# Patient Record
Sex: MALE | Race: WHITE | NOT HISPANIC OR LATINO | Employment: OTHER | URBAN - NONMETROPOLITAN AREA
[De-identification: names, ages, dates, MRNs, and addresses within clinical notes are randomized per-mention and may not be internally consistent; named-entity substitution may affect disease eponyms.]

---

## 2017-01-03 ENCOUNTER — COMMUNICATION - GICH (OUTPATIENT)
Dept: FAMILY MEDICINE | Facility: OTHER | Age: 62
End: 2017-01-03

## 2017-01-03 DIAGNOSIS — I20.89 OTHER FORMS OF ANGINA PECTORIS (H): ICD-10-CM

## 2017-01-14 ENCOUNTER — COMMUNICATION - GICH (OUTPATIENT)
Dept: FAMILY MEDICINE | Facility: OTHER | Age: 62
End: 2017-01-14

## 2017-01-14 DIAGNOSIS — I20.89 OTHER FORMS OF ANGINA PECTORIS (H): ICD-10-CM

## 2017-01-17 ENCOUNTER — COMMUNICATION - GICH (OUTPATIENT)
Dept: RADIOLOGY | Facility: OTHER | Age: 62
End: 2017-01-17

## 2017-01-20 ENCOUNTER — COMMUNICATION - GICH (OUTPATIENT)
Dept: CARDIAC REHAB | Facility: OTHER | Age: 62
End: 2017-01-20

## 2017-01-24 ENCOUNTER — AMBULATORY - GICH (OUTPATIENT)
Dept: FAMILY MEDICINE | Facility: OTHER | Age: 62
End: 2017-01-24

## 2017-01-24 ENCOUNTER — TRANSFERRED RECORDS (OUTPATIENT)
Dept: HEALTH INFORMATION MANAGEMENT | Facility: CLINIC | Age: 62
End: 2017-01-24

## 2017-01-24 ENCOUNTER — HOSPITAL ENCOUNTER (OUTPATIENT)
Dept: RADIOLOGY | Facility: OTHER | Age: 62
End: 2017-01-24
Attending: FAMILY MEDICINE

## 2017-01-24 ENCOUNTER — OFFICE VISIT - GICH (OUTPATIENT)
Dept: FAMILY MEDICINE | Facility: OTHER | Age: 62
End: 2017-01-24

## 2017-01-24 ENCOUNTER — AMBULATORY - GICH (OUTPATIENT)
Dept: SCHEDULING | Facility: OTHER | Age: 62
End: 2017-01-24

## 2017-01-24 ENCOUNTER — MEDICAL CORRESPONDENCE (OUTPATIENT)
Facility: CLINIC | Age: 62
End: 2017-01-24
Payer: COMMERCIAL

## 2017-01-24 DIAGNOSIS — I71.9 AORTIC ANEURYSM WITHOUT RUPTURE (H): ICD-10-CM

## 2017-01-24 DIAGNOSIS — I20.89 OTHER FORMS OF ANGINA PECTORIS (H): ICD-10-CM

## 2017-01-24 DIAGNOSIS — R07.9 CHEST PAIN: ICD-10-CM

## 2017-01-24 DIAGNOSIS — R94.31 ABNORMAL ELECTROCARDIOGRAM: ICD-10-CM

## 2017-01-24 DIAGNOSIS — K21.9 GASTRO-ESOPHAGEAL REFLUX DISEASE WITHOUT ESOPHAGITIS: ICD-10-CM

## 2017-01-24 DIAGNOSIS — M51.369 OTHER INTERVERTEBRAL DISC DEGENERATION, LUMBAR REGION: ICD-10-CM

## 2017-01-24 DIAGNOSIS — M54.16 RADICULOPATHY OF LUMBAR REGION: ICD-10-CM

## 2017-01-24 DIAGNOSIS — I10 ESSENTIAL (PRIMARY) HYPERTENSION: ICD-10-CM

## 2017-01-24 PROCEDURE — 93350 STRESS TTE ONLY: CPT | Mod: 26 | Performed by: INTERNAL MEDICINE

## 2017-01-24 PROCEDURE — 93016 CV STRESS TEST SUPVJ ONLY: CPT | Performed by: INTERNAL MEDICINE

## 2017-01-24 PROCEDURE — 93325 DOPPLER ECHO COLOR FLOW MAPG: CPT | Mod: 26 | Performed by: INTERNAL MEDICINE

## 2017-01-24 PROCEDURE — 93321 DOPPLER ECHO F-UP/LMTD STD: CPT | Mod: 26 | Performed by: INTERNAL MEDICINE

## 2017-01-24 PROCEDURE — 93018 CV STRESS TEST I&R ONLY: CPT | Performed by: INTERNAL MEDICINE

## 2017-02-02 ENCOUNTER — COMMUNICATION - GICH (OUTPATIENT)
Dept: FAMILY MEDICINE | Facility: OTHER | Age: 62
End: 2017-02-02

## 2017-02-13 ENCOUNTER — AMBULATORY - GICH (OUTPATIENT)
Dept: SCHEDULING | Facility: OTHER | Age: 62
End: 2017-02-13

## 2017-02-13 ENCOUNTER — COMMUNICATION - GICH (OUTPATIENT)
Dept: FAMILY MEDICINE | Facility: OTHER | Age: 62
End: 2017-02-13

## 2017-02-13 DIAGNOSIS — I20.89 OTHER FORMS OF ANGINA PECTORIS (H): ICD-10-CM

## 2017-02-14 ENCOUNTER — AMBULATORY - GICH (OUTPATIENT)
Dept: RADIOLOGY | Facility: OTHER | Age: 62
End: 2017-02-14

## 2017-02-14 ENCOUNTER — COMMUNICATION - GICH (OUTPATIENT)
Dept: FAMILY MEDICINE | Facility: OTHER | Age: 62
End: 2017-02-14

## 2017-02-14 DIAGNOSIS — M48.061 SPINAL STENOSIS OF LUMBAR REGION: ICD-10-CM

## 2017-02-14 DIAGNOSIS — M54.16 RADICULOPATHY OF LUMBAR REGION: ICD-10-CM

## 2017-02-16 ENCOUNTER — HOSPITAL ENCOUNTER (OUTPATIENT)
Dept: RADIOLOGY | Facility: OTHER | Age: 62
End: 2017-02-16

## 2017-02-16 DIAGNOSIS — M54.16 RADICULOPATHY OF LUMBAR REGION: ICD-10-CM

## 2017-02-16 DIAGNOSIS — M48.061 SPINAL STENOSIS OF LUMBAR REGION: ICD-10-CM

## 2017-02-17 ENCOUNTER — OFFICE VISIT - GICH (OUTPATIENT)
Dept: FAMILY MEDICINE | Facility: OTHER | Age: 62
End: 2017-02-17

## 2017-02-17 ENCOUNTER — HISTORY (OUTPATIENT)
Dept: FAMILY MEDICINE | Facility: OTHER | Age: 62
End: 2017-02-17

## 2017-02-17 DIAGNOSIS — M51.369 OTHER INTERVERTEBRAL DISC DEGENERATION, LUMBAR REGION: ICD-10-CM

## 2017-02-17 DIAGNOSIS — M54.16 RADICULOPATHY OF LUMBAR REGION: ICD-10-CM

## 2017-02-23 ENCOUNTER — COMMUNICATION - GICH (OUTPATIENT)
Dept: FAMILY MEDICINE | Facility: OTHER | Age: 62
End: 2017-02-23

## 2017-02-23 DIAGNOSIS — I20.89 OTHER FORMS OF ANGINA PECTORIS (H): ICD-10-CM

## 2017-03-08 ENCOUNTER — COMMUNICATION - GICH (OUTPATIENT)
Dept: FAMILY MEDICINE | Facility: OTHER | Age: 62
End: 2017-03-08

## 2017-03-08 ENCOUNTER — OFFICE VISIT - GICH (OUTPATIENT)
Dept: FAMILY MEDICINE | Facility: OTHER | Age: 62
End: 2017-03-08

## 2017-03-08 DIAGNOSIS — E78.00 PURE HYPERCHOLESTEROLEMIA: ICD-10-CM

## 2017-03-08 DIAGNOSIS — M54.16 RADICULOPATHY OF LUMBAR REGION: ICD-10-CM

## 2017-03-08 DIAGNOSIS — C61 MALIGNANT NEOPLASM OF PROSTATE (H): ICD-10-CM

## 2017-03-08 LAB
A/G RATIO - HISTORICAL: 1.5 (ref 1–2)
ALBUMIN SERPL-MCNC: 4.1 G/DL (ref 3.5–5.7)
ALP SERPL-CCNC: 70 IU/L (ref 34–104)
ALT (SGPT) - HISTORICAL: 12 IU/L (ref 7–52)
ANION GAP - HISTORICAL: 7 (ref 5–18)
AST SERPL-CCNC: 12 IU/L (ref 13–39)
BILIRUB SERPL-MCNC: 0.8 MG/DL (ref 0.3–1)
BUN SERPL-MCNC: 9 MG/DL (ref 7–25)
BUN/CREAT RATIO - HISTORICAL: 11
CALCIUM SERPL-MCNC: 9.3 MG/DL (ref 8.6–10.3)
CHLORIDE SERPLBLD-SCNC: 105 MMOL/L (ref 98–107)
CHOL/HDL RATIO - HISTORICAL: 2.65
CHOLESTEROL TOTAL: 98 MG/DL
CO2 SERPL-SCNC: 27 MMOL/L (ref 21–31)
CREAT SERPL-MCNC: 0.8 MG/DL (ref 0.7–1.3)
GFR IF NOT AFRICAN AMERICAN - HISTORICAL: >60 ML/MIN/1.73M2
GLOBULIN - HISTORICAL: 2.8 G/DL (ref 2–3.7)
GLUCOSE SERPL-MCNC: 106 MG/DL (ref 70–105)
HDLC SERPL-MCNC: 37 MG/DL (ref 23–92)
LDLC SERPL CALC-MCNC: 29 MG/DL
NON-HDL CHOLESTEROL - HISTORICAL: 61 MG/DL
PATIENT STATUS - HISTORICAL: ABNORMAL
POTASSIUM SERPL-SCNC: 4.1 MMOL/L (ref 3.5–5.1)
PROT SERPL-MCNC: 6.9 G/DL (ref 6.4–8.9)
PSA TOTAL (DIAGNOSTIC) - HISTORICAL: 0.26 NG/ML
SODIUM SERPL-SCNC: 139 MMOL/L (ref 133–143)
TRIGL SERPL-MCNC: 161 MG/DL

## 2017-03-29 ENCOUNTER — COMMUNICATION - GICH (OUTPATIENT)
Dept: FAMILY MEDICINE | Facility: OTHER | Age: 62
End: 2017-03-29

## 2017-04-07 ENCOUNTER — HISTORY (OUTPATIENT)
Dept: FAMILY MEDICINE | Facility: OTHER | Age: 62
End: 2017-04-07

## 2017-04-07 ENCOUNTER — AMBULATORY - GICH (OUTPATIENT)
Dept: FAMILY MEDICINE | Facility: OTHER | Age: 62
End: 2017-04-07

## 2017-04-07 DIAGNOSIS — Z01.810 ENCOUNTER FOR PREPROCEDURAL CARDIOVASCULAR EXAMINATION: ICD-10-CM

## 2017-04-07 DIAGNOSIS — M54.16 RADICULOPATHY OF LUMBAR REGION: ICD-10-CM

## 2017-04-07 LAB
A/G RATIO - HISTORICAL: 1.4 (ref 1–2)
ABSOLUTE BASOPHILS - HISTORICAL: 0.1 THOU/CU MM
ABSOLUTE EOSINOPHILS - HISTORICAL: 0.2 THOU/CU MM
ABSOLUTE LYMPHOCYTES - HISTORICAL: 1.4 THOU/CU MM (ref 0.9–2.9)
ABSOLUTE MONOCYTES - HISTORICAL: 0.4 THOU/CU MM
ABSOLUTE NEUTROPHILS - HISTORICAL: 3.2 THOU/CU MM (ref 1.7–7)
ALBUMIN SERPL-MCNC: 4 G/DL (ref 3.5–5.7)
ALP SERPL-CCNC: 67 IU/L (ref 34–104)
ALT (SGPT) - HISTORICAL: 13 IU/L (ref 7–52)
ANION GAP - HISTORICAL: 8 (ref 5–18)
AST SERPL-CCNC: 15 IU/L (ref 13–39)
BASOPHILS # BLD AUTO: 1.1 %
BILIRUB SERPL-MCNC: 0.5 MG/DL (ref 0.3–1)
BUN SERPL-MCNC: 24 MG/DL (ref 7–25)
BUN/CREAT RATIO - HISTORICAL: 32
CALCIUM SERPL-MCNC: 9.1 MG/DL (ref 8.6–10.3)
CHLORIDE SERPLBLD-SCNC: 107 MMOL/L (ref 98–107)
CO2 SERPL-SCNC: 23 MMOL/L (ref 21–31)
CREAT SERPL-MCNC: 0.76 MG/DL (ref 0.7–1.3)
EOSINOPHIL NFR BLD AUTO: 4.4 %
ERYTHROCYTE [DISTWIDTH] IN BLOOD BY AUTOMATED COUNT: 12.4 % (ref 11.5–15.5)
GFR IF NOT AFRICAN AMERICAN - HISTORICAL: >60 ML/MIN/1.73M2
GLOBULIN - HISTORICAL: 2.9 G/DL (ref 2–3.7)
GLUCOSE SERPL-MCNC: 98 MG/DL (ref 70–105)
HCT VFR BLD AUTO: 43.8 % (ref 37–53)
HEMOGLOBIN: 14.7 G/DL (ref 13.5–17.5)
INR - HISTORICAL: 1.1
LYMPHOCYTES NFR BLD AUTO: 26 % (ref 20–44)
MCH RBC QN AUTO: 29.7 PG (ref 26–34)
MCHC RBC AUTO-ENTMCNC: 33.6 G/DL (ref 32–36)
MCV RBC AUTO: 88 FL (ref 80–100)
MONOCYTES NFR BLD AUTO: 7.6 %
NEUTROPHILS NFR BLD AUTO: 60.9 % (ref 42–72)
PLATELET # BLD AUTO: 179 THOU/CU MM (ref 140–440)
PMV BLD: 10.3 FL (ref 6.5–11)
POTASSIUM SERPL-SCNC: 4 MMOL/L (ref 3.5–5.1)
PROT SERPL-MCNC: 6.9 G/DL (ref 6.4–8.9)
PROTIME - HISTORICAL: 11.4 SEC (ref 11.9–15.2)
PSA TOTAL (DIAGNOSTIC) - HISTORICAL: 0.27 NG/ML
RED BLOOD COUNT - HISTORICAL: 4.95 MIL/CU MM (ref 4.3–5.9)
SODIUM SERPL-SCNC: 138 MMOL/L (ref 133–143)
WHITE BLOOD COUNT - HISTORICAL: 5.2 THOU/CU MM (ref 4.5–11)

## 2017-05-11 ENCOUNTER — COMMUNICATION - GICH (OUTPATIENT)
Dept: FAMILY MEDICINE | Facility: OTHER | Age: 62
End: 2017-05-11

## 2017-05-12 ENCOUNTER — AMBULATORY - GICH (OUTPATIENT)
Dept: FAMILY MEDICINE | Facility: OTHER | Age: 62
End: 2017-05-12

## 2017-05-17 ENCOUNTER — OFFICE VISIT - GICH (OUTPATIENT)
Dept: FAMILY MEDICINE | Facility: OTHER | Age: 62
End: 2017-05-17

## 2017-05-17 ENCOUNTER — HISTORY (OUTPATIENT)
Dept: FAMILY MEDICINE | Facility: OTHER | Age: 62
End: 2017-05-17

## 2017-05-17 DIAGNOSIS — M54.50 LOW BACK PAIN: ICD-10-CM

## 2017-05-17 DIAGNOSIS — G89.29 OTHER CHRONIC PAIN: ICD-10-CM

## 2017-05-17 DIAGNOSIS — G89.18 OTHER ACUTE POSTPROCEDURAL PAIN: ICD-10-CM

## 2017-05-17 DIAGNOSIS — Z79.899 OTHER LONG TERM (CURRENT) DRUG THERAPY: ICD-10-CM

## 2017-05-17 DIAGNOSIS — I10 ESSENTIAL (PRIMARY) HYPERTENSION: ICD-10-CM

## 2017-06-15 ENCOUNTER — AMBULATORY - GICH (OUTPATIENT)
Dept: RADIOLOGY | Facility: OTHER | Age: 62
End: 2017-06-15

## 2017-06-15 ENCOUNTER — AMBULATORY - GICH (OUTPATIENT)
Dept: SCHEDULING | Facility: OTHER | Age: 62
End: 2017-06-15

## 2017-06-19 ENCOUNTER — COMMUNICATION - GICH (OUTPATIENT)
Dept: FAMILY MEDICINE | Facility: OTHER | Age: 62
End: 2017-06-19

## 2017-06-28 ENCOUNTER — AMBULATORY - GICH (OUTPATIENT)
Dept: PHYSICAL THERAPY | Facility: OTHER | Age: 62
End: 2017-06-28

## 2017-06-28 ENCOUNTER — OFFICE VISIT - GICH (OUTPATIENT)
Dept: FAMILY MEDICINE | Facility: OTHER | Age: 62
End: 2017-06-28

## 2017-06-28 ENCOUNTER — HISTORY (OUTPATIENT)
Dept: FAMILY MEDICINE | Facility: OTHER | Age: 62
End: 2017-06-28

## 2017-06-28 DIAGNOSIS — M54.50 LOW BACK PAIN: ICD-10-CM

## 2017-06-28 DIAGNOSIS — M48.061 SPINAL STENOSIS OF LUMBAR REGION: ICD-10-CM

## 2017-06-28 DIAGNOSIS — G89.18 OTHER ACUTE POSTPROCEDURAL PAIN: ICD-10-CM

## 2017-06-28 DIAGNOSIS — G89.29 OTHER CHRONIC PAIN: ICD-10-CM

## 2017-07-07 ENCOUNTER — AMBULATORY - GICH (OUTPATIENT)
Dept: SCHEDULING | Facility: OTHER | Age: 62
End: 2017-07-07

## 2017-07-12 ENCOUNTER — COMMUNICATION - GICH (OUTPATIENT)
Dept: FAMILY MEDICINE | Facility: OTHER | Age: 62
End: 2017-07-12

## 2017-07-13 ENCOUNTER — AMBULATORY - GICH (OUTPATIENT)
Dept: SCHEDULING | Facility: OTHER | Age: 62
End: 2017-07-13

## 2017-07-14 ENCOUNTER — AMBULATORY - GICH (OUTPATIENT)
Dept: SCHEDULING | Facility: OTHER | Age: 62
End: 2017-07-14

## 2017-07-14 ENCOUNTER — HOSPITAL ENCOUNTER (OUTPATIENT)
Dept: PHYSICAL THERAPY | Facility: OTHER | Age: 62
Setting detail: THERAPIES SERIES
End: 2017-07-14

## 2017-07-14 DIAGNOSIS — M48.061 SPINAL STENOSIS OF LUMBAR REGION: ICD-10-CM

## 2017-07-18 ENCOUNTER — HOSPITAL ENCOUNTER (OUTPATIENT)
Dept: PHYSICAL THERAPY | Facility: OTHER | Age: 62
Setting detail: THERAPIES SERIES
End: 2017-07-18

## 2017-07-20 ENCOUNTER — AMBULATORY - GICH (OUTPATIENT)
Dept: SCHEDULING | Facility: OTHER | Age: 62
End: 2017-07-20

## 2017-07-21 ENCOUNTER — HOSPITAL ENCOUNTER (OUTPATIENT)
Dept: PHYSICAL THERAPY | Facility: OTHER | Age: 62
Setting detail: THERAPIES SERIES
End: 2017-07-21

## 2017-07-24 ENCOUNTER — HOSPITAL ENCOUNTER (OUTPATIENT)
Dept: PHYSICAL THERAPY | Facility: OTHER | Age: 62
Setting detail: THERAPIES SERIES
End: 2017-07-24

## 2017-07-27 ENCOUNTER — HOSPITAL ENCOUNTER (OUTPATIENT)
Dept: PHYSICAL THERAPY | Facility: OTHER | Age: 62
Setting detail: THERAPIES SERIES
End: 2017-07-27

## 2017-07-31 ENCOUNTER — HOSPITAL ENCOUNTER (OUTPATIENT)
Dept: PHYSICAL THERAPY | Facility: OTHER | Age: 62
Setting detail: THERAPIES SERIES
End: 2017-07-31

## 2017-08-01 ENCOUNTER — COMMUNICATION - GICH (OUTPATIENT)
Dept: FAMILY MEDICINE | Facility: OTHER | Age: 62
End: 2017-08-01

## 2017-08-01 ENCOUNTER — OFFICE VISIT - GICH (OUTPATIENT)
Dept: FAMILY MEDICINE | Facility: OTHER | Age: 62
End: 2017-08-01

## 2017-08-01 ENCOUNTER — AMBULATORY - GICH (OUTPATIENT)
Dept: SCHEDULING | Facility: OTHER | Age: 62
End: 2017-08-01

## 2017-08-01 ENCOUNTER — HISTORY (OUTPATIENT)
Dept: FAMILY MEDICINE | Facility: OTHER | Age: 62
End: 2017-08-01

## 2017-08-01 DIAGNOSIS — M54.50 LOW BACK PAIN: ICD-10-CM

## 2017-08-01 DIAGNOSIS — G89.29 OTHER CHRONIC PAIN: ICD-10-CM

## 2017-08-03 ENCOUNTER — HOSPITAL ENCOUNTER (OUTPATIENT)
Dept: PHYSICAL THERAPY | Facility: OTHER | Age: 62
Setting detail: THERAPIES SERIES
End: 2017-08-03

## 2017-08-28 ENCOUNTER — COMMUNICATION - GICH (OUTPATIENT)
Dept: FAMILY MEDICINE | Facility: OTHER | Age: 62
End: 2017-08-28

## 2017-09-25 ENCOUNTER — OFFICE VISIT - GICH (OUTPATIENT)
Dept: FAMILY MEDICINE | Facility: OTHER | Age: 62
End: 2017-09-25

## 2017-09-25 ENCOUNTER — HISTORY (OUTPATIENT)
Dept: FAMILY MEDICINE | Facility: OTHER | Age: 62
End: 2017-09-25

## 2017-09-25 ENCOUNTER — COMMUNICATION - GICH (OUTPATIENT)
Dept: FAMILY MEDICINE | Facility: OTHER | Age: 62
End: 2017-09-25

## 2017-09-25 DIAGNOSIS — M48.061 SPINAL STENOSIS OF LUMBAR REGION: ICD-10-CM

## 2017-09-25 DIAGNOSIS — Z23 ENCOUNTER FOR IMMUNIZATION: ICD-10-CM

## 2017-09-25 DIAGNOSIS — Z79.899 OTHER LONG TERM (CURRENT) DRUG THERAPY: ICD-10-CM

## 2017-09-25 DIAGNOSIS — N39.492 POSTURAL URINARY INCONTINENCE: ICD-10-CM

## 2017-09-25 DIAGNOSIS — C61 MALIGNANT NEOPLASM OF PROSTATE (H): ICD-10-CM

## 2017-10-17 ENCOUNTER — COMMUNICATION - GICH (OUTPATIENT)
Dept: FAMILY MEDICINE | Facility: OTHER | Age: 62
End: 2017-10-17

## 2017-10-18 ENCOUNTER — OFFICE VISIT - GICH (OUTPATIENT)
Dept: FAMILY MEDICINE | Facility: OTHER | Age: 62
End: 2017-10-18

## 2017-10-18 ENCOUNTER — HISTORY (OUTPATIENT)
Dept: FAMILY MEDICINE | Facility: OTHER | Age: 62
End: 2017-10-18

## 2017-10-18 DIAGNOSIS — G89.29 OTHER CHRONIC PAIN: ICD-10-CM

## 2017-10-18 DIAGNOSIS — C61 MALIGNANT NEOPLASM OF PROSTATE (H): ICD-10-CM

## 2017-10-18 DIAGNOSIS — M54.50 LOW BACK PAIN: ICD-10-CM

## 2017-11-04 ENCOUNTER — AMBULATORY - GICH (OUTPATIENT)
Dept: SCHEDULING | Facility: OTHER | Age: 62
End: 2017-11-04

## 2017-11-07 ENCOUNTER — COMMUNICATION - GICH (OUTPATIENT)
Dept: FAMILY MEDICINE | Facility: OTHER | Age: 62
End: 2017-11-07

## 2017-12-05 ENCOUNTER — COMMUNICATION - GICH (OUTPATIENT)
Dept: FAMILY MEDICINE | Facility: OTHER | Age: 62
End: 2017-12-05

## 2017-12-06 ENCOUNTER — AMBULATORY - GICH (OUTPATIENT)
Dept: SCHEDULING | Facility: OTHER | Age: 62
End: 2017-12-06

## 2017-12-22 ENCOUNTER — COMMUNICATION - GICH (OUTPATIENT)
Dept: FAMILY MEDICINE | Facility: OTHER | Age: 62
End: 2017-12-22

## 2017-12-22 DIAGNOSIS — M54.50 LOW BACK PAIN: ICD-10-CM

## 2017-12-22 DIAGNOSIS — G89.29 OTHER CHRONIC PAIN: ICD-10-CM

## 2017-12-27 NOTE — PROGRESS NOTES
"Patient Information     Patient Name MRN Sex Dilip Lomax [id number] Male 1955      Progress Notes by Mago Musa at 2017  1:26 PM     Author:  Mago Musa Service:  (none) Author Type:  PT- Physical Therapy Assistant     Filed:  2017  4:20 PM Date of Service:  2017  1:26 PM Status:  Signed     :  Mago Musa (PT- Physical Therapy Assistant)            [facility]  Outpatient PT - Daily Note      Date of Service: 2017   Appointment #3  Patient Name: Dilip Kan   YOB: 1955   Referring MD/Provider: (not recorded)  Medical and Treatment Diagnosis: (not recorded)  PT Treatment Diagnosis: Spinal stenosis at L4-L5 level [M48.06] , neurogenic claudication  Insurance: Other: UMR/UMR  Start of Service:  2017     Certification Dates: Start of Service: 2017                        Medicare/MA Re-Cert Due: 2017     Surgery date: 2017. Procedure L4-5 PSF. Dr. Reeder [facility]      Subjective        Pain Rating:  Today pain is \"real bad\": He has increased pain with sitting and driving.    3 = Mild Pain, (Bothersome, Annoying, Irritating, Nagging) and  4 = Moderate Pain, (Aggravating, Grueling, Upsetting, Frustrating) / Location:  Mid to low lumbar with radiation to the upper lateral right hip.    According to Dr. Reeder, surgery site looks good, may have tweaked something a few weeks ago which is causing his current pain. Reports he was told to start doing more, within reason and as pain allows.     Objective      Today's Intervention:    1) Reviewed Physical Therapy POC  2) Reviewed initial therapeutic exercise program    Treadmill x 10 minutes 1.0 mph - progressed to 1.5 mph   NuStep x 10 minutes level 4 seat and arms at position 11    Completed   1) PPT 5 second x20 - reinstructed for greater ROM vs ab set   2) PPT with SLR x 10 B  3) squats with PPT x 10  4) hooklying lower trunk rotation B x10 "     Passive HS stretch B 5 x30 second hold       Interferential e-stim x 20 minutes - Patient declined, did not feel like pain levels required it today.     Plan: quad and hip flexor stretches     Home Exercise Program:    Supine lumbar rotation  PPT  PPT with SLR  Gentle knee to chest      Assessment    Therapist Assessment:    Functional limitation due to residual LBP        Goals:  Patient goal (time reference required): 8-10 weeks.  ST) Patient is to be independent in their Home Exercise Program in 4-5 weeks.  2) Patient will report a 30% decrease in LBP in 4-5 weeks  3) Patient will be able to flex forward to reach his ankles in 4  4) Patient will demo an improvement of LE strength of  1/2 to 1 full MMT grade in 4-5 weeks         Patients long term functional goals:   Patient is to tolerate walking with 0-210 pain up to 45 minutes in 8 weeks.  Patient is to walk with a normal gait pattern in 8 weeks.  Patient is to have improved spine mobility to NML to allow for improved dressing and self-cares with 0-2 pain in 8-10 weeks.  Patient is to sleep with rare disruptions due to pain in 8-10 weeks.  Patient is to complete work activities with 0-2/10 pain with no restrictions in 12 weeks.  Patient is to demonstrate improved core and abdominal strength to allow work tasks around tasks in 12 weeks.     Patient is to report /10 pain during household activities including light cleaning, self-cares in 8 weeks    Response to Intervention:  Patient had no c/o pain with aerobic conditioning. Note back pain when getting up from sitting/supine positions       Plan    Treatment Plan / Targeted Outcomes:     Frequency:   16 visits     Duration of Treatment: 60 days    Planned Interventions:    Home Exercise Program development  Therapeutic Exercise (ROM & Strengthening)  Manual Therapy  Neuromuscular Re-education  Ultrasound  Electrical Stimulation  Phonophoresis with Ketoprofen  Iontophoresis with Dexamethasone  Therapeutic  Activities    Plan for next visit:  Continue PT    Student or PTA has been instructed in and demonstrates skills necessary to carry out above stated treatment plan: Yes    Thank you for your referral to [facility].  Please call with any questions, concerns or comments.  [phone]

## 2017-12-27 NOTE — PROGRESS NOTES
Patient Information     Patient Name MRN Dilip Kumar [id number] Male 1955      Progress Notes by Sumanth Roberts MD at 2017  2:30 PM     Author:  Sumanth Roberts MD Service:  (none) Author Type:  Physician     Filed:  7/3/2017 10:57 AM Encounter Date:  2017 Status:  Signed     :  Sumanth Roberts MD (Physician)            SUBJECTIVE:  Dilip Kan is a 62 y.o. Male.  He comes in today to follow-up for pain management on chronic low back pain. Recently had surgery. Has had gradual improvement in pain and has gradually reduced his narcotics since surgery. He had cut down to 8 pills per day of hydrocodone for total oral morphine equivalent of 80 mg up until about a week ago. He reports he was taking the garbage out and afterwards had intense pain in his low back. Upon further discussion right around this time he also started walking for about a half hour per day. He reports the pain is localized to his low back. Seems to be worse with activity, better when he lays down. He has not had any numbness or weakness. He has not had any urinary changes. Specifically no bowel or bladder incontinence. Because of this he has increased his hydrocodone back to 12 pills per day. He does think overall symptoms have improved over the past day or 2 but is still using the higher dose of hydrocodone. He has not had any oxycodone now for couple of weeks.      Social History        Substance Use Topics          Smoking status:   Former Smoker      Packs/day:  1.00      Years:  20.00      Types:  Cigarettes      Quit date:  2002      Smokeless tobacco:   Current User      Types:  Snuff      Alcohol use   Yes      Comment: 5 drinks a month         I have personally reviewed and updated above noted social, family and/or past medical history.    A comprehensive review of systems was negative except for items noted in HPI/Subjective.      OBJECTIVE:  /64  Pulse 68  Temp 98.5  " F (36.9  C) (Tympanic)  Ht 1.797 m (5' 10.75\")  Wt 99.5 kg (219 lb 6.4 oz)  BMI 30.82 kg/m2  EXAM:  General Appearance: Pleasant, alert, appropriate appearance for age. No acute distress  Chest/Respiratory Exam: Normal chest wall and respirations. Clear to auscultation.  Cardiovascular Exam: Regular rate and rhythm. S1, S2, no murmur, click, gallop, or rubs.  Gastrointestinal Exam: Soft, nontender, no abnormal masses or organomegaly.  Musculoskeletal Exam: Patient is tender with palpation of paraspinous muscles. He does not have midline or bony tenderness today.  Neurologic Exam: Nonfocal; symmetric DTRs, normal gross motor movement, tone, and coordination. No tremor.  Skin: No redness or abnormality.    ASSESSMENT/Plan :          Dilip was seen today for follow up.    Diagnoses and all orders for this visit:    Chronic low back pain, unspecified back pain laterality, with sciatica presence unspecified  patient's exam is reassuring. I think he likely has overdone it with increasing his activity a little fast. It is also reassuring that things have improved over the past day or 2. Patient had #340 hydrocodone last 34 days after surgery he had it refilled on June 12 for lower quantity of #300. He reports that he probably has 10-12 days of hydrocodone left at his current increased dose, but would like to have it filled on July 3 due to holiday. I think that is reasonable. He has been very reliable.  His current oral morphine equivalent is right around the 100 mg. It has decreased since surgery. Hopefully we can continue to decrease and patient's goal is to get off the medication altogether if possible. I did write for him a second prescription for #300 to be filled 30 days later. We will see him back in September and hopefully at this point we can significantly decrease his hydrocodone. I explained to him that if in the meantime he does not require as much medications he should only take what is needed andhis " prescription would last longer. This will make tapering even easier.     reviewed. Tox screen up-to-date.  We'll hold off on changing his narcotic agreement as his monthly prescription and care plan is in flux right now      -     Discontinue: HYDROcodone-acetaminophen,  mg, (NORCO )  mg per tablet; Take 1-2 tablets by mouth every 4 hours if needed  Ok to refill on 7/3/17  -     HYDROcodone-acetaminophen,  mg, (NORCO )  mg per tablet; Take 1-2 tablets by mouth every 4 hours if needed  Ok to refill on 8/3/17    Acute postoperative pain  with some decreased mobility and ongoing narcotics has had some intermittent constipation. Needs refill of senna. This has worked well in the past.  -     sennosides-docusate, 8.6-50 mg, (SENOKOT S) 8.6-50 mg tablet; Take 1-4 tablets by mouth 2 times daily.      Greater than 50% of this 25 minute visit was spent in counseling and coordination of care regarding diagnosis and treatment of chronic low back pain    There are no Patient Instructions on file for this visit.    Sumanth Roberts MD    This document was created using computer generated templates and voice activated software.

## 2017-12-27 NOTE — PROGRESS NOTES
Patient Information     Patient Name MRN Sex Dilip Lomax [id number] Male 1955      Progress Notes by Kee Tobar PT at 2017  1:52 PM     Author:  Kee Tobar PT Service:  (none) Author Type:  PT- Physical Therapist     Filed:  2017  2:35 PM Date of Service:  2017  1:52 PM Status:  Signed     :  Kee Tobar PT (PT- Physical Therapist)            [facility]  Outpatient PT - Daily Note      Date of Service:  2017    Appointment #5  Patient Name: Dilip Kan   YOB: 1955   Referring MD/Provider: (not recorded)  Medical and Treatment Diagnosis: (not recorded)  PT Treatment Diagnosis: Spinal stenosis at L4-L5 level [M48.06] , neurogenic claudication  Insurance: Other: UMR/UMR  Start of Service:  2017     Certification Dates: Start of Service: 2017                        Medicare/MA Re-Cert Due: 2017     Surgery date: 2017. Procedure L4-5 PSF. Dr. Reeder [facility]      Subjective        Pain Rating:  Patient reports decreased pain. H efeels more confident in his back after conversation with his spinal surgeon.    3 = Mild Pain, (Bothersome, Annoying, Irritating, Nagging) and  4 = Moderate Pain, (Aggravating, Grueling, Upsetting, Frustrating) / Location:  Mid to low lumbar with radiation to the upper lateral right hip.    Patient reports he has been able to do more outside without any increase in back discomfort.      Objective  Note significant bilateral HS, piriformis  tightness    Today's Intervention:    1) Reviewed Physical Therapy POC  2) Reviewed initial therapeutic exercise program    Treadmill x 10 minutes 1.0 mph - progressed to 1.6 mph   NuStep x 10 minutes level 4 seat and arms at position 11    Completed   1) PPT 5 second x20 - reinstructed for greater ROM vs ab set   2) PPT with SLR x 10 B  3) squats with PPT x 10  4) hooklying lower trunk rotation B x10   5) Anterior/posterior  pelvic tilts 5 second hold x10     Passive HS and piriformis  stretch B 5 x30 second hold   Sidelying passive hip flexor and quad stretches B 5 x30 second hold       Interferential e-stim x 20 minutes - Patient again declined notes his pain level has decreased significantly.      Home Exercise Program:    Supine lumbar rotation  PPT  PPT with SLR  Gentle knee to chest      Assessment    Therapist Assessment:    Functional limitation due to residual LBP        Goals:  Patient goal (time reference required): 8-10 weeks.  ST) Patient is to be independent in their Home Exercise Program in 4-5 weeks.  2) Patient will report a 30% decrease in LBP in 4-5 weeks  3) Patient will be able to flex forward to reach his ankles in 4  4) Patient will demo an improvement of LE strength of  1/2 to 1 full MMT grade in 4-5 weeks         Patients long term functional goals:   Patient is to tolerate walking with 0-210 pain up to 45 minutes in 8 weeks.  Patient is to walk with a normal gait pattern in 8 weeks.  Patient is to have improved spine mobility to NML to allow for improved dressing and self-cares with 0-2 pain in 8-10 weeks.  Patient is to sleep with rare disruptions due to pain in 8-10 weeks.  Patient is to complete work activities with 0-2/10 pain with no restrictions in 12 weeks.  Patient is to demonstrate improved core and abdominal strength to allow work tasks around tasks in 12 weeks.     Patient is to report /10 pain during household activities including light cleaning, self-cares in 8 weeks    Response to Intervention:  Patient had no c/o pain with aerobic conditioning. Note back pain when getting up from sitting/supine positions       Plan    Treatment Plan / Targeted Outcomes:     Frequency:   16 visits     Duration of Treatment: 60 days    Planned Interventions:    Home Exercise Program development  Therapeutic Exercise (ROM & Strengthening)  Manual Therapy  Neuromuscular Re-education  Ultrasound  Electrical  Stimulation  Phonophoresis with Ketoprofen  Iontophoresis with Dexamethasone  Therapeutic Activities    Plan for next visit:  Continue PT    Student or PTA has been instructed in and demonstrates skills necessary to carry out above stated treatment plan: Yes    Thank you for your referral to [facility].  Please call with any questions, concerns or comments.  [phone]

## 2017-12-28 ENCOUNTER — COMMUNICATION - GICH (OUTPATIENT)
Dept: FAMILY MEDICINE | Facility: OTHER | Age: 62
End: 2017-12-28

## 2017-12-28 ENCOUNTER — HISTORY (OUTPATIENT)
Dept: FAMILY MEDICINE | Facility: OTHER | Age: 62
End: 2017-12-28

## 2017-12-28 ENCOUNTER — OFFICE VISIT - GICH (OUTPATIENT)
Dept: FAMILY MEDICINE | Facility: OTHER | Age: 62
End: 2017-12-28

## 2017-12-28 DIAGNOSIS — G89.29 OTHER CHRONIC PAIN: ICD-10-CM

## 2017-12-28 DIAGNOSIS — M54.50 LOW BACK PAIN: ICD-10-CM

## 2017-12-28 DIAGNOSIS — I10 ESSENTIAL (PRIMARY) HYPERTENSION: ICD-10-CM

## 2017-12-28 NOTE — TELEPHONE ENCOUNTER
Patient Information     Patient Name MRN Sex Dilip Lomax [id number] Male 1955      Telephone Encounter by Jacqueline Cortez at 2017  1:10 PM     Author:  Jacqueline Cortez Service:  (none) Author Type:  (none)     Filed:  2017  1:24 PM Encounter Date:  2017 Status:  Signed     :  Jacqueline Cortez            Fax received requesting Prior Authorization for Hydrocodone.  Patient's insurance and ID #: [id number]  Phone number to help desk: [phone]  PA or change to alternative therapy? PA  Drugs tried and failed: Morphine CR, Oxycodone, several spinal injections, s/p decompression and spinal fusion 17  Diagnosis related to medication: low back pain, chronic (M48.06, M54.9, M54.5, G89.29)  See scanned image.  Jacqueline Cortez [facility])  ..................2017   1:10 PM

## 2017-12-28 NOTE — TELEPHONE ENCOUNTER
Patient Information     Patient Name MRN Sex Dilip Lomax [id number] Male 1955      Telephone Encounter by Sumanth Roberts MD at 2017 11:43 AM     Author:  Sumanth Roberts MD Service:  (none) Author Type:  Physician     Filed:  2017 11:43 AM Encounter Date:  2017 Status:  Signed     :  Sumanth Roberts MD (Physician)            I saw him a month ago after surgery.  Can we get more information from his surgeon such as an office note to find out what he actually needs.  It is unlikely I can see him anytime soon.        
Patient Information     Patient Name MRN Sex Dilip Lomax [id number] Male 1955      Telephone Encounter by Jacqueline Cortez at 2017 12:39 PM     Author:  Jacqueline Cortez Service:  (none) Author Type:  (none)     Filed:  2017 12:46 PM Encounter Date:  2017 Status:  Signed     :  Jacqueline Cortez            Left message for [facility] to call back or fax us some records from his surgery and/or follow up afterward.  Jacqueline Cortez CMA([facility]) ....................  2017   12:39 PM   Called patient and he is going to probably run out of his pain medications around the first weekend of July. He won't be able to make it until 17 for an appointment. He wonders if you could squeeze him on on 17 to discuss how to proceed with medications now that surgery is done.  Jacqueline Cortez CMA([facility])  ..................2017   12:45 PM         
Patient Information     Patient Name MRN Sex Dilip Lomax [id number] Male 1955      Telephone Encounter by Jacqueline Cortez at 2017  2:01 PM     Author:  Jacqueline Cortez Service:  (none) Author Type:  (none)     Filed:  2017  2:01 PM Encounter Date:  2017 Status:  Signed     :  Jacqueline Cortez            Left  Message with appointment time of 2:30 on 10/28/17 as there was a cancellation.  Jacqueline Cortez [facility])  ..................2017   2:01 PM         
Patient Information     Patient Name MRN Sex Dilip Lomax [id number] Male 1955      Telephone Encounter by Marissa Garcia at 2017 10:56 AM     Author:  Marissa Garcia Service:  (none) Author Type:  (none)     Filed:  2017 10:58 AM Encounter Date:  2017 Status:  Signed     :  Marissa Garcia            PT NEEDS TO BE SEEN FOR A HOSPITAL FOLLOW UP FROM HIS SURG PER HIS SURGEON IN REGARDS TO HIS BACK.  NOTHING IS AVAIL BEFORE 17; HE WOULD LIKE A CALL FROM [facility] NURSE. THANK YOU        
Patient Information     Patient Name MRN Sex Dilip Lomax [id number] Male 1955      Telephone Encounter by Jacqueline Cortez at 2017 11:11 AM     Author:  Jacqueline Cortez Service:  (none) Author Type:  (none)     Filed:  2017 11:11 AM Encounter Date:  2017 Status:  Signed     :  Jacqueline Cortez            Where would you like to add him?  Jacqueline Cortez [facility])  ..................2017   11:11 AM         
no concerns

## 2017-12-28 NOTE — TELEPHONE ENCOUNTER
Patient Information     Patient Name MRN Dilip Kumar [id number] Male 1955      Telephone Encounter by Sumanth Roberts MD at 2017  4:39 PM     Author:  Sumanth Roberts MD Service:  (none) Author Type:  Physician     Filed:  2017  4:40 PM Encounter Date:  2017 Status:  Signed     :  Sumanth Roberts MD (Physician)            Yes that is fine, but then his next refill should still be on  after that.

## 2017-12-28 NOTE — PROGRESS NOTES
Patient Information     Patient Name MRN Sex Dilip Lomax [id number] Male 1955      Progress Notes by Kee Tobar PT at 2017  8:32 AM     Author:  Kee Tobar PT Service:  (none) Author Type:  PT- Physical Therapist     Filed:  2017  8:49 AM Date of Service:  2017  8:32 AM Status:  Signed     :  Kee Tobar PT (PT- Physical Therapist)            [facility]  Outpatient PT - Daily Note      Date of Service:  2017  Appointment #6    Patient Name: Dilip Kan   YOB: 1955   Referring MD/Provider: (not recorded)  Medical and Treatment Diagnosis: (not recorded)  PT Treatment Diagnosis: Spinal stenosis at L4-L5 level [M48.06] , neurogenic claudication  Insurance: Other: UMR/UMR  Start of Service:  2017     Certification Dates: Start of Service: 2017                        Medicare/MA Re-Cert Due: 2017     Surgery date: 2017. Procedure L4-5 PSF. Dr. Reeder [facility]      Subjective        Pain Rating:  Patient reports decreased pain. He feels more confident in his back after conversation with his spinal surgeon.    3 = Mild Pain, (Bothersome, Annoying, Irritating, Nagging) and  4 = Moderate Pain, (Aggravating, Grueling, Upsetting, Frustrating) / Location:  Mid to low lumbar with radiation to the upper lateral right hip.    Patient reports he has been able to do more outside without any increase in back discomfort.      Objective  Note significant bilateral HS, piriformis  tightness    Today's Intervention:    1) Reviewed Physical Therapy POC  2) Reviewed initial therapeutic exercise program    Treadmill x 10 minutes 1.0 mph - progressed to 1.6 mph   NuStep x 10 minutes level 4 seat and arms at position 11    Completed   1) PPT 5 second x20 - reinstructed for greater ROM vs ab set   2) PPT with SLR x 15 B  3) squats with PPT x 15  4) hooklying lower trunk rotation B x10   5) Anterior/posterior  pelvic tilts with ball squeeze 5 second hold x10   6) SL Hip abduction  x15 With PPT    Passive HS and piriformis  stretch B 5 x30 second hold   Sidelying passive hip flexor and quad stretches B 5 x30 second hold       Interferential e-stim x 20 minutes - Patient again declined notes his pain level has decreased significantly.      Home Exercise Program:    Supine lumbar rotation  PPT  PPT with SLR  Gentle knee to chest      Assessment    Therapist Assessment:    Functional limitation due to residual LBP        Goals:  Patient goal (time reference required): 8-10 weeks.  ST) Patient is to be independent in their Home Exercise Program in 4-5 weeks.  2) Patient will report a 30% decrease in LBP in 4-5 weeks  3) Patient will be able to flex forward to reach his ankles in 4  4) Patient will demo an improvement of LE strength of  1/2 to 1 full MMT grade in 4-5 weeks         Patients long term functional goals:   Patient is to tolerate walking with 0-210 pain up to 45 minutes in 8 weeks.  Patient is to walk with a normal gait pattern in 8 weeks.  Patient is to have improved spine mobility to NML to allow for improved dressing and self-cares with 0-2 pain in 8-10 weeks.  Patient is to sleep with rare disruptions due to pain in 8-10 weeks.  Patient is to complete work activities with 0-2/10 pain with no restrictions in 12 weeks.  Patient is to demonstrate improved core and abdominal strength to allow work tasks around tasks in 12 weeks.     Patient is to report /10 pain during household activities including light cleaning, self-cares in 8 weeks    Response to Intervention:  Patient had no c/o pain with aerobic conditioning. Note back pain when getting up from sitting/supine positions       Plan    Treatment Plan / Targeted Outcomes:     Frequency:   16 visits     Duration of Treatment: 60 days    Planned Interventions:    Home Exercise Program development  Therapeutic Exercise (ROM & Strengthening)  Manual  Therapy  Neuromuscular Re-education  Ultrasound  Electrical Stimulation  Phonophoresis with Ketoprofen  Iontophoresis with Dexamethasone  Therapeutic Activities    Plan for next visit:  Continue PT    Student or PTA has been instructed in and demonstrates skills necessary to carry out above stated treatment plan: Yes    Thank you for your referral to [facility].  Please call with any questions, concerns or comments.  [phone]

## 2017-12-28 NOTE — TELEPHONE ENCOUNTER
Patient Information     Patient Name MRN Sex Dilip Lomax [id number] Male 1955      Telephone Encounter by Mary Gaffney at 2017  4:44 PM     Author:  Mary Gaffney Service:  (none) Author Type:  (none)     Filed:  2017  4:47 PM Encounter Date:  2017 Status:  Signed     :  Mary Gaffney            Patient notified of Dr Roberts's response.

## 2017-12-28 NOTE — TELEPHONE ENCOUNTER
Patient Information     Patient Name MRN Sex Dilip Lomax [id number] Male 1955      Telephone Encounter by Jacqueline Cortez at 2017  2:10 PM     Author:  Jacqueline Cortez Service:  (none) Author Type:  (none)     Filed:  2017  2:11 PM Encounter Date:  2017 Status:  Signed     :  Jacqueline Cortez            Fax received today with a denial of the Hydrocodone prescription. I called to appeal it and they are faxing me a new PA form.  Will fill out and send back as soon as possible.  Jacqueline Cortez CMA([facility])  ..................2017   2:11 PM

## 2017-12-28 NOTE — TELEPHONE ENCOUNTER
Patient Information     Patient Name MRN Sex Dilip Lomax [id number] Male 1955      Telephone Encounter by Jacqueline Cortez at 2017 12:08 PM     Author:  Jacqueline Cortez Service:  (none) Author Type:  (none)     Filed:  2017 12:08 PM Encounter Date:  2017 Status:  Signed     :  Jacqueline Cortez            Approval received dated 17. Good through  18.  Jacqueline Cortez [facility])  ..................2017   12:08 PM

## 2017-12-28 NOTE — PROGRESS NOTES
"Patient Information     Patient Name MRN Sex Dilip Lomax [id number] Male 1955      Progress Notes by Mago Musa at 2017  2:33 PM     Author:  Mago Musa Service:  (none) Author Type:  PT- Physical Therapy Assistant     Filed:  2017  4:24 PM Date of Service:  2017  2:33 PM Status:  Signed     :  Mago Musa (PT- Physical Therapy Assistant)            [facility]  Outpatient PT - Daily Note      Date of Service: 2017   Appointment #4  Patient Name: Dilip Kan   YOB: 1955   Referring MD/Provider: (not recorded)  Medical and Treatment Diagnosis: (not recorded)  PT Treatment Diagnosis: Spinal stenosis at L4-L5 level [M48.06] , neurogenic claudication  Insurance: Other: UMR/UMR  Start of Service:  2017     Certification Dates: Start of Service: 2017                        Medicare/MA Re-Cert Due: 2017     Surgery date: 2017. Procedure L4-5 PSF. Dr. Reeder [facility]      Subjective        Pain Rating:  Today pain is \"real bad\": He has increased pain with sitting and driving.    4 = Moderate Pain, (Aggravating, Grueling, Upsetting, Frustrating) / Location:  Mid to low lumbar with radiation to the upper lateral right hip.    According to Dr. Reeder, surgery site looks good, may have tweaked something a few weeks ago which is causing his current pain. Reports he was told to start doing more, within reason and as pain allows.     Objective      Today's Intervention:    1) Reviewed Physical Therapy POC  2) Reviewed initial therapeutic exercise program    Treadmill x 10 minutes 1.0 mph - progressed to 1.5 mph   NuStep x 10 minutes level 4 seat and arms at position 11    Completed   1) PPT 5 second x20 - reinstructed for greater ROM vs ab set   2) PPT with SLR x 10 B  3) squats with PPT x 10  4) hooklying lower trunk rotation B x10   5) Anterior/posterior pelvic tilts 5 second hold x10     Passive " HS stretch B 5 x30 second hold   Sidelying passive hip flexor and quad stretches B 5 x30 second hold       Interferential e-stim x 20 minutes - Patient declined, did not feel like pain levels required it today.       Home Exercise Program:    Supine lumbar rotation  PPT  PPT with SLR  Gentle knee to chest      Assessment    Therapist Assessment:    Functional limitation due to residual LBP        Goals:  Patient goal (time reference required): 8-10 weeks.  ST) Patient is to be independent in their Home Exercise Program in 4-5 weeks.  2) Patient will report a 30% decrease in LBP in 4-5 weeks  3) Patient will be able to flex forward to reach his ankles in 4  4) Patient will demo an improvement of LE strength of  1/2 to 1 full MMT grade in 4-5 weeks         Patients long term functional goals:   Patient is to tolerate walking with 0-210 pain up to 45 minutes in 8 weeks.  Patient is to walk with a normal gait pattern in 8 weeks.  Patient is to have improved spine mobility to NML to allow for improved dressing and self-cares with 0-2 pain in 8-10 weeks.  Patient is to sleep with rare disruptions due to pain in 8-10 weeks.  Patient is to complete work activities with 0-2/10 pain with no restrictions in 12 weeks.  Patient is to demonstrate improved core and abdominal strength to allow work tasks around tasks in 12 weeks.     Patient is to report /10 pain during household activities including light cleaning, self-cares in 8 weeks    Response to Intervention:  Patient had no c/o pain with aerobic conditioning. Note back pain when getting up from sitting/supine positions       Plan    Treatment Plan / Targeted Outcomes:     Frequency:   16 visits     Duration of Treatment: 60 days    Planned Interventions:    Home Exercise Program development  Therapeutic Exercise (ROM & Strengthening)  Manual Therapy  Neuromuscular Re-education  Ultrasound  Electrical Stimulation  Phonophoresis with Ketoprofen  Iontophoresis with  Dexamethasone  Therapeutic Activities    Plan for next visit:  Continue PT    Student or PTA has been instructed in and demonstrates skills necessary to carry out above stated treatment plan: Yes    Thank you for your referral to [facility].  Please call with any questions, concerns or comments.  [phone]

## 2017-12-28 NOTE — PROGRESS NOTES
Patient Information     Patient Name MRN Sex Dilip Lomax [id number] Male 1955      Progress Notes by Kee Tobar, PT at 8/3/2017  9:49 AM     Author:  Kee Tobar, PT  Service:  (none) Author Type:  PT- Physical Therapist     Filed:  2017  9:29 AM  Date of Service:  8/3/2017  9:49 AM Status:  Signed     :  Kee Tobar PT (PT- Physical Therapist)        Related Notes: Original Note by Mago Musa (PT- Physical Therapy Assistant) filed at 8/3/2017 11:24 AM            [facility]  Outpatient PT - Daily Note      Date of Service: 8/3/2017   Appointment #7    Patient Name: Dilip Kan   YOB: 1955   Referring MD/Provider: (not recorded)  Medical and Treatment Diagnosis: (not recorded)  PT Treatment Diagnosis: Spinal stenosis at L4-L5 level [M48.06] , neurogenic claudication  Insurance: Other: UMR/UMR  Start of Service:  2017     Certification Dates: Start of Service: 2017                        Medicare/MA Re-Cert Due: 2017     Surgery date: 2017. Procedure L4-5 PSF. Dr. Reeder [facility]      Subjective        Pain Ratin = Moderate Pain, (Aggravating, Grueling, Upsetting, Frustrating) / Location:  Mid to low lumbar with radiation to the upper lateral right hip.    Jermain reports that he hopes to go back to work this weekend, got the ok from doctor on Tuesday. He reports pain no longer jumps to 8-9/10, the worst it gets is 6/10. He is having less bad days in a row. No longer having radiating symptoms unless the pain is really bad, none since last week.     Overall improvement 60-70% improvement. Was able to use his chainsaw for a short duration recently.      Objective  Note significant bilateral HS, piriformis  tightness    Today's Intervention:    1) Reviewed Physical Therapy POC  2) Reviewed initial therapeutic exercise program    Treadmill x 10 minutes 1.0 mph - progressed to 1.9 mph   NuStep x  10 minutes level 4 seat and arms at position 11    Completed   Sidestepping squat walk with neutral spine and red tband B x15'     Passive HS and piriformis  stretch B 5 x30 second hold   Sidelying passive hip flexor and quad stretches B 5 x30 second hold     1) PPT 5 second x20 - reinstructed for greater ROM vs ab set   2) PPT with SLR x 15 B  3) squats with PPT x 15  4) hooklying lower trunk rotation B x10   5) Anterior/posterior pelvic tilts with ball squeeze 5 second hold x10   6) SL Hip abduction  x15 With PPT    Instructed in:   - 1/2 kneel hip flexor stretch B x30 second hold   - standing quad stretch with patient holding pant leg B x30 second hold     Discussed frequency of performing HEP throughout day when working. Encouraged him to perform lumbar extension and rotation every time he gets out of his trunk. Jermain will call to schedule follow-up appointments as he is able.       Home Exercise Program:    Supine lumbar rotation  PPT  PPT with SLR  Gentle knee to chest    Date: 2017 Prepared by: Mago Musa Access Code: [email or web address]    Half Kneeling Hip Flexor Stretch - 3 reps - 1 sets - 30 hold - 2x daily - 7x weekly    Standing Quadriceps Stretch - 3 reps - 1 sets - 30 hold - 2x daily - 7x weekly    Assessment    Therapist Assessment:    Functional limitation due to residual LBP        Goals:  Patient goal (time reference required): 8-10 weeks.  ST) Patient is to be independent in their Home Exercise Program in 4-5 weeks. Goal met   2) Patient will report a 30% decrease in LBP in 4-5 weeks. Goal met   3) Patient will be able to flex forward to reach his ankles in 4  4) Patient will demo an improvement of LE strength of 1/2 to 1 full MMT grade in 4-5 weeks         Patients long term functional goals:   Patient is to tolerate walking with 0-2/10 pain up to 45 minutes in 8 weeks. 4/10 after 20 minutes, which is the longest duration   Patient is to walk with a normal gait pattern in 8 weeks.  Patient is  to have improved spine mobility to NML to allow for improved dressing and self-cares with 0-2 pain in 8-10 weeks.  Patient is to sleep with rare disruptions due to pain in 8-10 weeks. Very seldom waking because of LBP  Patient is to complete work activities with 0-2/10 pain with no restrictions in 12 weeks.  Patient is to demonstrate improved core and abdominal strength to allow work tasks around tasks in 12 weeks.  Patient is to report /10 pain during household activities including light cleaning, self-cares in 8 weeks. Has been very active outdoors with pain reaching 6/10     Response to Intervention:  Patient had no c/o pain with aerobic conditioning. Note back pain when getting up from sitting/supine positions       Plan    Treatment Plan / Targeted Outcomes:     Frequency:   16 visits     Duration of Treatment: 60 days    Planned Interventions:    Home Exercise Program development  Therapeutic Exercise (ROM & Strengthening)  Manual Therapy  Neuromuscular Re-education  Ultrasound  Electrical Stimulation  Phonophoresis with Ketoprofen  Iontophoresis with Dexamethasone  Therapeutic Activities    Plan for next visit:  Continue PT    Student or PTA has been instructed in and demonstrates skills necessary to carry out above stated treatment plan: Yes    Thank you for your referral to [facility].  Please call with any questions, concerns or comments.  [phone]      Documentation reviewed and found to be appropriate and complete.    Kee Tobar PT, SCS

## 2017-12-28 NOTE — PROGRESS NOTES
Patient Information     Patient Name MRN Sex Dilip Lomax [id number] Male 1955      Progress Notes by Sumanth Roberts MD at 10/18/2017 10:00 AM     Author:  Sumanth Roberts MD Service:  (none) Author Type:  Physician     Filed:  10/29/2017  4:32 PM Encounter Date:  10/18/2017 Status:  Signed     :  Sumanth Roberts MD (Physician)            Nursing Notes:   Mary Gaffney  10/18/2017 10:07 AM  Signed  Patient comes in to the clinic today to follow up on his back and his medications.      SUBJECTIVE:  Dilip Kan is a 62 y.o. Male.  Patient reports that his back pain has improved since our last visit. He had surgery 6 months ago and since then has had steady improvement. He is no longer having radicular symptoms. Now mainly just having pain in his lower and mid spine. It is much worse with work. 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 If he sits or lays down it improves. He continues to gradually reduce his hydrocodone usage. He is currently utilizing 8 pills per day. He has not noticed any issues with sedation or drowsiness. He has not had any new concerning symptoms.      OBJECTIVE:  /80  Pulse 68  Wt 99.3 kg (219 lb)  BMI 30.76 kg/m2  EXAM:  General Appearance: Pleasant, alert, appropriate appearance for age. No acute distress  Musculoskeletal Exam: Tender with palpation of lumbar spine but more so over paraspinous muscles and thoracic and lumbar spine. Straight leg raise negative.  Neurologic Exam: Nonfocal; symmetric DTRs, normal gross motor movement, tone, and coordination. No tremor.        ASSESSMENT/Plan :      Dilip was seen today for follow up.    Diagnoses and all orders for this visit:    Cancer of prostate (HC)  patient still has not followed up with urology and needs to do so.  -     AMB CONSULT TO UROLOGY; Future    Chronic low back pain,  unspecified back pain laterality, with sciatica presence unspecified  patient has had to have a few refills done a week or so early mainly due to his work schedule. Overall however he has been steadily decreasing his hydrocodone usage. We discussed ongoing taper. We will cut back to #225 tablets per month. He is optimistic that he will be able to make this last greater than a month and will come back when he is about out of medications. At that point we will continue his taper. He is optimistic than the next 4-5 months he will be able to retire from his job and then suspects he will need dramatically less pain medication and we can expedite his taper.    CSA was updated to reflect #225 pills per month. Tox screen have all been normal, consider repeat next visit if taper expected to last greater than 6 months.  reviewed. Current oral morphine equivalent will be about 75 mg per day with goal to significantly reduce in 2018.    -     Discontinue: HYDROcodone-acetaminophen,  mg, (NORCO )  mg per tablet; Take 1-2 tablets by mouth every 4 hours if needed  Ok to refill on 10/18  -     Discontinue: HYDROcodone-acetaminophen,  mg, (NORCO )  mg per tablet; Take 1-2 tablets by mouth every 4 hours if needed  Ok to refill on 10/17  -     HYDROcodone-acetaminophen,  mg, (NORCO )  mg per tablet; Take 1-2 tablets by mouth every 4 hours if needed  Ok to refill on 12/17        There are no Patient Instructions on file for this visit.    Sumanth Roberts MD    This document was created using computer generated templates and voice activated software.

## 2017-12-28 NOTE — PROGRESS NOTES
Patient Information     Patient Name MRN Sex Dilip Lomax [id number] Male 1955      Progress Notes by Sumanth Roberts MD at 2017 12:30 PM     Author:  Sumanth Roberts MD Service:  (none) Author Type:  Physician     Filed:  2017  9:31 AM Encounter Date:  2017 Status:  Signed     :  Sumanth Roberts MD (Physician)            SUBJECTIVE:  Dilip Kan is a 62 y.o. Male.  He comes in to follow-up on low back pain. Patient underwent surgery about 2 months ago now. He has had steady improvement since our last visit. He did have a flare of pain which required him to increase his hydrocodone from about 90 mg a day up to 120 mg a day. He has now tapered back down to 100 mg per day. He does think it would be possible to continue to taper down. He saw the spine surgeon who felt that everything surgically was stable. He thought this was likely predominantly muscular and due to increase in activity following surgery. He recommended that he continue with activity, stretching and strengthening. He reports that the spine surgeon told him that he has a 50 pound lifting restriction but otherwise no restrictions.    He has not had any change in urination. No change in bowel habits.  He denies any confusion or slowed mentation. He has not noticed any other side effects from the narcotics.        Social History        Substance Use Topics          Smoking status:   Former Smoker      Packs/day:  1.00      Years:  20.00      Types:  Cigarettes      Quit date:  2002      Smokeless tobacco:   Current User      Types:  Snuff      Alcohol use   Yes      Comment: 5 drinks a month         I have personally reviewed and updated above noted social, family and/or past medical history.    A comprehensive review of systems was negative except for items noted in HPI/Subjective.      OBJECTIVE:  /82  Pulse 84  Wt 97.1 kg (214 lb)  BMI 30.06 kg/m2  EXAM:  General Appearance: Pleasant,  alert, appropriate appearance for age. No acute distress  RESP: Clear to ascultation bilaterally.  No increased respiratory effort.   CV: Regular rate and rhythm.  No murmurs rubs or gallops heard.   MSK: No synovitis.  Muscle strength age and body habitus appropriate as well as equal and even.   NEURO:  No deficits.    ASSESSMENT/Plan :    I personally reviewed the patients' labs and imaging.    Results for orders placed or performed during the hospital encounter of 04/28/17      Hemoglobin - Daily x 2      Result  Value Ref Range    HEMOGLOBIN                11.8 (L) 13.5 - 17.5 g/dL    MCV                       89 80 - 100 fL   Hemoglobin - Daily x 2      Result  Value Ref Range    HEMOGLOBIN                11.6 (L) 13.5 - 17.5 g/dL    MCV                       89 80 - 100 fL       Dilip was seen today for follow up.    Diagnoses and all orders for this visit:    Chronic low back pain, unspecified back pain laterality, with sciatica presence unspecified  patient would like to gradually taper off the hydrocodone. He has been on this now for the past few years at relatively high doses. Between the medication in his back surgery he has been off work now for the past few months. He would like to go back to work. I explained to patient that I would like to see him continue to taper down on the hydrocodone. At this point no evidence of altered mentation and from a surgical perspective he certainly seems capable of driving as he is able to walk briskly, carry items up to 50 pounds with twisting and also is able to obviously easily slam on break and gas as he was able to demonstrate for me today without any wincing or pain. He has a refill for #300 now.  He will use a maximum of 10/d.  We will cut down to 270 pills later this month and then down to 240 pills. I will see him back at that point and we will likely continue to cut back by about 30 pills per month until hopefully he can be off the medication. Certainly if  he is able to expedite his taper he will do so and we discussed this in detail. He is very motivated to get off the medication.    I think it is reasonable for him to drive with lifting restriction but he understands potential risks of the narcotics and will continue to avoid any increasing doses while driving and if he notices any issues with cognition reaction time he will take himself off the road. It should be noted that his oral morphine equivalent has reduced compared with his rate over the past several months.    Greater than 50% of this 25 minute visit was spent in counseling and coordination of care regarding diagnosis and treatment of the above.      -     Discontinue: HYDROcodone-acetaminophen,  mg, (NORCO )  mg per tablet; Take 1-2 tablets by mouth every 4 hours if needed  Ok to refill on 9/1/17  -     HYDROcodone-acetaminophen,  mg, (NORCO )  mg per tablet; Take 1-2 tablets by mouth every 4 hours if needed  Ok to refill on 10/1/17      Greater than 50% of this 25 minute visit was spent in counseling and coordination of care regarding diagnosis and treatment of chronic low back pain s/p       There are no Patient Instructions on file for this visit.    Sumanth Roberts MD    This document was created using computer generated templates and voice activated software.

## 2017-12-28 NOTE — TELEPHONE ENCOUNTER
Patient Information     Patient Name MRN Sex Dilip Lomax [id number] Male 1955      Telephone Encounter by Mary Gaffney at 2017 10:51 AM     Author:  Mary Gaffney Service:  (none) Author Type:  (none)     Filed:  2017 10:53 AM Encounter Date:  2017 Status:  Signed     :  Mary Gaffney            Patient states his prescriptions are due to be filled on  but he is leaving [facility] for work on 11/10. He is wondering if he can stop in and have the date changed to 11/10.

## 2017-12-28 NOTE — TELEPHONE ENCOUNTER
Patient Information     Patient Name MRN Sex Dilip Lomax [id number] Male 1955      Telephone Encounter by Sumanth Roberts MD at 2017  2:18 PM     Author:  Sumanth Roberts MD Service:  (none) Author Type:  Physician     Filed:  2017  2:18 PM Encounter Date:  2017 Status:  Signed     :  Sumanth Roberts MD (Physician)            Noted.

## 2017-12-28 NOTE — TELEPHONE ENCOUNTER
Patient Information     Patient Name MRN Sex Dilip Lomax [id number] Male 1955      Telephone Encounter by Mary Gaffney at 2017 11:40 AM     Author:  Mary Gaffney Service:  (none) Author Type:  (none)     Filed:  2017 11:41 AM Encounter Date:  2017 Status:  Signed     :  Mary Gaffney            Patient requested to be seen today instead of tomorrow. He will come in this afternoon at the  clinic.

## 2017-12-28 NOTE — TELEPHONE ENCOUNTER
Patient Information     Patient Name MRN Sex Dilip Lomax [id number] Male 1955      Telephone Encounter by Dwayne Dorman LPN at 10/17/2017 11:27 AM     Author:  Dwayne Dorman LPN Service:  (none) Author Type:  NURS- Licensed Practical Nurse     Filed:  10/17/2017 11:29 AM Encounter Date:  10/17/2017 Status:  Signed     :  Dwayne Dorman LPN (NURS- Licensed Practical Nurse)            Called patient and he stated he is having back issues and is leaving out of town for work this Thursday. Patient is wondering if he could be worked in today or tomorrow. Please advise.  Dwayne Dorman LPN ..............10/17/2017 11:28 AM

## 2017-12-28 NOTE — TELEPHONE ENCOUNTER
Patient Information     Patient Name MRN Sex Dilip Lomax [id number] Male 1955      Telephone Encounter by Jacqueline Cortez at 2017  9:45 AM     Author:  Jacqueline Cortez Service:  (none) Author Type:  (none)     Filed:  2017  9:46 AM Encounter Date:  2017 Status:  Signed     :  Jacqueline Cortez            Patient is wondering if he could be seen today to ask Dr Roberts about his prescription that is dated for this Friday. Also has some urinary stream issues to discuss. He will be here by 10:15am.  Jacqueline Cortez CMA(AAMA)  ..................2017   9:46 AM

## 2017-12-28 NOTE — PROGRESS NOTES
Patient Information     Patient Name MRN Dilip Kumar [id number] Male 1955      Progress Notes by Sumanth Roberts MD at 2017 10:30 AM     Author:  Sumanth Roberts MD  Service:  (none) Author Type:  Physician     Filed:  10/2/2017  8:04 AM  Encounter Date:  2017 Status:  Addendum     :  Sumanth Roberts MD (Physician)        Related Notes: Original Note by Sumanth Roberts MD (Physician) filed at 10/2/2017  7:55 AM            Nursing Notes:   Akosua Smith  2017 11:13 AM  Signed  Dilip Kan is a 62 y.o. male presenting with a urinary problem.  Akosua Smith LPN 2017 10:54 AM       SUBJECTIVE:  Dilip Kan is a 62 y.o. Male.  Patient comes in today to follow-up on a couple of different issues. First issue is to follow-up on pain in his back. Patient reports that the pain continues to gradually improve. He has had some time where the pain has become acutely worse and he has taken a few more hydrocodone but overall has been gradually reducing the amount. He reports that he has had symptoms of anxiety, nausea and generally feeling sick. This has seemed to be more prominent if he tries to go more than 6 hours without a pain pill. That being said he has had a few days where he has been able to space them out without difficulty. He has a more psychologically difficult time trying to cut down from 1.5 pills when he does take them and he has had with trying to space out time in between doses.  He has not had problems with constipation. He is no longer having numbness and nearly as much radiating pain.    He does report however since back surgery he has had more problems with increased urinary incontinence. He had quite a bit of difficulty with this after his prostate surgery but it gradually improved. Now he reports with certain things such as standing after sitting for some time he will have urinary urgency. If he gets out of his truck and moves  quickly he can make it to the bathroom. He has been doing some kilo exercises.      Social History        Substance Use Topics          Smoking status:   Former Smoker      Packs/day:  1.00      Years:  20.00      Types:  Cigarettes      Quit date:  11/8/2002      Smokeless tobacco:   Current User      Types:  Snuff      Alcohol use   Yes      Comment: 5 drinks a month         I have personally reviewed and updated above noted social, family and/or past medical history.    A comprehensive review of systems was negative except for items noted in HPI/Subjective.      OBJECTIVE:  /70  Pulse 72  Wt 99.8 kg (220 lb)  BMI 30.9 kg/m2  EXAM:  General Appearance: Pleasant, alert, appropriate appearance for age. No acute distress  Chest/Respiratory Exam: Normal chest wall and respirations. Clear to auscultation.  Cardiovascular Exam: Regular rate and rhythm. S1, S2, no murmur, click, gallop, or rubs.  Genitourinary Exam Male: Normal.  Musculoskeletal Exam: No synovitis.  Muscle strength age and body habitus appropriate as well as equal and even. Minimal tenderness with palpation of paraspinous muscles. At present no midline lumbar tenderness. Also some mild tenderness of SI joints. Significant improvement.  Neurologic Exam: Nonfocal; symmetric DTRs, normal gross motor movement, tone, and coordination. No tremor.    ASSESSMENT/Plan :    I personally reviewed the patients' imaging.        Dilip was seen today for urinary problem.    Diagnoses and all orders for this visit:    Needs flu shot  -     FLU VACCINE => 3 YRS PF QUADRIVALENT IIV4 IM  -     NM ADMIN VACC INITIAL SEASONAL AFFILIATE ONLY    Postural urinary incontinence  scuffs patient that I think he is past due for follow-up on urology. He needs to follow-up from prostate cancer standpoint but also I think there are potential interventions that could dramatically improve his incontinence. That being said Dr. Arrington may choose a more conservative approach as  the symptoms have seemed to be worse since back surgery and now may be are improving gradually again. If patient has any andrea incontinence or any numbness or loss of sensation urination or if he has any abdominal pain and urinary retention he will come in urgently. Also if he has any stool incontinence or dramatic changes he'll come in urgently.  -     AMB SURGERY UROLOGY; Future    Controlled substance agreement signed 7-13-16   patient continues with his taper. He is now down to 240 tablets of hydrocodone per month.  He admits that the taper has been more difficult than he was imagining. We will continue to gradually taper down over the next 6-12 months with goal being to get him off the medication. I suspect he will have significant improvement in a couple of months once she is healed up from surgery.  reviewed. CSA is out of date but being that we are actively tapering, updating the quantities is a limited value as able change month by month.    Current daily oral morphine equivalent 80 mg. Peak daily oral morphine equivalent 150 mg.    Cancer of prostate (HC)    Spinal stenosis, lumbar region, without neurogenic claudication   improved since surgery. We'll work on strengthening through static and mobile core exercise and gradual increase in activity.        There are no Patient Instructions on file for this visit.    Sumanth Roberts MD    This document was created using computer generated templates and voice activated software.

## 2017-12-28 NOTE — TELEPHONE ENCOUNTER
Patient Information     Patient Name MRN Sex Dilip Lomax [id number] Male 1955      Telephone Encounter by Jacqueline Cortez at 2017  8:54 AM     Author:  Jacqueline Cortez Service:  (none) Author Type:  (none)     Filed:  2017  8:57 AM Encounter Date:  2017 Status:  Signed     :  Jacqueline Cortez            Jermain is returning to [facility] tomorrow and leaving again on Wednesday. He is wondering if it would be ok to fill his Hydrocodone prescription on Tuesday at [facility]. He will stop by on Tuesday morning to see us here at the [facility] clinic.  Jacqueline Cortez CMA(AAMA)  ..................2017   8:57 AM

## 2017-12-28 NOTE — TELEPHONE ENCOUNTER
Patient Information     Patient Name MRN Sex Dilip Lomax [id number] Male 1955      Telephone Encounter by Mary Gaffney at 10/17/2017  4:22 PM     Author:  Mary Gaffney Service:  (none) Author Type:  (none)     Filed:  10/17/2017  4:23 PM Encounter Date:  10/17/2017 Status:  Signed     :  Mary Gaffney            Appointment made for tomorrow morning.

## 2017-12-28 NOTE — PROGRESS NOTES
"Patient Information     Patient Name MRN Sex Dilip Lomax [id number] Male 1955      Progress Notes by Kee Tobar PT at 2017  9:16 AM     Author:  Kee Tobar PT Service:  (none) Author Type:  PT- Physical Therapist     Filed:  2017 10:07 AM Date of Service:  2017  9:16 AM Status:  Signed     :  Kee Tobar PT (PT- Physical Therapist)            [facility]  Outpatient PT - Daily Note      Date of Service: 2017  Appointment #2  Patient Name: Dilip Kan   YOB: 1955   Referring MD/Provider: (not recorded)  Medical and Treatment Diagnosis: (not recorded)  PT Treatment Diagnosis: Spinal stenosis at L4-L5 level [M48.06] , neurogenic claudication  Insurance: Other: UMR/UMR  Start of Service:  2017     Certification Dates: Start of Service: 2017                        Medicare/MA Re-Cert Due: 2017     Surgery date: 2017. Procedure L4-5 PSF. Dr. Reeder [facility]      Subjective        Pain Rating:  Today pain is \"real bad\": He has increased pain with sitting and driving.    6 = Severe Pain, (Miserable, Gnawing, Fierce, Piercing) and  7 = Severe Pain, (Miserable, Gnawing, Fierce, Piercing) / Location:  Mid to low lumbar with radiation to the upper lateral right hip.        Objective      Today's Intervention:    1) Reviewed Physical Therapy POC  2) Reviewed initial therapeutic exercise program    Treadmill x 10' 1.0 mph  Nu Step x 10' level 4 seat and arms at position 11    Completed   1) PPT x 12 hold 5\"  2) PPT with SLR x 10 B  3) squats with PPT x 10  4) Patient was uncomfortable in supine so deferred supine lumbar rotation  5) Seated lumbar rotation x 10 B     Interferential e-stim x 20' to treat post evaluation soreness     Home Exercise Program:    Supine lumbar rotation  PPT  PPT with SLR  Gentle knee to chest      Assessment    Therapist Assessment:    Functional limitation due to " residual LBP        Goals:  Patient goal (time reference required): 8-10 weeks.  ST) Patient is to be independent in their Home Exercise Program in 4-5 weeks.  2) Patient will report a 30% decrease in LBP in 4-5 weeks  3) Patient will be able to flex foirward to reach his ankles in 4  4) Patient will demo an improvement of LE strength of  1/2 to 1 full MMT grade in 4-5 weeks         Patients long term functional goals:   Patient is to tolerate walking with 0-210 pain up to 45 minutes in 8 weeks.  Patient is to walk with a normal gait pattern in 8 weeks.  Patient is to have improved spine mobility to NML to allow for improved dressing and self-cares with 0-2 pain in 8-10 weeks.  Patient is to sleep with rare disruptions due to pain in 8-10 weeks.  Patient is to complete work activities with 0-2/10 pain with no restrictions in 12 weeks.  Patient is to demonstrate improved core and abdominal strength to allow work tasks around tasks in 12 weeks.     Patient is to report /10 pain during household activities including light cleaning, self-cares in 8 weeks    Response to Intervention:  Patient had no c/o pain with aerobic conditioning. Note back pain when getting up from sitting/supine positions       Plan    Treatment Plan / Targeted Outcomes:     Frequency:   16 visits     Duration of Treatment: 60 days    Planned Interventions:    Home Exercise Program development  Therapeutic Exercise (ROM & Strengthening)  Manual Therapy  Neuromuscular Re-education  Ultrasound  Electrical Stimulation  Phonophoresis with Ketoprofen  Iontophoresis with Dexamethasone  Therapeutic Activities    Plan for next visit:  Continue PT    Student or PTA has been instructed in and demonstrates skills necessary to carry out above stated treatment plan: Yes    Thank you for your referral to [facility].  Please call with any questions, concerns or comments.  [phone]

## 2017-12-30 NOTE — NURSING NOTE
Patient Information     Patient Name MRN Sex Dilip Lomax [id number] Male 1955      Nursing Note by Mary Gaffney at 10/18/2017 10:00 AM     Author:  Mary Gaffney Service:  (none) Author Type:  (none)     Filed:  10/18/2017 10:07 AM Encounter Date:  10/18/2017 Status:  Signed     :  Mary Gaffney            Patient comes in to the clinic today to follow up on his back and his medications.

## 2017-12-30 NOTE — NURSING NOTE
Patient Information     Patient Name MRN Dilip Kumar [id number] Male 1955      Nursing Note by Akosua Smith at 2017 10:30 AM     Author:  Akosua Smith Service:  (none) Author Type:  (none)     Filed:  2017 11:13 AM Encounter Date:  2017 Status:  Signed     :  Akosua Smith            Dilip Kan is a 62 y.o. male presenting with a urinary problem.  Akosua Smith LPN 2017 10:54 AM

## 2017-12-30 NOTE — INITIAL ASSESSMENTS
Patient Information     Patient Name MRN Sex Dilip Lomax [id number] Male 1955      Initial Assessments by Kee Tobar PT at 2017  8:30 AM     Author:  Kee Tobar PT  Service:  (none) Author Type:  PT- Physical Therapist     Filed:  2017 10:23 AM  Date of Service:  2017  8:30 AM Status:  Addendum     :  Kee Tobar PT (PT- Physical Therapist)        Related Notes: Original Note by Kee Tobar PT (PT- Physical Therapist) filed at 2017 10:14 AM            [facility]  Outpatient PT - Initial Evaluation  Spine Eval     Date of Service: 2017     Patient Name: Dilip Kan   YOB: 1955   Referring MD/Provider: (not recorded)  Medical and Treatment Diagnosis: (not recorded)  PT Treatment Diagnosis: Spinal stenosis at L4-L5 level [M48.06] , neurogenic claudication  Insurance: Other: UMR/UMR  Start of Service:  2017    Certification Dates: Start of Service: 2017    Medicare/MA Re-Cert Due: 2017    Surgery date: 2017. Procedure L4-5 PSF. Dr. Reeder [facility]        Subjective      History of Injury:  Patient is a 61 y/o male whom received a L4-5 PSF procedure 2017. Patient reports that he had a previous surgery several years ago. Reports that his back pain slowly returned and became much worse over the last 2 1/2 years. 3 weeks ago  And after the surgery he re-developed back pain. Pain has remained at a higher level since. Patient had a new evaluation with Dr. Reeder prior to this re-exacerbation. He has not seen him since. He will see him on 2017. His pain is worse with rotation, bending forward, getting up from sitting. He is referred to PT at this time for treatment of post surgical pain, weakness and to develop core and back stability.     Current Symptoms:    Decreased Motion  Lumbar  Weakness Right leg  Tingling/Numbness intermittently right anterior thigh and groin  "area.  Pain Rating:  With pain medications  ( 10-11 hydrocodone 325 )   4 = Moderate Pain, (Aggravating, Grueling, Upsetting, Frustrating) and  5 = Moderate Pain, (Aggravating, Grueling, Upsetting, Frustrating) / Location:  Mid lumbar to the right. Described as a \"hard pain\".  Reports that some days are better, but intermittent \"bad\" days.    24 hour Hx:  Pain worse in AM and better through out day. Worse again  in evening 5-6/10    Aggravation Factors:   AM:   Stiffness   PM: Increased pain with fatigue   Bending: Forward   Sitting: for extended periods of time   Standing: for more than 10'     Easing Factors:   Pain medications will cover for \"a couple hours\".   As day progresses: with movement     Subjective rating of current functional limitations:  Functional Activity No Difficulty Mild Difficulty Mod. Difficulty Severe Difficulty   Sleeping   X1-2 hours X   Walking   10' limit    Lifting/Carrying    X   Bending    X forward   Sitting/Driving    1 Hour  X X X   Work Activities         Oswestry LBP disability index score: 19/50 or 38% function      Occupation: Long Distance   Current Work Status:  Not Working?    ?   Expected Date of Return: Unknown      Prior Level of Function:    Long history of intermittent functional limitation due to chronic LBP    Previous Injury / Surgery:    Lumbar laminectomy several years ago     Previous Treatment:    Pain Meds / Anti-inflammatory Meds:    ?See current medication lisrt   Injections    ? 2013  Chiropractic None recent  Surgery lumbar laminectomy  Physical Therapy       Diagnostics:           MRI                      Results:HISTORY: 61 years Male chronic low back pain, 5 years duration.     COMPARISON: 07/22/2015     TECHNIQUE: Multisequence and multiplanar MRI imaging of the lumbar spine  was performed with and without fat saturated sequences.     FINDINGS  Alignment of the lumbar spine is normal. Vertebral body height is well-maintained throughout.. " Signal intensity and caliber of the thoracic spinal cord is normal. The conus medullaris is at the level of T12-L1.     L5-S1: There is moderate to severe loss of disc height. A mild broad-based disc bulge is present. There is right neuroforaminal narrowing of moderate severity. The central canal and left neuroforamen are patent. There is no significant interval change.     L4-L5: There is moderate loss of disc height and signal. The Center Center canal is patent. There is bilateral facet osteoarthritis. There is neuroforaminal narrowing of mild severity bilaterally.     L3-L4: There is loss of disc height and signal of moderate or greater severity. There is discogenic endplate edema. There is a mild broad-based disc bulge. There is mild neuroforaminal narrowing bilaterally.     L2-L3: There is moderate severe loss of disc height and signal. There is a broad-based disc bulge with small left paracentral disc herniation. This narrows the left lateral recess and imparts mass effect upon the left L3 nerve root. The Center Center canal is mildly narrowed. The right neuroforamen is patent. The left neuroforamen is moderately narrowed.     L1-L2: There is mild loss of disc height and signal. The central spinal canal and neural foramen are patent.      IMPRESSION: Interval development of small left paracentral disc herniation at L2-L3. The disc fragment is extruded inferiorly. There is narrowing of the left lateral recess and mild mass effect upon the left L3 nerve root. There is also left neuroforaminal narrowing at this level of moderate severity.     Degenerative disc disease with increasing discogenic bone marrow edema at L2-L3 and L3-L4. There is been otherwise no significant interval change.     Electronically Signed By: Mao Mackenzie M.D. on 2/16/2017 2:51 PM     Other:    Current Medications:  Reviewed (see chart)    Drug Allergies:  Reviewed (see chart)  ?   Latex Allergy:  None reprted    Significant PMHX:   "     Past Medical History:     Diagnosis  Date     Cancer of prostate (HC) 9/6/2012     CIGARETTE SMOKER     Quit - October 2007 with chantix      DEGENERATIVE DISC DISEASE, LUMBAR SPINE 12/1/2008     Esophagitis      GASTROESOPHAGEAL REFLUX DISEASE      Low back pain      Pain medication agreement signed 09/09/2013 1/31/2014     PSA, INCREASED 4/10/2012     Past Surgical History:      Procedure  Laterality Date     APPENDECTOMY  09/19/2009     Ruptured [place]       COLONOSCOPY SCREENING  08/31/2006    EGD, next due in 2016        ESOPHAGOGASTRODUODENOSCOPY  03/2003     ESOPHAGOGASTRODUODENOSCOPY  08/31/2006     INCISION AND DRAINAGE  age 4    EXCISE VARICOCELE       LUMBAR DISKECTOMY  03/16/2009    [facility]       RADICAL PROSTATECTOMY  11/28/12    Nerve Sparring, Dr Warner       TONSIL AND ADENOIDECTOMY      as a child              Objective    Items left blank indicate that the test was inappropriate or not meaningful at the time of evaluation and therefore not performed.    Fall Risk Screening: Not Indicated      ROM/Strength:UE strength is all WFL     Cerv Thor Lumbar Myotomes    L    R     L    R   Flexion  NML Hands to thighs C4 Shoulder Elev. /5 /5 L2 Hip Flexion 4+/5 4+/5   Extension  Limit Limited C5 Shoulder Abd. /5 /5 L3 Knee Ext.      4/5 4/5   Left Lat. Flex  Minor  limits 20 C6 Elbow Flexion /5 /5 L4 Ankle DF 5/5 5/5   Right Lat. Flex  \" 10 C7 Elbow Ext. /5 /5 L5 1st MTP Ext. 5/5 5/5   Left Rotation  \" Min C8 Finger Flex /5 /5 S1 Ankle P.F. 5/5 5/5   Right Rotation  \" Min T1 Finger Abd. /5 /5 S2 Knee Flexion 4/5 4/5          Hip Abduction 4/5 4/5          Ext. Rotation 4/5 4/5       Palpation:    Cervical:  NT   Thoracic:  Tender lower para spinals   Lumbar:  Central Lumbar at L3-4  To L5 S1  ASIS:  Equal   PSIS:  Equal      LLD:  Equal    Posture/Structural:     Cervical Lordosis:  Reduced   Thoracic Kyphosis:  Increased   Lumbar Lordosis:  Flat   Pelvic Crest Height:  " Equal        Special Tests:     Not indicated due to post operative nature of this diagnosis.    Today's Intervention:    1) Discussed a Physical Therapy POC  2) Instructed an initial therapeutic exercise program  3) Interferential e-stim x 20' to treat post evaluation soreness    Home Exercise Program:    Supine lumbar rotation  PPT  PPT with SLR  Gentle knee to chest    Assessment    Therapist Assessment / Clinical Impression:    Functional impairment due to residual LBP and weakness s/p lumbar fusion surgery       Functional Impairment(s): See subjective functional limitations above      Physical Impairment(s):       MUSCULOSKELETAL:  Deconditioned, Loss of Motion, Muscle Tightness, Obesity, Pain and Weakness       PAIN       POSTURE AND FUNCTIONAL BODY MECHANICS DEFICITS    G Codes and Modifier taken from patient completing the FOTO assessment:   Initial Primary G Code and Modifier:    Per the Patient's intake and/or assessment the Primary G Code is: Other PT/OT Subsequent .   The Patient's Impairment, Limitation or Restriction Modifier would be best described as: CN - 100% Impairment.   Goal Primary G Code and Modifier:    The Patient's G Code Goal would be: Other PT/OT Subsequent    The Patient's Impairment, Limitation or Restriction Modifier goal would be best described as: CN - 100% Impairment.     Goals:  Patient goal (time reference required): 8-10 weeks.  ST) Patient is to be independent in their Home Exercise Program in 4-5 weeks.  2) Patient will report a 30% decrease in LBP in 4-5 weeks  3) Patient will be able to flex foirward to reach his ankles in 4  4) Patient will demo an improvement of LE strength of  1/2 to 1 full MMT grade in 4-5 weeks       Patients long term functional goals:   Patient is to tolerate walking with 0-210 pain up to 45 minutes in 8 weeks.  Patient is to walk with a normal gait pattern in 8 weeks.  Patient is to have improved spine mobility to NML to allow for  improved dressing and self-cares with 0-2 pain in 8-10 weeks.  Patient is to sleep with rare disruptions due to pain in 8-10 weeks.  Patient is to complete work activities with 0-2/10 pain with no restrictions in 12 weeks.  Patient is to demonstrate improved core and abdominal strength to allow work tasks around tasks in 12 weeks.    Patient is to report /10 pain during household activities including light cleaning, self-cares in 8 weeks    Patient participated in goal selection and understand(s) the plan of care: Yes   Patient Potential for Achieving Desired Outcome: Good, Fair  Patient will need to develop a consistent approach to the instructed exercise program in order to achieve his desired goals.       Response to Intervention:    Patient understands and is able to complete all exercises with effective technique.       Plan    Treatment Plan / Targeted Outcomes:      Frequency:   16 visits    Duration of Treatment: 60 days    Planned Interventions:    Home Exercise Program development  Therapeutic Exercise (ROM & Strengthening)  Neuromuscular Re-education  Manual Therapy  Gait Training  Ultrasound  Phonophoresis with Ketoprofen  Iontophoresis with Dexamethasone  Therapeutic Activities    Plan for next visit:   Continue PT treatment with a focus on improving strength and function        Student or PTA has been instructed in and demonstrates skills necessary to carry out above stated treatment plan: Yes    Thank you for your referral to [facility].  Please call with any questions, concerns or comments.  [phone]    The signature, of the referring medical provider, on this document indicates certification of the above prescribed plan of care and is medically necessary.    X____________________________________________________    Physician Signature                     Date  Time

## 2017-12-30 NOTE — NURSING NOTE
Patient Information     Patient Name MRN Sex Dilip Lomax [id number] Male 1955      Nursing Note by Erin Long at 2017  2:30 PM     Author:  Erin Long Service:  (none) Author Type:  (none)     Filed:  2017  3:27 PM Encounter Date:  2017 Status:  Signed     :  Erin Long            Patient presents to the clinic for a follow up from his back surgery on 2017.   Erin CODY CMA.......2017..2:30 PM

## 2017-12-31 ENCOUNTER — COMMUNICATION - GICH (OUTPATIENT)
Dept: FAMILY MEDICINE | Facility: OTHER | Age: 62
End: 2017-12-31

## 2017-12-31 DIAGNOSIS — K21.9 GASTRO-ESOPHAGEAL REFLUX DISEASE WITHOUT ESOPHAGITIS: ICD-10-CM

## 2018-01-02 NOTE — TELEPHONE ENCOUNTER
Patient Information     Patient Name MRN Sex Dilip Lomax [id number] Male 1955      Telephone Encounter by Mary Gaffney at 1/3/2017 10:47 AM     Author:  Mary Gaffney Service:  (none) Author Type:  (none)     Filed:  1/3/2017 10:55 AM Encounter Date:  1/3/2017 Status:  Signed     :  Mary Gaffney            Patient notified that Dr Roberts would like to start him on a statin medication. He is in [place] now and he gave me the name of a pharmacy there.

## 2018-01-03 NOTE — PROGRESS NOTES
Patient Information     Patient Name MRN Sex Dilip Lomax [id number] Male 1955      Progress Notes by Marybeth Kim RN at 2017  8:19 AM     Author:  Marybeth Kim RN Service:  (none) Author Type:  NURS- Registered Nurse     Filed:  2017  8:19 AM Date of Service:  2017  8:19 AM Status:  Signed     :  Marybeth Kim RN (NURS- Registered Nurse)            Falls Risk Criteria:    Age 65 and older or under age 4        Sensory deficits    Poor vision    Use of ambulatory aides    Impaired judgment    Unable to walk independently    Meets High Risk criteria for falls:  no

## 2018-01-03 NOTE — TELEPHONE ENCOUNTER
Patient Information     Patient Name MRN Sex Dilip Lomax [id number] Male 1955      Telephone Encounter by Suamnth Roberts MD at 3/8/2017  5:00 PM     Author:  Sumanth Roberts MD  Service:  (none) Author Type:  Physician     Filed:  3/8/2017  5:07 PM  Encounter Date:  3/8/2017 Status:  Addendum     :  Sumanth Roberts MD (Physician)        Related Notes: Original Note by Sumanth Roberts MD (Physician) filed at 3/8/2017  5:06 PM            I had changed the date for the patient but the wrong prescription was redated for him.  He did bring it back to me and it was my writing and the patient did not alter anything.  I now have that rx and it has been destroyed as he has tapered off the MScontin but does need a refill of his hydrocodone as he will run out prior to returning home at the end of March.

## 2018-01-03 NOTE — TELEPHONE ENCOUNTER
Patient Information     Patient Name MRN Sex Dilip Lomax [id number] Male 1955      Telephone Encounter by Marco A Aburto RN at 2017  3:15 PM     Author:  Marco A Aburto RN Service:  (none) Author Type:  NURS- Registered Nurse     Filed:  2017  3:26 PM Encounter Date:  2017 Status:  Signed     :  Marco A Aburto RN (NURS- Registered Nurse)            Statins    Office visit in the past 12 months.    Last visit with BANDAR HUGHES was on: 2017 in [email or web address]  Next visit with BANDAR HUGHES is on: No future appointment listed with this provider  Next visit with Family Practice is on: 2017 in [facility]    Lab testing requirements:  Lipids annually.  Repeat lipids 6-8 weeks after dosage or drug change.    Last Lipids:  Chol: 145    10/16/2014  T    10/16/2014  HDL:   36    10/16/2014  LDL:  65    10/16/2014  LDL DIRECT:  No results found in past 5 years    .    Max refills 12 months from last office visit.      Per chart review, rx for lipitor originally sent in by PCP on 1/3/17 after office visit with PCP on 16. Patient was not on a statin prior to this date. Last Lipid panel as listed above, with current order in patients chart for a lipid panel. Protocol states that patient would need a recheck lipid panel 6-8 weeks after starting on a statin. Writer will fill requested rx for another 30 days, of which patient would be due for a recheck lipid panel. Attempted to reach patient via phone to advise him of this. Writer was unsuccessful.    Prescription refilled per RN Medication Refill Policy.................... Marco A Aburto RN ....................  2017   3:24 PM

## 2018-01-03 NOTE — TELEPHONE ENCOUNTER
Patient Information     Patient Name MRN Sex Dilip Lomax [id number] Male 1955      Telephone Encounter by Xaio Paula RN at 2017  9:18 AM     Author:  Xiao Paula RN Service:  (none) Author Type:  NURS- Registered Nurse     Filed:  2017  9:22 AM Encounter Date:  2017 Status:  Signed     :  Xiao Paula RN (NURS- Registered Nurse)            Statins    Office visit in the past 12 months.    Last visit with BANDAR HUGHES was on: 2016 in [facility]  Next visit with BANDAR HUGEHS is on: No future appointment listed with this provider  Next visit with Family Practice is on: No future appointment listed in this department    Lab testing requirements:  Lipids annually.  Repeat lipids 6-8 weeks after dosage or drug change.    Last Lipids:  Chol: 145    10/16/2014  T    10/16/2014  HDL:   36    10/16/2014  LDL:  65    10/16/2014  LDL DIRECT:  No results found in past 5 years    .    Concommitant use of fibrates and statins-If it is an addition to the medication list, review note and/or discuss with provider.  If already on medication list, refill.    Max refills 12 months from last office visit.    Prescription refilled per RN Medication Refill Policy.................... Xiao Paula RN ....................  2017   9:21 AM

## 2018-01-03 NOTE — TELEPHONE ENCOUNTER
Patient Information     Patient Name MRN Sex Dilip Lomax [id number] Male 1955      Telephone Encounter by Valerie Rutledge RN at 2017  1:04 PM     Author:  Valerie Rutledge RN Service:  (none) Author Type:  NURS- Registered Nurse     Filed:  2017  1:04 PM Encounter Date:  2017 Status:  Signed     :  Valerie Rutledge RN (NURS- Registered Nurse)            Left message for pt/ re: upcoming stress echo  instructions

## 2018-01-03 NOTE — PROGRESS NOTES
Patient Information     Patient Name MRN Sex Dilip Lomax [id number] Male 1955      Progress Notes by Diamante Zheng at 2017 11:29 AM     Author:  Diamante Zheng Service:  (none) Author Type:  Other Clinical Staff     Filed:  2017 11:29 AM Date of Service:  2017 11:29 AM Status:  Signed     :  Diamante Zheng (Other Clinical Staff)            Falls Risk Criteria:    Age 65 and older or under age 4        Sensory deficits    Poor vision    Use of ambulatory aides    Impaired judgment    Unable to walk independently    Meets High Risk criteria for falls:  no

## 2018-01-03 NOTE — TELEPHONE ENCOUNTER
Patient Information     Patient Name MRN Sex Dilip Lomax [id number] Male 1955      Telephone Encounter by Marco A Aburto RN at 2017  1:20 PM     Author:  Marco A Aburto RN Service:  (none) Author Type:  NURS- Registered Nurse     Filed:  2017  1:28 PM Encounter Date:  2017 Status:  Signed     :  Marco A Aburto RN (NURS- Registered Nurse)            This is a Refill request from: [facility]  Name of Medication: Lipitor  Quantity requested: 90 tabs with 3 refills  Last fill date: 17  Due for refill: Not now, but was only given 30 day supply  Last visit with SUMANTH HUGHES was on: 2017 in [facility]  PCP:  Sumanth Hughes MD  Controlled Substance Agreement:  N/A   Diagnosis r/t this medication request: Hyperlipidemia    Chart review shows that patient was started on Lipitor on 1/3/17 per PCP. RN refill protocol states that patient should have a repeat lipids 6-8 weeks after initiation of a statin. Rx was filled by this writer for 30 day supply on 17 as no repeat lipids noted. Lab order in chart for lipids but lab has not been completed.. Additional refill request received from pharmacy. Will pend rx request to PCP for his consideration/approval.      Unable to complete prescription refill per RN Medication Refill Policy.................... Marco A Aburto RN ....................  2017   1:22 PM

## 2018-01-03 NOTE — TELEPHONE ENCOUNTER
Patient Information     Patient Name MRN Sex Dilip Lomax [id number] Male 1955      Telephone Encounter by Jacqueline Cortez at 2017 11:06 AM     Author:  Jacqueline Cortez Service:  (none) Author Type:  (none)     Filed:  2017 11:09 AM Encounter Date:  2017 Status:  Signed     :  Jacqueline Cortez            Per last visit note:    hyperlipidemia   continue her Lipitor. Available stress test reading is reassuring. After the visit his formal echocardiogram came back again reassuring. Slight aortic enlargement. We'll consult cardiology but this does not need to be done urgently.  Sumanth Roberts MD     Patient notified and he will schedule when he gets back in town.  Jacqueline Cortez CMA(AAMA)  ..................2017   11:08 AM

## 2018-01-03 NOTE — PROGRESS NOTES
Patient Information     Patient Name MRN Sex Dilip Lomax [id number] Male 1955      Progress Notes by Sumanth Roberts MD at 2017 12:00 PM     Author:  Sumanth Roberts MD Service:  (none) Author Type:  Physician     Filed:  2017  7:40 AM Encounter Date:  2017 Status:  Signed     :  Sumanth Roberts MD (Physician)            SUBJECTIVE:  Dilip Kan is a 61 y.o. Male.  He comes in today to follow-up on hypertension, hyperlipidemia, GERD and to follow-up on his stress echocardiogram. He reports overall he feels well. He has had no side effects from antihypertensives.  He recently started Lipitor and has not noticed any side effect. Has not had any breakthrough acid reflux.    Patient has not had any additional spells. He reports that he is feeling well. The available portion of the stress test was reviewed with him in detail. I did get a verbal pulmonary report from the echo tech that showed mildly dilated aorta and otherwise normal.    Patient does continue to have low back pain that radiates down into his leg. Is unchanged in quality. Unchanged in severity. She continues to use hydrocodone. He is taking 11-12 tablets per day. He is also using MS Contin 15 mg twice a day. He denies any problems with constipation. He has been using over-the-counter senna with Colace. He reports no issues with mood. He has been on this long-term and does not have any problems with altered mental status when he is on the medication. He does have an appointment set up for  to scheduled back surgery. This was previously set up but unfortunately his surgeon took a one-year leave of absence for policy work in [facility] and then his insurance required him to restart physical therapy/conservative care. That was ineffective and has effectively delayed his intervention.      Social History      Substance Use Topics        Smoking status:  Former Smoker     Packs/day: 1.00      Years: 20.00     Types: Cigarettes     Quit date: 11/8/2002     Smokeless tobacco:  Current User     Types: Snuff     Alcohol use  Not on file       I have personally reviewed and updated above noted social, family and/or past medical history.    A comprehensive review of systems was negative except for items noted in HPI/Subjective.      OBJECTIVE:  /82  Pulse 80  Wt 103 kg (227 lb)  BMI 32.57 kg/m2  EXAM:  General Appearance: Pleasant, alert, appropriate appearance for age. No acute distress  Chest/Respiratory Exam: Normal chest wall and respirations. Clear to auscultation.  Cardiovascular Exam: Regular rate and rhythm. S1, S2, no murmur, click, gallop, or rubs.  Gastrointestinal Exam: Soft, nontender, no abnormal masses or organomegaly.  Musculoskeletal Exam: Tender with palpation of lumbar spine. No tenderness today at SI joint. Mild pain at sciatic notch. Straight leg raise equivocal.  Neurologic Exam: Nonfocal; symmetric DTRs, normal gross motor movement, tone, and coordination. No tremor.    ASSESSMENT/Plan :      Results for orders placed or performed in visit on 11/03/16      PSA TOTAL (DIAGNOSTIC)      Result  Value Ref Range    PSA TOTAL (DIAGNOSTIC) 0.253 <=3.100 ng/mL   BASIC METABOLIC PANEL      Result  Value Ref Range    SODIUM 140 133 - 143 mmol/L    POTASSIUM 4.2 3.5 - 5.1 mmol/L    CHLORIDE 107 98 - 107 mmol/L    CO2,TOTAL 24 21 - 31 mmol/L    ANION GAP 9 5 - 18                    GLUCOSE 112 (H) 70 - 105 mg/dL    CALCIUM 8.9 8.6 - 10.3 mg/dL    BUN 18 7 - 25 mg/dL    CREATININE 0.96 0.70 - 1.30 mg/dL    BUN/CREAT RATIO           19                    GFR if African American >60 >60 ml/min/1.73m2    GFR if not African American >60 >60 ml/min/1.73m2       Dilip was seen today for medication management and follow up.    Diagnoses and all orders for this visit:    HTN (hypertension)  continue current dose of lisinopril. BMP done 2 months ago normal. Blood pressure mildly elevated. Suggest  low sodium diet. If persistent will increase lisinopril or add diuretic.  -     lisinopril (PRINIVIL; ZESTRIL) 5 mg tablet; Take 1 tablet by mouth once daily.    Gastroesophageal reflux disease without esophagitis  continue current dose of PPI.  -     omeprazole (PRILOSEC) 40 mg Delayed-Release capsule; Take 1 capsule by mouth once daily.    Lumbar radiculopathy  patient's current oral morphine equivalent is between 140-150 mg. He understands that this is well above guidelines. He does have planned intervention set up. Our goal will be to taper him completely off narcotics as soon as he has recovered from back surgery. In the event back surgery does not significantly alleviated pain we will still need to look towards other ways to manage his pain and taper him off narcotics. I discussed with patient if there is any further significant delays we will need to taper down on medications and consider alternative options. He voiced good understanding. He is optimistic that he will get surgery done within the next month and at that point we will be able to taper him off over the next couple of months.  - Discontinue: HYDROcodone-acetaminophen,  mg, (NORCO )  mg per tablet; 1-2 tabs every 4 hours as needed.  Maximum 12 per day. Ok to fill Jan 30th  -     Discontinue: morphine CONTROLLED-RELEASE (MS CONTIN) 15 mg tablet; Take 1 tablet by mouth every 12 hours  Ok to fill Jan 30th.  -     Discontinue: morphine CONTROLLED-RELEASE (MS CONTIN) 15 mg tablet; Take 1 tablet by mouth every 12 hours  Ok to fill March 1st.  -     Discontinue: HYDROcodone-acetaminophen,  mg, (NORCO )  mg per tablet; 1-2 tabs every 4 hours as needed.  Maximum 12 per day. Ok to fill March 1st.  -     HYDROcodone-acetaminophen,  mg, (NORCO )  mg per tablet; 1-2 tabs every 4 hours as needed.  Maximum 12 per day. Ok to fill March 31st.  -     morphine CONTROLLED-RELEASE (MS CONTIN) 15 mg tablet; Take 1  tablet by mouth every 12 hours  Ok to fill March 31st.    Degeneration of intervertebral disc of lumbar region  -     Discontinue: morphine CONTROLLED-RELEASE (MS CONTIN) 15 mg tablet; Take 1 tablet by mouth every 12 hours  Ok to fill Jan 30th.  -     Discontinue: morphine CONTROLLED-RELEASE (MS CONTIN) 15 mg tablet; Take 1 tablet by mouth every 12 hours  Ok to fill March 1st.  -     morphine CONTROLLED-RELEASE (MS CONTIN) 15 mg tablet; Take 1 tablet by mouth every 12 hours  Ok to fill March 31st.    hyperlipidemia    continue her Lipitor. Available stress test reading is reassuring. After the visit his formal echocardiogram came back again reassuring. Slight aortic enlargement. We'll consult cardiology but this does not need to be done urgently.      There are no Patient Instructions on file for this visit.    Sumanth Roberts MD    This document was created using computer generated templates and voice activated software.

## 2018-01-03 NOTE — PROGRESS NOTES
Patient Information     Patient Name MRN Sex Dilip Lomax [id number] Male 1955      Progress Notes by Marybeth Kim RN at 2017  9:15 AM     Author:  Marybeth Kim RN Service:  (none) Author Type:  NURS- Registered Nurse     Filed:  2017  9:40 AM Date of Service:  2017  9:15 AM Status:  Signed     :  Marybeth Kim RN (NURS- Registered Nurse)            0800 The patient arrived for a stress echo. The procedure, risks and benefits were discussed and the consent was signed. The patient was prepped for the stress test, and the echo sonographer did the initial images. An IV was started for use of Definity with the images, per the sonographer's request.  Dr. Box arrived, and the patient walked 7 minutes and 16 seconds. The patient complained of mid chest pressure of 4-5/10 in stage 2 of the stress test. Then he stated he got a sharp pain in his left chest near the end of the stage, that is what he has been having. The pains all resolved with rest. . Stress images were completed and the patient was released in stable condition. The IV was removed before discharge. Please see the chart for complete test results.

## 2018-01-03 NOTE — TELEPHONE ENCOUNTER
Patient Information     Patient Name MRN Sex Dilip Lomax [id number] Male 1955      Telephone Encounter by Anastasia Abel at 2017 10:53 AM     Author:  Anastasia Abel Service:  (none) Author Type:  (none)     Filed:  2017 10:56 AM Encounter Date:  2017 Status:  Signed     :  Anastasia Abel            Patient called and is unaware of why a referral was placed for him for cardiology.  He stated that he had a stress test done and has not heard of the results for that.  He is wondering if [facility] could give him a call as to why the cardiology referral was placed for him.  Thank you!     Anastasia Abel ....................  2017   10:55 AM

## 2018-01-03 NOTE — PROGRESS NOTES
Patient Information     Patient Name MRN Sex Dilip Lomax [id number] Male 1955      Progress Notes by Sumanth Roberts MD at 2017  1:45 PM     Author:  Sumanth Roberts MD  Service:  (none) Author Type:  Physician     Filed:  2017  8:25 AM  Encounter Date:  2017 Status:  Addendum     :  Sumanth Roberts MD (Physician)        Related Notes: Original Note by Sumanth Roberts MD (Physician) filed at 2017  7:29 AM            SUBJECTIVE:  Dilip Kan is a 61 y.o. Male.  He comes in today to follow-up on his back pain. He reports that he recently had an appointment with Dr. Mendoza ().  Plan is for him to have spinal fusion. He is not quite certain on that timeframe but is hoping this can be done mid March. He is working for the next 3 weeks. Leaving tomorrow. Will then be back in home and is hoping to get it done then. He is currently taking hydrocodone typically 12 tablets per day. He has actually cut down his MS Contin to 1 tablet per day. He denies any issues with sedation. He takes a stool softener with senna and Colace and does not have any problems with constipation. He reports no mood disturbance.    Patient reports lumbar back pain with bilateral radiation down his legs. It is worse on the right. Worse with lifting things or prolonged sitting or standing still. Seems to be better with rest.    He reports specifically he has not had any symptoms of impaired cognition or any difficulty with driving abilities with the medication. Even though he has never noticed any sedation or altered sensorium he does time using the medication so that he is not driving right after taking the meds, just to be careful. He reports never having had an accident.      Social History      Substance Use Topics        Smoking status:  Former Smoker     Packs/day: 1.00     Years: 20.00     Types: Cigarettes     Quit date: 2002     Smokeless tobacco:  Current  "User     Types: Snuff     Alcohol use  Not on file       I have personally reviewed and updated above noted social, family and/or past medical history.    A comprehensive review of systems was negative except for items noted in HPI/Subjective.      OBJECTIVE:  /60  Pulse 76  Ht 1.77 m (5' 9.7\")  Wt 103 kg (227 lb)  BMI 32.85 kg/m2  EXAM:  General Appearance: Pleasant, alert, appropriate appearance for age. No acute distress  Musculoskeletal Exam: Tender with palpation of the lumbar spine and facets, most predominantly L3-L5. Also some paraspinous muscle soreness. Straight leg raise equivocal. No focal motor weakness  Neurologic Exam: Nonfocal; symmetric DTRs, normal gross motor movement, tone, and coordination. No tremor.  Psychiatric Exam: Alert and oriented, appropriate affect.    ASSESSMENT/Plan :    I personally reviewed the patients' labs and imaging.    Results for orders placed or performed in visit on 11/03/16      PSA TOTAL (DIAGNOSTIC)      Result  Value Ref Range    PSA TOTAL (DIAGNOSTIC) 0.253 <=3.100 ng/mL   BASIC METABOLIC PANEL      Result  Value Ref Range    SODIUM 140 133 - 143 mmol/L    POTASSIUM 4.2 3.5 - 5.1 mmol/L    CHLORIDE 107 98 - 107 mmol/L    CO2,TOTAL 24 21 - 31 mmol/L    ANION GAP 9 5 - 18                    GLUCOSE 112 (H) 70 - 105 mg/dL    CALCIUM 8.9 8.6 - 10.3 mg/dL    BUN 18 7 - 25 mg/dL    CREATININE 0.96 0.70 - 1.30 mg/dL    BUN/CREAT RATIO           19                    GFR if African American >60 >60 ml/min/1.73m2    GFR if not African American >60 >60 ml/min/1.73m2       Dilip was seen today for back pain.    Diagnoses and all orders for this visit:    Lumbar radiculopathy  patient understands very well that the surgery will likely resolve his radicular type symptoms but he will likely still have some persistent low back pain. I discussed with patient that he may continue current dose of narcotics but I would not be willing to increase his dosage at all. He does not " want an increase in actually would like to cut back or go off medications in the near future. I discussed with patient that while he has not had any issues with the medication as been stable on his current oral morphine equivalent of roughly 135 mg he is above the typically recommended maximum dosage. He will continue to be very careful in regards to medication and his work.   I would strongly suggest that he pursue surgical intervention and then we work on a taper schedule to either get him off narcotics or at least get him down significantly, at least less than or equal to 60mg hydrocodone daily. He is willing to do this plan of care. His surgical intervention has been delayed logistically as his previous surgeon decided to work on national policy issues just before his surgery was to be completed and from an insurance perspective he had to go through physical therapy, injections and other conservative measures.    If patient notices any sedation or any altered sensorium with the medications she will immediately stop driving, returned home and follow-up with me regarding adjustment of medications. He understands that not doing so could put himself and others at risk.    Degeneration of intervertebral disc of lumbar region    Other orders  -     Cancel: HYDROcodone-acetaminophen,  mg, (NORCO )  mg per tablet; 1-2 tabs every 4 hours as needed.  Maximum 12 per day. Ok to fill March 31st.      Greater than 50% of this 25 minute visit was spent in counseling and coordination of care regarding diagnosis and treatment of the above      There are no Patient Instructions on file for this visit.    Sumanth Roberts MD    This document was created using computer generated templates and voice activated software.

## 2018-01-03 NOTE — PROGRESS NOTES
Patient Information     Patient Name MRN Sex Dilip Lomax [id number] Male 1955      Progress Notes by Sumanth Roberts MD at 3/8/2017  1:00 PM     Author:  Sumanth Roberts MD Service:  (none) Author Type:  Physician     Filed:  3/13/2017  1:56 PM Encounter Date:  3/8/2017 Status:  Signed     :  Sumanth Roberts MD (Physician)            SUBJECTIVE:  Dilip Kan is a 61 y.o. Male.  Patient comes in for follow-up on his back pain. He reports no change in severity or quality of back pain. He is awaiting a final decision by insurance to the surgical center in terms of setting up a surgical date. His report. He has been taking hydrocodone 12 tablets per day. He has now tapered off and discontinued MS Contin. His last refill of hydrocodone was on , this was 19 days ago. Quantity filled was 340.  He did bring in his pills today. He has over 100 tablets.  He reports that he would not need a refill until  but will be out of town for the next 2 weeks. He would like to get this refilled today so he does not have to refill out of state as this has been problematic for him in the past.    He has not had a chance to follow up with Dr. Arrington yet.      OBJECTIVE:  /80  Pulse 80  Wt 103.4 kg (228 lb)  BMI 33 kg/m2  EXAM:  General Appearance: Pleasant, alert, appropriate appearance for age. No acute distress  Chest/Respiratory Exam: Normal chest wall and respirations. Clear to auscultation.  Cardiovascular Exam: Regular rate and rhythm. S1, S2, no murmur, click, gallop, or rubs.  Gastrointestinal Exam: Soft, nontender, no abnormal masses or organomegaly.  Musculoskeletal Exam: No synovitis.  Muscle strength age and body habitus appropriate as well as equal and even. Continues to be tender to palpation in lumbar spine and paraspinous muscles  Neurologic Exam: Nonfocal; symmetric DTRs, normal gross motor movement, tone, and coordination. No tremor.  Psychiatric Exam:  Alert and oriented ×3. Cognition appears to be normal, at baseline and without any deficits whatsoever. He did take his medications prior to arrival.        ASSESSMENT/Plan :      Dilip was seen today for follow up.    Diagnoses and all orders for this visit:    Cancer of prostate (HC)  patient has not follow-up with Dr. Arrington. He is due for repeat PSA. PSA came back essentially unchanged. Strongly suggest she schedule follow-up with urology.  -     PSA, TOTAL; Future  -     PSA, TOTAL    Lumbar radiculopathy  patient will continue with current prescription of hydrocodone 10 mg tablets. He is currently taking 12 tablets per day. I discussed with patient that this is the absolute maximum and I would like to see him taper down. I did call his surgical office and they report that the insurance issue has resolved they're just waiting on a formal decision by his spine surgeon to determine if he can be scheduled for surgery or needs to come back down for follow-up visit.    I discussed with patient that he has gradually increased his hydrocodone usage. I think he needs to pursue definitive treatment. If definitive treatment is either not available or not successful at significantly managing his pain I would suggest he discontinue his work and develop multimodal approach to pain management which may include narcotics or continue work but taper off narcotics and focus only on other modalities. He does not think that he would be able to tolerate the pain without surgical intervention.    He has not ever been found to have cognitive deficit or evidence of sedation during visits despite being on the medication. He has been on a stable dose overall for the past year. Current oral morphine equivalents 120 mg. He understands he is on high-dose treatment and is aggressively trying to pursue surgical intervention.    He has not had any numbness or weakness of his foot recently and reports no bowel or bladder dysfunction.  -      HYDROcodone-acetaminophen,  mg, (NORCO )  mg per tablet; 1-2 tabs every 4 hours as needed.  Maximum 12 per day. Ok to fill March 8th    Greater than 50% of this 25 minute visit was spent in counseling and coordination of care regarding diagnosis and treatment of      Hypercholesterolemia  -     LIPID PANEL  -     COMPLETE METABOLIC PANEL        There are no Patient Instructions on file for this visit.    Sumanth Roberts MD    This document was created using computer generated templates and voice activated software.

## 2018-01-03 NOTE — TELEPHONE ENCOUNTER
Patient Information     Patient Name MRN Sex Dilip Lomax [id number] Male 1955      Telephone Encounter by Valerie Rutledge RN at 2017 11:20 AM     Author:  Valerie Rutledge RN Service:  (none) Author Type:  NURS- Registered Nurse     Filed:  2017 11:21 AM Encounter Date:  2017 Status:  Signed     :  Valerie Rutledge RN (NURS- Registered Nurse)            Left message re: appointment for 2017.

## 2018-01-04 NOTE — TELEPHONE ENCOUNTER
Patient Information     Patient Name MRN Sex Dilip Lomax [id number] Male 1955      Telephone Encounter by Anh Alonso CNA at 3/29/2017 10:38 AM     Author:  Anh Alonso CNA Service:  (none) Author Type:  NURS- Nurse Assist/Care Tech     Filed:  3/29/2017 10:39 AM Encounter Date:  3/29/2017 Status:  Signed     :  Anh Alonso CNA (NURS- Nurse Assist/Care Tech)            Patient called in regards to getting an appointment on Friday when Dr. Roberts is back in. He states he has severe back pain and would like to see him about this. Please call him back in regards to this. Thank you!

## 2018-01-04 NOTE — PROGRESS NOTES
Patient Information     Patient Name MRN Sex Dilip Lomax [id number] Male 1955      Progress Notes by Sumanth Roberts MD at 2017  2:00 PM     Author:  Sumanth Roberts MD  Service:  (none) Author Type:  Physician     Filed:  2017  1:39 PM  Encounter Date:  2017 Status:  Addendum     :  Sumanth Roberts MD (Physician)        Related Notes: Original Note by Sumanth Roberts MD (Physician) filed at 2017  9:12 PM            ----------------- PREOPERATIVE EXAM ------------------  2017    SUBJECTIVE:  Dilip Kan is a 62 y.o. male here for preoperative optimization.    I was asked to see Dilip Kan by Dr. Mendoza for  preoperative evaluation due to chronic narcotic use.    Date of Surgery: 17  Type of Surgery: Lumbosacral decompression with fusion  Surgeon: Dr. Mendoza  Hospital[facility]  [facility]    HPI:  Patient reports that he feels well. He does not have any acute concerns. He has not had any chest pain or shortness of breath. Patient's activity has been moderated by his back pain. He is able to walk 100 yards without any cardiopulmonary symptoms.  He did have some nonspecific symptoms in December. Underwent echocardiogram stress test. That was low probability for ischemia. It was noted that he developed some chest pain during exercise however. He has not had any recurrent symptoms. He has not had any fevers or chills.  No change in bowel movements.      Fever/Chills or other infectious symptoms in past month:  (NO)   >10lb weight loss in past two months:  (NO)     Health Care Directive/Code status: FULL CODE  Hx of blood transfusions:   (NO)   History of VRE/MRSA:  (NO) Date: NA Site: NA    Preoperative Evaluation: Obstructive Sleep Apnea screening    S: Snore -  Do you snore loudly? (louder than talking or loud enough to be heard through closed doors)(NO)  T: Tired - Do you often feel tired, fatigued, or sleepy during the daytime?(NO)  O: Observed -  "Has anyone ever observed you stop breathing during your sleep?(NO)  P: Pressure - Do you have or are you being treated for high blood pressure?(YES)  B: BMI - BMI greater than 35kg/m2?(NO)  A: Age - Age over 50 years old?(YES)  N: Neck - Neck circumference greater than 40 cm?(YES)  G: Gender - Gender: Male?(YES)    Total number of \"YES\" responses:  4    Scoring: Low risk of JAMES 0-2  At Risk of JAMES: >3 High Risk of JAMES: 5-8        Patient Active Problem List       Diagnosis  Date Noted     Controlled substance agreement signed 7-13-16  01/15/2016     Back pain  01/13/2014     Malignant neoplasm of prostate (HC)  01/02/2013     Malignant neoplasm of prostate (HC)  01/02/2013     SURGERY FOLLOWUP NEC  12/21/2012     PERS HX PROSTATIC MALIGNANCY  12/06/2012     Pelvic pain in male  11/30/2012     Gross hematuria  11/30/2012     Post op, with clots, leading to hooks cath obstruction          Anemia due to acute blood loss  11/30/2012     EBL 11/28/12:  1700 ml  EBL 11/28/12:  500 ml  Transfused 2 units pRBC's early on 11/29/12        Syncope  11/30/2012     One episode, 11/28/12 evening          Fever, postprocedural  11/30/2012 11/30/12, mild        Cancer of prostate (HC)  09/06/2012     Nerve sparing radical retropubic prostatectomy, 11/28/12  Replacement of obstructed Hooks catheter with reanastamosis of urethra to bladder neck, 11/28/12              Past Medical History:     Diagnosis  Date     Cancer of prostate (HC) 9/6/2012     CIGARETTE SMOKER     Quit - October 2007 with chantix      DEGENERATIVE DISC DISEASE, LUMBAR SPINE 12/1/2008     Esophagitis      GASTROESOPHAGEAL REFLUX DISEASE      Low back pain      Pain medication agreement signed 09/09/2013 1/31/2014     PSA, INCREASED 4/10/2012       Past Surgical History:      Procedure  Laterality Date     APPENDECTOMY  09/19/2009     Ruptured [place]       COLONOSCOPY SCREENING  08/31/2006    EGD, next due in 2016        ESOPHAGOGASTRODUODENOSCOPY "  03/2003     ESOPHAGOGASTRODUODENOSCOPY  08/31/2006     INCISION AND DRAINAGE  age 4    EXCISE VARICOCELE       LUMBAR DISKECTOMY  03/16/2009    L 3-4 microdiskectomy, [facility]       RADICAL PROSTATECTOMY  11/28/12    Nerve Sparring, Dr Warner       TONSIL AND ADENOIDECTOMY      as a child         Current Outpatient Prescriptions       Medication  Sig Dispense Refill     aspirin enteric coated 81 mg tablet Take 1 tablet by mouth once daily with a meal. 90 tablet 0     atorvastatin (LIPITOR) 20 mg tablet TAKE 1 TABLET BY MOUTH ONCE DAILY. 90 tablet 3     Bee Pollen 500 mg Tab Take 2 tablets by mouth once daily.       CYANOCOBALAMIN, VITAMIN B-12, ORAL Take 1 Tab by mouth once daily.       Garlic tablet Take 2 tablets by mouth once daily.       HYDROcodone-acetaminophen,  mg, (NORCO )  mg per tablet 1-2 tabs every 4 hours as needed.  Maximum 12 per day. Ok to fill March 8th 340 tablet 0     lisinopril (PRINIVIL; ZESTRIL) 5 mg tablet Take 1 tablet by mouth once daily. 90 tablet 1     Omega-3 Fatty Acids cap Take 2 capsules by mouth once daily.       omeprazole (PRILOSEC) 40 mg Delayed-Release capsule Take 1 capsule by mouth once daily. 90 capsule 2     sildenafil citrate (VIAGRA) 100 mg tablet Take 0.5 tablets by mouth once daily if needed for Erectile Dysfunction. Take 30min to 4 hours before sexual activity. Max 100mg/24hr. 6 tablet 0     tamsulosin (FLOMAX) 0.4 mg capsule Take 1 capsule by mouth once daily after a meal.  0     No current facility-administered medications for this visit.      Medications have been reviewed by me and are current to the best of my knowledge and ability.    Recent use of: aspirin and ibuprofen    Allergies:  No Known Allergies  Latex allergy  no  Immunizations:  Immunization History     Administered  Date(s) Administered     Influenza Virus, Unspecified 11/17/2011     Influenza, IIV3 (Age >=3 years) 11/09/2012, 01/13/2014     Influenza, IIV4 (Age >= 3 Years) 10/16/2014,  "11/13/2015, 11/03/2016     Tdap 11/09/2012       Family History       Problem   Relation Age of Onset     Heart Disease  Father       passed away from CHF,CAD        Cancer-colon  Father      Hypertension  Father      Stroke  Father      Other  Father      Dementia       Hypertension  Mother      HTN       Other  Mother       Parkinsons       Good Health  Sister      emotional problems.        Good Health  Sister      Good Health  Other      Other  Son      w/o major medical problems.        Other  Daughter      w/o major medical problems.        Good Health  Other      previous marriage./previous marriage.       Good Health  Other      previous marriage.         Denies family hx of bleeding tendencies, anesthesia complications, or other problems with surgery.    Social History      Substance Use Topics        Smoking status:  Former Smoker     Packs/day: 1.00     Years: 20.00     Types: Cigarettes     Quit date: 11/8/2002     Smokeless tobacco:  Current User     Types: Snuff     Alcohol use  Not on file       ROS:    Surgical:  patient denies previous complications from prior surgeries including but not limited to prolonged bleeding, anesthesia complications, dysrhythmias, surgical wound infections, or prolonged hospital stay.  Cardiorespiratory: denies chest pain, palpitations, shortness of breath, cough. Most exertion in the past 2 weeks was Stacking/carrying firewood for house.  Complete ROS otherwise negative except as noted in HPI.     -------------------------------------------------------------    PHYSICAL EXAM:  /82  Pulse 88  Ht 1.778 m (5' 10\")  Wt 102.5 kg (226 lb)  BMI 32.43 kg/m2    EXAM:  General Appearance: Pleasant, alert, appropriate appearance for age. No acute distress  Head Exam: Normal. Normocephalic, atraumatic.  Eyes: PERRL, EOMI  Ears: Normal TM's bilaterally.   OroPharynx: Mucosa pink and moist. Dentition in good repair. Yes - has partial dentures.   Neck: Supple, no masses or " nodes, no lymphadenopathy.  No thyromegaly.  Lungs: Normal chest wall and respirations. Clear to auscultation, no wheezes or crackles.  Cardiovascular: Regular rate and rhythm. S1, S2, no murmurs.  Gastrointestinal: Soft, nontender, no abnormal masses or organomegaly. BS normal   Musculoskeletal: No edema. No warm or erythematous joints.  Skin: no concerning or new rashes.  Neurologic Exam: CN 2-12 grossly intact.  Normal gait.  Symmetric DTRs, normal gross motor movement, tone, and coordination. No tremor.  Psychiatric Exam: Alert and oriented, appropriate affect.      EKG:  Please see internal medicine read.  I did not notice any significant change from previous.  ---------------------------------------------------------------    ASSESSEMENT AND PLAN:     Dilip was seen today for preoperative exam.    Diagnoses and all orders for this visit:    Preop cardiovascular exam   from a clinical standpoint patient appears to be stable with no evidence of cardiopulmonary disease however he has decreased exercise capacity. I waited the formal read from internal medicine. Given the concerns noted in that read I would like him to follow-up with internal medicine prior to surgery to determine if he needs any additional risk stratification given recent negative stress test despite having chest pain during test.    I asked Dr. Yen, who interpreted his EKG, to review his chart and evaluate his need for further risk stratification.  He felt due to patient's maximal stress test in Jan 2017 there was nothing that could be done to reduce his surgical risk and he has no cardiopulmonary limitation thus ok for surgery from that standpoint.    Lumbar radiculopathy  patient continues on relatively high-dose narcotics. His oral morphine equivalent is roughly 120mg.  -     Discontinue: HYDROcodone-acetaminophen,  mg, (NORCO )  mg per tablet; 1-2 tabs every 4 hours as needed.  Maximum 12 per day. Ok to fill April  7th.  -     HYDROcodone-acetaminophen,  mg, (NORCO )  mg per tablet; 1-2 tabs every 4 hours as needed.  Maximum 12 per day. Ok to fill May 7th.  -     PROTIME-INR; Future  -     COMPLETE METABOLIC PANEL; Future  -     PSA, TOTAL; Future  -     CBC WITH DIFFERENTIAL; Future  -     EKG 12 LEAD UNIT PERFORMED  -     PROTIME-INR  -     COMPLETE METABOLIC PANEL  -     PSA, TOTAL  -     CBC WITH DIFFERENTIAL  -     CBC WITH AUTO DIFFERENTIAL    Elevated PSA   history of prostate cancer and radical prostatectomy. Strongly suggest he follow up with Dr. Arrington. He reports that he plans to but feels he cannot get this done before his surgery. He has not had any urinary symptoms.    PRE OP RECOMMENDATIONS:    No family history of problems with bleeding or anesthesia. Patient is able to tolerate greater than 4 METs of activity without any cardiopulmonary symptoms. ASA PS class 2 . No cardiopulmonary workup is neccessary for the current procedure. Please contact the office with any questions or concerns.    Patient is on chronic pain medications (YES);  - I would appreciate the surgeon to manage his narcotics in the first two weeks after surgery.  After that I would be happy to assume ongoing pain management care.  Current OME is about 120mg.  Patient is on antiplatlet/anticoagulation (NO)  Other medications that need adjustment perioperatively (NO)    Other:  Patient was advised to call our office and the surgical services with any change in condition or new symptoms if they were to develop between today and their surgical date; especially any cardiopulmonary symptoms or symptoms concerning for an infection.

## 2018-01-04 NOTE — TELEPHONE ENCOUNTER
Patient Information     Patient Name MRN Sex Dilip Lomax [id number] Male 1955      Telephone Encounter by Mary Gaffney at 3/29/2017 11:03 AM     Author:  Mary Gaffney Service:  (none) Author Type:  (none)     Filed:  3/29/2017 11:03 AM Encounter Date:  3/29/2017 Status:  Signed     :  Mary Gaffney            Appointment made for .

## 2018-01-04 NOTE — H&P
Patient Information     Patient Name MRN Sex Dilip Lomax [id number] Male 1955      H&P by Sumanth Roberts MD at 2017  2:00 PM     Author:  Sumanth Roberts MD  Service:  (none) Author Type:  Physician     Filed:  2017  1:39 PM  Encounter Date:  2017 Status:  Addendum     :  Sumanth Roberts MD (Physician)        Related Notes: Original Note by Sumanth Roberts MD (Physician) filed at 2017  1:36 PM            ----------------- PREOPERATIVE EXAM ------------------  2017    SUBJECTIVE:  Dilip Kan is a 62 y.o. male here for preoperative optimization.    I was asked to see Dilip Kan by Dr. Mendoza for  preoperative evaluation due to chronic narcotic use.    Date of Surgery: 17  Type of Surgery: Lumbosacral decompression with fusion  Surgeon: Dr. Mendoza  Hospital:  [facility]    HPI:  Patient reports that he feels well. He does not have any acute concerns. He has not had any chest pain or shortness of breath. Patient's activity has been moderated by his back pain. He is able to walk 100 yards without any cardiopulmonary symptoms.  He did have some nonspecific symptoms in December. Underwent echocardiogram stress test. That was low probability for ischemia. It was noted that he developed some chest pain during exercise however. He has not had any recurrent symptoms. He has not had any fevers or chills.  No change in bowel movements.      Fever/Chills or other infectious symptoms in past month:  (NO)   >10lb weight loss in past two months:  (NO)     Health Care Directive/Code status: FULL CODE  Hx of blood transfusions:   (NO)   History of VRE/MRSA:  (NO) Date: NA Site: NA    Preoperative Evaluation: Obstructive Sleep Apnea screening    S: Snore -  Do you snore loudly? (louder than talking or loud enough to be heard through closed doors)(NO)  T: Tired - Do you often feel tired, fatigued, or sleepy during the daytime?(NO)  O: Observed - Has anyone  "ever observed you stop breathing during your sleep?(NO)  P: Pressure - Do you have or are you being treated for high blood pressure?(YES)  B: BMI - BMI greater than 35kg/m2?(NO)  A: Age - Age over 50 years old?(YES)  N: Neck - Neck circumference greater than 40 cm?(YES)  G: Gender - Gender: Male?(YES)    Total number of \"YES\" responses:  4    Scoring: Low risk of JAMES 0-2  At Risk of JAMES: >3 High Risk of JAMES: 5-8        Patient Active Problem List       Diagnosis  Date Noted     Controlled substance agreement signed 7-13-16  01/15/2016     Back pain  01/13/2014     Malignant neoplasm of prostate (HC)  01/02/2013     Malignant neoplasm of prostate (HC)  01/02/2013     SURGERY FOLLOWUP NEC  12/21/2012     PERS HX PROSTATIC MALIGNANCY  12/06/2012     Pelvic pain in male  11/30/2012     Gross hematuria  11/30/2012     Post op, with clots, leading to hooks cath obstruction          Anemia due to acute blood loss  11/30/2012     EBL 11/28/12:  1700 ml  EBL 11/28/12:  500 ml  Transfused 2 units pRBC's early on 11/29/12        Syncope  11/30/2012     One episode, 11/28/12 evening          Fever, postprocedural  11/30/2012 11/30/12, mild        Cancer of prostate (HC)  09/06/2012     Nerve sparing radical retropubic prostatectomy, 11/28/12  Replacement of obstructed Hooks catheter with reanastamosis of urethra to bladder neck, 11/28/12              Past Medical History:     Diagnosis  Date     Cancer of prostate (HC) 9/6/2012     CIGARETTE SMOKER     Quit - October 2007 with chantix      DEGENERATIVE DISC DISEASE, LUMBAR SPINE 12/1/2008     Esophagitis      GASTROESOPHAGEAL REFLUX DISEASE      Low back pain      Pain medication agreement signed 09/09/2013 1/31/2014     PSA, INCREASED 4/10/2012       Past Surgical History:      Procedure  Laterality Date     APPENDECTOMY  09/19/2009     Ruptured Kimberly       COLONOSCOPY SCREENING  08/31/2006    EGD, next due in 2016        ESOPHAGOGASTRODUODENOSCOPY  03/2003 "     ESOPHAGOGASTRODUODENOSCOPY  08/31/2006     INCISION AND DRAINAGE  age 4    EXCISE VARICOCELE       LUMBAR DISKECTOMY  03/16/2009    L 3-4 [facility]diskectomy, [facility]       RADICAL PROSTATECTOMY  11/28/12    Nerve Sparring, Dr Warner       TONSIL AND ADENOIDECTOMY      as a child         Current Outpatient Prescriptions       Medication  Sig Dispense Refill     aspirin enteric coated 81 mg tablet Take 1 tablet by mouth once daily with a meal. 90 tablet 0     atorvastatin (LIPITOR) 20 mg tablet TAKE 1 TABLET BY MOUTH ONCE DAILY. 90 tablet 3     Bee Pollen 500 mg Tab Take 2 tablets by mouth once daily.       CYANOCOBALAMIN, VITAMIN B-12, ORAL Take 1 Tab by mouth once daily.       Garlic tablet Take 2 tablets by mouth once daily.       HYDROcodone-acetaminophen,  mg, (NORCO )  mg per tablet 1-2 tabs every 4 hours as needed.  Maximum 12 per day. Ok to fill March 8th 340 tablet 0     lisinopril (PRINIVIL; ZESTRIL) 5 mg tablet Take 1 tablet by mouth once daily. 90 tablet 1     Omega-3 Fatty Acids cap Take 2 capsules by mouth once daily.       omeprazole (PRILOSEC) 40 mg Delayed-Release capsule Take 1 capsule by mouth once daily. 90 capsule 2     sildenafil citrate (VIAGRA) 100 mg tablet Take 0.5 tablets by mouth once daily if needed for Erectile Dysfunction. Take 30min to 4 hours before sexual activity. Max 100mg/24hr. 6 tablet 0     tamsulosin (FLOMAX) 0.4 mg capsule Take 1 capsule by mouth once daily after a meal.  0     No current facility-administered medications for this visit.      Medications have been reviewed by me and are current to the best of my knowledge and ability.    Recent use of: aspirin and ibuprofen    Allergies:  No Known Allergies  Latex allergy  no  Immunizations:  Immunization History     Administered  Date(s) Administered     Influenza Virus, Unspecified 11/17/2011     Influenza, IIV3 (Age >=3 years) 11/09/2012, 01/13/2014     Influenza, IIV4 (Age >= 3 Years) 10/16/2014, 11/13/2015,  "11/03/2016     Tdap 11/09/2012       Family History       Problem   Relation Age of Onset     Heart Disease  Father       passed away from CHF,CAD        Cancer-colon  Father      Hypertension  Father      Stroke  Father      Other  Father      Dementia       Hypertension  Mother      HTN       Other  Mother       Parkinsons       Good Health  Sister      emotional problems.        Good Health  Sister      Good Health  Other      Other  Son      w/o major medical problems.        Other  Daughter      w/o major medical problems.        Good Health  Other      previous marriage./previous marriage.       Good Health  Other      previous marriage.         Denies family hx of bleeding tendencies, anesthesia complications, or other problems with surgery.    Social History      Substance Use Topics        Smoking status:  Former Smoker     Packs/day: 1.00     Years: 20.00     Types: Cigarettes     Quit date: 11/8/2002     Smokeless tobacco:  Current User     Types: Snuff     Alcohol use  Not on file       ROS:    Surgical:  patient denies previous complications from prior surgeries including but not limited to prolonged bleeding, anesthesia complications, dysrhythmias, surgical wound infections, or prolonged hospital stay.  Cardiorespiratory: denies chest pain, palpitations, shortness of breath, cough. Most exertion in the past 2 weeks was Stacking/carrying firewood for house.  Complete ROS otherwise negative except as noted in HPI.     -------------------------------------------------------------    PHYSICAL EXAM:  /82  Pulse 88  Ht 1.778 m (5' 10\")  Wt 102.5 kg (226 lb)  BMI 32.43 kg/m2    EXAM:  General Appearance: Pleasant, alert, appropriate appearance for age. No acute distress  Head Exam: Normal. Normocephalic, atraumatic.  Eyes: PERRL, EOMI  Ears: Normal TM's bilaterally.   OroPharynx: Mucosa pink and moist. Dentition in good repair. Yes - has partial dentures.   Neck: Supple, no masses or nodes, no " lymphadenopathy.  No thyromegaly.  Lungs: Normal chest wall and respirations. Clear to auscultation, no wheezes or crackles.  Cardiovascular: Regular rate and rhythm. S1, S2, no murmurs.  Gastrointestinal: Soft, nontender, no abnormal masses or organomegaly. BS normal   Musculoskeletal: No edema. No warm or erythematous joints.  Skin: no concerning or new rashes.  Neurologic Exam: CN 2-12 grossly intact.  Normal gait.  Symmetric DTRs, normal gross motor movement, tone, and coordination. No tremor.  Psychiatric Exam: Alert and oriented, appropriate affect.      EKG:  Please see internal medicine read.  I did not notice any significant change from previous.  ---------------------------------------------------------------    ASSESSEMENT AND PLAN:     Dilip was seen today for preoperative exam.    Diagnoses and all orders for this visit:    Preop cardiovascular exam   from a clinical standpoint patient appears to be stable with no evidence of cardiopulmonary disease however he has decreased exercise capacity. I waited the formal read from internal medicine. Given the concerns noted in that read I would like him to follow-up with internal medicine prior to surgery to determine if he needs any additional risk stratification given recent negative stress test despite having chest pain during test.    I asked Dr. Yen, who interpreted his EKG, to review his chart and evaluate his need for further risk stratification.  He felt due to patient's maximal stress test in Jan 2017 there was nothing that could be done to reduce his surgical risk and he has no cardiopulmonary limitation thus ok for surgery from that standpoint.    Lumbar radiculopathy  patient continues on relatively high-dose narcotics. His oral morphine equivalent is roughly 120mg.  -     Discontinue: HYDROcodone-acetaminophen,  mg, (NORCO )  mg per tablet; 1-2 tabs every 4 hours as needed.  Maximum 12 per day. Ok to fill April 7th.  -      HYDROcodone-acetaminophen,  mg, (NORCO )  mg per tablet; 1-2 tabs every 4 hours as needed.  Maximum 12 per day. Ok to fill May 7th.  -     PROTIME-INR; Future  -     COMPLETE METABOLIC PANEL; Future  -     PSA, TOTAL; Future  -     CBC WITH DIFFERENTIAL; Future  -     EKG 12 LEAD UNIT PERFORMED  -     PROTIME-INR  -     COMPLETE METABOLIC PANEL  -     PSA, TOTAL  -     CBC WITH DIFFERENTIAL  -     CBC WITH AUTO DIFFERENTIAL    Elevated PSA   history of prostate cancer and radical prostatectomy. Strongly suggest he follow up with Dr. Arrington. He reports that he plans to but feels he cannot get this done before his surgery. He has not had any urinary symptoms.    PRE OP RECOMMENDATIONS:    No family history of problems with bleeding or anesthesia. Patient is able to tolerate greater than 4 METs of activity without any cardiopulmonary symptoms. ASA PS class 2 . No cardiopulmonary workup is neccessary for the current procedure. Please contact the office with any questions or concerns.    Patient is on chronic pain medications (YES);  - I would appreciate the surgeon to manage his narcotics in the first two weeks after surgery.  After that I would be happy to assume ongoing pain management care.  Current OME is about 120mg.  Patient is on antiplatlet/anticoagulation (NO)  Other medications that need adjustment perioperatively (NO)    Other:  Patient was advised to call our office and the surgical services with any change in condition or new symptoms if they were to develop between today and their surgical date; especially any cardiopulmonary symptoms or symptoms concerning for an infection.

## 2018-01-05 NOTE — TELEPHONE ENCOUNTER
Patient Information     Patient Name MRN Sex Dilip Lomax [id number] Male 1955      Telephone Encounter by Mary Gaffney at 2017  2:21 PM     Author:  Mary Gaffney Service:  (none) Author Type:  (none)     Filed:  2017  2:22 PM Encounter Date:  2017 Status:  Signed     :  Mary Gaffney            Appointment made for next Wednesday at 1:30.

## 2018-01-05 NOTE — NURSING NOTE
Patient Information     Patient Name MRN Sex Dilip Lomax [id number] Male 1955      Nursing Note by Erin Long at 2017  1:30 PM     Author:  Erin Long Service:  (none) Author Type:  (none)     Filed:  2017  1:42 PM Encounter Date:  2017 Status:  Signed     :  Erin Long            Patient presents to the clinic for a 3 week follow up on his back surgery and for medication refills on oxycodone and lisinopril.  Erin CODY, CHANDRA.......2017..1:21 PM

## 2018-01-05 NOTE — TELEPHONE ENCOUNTER
"Patient Information     Patient Name MRN Dilip Kumar [id number] Male 1955      Telephone Encounter by Marissa Garcia at 2017  9:40 AM     Author:  Marissa Garcia Service:  (none) Author Type:  (none)     Filed:  2017  9:46 AM Encounter Date:  2017 Status:  Signed     :  Marissa Garcia            PT CALLED, HE NEEDS TO BE SEEN FOR HIS SURG ON HIS BACK (DOS 2017) FOR A FOLLOW UP AND MEDS. THE NEXT AVAILABLE IS THE FIRST WEEK OF , HE STATES HE NEEDS TO BE SEEN SOONER THAN THAT AND WOULD LIKE TO COME TO THE \"Y\". PLEASE CALL PT BACK.        "

## 2018-01-05 NOTE — PROGRESS NOTES
Patient Information     Patient Name MRN Sex Dilip Lomax [id number] Male 1955      Progress Notes by Sumanth Roberts MD at 2017  1:30 PM     Author:  Sumanth Roberts MD  Service:  (none) Author Type:  Physician     Filed:  2017  9:50 AM  Encounter Date:  2017 Status:  Addendum     :  Sumanth Roberts MD (Physician)        Related Notes: Original Note by Sumanth Roberts MD (Physician) filed at 2017  7:49 AM            SUBJECTIVE:  Dilip Kan is a 62 y.o. Male.  He comes in today to follow-up on his back surgery. He had decompression on  as well as fusion of L4-L5. He reports significant change in the quality of pain. He reports no more pain radiating down his legs or any sensation of numbness. He does have quite a bit of pain and soreness locally at surgical site. He reports that is gradually improving. Was prescribed oxycodone and MS Contin from surgery. Did not take any of the MS Contin. He is taking oxycodone as prescribed and reports it is effective. He has been on the medication for a little over 2 weeks and does have about 20 left. He is taking roughly 5 per day. In addition to the oxycodone he has continued on his typical dose of hydrocodone although has been able to cut down slightly to about 10 pills per day.    Current oral morphine equivalent over the past 2 weeks has been 175 mg which is an increase as would be expected with surgery.    He reports no issues with urination. He has had no problems with constipation. Does continue with Senokot S.      Social History        Substance Use Topics          Smoking status:   Former Smoker      Packs/day:  1.00      Years:  20.00      Types:  Cigarettes      Quit date:  2002      Smokeless tobacco:   Current User      Types:  Snuff      Alcohol use   Yes      Comment: 5 drinks a month         I have personally reviewed and updated above noted social, family and/or past medical  "history.    A comprehensive review of systems was negative except for items noted in HPI/Subjective.      OBJECTIVE:  /68  Pulse 70  Ht 1.778 m (5' 10\")  Wt 98.2 kg (216 lb 9.6 oz)  BMI 31.08 kg/m2  EXAM:  Chest/Respiratory Exam: Normal chest wall and respirations. Clear to auscultation.  Cardiovascular Exam: Regular rate and rhythm. S1, S2, no murmur, click, gallop, or rubs.  Musculoskeletal Exam: Back is straight. He has generalized tenderness around the surgical site but nothing concerning.  Neurologic Exam: Reflexes symmetric. Strength symmetric and normal  Skin: Wound is healing very well. No evidence of infection    ASSESSMENT/Plan :      Results for orders placed or performed during the hospital encounter of 04/28/17      Hemoglobin - Daily x 2      Result  Value Ref Range    HEMOGLOBIN                11.8 (L) 13.5 - 17.5 g/dL    MCV                       89 80 - 100 fL   Hemoglobin - Daily x 2      Result  Value Ref Range    HEMOGLOBIN                11.6 (L) 13.5 - 17.5 g/dL    MCV                       89 80 - 100 fL       Dilip was seen today for follow up.    Diagnoses and all orders for this visit:    HTN (hypertension)  -     lisinopril (PRINIVIL; ZESTRIL) 5 mg tablet; Take 1 tablet by mouth once daily.    Acute postoperative pain  -     oxyCODONE 10 mg tablet; Take 1-2 tablets by mouth every 4 hours if needed    Controlled substance agreement signed 7-13-16    Chronic low back pain, unspecified back pain laterality, with sciatica presence unspecified  -     HYDROcodone-acetaminophen,  mg, (NORCO )  mg per tablet; Take 1-2 tablets by mouth every 4 hours if needed  Ok to refill on 6/9/17      I discussed with patient that I would expect his pain does start to significantly improve concerning majority of this seems to be postsurgical pain. That being said he will likely require some degree of narcotics for the next couple of months. I discussed with him initial taper would " be to get off the oxycodone. I would like him to use the oxycodone or the hydrocodone but not use both medications within 4 hours of each other. He should limit his oxycodone 10 maximum of 2 pills per day and hydrocodone to maximum of 10 pills per day. This would be oral morphine equivalent of 130 mg. After a month he should be able to get off the oxycodone which would cut his oral morphine equivalent 100 mg. A couple weeks later he will follow-up with me and we will come up with a long term taper plan to get him off the hydrocodone.    Patient voiced good understanding of all these concerns and considerations. He plans on bringing the morphine to the 's office to get rid of them.    Greater than 50% of this 25 minute visit was spent in counseling and coordination of care regarding diagnosis and treatment of chronic narcotics and chronic back pain s/p surgery.      Patient Instructions   Ok to take the oxycodone when pain is severe.  Take the hydrocodone when pain is mild/moderate.  Do not take any pain pills more frequently than every 4hrs.  Take a maximum of 10 pills of hydrocodone per day.  Take a maximum of 2 pills of oxycodone per day.  After a month you should be able to get off the oxycodone.  See me back in about 6w and we will come up with a long term plan to get you off hydrocodone.      Sumanth Roberts MD    This document was created using computer generated templates and voice activated software.

## 2018-01-05 NOTE — PATIENT INSTRUCTIONS
Patient Information     Patient Name MRN Dilip Kumar [id number] Male 1955      Patient Instructions by Sumanth Roberts MD at 2017  1:30 PM     Author:  Sumanth Roberts MD Service:  (none) Author Type:  Physician     Filed:  2017  1:47 PM Encounter Date:  2017 Status:  Signed     :  Sumanth Roberts MD (Physician)            Ok to take the oxycodone when pain is severe.  Take the hydrocodone when pain is mild/moderate.  Do not take any pain pills more frequently than every 4hrs.  Take a maximum of 10 pills of hydrocodone per day.  Take a maximum of 2 pills of oxycodone per day.  After a month you should be able to get off the oxycodone.  See me back in about 6w and we will come up with a long term plan to get you off hydrocodone.

## 2018-01-05 NOTE — TELEPHONE ENCOUNTER
Patient Information     Patient Name MRN Sex Dilip Lomax [id number] Male 1955      Telephone Encounter by Mary Gaffney at 2017  9:35 AM     Author:  Mary Gaffney Service:  (none) Author Type:  (none)     Filed:  2017  9:36 AM Encounter Date:  2017 Status:  Signed     :  Mary Gaffney            Left message to call back.

## 2018-01-07 ENCOUNTER — COMMUNICATION - GICH (OUTPATIENT)
Dept: FAMILY MEDICINE | Facility: OTHER | Age: 63
End: 2018-01-07

## 2018-01-07 DIAGNOSIS — K21.9 GASTRO-ESOPHAGEAL REFLUX DISEASE WITHOUT ESOPHAGITIS: ICD-10-CM

## 2018-01-17 ENCOUNTER — COMMUNICATION - GICH (OUTPATIENT)
Dept: FAMILY MEDICINE | Facility: OTHER | Age: 63
End: 2018-01-17

## 2018-01-23 ENCOUNTER — COMMUNICATION - GICH (OUTPATIENT)
Dept: FAMILY MEDICINE | Facility: OTHER | Age: 63
End: 2018-01-23

## 2018-01-26 VITALS
SYSTOLIC BLOOD PRESSURE: 128 MMHG | DIASTOLIC BLOOD PRESSURE: 64 MMHG | BODY MASS INDEX: 30.71 KG/M2 | TEMPERATURE: 98.5 F | WEIGHT: 214 LBS | SYSTOLIC BLOOD PRESSURE: 144 MMHG | HEART RATE: 68 BPM | WEIGHT: 219.4 LBS | HEART RATE: 84 BPM | DIASTOLIC BLOOD PRESSURE: 82 MMHG | BODY MASS INDEX: 30.72 KG/M2 | HEIGHT: 71 IN

## 2018-01-26 VITALS
HEIGHT: 70 IN | HEART RATE: 72 BPM | SYSTOLIC BLOOD PRESSURE: 120 MMHG | DIASTOLIC BLOOD PRESSURE: 60 MMHG | SYSTOLIC BLOOD PRESSURE: 110 MMHG | BODY MASS INDEX: 30.9 KG/M2 | BODY MASS INDEX: 32.5 KG/M2 | WEIGHT: 227 LBS | WEIGHT: 220 LBS | DIASTOLIC BLOOD PRESSURE: 70 MMHG | HEART RATE: 76 BPM

## 2018-01-26 VITALS
HEART RATE: 88 BPM | BODY MASS INDEX: 30.76 KG/M2 | SYSTOLIC BLOOD PRESSURE: 124 MMHG | SYSTOLIC BLOOD PRESSURE: 138 MMHG | WEIGHT: 226 LBS | HEIGHT: 70 IN | DIASTOLIC BLOOD PRESSURE: 80 MMHG | HEART RATE: 68 BPM | BODY MASS INDEX: 32.35 KG/M2 | DIASTOLIC BLOOD PRESSURE: 82 MMHG | WEIGHT: 219 LBS

## 2018-01-26 VITALS
WEIGHT: 227 LBS | WEIGHT: 228 LBS | DIASTOLIC BLOOD PRESSURE: 82 MMHG | DIASTOLIC BLOOD PRESSURE: 80 MMHG | SYSTOLIC BLOOD PRESSURE: 138 MMHG | HEART RATE: 80 BPM | BODY MASS INDEX: 32.57 KG/M2 | SYSTOLIC BLOOD PRESSURE: 134 MMHG | BODY MASS INDEX: 32.71 KG/M2 | HEART RATE: 80 BPM

## 2018-01-26 VITALS
WEIGHT: 216.6 LBS | DIASTOLIC BLOOD PRESSURE: 68 MMHG | HEIGHT: 70 IN | BODY MASS INDEX: 31.01 KG/M2 | HEART RATE: 70 BPM | SYSTOLIC BLOOD PRESSURE: 130 MMHG

## 2018-01-31 ENCOUNTER — DOCUMENTATION ONLY (OUTPATIENT)
Dept: FAMILY MEDICINE | Facility: OTHER | Age: 63
End: 2018-01-31

## 2018-01-31 PROBLEM — F11.90 CHRONIC, CONTINUOUS USE OF OPIOIDS: Status: ACTIVE | Noted: 2018-01-31

## 2018-01-31 PROBLEM — I10 HYPERTENSION: Status: ACTIVE | Noted: 2017-04-28

## 2018-01-31 PROBLEM — E78.00 PURE HYPERCHOLESTEROLEMIA: Status: ACTIVE | Noted: 2017-04-28

## 2018-01-31 PROBLEM — M48.061 SPINAL STENOSIS, LUMBAR REGION, WITHOUT NEUROGENIC CLAUDICATION: Status: ACTIVE | Noted: 2017-04-28

## 2018-01-31 PROBLEM — K21.9 GASTROESOPHAGEAL REFLUX DISEASE WITHOUT ESOPHAGITIS: Status: ACTIVE | Noted: 2017-04-28

## 2018-01-31 PROBLEM — E66.09 NON MORBID OBESITY DUE TO EXCESS CALORIES: Status: ACTIVE | Noted: 2017-04-28

## 2018-01-31 RX ORDER — HYDROCODONE BITARTRATE AND ACETAMINOPHEN 10; 325 MG/1; MG/1
TABLET ORAL
COMMUNITY
Start: 2017-12-28 | End: 2018-04-24

## 2018-01-31 RX ORDER — OMEPRAZOLE 40 MG/1
40 CAPSULE, DELAYED RELEASE ORAL DAILY
COMMUNITY
Start: 2018-01-03 | End: 2018-12-26

## 2018-01-31 RX ORDER — ATORVASTATIN CALCIUM 20 MG/1
20 TABLET, FILM COATED ORAL DAILY
COMMUNITY
Start: 2017-02-23 | End: 2019-01-02

## 2018-01-31 RX ORDER — LISINOPRIL 5 MG/1
5 TABLET ORAL DAILY
COMMUNITY
Start: 2018-01-02 | End: 2019-01-02

## 2018-01-31 RX ORDER — AMOXICILLIN 250 MG
1-4 CAPSULE ORAL PRN
COMMUNITY
Start: 2017-06-28 | End: 2019-11-26

## 2018-02-06 ENCOUNTER — COMMUNICATION - GICH (OUTPATIENT)
Dept: FAMILY MEDICINE | Facility: OTHER | Age: 63
End: 2018-02-06

## 2018-02-07 ENCOUNTER — HISTORY (OUTPATIENT)
Dept: FAMILY MEDICINE | Facility: OTHER | Age: 63
End: 2018-02-07

## 2018-02-07 ENCOUNTER — OFFICE VISIT - GICH (OUTPATIENT)
Dept: FAMILY MEDICINE | Facility: OTHER | Age: 63
End: 2018-02-07

## 2018-02-07 DIAGNOSIS — M54.50 LOW BACK PAIN: ICD-10-CM

## 2018-02-07 DIAGNOSIS — I77.810 THORACIC AORTIC ECTASIA (H): ICD-10-CM

## 2018-02-07 DIAGNOSIS — C61 MALIGNANT NEOPLASM OF PROSTATE (H): ICD-10-CM

## 2018-02-07 DIAGNOSIS — G89.29 OTHER CHRONIC PAIN: ICD-10-CM

## 2018-02-07 DIAGNOSIS — I10 ESSENTIAL (PRIMARY) HYPERTENSION: ICD-10-CM

## 2018-02-07 DIAGNOSIS — E78.5 HYPERLIPIDEMIA: ICD-10-CM

## 2018-02-07 DIAGNOSIS — K21.9 GASTRO-ESOPHAGEAL REFLUX DISEASE WITHOUT ESOPHAGITIS: ICD-10-CM

## 2018-02-09 VITALS
DIASTOLIC BLOOD PRESSURE: 82 MMHG | HEIGHT: 71 IN | BODY MASS INDEX: 31.36 KG/M2 | SYSTOLIC BLOOD PRESSURE: 152 MMHG | HEART RATE: 89 BPM | WEIGHT: 224 LBS

## 2018-02-09 VITALS
SYSTOLIC BLOOD PRESSURE: 152 MMHG | WEIGHT: 227 LBS | HEART RATE: 84 BPM | BODY MASS INDEX: 31.88 KG/M2 | DIASTOLIC BLOOD PRESSURE: 80 MMHG

## 2018-02-12 NOTE — TELEPHONE ENCOUNTER
Patient Information     Patient Name MRN Sex Dilip Lomax [id number] Male 1955      Telephone Encounter by Mary Gaffney at 2017 11:22 AM     Author:  Mary Gaffney Service:  (none) Author Type:  (none)     Filed:  2017 11:26 AM Encounter Date:  2017 Status:  Signed     :  Mary Gaffney            Patient states he will be going out on the road on Friday and will need to fill his prescription a few days early. He will stop in to the [facility] tomorrow to have Dr Roberts review this request and possibly change the date on the refill. Dr Roberts is aware of this request.

## 2018-02-12 NOTE — PROGRESS NOTES
"Patient Information     Patient Name MRN Sex Dilip Lomax [id number] Male 1955      Progress Notes by Riley Carson MD at 2017  9:15 AM     Author:  Riley Carson MD Service:  (none) Author Type:  Physician     Filed:  2017  9:41 AM Encounter Date:  2017 Status:  Signed     :  Riley Carson MD (Physician)            SUBJECTIVE:  Dilip Kan is a 62 y.o. male here for follow-up. He has a history of spinal surgery this past April. He had decompression at L3 S1 and fusion at L4-L5. He has been using hydrocodone and has been working on titrating downwards. He has no other road  and is leaving for [place] this evening. He is due for refills on  and will not be in town. He is here to get refills early. It appears that this has happened previously.    Allergies:  No Known Allergies    ROS:    As above otherwise ROS is unremarkable.    OBJECTIVE:  /82 (Cuff Site: Right Arm, Position: Sitting, Cuff Size: Adult Large)  Pulse 89  Ht 1.797 m (5' 10.75\")  Wt 101.6 kg (224 lb)  BMI 31.46 kg/m2    EXAM:  General Appearance: Pleasant, alert, appropriate appearance for age. No acute distress      ASSESSEMENT AND PLAN:    Dilip was seen today for medication management.    Diagnoses and all orders for this visit:    Chronic low back pain, unspecified back pain laterality, with sciatica presence unspecified  -     HYDROcodone-acetaminophen,  mg, (NORCO )  mg per tablet; Take 1-2 tablets by mouth every 4 hours if needed. 7 tabs daily max.     his case was discussed with his primary physician who recommended refilling for 7 tablets daily at a maximum. He is feeling early so this should last him through approximately . He has a follow-up appointment with his primary physician at that time to continue titration.      Kenny Carson MD    This document was prepared using voice generated software.  While every attempt was made for " accuracy, grammatical errors may exist.

## 2018-02-12 NOTE — TELEPHONE ENCOUNTER
Patient Information     Patient Name MRN Dilip Kumar [id number] Male 1955      Telephone Encounter by Lucero Bernard at 2017  1:48 PM     Author:  Lucero Bernrad Service:  (none) Author Type:  (none)     Filed:  2017  1:58 PM Encounter Date:  2017 Status:  Signed     :  Lucero Bernard            Patient states that he is going to be running out of his hydrocodone while Dr Roberts is out on vacation, he is wondering if he is able to have one month rx filled and then he will come in mid January.  Lucero Bernard LPN .............2017  1:56 PM

## 2018-02-12 NOTE — NURSING NOTE
Patient Information     Patient Name MRN Sex Dilip Lomax [id number] Male 1955      Nursing Note by Lucero Bernard at 2017  9:15 AM     Author:  Lucero Bernard Service:  (none) Author Type:  (none)     Filed:  2017  9:30 AM Encounter Date:  2017 Status:  Signed     :  Lucero Bernard            Patient presents today to have medication refilled until he can be seen with Dr Roberts on . He is leaving [facility] this afternoon for work and will not return until around the .  Lucero Bernard LPN .............2017  9:20 AM

## 2018-02-12 NOTE — TELEPHONE ENCOUNTER
Patient Information     Patient Name MRN Sex Dilip Lomax [id number] Male 1955      Telephone Encounter by Sumanth Roberts MD at 2017  6:54 PM     Author:  Sumanth Roberts MD Service:  (none) Author Type:  Physician     Filed:  2017  6:58 PM Encounter Date:  2017 Status:  Signed     :  Sumanth Roberts MD (Physician)            We contacted patient.  He is currently out of town and he will be out of town in mid January during his scheduled visit with me on .     I would suggest he see Dr. Carson for a limited refill of max 7 per day of hydrocodone for a couple weeks until I return.  It sounds like he will run out around  or shortly therafter, which is early but I will discuss this with him at my visit as he is on an ongoing taper since his spine surgery.  He will probably need #140 to refill on 18.

## 2018-02-13 NOTE — TELEPHONE ENCOUNTER
Patient Information     Patient Name MRN Dilip Kumar [id number] Male 1955      Telephone Encounter by Sumanth Roberts MD at 2018  3:04 PM     Author:  Sumanth Roberts MD Service:  (none) Author Type:  Physician     Filed:  2018  3:08 PM Encounter Date:  2018 Status:  Signed     :  Sumanth Roberts MD (Physician)            Agree that patient should keep his appointment on Tuesday and reduce his medication to make it until that date.  This is not a new issue and his appointment has been made and planned for for weeks.  His last refill was for #140 and he told me on  he had enough meds to get him through  which means at 7 per day he should be covered until .    He can safely reduce to 5 pills daily from 7 which should at least get him through another day and a half and if at that point he still runs out on the  or  unfortunately he may withdraw.

## 2018-02-13 NOTE — TELEPHONE ENCOUNTER
Patient Information     Patient Name MRN Sex Dilip Lomax [id number] Male 1955      Telephone Encounter by Marco A Aburto RN at 2018  4:04 PM     Author:  Marco A Aburto RN Service:  (none) Author Type:  NURS- Registered Nurse     Filed:  2018  4:07 PM Encounter Date:  2018 Status:  Signed     :  Marco A Aburto RN (NURS- Registered Nurse)            Redundant Refill Request for Omeprazole refused;    Prescribing Provider: Sumanth Roberts MD             Order Date: 2018  Ordered by: LAINEY BRASHER  Medication:omeprazole (PRILOSEC) 40 mg Delayed-Release capsule  Prescription #:[id number]    Qty:90 capsule  Ref:0  Start:1/3/2018    End:              Route:Oral                  CHELSEA:No   Class:eRx    Sig:TAKE 1 CAPSULE BY MOUTH DAILY    Pharmacy:[id number] - [place], [place] - [address]    Cosign accepted by SUMANTH ROBERTS[[id number]] on 2018  9:09 AM    Unable to complete prescription refill per RN Medication Refill Policy.................... Marco A Aburto RN ....................  2018   4:05 PM

## 2018-02-13 NOTE — TELEPHONE ENCOUNTER
Patient Information     Patient Name MRN Sex Dilip Lomax [id number] Male 1955      Telephone Encounter by Mary Gaffney at 2018  1:32 PM     Author:  Mary Gaffney Service:  (none) Author Type:  (none)     Filed:  2018  1:43 PM Encounter Date:  2018 Status:  Signed     :  Mary Gaffney            Patient states he is returning from an over the road trip this afternoon and would like to leave again on Friday morning. States he will be out of his pain meds over the weekend. Right now he has an appointment with Dr Roberts next Tuesday and says he would be able to get by with enough medication until then but would like to leave town on Friday. Per Dr Roberts, I advised him that it would be best if he can keep his appointment on Tuesday since the medication cannot be filled outside of an appointment and Dr Roberts will not be back in clinic until Tuesday. Patient states understanding of this. States if he can't get out of this work trip he may call to see if he can get in with another provider tomorrow in Dr Roberts's absence. He understands he may not be able to get his meds filled early.

## 2018-02-13 NOTE — PATIENT INSTRUCTIONS
Patient Information     Patient Name MRN Sex Dilip Lomax [id number] Male 1955      Patient Instructions by Sumanth Roberts MD at 2018  9:30 AM     Author:  Sumanth Roberts MD Service:  (none) Author Type:  Physician     Filed:  2018 10:48 AM Encounter Date:  2018 Status:  Signed     :  Sumanth Roberts MD (Physician)            See me back in April  Come in fasting, we will get labs then.  If the colonoscopy is tough to do logistically lets do cologuard.

## 2018-02-13 NOTE — TELEPHONE ENCOUNTER
Patient Information     Patient Name MRN Sex Dilip Lomax [id number] Male 1955      Telephone Encounter by Mary Gaffney at 2018  1:07 PM     Author:  Mary Gaffney Service:  (none) Author Type:  (none)     Filed:  2018  1:08 PM Encounter Date:  2018 Status:  Signed     :  Mary Gaffney            Patient states he will be back in [facility] and would like to come in tomorrow for prescription refills.

## 2018-02-13 NOTE — TELEPHONE ENCOUNTER
Patient Information     Patient Name MRN Sex Dilip Lomax [id number] Male 1955      Telephone Encounter by Mary Gaffney at 2018  4:57 PM     Author:  Mary Gaffney Service:  (none) Author Type:  (none)     Filed:  2018  4:58 PM Encounter Date:  2018 Status:  Signed     :  Mary Gaffney            Appointment made for tomorrow at 9:15 AM

## 2018-02-13 NOTE — TELEPHONE ENCOUNTER
Patient Information     Patient Name MRN Sex Dilip Lomax [id number] Male 1955      Telephone Encounter by Mary Gaffney at 2018  1:40 PM     Author:  Mary Gaffney Service:  (none) Author Type:  (none)     Filed:  2018  1:41 PM Encounter Date:  2018 Status:  Signed     :  Mary Gaffney            Patient notified of Dr Roberts's response. He states understanding of this.

## 2018-02-13 NOTE — TELEPHONE ENCOUNTER
Patient Information     Patient Name MRN Sex Dilip Lomax [id number] Male 1955      Telephone Encounter by Mary Gaffney at 2018 11:06 AM     Author:  Mary Gaffney Service:  (none) Author Type:  (none)     Filed:  2018 11:06 AM Encounter Date:  2018 Status:  Signed     :  Mary Gaffney            Left message to call back.

## 2018-02-13 NOTE — DISCHARGE SUMMARY
Patient Information     Patient Name MRN Sex Dilip Lomax [id number] Male 1955      Discharge Summaries by Kee Tobar PT at 2017  8:26 AM     Author:  Kee Tobar PT Service:  (none) Author Type:  PT- Physical Therapist     Filed:  2017  8:28 AM Date of Service:  2017  8:26 AM Status:  Signed     :  Kee Tobar PT (PT- Physical Therapist)            [facility]  Outpatient PT - Discharge Summary    Date of discharge: 2017                Reason for Discharge:    Goals partially met    Patient failed to schedule further appointments      Progress from Initial to Discharge (if applicable):    Comments:  See final progress note for status at the time of final visit.         Discharge G codes not required    Further Recommendations:      Patient was encouraged to continue with established home exercise program    Patient is discharged from Physical Therapy    Thank you for your referral to [facility].  Please call with any questions, concerns or comments.  [phone]

## 2018-02-13 NOTE — PROGRESS NOTES
"Patient Information     Patient Name MRN Sex Dilip Lomax [id number] Male 1955      Progress Notes by Sumanth Roberts MD at 2018  9:30 AM     Author:  Sumanth Roberts MD  Service:  (none) Author Type:  Physician     Filed:  2018  3:40 PM  Encounter Date:  2018 Status:  Addendum     :  Sumanth Roberts MD (Physician)        Related Notes: Original Note by Sumanth Roberts MD (Physician) filed at 2018  7:40 PM            Nursing Notes:   Mary Gaffney  2018 10:12 AM  Signed  Patient comes in today to follow up on his back and medications.      SUBJECTIVE:  Dilip Kan is a 62 y.o. Male who comes in today to follow up on multiple issues.  Last check up had elevated blood pressure.  He has been off narcotics for 3-4d as he was unable to come in for a required visit.  He has had symptoms of anxiety, increased pain in his back and some loose stools.  He cut back to six pills per day until about 7d ago, then took 4 per day and then ran out.      Back pain is currently localized to the small of his back.  It is an achy pain.  At times sharp with movement or lifting.  The radiating \"sciatic pain\" that had become severe has resolved.  He has no numbness or weakness in the leg.  Describes as moderate.  He continues to do his own core strengthening exercises and stretches.  Icing also helps a bit.    He has been taking omeprazole for GERD and has no breakthrough symptoms.      He continues on aspirin and lipitor and has not had any chest pain or anginal equivalents.  He did have an echo a year ago that showed normal EF and no wall motion abnormalities.  He did have mildly enlarged ascending aorta and aortic stenosis.    Also overdue for follow up with Dr. Arrington.      Social History        Substance Use Topics          Smoking status:   Former Smoker      Packs/day:  1.00      Years:  20.00      Types:  Cigarettes      Quit date:  2002      Smokeless " tobacco:   Current User      Types:  Snuff      Alcohol use   Yes      Comment: 5 drinks a month         I have personally reviewed and updated above noted social, family and/or past medical history.    A comprehensive review of systems was negative except for items noted in HPI/Subjective.      OBJECTIVE:  /80  Pulse 84  Wt 103 kg (227 lb)  BMI 31.88 kg/m2  EXAM:  General Appearance: Pleasant, alert, appropriate appearance for age. No acute distress  Thyroid Exam: No nodules or enlargement.  Chest/Respiratory Exam: Normal chest wall and respirations. Clear to auscultation.  Cardiovascular Exam: Regular rate and rhythm. 2/6 FAITH @ LUSB.  Gastrointestinal Exam: Soft, nontender, no abnormal masses or organomegaly.  Musculoskeletal Exam: No synovitis.  Muscle strength age and body habitus appropriate as well as equal and even. Mild tenderness with palpation of lumbar paraspinous muscles and to a lesser extent spine. Straight leg raise negative.  Neurologic Exam: Nonfocal; symmetric DTRs, normal gross motor movement, tone, and coordination. No tremor.  Psychiatric Exam: Alert and oriented, appropriate affect.    ASSESSMENT/Plan :    I personally reviewed the patients' labs and imaging.        Dilip was seen today for follow up.    Diagnoses and all orders for this visit:    Chronic low back pain, unspecified back pain laterality, with sciatica presence unspecified  patient has had chronic lumbar radiculopathy and had decompression back in November. At that time his oral morphine equivalent was 120 mg. We have been gradually reducing with most recent dosage at 70 mg daily.  He was driving over the road and was not able to get in for a visit for refill. Due to that he tapered down to 60 mg and then rapidly tapered off due to running out of medications. He has been off medications now for 4 days. He has had loose stools, some anxiety and other symptoms of withdrawal. He is managing reasonably well but has had  "significant increase in his pain.    I discussed with patient that long-term I do not think narcotics are good option in general. With the surgery he has had significant improvement in his pain and resolution of the radicular component but still has pain which may be from chronic arthritis.  This is made worse by his job activities. I discussed with him staying off the medications or restarting with slower taper. He would very much like to restart then taper at a more gradual pace.    He has never had any abnormal drug screens. While he has had some adjustments in fill dates both forwards and backwards this has been due to job duties and not typically due to \"running out\". He has not had any activities or concern me for diversion.    We will restart at 60 mg daily and taper by 10 mg per day per month. He will come back and see me in 3 months at which point he will be taking 30 mg daily. At that point we will likely reduce down to 7.5 mg hydrocodone for a month, then 5 mg hydrocodone for month and then consider discontinuing altogether.  -     Discontinue: HYDROcodone-acetaminophen,  mg, (NORCO )  mg per tablet; Take 1-2 tablets by mouth every 4 hours if needed. 6 tabs daily max.  -     Discontinue: HYDROcodone-acetaminophen,  mg, (NORCO )  mg per tablet; Take 1-2 tablets by mouth every 4 hours if needed. 5 tabs daily max. Ok to refill on 3/9/18  -     Discontinue: HYDROcodone-acetaminophen,  mg, (NORCO )  mg per tablet; Take 1-2 tablets by mouth every 4 hours if needed. 5 tabs daily max. Ok to refill on 3/9/18  -     HYDROcodone-acetaminophen,  mg, (NORCO )  mg per tablet; Take 1 tablets by mouth every 6 hours if needed. 4 tabs daily max. Ok to refill on 4/08/18    HTN (hypertension)  continue current dosage.  -     lisinopril (PRINIVIL; ZESTRIL) 5 mg tablet; Take 1 tablet by mouth once daily.    Gastroesophageal reflux disease without " esophagitis  continue PPI.  -     omeprazole (PRILOSEC) 40 mg Delayed-Release capsule; Take 1 capsule by mouth once daily.    Ascending aorta dilation (HC)  patient did have dilation of aorta. We'll recheck with echo.   -     Cancel: ECHO COMPLETE WO CONTRAST; Future    Hyperlipidemia, unspecified hyperlipidemia type  continue on Lipitor.  -     atorvastatin (LIPITOR) 20 mg tablet; Take 1 tablet by mouth once daily.    HTN   Recheck at next visit, has been relatively normotensive up until last two visits.  May be due to narcotic withdrawal and increased pain.  Suggest low sodium diet, exercise and weight loss.  If persistent suggest increase in ACEI and/or addition of diuretic.    Also needs to follow up with Dr. Arrington re: hx prostate cancer.  Strongly suggest colonoscopy but if not able to coordinate this he should do cologuard which was discussed with him.  He would like to do colonoscopy in a couple months when he returns.      Patient Instructions   See me back in April  Come in fasting, we will get labs then.  If the colonoscopy is tough to do logistically lets do cologuard.      Sumanth Roberts MD    This document was created using computer generated templates and voice activated software.

## 2018-02-13 NOTE — NURSING NOTE
Patient Information     Patient Name MRN Sex Dilip Lomax [id number] Male 1955      Nursing Note by Mary Gaffney at 2018  9:30 AM     Author:  Mary Gaffney Service:  (none) Author Type:  (none)     Filed:  2018 10:12 AM Encounter Date:  2018 Status:  Signed     :  Mary Gaffney            Patient comes in today to follow up on his back and medications.

## 2018-02-13 NOTE — TELEPHONE ENCOUNTER
Patient Information     Patient Name MRN Sex Dilip Lomax [id number] Male 1955      Telephone Encounter by Analia Bentley RN at 1/3/2018  4:13 PM     Author:  Analia Bentley RN Service:  (none) Author Type:  NURS- Registered Nurse     Filed:  1/3/2018  4:18 PM Encounter Date:  2017 Status:  Signed     :  Analia Bentley RN (NURS- Registered Nurse)            Proton Pump Inhibitors    Office visit in the past 12 months or per provider note.    Last visit with BANDAR HUGHES was on: [facility]  Next visit with BANDAR HUGHES is on: 01/10/2018 in [facility]  Next visit with Family Practice is on: 01/10/2018 in [facility]    Max refill for 12 months from last office visit or per provider note.    Prescription refilled per RN Medication Refill Policy.................... Analia Bentley RN ....................  1/3/2018   4:17 PM

## 2018-02-13 NOTE — TELEPHONE ENCOUNTER
Patient Information     Patient Name MRN Dilip Kumar [id number] Male 1955      Telephone Encounter by Sumanth Roberts MD at 2018  9:15 AM     Author:  Sumanth Roberts MD Service:  (none) Author Type:  Physician     Filed:  2018  9:19 AM Encounter Date:  2018 Status:  Signed     :  Sumanth Roberts MD (Physician)            I would take 2 pills daily until he runs out to try to mitigate some of the withdrawal.  If he has significant symptoms he may need to be seen by a clinic whever he is to try to get 20-30 pills so he can go down a bit slower.    If he had 30 pills left I would suggest he take 6 pills daily for 2d then 4 pills daily for 3d then 2 pills daily for 3d then stop

## 2018-02-13 NOTE — TELEPHONE ENCOUNTER
Patient Information     Patient Name MRN Sex Dilip Lomax [id number] Male 1955      Telephone Encounter by Mary Gaffney at 2018  9:08 AM     Author:  Mary Gaffney Service:  (none) Author Type:  (none)     Filed:  2018  9:10 AM Encounter Date:  2018 Status:  Signed     :  Mary Gaffney            Patient states he is out on the road until next week. He has 8 tabs left of his pain meds and thinks he will be out by Friday. He wonders what he can take to help with the withdrawal. He can call for an appointment when he finds out the exact day he will be home but wonders what he should do until then.

## 2018-03-09 ENCOUNTER — TELEPHONE (OUTPATIENT)
Dept: FAMILY MEDICINE | Facility: OTHER | Age: 63
End: 2018-03-09

## 2018-03-09 NOTE — TELEPHONE ENCOUNTER
Bentley from [facility] in [place] called needing to verify C2 prescription.    Ref #: [id number]       Quantity Refills Start End   HYDROcodone-acetaminophen,  mg, (NORCO )  mg per tablet (Discontinued) 150 tablet 0 2018   Sig :  Take 1-2 tablets by mouth every 4 hours if needed. 5 tabs daily max. Ok to refill on 3/9/18     Route:   (none)     Reason for Discontinue:   Reorder     Class:   Print       Per PCP note, taper was discussed. The month of March is 50 mg max. Patient will return in April to follow-up.    Returned call to [facility] and spoke with Bentley after verifying Patient's last name and . Bentley notified of this information.    Xiao Donald RN .............. 3/9/2018  2:27 PM

## 2018-03-27 DIAGNOSIS — Z85.46 PERSONAL HISTORY OF MALIGNANT NEOPLASM OF PROSTATE: Primary | ICD-10-CM

## 2018-03-28 ENCOUNTER — TELEPHONE (OUTPATIENT)
Dept: FAMILY MEDICINE | Facility: OTHER | Age: 63
End: 2018-03-28

## 2018-03-28 RX ORDER — HYDROCODONE BITARTRATE AND ACETAMINOPHEN 10; 325 MG/1; MG/1
TABLET ORAL
Qty: 18 TABLET | Status: CANCELLED | OUTPATIENT
Start: 2018-03-28

## 2018-03-28 NOTE — TELEPHONE ENCOUNTER
Ok to fill on Friday but Rx to last until 5/7 and he needs to see me in late April to discuss final taper schedule.

## 2018-03-28 NOTE — TELEPHONE ENCOUNTER
Pt would like to get norco refilled.  States that he will be leaving for out of town on Friday and be gone for a couple of weeks.  Pt wants [facility] to know that he will be giving his notice on Friday.  Nevaeh Varela LPN.......3/28/2018 1:11 PM

## 2018-03-29 ENCOUNTER — OFFICE VISIT (OUTPATIENT)
Dept: UROLOGY | Facility: OTHER | Age: 63
End: 2018-03-29
Attending: UROLOGY
Payer: COMMERCIAL

## 2018-03-29 ENCOUNTER — HOSPITAL ENCOUNTER (OUTPATIENT)
Dept: CARDIOLOGY | Facility: OTHER | Age: 63
Discharge: HOME | End: 2018-03-29
Attending: FAMILY MEDICINE | Admitting: FAMILY MEDICINE
Payer: COMMERCIAL

## 2018-03-29 VITALS
BODY MASS INDEX: 32.64 KG/M2 | WEIGHT: 228 LBS | DIASTOLIC BLOOD PRESSURE: 78 MMHG | RESPIRATION RATE: 14 BRPM | HEIGHT: 70 IN | SYSTOLIC BLOOD PRESSURE: 136 MMHG

## 2018-03-29 DIAGNOSIS — R97.21 RISING PSA FOLLOWING TREATMENT FOR MALIGNANT NEOPLASM OF PROSTATE: ICD-10-CM

## 2018-03-29 DIAGNOSIS — Z85.46 PERSONAL HISTORY OF MALIGNANT NEOPLASM OF PROSTATE: Primary | ICD-10-CM

## 2018-03-29 DIAGNOSIS — N52.35 ERECTILE DYSFUNCTION FOLLOWING RADIATION THERAPY: ICD-10-CM

## 2018-03-29 DIAGNOSIS — I77.810 ASCENDING AORTA DILATION (H): ICD-10-CM

## 2018-03-29 LAB — PSA SERPL-MCNC: 0.8 NG/ML

## 2018-03-29 PROCEDURE — 36415 COLL VENOUS BLD VENIPUNCTURE: CPT | Performed by: UROLOGY

## 2018-03-29 PROCEDURE — 99213 OFFICE O/P EST LOW 20 MIN: CPT | Performed by: UROLOGY

## 2018-03-29 PROCEDURE — 93306 TTE W/DOPPLER COMPLETE: CPT

## 2018-03-29 PROCEDURE — 84153 ASSAY OF PSA TOTAL: CPT | Performed by: UROLOGY

## 2018-03-29 PROCEDURE — 93306 TTE W/DOPPLER COMPLETE: CPT | Mod: 26 | Performed by: INTERNAL MEDICINE

## 2018-03-29 RX ORDER — SILDENAFIL CITRATE 20 MG/1
TABLET ORAL
Qty: 30 TABLET | Refills: 11 | Status: SHIPPED | OUTPATIENT
Start: 2018-03-29 | End: 2018-04-24

## 2018-03-29 ASSESSMENT — PAIN SCALES - GENERAL: PAINLEVEL: MODERATE PAIN (4)

## 2018-03-29 NOTE — PROGRESS NOTES
Type of Visit  EST    Chief Complaint  Rising PSA following prostatectomy and salvage radiation    HPI  Mr. Kan is a 63 y.o. male who presents s/p RRP in 11/2012 followed by salvage radiation therapy in 2013 who follows up with detectable PSA.  He has had recent PSA values that are detectable.  His most recent PSA was almost a year ago.  He elected not to undergo a PSA this morning prior to clinic visit.  Since I saw him last he reports no changes in health history.  He continues to deny unintentional weight loss, bone pain, no leg swelling or hematuria.        Family History  Family History   Problem Relation Age of Onset     Hypertension Mother      HTN     Heart Disease Father       passed away from CHF,CAD      Good Health Sister      emotional problems.      Good Health Sister      Good Health Other      Other Son      w/o major medical problems.      Other Daughter      w/o major medical problems.      Good Health Other      previous marriage./previous marriage.     Good Health Other      previous marriage.     Cancer-colon Father      Hypertension Father      Stroke Father      Other Father      Dementia     Other Mother       Parkinsons       Review of Systems  I reviewed the ROS with the patient today.    Nursing Notes:   Oanh Ibrahim LPN  3/29/2018 11:55 AM  Signed  Here to establish care. HX prostate cancer.  Review of Systems:    Weight loss:    No     Recent fever/chills:  No   Night sweats:   No  Current skin rash:  No   Recent hair loss:  No  Heat intolerance:  No   Cold intolerance:  No  Chest pain:   No   Palpitations:   No  Shortness of breath:  No   Wheezing:   No  Constipation:    No   Diarrhea:   No   Nausea:   No   Vomiting:   No   Kidney/side pain:  No   Back pain:   Yes  Frequent headaches:  No   Dizziness:     No  Leg swelling:   No   Calf pain:    No    Parents, brothers or sisters with history of kidney cancer:   No  Parents, brothers or sisters with history of bladder  "cancer: No  Father or brother with history of prostate cancer:  No  Oanh Khannaelder, LPN on 3/29/2018 at 11:52 AM        Physical Exam  /78 (BP Location: Right arm, Patient Position: Sitting, Cuff Size: Adult Large)  Resp 14  Ht 1.778 m (5' 10\")  Wt 103.4 kg (228 lb)  BMI 32.71 kg/m2  Constitutional: No acute distress.  Alert and cooperative   Head: NCAT  Eyes: Conjunctivae normal  Cardiovascular: Regular rate.  Pulmonary/Chest: Respirations are even and non-labored bilaterally, no audible wheezing  Abdominal: Soft. No distension, tenderness, masses or guarding.   Neurological: A + O x 3.  Cranial Nerves II-XII grossly intact.  Extremities: GINNA x 4, Warm. No clubbing.  No cyanosis.    Skin: Pink, warm and dry.  No visible rashes noted.  Psychiatric:  Normal mood and affect  Back:  No left CVA tenderness.  No right CVA tenderness.  Genitourinary:  Nonpalpable bladder    Labs  Results for LELAND KAN (MRN [id number]) as of 3/23/2016 10:42   1/2/2013 08:00* 2/27/2013** 08:10 10/16/2014 11:26 3/1/2016 14:15   PSA TOTAL (DIAGNOSTIC) 0.67 0.97     PSA TOTAL (DIAGNOSTIC)   0.070 0.154   * Following radical prostatectomy  ** Following salvage external beam radiation therapy.    Results for LELAND KAN (MRN [id number]) as of 3/29/2018 11:55   4/7/2017 16:04   PSA Total (Diagnostic) - Historical 0.268     Results for LELAND KAN (MRN [id number]) as of 3/29/2018 14:31   3/29/2018 12:15   Prostate Specific Antigen 0.798       Assessment  Mr. Kan is a 63 y.o. male who presents s/p RRP in 11/2012 followed by salvage radiation therapy in 2013 who presents with detectable and rising PSA.  Discussed the approach to his clinical situation.  He is asymptomatic however with a detectable PSA following prostatectomy and salvage EBRT.  His PSA will be trended out over time.  I will perform a metastatic survey if the PSA shows a persistent rise and reaches approximately 2.    Plan  Follow up with PSA in 6 " months

## 2018-03-29 NOTE — NURSING NOTE
Here to establish care. HX prostate cancer.  Review of Systems:    Weight loss:    No     Recent fever/chills:  No   Night sweats:   No  Current skin rash:  No   Recent hair loss:  No  Heat intolerance:  No   Cold intolerance:  No  Chest pain:   No   Palpitations:   No  Shortness of breath:  No   Wheezing:   No  Constipation:    No   Diarrhea:   No   Nausea:   No   Vomiting:   No   Kidney/side pain:  No   Back pain:   Yes  Frequent headaches:  No   Dizziness:     No  Leg swelling:   No   Calf pain:    No    Parents, brothers or sisters with history of kidney cancer:   No  Parents, brothers or sisters with history of bladder cancer: No  Father or brother with history of prostate cancer:  No  Oanh Ibrahim LPN on 3/29/2018 at 11:52 AM

## 2018-03-29 NOTE — MR AVS SNAPSHOT
After Visit Summary   3/29/2018    Dilip Kan    MRN: [id number]           Patient Information     Date Of Birth          1955        Visit Information        Provider Department      3/29/2018 11:30 AM Celestine Arrington MD [facility]        Today's Diagnoses     Personal history of malignant neoplasm of prostate    -  1    Rising PSA following treatment for malignant neoplasm of prostate        Erectile dysfunction following radiation therapy           Follow-ups after your visit        Your next 10 appointments already scheduled     Mar 29, 2018  2:30 PM CDT   Ech Complete with [address] ([facility])    [address]  [place] [address] [phone]           1. Please bring or wear a comfortable two-piece outfit. 2. You may eat, drink and take your normal medicines. 3. For any questions that cannot be answered, please contact the ordering physician            Apr 24, 2018  8:00 AM CDT   Office Visit with Sumanth Roberts MD   [facility] ([facility])    [address] [facility] [address] [phone]           Bring a current list of meds and any records pertaining to this visit. For Physicals, please bring immunization records and any forms needing to be filled out. Please arrive 10 minutes early to complete paperwork.              Who to contact     If you have questions or need follow up information about today's clinic visit or your schedule please contact [facility] directly at [phone].  Normal or non-critical lab and imaging results will be communicated to you by MyChart, letter or phone within 4 business days after the clinic has received the results. If you do not hear from us within 7 days, please contact the clinic through MyChart or phone. If you have a critical or abnormal lab result, we will notify you by phone as soon  "as possible.  Submit refill requests through [employer] or call your pharmacy and they will forward the refill request to us. Please allow 3 business days for your refill to be completed.          Additional Information About Your Visit        [employer] Information     [employer] lets you send messages to your doctor, view your test results, renew your prescriptions, schedule appointments and more. To sign up, go to [email or web address] . Click on \"Log in\" on the left side of the screen, which will take you to the Welcome page. Then click on \"Sign up Now\" on the right side of the page.     You will be asked to enter the access code listed below, as well as some personal information. Please follow the directions to create your username and password.     Your access code is: [email or web address]  Expires: 2018 12:37 PM     Your access code will  in 90 days. If you need help or a new code, please call your [facility] clinic or [phone].        Care EveryWhere ID     This is your Care EveryWhere ID. This could be used by other organizations to access your [facility] medical records  [id number]        Your Vitals Were     Respirations Height BMI (Body Mass Index)             14 1.778 m (5' 10\") 32.71 kg/m2          Blood Pressure from Last 3 Encounters:   18 136/78   18 152/80   17 152/82    Weight from Last 3 Encounters:   18 103.4 kg (228 lb)   18 103 kg (227 lb)   17 101.6 kg (224 lb)              We Performed the Following     Prostate Specific Antigen GH          Today's Medication Changes          These changes are accurate as of 3/29/18 12:37 PM.  If you have any questions, ask your nurse or doctor.               Start taking these medicines.        Dose/Directions    sildenafil 20 MG tablet   Commonly known as:  REVATIO   Used for:  Erectile dysfunction following radiation therapy   Started by:  Celestine Arrington MD        Take 3-5 (60-100mg) tablets by mouth 1 hour prior to " sexual activity   Quantity:  30 tablet   Refills:  11            Where to get your medicines      These medications were sent to [facility], - [place], [place] - [address]  [address], [place] [address]     Phone:  [phone]     sildenafil 20 MG tablet                Primary Care Provider Office Phone # Fax #    Sumanth Roberts [phone]       [address]  [place] [address]        Equal Access to Services     [facility]: Hadii aad ku hadasho Soomaali, waaxda luqadaha, qaybta kaalmada adeegyada, waxay idiin hayaan ynes guzmán . So [facility] [phone].    ATENCIÓN: Si habla español, tiene a mitchell disposición servicios gratuitos de asistencia lingüística. [facility] [phone].    We comply with applicable federal civil rights laws and [place] laws. We do not discriminate on the basis of race, color, national origin, age, disability, sex, sexual orientation, or gender identity.            Thank you!     Thank you for choosing [facility]  for your care. Our goal is always to provide you with excellent care. Hearing back from our patients is one way we can continue to improve our services. Please take a few minutes to complete the written survey that you may receive in the mail after your visit with us. Thank you!             Your Updated Medication List - Protect others around you: Learn how to safely use, store and throw away your medicines at www.disposemymeds.org.          This list is accurate as of 3/29/18 12:37 PM.  Always use your most recent med list.                   Brand Name Dispense Instructions for use Diagnosis    aspirin EC 81 MG EC tablet      Take 81 mg by mouth daily with food        atorvastatin 20 MG tablet    LIPITOR     Take 20 mg by mouth daily        * BEE POLLEN PO      Take  by mouth.        * Bee Pollen 500 MG Chew      Take 2 tablets by mouth daily        FISH OIL            GARLIC           IBUPROFEN      Prn for back pain        lisinopril 5 MG tablet    PRINIVIL/ZESTRIL     Take 5 mg by mouth daily        * LORTAB 5 5-500 MG per tablet   Generic drug:  HYDROcodone-acetaminophen      Take 1-2 tablets by mouth every 6 hours as needed. Takes for back pain        * HYDROcodone-acetaminophen  MG per tablet    NORCO     Take 1-2 tablets by mouth every 4 hours if needed. 7 tabs daily max.        OMEGA 3 PO      Take 2 capsules by mouth daily        omeprazole 40 MG capsule    priLOSEC     Take 40 mg by mouth daily        RA VITAMIN B-12 TR 1000 MCG Tbcr   Generic drug:  cyanocobalamin      Take 1,000 mcg by mouth daily        senna-docusate 8.6-50 MG per tablet    SENOKOT-S;PERICOLACE     Take 1-4 tablets by mouth 2 times daily        sildenafil 20 MG tablet    REVATIO    30 tablet    Take 3-5 (60-100mg) tablets by mouth 1 hour prior to sexual activity    Erectile dysfunction following radiation therapy       Simethicone 125 MG Caps      Take  by mouth.        SM GARLIC 150 MG Tabs tablet   Generic drug:  garlic      Take 2 tablets by mouth daily        VITAMIN B-1 PO      Take  by mouth.        * Notice:  This list has 4 medication(s) that are the same as other medications prescribed for you. Read the directions carefully, and ask your doctor or other care provider to review them with you.

## 2018-03-29 NOTE — Clinical Note
Please let the patient know his PSA returned at 0.798     because it has increased more than double in the last year I would like to check it in 6 months rather than the one year we initially planned.

## 2018-03-30 ENCOUNTER — TELEPHONE (OUTPATIENT)
Dept: UROLOGY | Facility: OTHER | Age: 63
End: 2018-03-30

## 2018-03-30 NOTE — TELEPHONE ENCOUNTER
Please let the patient know his PSA returned at 0.798     because it has increased more than double in the last year I would like to check it in 6 months rather than the one year we initially planned. Pt notified and will schedule appointment in Sept.  Oanh Ibrahim LPN on 3/30/2018 at 10:43 AM

## 2018-04-12 DIAGNOSIS — I35.2 AORTIC VALVE STENOSIS WITH INSUFFICIENCY, ETIOLOGY OF CARDIAC VALVE DISEASE UNSPECIFIED: Primary | ICD-10-CM

## 2018-04-24 ENCOUNTER — HOSPITAL ENCOUNTER (OUTPATIENT)
Dept: GENERAL RADIOLOGY | Facility: OTHER | Age: 63
Discharge: HOME | End: 2018-04-24
Attending: FAMILY MEDICINE | Admitting: FAMILY MEDICINE
Payer: COMMERCIAL

## 2018-04-24 ENCOUNTER — OFFICE VISIT (OUTPATIENT)
Dept: FAMILY MEDICINE | Facility: OTHER | Age: 63
End: 2018-04-24
Attending: FAMILY MEDICINE
Payer: COMMERCIAL

## 2018-04-24 VITALS
BODY MASS INDEX: 32.43 KG/M2 | HEART RATE: 68 BPM | SYSTOLIC BLOOD PRESSURE: 160 MMHG | DIASTOLIC BLOOD PRESSURE: 84 MMHG | WEIGHT: 226 LBS

## 2018-04-24 DIAGNOSIS — I10 ESSENTIAL HYPERTENSION: Primary | ICD-10-CM

## 2018-04-24 DIAGNOSIS — Z79.899 CONTROLLED SUBSTANCE AGREEMENT SIGNED: ICD-10-CM

## 2018-04-24 DIAGNOSIS — M17.12 PRIMARY OSTEOARTHRITIS OF LEFT KNEE: ICD-10-CM

## 2018-04-24 DIAGNOSIS — M17.12 PRIMARY LOCALIZED OSTEOARTHROSIS OF LEFT LOWER LEG: ICD-10-CM

## 2018-04-24 DIAGNOSIS — M48.061 SPINAL STENOSIS, LUMBAR REGION, WITHOUT NEUROGENIC CLAUDICATION: ICD-10-CM

## 2018-04-24 DIAGNOSIS — E78.00 HIGH BLOOD CHOLESTEROL: ICD-10-CM

## 2018-04-24 DIAGNOSIS — F11.90 CHRONIC, CONTINUOUS USE OF OPIOIDS: ICD-10-CM

## 2018-04-24 LAB
ANION GAP SERPL CALCULATED.3IONS-SCNC: 9 MMOL/L (ref 3–14)
BUN SERPL-MCNC: 23 MG/DL (ref 7–25)
CALCIUM SERPL-MCNC: 9.1 MG/DL (ref 8.6–10.3)
CHLORIDE SERPL-SCNC: 107 MMOL/L (ref 98–107)
CO2 SERPL-SCNC: 24 MMOL/L (ref 21–31)
CREAT SERPL-MCNC: 0.59 MG/DL (ref 0.7–1.3)
GFR SERPL CREATININE-BSD FRML MDRD: >90 ML/MIN/1.7M2
GLUCOSE SERPL-MCNC: 106 MG/DL (ref 70–105)
POTASSIUM SERPL-SCNC: 3.9 MMOL/L (ref 3.5–5.1)
SODIUM SERPL-SCNC: 140 MMOL/L (ref 134–144)

## 2018-04-24 PROCEDURE — 73560 X-RAY EXAM OF KNEE 1 OR 2: CPT | Mod: LT

## 2018-04-24 PROCEDURE — 36415 COLL VENOUS BLD VENIPUNCTURE: CPT | Performed by: FAMILY MEDICINE

## 2018-04-24 PROCEDURE — 99213 OFFICE O/P EST LOW 20 MIN: CPT | Mod: 25 | Performed by: FAMILY MEDICINE

## 2018-04-24 PROCEDURE — 80048 BASIC METABOLIC PNL TOTAL CA: CPT | Performed by: FAMILY MEDICINE

## 2018-04-24 PROCEDURE — 25000128 H RX IP 250 OP 636: Performed by: FAMILY MEDICINE

## 2018-04-24 PROCEDURE — 20610 DRAIN/INJ JOINT/BURSA W/O US: CPT | Mod: LT | Performed by: FAMILY MEDICINE

## 2018-04-24 RX ORDER — HYDROCODONE BITARTRATE AND ACETAMINOPHEN 10; 325 MG/1; MG/1
1 TABLET ORAL EVERY 6 HOURS PRN
Qty: 120 TABLET | Refills: 0 | Status: SHIPPED | OUTPATIENT
Start: 2018-04-24 | End: 2018-06-05

## 2018-04-24 RX ORDER — HYDROCODONE BITARTRATE AND ACETAMINOPHEN 10; 325 MG/1; MG/1
1 TABLET ORAL EVERY 6 HOURS PRN
Qty: 120 TABLET | Refills: 0 | Status: SHIPPED | OUTPATIENT
Start: 2018-04-24 | End: 2018-04-24

## 2018-04-24 RX ADMIN — TRIAMCINOLONE ACETONIDE 60 MG: 40 INJECTION, SUSPENSION INTRA-ARTICULAR; INTRAMUSCULAR at 15:47

## 2018-04-24 NOTE — MR AVS SNAPSHOT
After Visit Summary   4/24/2018    Dilip Kan    MRN: [id number]           Patient Information     Date Of Birth          1955        Visit Information        Provider Department      4/24/2018 8:00 AM Sumanth Roberts MD [facility]        Today's Diagnoses     Essential hypertension    -  1    High blood cholesterol        Chronic, continuous use of opioids        Controlled substance agreement signed        Spinal stenosis, lumbar region, without neurogenic claudication        Primary osteoarthritis of left knee        Primary localized osteoarthrosis of left lower leg          Care Instructions    Come back in 7-10d or so and get your blood drawn again.  Come in fasting that time.  Stay on the same dose of hydrocodone for the next three months and we can restart the taper in July.  Lets try a knee injection.          Follow-ups after your visit        Who to contact     If you have questions or need follow up information about today's clinic visit or your schedule please contact [facility] directly at [phone].  Normal or non-critical lab and imaging results will be communicated to you by MyChart, letter or phone within 4 business days after the clinic has received the results. If you do not hear from us within 7 days, please contact the clinic through MyChart or phone. If you have a critical or abnormal lab result, we will notify you by phone as soon as possible.  Submit refill requests through [employer] or call your pharmacy and they will forward the refill request to us. Please allow 3 business days for your refill to be completed.          Additional Information About Your Visit        Care EveryWhere ID     This is your Care EveryWhere ID. This could be used by other organizations to access your [facility] medical records  [id number]        Your Vitals Were     Pulse BMI (Body Mass Index)                68 32.43 kg/m2           Blood Pressure from Last  3 Encounters:   06/05/18 138/84   04/24/18 160/84   03/29/18 136/78    Weight from Last 3 Encounters:   06/05/18 225 lb (102.1 kg)   04/24/18 226 lb (102.5 kg)   03/29/18 228 lb (103.4 kg)              We Performed the Following     Basic metabolic panel     Large Joint/Bursa injection and/or drainage (Shoulder, Knee)          Today's Medication Changes          These changes are accurate as of 4/24/18 11:59 PM.  If you have any questions, ask your nurse or doctor.               Start taking these medicines.        Dose/Directions    HYDROcodone-acetaminophen  MG per tablet   Commonly known as:  NORCO   Used for:  Chronic, continuous use of opioids   Started by:  Sumanth Roberts MD        Dose:  1 tablet   Take 1 tablet by mouth every 6 hours as needed for moderate to severe pain or severe pain Ok to refill on 6/24/18   Quantity:  120 tablet   Refills:  0         These medicines have changed or have updated prescriptions.        Dose/Directions    Bee Pollen 500 MG Chew   This may have changed:  Another medication with the same name was removed. Continue taking this medication, and follow the directions you see here.   Changed by:  Sumanth Roberts MD        Dose:  2 tablet   Take 2 tablets by mouth daily   Refills:  0         Stop taking these medicines if you haven't already. Please contact your care team if you have questions.     FISH OIL   Stopped by:  Sumanth Roberts MD           GARLIC   Stopped by:  Sumanth Roberts MD           sildenafil 20 MG tablet   Commonly known as:  REVATIO   Stopped by:  Sumanth Roberts MD                Where to get your medicines      Some of these will need a paper prescription and others can be bought over the counter.  Ask your nurse if you have questions.     Bring a paper prescription for each of these medications     HYDROcodone-acetaminophen  MG per tablet                Primary Care Provider Office Phone # Fax #    Sumanth Roberts [phone]  [phone]       [address]  [place] [address]        Equal Access to Services     JANLIZBETH NANCY : Hadii aad ku hadirenebetito Yuridiaali, wajuliada katiegretchenha, chandnita kalynnda adriannacrystal, nu tavares bellpaco rodasdanielle robbiemarymarcel simpson. So [facility] [phone].    ATENCIÓN: Si habla español, tiene a mitchell disposición servicios gratuitos de asistencia lingüística. Llame al [phone].    We comply with applicable federal civil rights laws and [place] laws. We do not discriminate on the basis of race, color, national origin, age, disability, sex, sexual orientation, or gender identity.            Thank you!     Thank you for choosing [facility]  for your care. Our goal is always to provide you with excellent care. Hearing back from our patients is one way we can continue to improve our services. Please take a few minutes to complete the written survey that you may receive in the mail after your visit with us. Thank you!             Your Updated Medication List - Protect others around you: Learn how to safely use, store and throw away your medicines at www.disposemymeds.org.          This list is accurate as of 4/24/18 11:59 PM.  Always use your most recent med list.                   Brand Name Dispense Instructions for use Diagnosis    aspirin 81 MG EC tablet      Take 81 mg by mouth daily with food        atorvastatin 20 MG tablet    LIPITOR     Take 20 mg by mouth daily        Bee Pollen 500 MG Chew      Take 2 tablets by mouth daily        HYDROcodone-acetaminophen  MG per tablet    NORCO    120 tablet    Take 1 tablet by mouth every 6 hours as needed for moderate to severe pain or severe pain Ok to refill on 6/24/18    Chronic, continuous use of opioids       IBUPROFEN      Prn for back pain        lisinopril 5 MG tablet    PRINIVIL/ZESTRIL     Take 5 mg by mouth daily        OMEGA 3 PO      Take 2 capsules by mouth daily        omeprazole 40 MG capsule    priLOSEC     Take 40 mg by mouth daily         RA VITAMIN B-12 TR 1000 MCG Tbcr   Generic drug:  cyanocobalamin      Take 1,000 mcg by mouth daily        senna-docusate 8.6-50 MG per tablet    SENOKOT-S;PERICOLACE     Take 1-4 tablets by mouth 2 times daily        Simethicone 125 MG Caps      Take  by mouth.        SM GARLIC 150 MG Tabs tablet   Generic drug:  garlic      Take 2 tablets by mouth daily        VITAMIN B-1 PO      Take  by mouth.

## 2018-04-24 NOTE — PATIENT INSTRUCTIONS
Come back in 7-10d or so and get your blood drawn again.  Come in fasting that time.  Stay on the same dose of hydrocodone for the next three months and we can restart the taper in July.  Lets try a knee injection.

## 2018-04-24 NOTE — LETTER
April 27, 2018      Dilip Kan  [address] [place] [address]        Dear ,    We are writing to inform you of your test results.    Dilip Kan  [address] [place] [address]    4/27/2018      Dear ,    Your lab results are listed below and are acceptable/stable.  Please continue on your current medications.    Contact us with any questions.    I'm sending along your actual numbers so that youwill have them for your general interest and your records.       Take Care,   Sumanth Roberts           Resulted Orders   Basic metabolic panel   Result Value Ref Range    Sodium 140 134 - 144 mmol/L    Potassium 3.9 3.5 - 5.1 mmol/L    Chloride 107 98 - 107 mmol/L    Carbon Dioxide 24 21 - 31 mmol/L    Anion Gap 9 3 - 14 mmol/L    Glucose 106 (H) 70 - 105 mg/dL    Urea Nitrogen 23 7 - 25 mg/dL    Creatinine 0.59 (L) 0.70 - 1.30 mg/dL    GFR Estimate >90 >60 mL/min/1.7m2    GFR Estimate If Black >90 >60 mL/min/1.7m2    Calcium 9.1 8.6 - 10.3 mg/dL       If you have any questions or concerns, please call the clinic at the number listed above.       Sincerely,        Sumanth Roberts MD

## 2018-04-25 RX ORDER — TRIAMCINOLONE ACETONIDE 40 MG/ML
60 INJECTION, SUSPENSION INTRA-ARTICULAR; INTRAMUSCULAR ONCE
Status: COMPLETED | OUTPATIENT
Start: 2018-04-25 | End: 2018-04-24

## 2018-05-09 ENCOUNTER — TELEPHONE (OUTPATIENT)
Dept: FAMILY MEDICINE | Facility: OTHER | Age: 63
End: 2018-05-09

## 2018-05-09 DIAGNOSIS — F51.01 PRIMARY INSOMNIA: Primary | ICD-10-CM

## 2018-05-09 RX ORDER — TRAZODONE HYDROCHLORIDE 100 MG/1
50-100 TABLET ORAL
Qty: 30 TABLET | Refills: 1 | Status: SHIPPED | OUTPATIENT
Start: 2018-05-09 | End: 2018-06-05

## 2018-05-09 NOTE — TELEPHONE ENCOUNTER
Patient states he is waking up half way through the night because of his back pain. He takes a pain pill and is eventually able to go back to sleep. He thinks if he took something to help him sleep before he goes to bed he may sleep better through the night.

## 2018-05-15 ENCOUNTER — TELEPHONE (OUTPATIENT)
Dept: FAMILY MEDICINE | Facility: OTHER | Age: 63
End: 2018-05-15

## 2018-05-15 NOTE — TELEPHONE ENCOUNTER
Message left for patient with Dr Roberts's response. I also notified Briseida Jade that he can fill it on the 18th if insurance will allow it. If not he will have to pay for the full prescription out of pocket if he chooses to fill it early.

## 2018-05-15 NOTE — TELEPHONE ENCOUNTER
He should cut down to 3 pills daily and then ok to fill new RX on 5/18 but will need to last until 6/23 so he should continue to take 3 pills per day maximum.  Please let pharmacy know ok to fill on 5/18

## 2018-05-15 NOTE — TELEPHONE ENCOUNTER
Patient called to request a refill of his Hydrocodone early. States he has 9 tablets left to last until 5/24. States he takes up to 6 per day at times.

## 2018-05-17 ENCOUNTER — TELEPHONE (OUTPATIENT)
Dept: FAMILY MEDICINE | Facility: OTHER | Age: 63
End: 2018-05-17

## 2018-05-17 NOTE — PROGRESS NOTES
Nursing Notes:   Mary Gaffney LPN  4/24/2018  8:20 AM  Signed  Patient comes in to the clinic today to follow up on his back pain and medications.      SUBJECTIVE:  Dilip Kan is a 63 year old male who comes in today to follow up on back pain.  He underwent surgery about a year ago on his back and has had improvement but not resolution of pain.  He is frustrated by this and has not been able to discontinue hydrocodone, which was the original hope.  He has reduced his hydrocodone down to about 40mg daily but admits he takes anywhere from 30-60mg depending on pain level.  It is requesting a refill today as he used #120 in past 25d.  Pain has not changed in quality or severity recently.  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    Reports 1 mo ago developing pain in his left knee.  Pain is over medial joint line.  Worse with going down stairs.  Occasionally swells but no locking or catching.  No trauma.  Particularly bothersome at night.      PHQ-9  No flowsheet data found.    Past Surgical History:   Procedure Laterality Date     APPENDECTOMY OPEN      09/19/2009,Ruptured Diamond     COLONOSCOPY      08/31/2006,EGD, next due in 2016     ESOPHAGOSCOPY, GASTROSCOPY, DUODENOSCOPY (EGD), COMBINED      03/2003     ESOPHAGOSCOPY, GASTROSCOPY, DUODENOSCOPY (EGD), COMBINED      08/31/2006     OTHER SURGICAL HISTORY      age 4,[id number],INCISION AND DRAINAGE,EXCISE VARICOCELE     OTHER SURGICAL HISTORY      03/16/2009,[id number],LUMBAR DISKECTOMY,L 3-4 microdiskectomy, [facility]     OTHER SURGICAL HISTORY      11/28/12,,RADICAL PROSTATECTOMY,Nerve Sparring, Dr Warner     SPINE SURGERY N/A 04/28/2017    L3 to S1 spinal decompression L4/L5 fusion     TONSILLECTOMY, ADENOIDECTOMY, COMBINED      as a child         Current Outpatient Prescriptions   Medication Sig Dispense Refill     aspirin EC 81 MG EC tablet Take 81 mg by mouth daily with food        atorvastatin (LIPITOR) 20 MG tablet Take 20 mg by mouth daily       Bee Pollen 500 MG CHEW Take 2 tablets by mouth daily       cyanocobalamin (RA VITAMIN B-12 TR) 1000 MCG TBCR Take 1,000 mcg by mouth daily       garlic (SM GARLIC) 150 MG TABS tablet Take 2 tablets by mouth daily       HYDROcodone-acetaminophen (NORCO)  MG per tablet Take 1 tablet by mouth every 6 hours as needed for moderate to severe pain or severe pain Ok to refill on 6/24/18 120 tablet 0     IBUPROFEN Prn for back pain       lisinopril (PRINIVIL/ZESTRIL) 5 MG tablet Take 5 mg by mouth daily       Omega-3 Fatty Acids (OMEGA 3 PO) Take 2 capsules by mouth daily       omeprazole (PRILOSEC) 40 MG capsule Take 40 mg by mouth daily       senna-docusate (SENOKOT-S;PERICOLACE) 8.6-50 MG per tablet Take 1-4 tablets by mouth 2 times daily       Simethicone 125 MG CAPS Take  by mouth.       Thiamine HCl (VITAMIN B-1 PO) Take  by mouth.       traZODone (DESYREL) 100 MG tablet Take 0.5-1 tablets ( mg) by mouth nightly as needed for sleep 30 tablet 1         Social History     Social History     Marital status:      Spouse name: N/A     Number of children: N/A     Years of education: N/A     Occupational History      Other     Social History Main Topics     Smoking status: Former Smoker     Packs/day: 1.00     Years: 20.00     Types: Cigarettes     Quit date: 11/8/2002     Smokeless tobacco: Current User     Types: Snuff     Alcohol use Yes      Comment: Alcoholic Drinks/day: 5 drinks a month     Drug use: Not on file      Comment: Drug use: No     Sexual activity: Not on file     Other Topics Concern     Not on file     Social History Narrative    [employer] - retired.  Owns hobby farm and works at [employer]  Patient has quit smoking.         I have personally reviewed and updated above noted social, family and/or past medical history.    ROS  CONSTITUTIONAL: NEGATIVE for fever, chills, change in  weight  ENT/MOUTH: NEGATIVE for ear, mouth and throat problems  RESP: NEGATIVE for significant cough or SOB  CV: NEGATIVE for chest pain, palpitations or peripheral edema  MUSCULOSKELETAL: See HPI  NEURO: NEGATIVE for weakness, dizziness or paresthesias      /84  Pulse 68  Wt 226 lb (102.5 kg)  BMI 32.43 kg/m2    Exam:  GENERAL: healthy, alert and no distress  NECK: no adenopathy, no asymmetry, masses, or scars and thyroid normal to palpation  RESP: lungs clear to auscultation - no rales, rhonchi or wheezes  CV: regular rate and rhythm, normal S1 S2, no S3 or S4, no murmur, click or rub, no peripheral edema and peripheral pulses strong  ABDOMEN: soft, nontender, no hepatosplenomegaly, no masses and bowel sounds normal  MS: Affected knee with no visual defomities.  Mild effusion present.  Patella with crepitus.  Moderate joint line tenderness medial>lateral.  All knee ligaments in tact and bilaterally symmetric.  Dasha's isnegative.  Some pain with deep knee flexion.  Back with mild tenderness to palpation of the lumbar spine and paraspinous muscles with no weakness.  NEURO:  Reflexes symmetric.        ASSESSMENT/Plan :    I personally reviewed the patients' labs and imaging.    Results for orders placed or performed in visit on 04/24/18   Basic metabolic panel   Result Value Ref Range    Sodium 140 134 - 144 mmol/L    Potassium 3.9 3.5 - 5.1 mmol/L    Chloride 107 98 - 107 mmol/L    Carbon Dioxide 24 21 - 31 mmol/L    Anion Gap 9 3 - 14 mmol/L    Glucose 106 (H) 70 - 105 mg/dL    Urea Nitrogen 23 7 - 25 mg/dL    Creatinine 0.59 (L) 0.70 - 1.30 mg/dL    GFR Estimate >90 >60 mL/min/1.7m2    GFR Estimate If Black >90 >60 mL/min/1.7m2    Calcium 9.1 8.6 - 10.3 mg/dL       No images are attached to the encounter or orders placed in the encounter.      1. Essential hypertension  BP above goal but patient has run out of pain meds and only took one pill yesterday, may have mild withdrawal as he was normotensive  one month ago.  Will check BMP and consider increasing lisinopril or adding HCTZ if persistent.  - Basic metabolic panel  - **Basic metabolic panel FUTURE anytime; Future    2. High blood cholesterol  Continue lipitor.  He is not fasting today but will come back in about 1-2w for recheck of labs.  - Lipid Panel; Future    3. Chronic, continuous use of opioids  Patient has struggled with tapering off the opiods due to increased pain and symptoms of withdrawal.  He is concerned about loosing functionality.  I think a majority of his symptoms are due to tolerance of the medication and would suggest we continue to work towards discontinuing this as part of his chronic treatment plan but I do think we can slow down his taper.  Suggest 4 pills maximum daily for the next 3 months then we will resume a slow taper over the next 6 months at which point he should hopefully be off the medication.    - HYDROcodone-acetaminophen (NORCO)  MG per tablet; Take 1 tablet by mouth every 6 hours as needed for moderate to severe pain or severe pain Ok to refill on 6/24/18  Dispense: 120 tablet; Refill: 0    4. Controlled substance agreement signed  See above    5. Spinal stenosis, lumbar region, without neurogenic claudication  Persistent but improved symptoms, now on much less daily OME.    6. Primary localized osteoarthrosis of left lower leg  Symptoms and exam consistent with osteoarthritis.  Xray consistent with this. He is requesting trial of injection.    Risks, benefits and alternatives discussed with patient.  Theyvoiced good understanding and had the opportunity to ask questions.  Patient gave consent and proceed with procedure.     Area was sterilized with chloroprep.  Using sterile procedure 2.5 mL of 1% lidocaine without from was mixed with 1 mL of 0.25% Marcaine and 60 mg of Kenalog in a single 5 mL syringe.  The solution was injected into the knee space using the inferior lateral approach with 25-gauge needle.   Patient tolerated procedure well and had no complications.  They had significant improvement in symptoms prior to departure.    Patient will follow upurgently if they develop any fevers, chills, redness, warmth or worsening pain.    - Large Joint/Bursa injection and/or drainage (Shoulder, Knee)  - triamcinolone acetonide (KENALOG-40) injection 60 mg; 1.5 mLs (60 mg) by INTRA-ARTICULAR route once              Patient Instructions   Come back in 7-10d or so and get your blood drawn again.  Come in fasting that time.  Stay on the same dose of hydrocodone for the next three months and we can restart the taper in July.  Lets try a knee injection.      Sumanth Roberts    This document was created using computer generated templates and voiceactivated software.

## 2018-06-01 RX ORDER — SILDENAFIL CITRATE 20 MG/1
TABLET ORAL
Refills: 10 | COMMUNITY
Start: 2018-04-30 | End: 2019-04-02

## 2018-06-05 ENCOUNTER — OFFICE VISIT (OUTPATIENT)
Dept: FAMILY MEDICINE | Facility: OTHER | Age: 63
End: 2018-06-05
Attending: FAMILY MEDICINE
Payer: COMMERCIAL

## 2018-06-05 ENCOUNTER — TELEPHONE (OUTPATIENT)
Dept: FAMILY MEDICINE | Facility: OTHER | Age: 63
End: 2018-06-05

## 2018-06-05 VITALS
BODY MASS INDEX: 32.28 KG/M2 | SYSTOLIC BLOOD PRESSURE: 138 MMHG | DIASTOLIC BLOOD PRESSURE: 84 MMHG | WEIGHT: 225 LBS | HEART RATE: 68 BPM

## 2018-06-05 DIAGNOSIS — E78.00 HIGH BLOOD CHOLESTEROL: ICD-10-CM

## 2018-06-05 DIAGNOSIS — M54.16 CHRONIC LEFT LUMBAR RADICULOPATHY: ICD-10-CM

## 2018-06-05 DIAGNOSIS — M48.061 SPINAL STENOSIS, LUMBAR REGION, WITHOUT NEUROGENIC CLAUDICATION: ICD-10-CM

## 2018-06-05 DIAGNOSIS — I10 ESSENTIAL HYPERTENSION: ICD-10-CM

## 2018-06-05 DIAGNOSIS — F11.90 CHRONIC, CONTINUOUS USE OF OPIOIDS: Primary | ICD-10-CM

## 2018-06-05 DIAGNOSIS — Z23 NEED FOR VACCINATION AGAINST STREPTOCOCCUS PNEUMONIAE: ICD-10-CM

## 2018-06-05 DIAGNOSIS — F40.240 CLAUSTROPHOBIA: Primary | ICD-10-CM

## 2018-06-05 DIAGNOSIS — Z12.11 SPECIAL SCREENING FOR MALIGNANT NEOPLASMS, COLON: ICD-10-CM

## 2018-06-05 LAB
ANION GAP SERPL CALCULATED.3IONS-SCNC: 9 MMOL/L (ref 3–14)
BUN SERPL-MCNC: 17 MG/DL (ref 7–25)
CALCIUM SERPL-MCNC: 9.4 MG/DL (ref 8.6–10.3)
CHLORIDE SERPL-SCNC: 103 MMOL/L (ref 98–107)
CHOLEST SERPL-MCNC: 131 MG/DL
CO2 SERPL-SCNC: 26 MMOL/L (ref 21–31)
CREAT SERPL-MCNC: 0.84 MG/DL (ref 0.7–1.3)
GFR SERPL CREATININE-BSD FRML MDRD: >90 ML/MIN/1.7M2
GLUCOSE SERPL-MCNC: 100 MG/DL (ref 70–105)
HDLC SERPL-MCNC: 64 MG/DL (ref 23–92)
LDLC SERPL CALC-MCNC: 26 MG/DL
NONHDLC SERPL-MCNC: 67 MG/DL
POTASSIUM SERPL-SCNC: 4.2 MMOL/L (ref 3.5–5.1)
SODIUM SERPL-SCNC: 138 MMOL/L (ref 134–144)
TRIGL SERPL-MCNC: 206 MG/DL

## 2018-06-05 PROCEDURE — 80061 LIPID PANEL: CPT | Performed by: FAMILY MEDICINE

## 2018-06-05 PROCEDURE — G0463 HOSPITAL OUTPT CLINIC VISIT: HCPCS

## 2018-06-05 PROCEDURE — 99215 OFFICE O/P EST HI 40 MIN: CPT | Performed by: FAMILY MEDICINE

## 2018-06-05 PROCEDURE — 36415 COLL VENOUS BLD VENIPUNCTURE: CPT | Performed by: FAMILY MEDICINE

## 2018-06-05 PROCEDURE — 80048 BASIC METABOLIC PNL TOTAL CA: CPT | Performed by: FAMILY MEDICINE

## 2018-06-05 RX ORDER — HYDROCODONE BITARTRATE AND ACETAMINOPHEN 10; 325 MG/1; MG/1
1 TABLET ORAL EVERY 6 HOURS PRN
Qty: 150 TABLET | Refills: 0 | Status: SHIPPED | OUTPATIENT
Start: 2018-06-05 | End: 2018-09-18

## 2018-06-05 RX ORDER — HYDROCODONE BITARTRATE AND ACETAMINOPHEN 10; 325 MG/1; MG/1
1 TABLET ORAL EVERY 6 HOURS PRN
Qty: 150 TABLET | Refills: 0 | Status: SHIPPED | OUTPATIENT
Start: 2018-06-05 | End: 2018-06-05

## 2018-06-05 NOTE — LETTER
June 5, 2018      Dilip Kan  [address] [place] [address]        Dear ,    We are writing to inform you of your test results.    Your test results fall within the expected range(s) or remain unchanged from previous results.     Your cholesterol is quite good at present.  You have no evidence of diabetes or kidney disease.    Please continue with current treatment plan.    Resulted Orders   **Basic metabolic panel FUTURE anytime   Result Value Ref Range    Sodium 138 134 - 144 mmol/L    Potassium 4.2 3.5 - 5.1 mmol/L    Chloride 103 98 - 107 mmol/L    Carbon Dioxide 26 21 - 31 mmol/L    Anion Gap 9 3 - 14 mmol/L    Glucose 100 70 - 105 mg/dL    Urea Nitrogen 17 7 - 25 mg/dL    Creatinine 0.84 0.70 - 1.30 mg/dL    GFR Estimate >90 >60 mL/min/1.7m2    GFR Estimate If Black >90 >60 mL/min/1.7m2    Calcium 9.4 8.6 - 10.3 mg/dL   Lipid Panel   Result Value Ref Range    Cholesterol 131 <200 mg/dL    Triglycerides 206 (H) <150 mg/dL      Comment:      Borderline high:  150-199 mg/dl  High:             200-499 mg/dl  Very high:       >499 mg/dl      HDL Cholesterol 64 23 - 92 mg/dL    LDL Cholesterol Calculated 26 <100 mg/dL      Comment:      Desirable:       <100 mg/dl    Non HDL Cholesterol 67 <130 mg/dL       If you have any questions or concerns, please call the clinic at the number listed above.       Sincerely,        Sumanth Roberts MD

## 2018-06-05 NOTE — MR AVS SNAPSHOT
After Visit Summary   6/5/2018    Dilip Kan    MRN: [id number]           Patient Information     Date Of Birth          1955        Visit Information        Provider Department      6/5/2018 8:00 AM Sumanth Roberts MD [facility]        Today's Diagnoses     Chronic, continuous use of opioids    -  1    Spinal stenosis, lumbar region, without neurogenic claudication        Chronic left lumbar radiculopathy        Essential hypertension        Need for vaccination against Streptococcus pneumoniae        High blood cholesterol        Special screening for malignant neoplasms, colon          Care Instructions    Ok to refill your current Rx at pharmacy on 6/11/18.  Start the Elavil at night, this should allow you to use less if any pain pills at night.  We will get labs today.  Plan on repeat MRI then consider repeat injection versus visiting with surgeon again.  Repeat knee XR in two mos or so.          Follow-ups after your visit        Additional Services     SPINE SURGERY REFERRAL       Follow up with Dr. Mendoza's office at [facility] spine.  Please send recent MRI and note from 6/5                  Who to contact     If you have questions or need follow up information about today's clinic visit or your schedule please contact [facility] directly at [phone].  Normal or non-critical lab and imaging results will be communicated to you by MyChart, letter or phone within 4 business days after the clinic has received the results. If you do not hear from us within 7 days, please contact the clinic through MyChart or phone. If you have a critical or abnormal lab result, we will notify you by phone as soon as possible.  Submit refill requests through [employer] or call your pharmacy and they will forward the refill request to us. Please allow 3 business days for your refill to be completed.          Additional Information About Your Visit        Care EveryWhere ID      This is your Care EveryWhere ID. This could be used by other organizations to access your [place] medical records  [id number]        Your Vitals Were     Pulse BMI (Body Mass Index)                68 32.28 kg/m2           Blood Pressure from Last 3 Encounters:   06/05/18 138/84   04/24/18 160/84   03/29/18 136/78    Weight from Last 3 Encounters:   06/05/18 225 lb (102.1 kg)   04/24/18 226 lb (102.5 kg)   03/29/18 228 lb (103.4 kg)              We Performed the Following     **Basic metabolic panel FUTURE anytime     Lipid Panel     SPINE SURGERY REFERRAL          Today's Medication Changes          These changes are accurate as of 6/5/18 11:59 PM.  If you have any questions, ask your nurse or doctor.               Start taking these medicines.        Dose/Directions    ALPRAZolam 0.5 MG tablet   Commonly known as:  XANAX   Used for:  Claustrophobia   Started by:  Sumanth Roberts MD        One time as needed prior to MRI.  May repeat once.   Quantity:  2 tablet   Refills:  0       amitriptyline 25 MG tablet   Commonly known as:  ELAVIL   Started by:  Sumanth Roberts MD        Dose:  25 mg   Take 1 tablet (25 mg) by mouth At Bedtime   Quantity:  30 tablet   Refills:  1       HYDROcodone-acetaminophen  MG per tablet   Commonly known as:  NORCO   Used for:  Chronic, continuous use of opioids   Started by:  Sumanth Roberts MD        Dose:  1 tablet   Take 1 tablet by mouth every 6 hours as needed for moderate to severe pain or severe pain Ok to refill on 9/05/18.   Quantity:  150 tablet   Refills:  0         Stop taking these medicines if you haven't already. Please contact your care team if you have questions.     traZODone 100 MG tablet   Commonly known as:  DESYREL   Stopped by:  Sumanth Roberts MD                Where to get your medicines      These medications were sent to [id number] - [place], [place] - [address]  [address], [facility] [address]      Phone:  [phone]     amitriptyline 25 MG tablet         Some of these will need a paper prescription and others can be bought over the counter.  Ask your nurse if you have questions.     Bring a paper prescription for each of these medications     ALPRAZolam 0.5 MG tablet    HYDROcodone-acetaminophen  MG per tablet               Information about OPIOIDS     PRESCRIPTION OPIOIDS: WHAT YOU NEED TO KNOW   We gave you an opioid (narcotic) pain medicine. It is important to manage your pain, but opioids are not always the best choice. You should first try all the other options your care team gave you. Take this medicine for as short a time (and as few doses) as possible.     These medicines have risks:    DO NOT drive when on new or higher doses of pain medicine. These medicines can affect your alertness and reaction times, and you could be arrested for driving under the influence (DUI). If you need to use opioids long-term, talk to your care team about driving.    DO NOT operate heave machinery    DO NOT do any other dangerous activities while taking these medicines.     DO NOT drink any alcohol while taking these medicines.      If the opioid prescribed includes acetaminophen, DO NOT take with any other medicines that contain acetaminophen. Read all labels carefully. Look for the word  acetaminophen  or  Tylenol.  Ask your pharmacist if you have questions or are unsure.    You can get addicted to pain medicines, especially if you have a history of addiction (chemical, alcohol or substance dependence). Talk to your care team about ways to reduce this risk.    Store your pills in a secure place, locked if possible. We will not replace any lost or stolen medicine. If you don t finish your medicine, please throw away (dispose) as directed by your pharmacist. The [place] Pollution Control Agency has more information about safe disposal: https://www.pca.state.[place]/living-green/managing-unwanted-medications.      All opioids tend to cause constipation. Drink plenty of water and eat foods that have a lot of fiber, such as fruits, vegetables, prune juice, apple juice and high-fiber cereal. Take a laxative (Miralax, milk of magnesia, Colace, Senna) if you don t move your bowels at least every other day.          Primary Care Provider Office Phone # Fax #    Sumanth Roberts [phone] [address] [facility] [address]        Equal Access to Services     [facility]estoYESENIA : Hadii aad ku hadasho Soomaali, waaxda luqadaha, qaybta kaalmada adeegyada, waxsri tavares haydelmarn ynes guzmán . So [facility] [phone].    ATENCIÓN: Si habla español, tiene a mitchell disposición servicios gratuitos de asistencia lingüística. Llame al [phone].    We comply with applicable federal civil rights laws and [place] laws. We do not discriminate on the basis of race, color, national origin, age, disability, sex, sexual orientation, or gender identity.            Thank you!     Thank you for choosing [facility]  for your care. Our goal is always to provide you with excellent care. Hearing back from our patients is one way we can continue to improve our services. Please take a few minutes to complete the written survey that you may receive in the mail after your visit with us. Thank you!             Your Updated Medication List - Protect others around you: Learn how to safely use, store and throw away your medicines at www.disposemymeds.org.          This list is accurate as of 6/5/18 11:59 PM.  Always use your most recent med list.                   Brand Name Dispense Instructions for use Diagnosis    ALPRAZolam 0.5 MG tablet    XANAX    2 tablet    One time as needed prior to MRI.  May repeat once.    Claustrophobia       amitriptyline 25 MG tablet    ELAVIL    30 tablet    Take 1 tablet (25 mg) by mouth At Bedtime        aspirin 81 MG EC tablet      Take 81 mg by mouth daily with food         atorvastatin 20 MG tablet    LIPITOR     Take 20 mg by mouth daily        Bee Pollen 500 MG Chew      Take 2 tablets by mouth daily        HYDROcodone-acetaminophen  MG per tablet    NORCO    150 tablet    Take 1 tablet by mouth every 6 hours as needed for moderate to severe pain or severe pain Ok to refill on 9/05/18.    Chronic, continuous use of opioids       IBUPROFEN      Prn for back pain        lisinopril 5 MG tablet    PRINIVIL/ZESTRIL     Take 5 mg by mouth daily        OMEGA 3 PO      Take 2 capsules by mouth daily        omeprazole 40 MG capsule    priLOSEC     Take 40 mg by mouth daily        RA VITAMIN B-12 TR 1000 MCG Tbcr   Generic drug:  cyanocobalamin      Take 1,000 mcg by mouth daily        senna-docusate 8.6-50 MG per tablet    SENOKOT-S;PERICOLACE     Take 1-4 tablets by mouth 2 times daily        sildenafil 20 MG tablet    REVATIO     TAKE 3 TO 5 TABLETS (60MG- 100MG) BY MOUTH ONE HOUR PRIOR TO SEXUAL ACTIVITY.        Simethicone 125 MG Caps      Take  by mouth.        SM GARLIC 150 MG Tabs tablet   Generic drug:  garlic      Take 2 tablets by mouth daily        VITAMIN B-1 PO      Take  by mouth.

## 2018-06-05 NOTE — PATIENT INSTRUCTIONS
Ok to refill your current Rx at pharmacy on 6/11/18.  Start the Elavil at night, this should allow you to use less if any pain pills at night.  We will get labs today.  Plan on repeat MRI then consider repeat injection versus visiting with surgeon again.  Repeat knee XR in two mos or so.

## 2018-06-06 RX ORDER — ALPRAZOLAM 0.5 MG
TABLET ORAL
Qty: 2 TABLET | Refills: 0 | Status: SHIPPED | OUTPATIENT
Start: 2018-06-06 | End: 2019-03-05

## 2018-06-07 ENCOUNTER — TELEPHONE (OUTPATIENT)
Dept: FAMILY MEDICINE | Facility: OTHER | Age: 63
End: 2018-06-07

## 2018-06-07 NOTE — TELEPHONE ENCOUNTER
Patient is asking if we can call [facility] to let them know he can  his Hydrocodone prescription on Monday 6/11 instead of 6/24. OK?

## 2018-06-11 ENCOUNTER — HOSPITAL ENCOUNTER (OUTPATIENT)
Dept: MRI IMAGING | Facility: OTHER | Age: 63
Discharge: HOME | End: 2018-06-11
Attending: FAMILY MEDICINE | Admitting: FAMILY MEDICINE
Payer: MEDICARE

## 2018-06-11 DIAGNOSIS — M54.16 CHRONIC LEFT LUMBAR RADICULOPATHY: ICD-10-CM

## 2018-06-11 DIAGNOSIS — M48.061 SPINAL STENOSIS, LUMBAR REGION, WITHOUT NEUROGENIC CLAUDICATION: ICD-10-CM

## 2018-06-11 PROCEDURE — 72158 MRI LUMBAR SPINE W/O & W/DYE: CPT

## 2018-06-11 PROCEDURE — 25500064 ZZH RX 255 OP 636: Performed by: FAMILY MEDICINE

## 2018-06-11 PROCEDURE — A9575 INJ GADOTERATE MEGLUMI 0.1ML: HCPCS | Performed by: FAMILY MEDICINE

## 2018-06-11 RX ORDER — GADOTERATE MEGLUMINE 376.9 MG/ML
20 INJECTION INTRAVENOUS ONCE
Status: COMPLETED | OUTPATIENT
Start: 2018-06-11 | End: 2018-06-11

## 2018-06-11 RX ADMIN — GADOTERATE MEGLUMINE 20 ML: 376.9 INJECTION INTRAVENOUS at 12:15

## 2018-06-14 NOTE — PROGRESS NOTES
Nursing Notes:   Mary Gaffney LPN  6/5/2018  8:32 AM  Signed  Patient comes in to the clinic today for medication review and refills. He is also due for labs.      SUBJECTIVE:  Dilip Kan is a 63 year old male who comes in today to follow-up on hypertension and hyperlipidemia.  He continues to take lisinopril for hypertension without side effect.  He has been trying to watch his sodium intake but admits he has not been overly careful about this.  He is also here to follow-up on hyperlipidemia.  He is taking Lipitor 20 mg.  No side effects.  He has not had chest pain or shortness of breath.  Continues with low-dose aspirin for primary cardiac prevention.    Patient's last colonoscopy was in 2006.  He is overdue for repeat.  Also had EGD at that time.  He is on omeprazole.  Reports no recent issues with esophagitis.  No dysphasia.  He has not had any black or bloody stool.  No change in caliber of stool.    He is also underwent radical prostatectomy.  Continues to have urinary incontinence but otherwise has not had any change in symptoms or concerns.    Patient has long-standing history of low back pain.  Patient underwent microdiscectomy in 2009.  Over the past several years has been on increasing dosages of narcotics to maintain functionality.  On April 20, 2017 underwent lumbar decompression with lower lumbar fusion.  That did improve significant portion of pain and radicular symptoms.  Now he is having ongoing pain localized to the lumbar spine that has been impacting his functionality intermittently with lowering of narcotics.  Difficult to sleep.  It does radiate a little bit to the left but not down his leg.  Described as an achy sensation.  Worse with overactivity.  Better with stretching and rest.  We have been gradually tapering him off narcotics.  At his peak he was on about 150 mg oral morphine equivalent.  Over the past couple of months he has been taking 4-5 pills of 10 mg hydrocodone daily.   He is struggling with managing the pain and some functionality.    Right knee pain has had interval improvement.        Past Surgical History:   Procedure Laterality Date     APPENDECTOMY OPEN      09/19/2009,Ruptured [place]     COLONOSCOPY      08/31/2006,EGD, next due in 2016     ESOPHAGOSCOPY, GASTROSCOPY, DUODENOSCOPY (EGD), COMBINED      03/2003     ESOPHAGOSCOPY, GASTROSCOPY, DUODENOSCOPY (EGD), COMBINED      08/31/2006     OTHER SURGICAL HISTORY      age [id number],INCISION AND DRAINAGE,EXCISE VARICOCELE     OTHER SURGICAL HISTORY      03/16/2009,[id number],LUMBAR DISKECTOMY,L 3-4 microdiskectomy, [facility]     OTHER SURGICAL HISTORY      11/28/12,,RADICAL PROSTATECTOMY,Nerve Sparring, Dr Warner     SPINE SURGERY N/A 04/28/2017    L3 to S1 spinal decompression L4/L5 fusion     TONSILLECTOMY, ADENOIDECTOMY, COMBINED      as a child         Current Outpatient Prescriptions   Medication Sig Dispense Refill     amitriptyline (ELAVIL) 25 MG tablet Take 1 tablet (25 mg) by mouth At Bedtime 30 tablet 1     aspirin EC 81 MG EC tablet Take 81 mg by mouth daily with food       atorvastatin (LIPITOR) 20 MG tablet Take 20 mg by mouth daily       Bee Pollen 500 MG CHEW Take 2 tablets by mouth daily       cyanocobalamin (RA VITAMIN B-12 TR) 1000 MCG TBCR Take 1,000 mcg by mouth daily       garlic (SM GARLIC) 150 MG TABS tablet Take 2 tablets by mouth daily       HYDROcodone-acetaminophen (NORCO)  MG per tablet Take 1 tablet by mouth every 6 hours as needed for moderate to severe pain or severe pain Ok to refill on 9/05/18. 150 tablet 0     IBUPROFEN Prn for back pain       lisinopril (PRINIVIL/ZESTRIL) 5 MG tablet Take 5 mg by mouth daily       Omega-3 Fatty Acids (OMEGA 3 PO) Take 2 capsules by mouth daily       omeprazole (PRILOSEC) 40 MG capsule Take 40 mg by mouth daily       senna-docusate (SENOKOT-S;PERICOLACE) 8.6-50 MG per tablet Take 1-4 tablets by mouth 2 times daily       sildenafil (REVATIO) 20  MG tablet TAKE 3 TO 5 TABLETS (60MG- 100MG) BY MOUTH ONE HOUR PRIOR TO SEXUAL ACTIVITY.  10     Simethicone 125 MG CAPS Take  by mouth.       Thiamine HCl (VITAMIN B-1 PO) Take  by mouth.       ALPRAZolam (XANAX) 0.5 MG tablet One time as needed prior to MRI.  May repeat once. 2 tablet 0         Social History     Social History     Marital status:      Spouse name: N/A     Number of children: N/A     Years of education: N/A     Occupational History      Other     Social History Main Topics     Smoking status: Former Smoker     Packs/day: 1.00     Years: 20.00     Types: Cigarettes     Quit date: 11/8/2002     Smokeless tobacco: Current User     Types: Snuff     Alcohol use Yes      Comment: Alcoholic Drinks/day: 5 drinks a month     Drug use: Not on file      Comment: Drug use: No     Sexual activity: Not on file     Other Topics Concern     Not on file     Social History Narrative    [employer] .  Patient has quit smoking.  He plans on retiring from [employer] in the next few months.       I have personally reviewed and updated above noted social, family and/or past medical history.    ROS  CONSTITUTIONAL: NEGATIVE for fever, chills, change in weight  ENT/MOUTH: NEGATIVE for ear, mouth and throat problems  RESP: NEGATIVE for significant cough or SOB  CV: NEGATIVE for chest pain, palpitations or peripheral edema  : See HPI  MUSCULOSKELETAL: See HPI  NEURO: NEGATIVE for weakness, dizziness or paresthesias  PSYCHIATRIC: NEGATIVE for changes in mood or affect      /84  Pulse 68  Wt 225 lb (102.1 kg)  BMI 32.28 kg/m2    Exam:  GENERAL: healthy, alert and no distress  NECK: no adenopathy, no asymmetry, masses, or scars and thyroid normal to palpation  RESP: lungs clear to auscultation - no rales, rhonchi or wheezes  CV: regular rate and rhythm, normal S1 S2, no S3 or S4, no murmur, click or rub, no peripheral edema and peripheral pulses strong  ABDOMEN: soft, nontender, no  hepatosplenomegaly, no masses and bowel sounds normal  MS: no gross musculoskeletal defects noted, no edema.  Patient does have tenderness in the lumbar spine near L4.  Seems to be worse on the left.  Also some mild tenderness of paraspinous musculature.  NEURO: Reflexes are bilaterally symmetric.  Strength appears equal and even  PSYCH: mentation appears normal and affect normal/bright      ASSESSMENT/Plan :    I personally reviewed the patients' labs and imaging.    Results for orders placed or performed in visit on 06/05/18   **Basic metabolic panel FUTURE anytime   Result Value Ref Range    Sodium 138 134 - 144 mmol/L    Potassium 4.2 3.5 - 5.1 mmol/L    Chloride 103 98 - 107 mmol/L    Carbon Dioxide 26 21 - 31 mmol/L    Anion Gap 9 3 - 14 mmol/L    Glucose 100 70 - 105 mg/dL    Urea Nitrogen 17 7 - 25 mg/dL    Creatinine 0.84 0.70 - 1.30 mg/dL    GFR Estimate >90 >60 mL/min/1.7m2    GFR Estimate If Black >90 >60 mL/min/1.7m2    Calcium 9.4 8.6 - 10.3 mg/dL   Lipid Panel   Result Value Ref Range    Cholesterol 131 <200 mg/dL    Triglycerides 206 (H) <150 mg/dL    HDL Cholesterol 64 23 - 92 mg/dL    LDL Cholesterol Calculated 26 <100 mg/dL    Non HDL Cholesterol 67 <130 mg/dL               1. Chronic, continuous use of opioids  Discussed options with patient regarding medications.  I discussed adding additional medication such as gabapentin, amitriptyline or Lyrica.  Will trial elavil at night in place of the trazodone. He does use senna to help with constipation and has not had any issues if he takes that medication.    I discussed with patient at this time I would suggest he continue on current dose of hydrocodone.  Will write for 150 per month but this needs to last him a full 30 days and he understands this.  I will not refill early.    I would like to repeat MRI as she does have some increased symptoms on the left side.  MRI was reviewed with comment by radiologist that he may have L4 impingement.  This does  not specifically relate to symptoms but symptoms are a bit difficult for him to explain given chronicity and ongoing degree of pain with narcotic taper.  I would like him to follow-up with his spine surgeon to see if they have additional recommendations such as injection, any benefit from additional surgery or perhaps if they would recommend starting a medication like gabapentin.  I would like to further reduce his narcotics but at this time it appears the medication is helping him maintain functionality and if you are going any lower on the dose would cause significant decrease in activity.     reviewed.  CSA updated today.  No aberrant medication usage beyond difficulty tapering down to 4 pills and some early refills during his tapering.  Tox screens have been negative in past but will be repeated at next visit.  - HYDROcodone-acetaminophen (NORCO)  MG per tablet; Take 1 tablet by mouth every 6 hours as needed for moderate to severe pain or severe pain Ok to refill on 9/05/18.  Dispense: 150 tablet; Refill: 0    2. Spinal stenosis, lumbar region, without neurogenic claudication  As noted above  - MR Lumbar Spine w/o & w Contrast; Future    3. Chronic left lumbar radiculopathy  See above.  - MR Lumbar Spine w/o & w Contrast; Future    4. Essential hypertension  BP with slight elevation.  Further reduce sodium intake and consider addition of norvasc or increase in lisinopril if persistant  - **Basic metabolic panel FUTURE anytime    5. Need for vaccination against Streptococcus pneumoniae  - GH IMM-  PNEUMOCOCCAL CONJ VACCINE 13 VALENT IM (Prevnar)    6. High blood cholesterol  LDL excellent.  TGs elevated.  Suggest low carbohydate, low saturated fat diet  - Lipid Panel    7.  Colon cancer screening  Suggest colonoscopy or cologuard, he opts for cologuard which was given to him to complete.            Patient Instructions   Ok to refill your current Rx at pharmacy on 6/11/18.  Start the Elavil at night,  this should allow you to use less if any pain pills at night.  We will get labs today.  Plan on repeat MRI then consider repeat injection versus visiting with surgeon again.  Repeat knee XR in two mos or so.      Sumanth Roberts    This document was created using computer generated templates and voiceactivated software.

## 2018-07-23 NOTE — PROGRESS NOTES
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 This patient takes morphine and hydrocodone and has listed you as the prescribing provider.  By [facility] regulation (391.41 (b) (12)) this medication is considered medically disqualifying   except when the use is prescribed by a licensed medical practitioner who is familiar with the 's medical history and has advised the  that the substance will not adversely affect the 's ability to safely operate a commercial motor vehicle.   The intent of this rule is to ensure there is no medical condition that interferes with driving tasks on a public road. As the primary provider for this patient, you have knowledge of their medical history and ability to operate a commercial vehicle.  Until this clearance is obtained, this individual will be not be medically qualified for commercial driving.     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

## 2018-07-23 NOTE — PROGRESS NOTES
Patient Information     Patient Name  Dilip Kan MRN  [id number] Sex  Male   1955      Letter by Sumanth Roberts MD at      Author:  Sumanth Roberts MD Service:  (none) Author Type:  (none)    Filed:   Encounter Date:  3/8/2017 Status:  (Other)         Dilip Kan  [address]  [place] [address]    3/8/2017      Dear Mr. Kan,    Your lab results are listed below and are acceptable/stable.  Please continue on your current medications and schedule a follow up with Dr. Arrington as soon as able.  Your last PSA was 0.253 so it is essentially unchanged over the past 4 months but still above where we would like to see it with having your prostate removed.    Contact us with any questions.    I'm sending along your actual numbers so that you will have them for your general interest and your records.       Take Care,   Sumanth Roberts MD      Resulted Orders      LIPID PANEL (Collected: 3/8/2017  1:59 PM)      Result  Value Ref Range    CHOLESTEROL,TOTAL 98 <200 mg/dL    TRIGLYCERIDES 161 (H) <150 mg/dL    HDL CHOLESTEROL 37 23 - 92 mg/dL    NON-HDL CHOLESTEROL 61 <145 mg/dl    CHOL/HDL RATIO            2.65 <4.50                    LDL CHOLESTEROL 29 <100 mg/dL    PATIENT STATUS            FASTING                   COMP METABOLIC PANEL (Collected: 3/8/2017  1:59 PM)      Result  Value Ref Range    SODIUM 139 133 - 143 mmol/L    POTASSIUM 4.1 3.5 - 5.1 mmol/L    CHLORIDE 105 98 - 107 mmol/L    CO2,TOTAL 27 21 - 31 mmol/L    ANION GAP 7 5 - 18                    GLUCOSE 106 (H) 70 - 105 mg/dL    CALCIUM 9.3 8.6 - 10.3 mg/dL    BUN 9 7 - 25 mg/dL    CREATININE 0.80 0.70 - 1.30 mg/dL    BUN/CREAT RATIO           11                    GFR if African American >60 >60 ml/min/1.73m2    GFR if not African American >60 >60 ml/min/1.73m2    ALBUMIN 4.1 3.5 - 5.7 g/dL    PROTEIN,TOTAL 6.9 6.4 - 8.9 g/dL    GLOBULIN                  2.8 2.0 - 3.7 g/dL    A/G RATIO 1.5 1.0 - 2.0                     BILIRUBIN,TOTAL 0.8 0.3 - 1.0 mg/dL    ALK PHOSPHATASE 70 34 - 104 IU/L    ALT (SGPT) 12 7 - 52 IU/L    AST (SGOT) 12 (L) 13 - 39 IU/L   PSA TOTAL (DIAGNOSTIC) (Collected: 3/8/2017  1:59 PM)      Result  Value Ref Range    PSA TOTAL (DIAGNOSTIC) 0.256 <=3.100 ng/mL    Narrative      The percentage of Free PSA can be used to enhance the   differentiation of prostate cancer from benign prostatic  disease in subjects whose PSA levels are between 4.00 and  10.00 ng/mL.      The DXI PSA assay is a Chemiluminescent Assay.  Assay values obtained with different assay   methods cannot be used interchangeably due to differences  in assay methods and reagent specificity.

## 2018-07-24 NOTE — PROGRESS NOTES
Patient Information     Patient Name  Dilip Kan MRN  [id number] Sex  Male   1955      Letter by Sumanth Roberts MD at      Author:  Sumanth Roberts MD Service:  (none) Author Type:  (none)    Filed:   Encounter Date:  2017 Status:  (Other)         Dilip Kan  [address]  [place] [address]    2017      Dear Mr. Kan,    Your lab results are listed below and are acceptable/stable.  Please continue on your current medications.    Your PSA does continue to slowly creep up and I would strongly suggest you follow up with Dr. Arrington.    Contact us with any questions.    I'm sending along your actual numbers so that you will have them for your general interest and your records.       Take Care,   Sumanth Roberts MD      Resulted Orders      PROTIME-INR (Collected: 2017  3:15 PM)      Result  Value Ref Range    INR 1.1 <1.3    PROTIME 11.4 (L) 11.9 - 15.2 sec    Narrative                Therapeutic Range  2.0-3.0 for most anticoagulated patients  2.5-3.5 or 4.0 for high risk patients    The INR is only used for patients on stable oral anticoagulant therapy.   It makes no significant contribution to the diagnosis or treatment   of patients whose Protime is prolonged for other reasons.     COMPLETE METABOLIC PANEL (Collected: 2017  3:15 PM)      Result  Value Ref Range    SODIUM 138 133 - 143 mmol/L    POTASSIUM 4.0 3.5 - 5.1 mmol/L    CHLORIDE 107 98 - 107 mmol/L    CO2,TOTAL 23 21 - 31 mmol/L    ANION GAP 8 5 - 18                    GLUCOSE 98 70 - 105 mg/dL    CALCIUM 9.1 8.6 - 10.3 mg/dL    BUN 24 7 - 25 mg/dL    CREATININE 0.76 0.70 - 1.30 mg/dL    BUN/CREAT RATIO           32                    GFR if African American >60 >60 ml/min/1.73m2    GFR if not African American >60 >60 ml/min/1.73m2    ALBUMIN 4.0 3.5 - 5.7 g/dL    PROTEIN,TOTAL 6.9 6.4 - 8.9 g/dL    GLOBULIN                  2.9 2.0 - 3.7 g/dL    A/G RATIO 1.4 1.0 - 2.0                     BILIRUBIN,TOTAL 0.5 0.3 - 1.0 mg/dL    ALK PHOSPHATASE 67 34 - 104 IU/L    ALT (SGPT) 13 7 - 52 IU/L    AST (SGOT) 15 13 - 39 IU/L   PSA, TOTAL (Collected: 4/7/2017  3:15 PM)      Result  Value Ref Range    PSA TOTAL (DIAGNOSTIC) 0.268 <=3.100 ng/mL    Narrative      The percentage of Free PSA can be used to enhance the   differentiation of prostate cancer from benign prostatic  disease in subjects whose PSA levels are between 4.00 and  10.00 ng/mL.      The DXI PSA assay is a Chemiluminescent Assay.  Assay values obtained with different assay   methods cannot be used interchangeably due to differences  in assay methods and reagent specificity.       CBC WITH AUTO DIFFERENTIAL (Collected: 4/7/2017  3:15 PM)      Result  Value Ref Range    WHITE BLOOD COUNT         5.2 4.5 - 11.0 thou/cu mm    RED BLOOD COUNT           4.95 4.30 - 5.90 mil/cu mm    HEMOGLOBIN                14.7 13.5 - 17.5 g/dL    HEMATOCRIT                43.8 37.0 - 53.0 %    MCV                       88 80 - 100 fL    MCH                       29.7 26.0 - 34.0 pg    MCHC                      33.6 32.0 - 36.0 g/dL    RDW                       12.4 11.5 - 15.5 %    PLATELET COUNT            179 140 - 440 thou/cu mm    MPV                       10.3 6.5 - 11.0 fL    NEUTROPHILS               60.9 42.0 - 72.0 %    LYMPHOCYTES               26.0 20.0 - 44.0 %    MONOCYTES                 7.6 <12.0 %    EOSINOPHILS               4.4 <8.0 %    BASOPHILS                 1.1 <3.0 %    ABSOLUTE NEUTROPHILS      3.2 1.7 - 7.0 thou/cu mm    ABSOLUTE LYMPHOCYTES      1.4 0.9 - 2.9 thou/cu mm    ABSOLUTE MONOCYTES        0.4 <0.9 thou/cu mm    ABSOLUTE EOSINOPHILS      0.2 <0.5 thou/cu mm    ABSOLUTE BASOPHILS        0.1 <0.3 thou/cu mm

## 2018-07-31 ENCOUNTER — TELEPHONE (OUTPATIENT)
Dept: FAMILY MEDICINE | Facility: OTHER | Age: 63
End: 2018-07-31

## 2018-07-31 NOTE — TELEPHONE ENCOUNTER
No I cannot.  We specifically talked about this at his last appointment, and increased his medication to #150 per month from #120 with the specific agreement that the prescriptions must last 30d and no early refills would be given.

## 2018-07-31 NOTE — TELEPHONE ENCOUNTER
Patient notified of Dr Roberts's response. Since 8/5 is a Sunday and the pharmacy is closed, can he fill it on 8/4?

## 2018-07-31 NOTE — TELEPHONE ENCOUNTER
Patient states the past 2 weeks his back has been worse. He took extra pain medication and is now out of pills. Is due for refill on 8/5. He has an appointment with his surgeon on 8/9 and with Dr Roberts on 8/14. Is asking if he can fill his August prescription today.

## 2018-08-09 ENCOUNTER — MEDICAL CORRESPONDENCE (OUTPATIENT)
Dept: HEALTH INFORMATION MANAGEMENT | Facility: OTHER | Age: 63
End: 2018-08-09

## 2018-08-13 DIAGNOSIS — M54.50 LUMBAGO: Primary | ICD-10-CM

## 2018-08-15 ENCOUNTER — HOSPITAL ENCOUNTER (OUTPATIENT)
Dept: GENERAL RADIOLOGY | Facility: OTHER | Age: 63
Discharge: HOME | End: 2018-08-15
Admitting: FAMILY MEDICINE
Payer: MEDICARE

## 2018-08-15 DIAGNOSIS — R29.818 NEUROGENIC CLAUDICATION: ICD-10-CM

## 2018-08-15 PROCEDURE — 64483 NJX AA&/STRD TFRM EPI L/S 1: CPT

## 2018-08-15 PROCEDURE — 25000128 H RX IP 250 OP 636: Performed by: RADIOLOGY

## 2018-08-15 PROCEDURE — 25000125 ZZHC RX 250: Performed by: RADIOLOGY

## 2018-08-15 PROCEDURE — 25500064 ZZH RX 255 OP 636: Performed by: RADIOLOGY

## 2018-08-15 RX ORDER — DEXAMETHASONE SODIUM PHOSPHATE 10 MG/ML
10 INJECTION, SOLUTION INTRAMUSCULAR; INTRAVENOUS EVERY 6 HOURS
Status: DISCONTINUED | OUTPATIENT
Start: 2018-08-15 | End: 2018-08-16 | Stop reason: HOSPADM

## 2018-08-15 RX ORDER — LIDOCAINE HYDROCHLORIDE 10 MG/ML
10 INJECTION, SOLUTION INFILTRATION; PERINEURAL ONCE
Status: COMPLETED | OUTPATIENT
Start: 2018-08-15 | End: 2018-08-15

## 2018-08-15 RX ORDER — LIDOCAINE HYDROCHLORIDE 10 MG/ML
2 INJECTION, SOLUTION EPIDURAL; INFILTRATION; INTRACAUDAL; PERINEURAL ONCE
Status: COMPLETED | OUTPATIENT
Start: 2018-08-15 | End: 2018-08-15

## 2018-08-15 RX ADMIN — LIDOCAINE HYDROCHLORIDE 2 ML: 10 INJECTION, SOLUTION EPIDURAL; INFILTRATION; INTRACAUDAL; PERINEURAL at 14:04

## 2018-08-15 RX ADMIN — DEXAMETHASONE SODIUM PHOSPHATE 10 MG: 10 INJECTION, SOLUTION INTRAMUSCULAR; INTRAVENOUS at 14:03

## 2018-08-15 RX ADMIN — IOHEXOL 2 ML: 240 INJECTION, SOLUTION INTRATHECAL; INTRAVASCULAR; INTRAVENOUS; ORAL at 14:03

## 2018-08-15 RX ADMIN — LIDOCAINE HYDROCHLORIDE 2 ML: 10 INJECTION, SOLUTION INFILTRATION; PERINEURAL at 14:04

## 2018-08-21 ENCOUNTER — TELEPHONE (OUTPATIENT)
Dept: FAMILY MEDICINE | Facility: OTHER | Age: 63
End: 2018-08-21

## 2018-08-21 NOTE — TELEPHONE ENCOUNTER
After birth date was verified, spoke with pharmacy. Let them know the verbal information below from Sumanth Roberts MD.     Called patient back and informed him that his prescription may be filled on 9/3/2018.     Patient requested to inform Sumanth Roberts MD that he met with his back surgeon, Dr. Gustafson. He stated that the two vertebrae above where he previously had the fusion completed are pushing against his nerves and he will need to have a fusion again. He received a shot in his back last week and will begin physical therapy tomorrow. When PT is completed he will call his surgeon and schedule surgery.      Heads up was verbally given to Sumanth Roberts MD. No further questions or concerns at this time.        Marcial Caballero, KANE 08/21/18 12:22 PM

## 2018-08-21 NOTE — TELEPHONE ENCOUNTER
Ok to give verbal ok to pharmacy if they will accept that.  I am ok with him filling it two days early.

## 2018-08-21 NOTE — TELEPHONE ENCOUNTER
After birth date was verified, spoke with Dilip. He stated that he is going to [place] on September 4th. He would like to be able to  his hydrocodone prescription on September 3rd versus September 5th when it is due to refill.     Is this possible?        Marcial Caballero LPN 08/21/18 11:49 AM

## 2018-08-23 ENCOUNTER — HOSPITAL ENCOUNTER (OUTPATIENT)
Dept: PHYSICAL THERAPY | Facility: OTHER | Age: 63
Setting detail: THERAPIES SERIES
End: 2018-08-23
Attending: FAMILY MEDICINE
Payer: MEDICARE

## 2018-08-23 PROCEDURE — G8979 MOBILITY GOAL STATUS: HCPCS | Mod: GP,CK

## 2018-08-23 PROCEDURE — 97161 PT EVAL LOW COMPLEX 20 MIN: CPT | Mod: GP

## 2018-08-23 PROCEDURE — 97110 THERAPEUTIC EXERCISES: CPT | Mod: GP

## 2018-08-23 PROCEDURE — G8978 MOBILITY CURRENT STATUS: HCPCS | Mod: GP,CM

## 2018-08-23 PROCEDURE — 40000185 ZZHC STATISTIC PT OUTPT VISIT

## 2018-08-24 NOTE — PROGRESS NOTES
[facility]          OUTPATIENT PHYSICAL THERAPY ORTHOPEDIC EVALUATION  PLAN OF TREATMENT FOR OUTPATIENT REHABILITATION  (COMPLETE FOR INITIAL CLAIMS ONLY)  Patient's Last Name, First Name, M.I.  YOB: 1955  Dilip Kan    Provider s Name:  [facility]   Medical Record No.  [id number]   Start of Care Date:  08/23/18   Onset Date:      Type:     _X__PT   ___OT   ___SLP Medical Diagnosis:  Lumbar Pain     PT Diagnosis:  Lumbar Pain   Visits from SOC:  1      _________________________________________________________________________________  Plan of Treatment/Functional Goals:  manual therapy, neuromuscular re-education, ROM, gait training, strengthening, stretching     Cryotherapy     Goals  Goal Identifier: Pain  Goal Description: Report pain at 2/10 or leass at rest.   Target Date: 10/19/18    Goal Identifier: Walking   Goal Description: Report ability to walk for 30 minutes to complete shopping tasks with pain at 2/10 or less.  Target Date: 10/19/18    Goal Identifier: Sitting  Goal Description: Report ability to sit for 30 minutes with pain at 2/10 or less.   Target Date: 10/19/18                                                           Therapy Frequency:  2 times/Week  Predicted Duration of Therapy Intervention:  6 weeks    Delfino Day, PT                 I CERTIFY THE NEED FOR THESE SERVICES FURNISHED UNDER        THIS PLAN OF TREATMENT AND WHILE UNDER MY CARE .             Physician Signature               Date    X_____________________________________________________                             Certification Date From:  08/23/18   Certification Date To:  10/04/18    Referring Provider:  Dr. Reeder    Initial Assessment        See Epic Evaluation Start of Care Date: 08/23/18

## 2018-08-24 NOTE — PROGRESS NOTES
08/23/18 1600   General Information   Type of Visit Initial OP Ortho PT Evaluation   Start of Care Date 08/23/18   Referring Physician Dr. Reeder   Patient/Family Goals Statement decrease pain   Orders Evaluate and Treat   Orders Comment See scanned orders   Date of Order 08/14/18   Insurance Type Medicare   Medical Diagnosis Lumbar Pain   Surgical/Medical history reviewed Yes   Body Part(s)   Body Part(s) Lumbar Spine/SI   Presentation and Etiology   Pertinent history of current problem (include personal factors and/or comorbidities that impact the POC) Patient is s/p lumabr fusion a year ago.  States he has been experiencing worsening pain the past several months.  He recently had an MRI and follow up with Dr. Reeder.  He recieved an injection which did not help.  PT was recommended.  If PT does not improve symptoms, patient states he will most likely need another fusion at the levels above the prior lumabr fusion.    Impairments A. Pain;B. Decreased WB tolerance;F. Decreased strength and endurance;H. Impaired gait;K. Numbness   Functional Limitations perform activities of daily living;perform required work activities;perform desired leisure / sports activities   Symptom Location Low back and right anterior thigh   How/Where did it occur (Pain gradually got worse with working )   Chronicity Recurrent   Pain rating (0-10 point scale) Best (/10);Worst (/10)   Best (/10) 4/10   Worst (/10) 8/10   Pain quality A. Sharp;C. Aching;E. Shooting;F. Stabbing   Frequency of pain/symptoms A. Constant   Pain/symptoms exacerbated by A. Sitting;B. Walking;C. Lifting;D. Carrying;E. Rest;G. Certain positions;I. Bending;J. ADL;L. Work tasks   Pain/symptoms eased by C. Rest;E. Changing positions;F. Certain positions   Current / Previous Interventions   Diagnostic Tests: MRI   MRI Results Results   MRI results Reviewed in chart    Prior Level of Function   Prior Level of Function-Mobility No limitations    Prior Level of  Function-ADLs No limitations    Current Level of Function   Current Community Support Family/friend caregiver   Patient role/employment history F. Retired   Fall Risk Screen   Fall screen completed by PT   Have you fallen 2 or more times in the past year? No   Have you fallen and had an injury in the past year? No   Is patient a fall risk? No   Lumbar Spine/SI Objective Findings   Observation patient demonstrates a slight forward trunk lean    Gait/Locomotion Antalagic gait    Lumbar ROM Comment deferred due to current pain level    Palpation pain across the low back and lumbar paraspinals.    Planned Therapy Interventions   Planned Therapy Interventions manual therapy;neuromuscular re-education;ROM;gait training;strengthening;stretching   Planned Modality Interventions   Planned Modality Interventions Cryotherapy   Clinical Impression   Criteria for Skilled Therapeutic Interventions Met yes, treatment indicated   PT Diagnosis Lumbar Pain   Influenced by the following impairments Pain, weakness, impaired gait, impaired ROM    Functional limitations due to impairments Diffculty walking, sitting, lifting, carrying,  certain positions, ADL's and work tasks    Clinical Presentation Stable/Uncomplicated   Clinical Presentation Rationale Symptoms are consistent with DDD and stenosis at L2-L3   Clinical Decision Making (Complexity) Low complexity   Therapy Frequency 2 times/Week   Predicted Duration of Therapy Intervention (days/wks) 6 weeks   Risk & Benefits of therapy have been explained Yes   Patient, Family & other staff in agreement with plan of care Yes   Education Assessment   Preferred Learning Style Listening;Demonstration;Pictures/video   Barriers to Learning No barriers   ORTHO GOALS   PT Ortho Eval Goals 1;2;3   Ortho Goal 1   Goal Identifier Pain   Goal Description Report pain at 2/10 or leass at rest.    Target Date 10/19/18   Ortho Goal 2   Goal Identifier Walking    Goal Description Report ability to walk  for 30 minutes to complete shopping tasks with pain at 2/10 or less.   Target Date 10/19/18   Ortho Goal 3   Goal Identifier Sitting   Goal Description Report ability to sit for 30 minutes with pain at 2/10 or less.    Target Date 10/19/18   Total Evaluation Time   Total Evaluation Time 30   Therapy Certification   Certification date from 08/23/18   Certification date to 10/04/18   Medical Diagnosis Lumbar Pain

## 2018-08-28 ENCOUNTER — TELEPHONE (OUTPATIENT)
Dept: FAMILY MEDICINE | Facility: OTHER | Age: 63
End: 2018-08-28

## 2018-08-28 ENCOUNTER — HOSPITAL ENCOUNTER (OUTPATIENT)
Dept: PHYSICAL THERAPY | Facility: OTHER | Age: 63
Setting detail: THERAPIES SERIES
End: 2018-08-28
Attending: FAMILY MEDICINE
Payer: MEDICARE

## 2018-08-28 PROCEDURE — 40000185 ZZHC STATISTIC PT OUTPT VISIT

## 2018-08-28 PROCEDURE — 97110 THERAPEUTIC EXERCISES: CPT | Mod: GP

## 2018-08-28 NOTE — TELEPHONE ENCOUNTER
Patient is leaving for [place] Tuesday. Dr Roberts had previously stated he could  his prescription a day early at [facility]. But now he realized Monday is a holiday and he will need to fill it Saturday (perferably) or Sunday. Please advise if OK.

## 2018-08-31 ENCOUNTER — HOSPITAL ENCOUNTER (OUTPATIENT)
Dept: PHYSICAL THERAPY | Facility: OTHER | Age: 63
Setting detail: THERAPIES SERIES
End: 2018-08-31
Attending: SPECIALIST
Payer: MEDICARE

## 2018-08-31 PROCEDURE — 97110 THERAPEUTIC EXERCISES: CPT | Mod: GP

## 2018-08-31 PROCEDURE — 40000185 ZZHC STATISTIC PT OUTPT VISIT

## 2018-09-13 ENCOUNTER — HOSPITAL ENCOUNTER (OUTPATIENT)
Dept: PHYSICAL THERAPY | Facility: OTHER | Age: 63
Setting detail: THERAPIES SERIES
End: 2018-09-13
Attending: SPECIALIST
Payer: MEDICARE

## 2018-09-13 PROCEDURE — 40000185 ZZHC STATISTIC PT OUTPT VISIT

## 2018-09-13 PROCEDURE — 97110 THERAPEUTIC EXERCISES: CPT | Mod: GP

## 2018-09-18 ENCOUNTER — OFFICE VISIT (OUTPATIENT)
Dept: FAMILY MEDICINE | Facility: OTHER | Age: 63
End: 2018-09-18
Attending: FAMILY MEDICINE
Payer: COMMERCIAL

## 2018-09-18 VITALS
DIASTOLIC BLOOD PRESSURE: 78 MMHG | HEART RATE: 76 BPM | WEIGHT: 220.6 LBS | BODY MASS INDEX: 31.65 KG/M2 | SYSTOLIC BLOOD PRESSURE: 140 MMHG

## 2018-09-18 DIAGNOSIS — M54.17 LUMBOSACRAL RADICULOPATHY AT L3: Primary | ICD-10-CM

## 2018-09-18 DIAGNOSIS — Z12.11 COLON CANCER SCREENING: ICD-10-CM

## 2018-09-18 DIAGNOSIS — F11.90 CHRONIC, CONTINUOUS USE OF OPIOIDS: ICD-10-CM

## 2018-09-18 PROCEDURE — G0463 HOSPITAL OUTPT CLINIC VISIT: HCPCS

## 2018-09-18 PROCEDURE — 99215 OFFICE O/P EST HI 40 MIN: CPT | Performed by: FAMILY MEDICINE

## 2018-09-18 RX ORDER — HYDROCODONE BITARTRATE AND ACETAMINOPHEN 10; 325 MG/1; MG/1
1 TABLET ORAL EVERY 4 HOURS PRN
Qty: 180 TABLET | Refills: 0 | Status: SHIPPED | OUTPATIENT
Start: 2018-09-18 | End: 2018-09-18

## 2018-09-18 RX ORDER — HYDROCODONE BITARTRATE AND ACETAMINOPHEN 10; 325 MG/1; MG/1
1 TABLET ORAL EVERY 4 HOURS PRN
Qty: 150 TABLET | Refills: 0 | Status: SHIPPED | OUTPATIENT
Start: 2018-09-18 | End: 2018-12-11

## 2018-09-18 ASSESSMENT — PAIN SCALES - GENERAL: PAINLEVEL: SEVERE PAIN (6)

## 2018-09-18 NOTE — NURSING NOTE
Patient presents today for medication management and back pain.     Anh Betancur LPN on 9/18/2018 at 9:45 AM

## 2018-09-18 NOTE — MR AVS SNAPSHOT
After Visit Summary   9/18/2018    Dilip Kan    MRN: [id number]           Patient Information     Date Of Birth          1955        Visit Information        Provider Department      9/18/2018 9:30 AM Sumanth Roberts MD [facility]        Today's Diagnoses     Lumbosacral radiculopathy at L3    -  1    Colon cancer screening        Chronic, continuous use of opioids           Follow-ups after your visit        Additional Services     SPINE SURGERY REFERRAL       Please choose Medical Spine Specialist (unless patient was seen by a Medical Spine Specialist within the past 6 months).  Surgical Evaluation is advised if the patient presents with one or more of the following red flags:     **Cauda Equina Syndrome  **Evidence of Spinal Tumor  **Fracture  **Infection  **Loss of Bowel or Bladder Control  **Sudden or Progressive Weakness  **Any other documented emergent neurological condition resulting from a Lumbar Spinal Condition.    You have been referred to: Dr. Mendoza - follow up for L3 radiculopathy, not improving with injection or PT    Please be aware that coverage of these services is subject to the terms and limitations of your health insurance plan.  Call member services at your health plan with any benefit or coverage questions.      Please bring the following to your appointment:    **Any x-rays, CTs or MRIs which have been performed.  Contact the facility where they were done to arrange for  prior to your scheduled appointment.    **List of current medications   **This referral request   **Any documents/labs given to you regarding this referral                  Who to contact     If you have questions or need follow up information about today's clinic visit or your schedule please contact [facility] directly at [phone].  Normal or non-critical lab and imaging results will be communicated to you by MyChart, letter or phone within 4 business days  after the clinic has received the results. If you do not hear from us within 7 days, please contact the clinic through [employer] or phone. If you have a critical or abnormal lab result, we will notify you by phone as soon as possible.  Submit refill requests through [employer] or call your pharmacy and they will forward the refill request to us. Please allow 3 business days for your refill to be completed.          Additional Information About Your Visit        Care EveryWhere ID     This is your Care EveryWhere ID. This could be used by other organizations to access your [place] medical records  [id number]        Your Vitals Were     Pulse BMI (Body Mass Index)                76 31.65 kg/m2           Blood Pressure from Last 3 Encounters:   09/18/18 140/78   06/05/18 138/84   04/24/18 160/84    Weight from Last 3 Encounters:   09/18/18 220 lb 9.6 oz (100.1 kg)   06/05/18 225 lb (102.1 kg)   04/24/18 226 lb (102.5 kg)              We Performed the Following     SPINE SURGERY REFERRAL          Today's Medication Changes          These changes are accurate as of 9/18/18 11:59 PM.  If you have any questions, ask your nurse or doctor.               Start taking these medicines.        Dose/Directions    HYDROcodone-acetaminophen  MG per tablet   Commonly known as:  NORCO   Used for:  Chronic, continuous use of opioids   Started by:  Sumanth Roberts MD        Dose:  1 tablet   Take 1 tablet by mouth every 4 hours as needed for moderate to severe pain or severe pain Ok to refill on 11/27/18.   Quantity:  150 tablet   Refills:  0            Where to get your medicines      Some of these will need a paper prescription and others can be bought over the counter.  Ask your nurse if you have questions.     Bring a paper prescription for each of these medications     HYDROcodone-acetaminophen  MG per tablet               Information about OPIOIDS     PRESCRIPTION OPIOIDS: WHAT YOU NEED TO KNOW   We gave you an  opioid (narcotic) pain medicine. It is important to manage your pain, but opioids are not always the best choice. You should first try all the other options your care team gave you. Take this medicine for as short a time (and as few doses) as possible.    Some activities can increase your pain, such as bandage changes or therapy sessions. It may help to take your pain medicine 30 to 60 minutes before these activities. Reduce your stress by getting enough sleep, working on hobbies you enjoy and practicing relaxation or meditation. Talk to your care team about ways to manage your pain beyond prescription opioids.    These medicines have risks:    DO NOT drive when on new or higher doses of pain medicine. These medicines can affect your alertness and reaction times, and you could be arrested for driving under the influence (DUI). If you need to use opioids long-term, talk to your care team about driving.    DO NOT operate heavy machinery    DO NOT do any other dangerous activities while taking these medicines.    DO NOT drink any alcohol while taking these medicines.     If the opioid prescribed includes acetaminophen, DO NOT take with any other medicines that contain acetaminophen. Read all labels carefully. Look for the word  acetaminophen  or  Tylenol.  Ask your pharmacist if you have questions or are unsure.    You can get addicted to pain medicines, especially if you have a history of addiction (chemical, alcohol or substance dependence). Talk to your care team about ways to reduce this risk.    All opioids tend to cause constipation. Drink plenty of water and eat foods that have a lot of fiber, such as fruits, vegetables, prune juice, apple juice and high-fiber cereal. Take a laxative (Miralax, milk of magnesia, Colace, Senna) if you don t move your bowels at least every other day. Other side effects include upset stomach, sleepiness, dizziness, throwing up, tolerance (needing more of the medicine to have the  same effect), physical dependence and slowed breathing.    Store your pills in a secure place, locked if possible. We will not replace any lost or stolen medicine. If you don t finish your medicine, please throw away (dispose) as directed by your pharmacist. The [place] Pollution Control Agency has more information about safe disposal: [email or web address]://[email or web address].[email or web address]/[email or web address]-unwanted-medications         Primary Care Provider Office Phone # Fax #    Sumanth Roberts [phone] [address] [facility] [address]        Equal Access to Services     [facility]: Hadii aad ku hadasho Soomaali, waaxda luqadaha, qaybta kaalmada adedanielleyajamie, nu guzmán . So [facility] [phone].    ATENCIÓN: Si habla español, tiene a mitchell disposición servicios gratuitos de asistencia lingüística. Linda al [phone].    We comply with applicable federal civil rights laws and [place] laws. We do not discriminate on the basis of race, color, national origin, age, disability, sex, sexual orientation, or gender identity.            Thank you!     Thank you for choosing [facility]  for your care. Our goal is always to provide you with excellent care. Hearing back from our patients is one way we can continue to improve our services. Please take a few minutes to complete the written survey that you may receive in the mail after your visit with us. Thank you!             Your Updated Medication List - Protect others around you: Learn how to safely use, store and throw away your medicines at www.disposemymeds.org.          This list is accurate as of 9/18/18 11:59 PM.  Always use your most recent med list.                   Brand Name Dispense Instructions for use Diagnosis    ALPRAZolam 0.5 MG tablet    XANAX    2 tablet    One time as needed prior to MRI.  May repeat once.    Claustrophobia       amitriptyline 25 MG tablet    ELAVIL    30 tablet    Take 1  tablet (25 mg) by mouth At Bedtime        aspirin 81 MG EC tablet      Take 81 mg by mouth daily with food        atorvastatin 20 MG tablet    LIPITOR     Take 20 mg by mouth daily        Bee Pollen 500 MG Chew      Take 2 tablets by mouth daily        HYDROcodone-acetaminophen  MG per tablet    NORCO    150 tablet    Take 1 tablet by mouth every 4 hours as needed for moderate to severe pain or severe pain Ok to refill on 11/27/18.    Chronic, continuous use of opioids       IBUPROFEN      Prn for back pain        lisinopril 5 MG tablet    PRINIVIL/ZESTRIL     Take 5 mg by mouth daily        OMEGA 3 PO      Take 2 capsules by mouth daily        omeprazole 40 MG capsule    priLOSEC     Take 40 mg by mouth daily        RA VITAMIN B-12 TR 1000 MCG Tbcr   Generic drug:  cyanocobalamin      Take 1,000 mcg by mouth daily        senna-docusate 8.6-50 MG per tablet    SENOKOT-S;PERICOLACE     Take 1-4 tablets by mouth 2 times daily        sildenafil 20 MG tablet    REVATIO     TAKE 3 TO 5 TABLETS (60MG- 100MG) BY MOUTH ONE HOUR PRIOR TO SEXUAL ACTIVITY.        Simethicone 125 MG Caps      Take  by mouth.        SM GARLIC 150 MG Tabs tablet   Generic drug:  garlic      Take 2 tablets by mouth daily        VITAMIN B-1 PO      Take  by mouth.

## 2018-09-21 ENCOUNTER — HOSPITAL ENCOUNTER (OUTPATIENT)
Dept: PHYSICAL THERAPY | Facility: OTHER | Age: 63
Setting detail: THERAPIES SERIES
End: 2018-09-21
Attending: SPECIALIST
Payer: MEDICARE

## 2018-09-21 PROCEDURE — 97110 THERAPEUTIC EXERCISES: CPT | Mod: GP

## 2018-09-21 PROCEDURE — 40000185 ZZHC STATISTIC PT OUTPT VISIT

## 2018-09-26 ENCOUNTER — HOSPITAL ENCOUNTER (OUTPATIENT)
Dept: PHYSICAL THERAPY | Facility: OTHER | Age: 63
Setting detail: THERAPIES SERIES
End: 2018-09-26
Attending: SPECIALIST
Payer: MEDICARE

## 2018-09-26 PROCEDURE — 97110 THERAPEUTIC EXERCISES: CPT | Mod: GP

## 2018-09-26 PROCEDURE — 40000185 ZZHC STATISTIC PT OUTPT VISIT

## 2018-10-08 NOTE — PROGRESS NOTES
Nursing Notes:   Anh Betancur LPN  9/18/2018 10:16 AM  Signed  Patient presents today for medication management and back pain.     Anh Betancur LPN on 9/18/2018 at 9:45 AM        SUBJECTIVE:  Dilip Kan is a 63 year old male who comes in today to follow up on his low back pain.  He has had chronic low back pain for several years and underwent a lumbar microdiskectomy L3/L4 in 2009 and again had surgery with L3-S1 decompression and L4/L5 fusion in 4/2017.  He was previously on high dose narcotics and has been able to significantly reduce his daily OME since surgery albeit gradually.  His radiating pain resolved after surgery.     He reports however he has had gradual worsening of pain in his lower back at midline which radiates out to the right side. Is also having pain on the left side but less prominent today. Worse with bending, lifting.  Has also reports pain in his right hip that travels around to his anterior thigh just at the top of the knee.  He had a shot at L3 which helped with about 50% of the symptoms for 2 weeks but has since returned.  He has been doing therapy without any benefit.      He is continued to be followed by his spine specialist who told him he will likely need another operation and follow up is to be scheduled in the next month.  He thinks they are basically attempting another round of conservative therapy with the idea that insurance will not approve another surgery unless that fails, which it appears to be doing.    He has not had any weakness, but the pain can inhibit strength occasionally.  No falls.  NO foot drop.  NO radiation down the left side.  No bowel dysfunction.  Has some chronic urinary incontinence from prostate surgery that is unchanged.     reviewed with him. He had been down to about 40mg per day but actual use was closer to 50mg as he filled 4-5d early a couple of times.  In July and most of August he reports being steady at 50mg per day average in  the past month has been taking closer to 60mg per day due to increased pain and predicts he will run out about a week early from the previous estimate of what would be a refill on 10/4 after filling last rx 4 days early.    Past Surgical History:   Procedure Laterality Date     APPENDECTOMY OPEN      09/19/2009,Ruptured [place]     COLONOSCOPY      08/31/2006,EGD, next due in 2016     DISKECTOMY, LUMBAR, SINGLE SP  03/16/2009    L 3-4 microdiskectomy, [facility]     ESOPHAGOSCOPY, GASTROSCOPY, DUODENOSCOPY (EGD), COMBINED      03/2003     ESOPHAGOSCOPY, GASTROSCOPY, DUODENOSCOPY (EGD), COMBINED      08/31/2006     PROSTATECTOMY PERINEAL RADICAL  11/28/2012    Nerve Sparring, Dr Warner     SPINE SURGERY N/A 04/28/2017    L3 to S1 spinal decompression L4/L5 fusion     TONSILLECTOMY, ADENOIDECTOMY, COMBINED      as a child     VARICOCELECTOMY  1959    INCISION AND DRAINAGE,EXCISE VARICOCELE         Current Outpatient Prescriptions   Medication Sig Dispense Refill     ALPRAZolam (XANAX) 0.5 MG tablet One time as needed prior to MRI.  May repeat once. 2 tablet 0     amitriptyline (ELAVIL) 25 MG tablet Take 1 tablet (25 mg) by mouth At Bedtime 30 tablet 1     aspirin EC 81 MG EC tablet Take 81 mg by mouth daily with food       atorvastatin (LIPITOR) 20 MG tablet Take 20 mg by mouth daily       Bee Pollen 500 MG CHEW Take 2 tablets by mouth daily       cyanocobalamin (RA VITAMIN B-12 TR) 1000 MCG TBCR Take 1,000 mcg by mouth daily       garlic (SM GARLIC) 150 MG TABS tablet Take 2 tablets by mouth daily       HYDROcodone-acetaminophen (NORCO)  MG per tablet Take 1 tablet by mouth every 4 hours as needed for moderate to severe pain or severe pain Ok to refill on 11/27/18. 150 tablet 0     IBUPROFEN Prn for back pain       lisinopril (PRINIVIL/ZESTRIL) 5 MG tablet Take 5 mg by mouth daily       Omega-3 Fatty Acids (OMEGA 3 PO) Take 2 capsules by mouth daily       omeprazole (PRILOSEC) 40 MG capsule Take 40 mg by  mouth daily       senna-docusate (SENOKOT-S;PERICOLACE) 8.6-50 MG per tablet Take 1-4 tablets by mouth 2 times daily       sildenafil (REVATIO) 20 MG tablet TAKE 3 TO 5 TABLETS (60MG- 100MG) BY MOUTH ONE HOUR PRIOR TO SEXUAL ACTIVITY.  10     Simethicone 125 MG CAPS Take  by mouth.       Thiamine HCl (VITAMIN B-1 PO) Take  by mouth.         /78  Pulse 76  Wt 220 lb 9.6 oz (100.1 kg)  BMI 31.65 kg/m2    Exam:  GEN:  Undomfortable but no acute distress  RESP: Clear toascultation bilaterally.  No increased respiratory effort.   CV: Regularrate and rhythm.  No murmurs rubs or gallops heard.   MSK: No synovitis.  Muscle strength age and body habitus appropriateas well as equal and even. He is tender over the middle lumbar spine and the right Paraspinous muscles and facets.  NEURO:  Reflexes currently symmetric.  MS symmetric.  Sensation without deficit.      ASSESSMENT/Plan :    Results for orders placed or performed during the hospital encounter of 08/15/18   XR Lumbar Transforaminal Inj Single    Narrative    Procedure: XR LUMBAR TRANSFORAMINAL INJ SINGLE    HISTORY:  Neurogenic claudication    COMPARISON: MR lumbar spine 6/11/2018.    TECHNIQUE:    The risks and benefits of right L3-4 transforaminal epidural steroid  injection were discussed with the patient, including transient  worsening of radiculopathy, infection, bleeding / epidural hematoma,  inadvertent intrathecal puncture, spinal headache, nerve root injury  and reaction to contrast agents. The patient expressed understanding  and a willingness to proceed. Written informed consent was obtained.    The patient was placed prone on the fluoroscopy table. The right L3-4  neural foramen was identified. The overlying skin was marked, prepped  and draped in the standard sterile fashion. Lidocaine was used to  anesthetize the skin entry site. A 22-gauge spinal needle was advanced  under fluoroscopic observation into the neural foramen. A contrast  containing  syringe was attached and there was prompt visualization of  perineural and epidural contrast. Subsequent administration of 2cc of  preservative free 1% lidocaine and 10 mg dexamethasone resulted in  dispersion of the previously seen contrast. The patient tolerated the  procedure well. .2 minutes of fluoro time were used.    Prior to the procedure, the patient had pain 7 out of 10 in severity.  Shortly after, 50% improved in severity.       Impression    IMPRESSION:     Successful transforaminal epidural steroid injection on the right at  L3-4. R1.     KATHI OSPINA MD       No images are attached to the encounter or orders placed in the encounter.      1. Lumbosacral radiculopathy at L3  Patient needs to follow up with spine surgeon given complicated history.  Currently with bilateral pain and recent MRI shows perhaps worsening at the LEFT L4 nerve root although today symptoms worse on right.  He had recent right L3 injection with transient improvement in symptoms.      - SPINE SURGERY REFERRAL    2. Colon cancer screening  Strongly suggest colon cancer screening either by colonoscopy or cologuard.  Cologuard paperwork filled out as it is important he do at least one of them being 2y overdue.    3. Chronic, continuous use of opioids  Patient understands both short and long-term risks of chronic narcotics which include respiratory sedation that can lead to death.  He understands he is not to take anysedating substances with his pain medication. We also discussed long-term risks which include chronic constipation, risk for bowel impaction/obstruction, mood disturbance, urinary retention, tolerance and addiction.  He understands studies seem to suggest minimal if any long-term benefit with chronic narcotics in pain reduction.  Despite all of this he feels strongly that the medication significantly improves his functionality and qualityof life.     This is a difficult situation and we had a long discussion about  it.  He has never had any aberrant usage but occasionally has taken more than prescribed during his prolonged taper after surgery.  He is on about half of his once maximum dose and is committed to continued reduction but has noticed significant reduction in functionality in quality of life and ability to do neccessary chores around house and with his own personal hygiene on lower doses.    I explained that he needs to be absolutely faithful on not taking more than 5 pills daily going forward, which starts today but after pill count it was noted he will be out on 9/28.  Three refills given.  Needs close follow up for surgical intervention, although a bit unclear to me what would be the best approach with current pain and imaging findings.  It sounds like surgery already has a good plan in mind for him per patient.    Greater than 50% of this 40 minute visit was spent in counseling and coordination of care regarding chronic pain.  Tox screen will also be obtained at next appointment as it is due but was not ordered today.      - HYDROcodone-acetaminophen (NORCO)  MG per tablet; Take 1 tablet by mouth every 4 hours as needed for moderate to severe pain or severe pain Ok to refill on 11/27/18.  Dispense: 150 tablet; Refill: 0          There are no Patient Instructions on file for this visit.    Sumanth Roberts    This document was created using computer generated templates and voiceactivated software.

## 2018-10-23 ENCOUNTER — TELEPHONE (OUTPATIENT)
Dept: FAMILY MEDICINE | Facility: OTHER | Age: 63
End: 2018-10-23

## 2018-10-23 NOTE — TELEPHONE ENCOUNTER
OK to fill his meds on Saturday instead of Sunday the 28th as dated? [facility] is closed on Sunday.

## 2018-11-01 ENCOUNTER — OFFICE VISIT (OUTPATIENT)
Dept: FAMILY MEDICINE | Facility: OTHER | Age: 63
End: 2018-11-01
Attending: FAMILY MEDICINE
Payer: MEDICARE

## 2018-11-01 ENCOUNTER — OFFICE VISIT (OUTPATIENT)
Dept: UROLOGY | Facility: OTHER | Age: 63
End: 2018-11-01
Attending: UROLOGY
Payer: MEDICARE

## 2018-11-01 VITALS
BODY MASS INDEX: 31.85 KG/M2 | SYSTOLIC BLOOD PRESSURE: 138 MMHG | WEIGHT: 222 LBS | DIASTOLIC BLOOD PRESSURE: 80 MMHG | RESPIRATION RATE: 18 BRPM | HEART RATE: 76 BPM

## 2018-11-01 VITALS
RESPIRATION RATE: 18 BRPM | BODY MASS INDEX: 31.64 KG/M2 | DIASTOLIC BLOOD PRESSURE: 82 MMHG | HEIGHT: 70 IN | SYSTOLIC BLOOD PRESSURE: 138 MMHG | HEART RATE: 76 BPM | WEIGHT: 221 LBS | TEMPERATURE: 97.9 F

## 2018-11-01 DIAGNOSIS — D62 ANEMIA DUE TO ACUTE BLOOD LOSS: ICD-10-CM

## 2018-11-01 DIAGNOSIS — Z01.810 PRE-OPERATIVE CARDIOVASCULAR EXAMINATION: Primary | ICD-10-CM

## 2018-11-01 DIAGNOSIS — Z23 NEED FOR PROPHYLACTIC VACCINATION AND INOCULATION AGAINST INFLUENZA: ICD-10-CM

## 2018-11-01 DIAGNOSIS — M48.062 SPINAL STENOSIS OF LUMBAR REGION WITH NEUROGENIC CLAUDICATION: ICD-10-CM

## 2018-11-01 DIAGNOSIS — Z85.46 PERSONAL HISTORY OF MALIGNANT NEOPLASM OF PROSTATE: ICD-10-CM

## 2018-11-01 DIAGNOSIS — Z79.899 CONTROLLED SUBSTANCE AGREEMENT SIGNED: ICD-10-CM

## 2018-11-01 DIAGNOSIS — Z85.46 PERSONAL HISTORY OF MALIGNANT NEOPLASM OF PROSTATE: Primary | ICD-10-CM

## 2018-11-01 LAB
ANION GAP SERPL CALCULATED.3IONS-SCNC: 8 MMOL/L (ref 3–14)
BUN SERPL-MCNC: 13 MG/DL (ref 7–25)
CALCIUM SERPL-MCNC: 8.9 MG/DL (ref 8.6–10.3)
CHLORIDE SERPL-SCNC: 104 MMOL/L (ref 98–107)
CO2 SERPL-SCNC: 27 MMOL/L (ref 21–31)
CREAT SERPL-MCNC: 0.73 MG/DL (ref 0.7–1.3)
ERYTHROCYTE [DISTWIDTH] IN BLOOD BY AUTOMATED COUNT: 12.7 % (ref 10–15)
GFR SERPL CREATININE-BSD FRML MDRD: >90 ML/MIN/1.7M2
GLUCOSE SERPL-MCNC: 118 MG/DL (ref 70–105)
HCT VFR BLD AUTO: 41.3 % (ref 40–53)
HGB BLD-MCNC: 13.8 G/DL (ref 13.3–17.7)
INR PPP: 0.95 (ref 0–1.3)
MCH RBC QN AUTO: 29.7 PG (ref 26.5–33)
MCHC RBC AUTO-ENTMCNC: 33.4 G/DL (ref 31.5–36.5)
MCV RBC AUTO: 89 FL (ref 78–100)
PLATELET # BLD AUTO: 171 10E9/L (ref 150–450)
POTASSIUM SERPL-SCNC: 3.8 MMOL/L (ref 3.5–5.1)
PSA SERPL-MCNC: 1.18 NG/ML
RBC # BLD AUTO: 4.65 10E12/L (ref 4.4–5.9)
SODIUM SERPL-SCNC: 139 MMOL/L (ref 134–144)
WBC # BLD AUTO: 5 10E9/L (ref 4–11)

## 2018-11-01 PROCEDURE — 36415 COLL VENOUS BLD VENIPUNCTURE: CPT | Performed by: UROLOGY

## 2018-11-01 PROCEDURE — G0463 HOSPITAL OUTPT CLINIC VISIT: HCPCS | Mod: 25,27

## 2018-11-01 PROCEDURE — 85610 PROTHROMBIN TIME: CPT | Performed by: UROLOGY

## 2018-11-01 PROCEDURE — 93010 ELECTROCARDIOGRAM REPORT: CPT | Performed by: INTERNAL MEDICINE

## 2018-11-01 PROCEDURE — 85027 COMPLETE CBC AUTOMATED: CPT | Performed by: UROLOGY

## 2018-11-01 PROCEDURE — 80048 BASIC METABOLIC PNL TOTAL CA: CPT | Performed by: UROLOGY

## 2018-11-01 PROCEDURE — 99215 OFFICE O/P EST HI 40 MIN: CPT | Mod: 25 | Performed by: FAMILY MEDICINE

## 2018-11-01 PROCEDURE — 99213 OFFICE O/P EST LOW 20 MIN: CPT | Performed by: UROLOGY

## 2018-11-01 PROCEDURE — 84153 ASSAY OF PSA TOTAL: CPT | Performed by: UROLOGY

## 2018-11-01 PROCEDURE — G0008 ADMIN INFLUENZA VIRUS VAC: HCPCS

## 2018-11-01 PROCEDURE — G0463 HOSPITAL OUTPT CLINIC VISIT: HCPCS | Mod: 25

## 2018-11-01 PROCEDURE — 90662 IIV NO PRSV INCREASED AG IM: CPT | Performed by: FAMILY MEDICINE

## 2018-11-01 ASSESSMENT — PAIN SCALES - GENERAL: PAINLEVEL: SEVERE PAIN (6)

## 2018-11-01 NOTE — PROGRESS NOTES

## 2018-11-01 NOTE — PROGRESS NOTES
"----------------- PREOPERATIVE EXAM ------------------  Nursing Notes:   Mary Gaffney LPN  11/1/2018  8:28 AM  Signed  Date of Surgery: 11/14/2018  Type of Surgery: Fusion L2-L4  Surgeon: Dr Ambrose  Hospital:  [facility]  Fax:     Fever/Chills or otherinfectious symptoms in past month: No  >10lb weight loss in past two months: No    Health Care Directive/Code status:  Full Code  Hx of bloodtransfusions:   No   Td up to date:  Yes  History of VRE/MRSA:  No Date:     Preoperative Evaluation: Obstructive Sleep Apnea screening    S:Snore -  Do you snore loudly? (louder than talking or loud enough to be heard through closed doors)No  T: Tired - Do you often feeltired, fatigued, or sleepy during the daytime?No  O: Observed - Has anyone ever observed you stop breathing during your sleep?No  P: Pressure - Do you have or areyou being treated for high blood pressure?Yes  B: BMI - BMI greater than 35kg/m2?No  A: Age - Age over 50 years old?Yes  N: Neck - Neck circumferencegreater than 40 cm?No  G:Gender - Gender: Male?Yes    Totalnumber of \"YES\" responses:  3    Scoring: Low risk of JAMES 0-2  At Risk of JAMES: >3 High Risk ofOSA: 5-8    Mary Gaffney LPN....................  11/1/2018   8:25 AM      Mary Gaffney LPN  11/1/2018  8:28 AM  Signed  Chief Complaint   Patient presents with     Pre-Op Exam       Initial /82  Pulse 76  Temp 97.9  F (36.6  C)  Resp 18  Ht 5' 10\" (1.778 m)  Wt 221 lb (100.2 kg)  BMI 31.71 kg/m2 Estimated body mass index is 31.71 kg/(m^2) as calculated from the following:    Height as of this encounter: 5' 10\" (1.778 m).    Weight as of this encounter: 221 lb (100.2 kg).  Medication Reconciliation: complete    Mary Gaffney, KANE        11/1/2018    SUBJECTIVE:  Dilip Kan is a 63 year old male here for preoperative optimization.    I was asked to see Dilip Kan by Dr. Reeder for  preoperative evaluation due to hypertension cardiovascular risk and chronic narcotics.  Patient " reports that he feels reasonably well.  He continues to have significant back pain.  Reports the pain is constant and has not changed in quality.  Continues to be localized to lumbar spine with radiation down both legs over his anterior thighs.    He denies any chest pain, shortness of breath, exertional indigestion or episodes of diaphoresis or numbness in the arm or jaw.  He continues to take Lipitor as well as aspirin 81 mg daily.  He is also on lisinopril.  Has not had any orthostasis or presyncope.  Denies any palpitations.    He does have a 20-pack-year history but does not have known chronic lung disease and is not on any medications for this.    For pain he continues to take amitriptyline 25 mg at night as well as hydrocodone, 50-60 mg daily.  He has been consistent with his dosage as of late.  He has not had any issues with constipation as long as he takes senna with Colace.  He denies any mood disturbance.  No delirium or confusion.  Patient was previously on much higher dose prior to previous surgery.    Patient Active Problem List   Diagnosis     Anemia due to acute blood loss     Backache     Chronic, continuous use of opioids     Controlled substance agreement signed 4/24/18     Fever, postprocedural     Gastroesophageal reflux disease without esophagitis     Hypertension     Non morbid obesity due to excess calories     Other specified causes of urethral stricture     Pelvic pain in male     Personal history of prostate cancer s/p prostatectomy and sEBRT     Pure hypercholesterolemia     Spinal stenosis of lumbar region with neurogenic claudication     Follow-up examination following surgery     Syncope     Rising PSA following treatment for malignant neoplasm of prostate       Past Medical History:   Diagnosis Date     Elevated prostate specific antigen (PSA)     4/10/2012     Encounter for other administrative examinations     1/31/2014     Esophagitis     No Comments Provided     Gastro-esophageal  reflux disease without esophagitis     No Comments Provided     Low back pain     No Comments Provided     Malignant neoplasm of prostate (H)     9/6/2012     Nicotine dependence, uncomplicated     Quit - October 2007 with chantix     Other intervertebral disc degeneration, lumbosacral region     12/1/2008       Past Surgical History:   Procedure Laterality Date     APPENDECTOMY OPEN      09/19/2009,Ruptured [place]     COLONOSCOPY      08/31/2006,EGD, next due in 2016     DISKECTOMY, LUMBAR, SINGLE SP  03/16/2009    L 3-4 microdiskectomy, [facility]     ESOPHAGOSCOPY, GASTROSCOPY, DUODENOSCOPY (EGD), COMBINED      03/2003     ESOPHAGOSCOPY, GASTROSCOPY, DUODENOSCOPY (EGD), COMBINED      08/31/2006     PROSTATECTOMY PERINEAL RADICAL  11/28/2012    Nerve [place], Dr Warner     SPINE SURGERY N/A 04/28/2017    L3 to S1 spinal decompression L4/L5 fusion     TONSILLECTOMY, ADENOIDECTOMY, COMBINED      as a child     VARICOCELECTOMY  1959    INCISION AND DRAINAGE,EXCISE VARICOCELE       Current Outpatient Prescriptions   Medication Sig Dispense Refill     ALPRAZolam (XANAX) 0.5 MG tablet One time as needed prior to MRI.  May repeat once. 2 tablet 0     amitriptyline (ELAVIL) 25 MG tablet Take 1 tablet (25 mg) by mouth At Bedtime 30 tablet 1     aspirin EC 81 MG EC tablet Take 81 mg by mouth daily with food       atorvastatin (LIPITOR) 20 MG tablet Take 20 mg by mouth daily       Bee Pollen 500 MG CHEW Take 2 tablets by mouth daily       cyanocobalamin (RA VITAMIN B-12 TR) 1000 MCG TBCR Take 1,000 mcg by mouth daily       garlic (SM GARLIC) 150 MG TABS tablet Take 2 tablets by mouth daily       HYDROcodone-acetaminophen (NORCO)  MG per tablet Take 1 tablet by mouth every 4 hours as needed for moderate to severe pain or severe pain Ok to refill on 11/27/18. 150 tablet 0     IBUPROFEN Prn for back pain       lisinopril (PRINIVIL/ZESTRIL) 5 MG tablet Take 5 mg by mouth daily       Omega-3 Fatty Acids (OMEGA 3 PO)  Take 2 capsules by mouth daily       omeprazole (PRILOSEC) 40 MG capsule Take 40 mg by mouth daily       senna-docusate (SENOKOT-S;PERICOLACE) 8.6-50 MG per tablet Take 1-4 tablets by mouth 2 times daily       sildenafil (REVATIO) 20 MG tablet TAKE 3 TO 5 TABLETS (60MG- 100MG) BY MOUTH ONE HOUR PRIOR TO SEXUAL ACTIVITY.  10     Simethicone 125 MG CAPS Take  by mouth.       Thiamine HCl (VITAMIN B-1 PO) Take  by mouth.         Recent use of: ibuprofen, aspirin    Allergies:   No Known Allergies    Latex allergy: NO    Immunizations:  Immunization History   Administered Date(s) Administered     Influenza (IIV3) PF 10/15/2008, 11/17/2011, 11/09/2012     Influenza Vaccine IM 3yrs+ 4 Valent IIV4 10/16/2014, 11/13/2015, 11/03/2016, 09/25/2017     TDAP Vaccine (Adacel) 11/09/2012         Family History   Problem Relation Age of Onset     HEART DISEASE Father      Heart Disease, passed away from CHF,CAD     Colon Cancer Father      Cancer-colon     Hypertension Father      Hypertension     Other - See Comments Father      Stroke/Dementia     Hypertension Mother      Hypertension,HTN     Other - See Comments Mother       Parkinsons     Family History Negative Other      Good Health     Family History Negative Other      Good Health,previous marriage./previous marriage.     Family History Negative Other      Good Health,previous marriage.     Family History Negative Sister      Good Health,emotional problems.     Family History Negative Sister      Good Health     Other - See Comments Son      w/o major medical problems.     Other - See Comments Daughter      w/o major medical problems.         Deniesfamily hx of bleeding tendencies, anesthesia complications, or other problems with surgery.    Social History     Social History     Marital status:      Spouse name: N/A     Number of children: N/A     Years of education: N/A     Occupational History      Other     Social History Main Topics     Smoking status:  "Former Smoker     Packs/day: 1.00     Years: 20.00     Types: Cigarettes     Quit date: 11/8/2002     Smokeless tobacco: Current User     Types: Snuff     Alcohol use Yes      Comment: Alcoholic Drinks/day: 5 drinks a month     Drug use: Not on file      Comment: Drug use: No     Sexual activity: Not on file     Other Topics Concern     Not on file     Social History Narrative    [employer] - retired.  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  Patient has quit smoking.           ROS:    Surgical:  patient denies previous complications from prior surgeries including but not limitedto prolonged bleeding, anesthesia complications, dysrhythmias, surgical wound infections, or prolonged hospital stay.  Cardiorespiratory: denies chest pain, palpitations, shortness of breath, cough. Most exertion in the past 2 weeks was 100 yrds brisk walk to water trough, only limited by back pain.  Housework etc.  Goes up and down stairs.     Complete ROS otherwise negative except as noted in HPI.     -------------------------------------------------------------    PHYSICAL EXAM:  /82  Pulse 76  Temp 97.9  F (36.6  C)  Resp 18  Ht 5' 10\" (1.778 m)  Wt 221 lb (100.2 kg)  BMI 31.71 kg/m2      EXAM:  General Appearance:Pleasant, alert, appropriate appearance for age. No acute distress  Head Exam: Normal. Normocephalic, atraumatic.  Eyes: PERRL, EOMI  Ears: Normal TM's bilaterally.   OroPharynx: Mucosa pink and moist. Dentition in good repair. Yes  Neck: Supple, no masses or nodes, no lymphadenopathy.  No thyromegaly.  Lungs: Normal chest wall and respirations. Clear to auscultation, no wheezes or crackles.  Cardiovascular: Regular rate andrhythm. S1, S2, no murmurs.  Gastrointestinal: Soft, nontender, no abnormal masses or organomegaly. BS normal   Musculoskeletal: No edema. No warm or erythematous joints.  He does have tenderness of the lumbar spine and paraspinous muscles without any focal weakness.  Skin: no " concerning or new rashes.  Neurologic Exam: CN 2-12 grossly intact.  Normal gait.  Symmetric DTRs, normal gross motor movement, tone, and coordination. No tremor.  Psychiatric Exam: Alert and oriented, appropriate affect.    Results for orders placed or performed in visit on 11/01/18   Prostate Specific Antigen GH   Result Value Ref Range    Prostate Specific Antigen 1.177 <3.100 ng/mL   CBC with platelets   Result Value Ref Range    WBC 5.0 4.0 - 11.0 10e9/L    RBC Count 4.65 4.4 - 5.9 10e12/L    Hemoglobin 13.8 13.3 - 17.7 g/dL    Hematocrit 41.3 40.0 - 53.0 %    MCV 89 78 - 100 fl    MCH 29.7 26.5 - 33.0 pg    MCHC 33.4 31.5 - 36.5 g/dL    RDW 12.7 10.0 - 15.0 %    Platelet Count 171 150 - 450 10e9/L   INR   Result Value Ref Range    INR 0.95 0 - 1.3   Basic metabolic panel   Result Value Ref Range    Sodium 139 134 - 144 mmol/L    Potassium 3.8 3.5 - 5.1 mmol/L    Chloride 104 98 - 107 mmol/L    Carbon Dioxide 27 21 - 31 mmol/L    Anion Gap 8 3 - 14 mmol/L    Glucose 118 (H) 70 - 105 mg/dL    Urea Nitrogen 13 7 - 25 mg/dL    Creatinine 0.73 0.70 - 1.30 mg/dL    GFR Estimate >90 >60 mL/min/1.7m2    GFR Estimate If Black >90 >60 mL/min/1.7m2    Calcium 8.9 8.6 - 10.3 mg/dL   EKG 12-LEAD CLINIC READ    Impression    Normal sinus rhythm.  Rate 63.  Nonspecific T wave abnormality.  Normal DC and QRS indices.  Abnormal EKG.  Compared to previous tracing dated April 7, 2017, QRS axis in aVL is now negative.  Otherwise no significant change.  Teddy Yen MD         EKG: EKG shows normal sinus rhythm.  He has T wave inversion in the lateral leads which is not new, only change is aVL QRS with negative axis which after discussion with internal medicine, it is suspected that limb leads were reversed.  ---------------------------------------------------------------    ASSESSEMENT AND PLAN:     (Z01.810) Pre-operative cardiovascular examination  (primary encounter diagnosis)  Comment: Patient underwent stress testing in  January 2017 as he was having chest pain at that time, stress test was reassuring and he underwent spine surgery on 4/28/17.  He has not had any interval symptoms concerning for angina or cardiac ischemia.  Plan: EKG 12-LEAD CLINIC READ            (D62) Anemia due to acute blood loss  Comment: Hemoglobin is excellent.  No issues with black or bloody stool or other concerns for ongoing blood loss.  Plan: CBC with platelets, INR, Basic metabolic panel            (Z79.899) Controlled substance agreement signed 4/24/18  Comment: Patient is chronically taking 50-60 mg oral morphine equivalent.  Plan: Continue chronic dose and may need additional medications in the postoperative period.  For home dosage it would be reasonable to increase his hydrocodone rather than add a second medication if needed.    (Z85.46) Personal history of prostate cancer s/p prostatectomy and sEBRT  Comment: PSA continues to rise.  Patient follows with Dr. Arrington.  Plan: Advised to continue urologic follow-up.    (M48.062) Spinal stenosis of lumbar region with neurogenic claudication  Comment: Ongoing significant debilitating pain.  Plan: Agree with surgical intervention        PRE OP RECOMMENDATIONS:    No family history of problems with bleeding or anesthesia. Patient is able to tolerate about 4 METs of activity without any cardiopulmonary symptoms, only limited by back pain. ASA PS class 3. No additional cardiopulmonary workup is neccessary for the current procedure. Please contact the office with any questions or concerns.    Patient is on chronic pain medications: Yes Hydrocodone 50-60mg daily.    Patient is on antiplatlet/anticoagulation:ASA but will quit 10d prior to surgery  Other medications that need adjustment perioperatively: Hold ibuprofen if surgeon requests (call their office)    Other:  Patient was advised tocall our office and the surgical services with any change in condition or new symptoms if they were to develop between today  and their surgical date; especially any cardiopulmonary symptoms or symptoms concerning for aninfection.    Recommendations:

## 2018-11-01 NOTE — PROGRESS NOTES
Type of Visit  EST    Chief Complaint  Rising PSA following prostatectomy and salvage radiation    HPI  Mr. Kan is a 63 y.o. male who presents s/p RRP in 11/2012 followed by salvage radiation therapy in 2013 who follows up with increasing PSA.  His most recent PSA was almost a year ago.  He denies changes to his health history since the last visit.  He specifically denies unintentional weight loss, bone and pelvic pain.  He underwent PSA earlier this morning and follows up to review the results.      Family History  Family History   Problem Relation Age of Onset     Hypertension Mother      HTN     Heart Disease Father       passed away from CHF,CAD      Good Health Sister      emotional problems.      Good Health Sister      Good Health Other      Other Son      w/o major medical problems.      Other Daughter      w/o major medical problems.      Good Health Other      previous marriage./previous marriage.     Good Health Other      previous marriage.     Cancer-colon Father      Hypertension Father      Stroke Father      Other Father      Dementia     Other Mother       Parkinsons       Review of Systems  I reviewed the ROS with the patient today.    Nursing Notes:   Nevaeh Varela LPN  11/1/2018 10:50 AM  Signed  Pt presents to clinic for a six month PSA follow up     Review of Systems:    Weight loss:    No     Recent fever/chills:  No   Night sweats:   No  Current skin rash:  No   Recent hair loss:  No  Heat intolerance:  No   Cold intolerance:  No  Chest pain:   No   Palpitations:   No  Shortness of breath:  No   Wheezing:   No  Constipation:    No   Diarrhea:   No   Nausea:   No   Vomiting:   No   Kidney/side pain:  No   Back pain:   Yes  Frequent headaches:  No   Dizziness:     No  Leg swelling:   No   Calf pain:    No    P        Physical Exam  /80 (BP Location: Right arm, Patient Position: Sitting, Cuff Size: Adult Regular)  Pulse 76  Resp 18  Wt 100.7 kg (222 lb)  BMI 31.85  kg/m2  Constitutional: No acute distress.  Alert and cooperative   Head: NCAT  Eyes: Conjunctivae normal  Cardiovascular: Regular rate.  Pulmonary/Chest: Respirations are even and non-labored bilaterally, no audible wheezing  Abdominal: Soft. No distension, tenderness, masses or guarding.   Neurological: A + O x 3.  Cranial Nerves II-XII grossly intact.  Extremities: GINNA x 4, Warm. No clubbing.  No cyanosis.    Skin: Pink, warm and dry.  No visible rashes noted.  Psychiatric:  Normal mood and affect  Back:  No left CVA tenderness.  No right CVA tenderness.  Genitourinary:  Nonpalpable bladder    Labs  Results for LELAND KAN (MRN [id number]) as of 11/1/2018 11:08   11/1/2018 08:49   Prostate Specific Antigen 1.177     Results for LELAND KAN (MRN [id number]) as of 3/29/2018 14:31   3/29/2018 12:15   Prostate Specific Antigen 0.798     Results for LELAND KAN (MRN [id number]) as of 3/29/2018 11:55   4/7/2017 16:04   PSA Total (Diagnostic) - Historical 0.268     Results for LELAND KAN (MRN [id number]) as of 3/23/2016 10:42   1/2/2013 08:00* 2/27/2013** 08:10 10/16/2014 11:26 3/1/2016 14:15   PSA TOTAL (DIAGNOSTIC) 0.67 0.97     PSA TOTAL (DIAGNOSTIC)   0.070 0.154   * Following radical prostatectomy  ** Following salvage external beam radiation therapy.    Assessment  Mr. Kan is a 63 y.o. male who presents s/p RRP in 11/2012 followed by salvage radiation therapy in 2013 who presents with detectable and rising PSA.  Continuing to trend PSA.  I recommended we consider metastatic survey if and when the PSA reaches 2.    Plan  Follow up with PSA in 6 months

## 2018-11-01 NOTE — NURSING NOTE
Pt presents to clinic for a six month PSA follow up     Review of Systems:    Weight loss:    No     Recent fever/chills:  No   Night sweats:   No  Current skin rash:  No   Recent hair loss:  No  Heat intolerance:  No   Cold intolerance:  No  Chest pain:   No   Palpitations:   No  Shortness of breath:  No   Wheezing:   No  Constipation:    No   Diarrhea:   No   Nausea:   No   Vomiting:   No   Kidney/side pain:  No   Back pain:   Yes  Frequent headaches:  No   Dizziness:     No  Leg swelling:   No   Calf pain:    No    P

## 2018-11-01 NOTE — NURSING NOTE
"Date of Surgery: 11/14/2018  Type of Surgery: Fusion L2-L4  Surgeon: Dr Ambrose  Hospital:  [facility]  Fax:     Fever/Chills or otherinfectious symptoms in past month: No  >10lb weight loss in past two months: No    Health Care Directive/Code status:  Full Code  Hx of bloodtransfusions:   No   Td up to date:  Yes  History of VRE/MRSA:  No Date:     Preoperative Evaluation: Obstructive Sleep Apnea screening    S:Snore -  Do you snore loudly? (louder than talking or loud enough to be heard through closed doors)No  T: Tired - Do you often feeltired, fatigued, or sleepy during the daytime?No  O: Observed - Has anyone ever observed you stop breathing during your sleep?No  P: Pressure - Do you have or areyou being treated for high blood pressure?Yes  B: BMI - BMI greater than 35kg/m2?No  A: Age - Age over 50 years old?Yes  N: Neck - Neck circumferencegreater than 40 cm?No  G:Gender - Gender: Male?Yes    Totalnumber of \"YES\" responses:  3    Scoring: Low risk of JAMES 0-2  At Risk of JAMES: >3 High Risk ofOSA: 5-8    Mary Gaffney LPN....................  11/1/2018   8:25 AM    "

## 2018-11-01 NOTE — Clinical Note
Just a quick question.  He is asymptomatic for cardiopulmonary disease with a nl stress in 2017.  He is undergoing spine surgery and has been limited in exertion due to back pain, but still fairly active.  Does the change in axis in aVL concern you in this situation.

## 2018-11-01 NOTE — MR AVS SNAPSHOT
"              After Visit Summary   11/1/2018    Dilip Kan    MRN: [id number]           Patient Information     Date Of Birth          1955        Visit Information        Provider Department      11/1/2018 8:00 AM Sumanth Roberts MD [facility]        Today's Diagnoses     Pre-operative cardiovascular examination    -  1    Anemia due to acute blood loss        Controlled substance agreement signed 4/24/18        Personal history of prostate cancer s/p prostatectomy and sEBRT        Spinal stenosis of lumbar region with neurogenic claudication        Personal history of malignant neoplasm of prostate        Need for prophylactic vaccination and inoculation against influenza           Follow-ups after your visit        Who to contact     If you have questions or need follow up information about today's clinic visit or your schedule please contact [facility] directly at [phone].  Normal or non-critical lab and imaging results will be communicated to you by MyChart, letter or phone within 4 business days after the clinic has received the results. If you do not hear from us within 7 days, please contact the clinic through MyChart or phone. If you have a critical or abnormal lab result, we will notify you by phone as soon as possible.  Submit refill requests through [employer] or call your pharmacy and they will forward the refill request to us. Please allow 3 business days for your refill to be completed.          Additional Information About Your Visit        Care EveryWhere ID     This is your Care EveryWhere ID. This could be used by other organizations to access your [place] medical records  [id number]        Your Vitals Were     Pulse Temperature Respirations Height BMI (Body Mass Index)       76 97.9  F (36.6  C) 18 5' 10\" (1.778 m) 31.71 kg/m2        Blood Pressure from Last 3 Encounters:   11/01/18 138/80   11/01/18 138/82   09/18/18 140/78    Weight from Last 3 " Encounters:   11/01/18 222 lb (100.7 kg)   11/01/18 221 lb (100.2 kg)   09/18/18 220 lb 9.6 oz (100.1 kg)              We Performed the Following     Basic metabolic panel     CBC with platelets     EKG 12-LEAD CLINIC READ     HC FLU VACCINE, INCREASED ANTIGEN, PRESV FREE     INR     Prostate Specific Antigen GH        Primary Care Provider Office Phone # Fax #    Sumanth Roberts [phone] [address]   [facility] [address]        Equal Access to Services     [facility] : Hadii aad ku hadasho Soomaali, waaxda luqadaha, qaybta kaalmada adeegyada, waxay idiin hayaan ynes guzmán . So [facility] [phone].    ATENCIÓN: Si habla español, tiene a mitchell disposición servicios gratuitos de asistencia lingüística. Llame al [phone].    We comply with applicable federal civil rights laws and [place] laws. We do not discriminate on the basis of race, color, national origin, age, disability, sex, sexual orientation, or gender identity.            Thank you!     Thank you for choosing [facility]  for your care. Our goal is always to provide you with excellent care. Hearing back from our patients is one way we can continue to improve our services. Please take a few minutes to complete the written survey that you may receive in the mail after your visit with us. Thank you!             Your Updated Medication List - Protect others around you: Learn how to safely use, store and throw away your medicines at www.disposemymeds.org.          This list is accurate as of 11/1/18 11:59 PM.  Always use your most recent med list.                   Brand Name Dispense Instructions for use Diagnosis    ALPRAZolam 0.5 MG tablet    XANAX    2 tablet    One time as needed prior to MRI.  May repeat once.    Claustrophobia       amitriptyline 25 MG tablet    ELAVIL    30 tablet    Take 1 tablet (25 mg) by mouth At Bedtime        aspirin 81 MG EC tablet      Take 81 mg by mouth daily with  food        atorvastatin 20 MG tablet    LIPITOR     Take 20 mg by mouth daily        Bee Pollen 500 MG Chew      Take 2 tablets by mouth daily        HYDROcodone-acetaminophen  MG per tablet    NORCO    150 tablet    Take 1 tablet by mouth every 4 hours as needed for moderate to severe pain or severe pain Ok to refill on 11/27/18.    Chronic, continuous use of opioids       IBUPROFEN      Prn for back pain        lisinopril 5 MG tablet    PRINIVIL/ZESTRIL     Take 5 mg by mouth daily        OMEGA 3 PO      Take 2 capsules by mouth daily        omeprazole 40 MG capsule    priLOSEC     Take 40 mg by mouth daily        RA VITAMIN B-12 TR 1000 MCG Tbcr   Generic drug:  cyanocobalamin      Take 1,000 mcg by mouth daily        senna-docusate 8.6-50 MG per tablet    SENOKOT-S;PERICOLACE     Take 1-4 tablets by mouth 2 times daily        sildenafil 20 MG tablet    REVATIO     TAKE 3 TO 5 TABLETS (60MG- 100MG) BY MOUTH ONE HOUR PRIOR TO SEXUAL ACTIVITY.        Simethicone 125 MG Caps      Take  by mouth.        SM GARLIC 150 MG Tabs tablet   Generic drug:  garlic      Take 2 tablets by mouth daily        VITAMIN B-1 PO      Take  by mouth.

## 2018-11-01 NOTE — MR AVS SNAPSHOT
After Visit Summary   11/1/2018    Dilip Kan    MRN: [id number]           Patient Information     Date Of Birth          1955        Visit Information        Provider Department      11/1/2018 10:45 AM Celestine Arrington MD [facility]        Today's Diagnoses     Personal history of prostate cancer s/p prostatectomy and sEBRT    -  1       Follow-ups after your visit        Who to contact     If you have questions or need follow up information about today's clinic visit or your schedule please contact [facility] directly at [phone].  Normal or non-critical lab and imaging results will be communicated to you by MyChart, letter or phone within 4 business days after the clinic has received the results. If you do not hear from us within 7 days, please contact the clinic through MyChart or phone. If you have a critical or abnormal lab result, we will notify you by phone as soon as possible.  Submit refill requests through [employer] or call your pharmacy and they will forward the refill request to us. Please allow 3 business days for your refill to be completed.          Additional Information About Your Visit        Care EveryWhere ID     This is your Care EveryWhere ID. This could be used by other organizations to access your [place] medical records  [id number]        Your Vitals Were     Pulse Respirations BMI (Body Mass Index)             76 18 31.85 kg/m2          Blood Pressure from Last 3 Encounters:   11/01/18 138/80   11/01/18 138/82   09/18/18 140/78    Weight from Last 3 Encounters:   11/01/18 100.7 kg (222 lb)   11/01/18 100.2 kg (221 lb)   09/18/18 100.1 kg (220 lb 9.6 oz)              Today, you had the following     No orders found for display       Primary Care Provider Office Phone # Fax #    Sumanth Roberts [phone] [address] [facility] [address]        Equal Access to Services     HILARY CRUZ  AH: Hadii jay johnirenebetito Evan, waaxda luqadaha, qaybta kadilia castro, nu chaitanya bellpaco avalosmarymarcel simpson. So [facility] [phone].    ATENCIÓN: Si nonila lavinia, tiene a mitchell disposición servicios gratuitos de asistencia lingüística. Llame al [phone].    We comply with applicable federal civil rights laws and [place] laws. We do not discriminate on the basis of race, color, national origin, age, disability, sex, sexual orientation, or gender identity.            Thank you!     Thank you for choosing [facility]  for your care. Our goal is always to provide you with excellent care. Hearing back from our patients is one way we can continue to improve our services. Please take a few minutes to complete the written survey that you may receive in the mail after your visit with us. Thank you!             Your Updated Medication List - Protect others around you: Learn how to safely use, store and throw away your medicines at www.disposemymeds.org.          This list is accurate as of 11/1/18  1:55 PM.  Always use your most recent med list.                   Brand Name Dispense Instructions for use Diagnosis    ALPRAZolam 0.5 MG tablet    XANAX    2 tablet    One time as needed prior to MRI.  May repeat once.    Claustrophobia       amitriptyline 25 MG tablet    ELAVIL    30 tablet    Take 1 tablet (25 mg) by mouth At Bedtime        aspirin 81 MG EC tablet      Take 81 mg by mouth daily with food        atorvastatin 20 MG tablet    LIPITOR     Take 20 mg by mouth daily        Bee Pollen 500 MG Chew      Take 2 tablets by mouth daily        HYDROcodone-acetaminophen  MG per tablet    NORCO    150 tablet    Take 1 tablet by mouth every 4 hours as needed for moderate to severe pain or severe pain Ok to refill on 11/27/18.    Chronic, continuous use of opioids       IBUPROFEN      Prn for back pain        lisinopril 5 MG tablet    PRINIVIL/ZESTRIL     Take 5 mg by mouth daily         OMEGA 3 PO      Take 2 capsules by mouth daily        omeprazole 40 MG capsule    priLOSEC     Take 40 mg by mouth daily        RA VITAMIN B-12 TR 1000 MCG Tbcr   Generic drug:  cyanocobalamin      Take 1,000 mcg by mouth daily        senna-docusate 8.6-50 MG per tablet    SENOKOT-S;PERICOLACE     Take 1-4 tablets by mouth 2 times daily        sildenafil 20 MG tablet    REVATIO     TAKE 3 TO 5 TABLETS (60MG- 100MG) BY MOUTH ONE HOUR PRIOR TO SEXUAL ACTIVITY.        Simethicone 125 MG Caps      Take  by mouth.        SM GARLIC 150 MG Tabs tablet   Generic drug:  garlic      Take 2 tablets by mouth daily        VITAMIN B-1 PO      Take  by mouth.

## 2018-11-01 NOTE — NURSING NOTE
"Chief Complaint   Patient presents with     Pre-Op Exam       Initial /82  Pulse 76  Temp 97.9  F (36.6  C)  Resp 18  Ht 5' 10\" (1.778 m)  Wt 221 lb (100.2 kg)  BMI 31.71 kg/m2 Estimated body mass index is 31.71 kg/(m^2) as calculated from the following:    Height as of this encounter: 5' 10\" (1.778 m).    Weight as of this encounter: 221 lb (100.2 kg).  Medication Reconciliation: complete    Mary Gaffney LPN    "

## 2018-11-20 ENCOUNTER — OFFICE VISIT (OUTPATIENT)
Dept: FAMILY MEDICINE | Facility: OTHER | Age: 63
End: 2018-11-20
Attending: FAMILY MEDICINE
Payer: COMMERCIAL

## 2018-11-20 ENCOUNTER — TELEPHONE (OUTPATIENT)
Dept: FAMILY MEDICINE | Facility: OTHER | Age: 63
End: 2018-11-20

## 2018-11-20 VITALS
BODY MASS INDEX: 31.57 KG/M2 | WEIGHT: 220 LBS | RESPIRATION RATE: 20 BRPM | DIASTOLIC BLOOD PRESSURE: 64 MMHG | SYSTOLIC BLOOD PRESSURE: 134 MMHG | HEART RATE: 100 BPM

## 2018-11-20 DIAGNOSIS — M62.830 BACK MUSCLE SPASM: ICD-10-CM

## 2018-11-20 DIAGNOSIS — M48.062 SPINAL STENOSIS OF LUMBAR REGION WITH NEUROGENIC CLAUDICATION: Primary | ICD-10-CM

## 2018-11-20 PROCEDURE — 99213 OFFICE O/P EST LOW 20 MIN: CPT | Performed by: FAMILY MEDICINE

## 2018-11-20 PROCEDURE — G0463 HOSPITAL OUTPT CLINIC VISIT: HCPCS

## 2018-11-20 RX ORDER — ACETAMINOPHEN 325 MG/1
650 TABLET ORAL
COMMUNITY
Start: 2018-11-16 | End: 2021-09-15

## 2018-11-20 RX ORDER — DIAZEPAM 2 MG
2 TABLET ORAL
Qty: 7 TABLET | Refills: 0 | Status: SHIPPED | OUTPATIENT
Start: 2018-11-20 | End: 2018-12-11

## 2018-11-20 RX ORDER — OXYCODONE HYDROCHLORIDE 5 MG/1
5 TABLET ORAL EVERY 4 HOURS PRN
Qty: 40 TABLET | Refills: 0 | Status: SHIPPED | OUTPATIENT
Start: 2018-11-20 | End: 2018-12-11

## 2018-11-20 RX ORDER — OXYCODONE HYDROCHLORIDE 5 MG/1
TABLET ORAL
Refills: 0 | COMMUNITY
Start: 2018-11-16 | End: 2018-11-20

## 2018-11-20 RX ORDER — DIAZEPAM 2 MG
2-4 TABLET ORAL
COMMUNITY
Start: 2018-11-16 | End: 2018-12-11

## 2018-11-20 ASSESSMENT — PAIN SCALES - GENERAL: PAINLEVEL: SEVERE PAIN (6)

## 2018-11-20 NOTE — TELEPHONE ENCOUNTER
After birth date was verified, spoke with Jermain. He stated that he had back surgery on Wednesday. He is going to be running out of some prescriptions. Would like to come in today for refills.     -Oxycodone   -Valium  Tylenol 325      Scheduled to come at 2:30 pm today.          Marcial Caballero LPN 11/20/18 11:42 AM

## 2018-11-20 NOTE — NURSING NOTE
"Dilip presents to the clinic today for concerns of a medication check with refills.          Marcial Caballero LPN 11/20/18 2:23 PM      Chief Complaint   Patient presents with     Recheck Medication     Medication check and refills        Initial /64 (BP Location: Right arm, Patient Position: Sitting, Cuff Size: Adult Large)  Pulse 100  Resp 20  Wt 220 lb (99.8 kg)  BMI 31.57 kg/m2 Estimated body mass index is 31.57 kg/(m^2) as calculated from the following:    Height as of 11/1/18: 5' 10\" (1.778 m).    Weight as of this encounter: 220 lb (99.8 kg).  Medication Reconciliation: complete    Marcial Caballero LPN    "

## 2018-11-26 ENCOUNTER — TELEPHONE (OUTPATIENT)
Dept: FAMILY MEDICINE | Facility: OTHER | Age: 63
End: 2018-11-26

## 2018-11-26 NOTE — TELEPHONE ENCOUNTER
"Jermain's prescription is at the pharmacy with a \"fill date of 11/27/18\" but his wife is coming into town today and can bring him to pick it up. He doesn't have a ride to town tomorrow. Also, his last fill was on 10/27/18 (and there are 31 days in October). He had back surgery on 11/14/18.  Is it ok to fill today? Will need to notify [facility].  Jacqueline Cortez CMA([facility])..................11/26/2018   10:16 AM   "

## 2018-11-26 NOTE — TELEPHONE ENCOUNTER
Pharmacy and patient notified.  Jacqueline Cortez CMA([facility])..................11/26/2018   11:55 AM

## 2018-12-03 NOTE — PROGRESS NOTES
"Nursing Notes:   Marcial Caballero LPN  11/20/2018  2:52 PM  Signed  Dilip presents to the clinic today for concerns of a medication check with refills.          Marcial Caballero LPN 11/20/18 2:23 PM      Chief Complaint   Patient presents with     Recheck Medication     Medication check and refills        Initial /64 (BP Location: Right arm, Patient Position: Sitting, Cuff Size: Adult Large)  Pulse 100  Resp 20  Wt 220 lb (99.8 kg)  BMI 31.57 kg/m2 Estimated body mass index is 31.57 kg/(m^2) as calculated from the following:    Height as of 11/1/18: 5' 10\" (1.778 m).    Weight as of this encounter: 220 lb (99.8 kg).  Medication Reconciliation: complete    Marcial Caballero LPN        SUBJECTIVE:  Dilip Kan is a 63 year old male who comes in to follow-up on back surgery.  He is currently postoperative day #6.  Patient reports postsurgically he was prescribed a Depo-Medrol Dosepak and was started on oxycodone 5 mg tablets, given #50.  He was also given diazepam 2 mg tablets given #10.  He tells me that he has 2 or 3 diazepam left and probably about 10 oxycodone.  He feels both medications have been very effective and he has used less oxycodone today and yesterday than he did over the first couple of days.  He has not had any problems with sedation.  No issues with constipation.    Since surgery he has noticed that his right leg has improved significantly in terms of the numbness and radiating pain.  Most of his pain right now is localized to the low back.  Does have increased pain at night with back spasms.  He is very optimistic that this is going to dramatically improve his pain based on his current symptoms.  He would also like the incision looked at.    Current Outpatient Prescriptions   Medication Sig Dispense Refill     acetaminophen (TYLENOL) 325 MG tablet Take 650 mg by mouth       ALPRAZolam (XANAX) 0.5 MG tablet One time as needed prior to MRI.  May repeat once. 2 tablet 0     amitriptyline " (ELAVIL) 25 MG tablet Take 1 tablet (25 mg) by mouth At Bedtime 30 tablet 1     atorvastatin (LIPITOR) 20 MG tablet Take 20 mg by mouth daily       Bee Pollen 500 MG CHEW Take 2 tablets by mouth daily       cyanocobalamin (RA VITAMIN B-12 TR) 1000 MCG TBCR Take 1,000 mcg by mouth daily       diazepam (VALIUM) 2 MG tablet Take 2-4 mg by mouth       diazepam (VALIUM) 2 MG tablet Take 1 tablet (2 mg) by mouth nightly as needed for muscle spasms 7 tablet 0     garlic (SM GARLIC) 150 MG TABS tablet Take 2 tablets by mouth daily       HYDROcodone-acetaminophen (NORCO)  MG per tablet Take 1 tablet by mouth every 4 hours as needed for moderate to severe pain or severe pain Ok to refill on 11/27/18. 150 tablet 0     lisinopril (PRINIVIL/ZESTRIL) 5 MG tablet Take 5 mg by mouth daily       Omega-3 Fatty Acids (OMEGA 3 PO) Take 2 capsules by mouth daily       omeprazole (PRILOSEC) 40 MG capsule Take 40 mg by mouth daily       oxyCODONE IR (ROXICODONE) 5 MG tablet Take 1 tablet (5 mg) by mouth every 4 hours as needed for severe pain 40 tablet 0     senna-docusate (SENOKOT-S;PERICOLACE) 8.6-50 MG per tablet Take 1-4 tablets by mouth 2 times daily       sildenafil (REVATIO) 20 MG tablet TAKE 3 TO 5 TABLETS (60MG- 100MG) BY MOUTH ONE HOUR PRIOR TO SEXUAL ACTIVITY.  10     Simethicone 125 MG CAPS Take  by mouth.       Thiamine HCl (VITAMIN B-1 PO) Take  by mouth.       aspirin EC 81 MG EC tablet Take 81 mg by mouth daily with food       IBUPROFEN Prn for back pain         /64 (BP Location: Right arm, Patient Position: Sitting, Cuff Size: Adult Large)  Pulse 100  Resp 20  Wt 220 lb (99.8 kg)  BMI 31.57 kg/m2    Exam:  General: No acute distress  Musculoskeletal: No synovitis.  No new issues.  Skin: Well-healing incision      ASSESSMENT/Plan :        No images are attached to the encounter or orders placed in the encounter.      1. Spinal stenosis of lumbar region with neurogenic claudication  Discussed with patient  expectations for oxycodone usage.  He still has enough to likely last about 2 days.  He can continue with this medication for about the next week and then I would like him to transition back to hydrocodone.  Number 40 tablets should last him that long as he has taken about 10 tablets daily for the past 4 days.  I suspect he will average about 8 tablets daily for the next 4 days and then the following few days after that will likely be 6 tablets daily.    He may use diazepam but I would like him to minimize use of that, no more than once per day at night.  - oxyCODONE IR (ROXICODONE) 5 MG tablet; Take 1 tablet (5 mg) by mouth every 4 hours as needed for severe pain  Dispense: 40 tablet; Refill: 0  - diazepam (VALIUM) 2 MG tablet; Take 1 tablet (2 mg) by mouth nightly as needed for muscle spasms  Dispense: 7 tablet; Refill: 0    2. Back muscle spasm  As noted above may continue but need to use sparingly  - diazepam (VALIUM) 2 MG tablet; Take 1 tablet (2 mg) by mouth nightly as needed for muscle spasms  Dispense: 7 tablet; Refill: 0       website was reviewed and is entirely consistent with patient's history.      There are no Patient Instructions on file for this visit.    Sumanth Roberts    This document was created using computer generated templates and voiceactivated software.

## 2018-12-11 ENCOUNTER — OFFICE VISIT (OUTPATIENT)
Dept: FAMILY MEDICINE | Facility: OTHER | Age: 63
End: 2018-12-11
Attending: FAMILY MEDICINE
Payer: COMMERCIAL

## 2018-12-11 VITALS
BODY MASS INDEX: 30.99 KG/M2 | TEMPERATURE: 97.9 F | SYSTOLIC BLOOD PRESSURE: 136 MMHG | WEIGHT: 216 LBS | RESPIRATION RATE: 20 BRPM | DIASTOLIC BLOOD PRESSURE: 82 MMHG | HEART RATE: 68 BPM

## 2018-12-11 DIAGNOSIS — F11.90 CHRONIC, CONTINUOUS USE OF OPIOIDS: ICD-10-CM

## 2018-12-11 PROCEDURE — G0463 HOSPITAL OUTPT CLINIC VISIT: HCPCS

## 2018-12-11 PROCEDURE — 99213 OFFICE O/P EST LOW 20 MIN: CPT | Performed by: FAMILY MEDICINE

## 2018-12-11 RX ORDER — HYDROCODONE BITARTRATE AND ACETAMINOPHEN 10; 325 MG/1; MG/1
1-2 TABLET ORAL EVERY 6 HOURS PRN
Qty: 180 TABLET | Refills: 0 | Status: SHIPPED | OUTPATIENT
Start: 2018-12-11 | End: 2019-01-02

## 2018-12-11 NOTE — NURSING NOTE
No LMP for male patient.  Medication Reconciliation: complete    Mary Gaffney LPN  12/11/2018 10:31 AM

## 2018-12-20 ENCOUNTER — TRANSFERRED RECORDS (OUTPATIENT)
Dept: HEALTH INFORMATION MANAGEMENT | Facility: OTHER | Age: 63
End: 2018-12-20

## 2018-12-21 NOTE — PROGRESS NOTES
Nursing Notes:   Mary Gaffney LPN  12/11/2018 10:32 AM  Signed  No LMP for male patient.  Medication Reconciliation: complete    Mary Gaffney LPN  12/11/2018 10:31 AM      Mary Gaffney LPN  12/11/2018 10:32 AM  Signed  Patient comes in to the clinic today for medication review and refills.      SUBJECTIVE:  Dilip Kan is a 63 year old male comes in today for ongoing back pain.  Patient underwent decompression L2-L4, and to level posterior spinal fusion on 11/14.  He tells me that he is slowly improving.  Tends to be 2 steps forward one step back in terms of his improvement.  He has still been using a walker for mobility.  Still spending quite a bit of time in bed.    He denies any radiating numbness or weakness.  He reports some muscle spasms in his low back and pain around the L3-L4 area of his low back but not really any significant radiating pain.    He has been taking between 6 and 10 hydrocodone daily.  Admits that he took 9 or 10 per day for many days up until recently and now has been trying to cut back to 6.  He reports with this he has been able to stay a little bit active through the house and at least get decent sleep.  He has not noticed any side effects.  His last refill was on November 27.     Current Outpatient Medications   Medication Sig Dispense Refill     acetaminophen (TYLENOL) 325 MG tablet Take 650 mg by mouth       ALPRAZolam (XANAX) 0.5 MG tablet One time as needed prior to MRI.  May repeat once. 2 tablet 0     amitriptyline (ELAVIL) 25 MG tablet Take 1 tablet (25 mg) by mouth At Bedtime 30 tablet 1     aspirin EC 81 MG EC tablet Take 81 mg by mouth daily with food       atorvastatin (LIPITOR) 20 MG tablet Take 20 mg by mouth daily       Bee Pollen 500 MG CHEW Take 2 tablets by mouth daily       cyanocobalamin (RA VITAMIN B-12 TR) 1000 MCG TBCR Take 1,000 mcg by mouth daily       garlic (SM GARLIC) 150 MG TABS tablet Take 2 tablets by mouth daily        HYDROcodone-acetaminophen (NORCO)  MG per tablet Take 1-2 tablets by mouth every 6 hours as needed for moderate to severe pain or severe pain Ok to refill on 12/11/2018 180 tablet 0     IBUPROFEN Prn for back pain       lisinopril (PRINIVIL/ZESTRIL) 5 MG tablet Take 5 mg by mouth daily       Omega-3 Fatty Acids (OMEGA 3 PO) Take 2 capsules by mouth daily       omeprazole (PRILOSEC) 40 MG capsule Take 40 mg by mouth daily       senna-docusate (SENOKOT-S;PERICOLACE) 8.6-50 MG per tablet Take 1-4 tablets by mouth 2 times daily       sildenafil (REVATIO) 20 MG tablet TAKE 3 TO 5 TABLETS (60MG- 100MG) BY MOUTH ONE HOUR PRIOR TO SEXUAL ACTIVITY.  10     Simethicone 125 MG CAPS Take  by mouth.       Thiamine HCl (VITAMIN B-1 PO) Take  by mouth.         /82   Pulse 68   Temp 97.9  F (36.6  C)   Resp 20   Wt 98 kg (216 lb)   BMI 30.99 kg/m      Exam:        ASSESSMENT/Plan :    Results for orders placed or performed in visit on 11/01/18   Prostate Specific Antigen GH   Result Value Ref Range    Prostate Specific Antigen 1.177 <3.100 ng/mL   CBC with platelets   Result Value Ref Range    WBC 5.0 4.0 - 11.0 10e9/L    RBC Count 4.65 4.4 - 5.9 10e12/L    Hemoglobin 13.8 13.3 - 17.7 g/dL    Hematocrit 41.3 40.0 - 53.0 %    MCV 89 78 - 100 fl    MCH 29.7 26.5 - 33.0 pg    MCHC 33.4 31.5 - 36.5 g/dL    RDW 12.7 10.0 - 15.0 %    Platelet Count 171 150 - 450 10e9/L   INR   Result Value Ref Range    INR 0.95 0 - 1.3   Basic metabolic panel   Result Value Ref Range    Sodium 139 134 - 144 mmol/L    Potassium 3.8 3.5 - 5.1 mmol/L    Chloride 104 98 - 107 mmol/L    Carbon Dioxide 27 21 - 31 mmol/L    Anion Gap 8 3 - 14 mmol/L    Glucose 118 (H) 70 - 105 mg/dL    Urea Nitrogen 13 7 - 25 mg/dL    Creatinine 0.73 0.70 - 1.30 mg/dL    GFR Estimate >90 >60 mL/min/1.7m2    GFR Estimate If Black >90 >60 mL/min/1.7m2    Calcium 8.9 8.6 - 10.3 mg/dL   EKG 12-LEAD CLINIC READ    Impression    Normal sinus rhythm.  Rate 63.   Nonspecific T wave abnormality.  Normal AR and QRS indices.  Abnormal EKG.  Compared to previous tracing dated April 7, 2017, QRS axis in aVL is now negative.  Otherwise no significant change.  Teddy Yen MD       No images are attached to the encounter or orders placed in the encounter.      1. Chronic, continuous use of opioids  Patient reports that he has about 20 tablets left.  This puts him at 130 tablets over 14 days, or roughly 9/day average.  I discussed with patient need to start cutting back on the medication.  Average oral morphine equivalent over the past is 90 mg daily.  He tells me that he is presently taking about 6-7 tablets/day and thus we will refill his medications for #180 with expectation that it will last at least a month.    Patient will follow up with me in early January to further discuss pain management and postsurgical improvement.    - HYDROcodone-acetaminophen (NORCO)  MG per tablet; Take 1-2 tablets by mouth every 6 hours as needed for moderate to severe pain or severe pain Ok to refill on 12/11/2018  Dispense: 180 tablet; Refill: 0          There are no Patient Instructions on file for this visit.    Sumanth Roberts    This document was created using computer generated templates and voiceactivated software.

## 2018-12-26 DIAGNOSIS — K21.9 GASTROESOPHAGEAL REFLUX DISEASE WITHOUT ESOPHAGITIS: Primary | ICD-10-CM

## 2018-12-28 RX ORDER — OMEPRAZOLE 40 MG/1
CAPSULE, DELAYED RELEASE ORAL
Qty: 90 CAPSULE | Refills: 3 | Status: SHIPPED | OUTPATIENT
Start: 2018-12-28 | End: 2019-06-03

## 2018-12-28 NOTE — TELEPHONE ENCOUNTER
Refill request for: Omeprazole 40 mg caps   From: [facility]  Rx written date: 01/03/2018   LOV: 12/11/2018 with PCP  Next appt: 01/02/2019 with PCP  Protocol: PPI Protocol Passed     Prescription approved per [facility] [place]. Thalia Vázquez RN on 12/28/2018 at 12:38 PM

## 2019-01-02 ENCOUNTER — OFFICE VISIT (OUTPATIENT)
Dept: FAMILY MEDICINE | Facility: OTHER | Age: 64
End: 2019-01-02
Attending: FAMILY MEDICINE
Payer: COMMERCIAL

## 2019-01-02 VITALS
SYSTOLIC BLOOD PRESSURE: 138 MMHG | BODY MASS INDEX: 31.42 KG/M2 | WEIGHT: 219 LBS | DIASTOLIC BLOOD PRESSURE: 78 MMHG | RESPIRATION RATE: 18 BRPM | HEART RATE: 76 BPM | TEMPERATURE: 98 F

## 2019-01-02 DIAGNOSIS — I10 ESSENTIAL HYPERTENSION: ICD-10-CM

## 2019-01-02 DIAGNOSIS — G89.29 CHRONIC BACK PAIN GREATER THAN 3 MONTHS DURATION: ICD-10-CM

## 2019-01-02 DIAGNOSIS — F11.90 CHRONIC, CONTINUOUS USE OF OPIOIDS: ICD-10-CM

## 2019-01-02 DIAGNOSIS — M54.9 CHRONIC BACK PAIN GREATER THAN 3 MONTHS DURATION: ICD-10-CM

## 2019-01-02 DIAGNOSIS — E78.5 HYPERLIPIDEMIA LDL GOAL <100: Primary | ICD-10-CM

## 2019-01-02 PROCEDURE — 99214 OFFICE O/P EST MOD 30 MIN: CPT | Performed by: FAMILY MEDICINE

## 2019-01-02 PROCEDURE — G0463 HOSPITAL OUTPT CLINIC VISIT: HCPCS

## 2019-01-02 RX ORDER — ATORVASTATIN CALCIUM 20 MG/1
20 TABLET, FILM COATED ORAL DAILY
Qty: 90 TABLET | Refills: 3 | Status: SHIPPED | OUTPATIENT
Start: 2019-01-02 | End: 2019-04-16

## 2019-01-02 RX ORDER — LISINOPRIL 5 MG/1
5 TABLET ORAL DAILY
Qty: 90 TABLET | Refills: 3 | Status: SHIPPED | OUTPATIENT
Start: 2019-01-02 | End: 2019-04-16

## 2019-01-02 RX ORDER — HYDROCODONE BITARTRATE AND ACETAMINOPHEN 10; 325 MG/1; MG/1
1-2 TABLET ORAL EVERY 6 HOURS PRN
Qty: 180 TABLET | Refills: 0 | Status: SHIPPED | OUTPATIENT
Start: 2019-01-02 | End: 2019-01-22

## 2019-01-02 NOTE — NURSING NOTE
No LMP for male patient.  Medication Reconciliation: complete    Mary Gaffney LPN  1/2/2019 9:48 AM

## 2019-01-03 NOTE — PROGRESS NOTES
"Nursing Notes:   Mary Gaffney LPN  1/2/2019  9:50 AM  Signed  Patient comes in to the clinic today for medication review and refills.    Mary Gaffney LPN  1/2/2019  9:50 AM  Signed  No LMP for male patient.  Medication Reconciliation: complete    Mary Gaffney LPN  1/2/2019 9:48 AM        SUBJECTIVE:  Dilip Kan is a 63 year old male comes in today to follow-up on hypertension, hyperlipidemia and chronic low back pain.  Patient underwent surgery about 7 weeks ago by Dr. Mendoza.  He has had gradual improvement in pain since her last visit.  He reports for about a week and a half after last visit pain was severe and he was using 8 or 9 tablets of hydrocodone per day.  Now things have improved and he is weaned down to about 6 tablets/day, 7 if he \"overdoes it\".  He averaged 8.1 tablets/day over the last 22 days.  He has increased his walking.  He reports improvement in spasm type pain also improvement in the paresthesias over his left anterior thigh.  He has also noticed he has increased exercise tolerance and ability to sit his length and now before he has severe pain.  He is using amitriptyline at night as an adjuvant and denies any side effects.  No longer using any benzodiazepines.    She is also here to follow-up on hypertension and hyperlipidemia.  His blood pressure has had borderline elevation.  He admits he has not been adhering to a low-salt diet.  Has not really been able to focus on weight loss with his recent back surgery.  Does continue to take lisinopril as prescribed and denies any side effects.  Also continues on Lipitor without any side effects.          PHQ-9  No flowsheet data found.      Current Outpatient Medications   Medication Sig Dispense Refill     acetaminophen (TYLENOL) 325 MG tablet Take 650 mg by mouth       ALPRAZolam (XANAX) 0.5 MG tablet One time as needed prior to MRI.  May repeat once. 2 tablet 0     amitriptyline (ELAVIL) 25 MG tablet Take 1 tablet (25 mg) by " mouth At Bedtime 30 tablet 11     aspirin EC 81 MG EC tablet Take 81 mg by mouth daily with food       atorvastatin (LIPITOR) 20 MG tablet Take 1 tablet (20 mg) by mouth daily 90 tablet 3     Bee Pollen 500 MG CHEW Take 2 tablets by mouth daily       cyanocobalamin (RA VITAMIN B-12 TR) 1000 MCG TBCR Take 1,000 mcg by mouth daily       garlic (SM GARLIC) 150 MG TABS tablet Take 2 tablets by mouth daily       HYDROcodone-acetaminophen (NORCO)  MG per tablet Take 1-2 tablets by mouth every 6 hours as needed for moderate to severe pain or severe pain Ok to refill on 2019 180 tablet 0     IBUPROFEN Prn for back pain       lisinopril (PRINIVIL/ZESTRIL) 5 MG tablet Take 1 tablet (5 mg) by mouth daily 90 tablet 3     Omega-3 Fatty Acids (OMEGA 3 PO) Take 2 capsules by mouth daily       omeprazole (PRILOSEC) 40 MG DR capsule TAKE 1 CAPSULE BY MOUTH ONCE DAILY. 90 capsule 3     senna-docusate (SENOKOT-S;PERICOLACE) 8.6-50 MG per tablet Take 1-4 tablets by mouth 2 times daily       sildenafil (REVATIO) 20 MG tablet TAKE 3 TO 5 TABLETS (60MG- 100MG) BY MOUTH ONE HOUR PRIOR TO SEXUAL ACTIVITY.  10     Simethicone 125 MG CAPS Take  by mouth.       Thiamine HCl (VITAMIN B-1 PO) Take  by mouth.           Social History     Socioeconomic History     Marital status:      Spouse name: Not on file     Number of children: Not on file     Years of education: Not on file     Highest education level: Not on file   Social Needs     Financial resource strain: Not on file     Food insecurity - worry: Not on file     Food insecurity - inability: Not on file     Transportation needs - medical: Not on file     Transportation needs - non-medical: Not on file   Occupational History     Occupation:      Employer: OTHER   Tobacco Use     Smoking status: Former Smoker     Packs/day: 1.00     Years: 20.00     Pack years: 20.00     Types: Cigarettes     Last attempt to quit: 2002     Years since quittin.1     Smokeless  tobacco: Current User     Types: Snuff   Substance and Sexual Activity     Alcohol use: Yes     Comment: Alcoholic Drinks/day: 5 drinks a month     Drug use: No     Comment: Drug use: No     Sexual activity: Not on file   Other Topics Concern     Parent/sibling w/ CABG, MI or angioplasty before 65F 55M? Not Asked   Social History Narrative    [place] - retired.  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  Patient has quit smoking.         I have personally reviewed and updated above noted social, family and/or past medical history.    ROS  CONSTITUTIONAL: NEGATIVE for fever, chills, change in weight  ENT/MOUTH: NEGATIVE for ear, mouth and throat problems  RESP: NEGATIVE for significant cough or SOB  CV: NEGATIVE for chest pain, palpitations or peripheral edema  MUSCULOSKELETAL: See HPI  NEURO: See HPI      /78   Pulse 76   Temp 98  F (36.7  C)   Resp 18   Wt 99.3 kg (219 lb)   BMI 31.42 kg/m      Exam:  GENERAL: healthy, alert and no distress  NECK: no adenopathy, no asymmetry, masses, or scars and thyroid normal to palpation  RESP: lungs clear to auscultation - no rales, rhonchi or wheezes  CV: regular rate and rhythm, normal S1 S2, no S3 or S4, no murmur, click or rub, no peripheral edema and peripheral pulses strong  ABDOMEN: soft, nontender, no hepatosplenomegaly, no masses and bowel sounds normal  MS: no gross musculoskeletal defects noted, no edema  NEURO:  No deficits note today.      ASSESSMENT/Plan :      Results for orders placed or performed in visit on 11/01/18   Prostate Specific Antigen GH   Result Value Ref Range    Prostate Specific Antigen 1.177 <3.100 ng/mL   CBC with platelets   Result Value Ref Range    WBC 5.0 4.0 - 11.0 10e9/L    RBC Count 4.65 4.4 - 5.9 10e12/L    Hemoglobin 13.8 13.3 - 17.7 g/dL    Hematocrit 41.3 40.0 - 53.0 %    MCV 89 78 - 100 fl    MCH 29.7 26.5 - 33.0 pg    MCHC 33.4 31.5 - 36.5 g/dL    RDW 12.7 10.0 - 15.0 %    Platelet Count 171 150 - 450  10e9/L   INR   Result Value Ref Range    INR 0.95 0 - 1.3   Basic metabolic panel   Result Value Ref Range    Sodium 139 134 - 144 mmol/L    Potassium 3.8 3.5 - 5.1 mmol/L    Chloride 104 98 - 107 mmol/L    Carbon Dioxide 27 21 - 31 mmol/L    Anion Gap 8 3 - 14 mmol/L    Glucose 118 (H) 70 - 105 mg/dL    Urea Nitrogen 13 7 - 25 mg/dL    Creatinine 0.73 0.70 - 1.30 mg/dL    GFR Estimate >90 >60 mL/min/1.7m2    GFR Estimate If Black >90 >60 mL/min/1.7m2    Calcium 8.9 8.6 - 10.3 mg/dL   EKG 12-LEAD CLINIC READ    Impression    Normal sinus rhythm.  Rate 63.  Nonspecific T wave abnormality.  Normal VA and QRS indices.  Abnormal EKG.  Compared to previous tracing dated April 7, 2017, QRS axis in aVL is now negative.  Otherwise no significant change.  Teddy Yen MD       No images are attached to the encounter or orders placed in the encounter.      1. Hyperlipidemia LDL goal <100  Continue Lipitor at current dosage.  - atorvastatin (LIPITOR) 20 MG tablet; Take 1 tablet (20 mg) by mouth daily  Dispense: 90 tablet; Refill: 3    2. Essential hypertension  Continue lisinopril at current dosage.  - lisinopril (PRINIVIL/ZESTRIL) 5 MG tablet; Take 1 tablet (5 mg) by mouth daily  Dispense: 90 tablet; Refill: 3    3. Chronic back pain greater than 3 months duration  Discussed with patient various additional adjuvants.  He is already icing, starting to increase activity and plans on doing some stretching and strengthening once surgeon allows.  We discussed starting gabapentin which may particularly help with the paresthesias.  After discussion of risk benefits and alternatives and potential for increased risk of sedation while still on the narcotics we decided to hold off on starting this.  We will continue to use hydrocodone and gradually reduce his dosage.  He will come back and see me in about a month.  I anticipate his oral morphine equivalent to be around 60 mg but he will use the minimum amount necessary to maintain  functionality.  - amitriptyline (ELAVIL) 25 MG tablet; Take 1 tablet (25 mg) by mouth At Bedtime  Dispense: 30 tablet; Refill: 11    4. Chronic, continuous use of opioids  Patient understands both short and long-term risks of chronic narcotics which include respiratory sedation that can lead to death.  He understands he is not to take anysedating substances with his pain medication. We also discussed long-term risks which include chronic constipation, risk for bowel impaction/obstruction, mood disturbance, urinary retention, tolerance and addiction.  Heunderstands studies seem to suggest minimal if any long-term benefit with chronic narcotics in pain reduction.  Despite all of this he feels strongly that the medication significantly improves his functionality and qualityof life. We have not identified any risks that would necessitate discontinuation at this time.    - HYDROcodone-acetaminophen (NORCO)  MG per tablet; Take 1-2 tablets by mouth every 6 hours as needed for moderate to severe pain or severe pain Ok to refill on 1/02/2019  Dispense: 180 tablet; Refill: 0              There are no Patient Instructions on file for this visit.    Sumanth Roberts    This document was created using computer generated templates and voiceactivated software.

## 2019-01-17 NOTE — ADDENDUM NOTE
Encounter addended by: Delfino Day, PT on: 1/17/2019 8:46 AM   Actions taken: Sign clinical note, Episode resolved

## 2019-01-17 NOTE — PROGRESS NOTES
Outpatient Physical Therapy Discharge Note     Patient: Dilip Kan  : 1955    Beginning/End Dates of Reporting Period:  18 to 18    Referring Provider: Dr. Reeder    Therapy Diagnosis: Lumbar Pain     Client Self Report at time of final visit on 18: Patient states his low back seems to be getting progressively more.  Patient would like to hold PT until he talks to Dr. Reeder.     Objective Measurements:  Objective Measure: Left low back pain  Details: -5/10     Outcome Measures (most recent score): Not completed due to unplanned discharge    Goals:  Goal Identifier Pain   Goal Description Report pain at 2/10 or leass at rest.    Target Date 10/19/18   Date Met      Progress: not met due to continued pain      Goal Identifier Walking    Goal Description Report ability to walk for 30 minutes to complete shopping tasks with pain at 2/10 or less.   Target Date 10/19/18   Date Met      Progress: not met due to continued pain     Goal Identifier Sitting   Goal Description Report ability to sit for 30 minutes with pain at 2/10 or less.    Target Date 10/19/18   Date Met      Progress: not met due to continued pain       Progress Toward Goals:   Progress limited due to continued worsening of low back pain     Plan:  Discharge from therapy.    Discharge:    Reason for Discharge: Patient chooses to discontinue therapy.    Discharge Plan: Follow up with Dr. Reeder

## 2019-01-22 ENCOUNTER — OFFICE VISIT (OUTPATIENT)
Dept: FAMILY MEDICINE | Facility: OTHER | Age: 64
End: 2019-01-22
Attending: FAMILY MEDICINE
Payer: COMMERCIAL

## 2019-01-22 VITALS
DIASTOLIC BLOOD PRESSURE: 78 MMHG | HEART RATE: 92 BPM | WEIGHT: 218 LBS | TEMPERATURE: 99.3 F | SYSTOLIC BLOOD PRESSURE: 122 MMHG | RESPIRATION RATE: 20 BRPM | BODY MASS INDEX: 31.28 KG/M2

## 2019-01-22 DIAGNOSIS — F11.90 CHRONIC, CONTINUOUS USE OF OPIOIDS: ICD-10-CM

## 2019-01-22 PROCEDURE — G0463 HOSPITAL OUTPT CLINIC VISIT: HCPCS

## 2019-01-22 PROCEDURE — 99213 OFFICE O/P EST LOW 20 MIN: CPT | Performed by: FAMILY MEDICINE

## 2019-01-22 RX ORDER — HYDROCODONE BITARTRATE AND ACETAMINOPHEN 10; 325 MG/1; MG/1
1 TABLET ORAL EVERY 4 HOURS PRN
Qty: 180 TABLET | Refills: 0 | Status: SHIPPED | OUTPATIENT
Start: 2019-01-22 | End: 2019-01-22

## 2019-01-22 RX ORDER — HYDROCODONE BITARTRATE AND ACETAMINOPHEN 10; 325 MG/1; MG/1
1 TABLET ORAL EVERY 4 HOURS PRN
Qty: 180 TABLET | Refills: 0 | Status: SHIPPED | OUTPATIENT
Start: 2019-01-22 | End: 2019-03-08

## 2019-01-22 NOTE — NURSING NOTE
No LMP for male patient.  Medication Reconciliation: complete    Mary Gaffney LPN  1/22/2019 10:17 AM

## 2019-02-11 NOTE — PROGRESS NOTES
Nursing Notes:   Mary Gaffney LPN  1/22/2019 10:17 AM  Signed  Patient comes in to the clinic today for medication review and refills.    Mary Gaffney LPN  1/22/2019 10:17 AM  Signed  No LMP for male patient.  Medication Reconciliation: complete    Mary Gaffney LPN  1/22/2019 10:17 AM        SUBJECTIVE:  Dilip Kan is a 63 year old male comes in today to follow-up on chronic back pain.  Patient underwent surgery to his spine 10 weeks ago.  He reports gradual improvement in the pain.  He was last given #180 hydrocodone 20 days ago.  He reports he has about 20 pills left.  Patient does report that his overall pain has improved in severity despite not significantly reducing his narcotic usage.  He had previously averaged about 81 mg/day over the prior 22 months.  His current average would be 80 mg.  He does think that he will be able to cut back at this time.  I explained to patient that the current prescription will need to last 30 days.    He reports no change in the quality of pain.  He has not had any new symptoms that are concerning and does have follow-up with surgery as scheduled.    Current Outpatient Medications   Medication Sig Dispense Refill     acetaminophen (TYLENOL) 325 MG tablet Take 650 mg by mouth       ALPRAZolam (XANAX) 0.5 MG tablet One time as needed prior to MRI.  May repeat once. 2 tablet 0     amitriptyline (ELAVIL) 25 MG tablet Take 1 tablet (25 mg) by mouth At Bedtime 30 tablet 11     aspirin EC 81 MG EC tablet Take 81 mg by mouth daily with food       atorvastatin (LIPITOR) 20 MG tablet Take 1 tablet (20 mg) by mouth daily 90 tablet 3     Bee Pollen 500 MG CHEW Take 2 tablets by mouth daily       cyanocobalamin (RA VITAMIN B-12 TR) 1000 MCG TBCR Take 1,000 mcg by mouth daily       garlic (SM GARLIC) 150 MG TABS tablet Take 2 tablets by mouth daily       HYDROcodone-acetaminophen (NORCO)  MG per tablet Take 1 tablet by mouth every 4 hours as needed for moderate to  severe pain or severe pain Ok to refill on 2/21/2019.  Max 6 per day. 180 tablet 0     IBUPROFEN Prn for back pain       lisinopril (PRINIVIL/ZESTRIL) 5 MG tablet Take 1 tablet (5 mg) by mouth daily 90 tablet 3     Omega-3 Fatty Acids (OMEGA 3 PO) Take 2 capsules by mouth daily       omeprazole (PRILOSEC) 40 MG DR capsule TAKE 1 CAPSULE BY MOUTH ONCE DAILY. 90 capsule 3     senna-docusate (SENOKOT-S;PERICOLACE) 8.6-50 MG per tablet Take 1-4 tablets by mouth 2 times daily       sildenafil (REVATIO) 20 MG tablet TAKE 3 TO 5 TABLETS (60MG- 100MG) BY MOUTH ONE HOUR PRIOR TO SEXUAL ACTIVITY.  10     Simethicone 125 MG CAPS Take  by mouth.       Thiamine HCl (VITAMIN B-1 PO) Take  by mouth.         /78   Pulse 92   Temp 99.3  F (37.4  C)   Resp 20   Wt 98.9 kg (218 lb)   BMI 31.28 kg/m      Exam:  General: Uncomfortable but in no acute distress  Musculoskeletal: Tender with palpation of paraspinous muscles.  Generalized core muscle weakness.  No lower extremity weakness.  Skin: No erythema.      ASSESSMENT/Plan :        No images are attached to the encounter or orders placed in the encounter.      1. Chronic, continuous use of opioids  Patient understands both short and long-term risks of chronic narcotics which include respiratory sedation that can lead to death.  He understands he is not to take anysedating substances with his pain medication. We also discussed long-term risks which include chronic constipation, risk for bowel impaction/obstruction, mood disturbance, urinary retention, tolerance and addiction.  He understands studies seem to suggest minimal if any long-term benefit with chronic narcotics in pain reduction.  Despite all of this he feels strongly that the medication significantly improves his functionality and quality of life. We have not identified any risks that would necessitate discontinuation at this time but I did explain to patient that his maximum dosage per day going forward needs to  be 6 pills/day.  This would put him at 60 mg OME.  I did write for the next 2 months but will see him back in March at which point we will plan on further reducing his daily OME and a goal to get him off or at least below 45 mg daily in fairly short order.    - HYDROcodone-acetaminophen (NORCO)  MG per tablet; Take 1 tablet by mouth every 4 hours as needed for moderate to severe pain or severe pain Ok to refill on 2/21/2019.  Max 6 per day.  Dispense: 180 tablet; Refill: 0          There are no Patient Instructions on file for this visit.    Sumanth Roberts    This document was created using computer generated templates and voiceactivated software.

## 2019-02-19 ENCOUNTER — TELEPHONE (OUTPATIENT)
Dept: FAMILY MEDICINE | Facility: OTHER | Age: 64
End: 2019-02-19

## 2019-02-19 NOTE — TELEPHONE ENCOUNTER
Patient states he is due to fill his Hydrocodone on 2/21. He is leaving for the [employer] and would like to leave before the snow storm coming. Would like to fill it later today or tomorrow morning at the latest. OK to fill a day or 2 early?

## 2019-02-19 NOTE — TELEPHONE ENCOUNTER
Yes this is fine for a 1 time fill. Please notify pharmacy. MOISÉS Mario CNP on 2/19/2019 at 2:49 PM

## 2019-02-27 DIAGNOSIS — Z85.46 PERSONAL HISTORY OF MALIGNANT NEOPLASM OF PROSTATE: Primary | ICD-10-CM

## 2019-03-05 ENCOUNTER — OFFICE VISIT (OUTPATIENT)
Dept: FAMILY MEDICINE | Facility: OTHER | Age: 64
End: 2019-03-05
Attending: CHIROPRACTOR

## 2019-03-05 VITALS
BODY MASS INDEX: 32.58 KG/M2 | WEIGHT: 220 LBS | HEIGHT: 69 IN | TEMPERATURE: 98 F | HEART RATE: 72 BPM | RESPIRATION RATE: 20 BRPM | DIASTOLIC BLOOD PRESSURE: 66 MMHG | SYSTOLIC BLOOD PRESSURE: 130 MMHG

## 2019-03-05 DIAGNOSIS — Z02.89 ENCOUNTER FOR EXAMINATION REQUIRED BY DEPARTMENT OF TRANSPORTATION (DOT): Primary | ICD-10-CM

## 2019-03-05 LAB
ALBUMIN UR-MCNC: ABNORMAL MG/DL
APPEARANCE UR: CLEAR
BILIRUB UR QL STRIP: NEGATIVE
COLOR UR AUTO: YELLOW
GLUCOSE UR STRIP-MCNC: NEGATIVE MG/DL
HGB UR QL STRIP: NEGATIVE
KETONES UR STRIP-MCNC: NEGATIVE MG/DL
LEUKOCYTE ESTERASE UR QL STRIP: NEGATIVE
NITRATE UR QL: NEGATIVE
NON-SQ EPI CELLS #/AREA URNS LPF: NORMAL /LPF
PH UR STRIP: 5.5 PH (ref 5–9)
RBC #/AREA URNS AUTO: NORMAL /HPF
SOURCE: ABNORMAL
SP GR UR STRIP: 1.02 (ref 1–1.03)
UROBILINOGEN UR STRIP-ACNC: 0.2 EU/DL (ref 0.2–1)
WBC #/AREA URNS AUTO: NORMAL /HPF

## 2019-03-05 PROCEDURE — 81001 URINALYSIS AUTO W/SCOPE: CPT | Performed by: CHIROPRACTOR

## 2019-03-05 PROCEDURE — 99499 UNLISTED E&M SERVICE: CPT | Performed by: CHIROPRACTOR

## 2019-03-05 PROCEDURE — 40001068 ZZHCL STATISTIC UA W/ REFLEX TO MICRO GICH: Performed by: CHIROPRACTOR

## 2019-03-05 ASSESSMENT — MIFFLIN-ST. JEOR: SCORE: 1773.29

## 2019-03-05 NOTE — NURSING NOTE
Patient presenting for a DOT physical. Please see scanned in forms.  No LMP for male patient.  Medication Reconciliation: complete    Akosua Smith LPN  3/5/2019 9:35 AM

## 2019-03-05 NOTE — PROGRESS NOTES
Department of Transportation (DOT) physical performed.  See scanned documents dated today, 3/5/2019.     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  Referral made to Dr. Dave, cardiologist.

## 2019-03-08 ENCOUNTER — OFFICE VISIT (OUTPATIENT)
Dept: FAMILY MEDICINE | Facility: OTHER | Age: 64
End: 2019-03-08
Attending: FAMILY MEDICINE
Payer: MEDICARE

## 2019-03-08 VITALS
WEIGHT: 221 LBS | BODY MASS INDEX: 32.64 KG/M2 | HEART RATE: 76 BPM | SYSTOLIC BLOOD PRESSURE: 138 MMHG | DIASTOLIC BLOOD PRESSURE: 86 MMHG | TEMPERATURE: 98 F | RESPIRATION RATE: 18 BRPM

## 2019-03-08 DIAGNOSIS — F11.90 CHRONIC, CONTINUOUS USE OF OPIOIDS: ICD-10-CM

## 2019-03-08 DIAGNOSIS — M54.17 LUMBOSACRAL RADICULOPATHY AT L3: ICD-10-CM

## 2019-03-08 DIAGNOSIS — H93.13 TINNITUS, BILATERAL: Primary | ICD-10-CM

## 2019-03-08 PROCEDURE — 99213 OFFICE O/P EST LOW 20 MIN: CPT | Performed by: FAMILY MEDICINE

## 2019-03-08 PROCEDURE — G0463 HOSPITAL OUTPT CLINIC VISIT: HCPCS

## 2019-03-08 PROCEDURE — 80307 DRUG TEST PRSMV CHEM ANLYZR: CPT | Performed by: FAMILY MEDICINE

## 2019-03-08 RX ORDER — HYDROCODONE BITARTRATE AND ACETAMINOPHEN 10; 325 MG/1; MG/1
1 TABLET ORAL EVERY 4 HOURS PRN
Qty: 180 TABLET | Refills: 0 | Status: SHIPPED | OUTPATIENT
Start: 2019-03-08 | End: 2019-03-08

## 2019-03-08 RX ORDER — HYDROCODONE BITARTRATE AND ACETAMINOPHEN 10; 325 MG/1; MG/1
1 TABLET ORAL EVERY 4 HOURS PRN
Qty: 180 TABLET | Refills: 0 | Status: SHIPPED | OUTPATIENT
Start: 2019-03-08 | End: 2019-04-16

## 2019-03-08 NOTE — NURSING NOTE
Patient comes in to the clinic today for medication review and refills as well as to evaluate ringing in his ears.

## 2019-03-08 NOTE — NURSING NOTE
No LMP for male patient.  Medication Reconciliation: complete    Mary Gaffney LPN  3/8/2019 9:54 AM

## 2019-03-08 NOTE — PROGRESS NOTES
Nursing Notes:   Mary Gaffney LPN  3/8/2019 10:04 AM  Addendum  Patient comes in to the clinic today for medication review and refills as well as to evaluate ringing in his ears.    Mary Gaffney LPN  3/8/2019  9:55 AM  Signed  No LMP for male patient.  Medication Reconciliation: complete    Mary Gaffney LPN  3/8/2019 9:54 AM        SUBJECTIVE:  Dilip Kan is a 63 year old male who comes in today to follow-up on chronic back pain.  Patient is with him his unused hydrocodone.  He has 64 pills left.  There are 13 days left on his current prescription which would put him at about 5 pills per day remaining.  He typically will take between 5 and 7 tablets/day and earlier this month had several days where he took 7 which now rule will require him to take 5 for the last 2 weeks.  He reports the pain has not really changed much.  Does wax and wane in severity.  He does think gradually things are improving and is looking forward to being able to be more active outside which she thinks will help his symptoms.  Right now with the ice and whether it has been difficult for him to get much exercise.  He denies any new issues such as bowel or bladder changes.  No weakness in his lower extremities.  No numbness.    He has had ringing in his ears over the past 2 weeks.  He reports it is bilateral.  It is intermittent.  Not associated with any neurologic or balance issues.  Does admit that his hearing has gradually worsened.  Symptoms seem to be worse when it is quieter at night.  He does report the ringing has happened in the past usually last for a couple weeks every few months.  Symptoms are currently resolved.    Current Outpatient Medications   Medication Sig Dispense Refill     acetaminophen (TYLENOL) 325 MG tablet Take 650 mg by mouth       aspirin EC 81 MG EC tablet Take 81 mg by mouth daily with food       atorvastatin (LIPITOR) 20 MG tablet Take 1 tablet (20 mg) by mouth daily 90 tablet 3     Bee Pollen  500 MG CHEW Take 2 tablets by mouth daily       cyanocobalamin (RA VITAMIN B-12 TR) 1000 MCG TBCR Take 1,000 mcg by mouth daily       garlic (SM GARLIC) 150 MG TABS tablet Take 2 tablets by mouth daily       HYDROcodone-acetaminophen (NORCO)  MG per tablet Take 1 tablet by mouth every 4 hours as needed for moderate to severe pain or severe pain Ok to refill on 5/19/2019.  Max 6 per day. 180 tablet 0     IBUPROFEN Prn for back pain       lisinopril (PRINIVIL/ZESTRIL) 5 MG tablet Take 1 tablet (5 mg) by mouth daily 90 tablet 3     Omega-3 Fatty Acids (OMEGA 3 PO) Take 2 capsules by mouth daily       omeprazole (PRILOSEC) 40 MG DR capsule TAKE 1 CAPSULE BY MOUTH ONCE DAILY. 90 capsule 3     senna-docusate (SENOKOT-S;PERICOLACE) 8.6-50 MG per tablet Take 1-4 tablets by mouth 2 times daily       sildenafil (REVATIO) 20 MG tablet TAKE 3 TO 5 TABLETS (60MG- 100MG) BY MOUTH ONE HOUR PRIOR TO SEXUAL ACTIVITY.  10     Simethicone 125 MG CAPS Take  by mouth.       Thiamine HCl (VITAMIN B-1 PO) Take  by mouth.         /86   Pulse 76   Temp 98  F (36.7  C)   Resp 18   Wt 100.2 kg (221 lb)   BMI 32.64 kg/m      Exam:  General: No acute distress  Respiratory: Clear to auscultation bilaterally  Cardiovascular: Regular rate and rhythm  Musculoskeletal: Minimal tenderness  with palpation of lumbar spine.  No tenderness at facets.  Moderate tenderness with palpation of paraspinous muscles.  No pain at sciatic notch.  Straight leg raise negative.  Neurologic: No deficits currently.      ASSESSMENT/Plan :        No images are attached to the encounter or orders placed in the encounter.      1. Chronic, continuous use of opioids  CSA updated 3-.  Tox screen done today.  NO aberrant medication usage.  MME 60mg daily.  We will continue to work with patient to gradually reduce his morphine equivalent while helping him to maintain his functionality using multimodal approach to pain management as well as time for  recovery from surgery.  Needs to strengthen his core but right now is at high risk for falls given the weather.  I think in a month or 2 here things will get much better.    I reinforced with patient both short and long-term risks of chronic narcotics including but not limited to confusion, falls, constipation, hyperalgesia, acute respiratory sedation which can lead to death, tolerance and dependence and addiction as well as risks for mood disturbance    Patient voiced good understanding of need to keep medications in a secure location and take them exactly as prescribed.  She understands risks for unintentional overdose and need to not take with any other sedating substances.    At this time patient reports significant increase functionality on the medication and denies any uncontrolled side effects.  Currently benefit seems to outweigh risks but will continue to use a multimodal approach to pain management to try to minimize narcotics due to significant future risks.    - HYDROcodone-acetaminophen (NORCO)  MG per tablet; Take 1 tablet by mouth every 4 hours as needed for moderate to severe pain or severe pain Ok to refill on 5/19/2019.  Max 6 per day.  Dispense: 180 tablet; Refill: 0    2. Lumbosacral radiculopathy at L3  Drug testing has returned normal.  - Drug  Screen Comprehensive , Urine with Reported Meds ([facility]) (Pain Care Package)    3.  Tinnitus  I suspect this is due to hearing loss that is perhaps made acutely worse with some eustachian tube dysfunction or earwax.  I explained this to patient.  Currently no evidence of either.  Suggest hearing testing.      There are no Patient Instructions on file for this visit.    Sumanth Roberts    This document was created using computer generated templates and voiceactivated software.

## 2019-03-14 LAB — PAIN DRUG SCR UR W RPTD MEDS: NORMAL

## 2019-03-18 ENCOUNTER — NURSE TRIAGE (OUTPATIENT)
Dept: FAMILY MEDICINE | Facility: OTHER | Age: 64
End: 2019-03-18

## 2019-03-18 NOTE — TELEPHONE ENCOUNTER
"S-(situation):   Pt requesting approval to fill Norco 2 days early vs. Limited fill.    B-(background):   Saw TC 3/8, for back pain/chronic opioid use. Given 3 prescriptions for #180; ok to fill on 3/20, 4/19 and 5/19. Last filled #180 (30-day supply) on 2/21 (26 days ago). Max 6 per day.    A-(assessment):   Pt reports he takes 6-7 daily and has 2 remaining. Able to fill on Wednesday 3/20, but will run short for tonight and tomorrow. Back pain currently rated 4/10; usually 7/10 in the evening. Pt states, \"Dr. Roberts knew it might be close and told me to call if needed.\" (Not discussed in OV note). CSA signed 6/5/18, agreeing to request all sched. II medications from PCP only and not from any other providers, unless PCP absent >48 hours.    R-(recommendations):   Clinic policy regarding Schedule II medications discussed with Pt. JTC out today and back tomorrow.    In clinical absence of Dr. Roberts and Dr. Perez, patient is requesting that this message be sent to the primary provider's Teamlet for consideration please. Xiao Donald RN .............. 3/18/2019  9:58 AM       "

## 2019-03-18 NOTE — TELEPHONE ENCOUNTER
After verifying pts name and date of birth with pt, pt was notified of Dr. Nick's message and stated understanding.  Sydney Caballero

## 2019-03-19 ENCOUNTER — TELEPHONE (OUTPATIENT)
Dept: FAMILY MEDICINE | Facility: OTHER | Age: 64
End: 2019-03-19

## 2019-03-19 NOTE — TELEPHONE ENCOUNTER
Patient wants to know if he can fill his Hydrocodone prescription today instead of tomorrow as prescribed. He took his last one last night.

## 2019-03-22 ENCOUNTER — OFFICE VISIT (OUTPATIENT)
Dept: CARDIOLOGY | Facility: OTHER | Age: 64
End: 2019-03-22
Attending: FAMILY MEDICINE
Payer: COMMERCIAL

## 2019-03-22 VITALS
WEIGHT: 226 LBS | BODY MASS INDEX: 33.37 KG/M2 | HEART RATE: 68 BPM | TEMPERATURE: 97.8 F | SYSTOLIC BLOOD PRESSURE: 124 MMHG | RESPIRATION RATE: 16 BRPM | DIASTOLIC BLOOD PRESSURE: 60 MMHG

## 2019-03-22 DIAGNOSIS — E66.09 NON MORBID OBESITY DUE TO EXCESS CALORIES: ICD-10-CM

## 2019-03-22 DIAGNOSIS — R00.2 PALPITATIONS: ICD-10-CM

## 2019-03-22 DIAGNOSIS — E78.1 HYPERTRIGLYCERIDEMIA: ICD-10-CM

## 2019-03-22 DIAGNOSIS — I27.20 MILD PULMONARY HYPERTENSION (H): ICD-10-CM

## 2019-03-22 DIAGNOSIS — R09.89 BRUIT: ICD-10-CM

## 2019-03-22 DIAGNOSIS — I10 ESSENTIAL HYPERTENSION: ICD-10-CM

## 2019-03-22 DIAGNOSIS — I35.1 NONRHEUMATIC AORTIC VALVE INSUFFICIENCY: ICD-10-CM

## 2019-03-22 DIAGNOSIS — Z02.89 ENCOUNTER FOR EXAMINATION REQUIRED BY DEPARTMENT OF TRANSPORTATION (DOT): ICD-10-CM

## 2019-03-22 DIAGNOSIS — I35.0 MILD AORTIC STENOSIS: ICD-10-CM

## 2019-03-22 DIAGNOSIS — I71.21 ASCENDING AORTIC ANEURYSM (H): ICD-10-CM

## 2019-03-22 DIAGNOSIS — Z87.898 HX OF SYNCOPE: ICD-10-CM

## 2019-03-22 DIAGNOSIS — Z87.891 HISTORY OF TOBACCO ABUSE: ICD-10-CM

## 2019-03-22 DIAGNOSIS — E78.2 MIXED HYPERLIPIDEMIA: ICD-10-CM

## 2019-03-22 DIAGNOSIS — K21.9 GASTROESOPHAGEAL REFLUX DISEASE WITHOUT ESOPHAGITIS: ICD-10-CM

## 2019-03-22 DIAGNOSIS — I35.2 AORTIC VALVE STENOSIS WITH INSUFFICIENCY, ETIOLOGY OF CARDIAC VALVE DISEASE UNSPECIFIED: Primary | ICD-10-CM

## 2019-03-22 DIAGNOSIS — Z13.6 ENCOUNTER FOR ABDOMINAL AORTIC ANEURYSM SCREENING: ICD-10-CM

## 2019-03-22 PROCEDURE — 93010 ELECTROCARDIOGRAM REPORT: CPT | Performed by: INTERNAL MEDICINE

## 2019-03-22 PROCEDURE — 99204 OFFICE O/P NEW MOD 45 MIN: CPT | Performed by: INTERNAL MEDICINE

## 2019-03-22 PROCEDURE — G0463 HOSPITAL OUTPT CLINIC VISIT: HCPCS

## 2019-03-22 PROCEDURE — 93005 ELECTROCARDIOGRAM TRACING: CPT | Performed by: INTERNAL MEDICINE

## 2019-03-22 PROCEDURE — G0463 HOSPITAL OUTPT CLINIC VISIT: HCPCS | Mod: 25

## 2019-03-22 ASSESSMENT — PAIN SCALES - GENERAL: PAINLEVEL: MODERATE PAIN (4)

## 2019-03-22 NOTE — PATIENT INSTRUCTIONS
You were seen by Cardiologist Riley Dave DO      1. A Echocardiogram (an ultrasound of your heart) has been ordered. You will be receive a call from [facility] to schedule this. At that time you will receive instructions for the procedure.      2. An ultrasound of your carotid arteries and an ultrasound of your abdomen has been ordered. You will be receive a call from [facility] to schedule this. At that time you will receive instructions for the procedure.       3. A Zio Patch has been ordered to be placed May 1, 2019 or later. A Zio Patch is placed on the skin. This monitor will be worn for 14 days. This is a device that will monitor your heart rate and rhythm. You will receive a call from [facility] scheduling department to schedule this appointment, and educate you on the use of this patch.    You will follow up with [facility] in early June , sooner if needed.     Please call the cardiology office with problems, questions, or concerns at [phone].    If you experience chest pain, chest pressure, chest tightness, shortness of breath, fainting, lightheadedness, nausea, vomiting, or other concerning symptoms, please report to the Emergency Department or call 911. These symptoms may be emergent, and best treated in the Emergency Department.     AJ Botello, RN  [facility]  [phone]

## 2019-03-22 NOTE — PROGRESS NOTES
[facility] HEART CARE   CARDIOLOGY CONSULT     Dilip Kan   1955  [id number]    Wei Perez     Chief Complaint   Patient presents with     Consult For     aortic valve stenosis          HPI:   Mr. 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  This needs to be completed by 4/5/19 otherwise it will require additional testing.  He had some abnormal echo findings as described below on 3/29/18 on his previous echo.  He also has a history of mild aortic stenosis, moderate to severe aortic insufficiency, and ascending aortic aneurysm at 4.6 cm on 3/29/18, obesity, hyperlipidemia, hypertension, history of tobacco abuse quitting on 11/8/2002, hypertriglyceridemia, mild pulmonary hypertension, and palpitations.    He denies chest pain, chest tightness, or chest discomfort.  He is not having symptoms suggestive of angina.  He has not had any lower extremity edema, shortness of breath, or dyspnea on exertion. He has not had near syncope or any syncope.  He essentially has no cardiac complaints today.      He was identified as having mild aortic stenosis, moderate to severe aortic insufficiency, mild pulmonary hypertension, and a moderate ascending aortic aneurysm at 4.6 cm on his echo from 3/29/18.  He is needing clinical evaluation prior to being approved for his DOT license.  With these abnormal echo findings, it was suggested he have repeat echocardiogram to determine if there has been any changes to these previous noted findings.  If his abnormalities are stable, it should not preclude DOT licensing.  He is aware that he should have an echocardiogram on a yearly basis.    He also describes infrequent palpitations.  He only notices these when he is going to bed.  He describes a quick flutter in his chest that last seconds.  He has not been lightheaded, dizzy, had near-syncope, or syncope.  These have been going on since approximately age 25.  These  have never been worked up previously.  He would like to have ambulatory monitoring with a Zio patch.  He would like to have it after 5/1/19 as he does not want to have the workup completed while being assessed for DOT licensure.    We also discussed his ascending aortic aneurysm.  This was measured as moderately dilated at 4.6 cm on 3/29/18.  The heart model in the room was used to explain this finding.  Again, he is aware that he needs yearly echocardiogram as it can increase in size and potentially rupture.  He is aware that surgery is performed at 5.5 cm.    Also, using the heart model in the room, we discussed mild aortic stenosis and moderate to severe aortic insufficiency.  He is asymptomatic related to these abnormalities.    He has a history of hyperlipidemia with a cholesterol panel on 6/1/18 which was normal.  He has history of hypertension with normal blood pressures ranging from the 120s-130s.  He has a history of tobacco abuse quitting 11/8/2002.    IMAGING RESULTS:   Echocardiogram 3/29/18.    Interpretation Summary  Global and regional left ventricular function is normal with an EF of 60-65%.  Global right ventricular function is normal.  Mild aortic stenosis with moderate to severe associated insufficiency is  present.  Mild pulmonary hypertension. Right ventricular systolic pressure is 32 mmHg  above the right atrial pressure.  The inferior vena cava is normal. Estimated mean right atrial pressure is 3  mmHg.  Moderate dilatation of the aorta is present. The ascending aorta measures 4.6  cm.  No pericardial effusion is present.  This study was compared with the study from 1/24/2017. The size of the aorta  is overall unchanged; however, the degree of aortic insufficency has  progressed.  Consider definitive imaging for sizing and quantification of aortic  regurgitation with cardiac MRI.  _____________________________________________________________________________  __        Left Ventricle  Global  and regional left ventricular function is normal with an EF of 60-65%.  Left ventricular size is normal. Left ventricular wall thickness is normal.  Left ventricular diastolic function is normal. No regional wall motion  abnormalities are seen.     Right Ventricle  The right ventricle is normal size. Global right ventricular function is  normal.     Atria  Both atria appear normal.     Mitral Valve  Mild mitral annular calcification is present.     Aortic Valve  Moderate to severe aortic insufficiency is present. Mild aortic stenosis is  present. The peak aortic velocity is 2.44 m/sec. The mean gradient across the  aortic valve is13 mmHg.        Tricuspid Valve  The tricuspid valve is normal. Mild tricuspid insufficiency is present. Mild  pulmonary hypertension is present. Right ventricular systolic pressure is  32mmHg above the right atrial pressure.     Pulmonic Valve  The pulmonic valve is normal.     Vessels  The inferior vena cava is normal. Moderate dilatation of the aorta is present.  Ascending aorta 4.6 cm. Estimated mean right atrial pressure is 3 mmHg.     Pericardium  No pericardial effusion is present.     Compared to Previous Study  This study was compared with the study from 1/24/2017 . The size of the aorta  is overall unchanged; however, the degree of aortic insufficency has  progressed.    ALLERGIES:   No Known Allergies     PAST MEDICAL HISTORY:   Past Medical History:   Diagnosis Date     Elevated prostate specific antigen (PSA)     4/10/2012     Encounter for other administrative examinations     1/31/2014     Esophagitis     No Comments Provided     Gastro-esophageal reflux disease without esophagitis     No Comments Provided     Low back pain     No Comments Provided     Malignant neoplasm of prostate (H)     9/6/2012     Nicotine dependence, uncomplicated     Quit - October 2007 with chantix     Other intervertebral disc degeneration, lumbosacral region     12/1/2008        PAST SURGICAL  HISTORY:   Past Surgical History:   Procedure Laterality Date     APPENDECTOMY OPEN      09/19/2009,Ruptured [place]     COLONOSCOPY      08/31/2006,EGD, next due in 2016     DISKECTOMY, LUMBAR, SINGLE SP  03/16/2009    L 3-4 microdiskectomy, [facility]     ESOPHAGOSCOPY, GASTROSCOPY, DUODENOSCOPY (EGD), COMBINED      03/2003     ESOPHAGOSCOPY, GASTROSCOPY, DUODENOSCOPY (EGD), COMBINED      08/31/2006     PROSTATECTOMY PERINEAL RADICAL  11/28/2012    Nerve Sparring, Dr Warner     SPINE SURGERY N/A 04/28/2017    L3 to S1 spinal decompression L4/L5 fusion     TONSILLECTOMY, ADENOIDECTOMY, COMBINED      as a child     VARICOCELECTOMY  1959    INCISION AND DRAINAGE,EXCISE VARICOCELE        FAMILY HISTORY:   Family History   Problem Relation Age of Onset     Heart Disease Father         Heart Disease, passed away from CHF,CAD     Colon Cancer Father         Cancer-colon     Hypertension Father         Hypertension     Other - See Comments Father         Stroke/Dementia     Hypertension Mother         Hypertension,HTN     Other - See Comments Mother          Parkinsons     Family History Negative Other         Good Health     Family History Negative Other         Good Health,previous marriage./previous marriage.     Family History Negative Other         Good Health,previous marriage.     Family History Negative Sister         Good Health,emotional problems.     Family History Negative Sister         Good Health     Other - See Comments Son         w/o major medical problems.     Other - See Comments Daughter         w/o major medical problems.        SOCIAL HISTORY:   Social History     Socioeconomic History     Marital status:      Spouse name: None     Number of children: None     Years of education: None     Highest education level: None   Occupational History     Occupation:      Employer: OTHER   Social Needs     Financial resource strain: None     Food insecurity:     Worry: None     Inability:  None     Transportation needs:     Medical: None     Non-medical: None   Tobacco Use     Smoking status: Former Smoker     Packs/day: 1.00     Years: 20.00     Pack years: 20.00     Types: Cigarettes     Last attempt to quit: 2002     Years since quittin.3     Smokeless tobacco: Current User     Types: Snuff   Substance and Sexual Activity     Alcohol use: Yes     Comment: Alcoholic Drinks/day: 5 drinks a month     Drug use: No     Comment: Drug use: No     Sexual activity: Yes     Partners: Female   Lifestyle     Physical activity:     Days per week: None     Minutes per session: None     Stress: None   Relationships     Social connections:     Talks on phone: None     Gets together: None     Attends Hoahaoism service: None     Active member of club or organization: None     Attends meetings of clubs or organizations: None     Relationship status: None     Intimate partner violence:     Fear of current or ex partner: None     Emotionally abused: None     Physically abused: None     Forced sexual activity: None   Other Topics Concern     Parent/sibling w/ CABG, MI or angioplasty before 65F 55M? Not Asked   Social History Narrative    [employer] - retired.  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  Patient has quit smoking.           CURRENT MEDICATIONS:   Prior to Admission medications    Medication Sig Start Date End Date Taking? Authorizing Provider   acetaminophen (TYLENOL) 325 MG tablet Take 650 mg by mouth 18  Yes Reported, Patient   aspirin EC 81 MG EC tablet Take 81 mg by mouth daily with food 14  Yes Reported, Patient   atorvastatin (LIPITOR) 20 MG tablet Take 1 tablet (20 mg) by mouth daily 19  Yes Sumanth Roberts MD   Bee Pollen 500 MG CHEW Take 2 tablets by mouth daily   Yes Reported, Patient   cyanocobalamin (RA VITAMIN B-12 TR) 1000 MCG TBCR Take 1,000 mcg by mouth daily   Yes Reported, Patient   garlic (SM GARLIC) 150 MG TABS tablet Take 2 tablets by mouth  daily   Yes Reported, Patient   HYDROcodone-acetaminophen (NORCO)  MG per tablet Take 1 tablet by mouth every 4 hours as needed for moderate to severe pain or severe pain Ok to refill on 5/19/2019.  Max 6 per day. 3/8/19  Yes Sumanth Roberts MD   IBUPROFEN Prn for back pain   Yes Reported, Patient   lisinopril (PRINIVIL/ZESTRIL) 5 MG tablet Take 1 tablet (5 mg) by mouth daily 1/2/19  Yes Sumanth Roberts MD   Omega-3 Fatty Acids (OMEGA 3 PO) Take 2 capsules by mouth daily   Yes Reported, Patient   omeprazole (PRILOSEC) 40 MG DR capsule TAKE 1 CAPSULE BY MOUTH ONCE DAILY. 12/28/18  Yes Sumanth Roberts MD   senna-docusate (SENOKOT-S;PERICOLACE) 8.6-50 MG per tablet Take 1-4 tablets by mouth 2 times daily 6/28/17  Yes Reported, Patient   sildenafil (REVATIO) 20 MG tablet TAKE 3 TO 5 TABLETS (60MG- 100MG) BY MOUTH ONE HOUR PRIOR TO SEXUAL ACTIVITY. 4/30/18  Yes Celestine Arrington MD   Simethicone 125 MG CAPS Take  by mouth.   Yes Reported, Patient   Thiamine HCl (VITAMIN B-1 PO) Take  by mouth.   Yes Reported, Patient          ROS:   CONSTITUTIONAL: No weight loss, fever, chills, weakness or fatigue.   HEENT: Eyes: No visual changes. Ears, Nose, Throat: No hearing loss, congestion or difficulty swallowing.   CARDIOVASCULAR: No chest pain, chest pressure or chest discomfort.  Infrequent palpitations at night the last only seconds but no lower extremity edema.   RESPIRATORY: No shortness of breath, dyspnea upon exertion, cough or sputum production.   GASTROINTESTINAL: No abdominal pain. No anorexia, nausea, vomiting or diarrhea.   NEUROLOGICAL: No headache, lightheadedness, dizziness, syncope, ataxia or weakness.   HEMATOLOGIC: No anemia, bleeding or bruising.   PSYCHIATRIC: No history of depression or anxiety.   ENDOCRINOLOGIC: No reports of sweating, cold or heat intolerance. No polyuria or polydipsia.   SKIN: No abnormal rashes or itching.       PHYSICAL EXAM:   GENERAL: The patient is a well-developed,  well-nourished, in no apparent distress. Alert and oriented x3.   HEENT: Head is normocephalic and atraumatic. Eyes are symmetrical with normal visual tracking.  HEART: Regular rate and rhythm, S1S2 early peaking crescendo decrescendo murmur, but no rub or gallop.   LUNGS: Respirations regular and unlabored. Clear to auscultation.   GI: Abdomen is soft and nondistended.   EXTREMITIES: No peripheral edema present.   MUSCULOSKELETAL: No joint swelling.   NEUROLOGIC: Alert and oriented X3.    SKIN: No jaundice. No rashes or visible skin lesions present.         LAB RESULTS:   Office Visit on 03/08/2019   Component Date Value Ref Range Status     Pain Drug SCR UR W RPTD Meds 03/08/2019 FINAL   Final   Office Visit on 03/05/2019   Component Date Value Ref Range Status     Color Urine 03/05/2019 Yellow   Final     Appearance Urine 03/05/2019 Clear   Final     Glucose Urine 03/05/2019 Negative  NEG^Negative mg/dL Final     Bilirubin Urine 03/05/2019 Negative  NEG^Negative Final     Ketones Urine 03/05/2019 Negative  NEG^Negative mg/dL Final     Specific Gravity Urine 03/05/2019 1.020  1.000 - 1.030 Final     Blood Urine 03/05/2019 Negative  NEG^Negative Final     pH Urine 03/05/2019 5.5  5.0 - 9.0 pH Final     Protein Albumin Urine 03/05/2019 Trace* NEG^Negative mg/dL Final     Urobilinogen Urine 03/05/2019 0.2  0.2 - 1.0 EU/dL Final     Nitrite Urine 03/05/2019 Negative  NEG^Negative Final     Leukocyte Esterase Urine 03/05/2019 Negative  NEG^Negative Final     Source 03/05/2019 Midstream Urine   Final     WBC Urine 03/05/2019 0 - 5  OTO5^0 - 5 /HPF Final     RBC Urine 03/05/2019 O - 2  OTO2^O - 2 /HPF Final     Squamous Epithelial /LPF Urine 03/05/2019 Few  FEW^Few /LPF Final            ASSESSMENT:       ICD-10-CM    1. Aortic valve stenosis with insufficiency, etiology of cardiac valve disease unspecified I35.2 EKG 12-lead, tracing only (Same Day)     Echocardiogram Complete   2. Encounter for examination required by  Department of Transportation (DOT) Z02.89 Echocardiogram Complete   3. Mild aortic stenosis I35.0 Echocardiogram Complete   4. Aortic valve insufficiency-moderate to severe I35.1 Echocardiogram Complete   5. Ascending aortic aneurysm-moderate at 4.6 cm on 3/29/18 I71.2 Echocardiogram Complete   6. GERD K21.9    7. Non morbid obesity due to excess calories E66.09    8. Mixed hyperlipidemia E78.2    9. Essential hypertension I10 EKG 12-lead, tracing only (Same Day)   10. Hx of syncope Z87.898    11. History of tobacco abuse quitting 11/8/2002 Z87.891    12. Hypertriglyceridemia E78.1    13. Mild pulmonary hypertension (H) I27.20 Echocardiogram Complete   14. Bruit R09.89 US carotid bilateral   15. Encounter for abdominal aortic aneurysm screening Z13.6  Aorta Medicare AAA Screening   16. Palpitations R00.2 Zio Patch Holter Adult Pediatric Greater than 48 hrs         PLAN:   1.  He has been asymptomatic denying chest pain, shortness of breath, near syncope, syncope or lower extremity edema.  He will need a repeat echocardiogram in follow-up to an echocardiogram from 3/29/18 which showed mild aortic stenosis, moderate to severe aortic insufficiency, mild pulmonary hypertension, and ascending aortic aneurysm at 4.6 cm.  This needs to be completed prior to approval for DOT certification.  2.  He will have an ultrasound of his bilateral carotids to assess for carotid artery stenosis.  This is just a screening test.  3.  Have an ultrasound of his abdominal aorta to assess for abdominal aortic aneurysm.  Also, this is just a screening test.  4.  He gets infrequent palpitations particularly when he goes to bed at night.  This has been going on for approximately 40 years.  He does not have the symptoms every night.  This has never been worked up.  It was suggested he have a Zio patch placed to identify the cause for his palpitations.  He is requesting this be completed on 5/1/19 or thereafter.  5.  He will be seen in early  June, 2019 after completion of the Zio patch.  We will review his echocardiogram, ultrasound of carotids, and ultrasound of his abdomen looking for an abdominal aortic aneurysm at that time.  In the meantime, he will be called with these results.  If his results are concerning, he will be seen sooner.      Thank you for allowing me to participate in the care of your patient. Please do not hesitate to contact me if you have any questions.     Riley Dave, DO

## 2019-03-22 NOTE — NURSING NOTE
"Patient comes in for new consult on aortic valve stenosis.  Feels good without any symptoms.  Joy Kate LPN ....................3/22/2019   9:57 AM  Chief Complaint   Patient presents with     Consult For     aortic valve stenosis       Initial /60 (BP Location: Right arm, Patient Position: Sitting, Cuff Size: Adult Large)   Pulse 68   Temp 97.8  F (36.6  C) (Tympanic)   Resp 16   Wt 102.5 kg (226 lb)   BMI 33.37 kg/m   Estimated body mass index is 33.37 kg/m  as calculated from the following:    Height as of 3/5/19: 1.753 m (5' 9\").    Weight as of this encounter: 102.5 kg (226 lb).  Meds Reconciled: complete  Pt is on Aspirin  Pt is on a Statin  PHQ and/or MARY reviewed. Pt referred to PCP/MH Provider as appropriate.    Joy Kate LPN      "

## 2019-03-27 ENCOUNTER — HOSPITAL ENCOUNTER (OUTPATIENT)
Dept: ULTRASOUND IMAGING | Facility: OTHER | Age: 64
End: 2019-03-27
Attending: INTERNAL MEDICINE
Payer: MEDICARE

## 2019-03-27 ENCOUNTER — HOSPITAL ENCOUNTER (OUTPATIENT)
Dept: CARDIOLOGY | Facility: OTHER | Age: 64
Discharge: HOME | End: 2019-03-27
Attending: INTERNAL MEDICINE | Admitting: INTERNAL MEDICINE
Payer: MEDICARE

## 2019-03-27 DIAGNOSIS — I71.21 ASCENDING AORTIC ANEURYSM (H): ICD-10-CM

## 2019-03-27 DIAGNOSIS — I27.20 MILD PULMONARY HYPERTENSION (H): ICD-10-CM

## 2019-03-27 DIAGNOSIS — Z13.6 ENCOUNTER FOR ABDOMINAL AORTIC ANEURYSM SCREENING: ICD-10-CM

## 2019-03-27 DIAGNOSIS — R09.89 BRUIT: ICD-10-CM

## 2019-03-27 DIAGNOSIS — Z02.89 ENCOUNTER FOR EXAMINATION REQUIRED BY DEPARTMENT OF TRANSPORTATION (DOT): ICD-10-CM

## 2019-03-27 DIAGNOSIS — I35.0 MILD AORTIC STENOSIS: ICD-10-CM

## 2019-03-27 DIAGNOSIS — I35.2 AORTIC VALVE STENOSIS WITH INSUFFICIENCY, ETIOLOGY OF CARDIAC VALVE DISEASE UNSPECIFIED: ICD-10-CM

## 2019-03-27 DIAGNOSIS — I35.1 NONRHEUMATIC AORTIC VALVE INSUFFICIENCY: ICD-10-CM

## 2019-03-27 PROCEDURE — 76706 US ABDL AORTA SCREEN AAA: CPT

## 2019-03-27 PROCEDURE — 93306 TTE W/DOPPLER COMPLETE: CPT | Mod: 26 | Performed by: INTERNAL MEDICINE

## 2019-03-27 PROCEDURE — 93306 TTE W/DOPPLER COMPLETE: CPT

## 2019-03-27 PROCEDURE — 93880 EXTRACRANIAL BILAT STUDY: CPT

## 2019-03-28 ENCOUNTER — TELEPHONE (OUTPATIENT)
Dept: CARDIOLOGY | Facility: OTHER | Age: 64
End: 2019-03-28

## 2019-03-28 NOTE — TELEPHONE ENCOUNTER
ADELAIDA Vásquez patient   Incoming call from patient requesting letter for DOT.   Please have Dr. Dave sign for patient.   Ange Houston RN-BSN

## 2019-04-02 ENCOUNTER — OFFICE VISIT (OUTPATIENT)
Dept: FAMILY MEDICINE | Facility: OTHER | Age: 64
End: 2019-04-02
Attending: CHIROPRACTOR
Payer: COMMERCIAL

## 2019-04-02 VITALS
HEIGHT: 69 IN | SYSTOLIC BLOOD PRESSURE: 130 MMHG | TEMPERATURE: 98 F | DIASTOLIC BLOOD PRESSURE: 80 MMHG | WEIGHT: 231 LBS | RESPIRATION RATE: 20 BRPM | HEART RATE: 68 BPM | BODY MASS INDEX: 34.21 KG/M2

## 2019-04-02 DIAGNOSIS — Z02.89 ENCOUNTER FOR EXAMINATION REQUIRED BY DEPARTMENT OF TRANSPORTATION (DOT): Primary | ICD-10-CM

## 2019-04-02 DIAGNOSIS — N52.9 ERECTILE DYSFUNCTION, UNSPECIFIED ERECTILE DYSFUNCTION TYPE: Primary | ICD-10-CM

## 2019-04-02 DIAGNOSIS — N52.9 ED (ERECTILE DYSFUNCTION): ICD-10-CM

## 2019-04-02 PROCEDURE — 99499 UNLISTED E&M SERVICE: CPT | Performed by: CHIROPRACTOR

## 2019-04-02 PROCEDURE — G0463 HOSPITAL OUTPT CLINIC VISIT: HCPCS

## 2019-04-02 ASSESSMENT — MIFFLIN-ST. JEOR: SCORE: 1818.19

## 2019-04-02 NOTE — NURSING NOTE
Patient presenting for follow up on his DOT. He did see cardiology on 3/22/19 as instructed.  No LMP for male patient.  Medication Reconciliation: complete    Akosua Smith LPN  4/2/2019 8:36 AM

## 2019-04-02 NOTE — PROGRESS NOTES
Patient comes in for updated DOT card.  Has been cleared by Dr. Dave.  Issued one year card expiring on 3/5/2020.  He is to have cardiology follow up prior to his next DOT physical for cardiac related clearance.  See new scanned documents dated today.

## 2019-04-02 NOTE — TELEPHONE ENCOUNTER
"Pt.'s last office visit with Celestine Arrington MD was on 11/1/2018 and note states:  \"Plan  Follow up with PSA in 6 months\"  To MD to address.  Sydney Ace RN.........4/2/2019...2:52 PM    "

## 2019-04-03 RX ORDER — SILDENAFIL CITRATE 20 MG/1
TABLET ORAL
Qty: 100 TABLET | Refills: 1 | Status: SHIPPED | OUTPATIENT
Start: 2019-04-03 | End: 2019-11-07

## 2019-04-15 ENCOUNTER — TELEPHONE (OUTPATIENT)
Dept: FAMILY MEDICINE | Facility: OTHER | Age: 64
End: 2019-04-15

## 2019-04-15 NOTE — TELEPHONE ENCOUNTER
After verifying pts name and date of birth with pt, an appointment was made for 4/16 @1015.  Sydney Caballero

## 2019-04-16 ENCOUNTER — OFFICE VISIT (OUTPATIENT)
Dept: FAMILY MEDICINE | Facility: OTHER | Age: 64
End: 2019-04-16
Attending: FAMILY MEDICINE
Payer: COMMERCIAL

## 2019-04-16 VITALS
RESPIRATION RATE: 18 BRPM | WEIGHT: 223.2 LBS | TEMPERATURE: 98.1 F | BODY MASS INDEX: 33.06 KG/M2 | DIASTOLIC BLOOD PRESSURE: 72 MMHG | HEIGHT: 69 IN | SYSTOLIC BLOOD PRESSURE: 138 MMHG | HEART RATE: 98 BPM

## 2019-04-16 DIAGNOSIS — I10 ESSENTIAL HYPERTENSION: ICD-10-CM

## 2019-04-16 DIAGNOSIS — E78.5 HYPERLIPIDEMIA LDL GOAL <100: ICD-10-CM

## 2019-04-16 DIAGNOSIS — M48.062 SPINAL STENOSIS OF LUMBAR REGION WITH NEUROGENIC CLAUDICATION: Primary | ICD-10-CM

## 2019-04-16 DIAGNOSIS — F11.90 CHRONIC, CONTINUOUS USE OF OPIOIDS: ICD-10-CM

## 2019-04-16 PROCEDURE — 99214 OFFICE O/P EST MOD 30 MIN: CPT | Performed by: FAMILY MEDICINE

## 2019-04-16 PROCEDURE — G0463 HOSPITAL OUTPT CLINIC VISIT: HCPCS

## 2019-04-16 RX ORDER — ATORVASTATIN CALCIUM 20 MG/1
20 TABLET, FILM COATED ORAL DAILY
Qty: 90 TABLET | Refills: 3 | Status: SHIPPED | OUTPATIENT
Start: 2019-04-16 | End: 2020-01-24

## 2019-04-16 RX ORDER — HYDROCODONE BITARTRATE AND ACETAMINOPHEN 10; 325 MG/1; MG/1
1 TABLET ORAL EVERY 4 HOURS PRN
Qty: 180 TABLET | Refills: 0 | Status: SHIPPED | OUTPATIENT
Start: 2019-04-16 | End: 2019-05-17

## 2019-04-16 RX ORDER — LISINOPRIL 5 MG/1
5 TABLET ORAL DAILY
Qty: 90 TABLET | Refills: 3 | Status: SHIPPED | OUTPATIENT
Start: 2019-04-16 | End: 2020-01-24

## 2019-04-16 RX ORDER — AMITRIPTYLINE HYDROCHLORIDE 10 MG/1
10-20 TABLET ORAL AT BEDTIME
Qty: 60 TABLET | Refills: 1 | Status: SHIPPED | OUTPATIENT
Start: 2019-04-16 | End: 2019-05-17 | Stop reason: SINTOL

## 2019-04-16 ASSESSMENT — PAIN SCALES - GENERAL: PAINLEVEL: MODERATE PAIN (4)

## 2019-04-16 ASSESSMENT — MIFFLIN-ST. JEOR: SCORE: 1782.81

## 2019-04-16 NOTE — PROGRESS NOTES
Nursing Notes:   Sydney Caballero LPN  4/16/2019 10:19 AM  Sign at exiting of workspace  Pt presents to clinic today for medication management and establish care.      Medication Reconciliation: complete  Sydney Caballero LPN      SUBJECTIVE:  64 year old male presents for follow up on chronic low back pain.     He had a fusion on lumbar region in November. Seeing Dr Reeder in follow up in a few days. Was on 12 hydrocodone around surgery, has worked down to 6 per day on average, about 5 to 7 depending on the day. Pain remains in bilateral and midline low lumbar region. Denies numbness or tingling in feet. Chronic left thigh numbness. He was on opioids prior to surgery.    Looking back tried amitriptyline at 25 mg at bedtime, but made him feel funny next day and only ever took for 1 night. Gabapentin on list at surgery in November, but he never took. Never took Lyrica. Never took Cymbalta. Worried about any anti-depressants.     REVIEW OF SYSTEMS:    Constitutional: negative  Respiratory: negative  Cardiovascular: negative    Past Medical History:   Diagnosis Date     Elevated prostate specific antigen (PSA)     4/10/2012     Encounter for other administrative examinations     1/31/2014     Esophagitis     No Comments Provided     Gastro-esophageal reflux disease without esophagitis     No Comments Provided     Low back pain     No Comments Provided     Malignant neoplasm of prostate (H)     9/6/2012     Nicotine dependence, uncomplicated     Quit - October 2007 with chantix     Other intervertebral disc degeneration, lumbosacral region     12/1/2008       Past Surgical History:   Procedure Laterality Date     APPENDECTOMY OPEN      09/19/2009,Ruptured [place]     COLONOSCOPY      08/31/2006,EGD, next due in 2016     DISKECTOMY, LUMBAR, SINGLE SP  03/16/2009    L 3-4 microdiskectomy, [facility]     ESOPHAGOSCOPY, GASTROSCOPY, DUODENOSCOPY (EGD), COMBINED      03/2003     ESOPHAGOSCOPY, GASTROSCOPY, DUODENOSCOPY  "(EGD), COMBINED      08/31/2006     PROSTATECTOMY PERINEAL RADICAL  11/28/2012    Nerve Sparring, Dr Warner     SPINE SURGERY N/A 04/28/2017    L3 to S1 spinal decompression L4/L5 fusion     TONSILLECTOMY, ADENOIDECTOMY, COMBINED      as a child     VARICOCELECTOMY  1959    INCISION AND DRAINAGE,EXCISE VARICOCELE        Current Outpatient Medications   Medication Sig Dispense Refill     acetaminophen (TYLENOL) 325 MG tablet Take 650 mg by mouth       aspirin EC 81 MG EC tablet Take 81 mg by mouth daily with food       atorvastatin (LIPITOR) 20 MG tablet Take 1 tablet (20 mg) by mouth daily 90 tablet 3     Bee Pollen 500 MG CHEW Take 2 tablets by mouth daily       cyanocobalamin (RA VITAMIN B-12 TR) 1000 MCG TBCR Take 1,000 mcg by mouth daily       garlic (SM GARLIC) 150 MG TABS tablet Take 2 tablets by mouth daily       HYDROcodone-acetaminophen (NORCO)  MG per tablet Take 1 tablet by mouth every 4 hours as needed for moderate to severe pain or severe pain Ok to refill on 5/19/2019.  Max 6 per day. 180 tablet 0     IBUPROFEN Prn for back pain       lisinopril (PRINIVIL/ZESTRIL) 5 MG tablet Take 1 tablet (5 mg) by mouth daily 90 tablet 3     Omega-3 Fatty Acids (OMEGA 3 PO) Take 2 capsules by mouth daily       omeprazole (PRILOSEC) 40 MG DR capsule TAKE 1 CAPSULE BY MOUTH ONCE DAILY. 90 capsule 3     senna-docusate (SENOKOT-S;PERICOLACE) 8.6-50 MG per tablet Take 1-4 tablets by mouth as needed        sildenafil (REVATIO) 20 MG tablet TAKE 3 TO 5 TABLETS BY MOUTH 1 hour prior TO sexual activity. 100 tablet 1     Thiamine HCl (VITAMIN B-1 PO) Take  by mouth.       No Known Allergies    OBJECTIVE:  /72   Pulse 98   Temp 98.1  F (36.7  C) (Tympanic)   Resp 18   Ht 1.753 m (5' 9\")   Wt 101.2 kg (223 lb 3.2 oz)   BMI 32.96 kg/m      EXAM:  General Appearance: Pleasant, alert, appropriate appearance for age. No acute distress  Chest/Respiratory Exam: Clear to auscultation bilaterally  Cardiovascular Exam: " Regular rate and rhythm. S1, S2, no murmur, gallop, or rubs.  Musculoskeletal Exam: Lumbar Spine: tenderness over lumbar paraspinous muscles and near healing incision in inferior lumbar region  Psychiatric: Normal affect and mentation      ASSESSMENT/PLAN:    ICD-10-CM    1. Spinal stenosis of lumbar region with neurogenic claudication M48.062 amitriptyline (ELAVIL) 10 MG tablet   2. Essential hypertension I10 lisinopril (PRINIVIL/ZESTRIL) 5 MG tablet   3. Hyperlipidemia LDL goal <100 E78.5 atorvastatin (LIPITOR) 20 MG tablet   4. Chronic, continuous use of opioids F11.90 HYDROcodone-acetaminophen (NORCO)  MG per tablet     Reviewed harms of chronic opioid use.  Typically after the surgery he had in November, he would be off opioids for now.  Likely the reason he is not is due to opioids use prior to surgery.  Discussed goals of long-term gradual reduction in opioids.  He should not see any substantial harm or change in function if he gradually worked down on the dose.  He is willing to proceed with a plan of slow taper.  He is leaving town and would like to refill hydrocodone today so he does not have to return back to town before going down to see spine surgeon.  Given a prescription for today.  It is 3 days prior to his refill date, which should be April 19.  Next refill would then be due May 19.  He does have prescriptions from Dr. Roberts, which I was not aware of.  We will work out a plan going forward to reduce pills slightly, likely down by number 15 pills/month.    Discussed other treatments for pain that have not shown long-term detrimental effects on function and pain scores.  He could use a TCA, gabapentin annoyed, or S and RI.  He seems very concerned about any antidepressants.  However, he was willing to try amitriptyline again after we discussed it further.  Was given 25 mg, which appears to have been too strong.  He is willing to try 10 mg each night.  We discussed appropriate use.  He could  take 10 mg for 1 week and then increase to 20 mg nightly if tolerating.  If not able to tolerate, then contact me.  In that case would try 300 mg of gabapentin every evening.  Ideally if he sleeps better with use of TCA or other adjunct medication, he will be able to use less opioids at that time which will aid in reduction.    Renewed atorvastatin and lisinopril. Lab last in November 2018    F/U 1 month    Greater than 50% of this 32 minute appointment spent on counseling     Wei Perez MD    This document was prepared using a combination of typing and voice generated software.  While every attempt was made for accuracy, spelling and grammatical errors may exist.

## 2019-04-16 NOTE — NURSING NOTE
Pt presents to clinic today for medication management and establish care.      Medication Reconciliation: complete  Sydney Caballero LPN

## 2019-04-16 NOTE — PATIENT INSTRUCTIONS
Refilled hydrocodone  Next month likely plan to reduce pills slightly  Amitriptyline 10 mg at night for a week. If it works then can keep on 10 mg. Otherwise, if no side effects, then increase to 20 mg (2 pills) every night.  Call if problems taking the amitriptyline  Follow-up in a month

## 2019-04-18 ENCOUNTER — TRANSFERRED RECORDS (OUTPATIENT)
Dept: HEALTH INFORMATION MANAGEMENT | Facility: OTHER | Age: 64
End: 2019-04-18

## 2019-05-01 ENCOUNTER — OFFICE VISIT (OUTPATIENT)
Dept: UROLOGY | Facility: OTHER | Age: 64
End: 2019-05-01
Attending: UROLOGY
Payer: MEDICARE

## 2019-05-01 ENCOUNTER — HOSPITAL ENCOUNTER (OUTPATIENT)
Dept: CARDIOLOGY | Facility: OTHER | Age: 64
Discharge: HOME | End: 2019-05-01
Attending: INTERNAL MEDICINE | Admitting: INTERNAL MEDICINE
Payer: MEDICARE

## 2019-05-01 VITALS
BODY MASS INDEX: 33.37 KG/M2 | DIASTOLIC BLOOD PRESSURE: 70 MMHG | SYSTOLIC BLOOD PRESSURE: 140 MMHG | HEART RATE: 64 BPM | RESPIRATION RATE: 18 BRPM | WEIGHT: 226 LBS

## 2019-05-01 DIAGNOSIS — R97.21 RISING PSA FOLLOWING TREATMENT FOR MALIGNANT NEOPLASM OF PROSTATE: ICD-10-CM

## 2019-05-01 DIAGNOSIS — R00.2 PALPITATIONS: ICD-10-CM

## 2019-05-01 DIAGNOSIS — Z85.46 PERSONAL HISTORY OF MALIGNANT NEOPLASM OF PROSTATE: Primary | ICD-10-CM

## 2019-05-01 DIAGNOSIS — Z85.46 PERSONAL HISTORY OF MALIGNANT NEOPLASM OF PROSTATE: ICD-10-CM

## 2019-05-01 LAB — PSA SERPL-MCNC: 2.71 NG/ML

## 2019-05-01 PROCEDURE — 0296T ZIO PATCH HOLTER ADULT PEDIATRIC GREATER THAN 48 HRS: CPT

## 2019-05-01 PROCEDURE — 84153 ASSAY OF PSA TOTAL: CPT | Performed by: UROLOGY

## 2019-05-01 PROCEDURE — 99214 OFFICE O/P EST MOD 30 MIN: CPT | Performed by: UROLOGY

## 2019-05-01 PROCEDURE — 36415 COLL VENOUS BLD VENIPUNCTURE: CPT | Performed by: UROLOGY

## 2019-05-01 PROCEDURE — G0463 HOSPITAL OUTPT CLINIC VISIT: HCPCS

## 2019-05-01 PROCEDURE — 0298T ZIO PATCH HOLTER ADULT PEDIATRIC GREATER THAN 48 HRS: CPT | Performed by: INTERNAL MEDICINE

## 2019-05-01 ASSESSMENT — PAIN SCALES - GENERAL: PAINLEVEL: MODERATE PAIN (4)

## 2019-05-01 NOTE — PATIENT INSTRUCTIONS
Patient instructed not to:   -take baths, swim, sauna   -remove device prior to 14 days   -use electric blankets   -shower or sweat excessively within first 24 hours of device application  Patient instructed to:   -shower as needed   -be carefull when toweling off and dressing   -press button when cardiac symptoms occur   -document symptoms in log book   -remove and return device (send via mail to [facility])   -Call [employer] with any questions

## 2019-05-01 NOTE — NURSING NOTE
Chief Complaint   Patient presents with     Follow Up     PSA    Patient presents to the clinic for a follow up PSA.    Review of Systems:    Weight loss:    No     Recent fever/chills:  No   Night sweats:   No  Current skin rash:  No   Recent hair loss:  No  Heat intolerance:  No   Cold intolerance:  No  Chest pain:   No   Palpitations:   No  Shortness of breath:  No   Wheezing:   No  Constipation:    No   Diarrhea:   No   Nausea:   No   Vomiting:   No   Kidney/side pain:  No   Back pain:   Yes  Frequent headaches:  No   Dizziness:     No  Leg swelling:   No   Calf pain:    No        Medication Reconciliation: completed   Dasha Vasques LPN  5/1/2019 9:28 AM

## 2019-05-01 NOTE — PROGRESS NOTES
Type of Visit  EST    Chief Complaint  Rising PSA following prostatectomy and salvage radiation    HPI  Mr. Kan is a 64 y.o. male who presents s/p RRP in 11/2012 followed by salvage radiation therapy in 2013 who follows up with increasing PSA.  His most recent PSA was earlier today.  I saw him last 6 months ago.  Reports no changes in his health history in the last 6 months.  Specifically he denies bone and pelvic pain, gross hematuria and unintentional weight loss.  He follows up today to review the recent PSA.      Family History  Family History   Problem Relation Age of Onset     Hypertension Mother      HTN     Heart Disease Father       passed away from CHF,CAD      Good Health Sister      emotional problems.      Good Health Sister      Good Health Other      Other Son      w/o major medical problems.      Other Daughter      w/o major medical problems.      Good Health Other      previous marriage./previous marriage.     Good Health Other      previous marriage.     Cancer-colon Father      Hypertension Father      Stroke Father      Other Father      Dementia     Other Mother       Parkinsons       Review of Systems  I reviewed the ROS with the patient today.    Nursing Notes:   Dasha Vasques LPN  5/1/2019  9:44 AM  Signed  Chief Complaint   Patient presents with     Follow Up     PSA    Patient presents to the clinic for a follow up PSA.    Review of Systems:    Weight loss:    No     Recent fever/chills:  No   Night sweats:   No  Current skin rash:  No   Recent hair loss:  No  Heat intolerance:  No   Cold intolerance:  No  Chest pain:   No   Palpitations:   No  Shortness of breath:  No   Wheezing:   No  Constipation:    No   Diarrhea:   No   Nausea:   No   Vomiting:   No   Kidney/side pain:  No   Back pain:   Yes  Frequent headaches:  No   Dizziness:     No  Leg swelling:   No   Calf pain:    No        Medication Reconciliation: completed   Dasha Vasques LPN  5/1/2019 9:28 AM         Physical Exam  BP  140/70 (BP Location: Left arm, Patient Position: Sitting, Cuff Size: Adult Regular)   Pulse 64   Resp 18   Wt 102.5 kg (226 lb)   BMI 33.37 kg/m    Constitutional: No acute distress.  Alert and cooperative   Head: NCAT  Eyes: Conjunctivae normal  Cardiovascular: Regular rate.  Pulmonary/Chest: Respirations are even and non-labored bilaterally, no audible wheezing  Abdominal: Soft. No distension, tenderness, masses or guarding.   Neurological: A + O x 3.  Cranial Nerves II-XII grossly intact.  Extremities: GINNA x 4, Warm. No clubbing.  No cyanosis.    Skin: Pink, warm and dry.  No visible rashes noted.  Psychiatric:  Normal mood and affect  Back:  No left CVA tenderness.  No right CVA tenderness.  Genitourinary:  Nonpalpable bladder    Labs  Results for EMILIA KAN (MRN [id number]) as of 5/1/2019 09:45   5/1/2019 07:36   Prostate Specific Antigen 2.714     Results for LELAND KAN (MRN [id number]) as of 11/1/2018 11:08   11/1/2018 08:49   Prostate Specific Antigen 1.177     Results for LELAND KAN (MRN [id number]) as of 3/29/2018 14:31   3/29/2018 12:15   Prostate Specific Antigen 0.798     Results for LELAND KAN (MRN [id number]) as of 3/29/2018 11:55   4/7/2017 16:04   PSA Total (Diagnostic) - Historical 0.268     Results for LELAND KAN (MRN [id number]) as of 3/23/2016 10:42   1/2/2013 08:00* 2/27/2013** 08:10 10/16/2014 11:26 3/1/2016 14:15   PSA TOTAL (DIAGNOSTIC) 0.67 0.97     PSA TOTAL (DIAGNOSTIC)   0.070 0.154   * Following radical prostatectomy  ** Following salvage external beam radiation therapy.    Assessment  Mr. Kan is a 63 y.o. male who presents s/p RRP in 11/2012 followed by salvage radiation therapy in 2013 who follows up with detectable and rising PSA.  I explained that further local therapy would is typically not considered following surgery and ablative salvage therapy.  We discussed systemic androgen deprivation therapy.  I explained that the indications were rate of  PSA rise, absolute PSA value, clinical symptoms and imaging.  Because of this I recommended we perform a full metastatic survey.    Plan  CT c/a/p with IV contrast  Bone scan  Follow up after imaging

## 2019-05-06 DIAGNOSIS — M48.062 SPINAL STENOSIS, LUMBAR REGION, WITH NEUROGENIC CLAUDICATION: Primary | ICD-10-CM

## 2019-05-17 ENCOUNTER — TELEPHONE (OUTPATIENT)
Dept: FAMILY MEDICINE | Facility: OTHER | Age: 64
End: 2019-05-17

## 2019-05-17 ENCOUNTER — OFFICE VISIT (OUTPATIENT)
Dept: FAMILY MEDICINE | Facility: OTHER | Age: 64
End: 2019-05-17
Attending: FAMILY MEDICINE
Payer: COMMERCIAL

## 2019-05-17 VITALS
HEIGHT: 69 IN | SYSTOLIC BLOOD PRESSURE: 138 MMHG | TEMPERATURE: 97.6 F | OXYGEN SATURATION: 95 % | BODY MASS INDEX: 32.79 KG/M2 | RESPIRATION RATE: 20 BRPM | HEART RATE: 88 BPM | DIASTOLIC BLOOD PRESSURE: 74 MMHG | WEIGHT: 221.4 LBS

## 2019-05-17 DIAGNOSIS — I10 ESSENTIAL HYPERTENSION: ICD-10-CM

## 2019-05-17 DIAGNOSIS — M48.062 SPINAL STENOSIS OF LUMBAR REGION WITH NEUROGENIC CLAUDICATION: Primary | ICD-10-CM

## 2019-05-17 DIAGNOSIS — E78.2 MIXED HYPERLIPIDEMIA: ICD-10-CM

## 2019-05-17 DIAGNOSIS — M48.062 SPINAL STENOSIS OF LUMBAR REGION WITH NEUROGENIC CLAUDICATION: ICD-10-CM

## 2019-05-17 DIAGNOSIS — F11.90 CHRONIC, CONTINUOUS USE OF OPIOIDS: ICD-10-CM

## 2019-05-17 LAB
ANION GAP SERPL CALCULATED.3IONS-SCNC: 11 MMOL/L (ref 3–14)
BUN SERPL-MCNC: 18 MG/DL (ref 7–25)
CALCIUM SERPL-MCNC: 9.3 MG/DL (ref 8.6–10.3)
CHLORIDE SERPL-SCNC: 104 MMOL/L (ref 98–107)
CHOLEST SERPL-MCNC: 120 MG/DL
CO2 SERPL-SCNC: 25 MMOL/L (ref 21–31)
CREAT SERPL-MCNC: 0.81 MG/DL (ref 0.7–1.3)
GFR SERPL CREATININE-BSD FRML MDRD: >90 ML/MIN/{1.73_M2}
GLUCOSE SERPL-MCNC: 96 MG/DL (ref 70–105)
HDLC SERPL-MCNC: 58 MG/DL (ref 23–92)
LDLC SERPL CALC-MCNC: 40 MG/DL
NONHDLC SERPL-MCNC: 62 MG/DL
POTASSIUM SERPL-SCNC: 4.2 MMOL/L (ref 3.5–5.1)
SODIUM SERPL-SCNC: 140 MMOL/L (ref 134–144)
TRIGL SERPL-MCNC: 112 MG/DL

## 2019-05-17 PROCEDURE — 80061 LIPID PANEL: CPT | Mod: ZL | Performed by: FAMILY MEDICINE

## 2019-05-17 PROCEDURE — 36415 COLL VENOUS BLD VENIPUNCTURE: CPT | Mod: ZL | Performed by: FAMILY MEDICINE

## 2019-05-17 PROCEDURE — 80048 BASIC METABOLIC PNL TOTAL CA: CPT | Mod: ZL | Performed by: FAMILY MEDICINE

## 2019-05-17 PROCEDURE — 99214 OFFICE O/P EST MOD 30 MIN: CPT | Performed by: FAMILY MEDICINE

## 2019-05-17 PROCEDURE — G0463 HOSPITAL OUTPT CLINIC VISIT: HCPCS

## 2019-05-17 RX ORDER — GABAPENTIN 300 MG/1
300-600 CAPSULE ORAL AT BEDTIME
Qty: 60 CAPSULE | Refills: 1 | Status: SHIPPED | OUTPATIENT
Start: 2019-05-17 | End: 2019-05-17

## 2019-05-17 RX ORDER — HYDROCODONE BITARTRATE AND ACETAMINOPHEN 10; 325 MG/1; MG/1
1 TABLET ORAL EVERY 4 HOURS PRN
Qty: 180 TABLET | Refills: 0 | Status: SHIPPED | OUTPATIENT
Start: 2019-05-17 | End: 2019-06-12

## 2019-05-17 RX ORDER — HYDROCODONE BITARTRATE AND ACETAMINOPHEN 10; 325 MG/1; MG/1
1 TABLET ORAL EVERY 4 HOURS PRN
Qty: 180 TABLET | Refills: 0 | Status: SHIPPED | OUTPATIENT
Start: 2019-05-17 | End: 2019-05-17

## 2019-05-17 RX ORDER — GABAPENTIN 300 MG/1
300-600 CAPSULE ORAL AT BEDTIME
Qty: 60 CAPSULE | Refills: 1 | Status: SHIPPED | OUTPATIENT
Start: 2019-05-17 | End: 2019-06-12

## 2019-05-17 ASSESSMENT — MIFFLIN-ST. JEOR: SCORE: 1774.64

## 2019-05-17 ASSESSMENT — PAIN SCALES - GENERAL: PAINLEVEL: MODERATE PAIN (5)

## 2019-05-17 NOTE — PROGRESS NOTES
"Nursing Notes:   Gosselin, Norma J., LPN  5/17/2019  8:55 AM  Sign at exiting of workspace  Chief Complaint   Patient presents with     Recheck Medication     Hydrodone    Initial /74   Pulse 88   Temp 97.6  F (36.4  C) (Temporal)   Resp 20   Ht 1.753 m (5' 9\")   Wt 100.4 kg (221 lb 6.4 oz)   SpO2 95%   BMI 32.70 kg/m    Estimated body mass index is 32.7 kg/m  as calculated from the following:    Height as of this encounter: 1.753 m (5' 9\").    Weight as of this encounter: 100.4 kg (221 lb 6.4 oz).    Medication Reconciliation: complete      Norma J. Gosselin, LPN      SUBJECTIVE:  64 year old male presents for follow up on chronic low back pain.     Saw Dr Reeder and fusion seems appropriate. Starting PT with Delfino at [facility]. Remains on chronic opioids, but higher than previous baseline. Sometimes takes 3 pain pills overnight, ideally if he slept better would not take as many opioids.    We tried amitriptyline to help with pain and sleep. Amitriptyline makes him feel funny.      S/p prostatectomy and radiation in 2013. PSA is increasing. Now having CT scans and bone scan ordered by Dr Arrington to look for metastases.      REVIEW OF SYSTEMS:    Constitutional: negative  Respiratory: negative  Cardiovascular: negative    Past Medical History:   Diagnosis Date     Elevated prostate specific antigen (PSA)     4/10/2012     Encounter for other administrative examinations     1/31/2014     Esophagitis     No Comments Provided     Gastro-esophageal reflux disease without esophagitis     No Comments Provided     Low back pain     No Comments Provided     Malignant neoplasm of prostate (H)     9/6/2012     Nicotine dependence, uncomplicated     Quit - October 2007 with chantix     Other intervertebral disc degeneration, lumbosacral region     12/1/2008       Past Surgical History:   Procedure Laterality Date     APPENDECTOMY OPEN      09/19/2009,Ruptured [place]     COLONOSCOPY      08/31/2006,EGD, next " due in 2016     DISKECTOMY, LUMBAR, SINGLE SP  03/16/2009    L 3-4 microdiskectomy, [facility]     ESOPHAGOSCOPY, GASTROSCOPY, DUODENOSCOPY (EGD), COMBINED      03/2003     ESOPHAGOSCOPY, GASTROSCOPY, DUODENOSCOPY (EGD), COMBINED      08/31/2006     PROSTATECTOMY PERINEAL RADICAL  11/28/2012    Nerve Sparring, Dr Warner     SPINE SURGERY N/A 04/28/2017    L3 to S1 spinal decompression L4/L5 fusion     TONSILLECTOMY, ADENOIDECTOMY, COMBINED      as a child     VARICOCELECTOMY  1959    INCISION AND DRAINAGE,EXCISE VARICOCELE        Current Outpatient Medications   Medication Sig Dispense Refill     acetaminophen (TYLENOL) 325 MG tablet Take 650 mg by mouth       amitriptyline (ELAVIL) 10 MG tablet Take 1-2 tablets (10-20 mg) by mouth At Bedtime 60 tablet 1     aspirin EC 81 MG EC tablet Take 81 mg by mouth daily with food       atorvastatin (LIPITOR) 20 MG tablet Take 1 tablet (20 mg) by mouth daily 90 tablet 3     Bee Pollen 500 MG CHEW Take 2 tablets by mouth daily       cyanocobalamin (RA VITAMIN B-12 TR) 1000 MCG TBCR Take 1,000 mcg by mouth daily       garlic (SM GARLIC) 150 MG TABS tablet Take 2 tablets by mouth daily       HYDROcodone-acetaminophen (NORCO)  MG per tablet Take 1 tablet by mouth every 4 hours as needed for moderate to severe pain or severe pain Ok to refill on 5/19/2019.  Max 6 per day. 180 tablet 0     IBUPROFEN Prn for back pain       lisinopril (PRINIVIL/ZESTRIL) 5 MG tablet Take 1 tablet (5 mg) by mouth daily 90 tablet 3     Omega-3 Fatty Acids (OMEGA 3 PO) Take 2 capsules by mouth daily       omeprazole (PRILOSEC) 40 MG DR capsule TAKE 1 CAPSULE BY MOUTH ONCE DAILY. 90 capsule 3     senna-docusate (SENOKOT-S;PERICOLACE) 8.6-50 MG per tablet Take 1-4 tablets by mouth as needed        sildenafil (REVATIO) 20 MG tablet TAKE 3 TO 5 TABLETS BY MOUTH 1 hour prior TO sexual activity. 100 tablet 1     No Known Allergies    OBJECTIVE:  /74   Pulse 88   Temp 97.6  F (36.4  C) (Temporal)    "Resp 20   Ht 1.753 m (5' 9\")   Wt 100.4 kg (221 lb 6.4 oz)   SpO2 95%   BMI 32.70 kg/m      EXAM:  General Appearance: Pleasant, alert, appropriate appearance for age. No acute distress  Psychiatric: Normal affect and mentation      ASSESSMENT/PLAN:    ICD-10-CM    1. Spinal stenosis of lumbar region with neurogenic claudication M48.062 HYDROcodone-acetaminophen (NORCO)  MG per tablet     DISCONTINUED: gabapentin (NEURONTIN) 300 MG capsule     DISCONTINUED: HYDROcodone-acetaminophen (NORCO)  MG per tablet   2. Chronic, continuous use of opioids F11.90 HYDROcodone-acetaminophen (NORCO)  MG per tablet     CBC W PLT No Diff     DISCONTINUED: HYDROcodone-acetaminophen (NORCO)  MG per tablet   3. Essential hypertension I10 Basic Metabolic Panel     CBC W PLT No Diff     Basic Metabolic Panel     CBC W PLT No Diff     CANCELED: CBC W PLT No Diff   4. Mixed hyperlipidemia E78.2 Lipid Panel     Lipid Panel     CBC W PLT No Diff     We again discussed goal of using some kind of adjunct to help with pain and reduce opioids.  He did not tolerate amitriptyline.  Had gabapentin on his list in the past, but it does not sound like he ever took it.  We will try gabapentin 300 mg at night and if no side effects, he will increase up to 600 mg every night.  If no side effects, continue taking and we can increase the dose.  If he has substantial side effects from the gabapentin, then call for alternate options.    Continue same hydrocodone dosing of 10/325 up to 6/day.  Given a number 180 tablets    He is due for labs for medication monitoring.  He needs a creatinine checked before he can have a CT scan, so ordered CBC, BMP, lipids.    Continue with prostate cancer work-up.  We can visit about findings in a month and then determine how to dose opioids going forward.    Greater than 50% of this 27 minute appointment spent on counseling     Wei Perez MD    This document was prepared using a combination " of typing and voice generated software.  While every attempt was made for accuracy, spelling and grammatical errors may exist.

## 2019-05-17 NOTE — TELEPHONE ENCOUNTER
Pt states that his current pharmacy will not fill Rx for gabapentin but was told to have it sent to [facility] in [place] to get it filled

## 2019-05-17 NOTE — PATIENT INSTRUCTIONS
Complete the scans and follow up with Dr Arrington  Try gabapentin 300 mg each night for a few nights, then if no sedation or side effects, try 600 mg each night  Refilled hydrocodone  Letter with lab results or we will call if abnormal  Follow-up in a month

## 2019-05-17 NOTE — NURSING NOTE
"Chief Complaint   Patient presents with     Recheck Medication     Hydrodone    Initial /74   Pulse 88   Temp 97.6  F (36.4  C) (Temporal)   Resp 20   Ht 1.753 m (5' 9\")   Wt 100.4 kg (221 lb 6.4 oz)   SpO2 95%   BMI 32.70 kg/m   Estimated body mass index is 32.7 kg/m  as calculated from the following:    Height as of this encounter: 1.753 m (5' 9\").    Weight as of this encounter: 100.4 kg (221 lb 6.4 oz).    Medication Reconciliation: complete      Norma J. Gosselin, LPN  "

## 2019-05-17 NOTE — LETTER
May 19, 2019      Dilip Kan  [address] [place] [address]      Dear ,    We are writing to inform you of your test results. Cholesterol values look a little better than last year. Electrolytes and kidney tests are normal.  The blood counts clotted, so at some point we should check a CBC, but it can be done at your convenience.     Resulted Orders   Lipid Panel   Result Value Ref Range    Cholesterol 120 <200 mg/dL    Triglycerides 112 <150 mg/dL    HDL Cholesterol 58 23 - 92 mg/dL    LDL Cholesterol Calculated 40 <100 mg/dL    Non HDL Cholesterol 62 <130 mg/dL   Basic Metabolic Panel   Result Value Ref Range    Sodium 140 134 - 144 mmol/L    Potassium 4.2 3.5 - 5.1 mmol/L    Chloride 104 98 - 107 mmol/L    Carbon Dioxide 25 21 - 31 mmol/L    Glucose 96 70 - 105 mg/dL    Urea Nitrogen 18 7 - 25 mg/dL    Creatinine 0.81 0.70 - 1.30 mg/dL    Calcium 9.3 8.6 - 10.3 mg/dL       If you have any questions or concerns, please call the clinic at the number listed above. We can discuss results further at your next appointment.      Sincerely,        Wei Perez MD

## 2019-05-20 ENCOUNTER — HOSPITAL ENCOUNTER (OUTPATIENT)
Dept: CT IMAGING | Facility: OTHER | Age: 64
End: 2019-05-20
Attending: UROLOGY
Payer: MEDICARE

## 2019-05-20 ENCOUNTER — HOSPITAL ENCOUNTER (OUTPATIENT)
Dept: NUCLEAR MEDICINE | Facility: OTHER | Age: 64
End: 2019-05-20
Attending: UROLOGY
Payer: MEDICARE

## 2019-05-20 ENCOUNTER — HOSPITAL ENCOUNTER (OUTPATIENT)
Dept: CT IMAGING | Facility: OTHER | Age: 64
Discharge: HOME | End: 2019-05-20
Attending: UROLOGY | Admitting: UROLOGY
Payer: MEDICARE

## 2019-05-20 ENCOUNTER — HOSPITAL ENCOUNTER (OUTPATIENT)
Dept: NUCLEAR MEDICINE | Facility: OTHER | Age: 64
Setting detail: NUCLEAR MEDICINE
End: 2019-05-20
Attending: UROLOGY
Payer: MEDICARE

## 2019-05-20 DIAGNOSIS — I10 ESSENTIAL HYPERTENSION: ICD-10-CM

## 2019-05-20 DIAGNOSIS — Z85.46 PERSONAL HISTORY OF MALIGNANT NEOPLASM OF PROSTATE: ICD-10-CM

## 2019-05-20 DIAGNOSIS — E78.2 MIXED HYPERLIPIDEMIA: ICD-10-CM

## 2019-05-20 DIAGNOSIS — R97.21 RISING PSA FOLLOWING TREATMENT FOR MALIGNANT NEOPLASM OF PROSTATE: ICD-10-CM

## 2019-05-20 DIAGNOSIS — F11.90 CHRONIC, CONTINUOUS USE OF OPIOIDS: ICD-10-CM

## 2019-05-20 LAB
ERYTHROCYTE [DISTWIDTH] IN BLOOD BY AUTOMATED COUNT: 13.5 % (ref 10–15)
HCT VFR BLD AUTO: 42.5 % (ref 40–53)
HGB BLD-MCNC: 14.2 G/DL (ref 13.3–17.7)
MCH RBC QN AUTO: 28.8 PG (ref 26.5–33)
MCHC RBC AUTO-ENTMCNC: 33.4 G/DL (ref 31.5–36.5)
MCV RBC AUTO: 86 FL (ref 78–100)
PLATELET # BLD AUTO: 195 10E9/L (ref 150–450)
RBC # BLD AUTO: 4.93 10E12/L (ref 4.4–5.9)
WBC # BLD AUTO: 7.6 10E9/L (ref 4–11)

## 2019-05-20 PROCEDURE — A9503 TC99M MEDRONATE: HCPCS | Performed by: UROLOGY

## 2019-05-20 PROCEDURE — 78306 BONE IMAGING WHOLE BODY: CPT

## 2019-05-20 PROCEDURE — 74177 CT ABD & PELVIS W/CONTRAST: CPT

## 2019-05-20 PROCEDURE — 25500064 ZZH RX 255 OP 636: Performed by: UROLOGY

## 2019-05-20 PROCEDURE — 71260 CT THORAX DX C+: CPT

## 2019-05-20 PROCEDURE — 85027 COMPLETE CBC AUTOMATED: CPT | Performed by: FAMILY MEDICINE

## 2019-05-20 PROCEDURE — 34300033 ZZH RX 343: Performed by: UROLOGY

## 2019-05-20 RX ORDER — TC 99M MEDRONATE 20 MG/10ML
28.4 INJECTION, POWDER, LYOPHILIZED, FOR SOLUTION INTRAVENOUS ONCE
Status: COMPLETED | OUTPATIENT
Start: 2019-05-20 | End: 2019-05-20

## 2019-05-20 RX ADMIN — IOHEXOL 100 ML: 350 INJECTION, SOLUTION INTRAVENOUS at 11:38

## 2019-05-20 RX ADMIN — TC 99M MEDRONATE 28.4 MCI.: 20 INJECTION, POWDER, LYOPHILIZED, FOR SOLUTION INTRAVENOUS at 09:40

## 2019-05-22 ENCOUNTER — HOSPITAL ENCOUNTER (OUTPATIENT)
Dept: PHYSICAL THERAPY | Facility: OTHER | Age: 64
Setting detail: THERAPIES SERIES
End: 2019-05-22
Attending: SPECIALIST
Payer: MEDICARE

## 2019-05-22 DIAGNOSIS — M48.062 SPINAL STENOSIS, LUMBAR REGION, WITH NEUROGENIC CLAUDICATION: ICD-10-CM

## 2019-05-22 PROCEDURE — 97161 PT EVAL LOW COMPLEX 20 MIN: CPT | Mod: GP

## 2019-05-22 PROCEDURE — 97110 THERAPEUTIC EXERCISES: CPT | Mod: GP

## 2019-05-23 NOTE — PROGRESS NOTES
[facility]          OUTPATIENT PHYSICAL THERAPY ORTHOPEDIC EVALUATION  PLAN OF TREATMENT FOR OUTPATIENT REHABILITATION  (COMPLETE FOR INITIAL CLAIMS ONLY)  Patient's Last Name, First Name, M.I.  YOB: 1955  Dilip Kan    Provider s Name:  [facility]   Medical Record No.  [id number]   Start of Care Date:  05/22/19   Onset Date:  (11/14/18)   Type:     _X__PT   ___OT   ___SLP Medical Diagnosis:  (P) Spinal stenosis, lumbar region, with neurogenic claudication M48.062      PT Diagnosis:  s/p Decompression L2 to L4, Posterior Spine Fusion with Instrumentation-extension L2 to L4, Transforaminal Lumbar Interbody Fusion L2 to L4   Visits from SOC:  1      _________________________________________________________________________________  Plan of Treatment/Functional Goals:  gait training, manual therapy, neuromuscular re-education, ROM, strengthening, stretching           Goals  Goal Identifier: walking   Goal Description: Report ability to walk for 45 minutes with low back pain at 2/10 or less   Target Date: 07/03/19    Goal Identifier: Weakness  Goal Description: Improve LE strength to allow squatting to  10# from the floor with proper body mechanics  Target Date: 07/03/19    Goal Identifier: HEP  Goal Description: Develop independent HEP and management   Target Date: 07/03/19                                                           Therapy Frequency:  2 times/Week  Predicted Duration of Therapy Intervention:  6 weeks     Delfino Day, PT                 I CERTIFY THE NEED FOR THESE SERVICES FURNISHED UNDER        THIS PLAN OF TREATMENT AND WHILE UNDER MY CARE .             Physician Signature               Date    X_____________________________________________________                             Certification Date From:  05/22/19   Certification Date To:  (P) 07/03/19    Referring Provider:  Dr. Reeedr     Initial Assessment        See Epic  Evaluation Start of Care Date: 05/22/19

## 2019-05-23 NOTE — PROGRESS NOTES
05/22/19 1300   General Information   Type of Visit Initial OP Ortho PT Evaluation   Start of Care Date 05/22/19   Referring Physician Dr. Reeder    Patient/Family Goals Statement improve mobility   Orders Evaluate and Treat   Orders Comment Orders reviewed    Date of Order 04/18/19   Certification Required? Yes   Medical Diagnosis Spinal stenosis, lumbar region, with neurogenic claudication M48.062    Surgical/Medical history reviewed Yes   Precautions/Limitations spinal precautions   Body Part(s)   Body Part(s) Lumbar Spine/SI   Presentation and Etiology   Pertinent history of current problem (include personal factors and/or comorbidities that impact the POC) Patient is s/p Decompression L2 to L4, Posterior Spine Fusion with Instrumentation-extension L2 to L4, Transforaminal Lumbar Interbody Fusion L2 to L4  on 11/14/18.  States he feels better since surgery.  He still has days when the low back becomes painful.  He notes some days he can walk 25 minutes without much difficulty, but other days walking is more painful and he needs to use a cane.  He has been trying to walk 25-30 minutes per day.  He is motivated to begin PT.    Impairments A. Pain;D. Decreased ROM;E. Decreased flexibility;F. Decreased strength and endurance;H. Impaired gait   Functional Limitations perform activities of daily living;perform required work activities;perform desired leisure / sports activities   Symptom Location low back    Onset date of current episode/exacerbation   (11/14/18)   Pain rating (0-10 point scale) Best (/10);Worst (/10)   Best (/10) 3/10   Worst (/10) 8/10   Pain quality A. Sharp;C. Aching   Frequency of pain/symptoms B. Intermittent   Pain/symptoms exacerbated by A. Sitting;B. Walking;G. Certain positions;I. Bending;K. Home tasks;L. Work tasks   Pain/symptoms eased by E. Changing positions;F. Certain positions   Prior Level of Function   Prior Level of Function-Mobility No limitations   Prior Level of Function-ADLs  No limitations    Current Level of Function   Current Community Support Family/friend caregiver   Patient role/employment history F. Retired   Fall Risk Screen   Fall screen completed by PT   Have you fallen 2 or more times in the past year? No   Have you fallen and had an injury in the past year? No   Is patient a fall risk? No   Lumbar Spine/SI Objective Findings   Observation Healed lumbar incision    Gait/Locomotion normal gait pattern   Lumbar ROM Comment ROM assessment deferred due to post of fusion status    Hip Flexion (L2) Strength 4+/5 B   Knee Extension (L3) Strength 5/5 B   Ankle Dorsiflexion (L4) Strength 5/5 B   Great Toe Extension (L5) Strength 5/5 B    Ankle Plantar Flexion (S1) Strength 5/5 B   Planned Therapy Interventions   Planned Therapy Interventions gait training;manual therapy;neuromuscular re-education;ROM;strengthening;stretching   Clinical Impression   Criteria for Skilled Therapeutic Interventions Met yes, treatment indicated   PT Diagnosis s/p Decompression L2 to L4, Posterior Spine Fusion with Instrumentation-extension L2 to L4, Transforaminal Lumbar Interbody Fusion L2 to L4   Influenced by the following impairments pain, weakness, impaired ROM    Functional limitations due to impairments sitting, walking, certain positions, bending, household and work tasks   Clinical Presentation Stable/Uncomplicated   Clinical Presentation Rationale Patient is making satisfactory post op progress   Clinical Decision Making (Complexity) Low complexity   Therapy Frequency 2 times/Week   Predicted Duration of Therapy Intervention (days/wks) 6 weeks    Risk & Benefits of therapy have been explained Yes   Patient, Family & other staff in agreement with plan of care Yes   Education Assessment   Preferred Learning Style Listening;Reading;Pictures/video   Barriers to Learning No barriers   ORTHO GOALS   PT Ortho Eval Goals 1;3;2   Ortho Goal 1   Goal Identifier walking    Goal Description Report ability to  walk for 45 minutes with low back pain at 2/10 or less    Target Date 07/03/19   Ortho Goal 2   Goal Identifier Weakness   Goal Description Improve LE strength to allow squatting to  10# from the floor with proper body mechanics   Target Date 07/03/19   Ortho Goal 3   Goal Identifier HEP   Goal Description Develop independent HEP and management    Target Date 07/03/19   Total Evaluation Time   PT Eval, Low Complexity Minutes (15138) 20   Therapy Certification   Certification date from 05/22/19   Certification date to 07/03/19   Medical Diagnosis Spinal stenosis, lumbar region, with neurogenic claudication M48.062

## 2019-05-24 ENCOUNTER — HOSPITAL ENCOUNTER (OUTPATIENT)
Dept: PHYSICAL THERAPY | Facility: OTHER | Age: 64
Setting detail: THERAPIES SERIES
End: 2019-05-24
Attending: SPECIALIST
Payer: MEDICARE

## 2019-05-24 ENCOUNTER — OFFICE VISIT (OUTPATIENT)
Dept: UROLOGY | Facility: OTHER | Age: 64
End: 2019-05-24
Attending: UROLOGY
Payer: COMMERCIAL

## 2019-05-24 VITALS
RESPIRATION RATE: 18 BRPM | SYSTOLIC BLOOD PRESSURE: 126 MMHG | TEMPERATURE: 97.6 F | BODY MASS INDEX: 33.11 KG/M2 | DIASTOLIC BLOOD PRESSURE: 70 MMHG | HEART RATE: 90 BPM | WEIGHT: 224.2 LBS

## 2019-05-24 DIAGNOSIS — C61 PROSTATE CANCER (H): Primary | ICD-10-CM

## 2019-05-24 PROCEDURE — 97110 THERAPEUTIC EXERCISES: CPT | Mod: GP

## 2019-05-24 PROCEDURE — G0463 HOSPITAL OUTPT CLINIC VISIT: HCPCS

## 2019-05-24 PROCEDURE — 99214 OFFICE O/P EST MOD 30 MIN: CPT | Performed by: UROLOGY

## 2019-05-24 NOTE — PROGRESS NOTES
Type of Visit  EST    Chief Complaint  Rising PSA following prostatectomy and salvage radiation    HPI  Mr. Kan is a 64 y.o. male who presents s/p RRP in 2012 followed by sEBRT in 2013 who follows up with increasing PSA.  I had recommended a metastatic survey at the last visit given the increasing PSA.  He underwent CT scan and bone scan and follows up to review the results.    He continues to deny new changes such as unintentional weight loss, bone or pelvic pain.  He does have a history of lower back surgery from November 2018.  He is quite active and potential ADT side effects are concerning to him.      Family History  Family History   Problem Relation Age of Onset     Hypertension Mother      HTN     Heart Disease Father       passed away from CHF,CAD      Good Health Sister      emotional problems.      Good Health Sister      Good Health Other      Other Son      w/o major medical problems.      Other Daughter      w/o major medical problems.      Good Health Other      previous marriage./previous marriage.     Good Health Other      previous marriage.     Cancer-colon Father      Hypertension Father      Stroke Father      Other Father      Dementia     Other Mother       Parkinsons       Review of Systems  I reviewed the ROS with the patient today.    Nursing Notes:   Joana Herrera RN  5/24/2019 10:56 AM  Signed  Patient presents to urology clinic for F/U CT and bone scan.      Review of Systems:    Weight loss:    No     Recent fever/chills:  No   Night sweats:   No  Current skin rash:  No   Recent hair loss:  No  Heat intolerance:  No   Cold intolerance:  No  Chest pain:   No   Palpitations:   No  Shortness of breath:  No   Wheezing:   No  Constipation:    No   Diarrhea:   No   Nausea:   No   Vomiting:   No   Kidney/side pain:  No   Back pain:   Yes  Frequent headaches:  No   Dizziness:     No  Leg swelling:   No   Calf pain:    No      Joana Herrera RN on 5/24/2019 at 10:56  AM          Physical Exam  /70 (BP Location: Right arm, Patient Position: Sitting, Cuff Size: Adult Large)   Pulse 90   Temp 97.6  F (36.4  C) (Temporal)   Resp 18   Wt 101.7 kg (224 lb 3.2 oz)   BMI 33.11 kg/m    Constitutional: No acute distress.  Alert and cooperative   Head: NCAT  Eyes: Conjunctivae normal  Cardiovascular: Regular rate.  Pulmonary/Chest: Respirations are even and non-labored bilaterally, no audible wheezing  Abdominal: Soft. No distension, tenderness, masses or guarding.   Neurological: A + O x 3.  Cranial Nerves II-XII grossly intact.  Extremities: GINNA x 4, Warm. No clubbing.  No cyanosis.    Skin: Pink, warm and dry.  No visible rashes noted.  Psychiatric:  Normal mood and affect  Back:  No left CVA tenderness.  No right CVA tenderness.  Genitourinary:  Nonpalpable bladder    Labs  Results for EMILIA KAN (MRN [id number]) as of 5/1/2019 09:45   5/1/2019 07:36   Prostate Specific Antigen 2.714     Results for LELAND KAN (MRN [id number]) as of 11/1/2018 11:08   11/1/2018 08:49   Prostate Specific Antigen 1.177     Results for LELAND KAN (MRN [id number]) as of 3/29/2018 14:31   3/29/2018 12:15   Prostate Specific Antigen 0.798     Results for LELAND KAN (MRN [id number]) as of 3/29/2018 11:55   4/7/2017 16:04   PSA Total (Diagnostic) - Historical 0.268     Results for LELAND KAN (MRN [id number]) as of 3/23/2016 10:42   1/2/2013 08:00* 2/27/2013** 08:10 10/16/2014 11:26 3/1/2016 14:15   PSA TOTAL (DIAGNOSTIC) 0.67 0.97     PSA TOTAL (DIAGNOSTIC)   0.070 0.154   * Following radical prostatectomy  ** Following salvage external beam radiation therapy.    Imaging  CT c/a/p  5/20/2019  IMPRESSION:  No evidence of metastatic disease in the chest, abdomen,  or pelvis.    Bone Scan  5/20/2019  IMPRESSION: Multifocal probable degenerative changes. No sites highly  suspicious for osseous metastatic disease.     Assessment  Mr. Kan is a 64 y.o. male who follows up s/p  RRP in 2012 followed by sEBRT in 2013 who follows up with detectable and rising PSA with negative metastatic survey.    No additional local therapy is recommended given the combined effects of ablative therapies can lead to severe complications.    We discussed androgen deprivation therapy.  We discussed the potential side effects of low energy, hot flashes, bone demineralization among others.  We discussed the concept of early start versus late start ADT.  For this discussion we elected to start ADT in a delayed fashion.    Plan  Patient plans to follow-up in October with a PSA prior and plans for Lupron 3-month injection.   -At this point he will be close to 1 year out from his back surgery    (attempting to minimize bone side effects negatively impacting his recovery)        I spent 25 minutes on this patient's visit and over 50% of this time was spent in face-to-face counseling regarding his diagnosis, treatment options with emphasis on  risks and benefits of each, prognosis and importance of compliance.

## 2019-05-24 NOTE — NURSING NOTE
Patient presents to urology clinic for F/U CT and bone scan.      Review of Systems:    Weight loss:    No     Recent fever/chills:  No   Night sweats:   No  Current skin rash:  No   Recent hair loss:  No  Heat intolerance:  No   Cold intolerance:  No  Chest pain:   No   Palpitations:   No  Shortness of breath:  No   Wheezing:   No  Constipation:    No   Diarrhea:   No   Nausea:   No   Vomiting:   No   Kidney/side pain:  No   Back pain:   Yes  Frequent headaches:  No   Dizziness:     No  Leg swelling:   No   Calf pain:    No      Joana Herrera RN on 5/24/2019 at 10:56 AM

## 2019-06-03 DIAGNOSIS — K21.9 GASTROESOPHAGEAL REFLUX DISEASE WITHOUT ESOPHAGITIS: ICD-10-CM

## 2019-06-04 RX ORDER — OMEPRAZOLE 40 MG/1
CAPSULE, DELAYED RELEASE ORAL
Qty: 90 CAPSULE | Refills: 3 | Status: SHIPPED | OUTPATIENT
Start: 2019-06-04 | End: 2020-01-24

## 2019-06-04 NOTE — TELEPHONE ENCOUNTER
Refill request for: Omeprazole 40 mg capsules   From: [facility] [place] [place]  Rx written date: 12/28/2018 390 with 3 refills  LOV: 05/17/2019 with PCP  Next appt: 06/14/2019 with PCP  Protocol: PPI Protocol Passed     Reviewed LOV, no changes made to requested medication. Prescription approved per [facility] Refill Protocol. Thalia Vázquez RN on 6/4/2019 at 10:41 AM

## 2019-06-07 ENCOUNTER — HOSPITAL ENCOUNTER (OUTPATIENT)
Dept: PHYSICAL THERAPY | Facility: OTHER | Age: 64
Setting detail: THERAPIES SERIES
End: 2019-06-07
Attending: SPECIALIST
Payer: MEDICARE

## 2019-06-07 PROCEDURE — 97110 THERAPEUTIC EXERCISES: CPT | Mod: GP

## 2019-06-12 ENCOUNTER — OFFICE VISIT (OUTPATIENT)
Dept: FAMILY MEDICINE | Facility: OTHER | Age: 64
End: 2019-06-12
Attending: FAMILY MEDICINE
Payer: COMMERCIAL

## 2019-06-12 VITALS
WEIGHT: 220.6 LBS | TEMPERATURE: 97.5 F | OXYGEN SATURATION: 95 % | BODY MASS INDEX: 32.58 KG/M2 | HEART RATE: 90 BPM | RESPIRATION RATE: 20 BRPM | DIASTOLIC BLOOD PRESSURE: 78 MMHG | SYSTOLIC BLOOD PRESSURE: 137 MMHG

## 2019-06-12 DIAGNOSIS — F11.90 CHRONIC, CONTINUOUS USE OF OPIOIDS: ICD-10-CM

## 2019-06-12 DIAGNOSIS — M48.062 SPINAL STENOSIS OF LUMBAR REGION WITH NEUROGENIC CLAUDICATION: ICD-10-CM

## 2019-06-12 PROCEDURE — G0463 HOSPITAL OUTPT CLINIC VISIT: HCPCS

## 2019-06-12 PROCEDURE — 99214 OFFICE O/P EST MOD 30 MIN: CPT | Performed by: FAMILY MEDICINE

## 2019-06-12 RX ORDER — GABAPENTIN 300 MG/1
300-600 CAPSULE ORAL AT BEDTIME
Qty: 60 CAPSULE | Refills: 1 | Status: CANCELLED | OUTPATIENT
Start: 2019-06-12

## 2019-06-12 RX ORDER — HYDROCODONE BITARTRATE AND ACETAMINOPHEN 10; 325 MG/1; MG/1
1 TABLET ORAL EVERY 4 HOURS PRN
Qty: 180 TABLET | Refills: 0 | Status: SHIPPED | OUTPATIENT
Start: 2019-07-12 | End: 2019-07-10

## 2019-06-12 RX ORDER — GABAPENTIN 300 MG/1
300-600 CAPSULE ORAL AT BEDTIME
Qty: 60 CAPSULE | Refills: 5 | Status: SHIPPED | OUTPATIENT
Start: 2019-06-12 | End: 2019-09-30

## 2019-06-12 RX ORDER — HYDROCODONE BITARTRATE AND ACETAMINOPHEN 10; 325 MG/1; MG/1
1 TABLET ORAL EVERY 4 HOURS PRN
Qty: 180 TABLET | Refills: 0 | Status: SHIPPED | OUTPATIENT
Start: 2019-06-12 | End: 2019-06-12

## 2019-06-12 RX ORDER — HYDROCODONE BITARTRATE AND ACETAMINOPHEN 10; 325 MG/1; MG/1
1 TABLET ORAL EVERY 4 HOURS PRN
Qty: 180 TABLET | Refills: 0 | Status: SHIPPED | OUTPATIENT
Start: 2019-08-11 | End: 2019-07-10

## 2019-06-12 RX ORDER — GABAPENTIN 100 MG/1
100 CAPSULE ORAL 3 TIMES DAILY
Qty: 90 CAPSULE | Refills: 5 | Status: SHIPPED | OUTPATIENT
Start: 2019-06-12 | End: 2019-09-30

## 2019-06-12 ASSESSMENT — PAIN SCALES - GENERAL: PAINLEVEL: MODERATE PAIN (4)

## 2019-06-12 ASSESSMENT — ANXIETY QUESTIONNAIRES
1. FEELING NERVOUS, ANXIOUS, OR ON EDGE: NOT AT ALL
6. BECOMING EASILY ANNOYED OR IRRITABLE: NOT AT ALL
5. BEING SO RESTLESS THAT IT IS HARD TO SIT STILL: NOT AT ALL
2. NOT BEING ABLE TO STOP OR CONTROL WORRYING: NOT AT ALL
GAD7 TOTAL SCORE: 0
7. FEELING AFRAID AS IF SOMETHING AWFUL MIGHT HAPPEN: NOT AT ALL
3. WORRYING TOO MUCH ABOUT DIFFERENT THINGS: NOT AT ALL

## 2019-06-12 ASSESSMENT — PATIENT HEALTH QUESTIONNAIRE - PHQ9
5. POOR APPETITE OR OVEREATING: NOT AT ALL
SUM OF ALL RESPONSES TO PHQ QUESTIONS 1-9: 1

## 2019-06-12 NOTE — PATIENT INSTRUCTIONS
Try 400 mg (300+100) of gabapentin at night for a week, then 100 mg in the morning and 400 mg at night for week. Then 100 mg twice in the day and 400 mg at night for a week. Then could gradually work up night or day dose if needed.  Let me know if you need a change in prescription    Refilled hydrocodone for 3 months  Goal is to reduce hydrocodone by at least 1 full pill daily by follow up down to 5 per day. 4 would be even better!

## 2019-06-12 NOTE — PROGRESS NOTES
Nursing Notes:   Raulito Chaparrita MONTENEGRO., LPN  2019  3:31 PM  Sign at exiting of workspace  Patient is here for medication check, also wondering if can have his left knee checked. States had injection before and last few weeks has been bothering him.  Chaparrita Raulito LPN .............2019     3:25 PM        Medication Reconciliation: complete    Chaparrita MARTÍNEZ [place], LPN  2019 3:30 PM      SUBJECTIVE:  64 year old male presents for follow up on chronic low back pain.     Tried gabapentin and 300 mg each night is okay. Sleeps better through the night and helps him sleep better in the morning.  He believes that this should help him reduce at least 1 pain pill per day.  However, he is actually out early from hydrocodone as an aunt  and he went down to help with the estate.  Between traveling and having to sit and meet with attorneys, his pain is much worse.  He believes things should calm down and improved.    Had spinal fusion with Dr. Reeder in 2018.  Things look stable on follow-up.  Continues in physical therapy.    Followed up with Dr. Arrington due to elevated PSA.  Had a prostatectomy followed by radiation in .  He will pursue androgen deprivation therapy in a few months.  Bone scan and CT scan did not show any metastases.      REVIEW OF SYSTEMS:    Constitutional: negative  Respiratory: negative  Cardiovascular: negative    Past Medical History:   Diagnosis Date     Elevated prostate specific antigen (PSA)     4/10/2012     Encounter for other administrative examinations     2014     Esophagitis     No Comments Provided     Gastro-esophageal reflux disease without esophagitis     No Comments Provided     Low back pain     No Comments Provided     Malignant neoplasm of prostate (H)     2012     Nicotine dependence, uncomplicated     Quit - 2007 with chantix     Other intervertebral disc degeneration, lumbosacral region     2008       Past Surgical History:   Procedure  Laterality Date     APPENDECTOMY OPEN      09/19/2009,Ruptured [place]     COLONOSCOPY      08/31/2006,EGD, next due in 2016     DISKECTOMY, LUMBAR, SINGLE SP  03/16/2009    L 3-4 microdiskectomy, [facility]     ESOPHAGOSCOPY, GASTROSCOPY, DUODENOSCOPY (EGD), COMBINED      03/2003     ESOPHAGOSCOPY, GASTROSCOPY, DUODENOSCOPY (EGD), COMBINED      08/31/2006     PROSTATECTOMY PERINEAL RADICAL  11/28/2012    Nerve [facility], Dr Warner     SPINE SURGERY N/A 04/28/2017    L3 to S1 spinal decompression L4/L5 fusion     TONSILLECTOMY, ADENOIDECTOMY, COMBINED      as a child     VARICOCELECTOMY  1959    INCISION AND DRAINAGE,EXCISE VARICOCELE        Current Outpatient Medications   Medication Sig Dispense Refill     acetaminophen (TYLENOL) 325 MG tablet Take 650 mg by mouth       aspirin EC 81 MG EC tablet Take 81 mg by mouth daily with food       atorvastatin (LIPITOR) 20 MG tablet Take 1 tablet (20 mg) by mouth daily 90 tablet 3     Bee Pollen 500 MG CHEW Take 2 tablets by mouth daily       cyanocobalamin (RA VITAMIN B-12 TR) 1000 MCG TBCR Take 1,000 mcg by mouth daily       gabapentin (NEURONTIN) 300 MG capsule Take 1-2 capsules (300-600 mg) by mouth At Bedtime 60 capsule 1     garlic (SM GARLIC) 150 MG TABS tablet Take 2 tablets by mouth daily       HYDROcodone-acetaminophen (NORCO)  MG per tablet Take 1 tablet by mouth every 4 hours as needed for moderate to severe pain or severe pain Max 6 per day. 180 tablet 0     IBUPROFEN Prn for back pain       lisinopril (PRINIVIL/ZESTRIL) 5 MG tablet Take 1 tablet (5 mg) by mouth daily 90 tablet 3     Omega-3 Fatty Acids (OMEGA 3 PO) Take 2 capsules by mouth daily       omeprazole (PRILOSEC) 40 MG DR capsule TAKE 1 CAPSULE BY MOUTH ONCE DAILY. 90 capsule 3     senna-docusate (SENOKOT-S;PERICOLACE) 8.6-50 MG per tablet Take 1-4 tablets by mouth as needed        sildenafil (REVATIO) 20 MG tablet TAKE 3 TO 5 TABLETS BY MOUTH 1 hour prior TO sexual activity. 100 tablet 1      No Known Allergies    OBJECTIVE:  /78 (BP Location: Right arm, Patient Position: Sitting, Cuff Size: Adult Large)   Pulse 90   Temp 97.5  F (36.4  C) (Oral)   Resp 20   Wt 100.1 kg (220 lb 9.6 oz)   SpO2 95%   BMI 32.58 kg/m      EXAM:  General Appearance: Pleasant, alert, appropriate appearance for age. No acute distress  Psychiatric: Normal affect and mentation      ASSESSMENT/PLAN:    ICD-10-CM    1. Spinal stenosis of lumbar region with neurogenic claudication M48.062 gabapentin (NEURONTIN) 300 MG capsule     gabapentin (NEURONTIN) 100 MG capsule     HYDROcodone-acetaminophen (NORCO)  MG per tablet     HYDROcodone-acetaminophen (NORCO)  MG per tablet     DISCONTINUED: HYDROcodone-acetaminophen (NORCO)  MG per tablet   2. Chronic, continuous use of opioids F11.90 HYDROcodone-acetaminophen (NORCO)  MG per tablet     HYDROcodone-acetaminophen (NORCO)  MG per tablet     DISCONTINUED: HYDROcodone-acetaminophen (NORCO)  MG per tablet     Seems to have some early benefit from gabapentin.  Did not tolerate amitriptyline. Never took Lyrica. Never took Cymbalta.  Discussed dosing is typically 3 times per day.  He can try a lower dose during the day and higher dose at night to see if this will help some with pain, not cause sedation, but help with sleep.  Refilled gabapentin 300 mg pills.  He can try 100 mg pills in the daytime along with the 300 mg 1 at night.  See patient instructions on working up from current to 300 mg at night up to 400, then adding daytime dosing and then working up to nighttime dosing.  Contact me if he gets onto a higher dose such as 200 mg 2 times daily and 600 mg at night.    We reviewed options in hydrocodone.  He needs to work on reduction.  Ideally gabapentin will help ease this transition.  Healing after lumbar surgery should help as well.  Refilled hydrocodone for 3 months.  He should be working down on the dosing.  Expect that he is down  to 5/day by follow-up.  Certainly a lower dosing closer to 4/day would be even better.  If not able to reduce on her own, then I will set a gradual taper in place.    Greater than 50% of this 36 minute appointment spent on counseling     Wei Perez MD    This document was prepared using a combination of typing and voice generated software.  While every attempt was made for accuracy, spelling and grammatical errors may exist.

## 2019-06-13 ASSESSMENT — ANXIETY QUESTIONNAIRES: GAD7 TOTAL SCORE: 0

## 2019-06-17 ENCOUNTER — HOSPITAL ENCOUNTER (OUTPATIENT)
Dept: PHYSICAL THERAPY | Facility: OTHER | Age: 64
Setting detail: THERAPIES SERIES
End: 2019-06-17
Attending: SPECIALIST
Payer: MEDICARE

## 2019-06-17 PROCEDURE — 97110 THERAPEUTIC EXERCISES: CPT | Mod: GP

## 2019-06-21 ENCOUNTER — HOSPITAL ENCOUNTER (OUTPATIENT)
Dept: PHYSICAL THERAPY | Facility: OTHER | Age: 64
Setting detail: THERAPIES SERIES
End: 2019-06-21
Attending: SPECIALIST
Payer: MEDICARE

## 2019-06-21 PROCEDURE — 97110 THERAPEUTIC EXERCISES: CPT | Mod: GP

## 2019-06-24 ENCOUNTER — HOSPITAL ENCOUNTER (OUTPATIENT)
Dept: PHYSICAL THERAPY | Facility: OTHER | Age: 64
Setting detail: THERAPIES SERIES
End: 2019-06-24
Attending: SPECIALIST
Payer: MEDICARE

## 2019-06-24 PROCEDURE — 97110 THERAPEUTIC EXERCISES: CPT | Mod: GP

## 2019-07-01 ENCOUNTER — HOSPITAL ENCOUNTER (OUTPATIENT)
Dept: PHYSICAL THERAPY | Facility: OTHER | Age: 64
Setting detail: THERAPIES SERIES
End: 2019-07-01
Attending: SPECIALIST
Payer: MEDICARE

## 2019-07-01 PROCEDURE — 97110 THERAPEUTIC EXERCISES: CPT | Mod: GP

## 2019-07-02 NOTE — PROGRESS NOTES
Outpatient Physical Therapy Discharge Note     Patient: Dilip Kan  : 1955    Beginning/End Dates of Reporting Period:  19 to 2019    Referring Provider: Dr. Reeder     Therapy Diagnosis: Spinal stenosis, lumbar region, with neurogenic claudication M48.062      Client Self Report: Patient states he is ready to proceed with independent HEP and management.  He is compliant with the HEP.  States his low back will become stiff and sore if he does too much.  He is pleased with his current progress.     Objective Measurements:  Objective Measure: low back pain   Details: 5/10    Patient demonstrates proper body mechanics when performing lifting tasks.      Goals:  Goal Identifier walking    Goal Description Report ability to walk for 45 minutes with low back pain at 2/10 or less    Target Date 19   Date Met      Progress: Progressing towards goal at time of discharge      Goal Identifier Weakness   Goal Description Improve LE strength to allow squatting to  10# from the floor with proper body mechanics   Target Date 19   Date Met      Progress: Goal Met      Goal Identifier HEP   Goal Description Develop independent HEP and management    Target Date 19   Date Met      Progress: Goal Met        Progress Toward Goals:   Progress this reporting period: Patient is ready to proceed with independent management.      Plan:  Discharge from therapy.    Discharge:    Reason for Discharge: Patient is ready to proceed with independent management.      Discharge Plan: Patient to continue home program.

## 2019-07-09 ENCOUNTER — TELEPHONE (OUTPATIENT)
Dept: FAMILY MEDICINE | Facility: OTHER | Age: 64
End: 2019-07-09

## 2019-07-09 NOTE — TELEPHONE ENCOUNTER
After verifying pts name and date of birth with pt, pt was scheduled on 7/10 @1400 at the [facility] clinic.  Sydney Caballero

## 2019-07-09 NOTE — TELEPHONE ENCOUNTER
Patient is in need of appointment for refill on medication (narco) will be out by Friday 07/12/2019. Please call and advise patient if able to work in prior to 07/12/2019 or if okay to see another patient for this one time. Thank you!    Bernadette Gomes on 7/9/2019 at 10:57 AM

## 2019-07-10 ENCOUNTER — OFFICE VISIT (OUTPATIENT)
Dept: FAMILY MEDICINE | Facility: OTHER | Age: 64
End: 2019-07-10
Attending: FAMILY MEDICINE
Payer: COMMERCIAL

## 2019-07-10 VITALS
WEIGHT: 222.4 LBS | RESPIRATION RATE: 18 BRPM | HEART RATE: 82 BPM | OXYGEN SATURATION: 95 % | DIASTOLIC BLOOD PRESSURE: 84 MMHG | BODY MASS INDEX: 32.84 KG/M2 | TEMPERATURE: 98.4 F | SYSTOLIC BLOOD PRESSURE: 155 MMHG

## 2019-07-10 DIAGNOSIS — M48.062 SPINAL STENOSIS OF LUMBAR REGION WITH NEUROGENIC CLAUDICATION: ICD-10-CM

## 2019-07-10 DIAGNOSIS — F11.90 CHRONIC, CONTINUOUS USE OF OPIOIDS: ICD-10-CM

## 2019-07-10 PROCEDURE — G0463 HOSPITAL OUTPT CLINIC VISIT: HCPCS

## 2019-07-10 PROCEDURE — 99213 OFFICE O/P EST LOW 20 MIN: CPT | Performed by: FAMILY MEDICINE

## 2019-07-10 RX ORDER — HYDROCODONE BITARTRATE AND ACETAMINOPHEN 10; 325 MG/1; MG/1
1 TABLET ORAL EVERY 4 HOURS PRN
Qty: 168 TABLET | Refills: 0 | Status: SHIPPED | OUTPATIENT
Start: 2019-08-07 | End: 2019-08-28

## 2019-07-10 RX ORDER — HYDROCODONE BITARTRATE AND ACETAMINOPHEN 10; 325 MG/1; MG/1
1 TABLET ORAL EVERY 4 HOURS PRN
Qty: 168 TABLET | Refills: 0 | Status: SHIPPED | OUTPATIENT
Start: 2019-07-10 | End: 2019-08-28

## 2019-07-10 ASSESSMENT — ANXIETY QUESTIONNAIRES
3. WORRYING TOO MUCH ABOUT DIFFERENT THINGS: NOT AT ALL
GAD7 TOTAL SCORE: 0
2. NOT BEING ABLE TO STOP OR CONTROL WORRYING: NOT AT ALL
7. FEELING AFRAID AS IF SOMETHING AWFUL MIGHT HAPPEN: NOT AT ALL
IF YOU CHECKED OFF ANY PROBLEMS ON THIS QUESTIONNAIRE, HOW DIFFICULT HAVE THESE PROBLEMS MADE IT FOR YOU TO DO YOUR WORK, TAKE CARE OF THINGS AT HOME, OR GET ALONG WITH OTHER PEOPLE: NOT DIFFICULT AT ALL
5. BEING SO RESTLESS THAT IT IS HARD TO SIT STILL: NOT AT ALL
6. BECOMING EASILY ANNOYED OR IRRITABLE: NOT AT ALL
1. FEELING NERVOUS, ANXIOUS, OR ON EDGE: NOT AT ALL

## 2019-07-10 ASSESSMENT — PATIENT HEALTH QUESTIONNAIRE - PHQ9
SUM OF ALL RESPONSES TO PHQ QUESTIONS 1-9: 0
5. POOR APPETITE OR OVEREATING: NOT AT ALL

## 2019-07-10 ASSESSMENT — PAIN SCALES - GENERAL: PAINLEVEL: MODERATE PAIN (4)

## 2019-07-10 NOTE — PROGRESS NOTES
Nursing Notes:   Sydney Caballero LPN  7/10/2019  1:57 PM  Signed  Pt presents to clinic today for medication management.      Medication Reconciliation: complete  Sydney Caballero LPN      SUBJECTIVE:  64 year old male presents for follow up on chronic low back pain. Worked in due to opioid legislation changes.    Using gabapentin 600 mg at night and 100 mg twice daily. Tolerating at this point. Seems to be helping.    He has not been able to reduce hydrocodone yet despite the gabapentin. Hopes to reduce though. Still at 6 per day.    REVIEW OF SYSTEMS:    Constitutional: negative      Past Medical History:   Diagnosis Date     Elevated prostate specific antigen (PSA)     4/10/2012     Encounter for other administrative examinations     1/31/2014     Esophagitis     No Comments Provided     Gastro-esophageal reflux disease without esophagitis     No Comments Provided     Low back pain     No Comments Provided     Malignant neoplasm of prostate (H)     9/6/2012     Nicotine dependence, uncomplicated     Quit - October 2007 with chantix     Other intervertebral disc degeneration, lumbosacral region     12/1/2008       Past Surgical History:   Procedure Laterality Date     APPENDECTOMY OPEN  [id number]    Ruptured - [place]     COLONOSCOPY  08/31/2006    next due in 2016     DISKECTOMY, LUMBAR, SINGLE SP  03/16/2009    L 3-4 microdiskectomy, [facility]     ESOPHAGOSCOPY, GASTROSCOPY, DUODENOSCOPY (EGD), COMBINED  03/2003          ESOPHAGOSCOPY, GASTROSCOPY, DUODENOSCOPY (EGD), COMBINED  08/31/2006          PROSTATECTOMY PERINEAL RADICAL  11/28/2012    [facility]     SPINE SURGERY N/A 04/28/2017    L3 to S1 spinal decompression L4/L5 fusion     TONSILLECTOMY, ADENOIDECTOMY, COMBINED      as a child     VARICOCELECTOMY  1959    INCISION AND DRAINAGE,EXCISE VARICOCELE        Current Outpatient Medications   Medication Sig Dispense Refill     acetaminophen (TYLENOL) 325 MG tablet Take 650 mg by mouth        aspirin EC 81 MG EC tablet Take 81 mg by mouth daily with food       atorvastatin (LIPITOR) 20 MG tablet Take 1 tablet (20 mg) by mouth daily 90 tablet 3     Bee Pollen 500 MG CHEW Take 2 tablets by mouth daily       cyanocobalamin (RA VITAMIN B-12 TR) 1000 MCG TBCR Take 1,000 mcg by mouth daily       gabapentin (NEURONTIN) 100 MG capsule Take 1 capsule (100 mg) by mouth 3 times daily Along with 300 to 600 mg at night via 300 mg pills 90 capsule 5     gabapentin (NEURONTIN) 300 MG capsule Take 1-2 capsules (300-600 mg) by mouth At Bedtime 60 capsule 5     garlic (SM GARLIC) 150 MG TABS tablet Take 2 tablets by mouth daily       [START ON 7/12/2019] HYDROcodone-acetaminophen (NORCO)  MG per tablet Take 1 tablet by mouth every 4 hours as needed for moderate to severe pain or severe pain Max 6 per day. 180 tablet 0     IBUPROFEN Prn for back pain       lisinopril (PRINIVIL/ZESTRIL) 5 MG tablet Take 1 tablet (5 mg) by mouth daily 90 tablet 3     Omega-3 Fatty Acids (OMEGA 3 PO) Take 2 capsules by mouth daily       omeprazole (PRILOSEC) 40 MG DR capsule TAKE 1 CAPSULE BY MOUTH ONCE DAILY. 90 capsule 3     senna-docusate (SENOKOT-S;PERICOLACE) 8.6-50 MG per tablet Take 1-4 tablets by mouth as needed        sildenafil (REVATIO) 20 MG tablet TAKE 3 TO 5 TABLETS BY MOUTH 1 hour prior TO sexual activity. 100 tablet 1     No Known Allergies    OBJECTIVE:  /84   Pulse 82   Temp 98.4  F (36.9  C) (Tympanic)   Resp 18   Wt 100.9 kg (222 lb 6.4 oz)   SpO2 95%   BMI 32.84 kg/m      EXAM:  General Appearance: Pleasant, alert, appropriate appearance for age. No acute distress  Psychiatric: Normal affect and mentation      ASSESSMENT/PLAN:    ICD-10-CM    1. Chronic, continuous use of opioids F11.90 HYDROcodone-acetaminophen (NORCO)  MG per tablet     HYDROcodone-acetaminophen (NORCO)  MG per tablet   2. Spinal stenosis of lumbar region with neurogenic claudication M48.062 HYDROcodone-acetaminophen  (NORCO)  MG per tablet     HYDROcodone-acetaminophen (NORCO)  MG per tablet     Continue gabapentin. Work to reduce hydrocodone. Given new refill today, 2 days early as he is in town. Will then be on a 28 d cycle. Given 1 refill and follow up in 56 days.    Wei Perez MD    This document was prepared using a combination of typing and voice generated software.  While every attempt was made for accuracy, spelling and grammatical errors may exist.

## 2019-07-10 NOTE — NURSING NOTE
Pt presents to clinic today for medication management.      Medication Reconciliation: complete  Sydney Caballero LPN

## 2019-07-11 ASSESSMENT — ANXIETY QUESTIONNAIRES: GAD7 TOTAL SCORE: 0

## 2019-08-23 ENCOUNTER — TELEPHONE (OUTPATIENT)
Dept: FAMILY MEDICINE | Facility: OTHER | Age: 64
End: 2019-08-23

## 2019-08-23 NOTE — TELEPHONE ENCOUNTER
After verifying pts name and date of birth with pt, pt was put in schedule on 8/28 @1360.  Sydney Caballero LPN

## 2019-08-23 NOTE — TELEPHONE ENCOUNTER
Patient called in regards to getting an appointment before the 5th of September as he will be out of his meds than and will be in [place] for a week. I was able to schedule him for when he gets back but he is looking for a small refill until then or a appt before he leaves. Please call him back in regards to this.

## 2019-08-28 ENCOUNTER — OFFICE VISIT (OUTPATIENT)
Dept: FAMILY MEDICINE | Facility: OTHER | Age: 64
End: 2019-08-28
Attending: FAMILY MEDICINE
Payer: COMMERCIAL

## 2019-08-28 VITALS
TEMPERATURE: 97.6 F | DIASTOLIC BLOOD PRESSURE: 82 MMHG | HEART RATE: 82 BPM | OXYGEN SATURATION: 95 % | WEIGHT: 228.4 LBS | SYSTOLIC BLOOD PRESSURE: 157 MMHG | BODY MASS INDEX: 33.73 KG/M2

## 2019-08-28 DIAGNOSIS — F11.90 CHRONIC, CONTINUOUS USE OF OPIOIDS: ICD-10-CM

## 2019-08-28 DIAGNOSIS — M48.062 SPINAL STENOSIS OF LUMBAR REGION WITH NEUROGENIC CLAUDICATION: ICD-10-CM

## 2019-08-28 PROCEDURE — G0463 HOSPITAL OUTPT CLINIC VISIT: HCPCS

## 2019-08-28 PROCEDURE — 99214 OFFICE O/P EST MOD 30 MIN: CPT | Performed by: FAMILY MEDICINE

## 2019-08-28 RX ORDER — HYDROCODONE BITARTRATE AND ACETAMINOPHEN 10; 325 MG/1; MG/1
1-2 TABLET ORAL EVERY 6 HOURS PRN
Qty: 180 TABLET | Refills: 0 | Status: SHIPPED | OUTPATIENT
Start: 2019-08-28 | End: 2019-09-30

## 2019-08-28 RX ORDER — HYDROCODONE BITARTRATE AND ACETAMINOPHEN 10; 325 MG/1; MG/1
1 TABLET ORAL EVERY 4 HOURS PRN
Qty: 168 TABLET | Refills: 0 | Status: CANCELLED | OUTPATIENT
Start: 2019-08-28

## 2019-08-28 NOTE — PROGRESS NOTES
Nursing Notes:   Sydney Caballero LPN  8/28/2019 11:24 AM  Signed  Pt presents to clinic today for medication management.      Medication Reconciliation: complete  Sydney Caballero LPN      SUBJECTIVE:  64 year old male presents for follow up on chronic low back pain.     Used 3 less pills in July and then tweaked back in August and is short of medication has he has been taking extra pills. Was supposed to have enough hydrocodone for 1 more week. However, he will be on a trip to [place] at the time, otherwise he might have waited out the prescription and taken less.    Reduced gabapentin due to headache. Now down to 200 mg in the day and 200 - 300 mg at night.  He was tolerating 100 mg twice daily and 600 mg at night at last appointment.  With increased, then he developed side effects.      REVIEW OF SYSTEMS:    Constitutional: negative      Past Medical History:   Diagnosis Date     Elevated prostate specific antigen (PSA)     4/10/2012     Encounter for other administrative examinations     1/31/2014     Esophagitis     No Comments Provided     Gastro-esophageal reflux disease without esophagitis     No Comments Provided     Low back pain     No Comments Provided     Malignant neoplasm of prostate (H)     9/6/2012     Nicotine dependence, uncomplicated     Quit - October 2007 with chantix     Other intervertebral disc degeneration, lumbosacral region     12/1/2008       Past Surgical History:   Procedure Laterality Date     APPENDECTOMY OPEN  [id number]    Ruptured - [place]     COLONOSCOPY  08/31/2006    next due in 2016     DISKECTOMY, LUMBAR, SINGLE SP  03/16/2009    [facility]     ESOPHAGOSCOPY, GASTROSCOPY, DUODENOSCOPY (EGD), COMBINED  03/2003          ESOPHAGOSCOPY, GASTROSCOPY, DUODENOSCOPY (EGD), COMBINED  08/31/2006          PROSTATECTOMY PERINEAL RADICAL  11/28/2012    Nerve Sparring, Dr Warner     SPINE SURGERY N/A 04/28/2017    L3 to S1 spinal decompression L4/L5 fusion      TONSILLECTOMY, ADENOIDECTOMY, COMBINED      as a child     VARICOCELECTOMY  1959    INCISION AND DRAINAGE,EXCISE VARICOCELE        Current Outpatient Medications   Medication Sig Dispense Refill     acetaminophen (TYLENOL) 325 MG tablet Take 650 mg by mouth       aspirin EC 81 MG EC tablet Take 81 mg by mouth daily with food       atorvastatin (LIPITOR) 20 MG tablet Take 1 tablet (20 mg) by mouth daily 90 tablet 3     Bee Pollen 500 MG CHEW Take 2 tablets by mouth daily       cyanocobalamin (RA VITAMIN B-12 TR) 1000 MCG TBCR Take 1,000 mcg by mouth daily       gabapentin (NEURONTIN) 100 MG capsule Take 1 capsule (100 mg) by mouth 3 times daily Along with 300 to 600 mg at night via 300 mg pills 90 capsule 5     gabapentin (NEURONTIN) 300 MG capsule Take 1-2 capsules (300-600 mg) by mouth At Bedtime 60 capsule 5     garlic (SM GARLIC) 150 MG TABS tablet Take 2 tablets by mouth daily       HYDROcodone-acetaminophen (NORCO)  MG per tablet Take 1 tablet by mouth every 4 hours as needed for moderate to severe pain or severe pain Max 6 per day. 168 tablet 0     HYDROcodone-acetaminophen (NORCO)  MG per tablet Take 1 tablet by mouth every 4 hours as needed for moderate to severe pain or severe pain Max 6 per day. 168 tablet 0     IBUPROFEN Prn for back pain       lisinopril (PRINIVIL/ZESTRIL) 5 MG tablet Take 1 tablet (5 mg) by mouth daily 90 tablet 3     Omega-3 Fatty Acids (OMEGA 3 PO) Take 2 capsules by mouth daily       omeprazole (PRILOSEC) 40 MG DR capsule TAKE 1 CAPSULE BY MOUTH ONCE DAILY. 90 capsule 3     senna-docusate (SENOKOT-S;PERICOLACE) 8.6-50 MG per tablet Take 1-4 tablets by mouth as needed        sildenafil (REVATIO) 20 MG tablet TAKE 3 TO 5 TABLETS BY MOUTH 1 hour prior TO sexual activity. 100 tablet 1     No Known Allergies    OBJECTIVE:  BP (!) 157/82   Pulse 82   Temp 97.6  F (36.4  C) (Tympanic)   Wt 103.6 kg (228 lb 6.4 oz)   SpO2 95%   BMI 33.73 kg/m      EXAM:  General Appearance:  Pleasant, alert, appropriate appearance for age. No acute distress  Musculoskeletal: Tender in thoracic region  Psychiatric: Normal affect and mentation      ASSESSMENT/PLAN:    ICD-10-CM    1. Chronic, continuous use of opioids F11.90 HYDROcodone-acetaminophen (NORCO)  MG per tablet     HYDROcodone-acetaminophen (NORCO)  MG per tablet   2. Spinal stenosis of lumbar region with neurogenic claudication M48.062 HYDROcodone-acetaminophen (NORCO)  MG per tablet     HYDROcodone-acetaminophen (NORCO)  MG per tablet     Has a thoracic strain.  This may be due to his underlying low back issues status post lumbar surgery, but he also could have just strained the lumbar region.  If he presented with thoracic strain, opioids would not be utilized.  I pointed out that he has self escalated hydrocodone, but this is not an appropriate treatment.  He expressed understanding.  Further increases without appointments will not be tolerated.    Offered referral to physical therapy, but he feels like the pain should get better.  Needs to reduce hydrocodone back to baseline of 6/day.  We will continue working down on the dosing. May refill hydrocodone August 31. Wrote prescription to allow early refill. Then should follow up Sept 30.    Discussed use of the gabapentin.  He was tolerating a higher dose, but greatly scaled back with headaches.  Been working back up to 100 mg twice daily and 600 mg at night to see if that is tolerated.    Greater than 50% of this 25 minute appointment spent on counseling   Wei Perez MD    This document was prepared using a combination of typing and voice generated software.  While every attempt was made for accuracy, spelling and grammatical errors may exist.

## 2019-09-25 DIAGNOSIS — C61 PROSTATE CANCER (H): Primary | ICD-10-CM

## 2019-09-25 NOTE — PROGRESS NOTES
5/2019  Plan  Patient plans to follow-up in October with a PSA prior and plans for Lupron 3-month injection.              -At this point he will be close to 1 year out from his back surgery               (attempting to minimize bone side effects negatively impacting his recovery)

## 2019-09-30 ENCOUNTER — OFFICE VISIT (OUTPATIENT)
Dept: FAMILY MEDICINE | Facility: OTHER | Age: 64
End: 2019-09-30
Attending: FAMILY MEDICINE
Payer: MEDICARE

## 2019-09-30 VITALS
OXYGEN SATURATION: 98 % | TEMPERATURE: 97.2 F | SYSTOLIC BLOOD PRESSURE: 158 MMHG | BODY MASS INDEX: 34.11 KG/M2 | DIASTOLIC BLOOD PRESSURE: 83 MMHG | RESPIRATION RATE: 18 BRPM | HEART RATE: 86 BPM | WEIGHT: 231 LBS

## 2019-09-30 DIAGNOSIS — M48.062 SPINAL STENOSIS OF LUMBAR REGION WITH NEUROGENIC CLAUDICATION: Primary | ICD-10-CM

## 2019-09-30 DIAGNOSIS — Z23 NEED FOR PROPHYLACTIC VACCINATION AND INOCULATION AGAINST INFLUENZA: ICD-10-CM

## 2019-09-30 DIAGNOSIS — F11.90 CHRONIC, CONTINUOUS USE OF OPIOIDS: ICD-10-CM

## 2019-09-30 PROCEDURE — G0008 ADMIN INFLUENZA VIRUS VAC: HCPCS

## 2019-09-30 PROCEDURE — G0463 HOSPITAL OUTPT CLINIC VISIT: HCPCS | Mod: 25

## 2019-09-30 PROCEDURE — 90471 IMMUNIZATION ADMIN: CPT | Performed by: FAMILY MEDICINE

## 2019-09-30 PROCEDURE — 90662 IIV NO PRSV INCREASED AG IM: CPT

## 2019-09-30 PROCEDURE — G0463 HOSPITAL OUTPT CLINIC VISIT: HCPCS

## 2019-09-30 PROCEDURE — 90471 IMMUNIZATION ADMIN: CPT

## 2019-09-30 PROCEDURE — 99213 OFFICE O/P EST LOW 20 MIN: CPT | Performed by: FAMILY MEDICINE

## 2019-09-30 RX ORDER — GABAPENTIN 300 MG/1
CAPSULE ORAL
Qty: 360 CAPSULE | Refills: 1 | Status: SHIPPED | OUTPATIENT
Start: 2019-09-30 | End: 2019-10-24 | Stop reason: ALTCHOICE

## 2019-09-30 RX ORDER — HYDROCODONE BITARTRATE AND ACETAMINOPHEN 10; 325 MG/1; MG/1
1-2 TABLET ORAL EVERY 6 HOURS PRN
Qty: 180 TABLET | Refills: 0 | Status: SHIPPED | OUTPATIENT
Start: 2019-09-30 | End: 2019-10-24

## 2019-10-03 ENCOUNTER — TRANSFERRED RECORDS (OUTPATIENT)
Dept: HEALTH INFORMATION MANAGEMENT | Facility: OTHER | Age: 64
End: 2019-10-03

## 2019-10-15 DIAGNOSIS — C61 PROSTATE CANCER (H): Primary | ICD-10-CM

## 2019-10-23 ENCOUNTER — TELEPHONE (OUTPATIENT)
Dept: FAMILY MEDICINE | Facility: OTHER | Age: 64
End: 2019-10-23

## 2019-10-23 NOTE — TELEPHONE ENCOUNTER
After verifying pts name and date of birth with pt, pt was requesting work in appointment for medication management and back pain. Pt was offered tomorrow @ 1400 and accepted.  Sydney Caballero LPN

## 2019-10-24 ENCOUNTER — OFFICE VISIT (OUTPATIENT)
Dept: FAMILY MEDICINE | Facility: OTHER | Age: 64
End: 2019-10-24
Attending: FAMILY MEDICINE
Payer: COMMERCIAL

## 2019-10-24 VITALS
OXYGEN SATURATION: 95 % | DIASTOLIC BLOOD PRESSURE: 78 MMHG | WEIGHT: 230 LBS | SYSTOLIC BLOOD PRESSURE: 156 MMHG | RESPIRATION RATE: 18 BRPM | HEART RATE: 86 BPM | BODY MASS INDEX: 33.97 KG/M2 | TEMPERATURE: 97.8 F

## 2019-10-24 DIAGNOSIS — F11.90 CHRONIC, CONTINUOUS USE OF OPIOIDS: ICD-10-CM

## 2019-10-24 DIAGNOSIS — M54.42 CHRONIC LEFT-SIDED LOW BACK PAIN WITH LEFT-SIDED SCIATICA: Primary | ICD-10-CM

## 2019-10-24 DIAGNOSIS — F40.240 CLAUSTROPHOBIA: ICD-10-CM

## 2019-10-24 DIAGNOSIS — G89.29 CHRONIC BACK PAIN GREATER THAN 3 MONTHS DURATION: ICD-10-CM

## 2019-10-24 DIAGNOSIS — G89.29 CHRONIC LEFT-SIDED LOW BACK PAIN WITH LEFT-SIDED SCIATICA: Primary | ICD-10-CM

## 2019-10-24 DIAGNOSIS — M48.062 SPINAL STENOSIS OF LUMBAR REGION WITH NEUROGENIC CLAUDICATION: ICD-10-CM

## 2019-10-24 DIAGNOSIS — M54.9 CHRONIC BACK PAIN GREATER THAN 3 MONTHS DURATION: ICD-10-CM

## 2019-10-24 PROCEDURE — G0463 HOSPITAL OUTPT CLINIC VISIT: HCPCS

## 2019-10-24 PROCEDURE — 99214 OFFICE O/P EST MOD 30 MIN: CPT | Performed by: FAMILY MEDICINE

## 2019-10-24 RX ORDER — HYDROCODONE BITARTRATE AND ACETAMINOPHEN 10; 325 MG/1; MG/1
1-2 TABLET ORAL EVERY 6 HOURS PRN
Qty: 180 TABLET | Refills: 0 | Status: SHIPPED | OUTPATIENT
Start: 2019-10-26 | End: 2019-11-26

## 2019-10-24 RX ORDER — PREGABALIN 50 MG/1
CAPSULE ORAL
Qty: 90 CAPSULE | Refills: 0 | Status: SHIPPED | OUTPATIENT
Start: 2019-10-24 | End: 2019-11-26

## 2019-10-24 RX ORDER — ALPRAZOLAM 0.5 MG
TABLET ORAL
Qty: 2 TABLET | Refills: 0 | Status: SHIPPED | OUTPATIENT
Start: 2019-10-24 | End: 2019-11-26

## 2019-10-24 ASSESSMENT — PAIN SCALES - GENERAL: PAINLEVEL: MODERATE PAIN (4)

## 2019-10-24 NOTE — PROGRESS NOTES
Nursing Notes:   Sydney Caballero LPN  10/24/2019 11:27 AM  Signed  Pt presents to clinic today for medication management and back pain.      Medication Reconciliation: complete  Sydney Caballero LPN      SUBJECTIVE:  64 year old male presents for follow up on chronic low back pain.     Had follow up with Dr Reeder in October. Surgery looked fine. Follow-up in a year. They did not really discuss other treatments for left side back pain. Will have shooting pain down left leg at times.    Gabapentin has helped pain some, but if he takes too much then he gets an abnormal sensation in his feet and he cannot tolerate the higher dosing.    Because of driving and travel, his back is hurt more.  He has been taking more hydrocodone than prescribed and is out of few days early.    REVIEW OF SYSTEMS:    Constitutional: negative      Past Medical History:   Diagnosis Date     Elevated prostate specific antigen (PSA)     4/10/2012     Encounter for other administrative examinations     1/31/2014     Esophagitis     No Comments Provided     Gastro-esophageal reflux disease without esophagitis     No Comments Provided     Low back pain     No Comments Provided     Malignant neoplasm of prostate (H)     9/6/2012     Nicotine dependence, uncomplicated     Quit - October 2007 with chantix     Other intervertebral disc degeneration, lumbosacral region     12/1/2008       Past Surgical History:   Procedure Laterality Date     APPENDECTOMY OPEN  [id number]    Ruptured - [place]     COLONOSCOPY  08/31/2006    next due in 2016     DISKECTOMY, LUMBAR, SINGLE SP  03/16/2009    [facility]     ESOPHAGOSCOPY, GASTROSCOPY, DUODENOSCOPY (EGD), COMBINED  03/2003          ESOPHAGOSCOPY, GASTROSCOPY, DUODENOSCOPY (EGD), COMBINED  08/31/2006          PROSTATECTOMY PERINEAL RADICAL  11/28/2012    Nerve Sparring, Dr Warner     SPINE SURGERY N/A 04/28/2017    L3 to S1 spinal decompression L4/L5 fusion     TONSILLECTOMY,  ADENOIDECTOMY, COMBINED      as a child     VARICOCELECTOMY  1959    INCISION AND DRAINAGE,EXCISE VARICOCELE        Current Outpatient Medications   Medication Sig Dispense Refill     acetaminophen (TYLENOL) 325 MG tablet Take 650 mg by mouth       aspirin EC 81 MG EC tablet Take 81 mg by mouth daily with food       Bee Pollen 500 MG CHEW Take 2 tablets by mouth daily       cyanocobalamin (RA VITAMIN B-12 TR) 1000 MCG TBCR Take 1,000 mcg by mouth daily       gabapentin (NEURONTIN) 300 MG capsule Take 1 capsule (300 mg) by mouth 2 times daily AND 2 capsules (600 mg) At Bedtime. 360 capsule 1     garlic (SM GARLIC) 150 MG TABS tablet Take 2 tablets by mouth daily       HYDROcodone-acetaminophen (NORCO)  MG per tablet Take 1-2 tablets by mouth every 6 hours as needed for moderate to severe pain or severe pain 180 tablet 0     IBUPROFEN Prn for back pain       lisinopril (PRINIVIL/ZESTRIL) 5 MG tablet Take 1 tablet (5 mg) by mouth daily 90 tablet 3     Omega-3 Fatty Acids (OMEGA 3 PO) Take 2 capsules by mouth daily       omeprazole (PRILOSEC) 40 MG DR capsule TAKE 1 CAPSULE BY MOUTH ONCE DAILY. 90 capsule 3     senna-docusate (SENOKOT-S;PERICOLACE) 8.6-50 MG per tablet Take 1-4 tablets by mouth as needed        sildenafil (REVATIO) 20 MG tablet TAKE 3 TO 5 TABLETS BY MOUTH 1 hour prior TO sexual activity. 100 tablet 1     atorvastatin (LIPITOR) 20 MG tablet Take 1 tablet (20 mg) by mouth daily (Patient not taking: Reported on 10/24/2019) 90 tablet 3     No Known Allergies    OBJECTIVE:  BP (!) 156/78   Pulse 86   Temp 97.8  F (36.6  C) (Tympanic)   Resp 18   Wt 104.3 kg (230 lb)   SpO2 95%   BMI 33.97 kg/m      EXAM:  General Appearance: Pleasant, alert, appropriate appearance for age. No acute distress  Musculoskeletal: Tender in bilateral low back.  Restricted in hip range of motion, but no groin pain.  Neurological: Strength 5 out of 5 bilateral lower extremities  Psychiatric: Normal affect and  mentation      ASSESSMENT/PLAN:    ICD-10-CM    1. Chronic left-sided low back pain with left-sided sciatica M54.42 PHYSICAL THERAPY REFERRAL    G89.29    2. Chronic back pain greater than 3 months duration M54.9 PHYSICAL THERAPY REFERRAL    G89.29 pregabalin (LYRICA) 50 MG capsule   3. Spinal stenosis of lumbar region with neurogenic claudication M48.062 HYDROcodone-acetaminophen (NORCO)  MG per tablet     PHYSICAL THERAPY REFERRAL     pregabalin (LYRICA) 50 MG capsule     MR Lumbar Spine w/o Contrast     CANCELED: MR Lumbar Spine w/o & w Contrast   4. Chronic, continuous use of opioids F11.90 HYDROcodone-acetaminophen (NORCO)  MG per tablet   5. Claustrophobia F40.240 ALPRAZolam (XANAX) 0.5 MG tablet     He continues having some left-sided low back pain and left-sided sciatica despite recent surgery.  He is wondering if there is something else wrong.  We discussed options.  He would consider injections or therapy, but is interested in imaging the area to see if there is some abnormality that needs to be surgically treated.  Ordered an MRI for further assessment. Claustrophobic, given alprazolam to use.    Agrees that he has some limitations in mobility.  We examined the possibility that he could have some problems in his hips.  He does seem to have left hip arthritis on the  film for CT scan of his abdomen.  However, with hip range of motion he has no groin pain, mostly limitation in internal rotation.  He just appears very stiff.  Agrees to referral back to physical therapy to work on mobility and stiffness.    Gabapentin has helped pain, but he is limited by side effects.  May benefit from Lyrica if he has less side effects.  We discussed option of changing.  He would like to try Lyrica.  Take 50 mg twice daily for 3 days and then 1 in the morning and 2 at night.  Stop the gabapentin.  Still can consider use of duloxetine in the future.    He has 3 days early for hydrocodone refill.  We  discussed that he cannot take extra and expect a refill in the future.  At this point and willing to refill, but in the future he will just be out and have to put up with pain until he is due for refill.  He does not have aberrant use, but when trying to work on a taper, he needs to hold to the recommendations and prescription.  Given hydrocodone 10/325 taking 6/day.  #180 for the next 30 days.      Follow up in a month  Greater than 50% of this 26 minute appointment spent on counseling   Wei Perez MD    This document was prepared using a combination of typing and voice generated software.  While every attempt was made for accuracy, spelling and grammatical errors may exist.

## 2019-10-24 NOTE — PATIENT INSTRUCTIONS
Stop gabapentin  Start pregabalin 50 mg twice daily for 3 days, then 50 mg in the morning and 100 mg at night until follow up appointment  Call if problems  Maintain hydrocodone for now  MRI of the back  Referral to physical therapy  Follow-up in a month

## 2019-10-24 NOTE — NURSING NOTE
Pt presents to clinic today for medication management and back pain.      Medication Reconciliation: complete  Sydney Caballero LPN

## 2019-11-04 ENCOUNTER — TELEPHONE (OUTPATIENT)
Dept: UROLOGY | Facility: OTHER | Age: 64
End: 2019-11-04

## 2019-11-04 ENCOUNTER — OFFICE VISIT (OUTPATIENT)
Dept: UROLOGY | Facility: OTHER | Age: 64
End: 2019-11-04
Attending: UROLOGY
Payer: MEDICARE

## 2019-11-04 ENCOUNTER — HOSPITAL ENCOUNTER (OUTPATIENT)
Dept: MRI IMAGING | Facility: OTHER | Age: 64
Discharge: HOME | End: 2019-11-04
Attending: FAMILY MEDICINE | Admitting: UROLOGY
Payer: MEDICARE

## 2019-11-04 VITALS
BODY MASS INDEX: 34.41 KG/M2 | WEIGHT: 233 LBS | HEART RATE: 88 BPM | DIASTOLIC BLOOD PRESSURE: 80 MMHG | RESPIRATION RATE: 18 BRPM | SYSTOLIC BLOOD PRESSURE: 152 MMHG

## 2019-11-04 DIAGNOSIS — C61 PROSTATE CANCER (H): ICD-10-CM

## 2019-11-04 DIAGNOSIS — M48.062 SPINAL STENOSIS OF LUMBAR REGION WITH NEUROGENIC CLAUDICATION: ICD-10-CM

## 2019-11-04 DIAGNOSIS — R97.21 RISING PSA FOLLOWING TREATMENT FOR MALIGNANT NEOPLASM OF PROSTATE: Primary | ICD-10-CM

## 2019-11-04 LAB — PSA SERPL-MCNC: 7.11 NG/ML

## 2019-11-04 PROCEDURE — 96402 CHEMO HORMON ANTINEOPL SQ/IM: CPT

## 2019-11-04 PROCEDURE — 72158 MRI LUMBAR SPINE W/O & W/DYE: CPT

## 2019-11-04 PROCEDURE — 25000128 H RX IP 250 OP 636: Performed by: UROLOGY

## 2019-11-04 PROCEDURE — 96372 THER/PROPH/DIAG INJ SC/IM: CPT

## 2019-11-04 PROCEDURE — 25500064 ZZH RX 255 OP 636: Performed by: FAMILY MEDICINE

## 2019-11-04 PROCEDURE — 36415 COLL VENOUS BLD VENIPUNCTURE: CPT | Mod: ZL | Performed by: UROLOGY

## 2019-11-04 PROCEDURE — 99214 OFFICE O/P EST MOD 30 MIN: CPT | Performed by: UROLOGY

## 2019-11-04 PROCEDURE — A9575 INJ GADOTERATE MEGLUMI 0.1ML: HCPCS | Performed by: FAMILY MEDICINE

## 2019-11-04 PROCEDURE — 84153 ASSAY OF PSA TOTAL: CPT | Mod: ZL | Performed by: UROLOGY

## 2019-11-04 PROCEDURE — G0463 HOSPITAL OUTPT CLINIC VISIT: HCPCS

## 2019-11-04 PROCEDURE — G0463 HOSPITAL OUTPT CLINIC VISIT: HCPCS | Mod: 25

## 2019-11-04 RX ADMIN — GADOTERATE MEGLUMINE 20 ML: 376.9 INJECTION INTRAVENOUS at 09:32

## 2019-11-04 RX ADMIN — LEUPROLIDE ACETATE 22.5 MG: KIT at 10:56

## 2019-11-04 ASSESSMENT — PAIN SCALES - GENERAL: PAINLEVEL: MODERATE PAIN (4)

## 2019-11-04 NOTE — PROGRESS NOTES
Type of Visit  EST    Chief Complaint  Rising PSA following prostatectomy and salvage radiation    HPI  Mr. Kan is a 64 y.o. male who presents s/p RRP in 2012 followed by sEBRT in 2013 who follows up with increasing PSA.  I had recommended a metastatic survey at the last visit given the increasing PSA.  He underwent CT scan and bone scan which were negative for metastatic disease.  In addition at the time the patient was 8 months status post back surgery.  I had recommended a follow-up in 4 months about 1 year from his back surgery to allow for maximal healing prior to likely starting androgen deprivation therapy.  He presents today with a PSA prior to the visit.    He denies any new symptoms such as back pain, bone pain, unintentional weight loss.      Family History  Family History   Problem Relation Age of Onset     Hypertension Mother      HTN     Heart Disease Father       passed away from CHF,CAD      Good Health Sister      emotional problems.      Good Health Sister      Good Health Other      Other Son      w/o major medical problems.      Other Daughter      w/o major medical problems.      Good Health Other      previous marriage./previous marriage.     Good Health Other      previous marriage.     Cancer-colon Father      Hypertension Father      Stroke Father      Other Father      Dementia     Other Mother       Parkinsons       Review of Systems  I reviewed the ROS with the patient today.    Nursing Notes:   Nevaeh Varela LPN  11/4/2019 10:20 AM  Sign at exiting of workspace  Pt presents to clinic for 5 month prostate cancer follow up    Review of Systems:    Weight loss:    No     Recent fever/chills:  No   Night sweats:   No  Current skin rash:  No   Recent hair loss:  No  Heat intolerance:  No   Cold intolerance:  No  Chest pain:   No   Palpitations:   No  Shortness of breath:  No   Wheezing:   No  Constipation:    No   Diarrhea:   No   Nausea:   No   Vomiting:   No   Kidney/side  pain:  No   Back pain:   Yes  Frequent headaches:  Yes   Dizziness:     No  Leg swelling:   No   Calf pain:    No          Physical Exam  BP (!) 152/80 (BP Location: Left arm, Patient Position: Sitting, Cuff Size: Adult Regular)   Pulse 88   Resp 18   Wt 105.7 kg (233 lb)   BMI 34.41 kg/m    Constitutional: No acute distress.  Alert and cooperative   Head: NCAT  Eyes: Conjunctivae normal  Cardiovascular: Regular rate.  Pulmonary/Chest: Respirations are even and non-labored bilaterally, no audible wheezing  Abdominal: Soft. No distension, tenderness, masses or guarding.   Neurological: A + O x 3.  Cranial Nerves II-XII grossly intact.  Extremities: GINNA x 4, Warm. No clubbing.  No cyanosis.    Skin: Pink, warm and dry.  No visible rashes noted.  Psychiatric:  Normal mood and affect  Back:  No left CVA tenderness.  No right CVA tenderness.  Genitourinary:  Nonpalpable bladder    Labs  Results for EMILIA CHAPMAN (MRN [id number]) as of 11/4/2019 10:25   11/4/2019 08:10   Prostate Specific Antigen 7.112 (H)     Results for EMILIA CHAPMAN (MRN [id number]) as of 5/1/2019 09:45   5/1/2019 07:36   Prostate Specific Antigen 2.714     Results for LELAND CHAPMAN (MRN [id number]) as of 11/1/2018 11:08   11/1/2018 08:49   Prostate Specific Antigen 1.177     Results for LELAND CHAPMAN (MRN [id number]) as of 3/29/2018 14:31   3/29/2018 12:15   Prostate Specific Antigen 0.798     Results for LELAND CHAPMAN (MRN [id number]) as of 3/29/2018 11:55   4/7/2017 16:04   PSA Total (Diagnostic) - Historical 0.268     Results for LELAND CHAPMAN (MRN [id number]) as of 3/23/2016 10:42   1/2/2013 08:00* 2/27/2013** 08:10 10/16/2014 11:26 3/1/2016 14:15   PSA TOTAL (DIAGNOSTIC) 0.67 0.97     PSA TOTAL (DIAGNOSTIC)   0.070 0.154   * Following radical prostatectomy  ** Following salvage external beam radiation therapy.    Imaging  CT c/a/p  5/20/2019  IMPRESSION:  No evidence of metastatic disease in the chest, abdomen,  or pelvis.    Bone  Scan  5/20/2019  IMPRESSION: Multifocal probable degenerative changes. No sites highly  suspicious for osseous metastatic disease.     Lupron Injection Procedure  Today the patient received an intramuscular injection of Lupron 22.5mg.  This was given in the gluteal muscle.  The results of this injection: None    Assessment  Mr. Kan is a 64 y.o. male who follows up s/p RRP in 2012 followed by sEBRT in 2013 who follows up with detectable and rising PSA with negative metastatic survey but accelerating PSA.    I recommended we start androgen deprivation therapy based on the rate of rise of his PSA in spite of the negative metastatic work-up recently performed.    We discussed androgen deprivation therapy.  We discussed the potential side effects of low energy, hot flashes, bone demineralization among others.    Plan  Initiated ADT in the form of Lupron 3 month injection today  Follow up in 3 months for planned Vantas implant with a PSA prior.            I spent 25 minutes on this patient's visit and over 50% of this time was spent in face-to-face counseling regarding his diagnosis, treatment options with emphasis on  risks and benefits of each, prognosis and importance of compliance.

## 2019-11-04 NOTE — NURSING NOTE
Pt presents to clinic for 5 month prostate cancer follow up    Review of Systems:    Weight loss:    No     Recent fever/chills:  No   Night sweats:   No  Current skin rash:  No   Recent hair loss:  No  Heat intolerance:  No   Cold intolerance:  No  Chest pain:   No   Palpitations:   No  Shortness of breath:  No   Wheezing:   No  Constipation:    No   Diarrhea:   No   Nausea:   No   Vomiting:   No   Kidney/side pain:  No   Back pain:   Yes  Frequent headaches:  Yes   Dizziness:     No  Leg swelling:   No   Calf pain:    No

## 2019-11-06 ENCOUNTER — TELEPHONE (OUTPATIENT)
Dept: FAMILY MEDICINE | Facility: OTHER | Age: 64
End: 2019-11-06

## 2019-11-06 NOTE — TELEPHONE ENCOUNTER
After verifying pts name and date of birth with pt, pt was notified of message below.  Sydney Caballero LPN

## 2019-11-07 DIAGNOSIS — N52.9 ERECTILE DYSFUNCTION, UNSPECIFIED ERECTILE DYSFUNCTION TYPE: ICD-10-CM

## 2019-11-07 RX ORDER — SILDENAFIL CITRATE 20 MG/1
TABLET ORAL
Qty: 300 TABLET | Refills: 0 | Status: SHIPPED | OUTPATIENT
Start: 2019-11-07 | End: 2020-01-24

## 2019-11-07 NOTE — TELEPHONE ENCOUNTER
"Pt.'s last office visit with Celestine Arrington MD was on 11/4/19 and note states:  \"Plan  Initiated ADT in the form of Lupron 3 month injection today  Follow up in 3 months for planned Vantas implant with a PSA prior.\"  To MD to address.  Sydney Ace RN.........11/7/2019...3:52 PM   "

## 2019-11-26 ENCOUNTER — OFFICE VISIT (OUTPATIENT)
Dept: FAMILY MEDICINE | Facility: OTHER | Age: 64
End: 2019-11-26
Attending: FAMILY MEDICINE
Payer: COMMERCIAL

## 2019-11-26 VITALS
HEART RATE: 102 BPM | TEMPERATURE: 96.4 F | SYSTOLIC BLOOD PRESSURE: 130 MMHG | OXYGEN SATURATION: 96 % | WEIGHT: 225.8 LBS | BODY MASS INDEX: 33.34 KG/M2 | RESPIRATION RATE: 18 BRPM | DIASTOLIC BLOOD PRESSURE: 72 MMHG

## 2019-11-26 DIAGNOSIS — Z79.818 ANDROGEN DEPRIVATION THERAPY: ICD-10-CM

## 2019-11-26 DIAGNOSIS — F11.90 CHRONIC, CONTINUOUS USE OF OPIOIDS: ICD-10-CM

## 2019-11-26 DIAGNOSIS — G89.29 CHRONIC BACK PAIN GREATER THAN 3 MONTHS DURATION: ICD-10-CM

## 2019-11-26 DIAGNOSIS — M54.9 CHRONIC BACK PAIN GREATER THAN 3 MONTHS DURATION: ICD-10-CM

## 2019-11-26 DIAGNOSIS — M48.062 SPINAL STENOSIS OF LUMBAR REGION WITH NEUROGENIC CLAUDICATION: Primary | ICD-10-CM

## 2019-11-26 DIAGNOSIS — R45.84 ANHEDONIA: ICD-10-CM

## 2019-11-26 PROCEDURE — G0463 HOSPITAL OUTPT CLINIC VISIT: HCPCS

## 2019-11-26 PROCEDURE — 99214 OFFICE O/P EST MOD 30 MIN: CPT | Performed by: FAMILY MEDICINE

## 2019-11-26 RX ORDER — VENLAFAXINE HYDROCHLORIDE 37.5 MG/1
37.5 CAPSULE, EXTENDED RELEASE ORAL DAILY
Qty: 15 CAPSULE | Refills: 0 | Status: SHIPPED | OUTPATIENT
Start: 2019-11-26 | End: 2019-12-19

## 2019-11-26 RX ORDER — HYDROCODONE BITARTRATE AND ACETAMINOPHEN 10; 325 MG/1; MG/1
1-2 TABLET ORAL EVERY 6 HOURS PRN
Qty: 180 TABLET | Refills: 0 | Status: SHIPPED | OUTPATIENT
Start: 2019-11-26 | End: 2019-12-19

## 2019-11-26 RX ORDER — PREGABALIN 50 MG/1
CAPSULE ORAL
Qty: 90 CAPSULE | Refills: 1 | Status: SHIPPED | OUTPATIENT
Start: 2019-11-26 | End: 2020-01-24

## 2019-11-26 RX ORDER — CHOLECALCIFEROL (VITAMIN D3) 50 MCG
1 TABLET ORAL DAILY
Status: ON HOLD | COMMUNITY
Start: 2019-11-26 | End: 2023-04-22

## 2019-11-26 RX ORDER — VENLAFAXINE HYDROCHLORIDE 75 MG/1
75 CAPSULE, EXTENDED RELEASE ORAL DAILY
Qty: 30 CAPSULE | Refills: 1 | Status: SHIPPED | OUTPATIENT
Start: 2019-12-11 | End: 2020-01-24

## 2019-11-26 ASSESSMENT — PAIN SCALES - GENERAL: PAINLEVEL: MODERATE PAIN (4)

## 2019-11-26 NOTE — PROGRESS NOTES
"Nursing Notes:   Sydney Caballero LPN  11/26/2019  8:23 AM  Sign at exiting of workspace  Pt presents to clinic today for medication management and back pain.      Medication Reconciliation: complete  Sydney Caballero LPN      SUBJECTIVE:  64 year old male presents for follow up on chronic low back pain.     Stopped gabapentin and started pregabalin. Taking 2 pills at night which seems to help. Does not have the \"prickly feeling\" in his feet like with gabapentin. However, he does get a \"numbness in the forehead.\"      Had follow up with Dr Reeder in October. Surgery looked fine, but they did not discuss other treatments for back pain, so I ordered an MRI. Had MRI with findings below. We discussed today.   He does core strengthening regularly.   Still has left low back pain. Sharp pain, radiates to the left thigh. Not below knee. Worse with walking.    Notes some twitching in left anterior thigh.    Had leuprolide shot and now lacks motivation. Feels fatigued.  Some hot flashes.    REVIEW OF SYSTEMS:    As above      Past Medical History:   Diagnosis Date     Elevated prostate specific antigen (PSA)     4/10/2012     Encounter for other administrative examinations     1/31/2014     Esophagitis     No Comments Provided     Gastro-esophageal reflux disease without esophagitis     No Comments Provided     Low back pain     No Comments Provided     Malignant neoplasm of prostate (H)     9/6/2012     Nicotine dependence, uncomplicated     Quit - October 2007 with chantix     Other intervertebral disc degeneration, lumbosacral region     12/1/2008       Past Surgical History:   Procedure Laterality Date     APPENDECTOMY OPEN  634179    Ruptured - [place]     COLONOSCOPY  08/31/2006    next due in 2016     DISKECTOMY, LUMBAR, SINGLE SP  03/16/2009    L 3-4 microdiskectomy, [facility]     ESOPHAGOSCOPY, GASTROSCOPY, DUODENOSCOPY (EGD), COMBINED  03/2003          ESOPHAGOSCOPY, GASTROSCOPY, DUODENOSCOPY (EGD), COMBINED  " 08/31/2006          PROSTATECTOMY PERINEAL RADICAL  11/28/2012    Nerve Sparring, Dr Warner     SPINE SURGERY N/A 04/28/2017    L3 to S1 spinal decompression L4/L5 fusion     TONSILLECTOMY, ADENOIDECTOMY, COMBINED      as a child     VARICOCELECTOMY  1959    INCISION AND DRAINAGE,EXCISE VARICOCELE        Current Outpatient Medications   Medication Sig Dispense Refill     acetaminophen (TYLENOL) 325 MG tablet Take 650 mg by mouth       aspirin EC 81 MG EC tablet Take 81 mg by mouth daily with food       atorvastatin (LIPITOR) 20 MG tablet Take 1 tablet (20 mg) by mouth daily 90 tablet 3     Bee Pollen 500 MG CHEW Take 2 tablets by mouth daily       cyanocobalamin (RA VITAMIN B-12 TR) 1000 MCG TBCR Take 1,000 mcg by mouth daily       garlic (SM GARLIC) 150 MG TABS tablet Take 2 tablets by mouth daily       HYDROcodone-acetaminophen (NORCO)  MG per tablet Take 1-2 tablets by mouth every 6 hours as needed for moderate to severe pain or severe pain 180 tablet 0     IBUPROFEN Prn for back pain       lisinopril (PRINIVIL/ZESTRIL) 5 MG tablet Take 1 tablet (5 mg) by mouth daily 90 tablet 3     Omega-3 Fatty Acids (OMEGA 3 PO) Take 2 capsules by mouth daily       omeprazole (PRILOSEC) 40 MG DR capsule TAKE 1 CAPSULE BY MOUTH ONCE DAILY. 90 capsule 3     pregabalin (LYRICA) 50 MG capsule Take twice daily for 3 days, then 1 in the morning and 2 at night 90 capsule 0     sildenafil (REVATIO) 20 MG tablet TAKE 3 TO 5 TABLETS BY MOUTH 1 hour prior TO sexual activity. 100 tablet 3     sildenafil (REVATIO) 20 MG tablet TAKE 3 TO 5 TABLETS BY MOUTH 1 hour prior TO sexual activity. 300 tablet 0     No Known Allergies    OBJECTIVE:  /72   Pulse 102   Temp 96.4  F (35.8  C) (Temporal)   Resp 18   Wt 102.4 kg (225 lb 12.8 oz)   SpO2 96%   BMI 33.34 kg/m      EXAM:  General Appearance: Pleasant, alert, appropriate appearance for age. No acute distress  Musculoskeletal: Tender in left low back.  Neurological: Strength 5  out of 5 bilateral lower extremities  Psychiatric: Normal affect and mentation      MR Lumbar Spine w/o & w Contrast  Narrative: MR LUMBAR SPINE W/O & W CONTRAST    HISTORY: L/S-spine fusion, follow up; Back pain, prior surgery, new or  progressive sx; Radiculopathy, prior surgery, new or progressive sx;  Spinal stenosis of lumbar region with neurogenic claudication. .     TECHNIQUE: Sagittal T1, T2 and STIR as well as axial T2 images of the  lumbar spine were obtained.    COMPARISON: 6/11/18.    FINDINGS:      Postoperative changes of interval extension of lumbar fusion,  previously present at L4-5, now extending from L2 to L5, including  bilateral pedicle screws and vertical fixation rods at the operated  levels. Intervertebral spacer devices are present at L2-3, L3-4 and  L4-5. A small amount of fluid is present in the subcutaneous tissues.  There is postoperative paraspinal edema. No suspicious fluid  collection is seen. Artifact from metallic hardware limits assessment  of the immediate adjacent structures.      The lumbar lordosis is notable for trace degenerative retrolisthesis  of L1 on L2 and L2 on L3, unchanged. Lumbar vertebral body heights are  preserved. No suspicious marrow lesion is identified allowing for  metallic artifact.    The distal cord and conus medullaris have a normal caliber and  morphology.  The conus terminates at L1-2.  No abnormal cord signal is  seen in the distal cord or conus.  There are no masses in the spinal  canal or paravertebral soft tissues.    At L1-2, there is a mild symmetric disc bulge with mild facet  degenerative hypertrophy, not significantly changed when compared to  2018. Minimal spinal and foraminal narrowing are unchanged.    At L2-3, postoperative changes including interval disc spacer  placement results in reduced disc bulging. Mild asymmetric left  persistent disc bulging with osteophyte formation is, contributing to  improved mild left foraminal stenosis.  Overall spinal narrowing is  improved, minimal.    At L3-4, postoperative changes include right hemilaminectomy and  medial facetectomy. The spinal canal is patent. Mild foraminal  narrowing on the left persists. A small amount of enhancing  granulation tissue is questioned in the right neural foramen.    At L4-5, postoperative changes result in decompression of the spinal  canal. Moderate right foraminal narrowing is unchanged.    At L5-S1, focal right hemilaminotomy changes are present with partial  resection of the right ligamentum flavum. The spinal canal is patent.  Foraminal narrowing is mild on the left and moderate on the right  despite medial facetectomy changes, similar to prior. Mild to moderate  facet degenerative changes, similar to prior.  Impression: IMPRESSION:    Interval posterior interbody fusion at L2-3 and L3-4. Chronic prior  fusion changes at L4-5. No evidence of new left-sided nerve root  impingement to account for left leg radiculopathy.    KATHI OSPINA MD        ASSESSMENT/PLAN:    ICD-10-CM    1. Spinal stenosis of lumbar region with neurogenic claudication M48.062 HYDROcodone-acetaminophen (NORCO)  MG per tablet     venlafaxine (EFFEXOR-XR) 37.5 MG 24 hr capsule     venlafaxine (EFFEXOR-XR) 75 MG 24 hr capsule     pregabalin (LYRICA) 50 MG capsule   2. Chronic, continuous use of opioids F11.90 HYDROcodone-acetaminophen (NORCO)  MG per tablet   3. Anhedonia R45.84 venlafaxine (EFFEXOR-XR) 37.5 MG 24 hr capsule     venlafaxine (EFFEXOR-XR) 75 MG 24 hr capsule   4. Chronic back pain greater than 3 months duration M54.9 pregabalin (LYRICA) 50 MG capsule    G89.29    5. Androgen deprivation therapy Z79.818 vitamin D3 (CHOLECALCIFEROL) 2000 units (50 mcg) tablet     He continues having some left-sided low back pain and left-sided sciatica despite recent surgery.  MRI does not show anything definite to explain symptoms.  However, the twitching in his anterior left thigh is  concerning for an L3 radiculopathy.  We discussed potentially obtaining a lumbar epidural to see if this helps.    Continues doing core strengthening.  With ongoing pain after surgery, gabapentin has been tried.  He did not tolerate, so is currently on pregabalin at 200 mg total daily.  Since he is noticing some side effects, reduce to 50 mg in the morning and 100 mg at night.    Venlafaxine could potentially help some of his hormonal related symptoms as well as chronic pain.  Start venlafaxine 37.5 mg daily for 2 weeks and 75 mg daily.    Some of fatigue could relate to low vitamin D.  Start taking 50 mcg daily    On chronic hydrocodone. Has worked down, but very slowly. No change this month.  Given hydrocodone 10/325 taking 6/day.  #180 for the next 30 days.      Follow up in a month  Greater than 50% of this 29 minute appointment spent on counseling   Wei Perez MD    This document was prepared using a combination of typing and voice generated software.  While every attempt was made for accuracy, spelling and grammatical errors may exist.

## 2019-11-26 NOTE — PATIENT INSTRUCTIONS
Reduce pregabalin to 1 pill in the morning and 2 at night  Start venlafaxine 37.5 mg daily for 2 weeks, then 75 mg daily  Let me know if problems     vitamin D3 taking 50 mcg (2000 units) daily  Follow-up in a month

## 2019-12-19 ENCOUNTER — OFFICE VISIT (OUTPATIENT)
Dept: FAMILY MEDICINE | Facility: OTHER | Age: 64
End: 2019-12-19
Attending: FAMILY MEDICINE
Payer: COMMERCIAL

## 2019-12-19 VITALS
TEMPERATURE: 96.4 F | OXYGEN SATURATION: 100 % | BODY MASS INDEX: 33.46 KG/M2 | SYSTOLIC BLOOD PRESSURE: 138 MMHG | RESPIRATION RATE: 14 BRPM | WEIGHT: 226.6 LBS | HEART RATE: 94 BPM | DIASTOLIC BLOOD PRESSURE: 74 MMHG

## 2019-12-19 DIAGNOSIS — R45.84 ANHEDONIA: ICD-10-CM

## 2019-12-19 DIAGNOSIS — F11.90 CHRONIC, CONTINUOUS USE OF OPIOIDS: ICD-10-CM

## 2019-12-19 DIAGNOSIS — M48.062 SPINAL STENOSIS OF LUMBAR REGION WITH NEUROGENIC CLAUDICATION: ICD-10-CM

## 2019-12-19 PROCEDURE — G0463 HOSPITAL OUTPT CLINIC VISIT: HCPCS

## 2019-12-19 PROCEDURE — 99213 OFFICE O/P EST LOW 20 MIN: CPT | Performed by: FAMILY MEDICINE

## 2019-12-19 RX ORDER — VENLAFAXINE HYDROCHLORIDE 37.5 MG/1
37.5 CAPSULE, EXTENDED RELEASE ORAL DAILY
Qty: 30 CAPSULE | Refills: 1 | Status: SHIPPED | OUTPATIENT
Start: 2019-12-26 | End: 2020-01-15

## 2019-12-19 RX ORDER — HYDROCODONE BITARTRATE AND ACETAMINOPHEN 10; 325 MG/1; MG/1
1-2 TABLET ORAL EVERY 6 HOURS PRN
Qty: 180 TABLET | Refills: 0 | Status: SHIPPED | OUTPATIENT
Start: 2019-12-24 | End: 2020-01-24

## 2019-12-19 NOTE — PROGRESS NOTES
Nursing Notes:   Sydney Caballero LPN  12/19/2019 11:39 AM  Sign at exiting of workspace  Pt presents to clinic today for medication management.      Medication Reconciliation: complete  Sydney Caballero LPN      SUBJECTIVE:  64 year old male presents for follow up on chronic low back pain.     Started venlafaxine.  Has no side effects or benefit yet.  Has not helped his motivation.   Still getting hot flashes.     Having more leg cramps.  He is not sure if this is also a side effect of Lupron, or due to other causes.    REVIEW OF SYSTEMS:    As above      Past Medical History:   Diagnosis Date     Elevated prostate specific antigen (PSA)     4/10/2012     Encounter for other administrative examinations     1/31/2014     Esophagitis     No Comments Provided     Gastro-esophageal reflux disease without esophagitis     No Comments Provided     Low back pain     No Comments Provided     Malignant neoplasm of prostate (H)     9/6/2012     Nicotine dependence, uncomplicated     Quit - October 2007 with chantix     Other intervertebral disc degeneration, lumbosacral region     12/1/2008       Past Surgical History:   Procedure Laterality Date     APPENDECTOMY OPEN  [id number]    Ruptured - [place]     COLONOSCOPY  08/31/2006    next due in 2016     DISKECTOMY, LUMBAR, SINGLE SP  03/16/2009    [facility]     ESOPHAGOSCOPY, GASTROSCOPY, DUODENOSCOPY (EGD), COMBINED  03/2003          ESOPHAGOSCOPY, GASTROSCOPY, DUODENOSCOPY (EGD), COMBINED  08/31/2006          PROSTATECTOMY PERINEAL RADICAL  11/28/2012    Nerve Sparring, Dr Warner     SPINE SURGERY N/A 04/28/2017    L3 to S1 spinal decompression L4/L5 fusion     TONSILLECTOMY, ADENOIDECTOMY, COMBINED      as a child     VARICOCELECTOMY  1959    INCISION AND DRAINAGE,EXCISE VARICOCELE        Current Outpatient Medications   Medication Sig Dispense Refill     acetaminophen (TYLENOL) 325 MG tablet Take 650 mg by mouth       aspirin EC 81 MG EC tablet  Take 81 mg by mouth daily with food       atorvastatin (LIPITOR) 20 MG tablet Take 1 tablet (20 mg) by mouth daily 90 tablet 3     Bee Pollen 500 MG CHEW Take 2 tablets by mouth daily       cyanocobalamin (RA VITAMIN B-12 TR) 1000 MCG TBCR Take 1,000 mcg by mouth daily       garlic (SM GARLIC) 150 MG TABS tablet Take 2 tablets by mouth daily       HYDROcodone-acetaminophen (NORCO)  MG per tablet Take 1-2 tablets by mouth every 6 hours as needed for moderate to severe pain or severe pain 180 tablet 0     IBUPROFEN Prn for back pain       lisinopril (PRINIVIL/ZESTRIL) 5 MG tablet Take 1 tablet (5 mg) by mouth daily 90 tablet 3     Omega-3 Fatty Acids (OMEGA 3 PO) Take 2 capsules by mouth daily       omeprazole (PRILOSEC) 40 MG DR capsule TAKE 1 CAPSULE BY MOUTH ONCE DAILY. 90 capsule 3     pregabalin (LYRICA) 50 MG capsule 50 mg in the morning and 100 mg at night 90 capsule 1     sildenafil (REVATIO) 20 MG tablet TAKE 3 TO 5 TABLETS BY MOUTH 1 hour prior TO sexual activity. 100 tablet 3     sildenafil (REVATIO) 20 MG tablet TAKE 3 TO 5 TABLETS BY MOUTH 1 hour prior TO sexual activity. 300 tablet 0     venlafaxine (EFFEXOR-XR) 75 MG 24 hr capsule Take 1 capsule (75 mg) by mouth daily 30 capsule 1     vitamin D3 (CHOLECALCIFEROL) 2000 units (50 mcg) tablet Take 1 tablet (2,000 Units) by mouth daily       No Known Allergies    OBJECTIVE:  /74   Pulse 94   Temp 96.4  F (35.8  C) (Tympanic)   Resp 14   Wt 102.8 kg (226 lb 9.6 oz)   SpO2 100%   BMI 33.46 kg/m      EXAM:  General Appearance: Pleasant, alert, appropriate appearance for age. No acute distress  Psychiatric: Normal affect and mentation      MR Lumbar Spine w/o & w Contrast  Narrative: MR LUMBAR SPINE W/O & W CONTRAST    HISTORY: L/S-spine fusion, follow up; Back pain, prior surgery, new or  progressive sx; Radiculopathy, prior surgery, new or progressive sx;  Spinal stenosis of lumbar region with neurogenic claudication. .     TECHNIQUE:  Sagittal T1, T2 and STIR as well as axial T2 images of the  lumbar spine were obtained.    COMPARISON: 6/11/18.    FINDINGS:      Postoperative changes of interval extension of lumbar fusion,  previously present at L4-5, now extending from L2 to L5, including  bilateral pedicle screws and vertical fixation rods at the operated  levels. Intervertebral spacer devices are present at L2-3, L3-4 and  L4-5. A small amount of fluid is present in the subcutaneous tissues.  There is postoperative paraspinal edema. No suspicious fluid  collection is seen. Artifact from metallic hardware limits assessment  of the immediate adjacent structures.      The lumbar lordosis is notable for trace degenerative retrolisthesis  of L1 on L2 and L2 on L3, unchanged. Lumbar vertebral body heights are  preserved. No suspicious marrow lesion is identified allowing for  metallic artifact.    The distal cord and conus medullaris have a normal caliber and  morphology.  The conus terminates at L1-2.  No abnormal cord signal is  seen in the distal cord or conus.  There are no masses in the spinal  canal or paravertebral soft tissues.    At L1-2, there is a mild symmetric disc bulge with mild facet  degenerative hypertrophy, not significantly changed when compared to  2018. Minimal spinal and foraminal narrowing are unchanged.    At L2-3, postoperative changes including interval disc spacer  placement results in reduced disc bulging. Mild asymmetric left  persistent disc bulging with osteophyte formation is, contributing to  improved mild left foraminal stenosis. Overall spinal narrowing is  improved, minimal.    At L3-4, postoperative changes include right hemilaminectomy and  medial facetectomy. The spinal canal is patent. Mild foraminal  narrowing on the left persists. A small amount of enhancing  granulation tissue is questioned in the right neural foramen.    At L4-5, postoperative changes result in decompression of the spinal  canal. Moderate  right foraminal narrowing is unchanged.    At L5-S1, focal right hemilaminotomy changes are present with partial  resection of the right ligamentum flavum. The spinal canal is patent.  Foraminal narrowing is mild on the left and moderate on the right  despite medial facetectomy changes, similar to prior. Mild to moderate  facet degenerative changes, similar to prior.  Impression: IMPRESSION:    Interval posterior interbody fusion at L2-3 and L3-4. Chronic prior  fusion changes at L4-5. No evidence of new left-sided nerve root  impingement to account for left leg radiculopathy.    KATHI OSPINA MD        ASSESSMENT/PLAN:    ICD-10-CM    1. Chronic, continuous use of opioids F11.90 HYDROcodone-acetaminophen (NORCO)  MG per tablet   2. Spinal stenosis of lumbar region with neurogenic claudication M48.062 HYDROcodone-acetaminophen (NORCO)  MG per tablet     venlafaxine (EFFEXOR-XR) 37.5 MG 24 hr capsule   3. Anhedonia R45.84 venlafaxine (EFFEXOR-XR) 37.5 MG 24 hr capsule     Chronic low back pain.  Persistent left-sided low back pain and left sciatica despite surgery in 2018.  MRI does not appear concerning.  He has been working on a slow opioid wean since surgery a year ago.  Has been on chronic opioids for many years.    Started venlafaxine last appointment.  Has not noticed any side effects or benefit.  Therefore increased dose from 75 mg daily to 112 mg daily next week and stay on that until follow-up appointment.    On chronic hydrocodone. Has worked down, but very slowly. No change this month.  Given hydrocodone 10/325 taking 6/day.  #180 for the next 30 days.      If he continues having a lot of leg cramping, stop atorvastatin to see if this helps.    Follow-up around January 25    eWi Perez MD    This document was prepared using a combination of typing and voice generated software.  While every attempt was made for accuracy, spelling and grammatical errors may exist.     Statement Selected

## 2019-12-19 NOTE — PATIENT INSTRUCTIONS
Increase venlafaxine after Christmas to 75 mg plus 37.5 mg daily for 112.5 mg daily until follow up appointment     Consider stopping atorvastatin if cramping is worse    Follow-up around Jan 25

## 2020-01-09 DIAGNOSIS — C61 PROSTATE CANCER (H): Primary | ICD-10-CM

## 2020-01-09 NOTE — PROGRESS NOTES
11/4/19  Plan  Initiated ADT in the form of Lupron 3 month injection today  Follow up in 3 months for planned Vantas implant with a PSA prior.

## 2020-01-15 DIAGNOSIS — M48.062 SPINAL STENOSIS OF LUMBAR REGION WITH NEUROGENIC CLAUDICATION: ICD-10-CM

## 2020-01-15 DIAGNOSIS — R45.84 ANHEDONIA: ICD-10-CM

## 2020-01-15 RX ORDER — VENLAFAXINE HYDROCHLORIDE 37.5 MG/1
37.5 CAPSULE, EXTENDED RELEASE ORAL DAILY
Qty: 30 CAPSULE | Refills: 0 | Status: SHIPPED | OUTPATIENT
Start: 2020-01-15 | End: 2020-01-24

## 2020-01-15 NOTE — TELEPHONE ENCOUNTER
"Requested Prescriptions   Pending Prescriptions Disp Refills     venlafaxine (EFFEXOR-XR) 37.5 MG 24 hr capsule [Pharmacy Med Name: VENLAFAXINE HCL ER 37.5 MG CAP] 30 capsule 1     Sig: TAKE 1 CAPSULE (37.5 MG) BY MOUTH DAILY ALONG WITH 75 MG DAILY .5 MG DAILY       Serotonin-Norepinephrine Reuptake Inhibitors  Passed - 1/15/2020  1:32 PM        Passed - Blood pressure under 140/90 in past 12 months     BP Readings from Last 3 Encounters:   12/19/19 138/74   11/26/19 130/72   11/04/19 (!) 152/80                 Passed - Recent (12 mo) or future (30 days) visit within the authorizing provider's specialty     Patient has had an office visit with the authorizing provider or a provider within the authorizing providers department within the previous 12 mos or has a future within next 30 days. See \"Patient Info\" tab in inbasket, or \"Choose Columns\" in Meds & Orders section of the refill encounter.              Passed - Medication is active on med list        Passed - Patient is age 18 or older        Passed - Normal serum creatinine on file in past 12 months     Recent Labs   Lab Test 05/17/19  0954   CR 0.81             LOV 12/19/19  Upcoming apt 1/24/20  Prescription approved per [facility] Refill Protocol for 1 month.  Brenda J. Goodell, RN on 1/15/2020 at 3:31 PM    "

## 2020-01-24 ENCOUNTER — OFFICE VISIT (OUTPATIENT)
Dept: FAMILY MEDICINE | Facility: OTHER | Age: 65
End: 2020-01-24
Attending: FAMILY MEDICINE
Payer: COMMERCIAL

## 2020-01-24 VITALS
DIASTOLIC BLOOD PRESSURE: 74 MMHG | TEMPERATURE: 96.6 F | WEIGHT: 215.4 LBS | RESPIRATION RATE: 18 BRPM | SYSTOLIC BLOOD PRESSURE: 136 MMHG | OXYGEN SATURATION: 98 % | HEART RATE: 102 BPM | BODY MASS INDEX: 31.81 KG/M2

## 2020-01-24 DIAGNOSIS — E78.5 HYPERLIPIDEMIA LDL GOAL <100: ICD-10-CM

## 2020-01-24 DIAGNOSIS — M48.062 SPINAL STENOSIS OF LUMBAR REGION WITH NEUROGENIC CLAUDICATION: ICD-10-CM

## 2020-01-24 DIAGNOSIS — F11.90 CHRONIC, CONTINUOUS USE OF OPIOIDS: ICD-10-CM

## 2020-01-24 DIAGNOSIS — I10 ESSENTIAL HYPERTENSION: ICD-10-CM

## 2020-01-24 DIAGNOSIS — R45.84 ANHEDONIA: ICD-10-CM

## 2020-01-24 DIAGNOSIS — K21.9 GASTROESOPHAGEAL REFLUX DISEASE WITHOUT ESOPHAGITIS: ICD-10-CM

## 2020-01-24 DIAGNOSIS — G89.29 CHRONIC BACK PAIN GREATER THAN 3 MONTHS DURATION: ICD-10-CM

## 2020-01-24 DIAGNOSIS — M54.9 CHRONIC BACK PAIN GREATER THAN 3 MONTHS DURATION: ICD-10-CM

## 2020-01-24 PROCEDURE — 99213 OFFICE O/P EST LOW 20 MIN: CPT | Performed by: FAMILY MEDICINE

## 2020-01-24 PROCEDURE — G0463 HOSPITAL OUTPT CLINIC VISIT: HCPCS

## 2020-01-24 RX ORDER — OMEPRAZOLE 40 MG/1
CAPSULE, DELAYED RELEASE ORAL
Qty: 90 CAPSULE | Refills: 3 | Status: SHIPPED | OUTPATIENT
Start: 2020-01-24 | End: 2020-06-15

## 2020-01-24 RX ORDER — VENLAFAXINE HYDROCHLORIDE 75 MG/1
75 CAPSULE, EXTENDED RELEASE ORAL DAILY
Qty: 30 CAPSULE | Refills: 1 | Status: CANCELLED | OUTPATIENT
Start: 2020-01-24

## 2020-01-24 RX ORDER — LISINOPRIL 5 MG/1
5 TABLET ORAL DAILY
Qty: 90 TABLET | Refills: 3 | Status: SHIPPED | OUTPATIENT
Start: 2020-01-24 | End: 2020-06-15

## 2020-01-24 RX ORDER — VENLAFAXINE HYDROCHLORIDE 37.5 MG/1
37.5 CAPSULE, EXTENDED RELEASE ORAL DAILY
Qty: 30 CAPSULE | Refills: 0 | Status: CANCELLED | OUTPATIENT
Start: 2020-01-24

## 2020-01-24 RX ORDER — HYDROCODONE BITARTRATE AND ACETAMINOPHEN 10; 325 MG/1; MG/1
1 TABLET ORAL EVERY 4 HOURS PRN
Qty: 165 TABLET | Refills: 0 | Status: SHIPPED | OUTPATIENT
Start: 2020-01-24 | End: 2020-02-24

## 2020-01-24 RX ORDER — VENLAFAXINE HYDROCHLORIDE 150 MG/1
150 CAPSULE, EXTENDED RELEASE ORAL DAILY
Qty: 30 CAPSULE | Refills: 1 | Status: SHIPPED | OUTPATIENT
Start: 2020-01-24 | End: 2020-02-24

## 2020-01-24 RX ORDER — PREGABALIN 50 MG/1
CAPSULE ORAL
Qty: 270 CAPSULE | Refills: 1 | Status: SHIPPED | OUTPATIENT
Start: 2020-01-24 | End: 2020-02-24

## 2020-01-24 RX ORDER — ATORVASTATIN CALCIUM 20 MG/1
20 TABLET, FILM COATED ORAL DAILY
Qty: 90 TABLET | Refills: 3 | Status: SHIPPED | OUTPATIENT
Start: 2020-01-24 | End: 2020-10-05

## 2020-01-24 NOTE — PROGRESS NOTES
Nursing Notes:   Sydney Caballero LPN  1/24/2020 10:20 AM  Signed  Pt presents to clinic today for medication management.      Medication Reconciliation: complete  Sydney Caballero LPN      SUBJECTIVE:  64 year old male presents for follow up on chronic low back pain.     He has a history of multiple back surgeries, most recent was lumbar fusion in November 2018.  Saw his surgeon, Dr. Lavern garcia in the fall 2019 and felt to be healing appropriately.  No injections or surgery recommended at that time.  He used to take hydrocodone 12/day and has worked down most recently to 6/day.  Still has bilateral and midline lower lumbar pain.  Rare tingling in the legs, but is nothing consistent.  Recent MRI did not show any thing that would explain paresthesias to his legs.    Tried amitriptyline, but it made him feel funny the next day.  Gabapentin caused a prickly feeling in his feet.  He is tolerating Lyrica.  Started on venlafaxine due to some hot flashes as well as chronic pain issues.  Last appointment, increased venlafaxine.  Has no side effects or benefit yet.  Has not helped his motivation.   Still getting hot flashes from Lupron.     Started using exercise bike. Has a new chair with more lumbar support.      REVIEW OF SYSTEMS:    As above      Past Medical History:   Diagnosis Date     Elevated prostate specific antigen (PSA)     4/10/2012     Encounter for other administrative examinations     1/31/2014     Esophagitis     No Comments Provided     Gastro-esophageal reflux disease without esophagitis     No Comments Provided     Low back pain     No Comments Provided     Malignant neoplasm of prostate (H)     9/6/2012     Nicotine dependence, uncomplicated     Quit - October 2007 with chantix     Other intervertebral disc degeneration, lumbosacral region     12/1/2008       Past Surgical History:   Procedure Laterality Date     APPENDECTOMY OPEN  [id number]    Ruptured - [place]     COLONOSCOPY  08/31/2006    next  due in 2016     DISKECTOMY, LUMBAR, SINGLE SP  03/16/2009    [facility]     ESOPHAGOSCOPY, GASTROSCOPY, DUODENOSCOPY (EGD), COMBINED  03/2003          ESOPHAGOSCOPY, GASTROSCOPY, DUODENOSCOPY (EGD), COMBINED  08/31/2006          PROSTATECTOMY PERINEAL RADICAL  11/28/2012    Nerve Sparring, Dr Warner     SPINE SURGERY N/A 04/28/2017    L3 to S1 spinal decompression L4/L5 fusion     TONSILLECTOMY, ADENOIDECTOMY, COMBINED      as a child     VARICOCELECTOMY  1959    INCISION AND DRAINAGE,EXCISE VARICOCELE        Current Outpatient Medications   Medication Sig Dispense Refill     acetaminophen (TYLENOL) 325 MG tablet Take 650 mg by mouth       aspirin EC 81 MG EC tablet Take 81 mg by mouth daily with food       atorvastatin (LIPITOR) 20 MG tablet Take 1 tablet (20 mg) by mouth daily 90 tablet 3     Bee Pollen 500 MG CHEW Take 2 tablets by mouth daily       cyanocobalamin (RA VITAMIN B-12 TR) 1000 MCG TBCR Take 1,000 mcg by mouth daily       garlic (SM GARLIC) 150 MG TABS tablet Take 2 tablets by mouth daily       HYDROcodone-acetaminophen (NORCO)  MG per tablet Take 1-2 tablets by mouth every 6 hours as needed for moderate to severe pain or severe pain 180 tablet 0     IBUPROFEN Prn for back pain       lisinopril (PRINIVIL/ZESTRIL) 5 MG tablet Take 1 tablet (5 mg) by mouth daily 90 tablet 3     Omega-3 Fatty Acids (OMEGA 3 PO) Take 2 capsules by mouth daily       omeprazole (PRILOSEC) 40 MG DR capsule TAKE 1 CAPSULE BY MOUTH ONCE DAILY. 90 capsule 3     pregabalin (LYRICA) 50 MG capsule 50 mg in the morning and 100 mg at night 90 capsule 1     sildenafil (REVATIO) 20 MG tablet TAKE 3 TO 5 TABLETS BY MOUTH 1 hour prior TO sexual activity. 100 tablet 3     venlafaxine (EFFEXOR-XR) 37.5 MG 24 hr capsule TAKE 1 CAPSULE (37.5 MG) BY MOUTH DAILY ALONG WITH 75 MG DAILY .5 MG DAILY 30 capsule 0     venlafaxine (EFFEXOR-XR) 75 MG 24 hr capsule Take 1 capsule (75 mg) by mouth daily 30 capsule 1      vitamin D3 (CHOLECALCIFEROL) 2000 units (50 mcg) tablet Take 1 tablet (2,000 Units) by mouth daily       No Known Allergies    OBJECTIVE:  /74   Pulse 102   Temp 96.6  F (35.9  C) (Temporal)   Resp 18   Wt 97.7 kg (215 lb 6.4 oz)   SpO2 98%   BMI 31.81 kg/m      EXAM:  General Appearance: Pleasant, alert, appropriate appearance for age. No acute distress  Psychiatric: Normal affect and mentation      MR Lumbar Spine w/o & w Contrast  Narrative: MR LUMBAR SPINE W/O & W CONTRAST    HISTORY: L/S-spine fusion, follow up; Back pain, prior surgery, new or  progressive sx; Radiculopathy, prior surgery, new or progressive sx;  Spinal stenosis of lumbar region with neurogenic claudication. .     TECHNIQUE: Sagittal T1, T2 and STIR as well as axial T2 images of the  lumbar spine were obtained.    COMPARISON: 6/11/18.    FINDINGS:      Postoperative changes of interval extension of lumbar fusion,  previously present at L4-5, now extending from L2 to L5, including  bilateral pedicle screws and vertical fixation rods at the operated  levels. Intervertebral spacer devices are present at L2-3, L3-4 and  L4-5. A small amount of fluid is present in the subcutaneous tissues.  There is postoperative paraspinal edema. No suspicious fluid  collection is seen. Artifact from metallic hardware limits assessment  of the immediate adjacent structures.      The lumbar lordosis is notable for trace degenerative retrolisthesis  of L1 on L2 and L2 on L3, unchanged. Lumbar vertebral body heights are  preserved. No suspicious marrow lesion is identified allowing for  metallic artifact.    The distal cord and conus medullaris have a normal caliber and  morphology.  The conus terminates at L1-2.  No abnormal cord signal is  seen in the distal cord or conus.  There are no masses in the spinal  canal or paravertebral soft tissues.    At L1-2, there is a mild symmetric disc bulge with mild facet  degenerative hypertrophy, not significantly  changed when compared to  2018. Minimal spinal and foraminal narrowing are unchanged.    At L2-3, postoperative changes including interval disc spacer  placement results in reduced disc bulging. Mild asymmetric left  persistent disc bulging with osteophyte formation is, contributing to  improved mild left foraminal stenosis. Overall spinal narrowing is  improved, minimal.    At L3-4, postoperative changes include right hemilaminectomy and  medial facetectomy. The spinal canal is patent. Mild foraminal  narrowing on the left persists. A small amount of enhancing  granulation tissue is questioned in the right neural foramen.    At L4-5, postoperative changes result in decompression of the spinal  canal. Moderate right foraminal narrowing is unchanged.    At L5-S1, focal right hemilaminotomy changes are present with partial  resection of the right ligamentum flavum. The spinal canal is patent.  Foraminal narrowing is mild on the left and moderate on the right  despite medial facetectomy changes, similar to prior. Mild to moderate  facet degenerative changes, similar to prior.  Impression: IMPRESSION:    Interval posterior interbody fusion at L2-3 and L3-4. Chronic prior  fusion changes at L4-5. No evidence of new left-sided nerve root  impingement to account for left leg radiculopathy.    KATHI OSPINA MD        ASSESSMENT/PLAN:    ICD-10-CM    1. Chronic, continuous use of opioids F11.90 HYDROcodone-acetaminophen (NORCO)  MG per tablet   2. Spinal stenosis of lumbar region with neurogenic claudication M48.062 HYDROcodone-acetaminophen (NORCO)  MG per tablet     venlafaxine (EFFEXOR-XR) 150 MG 24 hr capsule     pregabalin (LYRICA) 50 MG capsule   3. Anhedonia R45.84 venlafaxine (EFFEXOR-XR) 150 MG 24 hr capsule   4. Essential hypertension I10 lisinopril (PRINIVIL/ZESTRIL) 5 MG tablet   5. Gastroesophageal reflux disease without esophagitis K21.9 omeprazole (PRILOSEC) 40 MG DR capsule   6.  Hyperlipidemia LDL goal <100 E78.5 atorvastatin (LIPITOR) 20 MG tablet   7. Chronic back pain greater than 3 months duration M54.9 pregabalin (LYRICA) 50 MG capsule    G89.29      Chronic low back pain.  Persistent left-sided low back pain and left sciatica despite surgery in 2018.  MRI does not appear concerning.  He has been working on a slow opioid wean since surgery a year ago.  Has been on chronic opioids for many years.    Pain has stabilized since last appointment.  We had already discussed weaning the hydrocodone further since it has not been adjusted for a few months we will trying to institute pregabalin and venlafaxine to help with pain.    He is agreeable to reducing the hydrocodone.  Decreased from 180 per 30 days down to 165 per 30 days, which is 5-1/2/day.    Increase the venlafaxine from 112.5 mg to 150 mg daily.    Follow-up in a month    Wei Perez MD    This document was prepared using a combination of typing and voice generated software.  While every attempt was made for accuracy, spelling and grammatical errors may exist.

## 2020-02-05 ENCOUNTER — OFFICE VISIT (OUTPATIENT)
Dept: UROLOGY | Facility: OTHER | Age: 65
End: 2020-02-05
Attending: UROLOGY
Payer: MEDICARE

## 2020-02-05 VITALS
DIASTOLIC BLOOD PRESSURE: 70 MMHG | HEART RATE: 90 BPM | SYSTOLIC BLOOD PRESSURE: 120 MMHG | WEIGHT: 211.8 LBS | BODY MASS INDEX: 31.28 KG/M2 | RESPIRATION RATE: 18 BRPM

## 2020-02-05 DIAGNOSIS — C61 PROSTATE CANCER (H): ICD-10-CM

## 2020-02-05 DIAGNOSIS — C61 PROSTATE CANCER (H): Primary | ICD-10-CM

## 2020-02-05 DIAGNOSIS — R97.21 RISING PSA FOLLOWING TREATMENT FOR MALIGNANT NEOPLASM OF PROSTATE: Primary | ICD-10-CM

## 2020-02-05 LAB — PSA SERPL-MCNC: 1.01 NG/ML

## 2020-02-05 PROCEDURE — 25000128 H RX IP 250 OP 636: Performed by: UROLOGY

## 2020-02-05 PROCEDURE — 36415 COLL VENOUS BLD VENIPUNCTURE: CPT | Mod: ZL | Performed by: UROLOGY

## 2020-02-05 PROCEDURE — 99213 OFFICE O/P EST LOW 20 MIN: CPT | Mod: 25 | Performed by: UROLOGY

## 2020-02-05 PROCEDURE — 11981 INSERTION DRUG DLVR IMPLANT: CPT | Performed by: UROLOGY

## 2020-02-05 PROCEDURE — G0463 HOSPITAL OUTPT CLINIC VISIT: HCPCS | Mod: 25

## 2020-02-05 PROCEDURE — 84153 ASSAY OF PSA TOTAL: CPT | Mod: ZL | Performed by: UROLOGY

## 2020-02-05 RX ADMIN — HISTRELIN ACETATE 50 MG: 50 IMPLANT SUBCUTANEOUS at 09:45

## 2020-02-05 ASSESSMENT — PAIN SCALES - GENERAL: PAINLEVEL: MILD PAIN (3)

## 2020-02-05 NOTE — PROGRESS NOTES
Type of Visit  EST    Chief Complaint  Rising PSA following prostatectomy and salvage radiation    HPI  Mr. Kan is a 64 y.o. male who presents s/p RRP in 2012 followed by sEBRT in 2013 who follows up with increasing PSA.  Patient underwent a metastatic survey which was negative for radiographic evidence of disease.  The patient initiated ADT 3 months ago in the form of Lupron.  He presents today for PSA and Vantas implant.  He has experienced some hot flashes.  He denies unintentional weight loss, bone pain.  He does have chronic back pain and recently underwent fusion about 1 year ago.      Family History  Family History   Problem Relation Age of Onset     Hypertension Mother      HTN     Heart Disease Father       passed away from CHF,CAD      Good Health Sister      emotional problems.      Good Health Sister      Good Health Other      Other Son      w/o major medical problems.      Other Daughter      w/o major medical problems.      Good Health Other      previous marriage./previous marriage.     Good Health Other      previous marriage.     Cancer-colon Father      Hypertension Father      Stroke Father      Other Father      Dementia     Other Mother       Parkinsons       Review of Systems  I reviewed the ROS with the patient today.    Nursing Notes:   Nevaeh Varela LPN  2/5/2020 10:26 AM  Signed  Pt presents to clinic for initial vantas placement    Review of Systems:    Weight loss:    No     Recent fever/chills:  Yes   Night sweats:   Yes  Current skin rash:  No   Recent hair loss:  No  Heat intolerance:  Yes   Cold intolerance:  Yes  Chest pain:   No   Palpitations:   No  Shortness of breath:  No   Wheezing:   No  Constipation:    No   Diarrhea:   No   Nausea:   No   Vomiting:   No   Kidney/side pain:  No   Back pain:   No  Frequent headaches:  Yes   Dizziness:     No  Leg swelling:   No   Calf pain:    No    Vantas  Patient positioned in supine position.    Lidocaine 1% with Epinephrine  Lot  #: [id number]  Expiration date: 2/1/20  : [employer]  NDC: 6414-3500-59    [place] Protocol    A. Pre-procedure verification complete Yes  1-relevant information / documentation available, reviewed and properly matched to the patient; 2-consent accurate and complete, 3-equipment and supplies available    B. Site marking complete N/A  Site marked if not in continuous attendance with patient    C. TIME OUT completed Yes  Time Out was conducted just prior to starting procedure to verify the eight required elements: 1-patient identity, 2-consent accurate and complete, 3-position, 4-correct side/site marked (if applicable), 5-procedure, 6-relevant images / results properly labeled and displayed (if applicable), 7-antibiotics / irrigation fluids (if applicable), 8-safety precautions.    After procedure discharge instructions reviewed with patient. Patient verbalized understanding of discharge instructions and discharged ambulatory.          Physical Exam  /70 (BP Location: Right arm, Patient Position: Sitting, Cuff Size: Adult Regular)   Pulse 90   Resp 18   Wt 96.1 kg (211 lb 12.8 oz)   BMI 31.28 kg/m    Constitutional: No acute distress.  Alert and cooperative   Head: NCAT  Eyes: Conjunctivae normal  Cardiovascular: Regular rate.  Pulmonary/Chest: Respirations are even and non-labored bilaterally, no audible wheezing  Abdominal: Soft. No distension, tenderness, masses or guarding.   Neurological: A + O x 3.  Cranial Nerves II-XII grossly intact.  Extremities: GINNA x 4, Warm. No clubbing.  No cyanosis.    Skin: Pink, warm and dry.  No visible rashes noted.  Psychiatric:  Normal mood and affect  Back:  No left CVA tenderness.  No right CVA tenderness.  Genitourinary:  Nonpalpable bladder    ^^^^^^^^^^^^^^^^^^^^^^^^^^^^^^^^^^^^^^    Preoperative diagnosis  Prostate cancer    Postoperative diagnosis  Prostate cancer    Procedure performed  Vantas (histrelin) implant    Anesthesia  Lidocaine 1% with  epinephrine    Surgeon  Celestine Arrington MD    Indications  The patient is a 64 year old male with prostate cancer on androgen deprivation therapy.    Procedure Note  Leland Kan was laid in supine position with left arm extended.  The area between the biceps and triceps was prepped and draped.  Local anesthetic was injected in the subcutaneous space along the prepped area.  A small stab incision was rendered with a scalpel.  The introducer was used to tunnel in the subcutaneous space.    The histrelin implant was placed and the introducer was removed..    The incision was covered with steri-strips and pressure dressing with gauze  and Coban.  The patient tolerated the procedure well.    ^^^^^^^^^^^^^^^^^^^^^^^^^^^^^^^^^^^^^^    Labs  Results for EMILIA KAN (MRN [id number]) as of 2/5/2020 09:14   11/4/2019 08:10 2/5/2020 06:53   PSA 7.112 (H) 1.006     Results for EMILIA KAN (MRN [id number]) as of 5/1/2019 09:45   5/1/2019 07:36   Prostate Specific Antigen 2.714     Results for LELAND KAN (MRN [id number]) as of 11/1/2018 11:08   11/1/2018 08:49   Prostate Specific Antigen 1.177     Results for LELAND KAN (MRN [id number]) as of 3/29/2018 14:31   3/29/2018 12:15   Prostate Specific Antigen 0.798     Results for LELAND KAN (MRN [id number]) as of 3/29/2018 11:55   4/7/2017 16:04   PSA Total (Diagnostic) - Historical 0.268     Results for LELAND KAN (MRN [id number]) as of 3/23/2016 10:42   1/2/2013 08:00* 2/27/2013** 08:10 10/16/2014 11:26 3/1/2016 14:15   PSA 0.67 0.97     PSA   0.070 0.154   * Following radical prostatectomy  ** Following salvage external beam radiation therapy.    Imaging  MRI Lumbar  11/4/2019  IMPRESSION:  Interval posterior interbody fusion at L2-3 and L3-4. Chronic prior  fusion changes at L4-5. No evidence of new left-sided nerve root  impingement to account for left leg radiculopathy.    ^^^^^^^^^^^^^^^^^^^^^^^^^^^^^^^^^^^^^^^^^^^^    CT c/a/p  5/20/2019  IMPRESSION:   No evidence of metastatic disease in the chest, abdomen,  or pelvis.    Bone Scan  5/20/2019  IMPRESSION: Multifocal probable degenerative changes. No sites highly  suspicious for osseous metastatic disease.     Assessment  Mr. Kan is a 64 y.o. male who follows up s/p RRP in 2012 followed by sEBRT in 2013 who follows up with improved PSA on primary ADT.  I had recommended initiating ADT in spite of a negative metastatic work-up given the accelerated rate of rise.    Plan  Follow-up every 4 months with PSA prior  Follow up after 2/5/2021 for Vantas exchange with a PSA prior.

## 2020-02-05 NOTE — PATIENT INSTRUCTIONS
Home Care after Vantas Placement   Follow these guidelines for your care after your surgery to help your recovery.    Incision care  Remove the Coban dressing and the gauze after 48 hours (about 2 days).    The steri-strips will fall off eventually on their own.  If they have not fallen off after a week you may remove them on your own    Bathing or showering  You may shower 48 hours after surgery.    Allow the water to wash over the incision but do not scrub the incision.   Dry the site gently by patting it with a clean towel.    Tub baths should be avoided for 7 days after surgery.    Swimming should be avoided for 14 days after surgery.    Follow up  As scheduled    Contacts  General Questions: [phone]  Appointments:  [phone]  Emergencies:  [phone]    When to call your doctor  Call the urology office if you have any of the following:   -Redness or tenderness around the incision site that is worsening after 2-3 days or  -Pus type drainage from the incision or  -Fever of greater than 101 degrees F    Also call the office if you have any questions or concerns about your care.

## 2020-02-05 NOTE — NURSING NOTE
Pt presents to clinic for initial vantas placement    Review of Systems:    Weight loss:    No     Recent fever/chills:  Yes   Night sweats:   Yes  Current skin rash:  No   Recent hair loss:  No  Heat intolerance:  Yes   Cold intolerance:  Yes  Chest pain:   No   Palpitations:   No  Shortness of breath:  No   Wheezing:   No  Constipation:    No   Diarrhea:   No   Nausea:   No   Vomiting:   No   Kidney/side pain:  No   Back pain:   No  Frequent headaches:  Yes   Dizziness:     No  Leg swelling:   No   Calf pain:    No    Vantas  Patient positioned in supine position.    Lidocaine 1% with Epinephrine  Lot #: -[id number]  Expiration date: 2/1/20  : [employer]  NDC: 5097-7174-44    [place] Protocol    A. Pre-procedure verification complete Yes  1-relevant information / documentation available, reviewed and properly matched to the patient; 2-consent accurate and complete, 3-equipment and supplies available    B. Site marking complete N/A  Site marked if not in continuous attendance with patient    C. TIME OUT completed Yes  Time Out was conducted just prior to starting procedure to verify the eight required elements: 1-patient identity, 2-consent accurate and complete, 3-position, 4-correct side/site marked (if applicable), 5-procedure, 6-relevant images / results properly labeled and displayed (if applicable), 7-antibiotics / irrigation fluids (if applicable), 8-safety precautions.    After procedure discharge instructions reviewed with patient. Patient verbalized understanding of discharge instructions and discharged ambulatory.

## 2020-02-24 ENCOUNTER — OFFICE VISIT (OUTPATIENT)
Dept: FAMILY MEDICINE | Facility: OTHER | Age: 65
End: 2020-02-24
Attending: FAMILY MEDICINE
Payer: COMMERCIAL

## 2020-02-24 VITALS
DIASTOLIC BLOOD PRESSURE: 72 MMHG | BODY MASS INDEX: 30.8 KG/M2 | WEIGHT: 208.6 LBS | OXYGEN SATURATION: 95 % | TEMPERATURE: 96.6 F | SYSTOLIC BLOOD PRESSURE: 134 MMHG | HEART RATE: 104 BPM | RESPIRATION RATE: 19 BRPM

## 2020-02-24 DIAGNOSIS — M54.9 CHRONIC BACK PAIN GREATER THAN 3 MONTHS DURATION: ICD-10-CM

## 2020-02-24 DIAGNOSIS — F11.90 CHRONIC, CONTINUOUS USE OF OPIOIDS: ICD-10-CM

## 2020-02-24 DIAGNOSIS — M48.062 SPINAL STENOSIS OF LUMBAR REGION WITH NEUROGENIC CLAUDICATION: Primary | ICD-10-CM

## 2020-02-24 DIAGNOSIS — R45.84 ANHEDONIA: ICD-10-CM

## 2020-02-24 DIAGNOSIS — G89.29 CHRONIC BACK PAIN GREATER THAN 3 MONTHS DURATION: ICD-10-CM

## 2020-02-24 PROCEDURE — 99214 OFFICE O/P EST MOD 30 MIN: CPT | Performed by: FAMILY MEDICINE

## 2020-02-24 PROCEDURE — G0463 HOSPITAL OUTPT CLINIC VISIT: HCPCS

## 2020-02-24 RX ORDER — HYDROCODONE BITARTRATE AND ACETAMINOPHEN 10; 325 MG/1; MG/1
1 TABLET ORAL EVERY 4 HOURS PRN
Qty: 154 TABLET | Refills: 0 | Status: SHIPPED | OUTPATIENT
Start: 2020-02-24 | End: 2020-04-21

## 2020-02-24 RX ORDER — HYDROCODONE BITARTRATE AND ACETAMINOPHEN 10; 325 MG/1; MG/1
1 TABLET ORAL EVERY 4 HOURS PRN
Qty: 140 TABLET | Refills: 0 | Status: SHIPPED | OUTPATIENT
Start: 2020-03-23 | End: 2020-02-24

## 2020-02-24 RX ORDER — PREGABALIN 50 MG/1
CAPSULE ORAL
Qty: 270 CAPSULE | Refills: 1 | Status: SHIPPED | OUTPATIENT
Start: 2020-02-24 | End: 2020-07-13

## 2020-02-24 RX ORDER — VENLAFAXINE HYDROCHLORIDE 150 MG/1
150 CAPSULE, EXTENDED RELEASE ORAL DAILY
Qty: 90 CAPSULE | Refills: 1 | Status: SHIPPED | OUTPATIENT
Start: 2020-02-24 | End: 2020-06-15

## 2020-02-24 RX ORDER — HYDROCODONE BITARTRATE AND ACETAMINOPHEN 10; 325 MG/1; MG/1
1 TABLET ORAL EVERY 4 HOURS PRN
Qty: 145 TABLET | Refills: 0 | Status: SHIPPED | OUTPATIENT
Start: 2020-03-23 | End: 2020-03-24

## 2020-02-24 ASSESSMENT — PAIN SCALES - GENERAL: PAINLEVEL: MODERATE PAIN (4)

## 2020-02-24 NOTE — PATIENT INSTRUCTIONS
Keep venlafaxine at 150 mg daily  Hydrocodone 5 1/2 pills on average for next 28 days (154 pills)  Refill due on March 23, for 5 per day on average (140 pills)  Follow-up April 20    See Dr Pizarro for DOT and future appointment for the spine consult

## 2020-02-24 NOTE — PROGRESS NOTES
Nursing Notes:   Sydney Caballero LPN  2/24/2020 10:26 AM  Signed  Pt presents to clinic today for medication management.      Medication Reconciliation: complete  Sydney Caballero LPN      SUBJECTIVE:  64 year old male presents for follow up on chronic low back pain.     He has a history of multiple back surgeries, most recent was lumbar fusion in November 2018.  Saw his surgeon, Dr. Reeder in the fall 2019 and felt to be healing appropriately.  No injections or surgery recommended at that time.  He used to take hydrocodone 12/day and has worked down to 5.5/day.     Increased venlafaxine from 112 to 150 mg, but he thought he was supposed to take 225 mg. Will take 150 and 75 mg and then after a few days will feel worse, so stopped 75 mg and then would try to take more again.    Vantas implant placed 2/5/2020. It seems to cause hot flashes and fatigue, similar to Lupron.    REVIEW OF SYSTEMS:    As above      Past Medical History:   Diagnosis Date     Elevated prostate specific antigen (PSA)     4/10/2012     Encounter for other administrative examinations     1/31/2014     Esophagitis     No Comments Provided     Gastro-esophageal reflux disease without esophagitis     No Comments Provided     Low back pain     No Comments Provided     Malignant neoplasm of prostate (H)     9/6/2012     Nicotine dependence, uncomplicated     Quit - October 2007 with chantix     Other intervertebral disc degeneration, lumbosacral region     12/1/2008       Past Surgical History:   Procedure Laterality Date     APPENDECTOMY OPEN  [id number]    Ruptured - [place]     COLONOSCOPY  08/31/2006    next due in 2016     DISKECTOMY, LUMBAR, SINGLE SP  03/16/2009    L 3-4 microdiskectomy, [facility]     ESOPHAGOSCOPY, GASTROSCOPY, DUODENOSCOPY (EGD), COMBINED  03/2003          ESOPHAGOSCOPY, GASTROSCOPY, DUODENOSCOPY (EGD), COMBINED  08/31/2006          PROSTATECTOMY PERINEAL RADICAL  11/28/2012    Nerve Sparring, Dr Warner     SPINE SURGERY  N/A 04/28/2017    L3 to S1 spinal decompression L4/L5 fusion     TONSILLECTOMY, ADENOIDECTOMY, COMBINED      as a child     VARICOCELECTOMY  1959    INCISION AND DRAINAGE,EXCISE VARICOCELE        Current Outpatient Medications   Medication Sig Dispense Refill     acetaminophen (TYLENOL) 325 MG tablet Take 650 mg by mouth       aspirin EC 81 MG EC tablet Take 81 mg by mouth daily with food       atorvastatin (LIPITOR) 20 MG tablet Take 1 tablet (20 mg) by mouth daily 90 tablet 3     Bee Pollen 500 MG CHEW Take 2 tablets by mouth daily       cyanocobalamin (RA VITAMIN B-12 TR) 1000 MCG TBCR Take 1,000 mcg by mouth daily       garlic (SM GARLIC) 150 MG TABS tablet Take 2 tablets by mouth daily       HYDROcodone-acetaminophen (NORCO)  MG per tablet Take 1 tablet by mouth every 4 hours as needed for moderate to severe pain 165 tablet 0     IBUPROFEN Prn for back pain       lisinopril (PRINIVIL/ZESTRIL) 5 MG tablet Take 1 tablet (5 mg) by mouth daily 90 tablet 3     Omega-3 Fatty Acids (OMEGA 3 PO) Take 2 capsules by mouth daily       omeprazole (PRILOSEC) 40 MG DR capsule TAKE 1 CAPSULE BY MOUTH ONCE DAILY. 90 capsule 3     pregabalin (LYRICA) 50 MG capsule 50 mg in the morning and 100 mg at night 270 capsule 1     sildenafil (REVATIO) 20 MG tablet TAKE 3 TO 5 TABLETS BY MOUTH 1 hour prior TO sexual activity. 100 tablet 3     venlafaxine (EFFEXOR-XR) 150 MG 24 hr capsule Take 1 capsule (150 mg) by mouth daily 30 capsule 1     vitamin D3 (CHOLECALCIFEROL) 2000 units (50 mcg) tablet Take 1 tablet (2,000 Units) by mouth daily       No Known Allergies    OBJECTIVE:  /72   Pulse 104   Temp 96.6  F (35.9  C) (Temporal)   Resp 19   Wt 94.6 kg (208 lb 9.6 oz)   SpO2 95%   BMI 30.80 kg/m      EXAM:  General Appearance: Pleasant, alert, appropriate appearance for age. No acute distress  Psychiatric: Normal affect and mentation      MR Lumbar Spine w/o & w Contrast  Narrative: MR LUMBAR SPINE W/O & W  CONTRAST    HISTORY: L/S-spine fusion, follow up; Back pain, prior surgery, new or  progressive sx; Radiculopathy, prior surgery, new or progressive sx;  Spinal stenosis of lumbar region with neurogenic claudication. .     TECHNIQUE: Sagittal T1, T2 and STIR as well as axial T2 images of the  lumbar spine were obtained.    COMPARISON: 6/11/18.    FINDINGS:      Postoperative changes of interval extension of lumbar fusion,  previously present at L4-5, now extending from L2 to L5, including  bilateral pedicle screws and vertical fixation rods at the operated  levels. Intervertebral spacer devices are present at L2-3, L3-4 and  L4-5. A small amount of fluid is present in the subcutaneous tissues.  There is postoperative paraspinal edema. No suspicious fluid  collection is seen. Artifact from metallic hardware limits assessment  of the immediate adjacent structures.      The lumbar lordosis is notable for trace degenerative retrolisthesis  of L1 on L2 and L2 on L3, unchanged. Lumbar vertebral body heights are  preserved. No suspicious marrow lesion is identified allowing for  metallic artifact.    The distal cord and conus medullaris have a normal caliber and  morphology.  The conus terminates at L1-2.  No abnormal cord signal is  seen in the distal cord or conus.  There are no masses in the spinal  canal or paravertebral soft tissues.    At L1-2, there is a mild symmetric disc bulge with mild facet  degenerative hypertrophy, not significantly changed when compared to  2018. Minimal spinal and foraminal narrowing are unchanged.    At L2-3, postoperative changes including interval disc spacer  placement results in reduced disc bulging. Mild asymmetric left  persistent disc bulging with osteophyte formation is, contributing to  improved mild left foraminal stenosis. Overall spinal narrowing is  improved, minimal.    At L3-4, postoperative changes include right hemilaminectomy and  medial facetectomy. The spinal canal is  patent. Mild foraminal  narrowing on the left persists. A small amount of enhancing  granulation tissue is questioned in the right neural foramen.    At L4-5, postoperative changes result in decompression of the spinal  canal. Moderate right foraminal narrowing is unchanged.    At L5-S1, focal right hemilaminotomy changes are present with partial  resection of the right ligamentum flavum. The spinal canal is patent.  Foraminal narrowing is mild on the left and moderate on the right  despite medial facetectomy changes, similar to prior. Mild to moderate  facet degenerative changes, similar to prior.  Impression: IMPRESSION:    Interval posterior interbody fusion at L2-3 and L3-4. Chronic prior  fusion changes at L4-5. No evidence of new left-sided nerve root  impingement to account for left leg radiculopathy.    KATHI OSPINA MD        ASSESSMENT/PLAN:    ICD-10-CM    1. Spinal stenosis of lumbar region with neurogenic claudication M48.062 venlafaxine (EFFEXOR-XR) 150 MG 24 hr capsule     pregabalin (LYRICA) 50 MG capsule     HYDROcodone-acetaminophen (NORCO)  MG per tablet   2. Anhedonia R45.84 venlafaxine (EFFEXOR-XR) 150 MG 24 hr capsule   3. Chronic back pain greater than 3 months duration M54.9 pregabalin (LYRICA) 50 MG capsule    G89.29    4. Chronic, continuous use of opioids F11.90 HYDROcodone-acetaminophen (NORCO)  MG per tablet     Chronic low back pain.  Persistent left-sided low back pain and left sciatica despite surgery in 2018.  MRI does not appear concerning.  He has been working on a slow opioid wean since surgery a year ago.  Has been on chronic opioids for many years.     He needs DOT certification. I have no concerns about his driving safety on chronic stable opioids. Has reduced opioids slowly with time. Seeing Dr Pizarro next week for DOT exam.    We discussed options again for low back. Surgeon had no specific recommendations. Injections not clearly beneficial. Further surgery  not recommended. Medically managed with Lyrica and Effexor. Started using exercise bike. Has a new chair with more lumbar support and seeing benefit. Discussed potentially trying Spine Therapy program. He could consult with Dr Pizarro about this. Referral placed.    Keep Effexor at 150 mg daily. May have increased too quickly and seeing side effects. Continue same pregabalin.    Reviewed . No concerns for aberrant use. Keep hydrocodone same dosing of 5-1/2/day for next 28 days, which is 154 pills. Then reduce to 5 pills per day on March 23. Fill 29 day supply on March 23, follow up with me on April 21    Greater than 50% of this 25 minute appointment spent on counseling and coordination of care    Wei Perez MD    This document was prepared using a combination of typing and voice generated software.  While every attempt was made for accuracy, spelling and grammatical errors may exist.

## 2020-03-02 ENCOUNTER — OFFICE VISIT (OUTPATIENT)
Dept: FAMILY MEDICINE | Facility: OTHER | Age: 65
End: 2020-03-02
Attending: CHIROPRACTOR

## 2020-03-02 VITALS
SYSTOLIC BLOOD PRESSURE: 130 MMHG | DIASTOLIC BLOOD PRESSURE: 68 MMHG | HEIGHT: 70 IN | BODY MASS INDEX: 31.78 KG/M2 | HEART RATE: 66 BPM | RESPIRATION RATE: 16 BRPM | WEIGHT: 222 LBS

## 2020-03-02 VITALS
HEIGHT: 70 IN | HEART RATE: 66 BPM | DIASTOLIC BLOOD PRESSURE: 68 MMHG | SYSTOLIC BLOOD PRESSURE: 130 MMHG | RESPIRATION RATE: 16 BRPM | OXYGEN SATURATION: 96 % | WEIGHT: 222 LBS | TEMPERATURE: 98 F | BODY MASS INDEX: 31.78 KG/M2

## 2020-03-02 DIAGNOSIS — M48.062 SPINAL STENOSIS OF LUMBAR REGION WITH NEUROGENIC CLAUDICATION: Primary | ICD-10-CM

## 2020-03-02 DIAGNOSIS — M54.9 CHRONIC BACK PAIN GREATER THAN 3 MONTHS DURATION: ICD-10-CM

## 2020-03-02 DIAGNOSIS — G89.29 CHRONIC BACK PAIN GREATER THAN 3 MONTHS DURATION: ICD-10-CM

## 2020-03-02 DIAGNOSIS — Z02.89 ENCOUNTER FOR EXAMINATION REQUIRED BY DEPARTMENT OF TRANSPORTATION (DOT): Primary | ICD-10-CM

## 2020-03-02 LAB
ALBUMIN UR-MCNC: 100 MG/DL
APPEARANCE UR: CLEAR
BACTERIA #/AREA URNS HPF: ABNORMAL /HPF
BILIRUB UR QL STRIP: NEGATIVE
COLOR UR AUTO: YELLOW
GLUCOSE UR STRIP-MCNC: NEGATIVE MG/DL
HGB UR QL STRIP: NEGATIVE
HYALINE CASTS #/AREA URNS LPF: 1 /LPF (ref 0–2)
KETONES UR STRIP-MCNC: 5 MG/DL
LEUKOCYTE ESTERASE UR QL STRIP: NEGATIVE
MUCOUS THREADS #/AREA URNS LPF: PRESENT /LPF
NITRATE UR QL: NEGATIVE
PH UR STRIP: 6 PH (ref 5–7)
RBC #/AREA URNS AUTO: 2 /HPF (ref 0–2)
SOURCE: ABNORMAL
SP GR UR STRIP: 1.03 (ref 1–1.03)
SQUAMOUS #/AREA URNS AUTO: 1 /HPF (ref 0–1)
UROBILINOGEN UR STRIP-MCNC: 2 MG/DL (ref 0–2)
WBC #/AREA URNS AUTO: 4 /HPF (ref 0–5)

## 2020-03-02 PROCEDURE — 99499 UNLISTED E&M SERVICE: CPT | Performed by: CHIROPRACTOR

## 2020-03-02 PROCEDURE — 99207: CPT | Performed by: CHIROPRACTOR

## 2020-03-02 PROCEDURE — 81001 URINALYSIS AUTO W/SCOPE: CPT | Mod: ZL | Performed by: CHIROPRACTOR

## 2020-03-02 ASSESSMENT — PAIN SCALES - GENERAL: PAINLEVEL: MODERATE PAIN (4)

## 2020-03-02 ASSESSMENT — MIFFLIN-ST. JEOR
SCORE: 1793.24
SCORE: 1793.24

## 2020-03-02 NOTE — PROGRESS NOTES
Department of Transportation (DOT) physical performed.  See scanned documents dated today, 3/2/2020.     Patient is not authorized until follow up with PCP for abnormal labs, and cardiology follow up as previously recommended.        Zeeshan Pizarro DC, CORTNEY  Director - Occupational Medicine Department  [facility]  [address] [place] [address]  Phone [phone]  Fax [phone]

## 2020-03-02 NOTE — PROGRESS NOTES
Spine Therapy Program Consultation Report      Patient referred by: Dr. Perez    Condition: Neurogenic claudication, prior fusion    Review of records performed: YES    Candidate for program: NO        Flexion and rotation of the spine are contradictory to Jermain's condition.  The Spine Therapy Program utilizes specific Med X machines that incorporate these two motions.    Instead, I have suggested Mr. Kan to start water based physical therapy at [facility].  The combination of light exercises and warm pool water will be therapeutic and likely give him pain relief.     I am happy to help monitor his progress if you'd like.  Thank you for your referral.          Zeeshan Pizarro DC, CORTNEY  Director - [place] [address]  Phone [phone]  Fax [phone]    Disclaimer:  This note consists of words and symbols derived from keyboarding, dictation, or using voice recognition software. As a result, there may be errors in the script that have gone undetected. Please consider this when interpreting information found in this note.    2:42 PM 3/2/2020

## 2020-03-02 NOTE — NURSING NOTE
Dilip Kan is a 64 year old male presenting for a DOT physical.   Urine analysis initiated per verbal order that was read back and verified.     Medication Reconciliation: harrison Smith LPN  3/2/2020 12:50 PM

## 2020-03-02 NOTE — NURSING NOTE
Dilip Kan is a 64 year old male presenting today to be evaluated for the Spine Program.  Diagnosis: Spinal stenosis of lumbar region with neurological claudication      Medication Reconciliation: complete    Review Of Systems  Skin: negative  Eyes: negative  Ears/Nose/Throat: negative  Respiratory: No shortness of breath, dyspnea on exertion, cough, or hemoptysis  Cardiovascular: negative  Gastrointestinal: negative  Genitourinary: positive for retention, decreased urinary stream, incontinence and prostate cancer  Musculoskeletal: positive for negative and back pain  Neurologic: negative  Psychiatric: negative  Hematologic/Lymphatic/Immunologic: negative  Endocrine: negative    Akosua Smith LPN  3/2/2020 1:23 PM

## 2020-03-04 ENCOUNTER — OFFICE VISIT (OUTPATIENT)
Dept: FAMILY MEDICINE | Facility: OTHER | Age: 65
End: 2020-03-04
Attending: FAMILY MEDICINE
Payer: COMMERCIAL

## 2020-03-04 VITALS
HEART RATE: 86 BPM | BODY MASS INDEX: 31.45 KG/M2 | TEMPERATURE: 97.6 F | OXYGEN SATURATION: 96 % | DIASTOLIC BLOOD PRESSURE: 81 MMHG | SYSTOLIC BLOOD PRESSURE: 137 MMHG | WEIGHT: 219.2 LBS | RESPIRATION RATE: 18 BRPM

## 2020-03-04 DIAGNOSIS — I35.1 NONRHEUMATIC AORTIC VALVE INSUFFICIENCY: ICD-10-CM

## 2020-03-04 DIAGNOSIS — I71.21 ASCENDING AORTIC ANEURYSM (H): Primary | ICD-10-CM

## 2020-03-04 DIAGNOSIS — R80.9 URINE PROTEIN INCREASED: ICD-10-CM

## 2020-03-04 PROCEDURE — 99213 OFFICE O/P EST LOW 20 MIN: CPT | Performed by: FAMILY MEDICINE

## 2020-03-04 PROCEDURE — G0463 HOSPITAL OUTPT CLINIC VISIT: HCPCS

## 2020-03-04 NOTE — PATIENT INSTRUCTIONS
Ordered echocardiogram ideally to have performed before seeing Dr Dave    Obtained urine protein test. If elevated, then we can consider increasing lisinopril

## 2020-03-04 NOTE — NURSING NOTE
Pt presents to clinic today for follow up DOT physical.      Medication Reconciliation: complete  Sydney Caballero LPN

## 2020-03-11 ENCOUNTER — HOSPITAL ENCOUNTER (OUTPATIENT)
Dept: CARDIOLOGY | Facility: OTHER | Age: 65
End: 2020-03-11
Attending: FAMILY MEDICINE
Payer: MEDICARE

## 2020-03-11 DIAGNOSIS — I35.1 NONRHEUMATIC AORTIC VALVE INSUFFICIENCY: ICD-10-CM

## 2020-03-11 DIAGNOSIS — I71.21 ASCENDING AORTIC ANEURYSM (H): ICD-10-CM

## 2020-03-11 DIAGNOSIS — R80.9 URINE PROTEIN INCREASED: ICD-10-CM

## 2020-03-11 LAB
CREAT UR-MCNC: 279 MG/DL
MICROALBUMIN UR-MCNC: 25 MG/L
MICROALBUMIN/CREAT UR: 9.03 MG/G CR (ref 0–17)

## 2020-03-11 PROCEDURE — 93306 TTE W/DOPPLER COMPLETE: CPT

## 2020-03-11 PROCEDURE — 82043 UR ALBUMIN QUANTITATIVE: CPT | Mod: ZL | Performed by: FAMILY MEDICINE

## 2020-03-11 PROCEDURE — 93306 TTE W/DOPPLER COMPLETE: CPT | Mod: 26 | Performed by: INTERNAL MEDICINE

## 2020-03-17 ENCOUNTER — TELEPHONE (OUTPATIENT)
Dept: FAMILY MEDICINE | Facility: OTHER | Age: 65
End: 2020-03-17

## 2020-03-24 ENCOUNTER — VIRTUAL VISIT (OUTPATIENT)
Dept: CARDIOLOGY | Facility: OTHER | Age: 65
End: 2020-03-24
Attending: INTERNAL MEDICINE
Payer: COMMERCIAL

## 2020-03-24 DIAGNOSIS — I35.0 MILD AORTIC STENOSIS: ICD-10-CM

## 2020-03-24 DIAGNOSIS — I35.1 NONRHEUMATIC AORTIC VALVE INSUFFICIENCY: ICD-10-CM

## 2020-03-24 DIAGNOSIS — E66.09 NON MORBID OBESITY DUE TO EXCESS CALORIES: ICD-10-CM

## 2020-03-24 DIAGNOSIS — E78.2 MIXED HYPERLIPIDEMIA: ICD-10-CM

## 2020-03-24 DIAGNOSIS — E78.1 HYPERTRIGLYCERIDEMIA: ICD-10-CM

## 2020-03-24 DIAGNOSIS — I10 ESSENTIAL HYPERTENSION: ICD-10-CM

## 2020-03-24 DIAGNOSIS — I35.2 AORTIC VALVE STENOSIS WITH INSUFFICIENCY, ETIOLOGY OF CARDIAC VALVE DISEASE UNSPECIFIED: ICD-10-CM

## 2020-03-24 DIAGNOSIS — I71.21 ASCENDING AORTIC ANEURYSM (H): Primary | ICD-10-CM

## 2020-03-24 DIAGNOSIS — R00.2 PALPITATIONS: ICD-10-CM

## 2020-03-24 DIAGNOSIS — I27.20 MILD PULMONARY HYPERTENSION (H): ICD-10-CM

## 2020-03-24 DIAGNOSIS — I25.10 PLAQUE IN HEART ARTERY: ICD-10-CM

## 2020-03-24 DIAGNOSIS — Z87.891 HISTORY OF TOBACCO ABUSE: ICD-10-CM

## 2020-03-24 PROCEDURE — 99213 OFFICE O/P EST LOW 20 MIN: CPT | Performed by: INTERNAL MEDICINE

## 2020-03-24 NOTE — PROGRESS NOTES
"Dilip Kan is a 65 year old male who is being evaluated via a billable telephone visit.      The patient has been notified of following:     \"This telephone visit will be conducted via a call between you and your physician/provider. We have found that certain health care needs can be provided without the need for a physical exam.  This service lets us provide the care you need with a short phone conversation.  If a prescription is necessary we can send it directly to your pharmacy.  If lab work is needed we can place an order for that and you can then stop by our lab to have the test done at a later time.    If during the course of the call the physician/provider feels a telephone visit is not appropriate, you will not be charged for this service.\"     Dilip Kan complains of    Chief Complaint   Patient presents with     Follow Up     echo       I have reviewed and updated the patient's Past Medical History, Social History, Family History and Medication List.    ALLERGIES  Patient has no known allergies.      Additional provider notes:   Impression:  1.  Moderate ascending aortic aneurysm of 4.6 cm on 3/11/2020.  2.  Moderate to severe aortic valve insufficiency.  3.  Mild pulmonary hypertension.  4.  Mild aortic stenosis.  5.  Hypertension  6.  Hyperlipidemia.  7.  Hypertriglyceridemia.  8.  Obesity.  9.  Plaque in the aorta-mild on 3/27/2019.  10.  Palpitations.  11.  H/O tobacco abuse quitting 11/8/2002.  12.  Encounter for examination required by Department of Transportation (DOT).    Assessment/Plan:  1.  Reviewed echo from 3/11/2020.  Ascending aorta is stable at 4.6 cm and aortic insufficiency is stable at moderate to severe.  2.  Repeat echo in 1 year.  3.  Follow-up in 1 year after completion of echocardiogram.  Call with any issues or needs.    Phone call duration:  21 minutes    Riley Dave, DO    "

## 2020-03-25 ENCOUNTER — TELEPHONE (OUTPATIENT)
Dept: FAMILY MEDICINE | Facility: OTHER | Age: 65
End: 2020-03-25

## 2020-03-25 NOTE — TELEPHONE ENCOUNTER
Left message on patient's personalized voicemail that his up to date DOT card will be ready tomorrow at the [address] [facility].  Akosua Smith LPN  3/25/2020 4:12 PM

## 2020-04-21 ENCOUNTER — VIRTUAL VISIT (OUTPATIENT)
Dept: FAMILY MEDICINE | Facility: OTHER | Age: 65
End: 2020-04-21
Attending: FAMILY MEDICINE
Payer: COMMERCIAL

## 2020-04-21 DIAGNOSIS — M48.062 SPINAL STENOSIS OF LUMBAR REGION WITH NEUROGENIC CLAUDICATION: ICD-10-CM

## 2020-04-21 DIAGNOSIS — F11.90 CHRONIC, CONTINUOUS USE OF OPIOIDS: ICD-10-CM

## 2020-04-21 PROCEDURE — 99213 OFFICE O/P EST LOW 20 MIN: CPT | Mod: TEL | Performed by: FAMILY MEDICINE

## 2020-04-21 RX ORDER — HYDROCODONE BITARTRATE AND ACETAMINOPHEN 10; 325 MG/1; MG/1
1 TABLET ORAL EVERY 4 HOURS PRN
Qty: 154 TABLET | Refills: 0 | Status: SHIPPED | OUTPATIENT
Start: 2020-05-19 | End: 2020-06-15

## 2020-04-21 RX ORDER — HYDROCODONE BITARTRATE AND ACETAMINOPHEN 10; 325 MG/1; MG/1
1 TABLET ORAL EVERY 4 HOURS PRN
Qty: 154 TABLET | Refills: 0 | Status: SHIPPED | OUTPATIENT
Start: 2020-04-21 | End: 2020-06-15

## 2020-04-21 NOTE — NURSING NOTE
After verifying pts name and date of birth with pt, pt is needing medication refills.      Medication Reconciliation: complete  Sydney Caballero LPN

## 2020-04-21 NOTE — PROGRESS NOTES
"Dilip Kan is a 65 year old male who is being evaluated via a billable telephone visit.      The patient has been notified of following:     \"This telephone visit will be conducted via a call between you and your physician/provider. We have found that certain health care needs can be provided without the need for a physical exam.  This service lets us provide the care you need with a short phone conversation.  If a prescription is necessary we can send it directly to your pharmacy.  If lab work is needed we can place an order for that and you can then stop by our lab to have the test done at a later time.    Telephone visits are billed at different rates depending on your insurance coverage. During this emergency period, for some insurers they may be billed the same as an in-person visit.  Please reach out to your insurance provider with any questions.    If during the course of the call the physician/provider feels a telephone visit is not appropriate, you will not be charged for this service.\"    Patient has given verbal consent for Telephone visit?  Yes    How would you like to obtain your AVS? Mail a copy    Subjective     Dilip Kan is a 65 year old male who presents to clinic today for the following health issues:    HPI    Follow up on chronic low back pain.      He has a history of multiple back surgeries, most recent was lumbar fusion in November 2018.  Saw his surgeon, Dr. Reeder in the fall 2019 and felt to be healing appropriately.  No injections or surgery recommended at that time.  He used to take hydrocodone 12/day and has worked down to 5/day. Made supply of hydrocodone last until yesterday, due for refill today. Continues also on venlafaxine to help with hot flashes and pain. 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        Patient Active Problem List   Diagnosis     Anemia due to acute blood loss     Backache     Chronic, continuous use " of opioids     Controlled substance agreement signed 3/08/19     Fever, postprocedural     GERD     Essential hypertension     Non morbid obesity due to excess calories     Other specified causes of urethral stricture     Pelvic pain in male     Personal history of prostate cancer s/p prostatectomy and sEBRT     Mixed hyperlipidemia     Spinal stenosis of lumbar region with neurogenic claudication     Follow-up examination following surgery     Rising PSA following treatment for malignant neoplasm of prostate     Aortic valve stenosis with insufficiency, etiology of cardiac valve disease unspecified     Encounter for examination required by Department of Transportation (DOT)     Mild aortic stenosis     Ascending aortic aneurysm-moderate at 4.6 cm on 3/11/20     Aortic valve insufficiency-moderate to severe     Hx of syncope     History of tobacco abuse quitting 2002     Hypertriglyceridemia     Mild pulmonary hypertension (H)     Palpitations     Plaque in heart artery-aorta     Past Surgical History:   Procedure Laterality Date     APPENDECTOMY OPEN  [id number]    Ruptured - [place]     COLONOSCOPY  2006    next due in 2016     DISKECTOMY, LUMBAR, SINGLE SP  2009    L 3-4 microdiskectomy, [facility]     ESOPHAGOSCOPY, GASTROSCOPY, DUODENOSCOPY (EGD), COMBINED  2003          ESOPHAGOSCOPY, GASTROSCOPY, DUODENOSCOPY (EGD), COMBINED  2006          PROSTATECTOMY PERINEAL RADICAL  2012    Nerve Sparring, Dr Warner     SPINE SURGERY N/A 2017    L3 to S1 spinal decompression L4/L5 fusion     TONSILLECTOMY, ADENOIDECTOMY, COMBINED      as a child     VARICOCELECTOMY      INCISION AND DRAINAGE,EXCISE VARICOCELE       Social History     Tobacco Use     Smoking status: Former Smoker     Packs/day: 1.00     Years: 20.00     Pack years: 20.00     Types: Cigarettes     Last attempt to quit: 2002     Years since quittin.4     Smokeless tobacco: Current User     Types: Snuff    Substance Use Topics     Alcohol use: Yes     Comment: Alcoholic Drinks/day: 5 drinks a month     Family History   Problem Relation Age of Onset     Heart Disease Father         Heart Disease, passed away from CHF,CAD     Colon Cancer Father         Cancer-colon     Hypertension Father         Hypertension     Other - See Comments Father         Stroke/Dementia     Hypertension Mother         Hypertension,HTN     Other - See Comments Mother          Parkinsons     Family History Negative Other         Good Health     Family History Negative Other         Good Health,previous marriage./previous marriage.     Family History Negative Other         Good Health,previous marriage.     Family History Negative Sister         Good Health,emotional problems.     Family History Negative Sister         Good Health     Other - See Comments Son         w/o major medical problems.     Other - See Comments Daughter         w/o major medical problems.         Current Outpatient Medications   Medication Sig Dispense Refill     acetaminophen (TYLENOL) 325 MG tablet Take 650 mg by mouth       aspirin EC 81 MG EC tablet Take 81 mg by mouth daily with food       atorvastatin (LIPITOR) 20 MG tablet Take 1 tablet (20 mg) by mouth daily 90 tablet 3     Bee Pollen 500 MG CHEW Take 2 tablets by mouth daily       cyanocobalamin (RA VITAMIN B-12 TR) 1000 MCG TBCR Take 1,000 mcg by mouth daily       garlic (SM GARLIC) 150 MG TABS tablet Take 2 tablets by mouth daily       HYDROcodone-acetaminophen (NORCO)  MG per tablet Take 1 tablet by mouth every 4 hours as needed for moderate to severe pain 5.5 on average 154 tablet 0     IBUPROFEN Prn for back pain       lisinopril (PRINIVIL/ZESTRIL) 5 MG tablet Take 1 tablet (5 mg) by mouth daily 90 tablet 3     Omega-3 Fatty Acids (OMEGA 3 PO) Take 2 capsules by mouth daily       omeprazole (PRILOSEC) 40 MG DR capsule TAKE 1 CAPSULE BY MOUTH ONCE DAILY. 90 capsule 3     pregabalin (LYRICA) 50 MG  capsule 50 mg in the morning and 100 mg at night 270 capsule 1     sildenafil (REVATIO) 20 MG tablet TAKE 3 TO 5 TABLETS BY MOUTH 1 hour prior TO sexual activity. 100 tablet 3     venlafaxine (EFFEXOR-XR) 150 MG 24 hr capsule Take 1 capsule (150 mg) by mouth daily 90 capsule 1     vitamin D3 (CHOLECALCIFEROL) 2000 units (50 mcg) tablet Take 1 tablet (2,000 Units) by mouth daily       No Known Allergies    Reviewed and updated as needed this visit by Provider  Tobacco  Allergies  Meds  Problems  Med Hx  Surg Hx  Fam Hx         Review of Systems          Objective   Reported vitals:     PSYCH: Alert and oriented times 3; coherent speech, normal   rate and volume, able to articulate logical thoughts, able   to abstract reason, no tangential thoughts, no hallucinations   or delusions  RESP: No cough, no audible wheezing, able to talk in full sentences  Remainder of exam unable to be completed due to telephone visits          Assessment/Plan:  1. Chronic, continuous use of opioids  2. Spinal stenosis of lumbar region with neurogenic claudication   reviewed. Given 28 day supply of hydrocodone for 5 per day. Then refill on May 19. Due for follow up by June 17.  - HYDROcodone-acetaminophen (NORCO)  MG per tablet; Take 1 tablet by mouth every 4 hours as needed for moderate to severe pain 5.5 on average  Dispense: 154 tablet; Refill: 0  - HYDROcodone-acetaminophen (NORCO)  MG per tablet; Take 1 tablet by mouth every 4 hours as needed for moderate to severe pain 5.5 on average  Dispense: 154 tablet; Refill: 0    Phone call duration:  10 minutes    Wei Perez MD

## 2020-05-26 DIAGNOSIS — C61 PROSTATE CANCER (H): Primary | ICD-10-CM

## 2020-05-26 NOTE — PROGRESS NOTES
2/5/20  Plan  Follow-up every 4 months with PSA prior  Follow up after 2/5/2021 for Vantas exchange with a PSA prior.

## 2020-06-15 ENCOUNTER — VIRTUAL VISIT (OUTPATIENT)
Dept: FAMILY MEDICINE | Facility: OTHER | Age: 65
End: 2020-06-15
Attending: FAMILY MEDICINE
Payer: COMMERCIAL

## 2020-06-15 DIAGNOSIS — E78.2 MIXED HYPERLIPIDEMIA: ICD-10-CM

## 2020-06-15 DIAGNOSIS — K21.9 GASTROESOPHAGEAL REFLUX DISEASE WITHOUT ESOPHAGITIS: ICD-10-CM

## 2020-06-15 DIAGNOSIS — M48.062 SPINAL STENOSIS OF LUMBAR REGION WITH NEUROGENIC CLAUDICATION: Primary | ICD-10-CM

## 2020-06-15 DIAGNOSIS — R45.84 ANHEDONIA: ICD-10-CM

## 2020-06-15 DIAGNOSIS — F11.90 CHRONIC, CONTINUOUS USE OF OPIOIDS: ICD-10-CM

## 2020-06-15 DIAGNOSIS — I10 ESSENTIAL HYPERTENSION: ICD-10-CM

## 2020-06-15 PROCEDURE — 99213 OFFICE O/P EST LOW 20 MIN: CPT | Mod: TEL | Performed by: FAMILY MEDICINE

## 2020-06-15 RX ORDER — HYDROCODONE BITARTRATE AND ACETAMINOPHEN 10; 325 MG/1; MG/1
1 TABLET ORAL EVERY 4 HOURS PRN
Qty: 154 TABLET | Refills: 0 | Status: SHIPPED | OUTPATIENT
Start: 2020-06-15 | End: 2020-07-13

## 2020-06-15 RX ORDER — LISINOPRIL 5 MG/1
5 TABLET ORAL DAILY
Qty: 90 TABLET | Refills: 3 | Status: SHIPPED | OUTPATIENT
Start: 2020-06-15 | End: 2020-07-13

## 2020-06-15 RX ORDER — NAPROXEN 500 MG/1
500 TABLET ORAL 2 TIMES DAILY PRN
Qty: 60 TABLET | Refills: 3 | Status: SHIPPED | OUTPATIENT
Start: 2020-06-15 | End: 2020-07-13

## 2020-06-15 RX ORDER — VENLAFAXINE HYDROCHLORIDE 150 MG/1
150 CAPSULE, EXTENDED RELEASE ORAL DAILY
Qty: 90 CAPSULE | Refills: 3 | Status: SHIPPED | OUTPATIENT
Start: 2020-06-15 | End: 2020-10-05

## 2020-06-15 RX ORDER — OMEPRAZOLE 40 MG/1
CAPSULE, DELAYED RELEASE ORAL
Qty: 90 CAPSULE | Refills: 3 | Status: SHIPPED | OUTPATIENT
Start: 2020-06-15 | End: 2021-07-28

## 2020-06-15 ASSESSMENT — PATIENT HEALTH QUESTIONNAIRE - PHQ9: SUM OF ALL RESPONSES TO PHQ QUESTIONS 1-9: 0

## 2020-06-15 NOTE — NURSING NOTE
Spoke with patient. He wishes to do a telephone visit today to go over meds. No vitals obtained at this visit.  Dasha Avendaño LPN.........................6/15/2020  8:13 AM

## 2020-07-09 DIAGNOSIS — C61 PROSTATE CANCER (H): ICD-10-CM

## 2020-07-09 DIAGNOSIS — E78.2 MIXED HYPERLIPIDEMIA: ICD-10-CM

## 2020-07-09 DIAGNOSIS — I10 ESSENTIAL HYPERTENSION: ICD-10-CM

## 2020-07-09 LAB
ANION GAP SERPL CALCULATED.3IONS-SCNC: 8 MMOL/L (ref 3–14)
BUN SERPL-MCNC: 19 MG/DL (ref 7–25)
CALCIUM SERPL-MCNC: 9.4 MG/DL (ref 8.6–10.3)
CHLORIDE SERPL-SCNC: 104 MMOL/L (ref 98–107)
CHOLEST SERPL-MCNC: 179 MG/DL
CO2 SERPL-SCNC: 28 MMOL/L (ref 21–31)
CREAT SERPL-MCNC: 0.69 MG/DL (ref 0.7–1.3)
ERYTHROCYTE [DISTWIDTH] IN BLOOD BY AUTOMATED COUNT: 12.3 % (ref 10–15)
GFR SERPL CREATININE-BSD FRML MDRD: >90 ML/MIN/{1.73_M2}
GLUCOSE SERPL-MCNC: 136 MG/DL (ref 70–105)
HCT VFR BLD AUTO: 41.7 % (ref 40–53)
HDLC SERPL-MCNC: 51 MG/DL (ref 23–92)
HGB BLD-MCNC: 13.7 G/DL (ref 13.3–17.7)
LDLC SERPL CALC-MCNC: 86 MG/DL
MCH RBC QN AUTO: 28.9 PG (ref 26.5–33)
MCHC RBC AUTO-ENTMCNC: 32.9 G/DL (ref 31.5–36.5)
MCV RBC AUTO: 88 FL (ref 78–100)
NONHDLC SERPL-MCNC: 128 MG/DL
PLATELET # BLD AUTO: 203 10E9/L (ref 150–450)
POTASSIUM SERPL-SCNC: 4.1 MMOL/L (ref 3.5–5.1)
PSA SERPL-MCNC: 0.97 NG/ML
RBC # BLD AUTO: 4.74 10E12/L (ref 4.4–5.9)
SODIUM SERPL-SCNC: 140 MMOL/L (ref 134–144)
TRIGL SERPL-MCNC: 210 MG/DL
WBC # BLD AUTO: 5.5 10E9/L (ref 4–11)

## 2020-07-09 PROCEDURE — 80048 BASIC METABOLIC PNL TOTAL CA: CPT | Mod: ZL | Performed by: FAMILY MEDICINE

## 2020-07-09 PROCEDURE — 84153 ASSAY OF PSA TOTAL: CPT | Mod: ZL | Performed by: UROLOGY

## 2020-07-09 PROCEDURE — 85027 COMPLETE CBC AUTOMATED: CPT | Mod: ZL | Performed by: FAMILY MEDICINE

## 2020-07-09 PROCEDURE — 36415 COLL VENOUS BLD VENIPUNCTURE: CPT | Mod: ZL | Performed by: FAMILY MEDICINE

## 2020-07-09 PROCEDURE — 80061 LIPID PANEL: CPT | Mod: ZL | Performed by: FAMILY MEDICINE

## 2020-07-13 ENCOUNTER — OFFICE VISIT (OUTPATIENT)
Dept: FAMILY MEDICINE | Facility: OTHER | Age: 65
End: 2020-07-13
Attending: FAMILY MEDICINE
Payer: MEDICARE

## 2020-07-13 ENCOUNTER — HOSPITAL ENCOUNTER (OUTPATIENT)
Dept: GENERAL RADIOLOGY | Facility: OTHER | Age: 65
End: 2020-07-13
Attending: FAMILY MEDICINE
Payer: MEDICARE

## 2020-07-13 VITALS
DIASTOLIC BLOOD PRESSURE: 84 MMHG | TEMPERATURE: 99.2 F | BODY MASS INDEX: 32.28 KG/M2 | HEART RATE: 68 BPM | RESPIRATION RATE: 16 BRPM | SYSTOLIC BLOOD PRESSURE: 152 MMHG | WEIGHT: 225 LBS

## 2020-07-13 DIAGNOSIS — F11.90 CHRONIC, CONTINUOUS USE OF OPIOIDS: ICD-10-CM

## 2020-07-13 DIAGNOSIS — M54.9 CHRONIC BACK PAIN GREATER THAN 3 MONTHS DURATION: ICD-10-CM

## 2020-07-13 DIAGNOSIS — I10 ESSENTIAL HYPERTENSION: ICD-10-CM

## 2020-07-13 DIAGNOSIS — M53.3 PAIN OF LEFT SACROILIAC JOINT: ICD-10-CM

## 2020-07-13 DIAGNOSIS — G89.29 CHRONIC BACK PAIN GREATER THAN 3 MONTHS DURATION: ICD-10-CM

## 2020-07-13 DIAGNOSIS — M48.062 SPINAL STENOSIS OF LUMBAR REGION WITH NEUROGENIC CLAUDICATION: Primary | ICD-10-CM

## 2020-07-13 PROCEDURE — G0463 HOSPITAL OUTPT CLINIC VISIT: HCPCS

## 2020-07-13 PROCEDURE — 72202 X-RAY EXAM SI JOINTS 3/> VWS: CPT

## 2020-07-13 PROCEDURE — 99214 OFFICE O/P EST MOD 30 MIN: CPT | Performed by: FAMILY MEDICINE

## 2020-07-13 RX ORDER — HYDROCODONE BITARTRATE AND ACETAMINOPHEN 10; 325 MG/1; MG/1
1 TABLET ORAL EVERY 4 HOURS PRN
Qty: 140 TABLET | Refills: 0 | Status: SHIPPED | OUTPATIENT
Start: 2020-08-10 | End: 2020-09-08

## 2020-07-13 RX ORDER — NAPROXEN 500 MG/1
500 TABLET ORAL 2 TIMES DAILY PRN
Qty: 180 TABLET | Refills: 1 | Status: SHIPPED | OUTPATIENT
Start: 2020-07-13 | End: 2021-05-28

## 2020-07-13 RX ORDER — PREGABALIN 50 MG/1
CAPSULE ORAL
Qty: 270 CAPSULE | Refills: 1 | Status: SHIPPED | OUTPATIENT
Start: 2020-07-13 | End: 2020-12-01

## 2020-07-13 RX ORDER — LISINOPRIL 10 MG/1
10 TABLET ORAL DAILY
Qty: 90 TABLET | Refills: 3 | Status: SHIPPED | OUTPATIENT
Start: 2020-07-13 | End: 2020-09-08

## 2020-07-13 RX ORDER — HYDROCODONE BITARTRATE AND ACETAMINOPHEN 10; 325 MG/1; MG/1
1 TABLET ORAL EVERY 4 HOURS PRN
Qty: 154 TABLET | Refills: 0 | Status: SHIPPED | OUTPATIENT
Start: 2020-07-13 | End: 2020-09-08

## 2020-07-13 ASSESSMENT — PATIENT HEALTH QUESTIONNAIRE - PHQ9: SUM OF ALL RESPONSES TO PHQ QUESTIONS 1-9: 0

## 2020-07-13 ASSESSMENT — PAIN SCALES - GENERAL: PAINLEVEL: MODERATE PAIN (4)

## 2020-07-13 NOTE — NURSING NOTE
"Chief Complaint   Patient presents with     Recheck Medication     Pt present to clinic today for a medication check.  Initial BP (!) 152/84 (BP Location: Right arm, Patient Position: Sitting, Cuff Size: Adult Large)   Pulse 68   Temp 99.2  F (37.3  C) (Tympanic)   Resp 16   Wt 102.1 kg (225 lb)   BMI 32.28 kg/m   Estimated body mass index is 32.28 kg/m  as calculated from the following:    Height as of 3/2/20: 1.778 m (5' 10\").    Weight as of this encounter: 102.1 kg (225 lb).  Medication Reconciliation: complete    Ellen Rebolledo LPN  "

## 2020-07-13 NOTE — PATIENT INSTRUCTIONS
Hydrocodone 5.5 per day on average for 4 weeks, then 5 per day for next 4 weeks  Ordered sacroiliac injection    Increase lisinopril to 10 mg daily  Follow-up in 8 weeks

## 2020-07-13 NOTE — PROGRESS NOTES
"Nursing Notes:   Ellen Rebolledo LPN  7/13/2020 12:47 PM  Signed  Chief Complaint   Patient presents with     Recheck Medication     Pt present to clinic today for a medication check.  Initial BP (!) 152/84 (BP Location: Right arm, Patient Position: Sitting, Cuff Size: Adult Large)   Pulse 68   Temp 99.2  F (37.3  C) (Tympanic)   Resp 16   Wt 102.1 kg (225 lb)   BMI 32.28 kg/m   Estimated body mass index is 32.28 kg/m  as calculated from the following:    Height as of 3/2/20: 1.778 m (5' 10\").    Weight as of this encounter: 102.1 kg (225 lb).  Medication Reconciliation: complete    Ellen Rebolledo LPN    SUBJECTIVE:  65 year old male presents to follow up on chronic pain, hypertension, GERD.     Stopped atorvastatin a month ago due to some concern it was causing side effects. He cannot say things are really better. Will resume.    S/p 3 lumbar surgeries. Continues having low back pain. At times will have very sharp pain in the left low back.   Most recent was lumbar fusion in November 2018.  Has appointment with Dr. Reeder coming up.   He used to take hydrocodone 12/day and has worked down to 5.5/day.   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      Using naproxen with mild benefit.  Pregabalin has helped slightly.  Started venlafaxine to help with sweats and pain.  He has not noticed a significant benefit with either the sweats or pain.  Amitriptyline caused sedation.    Blood pressure elevated, similar readings at home. Came down about 10 points when lisinopril started.    Reviewed recent labs, which look good.    REVIEW OF SYSTEMS:    Pertinent items are noted in HPI.  Constitutional: negative  Respiratory: negative  Cardiovascular: negative    Current Outpatient Medications   Medication Sig Dispense Refill     acetaminophen (TYLENOL) 325 MG tablet Take 650 mg by mouth       aspirin EC 81 MG EC tablet Take 81 mg by mouth daily with food       " atorvastatin (LIPITOR) 20 MG tablet Take 1 tablet (20 mg) by mouth daily 90 tablet 3     Bee Pollen 500 MG CHEW Take 2 tablets by mouth daily       cyanocobalamin (RA VITAMIN B-12 TR) 1000 MCG TBCR Take 1,000 mcg by mouth daily       garlic (SM GARLIC) 150 MG TABS tablet Take 2 tablets by mouth daily       HYDROcodone-acetaminophen (NORCO)  MG per tablet Take 1 tablet by mouth every 4 hours as needed for moderate to severe pain 5.5 on average 154 tablet 0     lisinopril (ZESTRIL) 5 MG tablet Take 1 tablet (5 mg) by mouth daily 90 tablet 3     naproxen (NAPROSYN) 500 MG tablet Take 1 tablet (500 mg) by mouth 2 times daily as needed for moderate pain 60 tablet 3     Omega-3 Fatty Acids (OMEGA 3 PO) Take 2 capsules by mouth daily       omeprazole (PRILOSEC) 40 MG DR capsule TAKE 1 CAPSULE BY MOUTH ONCE DAILY. 90 capsule 3     pregabalin (LYRICA) 50 MG capsule 50 mg in the morning and 100 mg at night 270 capsule 1     sildenafil (REVATIO) 20 MG tablet TAKE 3 TO 5 TABLETS BY MOUTH 1 hour prior TO sexual activity. 100 tablet 3     venlafaxine (EFFEXOR-XR) 150 MG 24 hr capsule Take 1 capsule (150 mg) by mouth daily 90 capsule 3     vitamin D3 (CHOLECALCIFEROL) 2000 units (50 mcg) tablet Take 1 tablet (2,000 Units) by mouth daily       No Known Allergies    OBJECTIVE:  BP (!) 152/84 (BP Location: Right arm, Patient Position: Sitting, Cuff Size: Adult Large)   Pulse 68   Temp 99.2  F (37.3  C) (Tympanic)   Resp 16   Wt 102.1 kg (225 lb)   BMI 32.28 kg/m      EXAM:  General Appearance: Pleasant, alert, appropriate appearance for age. No acute distress  Musculoskeletal Exam: Lumbar Spine: mild tenderness over inferior lumbar paraspinous muscles.  Right SI joint with significant tenderness.  Minimal tenderness on the left.  Neurologic Exam: reflexes 2+, strength symmetric lower extremities; SLR negative bilaterally  Psychiatric: Normal affect and mentation      ASSESSMENT/PLAN:    ICD-10-CM    1. Spinal stenosis of  lumbar region with neurogenic claudication  M48.062 HYDROcodone-acetaminophen (NORCO)  MG per tablet     pregabalin (LYRICA) 50 MG capsule     naproxen (NAPROSYN) 500 MG tablet     HYDROcodone-acetaminophen (NORCO)  MG per tablet   2. Chronic, continuous use of opioids  F11.90 HYDROcodone-acetaminophen (NORCO)  MG per tablet     HYDROcodone-acetaminophen (NORCO)  MG per tablet   3. Chronic back pain greater than 3 months duration  M54.9 pregabalin (LYRICA) 50 MG capsule    G89.29    4. Pain of left sacroiliac joint  M53.3 XR Sacroiliac Joint G/E 3 Views     XR Sacroiliac Diagnostic Injection Left   5. Essential hypertension  I10 lisinopril (ZESTRIL) 10 MG tablet     We reviewed options for back pain.  Chronic opioids have no proof of benefit.  They help for short-term, but then tolerance develops.  Have been working down on the dosing.  He has not had amazing results with pregabalin or venlafaxine.  May have noticed slight benefit with each.  Naproxen helped slightly.  He will meet with Dr. Reeder to see if he has any suggestions.     Potentially he could benefit from a hardware injection.  He could visit with pain management in [place].  Given significant tenderness over the SI joint today, recommend a diagnostic right SI injection.  If he has significant relief, then this could be an area of focus.  If it does not help, then his pain  may just relate to the lumbar spine.  Refilled hydrocodone 10/325 taking 5-1/2 pills/day on average for the next 28 days.  We will then do a slight dose reduction down to 5 pills/day for the next 28 days.    Blood pressure is elevated, increase lisinopril from 5 up to 10 mg daily.  Track blood pressure at home.    Follow-up 8 weeks    Wei Perez MD    This document was prepared using a combination of typing and voice generated software.  While every attempt was made for accuracy, spelling and grammatical errors may exist.

## 2020-07-21 ENCOUNTER — HOSPITAL ENCOUNTER (OUTPATIENT)
Dept: GENERAL RADIOLOGY | Facility: OTHER | Age: 65
Discharge: HOME | End: 2020-07-21
Attending: FAMILY MEDICINE | Admitting: FAMILY MEDICINE
Payer: MEDICARE

## 2020-07-21 DIAGNOSIS — M53.3 PAIN OF LEFT SACROILIAC JOINT: ICD-10-CM

## 2020-07-21 PROCEDURE — 27096 INJECT SACROILIAC JOINT: CPT | Mod: LT

## 2020-07-21 PROCEDURE — 25000128 H RX IP 250 OP 636: Performed by: RADIOLOGY

## 2020-07-21 PROCEDURE — 25500064 ZZH RX 255 OP 636: Performed by: RADIOLOGY

## 2020-07-21 PROCEDURE — 25000125 ZZHC RX 250: Performed by: RADIOLOGY

## 2020-07-21 RX ORDER — TRIAMCINOLONE ACETONIDE 40 MG/ML
40 INJECTION, SUSPENSION INTRA-ARTICULAR; INTRAMUSCULAR ONCE
Status: COMPLETED | OUTPATIENT
Start: 2020-07-21 | End: 2020-07-21

## 2020-07-21 RX ORDER — BUPIVACAINE HYDROCHLORIDE 5 MG/ML
3 INJECTION, SOLUTION EPIDURAL; INTRACAUDAL ONCE
Status: COMPLETED | OUTPATIENT
Start: 2020-07-21 | End: 2020-07-21

## 2020-07-21 RX ORDER — LIDOCAINE HYDROCHLORIDE 10 MG/ML
2 INJECTION, SOLUTION INFILTRATION; PERINEURAL ONCE
Status: COMPLETED | OUTPATIENT
Start: 2020-07-21 | End: 2020-07-21

## 2020-07-21 RX ADMIN — LIDOCAINE HYDROCHLORIDE 2 ML: 10 INJECTION, SOLUTION INFILTRATION; PERINEURAL at 08:34

## 2020-07-21 RX ADMIN — TRIAMCINOLONE ACETONIDE 40 MG: 40 INJECTION, SUSPENSION INTRA-ARTICULAR; INTRAMUSCULAR at 08:34

## 2020-07-21 RX ADMIN — IOHEXOL 2 ML: 240 INJECTION, SOLUTION INTRATHECAL; INTRAVASCULAR; INTRAVENOUS; ORAL at 08:33

## 2020-07-21 RX ADMIN — BUPIVACAINE HYDROCHLORIDE 3 ML: 5 INJECTION, SOLUTION EPIDURAL; INTRACAUDAL at 08:34

## 2020-07-22 ENCOUNTER — OFFICE VISIT (OUTPATIENT)
Dept: UROLOGY | Facility: OTHER | Age: 65
End: 2020-07-22
Attending: UROLOGY
Payer: COMMERCIAL

## 2020-07-22 VITALS
TEMPERATURE: 97 F | RESPIRATION RATE: 16 BRPM | SYSTOLIC BLOOD PRESSURE: 142 MMHG | WEIGHT: 235.8 LBS | BODY MASS INDEX: 33.83 KG/M2 | HEART RATE: 72 BPM | DIASTOLIC BLOOD PRESSURE: 64 MMHG

## 2020-07-22 DIAGNOSIS — C61 PROSTATE CANCER (H): Primary | ICD-10-CM

## 2020-07-22 PROCEDURE — G0463 HOSPITAL OUTPT CLINIC VISIT: HCPCS

## 2020-07-22 PROCEDURE — 99214 OFFICE O/P EST MOD 30 MIN: CPT | Performed by: UROLOGY

## 2020-07-22 ASSESSMENT — PAIN SCALES - GENERAL: PAINLEVEL: MILD PAIN (3)

## 2020-07-22 NOTE — PROGRESS NOTES
"Type of Visit  EST    Chief Complaint  Rising PSA following prostatectomy and salvage radiation    HPI  Mr. Kan is a 65 y.o. male who presents s/p RRP in 2012 followed by sEBRT in 2013 who follows up 4 months after undergoing [employer] implant as a form of ADT given his accelerated PSA rise.  He underwent a PSA earlier today.  ADT was initially started 7 months ago.  Patient underwent a metastatic survey over 1 year ago which was negative for radiographic evidence of disease.    He has experienced some hot flashes, this has persisted intermittently.  He continues to deny unintentional weight loss, bone pain.  He does have chronic back pain and recently underwent fusion almost 2 years ago.      Family History  Family History   Problem Relation Age of Onset     Hypertension Mother      HTN     Heart Disease Father       passed away from CHF,CAD      Good Health Sister      emotional problems.      Good Health Sister      Good Health Other      Other Son      w/o major medical problems.      Other Daughter      w/o major medical problems.      Good Health Other      previous marriage./previous marriage.     Good Health Other      previous marriage.     Cancer-colon Father      Hypertension Father      Stroke Father      Other Father      Dementia     Other Mother       Parkinsons       Review of Systems  I reviewed the ROS with the patient today.    Nursing Notes:   Danuta العراقي LPN  7/22/2020  8:31 AM  Sign at exiting of workspace  Chief Complaint   Patient presents with     RECHECK     f/u rising PSA levels       Initial BP (!) 142/64 (BP Location: Right arm, Patient Position: Sitting, Cuff Size: Adult Regular)   Pulse 72   Temp 97  F (36.1  C) (Tympanic)   Resp 16   Wt 107 kg (235 lb 12.8 oz)   BMI 33.83 kg/m   Estimated body mass index is 33.83 kg/m  as calculated from the following:    Height as of 3/2/20: 1.778 m (5' 10\").    Weight as of this encounter: 107 kg (235 lb 12.8 oz).  Medication " Reconciliation: Completed     Review of Systems:    Weight loss:    No     Recent fever/chills:  No   Night sweats:   Yes  Current skin rash:  No   Recent hair loss:  Yes  Heat intolerance:  Yes   Cold intolerance:  Yes  Chest pain:   No   Palpitations:   No  Shortness of breath:  No   Wheezing:   No  Constipation:    No   Diarrhea:   No   Nausea:   Yes   Vomiting:   No   Kidney/side pain:  No   Back pain:   Yes  Frequent headaches:  Yes   Dizziness:     Yes  Leg swelling:   No   Calf pain:    No        Danuta العراقي LPN    Physical Exam  BP (!) 142/64 (BP Location: Right arm, Patient Position: Sitting, Cuff Size: Adult Regular)   Pulse 72   Temp 97  F (36.1  C) (Tympanic)   Resp 16   Wt 107 kg (235 lb 12.8 oz)   BMI 33.83 kg/m    Constitutional: No acute distress.  Alert and cooperative   Head: NCAT  Eyes: Conjunctivae normal  Cardiovascular: Regular rate.  Pulmonary/Chest: Respirations are even and non-labored bilaterally, no audible wheezing  Abdominal: Soft. No distension, tenderness, masses or guarding.   Neurological: A + O x 3.  Cranial Nerves II-XII grossly intact.  Extremities: GINNA x 4, Warm. No clubbing.  No cyanosis.    Skin: Pink, warm and dry.  No visible rashes noted.  Psychiatric:  Normal mood and affect  Back:  No left CVA tenderness.  No right CVA tenderness.  Genitourinary:  Nonpalpable bladder    Labs  Results for EMILIA CHAPMAN (MRN [id number]) as of 7/22/2020 08:39   7/9/2020 09:36   Prostate Specific Antigen 0.975     Results for EMILIA CHAPMAN (MRN [id number]) as of 2/5/2020 09:14   11/4/2019 08:10^^ 2/5/2020 06:53   PSA 7.112 (H) 1.006   ^^ started ADT    Results for EMILIA CHAPMAN (MRN [id number]) as of 5/1/2019 09:45   5/1/2019 07:36   Prostate Specific Antigen 2.714     Results for LELAND CHAPMAN (MRN [id number]) as of 11/1/2018 11:08   11/1/2018 08:49   Prostate Specific Antigen 1.177     Results for LELAND CHAPMAN (MRN [id number]) as of 3/29/2018 14:31   3/29/2018 12:15    Prostate Specific Antigen 0.798     Results for LELAND KAN (MRN [id number]) as of 3/29/2018 11:55   4/7/2017 16:04   PSA Total 0.268     Results for LELAND KAN (MRN [id number]) as of 3/23/2016 10:42   1/2/2013 08:00* 2/27/2013** 08:10 10/16/2014 11:26 3/1/2016 14:15   PSA 0.67 0.97 0.070 0.154   * Following radical prostatectomy  ** Following salvage external beam radiation therapy.    Imaging  MRI Lumbar  11/4/2019  IMPRESSION:  Interval posterior interbody fusion at L2-3 and L3-4. Chronic prior  fusion changes at L4-5. No evidence of new left-sided nerve root  impingement to account for left leg radiculopathy.    ^^^^^^^^^^^^^^^^^^^^^^^^^^^^^^^^^^^^^^^^^^^^    CT c/a/p  5/20/2019  IMPRESSION:  No evidence of metastatic disease in the chest, abdomen,  or pelvis.    Bone Scan  5/20/2019  IMPRESSION: Multifocal probable degenerative changes. No sites highly  suspicious for osseous metastatic disease.     Assessment  Mr. Kan is a 65 y.o. male who follows up s/p RRP in 2012 followed by sEBRT in 2013 who follows up with improved PSA on primary ADT with a stable PSA.    We discussed the future in terms of an aggressive approach versus a more passive approach with monitoring.  I discussed chemotherapy as well as the benefits of early chemotherapy.  I explained that with early chemotherapy we would need to consider imaging more frequently, at least once annually, to assess for metastatic disease.    Plan  Follow-up in 4 months with a PSA, CT c/a/p and Bone Scan prior  Follow up after 2/5/2021 for Nelda figueroa          I spent 25 minutes on this patient's visit and over 50% of this time was spent in face-to-face counseling regarding his diagnosis, treatment options with emphasis on  risks and benefits of each, prognosis and importance of compliance.

## 2020-07-22 NOTE — NURSING NOTE
"Chief Complaint   Patient presents with     RECHECK     f/u rising PSA levels       Initial BP (!) 142/64 (BP Location: Right arm, Patient Position: Sitting, Cuff Size: Adult Regular)   Pulse 72   Temp 97  F (36.1  C) (Tympanic)   Resp 16   Wt 107 kg (235 lb 12.8 oz)   BMI 33.83 kg/m   Estimated body mass index is 33.83 kg/m  as calculated from the following:    Height as of 3/2/20: 1.778 m (5' 10\").    Weight as of this encounter: 107 kg (235 lb 12.8 oz).  Medication Reconciliation: Completed     Review of Systems:    Weight loss:    No     Recent fever/chills:  No   Night sweats:   Yes  Current skin rash:  No   Recent hair loss:  Yes  Heat intolerance:  Yes   Cold intolerance:  Yes  Chest pain:   No   Palpitations:   No  Shortness of breath:  No   Wheezing:   No  Constipation:    No   Diarrhea:   No   Nausea:   Yes   Vomiting:   No   Kidney/side pain:  No   Back pain:   Yes  Frequent headaches:  Yes   Dizziness:     Yes  Leg swelling:   No   Calf pain:    No        Danuta العراقي LPN  "

## 2020-09-08 ENCOUNTER — OFFICE VISIT (OUTPATIENT)
Dept: FAMILY MEDICINE | Facility: OTHER | Age: 65
End: 2020-09-08
Attending: FAMILY MEDICINE
Payer: COMMERCIAL

## 2020-09-08 VITALS
SYSTOLIC BLOOD PRESSURE: 152 MMHG | TEMPERATURE: 97.4 F | HEIGHT: 70 IN | WEIGHT: 240 LBS | BODY MASS INDEX: 34.36 KG/M2 | DIASTOLIC BLOOD PRESSURE: 78 MMHG | OXYGEN SATURATION: 97 % | RESPIRATION RATE: 18 BRPM | HEART RATE: 84 BPM

## 2020-09-08 DIAGNOSIS — F11.90 CHRONIC, CONTINUOUS USE OF OPIOIDS: ICD-10-CM

## 2020-09-08 DIAGNOSIS — R73.01 ELEVATED FASTING GLUCOSE: ICD-10-CM

## 2020-09-08 DIAGNOSIS — M48.062 SPINAL STENOSIS OF LUMBAR REGION WITH NEUROGENIC CLAUDICATION: ICD-10-CM

## 2020-09-08 DIAGNOSIS — I10 ESSENTIAL HYPERTENSION: Primary | ICD-10-CM

## 2020-09-08 PROCEDURE — 99214 OFFICE O/P EST MOD 30 MIN: CPT | Performed by: FAMILY MEDICINE

## 2020-09-08 PROCEDURE — G0463 HOSPITAL OUTPT CLINIC VISIT: HCPCS

## 2020-09-08 RX ORDER — LISINOPRIL 20 MG/1
20 TABLET ORAL DAILY
Qty: 90 TABLET | Refills: 3 | Status: SHIPPED | OUTPATIENT
Start: 2020-09-08 | End: 2021-01-27

## 2020-09-08 RX ORDER — HYDROCODONE BITARTRATE AND ACETAMINOPHEN 10; 325 MG/1; MG/1
1 TABLET ORAL EVERY 4 HOURS PRN
Qty: 150 TABLET | Refills: 0 | Status: SHIPPED | OUTPATIENT
Start: 2020-09-08 | End: 2020-10-05

## 2020-09-08 ASSESSMENT — MIFFLIN-ST. JEOR: SCORE: 1869.88

## 2020-09-08 ASSESSMENT — PAIN SCALES - GENERAL: PAINLEVEL: MODERATE PAIN (4)

## 2020-09-08 NOTE — NURSING NOTE
Patient presents today for pain medication management.    Medication Reconciliation Complete    Anh Betancur LPN  9/8/2020 9:13 AM

## 2020-09-08 NOTE — PATIENT INSTRUCTIONS
Increase lisinopril from 10 to 20 mg daily  Follow-up in a month, fasting for lab  Take blood pressure medication before appointment     Work on portion control to lose weight  Follow-up in a month

## 2020-09-08 NOTE — PROGRESS NOTES
Nursing Notes:   Anh Betancur LPN  9/8/2020  9:15 AM  Sign at exiting of workspace  Patient presents today for pain medication management.    Medication Reconciliation Complete    Anh MONIManan Betancur LPN  9/8/2020 9:13 AM    SUBJECTIVE:  65 year old male presents to follow up on chronic pain, hypertension.     S/p 3 lumbar surgeries. Continues having low back pain. He used to take hydrocodone 12/day and has worked down to 5/day.     Stopped venlafaxine. It was helping hot flashes but did not like side effects. It made him feel strange. Just stopped cold turkey without many withdrawal effects.     Sacroiliac injection helped at first. Then worse then improved. It seemed to treat 40% of pain. Sharp, knife-like pain in the left SI area has improved. Still has chronic low back pain.    Blood pressure was elevated, so increased lisinopril from 5 up to 10 mg daily.  Despite this, his blood pressure remains elevated.  He did not take lisinopril today.  Has gained 15 pounds in a month.  He thinks that could be accurate, is not wearing different clothing today.  Likes to snack a lot.  Has tried limiting sugary items and portions, but not made a concerted effort.    REVIEW OF SYSTEMS:    Pertinent items are noted in HPI.  Constitutional: negative  Respiratory: negative  Cardiovascular: negative    Current Outpatient Medications   Medication Sig Dispense Refill     acetaminophen (TYLENOL) 325 MG tablet Take 650 mg by mouth       aspirin EC 81 MG EC tablet Take 81 mg by mouth daily with food       atorvastatin (LIPITOR) 20 MG tablet Take 1 tablet (20 mg) by mouth daily 90 tablet 3     Bee Pollen 500 MG CHEW Take 2 tablets by mouth daily       cyanocobalamin (RA VITAMIN B-12 TR) 1000 MCG TBCR Take 1,000 mcg by mouth daily       garlic (SM GARLIC) 150 MG TABS tablet Take 2 tablets by mouth daily       HYDROcodone-acetaminophen (NORCO)  MG per tablet Take 1 tablet by mouth every 4 hours as needed for moderate to severe  "pain 5.5 on average 154 tablet 0     HYDROcodone-acetaminophen (NORCO)  MG per tablet Take 1 tablet by mouth every 4 hours as needed for moderate to severe pain 5 on average 140 tablet 0     lisinopril (ZESTRIL) 10 MG tablet Take 1 tablet (10 mg) by mouth daily 90 tablet 3     naproxen (NAPROSYN) 500 MG tablet Take 1 tablet (500 mg) by mouth 2 times daily as needed for moderate pain 180 tablet 1     Omega-3 Fatty Acids (OMEGA 3 PO) Take 2 capsules by mouth daily       omeprazole (PRILOSEC) 40 MG DR capsule TAKE 1 CAPSULE BY MOUTH ONCE DAILY. 90 capsule 3     pregabalin (LYRICA) 50 MG capsule 50 mg in the morning and 100 mg at night 270 capsule 1     sildenafil (REVATIO) 20 MG tablet TAKE 3 TO 5 TABLETS BY MOUTH 1 hour prior TO sexual activity. 100 tablet 3     venlafaxine (EFFEXOR-XR) 150 MG 24 hr capsule Take 1 capsule (150 mg) by mouth daily 90 capsule 3     vitamin D3 (CHOLECALCIFEROL) 2000 units (50 mcg) tablet Take 1 tablet (2,000 Units) by mouth daily       No Known Allergies    OBJECTIVE:  BP (!) 152/78   Pulse 84   Temp 97.4  F (36.3  C)   Resp 18   Ht 1.778 m (5' 10\")   Wt 108.9 kg (240 lb)   SpO2 97%   BMI 34.44 kg/m      EXAM:  General Appearance: Pleasant, alert, appropriate appearance for age. No acute distress  Psychiatric: Normal affect and mentation      ASSESSMENT/PLAN:    ICD-10-CM    1. Essential hypertension  I10 lisinopril (ZESTRIL) 20 MG tablet     Basic Metabolic Panel   2. Elevated fasting glucose  R73.01 Hemoglobin A1c   3. Chronic, continuous use of opioids  F11.90 HYDROcodone-acetaminophen (NORCO)  MG per tablet   4. Spinal stenosis of lumbar region with neurogenic claudication  M48.062 HYDROcodone-acetaminophen (NORCO)  MG per tablet      Blood pressure is still significantly elevated.  Even on 10 mg blood pressure was above 140/98 when he came in for SI injection.  Therefore increase lisinopril up to 20 mg daily.    Looking back at last labs, blood sugar was 136. "  Is not clear if he was fasting.  In 2019 he had a normal fasting glucose.  However, has now gained over 30 pounds since that time.  We discussed how the weight gain has contributed hypertension and may have led to prediabetes.    Discussed options between changing diet on her own, seeing dietitian, utilizing locations that suppress appetite and help with weight loss, or seeing medical weight management.  He would like to work on diet changes over the next month and see if he loses weight.  If not, then consider seeing dietitian or medical weight management. Check fasting glucose and A1c next appointment.    He appears to have improvement from the SI injection taking away sharp pain with certain movements in the low lumbar region.  There are no other clear options for his pain when discussed with neurosurgery before.  Pool therapy recommended, but with COVID it is on hold.  For now no further injections or other treatments are planned.      PDMP Review       Value Time User    State PDMP site checked  Yes 9/8/2020  9:26 AM Wei Perez MD        Refilled hydrocodone 10/325 taking 5 pills/day for next 30 days.    Follow up in a month    Greater than 50% of this 30 minute appointment spent on counseling   Wei Perez MD    This document was prepared using a combination of typing and voice generated software.  While every attempt was made for accuracy, spelling and grammatical errors may exist.

## 2020-10-05 ENCOUNTER — OFFICE VISIT (OUTPATIENT)
Dept: FAMILY MEDICINE | Facility: OTHER | Age: 65
End: 2020-10-05
Attending: FAMILY MEDICINE
Payer: MEDICARE

## 2020-10-05 VITALS
TEMPERATURE: 96.7 F | WEIGHT: 245.6 LBS | SYSTOLIC BLOOD PRESSURE: 152 MMHG | OXYGEN SATURATION: 94 % | RESPIRATION RATE: 18 BRPM | HEART RATE: 92 BPM | BODY MASS INDEX: 35.24 KG/M2 | DIASTOLIC BLOOD PRESSURE: 82 MMHG

## 2020-10-05 DIAGNOSIS — F11.90 CHRONIC, CONTINUOUS USE OF OPIOIDS: ICD-10-CM

## 2020-10-05 DIAGNOSIS — Z79.899 CONTROLLED SUBSTANCE AGREEMENT SIGNED: ICD-10-CM

## 2020-10-05 DIAGNOSIS — M48.062 SPINAL STENOSIS OF LUMBAR REGION WITH NEUROGENIC CLAUDICATION: Primary | ICD-10-CM

## 2020-10-05 DIAGNOSIS — I10 ESSENTIAL HYPERTENSION: ICD-10-CM

## 2020-10-05 DIAGNOSIS — E78.5 HYPERLIPIDEMIA LDL GOAL <100: ICD-10-CM

## 2020-10-05 DIAGNOSIS — Z23 NEEDS FLU SHOT: ICD-10-CM

## 2020-10-05 DIAGNOSIS — R73.01 ELEVATED FASTING GLUCOSE: ICD-10-CM

## 2020-10-05 DIAGNOSIS — Z23 NEED FOR VACCINATION FOR PNEUMOCOCCUS: ICD-10-CM

## 2020-10-05 DIAGNOSIS — E11.9 NEWLY DIAGNOSED DIABETES (H): ICD-10-CM

## 2020-10-05 DIAGNOSIS — R63.5 ABNORMAL WEIGHT GAIN: ICD-10-CM

## 2020-10-05 DIAGNOSIS — R23.2 HOT FLASH IN MALE: ICD-10-CM

## 2020-10-05 LAB
ANION GAP SERPL CALCULATED.3IONS-SCNC: 7 MMOL/L (ref 3–14)
BUN SERPL-MCNC: 18 MG/DL (ref 7–25)
CALCIUM SERPL-MCNC: 9.5 MG/DL (ref 8.6–10.3)
CHLORIDE SERPL-SCNC: 105 MMOL/L (ref 98–107)
CO2 SERPL-SCNC: 30 MMOL/L (ref 21–31)
CREAT SERPL-MCNC: 0.68 MG/DL (ref 0.7–1.3)
GFR SERPL CREATININE-BSD FRML MDRD: >90 ML/MIN/{1.73_M2}
GLUCOSE SERPL-MCNC: 153 MG/DL (ref 70–105)
HBA1C MFR BLD: 6.5 % (ref 4–6)
POTASSIUM SERPL-SCNC: 4.4 MMOL/L (ref 3.5–5.1)
SODIUM SERPL-SCNC: 142 MMOL/L (ref 134–144)
TSH SERPL DL<=0.05 MIU/L-ACNC: 0.38 IU/ML (ref 0.34–5.6)

## 2020-10-05 PROCEDURE — 36415 COLL VENOUS BLD VENIPUNCTURE: CPT | Mod: ZL | Performed by: FAMILY MEDICINE

## 2020-10-05 PROCEDURE — 84443 ASSAY THYROID STIM HORMONE: CPT | Mod: ZL | Performed by: FAMILY MEDICINE

## 2020-10-05 PROCEDURE — G0008 ADMIN INFLUENZA VIRUS VAC: HCPCS

## 2020-10-05 PROCEDURE — G0463 HOSPITAL OUTPT CLINIC VISIT: HCPCS

## 2020-10-05 PROCEDURE — 90670 PCV13 VACCINE IM: CPT

## 2020-10-05 PROCEDURE — 83036 HEMOGLOBIN GLYCOSYLATED A1C: CPT | Mod: ZL | Performed by: FAMILY MEDICINE

## 2020-10-05 PROCEDURE — G0463 HOSPITAL OUTPT CLINIC VISIT: HCPCS | Mod: 25

## 2020-10-05 PROCEDURE — 90662 IIV NO PRSV INCREASED AG IM: CPT

## 2020-10-05 PROCEDURE — G0009 ADMIN PNEUMOCOCCAL VACCINE: HCPCS

## 2020-10-05 PROCEDURE — 99214 OFFICE O/P EST MOD 30 MIN: CPT | Performed by: FAMILY MEDICINE

## 2020-10-05 PROCEDURE — 80048 BASIC METABOLIC PNL TOTAL CA: CPT | Mod: ZL | Performed by: FAMILY MEDICINE

## 2020-10-05 RX ORDER — HYDROCODONE BITARTRATE AND ACETAMINOPHEN 10; 325 MG/1; MG/1
1 TABLET ORAL EVERY 4 HOURS PRN
Qty: 150 TABLET | Refills: 0 | Status: SHIPPED | OUTPATIENT
Start: 2020-11-06 | End: 2020-10-05

## 2020-10-05 RX ORDER — METFORMIN HCL 500 MG
500 TABLET, EXTENDED RELEASE 24 HR ORAL 2 TIMES DAILY WITH MEALS
Qty: 60 TABLET | Refills: 3 | Status: SHIPPED | OUTPATIENT
Start: 2020-10-05 | End: 2020-12-01

## 2020-10-05 RX ORDER — ATORVASTATIN CALCIUM 20 MG/1
20 TABLET, FILM COATED ORAL DAILY
Qty: 90 TABLET | Refills: 3 | Status: SHIPPED | OUTPATIENT
Start: 2020-10-05 | End: 2021-08-25

## 2020-10-05 RX ORDER — HYDROCODONE BITARTRATE AND ACETAMINOPHEN 10; 325 MG/1; MG/1
1 TABLET ORAL EVERY 4 HOURS PRN
Qty: 150 TABLET | Refills: 0 | Status: SHIPPED | OUTPATIENT
Start: 2020-10-05 | End: 2020-12-01

## 2020-10-05 RX ORDER — HYDROCODONE BITARTRATE AND ACETAMINOPHEN 10; 325 MG/1; MG/1
1 TABLET ORAL EVERY 4 HOURS PRN
Qty: 150 TABLET | Refills: 0 | Status: SHIPPED | OUTPATIENT
Start: 2020-10-07 | End: 2020-10-05

## 2020-10-05 RX ORDER — HYDROCODONE BITARTRATE AND ACETAMINOPHEN 10; 325 MG/1; MG/1
1 TABLET ORAL EVERY 4 HOURS PRN
Qty: 150 TABLET | Refills: 0 | Status: SHIPPED | OUTPATIENT
Start: 2020-11-04 | End: 2020-12-01

## 2020-10-05 ASSESSMENT — ANXIETY QUESTIONNAIRES
6. BECOMING EASILY ANNOYED OR IRRITABLE: NOT AT ALL
7. FEELING AFRAID AS IF SOMETHING AWFUL MIGHT HAPPEN: NOT AT ALL
3. WORRYING TOO MUCH ABOUT DIFFERENT THINGS: NOT AT ALL
2. NOT BEING ABLE TO STOP OR CONTROL WORRYING: NOT AT ALL
5. BEING SO RESTLESS THAT IT IS HARD TO SIT STILL: NOT AT ALL
GAD7 TOTAL SCORE: 0
1. FEELING NERVOUS, ANXIOUS, OR ON EDGE: NOT AT ALL

## 2020-10-05 ASSESSMENT — PATIENT HEALTH QUESTIONNAIRE - PHQ9: 5. POOR APPETITE OR OVEREATING: NOT AT ALL

## 2020-10-05 NOTE — PROGRESS NOTES
Nursing Notes:   Sydney Caballero LPN  10/5/2020  8:52 AM  Signed  Pt presents to clinic today for medication management.      Medication Reconciliation: complete  Sydney Caballero LPN        SUBJECTIVE:  65 year old male presents to follow up on hypertension, elevated glucose, and chronic low back pain    S/p 3 lumbar surgeries. Continues having low back pain. He used to take hydrocodone 12/day and has worked down to 5/day.   Did not tolerate venlafaxine. Worse hot flashes now. Remains on pregabalin.    Blood pressure elevated, increased lisinopril from 10 to 20 mg daily. Did not take lisinopril today. This was the case last month as well. He has not tracked blood pressure at home.    Glucose was 136, unclear if fasting.  Had gained 15 lbs from July to Sept 2020 and 30 lbs since normal fasting glucose in 2019. Was to work on diet. Despite this, weight up 5 lbs from last month.    Stopped venlafaxine, did not feel it helped hot flashes or pain, but now having more hot flashes. On Lupron for prostate cancer.        REVIEW OF SYSTEMS:    Pertinent items are noted in HPI.  Respiratory: negative  Cardiovascular: negative  Gastrointestinal: negative    Current Outpatient Medications   Medication Sig Dispense Refill     acetaminophen (TYLENOL) 325 MG tablet Take 650 mg by mouth       aspirin EC 81 MG EC tablet Take 81 mg by mouth daily with food       atorvastatin (LIPITOR) 20 MG tablet Take 1 tablet (20 mg) by mouth daily 90 tablet 3     Bee Pollen 500 MG CHEW Take 2 tablets by mouth daily       cyanocobalamin (RA VITAMIN B-12 TR) 1000 MCG TBCR Take 1,000 mcg by mouth daily       garlic (SM GARLIC) 150 MG TABS tablet Take 2 tablets by mouth daily       HYDROcodone-acetaminophen (NORCO)  MG per tablet Take 1 tablet by mouth every 4 hours as needed for moderate to severe pain 5 on average 150 tablet 0     lisinopril (ZESTRIL) 20 MG tablet Take 1 tablet (20 mg) by mouth daily 90 tablet 3     naproxen (NAPROSYN) 500 MG  tablet Take 1 tablet (500 mg) by mouth 2 times daily as needed for moderate pain 180 tablet 1     Omega-3 Fatty Acids (OMEGA 3 PO) Take 2 capsules by mouth daily       omeprazole (PRILOSEC) 40 MG DR capsule TAKE 1 CAPSULE BY MOUTH ONCE DAILY. 90 capsule 3     pregabalin (LYRICA) 50 MG capsule 50 mg in the morning and 100 mg at night 270 capsule 1     sildenafil (REVATIO) 20 MG tablet TAKE 3 TO 5 TABLETS BY MOUTH 1 hour prior TO sexual activity. 100 tablet 3     venlafaxine (EFFEXOR-XR) 150 MG 24 hr capsule Take 1 capsule (150 mg) by mouth daily 90 capsule 3     vitamin D3 (CHOLECALCIFEROL) 2000 units (50 mcg) tablet Take 1 tablet (2,000 Units) by mouth daily       No Known Allergies    OBJECTIVE:  BP (!) 152/82   Pulse 92   Temp 96.7  F (35.9  C) (Temporal)   Resp 18   Wt 111.4 kg (245 lb 9.6 oz)   SpO2 94%   BMI 35.24 kg/m      EXAM:  General Appearance: Alert. No acute distress  Chest/Respiratory Exam: Clear to auscultation bilaterally  Cardiovascular Exam: Regular rate and rhythm. S1, S2, no murmur, gallop, or rubs.  Extremities: 2+ pedal pulses.  No lower extremity edema.  Psychiatric: Normal affect and mentation    Results for orders placed or performed in visit on 10/05/20   TSH Reflex GH     Status: None   Result Value Ref Range    TSH Reflex 0.38 0.34 - 5.60 IU/mL   Hemoglobin A1c     Status: Abnormal   Result Value Ref Range    Hemoglobin A1C 6.5 (H) 4.0 - 6.0 %   Basic Metabolic Panel     Status: Abnormal   Result Value Ref Range    Sodium 142 134 - 144 mmol/L    Potassium 4.4 3.5 - 5.1 mmol/L    Chloride 105 98 - 107 mmol/L    Carbon Dioxide 30 21 - 31 mmol/L    Anion Gap 7 3 - 14 mmol/L    Glucose 153 (H) 70 - 105 mg/dL    Urea Nitrogen 18 7 - 25 mg/dL    Creatinine 0.68 (L) 0.70 - 1.30 mg/dL    GFR Estimate >90 >60 mL/min/[1.73_m2]    GFR Estimate If Black >90 >60 mL/min/[1.73_m2]    Calcium 9.5 8.6 - 10.3 mg/dL      ASSESSMENT/PLAN:    ICD-10-CM    1. Spinal stenosis of lumbar region with  neurogenic claudication  M48.062 HYDROcodone-acetaminophen (NORCO)  MG per tablet     HYDROcodone-acetaminophen (NORCO)  MG per tablet     DISCONTINUED: HYDROcodone-acetaminophen (NORCO)  MG per tablet     DISCONTINUED: HYDROcodone-acetaminophen (NORCO)  MG per tablet   2. Chronic, continuous use of opioids  F11.90 Drug  Screen Comprehensive , Urine with Reported Meds ([facility]) (Pain Care Package)     HYDROcodone-acetaminophen (NORCO)  MG per tablet     HYDROcodone-acetaminophen (NORCO)  MG per tablet     DISCONTINUED: HYDROcodone-acetaminophen (NORCO)  MG per tablet     DISCONTINUED: HYDROcodone-acetaminophen (NORCO)  MG per tablet   3. Hyperlipidemia LDL goal <100  E78.5 atorvastatin (LIPITOR) 20 MG tablet   4. Essential hypertension  I10 Basic Metabolic Panel   5. Hot flash in male  R23.2 TSH Reflex GH     TSH Reflex GH   6. Abnormal weight gain  R63.5 TSH Reflex GH     TSH Reflex GH   7. Controlled substance agreement signed 3/08/19  Z79.899 Drug  Screen Comprehensive , Urine with Reported Meds ([facility]ox) (Pain Care Package)     HYDROcodone-acetaminophen (NORCO)  MG per tablet     DISCONTINUED: HYDROcodone-acetaminophen (NORCO)  MG per tablet   8. Elevated fasting glucose  R73.01 Hemoglobin A1c   9. Newly diagnosed diabetes (H)  E11.9 metFORMIN (GLUCOPHAGE-XR) 500 MG 24 hr tablet     AMBULATORY ADULT DIABETES EDUCATOR REFERRAL   10. Needs flu shot  Z23 [facility] IMM-  FLU VACCINE, INCREASED ANTIGEN, PRESV FREE   11. Need for vaccination for pneumococcus  Z23 [facility] IMM-  PNEUMOCOCCAL CONJ VACCINE 13 VALENT IM       PDMP Review       Value Time User    State PDMP site checked  Yes 10/5/2020  8:55 AM Wei Perez MD       Updated controlled substance agreement.  Refilled hydrocodone 10/325 mg taking 5/day.  Given prescription to fill 10 5 and 11 4.  Follow-up due by December 4.    Obtained labs for monitoring on lisinopril, BMP WNL. Blood pressure elevated, but  did not take medication today. Track blood pressure at home and let me know in 2 weeks if not consistently under 140/90.    Obtain TSH and A1c given hot flashes and weight gain.  TSH is normal, but A1c shows new diagnosis of diabetes.  Contacted with results.  We had discussed obesity medications at appointment prior to lab results.  He will start on metformin 500 mg daily for 1 week then twice daily.      Follow-up 2 months, sooner if needed. Needs to call with blood pressure readings in 2 weeks    Greater than 50% of this 30 minute appointment spent on counseling   Wei Perez MD    This document was prepared using a combination of typing and voice generated software.  While every attempt was made for accuracy, spelling and grammatical errors may exist.

## 2020-10-05 NOTE — PATIENT INSTRUCTIONS
Get a blood pressure monitor  Sit for 5 minutes, then check blood pressure   Track at least 3 times per week  Need to be under 140/90 consistently. Ideally under 130/80    Checking labs. If normal, then let me know blood pressure in 1-2 weeks. If higher than 140/90 then we can adjust blood pressure medication    Follow up in about a month

## 2020-10-06 ASSESSMENT — ANXIETY QUESTIONNAIRES: GAD7 TOTAL SCORE: 0

## 2020-10-16 ENCOUNTER — NURSE TRIAGE (OUTPATIENT)
Dept: FAMILY MEDICINE | Facility: OTHER | Age: 65
End: 2020-10-16

## 2020-10-16 NOTE — TELEPHONE ENCOUNTER
Those readings seem much worse than here, so I am guessing they are not accurate, but hard to say for sure.    Is he taking naproxen? If so, stop and see if blood pressure improves. If not taking, then potentially we will add another low dose blood pressure medication to help bring down pressures.

## 2020-10-16 NOTE — TELEPHONE ENCOUNTER
Called patient and let him know to stop naproxen  and will get a new monitor. He will call back Monday to let us know his readings. Oriana Benitez LPN ....................10/16/2020  11:26 AM

## 2020-10-16 NOTE — TELEPHONE ENCOUNTER
"S-(situation): Pt states that his BP has been running about 195/80 to 200/80.    B-(background): H/o essential and pulmonary HTN, multiple cardiac issues. LOV 10/5: BP elevated (152/82), Lisinopril increased from 10 to 20 mg daily. Pt to track BP at home and f/u 2 weeks later, if not consistently under 140/90.      A-(assessment): BP readings past few days ranging from 195/80 to 200/80. Bought home monitor and started tracking around 10/10. Taking increased dose of Lisinopril, takes BP medication in the AM after breakfast and denies any missed doses. Water intake: 6-8 glasses/24 hrs. Also recently started Metformin. Pt plans to buy another BP monitor, in case his is not working correctly. Denies: headache, chest pain, blurred vision, SOB, weakness, intake of high sodium diet.    R-(recommendations): Protocol recommends Pt be seen today in office, due to asymptomatic HTN with Systolic BP>180. Pt is instead requesting review by Dr. Perez and call back at [phone]; ok to leave detailed message.    Xiao Donald RN .............. 10/16/2020  9:50 AM      ______________________________________________________________        Additional Information    Systolic BP >= 180 OR Diastolic >= 110    Negative: Sounds like a life-threatening emergency to the triager    Negative: Systolic BP >= 160 OR Diastolic >= 100, and any cardiac or neurologic symptoms (e.g., chest pain, difficulty breathing, unsteady gait, blurred vision)    Negative: Patient sounds very sick or weak to the triager    Negative: Systolic BP >= 180 OR Diastolic >= 110, and missed most recent dose of blood pressure medication    Answer Assessment - Initial Assessment Questions  Pt purchased BP monitor around 10/10.    1. BLOOD PRESSURE:  Has been ranging from 195//80 over the past few days.    2. ONSET: \"When did you take your blood pressure?\"  Last reading from yesterday evenin/80    3. HOW: \"How did you obtain the blood pressure?\" (e.g., " "visiting nurse, automatic home BP monitor)  Automatic home BP monitor. Brand new, however Pt questions if it is working correctly, as he has no symptoms of high BP. Pt plans to buy a new one, and see if readings are different.    4. HISTORY: \"Do you have a history of high blood pressure?\"  See notes.    5. MEDICATIONS: \"Are you taking any medications for blood pressure?\" \"Have you missed any doses recently?\"  See notes. No missed doses. Taking new increased dose of Lisinopril. Pt states he takes his BP medications in the morning after eating breakfast.    6. OTHER SYMPTOMS: \"Do you have any symptoms?\" (e.g., headache, chest pain, blurred vision, difficulty breathing, weakness)  Denies    7. PREGNANCY: \"Is there any chance you are pregnant?\" \"When was your last menstrual period?\"  N/a    Patient denies high-sodium diet and drinks approximately 3-4 glasses of water during the day and another 3-4 glasses of water during the night.    Medication change: Also notes he recently started Metformin.    Protocols used: HIGH BLOOD PRESSURE-A-OH    Xiao Donald RN .............. 10/16/2020  9:50 AM    "

## 2020-10-20 ENCOUNTER — PATIENT OUTREACH (OUTPATIENT)
Dept: EDUCATION SERVICES | Facility: OTHER | Age: 65
End: 2020-10-20
Attending: FAMILY MEDICINE
Payer: MEDICARE

## 2020-10-20 DIAGNOSIS — E11.9 NEWLY DIAGNOSED DIABETES (H): ICD-10-CM

## 2020-10-20 NOTE — PROGRESS NOTES
Unable to reach patient today for new T2DM DSME.      Lupe Rogers RN, BSN, [facility]  10/20/2020 10:49 AM

## 2020-10-21 DIAGNOSIS — R97.21 RISING PSA FOLLOWING TREATMENT FOR MALIGNANT NEOPLASM OF PROSTATE: Primary | ICD-10-CM

## 2020-10-21 NOTE — PROGRESS NOTES
"Per last office visit  7/22/20 with Celestine Arrington MD \"Plan  Follow-up in 4 months with a PSA, CT c/a/p and Bone Scan prior  Follow up after 2/5/2021 for [facility] exchange          Orders Placed    "

## 2020-10-28 DIAGNOSIS — R97.21 RISING PSA FOLLOWING TREATMENT FOR MALIGNANT NEOPLASM OF PROSTATE: Primary | ICD-10-CM

## 2020-11-02 ENCOUNTER — TELEPHONE (OUTPATIENT)
Dept: FAMILY MEDICINE | Facility: OTHER | Age: 65
End: 2020-11-02

## 2020-11-02 NOTE — TELEPHONE ENCOUNTER
After verifying pts name and date of birth with pt, pt notified of message below and states understanding.  Sydney Caballero LPN

## 2020-11-02 NOTE — TELEPHONE ENCOUNTER
Those medications are not equivalent. Currently on 3 pregabalin daily. He could take up to 6 of the pregabalin in a day to see if a dose increase helps pain.

## 2020-11-02 NOTE — TELEPHONE ENCOUNTER
States he ran out of hydrocodone and can't  any until Wednesday. He is wanting to know how much pregabalin he can take for pain that would be similar to how much hydrocodone he takes

## 2020-11-04 DIAGNOSIS — F11.90 CHRONIC, CONTINUOUS USE OF OPIOIDS: Primary | ICD-10-CM

## 2020-11-04 DIAGNOSIS — Z79.899 NEED FOR PROPHYLACTIC CHEMOTHERAPY: ICD-10-CM

## 2020-11-04 PROCEDURE — 80307 DRUG TEST PRSMV CHEM ANLYZR: CPT | Mod: ZL | Performed by: FAMILY MEDICINE

## 2020-11-08 LAB — PAIN DRUG SCR UR W RPTD MEDS: NORMAL

## 2020-11-23 ENCOUNTER — HOSPITAL ENCOUNTER (OUTPATIENT)
Dept: CT IMAGING | Facility: OTHER | Age: 65
End: 2020-11-23
Attending: UROLOGY
Payer: MEDICARE

## 2020-11-23 ENCOUNTER — HOSPITAL ENCOUNTER (OUTPATIENT)
Dept: NUCLEAR MEDICINE | Facility: OTHER | Age: 65
End: 2020-11-23
Attending: UROLOGY
Payer: MEDICARE

## 2020-11-23 ENCOUNTER — HOSPITAL ENCOUNTER (OUTPATIENT)
Dept: NUCLEAR MEDICINE | Facility: OTHER | Age: 65
Setting detail: NUCLEAR MEDICINE
End: 2020-11-23
Attending: UROLOGY
Payer: MEDICARE

## 2020-11-23 ENCOUNTER — OFFICE VISIT (OUTPATIENT)
Dept: UROLOGY | Facility: OTHER | Age: 65
End: 2020-11-23
Attending: UROLOGY
Payer: MEDICARE

## 2020-11-23 VITALS
SYSTOLIC BLOOD PRESSURE: 134 MMHG | OXYGEN SATURATION: 97 % | DIASTOLIC BLOOD PRESSURE: 70 MMHG | WEIGHT: 246 LBS | BODY MASS INDEX: 35.3 KG/M2 | RESPIRATION RATE: 16 BRPM | HEART RATE: 57 BPM

## 2020-11-23 DIAGNOSIS — R97.21 RISING PSA FOLLOWING TREATMENT FOR MALIGNANT NEOPLASM OF PROSTATE: ICD-10-CM

## 2020-11-23 DIAGNOSIS — R97.21 RISING PSA FOLLOWING TREATMENT FOR MALIGNANT NEOPLASM OF PROSTATE: Primary | ICD-10-CM

## 2020-11-23 DIAGNOSIS — E66.01 MORBID OBESITY (H): ICD-10-CM

## 2020-11-23 LAB
CREAT SERPL-MCNC: 0.71 MG/DL (ref 0.7–1.3)
GFR SERPL CREATININE-BSD FRML MDRD: >90 ML/MIN/{1.73_M2}
PSA SERPL-MCNC: 1.22 NG/ML
TESTOST SERPL-MCNC: 17 NG/DL (ref 175–781)

## 2020-11-23 PROCEDURE — 78306 BONE IMAGING WHOLE BODY: CPT

## 2020-11-23 PROCEDURE — 71260 CT THORAX DX C+: CPT

## 2020-11-23 PROCEDURE — 84403 ASSAY OF TOTAL TESTOSTERONE: CPT | Mod: ZL | Performed by: UROLOGY

## 2020-11-23 PROCEDURE — 84153 ASSAY OF PSA TOTAL: CPT | Mod: ZL | Performed by: UROLOGY

## 2020-11-23 PROCEDURE — 82565 ASSAY OF CREATININE: CPT | Mod: ZL | Performed by: UROLOGY

## 2020-11-23 PROCEDURE — G0463 HOSPITAL OUTPT CLINIC VISIT: HCPCS

## 2020-11-23 PROCEDURE — 255N000002 HC RX 255 OP 636: Performed by: UROLOGY

## 2020-11-23 PROCEDURE — 99214 OFFICE O/P EST MOD 30 MIN: CPT | Performed by: UROLOGY

## 2020-11-23 PROCEDURE — 343N000001 HC RX 343: Performed by: UROLOGY

## 2020-11-23 PROCEDURE — 36415 COLL VENOUS BLD VENIPUNCTURE: CPT | Mod: ZL | Performed by: UROLOGY

## 2020-11-23 PROCEDURE — A9503 TC99M MEDRONATE: HCPCS | Performed by: UROLOGY

## 2020-11-23 PROCEDURE — G0463 HOSPITAL OUTPT CLINIC VISIT: HCPCS | Mod: 25

## 2020-11-23 PROCEDURE — 74177 CT ABD & PELVIS W/CONTRAST: CPT

## 2020-11-23 RX ORDER — TC 99M MEDRONATE 20 MG/10ML
27 INJECTION, POWDER, LYOPHILIZED, FOR SOLUTION INTRAVENOUS ONCE
Status: COMPLETED | OUTPATIENT
Start: 2020-11-23 | End: 2020-11-23

## 2020-11-23 RX ADMIN — IOHEXOL 100 ML: 350 INJECTION, SOLUTION INTRAVENOUS at 09:29

## 2020-11-23 RX ADMIN — TC 99M MEDRONATE 27 MCI.: 20 INJECTION, POWDER, LYOPHILIZED, FOR SOLUTION INTRAVENOUS at 07:45

## 2020-11-23 ASSESSMENT — PAIN SCALES - GENERAL: PAINLEVEL: MODERATE PAIN (4)

## 2020-11-23 NOTE — NURSING NOTE
Chief Complaint   Patient presents with     Follow Up     4 month follow up prostate cancer     Patient presents to the clinic today for a 4 month follow up for prostate cancer.    Review of Systems:    Weight loss:    No     Recent fever/chills:  No   Night sweats:   Yes  Current skin rash:  No   Recent hair loss:  No  Heat intolerance:  No   Cold intolerance:  No  Chest pain:   No   Palpitations:   No  Shortness of breath:  No   Wheezing:   No  Constipation:    No   Diarrhea:   No   Nausea:   No   Vomiting:   No   Kidney/side pain:  No   Back pain:   Yes  Frequent headaches:  No   Dizziness:     No  Leg swelling:   No   Calf pain:    No        Medication Reconciliation: completed   Dasha Vasques LPN  11/23/2020 11:13 AM

## 2020-11-23 NOTE — PROGRESS NOTES
IV Contrast- Discharge Instructions After Your CT Scan      The IV contrast you received today will be filtered from your bloodstream by your kidneys during the next 24 hours and pass from the body in urine.  You will not be aware of this process and your urine will not change in color.  To help this process you should drink at least 4 additional glasses of water or juice today.  This reduces stress on your kidneys.    Most contrast reactions are immediate.  Should you develop symptoms of concern after discharge, contact the department at the number below.  After hours you should contact your personal physician.  If you develop breathing distress or wheezing, call 911.      1.  Has the patient had a previous reaction to IV contrast? n    2.  Does the patient have kidney disease? n    3.  Is the patient on dialysis? n    If YES to any of these questions, exam will be reviewed with a Radiologist before administering contrast.    1.  Is patient currently taking metformin? y     If NO: Technologist will give the patient normal post CT instructions.     If YES: Technologist will obtain GFR from Lab.    2.  Is GFR is greater than 60? y     If YES: Technologist will give the patient normal post CT instructions.     If NO: Technologist will give the patient METFORMIN post CT instructions.

## 2020-11-23 NOTE — PROGRESS NOTES
Type of Visit  EST    Chief Complaint  Rising PSA following prostatectomy and salvage radiation    HPI  Mr. Kan is a 65 y.o. male who follows up with a history of prostate cancer s/p RRP in 2012 followed by sEBRT in 2013 with rising PSA which prompted initiation of ADT ([facility]).  The patient underwent a PSA earlier today and follows up to review the results.  He has been experiencing hot flashes which is bothersome for him.  I also recommended a metastatic work-up.  He has undergone a bone scan and CT scan prior to this visit.  He continues to deny unintentional weight loss, bone or pelvic pain.    ADT was initially started 10 months ago.    He does have chronic back pain and previously underwent fusion over 2 years ago.      Family History  Family History   Problem Relation Age of Onset     Hypertension Mother      HTN     Heart Disease Father       passed away from CHF,CAD      Good Health Sister      emotional problems.      Good Health Sister      Good Health Other      Other Son      w/o major medical problems.      Other Daughter      w/o major medical problems.      Good Health Other      previous marriage./previous marriage.     Good Health Other      previous marriage.     Cancer-colon Father      Hypertension Father      Stroke Father      Other Father      Dementia     Other Mother       Parkinsons       Review of Systems  I reviewed the ROS with the patient today.    Nursing Notes:   Dasha Vasques LPN  11/23/2020 11:36 AM  Signed  Chief Complaint   Patient presents with     Follow Up     4 month follow up prostate cancer     Patient presents to the clinic today for a 4 month follow up for prostate cancer.    Review of Systems:    Weight loss:    No     Recent fever/chills:  No   Night sweats:   Yes  Current skin rash:  No   Recent hair loss:  No  Heat intolerance:  No   Cold intolerance:  No  Chest pain:   No   Palpitations:   No  Shortness of breath:  No   Wheezing:   No  Constipation:     No   Diarrhea:   No   Nausea:   No   Vomiting:   No   Kidney/side pain:  No   Back pain:   Yes  Frequent headaches:  No   Dizziness:     No  Leg swelling:   No   Calf pain:    No        Medication Reconciliation: completed   Dasha Vasques LPN  11/23/2020 11:13 AM     Physical Exam  /70 (BP Location: Right arm, Patient Position: Sitting, Cuff Size: Adult Large)   Pulse 57   Resp 16   Wt 111.6 kg (246 lb)   SpO2 97%   BMI 35.30 kg/m    Constitutional: No acute distress.  Alert and cooperative   Head: NCAT  Eyes: Conjunctivae normal  Cardiovascular: Regular rate.  Pulmonary/Chest: Respirations are even and non-labored bilaterally, no audible wheezing  Abdominal: Soft. No distension, tenderness, masses or guarding.   Neurological: A + O x 3.  Cranial Nerves II-XII grossly intact.  Extremities: GINNA x 4, Warm. No clubbing.  No cyanosis.    Skin: Pink, warm and dry.  No visible rashes noted.  Psychiatric:  Normal mood and affect  Back:  No left CVA tenderness.  No right CVA tenderness.  Genitourinary:  Nonpalpable bladder    Labs  Results for EMILIA CHAPMAN (MRN [id number]) as of 11/23/2020 11:37   11/23/2020 07:20   PSA 1.215     Results for EMILIA CHAPMAN (MRN [id number]) as of 11/23/2020 12:10   11/23/2020 07:20   Testosterone Total 17 (L)     Results for EMILIA CHAPMAN (MRN [id number]) as of 7/22/2020 08:39   7/9/2020 09:36   PSA 0.975     Results for EMILIA CHAPMAN (MRN [id number]) as of 2/5/2020 09:14   11/4/2019 08:10** 2/5/2020 06:53   PSA 7.112 (H) 1.006   ** started ADT    Results for EMILIA CHAPMAN (MRN [id number]) as of 5/1/2019 09:45   5/1/2019 07:36   PSA 2.714     Results for LELAND CHAPMAN (MRN [id number]) as of 11/1/2018 11:08   11/1/2018 08:49   PSA 1.177     Results for LELAND CHAPMAN (MRN [id number]) as of 3/29/2018 14:31   3/29/2018 12:15   PSA 0.798     Results for LELAND CHAPMAN (MRN [id number]) as of 3/29/2018 11:55   4/7/2017 16:04   PSA 0.268     Results for LELAND CHAPMAN (MRN  [id number]) as of 3/23/2016 10:42   1/2/2013 08:00* 2/27/2013** 08:10 10/16/2014 11:26 3/1/2016 14:15   PSA 0.67 0.97 0.070 0.154   * Following radical prostatectomy  ** Following salvage external beam radiation therapy.    Imaging  Bone Scan  11/23/2020  IMPRESSION: No convincing evidence of bony metastatic disease.     CT c/a/p  11/23/2020  COMBINED IMPRESSION:    No evidence of new metastatic disease.  Dilated thoracic aorta measuring 4.4 cm in the mid ascending segment.    ^^^^^^^^^^^^^^^^^^^^^^^^^^^^^^^^^^^^^^^^^^^^    MRI Lumbar  11/4/2019  IMPRESSION:  Interval posterior interbody fusion at L2-3 and L3-4. Chronic prior  fusion changes at L4-5. No evidence of new left-sided nerve root  impingement to account for left leg radiculopathy.    ^^^^^^^^^^^^^^^^^^^^^^^^^^^^^^^^^^^^^^^^^^^^    CT c/a/p  5/20/2019  IMPRESSION:  No evidence of metastatic disease in the chest, abdomen,  or pelvis.    Bone Scan  5/20/2019  IMPRESSION: Multifocal probable degenerative changes. No sites highly  suspicious for osseous metastatic disease.     Assessment  Mr. Kan is a 65 y.o. male who follows up with a history of prostate cancer s/p RRP in 2012 followed by sEBRT in 2013 with rising PSA which prompted initiation of ADT ([facility]).    Unfortunately patient's PSA amy is close to 1.  I explained that in these types of circumstances we are hopeful for amy PSA that is undetectable.  I offered early referral to medical oncology but the patient would prefer to wait until radiographic metastatic disease develops.    We also discussed hot flashes.  I offered a trial of off label medication such as Effexor, Megace or clonidine to improve hot flashes.  He would like to hold off at this point.    Plan  Follow up after 2/5/2021 for [facility] exchange with PSA prior   - likely plan for CT c/a/p and Bone Scan 11/2021

## 2020-11-30 RX ORDER — VENLAFAXINE HYDROCHLORIDE 150 MG/1
CAPSULE, EXTENDED RELEASE ORAL
COMMUNITY
Start: 2020-06-15 | End: 2020-12-01 | Stop reason: SINTOL

## 2020-12-01 ENCOUNTER — OFFICE VISIT (OUTPATIENT)
Dept: FAMILY MEDICINE | Facility: OTHER | Age: 65
End: 2020-12-01
Attending: FAMILY MEDICINE
Payer: COMMERCIAL

## 2020-12-01 VITALS
OXYGEN SATURATION: 96 % | RESPIRATION RATE: 20 BRPM | TEMPERATURE: 96 F | DIASTOLIC BLOOD PRESSURE: 100 MMHG | BODY MASS INDEX: 34.78 KG/M2 | HEART RATE: 68 BPM | WEIGHT: 242.38 LBS | SYSTOLIC BLOOD PRESSURE: 162 MMHG

## 2020-12-01 DIAGNOSIS — M48.062 SPINAL STENOSIS OF LUMBAR REGION WITH NEUROGENIC CLAUDICATION: ICD-10-CM

## 2020-12-01 DIAGNOSIS — M54.9 CHRONIC BACK PAIN GREATER THAN 3 MONTHS DURATION: ICD-10-CM

## 2020-12-01 DIAGNOSIS — G89.29 CHRONIC BACK PAIN GREATER THAN 3 MONTHS DURATION: ICD-10-CM

## 2020-12-01 DIAGNOSIS — E11.9 TYPE 2 DIABETES MELLITUS WITHOUT COMPLICATION, WITHOUT LONG-TERM CURRENT USE OF INSULIN (H): ICD-10-CM

## 2020-12-01 DIAGNOSIS — I10 ESSENTIAL HYPERTENSION: ICD-10-CM

## 2020-12-01 DIAGNOSIS — Z79.899 CONTROLLED SUBSTANCE AGREEMENT SIGNED: ICD-10-CM

## 2020-12-01 DIAGNOSIS — F11.90 CHRONIC, CONTINUOUS USE OF OPIOIDS: Primary | ICD-10-CM

## 2020-12-01 PROCEDURE — G0463 HOSPITAL OUTPT CLINIC VISIT: HCPCS

## 2020-12-01 PROCEDURE — 99214 OFFICE O/P EST MOD 30 MIN: CPT | Performed by: FAMILY MEDICINE

## 2020-12-01 RX ORDER — PREGABALIN 75 MG/1
CAPSULE ORAL
Qty: 270 CAPSULE | Refills: 1 | Status: SHIPPED | OUTPATIENT
Start: 2020-12-01 | End: 2021-03-30

## 2020-12-01 RX ORDER — METFORMIN HCL 500 MG
1000 TABLET, EXTENDED RELEASE 24 HR ORAL 2 TIMES DAILY WITH MEALS
Qty: 360 TABLET | Refills: 3 | Status: SHIPPED | OUTPATIENT
Start: 2020-12-01 | End: 2021-12-07

## 2020-12-01 RX ORDER — AMLODIPINE BESYLATE 5 MG/1
5 TABLET ORAL DAILY
Qty: 90 TABLET | Refills: 0 | Status: SHIPPED | OUTPATIENT
Start: 2020-12-01 | End: 2021-03-03

## 2020-12-01 RX ORDER — HYDROCODONE BITARTRATE AND ACETAMINOPHEN 10; 325 MG/1; MG/1
1 TABLET ORAL EVERY 4 HOURS PRN
Qty: 150 TABLET | Refills: 0 | Status: SHIPPED | OUTPATIENT
Start: 2020-12-01 | End: 2021-01-27

## 2020-12-01 RX ORDER — HYDROCODONE BITARTRATE AND ACETAMINOPHEN 10; 325 MG/1; MG/1
1 TABLET ORAL EVERY 4 HOURS PRN
Qty: 150 TABLET | Refills: 0 | Status: SHIPPED | OUTPATIENT
Start: 2020-12-31 | End: 2021-01-27

## 2020-12-01 ASSESSMENT — ANXIETY QUESTIONNAIRES
5. BEING SO RESTLESS THAT IT IS HARD TO SIT STILL: NOT AT ALL
IF YOU CHECKED OFF ANY PROBLEMS ON THIS QUESTIONNAIRE, HOW DIFFICULT HAVE THESE PROBLEMS MADE IT FOR YOU TO DO YOUR WORK, TAKE CARE OF THINGS AT HOME, OR GET ALONG WITH OTHER PEOPLE: NOT DIFFICULT AT ALL
GAD7 TOTAL SCORE: 0
3. WORRYING TOO MUCH ABOUT DIFFERENT THINGS: NOT AT ALL
6. BECOMING EASILY ANNOYED OR IRRITABLE: NOT AT ALL
2. NOT BEING ABLE TO STOP OR CONTROL WORRYING: NOT AT ALL
7. FEELING AFRAID AS IF SOMETHING AWFUL MIGHT HAPPEN: NOT AT ALL
1. FEELING NERVOUS, ANXIOUS, OR ON EDGE: NOT AT ALL

## 2020-12-01 ASSESSMENT — PAIN SCALES - GENERAL: PAINLEVEL: MODERATE PAIN (5)

## 2020-12-01 ASSESSMENT — PATIENT HEALTH QUESTIONNAIRE - PHQ9
5. POOR APPETITE OR OVEREATING: NOT AT ALL
SUM OF ALL RESPONSES TO PHQ QUESTIONS 1-9: 0

## 2020-12-01 NOTE — PROGRESS NOTES
"Nursing Notes:   Leona Shell LPN  12/1/2020  9:07 AM  Sign at exiting of workspace  Patient presents to the clinic for medication management and blood pressure follow up.  Last administration of Norco was around 0500 today, contract signed 10-.    Previous A1C is at goal of <8  Lab Results   Component Value Date    A1C 6.5 10/05/2020     Urine microalbumin:creatine: n/a  Foot exam unknown-declines today  Eye exam yearly    Tobacco User YES  Patient is on a daily aspirin  Patient is on a Statin.  Blood pressure today of:     BP Readings from Last 1 Encounters:   11/23/20 134/70      is at the goal of <139/89 for diabetics.    Chief Complaint   Patient presents with     Recheck Medication     Hypertension       Initial BP (!) 162/100 (BP Location: Right arm, Patient Position: Sitting, Cuff Size: Adult Regular)   Pulse 68   Temp 96  F (35.6  C) (Temporal)   Resp 20   Wt 109.9 kg (242 lb 6 oz)   SpO2 96%   BMI 34.78 kg/m   Estimated body mass index is 34.78 kg/m  as calculated from the following:    Height as of 9/8/20: 1.778 m (5' 10\").    Weight as of this encounter: 109.9 kg (242 lb 6 oz).  Medication Reconciliation: complete    Leona Shell LPN          SUBJECTIVE:  65 year old male presents to follow up on diabetes, hypertension, and chronic low back pain    Blood pressure 140-160s at home. Was elevated last month but had not taken medication. Took today. Has gained 30 lbs in the past year, down a few pounds since starting metformin.    Tolerating metformin 500 mg twice daily started 2 months ago.    S/p 3 lumbar surgeries. Continues having low back pain. He used to take hydrocodone 12/day and has worked down to 5/day.   Did not tolerate venlafaxine started for hot flashes and pain. Remains on pregabalin with some benefit. Tolerating. He ran out of hydrocodone a month ago and took extra Lyrica without side effects.    REVIEW OF SYSTEMS:    Pertinent items are noted in HPI.  Respiratory: " negative  Cardiovascular: negative  Gastrointestinal: negative    Current Outpatient Medications   Medication Sig Dispense Refill     acetaminophen (TYLENOL) 325 MG tablet Take 650 mg by mouth       aspirin EC 81 MG EC tablet Take 81 mg by mouth daily with food       atorvastatin (LIPITOR) 20 MG tablet Take 1 tablet (20 mg) by mouth daily 90 tablet 3     Bee Pollen 500 MG CHEW Take 2 tablets by mouth daily       cyanocobalamin (RA VITAMIN B-12 TR) 1000 MCG TBCR Take 1,000 mcg by mouth daily       garlic (SM GARLIC) 150 MG TABS tablet Take 2 tablets by mouth daily       HYDROcodone-acetaminophen (NORCO)  MG per tablet Take 1 tablet by mouth every 4 hours as needed for moderate to severe pain 5 on average 150 tablet 0     HYDROcodone-acetaminophen (NORCO)  MG per tablet Take 1 tablet by mouth every 4 hours as needed for moderate to severe pain 5 on average 150 tablet 0     lisinopril (ZESTRIL) 20 MG tablet Take 1 tablet (20 mg) by mouth daily 90 tablet 3     metFORMIN (GLUCOPHAGE-XR) 500 MG 24 hr tablet Take 1 tablet (500 mg) by mouth 2 times daily (with meals) 60 tablet 3     naproxen (NAPROSYN) 500 MG tablet Take 1 tablet (500 mg) by mouth 2 times daily as needed for moderate pain 180 tablet 1     Omega-3 Fatty Acids (OMEGA 3 PO) Take 2 capsules by mouth daily       omeprazole (PRILOSEC) 40 MG DR capsule TAKE 1 CAPSULE BY MOUTH ONCE DAILY. 90 capsule 3     pregabalin (LYRICA) 50 MG capsule 50 mg in the morning and 100 mg at night 270 capsule 1     sildenafil (REVATIO) 20 MG tablet TAKE 3 TO 5 TABLETS BY MOUTH 1 hour prior TO sexual activity. 100 tablet 3     venlafaxine (EFFEXOR-XR) 150 MG 24 hr capsule Take 1 capsule (150 mg) by mouth daily       vitamin D3 (CHOLECALCIFEROL) 2000 units (50 mcg) tablet Take 1 tablet (2,000 Units) by mouth daily       No Known Allergies    OBJECTIVE:  BP (!) 162/100 (BP Location: Right arm, Patient Position: Sitting, Cuff Size: Adult Regular)   Pulse 68   Temp 96  F  (35.6  C) (Temporal)   Resp 20   Wt 109.9 kg (242 lb 6 oz)   SpO2 96%   BMI 34.78 kg/m      EXAM:  General Appearance: Alert. No acute distress  Chest/Respiratory Exam: Clear to auscultation bilaterally  Cardiovascular Exam: Regular rate and rhythm. S1, S2, no murmur, gallop, or rubs.  Extremities: 2+ pedal pulses.  No lower extremity edema.  Psychiatric: Normal affect and mentation       ASSESSMENT/PLAN:    ICD-10-CM    1. Chronic, continuous use of opioids  F11.90 HYDROcodone-acetaminophen (NORCO)  MG per tablet     HYDROcodone-acetaminophen (NORCO)  MG per tablet   2. Spinal stenosis of lumbar region with neurogenic claudication  M48.062 HYDROcodone-acetaminophen (NORCO)  MG per tablet     HYDROcodone-acetaminophen (NORCO)  MG per tablet     pregabalin (LYRICA) 75 MG capsule   3. Chronic back pain greater than 3 months duration  M54.9 pregabalin (LYRICA) 75 MG capsule    G89.29    4. Controlled substance agreement updated 10/5/2020  Z79.899 HYDROcodone-acetaminophen (NORCO)  MG per tablet     HYDROcodone-acetaminophen (NORCO)  MG per tablet   5. Essential hypertension  I10 amLODIPine (NORVASC) 5 MG tablet   6. Type 2 diabetes mellitus without complication, without long-term current use of insulin (H)  E11.9 metFORMIN (GLUCOPHAGE-XR) 500 MG 24 hr tablet     PDMP Review       Value Time User    State PDMP site checked  Yes 12/1/2020  9:23 AM Wei Perez MD        Refilled hydrocodone 10/325 mg taking 5/day for 2 months.  Increase pregabalin from 50 mg in AM and 100 mg PM to 75 mg in the morning and 150 mg at night.    Start on amlodipine 5 mg due to elevated blood pressure and continue same lisinopril dose. Continue monitoring at home.    Increase metformin to 1000 mg twice daily to help with weight loss and diabetes    Follow up in 2 months  Wei Perez MD    This document was prepared using a combination of typing and voice generated software.  While every  attempt was made for accuracy, spelling and grammatical errors may exist.

## 2020-12-01 NOTE — NURSING NOTE
"Patient presents to the clinic for medication management and blood pressure follow up.  Last administration of Norco was around 0500 today, contract signed 10-.    Previous A1C is at goal of <8  Lab Results   Component Value Date    A1C 6.5 10/05/2020     Urine microalbumin:creatine: n/a  Foot exam unknown-declines today  Eye exam yearly    Tobacco User YES  Patient is on a daily aspirin  Patient is on a Statin.  Blood pressure today of:     BP Readings from Last 1 Encounters:   11/23/20 134/70      is at the goal of <139/89 for diabetics.    Chief Complaint   Patient presents with     Recheck Medication     Hypertension       Initial BP (!) 162/100 (BP Location: Right arm, Patient Position: Sitting, Cuff Size: Adult Regular)   Pulse 68   Temp 96  F (35.6  C) (Temporal)   Resp 20   Wt 109.9 kg (242 lb 6 oz)   SpO2 96%   BMI 34.78 kg/m   Estimated body mass index is 34.78 kg/m  as calculated from the following:    Height as of 9/8/20: 1.778 m (5' 10\").    Weight as of this encounter: 109.9 kg (242 lb 6 oz).  Medication Reconciliation: complete    Leona Shell LPN      "

## 2020-12-01 NOTE — PATIENT INSTRUCTIONS
Start amlodipine at 5 mg daily plus continue lisinopril.  These are for blood pressure   Continue tracking blood pressure twice weekly    Increase metformin to 1000 mg (2 pills) in the morning and keep 1 pill (500 mg) at night for a week, then increase to 1000 mg twice daily (2 pills twice daily).   This is for diabetes     Increase pregabalin to 75 mg in the morning and 150 mg at night - changed to 75 mg pills  This is for pain    Refilled hydrocodone    Follow up in 2 months

## 2020-12-02 ASSESSMENT — ANXIETY QUESTIONNAIRES: GAD7 TOTAL SCORE: 0

## 2020-12-05 PROBLEM — E66.01 MORBID OBESITY (H): Status: RESOLVED | Noted: 2020-11-23 | Resolved: 2020-12-05

## 2020-12-09 ENCOUNTER — TELEPHONE (OUTPATIENT)
Dept: FAMILY MEDICINE | Facility: OTHER | Age: 65
End: 2020-12-09

## 2020-12-09 NOTE — TELEPHONE ENCOUNTER
Questions regarding his Hydrocodone and when the next scheduled time he will be able to pick it up.

## 2020-12-09 NOTE — TELEPHONE ENCOUNTER
Talked to patient and is aware will  prescription for hydrocodone Dec. 31. Oriana Benitez LPN ....................12/9/2020  10:47 AM

## 2020-12-22 DIAGNOSIS — R97.21 RISING PSA FOLLOWING TREATMENT FOR MALIGNANT NEOPLASM OF PROSTATE: Primary | ICD-10-CM

## 2020-12-22 NOTE — PROGRESS NOTES
"Per last office visit  11/22/20 with Celestine Arrington MD \"Plan  Follow up after 2/5/2021 for [facility] exchange with PSA prior              - likely plan for CT c/a/p and Bone Scan 11/2021\"        Orders Placed    "

## 2021-01-27 ENCOUNTER — OFFICE VISIT (OUTPATIENT)
Dept: FAMILY MEDICINE | Facility: OTHER | Age: 66
End: 2021-01-27
Attending: FAMILY MEDICINE
Payer: COMMERCIAL

## 2021-01-27 VITALS
RESPIRATION RATE: 22 BRPM | SYSTOLIC BLOOD PRESSURE: 146 MMHG | BODY MASS INDEX: 33.79 KG/M2 | WEIGHT: 236 LBS | HEART RATE: 90 BPM | TEMPERATURE: 96.6 F | OXYGEN SATURATION: 98 % | DIASTOLIC BLOOD PRESSURE: 74 MMHG | HEIGHT: 70 IN

## 2021-01-27 DIAGNOSIS — M48.062 SPINAL STENOSIS OF LUMBAR REGION WITH NEUROGENIC CLAUDICATION: ICD-10-CM

## 2021-01-27 DIAGNOSIS — Z79.899 CONTROLLED SUBSTANCE AGREEMENT SIGNED: ICD-10-CM

## 2021-01-27 DIAGNOSIS — F11.90 CHRONIC, CONTINUOUS USE OF OPIOIDS: Primary | ICD-10-CM

## 2021-01-27 DIAGNOSIS — E11.9 TYPE 2 DIABETES MELLITUS WITHOUT COMPLICATION, WITHOUT LONG-TERM CURRENT USE OF INSULIN (H): ICD-10-CM

## 2021-01-27 DIAGNOSIS — I10 ESSENTIAL HYPERTENSION: ICD-10-CM

## 2021-01-27 PROCEDURE — G0463 HOSPITAL OUTPT CLINIC VISIT: HCPCS

## 2021-01-27 PROCEDURE — 99214 OFFICE O/P EST MOD 30 MIN: CPT | Performed by: FAMILY MEDICINE

## 2021-01-27 RX ORDER — HYDROCODONE BITARTRATE AND ACETAMINOPHEN 10; 325 MG/1; MG/1
1 TABLET ORAL EVERY 4 HOURS PRN
Qty: 150 TABLET | Refills: 0 | Status: SHIPPED | OUTPATIENT
Start: 2021-03-01 | End: 2021-03-30

## 2021-01-27 RX ORDER — HYDROCODONE BITARTRATE AND ACETAMINOPHEN 10; 325 MG/1; MG/1
1 TABLET ORAL EVERY 4 HOURS PRN
Qty: 150 TABLET | Refills: 0 | Status: SHIPPED | OUTPATIENT
Start: 2021-01-27 | End: 2021-03-30

## 2021-01-27 RX ORDER — LISINOPRIL 40 MG/1
40 TABLET ORAL DAILY
Qty: 90 TABLET | Refills: 4 | Status: SHIPPED | OUTPATIENT
Start: 2021-01-27 | End: 2021-12-28

## 2021-01-27 ASSESSMENT — PAIN SCALES - GENERAL: PAINLEVEL: SEVERE PAIN (6)

## 2021-01-27 ASSESSMENT — MIFFLIN-ST. JEOR: SCORE: 1851.74

## 2021-01-27 NOTE — NURSING NOTE
"Chief Complaint   Patient presents with     Recheck Medication     and blood pressure     Patient is here to have pain medication refilled, is due for DM labs and said he needs a scan set up with the cardiologist.   Initial /70 (BP Location: Right arm, Patient Position: Sitting, Cuff Size: Adult Large)   Pulse 90   Temp 96.6  F (35.9  C) (Temporal)   Resp 22   Ht 1.778 m (5' 10\")   Wt 107 kg (236 lb)   SpO2 98%   BMI 33.86 kg/m   Estimated body mass index is 33.86 kg/m  as calculated from the following:    Height as of this encounter: 1.778 m (5' 10\").    Weight as of this encounter: 107 kg (236 lb).  Medication Reconciliation: complete    Oriana Benitez LPN  "

## 2021-01-27 NOTE — PATIENT INSTRUCTIONS
Need to see Dr Pizarro by March 2  Set up echocardiogram call [phone] by last week of February and see Dr Dave after echo (Feb 23 or 25)  Schedule lab appointment prior to seeing Dr Dave  See Dr Pizarro following week    Increase lisinopril to 40 mg daily  Follow up with me in 2 months (by March 31)

## 2021-01-27 NOTE — PROGRESS NOTES
"Nursing Notes:   Oriana Benitez LPN  1/27/2021  8:30 AM  Sign at exiting of workspace  Chief Complaint   Patient presents with     Recheck Medication     and blood pressure     Patient is here to have pain medication refilled, is due for DM labs and said he needs a scan set up with the cardiologist.   Initial /70 (BP Location: Right arm, Patient Position: Sitting, Cuff Size: Adult Large)   Pulse 90   Temp 96.6  F (35.9  C) (Temporal)   Resp 22   Ht 1.778 m (5' 10\")   Wt 107 kg (236 lb)   SpO2 98%   BMI 33.86 kg/m   Estimated body mass index is 33.86 kg/m  as calculated from the following:    Height as of this encounter: 1.778 m (5' 10\").    Weight as of this encounter: 107 kg (236 lb).  Medication Reconciliation: complete    Oriana Benitez LPN       Assessment & Plan       ICD-10-CM    1. Chronic, continuous use of opioids  F11.90 HYDROcodone-acetaminophen (NORCO)  MG per tablet     HYDROcodone-acetaminophen (NORCO)  MG per tablet   2. Spinal stenosis of lumbar region with neurogenic claudication  M48.062 HYDROcodone-acetaminophen (NORCO)  MG per tablet     HYDROcodone-acetaminophen (NORCO)  MG per tablet   3. Controlled substance agreement updated 10/5/2020  Z79.899 HYDROcodone-acetaminophen (NORCO)  MG per tablet     HYDROcodone-acetaminophen (NORCO)  MG per tablet     Reviewed notes from cardiology and occupational medicine last year regarding recommendations and follow up.    Discussed 3 recent medication changes.    Amlodipine helped bring down blood pressure, but blood pressure still elevated.  Increase lisinopril from 20 up to 40 mg daily.  Needs follow-up lab in 2 to 4 weeks.  He is going to see Dr. Dave in 3 to 4 weeks for follow-up on ascending aortic aneurysm.  An echocardiogram is already scheduled.  Wrote out a plan for him to obtain echocardiogram, lab, and see Dr. Dave end of February.  If blood pressure is not at goal, adjustments can be made " by Dr. Dave.    He can then follow-up with Dr. Berrios ideally March 1 when hydrocodone is due.    Tolerating increase in Metformin, continue same dose    PDMP Review       Value Time User    State PDMP site checked  Yes 1/27/2021  8:42 AM Wei Perez MD      Refilled hydrocodone 10/325 mg taking 5/day.  Refilling early today, is due January 30.  Next prescription then due March 1.  Continue same pregabalin dose  Follow up in 2 months    Wei Perez MD   [facility]      SUBJECTIVE:  65 year old male presents to follow up on diabetes, hypertension, and chronic low back pain    Blood pressure 140-160s at home last month, reading at appointment 160/100.  Started amlodipine 5 mg daily.  Tolerating that medication.  Checking blood pressure at home is often in the 140s systolic.    Increased metformin from 500 mg twice daily up to 1000 mg twice daily.  No side effects.    Lost 6 lbs in 2 months. Cut back on portions and excluding carbs.     S/p 3 lumbar surgeries. Continues having low back pain. He used to take hydrocodone 12/day and has worked down to 5/day.   Did not tolerate venlafaxine started for hot flashes and pain.   Increased pregabalin last month to help with pain.    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  Needs to have an echocardiogram and appointment with Dr. Dave before seeing Dr. Pizarro.  This was all discussed with him today.      REVIEW OF SYSTEMS:    Constitutional: negative  Respiratory: negative  Cardiovascular: negative  Gastrointestinal: negative    Current Outpatient Medications   Medication Sig Dispense Refill     acetaminophen (TYLENOL) 325 MG tablet Take 650 mg by mouth       amLODIPine (NORVASC) 5 MG tablet Take 1 tablet (5 mg) by mouth daily 90 tablet 0     aspirin EC 81 MG EC tablet Take 81 mg by mouth daily with food       atorvastatin (LIPITOR) 20 MG tablet Take 1 tablet (20 mg) by mouth daily 90 tablet 3     Bee Pollen 500 MG CHEW Take 2 tablets by  "mouth daily       cyanocobalamin (RA VITAMIN B-12 TR) 1000 MCG TBCR Take 1,000 mcg by mouth daily       garlic (SM GARLIC) 150 MG TABS tablet Take 2 tablets by mouth daily       HYDROcodone-acetaminophen (NORCO)  MG per tablet Take 1 tablet by mouth every 4 hours as needed for moderate to severe pain 5 on average 150 tablet 0     HYDROcodone-acetaminophen (NORCO)  MG per tablet Take 1 tablet by mouth every 4 hours as needed for moderate to severe pain 5 on average 150 tablet 0     lisinopril (ZESTRIL) 20 MG tablet Take 1 tablet (20 mg) by mouth daily 90 tablet 3     metFORMIN (GLUCOPHAGE-XR) 500 MG 24 hr tablet Take 2 tablets (1,000 mg) by mouth 2 times daily (with meals) 360 tablet 3     naproxen (NAPROSYN) 500 MG tablet Take 1 tablet (500 mg) by mouth 2 times daily as needed for moderate pain 180 tablet 1     Omega-3 Fatty Acids (OMEGA 3 PO) Take 2 capsules by mouth daily       omeprazole (PRILOSEC) 40 MG DR capsule TAKE 1 CAPSULE BY MOUTH ONCE DAILY. 90 capsule 3     pregabalin (LYRICA) 75 MG capsule Take 1 capsule (75 mg) by mouth every morning AND 2 capsules (150 mg) every evening. 270 capsule 1     sildenafil (REVATIO) 20 MG tablet TAKE 3 TO 5 TABLETS BY MOUTH 1 hour prior TO sexual activity. 100 tablet 3     vitamin D3 (CHOLECALCIFEROL) 2000 units (50 mcg) tablet Take 1 tablet (2,000 Units) by mouth daily       No Known Allergies    OBJECTIVE:  /70 (BP Location: Right arm, Patient Position: Sitting, Cuff Size: Adult Large)   Pulse 90   Temp 96.6  F (35.9  C) (Temporal)   Resp 22   Ht 1.778 m (5' 10\")   Wt 107 kg (236 lb)   SpO2 98%   BMI 33.86 kg/m      EXAM:  General Appearance: Alert. No acute distress  Chest/Respiratory Exam: Clear to auscultation bilaterally  Cardiovascular Exam: Regular rate and rhythm. S1, S2, no murmur, gallop, or rubs.  Extremities: 2+ pedal pulses.  No lower extremity edema.  Psychiatric: Normal affect and mentation        This document was prepared using a " combination of typing and voice generated software.  While every attempt was made for accuracy, spelling and grammatical errors may exist.

## 2021-02-16 ENCOUNTER — PATIENT OUTREACH (OUTPATIENT)
Dept: FAMILY MEDICINE | Facility: OTHER | Age: 66
End: 2021-02-16

## 2021-02-16 DIAGNOSIS — E11.9 TYPE 2 DIABETES MELLITUS WITHOUT COMPLICATION, WITHOUT LONG-TERM CURRENT USE OF INSULIN (H): Primary | ICD-10-CM

## 2021-02-16 NOTE — LETTER
[facility]  [address]  [place] [id number] [phone]       February 16, 2021    Dilip Kan  [address] [place] [address]    Dear Jermain,    We care about your health and have reviewed your health plan and are making recommendations based on this review, to optimize your health.    You are in particular need of attention regarding:  -Diabetes  -High Blood Pressure  -Colon Cancer Screening  -Wellness (Physical) Visit     We are recommending that you:  -schedule a NURSE-ONLY BLOOD PRESSURE APPOINTMENT within the next 1-4 weeks.    -schedule a FOLLOWUP OFFICE APPOINTMENT with Wei Perez.       -schedule a WELLNESS (Physical) APPOINTMENT with Wei Perez   I will check fasting labs the same day - nothing to eat except water and meds for 8-10 hours prior.    -schedule a COLONOSCOPY to look for colon cancer (due every 10 years or 5 years in higher risk situations.)        Colon cancer is now the second leading cause of cancer-related deaths in the [place] for both men and women and there are over 130,000 new cases and 50,000 deaths per year from colon cancer.  Colonoscopies can prevent 90-95% of these deaths.  Problem lesions can be removed before they ever become cancer.  This test is not only looking for cancer, but also getting rid of precancerious lesions.    If you are under/uninsured, we recommend you contact the [employer] program. [employer] is a free colorectal cancer screening program that provides colonoscopies for eligible under/uninsured [place] men and women. If you are interested in receiving a free colonoscopy, please call [employer] at [phone] (mention code [employer]) to see if you re eligible.      If you do not wish to do a colonoscopy or cannot afford to do one, at this time, there is another option. It is called a FIT test or Fecal Immunochemical Occult Blood Test (take home stool sample kit).  It does not replace  the colonoscopy for colorectal cancer screening, but it can detect hidden bleeding in the lower colon.  It does need to be repeated every year and if a positive result is obtained, you would be referred for a colonoscopy.          If you have completed either one of these tests at another facility, please call with the details of when and where the tests were done and if they were normal or not. Or have the records sent to our clinic so that we can best coordinate your care.    -Diabetic Eye Exam is due.  If this was done within the last 12 months then please contact the clinic with the date and location so we can update your records.    In addition, here is a list of due or overdue Health Maintenance reminders.    Health Maintenance Due   Topic Date Due     Diabetic Foot Exam  1955     Eye Exam  1955     Hepatitis C Screening  03/19/1971     Zoster (Shingles) Vaccine (1 of 2) 03/19/2003     Colorectal Cancer Screening  08/31/2016     Annual Wellness Visit  03/19/2018     A1C Lab  01/05/2021     Kidney Microalbumin Urine Test  03/11/2021       To address the above recommendations, we encourage you to contact us at [phone]. They will assist you with finding the most convenient time and location.    Thank you for trusting [facility] and we appreciate the opportunity to serve you.  We look forward to supporting your healthcare needs in the future.    Healthy Regards,    Your [facility] [place] Team

## 2021-02-16 NOTE — TELEPHONE ENCOUNTER
Patient Quality Outreach      Summary:    Patient has the following on his problem list/HM:   Hypertension   Last three blood pressure readings:  BP Readings from Last 3 Encounters:   01/27/21 (!) 146/74   12/01/20 (!) 162/100   11/23/20 134/70     Blood pressure: Failed    HTN Guidelines:  ? 139/89     Patient is due/failing the following:   BP check and Hypertension follow-up visit, Colonoscopy and Annual wellness, date due: Soon    Type of outreach:    Sent letter.    Questions for provider review:    Diabetic labs                                                                                                                                     Norma J. Gosselin, LPN .......  2/16/2021  3:37 PM         Chart routed to Provider.

## 2021-02-23 ENCOUNTER — HOSPITAL ENCOUNTER (OUTPATIENT)
Dept: CARDIOLOGY | Facility: OTHER | Age: 66
End: 2021-02-23
Attending: INTERNAL MEDICINE
Payer: MEDICARE

## 2021-02-23 DIAGNOSIS — E11.9 TYPE 2 DIABETES MELLITUS WITHOUT COMPLICATION, WITHOUT LONG-TERM CURRENT USE OF INSULIN (H): ICD-10-CM

## 2021-02-23 DIAGNOSIS — I10 HYPERTENSION: ICD-10-CM

## 2021-02-23 DIAGNOSIS — Z86.79 S/P ASCENDING AORTIC ANEURYSM REPAIR: ICD-10-CM

## 2021-02-23 DIAGNOSIS — Z98.890 S/P ASCENDING AORTIC ANEURYSM REPAIR: ICD-10-CM

## 2021-02-23 DIAGNOSIS — R97.21 RISING PSA FOLLOWING TREATMENT FOR MALIGNANT NEOPLASM OF PROSTATE: ICD-10-CM

## 2021-02-23 LAB
ALBUMIN SERPL-MCNC: 4.3 G/DL (ref 3.5–5.7)
ALP SERPL-CCNC: 66 U/L (ref 34–104)
ALT SERPL W P-5'-P-CCNC: 16 U/L (ref 7–52)
ANION GAP SERPL CALCULATED.3IONS-SCNC: 8 MMOL/L (ref 3–14)
AST SERPL W P-5'-P-CCNC: 15 U/L (ref 13–39)
BILIRUB SERPL-MCNC: 0.8 MG/DL (ref 0.3–1)
BUN SERPL-MCNC: 17 MG/DL (ref 7–25)
CALCIUM SERPL-MCNC: 10 MG/DL (ref 8.6–10.3)
CHLORIDE SERPL-SCNC: 103 MMOL/L (ref 98–107)
CHOLEST SERPL-MCNC: 118 MG/DL
CO2 SERPL-SCNC: 29 MMOL/L (ref 21–31)
CREAT SERPL-MCNC: 0.73 MG/DL (ref 0.7–1.3)
ERYTHROCYTE [DISTWIDTH] IN BLOOD BY AUTOMATED COUNT: 13.6 % (ref 10–15)
GFR SERPL CREATININE-BSD FRML MDRD: >90 ML/MIN/{1.73_M2}
GLUCOSE SERPL-MCNC: 139 MG/DL (ref 70–105)
HBA1C MFR BLD: 5.9 % (ref 4–6)
HCT VFR BLD AUTO: 42.5 % (ref 40–53)
HDLC SERPL-MCNC: 63 MG/DL (ref 23–92)
HGB BLD-MCNC: 14.1 G/DL (ref 13.3–17.7)
LDLC SERPL CALC-MCNC: 27 MG/DL
MCH RBC QN AUTO: 28.7 PG (ref 26.5–33)
MCHC RBC AUTO-ENTMCNC: 33.2 G/DL (ref 31.5–36.5)
MCV RBC AUTO: 87 FL (ref 78–100)
NONHDLC SERPL-MCNC: 55 MG/DL
PLATELET # BLD AUTO: 200 10E9/L (ref 150–450)
POTASSIUM SERPL-SCNC: 4.2 MMOL/L (ref 3.5–5.1)
PROT SERPL-MCNC: 7.2 G/DL (ref 6.4–8.9)
PSA SERPL-MCNC: 1.85 NG/ML
RBC # BLD AUTO: 4.91 10E12/L (ref 4.4–5.9)
SODIUM SERPL-SCNC: 140 MMOL/L (ref 134–144)
TRIGL SERPL-MCNC: 141 MG/DL
WBC # BLD AUTO: 6.9 10E9/L (ref 4–11)

## 2021-02-23 PROCEDURE — 36415 COLL VENOUS BLD VENIPUNCTURE: CPT | Mod: ZL | Performed by: FAMILY MEDICINE

## 2021-02-23 PROCEDURE — 85027 COMPLETE CBC AUTOMATED: CPT | Mod: ZL | Performed by: FAMILY MEDICINE

## 2021-02-23 PROCEDURE — 84153 ASSAY OF PSA TOTAL: CPT | Mod: ZL | Performed by: FAMILY MEDICINE

## 2021-02-23 PROCEDURE — 93306 TTE W/DOPPLER COMPLETE: CPT | Mod: 26 | Performed by: INTERNAL MEDICINE

## 2021-02-23 PROCEDURE — 80053 COMPREHEN METABOLIC PANEL: CPT | Mod: ZL | Performed by: FAMILY MEDICINE

## 2021-02-23 PROCEDURE — 93306 TTE W/DOPPLER COMPLETE: CPT

## 2021-02-23 PROCEDURE — 80061 LIPID PANEL: CPT | Mod: ZL | Performed by: FAMILY MEDICINE

## 2021-02-23 PROCEDURE — 83036 HEMOGLOBIN GLYCOSYLATED A1C: CPT | Mod: ZL | Performed by: FAMILY MEDICINE

## 2021-02-24 ENCOUNTER — OFFICE VISIT (OUTPATIENT)
Dept: UROLOGY | Facility: OTHER | Age: 66
End: 2021-02-24
Attending: UROLOGY
Payer: COMMERCIAL

## 2021-02-24 VITALS
HEART RATE: 87 BPM | SYSTOLIC BLOOD PRESSURE: 152 MMHG | RESPIRATION RATE: 20 BRPM | BODY MASS INDEX: 34.01 KG/M2 | WEIGHT: 237 LBS | DIASTOLIC BLOOD PRESSURE: 82 MMHG

## 2021-02-24 DIAGNOSIS — I35.0 MILD AORTIC STENOSIS: ICD-10-CM

## 2021-02-24 DIAGNOSIS — I71.21 ASCENDING AORTIC ANEURYSM (H): ICD-10-CM

## 2021-02-24 DIAGNOSIS — I27.20 MILD PULMONARY HYPERTENSION (H): ICD-10-CM

## 2021-02-24 DIAGNOSIS — R97.21 RISING PSA FOLLOWING TREATMENT FOR MALIGNANT NEOPLASM OF PROSTATE: Primary | ICD-10-CM

## 2021-02-24 DIAGNOSIS — I35.2 AORTIC VALVE STENOSIS WITH INSUFFICIENCY, ETIOLOGY OF CARDIAC VALVE DISEASE UNSPECIFIED: Primary | ICD-10-CM

## 2021-02-24 DIAGNOSIS — I35.1 NONRHEUMATIC AORTIC VALVE INSUFFICIENCY: ICD-10-CM

## 2021-02-24 DIAGNOSIS — E11.9 TYPE 2 DIABETES MELLITUS WITHOUT COMPLICATION, WITHOUT LONG-TERM CURRENT USE OF INSULIN (H): ICD-10-CM

## 2021-02-24 LAB
CREAT UR-MCNC: 282 MG/DL
MICROALBUMIN UR-MCNC: 36 MG/L
MICROALBUMIN/CREAT UR: 12.77 MG/G CR (ref 0–17)

## 2021-02-24 PROCEDURE — 82043 UR ALBUMIN QUANTITATIVE: CPT | Mod: ZL | Performed by: FAMILY MEDICINE

## 2021-02-24 PROCEDURE — 99214 OFFICE O/P EST MOD 30 MIN: CPT | Performed by: UROLOGY

## 2021-02-24 PROCEDURE — G0463 HOSPITAL OUTPT CLINIC VISIT: HCPCS

## 2021-02-24 PROCEDURE — G0463 HOSPITAL OUTPT CLINIC VISIT: HCPCS | Mod: 25

## 2021-02-24 ASSESSMENT — PAIN SCALES - GENERAL: PAINLEVEL: MODERATE PAIN (5)

## 2021-02-24 NOTE — PROGRESS NOTES
Type of Visit  EST    Chief Complaint  Rising PSA following prostatectomy and salvage radiation    HPI  Mr. Kan is a 65 y.o. male who follows up with a history of prostate cancer s/p RRP in 2012 followed by sEBRT in 2013 with rising PSA which prompted initiation of ADT (Vantas) over 1 year ago.  The patient's Vantas was last implanted 1 year ago and he is due for an exchange.  He underwent a PSA earlier.  He is experiencing hot flashes that are moderately bothersome for him.  He is asking about alternative ways to manage the prostate cancer.  Specifically he would like to avoid Vantas exchange today.  He would like to avoid all forms of ADT and then reassess his symptoms as well as PSA.    He does have chronic back pain and previously underwent fusion over 2 years ago.      Family History  Family History   Problem Relation Age of Onset     Hypertension Mother      HTN     Heart Disease Father       passed away from CHF,CAD      Good Health Sister      emotional problems.      Good Health Sister      Good Health Other      Other Son      w/o major medical problems.      Other Daughter      w/o major medical problems.      Good Health Other      previous marriage./previous marriage.     Good Health Other      previous marriage.     Cancer-colon Father      Hypertension Father      Stroke Father      Other Father      Dementia     Other Mother       Parkinsons       Review of Systems  I reviewed the ROS with the patient today.    Nursing Notes:   Nevaeh Varela LPN  2/24/2021 11:46 AM  Sign at exiting of workspace  Pt presents to clinic for vantas exchange    Review of Systems:    Weight loss:    No     Recent fever/chills:  No   Night sweats:   No  Current skin rash:  No   Recent hair loss:  No  Heat intolerance:  No   Cold intolerance:  No  Chest pain:   No   Palpitations:   No  Shortness of breath:  No   Wheezing:   No  Constipation:    No   Diarrhea:   No   Nausea:   No   Vomiting:   No   Kidney/side  pain:  No   Back pain:   No  Frequent headaches:  No   Dizziness:     No  Leg swelling:   No   Calf pain:    No          Physical Exam  BP (!) 152/82 (BP Location: Right arm, Patient Position: Sitting, Cuff Size: Adult Regular)   Pulse 87   Resp 20   Wt 107.5 kg (237 lb)   BMI 34.01 kg/m    Constitutional: No acute distress.  Alert and cooperative   Head: NCAT  Eyes: Conjunctivae normal  Cardiovascular: Regular rate.  Pulmonary/Chest: Respirations are even and non-labored bilaterally, no audible wheezing  Abdominal: Soft. No distension, tenderness, masses or guarding.   Neurological: A + O x 3.  Cranial Nerves II-XII grossly intact.  Extremities: GINNA x 4, Warm. No clubbing.  No cyanosis.    Skin: Pink, warm and dry.  No visible rashes noted.  Psychiatric:  Normal mood and affect  Back:  No left CVA tenderness.  No right CVA tenderness.  Genitourinary:  Nonpalpable bladder    Labs  Results for EMILIA CHAPMAN (MRN [id number]) as of 2/24/2021 11:51   2/23/2021 08:29   PSA 1.847     Results for EMILIA CHAPMAN (MRN [id number]) as of 11/23/2020 11:37   11/23/2020 07:20   PSA 1.215     Results for EMILIA CHAPMAN (MRN [id number]) as of 11/23/2020 12:10   11/23/2020 07:20   Testosterone  17 (L)     Results for EMILIA CHAPMAN (MRN [id number]) as of 7/22/2020 08:39   7/9/2020 09:36   PSA 0.975     Results for EMILIA CHAPMAN (MRN [id number]) as of 2/5/2020 09:14   11/4/2019 08:10** 2/5/2020 06:53   PSA 7.112 (H) 1.006   ** started ADT    Results for EMILIA CHAPMAN (MRN [id number]) as of 5/1/2019 09:45   5/1/2019 07:36   PSA 2.714     Results for LELAND CHAPMAN W (MRN [id number]) as of 11/1/2018 11:08   11/1/2018 08:49   PSA 1.177     Results for LELAND CHAPMAN W (MRN [id number]) as of 3/29/2018 14:31   3/29/2018 12:15   PSA 0.798     Results for LELAND CHAPMAN (MRN [id number]) as of 3/29/2018 11:55   4/7/2017 16:04   PSA 0.268     Results for LELAND CHAPMAN (MRN [id number]) as of 3/23/2016 10:42   1/2/2013 08:00* 2/27/2013**  08:10 10/16/2014 11:26 3/1/2016 14:15   PSA 0.67 0.97 0.070 0.154   * Following radical prostatectomy  ** Following salvage external beam radiation therapy.    Imaging  Bone Scan  11/23/2020  IMPRESSION: No convincing evidence of bony metastatic disease.     CT c/a/p  11/23/2020  COMBINED IMPRESSION:    No evidence of new metastatic disease.  Dilated thoracic aorta measuring 4.4 cm in the mid ascending segment.    ^^^^^^^^^^^^^^^^^^^^^^^^^^^^^^^^^^^^^^^^^^^^    MRI Lumbar  11/4/2019  IMPRESSION:  Interval posterior interbody fusion at L2-3 and L3-4. Chronic prior  fusion changes at L4-5. No evidence of new left-sided nerve root  impingement to account for left leg radiculopathy.    ^^^^^^^^^^^^^^^^^^^^^^^^^^^^^^^^^^^^^^^^^^^^    CT c/a/p  5/20/2019  IMPRESSION:  No evidence of metastatic disease in the chest, abdomen,  or pelvis.    Bone Scan  5/20/2019  IMPRESSION: Multifocal probable degenerative changes. No sites highly  suspicious for osseous metastatic disease.     Assessment  Mr. Kan is a 65 y.o. male who follows up with a history of prostate cancer s/p RRP in 2012 followed by sEBRT in 2013 with rising PSA which prompted initiation of ADT (Vantas).  I shared with him that his PSA demonstrates castrate resistant prostate cancer.  I explained this is a more aggressive form of prostate cancer.  I recommended Vantas exchange however the patient would like to cancel.  He would like to proceed in the next 3 months with intermittent ADT- defer today's treatment then reassess his symptoms and PSA to make ongoing decisions about ADT.    We discussed possibility of referral to medical oncology in addition and he refused.  He recently had a friend passed away while undergoing chemotherapy and he is strongly opposed to that type of treatment.    Regarding hot flashes I did discuss off label use of Effexor, clonidine or Megace.    Plan  Cancel Vantas exchange today.  Follow-up in 3 months with a PSA prior to make  decisions about possible Lupron/Vantas.          A total of 30 minutes was spent with the patient, reviewing records, tests, ordering medications, tests or procedures, counseling regarding the above described medical concern, recommendations regarding management and documenting clinical information in the EHR.

## 2021-02-24 NOTE — NURSING NOTE
Pt presents to clinic for vantas exchange    Review of Systems:    Weight loss:    No     Recent fever/chills:  No   Night sweats:   No  Current skin rash:  No   Recent hair loss:  No  Heat intolerance:  No   Cold intolerance:  No  Chest pain:   No   Palpitations:   No  Shortness of breath:  No   Wheezing:   No  Constipation:    No   Diarrhea:   No   Nausea:   No   Vomiting:   No   Kidney/side pain:  No   Back pain:   No  Frequent headaches:  No   Dizziness:     No  Leg swelling:   No   Calf pain:    No

## 2021-03-01 DIAGNOSIS — I10 ESSENTIAL HYPERTENSION: ICD-10-CM

## 2021-03-03 RX ORDER — AMLODIPINE BESYLATE 5 MG/1
5 TABLET ORAL DAILY
Qty: 90 TABLET | Refills: 0 | Status: SHIPPED | OUTPATIENT
Start: 2021-03-03 | End: 2021-03-03

## 2021-03-03 NOTE — TELEPHONE ENCOUNTER
[facility] sent Rx request for the following:   amLODIPine (NORVASC) 5 MG tablet  Sig:Take 1 tablet (5 mg) by mouth daily    Last Prescription Date:   12/01/2020  Last Fill Qty/Refills:         90, R-0    Last Office Visit:              01/27/2021 ([place])   Future Office visit:           03/08/2021 ([place])   Calcium Channel Blockers Protocol  Failed - 3/1/2021  8:05 AM        Failed - Blood pressure under 140/90 in past 12 months     BP Readings from Last 3 Encounters:   02/24/21 (!) 152/82   01/27/21 (!) 146/74   12/01/20 (!) 162/100              Routing for PCP review. Medication not on RN medication refillpolicy.

## 2021-03-04 ENCOUNTER — OFFICE VISIT (OUTPATIENT)
Dept: CARDIOLOGY | Facility: OTHER | Age: 66
End: 2021-03-04
Attending: INTERNAL MEDICINE
Payer: COMMERCIAL

## 2021-03-04 VITALS
DIASTOLIC BLOOD PRESSURE: 60 MMHG | HEIGHT: 70 IN | OXYGEN SATURATION: 96 % | WEIGHT: 241 LBS | SYSTOLIC BLOOD PRESSURE: 130 MMHG | BODY MASS INDEX: 34.5 KG/M2 | RESPIRATION RATE: 18 BRPM | TEMPERATURE: 98.5 F | HEART RATE: 85 BPM

## 2021-03-04 DIAGNOSIS — I35.2 AORTIC VALVE STENOSIS WITH INSUFFICIENCY, ETIOLOGY OF CARDIAC VALVE DISEASE UNSPECIFIED: ICD-10-CM

## 2021-03-04 DIAGNOSIS — Z02.89 ENCOUNTER FOR EXAMINATION REQUIRED BY DEPARTMENT OF TRANSPORTATION (DOT): ICD-10-CM

## 2021-03-04 DIAGNOSIS — E11.9 TYPE 2 DIABETES MELLITUS WITHOUT COMPLICATION, WITHOUT LONG-TERM CURRENT USE OF INSULIN (H): ICD-10-CM

## 2021-03-04 DIAGNOSIS — E78.2 MIXED HYPERLIPIDEMIA: ICD-10-CM

## 2021-03-04 DIAGNOSIS — I71.21 ASCENDING AORTIC ANEURYSM (H): ICD-10-CM

## 2021-03-04 DIAGNOSIS — Z87.891 HISTORY OF TOBACCO ABUSE: ICD-10-CM

## 2021-03-04 DIAGNOSIS — I35.1 NONRHEUMATIC AORTIC VALVE INSUFFICIENCY: ICD-10-CM

## 2021-03-04 DIAGNOSIS — R00.2 PALPITATIONS: ICD-10-CM

## 2021-03-04 DIAGNOSIS — I10 ESSENTIAL HYPERTENSION: Primary | ICD-10-CM

## 2021-03-04 DIAGNOSIS — E78.1 HYPERTRIGLYCERIDEMIA: ICD-10-CM

## 2021-03-04 DIAGNOSIS — I25.10 PLAQUE IN HEART ARTERY: ICD-10-CM

## 2021-03-04 DIAGNOSIS — I27.20 MILD PULMONARY HYPERTENSION (H): ICD-10-CM

## 2021-03-04 DIAGNOSIS — I35.0 MILD AORTIC STENOSIS: ICD-10-CM

## 2021-03-04 LAB — INTERPRETATION ECG - MUSE: NORMAL

## 2021-03-04 PROCEDURE — 93005 ELECTROCARDIOGRAM TRACING: CPT

## 2021-03-04 PROCEDURE — 99214 OFFICE O/P EST MOD 30 MIN: CPT | Performed by: INTERNAL MEDICINE

## 2021-03-04 PROCEDURE — 93010 ELECTROCARDIOGRAM REPORT: CPT | Performed by: INTERNAL MEDICINE

## 2021-03-04 PROCEDURE — G0463 HOSPITAL OUTPT CLINIC VISIT: HCPCS | Mod: 25

## 2021-03-04 PROCEDURE — G0463 HOSPITAL OUTPT CLINIC VISIT: HCPCS

## 2021-03-04 RX ORDER — AMLODIPINE BESYLATE 10 MG/1
10 TABLET ORAL DAILY
Qty: 90 TABLET | Refills: 3 | Status: SHIPPED | OUTPATIENT
Start: 2021-03-04 | End: 2022-03-30

## 2021-03-04 ASSESSMENT — MIFFLIN-ST. JEOR: SCORE: 1874.42

## 2021-03-04 ASSESSMENT — PAIN SCALES - GENERAL: PAINLEVEL: NO PAIN (0)

## 2021-03-04 NOTE — NURSING NOTE
"Patient comes in for annual follow up.  Joy Kate LPN ....................3/4/2021   10:41 AM  Chief Complaint   Patient presents with     Follow Up     annual       Initial /60 (BP Location: Right arm, Patient Position: Sitting, Cuff Size: Adult Large)   Pulse 85   Temp 98.5  F (36.9  C) (Tympanic)   Resp 18   Ht 1.778 m (5' 10\")   Wt 109.3 kg (241 lb)   SpO2 96%   BMI 34.58 kg/m   Estimated body mass index is 34.58 kg/m  as calculated from the following:    Height as of this encounter: 1.778 m (5' 10\").    Weight as of this encounter: 109.3 kg (241 lb).  Meds Reconciled: complete  Pt is on Aspirin  Pt is on a Statin  PHQ and/or MARY reviewed. Pt referred to PCP/MH Provider as appropriate.    Joy Kate LPN      "

## 2021-03-04 NOTE — PROGRESS NOTES
[facility] HEART [facility]   CARDIOLOGY PROGRESS NOTE     Chief Complaint   Patient presents with     Follow Up     annual          Diagnosis:  1.  Moderate ascending aortic aneurysm of 4.6 cm on 2/23/2021, unchanged.  2.  Moderate aortic valve insufficiency.  3.  Mild pulmonary hypertension.  4.  Mild aortic stenosis.  5.  Hypertension-uncontrolled  6.  Hyperlipidemia-controlled.  7.  Hypertriglyceridemia-controlled.  8.  Obesity.  9.  Plaque in the aorta-mild on 3/27/2019.  10.  Palpitations.  11.  H/O tobacco abuse quitting on 11/8/2002.  12.  Encounter for examination required by Department of Transportation (DOT).  13.  H/O tobacco abuse quitting 11/8/2002.    Assessment/Plan:    1.  Reviewed echo from 2/23/2021.  Ascending aorta is stable at 4.6 cm and aortic insufficiency is stable at moderate.  He also has mild aortic stenosis.  Essentially, his echo is unchanged.  2.  Repeat echo in 1 year.  3.  Secondary to uncontrolled hypertension, increase Norvasc from 5 mg daily to 10 mg daily.  4.  Follow-up in 1 year after completion of echocardiogram.    Follow-up or call with any issues or needs.      Interval history:  Dilip is doing well.  He voices no cardiac complaints.  He has not had any significant edema, shortness of breath, or chest pain.  He is here in follow-up for his echocardiogram as he has a history of moderate ascending aortic aneurysm, moderate aortic insufficiency, and mild aortic stenosis.  He had a repeat echo on 2/23/2019 in comparison to his one from 3/11/2020.  Essentially, things are unchanged as outlined above.  He was reassured.  We discussed symptoms of valvular failure and heart disease.  His blood pressure has been uncontrolled.  It was decided to increase Norvasc from 5 to 10 mg daily.  He understands he can get swelling from this medication.  Otherwise, no other issues or changes.      HPI:    Mr. 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 He had some abnormal  echo findings as described below on 3/29/18 on his previous echo.      He was identified as having mild aortic stenosis, moderate to severe aortic insufficiency, mild pulmonary hypertension, and a moderate ascending aortic aneurysm at 4.6 cm on his echo from 3/29/18.  He is needing clinical evaluation prior to being approved for his DOT license. With these abnormal echo findings, it was suggested he have repeat echocardiogram to determine if there has been any changes to these previous noted findings.  If his abnormalities are stable, it should not preclude DOT licensing.  He is aware that he should have an echocardiogram on a yearly basis.     He also describes infrequent palpitations.  He only notices these when he is going to bed.  He describes a quick flutter in his chest that last seconds. He has not been lightheaded, dizzy, had near-syncope, or syncope.  These have been going on since approximately age 25.  These have never been worked up previously.  He would like to have ambulatory monitoring with a Zio patch.  He would like to have it after 5/1/19 as he does not want to have the workup completed while being assessed for DOT licensure.     We also discussed his ascending aortic aneurysm.  This was measured as moderately dilated at 4.6 cm on 3/29/18.  The heart model in the room was used to explain this finding.  Again, he is aware that he needs yearly echocardiogram as it can increase in size and potentially rupture.  He is aware that surgery is performed at 5.5 cm.     Also, using the heart model in the room, we discussed mild aortic stenosis and moderate to severe aortic insufficiency.  He is asymptomatic related to these abnormalities.     He has a history of hyperlipidemia with a cholesterol panel on 6/1/18 which was normal.  He has history of hypertension with normal blood pressures ranging from the 120's-130's.  He has a history of tobacco abuse quitting 11/8/2002.      Relevant  testing:  Echocardiogram on 2/23/2021:  Global and regional left ventricular function is normal with an EF of 55-60%.  Global right ventricular function is normal.  Mild aortic stenosis is present.   The mean gradient across the aortic valve is 12 mmHg. Moderate aortic insufficiency is present.  Pulmonary artery systolic pressure is normal.  The inferior vena cava was normal in size with preserved respiratory  variability.  Moderate dilatation of the aorta is present.   Ascending aorta 4.6 cm.  No pericardial effusion is present.    Echo on 3/11/2020:  Right ventricular function, chamber size, wall motion, and thickness are  normal.  Moderate to severe aortic insufficiency is present.  Ascending aorta 4.6 cm.  Aortic index 20 mm/m2. Normal up to 17 mm/m2. Mild dilatation. Aortic size remain unchanged since 2017.    Zio patch on 5/1/2019:  The patient was monitored for 11 days and 22 hours.  Minimum heart rate 50, average heart rate 78, maximum heart rate 136.  There were 5 patient triggered events.  One was in normal sinus rhythm.  3 were associated with PAC's and PVC's.  One was associated with SVT, nonsustained.  There were 2 diary entries, both were a normal sinus rhythm.  Predominant underlying rhythm was sinus.  7 SVT runs occurred, the fastest lasting 7 beats with a rate of 130.  The longest lasted 8 beats with a rate of 110.  Other ectopy was rare.        ICD-10-CM    1. Essential hypertension  I10 amLODIPine (NORVASC) 10 MG tablet     EKG 12-lead, tracing only   2. Aortic valve stenosis with insufficiency, etiology of cardiac valve disease unspecified  I35.2 Echocardiogram Complete   3. Mild aortic stenosis  I35.0 Echocardiogram Complete   4. Ascending aortic aneurysm-moderate at 4.6 cm on 3/11/20  I71.2 Echocardiogram Complete   5. Aortic valve insufficiency-moderate to severe  I35.1 Echocardiogram Complete   6. Mild pulmonary hypertension (H)  I27.20 Echocardiogram Complete   7. Plaque in heart  artery-aorta  I25.10 Echocardiogram Complete   8. Encounter for examination required by Department of Transportation (DOT)  Z02.89    9. History of tobacco abuse quitting 11/8/2002  Z87.891    10. Palpitations  R00.2    11. Type 2 diabetes mellitus without complication, without long-term current use of insulin (H)  E11.9    12. Mixed hyperlipidemia  E78.2    13. Hypertriglyceridemia  E78.1        Past Medical History:   Diagnosis Date     Elevated prostate specific antigen (PSA)     4/10/2012     Encounter for other administrative examinations     1/31/2014     Esophagitis     No Comments Provided     Gastro-esophageal reflux disease without esophagitis     No Comments Provided     Low back pain     No Comments Provided     Malignant neoplasm of prostate (H)     9/6/2012     Nicotine dependence, uncomplicated     Quit - October 2007 with chantix     Other intervertebral disc degeneration, lumbosacral region     12/1/2008       Past Surgical History:   Procedure Laterality Date     APPENDECTOMY OPEN  765248    Ruptured - [facility]     COLONOSCOPY  08/31/2006    next due in 2016     DISKECTOMY, LUMBAR, SINGLE SP  03/16/2009    L 3-4 microdiskectomy, [facility]     ESOPHAGOSCOPY, GASTROSCOPY, DUODENOSCOPY (EGD), COMBINED  03/2003          ESOPHAGOSCOPY, GASTROSCOPY, DUODENOSCOPY (EGD), COMBINED  08/31/2006          PROSTATECTOMY PERINEAL RADICAL  11/28/2012    Nerve Sparring, Dr Warner     SPINE SURGERY N/A 04/28/2017    L3 to S1 spinal decompression L4/L5 fusion     TONSILLECTOMY, ADENOIDECTOMY, COMBINED      as a child     VARICOCELECTOMY  1959    INCISION AND DRAINAGE,EXCISE VARICOCELE       No Known Allergies    Current Outpatient Medications   Medication Sig Dispense Refill     amLODIPine (NORVASC) 10 MG tablet Take 1 tablet (10 mg) by mouth daily 90 tablet 3     acetaminophen (TYLENOL) 325 MG tablet Take 650 mg by mouth       aspirin EC 81 MG EC tablet Take 81 mg by mouth daily with food       atorvastatin  (LIPITOR) 20 MG tablet Take 1 tablet (20 mg) by mouth daily 90 tablet 3     Bee Pollen 500 MG CHEW Take 2 tablets by mouth daily       cyanocobalamin (RA VITAMIN B-12 TR) 1000 MCG TBCR Take 1,000 mcg by mouth daily       garlic (SM GARLIC) 150 MG TABS tablet Take 2 tablets by mouth daily       HYDROcodone-acetaminophen (NORCO)  MG per tablet Take 1 tablet by mouth every 4 hours as needed for moderate to severe pain 5 on average 150 tablet 0     HYDROcodone-acetaminophen (NORCO)  MG per tablet Take 1 tablet by mouth every 4 hours as needed for moderate to severe pain 5 on average 150 tablet 0     lisinopril (ZESTRIL) 40 MG tablet Take 1 tablet (40 mg) by mouth daily 90 tablet 4     metFORMIN (GLUCOPHAGE-XR) 500 MG 24 hr tablet Take 2 tablets (1,000 mg) by mouth 2 times daily (with meals) 360 tablet 3     naproxen (NAPROSYN) 500 MG tablet Take 1 tablet (500 mg) by mouth 2 times daily as needed for moderate pain 180 tablet 1     Omega-3 Fatty Acids (OMEGA 3 PO) Take 2 capsules by mouth daily       omeprazole (PRILOSEC) 40 MG DR capsule TAKE 1 CAPSULE BY MOUTH ONCE DAILY. 90 capsule 3     pregabalin (LYRICA) 75 MG capsule Take 1 capsule (75 mg) by mouth every morning AND 2 capsules (150 mg) every evening. 270 capsule 1     sildenafil (REVATIO) 20 MG tablet TAKE 3 TO 5 TABLETS BY MOUTH 1 hour prior TO sexual activity. 100 tablet 3     vitamin D3 (CHOLECALCIFEROL) 2000 units (50 mcg) tablet Take 1 tablet (2,000 Units) by mouth daily         Social History     Socioeconomic History     Marital status:      Spouse name: Not on file     Number of children: Not on file     Years of education: Not on file     Highest education level: Not on file   Occupational History     Occupation:      Employer: OTHER   Social Needs     Financial resource strain: Not on file     Food insecurity     Worry: Not on file     Inability: Not on file     Transportation needs     Medical: Not on file     Non-medical: Not on  file   Tobacco Use     Smoking status: Former Smoker     Packs/day: 1.00     Years: 20.00     Pack years: 20.00     Types: Cigarettes     Quit date: 2002     Years since quittin.3     Smokeless tobacco: Current User     Types: Snuff   Substance and Sexual Activity     Alcohol use: Yes     Comment: Alcoholic Drinks/day: 5 drinks a month     Drug use: No     Sexual activity: Yes     Partners: Female     Birth control/protection: None   Lifestyle     Physical activity     Days per week: Not on file     Minutes per session: Not on file     Stress: Not on file   Relationships     Social connections     Talks on phone: Not on file     Gets together: Not on file     Attends Sikhism service: Not on file     Active member of club or organization: Not on file     Attends meetings of clubs or organizations: Not on file     Relationship status: Not on file     Intimate partner violence     Fear of current or ex partner: Not on file     Emotionally abused: Not on file     Physically abused: Not on file     Forced sexual activity: Not on file   Other Topics Concern     Parent/sibling w/ CABG, MI or angioplasty before 65F 55M? Not Asked   Social History Narrative    [place] - retired.  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  Patient has quit smoking.         LAB RESULTS:   Orders Only on 2021   Component Date Value Ref Range Status     Creatinine Urine 2021 282  mg/dL Final     Albumin Urine mg/L 2021 36  mg/L Final     Albumin Urine mg/g Cr 2021 12.77  0 - 17 mg/g Cr Final   Orders Only on 2021   Component Date Value Ref Range Status     Cholesterol 2021 118  <200 mg/dL Final     Triglycerides 2021 141  <150 mg/dL Final     HDL Cholesterol 2021 63  23 - 92 mg/dL Final     LDL Cholesterol Calculated 2021 27  <100 mg/dL Final     Non HDL Cholesterol 2021 55  <130 mg/dL Final     WBC 2021 6.9  4.0 - 11.0 10e9/L Final     RBC Count  "02/23/2021 4.91  4.4 - 5.9 10e12/L Final     Hemoglobin 02/23/2021 14.1  13.3 - 17.7 g/dL Final     Hematocrit 02/23/2021 42.5  40.0 - 53.0 % Final     MCV 02/23/2021 87  78 - 100 fl Final     MCH 02/23/2021 28.7  26.5 - 33.0 pg Final     MCHC 02/23/2021 33.2  31.5 - 36.5 g/dL Final     RDW 02/23/2021 13.6  10.0 - 15.0 % Final     Platelet Count 02/23/2021 200  150 - 450 10e9/L Final     Sodium 02/23/2021 140  134 - 144 mmol/L Final     Potassium 02/23/2021 4.2  3.5 - 5.1 mmol/L Final     Chloride 02/23/2021 103  98 - 107 mmol/L Final     Carbon Dioxide 02/23/2021 29  21 - 31 mmol/L Final     Anion Gap 02/23/2021 8  3 - 14 mmol/L Final     Glucose 02/23/2021 139* 70 - 105 mg/dL Final     Urea Nitrogen 02/23/2021 17  7 - 25 mg/dL Final     Creatinine 02/23/2021 0.73  0.70 - 1.30 mg/dL Final     GFR Estimate 02/23/2021 >90  >60 mL/min/[1.73_m2] Final     GFR Estimate If Black 02/23/2021 >90  >60 mL/min/[1.73_m2] Final     Calcium 02/23/2021 10.0  8.6 - 10.3 mg/dL Final     Bilirubin Total 02/23/2021 0.8  0.3 - 1.0 mg/dL Final     Albumin 02/23/2021 4.3  3.5 - 5.7 g/dL Final     Protein Total 02/23/2021 7.2  6.4 - 8.9 g/dL Final     Alkaline Phosphatase 02/23/2021 66  34 - 104 U/L Final     ALT 02/23/2021 16  7 - 52 U/L Final     AST 02/23/2021 15  13 - 39 U/L Final     Hemoglobin A1C 02/23/2021 5.9  4.0 - 6.0 % Final     Prostate Specific Antigen 02/23/2021 1.847  <3.100 ng/mL Final        Review of systems: Negative except that which was noted in the HPI.    Physical examination:  /60 (BP Location: Right arm, Patient Position: Sitting, Cuff Size: Adult Large)   Pulse 85   Temp 98.5  F (36.9  C) (Tympanic)   Resp 18   Ht 1.778 m (5' 10\")   Wt 109.3 kg (241 lb)   SpO2 96%   BMI 34.58 kg/m      GENERAL APPEARANCE: healthy, alert and no distress  HEENT: no icterus, no xanthelasmas, normal pupil size and reaction, no cyanosis.  CHEST: lungs clear to auscultation - no rales, rhonchi or wheezes, no use of " accessory muscles, no retractions, respirations are unlabored, normal respiratory rate  CARDIOVASCULAR: regular rhythm, normal S1 with physiologic split S2, no S3 or S4 and no murmur, click or rub  EXTREMITIES: no clubbing, cyanosis with mild peripheral edema  NEURO: alert and oriented normal speech, and affect          Thank you for allowing me to participate in the care of your patient. Please do not hesitate to contact me if you have any questions.     Riley Dave, DO

## 2021-03-06 ENCOUNTER — IMMUNIZATION (OUTPATIENT)
Dept: FAMILY MEDICINE | Facility: OTHER | Age: 66
End: 2021-03-06
Attending: FAMILY MEDICINE
Payer: MEDICARE

## 2021-03-06 PROCEDURE — 0031A PR COVID VAC JANSSEN AD26 0.5ML: CPT

## 2021-03-08 ENCOUNTER — OFFICE VISIT (OUTPATIENT)
Dept: FAMILY MEDICINE | Facility: OTHER | Age: 66
End: 2021-03-08
Attending: CHIROPRACTOR
Payer: MEDICARE

## 2021-03-08 VITALS
OXYGEN SATURATION: 98 % | WEIGHT: 239 LBS | TEMPERATURE: 98 F | RESPIRATION RATE: 16 BRPM | HEART RATE: 68 BPM | BODY MASS INDEX: 35.4 KG/M2 | SYSTOLIC BLOOD PRESSURE: 130 MMHG | HEIGHT: 69 IN | DIASTOLIC BLOOD PRESSURE: 86 MMHG

## 2021-03-08 DIAGNOSIS — Z02.89 ENCOUNTER FOR EXAMINATION REQUIRED BY DEPARTMENT OF TRANSPORTATION (DOT): Primary | ICD-10-CM

## 2021-03-08 LAB
ALBUMIN UR-MCNC: 10 MG/DL
APPEARANCE UR: CLEAR
BILIRUB UR QL STRIP: NEGATIVE
COLOR UR AUTO: YELLOW
GLUCOSE UR STRIP-MCNC: NEGATIVE MG/DL
HGB UR QL STRIP: NEGATIVE
KETONES UR STRIP-MCNC: NEGATIVE MG/DL
LEUKOCYTE ESTERASE UR QL STRIP: NEGATIVE
MUCOUS THREADS #/AREA URNS LPF: PRESENT /LPF
NITRATE UR QL: NEGATIVE
PH UR STRIP: 5.5 PH (ref 5–7)
RBC #/AREA URNS AUTO: 1 /HPF (ref 0–2)
SOURCE: ABNORMAL
SP GR UR STRIP: 1.03 (ref 1–1.03)
SQUAMOUS #/AREA URNS AUTO: <1 /HPF (ref 0–1)
UROBILINOGEN UR STRIP-MCNC: NORMAL MG/DL (ref 0–2)
WBC #/AREA URNS AUTO: 1 /HPF (ref 0–5)

## 2021-03-08 PROCEDURE — G0463 HOSPITAL OUTPT CLINIC VISIT: HCPCS

## 2021-03-08 PROCEDURE — 99499 UNLISTED E&M SERVICE: CPT | Performed by: CHIROPRACTOR

## 2021-03-08 PROCEDURE — 81001 URINALYSIS AUTO W/SCOPE: CPT | Mod: ZL | Performed by: CHIROPRACTOR

## 2021-03-08 ASSESSMENT — MIFFLIN-ST. JEOR: SCORE: 1849.48

## 2021-03-08 ASSESSMENT — PAIN SCALES - GENERAL: PAINLEVEL: SEVERE PAIN (7)

## 2021-03-08 NOTE — NURSING NOTE
Dilip Kan is a 65 year old male presenting for a DOT physical.   Urine analysis initiated per verbal order that was read back and verified.  Please see scanned in copy for vitals.   Medication Reconciliation: complete    Akosua Smith LPN  3/8/2021 9:20 AM

## 2021-03-08 NOTE — PROGRESS NOTES
Department of Transportation (DOT) physical performed.  See scanned documents dated today.     Patient is authorized for 1 year under Federal DOT Regulations.  Reviewed cardiology encounter.  Cleared by cardiology with follow up Echo in one year.      Zeeshan Pizarro DC, CORTNEY  Director - Occupational Medicine Department  [facility]  [address] [place] [address]  Phone [phone]  Fax [phone]

## 2021-03-30 ENCOUNTER — OFFICE VISIT (OUTPATIENT)
Dept: FAMILY MEDICINE | Facility: OTHER | Age: 66
End: 2021-03-30
Attending: FAMILY MEDICINE
Payer: COMMERCIAL

## 2021-03-30 VITALS
HEART RATE: 80 BPM | WEIGHT: 233 LBS | TEMPERATURE: 96.2 F | RESPIRATION RATE: 18 BRPM | DIASTOLIC BLOOD PRESSURE: 70 MMHG | SYSTOLIC BLOOD PRESSURE: 124 MMHG | BODY MASS INDEX: 34.41 KG/M2 | OXYGEN SATURATION: 98 %

## 2021-03-30 DIAGNOSIS — G89.29 CHRONIC BACK PAIN GREATER THAN 3 MONTHS DURATION: ICD-10-CM

## 2021-03-30 DIAGNOSIS — M48.062 SPINAL STENOSIS OF LUMBAR REGION WITH NEUROGENIC CLAUDICATION: ICD-10-CM

## 2021-03-30 DIAGNOSIS — E11.9 TYPE 2 DIABETES MELLITUS WITHOUT COMPLICATION, WITHOUT LONG-TERM CURRENT USE OF INSULIN (H): ICD-10-CM

## 2021-03-30 DIAGNOSIS — Z12.11 SPECIAL SCREENING FOR MALIGNANT NEOPLASMS, COLON: ICD-10-CM

## 2021-03-30 DIAGNOSIS — F11.90 CHRONIC, CONTINUOUS USE OF OPIOIDS: Primary | ICD-10-CM

## 2021-03-30 DIAGNOSIS — Z79.899 CONTROLLED SUBSTANCE AGREEMENT SIGNED: ICD-10-CM

## 2021-03-30 DIAGNOSIS — I10 ESSENTIAL HYPERTENSION: ICD-10-CM

## 2021-03-30 DIAGNOSIS — M54.9 CHRONIC BACK PAIN GREATER THAN 3 MONTHS DURATION: ICD-10-CM

## 2021-03-30 PROCEDURE — 99214 OFFICE O/P EST MOD 30 MIN: CPT | Performed by: FAMILY MEDICINE

## 2021-03-30 PROCEDURE — G0463 HOSPITAL OUTPT CLINIC VISIT: HCPCS

## 2021-03-30 RX ORDER — HYDROCODONE BITARTRATE AND ACETAMINOPHEN 10; 325 MG/1; MG/1
1 TABLET ORAL
Qty: 150 TABLET | Refills: 0 | Status: SHIPPED | OUTPATIENT
Start: 2021-04-29 | End: 2021-05-28

## 2021-03-30 RX ORDER — PREGABALIN 150 MG/1
150 CAPSULE ORAL 2 TIMES DAILY
Qty: 180 CAPSULE | Refills: 1 | Status: SHIPPED | OUTPATIENT
Start: 2021-03-30 | End: 2021-08-25

## 2021-03-30 RX ORDER — HYDROCODONE BITARTRATE AND ACETAMINOPHEN 10; 325 MG/1; MG/1
1 TABLET ORAL
Qty: 150 TABLET | Refills: 0 | Status: SHIPPED | OUTPATIENT
Start: 2021-03-30 | End: 2021-05-28

## 2021-03-30 ASSESSMENT — PATIENT HEALTH QUESTIONNAIRE - PHQ9
SUM OF ALL RESPONSES TO PHQ QUESTIONS 1-9: 0
5. POOR APPETITE OR OVEREATING: NOT AT ALL

## 2021-03-30 ASSESSMENT — ANXIETY QUESTIONNAIRES
6. BECOMING EASILY ANNOYED OR IRRITABLE: NOT AT ALL
2. NOT BEING ABLE TO STOP OR CONTROL WORRYING: NOT AT ALL
IF YOU CHECKED OFF ANY PROBLEMS ON THIS QUESTIONNAIRE, HOW DIFFICULT HAVE THESE PROBLEMS MADE IT FOR YOU TO DO YOUR WORK, TAKE CARE OF THINGS AT HOME, OR GET ALONG WITH OTHER PEOPLE: NOT DIFFICULT AT ALL
7. FEELING AFRAID AS IF SOMETHING AWFUL MIGHT HAPPEN: NOT AT ALL
5. BEING SO RESTLESS THAT IT IS HARD TO SIT STILL: NOT AT ALL
1. FEELING NERVOUS, ANXIOUS, OR ON EDGE: NOT AT ALL
GAD7 TOTAL SCORE: 0
3. WORRYING TOO MUCH ABOUT DIFFERENT THINGS: NOT AT ALL

## 2021-03-30 ASSESSMENT — PAIN SCALES - GENERAL: PAINLEVEL: MODERATE PAIN (5)

## 2021-03-30 NOTE — PATIENT INSTRUCTIONS
Refilled hydrocodone for 2 months  Increase pregabalin to 150 mg twice daily  Follow up in 2 months    Cologuard testing for colon cancer

## 2021-03-30 NOTE — NURSING NOTE
"Patient presents to the clinic for controlled medication refill.  Last administration of [place] was today around 0200.  Contract signed 10-, TOX 11-4-2020.      Previous A1C is at goal of <8  Lab Results   Component Value Date    A1C 5.9 02/23/2021    A1C 6.5 10/05/2020     Urine microalbumin:creatine: n/a  Foot exam unknown-declines today  Eye exam yearly    Tobacco User no  Patient is on a daily aspirin  Patient is on a Statin.  Blood pressure today of:     BP Readings from Last 1 Encounters:   03/08/21 130/86      is at the goal of <139/89 for diabetics.    Chief Complaint   Patient presents with     Recheck Medication       Initial /70 (BP Location: Right arm, Patient Position: Sitting, Cuff Size: Adult Regular)   Pulse 80   Temp 96.2  F (35.7  C) (Temporal)   Resp 18   Wt 105.7 kg (233 lb)   SpO2 98%   BMI 34.41 kg/m   Estimated body mass index is 34.41 kg/m  as calculated from the following:    Height as of 3/8/21: 1.753 m (5' 9\").    Weight as of this encounter: 105.7 kg (233 lb).  Medication Reconciliation: complete    Leona Shell LPN      "

## 2021-03-30 NOTE — PROGRESS NOTES
"Nursing Notes:   Leona Shell LPN  3/30/2021  9:04 AM  Sign at exiting of workspace  Patient presents to the clinic for controlled medication refill.  Last administration of [place] was today around 0200.  Contract signed 10-, TOX 11-4-2020.      Previous A1C is at goal of <8  Lab Results   Component Value Date    A1C 5.9 02/23/2021    A1C 6.5 10/05/2020     Urine microalbumin:creatine: n/a  Foot exam unknown-declines today  Eye exam yearly    Tobacco User no  Patient is on a daily aspirin  Patient is on a Statin.  Blood pressure today of:     BP Readings from Last 1 Encounters:   03/08/21 130/86      is at the goal of <139/89 for diabetics.    Chief Complaint   Patient presents with     Recheck Medication       Initial /70 (BP Location: Right arm, Patient Position: Sitting, Cuff Size: Adult Regular)   Pulse 80   Temp 96.2  F (35.7  C) (Temporal)   Resp 18   Wt 105.7 kg (233 lb)   SpO2 98%   BMI 34.41 kg/m   Estimated body mass index is 34.41 kg/m  as calculated from the following:    Height as of 3/8/21: 1.753 m (5' 9\").    Weight as of this encounter: 105.7 kg (233 lb).  Medication Reconciliation: complete    Leona Shell LPN           Assessment & Plan       ICD-10-CM    1. Chronic, continuous use of opioids  F11.90 HYDROcodone-acetaminophen (NORCO)  MG per tablet     HYDROcodone-acetaminophen (NORCO)  MG per tablet   2. Spinal stenosis of lumbar region with neurogenic claudication  M48.062 HYDROcodone-acetaminophen (NORCO)  MG per tablet     HYDROcodone-acetaminophen (NORCO)  MG per tablet     pregabalin (LYRICA) 150 MG capsule   3. Controlled substance agreement updated 10/5/2020  Z79.899 HYDROcodone-acetaminophen (NORCO)  MG per tablet     HYDROcodone-acetaminophen (NORCO)  MG per tablet   4. Chronic back pain greater than 3 months duration  M54.9 pregabalin (LYRICA) 150 MG capsule    G89.29    5. Type 2 diabetes mellitus without complication, " without long-term current use of insulin (H)  E11.9    6. Essential hypertension  I10    7. Special screening for malignant neoplasms, colon  Z12.11 COLOGUARD([employer])       PDMP Review       Value Time User    State PDMP site checked  Yes 3/30/2021  9:16 AM Wei Perez MD         Refilled hydrocodone 10/325 mg taking 5/day for two x 1 month prescriptions.   Increase pregabalin dose to 150 mg twice daily.    Blood pressure now at goal. A1c improved. Plan recheck in 2 months.    Due for colonoscopy, he elects for Cologuard testing. Order placed.    Follow up in 2 months    31 minutes spent on the date of the encounter doing chart review, patient visit and documentation     Wei Perez MD   [facility]      SUBJECTIVE:  66 year old male presents to follow up on diabetes, hypertension, and chronic low back pain    Tolerating metformin. A1c improved to 5.9%. Weight down a few pounds from last appointment, several pounds from fall of 2020.    Blood pressure was elevated to 140s. Lisinopril increased. Still elevated at follow up with Dr Dave, so amlodipine increased. Blood pressure at goal now.    S/p 3 lumbar surgeries. Continues having low back pain. He used to take hydrocodone 12/day and has worked down to 5/day.   Did not tolerate venlafaxine started for hot flashes and pain.       On pregabalin to help with pain, but he's uncertain of how much benefit it provides. Ran out of 75 mg pills, has been taking 50 mg pills about 150 mg total daily without side effects.    REVIEW OF SYSTEMS:    Constitutional: negative  Respiratory: negative  Cardiovascular: negative  Gastrointestinal: negative    Current Outpatient Medications   Medication Sig Dispense Refill     acetaminophen (TYLENOL) 325 MG tablet Take 650 mg by mouth       amLODIPine (NORVASC) 10 MG tablet Take 1 tablet (10 mg) by mouth daily 90 tablet 3     aspirin EC 81 MG EC tablet Take 81 mg by mouth daily with food        atorvastatin (LIPITOR) 20 MG tablet Take 1 tablet (20 mg) by mouth daily 90 tablet 3     Bee Pollen 500 MG CHEW Take 2 tablets by mouth daily       cyanocobalamin (RA VITAMIN B-12 TR) 1000 MCG TBCR Take 1,000 mcg by mouth daily       garlic (SM GARLIC) 150 MG TABS tablet Take 2 tablets by mouth daily       HYDROcodone-acetaminophen (NORCO)  MG per tablet Take 1 tablet by mouth every 4 hours as needed for moderate to severe pain 5 on average 150 tablet 0     HYDROcodone-acetaminophen (NORCO)  MG per tablet Take 1 tablet by mouth every 4 hours as needed for moderate to severe pain 5 on average 150 tablet 0     lisinopril (ZESTRIL) 40 MG tablet Take 1 tablet (40 mg) by mouth daily 90 tablet 4     metFORMIN (GLUCOPHAGE-XR) 500 MG 24 hr tablet Take 2 tablets (1,000 mg) by mouth 2 times daily (with meals) 360 tablet 3     naproxen (NAPROSYN) 500 MG tablet Take 1 tablet (500 mg) by mouth 2 times daily as needed for moderate pain 180 tablet 1     Omega-3 Fatty Acids (OMEGA 3 PO) Take 2 capsules by mouth daily       omeprazole (PRILOSEC) 40 MG DR capsule TAKE 1 CAPSULE BY MOUTH ONCE DAILY. 90 capsule 3     pregabalin (LYRICA) 75 MG capsule Take 1 capsule (75 mg) by mouth every morning AND 2 capsules (150 mg) every evening. 270 capsule 1     sildenafil (REVATIO) 20 MG tablet TAKE 3 TO 5 TABLETS BY MOUTH 1 hour prior TO sexual activity. 100 tablet 3     vitamin D3 (CHOLECALCIFEROL) 2000 units (50 mcg) tablet Take 1 tablet (2,000 Units) by mouth daily       No Known Allergies    OBJECTIVE:  /70 (BP Location: Right arm, Patient Position: Sitting, Cuff Size: Adult Regular)   Pulse 80   Temp 96.2  F (35.7  C) (Temporal)   Resp 18   Wt 105.7 kg (233 lb)   SpO2 98%   BMI 34.41 kg/m      EXAM:  General Appearance: Alert. No acute distress  Neurological: Strength 5/5 bilateral lower extremities   Psychiatric: Normal affect and mentation        This document was prepared using a combination of typing and voice  generated software.  While every attempt was made for accuracy, spelling and grammatical errors may exist.

## 2021-03-31 ASSESSMENT — ANXIETY QUESTIONNAIRES: GAD7 TOTAL SCORE: 0

## 2021-05-24 ENCOUNTER — OFFICE VISIT (OUTPATIENT)
Dept: UROLOGY | Facility: OTHER | Age: 66
End: 2021-05-24
Attending: UROLOGY
Payer: MEDICARE

## 2021-05-24 VITALS
OXYGEN SATURATION: 100 % | RESPIRATION RATE: 16 BRPM | WEIGHT: 242.4 LBS | BODY MASS INDEX: 35.8 KG/M2 | HEART RATE: 86 BPM | DIASTOLIC BLOOD PRESSURE: 70 MMHG | SYSTOLIC BLOOD PRESSURE: 138 MMHG

## 2021-05-24 DIAGNOSIS — R97.21 RISING PSA FOLLOWING TREATMENT FOR MALIGNANT NEOPLASM OF PROSTATE: ICD-10-CM

## 2021-05-24 DIAGNOSIS — E11.9 TYPE 2 DIABETES MELLITUS WITHOUT COMPLICATION, WITHOUT LONG-TERM CURRENT USE OF INSULIN (H): ICD-10-CM

## 2021-05-24 DIAGNOSIS — C61 PROSTATE CANCER (H): Primary | ICD-10-CM

## 2021-05-24 LAB
HBA1C MFR BLD: 6 % (ref 4–6)
PSA SERPL-MCNC: 2.56 NG/ML

## 2021-05-24 PROCEDURE — 250N000011 HC RX IP 250 OP 636: Performed by: UROLOGY

## 2021-05-24 PROCEDURE — 84153 ASSAY OF PSA TOTAL: CPT | Mod: ZL | Performed by: UROLOGY

## 2021-05-24 PROCEDURE — G0463 HOSPITAL OUTPT CLINIC VISIT: HCPCS | Mod: 25

## 2021-05-24 PROCEDURE — 99213 OFFICE O/P EST LOW 20 MIN: CPT | Performed by: UROLOGY

## 2021-05-24 PROCEDURE — 96402 CHEMO HORMON ANTINEOPL SQ/IM: CPT

## 2021-05-24 PROCEDURE — 83036 HEMOGLOBIN GLYCOSYLATED A1C: CPT | Mod: ZL | Performed by: FAMILY MEDICINE

## 2021-05-24 PROCEDURE — 36415 COLL VENOUS BLD VENIPUNCTURE: CPT | Mod: ZL | Performed by: UROLOGY

## 2021-05-24 PROCEDURE — G0463 HOSPITAL OUTPT CLINIC VISIT: HCPCS

## 2021-05-24 RX ADMIN — LEUPROLIDE ACETATE 22.5 MG: KIT at 11:36

## 2021-05-24 ASSESSMENT — PAIN SCALES - GENERAL: PAINLEVEL: MODERATE PAIN (4)

## 2021-05-24 NOTE — PROGRESS NOTES
Type of Visit  EST    Chief Complaint  Rising PSA following prostatectomy and salvage radiation    HPI  Mr. Kan is a 66 y.o. male who follows up with a history of prostate cancer s/p RRP in 2012 followed by sEBRT in 2013 with rising PSA which prompted initiation of ADT (Vantas) over 1 year ago.  The patient's Vantas was last implanted about 15 months ago.  At the last visit he refused ADT due to hot flashes causing moderate to severe symptoms.  He underwent a PSA earlier today to assess trajectory while off ADT.  The hot flashes have significantly improved.  The frequency is much less however he does continue to experience them on an intermittent basis.    He would like to avoid all forms of ADT and then reassess his symptoms as well as PSA.    He does have chronic back pain and previously underwent fusion over 2 years ago.      Review of Systems  I reviewed the ROS with the patient today.    Nursing Notes:   Dasha Vasques LPN  5/24/2021 11:20 AM  Signed  Chief Complaint   Patient presents with     Follow Up     3 month History for prostate cancer   Patient presents to the clinic today for a 3 month follow up for history of prostate Cancer.    Review of Systems:    Weight loss:    No     Recent fever/chills:  No   Night sweats:   Yes  Current skin rash:  No   Recent hair loss:  No  Heat intolerance:  No   Cold intolerance:  No  Chest pain:   No   Palpitations:   No  Shortness of breath:  No   Wheezing:   No  Constipation:    No   Diarrhea:   No   Nausea:   No   Vomiting:   No   Kidney/side pain:  No   Back pain:   Yes  Frequent headaches:  No   Dizziness:     No  Leg swelling:   No   Calf pain:    No          Medication Reconciliation: completed   Dasha Vasques LPN  5/24/2021 11:12 AM     Physical Exam  /70 (BP Location: Right arm, Patient Position: Sitting, Cuff Size: Adult Large)   Pulse 86   Resp 16   Wt 110 kg (242 lb 6.4 oz)   SpO2 100%   BMI 35.80 kg/m    Constitutional: No acute distress.  Alert  and cooperative   Head: NCAT  Eyes: Conjunctivae normal  Cardiovascular: Regular rate.  Pulmonary/Chest: Respirations are even and non-labored bilaterally, no audible wheezing  Abdominal: Soft. No distension, tenderness, masses or guarding.   Neurological: A + O x 3.  Cranial Nerves II-XII grossly intact.  Extremities: GINNA x 4, Warm. No clubbing.  No cyanosis.    Skin: Pink, warm and dry.  No visible rashes noted.  Psychiatric:  Normal mood and affect  Back:  No left CVA tenderness.  No right CVA tenderness.  Genitourinary:  Nonpalpable bladder    Labs  Results for EMILIA CHAPMAN (MRN [id number]) as of 5/24/2021 11:21   2/23/2021 08:29 5/24/2021 09:36   PSA 1.847 2.555     Results for EMILIA CHAPMAN (MRN [id number]) as of 11/23/2020 11:37   11/23/2020 07:20   PSA 1.215     Results for EMILIA CHAPMAN (MRN [id number]) as of 11/23/2020 12:10   11/23/2020 07:20   Testosterone  17 (L)     Results for EMILIA CHAPMAN (MRN [id number]) as of 7/22/2020 08:39   7/9/2020 09:36   PSA 0.975     Results for EMILIA CHAPMAN (MRN [id number]) as of 2/5/2020 09:14   11/4/2019 08:10** 2/5/2020 06:53   PSA 7.112 (H) 1.006   ** started ADT    Results for EMILIA CHAPMAN (MRN [id number]) as of 5/1/2019 09:45   5/1/2019 07:36   PSA 2.714     Results for LELAND CHAPMAN (MRN [id number]) as of 11/1/2018 11:08   11/1/2018 08:49   PSA 1.177     Results for LELAND CHAPMAN (MRN [id number]) as of 3/29/2018 14:31   3/29/2018 12:15   PSA 0.798     Results for LELAND CHAPMAN (MRN [id number]) as of 3/29/2018 11:55   4/7/2017 16:04   PSA 0.268     Results for LELAND CHAPMAN (MRN [id number]) as of 3/23/2016 10:42   1/2/2013 08:00* 2/27/2013** 08:10 10/16/2014 11:26 3/1/2016 14:15   PSA 0.67 0.97 0.070 0.154   * Following radical prostatectomy  ** Following salvage external beam radiation therapy.    Imaging  Bone Scan  11/23/2020  IMPRESSION: No convincing evidence of bony metastatic disease.     CT c/a/p  11/23/2020  COMBINED IMPRESSION:    No evidence  of new metastatic disease.  Dilated thoracic aorta measuring 4.4 cm in the mid ascending segment.    ^^^^^^^^^^^^^^^^^^^^^^^^^^^^^^^^^^^^^^^^^^^^    MRI Lumbar  11/4/2019  IMPRESSION:  Interval posterior interbody fusion at L2-3 and L3-4. Chronic prior  fusion changes at L4-5. No evidence of new left-sided nerve root  impingement to account for left leg radiculopathy.    ^^^^^^^^^^^^^^^^^^^^^^^^^^^^^^^^^^^^^^^^^^^^    CT c/a/p  5/20/2019  IMPRESSION:  No evidence of metastatic disease in the chest, abdomen,  or pelvis.    Bone Scan  5/20/2019  IMPRESSION: Multifocal probable degenerative changes. No sites highly  suspicious for osseous metastatic disease.     Lupron Injection Procedure  Today the patient received an intramuscular injection of Lupron 22.5mg.  This was given in the gluteal muscle.  The results of this injection: None    Assessment  Mr. Kan is a 66 y.o. male who follows up with a history of prostate cancer s/p RRP in 2012 followed by sEBRT in 2013 with rising PSA.  He has essentially been off ADT for the past 3 months however Vantas likely has some therapeutic effect after 1 year.  In any event he is due for ADT if this is the treatment he would like to continue with.  Given his hot flashes I have recommended a shorter course as an option.  I have recommended 3 months of Lupron with reassessment at 3 months.    We again discussed a referral to medical oncology in addition and he refused.  He recently had a friend passed away while undergoing chemotherapy and he is strongly opposed to that type of treatment.    Regarding hot flashes I did discuss off label use of Effexor, clonidine or Megace.    Plan  Lupron 22.5 mg administered today  Follow-up in 3 months with a PSA prior (possible Lupron)

## 2021-05-24 NOTE — NURSING NOTE
Chief Complaint   Patient presents with     Follow Up     3 month History for prostate cancer   Patient presents to the clinic today for a 3 month follow up for history of prostate Cancer.    Review of Systems:    Weight loss:    No     Recent fever/chills:  No   Night sweats:   Yes  Current skin rash:  No   Recent hair loss:  No  Heat intolerance:  No   Cold intolerance:  No  Chest pain:   No   Palpitations:   No  Shortness of breath:  No   Wheezing:   No  Constipation:    No   Diarrhea:   No   Nausea:   No   Vomiting:   No   Kidney/side pain:  No   Back pain:   Yes  Frequent headaches:  No   Dizziness:     No  Leg swelling:   No   Calf pain:    No          Medication Reconciliation: completed   Dasha Vasques LPN  5/24/2021 11:12 AM

## 2021-05-28 ENCOUNTER — OFFICE VISIT (OUTPATIENT)
Dept: FAMILY MEDICINE | Facility: OTHER | Age: 66
End: 2021-05-28
Attending: FAMILY MEDICINE
Payer: COMMERCIAL

## 2021-05-28 VITALS
DIASTOLIC BLOOD PRESSURE: 78 MMHG | WEIGHT: 237 LBS | SYSTOLIC BLOOD PRESSURE: 120 MMHG | RESPIRATION RATE: 18 BRPM | TEMPERATURE: 96.2 F | OXYGEN SATURATION: 98 % | BODY MASS INDEX: 35 KG/M2 | HEART RATE: 80 BPM

## 2021-05-28 DIAGNOSIS — E11.9 TYPE 2 DIABETES MELLITUS WITHOUT COMPLICATION, WITHOUT LONG-TERM CURRENT USE OF INSULIN (H): ICD-10-CM

## 2021-05-28 DIAGNOSIS — F11.90 CHRONIC, CONTINUOUS USE OF OPIOIDS: ICD-10-CM

## 2021-05-28 DIAGNOSIS — Z79.899 CONTROLLED SUBSTANCE AGREEMENT SIGNED: ICD-10-CM

## 2021-05-28 DIAGNOSIS — I10 ESSENTIAL HYPERTENSION: ICD-10-CM

## 2021-05-28 DIAGNOSIS — M48.062 SPINAL STENOSIS OF LUMBAR REGION WITH NEUROGENIC CLAUDICATION: Primary | ICD-10-CM

## 2021-05-28 PROCEDURE — 99214 OFFICE O/P EST MOD 30 MIN: CPT | Performed by: FAMILY MEDICINE

## 2021-05-28 PROCEDURE — G0463 HOSPITAL OUTPT CLINIC VISIT: HCPCS | Mod: 25

## 2021-05-28 PROCEDURE — G0463 HOSPITAL OUTPT CLINIC VISIT: HCPCS

## 2021-05-28 RX ORDER — HYDROCODONE BITARTRATE AND ACETAMINOPHEN 10; 325 MG/1; MG/1
1 TABLET ORAL EVERY 4 HOURS PRN
Qty: 135 TABLET | Refills: 0 | Status: SHIPPED | OUTPATIENT
Start: 2021-06-28 | End: 2021-08-25

## 2021-05-28 RX ORDER — NAPROXEN 500 MG/1
500 TABLET ORAL 2 TIMES DAILY PRN
Qty: 180 TABLET | Refills: 1 | Status: SHIPPED | OUTPATIENT
Start: 2021-05-28 | End: 2021-10-26

## 2021-05-28 RX ORDER — DULOXETIN HYDROCHLORIDE 20 MG/1
20 CAPSULE, DELAYED RELEASE ORAL DAILY
Qty: 30 CAPSULE | Refills: 3 | Status: SHIPPED | OUTPATIENT
Start: 2021-05-28 | End: 2021-08-25

## 2021-05-28 RX ORDER — HYDROCODONE BITARTRATE AND ACETAMINOPHEN 10; 325 MG/1; MG/1
1 TABLET ORAL EVERY 4 HOURS PRN
Qty: 135 TABLET | Refills: 0 | Status: SHIPPED | OUTPATIENT
Start: 2021-05-28 | End: 2021-08-25

## 2021-05-28 RX ORDER — HYDROCODONE BITARTRATE AND ACETAMINOPHEN 10; 325 MG/1; MG/1
1 TABLET ORAL
Qty: 135 TABLET | Refills: 0 | Status: SHIPPED | OUTPATIENT
Start: 2021-07-28 | End: 2021-08-25

## 2021-05-28 ASSESSMENT — ANXIETY QUESTIONNAIRES
IF YOU CHECKED OFF ANY PROBLEMS ON THIS QUESTIONNAIRE, HOW DIFFICULT HAVE THESE PROBLEMS MADE IT FOR YOU TO DO YOUR WORK, TAKE CARE OF THINGS AT HOME, OR GET ALONG WITH OTHER PEOPLE: NOT DIFFICULT AT ALL
5. BEING SO RESTLESS THAT IT IS HARD TO SIT STILL: NOT AT ALL
2. NOT BEING ABLE TO STOP OR CONTROL WORRYING: NOT AT ALL
1. FEELING NERVOUS, ANXIOUS, OR ON EDGE: NOT AT ALL
3. WORRYING TOO MUCH ABOUT DIFFERENT THINGS: NOT AT ALL
7. FEELING AFRAID AS IF SOMETHING AWFUL MIGHT HAPPEN: NOT AT ALL
GAD7 TOTAL SCORE: 0
6. BECOMING EASILY ANNOYED OR IRRITABLE: NOT AT ALL

## 2021-05-28 ASSESSMENT — PAIN SCALES - GENERAL: PAINLEVEL: MODERATE PAIN (5)

## 2021-05-28 ASSESSMENT — PATIENT HEALTH QUESTIONNAIRE - PHQ9
SUM OF ALL RESPONSES TO PHQ QUESTIONS 1-9: 0
5. POOR APPETITE OR OVEREATING: NOT AT ALL

## 2021-05-28 NOTE — PROGRESS NOTES
"Nursing Notes:   Leona Shell LPN  5/28/2021  9:11 AM  Sign at exiting of workspace  Patient presents to the clinic for [place] refill, last administration was last night around 2000/2200.  Contract updated on 10-5-2020, TOX obtained 11-4-2020.  Patient reports increase in low back pain due to right knee pain causing patient was walk differently than normal.        Previous A1C is at goal of <8  Lab Results   Component Value Date    A1C 6.0 05/24/2021    A1C 5.9 02/23/2021    A1C 6.5 10/05/2020     Urine microalbumin:creatine: n/a  Foot exam unknown-declines today  Eye exam Yearly DOT PX    Tobacco User no  Patient is on a daily aspirin  Patient is on a Statin.  Blood pressure today of:     BP Readings from Last 1 Encounters:   05/24/21 138/70      is at the goal of <139/89 for diabetics.    Chief Complaint   Patient presents with     Recheck Medication       Initial /78 (BP Location: Right arm, Patient Position: Sitting, Cuff Size: Adult Regular)   Pulse 80   Temp 96.2  F (35.7  C) (Temporal)   Resp 18   Wt 107.5 kg (237 lb)   SpO2 98%   BMI 35.00 kg/m   Estimated body mass index is 35 kg/m  as calculated from the following:    Height as of 3/8/21: 1.753 m (5' 9\").    Weight as of this encounter: 107.5 kg (237 lb).  Medication Reconciliation: complete    Leona Shell LPN               Assessment & Plan       ICD-10-CM    1. Spinal stenosis of lumbar region with neurogenic claudication  M48.062 HYDROcodone-acetaminophen (NORCO)  MG per tablet     HYDROcodone-acetaminophen (NORCO)  MG per tablet     naproxen (NAPROSYN) 500 MG tablet     HYDROcodone-acetaminophen (NORCO)  MG per tablet     DULoxetine (CYMBALTA) 20 MG capsule   2. Chronic, continuous use of opioids  F11.90 HYDROcodone-acetaminophen (NORCO)  MG per tablet     HYDROcodone-acetaminophen (NORCO)  MG per tablet     HYDROcodone-acetaminophen (NORCO)  MG per tablet   3. Controlled substance agreement " updated 10/5/2020  Z79.899 HYDROcodone-acetaminophen (NORCO)  MG per tablet     HYDROcodone-acetaminophen (NORCO)  MG per tablet     HYDROcodone-acetaminophen (NORCO)  MG per tablet   4. Essential hypertension  I10    5. Type 2 diabetes mellitus without complication, without long-term current use of insulin (H)  E11.9        PDMP Review       Value Time User    State PDMP site checked  Yes 5/28/2021  9:16 AM Wei Perez MD         Refilled hydrocodone 10/325 mg at a reduced dose from 50 down to 45 mg daily - average 4.5 pills per day.  Given 3 month supply  Urine drug monitoring in Nov 2020    He did not tolerate venlafaxine in the past.  This was tried for hot flashes and seemed to make them worse.  Made him feel funny.  He has not tried duloxetine.  Recommend trying low-dose duloxetine 20 mg daily to see if this helps with knee pain as well as chronic back pain.    Blood pressure under control on current amlodipine dose    Diabetes stable.  Plan recheck in 3 months    Wei Perez MD   [facility]      SUBJECTIVE:  66 year old male presents to follow up on diabetes, hypertension, and chronic low back pain    Blood pressure looks good on current amlodipine dose.    A1c stable at 6%. Labs last week.  Weight is down from last appointment, overall in a similar range to the past several months.    Pregabalin dose increased a couple months ago. Tolerating the change.  Cannot clearly tell a benefit, but in the past he could see some improvement with the pregabalin.    S/p 3 lumbar surgeries. Continues having low back pain. He used to take hydrocodone 12/day and has worked down to 5/day.  Usually takes the hydrocodone 1/2 pill at a time 10 times in a day to only use a low dose when needed.    Did not tolerate venlafaxine started for hot flashes and pain.      Trying to ride an exercise bike every other day.  He has noticed some right medial knee pain.  Seems mostly over the  MCL, but some tenderness near the patella.  No knee effusion.  Previous PET scan has shown activity at the right knee, but he does not have corresponding knee x-rays.    REVIEW OF SYSTEMS:    Constitutional: negative  Respiratory: negative  Cardiovascular: negative  Gastrointestinal: negative    Current Outpatient Medications   Medication Sig Dispense Refill     acetaminophen (TYLENOL) 325 MG tablet Take 650 mg by mouth       amLODIPine (NORVASC) 10 MG tablet Take 1 tablet (10 mg) by mouth daily 90 tablet 3     aspirin EC 81 MG EC tablet Take 81 mg by mouth daily with food       atorvastatin (LIPITOR) 20 MG tablet Take 1 tablet (20 mg) by mouth daily 90 tablet 3     Bee Pollen 500 MG CHEW Take 2 tablets by mouth daily       cyanocobalamin (RA VITAMIN B-12 TR) 1000 MCG TBCR Take 1,000 mcg by mouth daily       garlic (SM GARLIC) 150 MG TABS tablet Take 2 tablets by mouth daily       HYDROcodone-acetaminophen (NORCO)  MG per tablet Take 1 tablet by mouth 5 times daily 150 tablet 0     HYDROcodone-acetaminophen (NORCO)  MG per tablet Take 1 tablet by mouth 5 times daily 150 tablet 0     lisinopril (ZESTRIL) 40 MG tablet Take 1 tablet (40 mg) by mouth daily 90 tablet 4     metFORMIN (GLUCOPHAGE-XR) 500 MG 24 hr tablet Take 2 tablets (1,000 mg) by mouth 2 times daily (with meals) 360 tablet 3     naproxen (NAPROSYN) 500 MG tablet Take 1 tablet (500 mg) by mouth 2 times daily as needed for moderate pain 180 tablet 1     Omega-3 Fatty Acids (OMEGA 3 PO) Take 2 capsules by mouth daily       omeprazole (PRILOSEC) 40 MG DR capsule TAKE 1 CAPSULE BY MOUTH ONCE DAILY. 90 capsule 3     pregabalin (LYRICA) 150 MG capsule Take 1 capsule (150 mg) by mouth 2 times daily 180 capsule 1     sildenafil (REVATIO) 20 MG tablet TAKE 3 TO 5 TABLETS BY MOUTH 1 hour prior TO sexual activity. 100 tablet 3     vitamin D3 (CHOLECALCIFEROL) 2000 units (50 mcg) tablet Take 1 tablet (2,000 Units) by mouth daily       No Known  Allergies    OBJECTIVE:  /78 (BP Location: Right arm, Patient Position: Sitting, Cuff Size: Adult Regular)   Pulse 80   Temp 96.2  F (35.7  C) (Temporal)   Resp 18   Wt 107.5 kg (237 lb)   SpO2 98%   BMI 35.00 kg/m      EXAM:  General Appearance: Alert. No acute distress  Musculoskeletal: Tender over right MCL.  Tender slightly anterior as well.  Forced subluxation of the kneecap medially results in pain.  Nontender over anterior joint line.  Psychiatric: Normal affect and mentation

## 2021-05-28 NOTE — NURSING NOTE
"Patient presents to the clinic for [place] refill, last administration was last night around 2000/2200.  Contract updated on 10-5-2020, TOX obtained 11-4-2020.  Patient reports increase in low back pain due to right knee pain causing patient was walk differently than normal.        Previous A1C is at goal of <8  Lab Results   Component Value Date    A1C 6.0 05/24/2021    A1C 5.9 02/23/2021    A1C 6.5 10/05/2020     Urine microalbumin:creatine: n/a  Foot exam unknown-declines today  Eye exam Yearly DOT PX    Tobacco User no  Patient is on a daily aspirin  Patient is on a Statin.  Blood pressure today of:     BP Readings from Last 1 Encounters:   05/24/21 138/70      is at the goal of <139/89 for diabetics.    Chief Complaint   Patient presents with     Recheck Medication       Initial /78 (BP Location: Right arm, Patient Position: Sitting, Cuff Size: Adult Regular)   Pulse 80   Temp 96.2  F (35.7  C) (Temporal)   Resp 18   Wt 107.5 kg (237 lb)   SpO2 98%   BMI 35.00 kg/m   Estimated body mass index is 35 kg/m  as calculated from the following:    Height as of 3/8/21: 1.753 m (5' 9\").    Weight as of this encounter: 107.5 kg (237 lb).  Medication Reconciliation: complete    Leona Shell LPN          "

## 2021-05-28 NOTE — PATIENT INSTRUCTIONS
Take naproxen 500 mg twice daily with food for 7 to 10 days to help with knee pain and inflammation    Check knee extension on the bike and make sure there is only a slight bend in the knee at furthest pedal point    Reduced hydrocodone to 4.5 pills per day on average.   Given 3 month supply    Start duloxetine 20 mg daily to help pain  Call if duloxetine does nothing good or bad, we can increase to 30 mg in a month before your next refill    Follow up in 3 months

## 2021-05-29 ASSESSMENT — ANXIETY QUESTIONNAIRES: GAD7 TOTAL SCORE: 0

## 2021-06-13 ENCOUNTER — PATIENT OUTREACH (OUTPATIENT)
Dept: FAMILY MEDICINE | Facility: OTHER | Age: 66
End: 2021-06-13

## 2021-06-13 NOTE — LETTER
June 13, 2021      Dilip [address]  [address] [place] [address]        Dear Dilip,     June 13, 2021      Dilip Kan  [address] [place] [address]      Your healthcare team cares about your health. To provide you with the best care,   we have reviewed your chart and based on our findings, we see that you are due to:     - DIABETES FOLLOW UP: Schedule a DIABETIC EYE EXAM.  This exam is done with an optometrist. You can schedule this appointment with your eye doctor.  If you need a referral, please let us know.    If you have already completed these items, please contact the clinic via phone or   [employer] so your care team can review and update your records. Thank you for   choosing [facility] for your healthcare needs. For any questions,   concerns, or to schedule an appointment please contact the clinic.       Healthy Regards,      Your [facility] Care Team          Enclosure: N/A

## 2021-06-13 NOTE — TELEPHONE ENCOUNTER
Patient Quality Outreach Summary      Summary:    Patient is due/failing the following:   Eye Exam    Type of outreach:    Sent letter.    Questions for provider review:    None                                                                                                                    Norma J. Gosselin, LPN       Chart routed to Care Team.

## 2021-07-28 DIAGNOSIS — K21.9 GASTROESOPHAGEAL REFLUX DISEASE WITHOUT ESOPHAGITIS: ICD-10-CM

## 2021-07-28 RX ORDER — OMEPRAZOLE 40 MG/1
CAPSULE, DELAYED RELEASE ORAL
Qty: 90 CAPSULE | Refills: 3 | Status: SHIPPED | OUTPATIENT
Start: 2021-07-28 | End: 2022-07-13

## 2021-07-28 NOTE — TELEPHONE ENCOUNTER
"[facility] sent Rx request for the following:   omeprazole (PRILOSEC) 40 MG DR capsule   Sig TAKE 1 CAPSULE BY MOUTH ONCE DAILY.    Last Prescription Date:   06/15/2020  Last Fill Qty/Refills:         90, R-3    Last Office Visit:              05/28/2021 ([facility])   Future Office visit:           08/25/2021 ([facility])   PPI Protocol Passed - 7/28/2021  9:19 AM        Passed - Not on Clopidogrel (unless Pantoprazole ordered)        Passed - No diagnosis of osteoporosis on record        Passed - Recent (12 mo) or future (30 days) visit within the authorizing provider's specialty     Patient has had an office visit with the authorizing provider or a provider within the authorizing providers department within the previous 12 mos or has a future within next 30 days. See \"Patient Info\" tab in inbasket, or \"Choose Columns\" in Meds & Orders section of the refill encounter.              Passed - Medication is active on med list        Passed - Patient is age 18 or older         Prescription approved per [facility] Refill Protocol.  Gillian Garcia RN ....................  7/28/2021   10:49 AM      " Epidermal Sutures: 4-0 Prolene

## 2021-08-25 ENCOUNTER — OFFICE VISIT (OUTPATIENT)
Dept: FAMILY MEDICINE | Facility: OTHER | Age: 66
End: 2021-08-25
Attending: FAMILY MEDICINE
Payer: MEDICARE

## 2021-08-25 ENCOUNTER — LAB (OUTPATIENT)
Dept: LAB | Facility: OTHER | Age: 66
End: 2021-08-25
Attending: UROLOGY
Payer: MEDICARE

## 2021-08-25 ENCOUNTER — HOSPITAL ENCOUNTER (OUTPATIENT)
Dept: GENERAL RADIOLOGY | Facility: OTHER | Age: 66
End: 2021-08-25
Attending: FAMILY MEDICINE
Payer: MEDICARE

## 2021-08-25 ENCOUNTER — OFFICE VISIT (OUTPATIENT)
Dept: UROLOGY | Facility: OTHER | Age: 66
End: 2021-08-25
Attending: UROLOGY
Payer: MEDICARE

## 2021-08-25 VITALS
DIASTOLIC BLOOD PRESSURE: 80 MMHG | HEART RATE: 76 BPM | TEMPERATURE: 96 F | SYSTOLIC BLOOD PRESSURE: 122 MMHG | OXYGEN SATURATION: 96 % | BODY MASS INDEX: 34.56 KG/M2 | RESPIRATION RATE: 18 BRPM | WEIGHT: 234 LBS

## 2021-08-25 VITALS
DIASTOLIC BLOOD PRESSURE: 78 MMHG | OXYGEN SATURATION: 97 % | RESPIRATION RATE: 16 BRPM | SYSTOLIC BLOOD PRESSURE: 128 MMHG | BODY MASS INDEX: 34.85 KG/M2 | HEART RATE: 82 BPM | WEIGHT: 236 LBS

## 2021-08-25 DIAGNOSIS — M48.062 SPINAL STENOSIS OF LUMBAR REGION WITH NEUROGENIC CLAUDICATION: Primary | ICD-10-CM

## 2021-08-25 DIAGNOSIS — E78.5 HYPERLIPIDEMIA LDL GOAL <100: ICD-10-CM

## 2021-08-25 DIAGNOSIS — G89.29 CHRONIC BACK PAIN GREATER THAN 3 MONTHS DURATION: ICD-10-CM

## 2021-08-25 DIAGNOSIS — M17.11 ARTHRITIS OF RIGHT KNEE: ICD-10-CM

## 2021-08-25 DIAGNOSIS — Z79.899 CONTROLLED SUBSTANCE AGREEMENT SIGNED: ICD-10-CM

## 2021-08-25 DIAGNOSIS — F11.90 CHRONIC, CONTINUOUS USE OF OPIOIDS: ICD-10-CM

## 2021-08-25 DIAGNOSIS — E11.9 TYPE 2 DIABETES MELLITUS WITHOUT COMPLICATION, WITHOUT LONG-TERM CURRENT USE OF INSULIN (H): ICD-10-CM

## 2021-08-25 DIAGNOSIS — C61 PROSTATE CANCER (H): ICD-10-CM

## 2021-08-25 DIAGNOSIS — R97.21 RISING PSA FOLLOWING TREATMENT FOR MALIGNANT NEOPLASM OF PROSTATE: Primary | ICD-10-CM

## 2021-08-25 DIAGNOSIS — M54.9 CHRONIC BACK PAIN GREATER THAN 3 MONTHS DURATION: ICD-10-CM

## 2021-08-25 LAB
HBA1C MFR BLD: 6.2 % (ref 4–6.2)
PSA SERPL-MCNC: 3.48 UG/L (ref 0–4)

## 2021-08-25 PROCEDURE — 96402 CHEMO HORMON ANTINEOPL SQ/IM: CPT | Performed by: UROLOGY

## 2021-08-25 PROCEDURE — G0463 HOSPITAL OUTPT CLINIC VISIT: HCPCS | Mod: 25,27

## 2021-08-25 PROCEDURE — 36415 COLL VENOUS BLD VENIPUNCTURE: CPT | Mod: ZL

## 2021-08-25 PROCEDURE — 83036 HEMOGLOBIN GLYCOSYLATED A1C: CPT | Mod: ZL

## 2021-08-25 PROCEDURE — 99214 OFFICE O/P EST MOD 30 MIN: CPT | Performed by: UROLOGY

## 2021-08-25 PROCEDURE — 250N000011 HC RX IP 250 OP 636: Performed by: UROLOGY

## 2021-08-25 PROCEDURE — G0463 HOSPITAL OUTPT CLINIC VISIT: HCPCS

## 2021-08-25 PROCEDURE — 99214 OFFICE O/P EST MOD 30 MIN: CPT | Performed by: FAMILY MEDICINE

## 2021-08-25 PROCEDURE — G0463 HOSPITAL OUTPT CLINIC VISIT: HCPCS | Mod: 25

## 2021-08-25 PROCEDURE — 84153 ASSAY OF PSA TOTAL: CPT | Mod: ZL

## 2021-08-25 PROCEDURE — 73560 X-RAY EXAM OF KNEE 1 OR 2: CPT | Mod: LT

## 2021-08-25 RX ORDER — HYDROCODONE BITARTRATE AND ACETAMINOPHEN 10; 325 MG/1; MG/1
1 TABLET ORAL EVERY 4 HOURS PRN
Qty: 135 TABLET | Refills: 0 | Status: SHIPPED | OUTPATIENT
Start: 2021-09-24 | End: 2021-10-26

## 2021-08-25 RX ORDER — HYDROCODONE BITARTRATE AND ACETAMINOPHEN 10; 325 MG/1; MG/1
1 TABLET ORAL EVERY 4 HOURS PRN
Qty: 135 TABLET | Refills: 0 | Status: SHIPPED | OUTPATIENT
Start: 2021-10-25 | End: 2021-11-23

## 2021-08-25 RX ORDER — HYDROCODONE BITARTRATE AND ACETAMINOPHEN 10; 325 MG/1; MG/1
1 TABLET ORAL
Qty: 135 TABLET | Refills: 0 | Status: SHIPPED | OUTPATIENT
Start: 2021-08-25 | End: 2021-10-26

## 2021-08-25 RX ORDER — PREGABALIN 150 MG/1
150 CAPSULE ORAL 2 TIMES DAILY
Qty: 180 CAPSULE | Refills: 1 | Status: SHIPPED | OUTPATIENT
Start: 2021-08-25 | End: 2022-07-13

## 2021-08-25 RX ORDER — ATORVASTATIN CALCIUM 20 MG/1
20 TABLET, FILM COATED ORAL DAILY
Qty: 90 TABLET | Refills: 3 | Status: SHIPPED | OUTPATIENT
Start: 2021-08-25 | End: 2022-07-13

## 2021-08-25 RX ADMIN — LEUPROLIDE ACETATE 22.5 MG: KIT at 11:56

## 2021-08-25 ASSESSMENT — PAIN SCALES - GENERAL
PAINLEVEL: MODERATE PAIN (4)
PAINLEVEL: MODERATE PAIN (5)

## 2021-08-25 ASSESSMENT — ANXIETY QUESTIONNAIRES
7. FEELING AFRAID AS IF SOMETHING AWFUL MIGHT HAPPEN: NOT AT ALL
8. IF YOU CHECKED OFF ANY PROBLEMS, HOW DIFFICULT HAVE THESE MADE IT FOR YOU TO DO YOUR WORK, TAKE CARE OF THINGS AT HOME, OR GET ALONG WITH OTHER PEOPLE?: NOT DIFFICULT AT ALL
4. TROUBLE RELAXING: NOT AT ALL
GAD7 TOTAL SCORE: 0
5. BEING SO RESTLESS THAT IT IS HARD TO SIT STILL: NOT AT ALL
2. NOT BEING ABLE TO STOP OR CONTROL WORRYING: NOT AT ALL
7. FEELING AFRAID AS IF SOMETHING AWFUL MIGHT HAPPEN: NOT AT ALL
GAD7 TOTAL SCORE: 0
3. WORRYING TOO MUCH ABOUT DIFFERENT THINGS: NOT AT ALL
1. FEELING NERVOUS, ANXIOUS, OR ON EDGE: NOT AT ALL
6. BECOMING EASILY ANNOYED OR IRRITABLE: NOT AT ALL
GAD7 TOTAL SCORE: 0

## 2021-08-25 ASSESSMENT — PATIENT HEALTH QUESTIONNAIRE - PHQ9
10. IF YOU CHECKED OFF ANY PROBLEMS, HOW DIFFICULT HAVE THESE PROBLEMS MADE IT FOR YOU TO DO YOUR WORK, TAKE CARE OF THINGS AT HOME, OR GET ALONG WITH OTHER PEOPLE: SOMEWHAT DIFFICULT
SUM OF ALL RESPONSES TO PHQ QUESTIONS 1-9: 2
SUM OF ALL RESPONSES TO PHQ QUESTIONS 1-9: 2

## 2021-08-25 NOTE — PROGRESS NOTES
Type of Visit  EST    Chief Complaint  Nonmetastatic castrate resistant prostate cancer    HPI  Mr. Kan is a 66 y.o. 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  Patient originally underwent radical prostatectomy in 2012.  He underwent salvage radiation in 2013.  He experienced a fairly stable PSA for many years however about 1.5 years ago he experienced a sudden increase in PSA.  Imaging has been negative thus far.  The last metastatic work-up was 9 months ago.    He has experienced fairly significant side effects with ADT.  Primary concern is hot flashes.  He started ADT with [facility] about 1.5 years ago.  We have transition to Lupron due to availability issues with [facility].  He has hot flashes with blood products.    He does have chronic back pain and previously underwent fusion over 2 years ago.      Review of Systems  I reviewed the ROS with the patient today.    Nursing Notes:   Dasha Vasques LPN  8/25/2021 11:28 AM  Addendum  Chief Complaint   Patient presents with     Follow Up     3 month history of prostate cancer   Patient presents to the clinic for a 3 month follow up history of prostate cancer    Review of Systems:    Weight loss:    No     Recent fever/chills:  No   Night sweats:   Yes  Current skin rash:  No   Recent hair loss:  No  Heat intolerance:  Yes   Cold intolerance:  No  Chest pain:   No   Palpitations:   No  Shortness of breath:  No   Wheezing:   No  Constipation:    No   Diarrhea:   Yes   Nausea:   Yes   Vomiting:   No   Kidney/side pain:  No   Back pain:   Yes  Frequent headaches:  No   Dizziness:     No  Leg swelling:   No   Calf pain:    Yes          Medication Reconciliation: completed   Dasha Vasques LPN  8/25/2021 11:24 AM       Physical Exam  /78 (BP Location: Right arm, Patient Position: Sitting, Cuff Size: Adult Large)   Pulse 82   Resp 16   Wt 107 kg (236 lb)   SpO2 97%   BMI 34.85 kg/m    Constitutional: No acute distress.  Alert and  cooperative   Head: NCAT  Eyes: Conjunctivae normal  Cardiovascular: Regular rate.  Pulmonary/Chest: Respirations are even and non-labored bilaterally, no audible wheezing  Abdominal: Soft. No distension, tenderness, masses or guarding.   Neurological: A + O x 3.  Cranial Nerves II-XII grossly intact.  Extremities: GINNA x 4, Warm. No clubbing.  No cyanosis.    Skin: Pink, warm and dry.  No visible rashes noted.  Psychiatric:  Normal mood and affect  Back:  No left CVA tenderness.  No right CVA tenderness.  Genitourinary:  Nonpalpable bladder    Labs  Results for EMILIA CHAPMAN (MRN [id number]) as of 8/25/2021 11:12   8/25/2021 09:28   PSA 3.48     Results for EMILIA CHAPMAN (MRN [id number]) as of 5/24/2021 11:21   2/23/2021 08:29 5/24/2021 09:36   PSA 1.847 2.555     Results for EMILIA CHAPMAN (MRN [id number]) as of 11/23/2020 11:37   11/23/2020 07:20   PSA 1.215     Results for EMILIA CHAPMAN (MRN [id number]) as of 11/23/2020 12:10   11/23/2020 07:20   Testosterone  17 (L)     Results for EMILIA CHAPMAN (MRN [id number]) as of 7/22/2020 08:39   7/9/2020 09:36   PSA 0.975     Results for EMILIA CHAPMAN (MRN [id number]) as of 2/5/2020 09:14   11/4/2019 08:10** 2/5/2020 06:53   PSA 7.112 (H) 1.006   ** started ADT    Results for EMILIA CHAPMAN (MRN [id number]) as of 5/1/2019 09:45   5/1/2019 07:36   PSA 2.714     Results for LELAND CHAPMAN (MRN [id number]) as of 11/1/2018 11:08   11/1/2018 08:49   PSA 1.177     Results for LELAND CHAPMAN (MRN [id number]) as of 3/29/2018 14:31   3/29/2018 12:15   PSA 0.798     Results for LELAND CHAPMAN MAILE (MRN [id number]) as of 3/29/2018 11:55   4/7/2017 16:04   PSA 0.268     Results for LELAND CHAPMAN (MRN [id number]) as of 3/23/2016 10:42   1/2/2013 08:00* 2/27/2013** 08:10 10/16/2014 11:26 3/1/2016 14:15   PSA 0.67 0.97 0.070 0.154   * Following radical prostatectomy  ** Following salvage external beam radiation therapy.    Imaging  Bone Scan  11/23/2020  IMPRESSION: No convincing  evidence of bony metastatic disease.     CT c/a/p  11/23/2020  COMBINED IMPRESSION:    No evidence of new metastatic disease.  Dilated thoracic aorta measuring 4.4 cm in the mid ascending segment.    ^^^^^^^^^^^^^^^^^^^^^^^^^^^^^^^^^^^^^^^^^^^^    MRI Lumbar  11/4/2019  IMPRESSION:  Interval posterior interbody fusion at L2-3 and L3-4. Chronic prior  fusion changes at L4-5. No evidence of new left-sided nerve root  impingement to account for left leg radiculopathy.    ^^^^^^^^^^^^^^^^^^^^^^^^^^^^^^^^^^^^^^^^^^^^    CT c/a/p  5/20/2019  IMPRESSION:  No evidence of metastatic disease in the chest, abdomen,  or pelvis.    Bone Scan  5/20/2019  IMPRESSION: Multifocal probable degenerative changes. No sites highly  suspicious for osseous metastatic disease.     Lupron Injection Procedure  Today the patient received an intramuscular injection of Lupron 22.5mg.  This was given in the gluteal muscle.  The results of this injection: None    Assessment  Mr. Kan is a 66 y.o. 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    Patient's PSA has continued to increase in spite of ADT.  In the past he has been adamantly opposed to chemotherapy and refused consultation with medical oncology.  We had another discussion today and he is open to discuss with medical oncology given the PSA values.    Plan  Total testosterone added onto labs from this morning to confirm castrate levels  Lupron 22.5 mg administered today  Follow-up in 3 months for Lupron  Recommended consultation with Dr. Fritz to consider/discuss chemotherapy

## 2021-08-25 NOTE — PROGRESS NOTES
"Nursing Notes:   Leona Shell LPN  8/25/2021  9:58 AM  Sign at exiting of workspace  Patient presents to the clinic for controlled medication refill.  Contract signed 10-, TOX obtained 11-4-2020.      FOOD SECURITY SCREENING QUESTIONS  Hunger Vital Signs:  Within the past 12 months we worried whether our food would run out before we got money to buy more. Never  Within the past 12 months the food we bought just didn't last and we didn't have money to get more. Never    Advance Care Directive on file? no  Advance Care Directive provided to patient? Yes    Lab Results   Component Value Date    A1C 6.0 05/24/2021    A1C 5.9 02/23/2021    A1C 6.5 10/05/2020    ALBUMIN 4.3 02/23/2021     Previous A1C is at goal of <8  Date of last Foot exam 8-  Eye exam YES: 3-8-2021 yearly DOT Physical  Patient is a Tobacco User  Patient is on a daily aspirin  Patient is on a Statin.  Blood pressure today of:     BP Readings from Last 1 Encounters:   05/28/21 120/78      is at the goal of <139/89 for diabetics.    Chief Complaint   Patient presents with     Recheck Medication       Initial /80 (BP Location: Right arm, Patient Position: Sitting, Cuff Size: Adult Regular)   Pulse 76   Temp (!) 96  F (35.6  C) (Temporal)   Resp 18   Wt 106.1 kg (234 lb)   SpO2 96%   BMI 34.56 kg/m   Estimated body mass index is 34.56 kg/m  as calculated from the following:    Height as of 3/8/21: 1.753 m (5' 9\").    Weight as of this encounter: 106.1 kg (234 lb).  Medication Reconciliation: complete    Leona Shell LPN                  Assessment & Plan       ICD-10-CM    1. Spinal stenosis of lumbar region with neurogenic claudication  M48.062 pregabalin (LYRICA) 150 MG capsule     HYDROcodone-acetaminophen (NORCO)  MG per tablet     HYDROcodone-acetaminophen (NORCO)  MG per tablet     HYDROcodone-acetaminophen (NORCO)  MG per tablet     Physical Therapy Referral   2. Chronic back pain greater than 3 " months duration  M54.9 pregabalin (LYRICA) 150 MG capsule    G89.29 Physical Therapy Referral   3. Chronic, continuous use of opioids  F11.90 HYDROcodone-acetaminophen (NORCO)  MG per tablet     HYDROcodone-acetaminophen (NORCO)  MG per tablet     HYDROcodone-acetaminophen (NORCO)  MG per tablet   4. Controlled substance agreement updated 10/5/2020  Z79.899 HYDROcodone-acetaminophen (NORCO)  MG per tablet     HYDROcodone-acetaminophen (NORCO)  MG per tablet     HYDROcodone-acetaminophen (NORCO)  MG per tablet   5. Hyperlipidemia LDL goal <100  E78.5 atorvastatin (LIPITOR) 20 MG tablet   6. Type 2 diabetes mellitus without complication, without long-term current use of insulin (H)  E11.9 FOOT EXAM   7. Arthritis of right knee  M17.11 Orthopedic  Referral     XR Knee Standing 2v  Bilateral & 2v Right       PDMP Review       Value Time User    State PDMP site checked  Yes 8/25/2021 10:41 AM Wei Perez MD         Refilled hydrocodone 10/325 mg at 45 mg daily - average 4.5 pills per day. We have been slowly tapering. No change today. Offered 7.5 mg pills instead, he can consider this option.  Given 3 month supply  Urine drug monitoring in Nov 2020    Has not tolerated venlafaxine or duloxetine    Referral to pool therapy at [facility] as previously recommended. He did not pursue with COVID shutdowns last year, recommend trying to arrange again at this time.    Diabetes stable.  Plan recheck in 3 months    X-rays ordered, not reviewed with patient, he needed to go to appointment with Dr. Arrington.  Arthritis has progressed, more significant 2018 showing severe medial compartment arthritis, but generalized tricompartmental arthritis.  Referral to orthopedics for knee arthritis to consider steroid injection vs knee replacement    Wei Perez MD   [facility]      SUBJECTIVE:  66 year old male presents to follow up on chronic low back pain and  "diabetes.    S/p 3 lumbar surgeries. He used to take hydrocodone 12/day and has worked down to 4.5/day.    Started duloxetine 20 mg daily and felt \"weird.\" Took for over a month. Improved after stopping.    Back pain more significant lately. Feels like right knee pain is impacting his ambulation and back pain.   X-ray in 2018 showed significant medial compartment arthritis.    He saw Dr. Pizarro in the past to consider spine program, but was felt not to be a candidate.  Pool therapy recommended.  He never pursued due to COVID-19 shutdown's in 2020.    Weight stable from last appointment. A1c was 6% last check.    REVIEW OF SYSTEMS:    Constitutional: negative  Respiratory: negative  Cardiovascular: negative  Gastrointestinal: negative    Current Outpatient Medications   Medication Sig Dispense Refill     acetaminophen (TYLENOL) 325 MG tablet Take 650 mg by mouth       amLODIPine (NORVASC) 10 MG tablet Take 1 tablet (10 mg) by mouth daily 90 tablet 3     aspirin EC 81 MG EC tablet Take 81 mg by mouth daily with food       atorvastatin (LIPITOR) 20 MG tablet Take 1 tablet (20 mg) by mouth daily 90 tablet 3     Bee Pollen 500 MG CHEW Take 2 tablets by mouth daily       cyanocobalamin (RA VITAMIN B-12 TR) 1000 MCG TBCR Take 1,000 mcg by mouth daily       garlic (SM GARLIC) 150 MG TABS tablet Take 2 tablets by mouth daily       HYDROcodone-acetaminophen (NORCO)  MG per tablet Take 1 tablet by mouth every 4 hours as needed for severe pain Average 4.5 per day = 135 per month 135 tablet 0     HYDROcodone-acetaminophen (NORCO)  MG per tablet Take 1 tablet by mouth every 4 hours as needed for severe pain Average 4.5 per day = 135 per month 135 tablet 0     HYDROcodone-acetaminophen (NORCO)  MG per tablet Take 1 tablet by mouth 5 times daily Average 4.5 per day = 135 per month 135 tablet 0     lisinopril (ZESTRIL) 40 MG tablet Take 1 tablet (40 mg) by mouth daily 90 tablet 4     metFORMIN (GLUCOPHAGE-XR) 500 " MG 24 hr tablet Take 2 tablets (1,000 mg) by mouth 2 times daily (with meals) 360 tablet 3     naproxen (NAPROSYN) 500 MG tablet Take 1 tablet (500 mg) by mouth 2 times daily as needed for moderate pain 180 tablet 1     Omega-3 Fatty Acids (OMEGA 3 PO) Take 2 capsules by mouth daily       omeprazole (PRILOSEC) 40 MG DR capsule TAKE 1 CAPSULE BY MOUTH ONCE DAILY. 90 capsule 3     pregabalin (LYRICA) 150 MG capsule Take 1 capsule (150 mg) by mouth 2 times daily 180 capsule 1     vitamin D3 (CHOLECALCIFEROL) 2000 units (50 mcg) tablet Take 1 tablet (2,000 Units) by mouth daily       No Known Allergies    OBJECTIVE:  /80 (BP Location: Right arm, Patient Position: Sitting, Cuff Size: Adult Regular)   Pulse 76   Temp (!) 96  F (35.6  C) (Temporal)   Resp 18   Wt 106.1 kg (234 lb)   SpO2 96%   BMI 34.56 kg/m      EXAM:  General Appearance: Alert. No acute distress  Musculoskeletal: Tender over medial knee. No significant crepitus  Psychiatric: Normal affect and mentation    Results for orders placed or performed during the hospital encounter of 08/25/21   XR Knee Standing 2v  Bilateral & 2v Right     Status: None    Narrative    XR KNEE STANDING 2 V  BILATERAL AND 2 V RIGHT    HISTORY: Arthritis of right knee .    COMPARISON: 4/24/2018.    TECHNIQUE: 4 views of the knees.    FINDINGS:    No acute fracture. Severe tricompartmental osteoarthritis of the right  knee, worse when compared to 2018, with complete medial joint space  loss. Small right suprapatellar effusion.        Impression    IMPRESSION:     Severe osteoarthritis of the right knee.      KATHI OSPINA MD         SYSTEM ID:  [id number]   Results for orders placed or performed in visit on 08/25/21   PSA tumor marker     Status: Normal   Result Value Ref Range    PSA Tumor Marker 3.48 0.00 - 4.00 ug/L   Hemoglobin A1c     Status: Normal   Result Value Ref Range    Hemoglobin A1C 6.2 4.0 - 6.2 %   Extra Tube *Canceled*     Status: None ()     Narrative    The following orders were created for panel order Extra Tube.  Procedure                               Abnormality         Status                     ---------                               -----------         ------                     Extra Purple Top Tube[[id number]]                                                         Please view results for these tests on the individual orders.

## 2021-08-25 NOTE — NURSING NOTE
Chief Complaint   Patient presents with     Follow Up     3 month history of prostate cancer   Patient presents to the clinic for a 3 month follow up history of prostate cancer    Review of Systems:    Weight loss:    No     Recent fever/chills:  No   Night sweats:   Yes  Current skin rash:  No   Recent hair loss:  No  Heat intolerance:  Yes   Cold intolerance:  No  Chest pain:   No   Palpitations:   No  Shortness of breath:  No   Wheezing:   No  Constipation:    No   Diarrhea:   Yes   Nausea:   Yes   Vomiting:   No   Kidney/side pain:  No   Back pain:   Yes  Frequent headaches:  No   Dizziness:     No  Leg swelling:   No   Calf pain:    Yes          Medication Reconciliation: completed   Dasha Vasques LPN  8/25/2021 11:24 AM

## 2021-08-25 NOTE — NURSING NOTE
"Patient presents to the clinic for controlled medication refill.  Contract signed 10-, TOX obtained 11-4-2020.      FOOD SECURITY SCREENING QUESTIONS  Hunger Vital Signs:  Within the past 12 months we worried whether our food would run out before we got money to buy more. Never  Within the past 12 months the food we bought just didn't last and we didn't have money to get more. Never    Advance Care Directive on file? no  Advance Care Directive provided to patient? Yes    Lab Results   Component Value Date    A1C 6.0 05/24/2021    A1C 5.9 02/23/2021    A1C 6.5 10/05/2020    ALBUMIN 4.3 02/23/2021     Previous A1C is at goal of <8  Date of last Foot exam 8-  Eye exam YES: 3-8-2021 yearly DOT Physical  Patient is a Tobacco User  Patient is on a daily aspirin  Patient is on a Statin.  Blood pressure today of:     BP Readings from Last 1 Encounters:   05/28/21 120/78      is at the goal of <139/89 for diabetics.    Chief Complaint   Patient presents with     Recheck Medication       Initial /80 (BP Location: Right arm, Patient Position: Sitting, Cuff Size: Adult Regular)   Pulse 76   Temp (!) 96  F (35.6  C) (Temporal)   Resp 18   Wt 106.1 kg (234 lb)   SpO2 96%   BMI 34.56 kg/m   Estimated body mass index is 34.56 kg/m  as calculated from the following:    Height as of 3/8/21: 1.753 m (5' 9\").    Weight as of this encounter: 106.1 kg (234 lb).  Medication Reconciliation: complete    Leona Shell LPN             "

## 2021-08-26 ASSESSMENT — PATIENT HEALTH QUESTIONNAIRE - PHQ9: SUM OF ALL RESPONSES TO PHQ QUESTIONS 1-9: 2

## 2021-08-26 ASSESSMENT — ANXIETY QUESTIONNAIRES: GAD7 TOTAL SCORE: 0

## 2021-09-14 ENCOUNTER — OFFICE VISIT (OUTPATIENT)
Dept: ORTHOPEDICS | Facility: OTHER | Age: 66
End: 2021-09-14
Attending: ORTHOPAEDIC SURGERY
Payer: COMMERCIAL

## 2021-09-14 VITALS
BODY MASS INDEX: 34.85 KG/M2 | DIASTOLIC BLOOD PRESSURE: 72 MMHG | WEIGHT: 236 LBS | HEART RATE: 72 BPM | SYSTOLIC BLOOD PRESSURE: 150 MMHG

## 2021-09-14 DIAGNOSIS — M25.561 CHRONIC PAIN OF RIGHT KNEE: Primary | ICD-10-CM

## 2021-09-14 DIAGNOSIS — G89.29 CHRONIC PAIN OF RIGHT KNEE: Primary | ICD-10-CM

## 2021-09-14 PROCEDURE — 99204 OFFICE O/P NEW MOD 45 MIN: CPT | Performed by: ORTHOPAEDIC SURGERY

## 2021-09-14 PROCEDURE — G0463 HOSPITAL OUTPT CLINIC VISIT: HCPCS

## 2021-09-14 NOTE — PROGRESS NOTES
Visit Date: 09/14/2021    REASON FOR EVALUATION:  Right knee pain.    HISTORY OF PRESENT ILLNESS:  Jermain comes in with regards to his right knee, has had progressive pain over the past couple of years, it is at that point where having a significant impact on his activities of daily living.  He is here to discuss his options moving forward at this point in time.  The patient had x-rays done, showed severe arthrosis.  MRI scan showed degenerative meniscal tearing present there as well.  He is here to explore his options at this stage in the game.    MEDICATIONS:  Reviewed.  He is a diabetic, but well controlled.    ALLERGIES:  No known drug allergies.    REVIEW OF SYSTEMS:  A 12-point otherwise negative with the exception stated above.    PHYSICAL EXAMINATION:  The patient is 236 pounds, blood pressure 150/72, pulse 72.  He is alert and oriented x3, cooperative with exam, in no acute distress, does walk some gait antalgia.  Affect appropriate as well.  Examination of right knee shows varus deformity.  Range of motion -.  Limb exam stable and balanced.  Neurovascular examination is otherwise intact.  Quad strength 5/5.  Swelling is seen about the joint itself in addition to that.  No associated pain with hip range of motion or straight leg raise or otherwise.    IMAGING:  X-rays reviewed, shows end-stage osteoarthrosis at this time.    IMPRESSION:  Right knee end-stage osteoarthrosis.    PLAN:  At this time, we have discussed the options.  I advised the patient does proceed forward with total joint reconstruction.  The patient is otherwise in agreement with that plan.  We will get things coordinated for him as far as that is concerned.  Moving forward, we will look at surgical intervention here in hopefully October, Heriberto Persona posterior stabilized, and then moving forward from that point to outpatient therapies,  coordinate DVT prophylaxis with aspirin there otherwise.    Micah Rock MD        D:  2021   T: 2021   MT: [id number]    Name:     LELAND CHAPMAN  MRN:      [id number]        Account:    [id number]   :      1955           Visit Date: 2021     Document: [id number]

## 2021-09-14 NOTE — PROGRESS NOTES
Patient is here for consult on his right knee pain.  Sofia Lundberg LPN .....................9/14/2021 10:49 AM

## 2021-09-15 ENCOUNTER — ONCOLOGY VISIT (OUTPATIENT)
Dept: ONCOLOGY | Facility: OTHER | Age: 66
End: 2021-09-15
Attending: UROLOGY
Payer: COMMERCIAL

## 2021-09-15 VITALS
TEMPERATURE: 98 F | BODY MASS INDEX: 34.22 KG/M2 | SYSTOLIC BLOOD PRESSURE: 138 MMHG | RESPIRATION RATE: 18 BRPM | OXYGEN SATURATION: 98 % | HEIGHT: 70 IN | WEIGHT: 239 LBS | HEART RATE: 66 BPM | DIASTOLIC BLOOD PRESSURE: 70 MMHG

## 2021-09-15 DIAGNOSIS — R97.21 RISING PSA FOLLOWING TREATMENT FOR MALIGNANT NEOPLASM OF PROSTATE: ICD-10-CM

## 2021-09-15 DIAGNOSIS — T50.905A ADVERSE DRUG EFFECT: ICD-10-CM

## 2021-09-15 DIAGNOSIS — C61 PROSTATE CANCER (H): Primary | ICD-10-CM

## 2021-09-15 DIAGNOSIS — C61 MALIGNANT NEOPLASM OF PROSTATE (H): ICD-10-CM

## 2021-09-15 PROCEDURE — 99205 OFFICE O/P NEW HI 60 MIN: CPT | Performed by: INTERNAL MEDICINE

## 2021-09-15 PROCEDURE — 99417 PROLNG OP E/M EACH 15 MIN: CPT | Performed by: INTERNAL MEDICINE

## 2021-09-15 PROCEDURE — G0463 HOSPITAL OUTPT CLINIC VISIT: HCPCS

## 2021-09-15 RX ORDER — AMOXICILLIN 500 MG/1
CAPSULE ORAL
COMMUNITY
Start: 2021-08-27 | End: 2021-11-09

## 2021-09-15 ASSESSMENT — MIFFLIN-ST. JEOR: SCORE: 1860.35

## 2021-09-15 ASSESSMENT — PAIN SCALES - GENERAL: PAINLEVEL: MODERATE PAIN (5)

## 2021-09-15 NOTE — NURSING NOTE
Chief Complaint   Patient presents with     Oncology Clinic Visit     CONSULT:  Prostate cancer     Medication Reconciliation: complete    FOOD SECURITY SCREENING QUESTIONS  Hunger Vital Signs:  Within the past 12 months we worried whether our food would run out before we got money to buy more. Never  Within the past 12 months the food we bought just didn't last and we didn't have money to get more. Never    Rochelle Toth CMA ([facility])

## 2021-09-15 NOTE — PROGRESS NOTES
Visit Date: 09/15/2021    HEMATOLOGY/ONCOLOGY CONSULTATION    REASON FOR CONSULTATION:  Rising PSA following treatment for malignant neoplasm of the prostate.    REQUESTING PHYSICIAN:  Celestine Arrington MD    HISTORY OF PRESENT ILLNESS:  Mr. Kan is a 66-year-old white male with previous history of aortic valve stenosis, type 2 diabetes mellitus, whom we were asked to evaluate concerning rising PSA in a castrate-resistant patient with prostate cancer.  Apparently, the patient states that approximately 10 years ago, he had undergone a prostatectomy back in 2012 for prostate cancer.  This was followed by SBRT in 2013.  The patient subsequently was followed by Dr. Celestine Arrington who noted a rising PSA and placed the patient on androgen deprivation therapy initially with Vantas implant and then switched to Lupron.  His PSA has been rising in the recent past, it was 0.975 on 07/09/2020, then on 11/23/2020 pineda to 1.215.  Dr. Arrington ordered scans including CT chest, abdomen, pelvis and bone scan.  There was no evidence of metastatic disease.  There was a dilated thoracic aorta measuring 4.4 cm in the mid ascending segment. PSA has continued to rise and on 02/23/2021 it was up to 1.847 and then in 08/25/2021 it was up to 3.48.  Therefore, the patient has had a PSA doubling time of less than 10 months and, therefore, would be considered to have castrate-resistant M0 prostate cancer.      He is here now for treatment options.  He says he is essentially asymptomatic.  Denies any fevers, night sweats, weight loss or bone pain.  He does have urinary incontinence, and wears a Depends for that for the most part.  He says he has had this since radiation was completed.  He is currently on Lupron 22.5 mg IM q. 3 months.  He could not tolerate the Vantas implant for unknown reasons.  He is here for chemotherapeutic options for his nonmetastatic castrate-resistant prostate cancer.    PAST MEDICAL HISTORY:  Type 2 diabetes mellitus, aortic valve  stenosis, ascending aortic aneurysm, aortic valve insufficiency, history of syncope, history of tobacco abuse (quitting), hyperlipidemia, palpitations, chronic continuous use of opioids, GERD, essential hypertension, spinal stenosis of lumbar region, anemia due to acute blood loss, syncope.    ALLERGIES:  HE HAS NO KNOWN DRUG ALLERGIES.    CURRENT MEDICATIONS:  Include Norco 1 tablet every 4 hours as needed, amoxicillin 500 mg q. 8 hours, Lipitor 20 mg daily, Lupron Depot 22.5 mg IM q. 90 days, Lyrica 150 mg daily, Prilosec 40 mg daily, Naprosyn 500 mg daily, Norvasc 10 mg daily, Zestril 40 mg daily, Glucophage 1000 b.i.d., vitamin D3 2000 units daily.    SOCIAL HISTORY:  He was a former smoker; he said he quit 30 years ago.  Alcohol is negative.  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    FAMILY HISTORY:  Noncontributory.    REVIEW OF SYSTEMS:     CENTRAL NERVOUS SYSTEM:  Negative for headaches, change in mental status.  ENT:  Negative for hearing loss.  RESPIRATORY:  Negative for cough, shortness of breath, hemoptysis.  CARDIAC:  Negative for chest pain, palpitations, orthopnea, PND, ankle edema.  GASTROINTESTINAL:  Negative for change in bowel habits, bright red blood per rectum, hematemesis, abdominal pain.  MUSCULOSKELETAL:  Significant for chronic low back pain.  GENITOURINARY:  Significant for urinary incontinence.  No hematuria.  CONSTITUTIONAL:  Negative for fevers, night sweats, weight loss.  HEMATOLOGIC:  Negative for easy bruisability, gingival bleeding, epistaxis.    PHYSICAL EXAMINATION:    GENERAL:  He is a middle-aged white male in no acute distress.  VITAL SIGNS:  Blood pressure 130/70, pulse 66, respirations 17, temperature 98.  HEENT:  Atraumatic, normocephalic.  Oropharynx nonerythematous.  NECK:  Supple.  LUNGS:  Clear to auscultation and percussion.  HEART:  Regular rhythm.  There was 2/6 systolic murmur heard best at the left sternal border.  ABDOMEN:  Soft, normoactive  bowel sounds.  No mass, nontender.  LYMPHATICS:  No cervical, supraclavicular, axillary or inguinal nodes.  EXTREMITIES:  No edema.  NEUROLOGIC:  Nonfocal.    LABORATORY DATA:  From 08/25/2021, PSA of 3.48.    IMPRESSION:    1.  Castrate-resistant nonmetastatic prostate cancer with PSA doubling time of less than 10 months based on the SPARTAN trial, apalutamide has been approved for nonmetastatic castrate-resistant prostate cancer.  The phase 3 trial randomized men with nonmetastatic adenocarcinoma of the prostate and a PSA doubling of 10 months or less during androgen deprivation therapy to apalutamide 240 mg daily or matched placebo, both in combination with ADT. The median metastases-free survival was 40.5 months for the apalutamide treated patients and 16.2 months with those receiving placebo. All secondary endpoints of time to metastases progression-free survival in time to symptomatic progression was significantly improved with apalutamide or placebo. Followup analysis of mature overall survival data demonstrated a median overall survival of 73.9 months with apalutamide, 59.9 months with placebo. Apalutamide also lengthened of time for chemotherapy versus placebo.  Adverse effects of the treatment included fatigue, rash, falls, fracture, hypothyroidism and seizures.  The patient is willing to proceed with this regimen.  The recommended dose is 240 mg p.o. daily on days 1-28 continuously.  We discussed side effects including risk of thyroid disease, hyperlipidemia, risk of seizures, also risk of rash.  The patient is willing to proceed.  We would like to get a baseline scan including CT chest, abdomen, pelvis and bone scan, but if there is no obvious evidence of metastases, we will proceed with apalutamide in addition to Lupron for this patient.  We will see the patient after he obtains scans.  We will obtain CBC, CMP, LDH, TSH and lipid profile.    One-hundred ten minutes were spent on this patient.  Time was  spent reviewing literature on treatment of nonmetastatic castrate-resistant prostate cancer, performing history and physical, reviewing scans, reviewing lab work, reviewing physician provider notes, documenting history and physical and ordering apalutamide.      Erum Fritz MD        D: 09/15/2021   T: 09/15/2021   MT: YUE    Name:     LELAND CHAPMAN  MRN:      [id number]        Account:    [id number]   :      1955           Visit Date: 09/15/2021     Document: [id number]    cc:  MD Wei Fong MD

## 2021-09-16 PROBLEM — C61 MALIGNANT NEOPLASM OF PROSTATE (H): Status: ACTIVE | Noted: 2021-09-16

## 2021-09-17 ENCOUNTER — TELEPHONE (OUTPATIENT)
Dept: ONCOLOGY | Facility: OTHER | Age: 66
End: 2021-09-17

## 2021-09-17 ENCOUNTER — LAB (OUTPATIENT)
Dept: LAB | Facility: OTHER | Age: 66
End: 2021-09-17
Attending: INTERNAL MEDICINE
Payer: MEDICARE

## 2021-09-17 ENCOUNTER — HOSPITAL ENCOUNTER (OUTPATIENT)
Dept: NUCLEAR MEDICINE | Facility: OTHER | Age: 66
End: 2021-09-17
Attending: INTERNAL MEDICINE
Payer: MEDICARE

## 2021-09-17 ENCOUNTER — HOSPITAL ENCOUNTER (OUTPATIENT)
Dept: NUCLEAR MEDICINE | Facility: OTHER | Age: 66
Setting detail: NUCLEAR MEDICINE
End: 2021-09-17
Attending: INTERNAL MEDICINE
Payer: MEDICARE

## 2021-09-17 ENCOUNTER — HOSPITAL ENCOUNTER (OUTPATIENT)
Dept: CT IMAGING | Facility: OTHER | Age: 66
End: 2021-09-17
Attending: INTERNAL MEDICINE
Payer: MEDICARE

## 2021-09-17 DIAGNOSIS — C61 PROSTATE CANCER (H): ICD-10-CM

## 2021-09-17 DIAGNOSIS — R97.21 RISING PSA FOLLOWING TREATMENT FOR MALIGNANT NEOPLASM OF PROSTATE: ICD-10-CM

## 2021-09-17 DIAGNOSIS — T50.905A ADVERSE DRUG EFFECT: ICD-10-CM

## 2021-09-17 LAB
ALBUMIN SERPL-MCNC: 4 G/DL (ref 3.5–5.7)
ALP SERPL-CCNC: 68 U/L (ref 34–104)
ALT SERPL W P-5'-P-CCNC: 17 U/L (ref 7–52)
ANION GAP SERPL CALCULATED.3IONS-SCNC: 7 MMOL/L (ref 3–14)
AST SERPL W P-5'-P-CCNC: 13 U/L (ref 13–39)
BASOPHILS # BLD AUTO: 0 10E3/UL (ref 0–0.2)
BASOPHILS NFR BLD AUTO: 1 %
BILIRUB SERPL-MCNC: 0.6 MG/DL (ref 0.3–1)
BUN SERPL-MCNC: 19 MG/DL (ref 7–25)
CALCIUM SERPL-MCNC: 9.4 MG/DL (ref 8.6–10.3)
CHLORIDE BLD-SCNC: 104 MMOL/L (ref 98–107)
CHOLEST SERPL-MCNC: 104 MG/DL
CO2 SERPL-SCNC: 30 MMOL/L (ref 21–31)
CREAT SERPL-MCNC: 0.67 MG/DL (ref 0.7–1.3)
EOSINOPHIL # BLD AUTO: 0.4 10E3/UL (ref 0–0.7)
EOSINOPHIL NFR BLD AUTO: 4 %
ERYTHROCYTE [DISTWIDTH] IN BLOOD BY AUTOMATED COUNT: 13.5 % (ref 10–15)
FASTING STATUS PATIENT QL REPORTED: YES
GFR SERPL CREATININE-BSD FRML MDRD: >90 ML/MIN/1.73M2
GLUCOSE BLD-MCNC: 115 MG/DL (ref 70–105)
HCT VFR BLD AUTO: 38.5 % (ref 40–53)
HDLC SERPL-MCNC: 55 MG/DL (ref 23–92)
HGB BLD-MCNC: 12.8 G/DL (ref 13.3–17.7)
IMM GRANULOCYTES # BLD: 0 10E3/UL
IMM GRANULOCYTES NFR BLD: 0 %
LDH SERPL L TO P-CCNC: 124 U/L (ref 140–271)
LDLC SERPL CALC-MCNC: 7 MG/DL
LYMPHOCYTES # BLD AUTO: 1.3 10E3/UL (ref 0.8–5.3)
LYMPHOCYTES NFR BLD AUTO: 16 %
MCH RBC QN AUTO: 29.2 PG (ref 26.5–33)
MCHC RBC AUTO-ENTMCNC: 33.2 G/DL (ref 31.5–36.5)
MCV RBC AUTO: 88 FL (ref 78–100)
MONOCYTES # BLD AUTO: 0.6 10E3/UL (ref 0–1.3)
MONOCYTES NFR BLD AUTO: 8 %
NEUTROPHILS # BLD AUTO: 5.7 10E3/UL (ref 1.6–8.3)
NEUTROPHILS NFR BLD AUTO: 71 %
NONHDLC SERPL-MCNC: 49 MG/DL
NRBC # BLD AUTO: 0 10E3/UL
NRBC BLD AUTO-RTO: 0 /100
PLATELET # BLD AUTO: 181 10E3/UL (ref 150–450)
POTASSIUM BLD-SCNC: 4.4 MMOL/L (ref 3.5–5.1)
PROT SERPL-MCNC: 6.6 G/DL (ref 6.4–8.9)
PSA SERPL-MCNC: 3.67 UG/L (ref 0–4)
RBC # BLD AUTO: 4.38 10E6/UL (ref 4.4–5.9)
SODIUM SERPL-SCNC: 141 MMOL/L (ref 134–144)
TESTOST SERPL-MCNC: <10 NG/DL (ref 240–950)
TRIGL SERPL-MCNC: 211 MG/DL
TSH SERPL DL<=0.005 MIU/L-ACNC: 0.99 MU/L (ref 0.4–4)
WBC # BLD AUTO: 8 10E3/UL (ref 4–11)

## 2021-09-17 PROCEDURE — 84153 ASSAY OF PSA TOTAL: CPT | Mod: ZL

## 2021-09-17 PROCEDURE — 250N000011 HC RX IP 250 OP 636: Performed by: INTERNAL MEDICINE

## 2021-09-17 PROCEDURE — 36415 COLL VENOUS BLD VENIPUNCTURE: CPT | Mod: ZL | Performed by: UROLOGY

## 2021-09-17 PROCEDURE — 74177 CT ABD & PELVIS W/CONTRAST: CPT

## 2021-09-17 PROCEDURE — 85004 AUTOMATED DIFF WBC COUNT: CPT | Mod: ZL

## 2021-09-17 PROCEDURE — 83615 LACTATE (LD) (LDH) ENZYME: CPT | Mod: ZL

## 2021-09-17 PROCEDURE — 82040 ASSAY OF SERUM ALBUMIN: CPT | Mod: ZL

## 2021-09-17 PROCEDURE — 84403 ASSAY OF TOTAL TESTOSTERONE: CPT | Mod: ZL | Performed by: UROLOGY

## 2021-09-17 PROCEDURE — 80061 LIPID PANEL: CPT | Mod: ZL

## 2021-09-17 PROCEDURE — 78306 BONE IMAGING WHOLE BODY: CPT

## 2021-09-17 PROCEDURE — A9503 TC99M MEDRONATE: HCPCS | Performed by: INTERNAL MEDICINE

## 2021-09-17 PROCEDURE — 84443 ASSAY THYROID STIM HORMONE: CPT | Mod: ZL

## 2021-09-17 PROCEDURE — 343N000001 HC RX 343: Performed by: INTERNAL MEDICINE

## 2021-09-17 PROCEDURE — 71260 CT THORAX DX C+: CPT

## 2021-09-17 RX ORDER — TC 99M MEDRONATE 20 MG/10ML
26.6 INJECTION, POWDER, LYOPHILIZED, FOR SOLUTION INTRAVENOUS ONCE
Status: COMPLETED | OUTPATIENT
Start: 2021-09-17 | End: 2021-09-17

## 2021-09-17 RX ORDER — IOPAMIDOL 755 MG/ML
100 INJECTION, SOLUTION INTRAVASCULAR ONCE
Status: COMPLETED | OUTPATIENT
Start: 2021-09-17 | End: 2021-09-17

## 2021-09-17 RX ADMIN — TC 99M MEDRONATE 26.6 MCI.: 20 INJECTION, POWDER, LYOPHILIZED, FOR SOLUTION INTRAVENOUS at 10:40

## 2021-09-17 RX ADMIN — IOPAMIDOL 100 ML: 755 INJECTION, SOLUTION INTRAVENOUS at 13:26

## 2021-09-17 NOTE — TELEPHONE ENCOUNTER
RX-Erleada  Needs to be sent to [facility].  PH# [phone]  FX# [phone]      Angelica Varela on 9/17/2021 at 3:33 PM

## 2021-09-17 NOTE — TELEPHONE ENCOUNTER
[facility] Prior Authorization Team   Phone: [phone]  Fax: [phone]    PA Initiation    Medication: Erleada  Insurance Company: [employer] - Phone [phone] Fax [phone]  Pharmacy Filling the Rx: [facility] [address]  Filling Pharmacy Phone: [phone]  Filling Pharmacy Fax: [phone]  Start Date: 9/17/2021    Prior Authorization Approval    Authorization Effective Date: 8/18/2021  Authorization Expiration Date: 9/16/2024  Medication: Erleada  Approved Dose/Quantity: 120/30  Reference #: [id number]   Insurance Company: [employer] [phone] Fax [phone]  Expected CoPay: unkown     CoPay Card Available:      [employer] Assistance Needed:    Which Pharmacy is filling the prescription (Not needed for infusion/clinic administered): [facility] [address]  Pharmacy Notified:    Patient Notified: Yes

## 2021-09-20 NOTE — TELEPHONE ENCOUNTER
Prior Authorization Approval     Authorization Effective Date: 08/18/21  Authorization Expiration Date: 09/16/24  Medication: Erleada 60mg  Approved Dose/Quantity:   Reference #:    Case ID: [id number]  Insurance Company: [employer]  Expected CoPay:       CoPay Card Available:     Foundation Assistance Needed:    Which Pharmacy is filling the prescription (Not needed for infusion/clinic administered):    Pharmacy Notified:    Patient Notified:

## 2021-09-21 ENCOUNTER — TELEPHONE (OUTPATIENT)
Dept: ONCOLOGY | Facility: OTHER | Age: 66
End: 2021-09-21

## 2021-09-21 NOTE — TELEPHONE ENCOUNTER
Scans good per Erum Fritz MD. No distant metastatic disease.   Proceed with Erleada.  Call placed to [facility] at [phone] regarding Erleada Rx. Per [facility]  co pay over 3000 dollars. Co pay  will contact patient to assist in co pay assistance. Co pay team direct number [phone]. Jermain can also call. Patient notified of Rx update. Jermain will call.   Elza Lundberg RN...........9/21/2021 12:06 PM

## 2021-09-27 ENCOUNTER — TELEPHONE (OUTPATIENT)
Dept: ONCOLOGY | Facility: OTHER | Age: 66
End: 2021-09-27

## 2021-09-27 NOTE — TELEPHONE ENCOUNTER
Called and left message for patient to return call.  Patient returned call and states that he called and spoke with Co pay assistance at [phone] and was told that they could not help him and they did not take any information except his name. Patient states they advised him to contact [employer] for application.    Patient did contact [employer] and they said it would take about one week to process application.  Informed patient that I would contact [employer] assistance.    Called Co pay assistance and they state patient does qualify for co pay assistance and should call back.  Called and informed patient and patient states that he will call them back and advised patient to call back if any issues.  Toma Smith RN on 9/27/2021 at 11:10 AM

## 2021-10-01 ENCOUNTER — TELEPHONE (OUTPATIENT)
Dept: ONCOLOGY | Facility: OTHER | Age: 66
End: 2021-10-01

## 2021-10-01 NOTE — TELEPHONE ENCOUNTER
Received Eregretchen from [employer] yesterday. Will start Erleada Monday 10/4. Needs lab and follow up in 1 month. 11/1 lab, 11/3 follow up.  Discussed how to take drug.  Elza Lundberg RN...........10/1/2021 8:49 AM

## 2021-10-05 ENCOUNTER — TELEPHONE (OUTPATIENT)
Dept: ORTHOPEDICS | Facility: OTHER | Age: 66
End: 2021-10-05

## 2021-10-05 DIAGNOSIS — Z96.651 STATUS POST TOTAL RIGHT KNEE REPLACEMENT: Primary | ICD-10-CM

## 2021-10-25 NOTE — PROGRESS NOTES
[facility]  [address]  [place] [address]  Phone: [phone]  Fax: [phone]  Primary Provider: Wei Perez  Pre-op Performing Provider: WEI PEREZ      PREOPERATIVE EVALUATION:  Today's date: 10/26/2021    Dilip Kan is a 66 year old male who presents for a preoperative evaluation.    Surgical Information:  Surgery/Procedure: ARTHROPLASTY, KNEE, TOTAL  Surgery Location: [facility]  Surgeon: Dr. Rock  Surgery Date: 11-  Time of Surgery: unknown  Where patient plans to recover: Other: in the hospital for 1-2 nights and then home  Fax number for surgical facility: Note does not need to be faxed, will be available electronically in Epic.    Type of Anesthesia Anticipated: General    Assessment & Plan     The proposed surgical procedure is considered INTERMEDIATE risk.      ICD-10-CM    1. Pre-op exam  Z01.818    2. Type 2 diabetes mellitus without complication, without long-term current use of insulin (H)  E11.9 Hemoglobin A1c   3. Essential hypertension  I10    4. Mild aortic stenosis  I35.0    5. Spinal stenosis of lumbar region with neurogenic claudication  M48.062 naproxen (NAPROSYN) 500 MG tablet   6. Chronic back pain greater than 3 months duration  M54.9     G89.29    7. Chronic, continuous use of opioids  F11.90    8. Controlled substance agreement signed  Z79.899    9. Needs flu shot  Z23 FLU SHOT 65+ (FLUZONE HD)     Labs not drawn today.  Labs are planned November 1 for oncology and a CBC and BMP will be obtained at that time.    Risks and Recommendations:  The patient has the following additional risks and recommendations for perioperative complications:   - No identified additional risk factors other than previously addressed  - Stable aortic stenosis in February 2021    Medication Instructions:  Hold aspirin 1 week prior to surgery  Stop naproxen 4 days prior to surgery  Continue other medications. Blood pressure  medications morning of surgery with a sip of water    RECOMMENDATION:  APPROVAL GIVEN to proceed with proposed procedure, without further diagnostic evaluation.  He should ask Dr Fritz if there are any issues taking Erleada and having a joint replacement    Renewed controlled substance agreement. He just refilled hydrocodone on October 25 for 135 pills, average of 4.5 per day.  Has follow-up in November to evaluate dosing following joint replacement.    Discussed COVID vaccination. He received Luis & Luis in March.  He is wondering out about a booster dose.  There are studies showing better results receiving Moderna, then Pfizer, over a Luis & Luis booster.  He will pursue a Moderna booster.  Given flu vaccine today    40 minutes spent on the date of the encounter doing chart review, interpretation of tests, patient visit and documentation     Wei Perez MD     [id number]}    Subjective     HPI related to upcoming procedure: 66 year old male with nonmetastatic castrate-resistant prostate cancer, chronic back pain on opioids, hypertension, mild aortic stenosis, and diabetes here for preoperative optimization prior to knee replacement.    S/p 3 lumbar surgeries. He used to take hydrocodone 12/day and has worked down to 4.5/day. Has not tolerated duloxetine. Pregabalin helpful.    History of prostatectomy in 2012 with rising PSA values since 2019. Following with Dr Arrington, not responding to hormone therapy. Saw Dr Fritz and started on apalutamide     Preop Questions 10/26/2021   1. Have you ever had a heart attack or stroke? No   2. Have you ever had surgery on your heart or blood vessels, such as a stent placement, a coronary artery bypass, or surgery on an artery in your head, neck, heart, or legs? No   3. Do you have chest pain with activity? No   4. Do you have a history of  heart failure? No   5. Do you currently have a cold, bronchitis or symptoms of other infection? No   6. Do you have a cough,  shortness of breath, or wheezing? No   7. Do you or anyone in your family have previous history of blood clots? No   8. Do you or does anyone in your family have a serious bleeding problem such as prolonged bleeding following surgeries or cuts? No   9. Have you ever had problems with anemia or been told to take iron pills? No   10. Have you had any abnormal blood loss such as black, tarry or bloody stools? No   11. Have you ever had a blood transfusion? No   12. Are you willing to have a blood transfusion if it is medically needed before, during, or after your surgery? YES   13. Have you or any of your relatives ever had problems with anesthesia? No   14. Do you have sleep apnea, excessive snoring or daytime drowsiness? No   15. Do you have any artifical heart valves or other implanted medical devices like a pacemaker, defibrillator, or continuous glucose monitor? No   16. Do you have artificial joints? No   17. Are you allergic to latex? No     Health Care Directive:  Patient does not have a Health Care Directive or Living Will: Discussed advance care planning with patient; information given to patient to review.    Preoperative Review of :   reviewed - controlled substances reflected in medication list.        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 03/24/2020     Priority: Medium     Aortic valve stenosis with insufficiency, etiology of cardiac valve disease unspecified 03/22/2019     Priority: Medium     Encounter for examination required by Department of Transportation (DOT)  03/22/2019     Priority: Medium     Mild aortic stenosis 03/22/2019     Priority: Medium     Ascending aortic aneurysm-moderate at 4.6 cm on 3/11/20 03/22/2019     Priority: Medium     Aortic valve insufficiency-moderate to severe 03/22/2019     Priority: Medium     Hx of syncope 03/22/2019     Priority: Medium     History of tobacco abuse quitting 11/8/2002 03/22/2019     Priority: Medium     Hypertriglyceridemia 03/22/2019     Priority: Medium     Mild pulmonary hypertension (H) 03/22/2019     Priority: Medium     Palpitations 03/22/2019     Priority: Medium     Rising PSA following treatment for malignant neoplasm of prostate 03/29/2018     Priority: Medium     Chronic, continuous use of opioids 01/31/2018     Priority: Medium     Patient is followed by Wei Perez MD for ongoing prescription of pain medication.  All refills should be approved by this provider only at face-to-face appointments - not by phone request.    Medication(s): Hydrocodone.   Maximum quantity per month: 135  Clinic visit frequency required: Q 3 months   PDMP Review       Value Time User    State PDMP site checked  Yes 8/25/2021 10:41 AM Wei Perez MD        Controlled substance agreement:  Patient-Level CSA:    There are no patient-level csa.       Pain Clinic evaluation in the past: No    Opioid Risk Tool Total Score(s):  OPIOID RISK TOOL TOTAL SCORE 8/25/2021   Total Score 0   Total Risk Score Category Low Risk (0-3)            Other specified causes of urethral stricture 01/31/2018     Priority: Medium     GERD 04/28/2017     Priority: Medium     Essential hypertension 04/28/2017     Priority: Medium     Non morbid obesity due to excess calories 04/28/2017     Priority: Medium     Mixed hyperlipidemia 04/28/2017     Priority: Medium     Spinal stenosis of lumbar region with neurogenic claudication 04/28/2017     Priority: Medium     Controlled substance agreement updated 10/5/2020 01/15/2016     Priority: Medium      Backache 01/13/2014     Priority: Medium     Prostate cancer (H) 01/02/2013     Priority: Medium     Follow-up examination following surgery 12/21/2012     Priority: Medium     Personal history of prostate cancer s/p prostatectomy and sEBRT 12/06/2012     Priority: Medium     Anemia due to acute blood loss 11/30/2012     Priority: Medium     Overview:   EBL 11/28/12:  1700 ml  EBL 11/28/12:  500 ml  Transfused 2 units pRBC's early on 11/29/12       Fever, postprocedural 11/30/2012     Priority: Medium     Overview:   11/30/12, mild       Pelvic pain in male 11/30/2012     Priority: Medium     Syncope 11/30/2012     Priority: Medium     Formatting of this note might be different from the original.  One episode, 11/28/12 evening        Past Medical History:   Diagnosis Date     Elevated prostate specific antigen (PSA)     4/10/2012     Encounter for other administrative examinations     1/31/2014     Esophagitis     No Comments Provided     Gastro-esophageal reflux disease without esophagitis     No Comments Provided     Low back pain     No Comments Provided     Malignant neoplasm of prostate (H)     9/6/2012     Nicotine dependence, uncomplicated     Quit - October 2007 with chantix     Other intervertebral disc degeneration, lumbosacral region     12/1/2008     Past Surgical History:   Procedure Laterality Date     APPENDECTOMY OPEN  [id number]    Ruptured - [place]     COLONOSCOPY  08/31/2006    next due in 2016     DISKECTOMY, LUMBAR, SINGLE SP  03/16/2009    L 3-4 microdiskectomy, [facility]     ESOPHAGOSCOPY, GASTROSCOPY, DUODENOSCOPY (EGD), COMBINED  03/2003          ESOPHAGOSCOPY, GASTROSCOPY, DUODENOSCOPY (EGD), COMBINED  08/31/2006          PROSTATECTOMY PERINEAL RADICAL  11/28/2012    Nerve [facility]     SPINE SURGERY N/A 04/28/2017    L3 to S1 spinal decompression L4/L5 fusion     TONSILLECTOMY, ADENOIDECTOMY, COMBINED      as a child     VARICOCELECTOMY  1959    INCISION AND DRAINAGE,EXCISE  VARICOCELE     Current Outpatient Medications   Medication Sig Dispense Refill     naproxen (NAPROSYN) 500 MG tablet Take 1 tablet (500 mg) by mouth 2 times daily as needed for moderate pain 180 tablet 0     amLODIPine (NORVASC) 10 MG tablet Take 1 tablet (10 mg) by mouth daily 90 tablet 3     amoxicillin (AMOXIL) 500 MG capsule TAKE 2 CAPSULES BY MOUTH NOW THEN 1 CAPSULE EVERY 8 HOURS       apalutamide (ERLEADA) 60 MG tablet Take 4 tablets (240 mg) by mouth daily 120 tablet 6     aspirin EC 81 MG EC tablet Take 81 mg by mouth daily with food       atorvastatin (LIPITOR) 20 MG tablet Take 1 tablet (20 mg) by mouth daily 90 tablet 3     Bee Pollen 500 MG CHEW Take 2 tablets by mouth daily       cyanocobalamin (RA VITAMIN B-12 TR) 1000 MCG TBCR Take 1,000 mcg by mouth daily       HYDROcodone-acetaminophen (NORCO)  MG per tablet Take 1 tablet by mouth every 4 hours as needed for severe pain Average 4.5 per day = 135 per month 135 tablet 0     HYDROcodone-acetaminophen (NORCO)  MG per tablet Take 1 tablet by mouth every 4 hours as needed for severe pain Average 4.5 per day = 135 per month (Patient not taking: Reported on 9/15/2021) 135 tablet 0     HYDROcodone-acetaminophen (NORCO)  MG per tablet Take 1 tablet by mouth 5 times daily Average 4.5 per day = 135 per month (Patient not taking: Reported on 9/15/2021) 135 tablet 0     lisinopril (ZESTRIL) 40 MG tablet Take 1 tablet (40 mg) by mouth daily 90 tablet 4     metFORMIN (GLUCOPHAGE-XR) 500 MG 24 hr tablet Take 2 tablets (1,000 mg) by mouth 2 times daily (with meals) 360 tablet 3     Omega-3 Fatty Acids (OMEGA 3 PO) Take 2 capsules by mouth daily       omeprazole (PRILOSEC) 40 MG DR capsule TAKE 1 CAPSULE BY MOUTH ONCE DAILY. 90 capsule 3     pregabalin (LYRICA) 150 MG capsule Take 1 capsule (150 mg) by mouth 2 times daily 180 capsule 1     vitamin D3 (CHOLECALCIFEROL) 2000 units (50 mcg) tablet Take 1 tablet (2,000 Units) by mouth daily         No  Known Allergies     Social History     Tobacco Use     Smoking status: Former Smoker     Packs/day: 1.00     Years: 20.00     Pack years: 20.00     Types: Cigarettes     Quit date: 2002     Years since quittin.9     Smokeless tobacco: Current User     Types: Snuff   Substance Use Topics     Alcohol use: Yes     Comment:  5 drinks a month     Family History   Problem Relation Age of Onset     Heart Disease Father         Heart Disease, passed away from CHF,CAD     Colon Cancer Father         Cancer-colon     Hypertension Father         Hypertension     Other - See Comments Father         Stroke/Dementia     Hypertension Mother         Hypertension,HTN     Other - See Comments Mother          Parkinsons     Family History Negative Other         Good Health     Family History Negative Other         Good Health,previous marriage./previous marriage.     Family History Negative Other         Good Health,previous marriage.     Family History Negative Sister         Good Health,emotional problems.     Family History Negative Sister         Good Health     Other - See Comments Son         w/o major medical problems.     Other - See Comments Daughter         w/o major medical problems.     History   Drug Use No         Objective     /74 (BP Location: Right arm, Patient Position: Sitting, Cuff Size: Adult Large)   Pulse 86   Temp (!) 96.4  F (35.8  C) (Temporal)   Resp 20   Wt 107.5 kg (237 lb)   SpO2 96%   BMI 34.01 kg/m      Physical Exam  General Appearance: Pleasant, alert, appropriate appearance for age. No acute distress  OroPharynx Exam: Dental hygiene adequate. Normal buccal mucosa. Normal pharynx. Mallampati 4/4.  Neck Exam: Supple, no masses or nodes.  Chest/Respiratory Exam: Normal chest wall and respirations. Clear to auscultation.  Cardiovascular Exam: Regular rate and rhythm. 2/6 FAITH at LSB  Extremities: 2 + pedal pulses.  No lower extremity edema.  Neurologic Exam: Nonfocal; symmetric DTRs,  normal gross motor movement, tone, and coordination. No tremor.   Psychiatric: Normal affect and mentation      Recent Labs   Lab Test 09/17/21  1036 08/25/21  0934 05/24/21  0936 02/23/21  0829 02/23/21  0829   HGB 12.8*  --   --   --  14.1     --   --   --  200     --   --   --  140   POTASSIUM 4.4  --   --   --  4.2   CR 0.67*  --   --   --  0.73   A1C  --  6.2 6.0   < > 5.9    < > = values in this interval not displayed.        Diagnostics:  No labs were ordered during this visit. Drawing fasting labs Nov 1 for oncology  EKG March 2021 with sinus rhythm, ST changes    Revised Cardiac Risk Index (RCRI):  The patient has the following serious cardiovascular risks for perioperative complications:   - No serious cardiac risks = 0 points     RCRI Interpretation: 0 points: Class I (very low risk - 0.4% complication rate)           Signed Electronically by: Wei Perez MD  Copy of this evaluation report is provided to requesting physician.

## 2021-10-25 NOTE — H&P (VIEW-ONLY)
[facility]  [address]  [place] [address]  Phone: [phone]  Fax: [phone]  Primary Provider: Wei Perez  Pre-op Performing Provider: WEI PEREZ      PREOPERATIVE EVALUATION:  Today's date: 10/26/2021    Dilip Kan is a 66 year old male who presents for a preoperative evaluation.    Surgical Information:  Surgery/Procedure: ARTHROPLASTY, KNEE, TOTAL  Surgery Location: [facility]  Surgeon: Dr. Rock  Surgery Date: 11-  Time of Surgery: unknown  Where patient plans to recover: Other: in the hospital for 1-2 nights and then home  Fax number for surgical facility: Note does not need to be faxed, will be available electronically in Epic.    Type of Anesthesia Anticipated: General    Assessment & Plan     The proposed surgical procedure is considered INTERMEDIATE risk.      ICD-10-CM    1. Pre-op exam  Z01.818    2. Type 2 diabetes mellitus without complication, without long-term current use of insulin (H)  E11.9 Hemoglobin A1c   3. Essential hypertension  I10    4. Mild aortic stenosis  I35.0    5. Spinal stenosis of lumbar region with neurogenic claudication  M48.062 naproxen (NAPROSYN) 500 MG tablet   6. Chronic back pain greater than 3 months duration  M54.9     G89.29    7. Chronic, continuous use of opioids  F11.90    8. Controlled substance agreement signed  Z79.899    9. Needs flu shot  Z23 FLU SHOT 65+ (FLUZONE HD)     Labs not drawn today.  Labs are planned November 1 for oncology and a CBC and BMP will be obtained at that time.    Risks and Recommendations:  The patient has the following additional risks and recommendations for perioperative complications:   - No identified additional risk factors other than previously addressed  - Stable aortic stenosis in February 2021    Medication Instructions:  Hold aspirin 1 week prior to surgery  Stop naproxen 4 days prior to surgery  Continue other medications. Blood pressure  medications morning of surgery with a sip of water    RECOMMENDATION:  APPROVAL GIVEN to proceed with proposed procedure, without further diagnostic evaluation.  He should ask Dr Fritz if there are any issues taking Erleada and having a joint replacement    Renewed controlled substance agreement. He just refilled hydrocodone on October 25 for 135 pills, average of 4.5 per day.  Has follow-up in November to evaluate dosing following joint replacement.    Discussed COVID vaccination. He received Luis & Luis in March.  He is wondering out about a booster dose.  There are studies showing better results receiving Moderna, then Pfizer, over a Luis & Luis booster.  He will pursue a Moderna booster.  Given flu vaccine today    40 minutes spent on the date of the encounter doing chart review, interpretation of tests, patient visit and documentation     Wei Perez MD     0069}    Subjective     HPI related to upcoming procedure: 66 year old male with nonmetastatic castrate-resistant prostate cancer, chronic back pain on opioids, hypertension, mild aortic stenosis, and diabetes here for preoperative optimization prior to knee replacement.    S/p 3 lumbar surgeries. He used to take hydrocodone 12/day and has worked down to 4.5/day. Has not tolerated duloxetine. Pregabalin helpful.    History of prostatectomy in 2012 with rising PSA values since 2019. Following with Dr Arrington, not responding to hormone therapy. Saw Dr Fritz and started on apalutamide     Preop Questions 10/26/2021   1. Have you ever had a heart attack or stroke? No   2. Have you ever had surgery on your heart or blood vessels, such as a stent placement, a coronary artery bypass, or surgery on an artery in your head, neck, heart, or legs? No   3. Do you have chest pain with activity? No   4. Do you have a history of  heart failure? No   5. Do you currently have a cold, bronchitis or symptoms of other infection? No   6. Do you have a cough,  shortness of breath, or wheezing? No   7. Do you or anyone in your family have previous history of blood clots? No   8. Do you or does anyone in your family have a serious bleeding problem such as prolonged bleeding following surgeries or cuts? No   9. Have you ever had problems with anemia or been told to take iron pills? No   10. Have you had any abnormal blood loss such as black, tarry or bloody stools? No   11. Have you ever had a blood transfusion? No   12. Are you willing to have a blood transfusion if it is medically needed before, during, or after your surgery? YES   13. Have you or any of your relatives ever had problems with anesthesia? No   14. Do you have sleep apnea, excessive snoring or daytime drowsiness? No   15. Do you have any artifical heart valves or other implanted medical devices like a pacemaker, defibrillator, or continuous glucose monitor? No   16. Do you have artificial joints? No   17. Are you allergic to latex? No     Health Care Directive:  Patient does not have a Health Care Directive or Living Will: Discussed advance care planning with patient; information given to patient to review.    Preoperative Review of :   reviewed - controlled substances reflected in medication list.        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 03/24/2020     Priority: Medium     Aortic valve stenosis with insufficiency, etiology of cardiac valve disease unspecified 03/22/2019     Priority: Medium     Encounter for examination required by Department of Transportation (DOT)  03/22/2019     Priority: Medium     Mild aortic stenosis 03/22/2019     Priority: Medium     Ascending aortic aneurysm-moderate at 4.6 cm on 3/11/20 03/22/2019     Priority: Medium     Aortic valve insufficiency-moderate to severe 03/22/2019     Priority: Medium     Hx of syncope 03/22/2019     Priority: Medium     History of tobacco abuse quitting 11/8/2002 03/22/2019     Priority: Medium     Hypertriglyceridemia 03/22/2019     Priority: Medium     Mild pulmonary hypertension (H) 03/22/2019     Priority: Medium     Palpitations 03/22/2019     Priority: Medium     Rising PSA following treatment for malignant neoplasm of prostate 03/29/2018     Priority: Medium     Chronic, continuous use of opioids 01/31/2018     Priority: Medium     Patient is followed by Wei Perez MD for ongoing prescription of pain medication.  All refills should be approved by this provider only at face-to-face appointments - not by phone request.    Medication(s): Hydrocodone.   Maximum quantity per month: 135  Clinic visit frequency required: Q 3 months   PDMP Review       Value Time User    State PDMP site checked  Yes 8/25/2021 10:41 AM Wei Perez MD        Controlled substance agreement:  Patient-Level CSA:    There are no patient-level csa.       Pain Clinic evaluation in the past: No    Opioid Risk Tool Total Score(s):  OPIOID RISK TOOL TOTAL SCORE 8/25/2021   Total Score 0   Total Risk Score Category Low Risk (0-3)            Other specified causes of urethral stricture 01/31/2018     Priority: Medium     GERD 04/28/2017     Priority: Medium     Essential hypertension 04/28/2017     Priority: Medium     Non morbid obesity due to excess calories 04/28/2017     Priority: Medium     Mixed hyperlipidemia 04/28/2017     Priority: Medium     Spinal stenosis of lumbar region with neurogenic claudication 04/28/2017     Priority: Medium     Controlled substance agreement updated 10/5/2020 01/15/2016     Priority: Medium      Backache 01/13/2014     Priority: Medium     Prostate cancer (H) 01/02/2013     Priority: Medium     Follow-up examination following surgery 12/21/2012     Priority: Medium     Personal history of prostate cancer s/p prostatectomy and sEBRT 12/06/2012     Priority: Medium     Anemia due to acute blood loss 11/30/2012     Priority: Medium     Overview:   EBL 11/28/12:  1700 ml  EBL 11/28/12:  500 ml  Transfused 2 units pRBC's early on 11/29/12       Fever, postprocedural 11/30/2012     Priority: Medium     Overview:   11/30/12, mild       Pelvic pain in male 11/30/2012     Priority: Medium     Syncope 11/30/2012     Priority: Medium     Formatting of this note might be different from the original.  One episode, 11/28/12 evening        Past Medical History:   Diagnosis Date     Elevated prostate specific antigen (PSA)     4/10/2012     Encounter for other administrative examinations     1/31/2014     Esophagitis     No Comments Provided     Gastro-esophageal reflux disease without esophagitis     No Comments Provided     Low back pain     No Comments Provided     Malignant neoplasm of prostate (H)     9/6/2012     Nicotine dependence, uncomplicated     Quit - October 2007 with chantix     Other intervertebral disc degeneration, lumbosacral region     12/1/2008     Past Surgical History:   Procedure Laterality Date     APPENDECTOMY OPEN  [id number]    Ruptured - [place]     COLONOSCOPY  08/31/2006    next due in 2016     DISKECTOMY, LUMBAR, SINGLE SP  03/16/2009    L 3-4 microdiskectomy, [facility]     ESOPHAGOSCOPY, GASTROSCOPY, DUODENOSCOPY (EGD), COMBINED  03/2003          ESOPHAGOSCOPY, GASTROSCOPY, DUODENOSCOPY (EGD), COMBINED  08/31/2006          PROSTATECTOMY PERINEAL RADICAL  11/28/2012    Nerve [facility]     SPINE SURGERY N/A 04/28/2017    L3 to S1 spinal decompression L4/L5 fusion     TONSILLECTOMY, ADENOIDECTOMY, COMBINED      as a child     VARICOCELECTOMY  1959    INCISION AND DRAINAGE,EXCISE  VARICOCELE     Current Outpatient Medications   Medication Sig Dispense Refill     naproxen (NAPROSYN) 500 MG tablet Take 1 tablet (500 mg) by mouth 2 times daily as needed for moderate pain 180 tablet 0     amLODIPine (NORVASC) 10 MG tablet Take 1 tablet (10 mg) by mouth daily 90 tablet 3     amoxicillin (AMOXIL) 500 MG capsule TAKE 2 CAPSULES BY MOUTH NOW THEN 1 CAPSULE EVERY 8 HOURS       apalutamide (ERLEADA) 60 MG tablet Take 4 tablets (240 mg) by mouth daily 120 tablet 6     aspirin EC 81 MG EC tablet Take 81 mg by mouth daily with food       atorvastatin (LIPITOR) 20 MG tablet Take 1 tablet (20 mg) by mouth daily 90 tablet 3     Bee Pollen 500 MG CHEW Take 2 tablets by mouth daily       cyanocobalamin (RA VITAMIN B-12 TR) 1000 MCG TBCR Take 1,000 mcg by mouth daily       HYDROcodone-acetaminophen (NORCO)  MG per tablet Take 1 tablet by mouth every 4 hours as needed for severe pain Average 4.5 per day = 135 per month 135 tablet 0     HYDROcodone-acetaminophen (NORCO)  MG per tablet Take 1 tablet by mouth every 4 hours as needed for severe pain Average 4.5 per day = 135 per month (Patient not taking: Reported on 9/15/2021) 135 tablet 0     HYDROcodone-acetaminophen (NORCO)  MG per tablet Take 1 tablet by mouth 5 times daily Average 4.5 per day = 135 per month (Patient not taking: Reported on 9/15/2021) 135 tablet 0     lisinopril (ZESTRIL) 40 MG tablet Take 1 tablet (40 mg) by mouth daily 90 tablet 4     metFORMIN (GLUCOPHAGE-XR) 500 MG 24 hr tablet Take 2 tablets (1,000 mg) by mouth 2 times daily (with meals) 360 tablet 3     Omega-3 Fatty Acids (OMEGA 3 PO) Take 2 capsules by mouth daily       omeprazole (PRILOSEC) 40 MG DR capsule TAKE 1 CAPSULE BY MOUTH ONCE DAILY. 90 capsule 3     pregabalin (LYRICA) 150 MG capsule Take 1 capsule (150 mg) by mouth 2 times daily 180 capsule 1     vitamin D3 (CHOLECALCIFEROL) 2000 units (50 mcg) tablet Take 1 tablet (2,000 Units) by mouth daily         No  Known Allergies     Social History     Tobacco Use     Smoking status: Former Smoker     Packs/day: 1.00     Years: 20.00     Pack years: 20.00     Types: Cigarettes     Quit date: 2002     Years since quittin.9     Smokeless tobacco: Current User     Types: Snuff   Substance Use Topics     Alcohol use: Yes     Comment:  5 drinks a month     Family History   Problem Relation Age of Onset     Heart Disease Father         Heart Disease, passed away from CHF,CAD     Colon Cancer Father         Cancer-colon     Hypertension Father         Hypertension     Other - See Comments Father         Stroke/Dementia     Hypertension Mother         Hypertension,HTN     Other - See Comments Mother          Parkinsons     Family History Negative Other         Good Health     Family History Negative Other         Good Health,previous marriage./previous marriage.     Family History Negative Other         Good Health,previous marriage.     Family History Negative Sister         Good Health,emotional problems.     Family History Negative Sister         Good Health     Other - See Comments Son         w/o major medical problems.     Other - See Comments Daughter         w/o major medical problems.     History   Drug Use No         Objective     /74 (BP Location: Right arm, Patient Position: Sitting, Cuff Size: Adult Large)   Pulse 86   Temp (!) 96.4  F (35.8  C) (Temporal)   Resp 20   Wt 107.5 kg (237 lb)   SpO2 96%   BMI 34.01 kg/m      Physical Exam  General Appearance: Pleasant, alert, appropriate appearance for age. No acute distress  OroPharynx Exam: Dental hygiene adequate. Normal buccal mucosa. Normal pharynx. Mallampati 4/4.  Neck Exam: Supple, no masses or nodes.  Chest/Respiratory Exam: Normal chest wall and respirations. Clear to auscultation.  Cardiovascular Exam: Regular rate and rhythm. 2/6 FAITH at LSB  Extremities: 2 + pedal pulses.  No lower extremity edema.  Neurologic Exam: Nonfocal; symmetric DTRs,  normal gross motor movement, tone, and coordination. No tremor.   Psychiatric: Normal affect and mentation      Recent Labs   Lab Test 09/17/21  1036 08/25/21  0934 05/24/21  0936 02/23/21  0829 02/23/21  0829   HGB 12.8*  --   --   --  14.1     --   --   --  200     --   --   --  140   POTASSIUM 4.4  --   --   --  4.2   CR 0.67*  --   --   --  0.73   A1C  --  6.2 6.0   < > 5.9    < > = values in this interval not displayed.        Diagnostics:  No labs were ordered during this visit. Drawing fasting labs Nov 1 for oncology  EKG March 2021 with sinus rhythm, ST changes    Revised Cardiac Risk Index (RCRI):  The patient has the following serious cardiovascular risks for perioperative complications:   - No serious cardiac risks = 0 points     RCRI Interpretation: 0 points: Class I (very low risk - 0.4% complication rate)           Signed Electronically by: Wei Perez MD  Copy of this evaluation report is provided to requesting physician.

## 2021-10-26 ENCOUNTER — TELEPHONE (OUTPATIENT)
Dept: ONCOLOGY | Facility: OTHER | Age: 66
End: 2021-10-26

## 2021-10-26 ENCOUNTER — OFFICE VISIT (OUTPATIENT)
Dept: FAMILY MEDICINE | Facility: OTHER | Age: 66
End: 2021-10-26
Attending: FAMILY MEDICINE
Payer: COMMERCIAL

## 2021-10-26 VITALS
HEART RATE: 86 BPM | TEMPERATURE: 96.4 F | SYSTOLIC BLOOD PRESSURE: 128 MMHG | WEIGHT: 237 LBS | RESPIRATION RATE: 20 BRPM | OXYGEN SATURATION: 96 % | BODY MASS INDEX: 34.01 KG/M2 | DIASTOLIC BLOOD PRESSURE: 74 MMHG

## 2021-10-26 DIAGNOSIS — M48.062 SPINAL STENOSIS OF LUMBAR REGION WITH NEUROGENIC CLAUDICATION: ICD-10-CM

## 2021-10-26 DIAGNOSIS — Z23 NEEDS FLU SHOT: ICD-10-CM

## 2021-10-26 DIAGNOSIS — Z79.899 CONTROLLED SUBSTANCE AGREEMENT SIGNED: ICD-10-CM

## 2021-10-26 DIAGNOSIS — I10 ESSENTIAL HYPERTENSION: ICD-10-CM

## 2021-10-26 DIAGNOSIS — M54.9 CHRONIC BACK PAIN GREATER THAN 3 MONTHS DURATION: ICD-10-CM

## 2021-10-26 DIAGNOSIS — I35.0 MILD AORTIC STENOSIS: ICD-10-CM

## 2021-10-26 DIAGNOSIS — E11.9 TYPE 2 DIABETES MELLITUS WITHOUT COMPLICATION, WITHOUT LONG-TERM CURRENT USE OF INSULIN (H): ICD-10-CM

## 2021-10-26 DIAGNOSIS — G89.29 CHRONIC BACK PAIN GREATER THAN 3 MONTHS DURATION: ICD-10-CM

## 2021-10-26 DIAGNOSIS — F11.90 CHRONIC, CONTINUOUS USE OF OPIOIDS: ICD-10-CM

## 2021-10-26 DIAGNOSIS — Z01.818 PRE-OP EXAM: Primary | ICD-10-CM

## 2021-10-26 DIAGNOSIS — R11.0 NAUSEA: Primary | ICD-10-CM

## 2021-10-26 PROCEDURE — G0463 HOSPITAL OUTPT CLINIC VISIT: HCPCS | Mod: 25

## 2021-10-26 PROCEDURE — G0008 ADMIN INFLUENZA VIRUS VAC: HCPCS

## 2021-10-26 PROCEDURE — 99215 OFFICE O/P EST HI 40 MIN: CPT | Performed by: FAMILY MEDICINE

## 2021-10-26 PROCEDURE — 90662 IIV NO PRSV INCREASED AG IM: CPT

## 2021-10-26 RX ORDER — NAPROXEN 500 MG/1
500 TABLET ORAL 2 TIMES DAILY PRN
Qty: 180 TABLET | Refills: 0 | Status: SHIPPED | OUTPATIENT
Start: 2021-10-26 | End: 2021-12-07

## 2021-10-26 ASSESSMENT — PAIN SCALES - GENERAL: PAINLEVEL: SEVERE PAIN (7)

## 2021-10-26 NOTE — LETTER
Opioid / Opioid Plus Controlled Substance Agreement    This is an agreement between you and your provider about the safe and appropriate use of controlled substance/opioids prescribed by your care team. Controlled substances are medicines that can cause physical and mental dependence (abuse).    There are strict laws about having and using these medicines. We here at [facility] are committing to working with you in your efforts to get better. To support you in this work, we ll help you schedule regular office appointments for medicine refills. If we must cancel or change your appointment for any reason, we ll make sure you have enough medicine to last until your next appointment.     As a Provider, I will:    Listen carefully to your concerns and treat you with respect.     Recommend a treatment plan that I believe is in your best interest. This plan may involve therapies other than opioid pain medication.     Talk with you often about the possible benefits, and the risk of harm of any medicine that we prescribe for you.     Provide a plan on how to taper (discontinue or go off) using this medicine if the decision is made to stop its use.    As a Patient, I understand that opioid(s):     Are a controlled substance prescribed by my care team to help me function or work and manage my condition(s).     Are strong medicines and can cause serious side effects such as:    Drowsiness, which can seriously affect my driving ability    A lower breathing rate, enough to cause death    Harm to my thinking ability     Depression     Abuse of and addiction to this medicine    Need to be taken exactly as prescribed. Combining opioids with certain medicines or chemicals (such as illegal drugs, sedatives, sleeping pills, and benzodiazepines) can be dangerous or even fatal. If I stop opioids suddenly, I may have severe withdrawal symptoms.    Do not work for all types of pain nor for all patients. If they re not helpful, I may  be asked to stop them.        The risks, benefits and side effects of these medicine(s) were explained to me. I agree that:  1. I will take part in other treatments as advised by my care team. This may be psychiatry or counseling, physical therapy, behavioral therapy, group treatment or a referral to a specialist.     2. I will keep all my appointments. I understand that this is part of the monitoring of opioids. My care team may require an office visit for EVERY opioid/controlled substance refill. If I miss appointments or don t follow instructions, my care team may stop my medicine.    3. I will take my medicines as prescribed. I will not change the dose or schedule unless my care team tells me to. There will be no refills if I run out early.     4. I may be asked to come to the clinic and complete a urine drug test or complete a pill count at any time. If I don t give a urine sample or participate in a pill count, the care team may stop my medicine.    5. I will only receive prescriptions from this clinic for chronic pain. If I am treated by another provider for acute pain issues, I will tell them that I am taking opioid pain medication for chronic pain and that I have a treatment agreement with this provider. I will inform my [facility] care team within one business day if I am given a prescription for any pain medication by another healthcare provider. My [facility] care team can contact other providers and pharmacists about my use of any medicines.    6. It is up to me to make sure that I don t run out of my medicines on weekends or holidays. If my care team is willing to refill my opioid prescription without a visit, I must request refills only during office hours. Refills may take up to 3 business days to process. I will use one pharmacy to fill all my opioid and other controlled substance prescriptions. I will notify the clinic about any changes to my insurance or medication  availability.    7. I am responsible for my prescriptions. If the medicine/prescription is lost, stolen or destroyed, it will not be replaced. I also agree not to share controlled substance medicines with anyone.    8. I am aware I should not use any illegal or recreational drugs. I agree not to drink alcohol unless my care team says I can.       9. If I enroll in the [place] [facility] Cannabis program, I will tell my care team prior to my next refill.     10. I will tell my care team right away if I become pregnant, have a new medical problem treated outside of my regular clinic, or have a change in my medications.    11. I understand that this medicine can affect my thinking, judgment and reaction time. Alcohol and drugs affect the brain and body, which can affect the safety of my driving. Being under the influence of alcohol or drugs can affect my decision-making, behaviors, personal safety, and the safety of others. Driving while impaired (DWI) can occur if a person is driving, operating, or in physical control of a car, motorcycle, boat, snowmobile, ATV, motorbike, off-road vehicle, or any other motor vehicle ([place] Statute 169A.20). I understand the risk if I choose to drive or operate any vehicle or machinery.    I understand that if I do not follow any of the conditions above, my prescriptions or treatment may be stopped or changed.          Opioids  What You Need to Know    What are opioids?   Opioids are pain medicines that must be prescribed by a doctor. They are also known as narcotics.     Examples are:   1. morphine (MS Contin, Nina)  2. oxycodone (Oxycontin)  3. oxycodone and acetaminophen (Percocet)  4. hydrocodone and acetaminophen (Vicodin, Norco)   5. fentanyl patch (Duragesic)   6. hydromorphone (Dilaudid)   7. methadone  8. codeine (Tylenol #3)     What do opioids do well?   Opioids are best for severe short-term pain such as after a surgery or injury. They may work well for cancer pain. They may  help some people with long-lasting (chronic) pain.     What do opioids NOT do well?   Opioids never get rid of pain entirely, and they don t work well for most patients with chronic pain. Opioids don t reduce swelling, one of the causes of pain.                                    Other ways to manage chronic pain and improve function include:       Treat the health problem that may be causing pain    Anti-inflammation medicines, which reduce swelling and tenderness, such as ibuprofen (Advil, Motrin) or naproxen (Aleve)    Acetaminophen (Tylenol)    Antidepressants and anti-seizure medicines, especially for nerve pain    Topical treatments such as patches or creams    Injections or nerve blocks    Chiropractic or osteopathic treatment    Acupuncture, massage, deep breathing, meditation, visual imagery, aromatherapy    Use heat or ice at the pain site    Physical therapy     Exercise    Stop smoking    Take part in therapy       Risks and side effects     Talk to your doctor before you start or decide to keep taking opioids. Possible side effects include:      Lowering your breathing rate enough to cause death    Overdose, including death, especially if taking higher than prescribed doses    Worse depression symptoms; less pleasure in things you usually enjoy    Feeling tired or sluggish    Slower thoughts or cloudy thinking    Being more sensitive to pain over time; pain is harder to control    Trouble sleeping or restless sleep    Changes in hormone levels (for example, less testosterone)    Changes in sex drive or ability to have sex    Constipation    Unsafe driving    Itching and sweating    Dizziness    Nausea, throwing up and dry mouth    What else should I know about opioids?    Opioids may lead to dependence, tolerance, or addiction.      Dependence means that if you stop or reduce the medicine too quickly, you will have withdrawal symptoms. These include loose poop (diarrhea), jitters, flu-like symptoms,  nervousness and tremors. Dependence is not the same as addiction.                       Tolerance means needing higher doses over time to get the same effect. This may increase the chance of serious side effects.      Addiction is when people improperly use a substance that harms their body, their mind or their relations with others. Use of opiates can cause a relapse of addiction if you have a history of drug or alcohol abuse.      People who have used opioids for a long time may have a lower quality of life, worse depression, higher levels of pain and more visits to doctors.    You can overdose on opioids. Take these steps to lower your risk of overdose:    1. Recognize the signs:  Signs of overdose include decrease or loss of consciousness (blackout), slowed breathing, trouble waking up and blue lips. If someone is worried about overdose, they should call 911.    2. Talk to your doctor about Narcan (naloxone).   If you are at risk for overdose, you may be given a prescription for Narcan. This medicine very quickly reverses the effects of opioids.   If you overdose, a friend or family member can give you Narcan while waiting for the ambulance. They need to know the signs of overdose and how to give Narcan.     3. Don't use alcohol or street drugs.   Taking them with opioids can cause death.    4. Do not take any of these medicines unless your doctor says it s OK. Taking these with opioids can cause death:    Benzodiazepines, such as lorazepam (Ativan), alprazolam (Xanax) or diazepam (Valium)    Muscle relaxers, such as cyclobenzaprine (Flexeril)    Sleeping pills like zolpidem (Ambien)     Other opioids      How to keep you and other people safe while taking opioids:    1. Never share your opioids with others.  Opioid medicines are regulated by the Drug Enforcement Agency ([employer]METT). Selling or sharing medications is a criminal act.    2. Be sure to store opioids in a secure place, locked up if possible. Young children  can easily swallow them and overdose.    3. When you are traveling with your medicines, keep them in the original bottles. If you use a pill box, be sure you also carry a copy of your medicine list from your clinic or pharmacy.    4. Safe disposal of opioids    Most pharmacies have places to get rid of medicine, called disposal kiosks. Medicine disposal options are also available in every [facility]. Search your county and  medication disposal  to find more options. You can find more details at:  [email or web address]     I agree that my provider, clinic care team, and pharmacy may work with any city, state or federal law enforcement agency that investigates the misuse, sale, or other diversion of my controlled medicine. I will allow my provider to discuss my care with, or share a copy of, this agreement with any other treating provider, pharmacy or emergency room where I receive care.    I have read this agreement and have asked questions about anything I did not understand.    _______________________________________________________  Patient Signature - Dilip Kan _____________________                   Date     _______________________________________________________  Provider Signature - Wei Perez MD   _____________________                   Date     _______________________________________________________  Witness Signature (required if provider not present while patient signing)   _____________________                   Date

## 2021-10-26 NOTE — NURSING NOTE
"Patient presents to the clinic for pre-op exam.      FOOD SECURITY SCREENING QUESTIONS  Hunger Vital Signs:  Within the past 12 months we worried whether our food would run out before we got money to buy more. Never  Within the past 12 months the food we bought just didn't last and we didn't have money to get more. Never    Advance Care Directive on file? no  Advance Care Directive provided to patient? Declined    Chief Complaint   Patient presents with     Pre-Op Exam       Initial /74 (BP Location: Right arm, Patient Position: Sitting, Cuff Size: Adult Large)   Pulse 86   Temp (!) 96.4  F (35.8  C) (Temporal)   Resp 20   Wt 107.5 kg (237 lb)   SpO2 96%   BMI 34.01 kg/m   Estimated body mass index is 34.01 kg/m  as calculated from the following:    Height as of 9/15/21: 1.778 m (5' 10\").    Weight as of this encounter: 107.5 kg (237 lb).  Medication Reconciliation: complete    Leona Shell LPN       "

## 2021-10-26 NOTE — TELEPHONE ENCOUNTER
Knee replacement 11/16. Does he need to hold Erleada?  Elza Lundberg RN...........10/26/2021 3:23 PM

## 2021-10-26 NOTE — NURSING NOTE
Immunization Documentation    Prior to Immunization administration, verified patients identity using patient's name and date of birth. Please see IMMUNIZATIONS  and order for additional information.  Patient / Parent instructed to remain in clinic for 15 minutes and report any adverse reaction to staff immediately.    Was entire vial of medication used? Yes  Vial/Syringe: Christos Shell LPN  10/26/2021   9:36 AM

## 2021-10-26 NOTE — TELEPHONE ENCOUNTER
Per Erum Fritz MD should be fine to continue taking Erleada with joint replacement scheduled.   Elza Lundberg RN...........10/26/2021 3:41 PM

## 2021-10-26 NOTE — PATIENT INSTRUCTIONS
Flu shot today  A COVID booster is a good idea. A Moderna booster was the best after Luis and Luis, Pfizer is next best    Hold aspirin 1 week prior to surgery  Stop naproxen 4 days prior to surgery    Continue other medications  OK to take morning medications with a sip of water prior to surgery, except avoid metformin morning of surgery    Ask Dr Fritz about any concerns with Erleada and knee replacement

## 2021-10-27 RX ORDER — ONDANSETRON 8 MG/1
8 TABLET, ORALLY DISINTEGRATING ORAL EVERY 8 HOURS PRN
Qty: 30 TABLET | Refills: 1 | Status: SHIPPED | OUTPATIENT
Start: 2021-10-27 | End: 2021-12-07

## 2021-10-27 NOTE — TELEPHONE ENCOUNTER
States is doing well since starting Erleada, although does have some nausea occasionally. Offered Zofran and patient would like to try. Rx requested to be sent to [facility].  Elza Lundberg RN...........10/27/2021 2:25 PM

## 2021-11-01 ENCOUNTER — LAB (OUTPATIENT)
Dept: LAB | Facility: OTHER | Age: 66
End: 2021-11-01
Attending: INTERNAL MEDICINE
Payer: MEDICARE

## 2021-11-01 DIAGNOSIS — C61 PROSTATE CANCER (H): ICD-10-CM

## 2021-11-01 DIAGNOSIS — T50.905A ADVERSE DRUG EFFECT: ICD-10-CM

## 2021-11-01 DIAGNOSIS — R97.21 RISING PSA FOLLOWING TREATMENT FOR MALIGNANT NEOPLASM OF PROSTATE: ICD-10-CM

## 2021-11-01 DIAGNOSIS — E11.9 TYPE 2 DIABETES MELLITUS WITHOUT COMPLICATION, WITHOUT LONG-TERM CURRENT USE OF INSULIN (H): ICD-10-CM

## 2021-11-01 LAB
ALBUMIN SERPL-MCNC: 3.9 G/DL (ref 3.5–5.7)
ALP SERPL-CCNC: 60 U/L (ref 34–104)
ALT SERPL W P-5'-P-CCNC: 17 U/L (ref 7–52)
ANION GAP SERPL CALCULATED.3IONS-SCNC: 8 MMOL/L (ref 3–14)
AST SERPL W P-5'-P-CCNC: 16 U/L (ref 13–39)
BASOPHILS # BLD AUTO: 0.1 10E3/UL (ref 0–0.2)
BASOPHILS NFR BLD AUTO: 1 %
BILIRUB SERPL-MCNC: 0.3 MG/DL (ref 0.3–1)
BUN SERPL-MCNC: 14 MG/DL (ref 7–25)
CALCIUM SERPL-MCNC: 9.7 MG/DL (ref 8.6–10.3)
CHLORIDE BLD-SCNC: 103 MMOL/L (ref 98–107)
CHOLEST SERPL-MCNC: 149 MG/DL
CO2 SERPL-SCNC: 28 MMOL/L (ref 21–31)
CREAT SERPL-MCNC: 0.7 MG/DL (ref 0.7–1.3)
EOSINOPHIL # BLD AUTO: 0.4 10E3/UL (ref 0–0.7)
EOSINOPHIL NFR BLD AUTO: 8 %
ERYTHROCYTE [DISTWIDTH] IN BLOOD BY AUTOMATED COUNT: 13.8 % (ref 10–15)
FASTING STATUS PATIENT QL REPORTED: YES
GFR SERPL CREATININE-BSD FRML MDRD: >90 ML/MIN/1.73M2
GLUCOSE BLD-MCNC: 136 MG/DL (ref 70–105)
HBA1C MFR BLD: 6.4 % (ref 4–6.2)
HCT VFR BLD AUTO: 40.7 % (ref 40–53)
HDLC SERPL-MCNC: 56 MG/DL (ref 23–92)
HGB BLD-MCNC: 13.3 G/DL (ref 13.3–17.7)
IMM GRANULOCYTES # BLD: 0 10E3/UL
IMM GRANULOCYTES NFR BLD: 0 %
LDH SERPL L TO P-CCNC: 121 U/L (ref 140–271)
LDLC SERPL CALC-MCNC: 61 MG/DL
LYMPHOCYTES # BLD AUTO: 1.4 10E3/UL (ref 0.8–5.3)
LYMPHOCYTES NFR BLD AUTO: 30 %
MCH RBC QN AUTO: 29.2 PG (ref 26.5–33)
MCHC RBC AUTO-ENTMCNC: 32.7 G/DL (ref 31.5–36.5)
MCV RBC AUTO: 90 FL (ref 78–100)
MONOCYTES # BLD AUTO: 0.4 10E3/UL (ref 0–1.3)
MONOCYTES NFR BLD AUTO: 8 %
NEUTROPHILS # BLD AUTO: 2.5 10E3/UL (ref 1.6–8.3)
NEUTROPHILS NFR BLD AUTO: 53 %
NONHDLC SERPL-MCNC: 93 MG/DL
NRBC # BLD AUTO: 0 10E3/UL
NRBC BLD AUTO-RTO: 0 /100
PLATELET # BLD AUTO: 199 10E3/UL (ref 150–450)
POTASSIUM BLD-SCNC: 4.4 MMOL/L (ref 3.5–5.1)
PROT SERPL-MCNC: 6.6 G/DL (ref 6.4–8.9)
PSA SERPL-MCNC: 1.49 UG/L (ref 0–4)
RBC # BLD AUTO: 4.55 10E6/UL (ref 4.4–5.9)
SODIUM SERPL-SCNC: 139 MMOL/L (ref 134–144)
TRIGL SERPL-MCNC: 158 MG/DL
TSH SERPL DL<=0.005 MIU/L-ACNC: 1.46 MU/L (ref 0.4–4)
WBC # BLD AUTO: 4.8 10E3/UL (ref 4–11)

## 2021-11-01 PROCEDURE — 36415 COLL VENOUS BLD VENIPUNCTURE: CPT | Mod: ZL

## 2021-11-01 PROCEDURE — 85025 COMPLETE CBC W/AUTO DIFF WBC: CPT | Mod: ZL

## 2021-11-01 PROCEDURE — 80053 COMPREHEN METABOLIC PANEL: CPT | Mod: ZL

## 2021-11-01 PROCEDURE — 83615 LACTATE (LD) (LDH) ENZYME: CPT | Mod: ZL

## 2021-11-01 PROCEDURE — 83036 HEMOGLOBIN GLYCOSYLATED A1C: CPT | Mod: ZL

## 2021-11-01 PROCEDURE — 84443 ASSAY THYROID STIM HORMONE: CPT | Mod: ZL

## 2021-11-01 PROCEDURE — 84153 ASSAY OF PSA TOTAL: CPT | Mod: ZL

## 2021-11-01 PROCEDURE — 80061 LIPID PANEL: CPT | Mod: ZL

## 2021-11-03 ENCOUNTER — ONCOLOGY VISIT (OUTPATIENT)
Dept: ONCOLOGY | Facility: OTHER | Age: 66
End: 2021-11-03
Attending: INTERNAL MEDICINE
Payer: COMMERCIAL

## 2021-11-03 VITALS
OXYGEN SATURATION: 96 % | WEIGHT: 240 LBS | SYSTOLIC BLOOD PRESSURE: 132 MMHG | TEMPERATURE: 97.8 F | BODY MASS INDEX: 34.44 KG/M2 | RESPIRATION RATE: 18 BRPM | HEART RATE: 90 BPM | DIASTOLIC BLOOD PRESSURE: 80 MMHG

## 2021-11-03 DIAGNOSIS — C61 PROSTATE CANCER (H): Primary | ICD-10-CM

## 2021-11-03 PROCEDURE — G0463 HOSPITAL OUTPT CLINIC VISIT: HCPCS | Performed by: INTERNAL MEDICINE

## 2021-11-03 PROCEDURE — 99215 OFFICE O/P EST HI 40 MIN: CPT | Performed by: INTERNAL MEDICINE

## 2021-11-03 PROCEDURE — 99417 PROLNG OP E/M EACH 15 MIN: CPT | Performed by: INTERNAL MEDICINE

## 2021-11-03 NOTE — NURSING NOTE
Pt presents to clinic today for 1 month follow up. Pt states his nausea is still intermitting.      Medication Reconciliation: complete  Sydney Caballero LPN

## 2021-11-03 NOTE — PROGRESS NOTES
Visit Date: 11/03/2021    HISTORY OF PRESENT ILLNESS:  Mr. Kan returns for followup of a rising PSA following treatment for malignant neoplasm of the prostate.  We had seen the patient in consultation at the request of Dr. Arrington back on 09/15/2021.  At that time, Mr. Kan was a 66-year-old white male with previous history of aortic valve stenosis, type 2 diabetes mellitus, we were asked to evaluate concerning rising PSA in a castrate-resistant patient with prostate cancer.  The patient stated that approximately 10 years ago, he had undergone a prostatectomy back in 2012 for prostate cancer.  This was followed by SBRT in 2013.  The patient was followed by Dr. Celestine Arrington who noted a rising PSA and placed the patient on androgen deprivation therapy initially with Vantas implant and then switched to Lupron.  His PSA had been rising, in the recent past was 0.975 on 07/09/2020, then on 11/23/2020, pineda to 1.215.  Dr. Arrington ordered scans including CT chest, abdomen, pelvis and bone scan.  There was no evidence of metastatic disease.  There was a dilated thoracic aorta measuring 4.4 cm in the mid ascending segment.  PSA continued to rise and on 02/23/2021, it was up to 1.847 and then on 08/25/2021, it was up to 3.48.  Therefore, the patient had a PSA doubling time of less than 10 months.  Therefore, we considered him to have castrate-resistant M0 prostate cancer.  The patient was essentially asymptomatic.  Denies any fevers, night sweats, weight loss or bone pain.  Does have urinary incontinence, and wears Depends for the most part.  He says he has had this since radiation was completed.  He is currently on Lupron 22.5 mg IM q. 3 month.  He could not tolerate the Vantas implant for unknown reasons.  The patient is here for chemotherapeutic options for nonmetastatic castrate-resistant prostate cancer.  We had seen the patient and felt he would be a candidate for apalutamide, which is indicated in the setting of  castrate-resistant M0 prostate cancer that is non-metastatic.  The patient was started on apalutamide 240 mg p.o. daily.  He also had scans.  On 09/17/2021, a bone scan was negative.  There were degenerative changes with probable periodontal disease, no definite metastatic disease.  CT chest, abdomen and pelvis was essentially negative.  There was some soft tissue density in the left lower pelvis in the region of the left seminal vesicle.  There was aneurysmal dilatation of the ascending thoracic aorta measuring up to 0.14 mm.  The patient otherwise has been on apalutamide for 3 weeks and his PSA has dropped from 3.48 down to 1.62.  He says he gets some fatigue with the apalutamide.  He has had some hot flashes.  He is willing to reconsider a Vantas implant.  Otherwise, no other complaints.  He recently had dental work.  He had multiple teeth removed due to his periodontal disease.  Denies fevers or weight loss.    PHYSICAL EXAMINATION:    GENERAL:  He is a middle-aged white male in no acute distress.  VITAL SIGNS:  Reveal blood pressure 130/88, pulse 90, respirations 18, temperature 97.8.  HEENT:  Atraumatic, normocephalic.  Oropharynx nonerythematous.  NECK:  Supple.  LUNGS:  Clear to auscultation and percussion.  HEART:  Regular rhythm.  There is a systolic murmur heard best at left sternal border.  ABDOMEN:  Soft, normoactive bowel sounds.  No mass, nontender.  LYMPHATICS:  No cervical, supraclavicular, axillary nodes.  EXTREMITIES:  No edema.  NEUROLOGIC:  Nonfocal.    LABORATORY DATA:  Reveal PSA of 1.49.  CBC is within normal limits.  Glucose 136.  LFTs are normal.  TSH is 1.46.    IMPRESSION:  Castrate-resistant nonmetastatic prostate cancer with PSA doubling time of less than 10 months based on the [place] trial, apalutamide has been approved for nonmetastatic castrate-resistant prostate cancer phase 3 trial, randomized men with nonmetastatic adenocarcinoma of the prostate and a PSA doubling time of 10  months or less during androgen deprivation therapy to apalutamide 240 mg daily or matched placebo both in combination with ADT.  The median metastasis disease-free survival was 40.5 months for the apalutamide treated patients and 16.2 months with those receiving placebo.  The patient was therefore started on apalutamide and has been on it for 3 weeks.  His PSA has dropped.  His scans were essentially negative.  Plan is to continue monitoring PSA.  We will see the patient in approximately 10 weeks, obtain CBC, CMP, LDH, PSA.  He will follow up with Dr. Arrington for possible Vantas implant as the patient is willing to reconsider.    TIME SPENT:  75 minutes were spent on this patient.  Time was spent reviewing imaging results with Radiology, reviewing lab results, ordering apalutamide, performing history and physical, documenting history and physical and ordering followup labs.    Erum Fritz MD        D: 2021   T: 2021   MT: [facility]    Name:     LELAND CHAPMAN  MRN:      [id number]        Account:    [id number]   :      1955           Visit Date: 2021     Document: [id number]    cc:  MD Celestine Puckett MD

## 2021-11-12 ENCOUNTER — ALLIED HEALTH/NURSE VISIT (OUTPATIENT)
Dept: FAMILY MEDICINE | Facility: OTHER | Age: 66
End: 2021-11-12
Attending: FAMILY MEDICINE
Payer: MEDICARE

## 2021-11-12 DIAGNOSIS — Z20.822 COVID-19 RULED OUT: Primary | ICD-10-CM

## 2021-11-12 PROCEDURE — C9803 HOPD COVID-19 SPEC COLLECT: HCPCS

## 2021-11-12 PROCEDURE — U0005 INFEC AGEN DETEC AMPLI PROBE: HCPCS | Mod: ZL

## 2021-11-12 NOTE — PROGRESS NOTES
Patient swabbed for COVID-19 testing.  DOS 11/16/21 Dr. Pranav Payne MA on 11/12/2021 at 9:00 AM    
Positive urine culture, pt called and informed of results.  Abx sent to pharmacy.

## 2021-11-13 LAB — SARS-COV-2 RNA RESP QL NAA+PROBE: NEGATIVE

## 2021-11-15 ENCOUNTER — ANESTHESIA EVENT (OUTPATIENT)
Dept: SURGERY | Facility: OTHER | Age: 66
End: 2021-11-15
Payer: MEDICARE

## 2021-11-16 ENCOUNTER — APPOINTMENT (OUTPATIENT)
Dept: OCCUPATIONAL THERAPY | Facility: OTHER | Age: 66
End: 2021-11-16
Attending: ORTHOPAEDIC SURGERY
Payer: MEDICARE

## 2021-11-16 ENCOUNTER — ANESTHESIA (OUTPATIENT)
Dept: SURGERY | Facility: OTHER | Age: 66
End: 2021-11-16
Payer: MEDICARE

## 2021-11-16 ENCOUNTER — APPOINTMENT (OUTPATIENT)
Dept: GENERAL RADIOLOGY | Facility: OTHER | Age: 66
End: 2021-11-16
Attending: ORTHOPAEDIC SURGERY
Payer: MEDICARE

## 2021-11-16 ENCOUNTER — APPOINTMENT (OUTPATIENT)
Dept: PHYSICAL THERAPY | Facility: OTHER | Age: 66
End: 2021-11-16
Attending: ORTHOPAEDIC SURGERY
Payer: MEDICARE

## 2021-11-16 ENCOUNTER — HOSPITAL ENCOUNTER (OUTPATIENT)
Facility: OTHER | Age: 66
Discharge: HOME | End: 2021-11-18
Attending: ORTHOPAEDIC SURGERY | Admitting: ORTHOPAEDIC SURGERY
Payer: MEDICARE

## 2021-11-16 DIAGNOSIS — G89.29 CHRONIC PAIN OF RIGHT KNEE: ICD-10-CM

## 2021-11-16 DIAGNOSIS — M25.561 CHRONIC PAIN OF RIGHT KNEE: ICD-10-CM

## 2021-11-16 PROBLEM — Z96.651 STATUS POST TOTAL RIGHT KNEE REPLACEMENT: Status: ACTIVE | Noted: 2021-11-16

## 2021-11-16 PROCEDURE — C1776 JOINT DEVICE (IMPLANTABLE): HCPCS | Performed by: ORTHOPAEDIC SURGERY

## 2021-11-16 PROCEDURE — 250N000012 HC RX MED GY IP 250 OP 636 PS 637: Performed by: INTERNAL MEDICINE

## 2021-11-16 PROCEDURE — 258N000003 HC RX IP 258 OP 636: Performed by: ORTHOPAEDIC SURGERY

## 2021-11-16 PROCEDURE — 250N000011 HC RX IP 250 OP 636: Performed by: NURSE ANESTHETIST, CERTIFIED REGISTERED

## 2021-11-16 PROCEDURE — 97165 OT EVAL LOW COMPLEX 30 MIN: CPT | Mod: GO | Performed by: OCCUPATIONAL THERAPIST

## 2021-11-16 PROCEDURE — 96374 THER/PROPH/DIAG INJ IV PUSH: CPT | Mod: XU

## 2021-11-16 PROCEDURE — 272N000001 HC OR GENERAL SUPPLY STERILE: Performed by: ORTHOPAEDIC SURGERY

## 2021-11-16 PROCEDURE — 76942 ECHO GUIDE FOR BIOPSY: CPT | Mod: 26 | Performed by: NURSE ANESTHETIST, CERTIFIED REGISTERED

## 2021-11-16 PROCEDURE — 96372 THER/PROPH/DIAG INJ SC/IM: CPT | Performed by: INTERNAL MEDICINE

## 2021-11-16 PROCEDURE — 99213 OFFICE O/P EST LOW 20 MIN: CPT | Performed by: INTERNAL MEDICINE

## 2021-11-16 PROCEDURE — 97530 THERAPEUTIC ACTIVITIES: CPT | Mod: GP

## 2021-11-16 PROCEDURE — 258N000003 HC RX IP 258 OP 636: Performed by: NURSE ANESTHETIST, CERTIFIED REGISTERED

## 2021-11-16 PROCEDURE — 258N000001 HC RX 258: Performed by: ORTHOPAEDIC SURGERY

## 2021-11-16 PROCEDURE — 272N000002 HC OR SUPPLY OTHER OPNP: Performed by: ORTHOPAEDIC SURGERY

## 2021-11-16 PROCEDURE — 27447 TOTAL KNEE ARTHROPLASTY: CPT | Mod: RT | Performed by: ORTHOPAEDIC SURGERY

## 2021-11-16 PROCEDURE — 999N000157 HC STATISTIC RCP TIME EA 10 MIN

## 2021-11-16 PROCEDURE — 250N000013 HC RX MED GY IP 250 OP 250 PS 637: Performed by: ORTHOPAEDIC SURGERY

## 2021-11-16 PROCEDURE — 96375 TX/PRO/DX INJ NEW DRUG ADDON: CPT | Mod: XU

## 2021-11-16 PROCEDURE — 64447 NJX AA&/STRD FEMORAL NRV IMG: CPT | Mod: RT | Performed by: NURSE ANESTHETIST, CERTIFIED REGISTERED

## 2021-11-16 PROCEDURE — 278N000051 HC OR IMPLANT GENERAL: Performed by: ORTHOPAEDIC SURGERY

## 2021-11-16 PROCEDURE — 97530 THERAPEUTIC ACTIVITIES: CPT | Mod: GO | Performed by: OCCUPATIONAL THERAPIST

## 2021-11-16 PROCEDURE — 97161 PT EVAL LOW COMPLEX 20 MIN: CPT | Mod: GP

## 2021-11-16 PROCEDURE — 97116 GAIT TRAINING THERAPY: CPT | Mod: GP

## 2021-11-16 PROCEDURE — 370N000017 HC ANESTHESIA TECHNICAL FEE, PER MIN: Performed by: ORTHOPAEDIC SURGERY

## 2021-11-16 PROCEDURE — 360N000077 HC SURGERY LEVEL 4, PER MIN: Performed by: ORTHOPAEDIC SURGERY

## 2021-11-16 PROCEDURE — 999N000141 HC STATISTIC PRE-PROCEDURE NURSING ASSESSMENT: Performed by: ORTHOPAEDIC SURGERY

## 2021-11-16 PROCEDURE — 250N000009 HC RX 250: Performed by: NURSE ANESTHETIST, CERTIFIED REGISTERED

## 2021-11-16 PROCEDURE — 27447 TOTAL KNEE ARTHROPLASTY: CPT | Performed by: NURSE ANESTHETIST, CERTIFIED REGISTERED

## 2021-11-16 PROCEDURE — 97535 SELF CARE MNGMENT TRAINING: CPT | Mod: GO | Performed by: OCCUPATIONAL THERAPIST

## 2021-11-16 PROCEDURE — 250N000011 HC RX IP 250 OP 636: Performed by: ORTHOPAEDIC SURGERY

## 2021-11-16 PROCEDURE — 999N000065 XR KNEE PORT RIGHT 1/2 VIEWS: Mod: RT

## 2021-11-16 PROCEDURE — 710N000010 HC RECOVERY PHASE 1, LEVEL 2, PER MIN: Performed by: ORTHOPAEDIC SURGERY

## 2021-11-16 PROCEDURE — 250N000025 HC SEVOFLURANE, PER MIN: Performed by: ORTHOPAEDIC SURGERY

## 2021-11-16 DEVICE — IMPLANTABLE DEVICE
Type: IMPLANTABLE DEVICE | Site: KNEE | Status: FUNCTIONAL
Brand: PERSONA™

## 2021-11-16 DEVICE — BONE CEMENT SIMPLEX W/TOBRAMYCIN 6197-9-001: Type: IMPLANTABLE DEVICE | Site: KNEE | Status: FUNCTIONAL

## 2021-11-16 DEVICE — IMPLANTABLE DEVICE
Type: IMPLANTABLE DEVICE | Site: KNEE | Status: FUNCTIONAL
Brand: PERSONA®

## 2021-11-16 DEVICE — IMPLANTABLE DEVICE
Type: IMPLANTABLE DEVICE | Site: KNEE | Status: FUNCTIONAL
Brand: PERSONA® VIVACIT-E®

## 2021-11-16 DEVICE — IMP TIBIAL ZIM PSN NP STM 5DEG SZ GR 42-5320-079-02: Type: IMPLANTABLE DEVICE | Site: KNEE | Status: FUNCTIONAL

## 2021-11-16 RX ORDER — BUPIVACAINE HYDROCHLORIDE 5 MG/ML
INJECTION, SOLUTION EPIDURAL; INTRACAUDAL PRN
Status: DISCONTINUED | OUTPATIENT
Start: 2021-11-16 | End: 2021-11-16

## 2021-11-16 RX ORDER — KETOROLAC TROMETHAMINE 30 MG/ML
INJECTION, SOLUTION INTRAMUSCULAR; INTRAVENOUS PRN
Status: DISCONTINUED | OUTPATIENT
Start: 2021-11-16 | End: 2021-11-16

## 2021-11-16 RX ORDER — ONDANSETRON 2 MG/ML
INJECTION INTRAMUSCULAR; INTRAVENOUS PRN
Status: DISCONTINUED | OUTPATIENT
Start: 2021-11-16 | End: 2021-11-16

## 2021-11-16 RX ORDER — PROCHLORPERAZINE MALEATE 5 MG
5 TABLET ORAL EVERY 6 HOURS PRN
Status: DISCONTINUED | OUTPATIENT
Start: 2021-11-16 | End: 2021-11-18 | Stop reason: HOSPADM

## 2021-11-16 RX ORDER — NAPROXEN 250 MG/1
500 TABLET ORAL 2 TIMES DAILY PRN
Status: DISCONTINUED | OUTPATIENT
Start: 2021-11-16 | End: 2021-11-16 | Stop reason: CLARIF

## 2021-11-16 RX ORDER — OXYCODONE HYDROCHLORIDE 5 MG/1
10 TABLET ORAL EVERY 4 HOURS PRN
Status: DISCONTINUED | OUTPATIENT
Start: 2021-11-16 | End: 2021-11-17

## 2021-11-16 RX ORDER — OMEPRAZOLE 40 MG/1
40 CAPSULE, DELAYED RELEASE ORAL EVERY MORNING
Status: DISCONTINUED | OUTPATIENT
Start: 2021-11-17 | End: 2021-11-16 | Stop reason: CLARIF

## 2021-11-16 RX ORDER — CEFAZOLIN SODIUM 2 G/100ML
2 INJECTION, SOLUTION INTRAVENOUS EVERY 8 HOURS
Status: COMPLETED | OUTPATIENT
Start: 2021-11-16 | End: 2021-11-17

## 2021-11-16 RX ORDER — GABAPENTIN 100 MG/1
CAPSULE ORAL
Status: ON HOLD | COMMUNITY
End: 2021-11-16

## 2021-11-16 RX ORDER — ACETAMINOPHEN 325 MG/1
975 TABLET ORAL EVERY 8 HOURS
Status: DISCONTINUED | OUTPATIENT
Start: 2021-11-16 | End: 2021-11-18 | Stop reason: HOSPADM

## 2021-11-16 RX ORDER — ONDANSETRON 4 MG/1
4 TABLET, ORALLY DISINTEGRATING ORAL EVERY 6 HOURS PRN
Status: DISCONTINUED | OUTPATIENT
Start: 2021-11-16 | End: 2021-11-18 | Stop reason: HOSPADM

## 2021-11-16 RX ORDER — NALOXONE HYDROCHLORIDE 0.4 MG/ML
0.4 INJECTION, SOLUTION INTRAMUSCULAR; INTRAVENOUS; SUBCUTANEOUS
Status: DISCONTINUED | OUTPATIENT
Start: 2021-11-16 | End: 2021-11-16

## 2021-11-16 RX ORDER — OXYCODONE HYDROCHLORIDE 5 MG/1
5 TABLET ORAL EVERY 4 HOURS PRN
Status: DISCONTINUED | OUTPATIENT
Start: 2021-11-16 | End: 2021-11-16 | Stop reason: HOSPADM

## 2021-11-16 RX ORDER — AMOXICILLIN 250 MG
1-2 CAPSULE ORAL 2 TIMES DAILY
Qty: 30 TABLET | Refills: 0 | Status: SHIPPED | OUTPATIENT
Start: 2021-11-16 | End: 2022-01-21

## 2021-11-16 RX ORDER — ACETAMINOPHEN 325 MG/1
650 TABLET ORAL EVERY 4 HOURS PRN
Status: DISCONTINUED | OUTPATIENT
Start: 2021-11-19 | End: 2021-11-18 | Stop reason: HOSPADM

## 2021-11-16 RX ORDER — SODIUM CHLORIDE, SODIUM LACTATE, POTASSIUM CHLORIDE, CALCIUM CHLORIDE 600; 310; 30; 20 MG/100ML; MG/100ML; MG/100ML; MG/100ML
INJECTION, SOLUTION INTRAVENOUS CONTINUOUS
Status: DISCONTINUED | OUTPATIENT
Start: 2021-11-16 | End: 2021-11-16

## 2021-11-16 RX ORDER — ASPIRIN 81 MG/1
81 TABLET ORAL 2 TIMES DAILY
Status: DISCONTINUED | OUTPATIENT
Start: 2021-11-17 | End: 2021-11-18 | Stop reason: HOSPADM

## 2021-11-16 RX ORDER — LIDOCAINE HYDROCHLORIDE 20 MG/ML
INJECTION, SOLUTION INFILTRATION; PERINEURAL PRN
Status: DISCONTINUED | OUTPATIENT
Start: 2021-11-16 | End: 2021-11-16

## 2021-11-16 RX ORDER — AMOXICILLIN 250 MG
1 CAPSULE ORAL 2 TIMES DAILY
Status: DISCONTINUED | OUTPATIENT
Start: 2021-11-16 | End: 2021-11-18 | Stop reason: HOSPADM

## 2021-11-16 RX ORDER — HYDROXYZINE HYDROCHLORIDE 10 MG/1
10 TABLET, FILM COATED ORAL EVERY 6 HOURS PRN
Status: DISCONTINUED | OUTPATIENT
Start: 2021-11-16 | End: 2021-11-17

## 2021-11-16 RX ORDER — SODIUM CHLORIDE, SODIUM LACTATE, POTASSIUM CHLORIDE, CALCIUM CHLORIDE 600; 310; 30; 20 MG/100ML; MG/100ML; MG/100ML; MG/100ML
INJECTION, SOLUTION INTRAVENOUS CONTINUOUS
Status: DISCONTINUED | OUTPATIENT
Start: 2021-11-16 | End: 2021-11-16 | Stop reason: HOSPADM

## 2021-11-16 RX ORDER — HYDROMORPHONE HYDROCHLORIDE 2 MG/ML
INJECTION, SOLUTION INTRAMUSCULAR; INTRAVENOUS; SUBCUTANEOUS PRN
Status: DISCONTINUED | OUTPATIENT
Start: 2021-11-16 | End: 2021-11-16

## 2021-11-16 RX ORDER — LISINOPRIL 40 MG/1
40 TABLET ORAL DAILY
Status: DISCONTINUED | OUTPATIENT
Start: 2021-11-17 | End: 2021-11-18 | Stop reason: HOSPADM

## 2021-11-16 RX ORDER — ONDANSETRON 2 MG/ML
4 INJECTION INTRAMUSCULAR; INTRAVENOUS EVERY 6 HOURS PRN
Status: DISCONTINUED | OUTPATIENT
Start: 2021-11-16 | End: 2021-11-18 | Stop reason: HOSPADM

## 2021-11-16 RX ORDER — DEXMEDETOMIDINE HYDROCHLORIDE 4 UG/ML
INJECTION, SOLUTION INTRAVENOUS PRN
Status: DISCONTINUED | OUTPATIENT
Start: 2021-11-16 | End: 2021-11-16

## 2021-11-16 RX ORDER — NALOXONE HYDROCHLORIDE 0.4 MG/ML
0.2 INJECTION, SOLUTION INTRAMUSCULAR; INTRAVENOUS; SUBCUTANEOUS
Status: DISCONTINUED | OUTPATIENT
Start: 2021-11-16 | End: 2021-11-16

## 2021-11-16 RX ORDER — KETOROLAC TROMETHAMINE 15 MG/ML
15 INJECTION, SOLUTION INTRAMUSCULAR; INTRAVENOUS EVERY 6 HOURS
Status: COMPLETED | OUTPATIENT
Start: 2021-11-16 | End: 2021-11-17

## 2021-11-16 RX ORDER — ONDANSETRON 2 MG/ML
4 INJECTION INTRAMUSCULAR; INTRAVENOUS EVERY 30 MIN PRN
Status: DISCONTINUED | OUTPATIENT
Start: 2021-11-16 | End: 2021-11-16 | Stop reason: HOSPADM

## 2021-11-16 RX ORDER — FENTANYL CITRATE 50 UG/ML
50 INJECTION, SOLUTION INTRAMUSCULAR; INTRAVENOUS EVERY 5 MIN PRN
Status: DISCONTINUED | OUTPATIENT
Start: 2021-11-16 | End: 2021-11-16 | Stop reason: HOSPADM

## 2021-11-16 RX ORDER — CEFAZOLIN SODIUM 2 G/100ML
2 INJECTION, SOLUTION INTRAVENOUS
Status: COMPLETED | OUTPATIENT
Start: 2021-11-16 | End: 2021-11-16

## 2021-11-16 RX ORDER — NALOXONE HYDROCHLORIDE 0.4 MG/ML
0.4 INJECTION, SOLUTION INTRAMUSCULAR; INTRAVENOUS; SUBCUTANEOUS
Status: DISCONTINUED | OUTPATIENT
Start: 2021-11-16 | End: 2021-11-18 | Stop reason: HOSPADM

## 2021-11-16 RX ORDER — TRANEXAMIC ACID 650 MG/1
1950 TABLET ORAL ONCE
Status: COMPLETED | OUTPATIENT
Start: 2021-11-16 | End: 2021-11-16

## 2021-11-16 RX ORDER — PROPOFOL 10 MG/ML
INJECTION, EMULSION INTRAVENOUS PRN
Status: DISCONTINUED | OUTPATIENT
Start: 2021-11-16 | End: 2021-11-16

## 2021-11-16 RX ORDER — GLYCINE 1.5 G/100ML
SOLUTION IRRIGATION PRN
Status: DISCONTINUED | OUTPATIENT
Start: 2021-11-16 | End: 2021-11-16 | Stop reason: HOSPADM

## 2021-11-16 RX ORDER — FENTANYL CITRATE 50 UG/ML
50 INJECTION, SOLUTION INTRAMUSCULAR; INTRAVENOUS
Status: DISCONTINUED | OUTPATIENT
Start: 2021-11-16 | End: 2021-11-16 | Stop reason: HOSPADM

## 2021-11-16 RX ORDER — OXYCODONE HYDROCHLORIDE 5 MG/1
5 TABLET ORAL EVERY 4 HOURS PRN
Status: DISCONTINUED | OUTPATIENT
Start: 2021-11-16 | End: 2021-11-17

## 2021-11-16 RX ORDER — DEXTROSE MONOHYDRATE 25 G/50ML
25-50 INJECTION, SOLUTION INTRAVENOUS
Status: DISCONTINUED | OUTPATIENT
Start: 2021-11-16 | End: 2021-11-18 | Stop reason: HOSPADM

## 2021-11-16 RX ORDER — FLUMAZENIL 0.1 MG/ML
0.2 INJECTION, SOLUTION INTRAVENOUS
Status: DISCONTINUED | OUTPATIENT
Start: 2021-11-16 | End: 2021-11-16 | Stop reason: HOSPADM

## 2021-11-16 RX ORDER — ASPIRIN 81 MG/1
81 TABLET ORAL DAILY
Status: DISCONTINUED | OUTPATIENT
Start: 2021-11-16 | End: 2021-11-16

## 2021-11-16 RX ORDER — HYDROXYZINE HYDROCHLORIDE 10 MG/1
10 TABLET, FILM COATED ORAL EVERY 6 HOURS PRN
Qty: 30 TABLET | Refills: 0 | Status: SHIPPED | OUTPATIENT
Start: 2021-11-16 | End: 2021-12-07

## 2021-11-16 RX ORDER — ACETAMINOPHEN 325 MG/1
975 TABLET ORAL ONCE
Status: COMPLETED | OUTPATIENT
Start: 2021-11-16 | End: 2021-11-16

## 2021-11-16 RX ORDER — ASPIRIN 81 MG/1
81 TABLET ORAL 2 TIMES DAILY
Qty: 60 TABLET | Refills: 0 | Status: SHIPPED | OUTPATIENT
Start: 2021-11-16 | End: 2021-12-28 | Stop reason: DRUGHIGH

## 2021-11-16 RX ORDER — ACETAMINOPHEN 325 MG/1
650 TABLET ORAL EVERY 4 HOURS PRN
Qty: 100 TABLET | Refills: 0 | Status: SHIPPED | OUTPATIENT
Start: 2021-11-16 | End: 2022-07-13

## 2021-11-16 RX ORDER — ATORVASTATIN CALCIUM 20 MG/1
20 TABLET, FILM COATED ORAL DAILY
Status: DISCONTINUED | OUTPATIENT
Start: 2021-11-17 | End: 2021-11-16

## 2021-11-16 RX ORDER — CEFAZOLIN SODIUM 2 G/100ML
2 INJECTION, SOLUTION INTRAVENOUS SEE ADMIN INSTRUCTIONS
Status: DISCONTINUED | OUTPATIENT
Start: 2021-11-16 | End: 2021-11-16 | Stop reason: HOSPADM

## 2021-11-16 RX ORDER — PANTOPRAZOLE SODIUM 40 MG/1
40 TABLET, DELAYED RELEASE ORAL DAILY
Status: DISCONTINUED | OUTPATIENT
Start: 2021-11-17 | End: 2021-11-18 | Stop reason: HOSPADM

## 2021-11-16 RX ORDER — KETAMINE HYDROCHLORIDE 10 MG/ML
INJECTION INTRAMUSCULAR; INTRAVENOUS PRN
Status: DISCONTINUED | OUTPATIENT
Start: 2021-11-16 | End: 2021-11-16

## 2021-11-16 RX ORDER — FENTANYL CITRATE 50 UG/ML
25-50 INJECTION, SOLUTION INTRAMUSCULAR; INTRAVENOUS
Status: DISCONTINUED | OUTPATIENT
Start: 2021-11-16 | End: 2021-11-16 | Stop reason: HOSPADM

## 2021-11-16 RX ORDER — VITAMIN B COMPLEX
50 TABLET ORAL DAILY
Status: DISCONTINUED | OUTPATIENT
Start: 2021-11-17 | End: 2021-11-16

## 2021-11-16 RX ORDER — DEXAMETHASONE SODIUM PHOSPHATE 10 MG/ML
INJECTION, SOLUTION INTRAMUSCULAR; INTRAVENOUS PRN
Status: DISCONTINUED | OUTPATIENT
Start: 2021-11-16 | End: 2021-11-16

## 2021-11-16 RX ORDER — DEXAMETHASONE SODIUM PHOSPHATE 4 MG/ML
INJECTION, SOLUTION INTRA-ARTICULAR; INTRALESIONAL; INTRAMUSCULAR; INTRAVENOUS; SOFT TISSUE PRN
Status: DISCONTINUED | OUTPATIENT
Start: 2021-11-16 | End: 2021-11-16

## 2021-11-16 RX ORDER — FENTANYL CITRATE 50 UG/ML
INJECTION, SOLUTION INTRAMUSCULAR; INTRAVENOUS PRN
Status: DISCONTINUED | OUTPATIENT
Start: 2021-11-16 | End: 2021-11-16

## 2021-11-16 RX ORDER — METFORMIN HCL 500 MG
1000 TABLET, EXTENDED RELEASE 24 HR ORAL 2 TIMES DAILY WITH MEALS
Status: DISCONTINUED | OUTPATIENT
Start: 2021-11-16 | End: 2021-11-16

## 2021-11-16 RX ORDER — HYDROMORPHONE HYDROCHLORIDE 1 MG/ML
0.4 INJECTION, SOLUTION INTRAMUSCULAR; INTRAVENOUS; SUBCUTANEOUS
Status: DISCONTINUED | OUTPATIENT
Start: 2021-11-16 | End: 2021-11-17

## 2021-11-16 RX ORDER — EPHEDRINE SULFATE 50 MG/ML
INJECTION, SOLUTION INTRAVENOUS PRN
Status: DISCONTINUED | OUTPATIENT
Start: 2021-11-16 | End: 2021-11-16

## 2021-11-16 RX ORDER — NALOXONE HYDROCHLORIDE 0.4 MG/ML
0.2 INJECTION, SOLUTION INTRAMUSCULAR; INTRAVENOUS; SUBCUTANEOUS
Status: DISCONTINUED | OUTPATIENT
Start: 2021-11-16 | End: 2021-11-18 | Stop reason: HOSPADM

## 2021-11-16 RX ORDER — BISACODYL 10 MG
10 SUPPOSITORY, RECTAL RECTAL DAILY PRN
Status: DISCONTINUED | OUTPATIENT
Start: 2021-11-16 | End: 2021-11-18 | Stop reason: HOSPADM

## 2021-11-16 RX ORDER — LIDOCAINE 40 MG/G
CREAM TOPICAL
Status: DISCONTINUED | OUTPATIENT
Start: 2021-11-16 | End: 2021-11-16 | Stop reason: HOSPADM

## 2021-11-16 RX ORDER — NICOTINE POLACRILEX 4 MG
15-30 LOZENGE BUCCAL
Status: DISCONTINUED | OUTPATIENT
Start: 2021-11-16 | End: 2021-11-18 | Stop reason: HOSPADM

## 2021-11-16 RX ORDER — POLYETHYLENE GLYCOL 3350 17 G/17G
17 POWDER, FOR SOLUTION ORAL DAILY
Status: DISCONTINUED | OUTPATIENT
Start: 2021-11-17 | End: 2021-11-18 | Stop reason: HOSPADM

## 2021-11-16 RX ORDER — OXYCODONE HYDROCHLORIDE 5 MG/1
5-10 TABLET ORAL
Qty: 30 TABLET | Refills: 0 | Status: SHIPPED | OUTPATIENT
Start: 2021-11-16 | End: 2021-11-23

## 2021-11-16 RX ORDER — HYDROMORPHONE HYDROCHLORIDE 1 MG/ML
0.2 INJECTION, SOLUTION INTRAMUSCULAR; INTRAVENOUS; SUBCUTANEOUS
Status: DISCONTINUED | OUTPATIENT
Start: 2021-11-16 | End: 2021-11-17

## 2021-11-16 RX ORDER — ONDANSETRON 4 MG/1
4 TABLET, ORALLY DISINTEGRATING ORAL EVERY 30 MIN PRN
Status: DISCONTINUED | OUTPATIENT
Start: 2021-11-16 | End: 2021-11-16 | Stop reason: HOSPADM

## 2021-11-16 RX ORDER — BUPIVACAINE HYDROCHLORIDE 2.5 MG/ML
INJECTION, SOLUTION EPIDURAL; INFILTRATION; INTRACAUDAL PRN
Status: DISCONTINUED | OUTPATIENT
Start: 2021-11-16 | End: 2021-11-16

## 2021-11-16 RX ORDER — CELECOXIB 100 MG/1
400 CAPSULE ORAL ONCE
Status: COMPLETED | OUTPATIENT
Start: 2021-11-16 | End: 2021-11-16

## 2021-11-16 RX ORDER — HYDROMORPHONE HYDROCHLORIDE 1 MG/ML
0.4 INJECTION, SOLUTION INTRAMUSCULAR; INTRAVENOUS; SUBCUTANEOUS EVERY 5 MIN PRN
Status: DISCONTINUED | OUTPATIENT
Start: 2021-11-16 | End: 2021-11-16 | Stop reason: HOSPADM

## 2021-11-16 RX ORDER — AMLODIPINE BESYLATE 10 MG/1
10 TABLET ORAL DAILY
Status: DISCONTINUED | OUTPATIENT
Start: 2021-11-17 | End: 2021-11-18 | Stop reason: HOSPADM

## 2021-11-16 RX ORDER — ONDANSETRON 4 MG/1
8 TABLET, ORALLY DISINTEGRATING ORAL EVERY 8 HOURS PRN
Status: DISCONTINUED | OUTPATIENT
Start: 2021-11-16 | End: 2021-11-16

## 2021-11-16 RX ORDER — LIDOCAINE 40 MG/G
CREAM TOPICAL
Status: DISCONTINUED | OUTPATIENT
Start: 2021-11-16 | End: 2021-11-18 | Stop reason: HOSPADM

## 2021-11-16 RX ADMIN — SODIUM CHLORIDE, SODIUM LACTATE, POTASSIUM CHLORIDE, AND CALCIUM CHLORIDE: 600; 310; 30; 20 INJECTION, SOLUTION INTRAVENOUS at 08:49

## 2021-11-16 RX ADMIN — Medication 10 MG: at 10:45

## 2021-11-16 RX ADMIN — DEXAMETHASONE SODIUM PHOSPHATE 4 MG: 10 INJECTION, SOLUTION INTRAMUSCULAR; INTRAVENOUS at 09:43

## 2021-11-16 RX ADMIN — HYDROMORPHONE HYDROCHLORIDE 0.5 MG: 2 INJECTION, SOLUTION INTRAMUSCULAR; INTRAVENOUS; SUBCUTANEOUS at 10:45

## 2021-11-16 RX ADMIN — DEXMEDETOMIDINE HYDROCHLORIDE 8 MCG: 4 INJECTION, SOLUTION INTRAVENOUS at 10:49

## 2021-11-16 RX ADMIN — EPHEDRINE SULFATE 10 MG: 50 INJECTION INTRAMUSCULAR; INTRAVENOUS; SUBCUTANEOUS at 10:19

## 2021-11-16 RX ADMIN — DOCUSATE SODIUM 50 MG AND SENNOSIDES 8.6 MG 1 TABLET: 8.6; 5 TABLET, FILM COATED ORAL at 22:52

## 2021-11-16 RX ADMIN — CEFAZOLIN SODIUM 2 G: 2 INJECTION, SOLUTION INTRAVENOUS at 10:13

## 2021-11-16 RX ADMIN — EPHEDRINE SULFATE 5 MG: 50 INJECTION INTRAMUSCULAR; INTRAVENOUS; SUBCUTANEOUS at 11:19

## 2021-11-16 RX ADMIN — ACETAMINOPHEN 975 MG: 325 TABLET, FILM COATED ORAL at 22:51

## 2021-11-16 RX ADMIN — MIDAZOLAM 2 MG: 1 INJECTION INTRAMUSCULAR; INTRAVENOUS at 09:39

## 2021-11-16 RX ADMIN — ROCURONIUM BROMIDE 50 MG: 50 INJECTION, SOLUTION INTRAVENOUS at 10:10

## 2021-11-16 RX ADMIN — SODIUM CHLORIDE 20 MCG: 900 INJECTION, SOLUTION INTRAVENOUS at 09:43

## 2021-11-16 RX ADMIN — EPHEDRINE SULFATE 10 MG: 50 INJECTION INTRAMUSCULAR; INTRAVENOUS; SUBCUTANEOUS at 10:16

## 2021-11-16 RX ADMIN — OXYCODONE HYDROCHLORIDE 10 MG: 5 TABLET ORAL at 18:22

## 2021-11-16 RX ADMIN — PHENYLEPHRINE HYDROCHLORIDE 100 MCG: 10 INJECTION INTRAVENOUS at 10:20

## 2021-11-16 RX ADMIN — CEFAZOLIN SODIUM 2 G: 2 INJECTION, SOLUTION INTRAVENOUS at 17:35

## 2021-11-16 RX ADMIN — Medication 10 MG: at 11:19

## 2021-11-16 RX ADMIN — MIDAZOLAM 2 MG: 1 INJECTION INTRAMUSCULAR; INTRAVENOUS at 09:50

## 2021-11-16 RX ADMIN — SODIUM CHLORIDE 20 MCG: 900 INJECTION, SOLUTION INTRAVENOUS at 09:47

## 2021-11-16 RX ADMIN — PHENYLEPHRINE HYDROCHLORIDE 100 MCG: 10 INJECTION INTRAVENOUS at 10:18

## 2021-11-16 RX ADMIN — SUGAMMADEX 200 MG: 100 INJECTION, SOLUTION INTRAVENOUS at 11:30

## 2021-11-16 RX ADMIN — SODIUM CHLORIDE, SODIUM LACTATE, POTASSIUM CHLORIDE, AND CALCIUM CHLORIDE: 600; 310; 30; 20 INJECTION, SOLUTION INTRAVENOUS at 11:33

## 2021-11-16 RX ADMIN — KETOROLAC TROMETHAMINE 15 MG: 15 INJECTION, SOLUTION INTRAMUSCULAR; INTRAVENOUS at 17:21

## 2021-11-16 RX ADMIN — DEXMEDETOMIDINE HYDROCHLORIDE 8 MCG: 4 INJECTION, SOLUTION INTRAVENOUS at 10:55

## 2021-11-16 RX ADMIN — DOCUSATE SODIUM 50 MG AND SENNOSIDES 8.6 MG 1 TABLET: 8.6; 5 TABLET, FILM COATED ORAL at 16:23

## 2021-11-16 RX ADMIN — FENTANYL CITRATE 50 MCG: 50 INJECTION, SOLUTION INTRAMUSCULAR; INTRAVENOUS at 10:10

## 2021-11-16 RX ADMIN — CELECOXIB 400 MG: 100 CAPSULE ORAL at 08:26

## 2021-11-16 RX ADMIN — FENTANYL CITRATE 50 MCG: 50 INJECTION, SOLUTION INTRAMUSCULAR; INTRAVENOUS at 12:00

## 2021-11-16 RX ADMIN — ROCURONIUM BROMIDE 30 MG: 50 INJECTION, SOLUTION INTRAVENOUS at 10:33

## 2021-11-16 RX ADMIN — BUPIVACAINE HYDROCHLORIDE 20 ML: 5 INJECTION, SOLUTION EPIDURAL; INTRACAUDAL; PERINEURAL at 09:43

## 2021-11-16 RX ADMIN — ONDANSETRON HYDROCHLORIDE 4 MG: 2 SOLUTION INTRAMUSCULAR; INTRAVENOUS at 10:15

## 2021-11-16 RX ADMIN — ACETAMINOPHEN 975 MG: 325 TABLET, FILM COATED ORAL at 08:25

## 2021-11-16 RX ADMIN — ACETAMINOPHEN 975 MG: 325 TABLET, FILM COATED ORAL at 13:51

## 2021-11-16 RX ADMIN — BUPIVACAINE HYDROCHLORIDE 10 ML: 2.5 INJECTION, SOLUTION EPIDURAL; INFILTRATION; INTRACAUDAL; PERINEURAL at 09:47

## 2021-11-16 RX ADMIN — DEXAMETHASONE SODIUM PHOSPHATE 4 MG: 4 INJECTION, SOLUTION INTRAMUSCULAR; INTRAVENOUS at 10:15

## 2021-11-16 RX ADMIN — ONDANSETRON 4 MG: 2 INJECTION INTRAMUSCULAR; INTRAVENOUS at 13:50

## 2021-11-16 RX ADMIN — KETOROLAC TROMETHAMINE 30 MG: 30 INJECTION, SOLUTION INTRAMUSCULAR at 11:26

## 2021-11-16 RX ADMIN — OXYCODONE HYDROCHLORIDE 10 MG: 5 TABLET ORAL at 22:51

## 2021-11-16 RX ADMIN — HYDROMORPHONE HYDROCHLORIDE 1 MG: 2 INJECTION, SOLUTION INTRAMUSCULAR; INTRAVENOUS; SUBCUTANEOUS at 10:35

## 2021-11-16 RX ADMIN — TRANEXAMIC ACID 1950 MG: 650 TABLET ORAL at 08:27

## 2021-11-16 RX ADMIN — DEXAMETHASONE SODIUM PHOSPHATE 4 MG: 10 INJECTION, SOLUTION INTRAMUSCULAR; INTRAVENOUS at 09:47

## 2021-11-16 RX ADMIN — DEXMEDETOMIDINE HYDROCHLORIDE 16 MCG: 4 INJECTION, SOLUTION INTRAVENOUS at 11:44

## 2021-11-16 RX ADMIN — FENTANYL CITRATE 50 MCG: 50 INJECTION, SOLUTION INTRAMUSCULAR; INTRAVENOUS at 11:38

## 2021-11-16 RX ADMIN — PHENYLEPHRINE HYDROCHLORIDE 50 MCG: 10 INJECTION INTRAVENOUS at 11:18

## 2021-11-16 RX ADMIN — Medication 10 MG: at 11:09

## 2021-11-16 RX ADMIN — SODIUM CHLORIDE, POTASSIUM CHLORIDE, SODIUM LACTATE AND CALCIUM CHLORIDE: 600; 310; 30; 20 INJECTION, SOLUTION INTRAVENOUS at 13:33

## 2021-11-16 RX ADMIN — LIDOCAINE HYDROCHLORIDE 100 MG: 20 INJECTION, SOLUTION INFILTRATION; PERINEURAL at 10:10

## 2021-11-16 RX ADMIN — HYDROMORPHONE HYDROCHLORIDE 0.4 MG: 1 INJECTION, SOLUTION INTRAMUSCULAR; INTRAVENOUS; SUBCUTANEOUS at 12:26

## 2021-11-16 RX ADMIN — PREGABALIN 150 MG: 25 CAPSULE ORAL at 22:52

## 2021-11-16 RX ADMIN — PROPOFOL 200 MG: 10 INJECTION, EMULSION INTRAVENOUS at 10:10

## 2021-11-16 RX ADMIN — INSULIN ASPART 2 UNITS: 100 INJECTION, SOLUTION INTRAVENOUS; SUBCUTANEOUS at 17:27

## 2021-11-16 RX ADMIN — OXYCODONE HYDROCHLORIDE 5 MG: 5 TABLET ORAL at 13:50

## 2021-11-16 RX ADMIN — Medication 20 MG: at 10:10

## 2021-11-16 RX ADMIN — FENTANYL CITRATE 50 MCG: 50 INJECTION, SOLUTION INTRAMUSCULAR; INTRAVENOUS at 12:09

## 2021-11-16 ASSESSMENT — ACTIVITIES OF DAILY LIVING (ADL)
WEAR_GLASSES_OR_BLIND: NO
DOING_ERRANDS_INDEPENDENTLY_DIFFICULTY: NO
CONCENTRATING,_REMEMBERING_OR_MAKING_DECISIONS_DIFFICULTY: NO
DIFFICULTY_COMMUNICATING: NO
WALKING_OR_CLIMBING_STAIRS_DIFFICULTY: NO
PATIENT_/_FAMILY_COMMUNICATION_STYLE: SPOKEN LANGUAGE (ENGLISH OR BILINGUAL)
DIFFICULTY_EATING/SWALLOWING: NO
DRESSING/BATHING_DIFFICULTY: NO
HEARING_DIFFICULTY_OR_DEAF: NO
FALL_HISTORY_WITHIN_LAST_SIX_MONTHS: NO
TOILETING_ISSUES: NO

## 2021-11-16 ASSESSMENT — MIFFLIN-ST. JEOR: SCORE: 1864.88

## 2021-11-16 ASSESSMENT — LIFESTYLE VARIABLES: TOBACCO_USE: 1

## 2021-11-16 NOTE — ANESTHESIA PREPROCEDURE EVALUATION
Anesthesia Pre-Procedure Evaluation    Patient: Dilip Kan   MRN: [id number] : 1955        Preoperative Diagnosis: Chronic pain of right knee [M25.561, G89.29]    Procedure : Procedure(s):  ARTHROPLASTY, KNEE, TOTAL          Past Medical History:   Diagnosis Date     Elevated prostate specific antigen (PSA)     4/10/2012     Encounter for other administrative examinations     2014     Esophagitis     No Comments Provided     Gastro-esophageal reflux disease without esophagitis     No Comments Provided     Low back pain     No Comments Provided     Malignant neoplasm of prostate (H)     2012     Nicotine dependence, uncomplicated     Quit - 2007 with chantix     Other intervertebral disc degeneration, lumbosacral region     2008      Past Surgical History:   Procedure Laterality Date     APPENDECTOMY OPEN      Ruptured - [place]     COLONOSCOPY  2006    next due in      DISKECTOMY, LUMBAR, SINGLE SP  2009    [facility]     ESOPHAGOSCOPY, GASTROSCOPY, DUODENOSCOPY (EGD), COMBINED  2003          ESOPHAGOSCOPY, GASTROSCOPY, DUODENOSCOPY (EGD), COMBINED  2006          PROSTATECTOMY PERINEAL RADICAL  2012    Nerve Sparring, Dr Warner     SPINE SURGERY N/A 2017    L3 to S1 spinal decompression L4/L5 fusion     TONSILLECTOMY, ADENOIDECTOMY, COMBINED      as a child     VARICOCELECTOMY      INCISION AND DRAINAGE,EXCISE VARICOCELE      No Known Allergies   Social History     Tobacco Use     Smoking status: Former Smoker     Packs/day: 1.00     Years: 20.00     Pack years: 20.00     Types: Cigarettes     Quit date: 2002     Years since quittin.0     Smokeless tobacco: Current User     Types: Snuff   Substance Use Topics     Alcohol use: Yes     Comment:  5 drinks a month      Wt Readings from Last 1 Encounters:   21 108.9 kg (240 lb)        Anesthesia Evaluation   Pt has had prior anesthetic.     No  history of anesthetic complications       ROS/MED HX  ENT/Pulmonary: Comment: Past smoker.  Currently chews tobacco.     (+) tobacco use,     Neurologic:  - neg neurologic ROS     Cardiovascular:     (+) hypertension-----Previous cardiac testing   Echo: Date: 2/31/21 Results:    Stress Test: Date: Results:    ECG Reviewed: Date: Results:    Cath: Date: Results:      METS/Exercise Tolerance: >4 METS    Hematologic:       Musculoskeletal:  - neg musculoskeletal ROS     GI/Hepatic:     (+) GERD,     Renal/Genitourinary:  - neg Renal ROS     Endo:     (+) Obesity,     Psychiatric/Substance Use:     (+) H/O chronic opiod use .     Infectious Disease:  - neg infectious disease ROS     Malignancy:   (+) Malignancy, History of Prostate.Prostate CA Active status post Chemo.        Other:  - neg other ROS          Physical Exam    Airway        Mallampati: II       Respiratory Devices and Support         Dental       (+) upper dentures      Cardiovascular   cardiovascular exam normal       Rhythm and rate: regular and normal     Pulmonary   pulmonary exam normal        breath sounds clear to auscultation           OUTSIDE LABS:  CBC:   Lab Results   Component Value Date    WBC 4.8 11/01/2021    WBC 8.0 09/17/2021    HGB 13.3 11/01/2021    HGB 12.8 (L) 09/17/2021    HCT 40.7 11/01/2021    HCT 38.5 (L) 09/17/2021     11/01/2021     09/17/2021     BMP:   Lab Results   Component Value Date     11/01/2021     09/17/2021    POTASSIUM 4.4 11/01/2021    POTASSIUM 4.4 09/17/2021    CHLORIDE 103 11/01/2021    CHLORIDE 104 09/17/2021    CO2 28 11/01/2021    CO2 30 09/17/2021    BUN 14 11/01/2021    BUN 19 09/17/2021    CR 0.70 11/01/2021    CR 0.67 (L) 09/17/2021     (H) 11/01/2021     (H) 09/17/2021     COAGS:   Lab Results   Component Value Date    INR 0.95 11/01/2018     POC: No results found for: BGM, HCG, HCGS  HEPATIC:   Lab Results   Component Value Date    ALBUMIN 3.9 11/01/2021     PROTTOTAL 6.6 11/01/2021    ALT 17 11/01/2021    AST 16 11/01/2021    ALKPHOS 60 11/01/2021    BILITOTAL 0.3 11/01/2021     OTHER:   Lab Results   Component Value Date    A1C 6.4 (H) 11/01/2021    ROBERTO 9.7 11/01/2021    TSH 1.46 11/01/2021       Anesthesia Plan    ASA Status:  3   NPO Status:  NPO Appropriate    Anesthesia Type: General.     - Airway: ETT   Induction: Intravenous.   Maintenance: Balanced.        Consents    Anesthesia Plan(s) and associated risks, benefits, and realistic alternatives discussed. Questions answered and patient/representative(s) expressed understanding.     - Discussed with:  Patient         Postoperative Care    Pain management: IV analgesics, Multi-modal analgesia, Peripheral nerve block (Single Shot) (Adductor and IPACK block).        Comments:                MOISÉS TORRES CRNA

## 2021-11-16 NOTE — ANESTHESIA POSTPROCEDURE EVALUATION
Patient: Dilip Kan    Procedure: Procedure(s):  ARTHROPLASTY, KNEE, TOTAL       Diagnosis:Chronic pain of right knee [M25.561, G89.29]  Diagnosis Additional Information: No value filed.    Anesthesia Type:  General    Note:  Disposition: Admission   Postop Pain Control: Uneventful            Sign Out: Well controlled pain   PONV: No   Neuro/Psych: Uneventful            Sign Out: Acceptable/Baseline neuro status   Airway/Respiratory: Uneventful            Sign Out: Acceptable/Baseline resp. status   CV/Hemodynamics: Uneventful            Sign Out: Acceptable CV status; No obvious hypovolemia; No obvious fluid overload   Other NRE: NONE   DID A NON-ROUTINE EVENT OCCUR? No           Last vitals:  Vitals Value Taken Time   /56 11/16/21 1235   Temp 96.7  F (35.9  C) 11/16/21 1245   Pulse 67 11/16/21 1239   Resp 45 11/16/21 1239   SpO2 97 % 11/16/21 1253   Vitals shown include unvalidated device data.    Electronically Signed By: MOISÉS TORRES CRNA  November 16, 2021  3:46 PM

## 2021-11-16 NOTE — PROGRESS NOTES
"NSG ADMISSION NOTE    Patient admitted to room  at approximately 1330 via cart from surgery. Patient was accompanied by spouse.     Verbal SBAR report received from LISA Cullen  prior to patient arrival.      Patient alert and oriented X 3. Pain is controlled with ice cooler and OR medications. .  . Admission vital signs: Blood pressure 128/61, pulse 78, temperature 97.9  F (36.6  C), temperature source Tympanic, resp. rate 18, height 1.778 m (5' 10\"), weight 108.9 kg (240 lb), SpO2 96 %. Patient was oriented to plan of care, call light, bed controls, tv, telephone, bathroom and visiting hours.     Risk Assessment    The following safety risks were identified during admission: fall and skin. Yellow risk band applied: YES.     Skin Initial Assessment    This writer admitted this patient and completed a full skin assessment and Lopez score in the Adult PCS flowsheet. Appropriate interventions initiated as needed.            Education    Patient has a [facility] to Observation order: No  Observation education completed and documented: N/A      Cynthia Hall RN    "

## 2021-11-16 NOTE — OR NURSING
PACU Transfer Note    Dilip Kan was transferred to [facility] via bed.  Equipment used for transport:  none.  Accompanied by:  Eloise GONZALEZ  Prescriptions were: n/a    PACU Respiratory Event Documentation     1) Episodes of Apnea greater than or equal to 10 seconds: 0    2) Bradypnea - less than 8 breaths per minute: 0    3) Pain score on 0 to 10 scale: 4    4) Pain-sedation mismatch (yes or no): no    5) Repeated 02 desaturation less than 90% (yes or no): no    Anesthesia notified? (yes or no): n/a    Any of the above events occuring repeatedly in separate 30 minute intervals may be considered recurrent PACU respiratory events.    Patient stable and meets phase 1 discharge criteria for transport from PACU.

## 2021-11-16 NOTE — INTERVAL H&P NOTE
Brief History of Illness:   Dilip Kan is a 66 year old male who was admitted for right knee pain    H&P reviewed and patient examined with no new updates from prior exam

## 2021-11-16 NOTE — BRIEF OP NOTE
[facility]    Brief Operative Note    Pre-operative diagnosis: Chronic pain of right knee [M25.561, G89.29]  Post-operative diagnosis Same as pre-operative diagnosis    Procedure: Procedure(s):  ARTHROPLASTY, KNEE, TOTAL  Surgeon: Surgeon(s) and Role:     * Micah Rock MD - Primary     * Eren Chester PA-C - Assisting  Anesthesia: General with Adductor Block   Estimated Blood Loss: Less than 50 ml    Drains: None  Specimens: * No specimens in log *  Findings:   None.  Complications: None.  Implants:   Implant Name Type Inv. Item Serial No.  Lot No. LRB No. Used Action   IMP SCR ZIM PSN FEMALE 2.5MM -467-25 - [id number] Metallic Hardware/[place] IMP SCR ZIM PSN FEMALE 2.5MM -649-25  [employer] [id number] Right 1 Used as a Supply   BONE CEMENT SIMPLEX W/TOBRAMYCIN 6197-9-001 - [id number] Cement, Bone BONE CEMENT SIMPLEX W/TOBRAMYCIN 6197-9-001  [employer] [id number] Right 2 Implanted   IMP TIBIAL ZIM PSN NP STM 5DEG SZ GR -871-02 - [id number] Total Joint Component/Insert IMP TIBIAL ZIM PSN NP STM 5DEG SZ GR -198-02  [employer] [id number] Right 1 Implanted   IMP COMP FEM ZIM PSN PS CMT STD SZ 11 RT  -418-02 - [id number] Total Joint Component/Insert IMP COMP FEM ZIM PSN PS CMT STD SZ 11 RT  -336-02  [employer] [id number] Right 1 Implanted   PERSONA  Articular Surface Fixed Bearing PS Right 11 mm    CB [id number] Right 1 Implanted   Persona All-Poly Patella Cemented 38 mm diameter, 9.5 mm thickness    CB [id number] Right 1 Implanted

## 2021-11-16 NOTE — ANESTHESIA PROCEDURE NOTES
Other (I PACK) Procedure Note    Pre-Procedure   Staff -        CRNA: Alf Álvarez APRN CRNA       Performed By: CRNA       Location: pre-op       Procedure Start/Stop Times: 11/16/2021 9:40 AM and 11/16/2021 9:50 AM       Pre-Anesthestic Checklist: patient identified, IV checked, site marked, risks and benefits discussed, informed consent, monitors and equipment checked, pre-op evaluation, at physician/surgeon's request and post-op pain management  Timeout:       Correct Patient: Yes        Correct Procedure: Yes        Correct Site: Yes        Correct Position: Yes        Correct Laterality: Yes        Site Marked: Yes  Procedure Documentation  Procedure: Other (I PACK)       Diagnosis: POST OP PAIN       Laterality: right       Patient Position: lateral       Skin prep: Chloraprep      Local skin infiltrated with 1 mL of. Local skin infiltration: bupivicaine.        Needle Type: insulated       Needle Gauge: 20.        Needle Length (Inches): 6        1. Ultrasound was used to identify targeted nerve, plexus, vascular marker, or fascial plane and place a needle adjacent to it in real-time.       2. Ultrasound was used to visualize the spread of anesthetic in close proximity to the above referenced structure.       3. A permanent image is entered into the patient's record.       4. The visualized anatomic structures appeared normal.       5. There were no apparent abnormal pathologic findings.    Assessment/Narrative         The placement was negative for: blood aspirated, painful injection and site bleeding       Paresthesias: No.     Bolus given via needle..        Secured via.        Insertion/Infusion Method: Single Shot

## 2021-11-16 NOTE — ANESTHESIA PROCEDURE NOTES
Airway       Patient location during procedure: OR       Procedure Start/Stop Times: 11/16/2021 10:47 AM  Staff -        CRNA: Alf Álvarez APRN CRNA       Performed By: CRNA  Consent for Airway        Urgency: elective  Indications and Patient Condition       Indications for airway management: lynn-procedural         Mask difficulty assessment: 1 - vent by mask    Final Airway Details       Final airway type: endotracheal airway       Successful airway: ETT - single  Endotracheal Airway Details        ETT size (mm): 8.0       Successful intubation technique: direct laryngoscopy       DL Blade Type: Peterson 2       Grade View of Cords: 1       Adjucts: stylet       Position: Right       Measured from: lips       Secured at (cm): 23       Bite block used: None    Post intubation assessment        Placement verified by: capnometry and equal breath sounds        Number of attempts at approach: 1       Secured with: silk tape       Ease of procedure: easy       Dentition: Intact    Additional Comments       Easy mask, DL x1 grade 1 view- deep peterson likely beneficial.

## 2021-11-16 NOTE — PLAN OF CARE
Patient alert and oriented upon arriving to [facility].  VSS. LS clear. BS audible. Pain 4/10, gave PRN meds see MAR. Polar cooler circulatingPatient reported feeling nauseated, gave zofran per MAR.   Patient up to chair with PT/OT.  Diet is advancing well, no concerns. CMS intact.  Will continue to monitor.   Problem: Pain (Knee Arthroplasty)  Goal: Acceptable Pain Control  Outcome: No Change  Intervention: Prevent or Manage Pain  Recent Flowsheet Documentation  Taken 11/16/2021 1350 by Cynthia Hall, RN  Pain Management Interventions: medication (see MAR)     Problem: Adult Inpatient Plan of Care  Goal: Absence of Hospital-Acquired Illness or Injury  Intervention: Prevent Skin Injury  Recent Flowsheet Documentation  Taken 11/16/2021 1350 by Cynthia Hall RN  Body Position: supine, head elevated     Problem: Adult Inpatient Plan of Care  Goal: Absence of Hospital-Acquired Illness or Injury  Intervention: Prevent and Manage VTE (Venous Thromboembolism) Risk  Recent Flowsheet Documentation  Taken 11/16/2021 1350 by Cynthia Hall RN  VTE Prevention/Management: pneumatic compression device    Cynthia Hall RN on 11/16/2021 at 4:50 PM

## 2021-11-16 NOTE — CONSULTS
[facility]    Hospitalist Consultation    Date of Admission:  11/16/2021    Assessment & Plan   Dilip Kan is a 66 year old male who was admitted on 11/16/2021. I was asked to see the patient for perioperative comanagement.    Principal Problem:    Status post total right knee replacement    Assessment: pod#0 and tolerated the procedure well without difficulty.     Plan:   - pain control, wound care, activity restrictions, and asa for dvt ppx per orthopedic surgery  - daily hgb  - follow-up in orthopedics clinic as scheduled for staple removal  - ongoing post surgical physical therapy arranged for discharge with ambulatory     Active Problems:    Essential hypertension    Assessment: stable    Plan:   - continue home ACE and norvasc      Diabetes mellitus, type 2 (H)    Assessment: well controlled with last A1c of 6.2    Plan:   - hold metformin  - iss achs    DVT Prophylaxis: Pneumatic Compression Devices  Code Status: Full Code    Jered Fragoso    Reason for Consult   Reason for consult: I was asked by Pranav to evaluate this patient for perioperative comangement.    Primary Care Physician   Wei Perez    Chief Complaint   TKA    History is obtained from the patient and chart review    History of Present Illness   Dilip Kan is a 66 year old male who presents for elective TKA. He underwent preop with Dr. Perez and note reviewed. Postop feeling well, drinking fluids, pain well controlled.     Past Medical History    I have reviewed this patient's medical history and updated it with pertinent information if needed.   Past Medical History:   Diagnosis Date     Elevated prostate specific antigen (PSA)     4/10/2012     Encounter for other administrative examinations     1/31/2014     Esophagitis     No Comments Provided     Gastro-esophageal reflux disease without esophagitis     No Comments Provided     Low back pain     No Comments Provided     Malignant neoplasm of prostate (H)      9/6/2012     Nicotine dependence, uncomplicated     Quit - October 2007 with chantix     Other intervertebral disc degeneration, lumbosacral region     12/1/2008       Past Surgical History   I have reviewed this patient's surgical history and updated it with pertinent information if needed.  Past Surgical History:   Procedure Laterality Date     APPENDECTOMY OPEN  [id number]    Ruptured - [place]     COLONOSCOPY  08/31/2006    next due in 2016     DISKECTOMY, LUMBAR, SINGLE SP  03/16/2009    L 3-4 microdiskectomy, [facility]     ESOPHAGOSCOPY, GASTROSCOPY, DUODENOSCOPY (EGD), COMBINED  03/2003          ESOPHAGOSCOPY, GASTROSCOPY, DUODENOSCOPY (EGD), COMBINED  08/31/2006          PROSTATECTOMY PERINEAL RADICAL  11/28/2012    Nerve Sparring, Dr Warner     SPINE SURGERY N/A 04/28/2017    L3 to S1 spinal decompression L4/L5 fusion     TONSILLECTOMY, ADENOIDECTOMY, COMBINED      as a child     VARICOCELECTOMY  1959    INCISION AND DRAINAGE,EXCISE VARICOCELE       Prior to Admission Medications   Prior to Admission Medications   Prescriptions Last Dose Informant Patient Reported? Taking?   Bee Pollen 500 MG CHEW Past Month at AM Self Yes Yes   Sig: Take 2 tablets by mouth daily   HYDROcodone-acetaminophen (NORCO)  MG per tablet 11/16/2021 at 0400 Self No Yes   Sig: Take 1 tablet by mouth every 4 hours as needed for severe pain Average 4.5 per day = 135 per month   amLODIPine (NORVASC) 10 MG tablet 11/16/2021 at AM Self No Yes   Sig: Take 1 tablet (10 mg) by mouth daily   apalutamide (ERLEADA) 60 MG tablet 11/15/2021 at PM Self No Yes   Sig: Take 4 tablets (240 mg) by mouth daily   aspirin EC 81 MG EC tablet Past Month at AM Self Yes Yes   Sig: Take 81 mg by mouth daily with food   atorvastatin (LIPITOR) 20 MG tablet 11/16/2021 at AM Self No Yes   Sig: Take 1 tablet (20 mg) by mouth daily   cyanocobalamin (RA VITAMIN B-12 TR) 1000 MCG TBCR Past Month at AM Self Yes Yes   Sig: Take 1,000 mcg by mouth daily    lisinopril (ZESTRIL) 40 MG tablet 11/16/2021 at AM Self No Yes   Sig: Take 1 tablet (40 mg) by mouth daily   metFORMIN (GLUCOPHAGE-XR) 500 MG 24 hr tablet 11/16/2021 at AM Self No Yes   Sig: Take 2 tablets (1,000 mg) by mouth 2 times daily (with meals)   naproxen (NAPROSYN) 500 MG tablet Past Month at PM Self No Yes   Sig: Take 1 tablet (500 mg) by mouth 2 times daily as needed for moderate pain   omeprazole (PRILOSEC) 40 MG DR capsule 11/16/2021 at AM Self No Yes   Sig: TAKE 1 CAPSULE BY MOUTH ONCE DAILY.   ondansetron (ZOFRAN-ODT) 8 MG ODT tab 11/15/2021 at PM Self No Yes   Sig: Take 1 tablet (8 mg) by mouth every 8 hours as needed for nausea   pregabalin (LYRICA) 150 MG capsule 11/16/2021 at AM Self No Yes   Sig: Take 1 capsule (150 mg) by mouth 2 times daily   vitamin D3 (CHOLECALCIFEROL) 2000 units (50 mcg) tablet Past Month at AM Self No Yes   Sig: Take 1 tablet (2,000 Units) by mouth daily      Facility-Administered Medications Last Administration Doses Remaining   leuprolide (LUPRON DEPOT) kit 22.5 mg None recorded    leuprolide (LUPRON DEPOT) kit 22.5 mg 5/24/2021 11:36 AM    leuprolide (LUPRON DEPOT) kit 22.5 mg 8/25/2021 11:56 AM         Allergies   No Known Allergies    Social History   I have reviewed this patient's social history and updated it with pertinent information if needed. Dilip MAILE Kan  reports that he quit smoking about 19 years ago. His smoking use included cigarettes. He has a 20.00 pack-year smoking history. His smokeless tobacco use includes snuff. He reports current alcohol use. He reports that he does not use drugs.    Family History   I have reviewed this patient's family history and updated it with pertinent information if needed.   Family History   Problem Relation Age of Onset     Heart Disease Father         Heart Disease, passed away from CHF,CAD     Colon Cancer Father         Cancer-colon     Hypertension Father         Hypertension     Other - See Comments Father          Stroke/Dementia     Hypertension Mother         Hypertension,HTN     Other - See Comments Mother          Parkinsons     Family History Negative Other         Good Health     Family History Negative Other         Good Health,previous marriage./previous marriage.     Family History Negative Other         Good Health,previous marriage.     Family History Negative Sister         Good Health,emotional problems.     Family History Negative Sister         Good Health     Other - See Comments Son         w/o major medical problems.     Other - See Comments Daughter         w/o major medical problems.       Review of Systems     Constitutional: normal energy and appetite, no recent sick contacts  Eyes: nochanges in vision  Ears, nose, mouth, throat, and face: no mouth sores, dysphagia, or odynophagia  Respiratory: no shortness of breath, cough, or wheezing.  Cardiovascular: no chest pain,palpitations, orthopnea, increased lower extremity edema, or syncope.   Gastrointestinal: no constipation, diarrhea, nausea, vomiting or abdominal pain.  Musculoskeletal: no pain to extremities.  Neurological: ongoing lack of sensation to right leg.   Endocrine: blood sugars have been well controlled.     The 10 point Review of Systems is negative other than noted in the HPI or here.     Physical Exam   Temp: 98.6  F (37  C) Temp src: Tympanic BP: 111/52 Pulse: 70   Resp: 16 SpO2: 92 % O2 Device: None (Room air) Oxygen Delivery: 2 LPM  Vital Signs with Ranges  Temp:  [96.4  F (35.8  C)-98.6  F (37  C)] 98.6  F (37  C)  Pulse:  [58-74] 70  Resp:  [9-17] 16  BP: (103-148)/(36-86) 111/52  SpO2:  [87 %-98 %] 92 %  240 lbs 0 oz    Exam:  GENERAL: Talkative, in no apparent distress.  Head: normocephalic and atraumatic  Eyes: anicteric and non-injected sclera  Nose: no rhinorrhea or epistaxis.   Throat: moist mucous membranes with no active oral lesions.  NECK: Supple, jugular venous distension not present.  CARDIOVASCULAR: regular rate and  rhythm, no murmurs, rubs, or gallops. Normal S1/S2. No lower extremity edema.   RESPIRATORY: clear to auscultation bilaterally, no wheezes, no crackles.  No accessory muscle use or evidence of respiratory distress.   GI: soft, non-tender, non-distended, normoactive bowel sounds.  MUSCULOSKELETAL: warm and well perfused, 2+ dorsalis pedis pulses.  CMS intact to bilateral feet, right knee wrapped with bulky ace dressing, cdi.   SKIN: no pallor, jaundice or rashes.  NEUROLOGY: AAOx3, follows commands, speech and language without focal deficits.     Data   -Data reviewed today: All pertinent laboratory and imaging results from this encounter were reviewed. I personally reviewed no images or EKG's today.  No lab results found in last 7 days.    Recent Results (from the past 24 hour(s))   XR Knee Port Right 1/2 Views    Narrative    XR KNEE PORT RIGHT 1/2 VIEWS, 11/16/2021 12:20 PM    History: Male, age 66 years; Post-Op Total Knee    Comparison: 8/25/2021    Technique: Two views were obtained.    FINDINGS: The metallic arthroplasty device is satisfactorily  positioned. Surrounding bone and overlying soft tissues appear grossly  intact.      Impression    IMPRESSION:   Status post metallic arthroplasty. No acute complication.    KARLO FLYNN MD         SYSTEM ID:  [id number]

## 2021-11-16 NOTE — OP NOTE
Procedure Date: 11/16/2021    PREOPERATIVE DIAGNOSIS:  Right knee degenerative arthritis.    POSTOPERATIVE DIAGNOSIS:  Right knee degenerative arthritis.    PROCEDURE:  Right total knee replacement, Heriberto Persona posterior stabilized.    SURGEON:  Micah Rock MD    ASSISTANT:  Eren Chester PA-C    ANESTHESIA:  General endotracheal with block.    COMPLICATIONS:  Without.    IMPLANTS:     1.  Size 11 femoral component.  2.  G baseplate.  3.  11 PS poly.  4.  35 patella.    INDICATIONS:  Mr. Kan is a 66-year-old gentleman with history of right knee pain, progressive in nature, unresponsive to conservative measures.  Recommendation was for total joint reconstruction.  The patient did elect to proceed following a discussion of the risks and benefits.    DESCRIPTION OF PROCEDURE:  The patient was taken to the operative suite, administered anesthesia.  Following induction, placed in standard supine position.  Right lower extremity prepped and draped in normal sterile fashion.  Preoperative antibiotics administered and timeout was called.  At this point in time, midline incision was made.  Dissection carried down to extensor mechanism.  Medial parapatellar approach was then performed.  Medial and lateral menisci removed.  Proper retractors placed about the knee.  Anterior start point was identified.  Drill was used to gain access.  Distal valgus cut set at 5 degrees, performed our distal valgus cut.  We then sized the patient up for a size 11, painted our block on the distal cut surface and performed anterior cut, posterior cut, and chamfer cuts.  Following femoral preparation, extramedullary guide was utilized for the tibia, positioned in proper varus and valgus plane.  We then did our tibial resection 2 mm off the medial aspect, checked our gap balance, had good gap balance.  At this time, sized the tibia for size G, centered this over the medial one-third of the tubercle, secured this down used the drill, followed  by the keel punch.  We then medial and lateralized our femoral component, cut our box for the PS and trialed both 10 and 11 PS poly and 11 showed the best stability at this time.  We then resurfaced our kneecap and drilled for a 38 mm component.  At this point in time, all trial components were removed.  Final meniscal resections were carried out.  Hemostasis achieved.  Cement with tobramycin was mixed.  Once mixed, we then applied cement to the tibia following implantation of tibial component, cement application of femur, implantation of femoral component, implantation polyethylene and cement application backside of the patella and implantation of patellar component.  Once the cement had cured, we copiously irrigated the knee, closed the retinacular incision with #1 Vicryl followed by 2-0 Vicryl, skin staples, Dermabond and Aquacel dressing.  The patient then was transferred to recovery room.    POSTOPERATIVE PLAN:  Discharge on postop day #1.  Outpatient therapies at [facility].  Aspirin for DVT prophylaxis, oxycodone for pain management.  Staples out 2 weeks.    A skilled first assistant was necessary for position, intraoperative decision making, retraction and exposure during the procedure.    Furthermore, a skilled first assistant was necessary to complete the procedure in a technically safe and efficient manner.    Micah Rock MD        D: 2021   T: 2021   MT: SHAWN    Name:     LELAND CHAPMANManan  MRN:      [id number]        Account:        [id number]   :      1955           Procedure Date: 2021     Document: [id number]

## 2021-11-16 NOTE — ANESTHESIA CARE TRANSFER NOTE
Patient: Dilip Kan    Procedure: Procedure(s):  ARTHROPLASTY, KNEE, TOTAL       Diagnosis: Chronic pain of right knee [M25.561, G89.29]  Diagnosis Additional Information: No value filed.    Anesthesia Type:   General     Note:    Oropharynx: spontaneously breathing  Level of Consciousness: awake  Oxygen Supplementation: face mask  Level of Supplemental Oxygen (L/min / FiO2): 6  Independent Airway: airway patency satisfactory and stable  Dentition: dentition unchanged  Vital Signs Stable: post-procedure vital signs reviewed and stable  Report to RN Given: handoff report given  Patient transferred to: PACU    Handoff Report: Identifed the Patient, Identified the Reponsible Provider, Reviewed the pertinent medical history, Discussed the surgical course, Reviewed Intra-OP anesthesia mangement and issues during anesthesia, Set expectations for post-procedure period and Allowed opportunity for questions and acknowledgement of understanding      Vitals:  Vitals Value Taken Time   /69 11/16/21 1145   Temp     Pulse 71 11/16/21 1147   Resp 8 11/16/21 1147   SpO2 98 % 11/16/21 1147   Vitals shown include unvalidated device data.    Electronically Signed By: MOISÉS Pena CRNA  November 16, 2021  11:48 AM

## 2021-11-16 NOTE — ANESTHESIA PROCEDURE NOTES
Adductor canal Procedure Note    Pre-Procedure   Staff -        CRNA: Alf Álvarez APRN CRNA       Performed By: CRNA       Location: pre-op       Procedure Start/Stop Times: 11/16/2021 9:40 AM and 11/16/2021 9:50 AM       Pre-Anesthestic Checklist: patient identified, IV checked, site marked, risks and benefits discussed, informed consent, monitors and equipment checked, pre-op evaluation, at physician/surgeon's request and post-op pain management  Timeout:       Correct Patient: Yes        Correct Procedure: Yes        Correct Site: Yes        Correct Position: Yes        Correct Laterality: Yes        Site Marked: Yes  Procedure Documentation  Procedure: Adductor canal       Diagnosis: POST OP PAIN       Laterality: right       Patient Position: supine       Patient Prep/Sterile Barriers: sterile gloves       Skin prep: Chloraprep      Local skin infiltrated with 1 mL of. Local skin infiltration: bupivicaine.        Needle Gauge: 20.        Needle Length (Inches): 6        1. Ultrasound was used to identify targeted nerve, plexus, vascular marker, or fascial plane and place a needle adjacent to it in real-time.       2. Ultrasound was used to visualize the spread of anesthetic in close proximity to the above referenced structure.       3. A permanent image is entered into the patient's record.       4. The visualized anatomic structures appeared normal.       5. There were no apparent abnormal pathologic findings.    Assessment/Narrative         The placement was negative for: blood aspirated, painful injection and site bleeding       Paresthesias: No.     Bolus given via needle..        Secured via.        Insertion/Infusion Method: Single Shot       Complications: none

## 2021-11-17 ENCOUNTER — APPOINTMENT (OUTPATIENT)
Dept: OCCUPATIONAL THERAPY | Facility: OTHER | Age: 66
End: 2021-11-17
Attending: ORTHOPAEDIC SURGERY
Payer: MEDICARE

## 2021-11-17 ENCOUNTER — APPOINTMENT (OUTPATIENT)
Dept: PHYSICAL THERAPY | Facility: OTHER | Age: 66
End: 2021-11-17
Attending: ORTHOPAEDIC SURGERY
Payer: MEDICARE

## 2021-11-17 ENCOUNTER — APPOINTMENT (OUTPATIENT)
Dept: GENERAL RADIOLOGY | Facility: OTHER | Age: 66
End: 2021-11-17
Attending: FAMILY MEDICINE
Payer: MEDICARE

## 2021-11-17 LAB
ANION GAP SERPL CALCULATED.3IONS-SCNC: 6 MMOL/L (ref 3–14)
BUN SERPL-MCNC: 19 MG/DL (ref 7–25)
CALCIUM SERPL-MCNC: 8.6 MG/DL (ref 8.6–10.3)
CHLORIDE BLD-SCNC: 102 MMOL/L (ref 98–107)
CO2 SERPL-SCNC: 28 MMOL/L (ref 21–31)
CREAT SERPL-MCNC: 0.85 MG/DL (ref 0.7–1.3)
ERYTHROCYTE [DISTWIDTH] IN BLOOD BY AUTOMATED COUNT: 13.9 % (ref 10–15)
GFR SERPL CREATININE-BSD FRML MDRD: 90 ML/MIN/1.73M2
GLUCOSE BLD-MCNC: 118 MG/DL (ref 70–105)
HCT VFR BLD AUTO: 32.4 % (ref 40–53)
HGB BLD-MCNC: 10.3 G/DL (ref 13.3–17.7)
HGB BLD-MCNC: 10.8 G/DL (ref 13.3–17.7)
HOLD SPECIMEN: NORMAL
MCH RBC QN AUTO: 30 PG (ref 26.5–33)
MCHC RBC AUTO-ENTMCNC: 33.3 G/DL (ref 31.5–36.5)
MCV RBC AUTO: 90 FL (ref 78–100)
PLATELET # BLD AUTO: 160 10E3/UL (ref 150–450)
POTASSIUM BLD-SCNC: 4.2 MMOL/L (ref 3.5–5.1)
RBC # BLD AUTO: 3.6 10E6/UL (ref 4.4–5.9)
SODIUM SERPL-SCNC: 136 MMOL/L (ref 134–144)
WBC # BLD AUTO: 7.5 10E3/UL (ref 4–11)

## 2021-11-17 PROCEDURE — 71045 X-RAY EXAM CHEST 1 VIEW: CPT

## 2021-11-17 PROCEDURE — 36415 COLL VENOUS BLD VENIPUNCTURE: CPT | Performed by: ORTHOPAEDIC SURGERY

## 2021-11-17 PROCEDURE — 36415 COLL VENOUS BLD VENIPUNCTURE: CPT | Performed by: FAMILY MEDICINE

## 2021-11-17 PROCEDURE — 97110 THERAPEUTIC EXERCISES: CPT | Mod: GP

## 2021-11-17 PROCEDURE — 99212 OFFICE O/P EST SF 10 MIN: CPT | Performed by: INTERNAL MEDICINE

## 2021-11-17 PROCEDURE — 250N000013 HC RX MED GY IP 250 OP 250 PS 637: Performed by: FAMILY MEDICINE

## 2021-11-17 PROCEDURE — 87040 BLOOD CULTURE FOR BACTERIA: CPT | Performed by: FAMILY MEDICINE

## 2021-11-17 PROCEDURE — 250N000011 HC RX IP 250 OP 636: Performed by: ORTHOPAEDIC SURGERY

## 2021-11-17 PROCEDURE — 97535 SELF CARE MNGMENT TRAINING: CPT | Mod: GO,XU | Performed by: OCCUPATIONAL THERAPIST

## 2021-11-17 PROCEDURE — 96375 TX/PRO/DX INJ NEW DRUG ADDON: CPT

## 2021-11-17 PROCEDURE — 85018 HEMOGLOBIN: CPT | Performed by: FAMILY MEDICINE

## 2021-11-17 PROCEDURE — 97116 GAIT TRAINING THERAPY: CPT | Mod: GP

## 2021-11-17 PROCEDURE — 96376 TX/PRO/DX INJ SAME DRUG ADON: CPT

## 2021-11-17 PROCEDURE — 999N000157 HC STATISTIC RCP TIME EA 10 MIN

## 2021-11-17 PROCEDURE — 97530 THERAPEUTIC ACTIVITIES: CPT | Mod: GP

## 2021-11-17 PROCEDURE — 250N000011 HC RX IP 250 OP 636: Performed by: FAMILY MEDICINE

## 2021-11-17 PROCEDURE — 80048 BASIC METABOLIC PNL TOTAL CA: CPT | Performed by: FAMILY MEDICINE

## 2021-11-17 PROCEDURE — 250N000013 HC RX MED GY IP 250 OP 250 PS 637: Performed by: ORTHOPAEDIC SURGERY

## 2021-11-17 PROCEDURE — 85018 HEMOGLOBIN: CPT | Performed by: ORTHOPAEDIC SURGERY

## 2021-11-17 RX ORDER — HYDROMORPHONE HYDROCHLORIDE 1 MG/ML
0.5 INJECTION, SOLUTION INTRAMUSCULAR; INTRAVENOUS; SUBCUTANEOUS
Status: DISCONTINUED | OUTPATIENT
Start: 2021-11-17 | End: 2021-11-18 | Stop reason: HOSPADM

## 2021-11-17 RX ORDER — HYDROXYZINE HYDROCHLORIDE 10 MG/1
10 TABLET, FILM COATED ORAL EVERY 6 HOURS
Status: DISCONTINUED | OUTPATIENT
Start: 2021-11-17 | End: 2021-11-18 | Stop reason: HOSPADM

## 2021-11-17 RX ORDER — OXYCODONE HYDROCHLORIDE 5 MG/1
10 TABLET ORAL EVERY 4 HOURS PRN
Status: DISCONTINUED | OUTPATIENT
Start: 2021-11-17 | End: 2021-11-18 | Stop reason: HOSPADM

## 2021-11-17 RX ORDER — OXYCODONE HYDROCHLORIDE 5 MG/1
15 TABLET ORAL EVERY 4 HOURS PRN
Status: DISCONTINUED | OUTPATIENT
Start: 2021-11-17 | End: 2021-11-18 | Stop reason: HOSPADM

## 2021-11-17 RX ORDER — KETOROLAC TROMETHAMINE 15 MG/ML
15 INJECTION, SOLUTION INTRAMUSCULAR; INTRAVENOUS EVERY 6 HOURS PRN
Status: DISCONTINUED | OUTPATIENT
Start: 2021-11-17 | End: 2021-11-18

## 2021-11-17 RX ADMIN — ONDANSETRON 4 MG: 2 INJECTION INTRAMUSCULAR; INTRAVENOUS at 21:23

## 2021-11-17 RX ADMIN — POLYETHYLENE GLYCOL 3350 17 G: 17 POWDER, FOR SOLUTION ORAL at 10:28

## 2021-11-17 RX ADMIN — HYDROMORPHONE HYDROCHLORIDE 0.4 MG: 1 INJECTION, SOLUTION INTRAMUSCULAR; INTRAVENOUS; SUBCUTANEOUS at 18:19

## 2021-11-17 RX ADMIN — KETOROLAC TROMETHAMINE 15 MG: 15 INJECTION, SOLUTION INTRAMUSCULAR; INTRAVENOUS at 00:23

## 2021-11-17 RX ADMIN — ASPIRIN 81 MG: 81 TABLET, COATED ORAL at 10:28

## 2021-11-17 RX ADMIN — DOCUSATE SODIUM 50 MG AND SENNOSIDES 8.6 MG 1 TABLET: 8.6; 5 TABLET, FILM COATED ORAL at 10:24

## 2021-11-17 RX ADMIN — PANTOPRAZOLE SODIUM 40 MG: 40 TABLET, DELAYED RELEASE ORAL at 10:24

## 2021-11-17 RX ADMIN — PREGABALIN 150 MG: 25 CAPSULE ORAL at 21:26

## 2021-11-17 RX ADMIN — LISINOPRIL 40 MG: 40 TABLET ORAL at 10:27

## 2021-11-17 RX ADMIN — CEFAZOLIN SODIUM 2 G: 2 INJECTION, SOLUTION INTRAVENOUS at 02:59

## 2021-11-17 RX ADMIN — KETOROLAC TROMETHAMINE 15 MG: 15 INJECTION, SOLUTION INTRAMUSCULAR; INTRAVENOUS at 06:25

## 2021-11-17 RX ADMIN — PREGABALIN 150 MG: 25 CAPSULE ORAL at 10:24

## 2021-11-17 RX ADMIN — ACETAMINOPHEN 975 MG: 325 TABLET, FILM COATED ORAL at 06:26

## 2021-11-17 RX ADMIN — OXYCODONE HYDROCHLORIDE 15 MG: 5 TABLET ORAL at 20:01

## 2021-11-17 RX ADMIN — ACETAMINOPHEN 975 MG: 325 TABLET, FILM COATED ORAL at 13:25

## 2021-11-17 RX ADMIN — OXYCODONE HYDROCHLORIDE 10 MG: 5 TABLET ORAL at 15:40

## 2021-11-17 RX ADMIN — ACETAMINOPHEN 975 MG: 325 TABLET, FILM COATED ORAL at 21:25

## 2021-11-17 RX ADMIN — AMLODIPINE BESYLATE 10 MG: 10 TABLET ORAL at 10:24

## 2021-11-17 RX ADMIN — KETOROLAC TROMETHAMINE 15 MG: 15 INJECTION, SOLUTION INTRAMUSCULAR; INTRAVENOUS at 18:19

## 2021-11-17 RX ADMIN — HYDROXYZINE HYDROCHLORIDE 10 MG: 10 TABLET ORAL at 20:01

## 2021-11-17 RX ADMIN — HYDROMORPHONE HYDROCHLORIDE 1 MG: 1 INJECTION, SOLUTION INTRAMUSCULAR; INTRAVENOUS; SUBCUTANEOUS at 21:27

## 2021-11-17 RX ADMIN — KETOROLAC TROMETHAMINE 15 MG: 15 INJECTION, SOLUTION INTRAMUSCULAR; INTRAVENOUS at 11:39

## 2021-11-17 RX ADMIN — OXYCODONE HYDROCHLORIDE 10 MG: 5 TABLET ORAL at 02:59

## 2021-11-17 RX ADMIN — ASPIRIN 81 MG: 81 TABLET, COATED ORAL at 21:25

## 2021-11-17 RX ADMIN — OXYCODONE HYDROCHLORIDE 10 MG: 5 TABLET ORAL at 07:48

## 2021-11-17 RX ADMIN — HYDROMORPHONE HYDROCHLORIDE 0.2 MG: 1 INJECTION, SOLUTION INTRAMUSCULAR; INTRAVENOUS; SUBCUTANEOUS at 14:43

## 2021-11-17 RX ADMIN — DOCUSATE SODIUM 50 MG AND SENNOSIDES 8.6 MG 1 TABLET: 8.6; 5 TABLET, FILM COATED ORAL at 21:25

## 2021-11-17 RX ADMIN — OXYCODONE HYDROCHLORIDE 10 MG: 5 TABLET ORAL at 11:39

## 2021-11-17 NOTE — PLAN OF CARE
"Pt afebrile, vss. CMS intact. Aquacel dressing remains CDI, mild edema present in right knee. Pt ambulating in room with SBA and walker. Pt rates pain 7/10, scheduled toradol, PRN oxycodone given, see MAR. Polar cooler also in place, will continue to monitor.            /60 (BP Location: Left arm)   Pulse 72   Temp 98.6  F (37  C) (Tympanic)   Resp 20   Ht 1.778 m (5' 10\")   Wt 108.9 kg (240 lb)   SpO2 97%   BMI 34.44 kg/m      "

## 2021-11-17 NOTE — PLAN OF CARE
Occupational Therapy Discharge Summary    Reason for therapy discharge:    Discharged to home with home therapy.    Progress towards therapy goal(s). See goals on Care Plan in [facility] electronic health record for goal details.  Goals partially met.  Barriers to achieving goals:   discharge from facility.    Therapy recommendation(s):    Continued therapy is recommended.  Rationale/Recommendations:  pt will benefit from on-going OT to address ADL's and functional mobility in the home.

## 2021-11-17 NOTE — PROGRESS NOTES
Continues to spike postop fever. Will order blood culture chest x-ray, CBC, BMP. 1 more night in the hospital.

## 2021-11-17 NOTE — PROGRESS NOTES
"Patent febrile, 100.5.  Will notify provider. /54 (BP Location: Right arm, Patient Position: Chair)   Pulse 80   Temp (!) 100.5  F (38.1  C) (Tympanic)   Resp 14   Ht 1.778 m (5' 10\")   Wt 108.9 kg (240 lb)   SpO2 94%   BMI 34.44 kg/m    Cynthia Hall RN on 11/17/2021 at 12:18 PM    Continuing to monitor temp, encouraging to use  IS frequently. Plan is to discharge if temp decreases. Notified patients wife.   Cynthia Hall RN on 11/17/2021 at 1:17 PM  "

## 2021-11-17 NOTE — PROGRESS NOTES
Subjective: The patient is POD #1 s/p R Total Knee Arthroplasty.  The patients pain is controlled fairly well.  They deny shortness of breath, chest pain, nausea or vomiting.  There have been no acute perioperative complications    Objective:   B/P: 120/56, Temp: 99.3, Pulse: 84, Resp: 14    Alert and Orientated x3; No acute distress; Appears comfortable    Knee Exam: dressing/wound is clean, dry, intact without significant drainage.  No erythema or signs of infection.     No evidence of DVT    Distal motor/sensory exam is intact in the superficial peroneal, deep peroneal and tibial nerve distributions.      Normal capillary refill with a palpable dorsalis pedis pulse.    Hemoglobin   Date Value Ref Range Status   11/17/2021 10.3 (L) 13.3 - 17.7 g/dL Final   02/23/2021 14.1 13.3 - 17.7 g/dL Final       Assessment/Plan:     1) POD # 1 S/P R Total Knee Arthroplasty  -Pain Controlled  -PT/OT--ROM and Gait training  -DVT prophylaxis with ASA  -Dispo--To home when cleared Medically and by PT  -Follow-up in 2 weeks [facility]  -Hospitalist co-management  Outpatient PT at [facility] orders sent

## 2021-11-17 NOTE — PROGRESS NOTES
SAFETY CHECKLIST  ID Bands and Risk clasps correct and in place (DNR, Fall risk, Allergy, Latex, Limb):  Yes  All Lines Reconciled and labeled correctly: Yes  Whiteboard updated:Yes  Environmental interventions (bed/chair alarm on, call light, side rails, restraints, sitter....): Yes  Verify Tele #: NA  Cynthia Hall RN on 11/17/2021 at 7:43 AM     98.4

## 2021-11-17 NOTE — PROGRESS NOTES
Incentive Spirometry education completed.  Pt goal 2000 mls.  Pt achieved 2000 mls.  Pt instructed to perform 10/hr while awake with at least one deep breath and cough per hour until able to perform baseline activity.  RT will follow and re-assess as need.      Aleah Benitez, RT on 11/16/2021 at 6:51 PM

## 2021-11-17 NOTE — PROGRESS NOTES
:    Patient lives at home with wife and is wanting to go back home with home care services.    I gave him Home care options and he chose [facility] home care for PT/OT/RN services.  I called and made referral.  They will accept him at discharge. Anticipated discharge today to home with [facility] home care.  Will fax orders.  No further needs at this time.    RUBY Stovall on 11/17/2021 at 12:38 PM

## 2021-11-17 NOTE — PROGRESS NOTES
11/16/21 1500   Quick Adds   Type of Visit Initial PT Evaluation   Living Environment   People in home spouse   Current Living Arrangements house   Home Accessibility no concerns   Transportation Anticipated family or friend will provide   Self-Care   Usual Activity Tolerance good   Current Activity Tolerance fair   Equipment Currently Used at Home none   Disability/Function   Hearing Difficulty or Deaf no   Wear Glasses or Blind no   Concentrating, Remembering or Making Decisions Difficulty no   Difficulty Communicating no   Difficulty Eating/Swallowing no   Walking or Climbing Stairs Difficulty no   Dressing/Bathing Difficulty no   Toileting issues no   Doing Errands Independently Difficulty (such as shopping) no   Fall history within last six months no   General Information   Referring Physician Pranav   Patient/Family Therapy Goals Statement (PT) return home   Existing Precautions/Restrictions fall  (s/p right TKA)   Weight-Bearing Status - LLE full weight-bearing   Weight-Bearing Status - RLE weight-bearing as tolerated   Cognition   Orientation Status (Cognition) oriented x 4   Affect/Mental Status (Cognition) WFL   Follows Commands (Cognition) WFL   Behavioral Issues difficulty managing stress   Pain Assessment   Patient Currently in Pain Yes, see Vital Sign flowsheet   Integumentary/Edema   Integumentary/Edema Comments surgical incision right knee   Posture    Posture Not impaired   Range of Motion (ROM)   ROM Quick Adds ROM WFL   ROM Comment with exception of right knee 5-75   Strength   Manual Muscle Testing Quick Adds Strength WFL   Strength Comments with exception of right knee not tested post-op   Bed Mobility   Impairments Contributing to Impaired Bed Mobility pain   Comment (Bed Mobility) moderate assist for right LE support   Transfers   Impairments Contributing to Impaired Transfers pain;decreased strength   Transfer Safety Comments CGA with Fww   Gait/Stairs (Locomotion)   [place] Level  (Gait) contact guard   Assistive Device (Gait) walker, front-wheeled   Distance in Feet (Required for LE Total Joints) 15   Pattern (Gait) 3-point   Maintains Weight-bearing Status (Gait) able to maintain   Balance   Balance Comments good with Fww   Sensory Examination   Sensory Perception WFL   Coordination   Coordination no deficits were identified   Muscle Tone   Muscle Tone no deficits were identified   Clinical Impression   Criteria for Skilled Therapeutic Intervention yes, treatment indicated   PT Diagnosis (PT) impaired mobility   Influenced by the following impairments pain and fatigue   Functional limitations due to impairments activity/gait tolerance   Clinical Presentation Stable/Uncomplicated   Clinical Decision Making (Complexity) low complexity   Therapy Frequency (PT) Daily   Predicted Duration of Therapy Intervention (days/wks) 1-2 days   Planned Therapy Interventions (PT) bed mobility training;gait training;ROM (range of motion);transfer training;strengthening   Anticipated Equipment Needs at Discharge (PT) walker, rolling   Risk & Benefits of therapy have been explained risks/benefits reviewed;patient   PT Discharge Planning    PT Discharge Recommendation (DC Rec) home with assist;home with home care physical therapy   PT Rationale for DC Rec to promote return of right knee function   PT Brief overview of current status  moderate assist for right LE support with bed mobilities; CGA with Fww for transfers and short ambulation within patient's room   Total Evaluation Time   Total Evaluation Time (Minutes) 15

## 2021-11-17 NOTE — DISCHARGE SUMMARY
[facility]    Discharge Summary  Hospitalist    Date of Admission:  11/16/2021  Date of Discharge:  11/17/2021  Discharging Provider: Jered Fragoso  Date of Service (when I saw the patient): 11/17/21    Discharge Diagnoses   Principal Problem:    Status post total right knee replacement (11/16/2021)  Active Problems:    Essential hypertension (4/28/2017)    Diabetes mellitus, type 2 (H) (11/23/2020)      History of Present Illness   Dilip Kan is an 66 year old male who presented for elective right total knee replacement.    Hospital Course   Dilip Kan was admitted on 11/16/2021.  The following problems were addressed during his hospitalization:     Status post total right knee replacement: He was discharged on pod#1 and tolerated the procedure well without difficulty.  Pain is well controlled, he tolerated regular consistency diet without difficulty. Will continue with routine pain control wound care activity restrictions and aspirin twice daily for DVT prophylaxis per orthopedic surgery. Follow-up in orthopedics clinic as scheduled for wound check and staple removal and opted for ambulatory physical therapy.        Essential hypertension: Stable throughout his stay and he will resume his home ACE and Norvasc.       Diabetes mellitus, type 2 (H): well controlled with last A1c of 6.2, he will continue his home Metformin.    Jered Fragoso MD    Significant Results and Procedures   IMPLANTS:     1.  Size 11 femoral component.  2.  G baseplate.  3.  11 PS poly.  4.  35 patella.    Pending Results   These results will be followed up by NA  Unresulted Labs Ordered in the Past 30 Days of this Admission     No orders found for last 31 day(s).          Code Status   Full Code       Primary Care Physician   Wei Perez    Physical Exam   Temp: 99.3  F (37.4  C) Temp src: Tympanic BP: 117/54 Pulse: 80   Resp: 14 SpO2: 94 % O2 Device: None (Room air) Oxygen Delivery: 2 LPM  Vitals:    11/16/21  "0808   Weight: 108.9 kg (240 lb)     Vital Signs with Ranges  Temp:  [96.4  F (35.8  C)-99.3  F (37.4  C)] 99.3  F (37.4  C)  Pulse:  [64-88] 80  Resp:  [9-20] 14  BP: (103-148)/(36-86) 117/54  SpO2:  [87 %-98 %] 94 %  I/O last 3 completed shifts:  In: 2012 [P.O.:480; I.V.:1532]  Out: 900 [Urine:900]    Exam on day of discharge:   GENERAL: Talkative, in no apparent distress.  CARDIOVASCULAR: regular rate and rhythm, no murmurs, rubs, or gallops. Normal S1/S2. No lower extremity edema.   RESPIRATORY: clear to auscultation bilaterally, no wheezes, no crackles.   GI: soft, non-tender, non-distended, normoactive bowel sounds.  MUSCULOSKELETAL: warm and well perfused, 2+ dorsalis pedis pulses bilaterally. CMS intact bilateral feet. Right knee with Aquacel dressing in place with no underlying spotting, clean and intact. Calves soft and symmetric.  SKIN: no pallor,jaundice, or rashes, skin changes or erythema.    Discharge Disposition   Discharged to home  Condition at discharge: Stable    Consultations This Hospital Stay   HOSPITALIST IP CONSULT  PHYSICAL THERAPY ADULT IP CONSULT  OCCUPATIONAL THERAPY ADULT IP CONSULT  SOCIAL WORK IP CONSULT    Time Spent on this Encounter   I, Jered Fragoso MD, personally saw the patient today and spent less than or equal to 30 minutes discharging this patient.    Discharge Orders      Referral Order to Outpatient Physical Therapy      Reason for your hospital stay    Right knee replacement     When to call - Contact Surgeon Team    You may experience symptoms that require follow-up before your scheduled appointment. Refer to the \"Stoplight Tool\" for instructions on when to contact your Surgeon Team if you are concerned about pain control, blood clots, constipation, or if you are unable to urinate.     When to call - Reach out to Urgent Care    If you are not able to reach your Surgeon Team and you need immediate care, go to the Orthopedic Walk-in Clinic or Urgent Care at your Surgeon's " office.  Do NOT go to the Emergency Room unless you have shortness of breath, chest pain, or other signs of a medical emergency.     When to call - Reasons to Call 911    Call 911 immediately if you experience sudden-onset chest pain, arm weakness/numbness, slurred speech, or shortness of breath     Discharge Instruction - Breathing exercises    Perform breathing exercises using your Incentive Spirometer 10 times per hour while awake for 2 weeks.     Symptoms - Fever Management    A low grade fever can be expected after surgery.  Use acetaminophen (TYLENOL) as needed for fever management.  Contact your Surgeon Team if you have a fever greater than 101.5 F, chills, and/or night sweats.     Symptoms - Constipation management    Constipation (hard, dry bowel movements) is expected after surgery due to the combination of being less active, the anesthetic, and the opioid pain medication.  You can do the following to help reduce constipation:  ~  FLUIDS:  Drink clear liquids (water or Gatorade), or juice (apple/prune).  ~  DIET:  Eat a fiber rich diet.    ~  ACTIVITY:  Get up and move around several times a day.  Increase your activity as you are able.  MEDICATIONS:  Reduce the risk of constipation by starting medications before you are constipated.  You can take Miralax   (1 packet as directed) and/or a stool softener (Senokot 1-2 tablets 1-2 times a day).  If you already have constipation and these medications are not working, you can get magnesium citrate and use as directed.  If you continue to have constipation you can try an over the counter suppository or enema.  Call your Surgeon Team if it has been greater than 3 days since your last bowel movement.     Symptoms - Reduced Urine Output    Changes in the amount of fluids you drank before and after surgery may result in problems urinating.  It is important to stay well-hydrated after surgery and drink plenty of water. If it has been greater than 8 hours since you  have urinated despite drinking plenty of water, call your Surgeon Team.     Activity - Exercises to prevent blood clots    Unless otherwise directed by your Surgeon team, perform the following exercises at least three times per day for the first four weeks after surgery to prevent blood clots in your legs: 1) Point and flex your feet (Ankle Pumps), 2) Move your ankle around in big circles, 3) Wiggle your toes, 4) Walk, even for short distances, several times a day, will help decrease the risk of blood clots.     Comfort and Pain Management - Pain after Surgery    Pain after surgery is normal and expected.  You will have some amount of pain for several weeks after surgery.  Your pain will improve with time.  There are several things you can do to help reduce your pain including: rest, ice, elevation, and using pain medications as needed. Contact your Surgeon Team if you have pain that persists or worsens after surgery despite rest, ice, elevation, and taking your medication(s) as prescribed. Contact your Surgeon Team if you have new numbness, tingling, or weakness in your operative extremity.     Comfort and Pain Management - Swelling after Surgery    Swelling and/or bruising of the surgical extremity is common and may persist for several months after surgery. In addition to frequent icing and elevation, gentle compressive support with an ACE wrap or tubigrip may help with swelling. Apply compression regularly, removing at least twice daily to perform skin checks. Contact your Surgeon Team if your swelling increases and is NOT associated with an increase in your activity level, or if your swelling increases and is associated with redness and pain.     Comfort and Pain Management - Cold therapy    Ice can be used to control swelling and discomfort after surgery. Place a thin towel over your operative site and apply the ice pack overtop. Leave ice pack in place for 20 minutes, then remove for 20 minutes. Repeat this 20  minutes on/20 minutes off routine as often as tolerated.     Medication Instructions - Acetaminophen (TYLENOL) Instructions    You were discharged with acetaminophen (TYLENOL) for pain management after surgery. Acetaminophen most effectively manages pain symptoms when it is taken on a schedule without missing doses (every four, six, or eight hours). Your Provider will prescribe a safe daily dose between 3000 - 4000 mg.  Do NOT exceed this daily dose. Most patients use acetaminophen for pain control for the first four weeks after surgery.  You can wean from this medication as your pain decreases.     Medication Instructions - NSAID Instructions    You were discharged with an anti-inflammatory medication for pain management to use in combination with acetaminophen (TYLENOL) and the narcotic pain medication.  Take this medication exactly as directed.  You should only take one anti-inflammatory at a time.  Some common anti-inflammatories include: ibuprofen (ADVIL, MOTRIN), naproxen (ALEVE, NAPROSYN), celecoxib (CELEBREX), meloxicam (MOBIC), ketorolac (TORADOL).  Take this medication with food and water.     Medication Instructions - Opioids - Tapering Instructions    In the first three days following surgery, your symptoms may warrant use of the narcotic pain medication every four to six hours as prescribed. This is normal. As your pain symptoms improve, focus your efforts on decreasing (tapering) use of narcotic medications. The most successful tapering strategy is to first, decrease the number of tablets you take every 4-6 hours to the minimum prescribed. Then, increase the amount of time between doses.  For example:  First, taper to   or 1 tablet every 4-6 hours.  Then, taper to   or 1 tablet every 6-8 hours.  Then, taper to   or 1 tablet every 8-10 hours.  Then, taper to   or 1 tablet every 10-12 hours.  Then, taper to   or 1 tablet at bedtime.  The bedtime dose can help with comfort during sleep and is typically  "the last dose to be discontinued after surgery.     Medication instructions -  Anticoagulation - aspirin    Take the aspirin as prescribed for a total of four weeks after surgery.  This is given to help minimize your risk of blood clot.     Comfort and Pain Management - LOWER Extremity Elevation    Swelling is expected for several months after surgery. This type of swelling is usually associated with gravity and activity, and can be improved with elevation.   The best way to do this is to get your \"toes above your nose\" by laying down and placing several pillows lengthwise under your calf and heel. When elevating your leg keep your knee completely straight. Perform this elevation as often as possible especially for the first two weeks after surgery.     Medication Instructions - Opioid Instructions (Less than 65 years)    You were discharged with an opioid medication (hydromorphone, oxycodone, hydrocodone, or tramadol). This medication should only be taken for breakthrough pain that is not controlled with acetaminophen (TYLENOL). If you rate your pain less than 3 you do not need this medication.  Pain rating 0-3:  You do not need this medication.  Pain rating 4-6:  Take 1 tablet every 4-6 hours as needed  Pain rating 7-10:  Take 2 tablets every 4-6 hours as needed.  Do not exceed 6 tablets per day     Activity - Total Knee Arthroplasty     Return to Driving    Return to driving - Driving is NOT permitted until directed by your provider. Under no circumstance are you permitted to drive while using narcotic pain medications.     Dressing / Wound Care - Wound    You have a clean dressing on your surgical wound. Dressing change instructions as follows: dressing will be removed at your follow-up appointment. Contact your Surgeon Team if you have increased redness, warmth around the surgical wound, and/or drainage from the surgical wound.     Dressing / Wound Care - NO Tub Bathing    Tub bathing, swimming, or any other " activities that will cause your incision to be submerged in water should be avoided until allowed by your Surgeon.     NO Precautions    No precautions directed by your Provider.  You may perform range of motion activities as tolerated.     Weight bearing as tolerated    Weight bearing as tolerated on your operative extremity.     Dressing / Wound care - Shower with wound/dressing covered    You must COVER your dressing or incision with saran wrap (or any other non-permeable covering) to allow the incision to remain dry while showering.  You may shower 3 days after surgery as long as the surgical wound stays dry. Continue to cover your dressing or incision for showering until your first office visit.  You are strictly prohibited from soaking   or submerging the surgical wound underwater.     Follow Up Care    12/1/2021 9:45 AM Micah Rock MD MD face to face encounter    Documentation of Face to Face and Certification for Home Health Services    I certify that patient: Dilip Kan is under my care and that I, or a nurse practitioner or physician's assistant working with me, had a face-to-face encounter that meets the physician face-to-face encounter requirements with this patient on: 11/17/2021.    This encounter with the patient was in whole, or in part, for the following medical condition, which is the primary reason for home health care: total knee replacement.    I certify that, based on my findings, the following services are medically necessary home health services: Nursing, Occupational Therapy, and Physical Therapy.    My clinical findings support the need for the above services because: Nurse is needed: For complex aftercare of surgical procedures because the patient needs instruction and cannot perform care on their own due to: total knee arthroplasty.., Occupational Therapy Services are needed to assess and treat cognitive ability and address ADL safety due to impairment in total knee  arthroplasty., and Physical Therapy Services are needed to assess and treat the following functional impairments: surgical pain.    Further, I certify that my clinical findings support that this patient is homebound (i.e. absences from home require considerable and taxing effort and are for medical reasons or [employer] services or infrequently or of short duration when for other reasons) because: Leaving home is medically contraindicated for the following reason(s): Side effects of increased pain medication including: dizzyness...    Based on the above findings. I certify that this patient is confined to the home and needs intermittent skilled nursing care, physical therapy and/or speech therapy.  The patient is under my care, and I have initiated the establishment of the plan of care.  This patient will be followed by a physician who will periodically review the plan of care.  Physician/Provider to provide follow up care: Wei Perez    Attending hospital physician (the Medicare certified [place] provider): Jered Fragoso MD  Physician Signature: See electronic signature associated with these discharge orders.  Date: 11/17/2021     Discharge Instruction - Regular Diet Adult    Return to your pre-surgery diet unless instructed otherwise     Discharge Medications   Current Discharge Medication List      START taking these medications    Details   acetaminophen (TYLENOL) 325 MG tablet Take 2 tablets (650 mg) by mouth every 4 hours as needed for other (mild pain)  Qty: 100 tablet, Refills: 0    Associated Diagnoses: Chronic pain of right knee      !! aspirin 81 MG EC tablet Take 1 tablet (81 mg) by mouth 2 times daily  Qty: 60 tablet, Refills: 0    Associated Diagnoses: Chronic pain of right knee      hydrOXYzine (ATARAX) 10 MG tablet Take 1 tablet (10 mg) by mouth every 6 hours as needed for itching or anxiety (with pain, moderate pain)  Qty: 30 tablet, Refills: 0    Associated Diagnoses: Chronic pain of right knee       oxyCODONE (ROXICODONE) 5 MG tablet Take 1-2 tablets (5-10 mg) by mouth every 3 hours as needed for pain (Moderate to Severe)  Qty: 30 tablet, Refills: 0    Associated Diagnoses: Chronic pain of right knee      senna-docusate (SENOKOT-S/PERICOLACE) 8.6-50 MG tablet Take 1-2 tablets by mouth 2 times daily Take while on oral narcotics to prevent or treat constipation.  Qty: 30 tablet, Refills: 0    Comments: While taking narcotics  Associated Diagnoses: Chronic pain of right knee       !! - Potential duplicate medications found. Please discuss with provider.      CONTINUE these medications which have NOT CHANGED    Details   amLODIPine (NORVASC) 10 MG tablet Take 1 tablet (10 mg) by mouth daily  Qty: 90 tablet, Refills: 3    Associated Diagnoses: Essential hypertension      apalutamide (ERLEADA) 60 MG tablet Take 4 tablets (240 mg) by mouth daily  Qty: 120 tablet, Refills: 6    Associated Diagnoses: Rising PSA following treatment for malignant neoplasm of prostate; Prostate cancer (H)      !! aspirin EC 81 MG EC tablet Take 81 mg by mouth daily with food      atorvastatin (LIPITOR) 20 MG tablet Take 1 tablet (20 mg) by mouth daily  Qty: 90 tablet, Refills: 3    Associated Diagnoses: Hyperlipidemia LDL goal <100      Bee Pollen 500 MG CHEW Take 2 tablets by mouth daily      cyanocobalamin (RA VITAMIN B-12 TR) 1000 MCG TBCR Take 1,000 mcg by mouth daily      HYDROcodone-acetaminophen (NORCO)  MG per tablet Take 1 tablet by mouth every 4 hours as needed for severe pain Average 4.5 per day = 135 per month  Qty: 135 tablet, Refills: 0    Associated Diagnoses: Spinal stenosis of lumbar region with neurogenic claudication; Chronic, continuous use of opioids; Controlled substance agreement signed      lisinopril (ZESTRIL) 40 MG tablet Take 1 tablet (40 mg) by mouth daily  Qty: 90 tablet, Refills: 4    Associated Diagnoses: Essential hypertension      metFORMIN (GLUCOPHAGE-XR) 500 MG 24 hr tablet Take 2 tablets  (1,000 mg) by mouth 2 times daily (with meals)  Qty: 360 tablet, Refills: 3    Associated Diagnoses: Type 2 diabetes mellitus without complication, without long-term current use of insulin (H)      naproxen (NAPROSYN) 500 MG tablet Take 1 tablet (500 mg) by mouth 2 times daily as needed for moderate pain  Qty: 180 tablet, Refills: 0    Associated Diagnoses: Spinal stenosis of lumbar region with neurogenic claudication      omeprazole (PRILOSEC) 40 MG DR capsule TAKE 1 CAPSULE BY MOUTH ONCE DAILY.  Qty: 90 capsule, Refills: 3    Associated Diagnoses: Gastroesophageal reflux disease without esophagitis      ondansetron (ZOFRAN-ODT) 8 MG ODT tab Take 1 tablet (8 mg) by mouth every 8 hours as needed for nausea  Qty: 30 tablet, Refills: 1    Associated Diagnoses: Nausea      pregabalin (LYRICA) 150 MG capsule Take 1 capsule (150 mg) by mouth 2 times daily  Qty: 180 capsule, Refills: 1    Associated Diagnoses: Spinal stenosis of lumbar region with neurogenic claudication; Chronic back pain greater than 3 months duration      vitamin D3 (CHOLECALCIFEROL) 2000 units (50 mcg) tablet Take 1 tablet (2,000 Units) by mouth daily    Associated Diagnoses: Androgen deprivation therapy       !! - Potential duplicate medications found. Please discuss with provider.        Allergies   No Known Allergies  Data   Most Recent 3 CBC's:  Recent Labs   Lab Test 11/17/21  0634 11/01/21  0908 09/17/21  1036 02/23/21  0829   WBC  --  4.8 8.0 6.9   HGB 10.3* 13.3 12.8* 14.1   MCV  --  90 88 87   PLT  --  199 181 200      Most Recent 3 BMP's:  Recent Labs   Lab Test 11/01/21  0908 09/17/21  1036 02/23/21  0829    141 140   POTASSIUM 4.4 4.4 4.2   CHLORIDE 103 104 103   CO2 28 30 29   BUN 14 19 17   CR 0.70 0.67* 0.73   ANIONGAP 8 7 8   ROBERTO 9.7 9.4 10.0   * 115* 139*     Most Recent 2 LFT's:  Recent Labs   Lab Test 11/01/21  0908 09/17/21  1036   AST 16 13   ALT 17 17   ALKPHOS 60 68   BILITOTAL 0.3 0.6     Most Recent INR's and  Anticoagulation Dosing History:  Anticoagulation Dose History     Recent Dosing and Labs Latest Ref Rng & Units 11/8/2012 4/7/2017 11/1/2018    INR 0 - 1.3 1.0 1.1 0.95        Most Recent 3 Troponin's:No lab results found.  Most Recent Cholesterol Panel:  Recent Labs   Lab Test 11/01/21  0908   CHOL 149   LDL 61   HDL 56   TRIG 158*     Most Recent 6 Bacteria Isolates From Any Culture (See EPIC Reports for Culture Details):No lab results found.  Most Recent TSH, T4 and A1c Labs:  Recent Labs   Lab Test 11/01/21  0914 11/01/21  0908   TSH  --  1.46   A1C 6.4*  --      Results for orders placed or performed during the hospital encounter of 11/16/21   XR Knee Port Right 1/2 Views    Narrative    XR KNEE PORT RIGHT 1/2 VIEWS, 11/16/2021 12:20 PM    History: Male, age 66 years; Post-Op Total Knee    Comparison: 8/25/2021    Technique: Two views were obtained.    FINDINGS: The metallic arthroplasty device is satisfactorily  positioned. Surrounding bone and overlying soft tissues appear grossly  intact.      Impression    IMPRESSION:   Status post metallic arthroplasty. No acute complication.    KARLO FLYNN MD         SYSTEM ID:  [id number]

## 2021-11-17 NOTE — PHARMACY - DISCHARGE MEDICATION RECONCILIATION AND EDUCATION
Pharmacy:  Discharge Counseling and Medication Reconciliation    Dilip Kan  [address] [place] [address]  [phone] (home)   66 year old male  PCP: Wei Perez    Allergies: Patient has no known allergies.    Discharge Counseling:    Pharmacist met with patient (and/or family) today to review the medication portion of the After Visit Summary (with an emphasis on NEW medications) and to address patient's questions/concerns.    Summary of Education: met with patient to discuss administration, duration and side effects of new medications.  Patient told to take either norco or oxycodone at home, but not both.  Also, patient was told to take aspirin twice daily.    Materials Provided:  MedCounselor sheets printed from Clinical Pharmacology on: oxycodone, atarax, senna/s, acetamiophen    Discharge Medication Reconciliation:    It has been determined that the patient has an adequate supply of medications available or which can be obtained from the patient's preferred pharmacy, which HE/SHE has confirmed as: [facility]   Thank you for the consult.    Kimmie Rose RPH........November 17, 2021 12:10 PM

## 2021-11-18 ENCOUNTER — APPOINTMENT (OUTPATIENT)
Dept: PHYSICAL THERAPY | Facility: OTHER | Age: 66
End: 2021-11-18
Attending: ORTHOPAEDIC SURGERY
Payer: MEDICARE

## 2021-11-18 ENCOUNTER — APPOINTMENT (OUTPATIENT)
Dept: ULTRASOUND IMAGING | Facility: OTHER | Age: 66
End: 2021-11-18
Attending: INTERNAL MEDICINE
Payer: MEDICARE

## 2021-11-18 ENCOUNTER — APPOINTMENT (OUTPATIENT)
Dept: OCCUPATIONAL THERAPY | Facility: OTHER | Age: 66
End: 2021-11-18
Attending: ORTHOPAEDIC SURGERY
Payer: MEDICARE

## 2021-11-18 VITALS
RESPIRATION RATE: 20 BRPM | WEIGHT: 240 LBS | OXYGEN SATURATION: 92 % | SYSTOLIC BLOOD PRESSURE: 112 MMHG | DIASTOLIC BLOOD PRESSURE: 55 MMHG | HEIGHT: 70 IN | HEART RATE: 86 BPM | TEMPERATURE: 98.8 F | BODY MASS INDEX: 34.36 KG/M2

## 2021-11-18 LAB
ALBUMIN UR-MCNC: NEGATIVE MG/DL
APPEARANCE UR: CLEAR
BILIRUB UR QL STRIP: NEGATIVE
COLOR UR AUTO: YELLOW
GLUCOSE UR STRIP-MCNC: NEGATIVE MG/DL
HGB BLD-MCNC: 9.8 G/DL (ref 13.3–17.7)
HGB UR QL STRIP: NEGATIVE
KETONES UR STRIP-MCNC: NEGATIVE MG/DL
LEUKOCYTE ESTERASE UR QL STRIP: NEGATIVE
MUCOUS THREADS #/AREA URNS LPF: PRESENT /LPF
NITRATE UR QL: NEGATIVE
PH UR STRIP: 5.5 [PH] (ref 5–9)
RBC URINE: 1 /HPF
SP GR UR STRIP: 1.02 (ref 1–1.03)
UROBILINOGEN UR STRIP-MCNC: NORMAL MG/DL
WBC URINE: 1 /HPF

## 2021-11-18 PROCEDURE — 99212 OFFICE O/P EST SF 10 MIN: CPT | Performed by: INTERNAL MEDICINE

## 2021-11-18 PROCEDURE — 96372 THER/PROPH/DIAG INJ SC/IM: CPT | Mod: XU

## 2021-11-18 PROCEDURE — 93971 EXTREMITY STUDY: CPT | Mod: RT

## 2021-11-18 PROCEDURE — 36415 COLL VENOUS BLD VENIPUNCTURE: CPT | Performed by: ORTHOPAEDIC SURGERY

## 2021-11-18 PROCEDURE — 97110 THERAPEUTIC EXERCISES: CPT | Mod: GP

## 2021-11-18 PROCEDURE — 97116 GAIT TRAINING THERAPY: CPT | Mod: GP

## 2021-11-18 PROCEDURE — 250N000013 HC RX MED GY IP 250 OP 250 PS 637: Performed by: ORTHOPAEDIC SURGERY

## 2021-11-18 PROCEDURE — 81001 URINALYSIS AUTO W/SCOPE: CPT | Performed by: FAMILY MEDICINE

## 2021-11-18 PROCEDURE — 250N000011 HC RX IP 250 OP 636: Performed by: INTERNAL MEDICINE

## 2021-11-18 PROCEDURE — 96376 TX/PRO/DX INJ SAME DRUG ADON: CPT

## 2021-11-18 PROCEDURE — 250N000011 HC RX IP 250 OP 636: Performed by: FAMILY MEDICINE

## 2021-11-18 PROCEDURE — 85018 HEMOGLOBIN: CPT | Performed by: ORTHOPAEDIC SURGERY

## 2021-11-18 PROCEDURE — 250N000013 HC RX MED GY IP 250 OP 250 PS 637: Performed by: FAMILY MEDICINE

## 2021-11-18 PROCEDURE — 97530 THERAPEUTIC ACTIVITIES: CPT | Mod: GP

## 2021-11-18 RX ORDER — KETOROLAC TROMETHAMINE 15 MG/ML
15 INJECTION, SOLUTION INTRAMUSCULAR; INTRAVENOUS EVERY 6 HOURS
Status: DISCONTINUED | OUTPATIENT
Start: 2021-11-18 | End: 2021-11-18 | Stop reason: HOSPADM

## 2021-11-18 RX ADMIN — AMLODIPINE BESYLATE 10 MG: 10 TABLET ORAL at 09:18

## 2021-11-18 RX ADMIN — HYDROMORPHONE HYDROCHLORIDE 0.5 MG: 1 INJECTION, SOLUTION INTRAMUSCULAR; INTRAVENOUS; SUBCUTANEOUS at 02:48

## 2021-11-18 RX ADMIN — HYDROXYZINE HYDROCHLORIDE 10 MG: 10 TABLET ORAL at 08:04

## 2021-11-18 RX ADMIN — OXYCODONE HYDROCHLORIDE 15 MG: 5 TABLET ORAL at 13:19

## 2021-11-18 RX ADMIN — HYDROMORPHONE HYDROCHLORIDE 0.5 MG: 1 INJECTION, SOLUTION INTRAMUSCULAR; INTRAVENOUS; SUBCUTANEOUS at 11:43

## 2021-11-18 RX ADMIN — PREGABALIN 150 MG: 25 CAPSULE ORAL at 09:19

## 2021-11-18 RX ADMIN — ACETAMINOPHEN 975 MG: 325 TABLET, FILM COATED ORAL at 13:17

## 2021-11-18 RX ADMIN — PANTOPRAZOLE SODIUM 40 MG: 40 TABLET, DELAYED RELEASE ORAL at 09:19

## 2021-11-18 RX ADMIN — HYDROXYZINE HYDROCHLORIDE 10 MG: 10 TABLET ORAL at 02:47

## 2021-11-18 RX ADMIN — INSULIN ASPART 1 UNITS: 100 INJECTION, SOLUTION INTRAVENOUS; SUBCUTANEOUS at 08:04

## 2021-11-18 RX ADMIN — ASPIRIN 81 MG: 81 TABLET, COATED ORAL at 09:19

## 2021-11-18 RX ADMIN — OXYCODONE HYDROCHLORIDE 15 MG: 5 TABLET ORAL at 00:06

## 2021-11-18 RX ADMIN — OXYCODONE HYDROCHLORIDE 15 MG: 5 TABLET ORAL at 05:09

## 2021-11-18 RX ADMIN — KETOROLAC TROMETHAMINE 15 MG: 15 INJECTION, SOLUTION INTRAMUSCULAR; INTRAVENOUS at 11:37

## 2021-11-18 RX ADMIN — KETOROLAC TROMETHAMINE 15 MG: 15 INJECTION, SOLUTION INTRAMUSCULAR; INTRAVENOUS at 00:06

## 2021-11-18 RX ADMIN — KETOROLAC TROMETHAMINE 15 MG: 15 INJECTION, SOLUTION INTRAMUSCULAR; INTRAVENOUS at 05:07

## 2021-11-18 RX ADMIN — ACETAMINOPHEN 975 MG: 325 TABLET, FILM COATED ORAL at 05:09

## 2021-11-18 RX ADMIN — DOCUSATE SODIUM 50 MG AND SENNOSIDES 8.6 MG 1 TABLET: 8.6; 5 TABLET, FILM COATED ORAL at 09:19

## 2021-11-18 RX ADMIN — LISINOPRIL 40 MG: 40 TABLET ORAL at 09:19

## 2021-11-18 RX ADMIN — POLYETHYLENE GLYCOL 3350 17 G: 17 POWDER, FOR SOLUTION ORAL at 09:18

## 2021-11-18 RX ADMIN — INSULIN ASPART 1 UNITS: 100 INJECTION, SOLUTION INTRAVENOUS; SUBCUTANEOUS at 11:52

## 2021-11-18 RX ADMIN — HYDROXYZINE HYDROCHLORIDE 10 MG: 10 TABLET ORAL at 13:18

## 2021-11-18 RX ADMIN — OXYCODONE HYDROCHLORIDE 15 MG: 5 TABLET ORAL at 09:18

## 2021-11-18 NOTE — PROGRESS NOTES
11/16/21 2000   Signing Clinician's Name / Credentials   Signing clinician's name / credentials Xiao Conway OTR/L    Quick Adds   Rehab Discipline OT   Functional Transfer Training   Symptoms Noted During/After Treatment fatigue;increased pain   Treatment Detail Min A   Gait Training   Symptoms Noted During/After Treatment (Gait Training) fatigue;increased pain   [place] Level (Gait Training) contact guard   Physical Assistance Level (Gait Training) 1 person + 1 person to manage equipment   Assistive Device (Gait Training) rolling walker   OT Discharge Planning    OT Discharge Recommendation (DC Rec) home with home care occupational therapy   OT Rationale for DC Rec Pt would benefit from on-going OT to address ADL's and functional mobility in the home    OT Brief overview of current status  Pt pleasant and motivated, having pain but eager to move, mod assist with supine to sit and ambualted to recliner in room with min assist of 2   Additional Documentation   OT Plan cont OT    Total Session Time   Total Session Time (minutes) 45 minutes

## 2021-11-18 NOTE — PLAN OF CARE
Occupational Therapy Discharge Summary    Reason for therapy discharge:    Discharged to home with home therapy.    Progress towards therapy goal(s). See goals on Care Plan in [facility] electronic health record for goal details.  Goals partially met.  Barriers to achieving goals:   discharge from facility.    Therapy recommendation(s):    Continued therapy is recommended.  Rationale/Recommendations:  pt will benefit from home care OT to address safety and independence with ADL's and functional mobility within the home.

## 2021-11-18 NOTE — PROGRESS NOTES
11/18/21 1533   Signing Clinician's Name / Credentials   Signing clinician's name / credentials Xiao Conway OTR/L    Quick Adds   Rehab Discipline OT   Gait Training   [place] Level (Gait Training) stand-by assist   Physical Assistance Level (Gait Training) supervision   Assistive Device (Gait Training) rolling walker   Bathing Training   [place] Level (Bathing Training) stand-by assist   Assistance (Bathing Training) supervision   Lower Body Dressing Training   Lower Body Dressing Training Assistance minimum assist (75% patient effort)   OT Discharge Planning    OT Discharge Recommendation (DC Rec) home with home care occupational therapy   OT Rationale for DC Rec Pt will benefit from home OT to address functional mobility and ADL's in the home   OT Brief overview of current status  Pt reports feeling better today, SBA with mobility, assist needed with L/B dressing, has assist from wife at home    Additional Documentation   OT Plan d/c home today with home care    Total Session Time   Total Session Time (minutes) 30 minutes

## 2021-11-18 NOTE — PROGRESS NOTES
11/17/21 1000   Signing Clinician's Name / Credentials   Signing clinician's name / credentials Maicol Saldaña MPT   Quick Adds   Rehab Discipline PT   Quick Adds Mobility;Therapeutic Activity;Therapeutic Exercise   Range of Motion Knee   ROM: Right Knee   Extension/Flexion - degrees 5-65   ROM Type Extension/Flexion AAROM   Pathologic End Feel Extension/Flexion soft pathologic end feel   Functional Transfer Training   Symptoms Noted During/After Treatment fatigue;increased pain   Treatment Detail CGA to SBA with Fww   Gait Training   Symptoms Noted During/After Treatment (Gait Training) fatigue;increased pain   Distance in Feet (Required for LE Total Joints) 150   Treatment Detail ambulation in hallway   [place] Level (Gait Training) stand-by assist   Physical Assistance Level (Gait Training) supervision   Weight Bearing (Gait Training) weight-bearing as tolerated   Assistive Device (Gait Training) rolling walker   Therapeutic Activity   Treatment Detail SBA with bed mobilities   Therapeutic Exercise   Treatment Detail TKA exercises with poor quadricept contraction; use of CPM in afternoon to promote right knee ROM   PT Discharge Planning    PT Discharge Recommendation (DC Rec) home with assist;home with home care physical therapy   PT Rationale for DC Rec to promote return of right knee function   PT Brief overview of current status  SBA for all mobilities using a Fww for gait activities   Additional Documentation   PT Plan continue PT   Total Session Time   Total Session Time (minutes) 55 minutes

## 2021-11-18 NOTE — PLAN OF CARE
Physical Therapy Discharge Summary    Reason for therapy discharge:    Discharged to home with home therapy.    Progress towards therapy goal(s). See goals on Care Plan in [facility] electronic health record for goal details.  Goals met    Therapy recommendation(s):    Continued therapy is recommended.  Rationale/Recommendations:  to promote right knee AROM, strength and stability.

## 2021-11-18 NOTE — PLAN OF CARE
"Patient alert and oriented. VSS. Afebrile. Pain 7/10, gave PRN meds and scheduled pain medications, see MAR.  Edema in right knee, +2. LS clear. BS audible. Will continue to monitor.   /55 (BP Location: Right arm, Patient Position: Semi-Vinson's)   Pulse 86   Temp 98.8  F (37.1  C) (Tympanic)   Resp 20   Ht 1.778 m (5' 10\")   Wt 108.9 kg (240 lb)   SpO2 92%   BMI 34.44 kg/m     Problem: Pain (Knee Arthroplasty)  Goal: Acceptable Pain Control  Outcome: No Change  Intervention: Prevent or Manage Pain  Recent Flowsheet Documentation  Taken 11/18/2021 1143 by Cynthia Hall RN  Pain Management Interventions:    medication (see MAR)    cold applied   Cynthia Hall RN on 11/18/2021 at 12:37 PM      Patients wife will be here to  patient around 1500.  Cynthia Hall RN on 11/18/2021 at 2:49 PM  "

## 2021-11-18 NOTE — PROGRESS NOTES
:    Patient did not discharge yesterday.  I called [facility] and gave them discharge update.  Anticipated discharge later today to home.    RUBY Stovall on 11/18/2021 at 9:13 AM    :    Discharge summary faxed to [facility] care.  Discharge to home today.    RUBY Stovall on 11/18/2021 at 2:46 PM

## 2021-11-18 NOTE — PROGRESS NOTES
NS[facility] DISCHARGE NOTE    Patient discharged to home at 3:03 PM via wheel chair. Accompanied by spouse and staff. Discharge instructions reviewed with patient, opportunity offered to ask questions. Prescriptions sent to patients preferred pharmacy. All belongings sent with patient.    Cynthia Hall RN

## 2021-11-18 NOTE — PROGRESS NOTES
[facility]      OUTPATIENT PHYSICAL THERAPY EVALUATION  PLAN OF TREATMENT FOR OUTPATIENT REHABILITATION  (COMPLETE FOR INITIAL CLAIMS ONLY)  Patient's Last Name, First Name, M.I.  YOB: 1955  Dilip Kan                        Provider's Name  [facility] Medical Record No.  [id number]                               Onset Date: 11/16/21     Start of Care Date:   11/16/21      Type:     _X_PT   ___OT   ___SLP Medical Diagnosis:   S/p right TKA                        PT Diagnosis:  impaired mobility   Visits from SOC:  3   _________________________________________________________________________________  Plan of Treatment/Functional Goals    Planned Interventions: bed mobility training,gait training,ROM (range of motion),transfer training,strengthening     Goals: See Physical Therapy Goals on Care Plan in [facility] electronic health record.    Therapy Frequency: Daily  Predicted Duration of Therapy Intervention: 1-2 days  _________________________________________________________________________________    I CERTIFY THE NEED FOR THESE SERVICES FURNISHED UNDER        THIS PLAN OF TREATMENT AND WHILE UNDER MY CARE     (Physician co-signature of this document indicates review and certification of the therapy plan).                 ,      Referring Physician: Pranav            Initial Assessment        See Physical Therapy evaluation dated   in Epic electronic health record.

## 2021-11-18 NOTE — PROGRESS NOTES
11/17/21 1340   Signing Clinician's Name / Credentials   Signing clinician's name / credentials Xiao Conway OTR/L    Quick Adds   Rehab Discipline OT   Functional Transfer Training   Treatment Detail SBA/CGA with FWW    Walk-In Shower Transfer Training   Walk-In Shower Transfer [place] Level (Training) stand-by assist   Walk-In Shower Transfer Physical Assistance Level (Training) supervision;verbal cues   Gait Training   [place] Level (Gait Training) stand-by assist   Physical Assistance Level (Gait Training) supervision   Assistive Device (Gait Training) rolling walker   Bathing Training   [place] Level (Bathing Training) stand-by assist   Assistance (Bathing Training) supervision   Lower Body Dressing Training   Lower Body Dressing Training Assistance moderate assist (50% patient effort)   OT Discharge Planning    OT Discharge Recommendation (DC Rec) Home with assist;home with home care occupational therapy   OT Rationale for DC Rec Pt will benefit from on-going home care OT to address ADL's and safety with functional mobility in the home    OT Brief overview of current status  Pt ambulated to shower with SBA/CGA, completed full shower with SBA while seated, mod assist for L/B dressing, increased complaints of pain with all activity   Additional Documentation   OT Plan cont OT    Total Session Time   Total Session Time (minutes) 60 minutes

## 2021-11-18 NOTE — PROGRESS NOTES
SAFETY CHECKLIST  ID Bands and Risk clasps correct and in place (DNR, Fall risk, Allergy, Latex, Limb):  Yes  All Lines Reconciled and labeled correctly: Yes  Whiteboard updated:Yes  Environmental interventions (bed/chair alarm on, call light, side rails, restraints, sitter....): Yes  Verify Tele #: NA  Cynthia Hall RN on 11/18/2021 at 7:38 AM

## 2021-11-18 NOTE — DISCHARGE SUMMARY
[facility]    Discharge Summary  Hospitalist    Date of Admission:  11/16/2021  Date of Discharge:  11/18/2021  Discharging Provider: Jered Fragoso  Date of Service (when I saw the patient): 11/17/21    Discharge Diagnoses   Principal Problem:    Status post total right knee replacement (11/16/2021)  Active Problems:    Essential hypertension (4/28/2017)    Diabetes mellitus, type 2 (H) (11/23/2020)      History of Present Illness   Dilip Kan is an 66 year old male who presented for elective right total knee replacement.    Hospital Course   Dilip Kan was admitted on 11/16/2021.  The following problems were addressed during his hospitalization:     Status post total right knee replacement: He was discharged on pod#1 and tolerated the procedure well without difficulty.  Pain is well controlled, he tolerated regular consistency diet without difficulty. Will continue with routine pain control wound care activity restrictions and aspirin twice daily for DVT prophylaxis per orthopedic surgery. Follow-up in orthopedics clinic as scheduled for wound check and staple removal and opted for home care for acute rehab needs.    Addendum: Afternoon of 11/17 (date of intended discharge) patient developed escalating pain in multiple fevers, required frequent IV pain medications to improve control and fevers did defervesce with incentive spirometry and improved pain control.  Chest x-ray clear, UA without evidence of infection, likely due to atelectasis, underwent lower extremity Doppler on the right with NO DVT.  He continued to feel improved, was counseled at length regarding signs and symptoms warranting timely/urgent/emergent reevaluation otherwise we will continue with home care as above.        Essential hypertension: Stable throughout his stay and he will resume his home ACE and Norvasc.       Diabetes mellitus, type 2 (H): well controlled with last A1c of 6.2, he will continue his home  Metformin.    Jered Fragoso MD    Significant Results and Procedures   IMPLANTS:     1.  Size 11 femoral component.  2.  G baseplate.  3.  11 PS poly.  4.  35 patella.    Pending Results   These results will be followed up by NA  Unresulted Labs Ordered in the Past 30 Days of this Admission     Date and Time Order Name Status Description    11/17/2021  2:52 PM Blood Culture Arm, Left In process     11/17/2021  2:52 PM Blood Culture Arm, Left In process           Code Status   Full Code       Primary Care Physician   Wei Perez    Physical Exam   Temp: 98.8  F (37.1  C) Temp src: Tympanic BP: 112/55 Pulse: 86   Resp: 20 SpO2: 92 % O2 Device: None (Room air)    Vitals:    11/16/21 0808   Weight: 108.9 kg (240 lb)     Vital Signs with Ranges  Temp:  [98.4  F (36.9  C)-101.9  F (38.8  C)] 98.8  F (37.1  C)  Pulse:  [] 86  Resp:  [14-20] 20  BP: (104-156)/(55-82) 112/55  SpO2:  [92 %-96 %] 92 %  I/O last 3 completed shifts:  In: 680 [P.O.:680]  Out: 875 [Urine:875]    Exam on day of discharge (/18/21):   GENERAL: Talkative, in no apparent distress.  CARDIOVASCULAR: regular rate and rhythm, no murmurs, rubs, or gallops. Normal S1/S2. No lower extremity edema.   RESPIRATORY: clear to auscultation bilaterally, no wheezes, no crackles.   GI: soft, non-tender, non-distended, normoactive bowel sounds.  MUSCULOSKELETAL: warm and well perfused, 2+ dorsalis pedis pulses bilaterally. CMS intact bilateral feet. Right knee with Aquacel dressing in place with no underlying spotting, clean and intact. Calves soft slight asymmetric swelling right greater than left.  SKIN: no pallor,jaundice, or rashes, skin changes or erythema.    Discharge Disposition   Discharged to home  Condition at discharge: Stable    Consultations This Hospital Stay   HOSPITALIST IP CONSULT  PHYSICAL THERAPY ADULT IP CONSULT  OCCUPATIONAL THERAPY ADULT IP CONSULT  SOCIAL WORK IP CONSULT  SOCIAL WORK IP CONSULT    Time Spent on this Encounter   I,  "Jered Fragoso MD, personally saw the patient today and spent less than or equal to 30 minutes discharging this patient.    Discharge Orders      Referral Order to Outpatient Physical Therapy      Reason for your hospital stay    Right knee replacement     When to call - Contact Surgeon Team    You may experience symptoms that require follow-up before your scheduled appointment. Refer to the \"[employer]\" for instructions on when to contact your Surgeon Team if you are concerned about pain control, blood clots, constipation, or if you are unable to urinate.     When to call - Reach out to Urgent Care    If you are not able to reach your Surgeon Team and you need immediate care, go to the Orthopedic Walk-in Clinic or Urgent Care at your Surgeon's office.  Do NOT go to the Emergency Room unless you have shortness of breath, chest pain, or other signs of a medical emergency.     When to call - Reasons to Call 911    Call 911 immediately if you experience sudden-onset chest pain, arm weakness/numbness, slurred speech, or shortness of breath     Discharge Instruction - Breathing exercises    Perform breathing exercises using your Incentive Spirometer 10 times per hour while awake for 2 weeks.     Symptoms - Fever Management    A low grade fever can be expected after surgery.  Use acetaminophen (TYLENOL) as needed for fever management.  Contact your Surgeon Team if you have a fever greater than 101.5 F, chills, and/or night sweats.     Symptoms - Constipation management    Constipation (hard, dry bowel movements) is expected after surgery due to the combination of being less active, the anesthetic, and the opioid pain medication.  You can do the following to help reduce constipation:  ~  FLUIDS:  Drink clear liquids (water or Gatorade), or juice (apple/prune).  ~  DIET:  Eat a fiber rich diet.    ~  ACTIVITY:  Get up and move around several times a day.  Increase your activity as you are able.  MEDICATIONS:  Reduce the " risk of constipation by starting medications before you are constipated.  You can take Miralax   (1 packet as directed) and/or a stool softener (Senokot 1-2 tablets 1-2 times a day).  If you already have constipation and these medications are not working, you can get magnesium citrate and use as directed.  If you continue to have constipation you can try an over the counter suppository or enema.  Call your Surgeon Team if it has been greater than 3 days since your last bowel movement.     Symptoms - Reduced Urine Output    Changes in the amount of fluids you drank before and after surgery may result in problems urinating.  It is important to stay well-hydrated after surgery and drink plenty of water. If it has been greater than 8 hours since you have urinated despite drinking plenty of water, call your Surgeon Team.     Activity - Exercises to prevent blood clots    Unless otherwise directed by your Surgeon team, perform the following exercises at least three times per day for the first four weeks after surgery to prevent blood clots in your legs: 1) Point and flex your feet (Ankle Pumps), 2) Move your ankle around in big circles, 3) Wiggle your toes, 4) Walk, even for short distances, several times a day, will help decrease the risk of blood clots.     Comfort and Pain Management - Pain after Surgery    Pain after surgery is normal and expected.  You will have some amount of pain for several weeks after surgery.  Your pain will improve with time.  There are several things you can do to help reduce your pain including: rest, ice, elevation, and using pain medications as needed. Contact your Surgeon Team if you have pain that persists or worsens after surgery despite rest, ice, elevation, and taking your medication(s) as prescribed. Contact your Surgeon Team if you have new numbness, tingling, or weakness in your operative extremity.     Comfort and Pain Management - Swelling after Surgery    Swelling and/or bruising  of the surgical extremity is common and may persist for several months after surgery. In addition to frequent icing and elevation, gentle compressive support with an ACE wrap or tubigrip may help with swelling. Apply compression regularly, removing at least twice daily to perform skin checks. Contact your Surgeon Team if your swelling increases and is NOT associated with an increase in your activity level, or if your swelling increases and is associated with redness and pain.     Comfort and Pain Management - Cold therapy    Ice can be used to control swelling and discomfort after surgery. Place a thin towel over your operative site and apply the ice pack overtop. Leave ice pack in place for 20 minutes, then remove for 20 minutes. Repeat this 20 minutes on/20 minutes off routine as often as tolerated.     Medication Instructions - Acetaminophen (TYLENOL) Instructions    You were discharged with acetaminophen (TYLENOL) for pain management after surgery. Acetaminophen most effectively manages pain symptoms when it is taken on a schedule without missing doses (every four, six, or eight hours). Your Provider will prescribe a safe daily dose between 3000 - 4000 mg.  Do NOT exceed this daily dose. Most patients use acetaminophen for pain control for the first four weeks after surgery.  You can wean from this medication as your pain decreases.     Medication Instructions - NSAID Instructions    You were discharged with an anti-inflammatory medication for pain management to use in combination with acetaminophen (TYLENOL) and the narcotic pain medication.  Take this medication exactly as directed.  You should only take one anti-inflammatory at a time.  Some common anti-inflammatories include: ibuprofen (ADVIL, MOTRIN), naproxen (ALEVE, NAPROSYN), celecoxib (CELEBREX), meloxicam (MOBIC), ketorolac (TORADOL).  Take this medication with food and water.     Medication Instructions - Opioids - Tapering Instructions    In the  "first three days following surgery, your symptoms may warrant use of the narcotic pain medication every four to six hours as prescribed. This is normal. As your pain symptoms improve, focus your efforts on decreasing (tapering) use of narcotic medications. The most successful tapering strategy is to first, decrease the number of tablets you take every 4-6 hours to the minimum prescribed. Then, increase the amount of time between doses.  For example:  First, taper to   or 1 tablet every 4-6 hours.  Then, taper to   or 1 tablet every 6-8 hours.  Then, taper to   or 1 tablet every 8-10 hours.  Then, taper to   or 1 tablet every 10-12 hours.  Then, taper to   or 1 tablet at bedtime.  The bedtime dose can help with comfort during sleep and is typically the last dose to be discontinued after surgery.     Medication instructions -  Anticoagulation - aspirin    Take the aspirin as prescribed for a total of four weeks after surgery.  This is given to help minimize your risk of blood clot.     Comfort and Pain Management - LOWER Extremity Elevation    Swelling is expected for several months after surgery. This type of swelling is usually associated with gravity and activity, and can be improved with elevation.   The best way to do this is to get your \"toes above your nose\" by laying down and placing several pillows lengthwise under your calf and heel. When elevating your leg keep your knee completely straight. Perform this elevation as often as possible especially for the first two weeks after surgery.     Medication Instructions - Opioid Instructions (Less than 65 years)    You were discharged with an opioid medication (hydromorphone, oxycodone, hydrocodone, or tramadol). This medication should only be taken for breakthrough pain that is not controlled with acetaminophen (TYLENOL). If you rate your pain less than 3 you do not need this medication.  Pain rating 0-3:  You do not need this medication.  Pain rating 4-6:  Take 1 " tablet every 4-6 hours as needed  Pain rating 7-10:  Take 2 tablets every 4-6 hours as needed.  Do not exceed 6 tablets per day     Activity - Total Knee Arthroplasty     Return to Driving    Return to driving - Driving is NOT permitted until directed by your provider. Under no circumstance are you permitted to drive while using narcotic pain medications.     Dressing / Wound Care - Wound    You have a clean dressing on your surgical wound. Dressing change instructions as follows: dressing will be removed at your follow-up appointment. Contact your Surgeon Team if you have increased redness, warmth around the surgical wound, and/or drainage from the surgical wound.     Dressing / Wound Care - NO Tub Bathing    Tub bathing, swimming, or any other activities that will cause your incision to be submerged in water should be avoided until allowed by your Surgeon.     NO Precautions    No precautions directed by your Provider.  You may perform range of motion activities as tolerated.     Weight bearing as tolerated    Weight bearing as tolerated on your operative extremity.     Dressing / Wound care - Shower with wound/dressing covered    You must COVER your dressing or incision with saran wrap (or any other non-permeable covering) to allow the incision to remain dry while showering.  You may shower 3 days after surgery as long as the surgical wound stays dry. Continue to cover your dressing or incision for showering until your first office visit.  You are strictly prohibited from soaking   or submerging the surgical wound underwater.     Follow Up Care    12/1/2021 9:45 AM Micah Rock MD MD face to face encounter    Documentation of Face to Face and Certification for Home Health Services    I certify that patient: Dilip Kan is under my care and that I, or a nurse practitioner or physician's assistant working with me, had a face-to-face encounter that meets the physician face-to-face encounter requirements  with this patient on: 11/17/2021.    This encounter with the patient was in whole, or in part, for the following medical condition, which is the primary reason for home health care: total knee replacement.    I certify that, based on my findings, the following services are medically necessary home health services: Nursing, Occupational Therapy, and Physical Therapy.    My clinical findings support the need for the above services because: Nurse is needed: For complex aftercare of surgical procedures because the patient needs instruction and cannot perform care on their own due to: total knee arthroplasty.., Occupational Therapy Services are needed to assess and treat cognitive ability and address ADL safety due to impairment in total knee arthroplasty., and Physical Therapy Services are needed to assess and treat the following functional impairments: surgical pain.    Further, I certify that my clinical findings support that this patient is homebound (i.e. absences from home require considerable and taxing effort and are for medical reasons or [employer] services or infrequently or of short duration when for other reasons) because: Leaving home is medically contraindicated for the following reason(s): Side effects of increased pain medication including: dizzyness...    Based on the above findings. I certify that this patient is confined to the home and needs intermittent skilled nursing care, physical therapy and/or speech therapy.  The patient is under my care, and I have initiated the establishment of the plan of care.  This patient will be followed by a physician who will periodically review the plan of care.  Physician/Provider to provide follow up care: Wei Perez    Attending hospital physician (the Medicare certified [facility] provider): Jered Fragoso MD  Physician Signature: See electronic signature associated with these discharge orders.  Date: 11/17/2021     Discharge Instruction - Regular Diet Adult     Return to your pre-surgery diet unless instructed otherwise     Discharge Medications   Current Discharge Medication List      START taking these medications    Details   acetaminophen (TYLENOL) 325 MG tablet Take 2 tablets (650 mg) by mouth every 4 hours as needed for other (mild pain)  Qty: 100 tablet, Refills: 0    Associated Diagnoses: Chronic pain of right knee      !! aspirin 81 MG EC tablet Take 1 tablet (81 mg) by mouth 2 times daily  Qty: 60 tablet, Refills: 0    Associated Diagnoses: Chronic pain of right knee      hydrOXYzine (ATARAX) 10 MG tablet Take 1 tablet (10 mg) by mouth every 6 hours as needed for itching or anxiety (with pain, moderate pain)  Qty: 30 tablet, Refills: 0    Associated Diagnoses: Chronic pain of right knee      oxyCODONE (ROXICODONE) 5 MG tablet Take 1-2 tablets (5-10 mg) by mouth every 3 hours as needed for pain (Moderate to Severe)  Qty: 30 tablet, Refills: 0    Associated Diagnoses: Chronic pain of right knee      senna-docusate (SENOKOT-S/PERICOLACE) 8.6-50 MG tablet Take 1-2 tablets by mouth 2 times daily Take while on oral narcotics to prevent or treat constipation.  Qty: 30 tablet, Refills: 0    Comments: While taking narcotics  Associated Diagnoses: Chronic pain of right knee       !! - Potential duplicate medications found. Please discuss with provider.      CONTINUE these medications which have NOT CHANGED    Details   amLODIPine (NORVASC) 10 MG tablet Take 1 tablet (10 mg) by mouth daily  Qty: 90 tablet, Refills: 3    Associated Diagnoses: Essential hypertension      apalutamide (ERLEADA) 60 MG tablet Take 4 tablets (240 mg) by mouth daily  Qty: 120 tablet, Refills: 6    Associated Diagnoses: Rising PSA following treatment for malignant neoplasm of prostate; Prostate cancer (H)      !! aspirin EC 81 MG EC tablet Take 81 mg by mouth daily with food      atorvastatin (LIPITOR) 20 MG tablet Take 1 tablet (20 mg) by mouth daily  Qty: 90 tablet, Refills: 3    Associated  Diagnoses: Hyperlipidemia LDL goal <100      Bee Pollen 500 MG CHEW Take 2 tablets by mouth daily      cyanocobalamin (RA VITAMIN B-12 TR) 1000 MCG TBCR Take 1,000 mcg by mouth daily      HYDROcodone-acetaminophen (NORCO)  MG per tablet Take 1 tablet by mouth every 4 hours as needed for severe pain Average 4.5 per day = 135 per month  Qty: 135 tablet, Refills: 0    Associated Diagnoses: Spinal stenosis of lumbar region with neurogenic claudication; Chronic, continuous use of opioids; Controlled substance agreement signed      lisinopril (ZESTRIL) 40 MG tablet Take 1 tablet (40 mg) by mouth daily  Qty: 90 tablet, Refills: 4    Associated Diagnoses: Essential hypertension      metFORMIN (GLUCOPHAGE-XR) 500 MG 24 hr tablet Take 2 tablets (1,000 mg) by mouth 2 times daily (with meals)  Qty: 360 tablet, Refills: 3    Associated Diagnoses: Type 2 diabetes mellitus without complication, without long-term current use of insulin (H)      naproxen (NAPROSYN) 500 MG tablet Take 1 tablet (500 mg) by mouth 2 times daily as needed for moderate pain  Qty: 180 tablet, Refills: 0    Associated Diagnoses: Spinal stenosis of lumbar region with neurogenic claudication      omeprazole (PRILOSEC) 40 MG DR capsule TAKE 1 CAPSULE BY MOUTH ONCE DAILY.  Qty: 90 capsule, Refills: 3    Associated Diagnoses: Gastroesophageal reflux disease without esophagitis      ondansetron (ZOFRAN-ODT) 8 MG ODT tab Take 1 tablet (8 mg) by mouth every 8 hours as needed for nausea  Qty: 30 tablet, Refills: 1    Associated Diagnoses: Nausea      pregabalin (LYRICA) 150 MG capsule Take 1 capsule (150 mg) by mouth 2 times daily  Qty: 180 capsule, Refills: 1    Associated Diagnoses: Spinal stenosis of lumbar region with neurogenic claudication; Chronic back pain greater than 3 months duration      vitamin D3 (CHOLECALCIFEROL) 2000 units (50 mcg) tablet Take 1 tablet (2,000 Units) by mouth daily    Associated Diagnoses: Androgen deprivation therapy       !! -  Potential duplicate medications found. Please discuss with provider.        Allergies   No Known Allergies  Data   Most Recent 3 CBC's:  Recent Labs   Lab Test 11/18/21  0549 11/17/21  0634 11/17/21  0420 11/01/21  0908 09/17/21  1036   WBC  --   --  7.5 4.8 8.0   HGB 9.8* 10.3* 10.8* 13.3 12.8*   MCV  --   --  90 90 88   PLT  --   --  160 199 181      Most Recent 3 BMP's:  Recent Labs   Lab Test 11/17/21  1620 11/01/21  0908 09/17/21  1036    139 141   POTASSIUM 4.2 4.4 4.4   CHLORIDE 102 103 104   CO2 28 28 30   BUN 19 14 19   CR 0.85 0.70 0.67*   ANIONGAP 6 8 7   ROBERTO 8.6 9.7 9.4   * 136* 115*     Most Recent 2 LFT's:  Recent Labs   Lab Test 11/01/21  0908 09/17/21  1036   AST 16 13   ALT 17 17   ALKPHOS 60 68   BILITOTAL 0.3 0.6     Most Recent INR's and Anticoagulation Dosing History:  Anticoagulation Dose History     Recent Dosing and Labs Latest Ref Rng & Units 11/8/2012 4/7/2017 11/1/2018    INR 0 - 1.3 1.0 1.1 0.95        Most Recent 3 Troponin's:No lab results found.  Most Recent Cholesterol Panel:  Recent Labs   Lab Test 11/01/21  0908   CHOL 149   LDL 61   HDL 56   TRIG 158*     Most Recent 6 Bacteria Isolates From Any Culture (See EPIC Reports for Culture Details):No lab results found.  Most Recent TSH, T4 and A1c Labs:  Recent Labs   Lab Test 11/01/21  0914 11/01/21  0908   TSH  --  1.46   A1C 6.4*  --      Results for orders placed or performed during the hospital encounter of 11/16/21   XR Knee Port Right 1/2 Views    Narrative    XR KNEE PORT RIGHT 1/2 VIEWS, 11/16/2021 12:20 PM    History: Male, age 66 years; Post-Op Total Knee    Comparison: 8/25/2021    Technique: Two views were obtained.    FINDINGS: The metallic arthroplasty device is satisfactorily  positioned. Surrounding bone and overlying soft tissues appear grossly  intact.      Impression    IMPRESSION:   Status post metallic arthroplasty. No acute complication.    KARLO FLYNN MD         SYSTEM ID:  [id number]

## 2021-11-18 NOTE — PLAN OF CARE
"Temps 100.7-101 this shift, scheduled tylenol given and room temps decreased. Edema in right knee +2, polar cooler in place. Pain rated 7-8/10 in right leg/knee this shift,pt reports improvement from previous shift with new pain medication regimen.   Pt was nauseated, PRN zofran given, now resolved.  Urine collected and sent to lab, will continue to monitor.            /55   Pulse 82   Temp (!) 100.7  F (38.2  C) (Tympanic)   Resp 20   Ht 1.778 m (5' 10\")   Wt 108.9 kg (240 lb)   SpO2 95%   BMI 34.44 kg/m      "

## 2021-11-18 NOTE — PROGRESS NOTES
11/16/21 1311   Quick Adds   Type of Visit Initial Occupational Therapy Evaluation   Living Environment   People in home other (see comments)   Current Living Arrangements house   Home Accessibility no concerns   Transportation Anticipated family or friend will provide   Self-Care   Usual Activity Tolerance good   Current Activity Tolerance fair   Equipment Currently Used at Home none   Disability/Function   Hearing Difficulty or Deaf no   Wear Glasses or Blind no   Cognitive Status Examination   Orientation Status orientation to person, place and time   Affect/Mental Status (Cognitive) WFL   Follows Commands WFL   Visual Perception   Visual Impairment/Limitations WFL   Range of Motion Comprehensive   General Range of Motion no range of motion deficits identified   Coordination   Upper Extremity Coordination No deficits were identified   Bed Mobility   Bed Mobility supine-sit   Supine-Sit Lenorah (Bed Mobility) moderate assist (50% patient effort);1 person to manage equipment   Transfers   Transfers bed-chair transfer;sit-stand transfer   Transfer Skill: Bed to Chair/Chair to Bed   Bed-Chair Lenorah (Transfers) minimum assist (75% patient effort);2 person assist   Sit-Stand Transfer   Sit-Stand Lenorah (Transfers) minimum assist (75% patient effort)   Assistive Device (Sit-Stand Transfers) walker, front-wheeled   Clinical Impression   Criteria for Skilled Therapeutic Interventions Met (OT) yes   OT Problem List-Impairments impacting ADL activity tolerance impaired   Assessment of Occupational Performance 1-3 Performance Deficits   Planned Therapy Interventions (OT) ADL retraining;progressive activity/exercise   Clinical Decision Making Complexity (OT) low complexity   Therapy Frequency (OT) Daily   Anticipated Equipment Needs Upon Discharge (OT)   (has all )   Risk & Benefits of therapy have been explained risks/benefits reviewed   OT Discharge Planning    OT Discharge Recommendation (DC Rec) home  with home care occupational therapy   OT Rationale for DC Rec Pt would benefit from on-going OT to address ADL's and functional mobility in the home    Skin WDL   Skin WDL X   Skin Color pale   Skin Temperature warm   Skin Moisture other (see comments)  (WDL)   Skin Elasticity quick return to original state   Skin Integrity/Characteristics other (see comments)

## 2021-11-19 ENCOUNTER — PATIENT OUTREACH (OUTPATIENT)
Dept: CARE COORDINATION | Facility: CLINIC | Age: 66
End: 2021-11-19
Payer: COMMERCIAL

## 2021-11-19 NOTE — PROGRESS NOTES
Clinic Care Coordination Contact  Patient has orthopedic surgery follow-up only.  No TCM call required per policy.  Humera Turner RN ,....................  11/19/2021   8:13 AM

## 2021-11-20 PROCEDURE — G0180 MD CERTIFICATION HHA PATIENT: HCPCS | Performed by: FAMILY MEDICINE

## 2021-11-22 ENCOUNTER — TELEPHONE (OUTPATIENT)
Dept: FAMILY MEDICINE | Facility: OTHER | Age: 66
End: 2021-11-22
Payer: COMMERCIAL

## 2021-11-23 ENCOUNTER — OFFICE VISIT (OUTPATIENT)
Dept: FAMILY MEDICINE | Facility: OTHER | Age: 66
End: 2021-11-23
Attending: FAMILY MEDICINE
Payer: COMMERCIAL

## 2021-11-23 VITALS
SYSTOLIC BLOOD PRESSURE: 112 MMHG | TEMPERATURE: 96.2 F | HEART RATE: 100 BPM | OXYGEN SATURATION: 99 % | RESPIRATION RATE: 20 BRPM | DIASTOLIC BLOOD PRESSURE: 66 MMHG

## 2021-11-23 DIAGNOSIS — M48.062 SPINAL STENOSIS OF LUMBAR REGION WITH NEUROGENIC CLAUDICATION: ICD-10-CM

## 2021-11-23 DIAGNOSIS — Z96.651 STATUS POST RIGHT KNEE REPLACEMENT: Primary | ICD-10-CM

## 2021-11-23 DIAGNOSIS — Z79.899 CONTROLLED SUBSTANCE AGREEMENT SIGNED: ICD-10-CM

## 2021-11-23 DIAGNOSIS — F11.90 CHRONIC, CONTINUOUS USE OF OPIOIDS: ICD-10-CM

## 2021-11-23 LAB
BACTERIA BLD CULT: NO GROWTH
BACTERIA BLD CULT: NO GROWTH

## 2021-11-23 PROCEDURE — 99213 OFFICE O/P EST LOW 20 MIN: CPT | Performed by: FAMILY MEDICINE

## 2021-11-23 PROCEDURE — G0463 HOSPITAL OUTPT CLINIC VISIT: HCPCS | Mod: 25

## 2021-11-23 RX ORDER — OXYCODONE AND ACETAMINOPHEN 10; 325 MG/1; MG/1
1 TABLET ORAL EVERY 4 HOURS PRN
Qty: 90 TABLET | Refills: 0 | Status: SHIPPED | OUTPATIENT
Start: 2021-11-23 | End: 2021-12-07

## 2021-11-23 RX ORDER — HYDROCODONE BITARTRATE AND ACETAMINOPHEN 10; 325 MG/1; MG/1
1 TABLET ORAL EVERY 4 HOURS PRN
Qty: 135 TABLET | Refills: 0 | Status: CANCELLED | OUTPATIENT
Start: 2021-11-23

## 2021-11-23 ASSESSMENT — ANXIETY QUESTIONNAIRES

## 2021-11-23 ASSESSMENT — PATIENT HEALTH QUESTIONNAIRE - PHQ9: 5. POOR APPETITE OR OVEREATING: NOT AT ALL

## 2021-11-23 ASSESSMENT — PAIN SCALES - GENERAL: PAINLEVEL: EXTREME PAIN (9)

## 2021-11-23 NOTE — NURSING NOTE
"Patient presents to the clinic for controlled medication refill.  Contract updated today, TOX pending.      FOOD SECURITY SCREENING QUESTIONS  Hunger Vital Signs:  Within the past 12 months we worried whether our food would run out before we got money to buy more. Never  Within the past 12 months the food we bought just didn't last and we didn't have money to get more. Never    Advance Care Directive on file? no  Advance Care Directive provided to patient? Has a form at home.    Chief Complaint   Patient presents with     Recheck Medication       Initial /66 (BP Location: Right arm, Patient Position: Sitting, Cuff Size: Adult Large)   Pulse 100   Temp (!) 96.2  F (35.7  C) (Tympanic)   Resp 20   SpO2 99%  Estimated body mass index is 34.44 kg/m  as calculated from the following:    Height as of 11/16/21: 1.778 m (5' 10\").    Weight as of 11/16/21: 108.9 kg (240 lb).  Medication Reconciliation: complete        Leona Shell LPN       "

## 2021-11-23 NOTE — LETTER
Opioid / Opioid Plus Controlled Substance Agreement    This is an agreement between you and your provider about the safe and appropriate use of controlled substance/opioids prescribed by your care team. Controlled substances are medicines that can cause physical and mental dependence (abuse).    There are strict laws about having and using these medicines. We here at [facility] are committing to working with you in your efforts to get better. To support you in this work, we ll help you schedule regular office appointments for medicine refills. If we must cancel or change your appointment for any reason, we ll make sure you have enough medicine to last until your next appointment.     As a Provider, I will:    Listen carefully to your concerns and treat you with respect.     Recommend a treatment plan that I believe is in your best interest. This plan may involve therapies other than opioid pain medication.     Talk with you often about the possible benefits, and the risk of harm of any medicine that we prescribe for you.     Provide a plan on how to taper (discontinue or go off) using this medicine if the decision is made to stop its use.    As a Patient, I understand that opioid(s):     Are a controlled substance prescribed by my care team to help me function or work and manage my condition(s).     Are strong medicines and can cause serious side effects such as:    Drowsiness, which can seriously affect my driving ability    A lower breathing rate, enough to cause death    Harm to my thinking ability     Depression     Abuse of and addiction to this medicine    Need to be taken exactly as prescribed. Combining opioids with certain medicines or chemicals (such as illegal drugs, sedatives, sleeping pills, and benzodiazepines) can be dangerous or even fatal. If I stop opioids suddenly, I may have severe withdrawal symptoms.    Do not work for all types of pain nor for all patients. If they re not helpful, I may  be asked to stop them.        The risks, benefits and side effects of these medicine(s) were explained to me. I agree that:  1. I will take part in other treatments as advised by my care team. This may be psychiatry or counseling, physical therapy, behavioral therapy, group treatment or a referral to a specialist.     2. I will keep all my appointments. I understand that this is part of the monitoring of opioids. My care team may require an office visit for EVERY opioid/controlled substance refill. If I miss appointments or don t follow instructions, my care team may stop my medicine.    3. I will take my medicines as prescribed. I will not change the dose or schedule unless my care team tells me to. There will be no refills if I run out early.     4. I may be asked to come to the clinic and complete a urine drug test or complete a pill count at any time. If I don t give a urine sample or participate in a pill count, the care team may stop my medicine.    5. I will only receive prescriptions from this clinic for chronic pain. If I am treated by another provider for acute pain issues, I will tell them that I am taking opioid pain medication for chronic pain and that I have a treatment agreement with this provider. I will inform my [facility] care team within one business day if I am given a prescription for any pain medication by another healthcare provider. My [facility] care team can contact other providers and pharmacists about my use of any medicines.    6. It is up to me to make sure that I don t run out of my medicines on weekends or holidays. If my care team is willing to refill my opioid prescription without a visit, I must request refills only during office hours. Refills may take up to 3 business days to process. I will use one pharmacy to fill all my opioid and other controlled substance prescriptions. I will notify the clinic about any changes to my insurance or medication  availability.    7. I am responsible for my prescriptions. If the medicine/prescription is lost, stolen or destroyed, it will not be replaced. I also agree not to share controlled substance medicines with anyone.    8. I am aware I should not use any illegal or recreational drugs. I agree not to drink alcohol unless my care team says I can.       9. If I enroll in the [place] Medical Cannabis program, I will tell my care team prior to my next refill.     10. I will tell my care team right away if I become pregnant, have a new medical problem treated outside of my regular clinic, or have a change in my medications.    11. I understand that this medicine can affect my thinking, judgment and reaction time. Alcohol and drugs affect the brain and body, which can affect the safety of my driving. Being under the influence of alcohol or drugs can affect my decision-making, behaviors, personal safety, and the safety of others. Driving while impaired (DWI) can occur if a person is driving, operating, or in physical control of a car, motorcycle, boat, snowmobile, ATV, motorbike, off-road vehicle, or any other motor vehicle ([place] Statute 169A.20). I understand the risk if I choose to drive or operate any vehicle or machinery.    I understand that if I do not follow any of the conditions above, my prescriptions or treatment may be stopped or changed.          Opioids  What You Need to Know    What are opioids?   Opioids are pain medicines that must be prescribed by a doctor. They are also known as narcotics.     Examples are:   1. morphine (MS Contin, Nina)  2. oxycodone (Oxycontin)  3. oxycodone and acetaminophen (Percocet)  4. hydrocodone and acetaminophen (Vicodin, Norco)   5. fentanyl patch (Duragesic)   6. hydromorphone (Dilaudid)   7. methadone  8. codeine (Tylenol #3)     What do opioids do well?   Opioids are best for severe short-term pain such as after a surgery or injury. They may work well for cancer pain. They may  help some people with long-lasting (chronic) pain.     What do opioids NOT do well?   Opioids never get rid of pain entirely, and they don t work well for most patients with chronic pain. Opioids don t reduce swelling, one of the causes of pain.                                    Other ways to manage chronic pain and improve function include:       Treat the health problem that may be causing pain    Anti-inflammation medicines, which reduce swelling and tenderness, such as ibuprofen (Advil, Motrin) or naproxen (Aleve)    Acetaminophen (Tylenol)    Antidepressants and anti-seizure medicines, especially for nerve pain    Topical treatments such as patches or creams    Injections or nerve blocks    Chiropractic or osteopathic treatment    Acupuncture, massage, deep breathing, meditation, visual imagery, aromatherapy    Use heat or ice at the pain site    Physical therapy     Exercise    Stop smoking    Take part in therapy       Risks and side effects     Talk to your doctor before you start or decide to keep taking opioids. Possible side effects include:      Lowering your breathing rate enough to cause death    Overdose, including death, especially if taking higher than prescribed doses    Worse depression symptoms; less pleasure in things you usually enjoy    Feeling tired or sluggish    Slower thoughts or cloudy thinking    Being more sensitive to pain over time; pain is harder to control    Trouble sleeping or restless sleep    Changes in hormone levels (for example, less testosterone)    Changes in sex drive or ability to have sex    Constipation    Unsafe driving    Itching and sweating    Dizziness    Nausea, throwing up and dry mouth    What else should I know about opioids?    Opioids may lead to dependence, tolerance, or addiction.      Dependence means that if you stop or reduce the medicine too quickly, you will have withdrawal symptoms. These include loose poop (diarrhea), jitters, flu-like symptoms,  nervousness and tremors. Dependence is not the same as addiction.                       Tolerance means needing higher doses over time to get the same effect. This may increase the chance of serious side effects.      Addiction is when people improperly use a substance that harms their body, their mind or their relations with others. Use of opiates can cause a relapse of addiction if you have a history of drug or alcohol abuse.      People who have used opioids for a long time may have a lower quality of life, worse depression, higher levels of pain and more visits to doctors.    You can overdose on opioids. Take these steps to lower your risk of overdose:    1. Recognize the signs:  Signs of overdose include decrease or loss of consciousness (blackout), slowed breathing, trouble waking up and blue lips. If someone is worried about overdose, they should call 911.    2. Talk to your doctor about Narcan (naloxone).   If you are at risk for overdose, you may be given a prescription for Narcan. This medicine very quickly reverses the effects of opioids.   If you overdose, a friend or family member can give you Narcan while waiting for the ambulance. They need to know the signs of overdose and how to give Narcan.     3. Don't use alcohol or street drugs.   Taking them with opioids can cause death.    4. Do not take any of these medicines unless your doctor says it s OK. Taking these with opioids can cause death:    Benzodiazepines, such as lorazepam (Ativan), alprazolam (Xanax) or diazepam (Valium)    Muscle relaxers, such as cyclobenzaprine (Flexeril)    Sleeping pills like zolpidem (Ambien)     Other opioids      How to keep you and other people safe while taking opioids:    1. Never share your opioids with others.  Opioid medicines are regulated by the Drug Enforcement Agency ([employer]MET[employer]). Selling or sharing medications is a criminal act.    2. Be sure to store opioids in a secure place, locked up if possible. Young children  can easily swallow them and overdose.    3. When you are traveling with your medicines, keep them in the original bottles. If you use a pill box, be sure you also carry a copy of your medicine list from your clinic or pharmacy.    4. Safe disposal of opioids    Most pharmacies have places to get rid of medicine, called disposal kiosks. Medicine disposal options are also available in every [facility]. Search your county and  medication disposal  to find more options. You can find more details at:  [email or web address]     I agree that my provider, clinic care team, and pharmacy may work with any city, state or federal law enforcement agency that investigates the misuse, sale, or other diversion of my controlled medicine. I will allow my provider to discuss my care with, or share a copy of, this agreement with any other treating provider, pharmacy or emergency room where I receive care.    I have read this agreement and have asked questions about anything I did not understand.    _______________________________________________________  Patient Signature - Dilip Kan _____________________                   Date     _______________________________________________________  Provider Signature - Wei Perez MD   _____________________                   Date     _______________________________________________________  Witness Signature (required if provider not present while patient signing)   _____________________                   Date

## 2021-11-23 NOTE — PROGRESS NOTES
"Nursing Notes:   Leona Shell LPN  11/23/2021  9:38 AM  Signed  Patient presents to the clinic for controlled medication refill.  Contract updated today, TOX pending.      FOOD SECURITY SCREENING QUESTIONS  Hunger Vital Signs:  Within the past 12 months we worried whether our food would run out before we got money to buy more. Never  Within the past 12 months the food we bought just didn't last and we didn't have money to get more. Never    Advance Care Directive on file? no  Advance Care Directive provided to patient? Has a form at home.    Chief Complaint   Patient presents with     Recheck Medication       Initial /66 (BP Location: Right arm, Patient Position: Sitting, Cuff Size: Adult Large)   Pulse 100   Temp (!) 96.2  F (35.7  C) (Tympanic)   Resp 20   SpO2 99%  Estimated body mass index is 34.44 kg/m  as calculated from the following:    Height as of 11/16/21: 1.778 m (5' 10\").    Weight as of 11/16/21: 108.9 kg (240 lb).  Medication Reconciliation: complete        Leona Shell LPN            Assessment & Plan       ICD-10-CM    1. Status post right knee replacement  Z96.651 oxyCODONE-acetaminophen (PERCOCET)  MG per tablet   2. Spinal stenosis of lumbar region with neurogenic claudication  M48.062    3. Chronic, continuous use of opioids  F11.90    4. Controlled substance agreement updated 10/5/2020  Z79.899        Increased pain after knee replacement. Was on hydrocodone chronically (tapering down) for back pain  The combination of opioids can lead to confusion in dosing, increasing chance for adverse event  Stop hydrocodone  Currently taking 90 morphine milligram equivalents between hydrocodone and oxycodone  Change to oxycodone/APAP at 90 morphine milligram equivalents. May use 10/325 mg up to 6 pills per day.   Given 90 pills = 15 d supply    Consider ACE wrapping leg to over knee to reduce swelling and help pain. Can discuss with home care to see if this is acceptable s/p " TKA      Follow up 2 weeks    Wei Perez MD     [facility]      SUBJECTIVE:  66 year old male presents to follow up on chronic back pain  He had a right TKA on 11/16. Using a walker to get around the house. Receiving home care PT  Using hydrocodone usual 4.5 per day = 45 mg of hydrocodone  Also using oxycodone 10 mg at a time, a few times per day. = 45 morphine milligram equivalents per day  The oxycodone seems to last a little longer. He is hoping pain improves in the next couple weeks  Follow up with Pranav scheduled next week      REVIEW OF SYSTEMS:    Pertinent items are noted in HPI.    Current Outpatient Medications   Medication Sig Dispense Refill     acetaminophen (TYLENOL) 325 MG tablet Take 2 tablets (650 mg) by mouth every 4 hours as needed for other (mild pain) 100 tablet 0     amLODIPine (NORVASC) 10 MG tablet Take 1 tablet (10 mg) by mouth daily 90 tablet 3     apalutamide (ERLEADA) 60 MG tablet Take 4 tablets (240 mg) by mouth daily 120 tablet 6     aspirin 81 MG EC tablet Take 1 tablet (81 mg) by mouth 2 times daily 60 tablet 0     aspirin EC 81 MG EC tablet Take 81 mg by mouth daily with food       atorvastatin (LIPITOR) 20 MG tablet Take 1 tablet (20 mg) by mouth daily 90 tablet 3     Bee Pollen 500 MG CHEW Take 2 tablets by mouth daily       cyanocobalamin (RA VITAMIN B-12 TR) 1000 MCG TBCR Take 1,000 mcg by mouth daily       HYDROcodone-acetaminophen (NORCO)  MG per tablet Take 1 tablet by mouth every 4 hours as needed for severe pain Average 4.5 per day = 135 per month 135 tablet 0     hydrOXYzine (ATARAX) 10 MG tablet Take 1 tablet (10 mg) by mouth every 6 hours as needed for itching or anxiety (with pain, moderate pain) 30 tablet 0     lisinopril (ZESTRIL) 40 MG tablet Take 1 tablet (40 mg) by mouth daily 90 tablet 4     metFORMIN (GLUCOPHAGE-XR) 500 MG 24 hr tablet Take 2 tablets (1,000 mg) by mouth 2 times daily (with meals) 360 tablet 3     naproxen  (NAPROSYN) 500 MG tablet Take 1 tablet (500 mg) by mouth 2 times daily as needed for moderate pain 180 tablet 0     omeprazole (PRILOSEC) 40 MG DR capsule TAKE 1 CAPSULE BY MOUTH ONCE DAILY. 90 capsule 3     ondansetron (ZOFRAN-ODT) 8 MG ODT tab Take 1 tablet (8 mg) by mouth every 8 hours as needed for nausea 30 tablet 1     oxyCODONE (ROXICODONE) 5 MG tablet Take 1-2 tablets (5-10 mg) by mouth every 3 hours as needed for pain (Moderate to Severe) 30 tablet 0     pregabalin (LYRICA) 150 MG capsule Take 1 capsule (150 mg) by mouth 2 times daily 180 capsule 1     senna-docusate (SENOKOT-S/PERICOLACE) 8.6-50 MG tablet Take 1-2 tablets by mouth 2 times daily Take while on oral narcotics to prevent or treat constipation. 30 tablet 0     vitamin D3 (CHOLECALCIFEROL) 2000 units (50 mcg) tablet Take 1 tablet (2,000 Units) by mouth daily       No Known Allergies    OBJECTIVE:  /66 (BP Location: Right arm, Patient Position: Sitting, Cuff Size: Adult Large)   Pulse 100   Temp (!) 96.2  F (35.7  C) (Tympanic)   Resp 20   SpO2 99%     EXAM:  General Appearance: Alert. No acute distress  Psychiatric: Normal affect and mentation  Musculoskeletal: Dressing intact, no signs of bleeding. Knee very edematous, but no induration, erythema or signs of infection

## 2021-11-23 NOTE — PROGRESS NOTES
[facility]      OUTPATIENT OCCUPATIONAL THERAPY  EVALUATION  PLAN OF TREATMENT FOR OUTPATIENT REHABILITATION  (COMPLETE FOR INITIAL CLAIMS ONLY)  Patient's Last Name, First Name, M.I.  YOB: 1955  Dilip Kan                          Provider's Name  [facility] Medical Record No.  [id number]                               Onset Date:   11/16/21   Start of Care Date:     11/16/21   Type:     ___PT   _X_OT   ___SLP Medical Diagnosis:                        Right TKA    OT Diagnosis:  right TKA    Visits from SOC:  1   _________________________________________________________________________________  Plan of Treatment/Functional Goals    Planned Interventions: ADL retraining,progressive activity/exercise   Goals: See Occupational Therapy Goals on Care Plan in [facility] electronic health record.    Therapy Frequency: Daily  Predicted Duration of Therapy Intervention: 1-2 days   _________________________________________________________________________________    I CERTIFY THE NEED FOR THESE SERVICES FURNISHED UNDER        THIS PLAN OF TREATMENT AND WHILE UNDER MY CARE     (Physician co-signature of this document indicates review and certification of the therapy plan).                 ,                   Initial Assessment        See Occupational Therapy evaluation dated   in Epic electronic health record.

## 2021-11-23 NOTE — PATIENT INSTRUCTIONS
Current hydrocodone is about the same as 30 mg of oxycodone  Stop hydrocodone  Use oxycodone up to 60 mg in a day (6 pills) for the next couple weeks.  Ideally you will be able to drop back on the dosing to 5 or 4 pills per day by the next appointment with me in 2 weeks    Consider ACE wrapping the leg from the foot over the knee to reduce swelling. Physical therapist could wrap and then you could unwrap.

## 2021-11-24 ASSESSMENT — ANXIETY QUESTIONNAIRES: GAD7 TOTAL SCORE: 0

## 2021-11-24 ASSESSMENT — PATIENT HEALTH QUESTIONNAIRE - PHQ9: SUM OF ALL RESPONSES TO PHQ QUESTIONS 1-9: 0

## 2021-11-25 NOTE — PROGRESS NOTES
Chart accessed to document medication waste.    Hydromorphone 0.3mg wasted with Dayron Hebert RN and 0.2mg was given.   Cynthia Hall RN on 11/25/2021 at 3:56 PM

## 2021-11-29 ENCOUNTER — TELEPHONE (OUTPATIENT)
Dept: FAMILY MEDICINE | Facility: OTHER | Age: 66
End: 2021-11-29
Payer: COMMERCIAL

## 2021-11-29 DIAGNOSIS — Z47.1 AFTERCARE FOLLOWING RIGHT KNEE JOINT REPLACEMENT SURGERY: Primary | ICD-10-CM

## 2021-11-29 DIAGNOSIS — Z96.651 AFTERCARE FOLLOWING RIGHT KNEE JOINT REPLACEMENT SURGERY: Primary | ICD-10-CM

## 2021-11-29 NOTE — TELEPHONE ENCOUNTER
Wei Perez MD reviewed and completed the following home care or hospice form(s) for Home Care on 11-   This covers the certification period effective 11- to 1-.  Leona Shell LPN on 11/29/2021 at 12:19 PM

## 2021-12-01 ENCOUNTER — OFFICE VISIT (OUTPATIENT)
Dept: ORTHOPEDICS | Facility: OTHER | Age: 66
End: 2021-12-01
Attending: ORTHOPAEDIC SURGERY
Payer: MEDICARE

## 2021-12-01 DIAGNOSIS — Z96.651 STATUS POST TOTAL RIGHT KNEE REPLACEMENT: Primary | ICD-10-CM

## 2021-12-01 PROCEDURE — G0463 HOSPITAL OUTPT CLINIC VISIT: HCPCS

## 2021-12-01 PROCEDURE — 99024 POSTOP FOLLOW-UP VISIT: CPT | Performed by: ORTHOPAEDIC SURGERY

## 2021-12-01 NOTE — PROGRESS NOTES
Visit Date: 2021    REASON FOR EVALUATION:  Right knee replacement.    HISTORY:  Jermain comes in, right knee replacement.  Overall, he reports doing fairly well.  He is having some pain.  Oxycodone is helping with that pain.  He is here for suture removal and examination.  He has Home Care Therapy coming to his house here as well.    PHYSICAL EXAMINATION:  Examination of his knee shows incisional site to be healing properly, no signs or symptoms of infection present.  Staples removed, Steri-Strips applied.  Range of motion tolerable, -5 to about 90.  Limb exam stable and balanced.    IMPRESSION:  Right knee replacement.    PLAN:  Continue to work through the therapy process.  I will see him back in a month, x-rays, 2 views, right knee.  Certainly refill of pain medications can be done, either through myself or his primary care physician here moving forward.  I did put some therapy orders in here for him as well.    Micah Rock MD        D: 2021   T: 2021   MT: SHEREE    Name:     LELAND CHAPMAN  MRN:      [id number]        Account:    [id number]   :      1955           Visit Date: 2021     Document: [id number]

## 2021-12-01 NOTE — PROGRESS NOTES
Patient is here for follow up on his right total knee.   Sofia Lundberg LPN .....................12/1/2021 9:50 AM

## 2021-12-07 ENCOUNTER — OFFICE VISIT (OUTPATIENT)
Dept: FAMILY MEDICINE | Facility: OTHER | Age: 66
End: 2021-12-07
Attending: FAMILY MEDICINE
Payer: COMMERCIAL

## 2021-12-07 VITALS
DIASTOLIC BLOOD PRESSURE: 78 MMHG | RESPIRATION RATE: 16 BRPM | OXYGEN SATURATION: 95 % | BODY MASS INDEX: 33.29 KG/M2 | TEMPERATURE: 98.1 F | HEART RATE: 84 BPM | SYSTOLIC BLOOD PRESSURE: 120 MMHG | WEIGHT: 232 LBS

## 2021-12-07 DIAGNOSIS — M48.062 SPINAL STENOSIS OF LUMBAR REGION WITH NEUROGENIC CLAUDICATION: ICD-10-CM

## 2021-12-07 DIAGNOSIS — G89.29 CHRONIC PAIN OF RIGHT KNEE: ICD-10-CM

## 2021-12-07 DIAGNOSIS — R11.0 NAUSEA: ICD-10-CM

## 2021-12-07 DIAGNOSIS — E11.9 TYPE 2 DIABETES MELLITUS WITHOUT COMPLICATION, WITHOUT LONG-TERM CURRENT USE OF INSULIN (H): ICD-10-CM

## 2021-12-07 DIAGNOSIS — Z96.651 STATUS POST RIGHT KNEE REPLACEMENT: Primary | ICD-10-CM

## 2021-12-07 DIAGNOSIS — M25.561 CHRONIC PAIN OF RIGHT KNEE: ICD-10-CM

## 2021-12-07 PROCEDURE — G0463 HOSPITAL OUTPT CLINIC VISIT: HCPCS | Mod: 25

## 2021-12-07 PROCEDURE — 99213 OFFICE O/P EST LOW 20 MIN: CPT | Performed by: FAMILY MEDICINE

## 2021-12-07 RX ORDER — AMOXICILLIN 250 MG
1-2 CAPSULE ORAL 2 TIMES DAILY PRN
Qty: 90 TABLET | Status: CANCELLED | OUTPATIENT
Start: 2021-12-07

## 2021-12-07 RX ORDER — METFORMIN HCL 500 MG
1000 TABLET, EXTENDED RELEASE 24 HR ORAL 2 TIMES DAILY WITH MEALS
Qty: 360 TABLET | Refills: 4 | Status: ON HOLD | OUTPATIENT
Start: 2021-12-07 | End: 2023-04-22

## 2021-12-07 RX ORDER — HYDROXYZINE HYDROCHLORIDE 10 MG/1
10 TABLET, FILM COATED ORAL EVERY 6 HOURS PRN
Qty: 120 TABLET | Refills: 11 | Status: SHIPPED | OUTPATIENT
Start: 2021-12-07 | End: 2022-01-21

## 2021-12-07 RX ORDER — NAPROXEN 500 MG/1
500 TABLET ORAL 2 TIMES DAILY PRN
Qty: 180 TABLET | Refills: 4 | Status: ON HOLD | OUTPATIENT
Start: 2021-12-07 | End: 2023-04-19

## 2021-12-07 RX ORDER — OXYCODONE AND ACETAMINOPHEN 10; 325 MG/1; MG/1
1 TABLET ORAL EVERY 4 HOURS PRN
Qty: 100 TABLET | Refills: 0 | Status: SHIPPED | OUTPATIENT
Start: 2021-12-07 | End: 2021-12-28

## 2021-12-07 RX ORDER — ONDANSETRON 8 MG/1
8 TABLET, ORALLY DISINTEGRATING ORAL EVERY 8 HOURS PRN
Qty: 90 TABLET | Refills: 1 | Status: SHIPPED | OUTPATIENT
Start: 2021-12-07 | End: 2022-03-16

## 2021-12-07 ASSESSMENT — PAIN SCALES - GENERAL: PAINLEVEL: SEVERE PAIN (6)

## 2021-12-07 NOTE — PROGRESS NOTES
"Nursing Notes:   Leona Shell LPN  12/7/2021  9:25 AM  Sign at exiting of workspace  Patient presents to the clinic for controlled medication refill.  Last administration of [place] was around 0800 today.  TOX 11-4-2020, contract updated today.      FOOD SECURITY SCREENING QUESTIONS  Hunger Vital Signs:  Within the past 12 months we worried whether our food would run out before we got money to buy more. Never  Within the past 12 months the food we bought just didn't last and we didn't have money to get more. Never    Advance Care Directive on file? no  Advance Care Directive provided to patient? Declined-has forms at home.       Chief Complaint   Patient presents with     Recheck Medication       Initial /78 (BP Location: Right arm, Patient Position: Sitting, Cuff Size: Adult Large)   Pulse 84   Temp 98.1  F (36.7  C) (Tympanic)   Resp 16   Wt 105.2 kg (232 lb)   SpO2 95%   BMI 33.29 kg/m   Estimated body mass index is 33.29 kg/m  as calculated from the following:    Height as of 11/16/21: 1.778 m (5' 10\").    Weight as of this encounter: 105.2 kg (232 lb).  Medication Reconciliation: complete        Leona Shell LPN         Assessment & Plan       ICD-10-CM    1. Status post right knee replacement  Z96.651 oxyCODONE-acetaminophen (PERCOCET)  MG per tablet   2. Chronic pain of right knee  M25.561 hydrOXYzine (ATARAX) 10 MG tablet    G89.29    3. Type 2 diabetes mellitus without complication, without long-term current use of insulin (H)  E11.9 metFORMIN (GLUCOPHAGE-XR) 500 MG 24 hr tablet   4. Spinal stenosis of lumbar region with neurogenic claudication  M48.062 naproxen (NAPROSYN) 500 MG tablet   5. Nausea  R11.0 ondansetron (ZOFRAN-ODT) 8 MG ODT tab     PDMP Review       Value Time User    State PDMP site checked  Yes 12/7/2021  9:30 AM Wei Perez MD         Increased pain after knee replacement. Was on hydrocodone chronically (tapering down) for back pain.  Hydrocodone was stopped " and he has been utilizing oxycodone at 60 mg daily, or 90 morphine milligram equivalents.  Discussed need to work down on dosing as his knee improves.  Worked out a tapering plan.  Reduce oxycodone to 15 mg daily for 14 days, then 40 mg daily for 1 week.  Plan to follow-up in 3 weeks same date he has an appointment with Dr. Rock.  Further opioid dosing to be determined at that time.  Continue using naproxen twice daily to help with inflammation and pain.    Refilled hydroxyzine, Metformin, ondansetron.      Wei Perez MD     [facility]      SUBJECTIVE:  66 year old male presents to follow up on chronic back pain  He had a right TKA on 11/16.   Seeing some improvement in pain  Using a walker still to get around. Still receiving home care PT  Changed from hydrocodone 45 mg daily to oxycodone 60 mg daily.  Has 5 pills left from 90 pills  Still has a lot of right knee swelling.  Slowly seeing improved range of motion in the knee.  Is hoping he will start seeing some better gains.  Saw Dr. Rock last week and everything looked fine.    REVIEW OF SYSTEMS:    Pertinent items are noted in HPI.    Current Outpatient Medications   Medication Sig Dispense Refill     acetaminophen (TYLENOL) 325 MG tablet Take 2 tablets (650 mg) by mouth every 4 hours as needed for other (mild pain) 100 tablet 0     amLODIPine (NORVASC) 10 MG tablet Take 1 tablet (10 mg) by mouth daily 90 tablet 3     apalutamide (ERLEADA) 60 MG tablet Take 4 tablets (240 mg) by mouth daily 120 tablet 6     aspirin 81 MG EC tablet Take 1 tablet (81 mg) by mouth 2 times daily 60 tablet 0     aspirin EC 81 MG EC tablet Take 81 mg by mouth daily with food       atorvastatin (LIPITOR) 20 MG tablet Take 1 tablet (20 mg) by mouth daily 90 tablet 3     Bee Pollen 500 MG CHEW Take 2 tablets by mouth daily       cyanocobalamin (RA VITAMIN B-12 TR) 1000 MCG TBCR Take 1,000 mcg by mouth daily       hydrOXYzine (ATARAX) 10 MG tablet Take 1  tablet (10 mg) by mouth every 6 hours as needed for itching or anxiety (with pain, moderate pain) 30 tablet 0     lisinopril (ZESTRIL) 40 MG tablet Take 1 tablet (40 mg) by mouth daily 90 tablet 4     metFORMIN (GLUCOPHAGE-XR) 500 MG 24 hr tablet Take 2 tablets (1,000 mg) by mouth 2 times daily (with meals) 360 tablet 3     naproxen (NAPROSYN) 500 MG tablet Take 1 tablet (500 mg) by mouth 2 times daily as needed for moderate pain 180 tablet 0     omeprazole (PRILOSEC) 40 MG DR capsule TAKE 1 CAPSULE BY MOUTH ONCE DAILY. 90 capsule 3     ondansetron (ZOFRAN-ODT) 8 MG ODT tab Take 1 tablet (8 mg) by mouth every 8 hours as needed for nausea 30 tablet 1     oxyCODONE-acetaminophen (PERCOCET)  MG per tablet Take 1 tablet by mouth every 4 hours as needed for severe pain 90 tablet 0     pregabalin (LYRICA) 150 MG capsule Take 1 capsule (150 mg) by mouth 2 times daily 180 capsule 1     senna-docusate (SENOKOT-S/PERICOLACE) 8.6-50 MG tablet Take 1-2 tablets by mouth 2 times daily Take while on oral narcotics to prevent or treat constipation. 30 tablet 0     vitamin D3 (CHOLECALCIFEROL) 2000 units (50 mcg) tablet Take 1 tablet (2,000 Units) by mouth daily       No Known Allergies    OBJECTIVE:  /78 (BP Location: Right arm, Patient Position: Sitting, Cuff Size: Adult Large)   Pulse 84   Temp 98.1  F (36.7  C) (Tympanic)   Resp 16   Wt 105.2 kg (232 lb)   SpO2 95%   BMI 33.29 kg/m      EXAM:  General Appearance: Alert. No acute distress  Psychiatric: Normal affect and mentation  Musculoskeletal: Right knee still more edematous than expected.  No induration.  No specific concerning tenderness.

## 2021-12-07 NOTE — NURSING NOTE
"Patient presents to the clinic for controlled medication refill.  Last administration of [place] was around 0800 today.  TOX 11-4-2020, contract updated today.      FOOD SECURITY SCREENING QUESTIONS  Hunger Vital Signs:  Within the past 12 months we worried whether our food would run out before we got money to buy more. Never  Within the past 12 months the food we bought just didn't last and we didn't have money to get more. Never    Advance Care Directive on file? no  Advance Care Directive provided to patient? Declined-has forms at home.       Chief Complaint   Patient presents with     Recheck Medication       Initial /78 (BP Location: Right arm, Patient Position: Sitting, Cuff Size: Adult Large)   Pulse 84   Temp 98.1  F (36.7  C) (Tympanic)   Resp 16   Wt 105.2 kg (232 lb)   SpO2 95%   BMI 33.29 kg/m   Estimated body mass index is 33.29 kg/m  as calculated from the following:    Height as of 11/16/21: 1.778 m (5' 10\").    Weight as of this encounter: 105.2 kg (232 lb).  Medication Reconciliation: complete        Leona Shell LPN    "

## 2021-12-07 NOTE — LETTER
Opioid / Opioid Plus Controlled Substance Agreement    This is an agreement between you and your provider about the safe and appropriate use of controlled substance/opioids prescribed by your care team. Controlled substances are medicines that can cause physical and mental dependence (abuse).    There are strict laws about having and using these medicines. We here at [facility] are committing to working with you in your efforts to get better. To support you in this work, we ll help you schedule regular office appointments for medicine refills. If we must cancel or change your appointment for any reason, we ll make sure you have enough medicine to last until your next appointment.     As a Provider, I will:    Listen carefully to your concerns and treat you with respect.     Recommend a treatment plan that I believe is in your best interest. This plan may involve therapies other than opioid pain medication.     Talk with you often about the possible benefits, and the risk of harm of any medicine that we prescribe for you.     Provide a plan on how to taper (discontinue or go off) using this medicine if the decision is made to stop its use.    As a Patient, I understand that opioid(s):     Are a controlled substance prescribed by my care team to help me function or work and manage my condition(s).     Are strong medicines and can cause serious side effects such as:    Drowsiness, which can seriously affect my driving ability    A lower breathing rate, enough to cause death    Harm to my thinking ability     Depression     Abuse of and addiction to this medicine    Need to be taken exactly as prescribed. Combining opioids with certain medicines or chemicals (such as illegal drugs, sedatives, sleeping pills, and benzodiazepines) can be dangerous or even fatal. If I stop opioids suddenly, I may have severe withdrawal symptoms.    Do not work for all types of pain nor for all patients. If they re not helpful, I may  be asked to stop them.        The risks, benefits and side effects of these medicine(s) were explained to me. I agree that:  1. I will take part in other treatments as advised by my care team. This may be psychiatry or counseling, physical therapy, behavioral therapy, group treatment or a referral to a specialist.     2. I will keep all my appointments. I understand that this is part of the monitoring of opioids. My care team may require an office visit for EVERY opioid/controlled substance refill. If I miss appointments or don t follow instructions, my care team may stop my medicine.    3. I will take my medicines as prescribed. I will not change the dose or schedule unless my care team tells me to. There will be no refills if I run out early.     4. I may be asked to come to the clinic and complete a urine drug test or complete a pill count at any time. If I don t give a urine sample or participate in a pill count, the care team may stop my medicine.    5. I will only receive prescriptions from this clinic for chronic pain. If I am treated by another provider for acute pain issues, I will tell them that I am taking opioid pain medication for chronic pain and that I have a treatment agreement with this provider. I will inform my [facility] care team within one business day if I am given a prescription for any pain medication by another healthcare provider. My [facility] care team can contact other providers and pharmacists about my use of any medicines.    6. It is up to me to make sure that I don t run out of my medicines on weekends or holidays. If my care team is willing to refill my opioid prescription without a visit, I must request refills only during office hours. Refills may take up to 3 business days to process. I will use one pharmacy to fill all my opioid and other controlled substance prescriptions. I will notify the clinic about any changes to my insurance or medication  availability.    7. I am responsible for my prescriptions. If the medicine/prescription is lost, stolen or destroyed, it will not be replaced. I also agree not to share controlled substance medicines with anyone.    8. I am aware I should not use any illegal or recreational drugs. I agree not to drink alcohol unless my care team says I can.       9. If I enroll in the [place] Medical Cannabis program, I will tell my care team prior to my next refill.     10. I will tell my care team right away if I become pregnant, have a new medical problem treated outside of my regular clinic, or have a change in my medications.    11. I understand that this medicine can affect my thinking, judgment and reaction time. Alcohol and drugs affect the brain and body, which can affect the safety of my driving. Being under the influence of alcohol or drugs can affect my decision-making, behaviors, personal safety, and the safety of others. Driving while impaired (DWI) can occur if a person is driving, operating, or in physical control of a car, motorcycle, boat, snowmobile, ATV, motorbike, off-road vehicle, or any other motor vehicle ([place] Statute 169A.20). I understand the risk if I choose to drive or operate any vehicle or machinery.    I understand that if I do not follow any of the conditions above, my prescriptions or treatment may be stopped or changed.          Opioids  What You Need to Know    What are opioids?   Opioids are pain medicines that must be prescribed by a doctor. They are also known as narcotics.     Examples are:   1. morphine (MS Contin, Nina)  2. oxycodone (Oxycontin)  3. oxycodone and acetaminophen (Percocet)  4. hydrocodone and acetaminophen (Vicodin, Norco)   5. fentanyl patch (Duragesic)   6. hydromorphone (Dilaudid)   7. methadone  8. codeine (Tylenol #3)     What do opioids do well?   Opioids are best for severe short-term pain such as after a surgery or injury. They may work well for cancer pain. They may  help some people with long-lasting (chronic) pain.     What do opioids NOT do well?   Opioids never get rid of pain entirely, and they don t work well for most patients with chronic pain. Opioids don t reduce swelling, one of the causes of pain.                                    Other ways to manage chronic pain and improve function include:       Treat the health problem that may be causing pain    Anti-inflammation medicines, which reduce swelling and tenderness, such as ibuprofen (Advil, Motrin) or naproxen (Aleve)    Acetaminophen (Tylenol)    Antidepressants and anti-seizure medicines, especially for nerve pain    Topical treatments such as patches or creams    Injections or nerve blocks    Chiropractic or osteopathic treatment    Acupuncture, massage, deep breathing, meditation, visual imagery, aromatherapy    Use heat or ice at the pain site    Physical therapy     Exercise    Stop smoking    Take part in therapy       Risks and side effects     Talk to your doctor before you start or decide to keep taking opioids. Possible side effects include:      Lowering your breathing rate enough to cause death    Overdose, including death, especially if taking higher than prescribed doses    Worse depression symptoms; less pleasure in things you usually enjoy    Feeling tired or sluggish    Slower thoughts or cloudy thinking    Being more sensitive to pain over time; pain is harder to control    Trouble sleeping or restless sleep    Changes in hormone levels (for example, less testosterone)    Changes in sex drive or ability to have sex    Constipation    Unsafe driving    Itching and sweating    Dizziness    Nausea, throwing up and dry mouth    What else should I know about opioids?    Opioids may lead to dependence, tolerance, or addiction.      Dependence means that if you stop or reduce the medicine too quickly, you will have withdrawal symptoms. These include loose poop (diarrhea), jitters, flu-like symptoms,  nervousness and tremors. Dependence is not the same as addiction.                       Tolerance means needing higher doses over time to get the same effect. This may increase the chance of serious side effects.      Addiction is when people improperly use a substance that harms their body, their mind or their relations with others. Use of opiates can cause a relapse of addiction if you have a history of drug or alcohol abuse.      People who have used opioids for a long time may have a lower quality of life, worse depression, higher levels of pain and more visits to doctors.    You can overdose on opioids. Take these steps to lower your risk of overdose:    1. Recognize the signs:  Signs of overdose include decrease or loss of consciousness (blackout), slowed breathing, trouble waking up and blue lips. If someone is worried about overdose, they should call 911.    2. Talk to your doctor about Narcan (naloxone).   If you are at risk for overdose, you may be given a prescription for Narcan. This medicine very quickly reverses the effects of opioids.   If you overdose, a friend or family member can give you Narcan while waiting for the ambulance. They need to know the signs of overdose and how to give Narcan.     3. Don't use alcohol or street drugs.   Taking them with opioids can cause death.    4. Do not take any of these medicines unless your doctor says it s OK. Taking these with opioids can cause death:    Benzodiazepines, such as lorazepam (Ativan), alprazolam (Xanax) or diazepam (Valium)    Muscle relaxers, such as cyclobenzaprine (Flexeril)    Sleeping pills like zolpidem (Ambien)     Other opioids      How to keep you and other people safe while taking opioids:    1. Never share your opioids with others.  Opioid medicines are regulated by the Drug Enforcement Agency ([employer]MET[employer]). Selling or sharing medications is a criminal act.    2. Be sure to store opioids in a secure place, locked up if possible. Young children  can easily swallow them and overdose.    3. When you are traveling with your medicines, keep them in the original bottles. If you use a pill box, be sure you also carry a copy of your medicine list from your clinic or pharmacy.    4. Safe disposal of opioids    Most pharmacies have places to get rid of medicine, called disposal kiosks. Medicine disposal options are also available in every [facility]. Search your county and  medication disposal  to find more options. You can find more details at:  https://www.[facility].Count includes the [facility].mn./living-green/managing-unwanted-medications     I agree that my provider, clinic care team, and pharmacy may work with any city, state or federal law enforcement agency that investigates the misuse, sale, or other diversion of my controlled medicine. I will allow my provider to discuss my care with, or share a copy of, this agreement with any other treating provider, pharmacy or emergency room where I receive care.    I have read this agreement and have asked questions about anything I did not understand.    _______________________________________________________  Patient Signature - Dilip Kan _____________________                   Date     _______________________________________________________  Provider Signature - Wei Perez MD   _____________________                   Date     _______________________________________________________  Witness Signature (required if provider not present while patient signing)   _____________________                   Date

## 2021-12-08 ENCOUNTER — TELEPHONE (OUTPATIENT)
Dept: FAMILY MEDICINE | Facility: OTHER | Age: 66
End: 2021-12-08
Payer: COMMERCIAL

## 2021-12-08 NOTE — TELEPHONE ENCOUNTER
[facility] is looking for verbal orders to continue in home rehab for 2 times a week for 3 weeks please call and advise    Thank you    Vee Mejia on 12/8/2021 at 1:40 PM

## 2021-12-15 ENCOUNTER — OFFICE VISIT (OUTPATIENT)
Dept: UROLOGY | Facility: OTHER | Age: 66
End: 2021-12-15
Attending: UROLOGY
Payer: MEDICARE

## 2021-12-15 VITALS
RESPIRATION RATE: 16 BRPM | SYSTOLIC BLOOD PRESSURE: 112 MMHG | OXYGEN SATURATION: 94 % | WEIGHT: 229.2 LBS | BODY MASS INDEX: 32.89 KG/M2 | HEART RATE: 108 BPM | DIASTOLIC BLOOD PRESSURE: 60 MMHG

## 2021-12-15 DIAGNOSIS — C61 PROSTATE CANCER (H): Primary | ICD-10-CM

## 2021-12-15 PROCEDURE — 96402 CHEMO HORMON ANTINEOPL SQ/IM: CPT | Performed by: UROLOGY

## 2021-12-15 PROCEDURE — G0463 HOSPITAL OUTPT CLINIC VISIT: HCPCS

## 2021-12-15 PROCEDURE — G0463 HOSPITAL OUTPT CLINIC VISIT: HCPCS | Mod: 25

## 2021-12-15 PROCEDURE — 250N000011 HC RX IP 250 OP 636: Performed by: UROLOGY

## 2021-12-15 PROCEDURE — 99213 OFFICE O/P EST LOW 20 MIN: CPT | Performed by: UROLOGY

## 2021-12-15 RX ADMIN — LEUPROLIDE ACETATE 45 MG: KIT at 12:30

## 2021-12-15 ASSESSMENT — PAIN SCALES - GENERAL: PAINLEVEL: SEVERE PAIN (6)

## 2021-12-15 NOTE — PROGRESS NOTES
Type of Visit  EST    Chief Complaint  Nonmetastatic castrate resistant prostate cancer    HPI  Mr. Kan is a 66 y.o. 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  Patient originally underwent radical prostatectomy in 2012.  He underwent salvage radiation in 2013.  The patient has started ADT for nonmetastatic castrate resistant prostate cancer.  He has seen oncology which initiated early to.  He now takes both medications.  Last dose of Lupron was a 3-month dose administered about 4 months ago.  He follows up to receive Lupron and discuss his management.    He does have chronic back pain and previously underwent fusion 3 years ago.      Review of Systems  I reviewed the ROS with the patient today.    Nursing Notes:   Nevaeh Varela LPN  12/15/2021  9:43 AM  Addendum  Pt presents to clinic for a three month history of prostate cancer    Review of Systems:    Weight loss:    Yes     Recent fever/chills:  No   Night sweats:   No  Current skin rash:  No   Recent hair loss:  Yes  Heat intolerance:  No   Cold intolerance:  Yes  Chest pain:   No   Palpitations:   No  Shortness of breath:  No   Wheezing:   No  Constipation:    No   Diarrhea:   No   Nausea:   Yes   Vomiting:   No   Kidney/side pain:  No   Back pain:   Yes  Frequent headaches:  No   Dizziness:     No  Leg swelling:   Yes   Calf pain:    Yes            Physical Exam  /60 (BP Location: Right arm, Patient Position: Sitting, Cuff Size: Adult Large)   Pulse 108   Resp 16   Wt 104 kg (229 lb 3.2 oz)   SpO2 94%   BMI 32.89 kg/m    Constitutional: No acute distress.  Alert and cooperative   Head: NCAT  Eyes: Conjunctivae normal  Cardiovascular: Regular rate.  Pulmonary/Chest: Respirations are even and non-labored bilaterally, no audible wheezing  Abdominal: Soft. No distension, tenderness, masses or guarding.   Neurological: A + O x 3.  Cranial Nerves II-XII grossly intact.  Extremities: GINNA x 4, Warm. No clubbing.  No  cyanosis.    Skin: Pink, warm and dry.  No visible rashes noted.  Psychiatric:  Normal mood and affect  Back:  No left CVA tenderness.  No right CVA tenderness.  Genitourinary:  Nonpalpable bladder    Labs  Results for EMILIA CHAPMAN (MRN [id number]) as of 12/15/2021 09:54   11/1/2021 09:08   PSA 1.49     Results for EMILIA CHAPMAN (MRN [id number]) as of 8/25/2021 11:12   8/25/2021 09:28   PSA 3.48     Results for EMILIA CHAPMAN (MRN [id number]) as of 5/24/2021 11:21   2/23/2021 08:29 5/24/2021 09:36   PSA 1.847 2.555     Results for EMILIA CHAPMAN (MRN [id number]) as of 11/23/2020 11:37   11/23/2020 07:20   PSA 1.215     Results for EMILIA CHAPMAN (MRN [id number]) as of 11/23/2020 12:10   11/23/2020 07:20   Testosterone  17 (L)     Results for EMILIA CHAPMAN (MRN [id number]) as of 7/22/2020 08:39   7/9/2020 09:36   PSA 0.975     Results for EMILIA CHAPMAN (MRN [id number]) as of 2/5/2020 09:14   11/4/2019 08:10** 2/5/2020 06:53   PSA 7.112 (H) 1.006   ** started ADT    Results for EMILIA CHAPMAN (MRN [id number]) as of 5/1/2019 09:45   5/1/2019 07:36   PSA 2.714     Results for LELAND CHAPMAN (MRN [id number]) as of 11/1/2018 11:08   11/1/2018 08:49   PSA 1.177     Results for LELAND CHAPMAN (MRN [id number]) as of 3/29/2018 14:31   3/29/2018 12:15   PSA 0.798     Results for LELAND CHAPMAN W (MRN [id number]) as of 3/29/2018 11:55   4/7/2017 16:04   PSA 0.268     Results for LELAND CHAPMAN (MRN [id number]) as of 3/23/2016 10:42   1/2/2013 08:00* 2/27/2013** 08:10 10/16/2014 11:26 3/1/2016 14:15   PSA 0.67 0.97 0.070 0.154   * Following radical prostatectomy  ** Following salvage external beam radiation therapy.    Imaging  Bone Scan  11/23/2020  IMPRESSION: No convincing evidence of bony metastatic disease.     CT c/a/p  11/23/2020  COMBINED IMPRESSION:    No evidence of new metastatic disease.  Dilated thoracic aorta measuring 4.4 cm in the mid ascending segment.    ^^^^^^^^^^^^^^^^^^^^^^^^^^^^^^^^^^^^^^^^^^^^    MRI  Lumbar  11/4/2019  IMPRESSION:  Interval posterior interbody fusion at L2-3 and L3-4. Chronic prior  fusion changes at L4-5. No evidence of new left-sided nerve root  impingement to account for left leg radiculopathy.    ^^^^^^^^^^^^^^^^^^^^^^^^^^^^^^^^^^^^^^^^^^^^    CT c/a/p  5/20/2019  IMPRESSION:  No evidence of metastatic disease in the chest, abdomen,  or pelvis.    Bone Scan  5/20/2019  IMPRESSION: Multifocal probable degenerative changes. No sites highly  suspicious for osseous metastatic disease.     Lupron Injection Procedure  Today the patient received an intramuscular injection of Lupron 45mg.  This was given in the gluteal muscle.  The results of this injection: None    Assessment  Mr. Kan is a 66 y.o. 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  Discussed the different roles of ADT medications including Lupron and Erleada.  Recommended continuing follow-up with oncology for tertiary hormone management.    Plan  Continue Erleada per Dr Fritz  Lupron 45 mg administered today  Follow-up in 6 months for Lupron

## 2021-12-15 NOTE — NURSING NOTE
Pt presents to clinic for a three month history of prostate cancer    Review of Systems:    Weight loss:    Yes     Recent fever/chills:  No   Night sweats:   No  Current skin rash:  No   Recent hair loss:  Yes  Heat intolerance:  No   Cold intolerance:  Yes  Chest pain:   No   Palpitations:   No  Shortness of breath:  No   Wheezing:   No  Constipation:    No   Diarrhea:   No   Nausea:   Yes   Vomiting:   No   Kidney/side pain:  No   Back pain:   Yes  Frequent headaches:  No   Dizziness:     No  Leg swelling:   Yes   Calf pain:    Yes

## 2021-12-27 DIAGNOSIS — Z96.651 STATUS POST TOTAL RIGHT KNEE REPLACEMENT: Primary | ICD-10-CM

## 2021-12-28 ENCOUNTER — DOCUMENTATION ONLY (OUTPATIENT)
Dept: ONCOLOGY | Facility: CLINIC | Age: 66
End: 2021-12-28

## 2021-12-28 ENCOUNTER — OFFICE VISIT (OUTPATIENT)
Dept: ORTHOPEDICS | Facility: OTHER | Age: 66
End: 2021-12-28
Attending: ORTHOPAEDIC SURGERY
Payer: MEDICARE

## 2021-12-28 ENCOUNTER — LAB (OUTPATIENT)
Dept: LAB | Facility: OTHER | Age: 66
End: 2021-12-28
Attending: INTERNAL MEDICINE
Payer: MEDICARE

## 2021-12-28 ENCOUNTER — HOSPITAL ENCOUNTER (OUTPATIENT)
Dept: GENERAL RADIOLOGY | Facility: OTHER | Age: 66
End: 2021-12-28
Attending: ORTHOPAEDIC SURGERY
Payer: MEDICARE

## 2021-12-28 ENCOUNTER — OFFICE VISIT (OUTPATIENT)
Dept: FAMILY MEDICINE | Facility: OTHER | Age: 66
End: 2021-12-28
Attending: FAMILY MEDICINE
Payer: MEDICARE

## 2021-12-28 VITALS
BODY MASS INDEX: 32.14 KG/M2 | RESPIRATION RATE: 20 BRPM | DIASTOLIC BLOOD PRESSURE: 70 MMHG | WEIGHT: 224 LBS | HEART RATE: 100 BPM | SYSTOLIC BLOOD PRESSURE: 114 MMHG | TEMPERATURE: 98.4 F | OXYGEN SATURATION: 96 %

## 2021-12-28 DIAGNOSIS — Z96.651 STATUS POST TOTAL RIGHT KNEE REPLACEMENT: ICD-10-CM

## 2021-12-28 DIAGNOSIS — T50.905A ADVERSE DRUG EFFECT: ICD-10-CM

## 2021-12-28 DIAGNOSIS — R97.21 RISING PSA FOLLOWING TREATMENT FOR MALIGNANT NEOPLASM OF PROSTATE: ICD-10-CM

## 2021-12-28 DIAGNOSIS — Z96.651 STATUS POST TOTAL RIGHT KNEE REPLACEMENT: Primary | ICD-10-CM

## 2021-12-28 DIAGNOSIS — I10 ESSENTIAL HYPERTENSION: ICD-10-CM

## 2021-12-28 DIAGNOSIS — C61 PROSTATE CANCER (H): ICD-10-CM

## 2021-12-28 DIAGNOSIS — Z96.651 STATUS POST RIGHT KNEE REPLACEMENT: ICD-10-CM

## 2021-12-28 LAB
ALBUMIN SERPL-MCNC: 4.1 G/DL (ref 3.5–5.7)
ALP SERPL-CCNC: 87 U/L (ref 34–104)
ALT SERPL W P-5'-P-CCNC: 8 U/L (ref 7–52)
ANION GAP SERPL CALCULATED.3IONS-SCNC: 7 MMOL/L (ref 3–14)
AST SERPL W P-5'-P-CCNC: 9 U/L (ref 13–39)
BASOPHILS # BLD AUTO: 0.1 10E3/UL (ref 0–0.2)
BASOPHILS NFR BLD AUTO: 1 %
BILIRUB SERPL-MCNC: 0.4 MG/DL (ref 0.3–1)
BUN SERPL-MCNC: 14 MG/DL (ref 7–25)
CALCIUM SERPL-MCNC: 10.1 MG/DL (ref 8.6–10.3)
CHLORIDE BLD-SCNC: 97 MMOL/L (ref 98–107)
CHOLEST SERPL-MCNC: 194 MG/DL
CO2 SERPL-SCNC: 28 MMOL/L (ref 21–31)
CREAT SERPL-MCNC: 0.88 MG/DL (ref 0.7–1.3)
EOSINOPHIL # BLD AUTO: 0.3 10E3/UL (ref 0–0.7)
EOSINOPHIL NFR BLD AUTO: 4 %
ERYTHROCYTE [DISTWIDTH] IN BLOOD BY AUTOMATED COUNT: 13.6 % (ref 10–15)
FASTING STATUS PATIENT QL REPORTED: YES
GFR SERPL CREATININE-BSD FRML MDRD: >90 ML/MIN/1.73M2
GLUCOSE BLD-MCNC: 127 MG/DL (ref 70–105)
HCT VFR BLD AUTO: 39.8 % (ref 40–53)
HDLC SERPL-MCNC: 43 MG/DL (ref 23–92)
HGB BLD-MCNC: 12.9 G/DL (ref 13.3–17.7)
IMM GRANULOCYTES # BLD: 0 10E3/UL
IMM GRANULOCYTES NFR BLD: 0 %
LDH SERPL L TO P-CCNC: 127 U/L (ref 140–271)
LDLC SERPL CALC-MCNC: 103 MG/DL
LYMPHOCYTES # BLD AUTO: 1.7 10E3/UL (ref 0.8–5.3)
LYMPHOCYTES NFR BLD AUTO: 21 %
MCH RBC QN AUTO: 28.2 PG (ref 26.5–33)
MCHC RBC AUTO-ENTMCNC: 32.4 G/DL (ref 31.5–36.5)
MCV RBC AUTO: 87 FL (ref 78–100)
MONOCYTES # BLD AUTO: 0.6 10E3/UL (ref 0–1.3)
MONOCYTES NFR BLD AUTO: 7 %
NEUTROPHILS # BLD AUTO: 5.7 10E3/UL (ref 1.6–8.3)
NEUTROPHILS NFR BLD AUTO: 67 %
NONHDLC SERPL-MCNC: 151 MG/DL
NRBC # BLD AUTO: 0 10E3/UL
NRBC BLD AUTO-RTO: 0 /100
PLATELET # BLD AUTO: 347 10E3/UL (ref 150–450)
POTASSIUM BLD-SCNC: 4.7 MMOL/L (ref 3.5–5.1)
PROT SERPL-MCNC: 7.4 G/DL (ref 6.4–8.9)
PSA SERPL-MCNC: 1.51 UG/L (ref 0–4)
RBC # BLD AUTO: 4.58 10E6/UL (ref 4.4–5.9)
SODIUM SERPL-SCNC: 132 MMOL/L (ref 134–144)
TRIGL SERPL-MCNC: 241 MG/DL
TSH SERPL DL<=0.005 MIU/L-ACNC: 1.45 MU/L (ref 0.4–4)
WBC # BLD AUTO: 8.3 10E3/UL (ref 4–11)

## 2021-12-28 PROCEDURE — 99213 OFFICE O/P EST LOW 20 MIN: CPT | Performed by: FAMILY MEDICINE

## 2021-12-28 PROCEDURE — 83615 LACTATE (LD) (LDH) ENZYME: CPT | Mod: ZL

## 2021-12-28 PROCEDURE — 84443 ASSAY THYROID STIM HORMONE: CPT | Mod: ZL

## 2021-12-28 PROCEDURE — G0463 HOSPITAL OUTPT CLINIC VISIT: HCPCS

## 2021-12-28 PROCEDURE — G0463 HOSPITAL OUTPT CLINIC VISIT: HCPCS | Mod: 25

## 2021-12-28 PROCEDURE — 36415 COLL VENOUS BLD VENIPUNCTURE: CPT | Mod: ZL

## 2021-12-28 PROCEDURE — 80053 COMPREHEN METABOLIC PANEL: CPT | Mod: ZL

## 2021-12-28 PROCEDURE — 84153 ASSAY OF PSA TOTAL: CPT | Mod: ZL

## 2021-12-28 PROCEDURE — 99024 POSTOP FOLLOW-UP VISIT: CPT | Performed by: ORTHOPAEDIC SURGERY

## 2021-12-28 PROCEDURE — 85025 COMPLETE CBC W/AUTO DIFF WBC: CPT | Mod: ZL

## 2021-12-28 PROCEDURE — 80061 LIPID PANEL: CPT | Mod: ZL

## 2021-12-28 PROCEDURE — 73562 X-RAY EXAM OF KNEE 3: CPT | Mod: RT

## 2021-12-28 PROCEDURE — 82040 ASSAY OF SERUM ALBUMIN: CPT | Mod: ZL

## 2021-12-28 RX ORDER — OXYCODONE AND ACETAMINOPHEN 10; 325 MG/1; MG/1
1 TABLET ORAL EVERY 6 HOURS PRN
Qty: 20 TABLET | Refills: 0 | Status: SHIPPED | OUTPATIENT
Start: 2021-12-28 | End: 2021-12-28

## 2021-12-28 RX ORDER — LISINOPRIL 40 MG/1
40 TABLET ORAL DAILY
Qty: 90 TABLET | Refills: 4 | Status: SHIPPED | OUTPATIENT
Start: 2021-12-28 | End: 2023-02-03

## 2021-12-28 RX ORDER — OXYCODONE AND ACETAMINOPHEN 7.5; 325 MG/1; MG/1
1 TABLET ORAL EVERY 4 HOURS PRN
Qty: 135 TABLET | Refills: 0 | Status: SHIPPED | OUTPATIENT
Start: 2021-12-28 | End: 2022-01-21

## 2021-12-28 ASSESSMENT — ANXIETY QUESTIONNAIRES
7. FEELING AFRAID AS IF SOMETHING AWFUL MIGHT HAPPEN: NOT AT ALL
4. TROUBLE RELAXING: NOT AT ALL
GAD7 TOTAL SCORE: 0
2. NOT BEING ABLE TO STOP OR CONTROL WORRYING: NOT AT ALL
5. BEING SO RESTLESS THAT IT IS HARD TO SIT STILL: NOT AT ALL
GAD7 TOTAL SCORE: 0
3. WORRYING TOO MUCH ABOUT DIFFERENT THINGS: NOT AT ALL
GAD7 TOTAL SCORE: 0
7. FEELING AFRAID AS IF SOMETHING AWFUL MIGHT HAPPEN: NOT AT ALL
6. BECOMING EASILY ANNOYED OR IRRITABLE: NOT AT ALL
1. FEELING NERVOUS, ANXIOUS, OR ON EDGE: NOT AT ALL

## 2021-12-28 ASSESSMENT — PATIENT HEALTH QUESTIONNAIRE - PHQ9
SUM OF ALL RESPONSES TO PHQ QUESTIONS 1-9: 5
SUM OF ALL RESPONSES TO PHQ QUESTIONS 1-9: 5

## 2021-12-28 ASSESSMENT — PAIN SCALES - GENERAL: PAINLEVEL: SEVERE PAIN (6)

## 2021-12-28 NOTE — PROGRESS NOTES
Patient is here for follow up on his right total knee.  Sofia Lundberg LPN .....................12/28/2021 10:55 AM

## 2021-12-28 NOTE — PROGRESS NOTES
Visit Date: 2021    REASON FOR EVALUATION:  Right knee replacement.    HISTORY OF PRESENT ILLNESS:  Jermain comes in, right knee replacement.  Still working through his recovery process here.  Does have some stiffness.  He has been doing just home care therapies at this point in time as well in addition to that.  The patient denies any other major concerns.    PHYSICAL EXAMINATION:  Examination of his knee shows motion 0 to about 100.  Ligament exam stable and balanced.  Kneecap tracking is acceptable.  Minimal swelling seen within the joint.    X-rays, 3 views of the right knee reviewed.  The patient's components are in stable alignment and position at this current time.    IMPRESSION:  Right knee replacement.    PLAN:  The patient urged to get into formalized therapies here outpatient.  I do think this will hasten his recovery process.  I will see him back in a month for a final check at that time.    Micah Rock MD        D: 2021   T: 2021   MT: [id number]    Name:     LELAND CHAPMAN  MRN:      [id number]        Account:    [id number]   :      1955           Visit Date: 2021     Document: [id number]

## 2021-12-28 NOTE — PATIENT INSTRUCTIONS
Oxycodone changed to 7.5 mg  Take 5 pills daily (37.5 mg) for 15 days, then 4 pills daily (30 mg)  Follow up in a month

## 2021-12-28 NOTE — PROGRESS NOTES
Oral Chemotherapy Monitoring Program  Lab Follow Up    Reviewed lab results from 12/28/21.    No concerning abnormalities.    Questions answered to patient's satisfaction.    Annie French, PharmD, BCPS, BCOP

## 2021-12-28 NOTE — NURSING NOTE
"Patient presents to the clinic for discussion about controlled medications.    FOOD SECURITY SCREENING QUESTIONS:    The next two questions are to help us understand your food security.  If you are feeling you need any assistance in this area, we have resources available to support you today.    Hunger Vital Signs:  Within the past 12 months we worried whether our food would run out before we got money to buy more. Never  Within the past 12 months the food we bought just didn't last and we didn't have money to get more. Never    Advance Care Directive on file? no  Advance Care Directive provided to patient? Declined.    Chief Complaint   Patient presents with     Recheck Medication       Initial /70 (BP Location: Right arm, Patient Position: Sitting, Cuff Size: Adult Large)   Pulse 100   Temp 98.4  F (36.9  C) (Tympanic)   Resp 20   Wt 101.6 kg (224 lb)   SpO2 96%   BMI 32.14 kg/m   Estimated body mass index is 32.14 kg/m  as calculated from the following:    Height as of 11/16/21: 1.778 m (5' 10\").    Weight as of this encounter: 101.6 kg (224 lb).  Medication Reconciliation: complete        Leona Shell LPN       "

## 2021-12-29 ASSESSMENT — PATIENT HEALTH QUESTIONNAIRE - PHQ9: SUM OF ALL RESPONSES TO PHQ QUESTIONS 1-9: 5

## 2021-12-29 ASSESSMENT — ANXIETY QUESTIONNAIRES: GAD7 TOTAL SCORE: 0

## 2022-01-10 ENCOUNTER — TELEPHONE (OUTPATIENT)
Dept: FAMILY MEDICINE | Facility: OTHER | Age: 67
End: 2022-01-10
Payer: COMMERCIAL

## 2022-01-10 NOTE — TELEPHONE ENCOUNTER
Patient states he will be out of her Percocet before he can get it refilled would like to speak to nurse.     Jacqueline Barillas on 1/10/2022 at 10:36 AM

## 2022-01-10 NOTE — TELEPHONE ENCOUNTER
Patient has been taking 5-6 tabs of the Percocet since last office visit and does not have enough for upcoming visit on 1-.  Patient currently has 53 tabs.  Patient moved to an open appointment on the 21st.      Leona Shell LPN 1/10/2022 12:14 PM

## 2022-01-11 ENCOUNTER — HOSPITAL ENCOUNTER (OUTPATIENT)
Dept: PHYSICAL THERAPY | Facility: OTHER | Age: 67
Setting detail: THERAPIES SERIES
End: 2022-01-11
Attending: ORTHOPAEDIC SURGERY
Payer: MEDICARE

## 2022-01-11 DIAGNOSIS — Z96.651 STATUS POST TOTAL RIGHT KNEE REPLACEMENT: ICD-10-CM

## 2022-01-11 PROCEDURE — 97161 PT EVAL LOW COMPLEX 20 MIN: CPT | Mod: GP | Performed by: PHYSICAL THERAPIST

## 2022-01-11 PROCEDURE — 97110 THERAPEUTIC EXERCISES: CPT | Mod: GP | Performed by: PHYSICAL THERAPIST

## 2022-01-14 NOTE — PROGRESS NOTES
01/11/22 1030   General Information   Type of Visit Initial OP Ortho PT Evaluation   Start of Care Date 01/11/22   Referring Physician Dr. Pranav DASILVA   Patient/Family Goals Statement Decrease knee pain to be able to sleep, amb up and down stairs without diff.  Load wood stove with wood.     Orders Evaluate and Treat   Date of Order 12/28/21   Certification Required? Yes   Medical Diagnosis Z96.651 (ICD-10-CM) - Status post total right knee replacement   Surgical/Medical history reviewed Yes   General Information Comments Jermain has been referred for OP PT following right TKA.  Patient being transitioned from Homecare to OP.  Complaints of significant stiffness and pain in right knee.  He has had difficulty regaining his ROM and the surgeon may need to do a manipulation if we are unble to improve the ROM of right knee.     Body Part(s)   Body Part(s) Knee   Presentation and Etiology   Pertinent history of current problem (include personal factors and/or comorbidities that impact the POC) low   Impairments A. Pain;B. Decreased WB tolerance;C. Swelling;D. Decreased ROM;E. Decreased flexibility;F. Decreased strength and endurance;G. Impaired balance;H. Impaired gait   Functional Limitations perform activities of daily living;perform required work activities   How/Where did it occur From Degenerative Joint Disease   Onset date of current episode/exacerbation 11/16/22   Chronicity New   Pain rating (0-10 point scale) Best (/10);Worst (/10)   Best (/10) 3/10   Worst (/10) 8/10   Prior Level of Function   Functional Level Prior Comment Able to lift and carry wood, walk in the woods with minimal diff.  Some limitation from his LBP with walking.     Fall Risk Screen   Fall screen completed by PT   Have you fallen 2 or more times in the past year? No   Have you fallen and had an injury in the past year? No   Is patient a fall risk? Yes;Department fall risk interventions implemented   Knee Objective Findings   Side (if  bilateral, select both right and left) Right   Observation Antalgic gait on right, decreased right knee range of motion while walking.  Limited step and stride length on right lower extremity.  Mild swelling in right knee.  Significant stiffness in right knee limiting extension and flexion.   Balance/Proprioception (Single Leg Stance) Impaired right lower extremity   Knee ROM Comment Right knee range of motion 5-95; left knee 0-120   Knee/Hip Strength Comments Quad atrophy observed right lower extremity, right quad and hamstring weakness 4/5    Palpation Moderate stiffness with knee flexion stretching   Right Gastrocnemius Flexibility Impaired right lower extremity   Right Hamstring Flexibility Impaired   Right Hip Flexor Flexibility Impaired   Right Quadricep Flexibility Impaired   Planned Therapy Interventions   Planned Therapy Interventions balance training;gait training;manual therapy;neuromuscular re-education;ROM;strengthening;stretching   Planned Therapy Interventions Comment IASTM, MFR   Planned Modality Interventions   Planned Modality Interventions Cryotherapy;Electrical stimulation;Hot packs;TENS;Ultrasound   Planned Modality Interventions Comments Vasopneumatic compression   Clinical Impression   Criteria for Skilled Therapeutic Interventions Met yes, treatment indicated   PT Diagnosis Impaired right knee range of motion and strength, difficulty with gait and balance, right knee pain.   Functional limitations due to impairments Walking, stair ambulation, sleep disruption due to pain, difficulty with balance, difficulty squatting, lifting/carrying.   Clinical Presentation Stable/Uncomplicated   Clinical Decision Making (Complexity) Low complexity   Therapy Frequency other (see comments)  (16 visits)   Predicted Duration of Therapy Intervention (days/wks) 12 weeks   Risk & Benefits of therapy have been explained Yes   Patient, Family & other staff in agreement with plan of care Yes   ORTHO GOALS   PT Ortho  Eval Goals 1;2;3   Ortho Goal 1   Goal Identifier Sleep   Goal Description Patient will be able to sleep 7 hours without waking due to right knee pain.   Goal Progress N/A   Target Date 04/05/22   Ortho Goal 2   Goal Identifier Stairs   Goal Description May send and descend 14 stairs reciprocally without gait deviation and less than 2/10 pain.   Goal Progress N/A   Target Date 04/05/22   Ortho Goal 3   Goal Identifier Walking   Goal Description Walk 10 minutes outdoors with less than 3/10 pain/soreness.   Goal Progress N/A   Target Date 04/05/22   Total Evaluation Time   PT Eval, Low Complexity Minutes (86644) 25   Therapy Certification   Certification date from 01/11/22   Certification date to 04/05/22   Medical Diagnosis Z96.651 (ICD-10-CM) - Status post total right knee replacement

## 2022-01-14 NOTE — PROGRESS NOTES
[facility]    OUTPATIENT PHYSICAL THERAPY ORTHOPEDIC EVALUATION  PLAN OF TREATMENT FOR OUTPATIENT REHABILITATION  (COMPLETE FOR INITIAL CLAIMS ONLY)  Patient's Last Name, First Name, M.I.  YOB: 1955  Dilip Kan    Provider s Name:  [facility]   Medical Record No.  [id number]   Start of Care Date:  01/11/22   Onset Date:  11/16/22   Type:     _X__PT   ___OT   ___SLP Medical Diagnosis:  Z96.651 (ICD-10-CM) - Status post total right knee replacement     PT Diagnosis:  Impaired right knee range of motion and strength, difficulty with gait and balance, right knee pain.   Visits from SOC:  1      _________________________________________________________________________________  Plan of Treatment/Functional Goals:  balance training,gait training,manual therapy,neuromuscular re-education,ROM,strengthening,stretching  IASTM, MFR  Cryotherapy,Electrical stimulation,Hot packs,TENS,Ultrasound  Vasopneumatic compression      Goals  Goal Identifier: Sleep  Goal Description: Patient will be able to sleep 7 hours without waking due to right knee pain.  Target Date: 04/05/22    Goal Identifier: Stairs  Goal Description: May send and descend 14 stairs reciprocally without gait deviation and less than 2/10 pain.  Target Date: 04/05/22    Goal Identifier: Walking  Goal Description: Walk 10 minutes outdoors with less than 3/10 pain/soreness.  Target Date: 04/05/22                                                           Therapy Frequency:  other (see comments) (16 visits)  Predicted Duration of Therapy Intervention:  12 weeks    Jv Murrell, PT                 I CERTIFY THE NEED FOR THESE SERVICES FURNISHED UNDER        THIS PLAN OF TREATMENT AND WHILE UNDER MY CARE     (Physician co-signature of this document indicates review and certification of the therapy plan).                        Certification Date From:  01/11/22   Certification Date To:  04/05/22    Referring Provider:  Dr. Pranav DASILVA    Initial Assessment        See Epic Evaluation Start of Care Date: 01/11/22

## 2022-01-18 ENCOUNTER — HOSPITAL ENCOUNTER (OUTPATIENT)
Dept: PHYSICAL THERAPY | Facility: OTHER | Age: 67
Setting detail: THERAPIES SERIES
End: 2022-01-18
Attending: ORTHOPAEDIC SURGERY
Payer: MEDICARE

## 2022-01-18 PROCEDURE — 97110 THERAPEUTIC EXERCISES: CPT | Mod: GP | Performed by: PHYSICAL THERAPIST

## 2022-01-21 ENCOUNTER — OFFICE VISIT (OUTPATIENT)
Dept: FAMILY MEDICINE | Facility: OTHER | Age: 67
End: 2022-01-21
Attending: FAMILY MEDICINE
Payer: COMMERCIAL

## 2022-01-21 ENCOUNTER — HOSPITAL ENCOUNTER (OUTPATIENT)
Dept: PHYSICAL THERAPY | Facility: OTHER | Age: 67
Setting detail: THERAPIES SERIES
End: 2022-01-21
Attending: ORTHOPAEDIC SURGERY
Payer: MEDICARE

## 2022-01-21 VITALS
BODY MASS INDEX: 33.5 KG/M2 | OXYGEN SATURATION: 98 % | WEIGHT: 234 LBS | DIASTOLIC BLOOD PRESSURE: 72 MMHG | TEMPERATURE: 96.9 F | RESPIRATION RATE: 16 BRPM | SYSTOLIC BLOOD PRESSURE: 138 MMHG | HEART RATE: 71 BPM | HEIGHT: 70 IN

## 2022-01-21 DIAGNOSIS — Z96.651 STATUS POST RIGHT KNEE REPLACEMENT: ICD-10-CM

## 2022-01-21 DIAGNOSIS — M25.561 CHRONIC PAIN OF RIGHT KNEE: ICD-10-CM

## 2022-01-21 DIAGNOSIS — G89.29 CHRONIC PAIN OF RIGHT KNEE: ICD-10-CM

## 2022-01-21 PROCEDURE — G0463 HOSPITAL OUTPT CLINIC VISIT: HCPCS

## 2022-01-21 PROCEDURE — 99214 OFFICE O/P EST MOD 30 MIN: CPT | Performed by: FAMILY MEDICINE

## 2022-01-21 PROCEDURE — 97110 THERAPEUTIC EXERCISES: CPT | Mod: GP | Performed by: PHYSICAL THERAPIST

## 2022-01-21 RX ORDER — HYDROCODONE BITARTRATE AND ACETAMINOPHEN 10; 325 MG/1; MG/1
1 TABLET ORAL EVERY 4 HOURS PRN
Qty: 180 TABLET | Refills: 0 | Status: SHIPPED | OUTPATIENT
Start: 2022-01-21 | End: 2022-02-21

## 2022-01-21 RX ORDER — HYDROXYZINE HYDROCHLORIDE 10 MG/1
10 TABLET, FILM COATED ORAL EVERY 6 HOURS PRN
Qty: 120 TABLET | Refills: 11 | Status: SHIPPED | OUTPATIENT
Start: 2022-01-21 | End: 2023-02-07

## 2022-01-21 ASSESSMENT — PATIENT HEALTH QUESTIONNAIRE - PHQ9: SUM OF ALL RESPONSES TO PHQ QUESTIONS 1-9: 5

## 2022-01-21 ASSESSMENT — PAIN SCALES - GENERAL: PAINLEVEL: MODERATE PAIN (5)

## 2022-01-21 ASSESSMENT — MIFFLIN-ST. JEOR: SCORE: 1833.7

## 2022-01-21 NOTE — PROGRESS NOTES
"  Assessment & Plan       ICD-10-CM    1. Chronic pain of right knee  M25.561     G89.29        PDMP Review       Value Time User    State PDMP site checked  Yes 1/21/2022  1:18 PM Wei Perez MD         Oxycodone dose was reduced.  He had more pain when starting physical therapy.  Has more familiarity with how hydrocodone works.  Dose has been reduced down to 45 mg daily prior to knee surgery.  Was at 40 mg oxycodone before attempted reduction, which is 60 MME.  Therefore, changed to hydrocodone 10/325 mg taking 1 pill every 4 hours as needed, 6/day.  Given prescription for 180 pills to use at the same dosing over next 1 month of PT.  At next appointment, discussed reduction plan.    Discussed COVID booster.  He received 1 [employer] several months ago.  Recommend mRNA booster.    Follow up 1 month    30 minutes spent on the date of the encounter doing chart review, patient visit and documentation     Wei Perez MD     [facility]      Nursing Notes:   Lizette Domínguez LPN  1/21/2022  1:24 PM  Sign at exiting of workspace  Chief Complaint   Patient presents with     Recheck Medication       Initial /72   Pulse 71   Temp 96.9  F (36.1  C) (Temporal)   Resp 16   Ht 1.772 m (5' 9.75\")   Wt 106.1 kg (234 lb)   SpO2 98%   BMI 33.82 kg/m   Estimated body mass index is 33.82 kg/m  as calculated from the following:    Height as of this encounter: 1.772 m (5' 9.75\").    Weight as of this encounter: 106.1 kg (234 lb).  Medication Reconciliation: complete    FOOD SECURITY SCREENING QUESTIONS  Hunger Vital Signs:  Within the past 12 months we worried whether our food would run out before we got money to buy more. Never  Within the past 12 months the food we bought just didn't last and we didn't have money to get more. Never    Lizette Domínguez LPN         SUBJECTIVE:  HPI    66 year old male presents to follow up on chronic back pain  He had a right TKA on 11/16.   Changed from " hydrocodone 45 mg daily to oxycodone 60 mg daily after surgery. Worked down now from 60 to 40 mg daily  Reduced further to 7.5 mg pills taking 5 per day, but he could not tolerate the reduction and has been taking extra pills. Ran out, which is not something that happened for chronic back pain  He is working in PT on knee range of motion.  Knee still feels very swollen.  No increase in pain.  No fever.          REVIEW OF SYSTEMS:    Pertinent items are noted in HPI.    Current Outpatient Medications   Medication Sig Dispense Refill     acetaminophen (TYLENOL) 325 MG tablet Take 2 tablets (650 mg) by mouth every 4 hours as needed for other (mild pain) 100 tablet 0     amLODIPine (NORVASC) 10 MG tablet Take 1 tablet (10 mg) by mouth daily 90 tablet 3     apalutamide (ERLEADA) 60 MG tablet Take 4 tablets (240 mg) by mouth daily 120 tablet 6     aspirin EC 81 MG EC tablet Take 81 mg by mouth daily with food       atorvastatin (LIPITOR) 20 MG tablet Take 1 tablet (20 mg) by mouth daily 90 tablet 3     cyanocobalamin (RA VITAMIN B-12 TR) 1000 MCG TBCR Take 1,000 mcg by mouth daily       hydrOXYzine (ATARAX) 10 MG tablet Take 1 tablet (10 mg) by mouth every 6 hours as needed for itching or anxiety (with pain, moderate pain) 120 tablet 11     lisinopril (ZESTRIL) 40 MG tablet Take 1 tablet (40 mg) by mouth daily 90 tablet 4     metFORMIN (GLUCOPHAGE-XR) 500 MG 24 hr tablet Take 2 tablets (1,000 mg) by mouth 2 times daily (with meals) 360 tablet 4     naproxen (NAPROSYN) 500 MG tablet Take 1 tablet (500 mg) by mouth 2 times daily as needed for moderate pain 180 tablet 4     omeprazole (PRILOSEC) 40 MG DR capsule TAKE 1 CAPSULE BY MOUTH ONCE DAILY. 90 capsule 3     ondansetron (ZOFRAN-ODT) 8 MG ODT tab Take 1 tablet (8 mg) by mouth every 8 hours as needed for nausea 90 tablet 1     oxyCODONE-acetaminophen (PERCOCET) 7.5-325 MG per tablet Take 1 tablet by mouth every 4 hours as needed for severe pain 135 tablet 0      "pregabalin (LYRICA) 150 MG capsule Take 1 capsule (150 mg) by mouth 2 times daily 180 capsule 1     vitamin D3 (CHOLECALCIFEROL) 2000 units (50 mcg) tablet Take 1 tablet (2,000 Units) by mouth daily       No Known Allergies    OBJECTIVE:  /72   Pulse 71   Temp 96.9  F (36.1  C) (Temporal)   Resp 16   Ht 1.772 m (5' 9.75\")   Wt 106.1 kg (234 lb)   SpO2 98%   BMI 33.82 kg/m      EXAM:  General Appearance: Alert. No acute distress  Psychiatric: Normal affect and mentation  Musculoskeletal: Right knee swelling, mild warmth, no concerning induration or redness     "

## 2022-01-21 NOTE — PATIENT INSTRUCTIONS
Change from oxycodone back to hydrocodone at 6 pills per day for the next month  Follow-up in a month

## 2022-01-21 NOTE — NURSING NOTE
"Chief Complaint   Patient presents with     Recheck Medication       Initial /72   Pulse 71   Temp 96.9  F (36.1  C) (Temporal)   Resp 16   Ht 1.772 m (5' 9.75\")   Wt 106.1 kg (234 lb)   SpO2 98%   BMI 33.82 kg/m   Estimated body mass index is 33.82 kg/m  as calculated from the following:    Height as of this encounter: 1.772 m (5' 9.75\").    Weight as of this encounter: 106.1 kg (234 lb).  Medication Reconciliation: complete    FOOD SECURITY SCREENING QUESTIONS  Hunger Vital Signs:  Within the past 12 months we worried whether our food would run out before we got money to buy more. Never  Within the past 12 months the food we bought just didn't last and we didn't have money to get more. Never    Lizette Domínguez, KANE  "

## 2022-01-25 ENCOUNTER — HOSPITAL ENCOUNTER (OUTPATIENT)
Dept: PHYSICAL THERAPY | Facility: OTHER | Age: 67
Setting detail: THERAPIES SERIES
End: 2022-01-25
Attending: ORTHOPAEDIC SURGERY
Payer: MEDICARE

## 2022-01-25 ENCOUNTER — IMMUNIZATION (OUTPATIENT)
Dept: FAMILY MEDICINE | Facility: OTHER | Age: 67
End: 2022-01-25
Attending: FAMILY MEDICINE
Payer: MEDICARE

## 2022-01-25 ENCOUNTER — TELEPHONE (OUTPATIENT)
Dept: FAMILY MEDICINE | Facility: OTHER | Age: 67
End: 2022-01-25

## 2022-01-25 PROCEDURE — 0004A PR COVID VAC PFIZER DIL RECON 30 MCG/0.3 ML IM: CPT

## 2022-01-25 PROCEDURE — 97110 THERAPEUTIC EXERCISES: CPT | Mod: GP | Performed by: PHYSICAL THERAPIST

## 2022-01-25 NOTE — TELEPHONE ENCOUNTER
We received a fax from [employer] denying the RX PA Request that was submitted for Hydroxyzine 10 MG tablets.  It states that the diagnosis given for use of this drug is not found in the [facility] approved medical references.  For coverage of this drug under this patients Medicare Part D benefit, the diagnosis given for this medication must be approved by the FDA or be considered a Medicare Part D use (even though not FDA approved).  Examples of Medicare Part D uses are treatment of anxiety and seasonal allergic rhinitis.  In addition, this medication has been identified as a high risk medication in patients 65 years and older.  CMS allows coverage of this medication if the prescriber confirmed that the benefit of using this high risk medication outweighs the possible safety risk.  For the diagnosis of anxiety, the patient must have previously tired and failed therapy with at least 2 other FDA approved products for the management of anxiety (examples include: buspirone, duloxetine, paroxetine, and escitalopram).    For an Expedited Appeal,  Call [phone]    For a Standard Appeal,  Call [phone]  OR  Fax to [phone]  OR  Plan Website at [email or web address]  OR  Mail to:  [employer]  Medicare Clinical Appeals  [address] [address]  [place] [address]    I faxed the Denial/Appeal information to the nurses at [phone].    Enedina Gómez on 1/25/2022 at 10:29 AM

## 2022-01-26 ENCOUNTER — ONCOLOGY VISIT (OUTPATIENT)
Dept: ONCOLOGY | Facility: OTHER | Age: 67
End: 2022-01-26
Attending: INTERNAL MEDICINE
Payer: COMMERCIAL

## 2022-01-26 VITALS
SYSTOLIC BLOOD PRESSURE: 148 MMHG | WEIGHT: 227 LBS | OXYGEN SATURATION: 96 % | HEART RATE: 84 BPM | DIASTOLIC BLOOD PRESSURE: 72 MMHG | RESPIRATION RATE: 16 BRPM | TEMPERATURE: 97.8 F | BODY MASS INDEX: 32.81 KG/M2

## 2022-01-26 DIAGNOSIS — C61 PROSTATE CANCER (H): Primary | ICD-10-CM

## 2022-01-26 DIAGNOSIS — C61 MALIGNANT NEOPLASM OF PROSTATE (H): ICD-10-CM

## 2022-01-26 LAB
ALBUMIN SERPL-MCNC: 3.9 G/DL (ref 3.5–5.7)
ALP SERPL-CCNC: 73 U/L (ref 34–104)
ALT SERPL W P-5'-P-CCNC: 6 U/L (ref 7–52)
ANION GAP SERPL CALCULATED.3IONS-SCNC: 4 MMOL/L (ref 3–14)
AST SERPL W P-5'-P-CCNC: 8 U/L (ref 13–39)
BASOPHILS # BLD AUTO: 0 10E3/UL (ref 0–0.2)
BASOPHILS NFR BLD AUTO: 1 %
BILIRUB SERPL-MCNC: 0.3 MG/DL (ref 0.3–1)
BUN SERPL-MCNC: 12 MG/DL (ref 7–25)
CALCIUM SERPL-MCNC: 9.9 MG/DL (ref 8.6–10.3)
CHLORIDE BLD-SCNC: 103 MMOL/L (ref 98–107)
CO2 SERPL-SCNC: 29 MMOL/L (ref 21–31)
CREAT SERPL-MCNC: 0.71 MG/DL (ref 0.7–1.3)
EOSINOPHIL # BLD AUTO: 0.3 10E3/UL (ref 0–0.7)
EOSINOPHIL NFR BLD AUTO: 7 %
ERYTHROCYTE [DISTWIDTH] IN BLOOD BY AUTOMATED COUNT: 14.5 % (ref 10–15)
GFR SERPL CREATININE-BSD FRML MDRD: >90 ML/MIN/1.73M2
GLUCOSE BLD-MCNC: 124 MG/DL (ref 70–105)
HCT VFR BLD AUTO: 39.2 % (ref 40–53)
HGB BLD-MCNC: 12.6 G/DL (ref 13.3–17.7)
IMM GRANULOCYTES # BLD: 0 10E3/UL
IMM GRANULOCYTES NFR BLD: 0 %
LDH SERPL L TO P-CCNC: 142 U/L (ref 140–271)
LYMPHOCYTES # BLD AUTO: 0.9 10E3/UL (ref 0.8–5.3)
LYMPHOCYTES NFR BLD AUTO: 21 %
MCH RBC QN AUTO: 27.9 PG (ref 26.5–33)
MCHC RBC AUTO-ENTMCNC: 32.1 G/DL (ref 31.5–36.5)
MCV RBC AUTO: 87 FL (ref 78–100)
MONOCYTES # BLD AUTO: 0.4 10E3/UL (ref 0–1.3)
MONOCYTES NFR BLD AUTO: 10 %
NEUTROPHILS # BLD AUTO: 2.6 10E3/UL (ref 1.6–8.3)
NEUTROPHILS NFR BLD AUTO: 61 %
NRBC # BLD AUTO: 0 10E3/UL
NRBC BLD AUTO-RTO: 0 /100
PLATELET # BLD AUTO: 262 10E3/UL (ref 150–450)
POTASSIUM BLD-SCNC: 4.7 MMOL/L (ref 3.5–5.1)
PROT SERPL-MCNC: 6.9 G/DL (ref 6.4–8.9)
PSA SERPL-MCNC: 1.21 UG/L (ref 0–4)
RBC # BLD AUTO: 4.51 10E6/UL (ref 4.4–5.9)
SODIUM SERPL-SCNC: 136 MMOL/L (ref 134–144)
WBC # BLD AUTO: 4.2 10E3/UL (ref 4–11)

## 2022-01-26 PROCEDURE — 85004 AUTOMATED DIFF WBC COUNT: CPT | Mod: ZL | Performed by: INTERNAL MEDICINE

## 2022-01-26 PROCEDURE — 99215 OFFICE O/P EST HI 40 MIN: CPT | Performed by: INTERNAL MEDICINE

## 2022-01-26 PROCEDURE — 99417 PROLNG OP E/M EACH 15 MIN: CPT | Performed by: INTERNAL MEDICINE

## 2022-01-26 PROCEDURE — 80053 COMPREHEN METABOLIC PANEL: CPT | Mod: ZL | Performed by: INTERNAL MEDICINE

## 2022-01-26 PROCEDURE — 36415 COLL VENOUS BLD VENIPUNCTURE: CPT | Mod: ZL | Performed by: INTERNAL MEDICINE

## 2022-01-26 PROCEDURE — 82040 ASSAY OF SERUM ALBUMIN: CPT | Mod: ZL | Performed by: INTERNAL MEDICINE

## 2022-01-26 PROCEDURE — 83615 LACTATE (LD) (LDH) ENZYME: CPT | Mod: ZL | Performed by: INTERNAL MEDICINE

## 2022-01-26 PROCEDURE — 84153 ASSAY OF PSA TOTAL: CPT | Mod: ZL | Performed by: INTERNAL MEDICINE

## 2022-01-26 PROCEDURE — G0463 HOSPITAL OUTPT CLINIC VISIT: HCPCS

## 2022-01-26 ASSESSMENT — PAIN SCALES - GENERAL: PAINLEVEL: MODERATE PAIN (5)

## 2022-01-26 NOTE — PROGRESS NOTES
Visit Date: 01/26/2022    HISTORY OF PRESENT ILLNESS:  Mr. Kan returns for followup of a rising PSA following treatment for malignant neoplasm of the prostate.  We had seen the patient in consultation at the request of Dr. Arrington back on 09/15/2021.  At that time, Mr. Kan was a 66-year-old white male with a previous history of aortic valve stenosis and type 2 diabetes mellitus, who we were asked to evaluate concerning a rising PSA in a castrate-resistant patient with prostate cancer.  The patient stated that approximately 10 years ago, he had undergone a prostatectomy back in 2012 for prostate cancer.  This was followed by SBRT in 2013.  The patient was followed by Dr. Celestine Arrington, who noted a rising PSA and placed the patient on androgen deprivation therapy initially with Vantas implant and then switched to Lupron.  His PSA had been rising, in the recent past was 0.975 on 07/09/2020, then on 11/23/2020 pineda to 1.215.  Dr. Arrington ordered scans including a CT chest, abdomen, pelvis and bone scan.  There was no evidence of metastatic disease.  There was a dilated thoracic aorta measuring 4.4 cm in the mid ascending segment.  PSA continued to rise and on 02/23/2021, it was up to 1.847 and then on 08/25/2021, it was up to 3.48.  Therefore, the patient had a PSA doubling time of less than 10 months.  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  The patient was essentially asymptomatic.   He denied any fevers, night sweats, weight loss or bone pain.  He did have urinary incontinence, and wears Depends for the most part.  He says he has had this since radiation was completed.  He was initially on Lupron 22.5 mg IM every 3 months.  He could not tolerate the Vantas implant.  The patient was seen for chemotherapeutic options for nonmetastatic castrate-resistant prostate cancer.  We had seen the patient and felt he would be a candidate for apalutamide, which is indicated in the setting of  castrate-resistant M0 prostate cancer that is non-metastatic.  The patient was started on apalutamide 240 mg p.o. daily.  He also had scans.  On 09/17/2021, a bone scan was negative.  There were degenerative changes with probable periodontal disease, no definite metastatic disease.  CT chest, abdomen and pelvis was essentially negative.  There was some soft tissue density in the left lower pelvis in the region of the left seminal vesicle.  There was aneurysmal dilatation of the ascending thoracic aorta measuring up to 0.14 mm.  The patient otherwise has been on apalutamide for 3 weeks and his PSA has dropped from 3.48 down to 1.62.  He did have some fatigue associated with apalutamide.    When we saw the patient last 11/03/2021, his PSA had dropped further to 1.49.  The patient went on to have continued androgen deprivation therapy and was treated with 45 mg of Lupron by Dr. Arrington on 12/15/2021.  This will be repeated again in 6 months.  He also recently had a right knee replacement and is recovering from that.  He has some localized pain in the right knee and is undergoing physical therapy.  He continues to have urinary incontinence.  Denies any fevers, night sweats, weight loss or lymphadenopathy.  He still has some ongoing fatigue.    PHYSICAL EXAMINATION:    GENERAL:  He is a middle-aged white male in no acute distress.  ECOG performance status of 1.  VITAL SIGNS:  Reveal a blood pressure of 144/80, 80, pulse 84, respirations 16, temperature 97.8.  HEENT:  Atraumatic, normocephalic.  Oropharynx nonerythematous.  NECK:  Supple.  LUNGS:  Clear to auscultation and percussion.  HEART:  Regular rhythm.  S1, S2 normal.  ABDOMEN:  Obese, soft, normoactive bowel sounds, nondistended.  LYMPHATICS:  No cervical, supraclavicular, axillary or inguinal nodes.  EXTREMITIES:  Trace ankle edema.  He has limited mobility of the right knee, status post knee replacement.  NEUROLOGIC:  Grossly focal.    LABORATORY DATA:  Reveal  from 2021, CBC with white count 8.3, H and H 12.9 and 39.8, platelet count 347,000.  BUN 14, creatinine 0.88.  LFTs are normal.  LDH is 127.  PSA is 1.51.    IMPRESSION AND PLAN:  Castrate-resistant nonmetastatic prostate cancer with a PSA doubling time of less than 10 months based on the [place] trial.  Apalutamide has been approved for nonmetastatic castrate-resistant prostate cancer based on a phase 3 trial, which randomized men with nonmetastatic adenocarcinoma of the prostate with a PSA doubling time of 10 months or less, receiving androgen deprivation therapy to apalutamide 240 mg daily versus placebo.  The median metastasis disease-free survival was 40.5 months with apalutamide treated patients and 16.2 months for those receiving placebo.  The patient was therefore started on apalutamide.  He has had a positive response in conjunction with androgen deprivation therapy.  His PSA has dropped from 3.67 down to 1.51.  We would like to repeat his PSA today.  Otherwise, if his PSA remains stable, continue apalutamide 240 mg daily and see the patient in 2 months, repeat CBC, CMP, LDH and PSA.  Continue Lupron under the direction of Dr. Celestine Arrington.    TIME SPENT:  Seventy minutes were spent with this patient.  Time was spent reviewing multiple physician provider notes, lab results, imaging results, performing history and physical, documenting history and physical and ordering followup labs and ordering apalutamide.    Erum Fritz MD        D: 2022   T: 2022   MT: [id number]    Name:     LELAND CHAPMAN  MRN:      [id number]        Account:    [id number]   :      1955           Visit Date: 2022     Document: [id number]    cc:  MD Wei Fong MD

## 2022-01-26 NOTE — NURSING NOTE
"Chief Complaint   Patient presents with     Prostate Cancer     10 week follow up prostate cancer       Initial BP (!) 144/80 (BP Location: Right arm, Patient Position: Sitting, Cuff Size: Adult Large)   Pulse 84   Temp 97.8  F (36.6  C) (Tympanic)   Resp 16   Wt 103 kg (227 lb)   SpO2 96%   BMI 32.81 kg/m   Estimated body mass index is 32.81 kg/m  as calculated from the following:    Height as of 1/21/22: 1.772 m (5' 9.75\").    Weight as of this encounter: 103 kg (227 lb).  Medication Reconciliation: complete    Leona Colon LPN    "

## 2022-01-27 ENCOUNTER — HOSPITAL ENCOUNTER (OUTPATIENT)
Dept: PHYSICAL THERAPY | Facility: OTHER | Age: 67
Setting detail: THERAPIES SERIES
End: 2022-01-27
Attending: ORTHOPAEDIC SURGERY
Payer: MEDICARE

## 2022-01-27 PROCEDURE — 97110 THERAPEUTIC EXERCISES: CPT | Mod: GP | Performed by: PHYSICAL THERAPIST

## 2022-02-01 ENCOUNTER — HOSPITAL ENCOUNTER (OUTPATIENT)
Dept: PHYSICAL THERAPY | Facility: OTHER | Age: 67
Setting detail: THERAPIES SERIES
End: 2022-02-01
Attending: ORTHOPAEDIC SURGERY
Payer: MEDICARE

## 2022-02-01 PROCEDURE — 97110 THERAPEUTIC EXERCISES: CPT | Mod: GP | Performed by: PHYSICAL THERAPIST

## 2022-02-01 NOTE — PROGRESS NOTES
[facility] Rehabilitation Service    Outpatient Physical Therapy Progress Note  Patient: Dilip Kan  : 1955    Visit total:  6    Referring Provider: Dr. Pranav DASILVA    Therapy Diagnosis: Impaired right knee ROM and strength, right knee pain.  Difficulty with gait.      Client Self Report: Patient continues to have difficulty regaining motion of his right knee.  Moderate pain especially at night.  Taking pain meds 2-3 times per day for pain control.  Fair tolerance to stretching of right knee.  Plateau in progress regarding knee pain and ROM.  Jermain has a follow up with Ortho tomorrow to discuss potential manipulation.      Objective Measurements:  Objective Measure: Right knee ROM  Details: 5-102 degrees     Plateau in gaining right knee ROM.  Fluctuates between  degrees of flexion.  5-10 degrees of extension.               Goals:  Goals not met.  Plateau in progress.  Goal Identifier Sleep   Goal Description Patient will be able to sleep 7 hours without waking due to right knee pain.   Target Date 22   Date Met      Progress (detail required for progress note): N/A     Goal Identifier Stairs   Goal Description May send and descend 14 stairs reciprocally without gait deviation and less than 2/10 pain.   Target Date 22   Date Met      Progress (detail required for progress note): N/A     Goal Identifier Walking   Goal Description Walk 10 minutes outdoors with less than 3/10 pain/soreness.   Target Date 22   Date Met      Progress (detail required for progress note): N/A       Plan:  Continue therapy per current plan of care.  Anticipate additional PT if patient should have a manipulation under anesthesia.      Discharge:  No

## 2022-02-02 ENCOUNTER — OFFICE VISIT (OUTPATIENT)
Dept: ORTHOPEDICS | Facility: OTHER | Age: 67
End: 2022-02-02
Attending: ORTHOPAEDIC SURGERY
Payer: MEDICARE

## 2022-02-02 DIAGNOSIS — Z96.651 STATUS POST TOTAL RIGHT KNEE REPLACEMENT: Primary | ICD-10-CM

## 2022-02-02 PROCEDURE — 99024 POSTOP FOLLOW-UP VISIT: CPT | Performed by: ORTHOPAEDIC SURGERY

## 2022-02-02 NOTE — NURSING NOTE
"Chief Complaint   Patient presents with     RECHECK     Right TKA Recheck      Patient is here today for a right TKA recheck       Initial There were no vitals taken for this visit. Estimated body mass index is 32.81 kg/m  as calculated from the following:    Height as of 1/21/22: 1.772 m (5' 9.75\").    Weight as of 1/26/22: 103 kg (227 lb).  Medication Reconciliation: complete    Mary Kat LPN  "

## 2022-02-02 NOTE — NURSING NOTE
"Chief Complaint   Patient presents with     RECHECK     Right TKA Recheck        Initial There were no vitals taken for this visit. Estimated body mass index is 32.81 kg/m  as calculated from the following:    Height as of 1/21/22: 1.772 m (5' 9.75\").    Weight as of 1/26/22: 103 kg (227 lb).  Medication Reconciliation: complete    Mary Kat LPN  "

## 2022-02-02 NOTE — PROGRESS NOTES
Visit Date: 2022    FOLLOWUP EXAMINATION    REASON FOR EVALUATION:  Right knee replacement.    HISTORY:  Jermain comes in with right knee replacement, still a lot of stiffness as far as his knee is concerned.  He is trying to work through this at this point in time, feels like he is making some headway, but still a little bit frustrated by his progress at this point.    PHYSICAL EXAMINATION:  Examination of the knee shows motion 0 to about .  Ligament exam stable and balanced. Some swelling is seen within the joint.  Neurovascular examination is otherwise intact.    IMPRESSION:  Right knee replacement with postoperative stiffness and scar tissue.    PLAN:  The patient will currently continue through his rehabilitation process.  I will revisit with him otherwise in a month just to recheck on things.  I do not think any further surgical interventions make sense or are advised at this point in time.  He is in agreement with that plan otherwise.    Micah Rock MD        D: 2022   T: 2022   MT: [id number]    Name:     LELAND CHAPMAN  MRN:      [id number]        Account:    [id number]   :      1955           Visit Date: 2022     Document: [id number]

## 2022-02-07 ENCOUNTER — TELEPHONE (OUTPATIENT)
Dept: ONCOLOGY | Facility: OTHER | Age: 67
End: 2022-02-07
Payer: COMMERCIAL

## 2022-02-07 NOTE — TELEPHONE ENCOUNTER
Continue Piper. P financial liason will contact patient. Patient aware they will be calling.  Elza Lundberg RN...........2/7/2022 2:59 PM

## 2022-02-07 NOTE — TELEPHONE ENCOUNTER
Jermain called with questions about medication: Erleada.  States that he gets help with the cost of this medication and the program that he gets the help from is out of funds and this drug is very expensive.  Patient would like to know if he will continue with this drug or switch to something else.  Please call patient back.    Thank you,  Bee Caballero on 2/7/2022 at 2:11 PM

## 2022-02-08 ENCOUNTER — HOSPITAL ENCOUNTER (OUTPATIENT)
Dept: PHYSICAL THERAPY | Facility: OTHER | Age: 67
Setting detail: THERAPIES SERIES
End: 2022-02-08
Attending: ORTHOPAEDIC SURGERY
Payer: MEDICARE

## 2022-02-08 PROCEDURE — 97016 VASOPNEUMATIC DEVICE THERAPY: CPT | Mod: GP | Performed by: PHYSICAL THERAPIST

## 2022-02-08 PROCEDURE — 97110 THERAPEUTIC EXERCISES: CPT | Mod: GP | Performed by: PHYSICAL THERAPIST

## 2022-02-09 ENCOUNTER — TELEPHONE (OUTPATIENT)
Dept: ONCOLOGY | Facility: OTHER | Age: 67
End: 2022-02-09
Payer: COMMERCIAL

## 2022-02-09 NOTE — TELEPHONE ENCOUNTER
Free Drug Application Initiated  Medication: Erleada 60mg  Sponsor: [employer]  Phone # [phone]  Fax # [phone] or [phone]  Additional Information: Spoke to Jermain he will email me over his tax return page 1 as soon as his wife gets home. Application has been sent out to Dr. Fritz     *patient is out of medication for the last 2 weeks, will put a rush in when I send application off

## 2022-02-09 NOTE — TELEPHONE ENCOUNTER
Jeramin called.  States that he just wanted to follow up and let you know that he has not received a phone call regarding his medication.  Please follow up with the patient. [phone].    Thank you,  Bee Caballero on 2/9/2022 at 9:52 AM

## 2022-02-14 NOTE — TELEPHONE ENCOUNTER
Spoke to Jermain on 2/10 said he was having a hard time emailing his tax page 1 over to me.     Called Jermain 2/14 to see if he can bring page 1 into clinic if he still can't get it to send over to me

## 2022-02-15 ENCOUNTER — HOSPITAL ENCOUNTER (OUTPATIENT)
Dept: PHYSICAL THERAPY | Facility: OTHER | Age: 67
Setting detail: THERAPIES SERIES
End: 2022-02-15
Attending: ORTHOPAEDIC SURGERY
Payer: MEDICARE

## 2022-02-15 PROCEDURE — 97110 THERAPEUTIC EXERCISES: CPT | Mod: GP | Performed by: PHYSICAL THERAPIST

## 2022-02-15 PROCEDURE — 97016 VASOPNEUMATIC DEVICE THERAPY: CPT | Mod: GP | Performed by: PHYSICAL THERAPIST

## 2022-02-15 NOTE — TELEPHONE ENCOUNTER
Free Drug Application Approved  Effective Dates: 02/15/2022- 12/31/2022  Patient notified? Yes, patient will bring letter in to fax when he has it come in  Additional Information: attestataion letter must be signed by 04/15/2022    Filling pharmacy: [facility] ph: [phone]

## 2022-02-17 ENCOUNTER — HOSPITAL ENCOUNTER (OUTPATIENT)
Dept: PHYSICAL THERAPY | Facility: OTHER | Age: 67
Setting detail: THERAPIES SERIES
End: 2022-02-17
Attending: ORTHOPAEDIC SURGERY
Payer: MEDICARE

## 2022-02-17 PROCEDURE — 97110 THERAPEUTIC EXERCISES: CPT | Mod: GP | Performed by: PHYSICAL THERAPIST

## 2022-02-17 PROCEDURE — 97016 VASOPNEUMATIC DEVICE THERAPY: CPT | Mod: GP | Performed by: PHYSICAL THERAPIST

## 2022-02-21 ENCOUNTER — OFFICE VISIT (OUTPATIENT)
Dept: FAMILY MEDICINE | Facility: OTHER | Age: 67
End: 2022-02-21
Attending: FAMILY MEDICINE
Payer: COMMERCIAL

## 2022-02-21 ENCOUNTER — HOSPITAL ENCOUNTER (OUTPATIENT)
Dept: PHYSICAL THERAPY | Facility: OTHER | Age: 67
Setting detail: THERAPIES SERIES
End: 2022-02-21
Attending: ORTHOPAEDIC SURGERY
Payer: MEDICARE

## 2022-02-21 VITALS
BODY MASS INDEX: 32.95 KG/M2 | HEART RATE: 72 BPM | RESPIRATION RATE: 20 BRPM | TEMPERATURE: 98.4 F | OXYGEN SATURATION: 96 % | WEIGHT: 228 LBS | DIASTOLIC BLOOD PRESSURE: 86 MMHG | SYSTOLIC BLOOD PRESSURE: 136 MMHG

## 2022-02-21 DIAGNOSIS — Z96.651 STATUS POST RIGHT KNEE REPLACEMENT: Primary | ICD-10-CM

## 2022-02-21 DIAGNOSIS — E11.9 TYPE 2 DIABETES MELLITUS WITHOUT COMPLICATION, WITHOUT LONG-TERM CURRENT USE OF INSULIN (H): ICD-10-CM

## 2022-02-21 DIAGNOSIS — F11.90 CHRONIC, CONTINUOUS USE OF OPIOIDS: ICD-10-CM

## 2022-02-21 DIAGNOSIS — Z79.899 CONTROLLED SUBSTANCE AGREEMENT SIGNED: ICD-10-CM

## 2022-02-21 PROCEDURE — 97110 THERAPEUTIC EXERCISES: CPT | Mod: GP | Performed by: PHYSICAL THERAPIST

## 2022-02-21 PROCEDURE — G0463 HOSPITAL OUTPT CLINIC VISIT: HCPCS

## 2022-02-21 PROCEDURE — 97016 VASOPNEUMATIC DEVICE THERAPY: CPT | Mod: GP | Performed by: PHYSICAL THERAPIST

## 2022-02-21 PROCEDURE — G0463 HOSPITAL OUTPT CLINIC VISIT: HCPCS | Mod: 25

## 2022-02-21 PROCEDURE — 99213 OFFICE O/P EST LOW 20 MIN: CPT | Performed by: FAMILY MEDICINE

## 2022-02-21 RX ORDER — HYDROCODONE BITARTRATE AND ACETAMINOPHEN 10; 325 MG/1; MG/1
1 TABLET ORAL EVERY 4 HOURS PRN
Qty: 180 TABLET | Refills: 0 | Status: SHIPPED | OUTPATIENT
Start: 2022-02-21 | End: 2022-04-20

## 2022-02-21 RX ORDER — HYDROCODONE BITARTRATE AND ACETAMINOPHEN 10; 325 MG/1; MG/1
1 TABLET ORAL EVERY 4 HOURS PRN
Qty: 165 TABLET | Refills: 0 | Status: SHIPPED | OUTPATIENT
Start: 2022-03-23 | End: 2022-03-16

## 2022-02-21 ASSESSMENT — ANXIETY QUESTIONNAIRES
GAD7 TOTAL SCORE: 0
3. WORRYING TOO MUCH ABOUT DIFFERENT THINGS: NOT AT ALL
1. FEELING NERVOUS, ANXIOUS, OR ON EDGE: NOT AT ALL
7. FEELING AFRAID AS IF SOMETHING AWFUL MIGHT HAPPEN: NOT AT ALL
7. FEELING AFRAID AS IF SOMETHING AWFUL MIGHT HAPPEN: NOT AT ALL
GAD7 TOTAL SCORE: 0
2. NOT BEING ABLE TO STOP OR CONTROL WORRYING: NOT AT ALL
4. TROUBLE RELAXING: NOT AT ALL
5. BEING SO RESTLESS THAT IT IS HARD TO SIT STILL: NOT AT ALL
GAD7 TOTAL SCORE: 0
6. BECOMING EASILY ANNOYED OR IRRITABLE: NOT AT ALL

## 2022-02-21 ASSESSMENT — PAIN SCALES - GENERAL: PAINLEVEL: MODERATE PAIN (5)

## 2022-02-21 ASSESSMENT — PATIENT HEALTH QUESTIONNAIRE - PHQ9
10. IF YOU CHECKED OFF ANY PROBLEMS, HOW DIFFICULT HAVE THESE PROBLEMS MADE IT FOR YOU TO DO YOUR WORK, TAKE CARE OF THINGS AT HOME, OR GET ALONG WITH OTHER PEOPLE: NOT DIFFICULT AT ALL
SUM OF ALL RESPONSES TO PHQ QUESTIONS 1-9: 2
SUM OF ALL RESPONSES TO PHQ QUESTIONS 1-9: 2

## 2022-02-21 NOTE — NURSING NOTE
"Patient presents to the clinic for controlled medication refill.  Last administration of Adams was last night around 2300.  Contract signed 12-, TOX obtained 11-4-2020.    FOOD SECURITY SCREENING QUESTIONS:    The next two questions are to help us understand your food security.  If you are feeling you need any assistance in this area, we have resources available to support you today.    Hunger Vital Signs:  Within the past 12 months we worried whether our food would run out before we got money to buy more. Never  Within the past 12 months the food we bought just didn't last and we didn't have money to get more. Never    Advance Care Directive on file? no  Advance Care Directive provided to patient? Declined.  Chief Complaint   Patient presents with     Recheck Medication       Initial /86 (BP Location: Right arm, Patient Position: Sitting, Cuff Size: Adult Large)   Pulse 72   Temp 98.4  F (36.9  C) (Tympanic)   Resp 20   Wt 103.4 kg (228 lb)   SpO2 96%   BMI 32.95 kg/m   Estimated body mass index is 32.95 kg/m  as calculated from the following:    Height as of 1/21/22: 1.772 m (5' 9.75\").    Weight as of this encounter: 103.4 kg (228 lb).  Medication Reconciliation: complete        Leona Shell LPN       "

## 2022-02-21 NOTE — PROGRESS NOTES
Assessment & Plan       ICD-10-CM    1. Controlled substance agreement updated 12-  Z79.899 Drug Confirmation Panel Urine with Creatinine   2. Status post right knee replacement  Z96.651 HYDROcodone-acetaminophen (NORCO)  MG per tablet     HYDROcodone-acetaminophen (NORCO)  MG per tablet   3. Chronic, continuous use of opioids  F11.90 Drug Confirmation Panel Urine with Creatinine   4. Type 2 diabetes mellitus without complication, without long-term current use of insulin (H)  E11.9 Albumin Random Urine Quantitative with Creat Ratio       Had more pain after knee replacement.  Switched from hydrocodone to oxycodone, but changed back to hydrocodone a month ago.  Currently at 60 mg, previous baseline prior to surgery was 45 mg.  Discussed weaning process.  He is struggling through physical therapy.  Therefore left at hydrocodone 10 mg taking 6/day for the next month.  In 1 month, reduce to 5-1/2 pills/day on average, 165 pills for 30 days.  Plan to continue weaning process at follow-up appointment in 2 months.    Due for urine drug monitoring.  Because of prostate cancer and voiding issues, was unable to void today.  He can provide a sample when he has labs for oncology in March.  Due for urine microalbumin at that time.  Has been very reliable in past urine drug monitoring has been consistent.     Return in about 2 months (around 4/21/2022).    Wei Perez MD  [facility]    Varun Aly is a 66 year old who presents for the following health issues  accompanied by his self.    History of Present Illness     Reason for visit:  Medication refill.  Symptom onset:  More than a month  Symptoms include:  Pain  Symptom intensity:  Moderate  Symptom progression:  Worsening  Had these symptoms before:  Yes  Has tried/received treatment for these symptoms:  Yes  Previous treatment was successful:  Yes  Prior treatment description:  PT  What makes it worse:  Activity  What  makes it better:  Controlled medication    He eats 2-3 servings of fruits and vegetables daily.He consumes 1 sweetened beverage(s) daily.He exercises with enough effort to increase his heart rate 9 or less minutes per day.  He exercises with enough effort to increase his heart rate 3 or less days per week.   He is taking medications regularly.       Pain History:  When did you first notice your pain? - More than 6 weeks   Have you seen this provider for your pain in the past?   Yes   Where in your body do you have pain? Right knee, low back  Are you seeing anyone else for your pain? Yes - Physical Therapy    PHQ-9 SCORE 12/28/2021 1/21/2022 2/21/2022   PHQ-9 Total Score MyChart 5 (Mild depression) - 2 (Minimal depression)   PHQ-9 Total Score 5 5 2       MARY-7 SCORE 11/23/2021 12/28/2021 2/21/2022   Total Score - 0 (minimal anxiety) 0 (minimal anxiety)   Total Score 0 0 0         PEG Score 2/21/2022   PEG Total Score 5.33         Chronic Pain Follow Up:    Location of pain: knee and low back  Analgesia/pain control:    - Recent changes:  Oxycodone changed to hydrocodone    - Overall control: Tolerable with discomfort    - Current treatments: PT, icing   Adherence:     - When did you take your last dose of pain medicine?  Last night   Adverse effects: No   PDMP Review       Value Time User    State PDMP site checked  Yes 1/21/2022  1:18 PM Wei Perez MD        Last CSA Agreement:   CSA -- Patient Level:    Controlled Substance Agreement - Opioid - Scan on 12/15/2021  9:20 AM: Controlled Substance Agreement  Controlled Substance Agreement - Opioid - Scan on 10/26/2021  4:40 PM       Last UDS: 11/8/2020    Working with PT 3 days a week. ROM limited. Currently to 105 degrees, trying to get over 110 on a consistent basis    Review of Systems   As above      Objective    /86 (BP Location: Right arm, Patient Position: Sitting, Cuff Size: Adult Large)   Pulse 72   Temp 98.4  F (36.9  C) (Tympanic)   Resp 20    Wt 103.4 kg (228 lb)   SpO2 96%   BMI 32.95 kg/m    Body mass index is 32.95 kg/m .  Physical Exam   General Appearance: Alert. No acute distress  Psychiatric: Normal affect and mentation            Answers for HPI/ROS submitted by the patient on 2/21/2022  If you checked off any problems, how difficult have these problems made it for you to do your work, take care of things at home, or get along with other people?: Not difficult at all  PHQ9 TOTAL SCORE: 2  MARY 7 TOTAL SCORE: 0

## 2022-02-22 ASSESSMENT — ANXIETY QUESTIONNAIRES: GAD7 TOTAL SCORE: 0

## 2022-02-22 ASSESSMENT — PATIENT HEALTH QUESTIONNAIRE - PHQ9: SUM OF ALL RESPONSES TO PHQ QUESTIONS 1-9: 2

## 2022-02-28 ENCOUNTER — HOSPITAL ENCOUNTER (OUTPATIENT)
Dept: PHYSICAL THERAPY | Facility: OTHER | Age: 67
Setting detail: THERAPIES SERIES
End: 2022-02-28
Attending: ORTHOPAEDIC SURGERY
Payer: MEDICARE

## 2022-02-28 PROCEDURE — 97016 VASOPNEUMATIC DEVICE THERAPY: CPT | Mod: GP | Performed by: PHYSICAL THERAPIST

## 2022-02-28 PROCEDURE — 97110 THERAPEUTIC EXERCISES: CPT | Mod: GP | Performed by: PHYSICAL THERAPIST

## 2022-03-04 ENCOUNTER — HOSPITAL ENCOUNTER (OUTPATIENT)
Dept: PHYSICAL THERAPY | Facility: OTHER | Age: 67
Setting detail: THERAPIES SERIES
End: 2022-03-04
Attending: ORTHOPAEDIC SURGERY
Payer: MEDICARE

## 2022-03-04 ENCOUNTER — HOSPITAL ENCOUNTER (OUTPATIENT)
Dept: CARDIOLOGY | Facility: OTHER | Age: 67
Discharge: HOME | End: 2022-03-04
Attending: INTERNAL MEDICINE | Admitting: INTERNAL MEDICINE
Payer: MEDICARE

## 2022-03-04 DIAGNOSIS — I71.21 ASCENDING AORTIC ANEURYSM (H): ICD-10-CM

## 2022-03-04 DIAGNOSIS — I25.10 PLAQUE IN HEART ARTERY: ICD-10-CM

## 2022-03-04 DIAGNOSIS — I35.2 AORTIC VALVE STENOSIS WITH INSUFFICIENCY, ETIOLOGY OF CARDIAC VALVE DISEASE UNSPECIFIED: ICD-10-CM

## 2022-03-04 DIAGNOSIS — I35.1 NONRHEUMATIC AORTIC VALVE INSUFFICIENCY: ICD-10-CM

## 2022-03-04 DIAGNOSIS — I35.0 MILD AORTIC STENOSIS: ICD-10-CM

## 2022-03-04 DIAGNOSIS — I27.20 MILD PULMONARY HYPERTENSION (H): ICD-10-CM

## 2022-03-04 LAB — LVEF ECHO: NORMAL

## 2022-03-04 PROCEDURE — 93306 TTE W/DOPPLER COMPLETE: CPT | Mod: 26 | Performed by: INTERNAL MEDICINE

## 2022-03-04 PROCEDURE — 97016 VASOPNEUMATIC DEVICE THERAPY: CPT | Mod: GP,CQ

## 2022-03-04 PROCEDURE — 93306 TTE W/DOPPLER COMPLETE: CPT

## 2022-03-04 PROCEDURE — 97110 THERAPEUTIC EXERCISES: CPT | Mod: GP,CQ

## 2022-03-07 ENCOUNTER — LAB (OUTPATIENT)
Dept: LAB | Facility: OTHER | Age: 67
End: 2022-03-07
Attending: INTERNAL MEDICINE
Payer: MEDICARE

## 2022-03-07 ENCOUNTER — HOSPITAL ENCOUNTER (OUTPATIENT)
Dept: PHYSICAL THERAPY | Facility: OTHER | Age: 67
Setting detail: THERAPIES SERIES
End: 2022-03-07
Attending: ORTHOPAEDIC SURGERY
Payer: MEDICARE

## 2022-03-07 DIAGNOSIS — T50.905A ADVERSE DRUG EFFECT: ICD-10-CM

## 2022-03-07 DIAGNOSIS — I25.10 PLAQUE IN HEART ARTERY: Primary | ICD-10-CM

## 2022-03-07 DIAGNOSIS — C61 PROSTATE CANCER (H): ICD-10-CM

## 2022-03-07 DIAGNOSIS — I35.1 NONRHEUMATIC AORTIC VALVE INSUFFICIENCY: ICD-10-CM

## 2022-03-07 DIAGNOSIS — F11.90 CHRONIC, CONTINUOUS USE OF OPIOIDS: ICD-10-CM

## 2022-03-07 DIAGNOSIS — R97.21 RISING PSA FOLLOWING TREATMENT FOR MALIGNANT NEOPLASM OF PROSTATE: ICD-10-CM

## 2022-03-07 DIAGNOSIS — I35.2 AORTIC VALVE STENOSIS WITH INSUFFICIENCY, ETIOLOGY OF CARDIAC VALVE DISEASE UNSPECIFIED: ICD-10-CM

## 2022-03-07 DIAGNOSIS — I35.0 MILD AORTIC STENOSIS: ICD-10-CM

## 2022-03-07 DIAGNOSIS — I71.21 ASCENDING AORTIC ANEURYSM (H): ICD-10-CM

## 2022-03-07 DIAGNOSIS — Z79.899 CONTROLLED SUBSTANCE AGREEMENT SIGNED: ICD-10-CM

## 2022-03-07 DIAGNOSIS — E11.9 TYPE 2 DIABETES MELLITUS WITHOUT COMPLICATION, WITHOUT LONG-TERM CURRENT USE OF INSULIN (H): ICD-10-CM

## 2022-03-07 LAB
ALBUMIN SERPL-MCNC: 4.1 G/DL (ref 3.5–5.7)
ALP SERPL-CCNC: 63 U/L (ref 34–104)
ALT SERPL W P-5'-P-CCNC: 9 U/L (ref 7–52)
ANION GAP SERPL CALCULATED.3IONS-SCNC: 5 MMOL/L (ref 3–14)
AST SERPL W P-5'-P-CCNC: 10 U/L (ref 13–39)
BASOPHILS # BLD AUTO: 0 10E3/UL (ref 0–0.2)
BASOPHILS NFR BLD AUTO: 1 %
BILIRUB SERPL-MCNC: 0.3 MG/DL (ref 0.3–1)
BUN SERPL-MCNC: 18 MG/DL (ref 7–25)
CALCIUM SERPL-MCNC: 9.7 MG/DL (ref 8.6–10.3)
CHLORIDE BLD-SCNC: 103 MMOL/L (ref 98–107)
CHOLEST SERPL-MCNC: 201 MG/DL
CO2 SERPL-SCNC: 31 MMOL/L (ref 21–31)
CREAT SERPL-MCNC: 0.71 MG/DL (ref 0.7–1.3)
CREAT UR-MCNC: 106 MG/DL
CREAT UR-MCNC: 107 MG/DL
EOSINOPHIL # BLD AUTO: 0.3 10E3/UL (ref 0–0.7)
EOSINOPHIL NFR BLD AUTO: 6 %
ERYTHROCYTE [DISTWIDTH] IN BLOOD BY AUTOMATED COUNT: 15.1 % (ref 10–15)
FASTING STATUS PATIENT QL REPORTED: YES
GFR SERPL CREATININE-BSD FRML MDRD: >90 ML/MIN/1.73M2
GLUCOSE BLD-MCNC: 114 MG/DL (ref 70–105)
HCT VFR BLD AUTO: 40.4 % (ref 40–53)
HDLC SERPL-MCNC: 52 MG/DL (ref 23–92)
HGB BLD-MCNC: 12.9 G/DL (ref 13.3–17.7)
IMM GRANULOCYTES # BLD: 0 10E3/UL
IMM GRANULOCYTES NFR BLD: 0 %
LDH SERPL L TO P-CCNC: 126 U/L (ref 140–271)
LDLC SERPL CALC-MCNC: 112 MG/DL
LYMPHOCYTES # BLD AUTO: 1.5 10E3/UL (ref 0.8–5.3)
LYMPHOCYTES NFR BLD AUTO: 30 %
MCH RBC QN AUTO: 27.5 PG (ref 26.5–33)
MCHC RBC AUTO-ENTMCNC: 31.9 G/DL (ref 31.5–36.5)
MCV RBC AUTO: 86 FL (ref 78–100)
MICROALBUMIN UR-MCNC: 101 MG/L
MICROALBUMIN/CREAT UR: 95.28 MG/G CR (ref 0–17)
MONOCYTES # BLD AUTO: 0.4 10E3/UL (ref 0–1.3)
MONOCYTES NFR BLD AUTO: 8 %
NEUTROPHILS # BLD AUTO: 2.8 10E3/UL (ref 1.6–8.3)
NEUTROPHILS NFR BLD AUTO: 55 %
NONHDLC SERPL-MCNC: 149 MG/DL
NRBC # BLD AUTO: 0 10E3/UL
NRBC BLD AUTO-RTO: 0 /100
PLATELET # BLD AUTO: 272 10E3/UL (ref 150–450)
POTASSIUM BLD-SCNC: 4.8 MMOL/L (ref 3.5–5.1)
PROT SERPL-MCNC: 6.9 G/DL (ref 6.4–8.9)
PSA SERPL-MCNC: 1.25 UG/L (ref 0–4)
RBC # BLD AUTO: 4.69 10E6/UL (ref 4.4–5.9)
SODIUM SERPL-SCNC: 139 MMOL/L (ref 134–144)
TRIGL SERPL-MCNC: 187 MG/DL
TSH SERPL DL<=0.005 MIU/L-ACNC: 1.5 MU/L (ref 0.4–4)
WBC # BLD AUTO: 5 10E3/UL (ref 4–11)

## 2022-03-07 PROCEDURE — 36415 COLL VENOUS BLD VENIPUNCTURE: CPT | Mod: ZL

## 2022-03-07 PROCEDURE — 85025 COMPLETE CBC W/AUTO DIFF WBC: CPT | Mod: ZL

## 2022-03-07 PROCEDURE — 83615 LACTATE (LD) (LDH) ENZYME: CPT | Mod: ZL

## 2022-03-07 PROCEDURE — 82310 ASSAY OF CALCIUM: CPT | Mod: ZL

## 2022-03-07 PROCEDURE — 82043 UR ALBUMIN QUANTITATIVE: CPT | Mod: ZL

## 2022-03-07 PROCEDURE — 97110 THERAPEUTIC EXERCISES: CPT | Mod: GP | Performed by: PHYSICAL THERAPIST

## 2022-03-07 PROCEDURE — 97016 VASOPNEUMATIC DEVICE THERAPY: CPT | Mod: GP | Performed by: PHYSICAL THERAPIST

## 2022-03-07 PROCEDURE — 80061 LIPID PANEL: CPT | Mod: ZL

## 2022-03-07 PROCEDURE — 84443 ASSAY THYROID STIM HORMONE: CPT | Mod: ZL

## 2022-03-07 PROCEDURE — 80307 DRUG TEST PRSMV CHEM ANLYZR: CPT | Mod: ZL

## 2022-03-07 PROCEDURE — 84153 ASSAY OF PSA TOTAL: CPT | Mod: ZL

## 2022-03-09 LAB
DHC UR CFM-MCNC: 540 NG/ML
DHC/CREAT UR: 505 NG/MG {CREAT}
HYDROCODONE UR CFM-MCNC: 800 NG/ML
HYDROCODONE/CREAT UR: 748 NG/MG {CREAT}
HYDROMORPHONE UR CFM-MCNC: 780 NG/ML
HYDROMORPHONE/CREAT UR: 729 NG/MG {CREAT}

## 2022-03-10 ENCOUNTER — HOSPITAL ENCOUNTER (OUTPATIENT)
Dept: PHYSICAL THERAPY | Facility: OTHER | Age: 67
Setting detail: THERAPIES SERIES
Discharge: HOME | End: 2022-03-10
Attending: ORTHOPAEDIC SURGERY
Payer: MEDICARE

## 2022-03-10 PROCEDURE — 97110 THERAPEUTIC EXERCISES: CPT | Mod: GP | Performed by: PHYSICAL THERAPIST

## 2022-03-14 ENCOUNTER — HOSPITAL ENCOUNTER (OUTPATIENT)
Dept: PHYSICAL THERAPY | Facility: OTHER | Age: 67
Setting detail: THERAPIES SERIES
Discharge: HOME | End: 2022-03-14
Attending: ORTHOPAEDIC SURGERY
Payer: MEDICARE

## 2022-03-14 PROCEDURE — 97110 THERAPEUTIC EXERCISES: CPT | Mod: GP | Performed by: PHYSICAL THERAPIST

## 2022-03-14 NOTE — PROGRESS NOTES
[facility] Rehabilitation Service    Outpatient Physical Therapy Progress Note  Patient: Dilip Kan  : 1955    Visits:  15    Referring Provider: Dr. Pranav DASILVA    Therapy Diagnosis: Impaired right knee ROM, difficulty with gait.       Client Self Report: Moderate soreness after stretching that he performs daily.  Consistent and compliant with HEP.  Very slow progress regaining ROM.  Mild difficulty going down stairs due to knee ROM and stiffness.  Minimal diff going up with improving strength observed.  Discussed continuation of PT for 2-3 more weeks to reach goal of 115 degrees knee flexion.  Improvements observed with aggressive manual stretching.  Patient agrees with the plan.      Objective Measurements:  Objective Measure: Right knee ROM  Details: 3-110 degrees                  Goals:  Goal Identifier Sleep   Goal Description Patient will be able to sleep 7 hours without waking due to right knee pain.   Target Date 22   Date Met   3/14/2022   Progress (detail required for progress note): Goal met     Goal Identifier Stairs   Goal Description Ascend and descend 14 stairs reciprocally without gait deviation and less than 2/10 pain.   Target Date 22   Date Met   Not yet met   Progress (detail required for progress note): Improving gait ascending, goal not met due to descending stairs with difficulty and pain.      Goal Identifier Walking   Goal Description Walk 10 minutes outdoors with less than 3/10 pain/soreness.   Target Date 22   Date Met   Goal met   Progress (detail required for progress note): Minimal pain with 10 minutes of walking outdoors.         Plan:  Continue therapy per current plan of care.    Discharge:  No

## 2022-03-16 ENCOUNTER — TELEPHONE (OUTPATIENT)
Dept: CARDIOLOGY | Facility: OTHER | Age: 67
End: 2022-03-16

## 2022-03-16 ENCOUNTER — VIRTUAL VISIT (OUTPATIENT)
Dept: ONCOLOGY | Facility: OTHER | Age: 67
End: 2022-03-16
Attending: INTERNAL MEDICINE
Payer: COMMERCIAL

## 2022-03-16 DIAGNOSIS — R11.0 NAUSEA: ICD-10-CM

## 2022-03-16 PROCEDURE — 99214 OFFICE O/P EST MOD 30 MIN: CPT | Mod: TEL | Performed by: INTERNAL MEDICINE

## 2022-03-16 RX ORDER — ONDANSETRON 8 MG/1
8 TABLET, ORALLY DISINTEGRATING ORAL EVERY 8 HOURS PRN
Qty: 90 TABLET | Refills: 1 | Status: SHIPPED | OUTPATIENT
Start: 2022-03-16 | End: 2023-06-01

## 2022-03-16 NOTE — PROGRESS NOTES
Jermain is a 66 year old who is being evaluated via a billable telephone visit.      What phone number would you like to be contacted at? [phone]  How would you like to obtain your AVS?   Vitals:  No vitals were obtained today due to virtual visit.

## 2022-03-16 NOTE — TELEPHONE ENCOUNTER
Patient called stating that he had his echo and everything looks good. He needs Dr. Dave to let Dr. 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 He would like a call back when this is completed so he can schedule the DOT physical with Dr. Pizarro. Please call.    Yennifer Whyte on 3/16/2022 at 12:00 PM

## 2022-03-16 NOTE — NURSING NOTE
Medication Reconciliation: complete     Complains of fatigue and occasional nausea. Does have occasional headache. Needs refill Zofran.  Elza Lundberg RN...........3/16/2022 9:29 AM

## 2022-03-17 ENCOUNTER — HOSPITAL ENCOUNTER (OUTPATIENT)
Dept: PHYSICAL THERAPY | Facility: OTHER | Age: 67
Setting detail: THERAPIES SERIES
Discharge: HOME | End: 2022-03-17
Attending: ORTHOPAEDIC SURGERY
Payer: MEDICARE

## 2022-03-17 PROCEDURE — 97110 THERAPEUTIC EXERCISES: CPT | Mod: GP,CQ

## 2022-03-17 NOTE — PROGRESS NOTES
Visit Date: 03/16/2022    This is a telephone virtual visit performed due to the COVID-19 viral pandemic.    HISTORY OF PRESENT ILLNESS:  Mr. Kan returns for followup of a rising PSA following treatment for malignant neoplasm of the prostate. For details, please see previous note.  The patient had been followed by Dr. Arrington.  He was on androgen deprivation therapy, on Lupron.  His PSA began to rise. On 07/09/2020 it was 0.975, then on 11/23/2020 it with 1.215.  Dr. Arrington ordered scans including CT chest, abdomen, pelvis and bone scan.  There was no evidence of metastatic disease.  There was a dilated thoracic aorta measuring 4.4 cm in the mid ascending segment.  PSA continued to rise and on 02/23/2021 it was up to 1.847, then on 08/25/2021 it was up to 3.48.  Therefore, the patient had a PSA doubling time of less than 10 months.  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  The patient was essentially asymptomatic.  Denied any fevers, night sweats, weight loss or bone pain.  He did have urinary incontinence and wears Depends for the most part.  He had this since radiation was completed.  He was initially on Lupron 22.5 mg IM q 3 months, could not tolerate the Vantas implant.  He was seen by us for chemotherapeutic options for nonmetastatic castrate-resistant prostate cancer.  When we saw the patient, we felt that the patient would be a candidate for apalutamide, which is indicated in the setting of castrate-resistant M0 prostate cancer that is nonmetastatic.  The patient was started on apalutamide 240 mg p.o. daily.  Scans were essentially negative, including bone scan.  The patient otherwise was on apalutamide for 3 weeks and his PSA dropped from 3.48 down to 1.62. On 11/03/2021 it dropped to 1.49.  The patient was started on 45 of Lupron given by Dr. Arrington on 12/15/2021, and the plan was to repeat this in 6 months, continue his apalutamide.  He is tolerating it reasonably well.  He  says he gets hot flashes, which are tolerable.  Denies any fevers, night sweats, weight loss or bone pain.    PHYSICAL EXAMINATION:  Could not be performed due to the COVID-19 viral pandemic.    LABORATORY DATA:  Reveal CBC with white count 5.0, H and H 12.9 and 40.4, platelet count is 272, alkaline phosphatase 63, AST 10.  LDH is 126.  PSA is 1.25.    IMPRESSION:  Castrate-resistant nonmetastatic prostate cancer with a PSA doubling time of less than 10 months based on support and trial. Apalutamide had been approved for nonmetastatic castrate-resistant prostate cancer based on a phase 3 trial, which randomized men with nonmetastatic adenocarcinoma of the prostate with a PSA doubling time of 10 months or less receiving adjuvant androgen deprivation therapy with apalutamide 240 mg daily versus placebo.  The median metastasis disease-free survival was 40.5 months with apalutamide treated patients.  Therefore, apalutamide was approved for this indication.  The patient was started on apalutamide, had a positive response in conjunction with androgen deprivation therapy.  His PSA dropped from 3.67 down to 1.51 and it continues to drop.  It was 1.25 today.  The plan is to continue apalutamide 240 mg daily.  We will see the patient in 3 months and obtain CBC, CMP, LDH, PSA.  Continue Lupron under the direction of Dr. Celestine Arrington.     TIME SPENT:  34 minutes was spent on this telephone virtual visit.  Time was spent obtaining history, reviewing lab results, documenting history and ordering followup labs.    Erum Fritz MD        D: 2022   T: 2022   MT: [id number]    Name:     LELAND CHAPMAN  MRN:      [id number]        Account:    [id number]   :      1955           Visit Date: 2022     Document: [id number]    cc:  MD Wei Fong MD

## 2022-03-21 ENCOUNTER — HOSPITAL ENCOUNTER (OUTPATIENT)
Dept: PHYSICAL THERAPY | Facility: OTHER | Age: 67
Setting detail: THERAPIES SERIES
Discharge: HOME | End: 2022-03-21
Attending: ORTHOPAEDIC SURGERY
Payer: MEDICARE

## 2022-03-21 ENCOUNTER — TELEPHONE (OUTPATIENT)
Dept: FAMILY MEDICINE | Facility: OTHER | Age: 67
End: 2022-03-21
Payer: COMMERCIAL

## 2022-03-21 DIAGNOSIS — Z96.651 STATUS POST RIGHT KNEE REPLACEMENT: ICD-10-CM

## 2022-03-21 PROCEDURE — 97110 THERAPEUTIC EXERCISES: CPT | Mod: GP | Performed by: PHYSICAL THERAPIST

## 2022-03-21 RX ORDER — HYDROCODONE BITARTRATE AND ACETAMINOPHEN 10; 325 MG/1; MG/1
1 TABLET ORAL EVERY 4 HOURS PRN
Qty: 165 TABLET | Refills: 0 | Status: SHIPPED | OUTPATIENT
Start: 2022-03-21 | End: 2022-04-20

## 2022-03-21 NOTE — TELEPHONE ENCOUNTER
Yes, he can  today. I sent in a new prescription because oncology nurse removed his hydrocodone prescription from his medication list.  Follow up with me in a month as scheduled

## 2022-03-21 NOTE — TELEPHONE ENCOUNTER
After verifying patient's name and date of birth,   Spoke with patient and coming for PT today and wondering if he could  his hydrocodone today instead of Wednesday.  Per protocol patient was told controlled understanded can not be filled early.  Encouraged to call the pharmacy and see if they will fill.  He states in the past they would not.  Vahe Willams .......  3/21/2022  10:28 AM

## 2022-03-21 NOTE — TELEPHONE ENCOUNTER
After verifying patient's name and date of birth, patient was given the below information.  Called and notified pharmacy  Vahe Willams....3/21/2022 11:33 AM

## 2022-03-21 NOTE — TELEPHONE ENCOUNTER
Would like to know if he can get his hydrocodone today rather than Wednesday, please call, he has to have PT today and was hoping to do both today, thank you!    Rosalba Rudd on 3/21/2022 at 9:53 AM

## 2022-03-22 NOTE — TELEPHONE ENCOUNTER
"Per Riley Dave, DO: \"The patient is correct.  His echocardiogram is stable and unchanged from a year ago.  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  But the final decision will have to come from Dr. Pizarro I do not know what exactly the exclusions of this particular license entail.     Dr. Dave\"    Left message to call back at mobile number.  Sydney Ace RN......March 22, 2022...8:50 AM   "

## 2022-03-24 ENCOUNTER — HOSPITAL ENCOUNTER (OUTPATIENT)
Dept: PHYSICAL THERAPY | Facility: OTHER | Age: 67
Setting detail: THERAPIES SERIES
Discharge: HOME | End: 2022-03-24
Attending: ORTHOPAEDIC SURGERY
Payer: MEDICARE

## 2022-03-24 PROCEDURE — 97110 THERAPEUTIC EXERCISES: CPT | Mod: GP | Performed by: PHYSICAL THERAPIST

## 2022-03-28 ENCOUNTER — HOSPITAL ENCOUNTER (OUTPATIENT)
Dept: PHYSICAL THERAPY | Facility: OTHER | Age: 67
Setting detail: THERAPIES SERIES
Discharge: HOME | End: 2022-03-28
Attending: ORTHOPAEDIC SURGERY
Payer: MEDICARE

## 2022-03-28 PROCEDURE — 97110 THERAPEUTIC EXERCISES: CPT | Mod: GP,CQ

## 2022-03-29 ENCOUNTER — OFFICE VISIT (OUTPATIENT)
Dept: FAMILY MEDICINE | Facility: OTHER | Age: 67
End: 2022-03-29
Attending: CHIROPRACTOR
Payer: MEDICARE

## 2022-03-29 VITALS
OXYGEN SATURATION: 99 % | WEIGHT: 229 LBS | RESPIRATION RATE: 18 BRPM | BODY MASS INDEX: 33.92 KG/M2 | HEART RATE: 80 BPM | HEIGHT: 69 IN | DIASTOLIC BLOOD PRESSURE: 80 MMHG | SYSTOLIC BLOOD PRESSURE: 156 MMHG

## 2022-03-29 DIAGNOSIS — Z02.89 ENCOUNTER FOR EXAMINATION REQUIRED BY DEPARTMENT OF TRANSPORTATION (DOT): Primary | ICD-10-CM

## 2022-03-29 LAB
ALBUMIN UR-MCNC: NEGATIVE MG/DL
APPEARANCE UR: CLEAR
BILIRUB UR QL STRIP: NEGATIVE
COLOR UR AUTO: YELLOW
GLUCOSE UR STRIP-MCNC: NEGATIVE MG/DL
HGB UR QL STRIP: NEGATIVE
KETONES UR STRIP-MCNC: NEGATIVE MG/DL
LEUKOCYTE ESTERASE UR QL STRIP: NEGATIVE
NITRATE UR QL: NEGATIVE
PH UR STRIP: 5.5 [PH] (ref 5–9)
SP GR UR STRIP: 1.02 (ref 1–1.03)
UROBILINOGEN UR STRIP-MCNC: NORMAL MG/DL

## 2022-03-29 PROCEDURE — 99499 UNLISTED E&M SERVICE: CPT | Performed by: CHIROPRACTOR

## 2022-03-29 PROCEDURE — 81003 URINALYSIS AUTO W/O SCOPE: CPT | Mod: ZL | Performed by: CHIROPRACTOR

## 2022-03-29 ASSESSMENT — PAIN SCALES - GENERAL: PAINLEVEL: NO PAIN (0)

## 2022-03-29 NOTE — PROGRESS NOTES
Department of Transportation (DOT) physical performed.  See scanned documents dated today.     Patient is authorized for 1 year under Federal DOT Regulations. Cardiac clearance given by Dr. Dave.      Zeeshan Pizarro DC, PETAR, CORTNEY  Director - Occupational Medicine Department  Diplomate - American Board of Forensic Professionals  Board Certified - American Board of Independent Medical Examiners  [facility]  [address] [place] [address]  Phone [phone]  Fax [phone]

## 2022-03-29 NOTE — NURSING NOTE
"Chief Complaint   Patient presents with     Employment Physical       Initial BP (!) 156/80 (BP Location: Right arm, Patient Position: Sitting, Cuff Size: Adult Large)   Pulse 80   Resp 18   Ht 1.759 m (5' 9.25\")   Wt 103.9 kg (229 lb)   SpO2 99%   BMI 33.57 kg/m   Estimated body mass index is 33.57 kg/m  as calculated from the following:    Height as of this encounter: 1.759 m (5' 9.25\").    Weight as of this encounter: 103.9 kg (229 lb).  Medication Reconciliation: complete    FOOD SECURITY SCREENING QUESTIONS:    The next two questions are to help us understand your food security.  If you are feeling you need any assistance in this area, we have resources available to support you today.    Hunger Vital Signs:  Within the past 12 months we worried whether our food would run out before we got money to buy more. Never  Within the past 12 months the food we bought just didn't last and we didn't have money to get more. Never      Ellen Rebolledo RN      "

## 2022-03-30 DIAGNOSIS — I10 ESSENTIAL HYPERTENSION: ICD-10-CM

## 2022-03-30 RX ORDER — AMLODIPINE BESYLATE 10 MG/1
10 TABLET ORAL DAILY
Qty: 90 TABLET | Refills: 3 | Status: SHIPPED | OUTPATIENT
Start: 2022-03-30 | End: 2023-02-03

## 2022-03-30 NOTE — TELEPHONE ENCOUNTER
"Requested Prescriptions   Pending Prescriptions Disp Refills     amLODIPine (NORVASC) 10 MG tablet [Pharmacy Med Name: amlodipine 10 mg tablet] 90 tablet 3     Sig: Take 1 tablet (10 mg) by mouth daily       Calcium Channel Blockers Protocol  Failed - 3/30/2022 11:35 AM        Failed - Blood pressure under 140/90 in past 12 months     BP Readings from Last 3 Encounters:   03/29/22 (!) 156/80   02/21/22 136/86   01/26/22 (!) 148/72                 Passed - Recent (12 mo) or future (30 days) visit within the authorizing provider's specialty     Patient has had an office visit with the authorizing provider or a provider within the authorizing providers department within the previous 12 mos or has a future within next 30 days. See \"Patient Info\" tab in inbasket, or \"Choose Columns\" in Meds & Orders section of the refill encounter.              Passed - Medication is active on med list        Passed - Patient is age 18 or older        Passed - Normal serum creatinine on file in past 12 months     Recent Labs   Lab Test 03/07/22  1001   CR 0.71       Ok to refill medication if creatinine is low             Last Written Prescription Date:  3/4/21  Last Fill Quantity: 90,   # refills: 3  Last Office Visit: 3/4/21  Future Office visit:    Next 5 appointments (look out 90 days)    Apr 07, 2022 11:15 AM  Return Visit with Riley Dave DO  [facility] ([facility] ) [address]  [place] [address]  [phone]   Apr 20, 2022 10:20 AM  SHORT with Wei Perez MD  [facility] ([facility] ) [address]  [place] [address]  [phone]     Routing refill request to provider for review/approval because:  Blood pressure out of range     Unable to complete prescription refill per RN Medication Refill Policy.   Sydney Ace RN ....................  3/30/2022   12:10 " PM

## 2022-03-31 ENCOUNTER — TELEPHONE (OUTPATIENT)
Dept: FAMILY MEDICINE | Facility: OTHER | Age: 67
End: 2022-03-31
Payer: COMMERCIAL

## 2022-04-01 ENCOUNTER — HOSPITAL ENCOUNTER (OUTPATIENT)
Dept: PHYSICAL THERAPY | Facility: OTHER | Age: 67
Setting detail: THERAPIES SERIES
Discharge: HOME | End: 2022-04-01
Attending: ORTHOPAEDIC SURGERY
Payer: MEDICARE

## 2022-04-01 PROCEDURE — 97110 THERAPEUTIC EXERCISES: CPT | Mod: GP | Performed by: PHYSICAL THERAPIST

## 2022-04-01 NOTE — PROGRESS NOTES
[facility]    Outpatient Physical Therapy Discharge Note  Patient: Dilip Kan  : 1955    Visits:  20    Referring Provider: Dr. Pranav DASILVA    Therapy Diagnosis: Right knee pain, impaired ROM right knee.     Client Self Report: Patient continues to have moderate pain following stretching and occasionally at night when sleeping.  C/O right knee stiffness and difficulty regaining ROM of right knee.  Plateau in progress regarding knee flexion/bending of right knee despite aggressive stretching in PT and by patient at home.  He reports he is not having difficulty with stairs or with shopping distances.  He has not returned to activities around [place].  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  He will continue to stretch to maintain his current ROm of right knee which is typically 3-110.  He is formally discharged from PT due to the plateau in progress.      Objective Measurements:  Objective Measure: Right knee ROM  Details: 3-110 degrees                   Goals:  Goal Identifier Sleep   Goal Description Patient will be able to sleep 7 hours without waking due to right knee pain.   Target Date 22   Date Met   not met   Progress (detail required for progress note): Not met - plateau in progress     Goal Identifier Stairs   Goal Description May send and descend 14 stairs reciprocally without gait deviation and less than 2/10 pain.   Target Date 22   Date Met   2022   Progress (detail required for progress note): No difficulty or gait deviation with stairs     Goal Identifier Walking   Goal Description Walk 10 minutes outdoors with less than 3/10 pain/soreness.   Target Date 22   Date Met   2022   Progress (detail required for progress note): No difficulty or pain       Plan:  Discharge from therapy.    Discharge:    Reason for Discharge: No  further expectation of progress.  Plateau in ROM right knee.      Discharge Plan: Patient to continue home program.

## 2022-04-20 ENCOUNTER — OFFICE VISIT (OUTPATIENT)
Dept: FAMILY MEDICINE | Facility: OTHER | Age: 67
End: 2022-04-20
Attending: FAMILY MEDICINE
Payer: COMMERCIAL

## 2022-04-20 VITALS
TEMPERATURE: 97.9 F | OXYGEN SATURATION: 98 % | HEART RATE: 70 BPM | RESPIRATION RATE: 18 BRPM | DIASTOLIC BLOOD PRESSURE: 84 MMHG | SYSTOLIC BLOOD PRESSURE: 134 MMHG

## 2022-04-20 DIAGNOSIS — F11.90 CHRONIC, CONTINUOUS USE OF OPIOIDS: ICD-10-CM

## 2022-04-20 DIAGNOSIS — Z96.651 STATUS POST RIGHT KNEE REPLACEMENT: Primary | ICD-10-CM

## 2022-04-20 DIAGNOSIS — E11.9 TYPE 2 DIABETES MELLITUS WITHOUT COMPLICATION, WITHOUT LONG-TERM CURRENT USE OF INSULIN (H): ICD-10-CM

## 2022-04-20 DIAGNOSIS — M48.062 SPINAL STENOSIS OF LUMBAR REGION WITH NEUROGENIC CLAUDICATION: ICD-10-CM

## 2022-04-20 DIAGNOSIS — Z98.1 HISTORY OF LUMBAR SPINAL FUSION: ICD-10-CM

## 2022-04-20 PROCEDURE — 99213 OFFICE O/P EST LOW 20 MIN: CPT | Performed by: FAMILY MEDICINE

## 2022-04-20 PROCEDURE — G0463 HOSPITAL OUTPT CLINIC VISIT: HCPCS | Performed by: FAMILY MEDICINE

## 2022-04-20 RX ORDER — HYDROCODONE BITARTRATE AND ACETAMINOPHEN 10; 325 MG/1; MG/1
1 TABLET ORAL
Qty: 150 TABLET | Refills: 0 | Status: SHIPPED | OUTPATIENT
Start: 2022-06-17 | End: 2022-07-13

## 2022-04-20 RX ORDER — HYDROCODONE BITARTRATE AND ACETAMINOPHEN 10; 325 MG/1; MG/1
1 TABLET ORAL
Qty: 150 TABLET | Refills: 0 | Status: SHIPPED | OUTPATIENT
Start: 2022-04-20 | End: 2022-07-13

## 2022-04-20 RX ORDER — HYDROCODONE BITARTRATE AND ACETAMINOPHEN 10; 325 MG/1; MG/1
1 TABLET ORAL
Qty: 150 TABLET | Refills: 0 | Status: SHIPPED | OUTPATIENT
Start: 2022-05-19 | End: 2022-07-13

## 2022-04-20 ASSESSMENT — ANXIETY QUESTIONNAIRES
3. WORRYING TOO MUCH ABOUT DIFFERENT THINGS: NOT AT ALL
5. BEING SO RESTLESS THAT IT IS HARD TO SIT STILL: NOT AT ALL
2. NOT BEING ABLE TO STOP OR CONTROL WORRYING: NOT AT ALL
IF YOU CHECKED OFF ANY PROBLEMS ON THIS QUESTIONNAIRE, HOW DIFFICULT HAVE THESE PROBLEMS MADE IT FOR YOU TO DO YOUR WORK, TAKE CARE OF THINGS AT HOME, OR GET ALONG WITH OTHER PEOPLE: NOT DIFFICULT AT ALL
6. BECOMING EASILY ANNOYED OR IRRITABLE: NOT AT ALL
7. FEELING AFRAID AS IF SOMETHING AWFUL MIGHT HAPPEN: NOT AT ALL
GAD7 TOTAL SCORE: 0
1. FEELING NERVOUS, ANXIOUS, OR ON EDGE: NOT AT ALL

## 2022-04-20 ASSESSMENT — PATIENT HEALTH QUESTIONNAIRE - PHQ9
5. POOR APPETITE OR OVEREATING: NOT AT ALL
SUM OF ALL RESPONSES TO PHQ QUESTIONS 1-9: 0

## 2022-04-20 ASSESSMENT — PAIN SCALES - GENERAL: PAINLEVEL: MODERATE PAIN (4)

## 2022-04-20 NOTE — NURSING NOTE
"Patient presents to the clinic for controlled medication.  Last administration of Clinton was today around 0500.   Contract signed 12-, and TOX obtain 11-4-2020.     FOOD SECURITY SCREENING QUESTIONS:    The next two questions are to help us understand your food security.  If you are feeling you need any assistance in this area, we have resources available to support you today.    Hunger Vital Signs:  Within the past 12 months we worried whether our food would run out before we got money to buy more. Never  Within the past 12 months the food we bought just didn't last and we didn't have money to get more. Never    Advance Care Directive on file? no  Advance Care Directive provided to patient? Declined.  Chief Complaint   Patient presents with     Recheck Medication       Initial /84 (BP Location: Right arm, Patient Position: Sitting, Cuff Size: Adult Large)   Pulse 70   Temp 97.9  F (36.6  C) (Tympanic)   Resp 18   SpO2 98%  Estimated body mass index is 33.57 kg/m  as calculated from the following:    Height as of 3/29/22: 1.759 m (5' 9.25\").    Weight as of 3/29/22: 103.9 kg (229 lb).  Medication Reconciliation: complete        Leona Shell LPN       "

## 2022-04-20 NOTE — PROGRESS NOTES
Assessment & Plan       ICD-10-CM    1. Status post right knee replacement  Z96.651    2. Type 2 diabetes mellitus without complication, without long-term current use of insulin (H)  E11.9 Hemoglobin A1c   3. Chronic, continuous use of opioids  F11.90 HYDROcodone-acetaminophen (NORCO)  MG per tablet     HYDROcodone-acetaminophen (NORCO)  MG per tablet     HYDROcodone-acetaminophen (NORCO)  MG per tablet   4. Spinal stenosis of lumbar region with neurogenic claudication  M48.062 HYDROcodone-acetaminophen (NORCO)  MG per tablet     HYDROcodone-acetaminophen (NORCO)  MG per tablet     Physical Therapy Referral     HYDROcodone-acetaminophen (NORCO)  MG per tablet   5. History of lumbar spinal fusion  Z98.1 HYDROcodone-acetaminophen (NORCO)  MG per tablet     HYDROcodone-acetaminophen (NORCO)  MG per tablet     Physical Therapy Referral     HYDROcodone-acetaminophen (NORCO)  MG per tablet     Prior to knee replacement was using 45 mg of hydrocodone daily. Dose increased after replacement and has been tapering down. Most recent prescription for 55 morphine milligram equivalents daily.   Reduce to 50 mg daily for next 3 months. Provided 3 prescriptions for hydrocodone.    For lumbar spine, this has been a chronic issue s/p previous lumbar surgery. He met with Dr Pizarro in 2019 to consider the spine program, but was not a candidate. Pool therapy recommended, but never pursued due to COVID-19.   Now that he has completed PT for knee I think he should consider pool therapy again  Past steroid injections have not been of clear benefit.  Is on multiple medications for back pain  Referral to [facility] for pool therapy    Plan A1c with next labs for oncology in June    Return in about 3 months (around 7/20/2022).    Wei Perez MD   [facility]      Varun Aly is a 67 year old who presents for the following health issues  accompanied by  his Grand Daughter.    History of Present Illness       Back Pain:  He presents for follow up of back pain. Patient's back pain is a chronic problem.  Location of back pain:  Right lower back and right middle of back  Description of back pain: burning, sharp, shooting and stabbing  Back pain spreads: left buttocks and right thigh    Since patient first noticed back pain, pain is: always present, but gets better and worse  Does back pain interfere with his job:  Yes      He eats 0-1 servings of fruits and vegetables daily.He consumes 0 sweetened beverage(s) daily.He exercises with enough effort to increase his heart rate 9 or less minutes per day.  He exercises with enough effort to increase his heart rate 3 or less days per week.   He is taking medications regularly.       Pain History:  When did you first notice your pain? - Chronic Pain   Have you seen this provider for your pain in the past?   Yes   Where in your body do you have pain? Low back  Are you seeing anyone else for your pain? No    PHQ-9 SCORE 1/21/2022 2/21/2022 4/20/2022   PHQ-9 Total Score MyChart - 2 (Minimal depression) -   PHQ-9 Total Score 5 2 0       MARY-7 SCORE 12/28/2021 2/21/2022 4/20/2022   Total Score 0 (minimal anxiety) 0 (minimal anxiety) -   Total Score 0 0 0       PEG Score 2/21/2022 4/20/2022   PEG Total Score 5.33 6         PDMP Review       Value Time User    State PDMP site checked  Yes 4/20/2022 11:07 AM Wei Perez MD        Last CSA Agreement:   CSA -- Patient Level:    Controlled Substance Agreement - Opioid - Scan on 12/15/2021  9:20 AM: Controlled Substance Agreement  Controlled Substance Agreement - Opioid - Scan on 10/26/2021  4:40 PM       Last UDS: 3/9/2022      Knee finally improved after knee replacement  Completed PT for knee  Back is worse, wonders if knee exercises and recovery flared pain  Has a fluctuating course with back pain in the past        Review of Systems   As above      Objective    /84 (BP  Location: Right arm, Patient Position: Sitting, Cuff Size: Adult Large)   Pulse 70   Temp 97.9  F (36.6  C) (Tympanic)   Resp 18   SpO2 98%   There is no height or weight on file to calculate BMI.  Physical Exam   General Appearance: Alert. No acute distress  Psychiatric: Normal affect and mentation  Musculoskeletal: Right knee still larger than left, ROM improved

## 2022-04-21 ASSESSMENT — ANXIETY QUESTIONNAIRES: GAD7 TOTAL SCORE: 0

## 2022-06-16 ENCOUNTER — TELEPHONE (OUTPATIENT)
Dept: FAMILY MEDICINE | Facility: OTHER | Age: 67
End: 2022-06-16
Payer: COMMERCIAL

## 2022-06-16 NOTE — TELEPHONE ENCOUNTER
Patient is coming through [employer] uses [facility] and has not heard on this note from 8:30 Please call as soon as you can

## 2022-06-16 NOTE — TELEPHONE ENCOUNTER
Can he  his Hydrocodone today instead of tomorrow, will be out of town, please call, thank you!      Rosalba Rudd on 6/16/2022 at 8:35 AM

## 2022-06-16 NOTE — TELEPHONE ENCOUNTER
Patient did not answer phone, pharmacy notified of providers note.    Leona Shell LPN 6/16/2022 5:12 PM

## 2022-06-16 NOTE — TELEPHONE ENCOUNTER
Patient reports that he has a couple of tabs left and would like to pick it up today if possible.  Patient is staying at a cabin and is driving through [place] in the next 30 minutes.    Leona Shell LPN 6/16/2022 4:37 PM

## 2022-06-27 ENCOUNTER — TELEPHONE (OUTPATIENT)
Dept: ONCOLOGY | Facility: OTHER | Age: 67
End: 2022-06-27

## 2022-06-27 ENCOUNTER — LAB (OUTPATIENT)
Dept: LAB | Facility: OTHER | Age: 67
End: 2022-06-27
Attending: INTERNAL MEDICINE
Payer: MEDICARE

## 2022-06-27 DIAGNOSIS — T50.905A ADVERSE DRUG EFFECT: ICD-10-CM

## 2022-06-27 DIAGNOSIS — C61 PROSTATE CANCER (H): ICD-10-CM

## 2022-06-27 DIAGNOSIS — R97.21 RISING PSA FOLLOWING TREATMENT FOR MALIGNANT NEOPLASM OF PROSTATE: ICD-10-CM

## 2022-06-27 DIAGNOSIS — E11.9 TYPE 2 DIABETES MELLITUS WITHOUT COMPLICATION, WITHOUT LONG-TERM CURRENT USE OF INSULIN (H): ICD-10-CM

## 2022-06-27 LAB
ALBUMIN SERPL-MCNC: 3.8 G/DL (ref 3.5–5.7)
ALP SERPL-CCNC: 69 U/L (ref 34–104)
ALT SERPL W P-5'-P-CCNC: 8 U/L (ref 7–52)
ANION GAP SERPL CALCULATED.3IONS-SCNC: 8 MMOL/L (ref 3–14)
AST SERPL W P-5'-P-CCNC: 10 U/L (ref 13–39)
BASOPHILS # BLD AUTO: 0 10E3/UL (ref 0–0.2)
BASOPHILS NFR BLD AUTO: 1 %
BILIRUB SERPL-MCNC: 0.2 MG/DL (ref 0.3–1)
BUN SERPL-MCNC: 14 MG/DL (ref 7–25)
CALCIUM SERPL-MCNC: 9 MG/DL (ref 8.6–10.3)
CHLORIDE BLD-SCNC: 104 MMOL/L (ref 98–107)
CHOLEST SERPL-MCNC: 203 MG/DL
CO2 SERPL-SCNC: 27 MMOL/L (ref 21–31)
CREAT SERPL-MCNC: 0.98 MG/DL (ref 0.7–1.3)
EOSINOPHIL # BLD AUTO: 0.4 10E3/UL (ref 0–0.7)
EOSINOPHIL NFR BLD AUTO: 7 %
ERYTHROCYTE [DISTWIDTH] IN BLOOD BY AUTOMATED COUNT: 13.1 % (ref 10–15)
FASTING STATUS PATIENT QL REPORTED: ABNORMAL
GFR SERPL CREATININE-BSD FRML MDRD: 83 ML/MIN/1.73M2
GLUCOSE BLD-MCNC: 102 MG/DL (ref 70–105)
HBA1C MFR BLD: 6.1 % (ref 4–6.2)
HCT VFR BLD AUTO: 38.2 % (ref 40–53)
HDLC SERPL-MCNC: 47 MG/DL (ref 23–92)
HGB BLD-MCNC: 12.9 G/DL (ref 13.3–17.7)
IMM GRANULOCYTES # BLD: 0 10E3/UL
IMM GRANULOCYTES NFR BLD: 0 %
LDH SERPL L TO P-CCNC: 135 U/L (ref 140–271)
LDLC SERPL CALC-MCNC: 85 MG/DL
LYMPHOCYTES # BLD AUTO: 1.2 10E3/UL (ref 0.8–5.3)
LYMPHOCYTES NFR BLD AUTO: 23 %
MCH RBC QN AUTO: 30.1 PG (ref 26.5–33)
MCHC RBC AUTO-ENTMCNC: 33.8 G/DL (ref 31.5–36.5)
MCV RBC AUTO: 89 FL (ref 78–100)
MONOCYTES # BLD AUTO: 0.5 10E3/UL (ref 0–1.3)
MONOCYTES NFR BLD AUTO: 9 %
NEUTROPHILS # BLD AUTO: 3.3 10E3/UL (ref 1.6–8.3)
NEUTROPHILS NFR BLD AUTO: 60 %
NONHDLC SERPL-MCNC: 156 MG/DL
NRBC # BLD AUTO: 0 10E3/UL
NRBC BLD AUTO-RTO: 0 /100
PLATELET # BLD AUTO: 196 10E3/UL (ref 150–450)
POTASSIUM BLD-SCNC: 4.3 MMOL/L (ref 3.5–5.1)
PROT SERPL-MCNC: 6.4 G/DL (ref 6.4–8.9)
PSA SERPL-MCNC: 2.06 UG/L (ref 0–4)
RBC # BLD AUTO: 4.28 10E6/UL (ref 4.4–5.9)
SODIUM SERPL-SCNC: 139 MMOL/L (ref 134–144)
TRIGL SERPL-MCNC: 353 MG/DL
TSH SERPL DL<=0.005 MIU/L-ACNC: 1.52 MU/L (ref 0.4–4)
WBC # BLD AUTO: 5.4 10E3/UL (ref 4–11)

## 2022-06-27 PROCEDURE — 82310 ASSAY OF CALCIUM: CPT | Mod: ZL

## 2022-06-27 PROCEDURE — 84153 ASSAY OF PSA TOTAL: CPT | Mod: ZL

## 2022-06-27 PROCEDURE — 83615 LACTATE (LD) (LDH) ENZYME: CPT | Mod: ZL

## 2022-06-27 PROCEDURE — 83036 HEMOGLOBIN GLYCOSYLATED A1C: CPT | Mod: ZL

## 2022-06-27 PROCEDURE — 80061 LIPID PANEL: CPT | Mod: ZL

## 2022-06-27 PROCEDURE — 84443 ASSAY THYROID STIM HORMONE: CPT | Mod: ZL

## 2022-06-27 PROCEDURE — 85025 COMPLETE CBC W/AUTO DIFF WBC: CPT | Mod: ZL

## 2022-06-27 PROCEDURE — 36415 COLL VENOUS BLD VENIPUNCTURE: CPT | Mod: ZL

## 2022-06-27 NOTE — TELEPHONE ENCOUNTER
Received a fax today: [facility]  Stating that they have been unable to reach patient regarding his Erleada 60 mg tab. He is to call them back at [phone] to schedule his his order and address questions or concerns. Patient called and made aware. Maine Morales RN on 6/27/2022 at 3:46 PM

## 2022-06-28 ENCOUNTER — DOCUMENTATION ONLY (OUTPATIENT)
Dept: ONCOLOGY | Facility: OTHER | Age: 67
End: 2022-06-28

## 2022-06-28 NOTE — PROGRESS NOTES
Oral Chemotherapy Program  Lab review     Reviewed labs from 6/27/22     Labs are unremarkable and do not require any dose adjustments at this time     Follow-up/plan  7/7/22- 3 month follow up visit with Dr. Catrina Johnson [place]  Oncology Pharmacy Intern  [facility]  [phone]

## 2022-07-05 DIAGNOSIS — M25.561 CHRONIC PAIN OF RIGHT KNEE: ICD-10-CM

## 2022-07-05 DIAGNOSIS — F11.90 CHRONIC, CONTINUOUS USE OF OPIOIDS: ICD-10-CM

## 2022-07-05 DIAGNOSIS — Z98.1 HISTORY OF LUMBAR SPINAL FUSION: ICD-10-CM

## 2022-07-05 DIAGNOSIS — I10 ESSENTIAL HYPERTENSION: ICD-10-CM

## 2022-07-05 DIAGNOSIS — M48.062 SPINAL STENOSIS OF LUMBAR REGION WITH NEUROGENIC CLAUDICATION: ICD-10-CM

## 2022-07-05 DIAGNOSIS — G89.29 CHRONIC PAIN OF RIGHT KNEE: ICD-10-CM

## 2022-07-05 RX ORDER — AMLODIPINE BESYLATE 10 MG/1
10 TABLET ORAL DAILY
Qty: 90 TABLET | Refills: 3 | Status: CANCELLED | OUTPATIENT
Start: 2022-07-05

## 2022-07-05 RX ORDER — ACETAMINOPHEN 325 MG/1
650 TABLET ORAL EVERY 4 HOURS PRN
Qty: 100 TABLET | Refills: 0 | Status: CANCELLED | OUTPATIENT
Start: 2022-07-05

## 2022-07-05 RX ORDER — HYDROCODONE BITARTRATE AND ACETAMINOPHEN 10; 325 MG/1; MG/1
1 TABLET ORAL
Qty: 150 TABLET | Refills: 0 | Status: CANCELLED | OUTPATIENT
Start: 2022-07-05

## 2022-07-05 NOTE — TELEPHONE ENCOUNTER
Patient needs refills on his meds he will be out next week.  He has an appointment on 07/18.     ADELAIDA Burnette on 7/5/2022 at 8:38 AM

## 2022-07-05 NOTE — TELEPHONE ENCOUNTER
Patient will be out of hydrocodone by 7/15/2022. Follow up is scheduled for 7/18/2022. Patient is also wanting his tylenol and Amlodipine filled.   Aminah Quezada LPN........................7/5/2022  11:19 AM

## 2022-07-06 NOTE — TELEPHONE ENCOUNTER
Patient notified that he has refills on Amlodipine at the pharmacy.  Patient will  Tylenol over the counter at this time.    Patient appointment moved to the 13th for controlled medication refill.  Patient has been riding the tractor more.      Leona Shell LPN 7/6/2022 10:17 AM

## 2022-07-07 ENCOUNTER — ONCOLOGY VISIT (OUTPATIENT)
Dept: ONCOLOGY | Facility: OTHER | Age: 67
End: 2022-07-07
Attending: INTERNAL MEDICINE
Payer: COMMERCIAL

## 2022-07-07 VITALS
BODY MASS INDEX: 34.31 KG/M2 | RESPIRATION RATE: 16 BRPM | OXYGEN SATURATION: 96 % | SYSTOLIC BLOOD PRESSURE: 120 MMHG | HEART RATE: 73 BPM | TEMPERATURE: 97.8 F | DIASTOLIC BLOOD PRESSURE: 68 MMHG | WEIGHT: 234 LBS

## 2022-07-07 DIAGNOSIS — C61 MALIGNANT NEOPLASM OF PROSTATE (H): Primary | ICD-10-CM

## 2022-07-07 PROCEDURE — 99417 PROLNG OP E/M EACH 15 MIN: CPT | Performed by: INTERNAL MEDICINE

## 2022-07-07 PROCEDURE — G0463 HOSPITAL OUTPT CLINIC VISIT: HCPCS

## 2022-07-07 PROCEDURE — 99215 OFFICE O/P EST HI 40 MIN: CPT | Performed by: INTERNAL MEDICINE

## 2022-07-07 ASSESSMENT — PAIN SCALES - GENERAL: PAINLEVEL: MODERATE PAIN (5)

## 2022-07-07 NOTE — PROGRESS NOTES
Visit Date: 07/07/2022    HISTORY OF PRESENT ILLNESS:  Mr. Kan returns for followup of rising PSA following treatment for malignant neoplasm of the prostate.  For details, see previous notes.  The patient has been followed by Dr. Arrington.  He was on androgen deprivation therapy with Lupron.  His PSA began to rise on 07/09/2020 and was 0.975.  Then, on 11/23/2020 it was 1.215.  Dr. Arrington ordered scans including CT chest, abdomen, pelvis and bone scan.  There was no evidence of metastatic disease.  There was a dilated thoracic aorta measuring 4.4 cm in the mid ascending segment.  PSA continued to rise and on 02/23/2020 was up to 1.847, then on 08/25/2020 it was up to 3.48.  Therefore, the patient had a PSA doubling time of less than 10 months.  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  The patient was essentially asymptomatic.  He denied any fevers, night sweats, weight loss or bone pain.  He did have urinary incontinence and wears Depends for most part.  Since had this since radiation was completed.  He was initially on Lupron 22.5 mg IM q.3 months, and he could not tolerate the Vantas implant.  He was seen by us for chemotherapeutic options for nonmetastatic castrate-resistant prostate cancer.  When we saw the patient, we felt the patient would be a candidate for apalutamide, which is indicated in the setting of castrate-resistant M0 prostate cancer that is nonmetastatic.  The patient was started on apalutamide 240 mg p.o. daily.  Scans were essentially negative, including bone scan.  The patient otherwise was on apalutamide for 3 weeks.  His PSA dropped from 3.48 down to 1.62. On 11/03/2020, it dropped to 1.49.  The patient was started on Lupron 45 IM q.6 months.  This was given by Dr. Arrington on 12/17/2021.  He was having significant hot flashes but otherwise had no other complaints when we saw him in March via telephone visit.  He comes in today stating he is having some issues with  fatigue as well as hot flashes.  He gets occasional nausea with the medication.  He is due to have another Lupron injection to be given by Dr. Arrington on 07/21/2022.  He denies any fevers, night sweats, weight loss, bone pain.  He says the hot flashes appear to have gotten worse, and he also feels like he has no ambition due to the chronic fatigue.  Otherwise, no other complaints.    PHYSICAL EXAMINATION:    GENERAL:  He is a middle-aged white male.  ECOG performance status is 0.  VITAL SIGNS:  Reveal blood pressure 120/68, pulse 73, respirations 16, temperature 97.8.  HEENT:  Atraumatic, normocephalic.  Oropharynx nonerythematous.  NECK:  Supple, no thyromegaly.  LUNGS:  Clear to auscultation and percussion.  HEART:  Regular rhythm.  S1, S2 normal.  ABDOMEN:  Obese, soft.  Normoactive bowel sounds.  Nondistended, nontender.  LYMPHATICS:  No supraclavicular, axillary or inguinal nodes.  EXTREMITIES:  No edema.  NEUROLOGIC:  Nonfocal.    LABORATORY DATA:  Reveal CBC with white count 5.4, H and H 12.9 and 38.2, and platelet count is 196.  BUN is 14, creatinine 0.98.  LFTs are normal.  LDH is 135.  PSA is 2.06.    IMPRESSION AND PLAN:  Castrate-resistant nonmetastatic prostate cancer with PSA doubling time of less than 10 months.  Based on the phase 3 trial, apalutamide has been approved for nonmetastatic castrate-resistant prostate cancer with a PSA doubling time of 10 months or less receiving adjuvant androgen deprivation therapy.  Metastasis-free, disease-free survival was 40.5 months.  The patient was started on apalutamide 240 mg daily.  His PSA dropped to 1.25 and currently has risen up to 2.06.  He is due to have a Lupron injection 07/21/2022.  We would like to obtain imaging to rule out metastatic disease by obtaining CT chest, abdomen, pelvis and bone scan after he has his Lupron injection and also repeat CBC, CMP, LDH, PSA.  If he has evidence of metastatic disease, he will likely be a candidate for abiraterone  and prednisone.  Otherwise, we will continue Lupron as per Dr. Arrington.    TIME SPENT:  70 minutes were spent on this patient.  Time was spent reviewing multiple physician/provider notes, lab results, imaging results, performing history and physical, documenting history and physical and ordering followup labs and scans.    Erum Fritz MD        D: 2022   T: 2022   MT: [facility]    Name:     LELAND CHAPMAN  MRN:      [id number]        Account:    [id number]   :      1955           Visit Date: 2022     Document: [id number]    cc:  MD Wei Fong MD

## 2022-07-07 NOTE — NURSING NOTE
Chief Complaint   Patient presents with     Oncology Clinic Visit     F/U Prostate cancer     Medication Reconciliation: complete    Rochelle Toth CMA ([facility])

## 2022-07-13 ENCOUNTER — OFFICE VISIT (OUTPATIENT)
Dept: FAMILY MEDICINE | Facility: OTHER | Age: 67
End: 2022-07-13
Attending: FAMILY MEDICINE
Payer: COMMERCIAL

## 2022-07-13 VITALS
RESPIRATION RATE: 20 BRPM | SYSTOLIC BLOOD PRESSURE: 128 MMHG | WEIGHT: 232 LBS | HEART RATE: 88 BPM | DIASTOLIC BLOOD PRESSURE: 74 MMHG | OXYGEN SATURATION: 97 % | TEMPERATURE: 98.3 F | BODY MASS INDEX: 34.01 KG/M2

## 2022-07-13 DIAGNOSIS — M25.561 CHRONIC PAIN OF RIGHT KNEE: ICD-10-CM

## 2022-07-13 DIAGNOSIS — F11.90 CHRONIC, CONTINUOUS USE OF OPIOIDS: ICD-10-CM

## 2022-07-13 DIAGNOSIS — M54.9 CHRONIC BACK PAIN GREATER THAN 3 MONTHS DURATION: ICD-10-CM

## 2022-07-13 DIAGNOSIS — Z98.1 HISTORY OF LUMBAR SPINAL FUSION: ICD-10-CM

## 2022-07-13 DIAGNOSIS — E78.5 HYPERLIPIDEMIA LDL GOAL <100: ICD-10-CM

## 2022-07-13 DIAGNOSIS — G89.29 CHRONIC PAIN OF RIGHT KNEE: ICD-10-CM

## 2022-07-13 DIAGNOSIS — M48.062 SPINAL STENOSIS OF LUMBAR REGION WITH NEUROGENIC CLAUDICATION: ICD-10-CM

## 2022-07-13 DIAGNOSIS — G89.29 CHRONIC BACK PAIN GREATER THAN 3 MONTHS DURATION: ICD-10-CM

## 2022-07-13 DIAGNOSIS — K21.9 GASTROESOPHAGEAL REFLUX DISEASE WITHOUT ESOPHAGITIS: ICD-10-CM

## 2022-07-13 DIAGNOSIS — M53.3 PAIN OF LEFT SACROILIAC JOINT: Primary | ICD-10-CM

## 2022-07-13 PROCEDURE — 99214 OFFICE O/P EST MOD 30 MIN: CPT | Performed by: FAMILY MEDICINE

## 2022-07-13 PROCEDURE — G0463 HOSPITAL OUTPT CLINIC VISIT: HCPCS | Performed by: FAMILY MEDICINE

## 2022-07-13 RX ORDER — HYDROCODONE BITARTRATE AND ACETAMINOPHEN 10; 325 MG/1; MG/1
1 TABLET ORAL
Qty: 150 TABLET | Refills: 0 | Status: SHIPPED | OUTPATIENT
Start: 2022-07-13 | End: 2022-10-05

## 2022-07-13 RX ORDER — PREGABALIN 150 MG/1
150 CAPSULE ORAL 2 TIMES DAILY
Qty: 180 CAPSULE | Refills: 1 | Status: SHIPPED | OUTPATIENT
Start: 2022-07-13 | End: 2023-02-03

## 2022-07-13 RX ORDER — ACETAMINOPHEN 325 MG/1
325 TABLET ORAL
Qty: 100 TABLET | Refills: 4 | Status: ON HOLD | OUTPATIENT
Start: 2022-07-13 | End: 2023-04-19

## 2022-07-13 RX ORDER — OMEPRAZOLE 40 MG/1
40 CAPSULE, DELAYED RELEASE ORAL DAILY
Qty: 90 CAPSULE | Refills: 4 | Status: ON HOLD | OUTPATIENT
Start: 2022-07-13 | End: 2023-04-22

## 2022-07-13 RX ORDER — HYDROCODONE BITARTRATE AND ACETAMINOPHEN 10; 325 MG/1; MG/1
1 TABLET ORAL
Qty: 150 TABLET | Refills: 0 | Status: SHIPPED | OUTPATIENT
Start: 2022-08-11 | End: 2022-10-05

## 2022-07-13 RX ORDER — ATORVASTATIN CALCIUM 20 MG/1
20 TABLET, FILM COATED ORAL DAILY
Qty: 90 TABLET | Refills: 4 | Status: SHIPPED | OUTPATIENT
Start: 2022-07-13 | End: 2023-11-15

## 2022-07-13 RX ORDER — HYDROCODONE BITARTRATE AND ACETAMINOPHEN 10; 325 MG/1; MG/1
1 TABLET ORAL
Qty: 150 TABLET | Refills: 0 | Status: SHIPPED | OUTPATIENT
Start: 2022-09-09 | End: 2022-10-05

## 2022-07-13 ASSESSMENT — ANXIETY QUESTIONNAIRES
7. FEELING AFRAID AS IF SOMETHING AWFUL MIGHT HAPPEN: NOT AT ALL
3. WORRYING TOO MUCH ABOUT DIFFERENT THINGS: NOT AT ALL
GAD7 TOTAL SCORE: 5
8. IF YOU CHECKED OFF ANY PROBLEMS, HOW DIFFICULT HAVE THESE MADE IT FOR YOU TO DO YOUR WORK, TAKE CARE OF THINGS AT HOME, OR GET ALONG WITH OTHER PEOPLE?: NOT DIFFICULT AT ALL
GAD7 TOTAL SCORE: 5
GAD7 TOTAL SCORE: 5
1. FEELING NERVOUS, ANXIOUS, OR ON EDGE: NOT AT ALL
4. TROUBLE RELAXING: MORE THAN HALF THE DAYS
5. BEING SO RESTLESS THAT IT IS HARD TO SIT STILL: SEVERAL DAYS
7. FEELING AFRAID AS IF SOMETHING AWFUL MIGHT HAPPEN: NOT AT ALL
2. NOT BEING ABLE TO STOP OR CONTROL WORRYING: SEVERAL DAYS
6. BECOMING EASILY ANNOYED OR IRRITABLE: SEVERAL DAYS

## 2022-07-13 ASSESSMENT — PATIENT HEALTH QUESTIONNAIRE - PHQ9
SUM OF ALL RESPONSES TO PHQ QUESTIONS 1-9: 5
SUM OF ALL RESPONSES TO PHQ QUESTIONS 1-9: 5
10. IF YOU CHECKED OFF ANY PROBLEMS, HOW DIFFICULT HAVE THESE PROBLEMS MADE IT FOR YOU TO DO YOUR WORK, TAKE CARE OF THINGS AT HOME, OR GET ALONG WITH OTHER PEOPLE: SOMEWHAT DIFFICULT

## 2022-07-13 ASSESSMENT — PAIN SCALES - GENERAL: PAINLEVEL: SEVERE PAIN (6)

## 2022-07-13 NOTE — PATIENT INSTRUCTIONS
Use naproxen 500 mg twice daily. Avoid ibuprofen and any extra aspirin    Max acetaminophen should be 3,000 mg in a day, which would be 5 of the standard acetaminophen with 5 of hydrocodone max per day

## 2022-07-13 NOTE — PROGRESS NOTES
Assessment & Plan       ICD-10-CM    1. Pain of left sacroiliac joint  M53.3 Physical Therapy Referral   2. History of lumbar spinal fusion  Z98.1 HYDROcodone-acetaminophen (NORCO)  MG per tablet     HYDROcodone-acetaminophen (NORCO)  MG per tablet     HYDROcodone-acetaminophen (NORCO)  MG per tablet   3. Chronic, continuous use of opioids  F11.90 HYDROcodone-acetaminophen (NORCO)  MG per tablet     HYDROcodone-acetaminophen (NORCO)  MG per tablet     HYDROcodone-acetaminophen (NORCO)  MG per tablet   4. Spinal stenosis of lumbar region with neurogenic claudication  M48.062 HYDROcodone-acetaminophen (NORCO)  MG per tablet     HYDROcodone-acetaminophen (NORCO)  MG per tablet     HYDROcodone-acetaminophen (NORCO)  MG per tablet     pregabalin (LYRICA) 150 MG capsule   5. Chronic back pain greater than 3 months duration  M54.9 pregabalin (LYRICA) 150 MG capsule    G89.29 Physical Therapy Referral   6. Chronic pain of right knee  M25.561 acetaminophen (TYLENOL) 325 MG tablet    G89.29    7. Hyperlipidemia LDL goal <100  E78.5 atorvastatin (LIPITOR) 20 MG tablet   8. Gastroesophageal reflux disease without esophagitis  K21.9 omeprazole (PRILOSEC) 40 MG DR capsule     Discussed that hydrocodone should not be dosed to treat this more acute pain that has flared due to overuse  Appears to have SI pain that is acute on chronic LBP s/p lumbar fusion  He was on hydrocodone at 45 mg daily prior to knee replacement, increased after surgery and has been working down  Refilled early for 50 mg daily. 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 Should only be on 1 NSAID. Use naproxen, stop others. Recent labs with oncology had normal CBC and CMP, so no abnormality from NSAID overuse  Also using  acetaminophen with hydrocodone needs to limit dosing, which was discussed    Refilled atorvastatin and omeprazole          Return in about 3 months (around 10/13/2022).    30 minutes spent on the date of the encounter doing patient visit and documentation     Wei Perez MD   [facility]     Subjective   Jermain is a 67 year old, presenting for the following health issues:  Recheck Medication      History of Present Illness       Back Pain:  He presents for follow up of back pain. Patient's back pain is a chronic problem.  Location of back pain:  Left lower back  Description of back pain: sharp and shooting  Back pain spreads: left thigh and left knee    Since patient first noticed back pain, pain is: gradually worsening  Does back pain interfere with his job:  Not applicable      He eats 0-1 servings of fruits and vegetables daily.He consumes 1 sweetened beverage(s) daily.He exercises with enough effort to increase his heart rate 20 to 29 minutes per day.  He exercises with enough effort to increase his heart rate 3 or less days per week.   He is taking medications regularly.    Today's PHQ-9         PHQ-9 Total Score: 5    PHQ-9 Q9 Thoughts of better off dead/self-harm past 2 weeks :   Not at all    How difficult have these problems made it for you to do your work, take care of things at home, or get along with other people: Somewhat difficult  Today's MARY-7 Score: 5     Worse left low back pain  Seemed to start after knee replacement, worsened in PT  Now very aggravated after helping his son hay and riding a tractor    Waiting on PET scan for prostate cancer    Due for multiple medication refills    Review of Systems   As above      Objective    /74 (BP Location: Right arm, Patient Position: Sitting, Cuff Size: Adult Large)   Pulse 88   Temp 98.3  F (36.8  C) (Tympanic)   Resp 20   Wt 105.2 kg (232 lb)   SpO2 97%   BMI 34.01 kg/m    Body mass index is 34.01 kg/m .  Physical Exam    General Appearance: Pleasant, alert, appropriate appearance for age. No acute distress  Musculoskeletal Exam: Lumbar Spine: Tender over low lumbar paraspinous muscles on left, most tender over left SI joint  Psychiatric: Normal affect and mentation              .  ..

## 2022-07-13 NOTE — NURSING NOTE
"Patient presents to the clinic for medication refills.  Last administration of Norco was yesterday around 2230.  Contract signed 12-, and TOX obtain 3-7-2022.     FOOD SECURITY SCREENING QUESTIONS:    The next two questions are to help us understand your food security.  If you are feeling you need any assistance in this area, we have resources available to support you today.    Hunger Vital Signs:  Within the past 12 months we worried whether our food would run out before we got money to buy more. Never  Within the past 12 months the food we bought just didn't last and we didn't have money to get more. Never    Advance Care Directive on file? no  Advance Care Directive provided to patient? Declined.     Chief Complaint   Patient presents with     Recheck Medication       Initial /74 (BP Location: Right arm, Patient Position: Sitting, Cuff Size: Adult Large)   Pulse 88   Temp 98.3  F (36.8  C) (Tympanic)   Resp 20   Wt 105.2 kg (232 lb)   SpO2 97%   BMI 34.01 kg/m   Estimated body mass index is 34.01 kg/m  as calculated from the following:    Height as of 3/29/22: 1.759 m (5' 9.25\").    Weight as of this encounter: 105.2 kg (232 lb).  Medication Reconciliation: complete        Leona Shell LPN    "

## 2022-07-21 ENCOUNTER — OFFICE VISIT (OUTPATIENT)
Dept: UROLOGY | Facility: OTHER | Age: 67
End: 2022-07-21
Attending: UROLOGY
Payer: MEDICARE

## 2022-07-21 VITALS
RESPIRATION RATE: 16 BRPM | BODY MASS INDEX: 34.57 KG/M2 | SYSTOLIC BLOOD PRESSURE: 122 MMHG | WEIGHT: 235.8 LBS | HEART RATE: 89 BPM | DIASTOLIC BLOOD PRESSURE: 80 MMHG | OXYGEN SATURATION: 94 %

## 2022-07-21 DIAGNOSIS — C61 PROSTATE CANCER (H): Primary | ICD-10-CM

## 2022-07-21 PROCEDURE — 99213 OFFICE O/P EST LOW 20 MIN: CPT | Performed by: UROLOGY

## 2022-07-21 PROCEDURE — 96402 CHEMO HORMON ANTINEOPL SQ/IM: CPT | Performed by: UROLOGY

## 2022-07-21 PROCEDURE — G0463 HOSPITAL OUTPT CLINIC VISIT: HCPCS | Mod: 25

## 2022-07-21 PROCEDURE — 250N000011 HC RX IP 250 OP 636: Performed by: UROLOGY

## 2022-07-21 RX ADMIN — LEUPROLIDE ACETATE 45 MG: KIT at 14:59

## 2022-07-21 ASSESSMENT — PAIN SCALES - GENERAL: PAINLEVEL: MODERATE PAIN (4)

## 2022-07-21 NOTE — PROGRESS NOTES
Type of Visit  EST    Chief Complaint  Metastatic castrate resistant prostate cancer    HPI  Mr. Kan is a 67 y.o. male who follows up with metastatic castrate resistant prostate cancer.  Patient originally underwent radical prostatectomy in 2012.  He underwent salvage radiation in 2013.  The patient has started ADT for nonmetastatic castrate resistant prostate cancer.  He has seen oncology which initiated Erleda.  He has continued the same treatment and follows up for additional Lupron.    He does have chronic back pain and previously underwent fusion 4 years ago.      Review of Systems  I reviewed the ROS with the patient today.    Nursing Notes:   Nevaeh Varela, LPN  7/21/2022  2:31 PM  Addendum  Pt presents to clinic for a six month history of prostate cancer follow up    Review of Systems:    Weight loss:    No     Recent fever/chills:  No   Night sweats:   Yes  Current skin rash:  No   Recent hair loss:  Yes  Heat intolerance:  No   Cold intolerance:  No  Chest pain:   No   Palpitations:   No  Shortness of breath:  No   Wheezing:   No  Constipation:    No   Diarrhea:   Yes   Nausea:   Yes   Vomiting:   No   Kidney/side pain:  No   Back pain:   Yes  Frequent headaches:  No   Dizziness:     No  Leg swelling:   No   Calf pain:    No        Physical Exam  /80 (BP Location: Right arm, Patient Position: Sitting, Cuff Size: Adult Large)   Pulse 89   Resp 16   Wt 107 kg (235 lb 12.8 oz)   SpO2 94%   BMI 34.57 kg/m    Constitutional: No acute distress.  Alert and cooperative   Head: NCAT  Eyes: Conjunctivae normal  Cardiovascular: Regular rate.  Pulmonary/Chest: Respirations are even and non-labored bilaterally, no audible wheezing  Abdominal: Soft. No distension, tenderness, masses or guarding.   Neurological: A + O x 3.  Cranial Nerves II-XII grossly intact.  Extremities: GINNA x 4, Warm. No clubbing.  No cyanosis.    Skin: Pink, warm and dry.  No visible rashes noted.  Psychiatric:  Normal mood and  affect  Back:  No left CVA tenderness.  No right CVA tenderness.  Genitourinary:  Nonpalpable bladder    Labs   12/28/21 10:47 01/26/22 10:54 03/07/22 10:01 06/27/22 12:51   PSA 1.51 1.21 1.25 2.06     Results for EMILIA CHAPMAN (MRN [id number]) as of 8/25/2021 11:12   8/25/2021 09:28   PSA 3.48     Results for EMILIA CHAPMAN (MRN [id number]) as of 5/24/2021 11:21   2/23/2021 08:29 5/24/2021 09:36   PSA 1.847 2.555     Results for EMILIA CHAPMAN (MRN [id number]) as of 11/23/2020 11:37   11/23/2020 07:20   PSA 1.215     Results for EMILIA CHAPMAN (MRN [id number]) as of 7/22/2020 08:39   7/9/2020 09:36   PSA 0.975     Results for EMILIA CHAPMAN (MRN [id number]) as of 2/5/2020 09:14   11/4/2019 08:10 2/5/2020 06:53   PSA 7.112 (H)^ 1.006   ^started ADT    Results for EMILIA CHAPMAN (MRN [id number]) as of 5/1/2019 09:45   5/1/2019 07:36   PSA 2.714     Results for LELAND CHAPMAN (MRN [id number]) as of 11/1/2018 11:08   11/1/2018 08:49   PSA 1.177     Results for LELAND CHAPMAN (MRN [id number]) as of 3/29/2018 14:31   3/29/2018 12:15   PSA 0.798     Results for LELAND CHAPMAN (MRN [id number]) as of 3/29/2018 11:55   4/7/2017 16:04   PSA 0.268     Results for LELAND CHAPMAN (MRN [id number]) as of 3/23/2016 10:42   1/2/2013 08:00* 2/27/2013** 08:10 10/16/2014 11:26 3/1/2016 14:15   PSA 0.67 0.97 0.070 0.154   * Following radical prostatectomy  ** Following salvage external beam radiation therapy.    Imaging  Bone Scan  9/17/2021  IMPRESSION: Degenerative change with probable periodontal disease. No  definite metastatic disease.    CT c/a/p  9/17/2021  IMPRESSION:     Newly apparent soft tissue density lesion in the left lower pelvis in  the region of the left seminal vesicle measuring up to 2.6 cm,  concerning for recurrent disease.     No evidence of distant metastatic disease.     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    Lupron Injection Procedure  Today the patient  received an intramuscular injection of Lupron 45mg.  This was given in the gluteal muscle.  The results of this injection: None    Assessment  Mr. Kan is a 67 y.o. male who follows up with metastatic castrate resistant prostate cancer.  Patient would like to continue current management.  4-month dose of Lupron was administered.    Plan  Continue Erleada and additional management per Dr Fritz  Lupron 45 mg administered today  Follow-up in 6 months for Lupron

## 2022-07-21 NOTE — NURSING NOTE
Pt presents to clinic for a six month history of prostate cancer follow up    Review of Systems:    Weight loss:    No     Recent fever/chills:  No   Night sweats:   Yes  Current skin rash:  No   Recent hair loss:  Yes  Heat intolerance:  No   Cold intolerance:  No  Chest pain:   No   Palpitations:   No  Shortness of breath:  No   Wheezing:   No  Constipation:    No   Diarrhea:   Yes   Nausea:   Yes   Vomiting:   No   Kidney/side pain:  No   Back pain:   Yes  Frequent headaches:  No   Dizziness:     No  Leg swelling:   No   Calf pain:    No

## 2022-08-01 ENCOUNTER — LAB (OUTPATIENT)
Dept: LAB | Facility: OTHER | Age: 67
End: 2022-08-01
Attending: INTERNAL MEDICINE
Payer: MEDICARE

## 2022-08-01 ENCOUNTER — HOSPITAL ENCOUNTER (OUTPATIENT)
Dept: NUCLEAR MEDICINE | Facility: OTHER | Age: 67
Discharge: HOME | End: 2022-08-01
Attending: INTERNAL MEDICINE
Payer: MEDICARE

## 2022-08-01 ENCOUNTER — HOSPITAL ENCOUNTER (OUTPATIENT)
Dept: NUCLEAR MEDICINE | Facility: OTHER | Age: 67
Setting detail: NUCLEAR MEDICINE
Discharge: HOME | End: 2022-08-01
Attending: INTERNAL MEDICINE
Payer: MEDICARE

## 2022-08-01 ENCOUNTER — HOSPITAL ENCOUNTER (OUTPATIENT)
Dept: CT IMAGING | Facility: OTHER | Age: 67
Discharge: HOME | End: 2022-08-01
Attending: INTERNAL MEDICINE
Payer: MEDICARE

## 2022-08-01 DIAGNOSIS — C61 MALIGNANT NEOPLASM OF PROSTATE (H): ICD-10-CM

## 2022-08-01 LAB
ALBUMIN SERPL-MCNC: 3.8 G/DL (ref 3.5–5.7)
ALP SERPL-CCNC: 68 U/L (ref 34–104)
ALT SERPL W P-5'-P-CCNC: 7 U/L (ref 7–52)
ANION GAP SERPL CALCULATED.3IONS-SCNC: 10 MMOL/L (ref 3–14)
AST SERPL W P-5'-P-CCNC: 8 U/L (ref 13–39)
BASOPHILS # BLD AUTO: 0 10E3/UL (ref 0–0.2)
BASOPHILS NFR BLD AUTO: 1 %
BILIRUB SERPL-MCNC: 0.3 MG/DL (ref 0.3–1)
BUN SERPL-MCNC: 14 MG/DL (ref 7–25)
CALCIUM SERPL-MCNC: 9.2 MG/DL (ref 8.6–10.3)
CHLORIDE BLD-SCNC: 103 MMOL/L (ref 98–107)
CO2 SERPL-SCNC: 28 MMOL/L (ref 21–31)
CREAT SERPL-MCNC: 0.78 MG/DL (ref 0.7–1.3)
EOSINOPHIL # BLD AUTO: 0.5 10E3/UL (ref 0–0.7)
EOSINOPHIL NFR BLD AUTO: 9 %
ERYTHROCYTE [DISTWIDTH] IN BLOOD BY AUTOMATED COUNT: 13.2 % (ref 10–15)
GFR SERPL CREATININE-BSD FRML MDRD: >90 ML/MIN/1.73M2
GLUCOSE BLD-MCNC: 113 MG/DL (ref 70–105)
HCT VFR BLD AUTO: 39.7 % (ref 40–53)
HGB BLD-MCNC: 13.1 G/DL (ref 13.3–17.7)
IMM GRANULOCYTES # BLD: 0 10E3/UL
IMM GRANULOCYTES NFR BLD: 0 %
LDH SERPL L TO P-CCNC: 137 U/L (ref 140–271)
LYMPHOCYTES # BLD AUTO: 1.6 10E3/UL (ref 0.8–5.3)
LYMPHOCYTES NFR BLD AUTO: 28 %
MCH RBC QN AUTO: 29.9 PG (ref 26.5–33)
MCHC RBC AUTO-ENTMCNC: 33 G/DL (ref 31.5–36.5)
MCV RBC AUTO: 91 FL (ref 78–100)
MONOCYTES # BLD AUTO: 0.5 10E3/UL (ref 0–1.3)
MONOCYTES NFR BLD AUTO: 9 %
NEUTROPHILS # BLD AUTO: 3 10E3/UL (ref 1.6–8.3)
NEUTROPHILS NFR BLD AUTO: 53 %
NRBC # BLD AUTO: 0 10E3/UL
NRBC BLD AUTO-RTO: 0 /100
PLATELET # BLD AUTO: 193 10E3/UL (ref 150–450)
POTASSIUM BLD-SCNC: 4.5 MMOL/L (ref 3.5–5.1)
PROT SERPL-MCNC: 6.4 G/DL (ref 6.4–8.9)
PSA SERPL-MCNC: 2.02 UG/L (ref 0–4)
RBC # BLD AUTO: 4.38 10E6/UL (ref 4.4–5.9)
SODIUM SERPL-SCNC: 141 MMOL/L (ref 134–144)
WBC # BLD AUTO: 5.7 10E3/UL (ref 4–11)

## 2022-08-01 PROCEDURE — 80053 COMPREHEN METABOLIC PANEL: CPT | Mod: ZL

## 2022-08-01 PROCEDURE — G1010 CDSM STANSON: HCPCS

## 2022-08-01 PROCEDURE — A9503 TC99M MEDRONATE: HCPCS | Performed by: INTERNAL MEDICINE

## 2022-08-01 PROCEDURE — 85025 COMPLETE CBC W/AUTO DIFF WBC: CPT | Mod: ZL

## 2022-08-01 PROCEDURE — 74177 CT ABD & PELVIS W/CONTRAST: CPT | Mod: MG

## 2022-08-01 PROCEDURE — 82040 ASSAY OF SERUM ALBUMIN: CPT | Mod: ZL

## 2022-08-01 PROCEDURE — 343N000001 HC RX 343: Performed by: INTERNAL MEDICINE

## 2022-08-01 PROCEDURE — 84153 ASSAY OF PSA TOTAL: CPT | Mod: ZL

## 2022-08-01 PROCEDURE — 250N000011 HC RX IP 250 OP 636: Performed by: INTERNAL MEDICINE

## 2022-08-01 PROCEDURE — 78306 BONE IMAGING WHOLE BODY: CPT | Mod: MG

## 2022-08-01 PROCEDURE — 36415 COLL VENOUS BLD VENIPUNCTURE: CPT | Mod: ZL

## 2022-08-01 PROCEDURE — 83615 LACTATE (LD) (LDH) ENZYME: CPT | Mod: ZL

## 2022-08-01 RX ORDER — IOPAMIDOL 755 MG/ML
115 INJECTION, SOLUTION INTRAVASCULAR ONCE
Status: COMPLETED | OUTPATIENT
Start: 2022-08-01 | End: 2022-08-01

## 2022-08-01 RX ORDER — TC 99M MEDRONATE 20 MG/10ML
27.4 INJECTION, POWDER, LYOPHILIZED, FOR SOLUTION INTRAVENOUS ONCE
Status: COMPLETED | OUTPATIENT
Start: 2022-08-01 | End: 2022-08-01

## 2022-08-01 RX ADMIN — TC 99M MEDRONATE 27.4 MCI.: 20 INJECTION, POWDER, LYOPHILIZED, FOR SOLUTION INTRAVENOUS at 08:10

## 2022-08-01 RX ADMIN — IOPAMIDOL 115 ML: 755 INJECTION, SOLUTION INTRAVENOUS at 09:01

## 2022-08-05 ENCOUNTER — ONCOLOGY VISIT (OUTPATIENT)
Dept: ONCOLOGY | Facility: OTHER | Age: 67
End: 2022-08-05
Attending: INTERNAL MEDICINE
Payer: COMMERCIAL

## 2022-08-05 ENCOUNTER — TELEPHONE (OUTPATIENT)
Dept: ONCOLOGY | Facility: OTHER | Age: 67
End: 2022-08-05

## 2022-08-05 VITALS
OXYGEN SATURATION: 95 % | BODY MASS INDEX: 34.31 KG/M2 | TEMPERATURE: 98.4 F | WEIGHT: 234 LBS | SYSTOLIC BLOOD PRESSURE: 120 MMHG | HEART RATE: 78 BPM | DIASTOLIC BLOOD PRESSURE: 74 MMHG | RESPIRATION RATE: 20 BRPM

## 2022-08-05 DIAGNOSIS — C61 MALIGNANT NEOPLASM OF PROSTATE (H): Primary | ICD-10-CM

## 2022-08-05 PROCEDURE — G0463 HOSPITAL OUTPT CLINIC VISIT: HCPCS

## 2022-08-05 PROCEDURE — 99417 PROLNG OP E/M EACH 15 MIN: CPT | Performed by: INTERNAL MEDICINE

## 2022-08-05 PROCEDURE — 99215 OFFICE O/P EST HI 40 MIN: CPT | Performed by: INTERNAL MEDICINE

## 2022-08-05 RX ORDER — PREDNISONE 5 MG/1
5 TABLET ORAL 2 TIMES DAILY
Qty: 60 TABLET | Refills: 6 | Status: SHIPPED | OUTPATIENT
Start: 2022-08-05 | End: 2023-05-03

## 2022-08-05 ASSESSMENT — PAIN SCALES - GENERAL: PAINLEVEL: SEVERE PAIN (6)

## 2022-08-05 NOTE — TELEPHONE ENCOUNTER
PA Initiation    Medication: Zytiga 250mg, PA pending   Ref.# [id number]  Insurance Company: [employer] - Phone [phone] Fax [phone]  Pharmacy Filling the Rx:    Filling Pharmacy Phone:    Filling Pharmacy Fax:    Start Date: 8/5/2022

## 2022-08-05 NOTE — NURSING NOTE
Erleada discontinued. Zytiga sent to [facility]. Prednisone sent to [facility] pharmacy. Education information and written drug information provided  Will need labs every 2 weeks after starting Zytiga.  Patient questions answered.

## 2022-08-05 NOTE — NURSING NOTE
Chief Complaint   Patient presents with     Oncology Clinic Visit     F/U Prostate cancer     Medication Reconciliation: complete    Rochelle Toth CMA ([facility])

## 2022-08-05 NOTE — TELEPHONE ENCOUNTER
Spoke to Richar at [employer]. Application has been canceled 8/5 and last shipment was sent to patient 4/13/22

## 2022-08-05 NOTE — PROGRESS NOTES
Visit Date: 08/05/2022    HISTORY OF PRESENT ILLNESS:  Mr. Kan returns for followup of a rising PSA following treatment for malignant neoplasm of the prostate.  We had seen the patient in consultation at the request of Dr. Arrington back on 09/15/2021.  At that time, Mr. Kan was a 66-year-old white male with previous history of aortic valve stenosis and type 2 diabetes mellitus, we were asked to evaluate concerning a rising PSA in a castrate-resistant patient with prostate cancer.  The patient had undergone a prostatectomy back in 2012 for prostate cancer.  This was followed by SBRT in 2013.  The patient was followed by Dr. Celestine Arrington of Urology, who noted a rising PSA and placed the patient on androgen deprivation therapy initially with Vantas implant and then switched to Lupron.  His PSA had been rising in the recent past.  On 07/09/2020 was 0.975.  Then, on 11/23/2020, it pineda to 1.215.  Dr. Arrington ordered staging studies, CT chest, abdomen and pelvis.  There was no evidence of metastatic disease.  There was a dilated thoracic aorta measuring 4.4 cm in the mid ascending segment.  PSA continued to rise, and on 02/23/2021, it was up to 1.847, then on 08/25/2021, it was up to 3.48.  Therefore, the patient had a PSA doubling time of less than 10 months.  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  The patient was essentially asymptomatic.  He denied any fevers, night sweats, weight loss or bone pain.  He did have urinary incontinence, and had to wear Depends for the most part.  He could not tolerate the Vantas implant.  The patient was seen for chemotherapeutic options for nonmetastatic castrate-resistant prostate cancer.  We had seen the patient and felt he would be a candidate for apalutamide, which is indicated in the setting of castrate-resistant M0 prostate cancer that is nonmetastatic.  The patient was started on apalutamide 240 mg p.o. daily.  He also had scans initially on  09/17/2021.  Bone scan was negative.  CT chest, abdomen and pelvis were felt to be negative.  There was some soft tissue density in the left lower pelvis in the region of the left seminal vesicle, which was concerning.  The patient otherwise was on apalutamide for 3 weeks and his PSA dropped from 3.48 down to 1.62.  He did have some fatigue associated with apalutamide.  His PSA continued to drop.  On 11/03/2021, it was down to 1.49.  The patient was continued on androgen deprivation therapy with Dr. Arrington with Lupron 45 mg q. 6 months.  The patient recently had a right knee replacement and he was recovering from that.  When we saw the patient last on 07/07/2022, his PSA was beginning to rise and was up to 2.06.  We elected to repeat imaging.  Bone scan was negative for metastatic disease, but CT chest, abdomen and pelvis revealed that there was progression of disease with an enlarging mass in the left seminal vesicle, which appeared to invade the adjacent bladder and the adjacent rectosigmoid junction spanning 4.4 x 4 cm.  Previously it was up to 3.6 x 3 cm.  No suspicious adenopathy otherwise, no evidence of bone mets.  The patient comes in today, he says he has occasional dull pain in the left lower quadrant of his abdominal pelvic region, but no hematuria, change in bowel habits, no nausea or vomiting.  No bright red blood per rectum.  He has obviously progressed and now has definite metastatic disease and would be a candidate for alternative therapies.  His major complaint was related to the flashes.  He recently had seen Dr. Arrington on 07/21/2022, and at that time, he was given a 6-month Lupron injection.  The patient does suffer from chronic back pain.  He had undergone a fusion 4 years prior.  Otherwise, no other complaints of headache, shortness of breath, chest pain, fevers, night sweats, weight loss.    PHYSICAL EXAMINATION:    GENERAL:  He is an obese, elderly white male in no acute distress, ECOG  performance status is 0.  VITAL SIGNS:  Reveal blood pressure 120/74, pulse 78, respirations 20, temperature 98.4.  HEENT:  Atraumatic, normocephalic.  Oropharynx nonerythematous.  NECK:  Supple, no thyromegaly.  LUNGS:  Clear to auscultation and percussion.  HEART:  Regular rhythm.  S1, S2 normal.  ABDOMEN:  Obese, soft, normoactive bowel sounds.  There is a minimal to mild tenderness in the left lower quadrant.  No rebound.  LYMPHATICS:  No cervical, supraclavicular, axillary or inguinal nodes.  EXTREMITIES:  Trace ankle edema.  NEUROLOGIC:  Grossly nonfocal.    LABORATORY DATA:  From 08/01/2022 reveal a PSA of 2.02.  LDH is 137, glucose 113, alkaline phosphatase is 68, AST 8.  LFTs are normal otherwise.  CBC reveals white count of 5.7, H and H 13.1 and 39.7, platelet count is 193.    IMPRESSION AND PLAN:  Castrate-resistant nonmetastatic prostate cancer with PSA doubling time of less than 10 months.  Based on the phase 3 trial, apalutamide has been approved for nonmetastatic castrate-resistant prostate cancer with a PSA doubling time of 10 months or less receiving adjuvant androgen deprivation therapy.  Disease-free survival is 40.8 months.  The patient was started on apalutamide 240 mg daily.  His PSA dropped to 1.25, but then began to rise and was up to 2.06 when we saw him in July.  Imaging studies indicate progression of disease with local recurrence with invasion of the bladder and rectosigmoid.  Given the fact he has obvious metastatic disease, we would favor treating with frontline abiraterone and prednisone with abiraterone given at dose of 1000 mg p.o. daily on days 1-28 with prednisone 5 mg p.o. b.i.d.  The plan is to initiate this therapy and monitor his PSA.  If PSA continues to rise, we may have to switch to docetaxel based chemotherapy, but for now, we will continue to monitor on this regimen.  We discussed side effects including risk of hypertension, hot flashes, fatigue.  The patient is willing to  proceed.  We also will monitor his glucose on prednisone.    Eighty minutes was spent on this patient.  Time was spent reviewing literature on treatment of castrate-resistant metastatic prostate cancer, reviewing lab results with the patient, reviewing imaging results with the patient, performing a history and physical, documenting history and physical and ordering abiraterone and prednisone.  We will see the patient after he initiates cycle 2 to assess response and tolerability to abiraterone and prednisone.    Erum Fritz MD        D: 2022   T: 2022   MT: [id number]    Name:     LELAND CHAPMAN  MRN:      [id number]        Account:    [id number]   :      1955           Visit Date: 2022     Document: [id number]    cc:  MD Wei Fong MD

## 2022-08-05 NOTE — PATIENT INSTRUCTIONS
Stop Erleada.  Prednisone sent to [facility] pharmacy.  Zytiga will be delivered from [facility]  Need labs every 2 weeks after starting Zytiga  Need to start Prednisone with Zytiga

## 2022-08-05 NOTE — TELEPHONE ENCOUNTER
Prior Authorization Approval    Authorization Effective Date: 7/6/2022  Authorization Expiration Date: 8/5/2023  Medication: Zytiga 250mg, PA approved  Approved Dose/Quantity: 120/30 days  Reference #: [id number]   Insurance Company: [employer] Phone [phone] Fax [phone]  Expected CoPay:       CoPay Card Available: No    Foundation Assistance Needed:    Which Pharmacy is filling the prescription (Not needed for infusion/clinic administered):  [facility]  Pharmacy Notified: Yes  Patient Notified: Yes

## 2022-08-08 ENCOUNTER — DOCUMENTATION ONLY (OUTPATIENT)
Dept: ONCOLOGY | Facility: OTHER | Age: 67
End: 2022-08-08

## 2022-08-08 DIAGNOSIS — R97.21 RISING PSA FOLLOWING TREATMENT FOR MALIGNANT NEOPLASM OF PROSTATE: ICD-10-CM

## 2022-08-08 DIAGNOSIS — C61 MALIGNANT NEOPLASM OF PROSTATE (H): Primary | ICD-10-CM

## 2022-08-08 RX ORDER — ABIRATERONE ACETATE 250 MG/1
1000 TABLET ORAL DAILY
Qty: 120 TABLET | Refills: 6 | Status: SHIPPED | OUTPATIENT
Start: 2022-08-08 | End: 2023-04-10 | Stop reason: ALTCHOICE

## 2022-08-08 NOTE — PROGRESS NOTES
"Oral Chemotherapy Monitoring Program    Primary Oncologist: Dr. Fritz  Primary Oncology Clinic: [facility]  Cancer Diagnosis: Prostate Cancer    Drug: Zytiga 1,000 mg daily + prednisone 5 mg BID  Start Date: ASAP  Expected duration of therapy: Until disease progression or unacceptable toxicity    Drug Interaction Assessment: None    Lab Monitoring Plan  Labs every 2 weeks for the first 2 cycles, then monthly    Subjective/Objective:  Dilip Kan is a 67 year old male contacted by phone for an initial visit for oral chemotherapy education.      ORAL CHEMOTHERAPY 6/28/2022 8/8/2022   Assessment Type Lab Monitoring New Teach   Diagnosis Code Prostate Cancer Prostate Cancer   Providers Catrina Fritz   Clinic Name/Location [facility]   Drug Name Erleada (apalutamide) Zytiga (abiraterone)   Dose 240 mg 1,000 mg   Current Schedule Daily Daily   Cycle Details Continuous Continuous       Vitals:  BP:   BP Readings from Last 1 Encounters:   08/05/22 120/74     Wt Readings from Last 1 Encounters:   08/05/22 106.1 kg (234 lb)     Estimated body surface area is 2.28 meters squared as calculated from the following:    Height as of 3/29/22: 1.759 m (5' 9.25\").    Weight as of 8/5/22: 106.1 kg (234 lb).    Labs:  _  Result Component Current Result Ref Range   Sodium 141 (8/1/2022) 134 - 144 mmol/L     _  Result Component Current Result Ref Range   Potassium 4.5 (8/1/2022) 3.5 - 5.1 mmol/L     _  Result Component Current Result Ref Range   Calcium 9.2 (8/1/2022) 8.6 - 10.3 mg/dL     No results found for Mag within last 30 days.     No results found for Phos within last 30 days.     _  Result Component Current Result Ref Range   Albumin 3.8 (8/1/2022) 3.5 - 5.7 g/dL     _  Result Component Current Result Ref Range   Urea Nitrogen 14 (8/1/2022) 7 - 25 mg/dL     _  Result Component Current Result Ref Range   Creatinine 0.78 (8/1/2022) 0.70 - 1.30 mg/dL       _  Result Component Current Result Ref Range   AST 8 " (L) (8/1/2022) 13 - 39 U/L     _  Result Component Current Result Ref Range   ALT 7 (8/1/2022) 7 - 52 U/L     _  Result Component Current Result Ref Range   Bilirubin Total 0.3 (8/1/2022) 0.3 - 1.0 mg/dL       _  Result Component Current Result Ref Range   WBC Count 5.7 (8/1/2022) 4.0 - 11.0 10e3/uL     _  Result Component Current Result Ref Range   Hemoglobin 13.1 (L) (8/1/2022) 13.3 - 17.7 g/dL     _  Result Component Current Result Ref Range   Platelet Count 193 (8/1/2022) 150 - 450 10e3/uL     No results found for ANC within last 30 days.         Assessment:  Patient is appropriate to start therapy.    Plan:  Basic chemotherapy teaching was reviewed with the patient including indication, start date of therapy, dose, administration, adverse effects, missed doses, food and drug interactions, monitoring, side effect management, office contact information, and safe handling. Written materials were provided and all questions answered.    Follow-Up:  8/18/22- 1 week follow up call to assess tolerability and determine start date     [facility]  Oncology Pharmacy [facility]  [phone]

## 2022-08-08 NOTE — TELEPHONE ENCOUNTER
Rx for Zytiga needs to be sent to [facility] ASAP. Cannot fill at [facility] per insurance  Elza Lundberg RN...........8/8/2022 3:37 PM

## 2022-08-09 DIAGNOSIS — R97.21 RISING PSA FOLLOWING TREATMENT FOR MALIGNANT NEOPLASM OF PROSTATE: ICD-10-CM

## 2022-08-09 DIAGNOSIS — C61 MALIGNANT NEOPLASM OF PROSTATE (H): Primary | ICD-10-CM

## 2022-08-09 RX ORDER — ABIRATERONE ACETATE 250 MG/1
1000 TABLET ORAL DAILY
Qty: 120 TABLET | Refills: 0 | Status: SHIPPED | OUTPATIENT
Start: 2022-08-15 | End: 2022-09-14

## 2022-08-11 NOTE — TELEPHONE ENCOUNTER
Free Drug Application Initiated  Medication: Zytiga 250mg  Sponsor: [employer]  Phone #: [phone]  Fax #: [phone] or [phone]  Additional Information: [employer] called back income 44K 2pp  Sent application to Dr. Fritz to sign/date

## 2022-08-11 NOTE — TELEPHONE ENCOUNTER
Application has been signed/dated by Dr. Fritz just waiting till [employer] sends page one of tax return

## 2022-08-16 NOTE — TELEPHONE ENCOUNTER
Free Drug Application Approved  Effective Dates: 8/16/22-12/31/22  Patient notified: yes, left vm to call me back  Additional Information: Filling pharmacy [facility] ph: [phone]

## 2022-08-16 NOTE — TELEPHONE ENCOUNTER
Spoke to Danuta at [employer]. Application has been received and checking benefits and should be done tomorrow per the rep

## 2022-08-16 NOTE — TELEPHONE ENCOUNTER
Spoke to Jermain and gave him the filling pharmacy info and told him about the attestation letter that needs to be sent back before 10/15/22 and will come in to fax back

## 2022-08-25 ENCOUNTER — TELEPHONE (OUTPATIENT)
Dept: FAMILY MEDICINE | Facility: OTHER | Age: 67
End: 2022-08-25

## 2022-08-25 NOTE — TELEPHONE ENCOUNTER
PBI-patient is looking for an early refill on his pain medication because he is leaving early for [place] 09.07.22     Please call and advise  Thank You    Vee Mejia on 8/25/2022 at 10:23 AM

## 2022-08-26 NOTE — TELEPHONE ENCOUNTER
Patient is leaving for [place] on Wednesday the 7th.  Patient would like to get the Okay to fill on the 7th versus the 9th.    Leona Shell LPN 8/26/2022 9:37 AM

## 2022-08-26 NOTE — TELEPHONE ENCOUNTER
Pharmacy notified of providers note.  Patient notified of status.        Leona Shell LPN 8/26/2022 3:40 PM

## 2022-08-29 ENCOUNTER — TELEPHONE (OUTPATIENT)
Dept: ONCOLOGY | Facility: OTHER | Age: 67
End: 2022-08-29

## 2022-08-29 ENCOUNTER — TELEPHONE (OUTPATIENT)
Dept: PHARMACY | Facility: CLINIC | Age: 67
End: 2022-08-29

## 2022-08-29 NOTE — TELEPHONE ENCOUNTER
"Oral Chemotherapy Monitoring Program    Primary Oncologist: Dr. Fritz   Drug: Abiraterone/Prednisone  Start Date: 8/24/2022    Subjective/Objective:  Dilip Kan is a 67 year old male contacted by phone for a follow-up visit for oral chemotherapy.      ORAL CHEMOTHERAPY 6/28/2022 8/8/2022 8/29/2022 8/29/2022   Assessment Type Lab Monitoring New Teach Left Voicemail Initial Follow up   Diagnosis Code Prostate Cancer Prostate Cancer Prostate Cancer Prostate Cancer   Providers Catrina Fritz   Clinic Name/Location [facility]   Drug Name Erleada (apalutamide) Zytiga (abiraterone) Zytiga (abiraterone) Zytiga (abiraterone)   Dose 240 mg 1,000 mg 1,000 mg 1,000 mg   Current Schedule Daily Daily Daily Daily   Cycle Details Continuous Continuous Continuous Continuous   Planned next cycle start date - - - 8/24/2022   Doses missed in last 2 weeks - - - 0   Adherence Assessment - - - Adherent   Adverse Effects - - - Fatigue   Fatigue - - - Grade 1   Pharmacist Intervention(fatigue) - - - No   Home BPs - - - Not applicable   Any new drug interactions? - - - No   Is the dose as ordered appropriate for the patient? - - - Yes   Is the patient currently in pain? - - - No   Has the patient been assessed within the past 6 months for depression? - - - Yes   Has the patient missed any days of school, work, or other routine activity? - - - No   Since the last time we talked, have you been hospitalized or used the emergency room? - - - No     Vitals:  BP:   BP Readings from Last 1 Encounters:   08/05/22 120/74     Wt Readings from Last 1 Encounters:   08/05/22 106.1 kg (234 lb)     Estimated body surface area is 2.28 meters squared as calculated from the following:    Height as of 3/29/22: 1.759 m (5' 9.25\").    Weight as of 8/5/22: 106.1 kg (234 lb).    Labs:  _  Result Component Current Result Ref Range   Sodium 141 (8/1/2022) 134 - 144 mmol/L     _  Result Component " Current Result Ref Range   Potassium 4.5 (8/1/2022) 3.5 - 5.1 mmol/L     _  Result Component Current Result Ref Range   Calcium 9.2 (8/1/2022) 8.6 - 10.3 mg/dL     Result Component Current Result Ref Range   Albumin 3.8 (8/1/2022) 3.5 - 5.7 g/dL     _  Result Component Current Result Ref Range   Urea Nitrogen 14 (8/1/2022) 7 - 25 mg/dL     _  Result Component Current Result Ref Range   Creatinine 0.78 (8/1/2022) 0.70 - 1.30 mg/dL       _  Result Component Current Result Ref Range   AST 8 (L) (8/1/2022) 13 - 39 U/L     _  Result Component Current Result Ref Range   ALT 7 (8/1/2022) 7 - 52 U/L     _  Result Component Current Result Ref Range   Bilirubin Total 0.3 (8/1/2022) 0.3 - 1.0 mg/dL       _  Result Component Current Result Ref Range   WBC Count 5.7 (8/1/2022) 4.0 - 11.0 10e3/uL     _  Result Component Current Result Ref Range   Hemoglobin 13.1 (L) (8/1/2022) 13.3 - 17.7 g/dL     _  Result Component Current Result Ref Range   Platelet Count 193 (8/1/2022) 150 - 450 10e3/uL     No results found for ANC within last 30 days.         Assessment/Plan:  I spoke with patient today. Patient reports doing feeling pretty crummy and tired since starting the Zytiga. Patient is compliant with therapy and no missed doses. Future appointments have been confirmed with patient. I will communicate patients symptoms to the care team as an FYI. We will continue to follow. After discussing with Dr. Fritz we will dose reduce Jermain to 750 mg (3x 250mg tablets) daily. This plan has been communicated to the patient. Will send new Rx to drug company moving forward.     Follow-Up:  9/1 Amanda RenteriaD, BCOP  Hematology/Oncology Clinical Pharmacist  [facility]  [phone]

## 2022-08-29 NOTE — TELEPHONE ENCOUNTER
----- Message from Joana Garay [facility] sent at 8/29/2022 12:25 PM CDT -----  Regarding: RE: Zytiga Adverse Effects  Sounds good. Will you update his order in the treatment plan and then I will connect with patient about the dose adjustment. Thanks!    Dieudonne Garay PharmD, [facility]  Hematology/Oncology Clinical Pharmacist  [facility]  [phone]    ----- Message -----  From: Erum Fritz MD  Sent: 8/29/2022  12:23 PM CDT  To: Joana Garay [facility]  Subject: RE: Zytiga Adverse Effects                       We can dose reduve him to 750mg of Zytiga  ----- Message -----  From: Joana Garay [facility]  Sent: 8/29/2022  11:51 AM CDT  To: Erum Fritz MD, Elza Lundberg RN  Subject: Zytiga Adverse Effects                           Dr. Fritz and Elza,    Just reaching out to give you a heads up about this patient. I was doing his 1 week f/u call to see how he is tolerating his Zytiga therapy. He started 8/24 and is reporting significant fatigue and generally just feeling crummy. He said he feels worse than when he was on the Erleada (apalutamide) therapy. He is getting labs this coming Thursday 9/1 and sees Dr. Fritz again 10/25. He is concerned about tolerability and is wanting to know about a possible dose reduction or medication adjustment. Just wanted to make your team aware. I told him to call us if things get worse or if they are effecting his normal daily activities. Thanks!      Dieudonne Garay PharmD, [facility]  Hematology/Oncology Clinical Pharmacist  [facility]  [phone]

## 2022-08-29 NOTE — ORAL ONC MGMT
Oral Chemotherapy Monitoring Program     Placed call to patient in follow up of oral chemotherapy. Left message requesting call back. No drug names were mentioned. Will update when response received.     Devon Osorio, PharmD, MS  Hematology/Oncology Clinical Pharmacist  [facility]

## 2022-08-29 NOTE — TELEPHONE ENCOUNTER
Jermain states that he has already spoke with Joana from [facility] and is aware of plan and how to take lower dose. Advised to continue with lab appt.'s as scheduled. Advised to call right away if he still does not tolerate Zytiga at lowered dose. Jermain states understanding.   Elza Lundberg RN...........8/29/2022 4:23 PM

## 2022-08-30 DIAGNOSIS — R97.21 RISING PSA FOLLOWING TREATMENT FOR MALIGNANT NEOPLASM OF PROSTATE: ICD-10-CM

## 2022-08-30 DIAGNOSIS — C61 MALIGNANT NEOPLASM OF PROSTATE (H): Primary | ICD-10-CM

## 2022-08-31 DIAGNOSIS — R97.21 RISING PSA FOLLOWING TREATMENT FOR MALIGNANT NEOPLASM OF PROSTATE: ICD-10-CM

## 2022-08-31 DIAGNOSIS — C61 MALIGNANT NEOPLASM OF PROSTATE (H): Primary | ICD-10-CM

## 2022-08-31 RX ORDER — ABIRATERONE ACETATE 250 MG/1
750 TABLET ORAL DAILY
Qty: 90 TABLET | Refills: 11 | Status: SHIPPED | OUTPATIENT
Start: 2022-09-14 | End: 2022-10-14

## 2022-09-19 NOTE — TELEPHONE ENCOUNTER
Spoke to Diamante at [employer]. Attestation letter has made it in and fully approved till end of year 12/31/2022

## 2022-09-29 ENCOUNTER — LAB (OUTPATIENT)
Dept: LAB | Facility: OTHER | Age: 67
End: 2022-09-29
Attending: INTERNAL MEDICINE
Payer: MEDICARE

## 2022-09-29 ENCOUNTER — DOCUMENTATION ONLY (OUTPATIENT)
Dept: ONCOLOGY | Facility: CLINIC | Age: 67
End: 2022-09-29

## 2022-09-29 DIAGNOSIS — C61 MALIGNANT NEOPLASM OF PROSTATE (H): ICD-10-CM

## 2022-09-29 LAB
ALBUMIN SERPL-MCNC: 3.8 G/DL (ref 3.5–5.7)
ALP SERPL-CCNC: 83 U/L (ref 34–104)
ALT SERPL W P-5'-P-CCNC: 8 U/L (ref 7–52)
ANION GAP SERPL CALCULATED.3IONS-SCNC: 7 MMOL/L (ref 3–14)
AST SERPL W P-5'-P-CCNC: 7 U/L (ref 13–39)
BASOPHILS # BLD AUTO: 0 10E3/UL (ref 0–0.2)
BASOPHILS NFR BLD AUTO: 1 %
BILIRUB SERPL-MCNC: 0.5 MG/DL (ref 0.3–1)
BUN SERPL-MCNC: 19 MG/DL (ref 7–25)
CALCIUM SERPL-MCNC: 9.4 MG/DL (ref 8.6–10.3)
CHLORIDE BLD-SCNC: 105 MMOL/L (ref 98–107)
CO2 SERPL-SCNC: 29 MMOL/L (ref 21–31)
CREAT SERPL-MCNC: 0.71 MG/DL (ref 0.7–1.3)
EOSINOPHIL # BLD AUTO: 0.3 10E3/UL (ref 0–0.7)
EOSINOPHIL NFR BLD AUTO: 4 %
ERYTHROCYTE [DISTWIDTH] IN BLOOD BY AUTOMATED COUNT: 12.6 % (ref 10–15)
GFR SERPL CREATININE-BSD FRML MDRD: >90 ML/MIN/1.73M2
GLUCOSE BLD-MCNC: 99 MG/DL (ref 70–105)
HCT VFR BLD AUTO: 39.4 % (ref 40–53)
HGB BLD-MCNC: 13.3 G/DL (ref 13.3–17.7)
IMM GRANULOCYTES # BLD: 0 10E3/UL
IMM GRANULOCYTES NFR BLD: 0 %
LYMPHOCYTES # BLD AUTO: 1.8 10E3/UL (ref 0.8–5.3)
LYMPHOCYTES NFR BLD AUTO: 25 %
MCH RBC QN AUTO: 29.9 PG (ref 26.5–33)
MCHC RBC AUTO-ENTMCNC: 33.8 G/DL (ref 31.5–36.5)
MCV RBC AUTO: 89 FL (ref 78–100)
MONOCYTES # BLD AUTO: 0.6 10E3/UL (ref 0–1.3)
MONOCYTES NFR BLD AUTO: 8 %
NEUTROPHILS # BLD AUTO: 4.5 10E3/UL (ref 1.6–8.3)
NEUTROPHILS NFR BLD AUTO: 62 %
NRBC # BLD AUTO: 0 10E3/UL
NRBC BLD AUTO-RTO: 0 /100
PLATELET # BLD AUTO: 181 10E3/UL (ref 150–450)
POTASSIUM BLD-SCNC: 4 MMOL/L (ref 3.5–5.1)
PROT SERPL-MCNC: 6.5 G/DL (ref 6.4–8.9)
PSA SERPL-MCNC: 2.37 UG/L (ref 0–4)
RBC # BLD AUTO: 4.45 10E6/UL (ref 4.4–5.9)
SODIUM SERPL-SCNC: 141 MMOL/L (ref 134–144)
WBC # BLD AUTO: 7.2 10E3/UL (ref 4–11)

## 2022-09-29 PROCEDURE — 36415 COLL VENOUS BLD VENIPUNCTURE: CPT | Mod: ZL

## 2022-09-29 PROCEDURE — 85025 COMPLETE CBC W/AUTO DIFF WBC: CPT | Mod: ZL

## 2022-09-29 PROCEDURE — 84153 ASSAY OF PSA TOTAL: CPT | Mod: ZL

## 2022-09-29 PROCEDURE — 80053 COMPREHEN METABOLIC PANEL: CPT | Mod: ZL

## 2022-09-29 NOTE — PROGRESS NOTES
Oral Chemotherapy Program  Lab review     Reviewed labs from 9/29/2022.     Labs are unremarkable and do not require any dose adjustments at this time     Follow-up/plan  10/13/2022: Labs     Shanna Storey PharmD, MPH, BCPS  September 29, 2022

## 2022-10-05 ENCOUNTER — OFFICE VISIT (OUTPATIENT)
Dept: FAMILY MEDICINE | Facility: OTHER | Age: 67
End: 2022-10-05
Attending: FAMILY MEDICINE
Payer: MEDICARE

## 2022-10-05 VITALS
DIASTOLIC BLOOD PRESSURE: 78 MMHG | HEIGHT: 70 IN | WEIGHT: 227 LBS | BODY MASS INDEX: 32.5 KG/M2 | SYSTOLIC BLOOD PRESSURE: 138 MMHG | HEART RATE: 68 BPM | TEMPERATURE: 98.4 F | RESPIRATION RATE: 16 BRPM | OXYGEN SATURATION: 98 %

## 2022-10-05 DIAGNOSIS — M48.062 SPINAL STENOSIS OF LUMBAR REGION WITH NEUROGENIC CLAUDICATION: Primary | ICD-10-CM

## 2022-10-05 DIAGNOSIS — I71.21 ANEURYSM OF ASCENDING AORTA WITHOUT RUPTURE (H): ICD-10-CM

## 2022-10-05 DIAGNOSIS — F11.90 CHRONIC, CONTINUOUS USE OF OPIOIDS: ICD-10-CM

## 2022-10-05 DIAGNOSIS — Z12.11 SPECIAL SCREENING FOR MALIGNANT NEOPLASMS, COLON: ICD-10-CM

## 2022-10-05 DIAGNOSIS — Z23 NEEDS FLU SHOT: ICD-10-CM

## 2022-10-05 DIAGNOSIS — C61 MALIGNANT NEOPLASM OF PROSTATE (H): ICD-10-CM

## 2022-10-05 DIAGNOSIS — Z98.1 HISTORY OF LUMBAR SPINAL FUSION: ICD-10-CM

## 2022-10-05 DIAGNOSIS — E11.9 TYPE 2 DIABETES MELLITUS WITHOUT COMPLICATION, WITHOUT LONG-TERM CURRENT USE OF INSULIN (H): ICD-10-CM

## 2022-10-05 DIAGNOSIS — I27.20 MILD PULMONARY HYPERTENSION (H): ICD-10-CM

## 2022-10-05 DIAGNOSIS — Z23 NEED FOR PNEUMOCOCCAL VACCINE: ICD-10-CM

## 2022-10-05 PROCEDURE — 90732 PPSV23 VACC 2 YRS+ SUBQ/IM: CPT

## 2022-10-05 PROCEDURE — G0009 ADMIN PNEUMOCOCCAL VACCINE: HCPCS

## 2022-10-05 PROCEDURE — G0008 ADMIN INFLUENZA VIRUS VAC: HCPCS

## 2022-10-05 PROCEDURE — 99214 OFFICE O/P EST MOD 30 MIN: CPT | Performed by: FAMILY MEDICINE

## 2022-10-05 PROCEDURE — G0463 HOSPITAL OUTPT CLINIC VISIT: HCPCS | Mod: 25

## 2022-10-05 RX ORDER — HYDROCODONE BITARTRATE AND ACETAMINOPHEN 10; 325 MG/1; MG/1
1 TABLET ORAL
Qty: 150 TABLET | Refills: 0 | Status: SHIPPED | OUTPATIENT
Start: 2022-12-08 | End: 2022-12-02

## 2022-10-05 RX ORDER — HYDROCODONE BITARTRATE AND ACETAMINOPHEN 10; 325 MG/1; MG/1
1 TABLET ORAL
Qty: 150 TABLET | Refills: 0 | Status: SHIPPED | OUTPATIENT
Start: 2022-10-05 | End: 2022-12-02

## 2022-10-05 RX ORDER — HYDROCODONE BITARTRATE AND ACETAMINOPHEN 10; 325 MG/1; MG/1
1 TABLET ORAL
Qty: 150 TABLET | Refills: 0 | Status: SHIPPED | OUTPATIENT
Start: 2022-11-08 | End: 2023-02-03

## 2022-10-05 ASSESSMENT — ANXIETY QUESTIONNAIRES
5. BEING SO RESTLESS THAT IT IS HARD TO SIT STILL: SEVERAL DAYS
1. FEELING NERVOUS, ANXIOUS, OR ON EDGE: SEVERAL DAYS
IF YOU CHECKED OFF ANY PROBLEMS ON THIS QUESTIONNAIRE, HOW DIFFICULT HAVE THESE PROBLEMS MADE IT FOR YOU TO DO YOUR WORK, TAKE CARE OF THINGS AT HOME, OR GET ALONG WITH OTHER PEOPLE: SOMEWHAT DIFFICULT
4. TROUBLE RELAXING: SEVERAL DAYS
2. NOT BEING ABLE TO STOP OR CONTROL WORRYING: SEVERAL DAYS
GAD7 TOTAL SCORE: 5
7. FEELING AFRAID AS IF SOMETHING AWFUL MIGHT HAPPEN: NOT AT ALL
GAD7 TOTAL SCORE: 5
8. IF YOU CHECKED OFF ANY PROBLEMS, HOW DIFFICULT HAVE THESE MADE IT FOR YOU TO DO YOUR WORK, TAKE CARE OF THINGS AT HOME, OR GET ALONG WITH OTHER PEOPLE?: SOMEWHAT DIFFICULT
3. WORRYING TOO MUCH ABOUT DIFFERENT THINGS: SEVERAL DAYS
GAD7 TOTAL SCORE: 5
7. FEELING AFRAID AS IF SOMETHING AWFUL MIGHT HAPPEN: NOT AT ALL
6. BECOMING EASILY ANNOYED OR IRRITABLE: NOT AT ALL

## 2022-10-05 ASSESSMENT — PAIN SCALES - GENERAL: PAINLEVEL: MODERATE PAIN (4)

## 2022-10-05 ASSESSMENT — PATIENT HEALTH QUESTIONNAIRE - PHQ9
10. IF YOU CHECKED OFF ANY PROBLEMS, HOW DIFFICULT HAVE THESE PROBLEMS MADE IT FOR YOU TO DO YOUR WORK, TAKE CARE OF THINGS AT HOME, OR GET ALONG WITH OTHER PEOPLE: SOMEWHAT DIFFICULT
SUM OF ALL RESPONSES TO PHQ QUESTIONS 1-9: 4
SUM OF ALL RESPONSES TO PHQ QUESTIONS 1-9: 4

## 2022-10-05 NOTE — PROGRESS NOTES
Assessment & Plan       ICD-10-CM    1. Spinal stenosis of lumbar region with neurogenic claudication  M48.062 HYDROcodone-acetaminophen (NORCO)  MG per tablet     HYDROcodone-acetaminophen (NORCO)  MG per tablet     HYDROcodone-acetaminophen (NORCO)  MG per tablet   2. History of lumbar spinal fusion  Z98.1 HYDROcodone-acetaminophen (NORCO)  MG per tablet     HYDROcodone-acetaminophen (NORCO)  MG per tablet     HYDROcodone-acetaminophen (NORCO)  MG per tablet   3. Chronic, continuous use of opioids  F11.90 HYDROcodone-acetaminophen (NORCO)  MG per tablet     HYDROcodone-acetaminophen (NORCO)  MG per tablet     HYDROcodone-acetaminophen (NORCO)  MG per tablet   4. Malignant neoplasm of prostate (H)  C61    5. Type 2 diabetes mellitus without complication, without long-term current use of insulin (H)  E11.9    6. Aneurysm of ascending aorta without rupture  I71.21    7. Mild pulmonary hypertension (H)  I27.20    8. Needs flu shot  Z23 FLU SHOT 65+ (FLUZONE HD)   9. Need for pneumococcal vaccine  Z23 GH IMM-  PNEUMOCOCCAL VACCINE,ADULT,SQ OR IM   10. Special screening for malignant neoplasms, colon  Z12.11 ADEN([employer])     Here for opioid refill.  Spent a lot of time discussing goals of care with prostate cancer.  He is not tolerating Zytiga very well.  It sounds like next level treatment is chemotherapy.  Currently is active and functional enough to enjoy quality of life.  We discussed considerations including DNR status if his level of function declines.  He is still looking for years of life.  If life expectancy is measured in shorter timeframe, then will not focus as much on restricting dosing of opioids.  For now, refilled hydrocodone 10/325 mg taking 5 times daily.  Refilled early today at 5 over fifth.  He had received a refill early on September 7, but refill date is closer to the 10th of each month.  Refill November 8 and December 8.  PDMP  Review       Value Time User    State PDMP site checked  Yes 10/5/2022  9:34 AM Wei Perez MD        Due for A1c.  Ordered to coincide with next oncology labs October 13.    He has a mild ascending aortic aneurysm and mild pulmonary hypertension, following with cardiology.  These were stable on last echocardiogram.  He is scheduled for reevaluation in March 2023.    Influenza vaccine provided.  Completed pneumonia vaccination series.  Consider COVID bivalent booster.    Discussed cancer screenings.  He is overdue for colon cancer screening.  It is hard to determine life expectancy.  However, if he were found to have colon cancer, that could alter the treatment recommendations for prostate cancer.  He is agreeable to performing Cologuard, which is ordered.      30 minutes spent on the date of the encounter doing chart review, patient visit and documentation     Return in about 3 months (around 1/5/2023).    Wei Perez MD  [facility] [place]    Varun Aly is a 67 year old, presenting for the following health issues:  Recheck Medication      History of Present Illness       Back Pain:  He presents for follow up of back pain. Patient's back pain is a chronic problem.  Location of back pain:  Left lower back  Description of back pain: sharp, shooting and stabbing  Back pain spreads: right buttocks, left buttocks and left knee    Since patient first noticed back pain, pain is: unchanged  Does back pain interfere with his job:  Not applicable      He eats 0-1 servings of fruits and vegetables daily.He consumes 1 sweetened beverage(s) daily.He exercises with enough effort to increase his heart rate 20 to 29 minutes per day.  He exercises with enough effort to increase his heart rate 3 or less days per week.   He is taking medications regularly.    Today's PHQ-9         PHQ-9 Total Score: 4    PHQ-9 Q9 Thoughts of better off dead/self-harm past 2 weeks :   Not at all    How difficult have  "these problems made it for you to do your work, take care of things at home, or get along with other people: Somewhat difficult  Today's MARY-7 Score: 5     Recurrent prostate cancer, treatment recently changed.  He is having trouble tolerating Zytiga.  Started on prednisone as well.  Does not check blood sugars    Received hydrocodone early to take a fishing trip.  Still on 5/day.        Review of Systems   General: Denies general constitutional problems  Cardiovascular: Denies problems  Respiratory: Denies problems        Objective    /78 (BP Location: Right arm, Patient Position: Sitting, Cuff Size: Adult Large)   Pulse 68   Temp 98.4  F (36.9  C) (Tympanic)   Resp 16   Ht 1.765 m (5' 9.5\")   Wt 103 kg (227 lb)   SpO2 98%   BMI 33.04 kg/m    Body mass index is 33.04 kg/m .  Physical Exam   General Appearance: Alert. No acute distress  Psychiatric: Normal affect and mentation                      "

## 2022-10-05 NOTE — NURSING NOTE
"Patient presents to the clinic for controlled medication refill.  Last administration of [place] was last night around 2130.  Last administration of Lyrica was las night around 2130.   Contract signed 12-15-21, and TOX obtain 3-7-22.   FOOD SECURITY SCREENING QUESTIONS:    The next two questions are to help us understand your food security.  If you are feeling you need any assistance in this area, we have resources available to support you today.    Hunger Vital Signs:  Within the past 12 months we worried whether our food would run out before we got money to buy more. Never  Within the past 12 months the food we bought just didn't last and we didn't have money to get more. Never    Advance Care Directive on file? no  Advance Care Directive provided to patient? Declined.  Chief Complaint   Patient presents with     Recheck Medication       Initial /78 (BP Location: Right arm, Patient Position: Sitting, Cuff Size: Adult Large)   Pulse 68   Temp 98.4  F (36.9  C) (Tympanic)   Resp 16   Ht 1.765 m (5' 9.5\")   Wt 103 kg (227 lb)   SpO2 98%   BMI 33.04 kg/m   Estimated body mass index is 33.04 kg/m  as calculated from the following:    Height as of this encounter: 1.765 m (5' 9.5\").    Weight as of this encounter: 103 kg (227 lb).  Medication Reconciliation: complete        Leona Shell LPN       "

## 2022-10-05 NOTE — PATIENT INSTRUCTIONS
Refilled hydrocodone for today, then Nov 8 and Dec 8  Flu and pneumonia shots today  Consider COVID booster  Ask lab to draw an A1c next week

## 2022-10-05 NOTE — LETTER
November 22, 2022      Jermain MAILE Lucius  [address] [place] [address]        Dear ,    We are writing to inform you of your test results. The Cologuard test is negative. Plan to repeat colon cancer screening in 3 years.      Resulted Orders   COLOGUARD([employer])   Result Value Ref Range    COLOGUARD-ABSTRACT Negative Negative       If you have any questions or concerns, please call the clinic at the number listed above.       Sincerely,      Wei Perez MD

## 2022-10-13 ENCOUNTER — DOCUMENTATION ONLY (OUTPATIENT)
Dept: ONCOLOGY | Facility: CLINIC | Age: 67
End: 2022-10-13

## 2022-10-13 ENCOUNTER — LAB (OUTPATIENT)
Dept: LAB | Facility: OTHER | Age: 67
End: 2022-10-13
Attending: INTERNAL MEDICINE
Payer: MEDICARE

## 2022-10-13 DIAGNOSIS — E11.9 TYPE 2 DIABETES MELLITUS WITHOUT COMPLICATION, WITHOUT LONG-TERM CURRENT USE OF INSULIN (H): ICD-10-CM

## 2022-10-13 DIAGNOSIS — C61 MALIGNANT NEOPLASM OF PROSTATE (H): ICD-10-CM

## 2022-10-13 LAB
ALBUMIN SERPL-MCNC: 3.8 G/DL (ref 3.5–5.7)
ALP SERPL-CCNC: 86 U/L (ref 34–104)
ALT SERPL W P-5'-P-CCNC: 9 U/L (ref 7–52)
ANION GAP SERPL CALCULATED.3IONS-SCNC: 6 MMOL/L (ref 3–14)
AST SERPL W P-5'-P-CCNC: 9 U/L (ref 13–39)
BASOPHILS # BLD AUTO: 0 10E3/UL (ref 0–0.2)
BASOPHILS NFR BLD AUTO: 1 %
BILIRUB SERPL-MCNC: 0.6 MG/DL (ref 0.3–1)
BUN SERPL-MCNC: 15 MG/DL (ref 7–25)
CALCIUM SERPL-MCNC: 9.6 MG/DL (ref 8.6–10.3)
CHLORIDE BLD-SCNC: 103 MMOL/L (ref 98–107)
CO2 SERPL-SCNC: 30 MMOL/L (ref 21–31)
CREAT SERPL-MCNC: 0.69 MG/DL (ref 0.7–1.3)
EOSINOPHIL # BLD AUTO: 0.2 10E3/UL (ref 0–0.7)
EOSINOPHIL NFR BLD AUTO: 3 %
ERYTHROCYTE [DISTWIDTH] IN BLOOD BY AUTOMATED COUNT: 12.7 % (ref 10–15)
GFR SERPL CREATININE-BSD FRML MDRD: >90 ML/MIN/1.73M2
GLUCOSE BLD-MCNC: 100 MG/DL (ref 70–105)
HBA1C MFR BLD: 6.3 % (ref 4–6.2)
HCT VFR BLD AUTO: 39.1 % (ref 40–53)
HGB BLD-MCNC: 13.1 G/DL (ref 13.3–17.7)
IMM GRANULOCYTES # BLD: 0 10E3/UL
IMM GRANULOCYTES NFR BLD: 0 %
LYMPHOCYTES # BLD AUTO: 1.8 10E3/UL (ref 0.8–5.3)
LYMPHOCYTES NFR BLD AUTO: 29 %
MCH RBC QN AUTO: 29.9 PG (ref 26.5–33)
MCHC RBC AUTO-ENTMCNC: 33.5 G/DL (ref 31.5–36.5)
MCV RBC AUTO: 89 FL (ref 78–100)
MONOCYTES # BLD AUTO: 0.5 10E3/UL (ref 0–1.3)
MONOCYTES NFR BLD AUTO: 9 %
NEUTROPHILS # BLD AUTO: 3.8 10E3/UL (ref 1.6–8.3)
NEUTROPHILS NFR BLD AUTO: 58 %
NRBC # BLD AUTO: 0 10E3/UL
NRBC BLD AUTO-RTO: 0 /100
PLAT MORPH BLD: ABNORMAL
PLATELET # BLD AUTO: 205 10E3/UL (ref 150–450)
POTASSIUM BLD-SCNC: 4 MMOL/L (ref 3.5–5.1)
PROT SERPL-MCNC: 6.6 G/DL (ref 6.4–8.9)
PSA SERPL-MCNC: 2.86 UG/L (ref 0–4)
RBC # BLD AUTO: 4.38 10E6/UL (ref 4.4–5.9)
RBC MORPH BLD: ABNORMAL
SODIUM SERPL-SCNC: 139 MMOL/L (ref 134–144)
VARIANT LYMPHS BLD QL SMEAR: PRESENT
WBC # BLD AUTO: 6.4 10E3/UL (ref 4–11)

## 2022-10-13 PROCEDURE — 82040 ASSAY OF SERUM ALBUMIN: CPT | Mod: ZL

## 2022-10-13 PROCEDURE — 83036 HEMOGLOBIN GLYCOSYLATED A1C: CPT | Mod: ZL

## 2022-10-13 PROCEDURE — 80053 COMPREHEN METABOLIC PANEL: CPT | Mod: ZL

## 2022-10-13 PROCEDURE — 36415 COLL VENOUS BLD VENIPUNCTURE: CPT | Mod: ZL

## 2022-10-13 PROCEDURE — 85004 AUTOMATED DIFF WBC COUNT: CPT | Mod: ZL

## 2022-10-13 PROCEDURE — 84153 ASSAY OF PSA TOTAL: CPT | Mod: ZL

## 2022-10-13 NOTE — PROGRESS NOTES
Oral Chemotherapy Program  Lab review     Reviewed labs from 10/13     Labs are unremarkable and do not require any dose adjustments at this time     Follow-up/plan  10/2 appt with Dr. Catrina Garay, PharmD, [facility]  Hematology/Oncology Clinical Pharmacist  [facility]  [phone]

## 2022-10-25 ENCOUNTER — ONCOLOGY VISIT (OUTPATIENT)
Dept: ONCOLOGY | Facility: OTHER | Age: 67
End: 2022-10-25
Attending: INTERNAL MEDICINE
Payer: COMMERCIAL

## 2022-10-25 VITALS
OXYGEN SATURATION: 96 % | SYSTOLIC BLOOD PRESSURE: 136 MMHG | RESPIRATION RATE: 20 BRPM | WEIGHT: 236 LBS | TEMPERATURE: 97.7 F | DIASTOLIC BLOOD PRESSURE: 78 MMHG | HEART RATE: 70 BPM | BODY MASS INDEX: 34.35 KG/M2

## 2022-10-25 DIAGNOSIS — C61 MALIGNANT NEOPLASM OF PROSTATE (H): Primary | ICD-10-CM

## 2022-10-25 DIAGNOSIS — R97.21 RISING PSA FOLLOWING TREATMENT FOR MALIGNANT NEOPLASM OF PROSTATE: ICD-10-CM

## 2022-10-25 PROCEDURE — 99417 PROLNG OP E/M EACH 15 MIN: CPT | Performed by: INTERNAL MEDICINE

## 2022-10-25 PROCEDURE — G0463 HOSPITAL OUTPT CLINIC VISIT: HCPCS

## 2022-10-25 PROCEDURE — 99215 OFFICE O/P EST HI 40 MIN: CPT | Performed by: INTERNAL MEDICINE

## 2022-10-25 RX ORDER — ABIRATERONE ACETATE 250 MG/1
1000 TABLET ORAL DAILY
Qty: 120 TABLET | Refills: 0 | Status: SHIPPED | OUTPATIENT
Start: 2022-10-25 | End: 2022-11-24

## 2022-10-25 ASSESSMENT — PAIN SCALES - GENERAL
PAINLEVEL: MODERATE PAIN (5)
PAINLEVEL: MODERATE PAIN (4)

## 2022-10-25 NOTE — NURSING NOTE
Chief Complaint   Patient presents with     Prostate Cancer     Follow up Labs and oral Treatment        Medication Reconciliation: complete   Patient states he has not been feeling well.     Cynthia Mckeon LPN........................10/25/2022  10:31 AM

## 2022-10-25 NOTE — PROGRESS NOTES
Visit Date: 10/25/2022    HEMATOLOGY/ONCOLOGY CLINIC NOTE     HISTORY OF PRESENT ILLNESS:  Mr. Kan returns for followup of a rising PSA following treatment for malignant neoplasm of the prostate.  We had seen the patient in consultation at the request of Dr. Arrington back on 09/15/2021.  At that time, Mr. Kan was a 66-year-old white male with previous history of aortic valve stenosis and type 2 diabetes mellitus, whom we were asked to evaluate concerning a rising PSA in a castrate-resistant patient with prostate cancer.  The patient had undergone a prostatectomy back in 2012 for prostate cancer.  This was followed by SBRT in 2013.  The patient was followed by Dr. Celestine Arrington of Urology, who noted a rising PSA and placed the patient on androgen deprivation therapy, initially with Vantas implant and then switched to Lupron.  His PSA has been rising in the recent past.  On 07/09/2020, it was 0.975, then on 11/23/2020, it pineda to 1.215.  Dr. Arrington ordered staging studies, CT chest, abdomen and pelvis.  There was no evidence of metastatic disease.  There was a dilated thoracic aorta measuring 4.4 cm in the mid ascending segment.  PSA continued to rise, and on 02/23/2021, it was up to 1.847, then on 08/25/2021, it was up to 3.48; therefore, the patient had a PSA doubling time of less than 10 months and, therefore, we considered him to be castrate-resistant M0 prostate cancer.  The patient was essentially asymptomatic.  He denied any fevers, night sweats, weight loss or bone pain.  He did have urinary incontinence and had to wear Depends for the most part.  He could not tolerate the Vantas implant.  The patient was seen for chemotherapeutic options for nonmetastatic castrate-resistant prostate cancer.  We had seen the patient and felt he would be a candidate for apalutamide, which is indicated in the setting of castrate-resistant M0 prostate cancer that is nonmetastatic.  The patient was started on apalutamide 240 mg p.o.  daily.  He also had scans initially on 09/17/2021.  Bone scan was negative.  CT chest, abdomen and pelvis were all negative.  There was some soft tissue density in the left lower pelvis in the region of the left seminal vesicle, which was somewhat concerning.  The patient otherwise was on apalutamide for 3 weeks and his PSA dropped from 3.48 down to 1.62.  He did have some fatigue associated with apalutamide.  His PSA continued to drop.  On 11/03/2021, it was down to 1.49.  The patient was continued on androgen deprivation therapy with Dr. Arrington, receiving Lupron 45 mg q.6 months.  The patient had a right knee replacement and he was recovering from that.  When we saw the patient on 07/07/2022, his PSA was beginning to rise and was up to 2.06.  We elected to repeat imaging.  Bone scan was negative for metastatic disease, but CT chest, abdomen and pelvis revealed that there was progression of disease with an enlarging mass in the left seminal vesicle, which appeared to invade the adjacent bladder and the adjacent rectosigmoid junction spanning 4.4 x 4 cm, previously it was up to 3.6 x 3 cm.  No suspicious adenopathy otherwise.  He had received a 6-month Lupron injection on 07/21/2022.      When we saw the patient on 08/05/2022, we felt the patient had evidence of metastatic prostate cancer, castrate-resistant, and felt the patient would be a candidate for abiraterone and prednisone dose with abiraterone at a dose of 1000 mg p.o. daily and prednisone 5 mg p.o. b.i.d.  Apparently, after about 2-3 weeks of being on this, he developed fatigue and it was decided to decrease his abiraterone dose to 750 mg daily.  He says the fatigue never got better on the reduced dose.  He did not notice anything different.  He does suffer from diabetes, which may or may not be contributing to his fatigue.  He had fatigue before with apalutamide. Unclear if this is related to fatigue Zytiga or not.  Otherwise, no other complaints of  significant bone pain other than chronic back pain.    PHYSICAL EXAMINATION:    GENERAL:  He is a middle-aged white male in no acute distress, ECOG performance status is 0.  VITAL SIGNS:  Blood pressure 130/78, pulse 70, respirations 20, temperature 97.7.  HEENT:  Atraumatic, normocephalic.  Oropharynx nonerythematous.  NECK:  Supple.  LUNGS:  Clear to auscultation and percussion.  HEART:  Regular rhythm, S1, S2 normal.  ABDOMEN:  Soft, nontender.  No mass, nontender.  LYMPHATICS:  No cervical, supraclavicular, axillary or inguinal nodes.  EXTREMITIES:  No edema.  NEUROLOGIC:  Nonfocal.    LABORATORY DATA:  PSA of 2.86.  CBC with white count 6.4, H and H 13.1 and 39.1, platelet count 205. BUN 15, creatinine 0.69.    IMPRESSION:  Castrate-resistant nonmetastatic prostate cancer, PSA doubling time of less than 10 months. Based on the Phase 3 trial, apalutamide has been approved for nonmetastatic castrate-resistant prostate cancer with a PSA doubling time of 10 months or less, receiving androgen deprivation therapy.  Disease-free survival was 40.8 months.  The patient was started on apalutamide 240 mg daily.  His PSA did drop to 1.25 and then began to rise to 2.06.  Imaging studies indicate progression of disease with local recurrence of invasive bladder and rectosigmoid. We elected to switch him to abiraterone and prednisone with abiraterone 1000 mg p.o. daily on days 1-28, with prednisone 5 mg p.o. b.i.d.  He received 1 cycle and then developed fatigue.  We dose reduced Zytiga to 750.  Now his PSA is slowly rising.  Apparently fatigue has not resolved; therefore, we would like to resume Zytiga at full dose of 1000 mg p.o. daily on days 1-28, and prednisone 5 mg p.o. b.i.d.  We would like to recheck PSA in 2 months.  If it continues to rise, we will have to restage and consider docetaxel-based chemotherapy.    Seventy-five minutes was spent on this patient.  Time was spent reviewing multiple physician provider notes,  lab results, imaging results, performing history and physical, documenting history and physical, and ordering chemotherapy.    Erum Fritz MD        D: 10/25/2022   T: 10/25/2022   MT: YUE    Name:     LELAND CHAPMAN  MRN:      [id number]        Account:    [id number]   :      1955           Visit Date: 10/25/2022     Document: [id number]

## 2022-10-26 ENCOUNTER — TELEPHONE (OUTPATIENT)
Dept: FAMILY MEDICINE | Facility: OTHER | Age: 67
End: 2022-10-26

## 2022-10-27 NOTE — TELEPHONE ENCOUNTER
Patient was calling and following up on Leona's call regarding lab results. No further questions. Did schedule an appointment with Dr. Perez 11/15/22 to discuss ongoing back aches.    Corinne R Thayer, RN on 10/27/2022 at 11:08 AM

## 2022-11-14 ENCOUNTER — LAB (OUTPATIENT)
Dept: LAB | Facility: OTHER | Age: 67
End: 2022-11-14
Attending: INTERNAL MEDICINE
Payer: MEDICARE

## 2022-11-14 DIAGNOSIS — C61 MALIGNANT NEOPLASM OF PROSTATE (H): ICD-10-CM

## 2022-11-14 DIAGNOSIS — R97.21 RISING PSA FOLLOWING TREATMENT FOR MALIGNANT NEOPLASM OF PROSTATE: ICD-10-CM

## 2022-11-14 LAB
ALBUMIN SERPL BCG-MCNC: 4.1 G/DL (ref 3.5–5.2)
ALP SERPL-CCNC: 124 U/L (ref 40–129)
ALT SERPL W P-5'-P-CCNC: 12 U/L (ref 10–50)
ANION GAP SERPL CALCULATED.3IONS-SCNC: 10 MMOL/L (ref 7–15)
AST SERPL W P-5'-P-CCNC: 13 U/L (ref 10–50)
BASOPHILS # BLD AUTO: 0 10E3/UL (ref 0–0.2)
BASOPHILS NFR BLD AUTO: 1 %
BILIRUB SERPL-MCNC: 0.5 MG/DL
BUN SERPL-MCNC: 16.6 MG/DL (ref 8–23)
CALCIUM SERPL-MCNC: 9.7 MG/DL (ref 8.8–10.2)
CHLORIDE SERPL-SCNC: 101 MMOL/L (ref 98–107)
CREAT SERPL-MCNC: 0.8 MG/DL (ref 0.67–1.17)
DEPRECATED HCO3 PLAS-SCNC: 29 MMOL/L (ref 22–29)
EOSINOPHIL # BLD AUTO: 0.3 10E3/UL (ref 0–0.7)
EOSINOPHIL NFR BLD AUTO: 4 %
ERYTHROCYTE [DISTWIDTH] IN BLOOD BY AUTOMATED COUNT: 13.2 % (ref 10–15)
GFR SERPL CREATININE-BSD FRML MDRD: >90 ML/MIN/1.73M2
GLUCOSE SERPL-MCNC: 116 MG/DL (ref 70–99)
HCT VFR BLD AUTO: 41.8 % (ref 40–53)
HGB BLD-MCNC: 13.8 G/DL (ref 13.3–17.7)
HOLD SPECIMEN: NORMAL
IMM GRANULOCYTES # BLD: 0 10E3/UL
IMM GRANULOCYTES NFR BLD: 0 %
LYMPHOCYTES # BLD AUTO: 1.6 10E3/UL (ref 0.8–5.3)
LYMPHOCYTES NFR BLD AUTO: 26 %
MCH RBC QN AUTO: 29.9 PG (ref 26.5–33)
MCHC RBC AUTO-ENTMCNC: 33 G/DL (ref 31.5–36.5)
MCV RBC AUTO: 91 FL (ref 78–100)
MONOCYTES # BLD AUTO: 0.5 10E3/UL (ref 0–1.3)
MONOCYTES NFR BLD AUTO: 8 %
NEUTROPHILS # BLD AUTO: 3.8 10E3/UL (ref 1.6–8.3)
NEUTROPHILS NFR BLD AUTO: 61 %
NRBC # BLD AUTO: 0 10E3/UL
NRBC BLD AUTO-RTO: 0 /100
PLATELET # BLD AUTO: 193 10E3/UL (ref 150–450)
POTASSIUM SERPL-SCNC: 4.6 MMOL/L (ref 3.4–5.3)
PROT SERPL-MCNC: 7 G/DL (ref 6.4–8.3)
PSA SERPL-MCNC: 3.79 NG/ML (ref 0–4.5)
RBC # BLD AUTO: 4.61 10E6/UL (ref 4.4–5.9)
SODIUM SERPL-SCNC: 140 MMOL/L (ref 136–145)
WBC # BLD AUTO: 6.2 10E3/UL (ref 4–11)

## 2022-11-14 PROCEDURE — 80053 COMPREHEN METABOLIC PANEL: CPT | Mod: ZL

## 2022-11-14 PROCEDURE — 84153 ASSAY OF PSA TOTAL: CPT | Mod: ZL

## 2022-11-14 PROCEDURE — 85004 AUTOMATED DIFF WBC COUNT: CPT | Mod: ZL

## 2022-11-14 PROCEDURE — 82040 ASSAY OF SERUM ALBUMIN: CPT | Mod: ZL

## 2022-11-14 PROCEDURE — 36415 COLL VENOUS BLD VENIPUNCTURE: CPT | Mod: ZL

## 2022-11-16 DIAGNOSIS — R97.21 RISING PSA FOLLOWING TREATMENT FOR MALIGNANT NEOPLASM OF PROSTATE: ICD-10-CM

## 2022-11-16 DIAGNOSIS — C61 MALIGNANT NEOPLASM OF PROSTATE (H): Primary | ICD-10-CM

## 2022-11-22 DIAGNOSIS — R97.21 RISING PSA FOLLOWING TREATMENT FOR MALIGNANT NEOPLASM OF PROSTATE: ICD-10-CM

## 2022-11-22 DIAGNOSIS — C61 MALIGNANT NEOPLASM OF PROSTATE (H): Primary | ICD-10-CM

## 2022-11-22 LAB — NONINV COLON CA DNA+OCC BLD SCRN STL QL: NEGATIVE

## 2022-11-22 RX ORDER — ABIRATERONE ACETATE 250 MG/1
1000 TABLET ORAL DAILY
Qty: 120 TABLET | Refills: 0 | Status: CANCELLED | OUTPATIENT
Start: 2022-11-22 | End: 2022-12-22

## 2022-11-22 RX ORDER — ABIRATERONE ACETATE 250 MG/1
1000 TABLET ORAL DAILY
Qty: 120 TABLET | Refills: 0 | Status: SHIPPED | OUTPATIENT
Start: 2022-11-22 | End: 2022-12-22

## 2022-11-28 ENCOUNTER — LAB (OUTPATIENT)
Dept: LAB | Facility: OTHER | Age: 67
End: 2022-11-28
Attending: INTERNAL MEDICINE
Payer: MEDICARE

## 2022-11-28 DIAGNOSIS — C61 MALIGNANT NEOPLASM OF PROSTATE (H): ICD-10-CM

## 2022-11-28 LAB
ALBUMIN SERPL BCG-MCNC: 3.7 G/DL (ref 3.5–5.2)
ALP SERPL-CCNC: 122 U/L (ref 40–129)
ALT SERPL W P-5'-P-CCNC: 44 U/L (ref 10–50)
ANION GAP SERPL CALCULATED.3IONS-SCNC: 11 MMOL/L (ref 7–15)
AST SERPL W P-5'-P-CCNC: 38 U/L (ref 10–50)
BASOPHILS # BLD AUTO: 0 10E3/UL (ref 0–0.2)
BASOPHILS NFR BLD AUTO: 1 %
BILIRUB SERPL-MCNC: 0.5 MG/DL
BUN SERPL-MCNC: 9.3 MG/DL (ref 8–23)
CALCIUM SERPL-MCNC: 9.1 MG/DL (ref 8.8–10.2)
CHLORIDE SERPL-SCNC: 102 MMOL/L (ref 98–107)
CREAT SERPL-MCNC: 0.7 MG/DL (ref 0.67–1.17)
DEPRECATED HCO3 PLAS-SCNC: 28 MMOL/L (ref 22–29)
EOSINOPHIL # BLD AUTO: 0.3 10E3/UL (ref 0–0.7)
EOSINOPHIL NFR BLD AUTO: 6 %
ERYTHROCYTE [DISTWIDTH] IN BLOOD BY AUTOMATED COUNT: 12.8 % (ref 10–15)
GFR SERPL CREATININE-BSD FRML MDRD: >90 ML/MIN/1.73M2
GLUCOSE SERPL-MCNC: 146 MG/DL (ref 70–99)
HCT VFR BLD AUTO: 38 % (ref 40–53)
HGB BLD-MCNC: 12.5 G/DL (ref 13.3–17.7)
IMM GRANULOCYTES # BLD: 0 10E3/UL
IMM GRANULOCYTES NFR BLD: 0 %
LYMPHOCYTES # BLD AUTO: 0.9 10E3/UL (ref 0.8–5.3)
LYMPHOCYTES NFR BLD AUTO: 18 %
MCH RBC QN AUTO: 29.3 PG (ref 26.5–33)
MCHC RBC AUTO-ENTMCNC: 32.9 G/DL (ref 31.5–36.5)
MCV RBC AUTO: 89 FL (ref 78–100)
MONOCYTES # BLD AUTO: 0.4 10E3/UL (ref 0–1.3)
MONOCYTES NFR BLD AUTO: 8 %
NEUTROPHILS # BLD AUTO: 3.4 10E3/UL (ref 1.6–8.3)
NEUTROPHILS NFR BLD AUTO: 67 %
NRBC # BLD AUTO: 0 10E3/UL
NRBC BLD AUTO-RTO: 0 /100
PLATELET # BLD AUTO: 191 10E3/UL (ref 150–450)
POTASSIUM SERPL-SCNC: 3.8 MMOL/L (ref 3.4–5.3)
PROT SERPL-MCNC: 6.3 G/DL (ref 6.4–8.3)
PSA SERPL-MCNC: 3.21 NG/ML (ref 0–4.5)
RBC # BLD AUTO: 4.27 10E6/UL (ref 4.4–5.9)
SODIUM SERPL-SCNC: 141 MMOL/L (ref 136–145)
WBC # BLD AUTO: 5 10E3/UL (ref 4–11)

## 2022-11-28 PROCEDURE — 80053 COMPREHEN METABOLIC PANEL: CPT | Mod: ZL

## 2022-11-28 PROCEDURE — 36415 COLL VENOUS BLD VENIPUNCTURE: CPT | Mod: ZL

## 2022-11-28 PROCEDURE — 84153 ASSAY OF PSA TOTAL: CPT | Mod: ZL

## 2022-11-28 PROCEDURE — 85025 COMPLETE CBC W/AUTO DIFF WBC: CPT | Mod: ZL

## 2022-12-02 ENCOUNTER — OFFICE VISIT (OUTPATIENT)
Dept: FAMILY MEDICINE | Facility: OTHER | Age: 67
End: 2022-12-02
Attending: FAMILY MEDICINE
Payer: MEDICARE

## 2022-12-02 VITALS
WEIGHT: 236 LBS | RESPIRATION RATE: 16 BRPM | SYSTOLIC BLOOD PRESSURE: 136 MMHG | OXYGEN SATURATION: 97 % | HEART RATE: 70 BPM | DIASTOLIC BLOOD PRESSURE: 70 MMHG | BODY MASS INDEX: 34.35 KG/M2 | TEMPERATURE: 97.7 F

## 2022-12-02 DIAGNOSIS — Z98.1 HISTORY OF LUMBAR SPINAL FUSION: ICD-10-CM

## 2022-12-02 DIAGNOSIS — Z87.898 HX OF MOTION SICKNESS: ICD-10-CM

## 2022-12-02 DIAGNOSIS — M48.062 SPINAL STENOSIS OF LUMBAR REGION WITH NEUROGENIC CLAUDICATION: ICD-10-CM

## 2022-12-02 DIAGNOSIS — R10.2 SUPRAPUBIC ABDOMINAL PAIN: Primary | ICD-10-CM

## 2022-12-02 DIAGNOSIS — F11.90 CHRONIC, CONTINUOUS USE OF OPIOIDS: ICD-10-CM

## 2022-12-02 DIAGNOSIS — C61 MALIGNANT NEOPLASM OF PROSTATE (H): ICD-10-CM

## 2022-12-02 PROCEDURE — 99213 OFFICE O/P EST LOW 20 MIN: CPT | Performed by: FAMILY MEDICINE

## 2022-12-02 PROCEDURE — G0463 HOSPITAL OUTPT CLINIC VISIT: HCPCS | Mod: 25 | Performed by: FAMILY MEDICINE

## 2022-12-02 PROCEDURE — 91312 COVID-19 VACCINE BIVALENT BOOSTER 12+ (PFIZER): CPT

## 2022-12-02 RX ORDER — MECLIZINE HYDROCHLORIDE 25 MG/1
25 TABLET ORAL 3 TIMES DAILY PRN
Qty: 20 TABLET | Refills: 0 | Status: ON HOLD | OUTPATIENT
Start: 2022-12-02 | End: 2023-04-19

## 2022-12-02 RX ORDER — HYDROCODONE BITARTRATE AND ACETAMINOPHEN 10; 325 MG/1; MG/1
1 TABLET ORAL
Qty: 150 TABLET | Refills: 0 | Status: SHIPPED | OUTPATIENT
Start: 2023-01-06 | End: 2023-02-03

## 2022-12-02 RX ORDER — HYDROCODONE BITARTRATE AND ACETAMINOPHEN 10; 325 MG/1; MG/1
1 TABLET ORAL
Qty: 150 TABLET | Refills: 0 | Status: SHIPPED | OUTPATIENT
Start: 2022-12-07 | End: 2023-02-03

## 2022-12-02 ASSESSMENT — ANXIETY QUESTIONNAIRES
GAD7 TOTAL SCORE: 0
7. FEELING AFRAID AS IF SOMETHING AWFUL MIGHT HAPPEN: NOT AT ALL
GAD7 TOTAL SCORE: 0
6. BECOMING EASILY ANNOYED OR IRRITABLE: NOT AT ALL
3. WORRYING TOO MUCH ABOUT DIFFERENT THINGS: NOT AT ALL
1. FEELING NERVOUS, ANXIOUS, OR ON EDGE: NOT AT ALL
5. BEING SO RESTLESS THAT IT IS HARD TO SIT STILL: NOT AT ALL
2. NOT BEING ABLE TO STOP OR CONTROL WORRYING: NOT AT ALL
IF YOU CHECKED OFF ANY PROBLEMS ON THIS QUESTIONNAIRE, HOW DIFFICULT HAVE THESE PROBLEMS MADE IT FOR YOU TO DO YOUR WORK, TAKE CARE OF THINGS AT HOME, OR GET ALONG WITH OTHER PEOPLE: NOT DIFFICULT AT ALL

## 2022-12-02 ASSESSMENT — PAIN SCALES - GENERAL: PAINLEVEL: MILD PAIN (2)

## 2022-12-02 ASSESSMENT — PATIENT HEALTH QUESTIONNAIRE - PHQ9
SUM OF ALL RESPONSES TO PHQ QUESTIONS 1-9: 0
5. POOR APPETITE OR OVEREATING: NOT AT ALL

## 2022-12-02 NOTE — NURSING NOTE
"Patient presents to the clinic for ABD pain.    FOOD SECURITY SCREENING QUESTIONS:    The next two questions are to help us understand your food security.  If you are feeling you need any assistance in this area, we have resources available to support you today.    Hunger Vital Signs:  Within the past 12 months we worried whether our food would run out before we got money to buy more. Never  Within the past 12 months the food we bought just didn't last and we didn't have money to get more. Never    Advance Care Directive on file? no  Advance Care Directive provided to patient? Declined.    Chief Complaint   Patient presents with     Abdominal Pain       Initial /70 (BP Location: Right arm, Patient Position: Sitting, Cuff Size: Adult Large)   Pulse 70   Temp 97.7  F (36.5  C) (Tympanic)   Resp 16   Wt 107 kg (236 lb)   SpO2 97%   BMI 34.35 kg/m   Estimated body mass index is 34.35 kg/m  as calculated from the following:    Height as of 10/5/22: 1.765 m (5' 9.5\").    Weight as of this encounter: 107 kg (236 lb).  Medication Reconciliation: complete        Leona Shell LPN       "

## 2022-12-02 NOTE — PROGRESS NOTES
Assessment & Plan       ICD-10-CM    1. Suprapubic abdominal pain  R10.2       2. Malignant neoplasm of prostate (H)  C61       3. Chronic, continuous use of opioids  F11.90 HYDROcodone-acetaminophen (NORCO)  MG per tablet     HYDROcodone-acetaminophen (NORCO)  MG per tablet      4. Spinal stenosis of lumbar region with neurogenic claudication  M48.062 HYDROcodone-acetaminophen (NORCO)  MG per tablet     HYDROcodone-acetaminophen (NORCO)  MG per tablet      5. History of lumbar spinal fusion  Z98.1 HYDROcodone-acetaminophen (NORCO)  MG per tablet     HYDROcodone-acetaminophen (NORCO)  MG per tablet      6. Hx of motion sickness  Z87.898 meclizine (ANTIVERT) 25 MG tablet        Has recurrent prostate cancer with a mass located by the seminal vesicles that has spread to the bladder and rectum.  This was seen in August 2022 on CT scan.  Since that time oncology has changed his treatment to Zytiga.  Location of the mass would explain his symptoms.  Additionally, he should have more pain when the bladder is expanded and would improve with voiding, which is the case.  For now, no other treatment needs to occur, just awareness.  Ensure no constipation.  Can try to avoid before the pain gets too severe.  Otherwise, continue cares with oncology.    Refilled hydrocodone 10/325 mg taking 5 times daily.  He is leaving on a trip from [place] around December 8 and hopes to receive next refill on December 7.  I sent in a new prescription.  Next prescription due January 7, but this is a weekend.  Given refill for January 6.  Follow-up in early February.  PDMP Review       Value Time User    State PDMP site checked  Yes 12/2/2022  9:35 AM Wei Perez MD         Return in about 2 months (around 2/6/2023).    Wei Perez MD  [facility] AND HOSPITAL    Varun Aly is a 67 year old, presenting for the following health issues:  Abdominal Pain      HPI     Left ABD pain for  the past couple of months.  Pain is intermittent and will flare up more when back pain is present.    He has pain in the left lower quadrant and suprapubic region  Seems to improve after going to the bathroom  On Zytiga for recurrent prostate cancer  Having normal bowel movements  Was has some urinary hesitancy, that is not worse.      Review of Systems   General: Denies general constitutional problems  Cardiovascular: Denies problems  Respiratory: Denies problems        Objective    /70 (BP Location: Right arm, Patient Position: Sitting, Cuff Size: Adult Large)   Pulse 70   Temp 97.7  F (36.5  C) (Tympanic)   Resp 16   Wt 107 kg (236 lb)   SpO2 97%   BMI 34.35 kg/m    Body mass index is 34.35 kg/m .  Physical Exam   General Appearance: Alert. No acute distress  Psychiatric: Normal affect and mentation  Abdomen: Soft nontender

## 2022-12-06 NOTE — PROGRESS NOTES
"Per last office visit  7/21/22 with Celestine Arrington MD \"Plan  Continue Erleada and additional management per Dr Fritz  Lupron 45 mg administered today  Follow-up in 6 months for Lupron\"        Orders Placed    "

## 2022-12-08 ENCOUNTER — TELEPHONE (OUTPATIENT)
Dept: ONCOLOGY | Facility: OTHER | Age: 67
End: 2022-12-08

## 2022-12-08 NOTE — TELEPHONE ENCOUNTER
Left vm for Jermain and would like to talk about [facility]/ [facility] being discontinued at the end of 2022 year. He asked to get emailed the info on [facility] ahead of time to get his account set up before the end of year.     Cost filled with [employer] [facility] is about $900 [facility] is about $165 a month.

## 2022-12-20 NOTE — TELEPHONE ENCOUNTER
Spoke to Jermain and about [facility] being discontinued at the end of 2022 year. He asked to get emailed the info on [facility] ahead of time to get his account set up before the end of year.     Cost filled with [facility] is about $900 [facility] is about $170 a month.    Jermain has full bottle will need at end of Jan 2023

## 2022-12-20 NOTE — TELEPHONE ENCOUNTER
Called and left vm to call me back on Jermain's and wife's cell phones. Home phone has no vm with it and just rings.

## 2022-12-23 DIAGNOSIS — R97.21 RISING PSA FOLLOWING TREATMENT FOR MALIGNANT NEOPLASM OF PROSTATE: ICD-10-CM

## 2022-12-23 DIAGNOSIS — C61 MALIGNANT NEOPLASM OF PROSTATE (H): Primary | ICD-10-CM

## 2022-12-23 RX ORDER — ABIRATERONE ACETATE 250 MG/1
1000 TABLET ORAL DAILY
Qty: 120 TABLET | Refills: 0 | Status: SHIPPED | OUTPATIENT
Start: 2022-12-23 | End: 2023-01-22

## 2023-01-10 ENCOUNTER — LAB (OUTPATIENT)
Dept: LAB | Facility: OTHER | Age: 68
End: 2023-01-10
Attending: INTERNAL MEDICINE
Payer: COMMERCIAL

## 2023-01-10 DIAGNOSIS — R97.21 RISING PSA FOLLOWING TREATMENT FOR MALIGNANT NEOPLASM OF PROSTATE: ICD-10-CM

## 2023-01-10 DIAGNOSIS — C61 MALIGNANT NEOPLASM OF PROSTATE (H): ICD-10-CM

## 2023-01-10 LAB
ALBUMIN SERPL BCG-MCNC: 3.8 G/DL (ref 3.5–5.2)
ALP SERPL-CCNC: 118 U/L (ref 40–129)
ALT SERPL W P-5'-P-CCNC: 10 U/L (ref 10–50)
ANION GAP SERPL CALCULATED.3IONS-SCNC: 7 MMOL/L (ref 7–15)
AST SERPL W P-5'-P-CCNC: 13 U/L (ref 10–50)
BASOPHILS # BLD AUTO: 0 10E3/UL (ref 0–0.2)
BASOPHILS NFR BLD AUTO: 1 %
BILIRUB SERPL-MCNC: 0.4 MG/DL
BUN SERPL-MCNC: 13.5 MG/DL (ref 8–23)
CALCIUM SERPL-MCNC: 9.3 MG/DL (ref 8.8–10.2)
CHLORIDE SERPL-SCNC: 107 MMOL/L (ref 98–107)
CREAT SERPL-MCNC: 0.77 MG/DL (ref 0.67–1.17)
DEPRECATED HCO3 PLAS-SCNC: 31 MMOL/L (ref 22–29)
EOSINOPHIL # BLD AUTO: 0.3 10E3/UL (ref 0–0.7)
EOSINOPHIL NFR BLD AUTO: 5 %
ERYTHROCYTE [DISTWIDTH] IN BLOOD BY AUTOMATED COUNT: 12.8 % (ref 10–15)
GFR SERPL CREATININE-BSD FRML MDRD: >90 ML/MIN/1.73M2
GLUCOSE SERPL-MCNC: 144 MG/DL (ref 70–99)
HCT VFR BLD AUTO: 38.2 % (ref 40–53)
HGB BLD-MCNC: 12.4 G/DL (ref 13.3–17.7)
IMM GRANULOCYTES # BLD: 0 10E3/UL
IMM GRANULOCYTES NFR BLD: 0 %
LYMPHOCYTES # BLD AUTO: 1.5 10E3/UL (ref 0.8–5.3)
LYMPHOCYTES NFR BLD AUTO: 26 %
MCH RBC QN AUTO: 29.2 PG (ref 26.5–33)
MCHC RBC AUTO-ENTMCNC: 32.5 G/DL (ref 31.5–36.5)
MCV RBC AUTO: 90 FL (ref 78–100)
MONOCYTES # BLD AUTO: 0.5 10E3/UL (ref 0–1.3)
MONOCYTES NFR BLD AUTO: 9 %
NEUTROPHILS # BLD AUTO: 3.5 10E3/UL (ref 1.6–8.3)
NEUTROPHILS NFR BLD AUTO: 59 %
NRBC # BLD AUTO: 0 10E3/UL
NRBC BLD AUTO-RTO: 0 /100
PLATELET # BLD AUTO: 202 10E3/UL (ref 150–450)
POTASSIUM SERPL-SCNC: 3.9 MMOL/L (ref 3.4–5.3)
PROT SERPL-MCNC: 6.4 G/DL (ref 6.4–8.3)
PSA SERPL-MCNC: 4.05 NG/ML (ref 0–4.5)
RBC # BLD AUTO: 4.25 10E6/UL (ref 4.4–5.9)
SODIUM SERPL-SCNC: 145 MMOL/L (ref 136–145)
WBC # BLD AUTO: 5.8 10E3/UL (ref 4–11)

## 2023-01-10 PROCEDURE — 80053 COMPREHEN METABOLIC PANEL: CPT | Mod: ZL

## 2023-01-10 PROCEDURE — 36415 COLL VENOUS BLD VENIPUNCTURE: CPT | Mod: ZL

## 2023-01-10 PROCEDURE — 85025 COMPLETE CBC W/AUTO DIFF WBC: CPT | Mod: ZL

## 2023-01-10 PROCEDURE — 84153 ASSAY OF PSA TOTAL: CPT | Mod: ZL

## 2023-01-11 ENCOUNTER — VIRTUAL VISIT (OUTPATIENT)
Dept: ONCOLOGY | Facility: OTHER | Age: 68
End: 2023-01-11
Attending: INTERNAL MEDICINE
Payer: COMMERCIAL

## 2023-01-11 DIAGNOSIS — C61 MALIGNANT NEOPLASM OF PROSTATE (H): Primary | ICD-10-CM

## 2023-01-11 PROCEDURE — 99214 OFFICE O/P EST MOD 30 MIN: CPT | Mod: TEL | Performed by: INTERNAL MEDICINE

## 2023-01-11 NOTE — NURSING NOTE
Chief Complaint   Patient presents with     Oncology Clinic Visit     F/U Prostate cancer     Medication Reconciliation: complete     Rochelle Toth CMA ([facility])

## 2023-01-11 NOTE — PROGRESS NOTES
Visit Date: 01/11/2023    This is a telephone virtual visit performed due to the COVID-19 viral pandemic.  Apparently Mr. Kan forgot his appointment today and he was due to be seen physically today, but apparently he forgot to show up for the appointment so the visit was converted to telephone virtual visit.  He is currently laying down.     HISTORY OF PRESENT ILLNESS:  We had seen the patient in consultation at the request of Dr. Arrington back on 09/15/2021.  At that time, Mr. Kan was a 66-year-old white male with previous history of aortic valve stenosis and type 2 diabetes mellitus we were asked to evaluate concerning a rising PSA in a castrate resistant patient with prostate cancer.  The patient had undergone prostatectomy back in 2012 for prostate cancer.  This was followed by SBRT in 2013. The patient was followed by Dr. Celestine Arrington of Urology who had noted a rise in PSA and placed the patient on androgen deprivation therapy initially, Vantas implant and then switched to Lupron.  His PSA had been rising in the recent past.  On 07/09/2020, it was 0.97, then 11/13/2020, it pineda to 1.215.  Dr. Arrington ordered imaging studies.  CT chest, abdomen and pelvis were negative for metastatic disease.  PSA continued to rise.  On 02/03/2021, it was up to 1.847.  On 08/25/2021, it was up to 3.48; therefore, the patient had a PSA doubling time of less than 10 months, therefore, was considered to be castrate resistant M0 prostate cancer.  The patient was essentially asymptomatic, denied any fevers, night sweats, weight loss or bone pain.  He did have urinary incontinence and had to wear Depends for the most part.  He could not tolerate the Vantas implant.  The patient was seen for chemotherapy with the option for metastatic castrate resistant prostate cancer.  We had seen the patient and felt he would be a candidate for apalutamide which is indicated in the setting of castrate resistant M0 prostate cancer that is not  metastatic.  The patient was started on apalutamide 240 mg daily.  He had scans initially on 09/17/2021.  Bone scan was negative.  CT chest, abdomen and pelvis were all negative.  There was some soft tissue density in the left lower pelvis in the region of the left seminal vesicle which was somewhat concerning.  The patient continued on apalutamide.  After three weeks of apalutamide, his PSA dropped from 3.48, down to 1.62.  He did have some fatigue associated with apalutamide.  His PSA continued to drop.  On 11/13/2021, it was down to 1.49.  The patient continued androgen deprivation therapy with Dr. Arrington, receiving Lupron 45 mg q. 6 months.  The patient had a right knee replacement, was recovering from that.  When we saw the patient on 07/07/2021, his PSA began to rise.  It was up to 2.06.  It was elected to repeat imaging.  Bone scan was negative for metastatic disease.  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  Dimensions spanning 4.4 x 4 cm.  This was 3.6 x 3 cm.  No suspicion for adenopathy.  Otherwise, he received his 6-month Lupron injection on 07/21/2022.  When we saw the patient on 08/05/2022, we felt the patient had evidence of metastatic prostate cancer, castrate resistant.  We felt the patient would be a candidate for abiraterone and prednisone with abiraterone dose at 1000 mg p.o. daily and prednisone 5 mg p.o. b.i.d.  Apparently after about 2-3 weeks of being on this, he developed fatigue and was denied two weeks of abiraterone dose.  He decided to decrease his abiraterone dose to 750 mg daily.  Apparently the fatigue never improved with the reduced dose of Zytiga.  When we saw him on 10/25/2022, his PSA was up to 2.86.  We elected to increase his Zytiga to full dose, 1000 mg p.o. daily on days 1 through 28, prednisone 5 mg p.o. b.i.d. and monitor his PSA.  The  patient says he still has some fatigue.  He denies any urinary symptoms.  He is currently laying down on the carpet with a , no bone pain.  He is due to receive his Lupron injection with Dr. Arrington on 01/26/2022.  His PSA has been slowly rising.  His PSA on 11/28/2022 had dropped from 3.79 down to 3.21.     PHYSICAL EXAMINATION:  Physical exam could not be performed due to the COVID-19 viral pandemic.    LABORATORY DATA:  Laboratories revealed PSA performed on 01/10/2023 of 4.05.  CBC: White count 5.8, H and H 12.4 and 38.2.  Platelet count is 202.  Glucose 144.  LFTs were normal.     IMPRESSION:  Castrate resistant non-metastatic prostate cancer, PSA doubling time less than 10 months based on the phase 3 trial.  Apalutamide had been approved for non-metastatic castrate resistant prostate cancer with PSA doubling time of 10 months or less, receiving androgen deprivation therapy.  Disease free survival was 40.8 months.  The patient was started on apalutamide 240 mg daily.  His PSA did drop to 1.25, then began to rise to 2.06.  Imaging studies indicated progression of disease with local recurrence of invasive bladder, rectosigmoid.  We elected to switch him to abiraterone and prednisone with abiraterone 1000 mg p.o. daily on days 1 thorough 28 and prednisone 5 mg p.o. b.i.d.  He received one cycle and then developed fatigue.  We dose reduced Zytiga to 750 mg p.o. daily, and his PSA began to rise.  On 10/25/2022, we increased his Zytiga to full dose 1000 mg p.o. daily with prednisone 5 mg p.o. b.i.d. given on days 1 through 28.  PSA initially dropped and now is rising again.  Unclear if this is an accurate measurement as the patient has not received Lupron yet.  We would like to confirm that his PSA is rising.  We will see the patient after he sees Dr. Arrington after his Lupron injection, and if his PSA rises again, then we will need to repeat imaging including CT chest, abdomen and pelvis and consider  docetaxel based chemotherapy.     Thirty minutes was spent on this telephone virtual visit.  Half the time was spent discussing lab results with the patient, performing history, documenting history and ordering followup labs.        Erum Fritz MD        D: 2023   T: 2023   MT: SHAWN    Name:     LELAND CHAPMAN  MRN:      [id number]        Account:    [id number]   :      1955           Visit Date: 2023     Document: [id number]    cc:  MD Wei Fong MD

## 2023-01-26 ENCOUNTER — OFFICE VISIT (OUTPATIENT)
Dept: UROLOGY | Facility: OTHER | Age: 68
End: 2023-01-26
Attending: UROLOGY
Payer: COMMERCIAL

## 2023-01-26 VITALS — WEIGHT: 236 LBS | BODY MASS INDEX: 34.35 KG/M2 | SYSTOLIC BLOOD PRESSURE: 130 MMHG | DIASTOLIC BLOOD PRESSURE: 72 MMHG

## 2023-01-26 DIAGNOSIS — R97.21 RISING PSA FOLLOWING TREATMENT FOR MALIGNANT NEOPLASM OF PROSTATE: ICD-10-CM

## 2023-01-26 DIAGNOSIS — C61 PROSTATE CANCER (H): Primary | ICD-10-CM

## 2023-01-26 DIAGNOSIS — R23.2 HOT FLASH IN MALE: ICD-10-CM

## 2023-01-26 PROCEDURE — 99214 OFFICE O/P EST MOD 30 MIN: CPT | Performed by: UROLOGY

## 2023-01-26 PROCEDURE — 96402 CHEMO HORMON ANTINEOPL SQ/IM: CPT | Performed by: UROLOGY

## 2023-01-26 PROCEDURE — 250N000011 HC RX IP 250 OP 636: Performed by: UROLOGY

## 2023-01-26 RX ORDER — MEGESTROL ACETATE 20 MG/1
20 TABLET ORAL 2 TIMES DAILY
Qty: 180 TABLET | Refills: 3 | Status: ON HOLD | OUTPATIENT
Start: 2023-01-26 | End: 2023-04-22

## 2023-01-26 RX ADMIN — LEUPROLIDE ACETATE 45 MG: KIT at 13:03

## 2023-01-26 ASSESSMENT — PAIN SCALES - GENERAL: PAINLEVEL: MODERATE PAIN (4)

## 2023-01-26 NOTE — PROGRESS NOTES
Type of Visit  EST    Chief Complaint  Metastatic castrate resistant prostate cancer  Hot flashes    HPI  Mr. Kan is a 67 y.o. male who follows up with metastatic castrate resistant prostate cancer.  Patient originally underwent radical prostatectomy in 2012.  He underwent salvage radiation in 2013.  The patient has started ADT for PSA recurrence in 2019.  The patient developed castrate resistant prostate cancer in 2020.  He developed radiographic evidence of metastatic disease in 2021.  He continues Erleada through medical oncology.    Today the patient complains of progressively worsening hot flashes and diminished energy level.  He understands that he is on numerous medications that contribute to this problem.  It is starting to affect his quality of life in a fairly significant way.  It is a significant bother for him on a daily basis.  He experienced a hot flash however talking during this visit.    He does have chronic back pain and previously underwent fusion 4 years ago.      Review of Systems  I reviewed the ROS with the patient today.    Nursing Notes:   Carrie Caballero LPN  1/26/2023 10:55 AM  Signed  Patient presents to clinic for follow up of PSA  Review of Systems:    Weight loss:   No     Recent fever/chills:  yes   Night sweats:   yes  Current skin rash:  No   Recent hair loss:  No  Heat intolerance:  yes   Cold intolerance:  yes  Chest pain:   No   Palpitations:   No  Shortness of breath:  yes   Wheezing:   No  Constipation:    No   Diarrhea:   No   Nausea:   yes   Vomiting:   No   Kidney/side pain:  yes   Back pain:   yes  Frequent headaches:  No   Dizziness:     No  Leg swelling:   No   Calf pain:    No  Carrie Caballero LPN on 1/26/2023 at 10:55 AM            Physical Exam  /72   Wt 107 kg (236 lb)   BMI 34.35 kg/m    Constitutional: No acute distress.  Alert and cooperative   Cardiovascular: Regular rate.  Pulmonary/Chest: Respirations are even and non-labored bilaterally, no audible  wheezing  Abdominal: Soft. No distension, tenderness, masses or guarding.   Extremities: GINNA x 4, Warm. No clubbing.  No cyanosis.    Skin: Pink, warm and dry.  No visible rashes noted.  Back:  No left CVA tenderness.  No right CVA tenderness.  Genitourinary:  Nonpalpable bladder    Labs   10/13/22 08:49 11/14/22 09:35 11/28/22 09:25 01/10/23 08:50   PSA 2.86 3.79 3.21 4.05      12/28/21 10:47 01/26/22 10:54 03/07/22 10:01 06/27/22 12:51   PSA 1.51 1.21 1.25 2.06     Results for EMILIA CHAPMAN (MRN [id number]) as of 8/25/2021 11:12   8/25/2021 09:28   PSA 3.48^^   ^^ started Erleada    Results for EMILIA CHAPMAN (MRN [id number]) as of 5/24/2021 11:21   2/23/2021 08:29 5/24/2021 09:36   PSA 1.847 2.555     Results for EMILIA CHAPMAN (MRN [id number]) as of 11/23/2020 11:37   11/23/2020 07:20   PSA 1.215     Results for EMILIA CHAPMAN (MRN [id number]) as of 7/22/2020 08:39   7/9/2020 09:36   PSA 0.975     Results for EMILIA CHAPMAN (MRN [id number]) as of 2/5/2020 09:14   11/4/2019 08:10 2/5/2020 06:53   PSA 7.112 (H)^ 1.006   ^started ADT    Results for EMILIA CHAPMAN (MRN [id number]) as of 5/1/2019 09:45   5/1/2019 07:36   PSA 2.714     Results for LELAND CHAPMAN W (MRN [id number]) as of 11/1/2018 11:08   11/1/2018 08:49   PSA 1.177     Results for LELAND CHAPMAN (MRN [id number]) as of 3/29/2018 14:31   3/29/2018 12:15   PSA 0.798     Results for LELAND CHAPMAN (MRN [id number]) as of 3/29/2018 11:55   4/7/2017 16:04   PSA 0.268     Results for LELAND CHAPMAN (MRN [id number]) as of 3/23/2016 10:42   1/2/2013 08:00* 2/27/2013** 08:10 10/16/2014 11:26 3/1/2016 14:15   PSA 0.67 0.97 0.070 0.154   * Following radical prostatectomy  ** Following salvage external beam radiation therapy.    Imaging  Bone Scan  9/17/2021  IMPRESSION: Degenerative change with probable periodontal disease. No  definite metastatic disease.    CT c/a/p  9/17/2021  IMPRESSION:  Newly apparent soft tissue density lesion in the left lower pelvis  "in  the region of the left seminal vesicle measuring up to 2.6 cm,  concerning for recurrent disease.  No evidence of distant metastatic disease.  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    Lupron Injection Procedure  Today the patient received an intramuscular injection of Lupron 45mg.  This was given in the gluteal muscle.  The results of this injection: None    Assessment  Mr. Kan is a 67 y.o. male who follows up with metastatic castrate resistant prostate cancer.  Patient would like to continue current management.  Reviewed his past labs and he is appropriate for Lupron today.    He is highly bothered by the progressively worsening hot flashes.  He is asking about treatment.  I explained that we do have a few medication options and they are all \"off label\" use.  I explained that I have had a few patients benefit from low-dose Megace.  He is in agreement and would like a trial.    Plan  Start Megace 20 mg twice daily for prevention of hot flashes  Continue Erleada and additional management per Dr Fritz  Lupron 45 mg administered today  Follow-up in 6 months for Lupron  "

## 2023-01-26 NOTE — NURSING NOTE
Patient presents to clinic for follow up of PSA  Review of Systems:    Weight loss:   No     Recent fever/chills:  yes   Night sweats:   yes  Current skin rash:  No   Recent hair loss:  No  Heat intolerance:  yes   Cold intolerance:  yes  Chest pain:   No   Palpitations:   No  Shortness of breath:  yes   Wheezing:   No  Constipation:    No   Diarrhea:   No   Nausea:   yes   Vomiting:   No   Kidney/side pain:  yes   Back pain:   yes  Frequent headaches:  No   Dizziness:     No  Leg swelling:   No   Calf pain:    No  Carrie KANE Caballero on 1/26/2023 at 10:55 AM

## 2023-01-30 DIAGNOSIS — R97.21 RISING PSA FOLLOWING TREATMENT FOR MALIGNANT NEOPLASM OF PROSTATE: ICD-10-CM

## 2023-01-30 DIAGNOSIS — C61 MALIGNANT NEOPLASM OF PROSTATE (H): Primary | ICD-10-CM

## 2023-01-31 DIAGNOSIS — R97.21 RISING PSA FOLLOWING TREATMENT FOR MALIGNANT NEOPLASM OF PROSTATE: ICD-10-CM

## 2023-01-31 DIAGNOSIS — C61 MALIGNANT NEOPLASM OF PROSTATE (H): Primary | ICD-10-CM

## 2023-01-31 RX ORDER — ABIRATERONE ACETATE 250 MG/1
1000 TABLET ORAL DAILY
Qty: 120 TABLET | Refills: 0 | Status: CANCELLED | OUTPATIENT
Start: 2023-01-31 | End: 2023-03-02

## 2023-01-31 RX ORDER — ABIRATERONE ACETATE 250 MG/1
1000 TABLET ORAL DAILY
Qty: 120 TABLET | Refills: 0 | Status: SHIPPED | OUTPATIENT
Start: 2023-02-01 | End: 2023-03-03

## 2023-02-01 ENCOUNTER — TELEPHONE (OUTPATIENT)
Dept: ONCOLOGY | Facility: OTHER | Age: 68
End: 2023-02-01

## 2023-02-01 NOTE — TELEPHONE ENCOUNTER
PA Initiation    Medication: Zytiga 250mg, PA pending   Ref.#[id number]  Insurance Company: [employer] - Phone [phone] Fax [phone]  Pharmacy Filling the Rx: [facility] - [place], [place] - [address]  Filling Pharmacy Phone:    Filling Pharmacy Fax:    Start Date: 2/1/2023

## 2023-02-02 DIAGNOSIS — G89.29 CHRONIC PAIN OF RIGHT KNEE: ICD-10-CM

## 2023-02-02 DIAGNOSIS — M25.561 CHRONIC PAIN OF RIGHT KNEE: ICD-10-CM

## 2023-02-02 NOTE — TELEPHONE ENCOUNTER
Prior Authorization Approval    Authorization Effective Date: 1/1/2023  Authorization Expiration Date: 8/1/2023  Medication: Zytiga 250mg, PA approved  Approved Dose/Quantity: 120/30 days  Reference #: [id number]   Insurance Company: [employer] - Phone [phone] Fax [phone]  Expected CoPay: $1664.60     CoPay Card Available: No    Foundation Assistance Needed: has eliazar on file  Which Pharmacy is filling the prescription (Not needed for infusion/clinic administered): [facility] - [place], [place] - [address]  Pharmacy Notified:    Patient Notified:

## 2023-02-03 ENCOUNTER — OFFICE VISIT (OUTPATIENT)
Dept: FAMILY MEDICINE | Facility: OTHER | Age: 68
End: 2023-02-03
Attending: FAMILY MEDICINE
Payer: COMMERCIAL

## 2023-02-03 VITALS
WEIGHT: 228 LBS | TEMPERATURE: 98.2 F | OXYGEN SATURATION: 96 % | HEART RATE: 84 BPM | DIASTOLIC BLOOD PRESSURE: 74 MMHG | SYSTOLIC BLOOD PRESSURE: 134 MMHG | BODY MASS INDEX: 33.19 KG/M2 | RESPIRATION RATE: 20 BRPM

## 2023-02-03 DIAGNOSIS — I10 ESSENTIAL HYPERTENSION: ICD-10-CM

## 2023-02-03 DIAGNOSIS — C61 MALIGNANT NEOPLASM OF PROSTATE (H): ICD-10-CM

## 2023-02-03 DIAGNOSIS — G89.29 CHRONIC BACK PAIN GREATER THAN 3 MONTHS DURATION: ICD-10-CM

## 2023-02-03 DIAGNOSIS — F11.90 CHRONIC, CONTINUOUS USE OF OPIOIDS: ICD-10-CM

## 2023-02-03 DIAGNOSIS — R31.0 GROSS HEMATURIA: ICD-10-CM

## 2023-02-03 DIAGNOSIS — M54.9 CHRONIC BACK PAIN GREATER THAN 3 MONTHS DURATION: ICD-10-CM

## 2023-02-03 DIAGNOSIS — Z98.1 HISTORY OF LUMBAR SPINAL FUSION: ICD-10-CM

## 2023-02-03 DIAGNOSIS — E11.9 TYPE 2 DIABETES MELLITUS WITHOUT COMPLICATION, WITHOUT LONG-TERM CURRENT USE OF INSULIN (H): ICD-10-CM

## 2023-02-03 DIAGNOSIS — M48.062 SPINAL STENOSIS OF LUMBAR REGION WITH NEUROGENIC CLAUDICATION: Primary | ICD-10-CM

## 2023-02-03 PROCEDURE — 99214 OFFICE O/P EST MOD 30 MIN: CPT | Performed by: FAMILY MEDICINE

## 2023-02-03 RX ORDER — HYDROCODONE BITARTRATE AND ACETAMINOPHEN 10; 325 MG/1; MG/1
1 TABLET ORAL
Qty: 150 TABLET | Refills: 0 | Status: ON HOLD | OUTPATIENT
Start: 2023-02-03 | End: 2023-04-22

## 2023-02-03 RX ORDER — HYDROCODONE BITARTRATE AND ACETAMINOPHEN 10; 325 MG/1; MG/1
1 TABLET ORAL
Qty: 150 TABLET | Refills: 0 | Status: ON HOLD | OUTPATIENT
Start: 2023-03-06 | End: 2023-04-22

## 2023-02-03 RX ORDER — AMLODIPINE BESYLATE 10 MG/1
10 TABLET ORAL DAILY
Qty: 90 TABLET | Refills: 3 | Status: SHIPPED | OUTPATIENT
Start: 2023-02-03 | End: 2024-01-10

## 2023-02-03 RX ORDER — LISINOPRIL 40 MG/1
40 TABLET ORAL DAILY
Qty: 90 TABLET | Refills: 4 | Status: ON HOLD | OUTPATIENT
Start: 2023-02-03 | End: 2023-04-22

## 2023-02-03 RX ORDER — PREGABALIN 150 MG/1
150 CAPSULE ORAL 2 TIMES DAILY
Qty: 180 CAPSULE | Refills: 1 | Status: ON HOLD | OUTPATIENT
Start: 2023-02-03 | End: 2023-04-22

## 2023-02-03 RX ORDER — HYDROCODONE BITARTRATE AND ACETAMINOPHEN 10; 325 MG/1; MG/1
1 TABLET ORAL
Qty: 150 TABLET | Refills: 0 | Status: ON HOLD | OUTPATIENT
Start: 2023-04-05 | End: 2023-04-22

## 2023-02-03 ASSESSMENT — ANXIETY QUESTIONNAIRES
GAD7 TOTAL SCORE: 0
IF YOU CHECKED OFF ANY PROBLEMS ON THIS QUESTIONNAIRE, HOW DIFFICULT HAVE THESE PROBLEMS MADE IT FOR YOU TO DO YOUR WORK, TAKE CARE OF THINGS AT HOME, OR GET ALONG WITH OTHER PEOPLE: NOT DIFFICULT AT ALL
GAD7 TOTAL SCORE: 0
7. FEELING AFRAID AS IF SOMETHING AWFUL MIGHT HAPPEN: NOT AT ALL
3. WORRYING TOO MUCH ABOUT DIFFERENT THINGS: NOT AT ALL
1. FEELING NERVOUS, ANXIOUS, OR ON EDGE: NOT AT ALL
5. BEING SO RESTLESS THAT IT IS HARD TO SIT STILL: NOT AT ALL
2. NOT BEING ABLE TO STOP OR CONTROL WORRYING: NOT AT ALL
6. BECOMING EASILY ANNOYED OR IRRITABLE: NOT AT ALL

## 2023-02-03 ASSESSMENT — PATIENT HEALTH QUESTIONNAIRE - PHQ9
5. POOR APPETITE OR OVEREATING: NOT AT ALL
SUM OF ALL RESPONSES TO PHQ QUESTIONS 1-9: 0

## 2023-02-03 ASSESSMENT — PAIN SCALES - GENERAL: PAINLEVEL: MODERATE PAIN (4)

## 2023-02-03 NOTE — NURSING NOTE
"Patient presents to the clinic for controlled medication refill.  Last administration of Norco was yesterday.  Contract signed today, and TOX obtain 3-7-22.     FOOD SECURITY SCREENING QUESTIONS:    The next two questions are to help us understand your food security.  If you are feeling you need any assistance in this area, we have resources available to support you today.    Hunger Vital Signs:  Within the past 12 months we worried whether our food would run out before we got money to buy more. Never  Within the past 12 months the food we bought just didn't last and we didn't have money to get more. Never    Advance Care Directive on file? no  Advance Care Directive provided to patient? Declined.    Chief Complaint   Patient presents with     Recheck Medication       Initial BP (!) 144/88 (BP Location: Right arm, Patient Position: Sitting, Cuff Size: Adult Large)   Pulse 84   Temp 98.2  F (36.8  C) (Tympanic)   Resp 20   Wt 103.4 kg (228 lb)   SpO2 96%   BMI 33.19 kg/m   Estimated body mass index is 33.19 kg/m  as calculated from the following:    Height as of 10/5/22: 1.765 m (5' 9.5\").    Weight as of this encounter: 103.4 kg (228 lb).  Medication Reconciliation: complete        Leona Shell LPN       "

## 2023-02-03 NOTE — LETTER
Opioid / Opioid Plus Controlled Substance Agreement    This is an agreement between you and your provider about the safe and appropriate use of controlled substance/opioids prescribed by your care team. Controlled substances are medicines that can cause physical and mental dependence (abuse).    There are strict laws about having and using these medicines. We here at [facility] are committing to working with you in your efforts to get better. To support you in this work, we ll help you schedule regular office appointments for medicine refills. If we must cancel or change your appointment for any reason, we ll make sure you have enough medicine to last until your next appointment.     As a Provider, I will:    Listen carefully to your concerns and treat you with respect.     Recommend a treatment plan that I believe is in your best interest. This plan may involve therapies other than opioid pain medication.     Talk with you often about the possible benefits, and the risk of harm of any medicine that we prescribe for you.     Provide a plan on how to taper (discontinue or go off) using this medicine if the decision is made to stop its use.    As a Patient, I understand that opioid(s):     Are a controlled substance prescribed by my care team to help me function or work and manage my condition(s).     Are strong medicines and can cause serious side effects such as:    Drowsiness, which can seriously affect my driving ability    A lower breathing rate, enough to cause death    Harm to my thinking ability     Depression     Abuse of and addiction to this medicine    Need to be taken exactly as prescribed. Combining opioids with certain medicines or chemicals (such as illegal drugs, sedatives, sleeping pills, and benzodiazepines) can be dangerous or even fatal. If I stop opioids suddenly, I may have severe withdrawal symptoms.    Do not work for all types of pain nor for all patients. If they re not helpful, I may  be asked to stop them.        The risks, benefits and side effects of these medicine(s) were explained to me. I agree that:  1. I will take part in other treatments as advised by my care team. This may be psychiatry or counseling, physical therapy, behavioral therapy, group treatment or a referral to a specialist.     2. I will keep all my appointments. I understand that this is part of the monitoring of opioids. My care team may require an office visit for EVERY opioid/controlled substance refill. If I miss appointments or don t follow instructions, my care team may stop my medicine.    3. I will take my medicines as prescribed. I will not change the dose or schedule unless my care team tells me to. There will be no refills if I run out early.     4. I may be asked to come to the clinic and complete a urine drug test or complete a pill count at any time. If I don t give a urine sample or participate in a pill count, the care team may stop my medicine.    5. I will only receive prescriptions from this clinic for chronic pain. If I am treated by another provider for acute pain issues, I will tell them that I am taking opioid pain medication for chronic pain and that I have a treatment agreement with this provider. I will inform my [facility] care team within one business day if I am given a prescription for any pain medication by another healthcare provider. My [facility] care team can contact other providers and pharmacists about my use of any medicines.    6. It is up to me to make sure that I don t run out of my medicines on weekends or holidays. If my care team is willing to refill my opioid prescription without a visit, I must request refills only during office hours. Refills may take up to 3 business days to process. I will use one pharmacy to fill all my opioid and other controlled substance prescriptions. I will notify the clinic about any changes to my insurance or medication  availability.    7. I am responsible for my prescriptions. If the medicine/prescription is lost, stolen or destroyed, it will not be replaced. I also agree not to share controlled substance medicines with anyone.    8. I am aware I should not use any illegal or recreational drugs. I agree not to drink alcohol unless my care team says I can.       9. If I enroll in the [place] [facility] Cannabis program, I will tell my care team prior to my next refill.     10. I will tell my care team right away if I become pregnant, have a new medical problem treated outside of my regular clinic, or have a change in my medications.    11. I understand that this medicine can affect my thinking, judgment and reaction time. Alcohol and drugs affect the brain and body, which can affect the safety of my driving. Being under the influence of alcohol or drugs can affect my decision-making, behaviors, personal safety, and the safety of others. Driving while impaired (DWI) can occur if a person is driving, operating, or in physical control of a car, motorcycle, boat, snowmobile, ATV, motorbike, off-road vehicle, or any other motor vehicle ([place] Statute 169A.20). I understand the risk if I choose to drive or operate any vehicle or machinery.    I understand that if I do not follow any of the conditions above, my prescriptions or treatment may be stopped or changed.          Opioids  What You Need to Know    What are opioids?   Opioids are pain medicines that must be prescribed by a doctor. They are also known as narcotics.     Examples are:   1. morphine (MS Contin, Nina)  2. oxycodone (Oxycontin)  3. oxycodone and acetaminophen (Percocet)  4. hydrocodone and acetaminophen (Vicodin, Norco)   5. fentanyl patch (Duragesic)   6. hydromorphone (Dilaudid)   7. methadone  8. codeine (Tylenol #3)     What do opioids do well?   Opioids are best for severe short-term pain such as after a surgery or injury. They may work well for cancer pain. They may  help some people with long-lasting (chronic) pain.     What do opioids NOT do well?   Opioids never get rid of pain entirely, and they don t work well for most patients with chronic pain. Opioids don t reduce swelling, one of the causes of pain.                                    Other ways to manage chronic pain and improve function include:       Treat the health problem that may be causing pain    Anti-inflammation medicines, which reduce swelling and tenderness, such as ibuprofen (Advil, Motrin) or naproxen (Aleve)    Acetaminophen (Tylenol)    Antidepressants and anti-seizure medicines, especially for nerve pain    Topical treatments such as patches or creams    Injections or nerve blocks    Chiropractic or osteopathic treatment    Acupuncture, massage, deep breathing, meditation, visual imagery, aromatherapy    Use heat or ice at the pain site    Physical therapy     Exercise    Stop smoking    Take part in therapy       Risks and side effects     Talk to your doctor before you start or decide to keep taking opioids. Possible side effects include:      Lowering your breathing rate enough to cause death    Overdose, including death, especially if taking higher than prescribed doses    Worse depression symptoms; less pleasure in things you usually enjoy    Feeling tired or sluggish    Slower thoughts or cloudy thinking    Being more sensitive to pain over time; pain is harder to control    Trouble sleeping or restless sleep    Changes in hormone levels (for example, less testosterone)    Changes in sex drive or ability to have sex    Constipation    Unsafe driving    Itching and sweating    Dizziness    Nausea, throwing up and dry mouth    What else should I know about opioids?    Opioids may lead to dependence, tolerance, or addiction.      Dependence means that if you stop or reduce the medicine too quickly, you will have withdrawal symptoms. These include loose poop (diarrhea), jitters, flu-like symptoms,  nervousness and tremors. Dependence is not the same as addiction.                       Tolerance means needing higher doses over time to get the same effect. This may increase the chance of serious side effects.      Addiction is when people improperly use a substance that harms their body, their mind or their relations with others. Use of opiates can cause a relapse of addiction if you have a history of drug or alcohol abuse.      People who have used opioids for a long time may have a lower quality of life, worse depression, higher levels of pain and more visits to doctors.    You can overdose on opioids. Take these steps to lower your risk of overdose:    1. Recognize the signs:  Signs of overdose include decrease or loss of consciousness (blackout), slowed breathing, trouble waking up and blue lips. If someone is worried about overdose, they should call 911.    2. Talk to your doctor about Narcan (naloxone).   If you are at risk for overdose, you may be given a prescription for Narcan. This medicine very quickly reverses the effects of opioids.   If you overdose, a friend or family member can give you Narcan while waiting for the ambulance. They need to know the signs of overdose and how to give Narcan.     3. Don't use alcohol or street drugs.   Taking them with opioids can cause death.    4. Do not take any of these medicines unless your doctor says it s OK. Taking these with opioids can cause death:    Benzodiazepines, such as lorazepam (Ativan), alprazolam (Xanax) or diazepam (Valium)    Muscle relaxers, such as cyclobenzaprine (Flexeril)    Sleeping pills like zolpidem (Ambien)     Other opioids      How to keep you and other people safe while taking opioids:    1. Never share your opioids with others.  Opioid medicines are regulated by the Drug Enforcement Agency ([employer]MET[employer]). Selling or sharing medications is a criminal act.    2. Be sure to store opioids in a secure place, locked up if possible. Young children  can easily swallow them and overdose.    3. When you are traveling with your medicines, keep them in the original bottles. If you use a pill box, be sure you also carry a copy of your medicine list from your clinic or pharmacy.    4. Safe disposal of opioids    Most pharmacies have places to get rid of medicine, called disposal kiosks. Medicine disposal options are also available in every [facility]. Search your county and  medication disposal  to find more options. You can find more details at:  [email or web address]     I agree that my provider, clinic care team, and pharmacy may work with any city, state or federal law enforcement agency that investigates the misuse, sale, or other diversion of my controlled medicine. I will allow my provider to discuss my care with, or share a copy of, this agreement with any other treating provider, pharmacy or emergency room where I receive care.    I have read this agreement and have asked questions about anything I did not understand.    _______________________________________________________  Patient Signature - Dilip Kan _____________________                   Date     _______________________________________________________  Provider Signature - Wei Perez MD   _____________________                   Date     _______________________________________________________  Witness Signature (required if provider not present while patient signing)   _____________________                   Date

## 2023-02-03 NOTE — PROGRESS NOTES
Assessment & Plan       ICD-10-CM    1. Spinal stenosis of lumbar region with neurogenic claudication  M48.062 HYDROcodone-acetaminophen (NORCO)  MG per tablet     HYDROcodone-acetaminophen (NORCO)  MG per tablet     HYDROcodone-acetaminophen (NORCO)  MG per tablet     pregabalin (LYRICA) 150 MG capsule      2. History of lumbar spinal fusion  Z98.1 HYDROcodone-acetaminophen (NORCO)  MG per tablet     HYDROcodone-acetaminophen (NORCO)  MG per tablet     HYDROcodone-acetaminophen (NORCO)  MG per tablet      3. Chronic back pain greater than 3 months duration  M54.9 pregabalin (LYRICA) 150 MG capsule    G89.29       4. Chronic, continuous use of opioids  F11.90 HYDROcodone-acetaminophen (NORCO)  MG per tablet     HYDROcodone-acetaminophen (NORCO)  MG per tablet     HYDROcodone-acetaminophen (NORCO)  MG per tablet      5. Type 2 diabetes mellitus without complication, without long-term current use of insulin (H)  E11.9 HEMOGLOBIN A1C      6. Essential hypertension  I10 amLODIPine (NORVASC) 10 MG tablet     lisinopril (ZESTRIL) 40 MG tablet      7. Gross hematuria  R31.0       8. Malignant neoplasm of prostate (H)  C61         Chronic low back pain, status post multiple lumbar surgeries.  Hydrocodone use has been decreased over time.  Recently no changes have been made.  He is dealing with recurrent prostate cancer and related pain.  Is a balance between risks and benefits of opioids, refilled hydrocodone 10/325 mg taking 5 times daily.  Provided 3-month supply.  PDMP Review       Value Time User    State PDMP site checked  Yes 2/3/2023  9:53 AM Wei Perez MD         Refilled pregabalin as additional treatment for pain.  Urine drug monitoring last March 2022.  Updated controlled substance agreement.    Obtain A1c with future labs for oncology.    Blood pressure controlled on current medications.  Normal CMP on last labs 3 weeks ago.    Reporting ongoing  gross hematuria.  This likely is due to invasion of the bladder by recurrent prostate cancer as seen on CT scan in August 2022.  No updated imaging since.  Previously we had discussed some bladder discomfort that likely related.  He is aware that gross hematuria could come and go.  If it becomes substantial, then it may warrant further investigation.  Otherwise, likely cystoscopy for evaluation may just aggravate the situation.  Hemoglobin has been stable, last was 12.4.      Return in about 3 months (around 5/3/2023).    Wei Perez MD   [facility]      Varun Aly is a 67 year old, presenting for the following health issues:  Recheck Medication      HPI     Pain History:  When did you first notice your pain? - Chronic Pain   Have you seen this provider for your pain in the past?   Yes   Where in your body do you have pain? Left low back/hip/pelvis.  Are you seeing anyone else for your pain? Yes - oncology    PHQ-9 SCORE 10/5/2022 12/2/2022 2/3/2023   PHQ-9 Total Score MyChart 4 (Minimal depression) - -   PHQ-9 Total Score 4 0 0       MARY-7 SCORE 10/5/2022 12/2/2022 2/3/2023   Total Score 5 (mild anxiety) - -   Total Score 5 0 0         PDMP Review       Value Time User    State PDMP site checked  Yes 12/2/2022  9:35 AM Wei Perez MD        Last CSA Agreement:   CSA -- Patient Level:     [Media Unavailable] Controlled Substance Agreement - Opioid - Scan on 12/15/2021  9:20 AM: Controlled Substance Agreement   [Media Unavailable] Controlled Substance Agreement - Opioid - Scan on 10/26/2021  4:40 PM       Last UDS: 3/9/2022    Hematuria intermittently  We discussed how this relates to metastatic prostate cancer with invasion to the bladder      Review of Systems   General: Denies general constitutional problems  Cardiovascular: Denies problems  Respiratory: Denies problems        Objective    BP (!) 144/88 (BP Location: Right arm, Patient Position: Sitting, Cuff Size: Adult  Large)   Pulse 84   Temp 98.2  F (36.8  C) (Tympanic)   Resp 20   Wt 103.4 kg (228 lb)   SpO2 96%   BMI 33.19 kg/m    Body mass index is 33.19 kg/m .  Physical Exam   General Appearance: Alert. No acute distress  Psychiatric: Normal affect and mentation

## 2023-02-07 RX ORDER — HYDROXYZINE HYDROCHLORIDE 10 MG/1
10 TABLET, FILM COATED ORAL EVERY 6 HOURS PRN
Qty: 120 TABLET | Refills: 11 | Status: SHIPPED | OUTPATIENT
Start: 2023-02-07 | End: 2024-01-10

## 2023-02-20 ENCOUNTER — LAB (OUTPATIENT)
Dept: LAB | Facility: OTHER | Age: 68
End: 2023-02-20
Attending: INTERNAL MEDICINE
Payer: COMMERCIAL

## 2023-02-20 DIAGNOSIS — E11.9 TYPE 2 DIABETES MELLITUS WITHOUT COMPLICATION, WITHOUT LONG-TERM CURRENT USE OF INSULIN (H): ICD-10-CM

## 2023-02-20 DIAGNOSIS — C61 MALIGNANT NEOPLASM OF PROSTATE (H): ICD-10-CM

## 2023-02-20 DIAGNOSIS — R97.21 RISING PSA FOLLOWING TREATMENT FOR MALIGNANT NEOPLASM OF PROSTATE: ICD-10-CM

## 2023-02-20 LAB
ALBUMIN SERPL BCG-MCNC: 4 G/DL (ref 3.5–5.2)
ALP SERPL-CCNC: 124 U/L (ref 40–129)
ALT SERPL W P-5'-P-CCNC: 10 U/L (ref 10–50)
ANION GAP SERPL CALCULATED.3IONS-SCNC: 8 MMOL/L (ref 7–15)
AST SERPL W P-5'-P-CCNC: 14 U/L (ref 10–50)
BASOPHILS # BLD AUTO: 0.1 10E3/UL (ref 0–0.2)
BASOPHILS NFR BLD AUTO: 1 %
BILIRUB SERPL-MCNC: 0.6 MG/DL
BUN SERPL-MCNC: 12.8 MG/DL (ref 8–23)
CALCIUM SERPL-MCNC: 9.4 MG/DL (ref 8.8–10.2)
CHLORIDE SERPL-SCNC: 105 MMOL/L (ref 98–107)
CREAT SERPL-MCNC: 0.64 MG/DL (ref 0.67–1.17)
DEPRECATED HCO3 PLAS-SCNC: 25 MMOL/L (ref 22–29)
EOSINOPHIL # BLD AUTO: 0.2 10E3/UL (ref 0–0.7)
EOSINOPHIL NFR BLD AUTO: 3 %
ERYTHROCYTE [DISTWIDTH] IN BLOOD BY AUTOMATED COUNT: 12.6 % (ref 10–15)
GFR SERPL CREATININE-BSD FRML MDRD: >90 ML/MIN/1.73M2
GLUCOSE SERPL-MCNC: 115 MG/DL (ref 70–99)
HBA1C MFR BLD: 6.7 % (ref 4–6.2)
HCT VFR BLD AUTO: 38.3 % (ref 40–53)
HGB BLD-MCNC: 12.7 G/DL (ref 13.3–17.7)
HOLD SPECIMEN: NORMAL
IMM GRANULOCYTES # BLD: 0 10E3/UL
IMM GRANULOCYTES NFR BLD: 0 %
LYMPHOCYTES # BLD AUTO: 1.8 10E3/UL (ref 0.8–5.3)
LYMPHOCYTES NFR BLD AUTO: 24 %
MCH RBC QN AUTO: 29.6 PG (ref 26.5–33)
MCHC RBC AUTO-ENTMCNC: 33.2 G/DL (ref 31.5–36.5)
MCV RBC AUTO: 89 FL (ref 78–100)
MONOCYTES # BLD AUTO: 0.6 10E3/UL (ref 0–1.3)
MONOCYTES NFR BLD AUTO: 8 %
NEUTROPHILS # BLD AUTO: 4.6 10E3/UL (ref 1.6–8.3)
NEUTROPHILS NFR BLD AUTO: 64 %
NRBC # BLD AUTO: 0 10E3/UL
NRBC BLD AUTO-RTO: 0 /100
PLATELET # BLD AUTO: 220 10E3/UL (ref 150–450)
POTASSIUM SERPL-SCNC: 4.6 MMOL/L (ref 3.4–5.3)
PROT SERPL-MCNC: 6.8 G/DL (ref 6.4–8.3)
PSA SERPL-MCNC: 8.3 NG/ML (ref 0–4.5)
RBC # BLD AUTO: 4.29 10E6/UL (ref 4.4–5.9)
SODIUM SERPL-SCNC: 138 MMOL/L (ref 136–145)
WBC # BLD AUTO: 7.3 10E3/UL (ref 4–11)

## 2023-02-20 PROCEDURE — 84153 ASSAY OF PSA TOTAL: CPT | Mod: ZL

## 2023-02-20 PROCEDURE — 83036 HEMOGLOBIN GLYCOSYLATED A1C: CPT | Mod: ZL

## 2023-02-20 PROCEDURE — 36415 COLL VENOUS BLD VENIPUNCTURE: CPT | Mod: ZL

## 2023-02-20 PROCEDURE — 80053 COMPREHEN METABOLIC PANEL: CPT | Mod: ZL

## 2023-02-20 PROCEDURE — 85025 COMPLETE CBC W/AUTO DIFF WBC: CPT | Mod: ZL

## 2023-02-28 ENCOUNTER — ONCOLOGY VISIT (OUTPATIENT)
Dept: ONCOLOGY | Facility: OTHER | Age: 68
End: 2023-02-28
Attending: INTERNAL MEDICINE
Payer: COMMERCIAL

## 2023-02-28 ENCOUNTER — TELEPHONE (OUTPATIENT)
Dept: FAMILY MEDICINE | Facility: OTHER | Age: 68
End: 2023-02-28

## 2023-02-28 VITALS
TEMPERATURE: 98.2 F | HEART RATE: 80 BPM | BODY MASS INDEX: 33.33 KG/M2 | RESPIRATION RATE: 16 BRPM | SYSTOLIC BLOOD PRESSURE: 100 MMHG | WEIGHT: 229 LBS | DIASTOLIC BLOOD PRESSURE: 52 MMHG | OXYGEN SATURATION: 96 %

## 2023-02-28 DIAGNOSIS — C61 PROSTATE CANCER (H): Primary | ICD-10-CM

## 2023-02-28 DIAGNOSIS — M25.552 HIP PAIN, LEFT: ICD-10-CM

## 2023-02-28 DIAGNOSIS — R97.21 RISING PSA FOLLOWING TREATMENT FOR MALIGNANT NEOPLASM OF PROSTATE: ICD-10-CM

## 2023-02-28 DIAGNOSIS — F40.240 CLAUSTROPHOBIA: Primary | ICD-10-CM

## 2023-02-28 DIAGNOSIS — C61 MALIGNANT NEOPLASM OF PROSTATE (H): Primary | ICD-10-CM

## 2023-02-28 PROCEDURE — 99215 OFFICE O/P EST HI 40 MIN: CPT | Performed by: INTERNAL MEDICINE

## 2023-02-28 RX ORDER — ALPRAZOLAM 0.5 MG
TABLET ORAL
Qty: 2 TABLET | Refills: 0 | Status: ON HOLD | OUTPATIENT
Start: 2023-02-28 | End: 2023-04-19

## 2023-02-28 ASSESSMENT — PAIN SCALES - GENERAL: PAINLEVEL: EXTREME PAIN (8)

## 2023-02-28 NOTE — TELEPHONE ENCOUNTER
Pt would like to get medication for his MRI tomorrow morning.  Please call.    Abilio Castellon on 2/28/2023 at 3:12 PM

## 2023-02-28 NOTE — NURSING NOTE
Chief Complaint   Patient presents with     Oncology Clinic Visit     F/U Prostate cancer     Medication Reconciliation: complete    Rochelle Toth CMA ([facility])

## 2023-02-28 NOTE — PATIENT INSTRUCTIONS
Follow up with Dr Fritz on 3/9/23.      You will need imaging done prior to your next visit.  Radiology scheduling will contact you to arrange these scans.  If you have not received a call in the next 2 weeks, please call them.  For MRIs call [phone] and all other scans call [phone].

## 2023-02-28 NOTE — TELEPHONE ENCOUNTER
Patient notified of medication called into pharmacy. No further concerns    Corinne R Thayer, RN on 2/28/2023 at 4:07 PM

## 2023-03-01 NOTE — PROGRESS NOTES
Visit Date: 02/28/2023    HISTORY OF PRESENT ILLNESS:  Mr. Kan returns for followup of castrate-resistant metastatic prostate cancer.  We had seen the patient in consultation at the request of Dr. Arrington back on 09/15/2021.  At that time, Mr. Kan was 66-year-old white male with previous history of aortic valve stenosis and type 2 diabetes mellitus, whom we were asked to evaluate concerning a rising PSA in a castrate-resistant patient with prostate cancer.  The patient had undergone a prostatectomy back in 2012 for prostate cancer.  This was followed by SBRT in 2013.  The patient was followed by Dr. Celestine Arrington of Urology, who noted a rising PSA and placed the patient on androgen deprivation therapy.  Vantas implant was then switched to Lupron.  His PSA began to rise.  In the recent past on 07/09/2020, it was 0.97, then on 11/13/2020 it pineda to 1.215.  Dr. Arrington ordered imaging studies.  CT chest, abdomen and pelvis were negative for metastatic disease.  PSA continued to rise.  On 02/03/2021, it was up to 1.847.  On 08/25/2021, it was up to 3.48.  Therefore, the patient had a PSA doubling time that was less than 10 months.  Therefore, he was considered to have castrate-resistant M0 prostate cancer.  The patient was essentially asymptomatic, denied any fevers, night sweats, weight loss or bone pain.  He did have urinary incontinence and had to wear Depends, but for the most part, he could not tolerate the Vantas implant.  The patient was seen subsequently, and we felt given the fact he had castrate-resistant M0 prostate cancer, that he would be a candidate for apalutamide 240 mg daily.  He had scans done initially on 09/17/2021.  Bone scan was negative.  CT chest, abdomen and pelvis were all negative.  There was some soft tissue density in the left lower pelvis in the region of the left seminal vesicle, which was somewhat concerning.  The patient continue apalutamide.  After 3 weeks of apalutamide, his PSA dropped  from 3.48 down to 1.62.  He did have some fatigue associated with apalutamide.  His PSA continued to drop.  On 11/13/2021, it was down to 1.49.  The patient continued androgen deprivation therapy under the direction of Dr. Celestine Arrington.  He is receiving Lupron 45 mg IM q.6 months.  When we saw the patient on 07/07/2021, his PSA began to rise and was up to 2.06.  We elected to repeat imaging.  Bone scan was negative for metastatic disease.  CT chest, abdomen and pelvis revealed there was progression of disease with an enlarging mass involving the left seminal vesicle, which appeared to invade the adjacent lateral rectosigmoid.  Dimension spanned 4.4 x 4 cm.  No adenopathy.  The patient continued to receive 6-month Lupron.  He received a dose on 07/21/2022.  When we saw the patient on 08/05/2022, we felt the patient had evidence of metastatic prostate cancer, castrate-resistant.  We felt the patient would be a candidate for abiraterone and prednisone with abiraterone given at a dose 1000 mg p.o. daily, prednisone 5 mg p.o. b.i.d.  Apparently after about 2 or 3 weeks of being on this, he developed fatigue and stopped taking his abiraterone for 2 weeks.  We elected to decrease his abiraterone to 750 mg daily, and  his fatigue seemed to improve on the reduced dose of Zytiga, or abiraterone.  On 10/25/2022, his PSA began to rise and was 2.86.  We elected to increase his abiraterone to 1000 mg p.o. daily on days 1-28 with prednisone 5 mg p.o. b.i.d.  When we saw the patient on 01/11/2023, his PSA had risen to 4.05.  He had not received his Lupron injection yet.  We wanted to wait for his Lupron injection and determine if he was truly progressing with imaging.  The patient had his last Lupron given by Dr. Celestine Arrington on 01/26/2023.  He comes in today.  He says he has been having left hip pain that radiates down to his knee.  He has chronic low back pain from previous back surgeries.  His PSA that was drawn on 02/20/2023 is up  to 8.3.  He has some urinary symptoms, he says, as well, but he basically is incontinent.  No loss of bowel control, no fevers, night sweats, weight loss.  He does get hot flashes.  He was recently placed on Megace by Dr. Arrington, which seems to help.  He says the hot flashes are less.    PHYSICAL EXAMINATION:    GENERAL:  He is a middle-aged white male in no acute distress, ECOG performance status of 1.  VITAL SIGNS:  Reveal blood pressure 152, pulse 80, respirations 16, temperature 98.2.  HEENT:  Atraumatic, normocephalic.  Oropharynx nonerythematous.  NECK:  Supple.  LUNGS:  Reveal few expiratory wheezes on the right.  HEART:  Regular rhythm.  S1, S2 normal.  ABDOMEN:  Obese, soft.  Normoactive bowel sounds.  Nondistended.  LYMPHATICS:  No cervical or supraclavicular nodes.  EXTREMITIES:  Trace ankle edema.  There is tenderness over the left hip.  He ambulates with difficulty secondary to left hip discomfort.  NEUROLOGIC:  Grossly nonfocal.    LABORATORY DATA:  Reveal PSA of 8.3.  CBC with white count 7.3, H and H 12.7 and 38.3.  Platelet count is 220.  BUN is 12.8, creatinine 0.64.  LFTs are normal.    IMPRESSION AND PLAN:  Castrate-resistant nonmetastatic prostate cancer.  PSA doubling time was initially less than 10 months, and the patient was started on apalutamide for nonmetastatic castrate-resistant prostate cancer, in addition to androgen deprivation therapy.  He was started on a dose of apalutamide 240 mg daily.  His PSA dropped to 1.25, then began to rise to 2.06.  Imaging indicated progression of disease or local recurrence involving the seminal vesicles potentially invading the rectosigmoid.  We elected to switch him to abiraterone and prednisone abiraterone 1000 mg p.o. daily on days 1-28, prednisone 5 mg p.o. b.i.d.  After 1 cycle, he developed fatigue.  We dose reduced his Zytiga, or abiraterone, to 750 p.o. daily.  His PSA again began to rise on 10/25/2022.  We increased his abiraterone to full dose  1000 mg p.o. daily with prednisone 5 mg p.o. b.i.d. given on days 1-28.  PSA initially dropped, but then began to rise.  It has now doubled in less than 1 month despite androgen deprivation therapy, is up to 8.  He has left hip pain, suspect bony metastases.  We would like to restage the patient with CT chest, abdomen and pelvis, bone scan as well as MRI of the left hip.  If there is evidence of bony progression, he will likely need to switch to alternative therapies, i.e. docetaxel chemotherapy.  In the interim, he will continue abiraterone and prednisone.  We will see the patient after the above imaging, repeat PSA.    Seventy-eight minutes were spent with this patient.  Time was spent reviewing multiple imaging studies, physician provider notes, performing history and physical, documenting history and physical, ordering followup scans and labs.    Erum Fritz MD        D: 2023   T: 2023   MT: [facility]    Name:     LELAND CHAPMAN  MRN:      [id number]        Account:    [id number]   :      1955           Visit Date: 2023     Document: [id number]    cc:  MD Wei Fong MD

## 2023-03-03 DIAGNOSIS — C61 MALIGNANT NEOPLASM OF PROSTATE (H): Primary | ICD-10-CM

## 2023-03-03 DIAGNOSIS — R97.21 RISING PSA FOLLOWING TREATMENT FOR MALIGNANT NEOPLASM OF PROSTATE: ICD-10-CM

## 2023-03-03 RX ORDER — ABIRATERONE ACETATE 250 MG/1
1000 TABLET ORAL DAILY
Qty: 120 TABLET | Refills: 0 | Status: SHIPPED | OUTPATIENT
Start: 2023-03-03 | End: 2023-03-09

## 2023-03-03 RX ORDER — PREDNISONE 5 MG/1
5 TABLET ORAL 2 TIMES DAILY
Qty: 60 TABLET | Refills: 11 | Status: SHIPPED | OUTPATIENT
Start: 2023-03-03 | End: 2023-03-09

## 2023-03-06 ENCOUNTER — HOSPITAL ENCOUNTER (OUTPATIENT)
Dept: CT IMAGING | Facility: OTHER | Age: 68
Discharge: HOME | End: 2023-03-06
Attending: INTERNAL MEDICINE
Payer: COMMERCIAL

## 2023-03-06 ENCOUNTER — HOSPITAL ENCOUNTER (OUTPATIENT)
Dept: NUCLEAR MEDICINE | Facility: OTHER | Age: 68
Discharge: HOME | End: 2023-03-06
Attending: INTERNAL MEDICINE
Payer: COMMERCIAL

## 2023-03-06 ENCOUNTER — HOSPITAL ENCOUNTER (OUTPATIENT)
Dept: NUCLEAR MEDICINE | Facility: OTHER | Age: 68
Setting detail: NUCLEAR MEDICINE
Discharge: HOME | End: 2023-03-06
Attending: INTERNAL MEDICINE
Payer: COMMERCIAL

## 2023-03-06 ENCOUNTER — HOSPITAL ENCOUNTER (OUTPATIENT)
Dept: MRI IMAGING | Facility: OTHER | Age: 68
Discharge: HOME | End: 2023-03-06
Attending: INTERNAL MEDICINE
Payer: COMMERCIAL

## 2023-03-06 ENCOUNTER — HOSPITAL ENCOUNTER (OUTPATIENT)
Dept: CARDIOLOGY | Facility: OTHER | Age: 68
Discharge: HOME | End: 2023-03-06
Attending: INTERNAL MEDICINE
Payer: COMMERCIAL

## 2023-03-06 DIAGNOSIS — R97.21 RISING PSA FOLLOWING TREATMENT FOR MALIGNANT NEOPLASM OF PROSTATE: ICD-10-CM

## 2023-03-06 DIAGNOSIS — C61 PROSTATE CANCER (H): ICD-10-CM

## 2023-03-06 DIAGNOSIS — M25.552 HIP PAIN, LEFT: ICD-10-CM

## 2023-03-06 DIAGNOSIS — I35.2 AORTIC VALVE STENOSIS WITH INSUFFICIENCY, ETIOLOGY OF CARDIAC VALVE DISEASE UNSPECIFIED: ICD-10-CM

## 2023-03-06 DIAGNOSIS — I35.0 MILD AORTIC STENOSIS: ICD-10-CM

## 2023-03-06 DIAGNOSIS — I35.1 NONRHEUMATIC AORTIC VALVE INSUFFICIENCY: ICD-10-CM

## 2023-03-06 DIAGNOSIS — I71.21 ASCENDING AORTIC ANEURYSM (H): ICD-10-CM

## 2023-03-06 DIAGNOSIS — I25.10 PLAQUE IN HEART ARTERY: ICD-10-CM

## 2023-03-06 LAB — LVEF ECHO: NORMAL

## 2023-03-06 PROCEDURE — 74177 CT ABD & PELVIS W/CONTRAST: CPT

## 2023-03-06 PROCEDURE — 78306 BONE IMAGING WHOLE BODY: CPT

## 2023-03-06 PROCEDURE — 73721 MRI JNT OF LWR EXTRE W/O DYE: CPT | Mod: LT

## 2023-03-06 PROCEDURE — 93306 TTE W/DOPPLER COMPLETE: CPT | Mod: 26 | Performed by: INTERNAL MEDICINE

## 2023-03-06 PROCEDURE — A9503 TC99M MEDRONATE: HCPCS | Performed by: INTERNAL MEDICINE

## 2023-03-06 PROCEDURE — 250N000011 HC RX IP 250 OP 636: Performed by: INTERNAL MEDICINE

## 2023-03-06 PROCEDURE — 93306 TTE W/DOPPLER COMPLETE: CPT

## 2023-03-06 PROCEDURE — 343N000001 HC RX 343: Performed by: INTERNAL MEDICINE

## 2023-03-06 PROCEDURE — 71260 CT THORAX DX C+: CPT

## 2023-03-06 RX ORDER — TC 99M MEDRONATE 20 MG/10ML
27.3 INJECTION, POWDER, LYOPHILIZED, FOR SOLUTION INTRAVENOUS ONCE
Status: COMPLETED | OUTPATIENT
Start: 2023-03-06 | End: 2023-03-06

## 2023-03-06 RX ORDER — IOPAMIDOL 755 MG/ML
132 INJECTION, SOLUTION INTRAVASCULAR ONCE
Status: COMPLETED | OUTPATIENT
Start: 2023-03-06 | End: 2023-03-06

## 2023-03-06 RX ADMIN — IOPAMIDOL 132 ML: 755 INJECTION, SOLUTION INTRAVENOUS at 10:50

## 2023-03-06 RX ADMIN — TC 99M MEDRONATE 27.3 MILLICURIE: 20 INJECTION, POWDER, LYOPHILIZED, FOR SOLUTION INTRAVENOUS at 09:15

## 2023-03-09 ENCOUNTER — ALLIED HEALTH/NURSE VISIT (OUTPATIENT)
Dept: RADIATION ONCOLOGY | Facility: HOSPITAL | Age: 68
End: 2023-03-09
Payer: COMMERCIAL

## 2023-03-09 ENCOUNTER — ONCOLOGY VISIT (OUTPATIENT)
Dept: RADIATION ONCOLOGY | Facility: HOSPITAL | Age: 68
End: 2023-03-09
Attending: RADIOLOGY
Payer: COMMERCIAL

## 2023-03-09 ENCOUNTER — ONCOLOGY VISIT (OUTPATIENT)
Dept: ONCOLOGY | Facility: OTHER | Age: 68
End: 2023-03-09
Attending: INTERNAL MEDICINE
Payer: COMMERCIAL

## 2023-03-09 VITALS
TEMPERATURE: 98.9 F | WEIGHT: 233 LBS | SYSTOLIC BLOOD PRESSURE: 134 MMHG | OXYGEN SATURATION: 97 % | HEART RATE: 58 BPM | BODY MASS INDEX: 33.91 KG/M2 | RESPIRATION RATE: 18 BRPM | DIASTOLIC BLOOD PRESSURE: 68 MMHG

## 2023-03-09 DIAGNOSIS — C61 MALIGNANT NEOPLASM OF PROSTATE (H): ICD-10-CM

## 2023-03-09 DIAGNOSIS — G89.3 CANCER ASSOCIATED PAIN: Primary | ICD-10-CM

## 2023-03-09 DIAGNOSIS — R97.21 RISING PSA FOLLOWING TREATMENT FOR MALIGNANT NEOPLASM OF PROSTATE: ICD-10-CM

## 2023-03-09 DIAGNOSIS — C79.51 BONE METASTASIS: Primary | ICD-10-CM

## 2023-03-09 DIAGNOSIS — C79.51 BONE METASTASIS: ICD-10-CM

## 2023-03-09 DIAGNOSIS — C79.51 BONE METASTASES: ICD-10-CM

## 2023-03-09 LAB
ALBUMIN SERPL BCG-MCNC: 3.6 G/DL (ref 3.5–5.2)
ALP SERPL-CCNC: 109 U/L (ref 40–129)
ALT SERPL W P-5'-P-CCNC: 6 U/L (ref 10–50)
ANION GAP SERPL CALCULATED.3IONS-SCNC: 7 MMOL/L (ref 7–15)
AST SERPL W P-5'-P-CCNC: 12 U/L (ref 10–50)
BASOPHILS # BLD AUTO: 0 10E3/UL (ref 0–0.2)
BASOPHILS NFR BLD AUTO: 0 %
BILIRUB SERPL-MCNC: 0.4 MG/DL
BUN SERPL-MCNC: 11.9 MG/DL (ref 8–23)
CALCIUM SERPL-MCNC: 9.2 MG/DL (ref 8.8–10.2)
CHLORIDE SERPL-SCNC: 108 MMOL/L (ref 98–107)
CREAT SERPL-MCNC: 0.77 MG/DL (ref 0.67–1.17)
DEPRECATED HCO3 PLAS-SCNC: 26 MMOL/L (ref 22–29)
EOSINOPHIL # BLD AUTO: 0.1 10E3/UL (ref 0–0.7)
EOSINOPHIL NFR BLD AUTO: 1 %
ERYTHROCYTE [DISTWIDTH] IN BLOOD BY AUTOMATED COUNT: 12.5 % (ref 10–15)
GFR SERPL CREATININE-BSD FRML MDRD: >90 ML/MIN/1.73M2
GLUCOSE SERPL-MCNC: 122 MG/DL (ref 70–99)
HCT VFR BLD AUTO: 35 % (ref 40–53)
HGB BLD-MCNC: 11.6 G/DL (ref 13.3–17.7)
IMM GRANULOCYTES # BLD: 0 10E3/UL
IMM GRANULOCYTES NFR BLD: 1 %
LYMPHOCYTES # BLD AUTO: 1.4 10E3/UL (ref 0.8–5.3)
LYMPHOCYTES NFR BLD AUTO: 16 %
MCH RBC QN AUTO: 29.1 PG (ref 26.5–33)
MCHC RBC AUTO-ENTMCNC: 33.1 G/DL (ref 31.5–36.5)
MCV RBC AUTO: 88 FL (ref 78–100)
MONOCYTES # BLD AUTO: 0.5 10E3/UL (ref 0–1.3)
MONOCYTES NFR BLD AUTO: 5 %
NEUTROPHILS # BLD AUTO: 6.9 10E3/UL (ref 1.6–8.3)
NEUTROPHILS NFR BLD AUTO: 77 %
NRBC # BLD AUTO: 0 10E3/UL
NRBC BLD AUTO-RTO: 0 /100
PLATELET # BLD AUTO: 189 10E3/UL (ref 150–450)
POTASSIUM SERPL-SCNC: 4.3 MMOL/L (ref 3.4–5.3)
PROT SERPL-MCNC: 6.4 G/DL (ref 6.4–8.3)
PSA SERPL-MCNC: 6.88 NG/ML (ref 0–4.5)
RBC # BLD AUTO: 3.99 10E6/UL (ref 4.4–5.9)
SODIUM SERPL-SCNC: 141 MMOL/L (ref 136–145)
WBC # BLD AUTO: 8.8 10E3/UL (ref 4–11)

## 2023-03-09 PROCEDURE — 99203 OFFICE O/P NEW LOW 30 MIN: CPT | Mod: 25 | Performed by: RADIOLOGY

## 2023-03-09 PROCEDURE — 99417 PROLNG OP E/M EACH 15 MIN: CPT | Performed by: INTERNAL MEDICINE

## 2023-03-09 PROCEDURE — G0463 HOSPITAL OUTPT CLINIC VISIT: HCPCS | Performed by: INTERNAL MEDICINE

## 2023-03-09 PROCEDURE — 85025 COMPLETE CBC W/AUTO DIFF WBC: CPT | Mod: ZL | Performed by: INTERNAL MEDICINE

## 2023-03-09 PROCEDURE — 99215 OFFICE O/P EST HI 40 MIN: CPT | Performed by: INTERNAL MEDICINE

## 2023-03-09 PROCEDURE — 84153 ASSAY OF PSA TOTAL: CPT | Mod: ZL | Performed by: INTERNAL MEDICINE

## 2023-03-09 PROCEDURE — 80053 COMPREHEN METABOLIC PANEL: CPT | Mod: ZL | Performed by: INTERNAL MEDICINE

## 2023-03-09 PROCEDURE — 36415 COLL VENOUS BLD VENIPUNCTURE: CPT | Mod: ZL | Performed by: INTERNAL MEDICINE

## 2023-03-09 RX ORDER — LORAZEPAM 2 MG/ML
0.5 INJECTION INTRAMUSCULAR EVERY 4 HOURS PRN
Status: CANCELLED | OUTPATIENT
Start: 2023-03-20

## 2023-03-09 RX ORDER — OXYCODONE AND ACETAMINOPHEN 5; 325 MG/1; MG/1
1 TABLET ORAL EVERY 6 HOURS PRN
Qty: 40 TABLET | Refills: 0 | Status: SHIPPED | OUTPATIENT
Start: 2023-03-09 | End: 2023-03-22

## 2023-03-09 RX ORDER — HEPARIN SODIUM,PORCINE 10 UNIT/ML
5 VIAL (ML) INTRAVENOUS
Status: CANCELLED | OUTPATIENT
Start: 2023-03-20

## 2023-03-09 RX ORDER — DIPHENHYDRAMINE HYDROCHLORIDE 50 MG/ML
50 INJECTION INTRAMUSCULAR; INTRAVENOUS
Status: CANCELLED
Start: 2023-03-20

## 2023-03-09 RX ORDER — MEPERIDINE HYDROCHLORIDE 50 MG/ML
25 INJECTION INTRAMUSCULAR; INTRAVENOUS; SUBCUTANEOUS EVERY 30 MIN PRN
Status: CANCELLED | OUTPATIENT
Start: 2023-03-20

## 2023-03-09 RX ORDER — PROCHLORPERAZINE MALEATE 10 MG
10 TABLET ORAL EVERY 6 HOURS PRN
Qty: 30 TABLET | Refills: 2 | Status: ON HOLD | OUTPATIENT
Start: 2023-03-19 | End: 2023-04-22

## 2023-03-09 RX ORDER — DEXAMETHASONE 4 MG/1
8 TABLET ORAL 2 TIMES DAILY WITH MEALS
Qty: 6 TABLET | Refills: 9 | Status: ON HOLD | OUTPATIENT
Start: 2023-03-19 | End: 2023-04-19

## 2023-03-09 RX ORDER — ALBUTEROL SULFATE 0.83 MG/ML
2.5 SOLUTION RESPIRATORY (INHALATION)
Status: CANCELLED | OUTPATIENT
Start: 2023-03-20

## 2023-03-09 RX ORDER — EPINEPHRINE 1 MG/ML
0.3 INJECTION, SOLUTION, CONCENTRATE INTRAVENOUS EVERY 5 MIN PRN
Status: CANCELLED | OUTPATIENT
Start: 2023-03-20

## 2023-03-09 RX ORDER — ALBUTEROL SULFATE 90 UG/1
1-2 AEROSOL, METERED RESPIRATORY (INHALATION)
Status: CANCELLED
Start: 2023-03-20

## 2023-03-09 RX ORDER — HEPARIN SODIUM (PORCINE) LOCK FLUSH IV SOLN 100 UNIT/ML 100 UNIT/ML
5 SOLUTION INTRAVENOUS
Status: CANCELLED | OUTPATIENT
Start: 2023-03-20

## 2023-03-09 RX ORDER — METHYLPREDNISOLONE SODIUM SUCCINATE 125 MG/2ML
125 INJECTION, POWDER, LYOPHILIZED, FOR SOLUTION INTRAMUSCULAR; INTRAVENOUS
Status: CANCELLED
Start: 2023-03-20

## 2023-03-09 ASSESSMENT — PAIN SCALES - GENERAL
PAINLEVEL: SEVERE PAIN (6)
PAINLEVEL: SEVERE PAIN (6)

## 2023-03-09 NOTE — PROGRESS NOTES
Visit Date: 03/09/2023    HISTORY OF PRESENT ILLNESS:  Mr. Kan was seen on an emergent basis for followup of metastatic castrate-resistant prostate cancer.  For details of history, see previous notes.  Most recently, the patient had progressed and was started on Zytiga, or abiraterone, and prednisone beginning in 08/2022.  He was started on a dose of around 1000 mg p.o. daily with prednisone 5 mg p.o. b.i.d.  Apparently after about 2-3 weeks of this, he developed fatigue and stopped taking his abiraterone for 2 weeks, elected to decrease his abiraterone dose to 750 mg.  His fatigue seemed to improve on a reduced dose. On 10/25/2022, his PSA pineda to 2.86.  We elected to increase his abiraterone to full dose 1000 mg p.o. daily on days 1 through 28 with prednisone 5 mg p.o. b.i.d.  When we saw the patient on 06/11/2023, his PSA had risen to 4.05.  The patient received his last Lupron injection under the direction of Dr. Arrington on 06/26/2023.  When we saw the patient on 02/28/2023, he was complaining of left hip pain that radiated down to his knee.  He had chronic back pain from previous back surgery.  His PSA was up to 8.3.  He had recently been placed on Megace by Dr. Arrington to help with his hot flashes.  Nonetheless, we ordered imaging studies including MRI of the left hip, which revealed that he had evidence of bony metastases, confirmed by bone scan as well.  Bone scan indicated that there was new region of increased uptake around the left acetabulum.  MRI of the hip revealed that there was a nondisplaced fracture of the left pubic bone adjacent to the pubic symphysis.  This was associated marrow edema.  There was fairly large marrow signal abnormality involving the left acetabulum consistent with metastatic disease.  There was abnormal signal associated with the left obturator internus muscle belly with associated T2 hyperintense signal deep to the muscle belly against the pelvic wall, possibly a direct extension  of tumor hematoma, delineated by pubic symphysis fracture.  There was amorphous abnormal signal in the expected region of the left prostate compatible with recurrent residual tumor.  CT chest, abdomen and pelvis also indicated progression with increasing volume locally invasive mass adjacent to the left prostate invasion of the obturator and levator ani muscles with a nondisplaced acute fracture to the left pubic symphysis.  There were multiple small hyperenhancing lesions throughout the liver compatible with diffuse hepatic metastatic disease.  The patient comes in stating he has difficulty ambulating.  He is currently not on any pain medications, except for hydrocodone, which he takes for chronic back pain, which does not seem to help.  Denies any bright red blood per rectum, change in bowel habits.    PHYSICAL EXAMINATION:    GENERAL:  He is a middle-aged white male in no acute distress.  ECOG performance status is a 1.  VITAL SIGNS:  Reveal blood pressure 130/68, pulse 58, respirations 18, temperature 98.9.  HEENT:  Atraumatic, normocephalic.  Oropharynx nonerythematous.  NECK:  Supple, no thyromegaly.  LUNGS:  Clear to auscultation and percussion.  HEART:  Regular rhythm, S1, S2 normal.  ABDOMEN:  Soft, normoactive bowel sounds.  No mass, no tenderness.  LYMPHATICS:  No cervical, supraclavicular, axillary or inguinal nodes.  EXTREMITIES:  Revealed tenderness over the left hip.  NEUROLOGIC:  Grossly nonfocal.    LABORATORY DATA:  Reveal PSA of 6.88.  BUN is 11.9, creatinine 0.77, glucose 122.  CBC reveals white count 8.8, H and H 11.6 and 35.0, platelet count 189.    IMPRESSION:  Castrate-resistant nonmetastatic prostate cancer, progressed now to castrate-resistant metastatic prostate cancer.  Initially, the patient was treated for PSA doubling time of less than 10 months with apalutamide in addition to androgen deprivation therapy.  We started at a dose of apalutamide 240 mg daily.  His PSA dropped to 1.25,  then began to rise 2.06.  We elected to switch him to abiraterone and prednisone, beginning 2022.  He required dose reduction due to fatigue.  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  He has failed abiraterone and prednisone.  He will be a candidate for chemotherapy with docetaxel and prednisone, which is indicated in this patient.  He will also need palliative radiation therapy to the left hip.  We called Dr. Hamilton of the [facility] Radiation [facility].  He will see the patient today to initiate radiation therapy.  Otherwise, we will prescribe pain meds.  We will also start the patient on Percocet 5/325 q.6 hours p.r.n. pain.  This is cancer associated pain.  Otherwise, we will restage after 3 cycles of docetaxel with prednisone. Docetaxel given at a dose 75 mg/m2 on day 1 of a 21-day cycle with prednisone 5 mg p.o. b.i.d.  Eighty minutes was spent on this patient.  Time was spent reviewing literature on treatment of metastatic prostate cancer, reviewing imaging with Radiology, including Dr. Cano, discussing the case with Radiation Oncology, Dr. Hamilton, performing history and physical, documenting history and physical, ordering chemotherapy, discussing chemotherapy, side effects including neuropathy as well as fluid overload and alopecia as well as cytopenias.  The patient is willing to proceed.    Erum Fritz MD        D: 2023   T: 2023   MT: [id number]    Name:     LELAND CHAPMAN  MRN:      [id number]        Account:    [id number]   :      1955           Visit Date: 2023     Document: [id number]    cc:  MD Wei Fong MD Jeffrey S. Brindle, MD KATIE A. BANNIE, APRN, CNP

## 2023-03-09 NOTE — PROGRESS NOTES
[facility]    RADIATION ONCOLOGY CONSULTATION      Dilip Kan  is referred by No ref. provider found for an oncology consultation.    PRIMARY PHYSICIAN: Wei Perez     Cancer Staging   No matching staging information was found for the patient.    IMPRESSION:Metastatic hormone resistent prostate cancer with non-displaced fracture of left symphysis. Moderate pain at rest but goes to 9 with standing or walking.    PLAN: 10 Gy in 1 fractions for palliative pain management.  ______________________________________________________________________  HISTORY OF PRESENT ILLNESS: Jermain is a 67-year-old male patient who presents with metastatic prostate cancer that is affecting multiple sites.  He presents today to discuss a left acetabulum acute fracture from metastatic disease.  He has been following with medical oncology, is under systemic therapy with Zytiga, prednisone, and docetaxel.  History as below    3/6/2023 CT chest abdomen pelvis with contrast: Increasing volume of locally invasive mass of left prostate, invasion of the obturator and levator ani muscles.  Nondisplaced acute fracture through left pubic symphysis.  Findings highly suspicious for progressive lytic bony metastatic disease, best seen in the manubrium.  Multiple small hyperenhancing lesions throughout the liver, new when compared to prior, suggest new diffuse hepatic metastatic disease.    3/6/2023 whole-body bone scan: New region of increased uptake around the left acetabulum concerning for metastatic disease.    3/6/2023 MR hip left without contrast: Nondisplaced fracture of the left pubic bone adjacent to pubic symphysis with associated marrow edema.  Fairly large region of marrow signal abnormality involving the left acetabulum without a discrete fracture line.  Most likely represents metastatic disease or possibly posttreatment change.  There is abnormal signal with the left obturator internus muscle belly with associated T12  hyperintense signal deep to the muscle belly against the pelvic wall, possibly direct extension of tumor or hematoma from nearby pubic symphysis fracture.  There is signal in the expected region of the left prostate compatible with recurrent or residual tumor.    Previous Radiation Therapy:  Prostate Fossa: Received 3000 cGy, over 15 fractions at 200 cGy ea.   Prostit fossa init: Received 4500 cGy, over 25 fractions at 180 cGy ea.     Past Medical History:   Diagnosis Date     Elevated prostate specific antigen (PSA)     4/10/2012     Encounter for other administrative examinations     1/31/2014     Esophagitis     No Comments Provided     Gastro-esophageal reflux disease without esophagitis     No Comments Provided     Low back pain     No Comments Provided     Malignant neoplasm of prostate (H)     9/6/2012     Nicotine dependence, uncomplicated     Quit - October 2007 with chantix     Other intervertebral disc degeneration, lumbosacral region     12/1/2008       Past Surgical History:   Procedure Laterality Date     APPENDECTOMY OPEN  [id number]    Ruptured - [place]     ARTHROPLASTY KNEE Right 11/16/2021    Procedure: ARTHROPLASTY, KNEE, TOTAL;  Surgeon: Micah Rock MD;  Location: [facility] OR     COLONOSCOPY  08/31/2006    next due in 2016     DISKECTOMY, LUMBAR, SINGLE SP  03/16/2009    L 3-4 microdiskectomy, [facility]     ESOPHAGOSCOPY, GASTROSCOPY, DUODENOSCOPY (EGD), COMBINED  03/2003          ESOPHAGOSCOPY, GASTROSCOPY, DUODENOSCOPY (EGD), COMBINED  08/31/2006          PROSTATECTOMY PERINEAL RADICAL  11/28/2012    Nerve [facility]     SPINE SURGERY N/A 04/28/2017    L3 to S1 spinal decompression L4/L5 fusion     TONSILLECTOMY, ADENOIDECTOMY, COMBINED      as a child     VARICOCELECTOMY  1959    INCISION AND DRAINAGE,EXCISE VARICOCELE       Family History   Problem Relation Age of Onset     Heart Disease Father         Heart Disease, passed away from CHF,CAD     Colon Cancer Father          Cancer-colon     Hypertension Father         Hypertension     Other - See Comments Father         Stroke/Dementia     Hypertension Mother         Hypertension,HTN     Other - See Comments Mother          Parkinsons     Family History Negative Other         Good Health     Family History Negative Other         Good Health,previous marriage./previous marriage.     Family History Negative Other         Good Health,previous marriage.     Family History Negative Sister         Good Health,emotional problems.     Family History Negative Sister         Good Health     Other - See Comments Son         w/o major medical problems.     Other - See Comments Daughter         w/o major medical problems.       Social History     Tobacco Use     Smoking status: Former     Packs/day: 1.00     Years: 20.00     Pack years: 20.00     Types: Cigarettes     Quit date: 2002     Years since quittin.3     Passive exposure: Past     Smokeless tobacco: Current     Types: Snuff     Tobacco comments:     Can't remember when all he had passive exposure   Substance Use Topics     Alcohol use: Yes     Comment:  5 drinks a month         CURRENT MEDICATIONS:   Current Outpatient Medications   Medication     abiraterone (ZYTIGA) 250 MG tablet     acetaminophen (TYLENOL) 325 MG tablet     ALPRAZolam (XANAX) 0.5 MG tablet     amLODIPine (NORVASC) 10 MG tablet     aspirin EC 81 MG EC tablet     atorvastatin (LIPITOR) 20 MG tablet     [START ON 2023] HYDROcodone-acetaminophen (NORCO)  MG per tablet     HYDROcodone-acetaminophen (NORCO)  MG per tablet     HYDROcodone-acetaminophen (NORCO)  MG per tablet     hydrOXYzine (ATARAX) 10 MG tablet     lisinopril (ZESTRIL) 40 MG tablet     meclizine (ANTIVERT) 25 MG tablet     megestrol (MEGACE) 20 MG tablet     metFORMIN (GLUCOPHAGE-XR) 500 MG 24 hr tablet     naproxen (NAPROSYN) 500 MG tablet     omeprazole (PRILOSEC) 40 MG DR capsule     ondansetron (ZOFRAN-ODT) 8 MG ODT tab      oxyCODONE-acetaminophen (PERCOCET) 5-325 MG tablet     predniSONE (DELTASONE) 5 MG tablet     pregabalin (LYRICA) 150 MG capsule     vitamin B-12 (CYANOCOBALAMIN) 1000 MCG CR tablet     vitamin D3 (CHOLECALCIFEROL) 2000 units (50 mcg) tablet     Current Facility-Administered Medications   Medication     leuprolide (LUPRON DEPOT) kit 22.5 mg     leuprolide (LUPRON DEPOT) kit 22.5 mg     leuprolide (LUPRON DEPOT) kit 22.5 mg         ALLERGIES:  No Known Allergies    Review of Systems   Musculoskeletal: Positive for gait problem.        Left leg/ hip pain           VITAL SIGNS:  There were no vitals taken for this visit.  Wt Readings from Last 4 Encounters:   03/09/23 105.7 kg (233 lb)   02/28/23 103.9 kg (229 lb)   02/03/23 103.4 kg (228 lb)   01/26/23 107 kg (236 lb)       PHYSICAL EXAM:  Constitutional: awake, alert, cooperative, no apparent distress, and appears stated age  Eyes: Lids and lashes normal  ENT: Normocephalic, without obvious abnormality, atraumatic  Respiratory: no increased work of breathing   GI: non grossly distended   Skin: no rashes and no lesions  Musculoskeletal: tone is normal, gait steady with use of cane   Neurologic: Awake, alert, oriented to name, place and time.  Neuropsychiatric: General: normal, calm and normal eye contact          MOISÉS Luz CNP

## 2023-03-09 NOTE — NURSING NOTE
Chief Complaint   Patient presents with     Oncology Clinic Visit     F/U Prostate cancer     Medication Reconciliation: complete   Refused other rooming questionnaires as he was just seen.    Rochelle Toth CMA ([facility])

## 2023-03-09 NOTE — PROGRESS NOTES
Patient simulation completed for Dilip Kan today 03/09/23.    Kyrie Denny  March 9, 2023  3:54 PM

## 2023-03-09 NOTE — PROGRESS NOTES
[facility]  DEPARTMENT OF RADIATION ONCOLOGY   SIMULATION NOTE    Name: Dilip Kan MRN: [id number]   : 1955 (67 year old)  Date of Service: Mar 9, 2023        Diagnosis and Cancer Staging   No matching staging information was found for the patient.     Procedure   For non-SBRT treatment, the patient comes to clinic for simulation and radiation therapy for treatment as specified on the written consent (site of treatment, type of cancer). Therapy, Treatment Planning, and I reviewed the anticipated radiation therapy, clinical history and documentation, and radiographic information and images. We obtained written consent for treatment. With the patient, we verified their identification, site, and side of treatment. We evaluated multiple setup positions and elected to simulate the patient in a modified supine position. We used orthogonal lasers to align them with the CT simulator. We immobilized the patient with a customized extremity mold and accessories to improve the reproducibility and safety for daily radiation therapy. We placed radiopaque markers to assist in identifying topographical landmarks for simulation. We obtained  and axial CT imaging through the target region. We used virtual simulation techniques to verify the adequacy of the CT images and to create a preliminary setup isocenter. Motion management was not utilized. We placed tattoos to chadwick the setup isocenter. The patient tolerated the procedure well and without complications. We will use available diagnostic and radiation therapy imaging studies for CT-based treatment planning with image fusion as indicated. I anticipate utilizing a form of intensity-modulated or 3D-conformal radiation therapy to develop a computerized treatment plan whose dosimetric analysis (e.g., dose-volume histogram (DVH)) indicates adequate coverage of target tissues and sparing of nearby normal structures. We will complete routine QA  procedures. The patient wished to proceed as recommended. We answered all questions to his satisfaction.    Implanted Cardiac Devices: None.    Patient was simulated same day as consult due to medical necessity.     MOISÉS Luz CNP   Department of Radiation Oncology  Tel: [phone]  Fax: [phone]

## 2023-03-09 NOTE — PATIENT INSTRUCTIONS
Will be seen in radiation today    Chemo education with NP    Start Docetaxel ASAP    Start Xgeva ASAP    See NP with cycle 1

## 2023-03-13 ENCOUNTER — TELEPHONE (OUTPATIENT)
Dept: RADIATION ONCOLOGY | Facility: HOSPITAL | Age: 68
End: 2023-03-13

## 2023-03-13 ENCOUNTER — TELEPHONE (OUTPATIENT)
Dept: RADIATION ONCOLOGY | Facility: HOSPITAL | Age: 68
End: 2023-03-13
Payer: COMMERCIAL

## 2023-03-13 PROCEDURE — 77300 RADIATION THERAPY DOSE PLAN: CPT | Performed by: RADIOLOGY

## 2023-03-13 PROCEDURE — 77338 DESIGN MLC DEVICE FOR IMRT: CPT | Performed by: RADIOLOGY

## 2023-03-13 PROCEDURE — 77301 RADIOTHERAPY DOSE PLAN IMRT: CPT | Performed by: RADIOLOGY

## 2023-03-13 PROCEDURE — 77338 DESIGN MLC DEVICE FOR IMRT: CPT | Mod: 26 | Performed by: STUDENT IN AN ORGANIZED HEALTH CARE EDUCATION/TRAINING PROGRAM

## 2023-03-13 PROCEDURE — 77300 RADIATION THERAPY DOSE PLAN: CPT | Mod: 26 | Performed by: STUDENT IN AN ORGANIZED HEALTH CARE EDUCATION/TRAINING PROGRAM

## 2023-03-13 PROCEDURE — 77370 RADIATION PHYSICS CONSULT: CPT

## 2023-03-13 PROCEDURE — 77301 RADIOTHERAPY DOSE PLAN IMRT: CPT | Mod: 26 | Performed by: STUDENT IN AN ORGANIZED HEALTH CARE EDUCATION/TRAINING PROGRAM

## 2023-03-14 ENCOUNTER — APPOINTMENT (OUTPATIENT)
Dept: RADIATION ONCOLOGY | Facility: HOSPITAL | Age: 68
End: 2023-03-14
Payer: COMMERCIAL

## 2023-03-14 ENCOUNTER — OFFICE VISIT (OUTPATIENT)
Dept: RADIATION ONCOLOGY | Facility: HOSPITAL | Age: 68
End: 2023-03-14

## 2023-03-14 ENCOUNTER — RESULTS ONLY (OUTPATIENT)
Dept: RADIATION ONCOLOGY | Facility: HOSPITAL | Age: 68
End: 2023-03-14

## 2023-03-14 VITALS
DIASTOLIC BLOOD PRESSURE: 62 MMHG | HEART RATE: 84 BPM | TEMPERATURE: 99 F | BODY MASS INDEX: 33.25 KG/M2 | SYSTOLIC BLOOD PRESSURE: 134 MMHG | WEIGHT: 228.4 LBS | OXYGEN SATURATION: 96 % | RESPIRATION RATE: 16 BRPM

## 2023-03-14 DIAGNOSIS — C79.51 BONE METASTASIS: Primary | ICD-10-CM

## 2023-03-14 LAB
RAD ONC ARIA COURSE ID: NORMAL
RAD ONC ARIA COURSE LAST TREATMENT DATE: NORMAL
RAD ONC ARIA COURSE START DATE: NORMAL
RAD ONC ARIA COURSE TREATMENT ELAPSED DAYS: 0
RAD ONC ARIA FIRST TREATMENT DATE: NORMAL
RAD ONC ARIA PLAN FRACTIONS TREATED TO DATE: 1
RAD ONC ARIA PLAN ID: NORMAL
RAD ONC ARIA PLAN PRESCRIBED DOSE PER FRACTION: 10 GY
RAD ONC ARIA PLAN TOTAL FRACTIONS PRESCRIBED: 1
RAD ONC ARIA PLAN TOTAL PRESCRIBED DOSE: 1000 CGY
RAD ONC ARIA REFERENCE POINT DOSAGE GIVEN TO DATE: NORMAL GY
RAD ONC ARIA REFERENCE POINT DOSAGE GIVEN TO DATE: NORMAL GY
RAD ONC ARIA REFERENCE POINT ID: NORMAL
RAD ONC ARIA REFERENCE POINT ID: NORMAL

## 2023-03-14 PROCEDURE — 77431 RADIATION THERAPY MANAGEMENT: CPT | Performed by: STUDENT IN AN ORGANIZED HEALTH CARE EDUCATION/TRAINING PROGRAM

## 2023-03-14 PROCEDURE — 77014 PR CT GUIDE FOR PLACEMENT RADIATION THERAPY FIELDS: CPT | Mod: 26 | Performed by: STUDENT IN AN ORGANIZED HEALTH CARE EDUCATION/TRAINING PROGRAM

## 2023-03-14 PROCEDURE — 77386 HC IMRT TREATMENT DELIVERY, COMPLEX: CPT | Performed by: STUDENT IN AN ORGANIZED HEALTH CARE EDUCATION/TRAINING PROGRAM

## 2023-03-14 PROCEDURE — 77014 HC CT GUIDE FOR PLACEMENT RADIATION THERAPY FIELDS: CPT | Performed by: STUDENT IN AN ORGANIZED HEALTH CARE EDUCATION/TRAINING PROGRAM

## 2023-03-14 PROCEDURE — 77336 RADIATION PHYSICS CONSULT: CPT | Performed by: STUDENT IN AN ORGANIZED HEALTH CARE EDUCATION/TRAINING PROGRAM

## 2023-03-14 ASSESSMENT — PAIN SCALES - GENERAL: PAINLEVEL: SEVERE PAIN (7)

## 2023-03-14 NOTE — PROGRESS NOTES
Progress Notes  Encounter Date: Mar 14, 2023  MOISÉS Luz [facility]     RADIATION ONCOLOGY WEEKLY MANAGEMENT PROGRESS NOTE     Patient Care Team       Relationship Specialty Notifications Start End    Wei Perez MD PCP - General Family Practice  5/17/19     Phone: [phone] Fax: [phone] [address]  [place] [address]    Wei Perez MD Assigned PCP   6/14/20     Phone: [phone] Fax: [phone] [address]  [facility] [address]    Celestine Arrington MD Assigned Surgical Provider   10/23/20     Micah Rock MD Assigned Musculoskeletal Provider   9/19/21     Phone: [phone] Fax: [phone]         [facility] [address] [facility] [address]    Erum Fritz MD Assigned Cancer Care Provider   9/19/21     Phone: [phone] Fax: [phone]         [address] [facility] [address]    Wei Perez MD Assigned Pain Medication Provider   1/9/23     Phone: [phone] Fax: [phone] [address] [facility] [place] [address]                  DIAGNOSIS:  Cancer Staging   No matching staging information was found for the patient.        RADIATION THERAPY:    Dilip Kan has received 1000 cGy to date to left pubis.   Treatment 1/1  Total planned dose: 1000 cGy      SUBJECTIVE:    Currently he reports pain in his left groin is worse today.  He is taking Percocet 1 tab every 6 hours and if he is not getting adequate pain relief, he will take Norco 1 tab about 3 hours later. He starts chemo on Monday and will f/u with Dr. Fritz.           OBJECTIVE:    WEIGHT: 228 lbs 6.4 oz. /62 (BP Location: Left arm, Patient Position: Chair, Cuff Size: Adult Regular)   Pulse 84   Temp 99  F (37.2  C) (Tympanic)   Resp 16   Wt 103.6 kg (228 lb 6.4 oz)   SpO2 96%   BMI 33.25 kg/m    Examination reveals alert non ill appearing male patient. Cane used for ambulation, gait slightly altered        IMPRESSION:  Routine  tolerance to radiation therapy.      PLAN:  Continue treatment as planned.        Medical record and imaging reviewed by covering locum provider.      MOISÉS Luz CNP, MD  Radiation Oncologist    I saw this patient via video in collaboration with the on site team while providing locum tenens coverage.

## 2023-03-16 ENCOUNTER — DOCUMENTATION ONLY (OUTPATIENT)
Dept: RADIATION ONCOLOGY | Facility: HOSPITAL | Age: 68
End: 2023-03-16
Payer: COMMERCIAL

## 2023-03-16 DIAGNOSIS — C61 PROSTATE CANCER (H): Primary | ICD-10-CM

## 2023-03-16 DIAGNOSIS — C79.51 BONE METASTASES: ICD-10-CM

## 2023-03-16 NOTE — PROGRESS NOTES
Dilip GR Lucius  Gender: male  : 1955  Medical Record: [id number]  Primary Care Provider: Wei Perez    REFERRING PHYSICIANS: Dr. Fritz     DIAGNOSIS:    Prostate cancer (H)  Bone metastases (H)    TREATMENT INTENT: Palliative  AREA TREATED: Left pubis   PRIMARY TREATMENT TECHNIQUE: VMAT  ENERGY: 10X FFF  TUMOR DOSE: 1000 cGy  NUMBER OF TREATMENTS: 1    BOOST AREA TREATED: N/A    TOTAL NUMBER OF TREATMENTS: 1  TOTAL DOSE: 1000 cGy  ELAPSED CALENDAR DAYS: 0  COMPLETION DATE: 2023       Sugey Ellison DNP, APRN, FNP-C   Department of Radiation Oncology

## 2023-03-20 ENCOUNTER — HOSPITAL ENCOUNTER (OUTPATIENT)
Dept: INFUSION THERAPY | Facility: OTHER | Age: 68
Discharge: HOME | End: 2023-03-20
Attending: INTERNAL MEDICINE
Payer: COMMERCIAL

## 2023-03-20 ENCOUNTER — LAB (OUTPATIENT)
Dept: LAB | Facility: OTHER | Age: 68
End: 2023-03-20
Attending: INTERNAL MEDICINE
Payer: COMMERCIAL

## 2023-03-20 ENCOUNTER — ONCOLOGY VISIT (OUTPATIENT)
Dept: ONCOLOGY | Facility: OTHER | Age: 68
End: 2023-03-20
Attending: NURSE PRACTITIONER
Payer: COMMERCIAL

## 2023-03-20 VITALS
TEMPERATURE: 98.2 F | SYSTOLIC BLOOD PRESSURE: 133 MMHG | DIASTOLIC BLOOD PRESSURE: 59 MMHG | RESPIRATION RATE: 16 BRPM | HEART RATE: 79 BPM

## 2023-03-20 DIAGNOSIS — C79.51 BONE METASTASIS: ICD-10-CM

## 2023-03-20 DIAGNOSIS — R97.21 RISING PSA FOLLOWING TREATMENT FOR MALIGNANT NEOPLASM OF PROSTATE: ICD-10-CM

## 2023-03-20 DIAGNOSIS — C61 MALIGNANT NEOPLASM OF PROSTATE (H): ICD-10-CM

## 2023-03-20 DIAGNOSIS — C79.51 BONE METASTASIS: Primary | ICD-10-CM

## 2023-03-20 DIAGNOSIS — C61 PROSTATE CANCER (H): Primary | ICD-10-CM

## 2023-03-20 DIAGNOSIS — C61 MALIGNANT NEOPLASM OF PROSTATE (H): Primary | ICD-10-CM

## 2023-03-20 LAB
ALBUMIN SERPL BCG-MCNC: 4 G/DL (ref 3.5–5.2)
ALP SERPL-CCNC: 112 U/L (ref 40–129)
ALT SERPL W P-5'-P-CCNC: 8 U/L (ref 10–50)
ANION GAP SERPL CALCULATED.3IONS-SCNC: 9 MMOL/L (ref 7–15)
AST SERPL W P-5'-P-CCNC: 12 U/L (ref 10–50)
BASOPHILS # BLD AUTO: 0 10E3/UL (ref 0–0.2)
BASOPHILS NFR BLD AUTO: 0 %
BILIRUB SERPL-MCNC: 0.8 MG/DL
BUN SERPL-MCNC: 12.8 MG/DL (ref 8–23)
CALCIUM SERPL-MCNC: 9.5 MG/DL (ref 8.8–10.2)
CHLORIDE SERPL-SCNC: 104 MMOL/L (ref 98–107)
CREAT SERPL-MCNC: 0.83 MG/DL (ref 0.67–1.17)
DEPRECATED HCO3 PLAS-SCNC: 27 MMOL/L (ref 22–29)
EOSINOPHIL # BLD AUTO: 0.1 10E3/UL (ref 0–0.7)
EOSINOPHIL NFR BLD AUTO: 1 %
ERYTHROCYTE [DISTWIDTH] IN BLOOD BY AUTOMATED COUNT: 13 % (ref 10–15)
GFR SERPL CREATININE-BSD FRML MDRD: >90 ML/MIN/1.73M2
GLUCOSE SERPL-MCNC: 200 MG/DL (ref 70–99)
HCT VFR BLD AUTO: 39.7 % (ref 40–53)
HGB BLD-MCNC: 13.3 G/DL (ref 13.3–17.7)
HOLD SPECIMEN: NORMAL
IMM GRANULOCYTES # BLD: 0 10E3/UL
IMM GRANULOCYTES NFR BLD: 0 %
LYMPHOCYTES # BLD AUTO: 1.8 10E3/UL (ref 0.8–5.3)
LYMPHOCYTES NFR BLD AUTO: 19 %
MCH RBC QN AUTO: 29.3 PG (ref 26.5–33)
MCHC RBC AUTO-ENTMCNC: 33.5 G/DL (ref 31.5–36.5)
MCV RBC AUTO: 87 FL (ref 78–100)
MONOCYTES # BLD AUTO: 0.7 10E3/UL (ref 0–1.3)
MONOCYTES NFR BLD AUTO: 7 %
NEUTROPHILS # BLD AUTO: 7.1 10E3/UL (ref 1.6–8.3)
NEUTROPHILS NFR BLD AUTO: 73 %
NRBC # BLD AUTO: 0 10E3/UL
NRBC BLD AUTO-RTO: 0 /100
PLATELET # BLD AUTO: 253 10E3/UL (ref 150–450)
POTASSIUM SERPL-SCNC: 4 MMOL/L (ref 3.4–5.3)
PROT SERPL-MCNC: 6.6 G/DL (ref 6.4–8.3)
PSA SERPL DL<=0.01 NG/ML-MCNC: 8.6 NG/ML (ref 0–6.5)
RBC # BLD AUTO: 4.54 10E6/UL (ref 4.4–5.9)
SODIUM SERPL-SCNC: 140 MMOL/L (ref 136–145)
WBC # BLD AUTO: 9.7 10E3/UL (ref 4–11)

## 2023-03-20 PROCEDURE — 96413 CHEMO IV INFUSION 1 HR: CPT

## 2023-03-20 PROCEDURE — 36415 COLL VENOUS BLD VENIPUNCTURE: CPT | Mod: ZL

## 2023-03-20 PROCEDURE — 99207 PR NO CHARGE LOS: CPT | Performed by: NURSE PRACTITIONER

## 2023-03-20 PROCEDURE — G0103 PSA SCREENING: HCPCS | Mod: ZL

## 2023-03-20 PROCEDURE — 250N000011 HC RX IP 250 OP 636: Performed by: INTERNAL MEDICINE

## 2023-03-20 PROCEDURE — 85025 COMPLETE CBC W/AUTO DIFF WBC: CPT | Performed by: INTERNAL MEDICINE

## 2023-03-20 PROCEDURE — 96367 TX/PROPH/DG ADDL SEQ IV INF: CPT

## 2023-03-20 PROCEDURE — 80053 COMPREHEN METABOLIC PANEL: CPT | Mod: ZL

## 2023-03-20 PROCEDURE — 258N000003 HC RX IP 258 OP 636: Performed by: INTERNAL MEDICINE

## 2023-03-20 RX ORDER — HEPARIN SODIUM,PORCINE 10 UNIT/ML
5 VIAL (ML) INTRAVENOUS
Status: CANCELLED | OUTPATIENT
Start: 2023-04-10

## 2023-03-20 RX ORDER — HEPARIN SODIUM (PORCINE) LOCK FLUSH IV SOLN 100 UNIT/ML 100 UNIT/ML
5 SOLUTION INTRAVENOUS
Status: CANCELLED | OUTPATIENT
Start: 2023-04-10

## 2023-03-20 RX ORDER — ALBUTEROL SULFATE 90 UG/1
1-2 AEROSOL, METERED RESPIRATORY (INHALATION)
Status: CANCELLED
Start: 2023-04-10

## 2023-03-20 RX ORDER — ALBUTEROL SULFATE 0.83 MG/ML
2.5 SOLUTION RESPIRATORY (INHALATION)
Status: CANCELLED | OUTPATIENT
Start: 2023-04-10

## 2023-03-20 RX ORDER — MEPERIDINE HYDROCHLORIDE 25 MG/ML
25 INJECTION INTRAMUSCULAR; INTRAVENOUS; SUBCUTANEOUS EVERY 30 MIN PRN
Status: CANCELLED | OUTPATIENT
Start: 2023-04-10

## 2023-03-20 RX ORDER — DIPHENHYDRAMINE HYDROCHLORIDE 50 MG/ML
50 INJECTION INTRAMUSCULAR; INTRAVENOUS
Status: CANCELLED
Start: 2023-04-10

## 2023-03-20 RX ORDER — LORAZEPAM 2 MG/ML
0.5 INJECTION INTRAMUSCULAR EVERY 4 HOURS PRN
Status: CANCELLED | OUTPATIENT
Start: 2023-04-10

## 2023-03-20 RX ORDER — METHYLPREDNISOLONE SODIUM SUCCINATE 125 MG/2ML
125 INJECTION, POWDER, LYOPHILIZED, FOR SOLUTION INTRAMUSCULAR; INTRAVENOUS
Status: CANCELLED
Start: 2023-04-10

## 2023-03-20 RX ORDER — EPINEPHRINE 1 MG/ML
0.3 INJECTION, SOLUTION, CONCENTRATE INTRAVENOUS EVERY 5 MIN PRN
Status: CANCELLED | OUTPATIENT
Start: 2023-04-10

## 2023-03-20 RX ADMIN — SODIUM CHLORIDE 250 ML: 9 INJECTION, SOLUTION INTRAVENOUS at 12:00

## 2023-03-20 RX ADMIN — DEXAMETHASONE SODIUM PHOSPHATE: 10 INJECTION, SOLUTION INTRAMUSCULAR; INTRAVENOUS at 12:01

## 2023-03-20 RX ADMIN — SODIUM CHLORIDE 168 MG: 9 INJECTION, SOLUTION INTRAVENOUS at 12:52

## 2023-03-20 NOTE — NURSING NOTE
Patient is being seen by the provider in infusion where all required infusion department rooming assessments, screenings, and vital signs were done by infusion RN.   Elza Lundberg RN...........3/20/2023 10:56 AM

## 2023-03-20 NOTE — PROGRESS NOTES
Patient seen in infusion therapy for a first infusion visit. Patient is here for cycle 1 day 1 Docetaxel/prednisone for his prostate cancer. The patient was initially treated with apalutamide in addition to androgen deprivation therapy. Patient was started at a dose of apalutamide 240 mg daily.  His PSA dropped to 1.25, then began to rise 2.06.  We elected to switch him to abiraterone and prednisone, beginning 08/2022. He required dose reduction due to fatigue.  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 Patient was felt to be a candidate for chemotherapy with docetaxel and prednisone, which is indicated in this patient. Patient did complete palliative radiation therapy to the left hip. Plan is to treat with Docetaxel given at a dose 75 mg/m2 on day 1 of a 21-day cycle with prednisone 5 mg p.o. b.i.d. Patient reports ongoing fatigue. He reports acid reflux today. He denies chest pain or shortness of breath. He denies fevers, no signs of infection. He denies bowel or urinary changes. We did discuss how chemotherapy works, possible side effects of treatment. We discussed treament plan and how to take prednisone and dexamethasone. Labs today are within parameters for treatment. Glucose is elevated, will continue to monitor. Will proceed with treatment today as planned. Pharmacy will give patient a calendar with how to take medications. Will see patient with cycle 2. Plan is to restage after 3 cycles.

## 2023-03-20 NOTE — NURSING NOTE
Infusion Nursing Note:  Dilip Kan presents today for Cycle 1 day 1 of Doctaxel.    Patient seen by provider today: Yes: CHERELLE oncology NP and TF pharmacist for chemo education.   present during visit today: Not Applicable.    Note: N/A.    Intravenous Access:  Labs drawn without difficulty with  assist.  Peripheral IV placed to top of left hand with blood return.    Treatment Conditions:  Lab Results   Component Value Date    HGB 13.3 03/20/2023    WBC 9.7 03/20/2023    ANEUTAUTO 7.1 03/20/2023     03/20/2023      Lab Results   Component Value Date     03/20/2023    POTASSIUM 4.0 03/20/2023    CR 0.83 03/20/2023    ROBERTO 9.5 03/20/2023    BILITOTAL 0.8 03/20/2023    ALBUMIN 4.0 03/20/2023    ALT 8 (L) 03/20/2023    AST 12 03/20/2023     Results reviewed, labs MET treatment parameters, ok to proceed with treatment.    Post Infusion Assessment:  Patient tolerated infusion without incident.  Blood return noted pre and post infusion.  Site patent and intact, free from redness, edema or discomfort.  No evidence of extravasations.  Access discontinued per protocol.  Biologic Infusion Post Education: Call the triage nurse at your clinic or seek medical attention if you have chills and/or temperature greater than or equal to 100.5, uncontrolled nausea/vomiting, diarrhea, constipation, dizziness, shortness of breath, chest pain, heart palpitations, weakness or any other new or concerning symptoms, questions or concerns.  You cannot have any live virus vaccines prior to or during treatment or up to 6 months post infusion.  If you have an upcoming surgery, medical procedure or dental procedure during treatment, this should be discussed with your ordering physician and your surgeon/dentist.  If you are having any concerning symptom, if you are unsure if you should get your next infusion or wish to speak to a provider before your next infusion, please call your care coordinator or triage nurse  at your clinic to notify them so we can adequately serve you.     Discharge Plan:   Discharge instructions reviewed with: Patient.  Patient and/or family verbalized understanding of discharge instructions and all questions answered.  Copy of AVS reviewed with patient and/or family.  Patient will return 4/10/23 for next appointment.  Patient discharged in stable condition accompanied by: self.  Departure Mode: Ambulatory.      Monica Cheng RN

## 2023-03-20 NOTE — PHARMACY-MEDICATION REGIMEN REVIEW
Pharmacy: NEW INFUSION MEDICATION EDUCATION    Dilip Kan  [address] [place] [address]  [phone] (home)   68 year old male  PCP:Wei Perez    No Known Allergies      Summary of Education:   Met with patient today to review new medications to be administered in infusion including docetaxel/prednisone. Discussed cycle length, and home use of as needed medications including prochlorperazine.  We discuss stopping his Zytiga.  Patient states he used a dose last night but will not take any more doses going forward.  We also discuss the importance of taking dexamethasone x3 doses after EACH docetaxel infusion.  Also discussed taking prednisone BID every day.  Patient states understanding. Patient is currently using oxycodone/apap for his hip pain and hydrocodone for his chronic back pain.  Discussed that these two medications are very similar and ideally he should be on one or the other.  We also discussed a bowel regimen and patient states that he stays regular by drinking a lot of water and eating grapes.  Patient asked several questions and all concerns were addressed.    Materials Provided:   Drug information handouts printed from [email or web address] on: docetaxel    Thank you for the consult.     Ana Paula Hameed [facility] ....................  3/20/2023   3:17 PM

## 2023-03-22 ENCOUNTER — TELEPHONE (OUTPATIENT)
Dept: ONCOLOGY | Facility: OTHER | Age: 68
End: 2023-03-22
Payer: MEDICARE

## 2023-03-22 DIAGNOSIS — G89.3 CANCER ASSOCIATED PAIN: Primary | ICD-10-CM

## 2023-03-22 RX ORDER — TRAZODONE HYDROCHLORIDE 50 MG/1
50 TABLET, FILM COATED ORAL AT BEDTIME
Qty: 30 TABLET | Refills: 0 | Status: ON HOLD | OUTPATIENT
Start: 2023-03-22 | End: 2023-04-22

## 2023-03-22 RX ORDER — OXYCODONE AND ACETAMINOPHEN 5; 325 MG/1; MG/1
1 TABLET ORAL EVERY 6 HOURS PRN
Qty: 40 TABLET | Refills: 0 | Status: SHIPPED | OUTPATIENT
Start: 2023-03-22 | End: 2023-06-29 | Stop reason: ALTCHOICE

## 2023-03-22 NOTE — TELEPHONE ENCOUNTER
Dr. Fritz,   Patient states his hip/leg pain is improving since radiation, but calls regarding two issues:  1.) Sleep: During the day his pain is around 5/10. Laying flat at bedtime helps alleviate some of the pain, but he gets up to use the bathroom 4-6 times a night and has a difficult time falling back asleep. He asks for a short term supply of Percocet to use at night. He takes Norco for back pain prescribed via PCP. He is also interested in trying trazodone if that is an option. Both medications pending for your consideration.   2.) What are his precautions/restrictions to ensure the crack is his hip doesn't worsen or result in a break? Does he need to stay off it as much as possible? And for how long might he expect to be under restrictions?  Sugey Perez RN on 3/22/2023 at 10:17 AM

## 2023-03-22 NOTE — TELEPHONE ENCOUNTER
Jermain called and stated that ever since he received his 1st infusion - he is experiencing insomnia.  Stated that he is up almost every night all night.  Patient would like a call back please.  [phone].    Bee Caballero on 3/22/2023 at 8:39 AM

## 2023-03-22 NOTE — TELEPHONE ENCOUNTER
Erum Fritz MD  You 4 minutes ago (11:18 AM)     He should limit prolonged ambulation and elevate his leg when sitting      Patient notified of the above and orders placed. Advised of acetaminophen content in both Norco and Percocet to which is states he is aware. Sugey Perez RN on 3/22/2023 at 11:24 AM

## 2023-03-24 ENCOUNTER — NURSE TRIAGE (OUTPATIENT)
Dept: ONCOLOGY | Facility: OTHER | Age: 68
End: 2023-03-24
Payer: MEDICARE

## 2023-03-24 NOTE — TELEPHONE ENCOUNTER
Patient had soft stools yesterday and two watery stools this morning. Denies abdominal pain or cramping.     Discussed the following care advice with patient:   Patient normally drinks non-diet pop regularly and at bedtime. He will switch to non-caffeine, non-sugar beverages such as gatorade G2 and Bubbly, frequent sips of water. Patient will try Imodium over the weekend, possibly pepto bismol. Monitor symptoms for signs of complication. Patient denies uncontrolled nausea, swelling or bloating, fever. He states his appetite is so-so but will incorporate starchy foods and a probiotic/yoghurt. Encouraged use of vasceline to anal region after passing stools to protect from skin breakdown, good hand hygiene. Patient to present to ER if any s/s of severe symptoms as discussed over the weekend. Otherwise if symptoms remain mild/minor but persistent he will call back Monday.     Referenced UpTo Date:     What are some side effects that I need to call my doctor about right away?   WARNING/CAUTION: Even though it may be rare, some people may have very bad and sometimes deadly side effects when taking a drug. Tell your doctor or get medical help right away if you have any of the following signs or symptoms that may be related to a very bad side effect:   Signs of an allergic reaction, like rash; hives; itching; red, swollen, blistered, or peeling skin with or without fever; wheezing; tightness in the chest or throat; trouble breathing, swallowing, or talking; unusual hoarseness; or swelling of the mouth, face, lips, tongue, or throat.   Signs of infection like fever, chills, very bad sore throat, ear or sinus pain, cough, more sputum or change in color of sputum, pain with passing urine, mouth sores, or wound that will not heal.   Signs of bleeding like throwing up or coughing up blood; vomit that looks like coffee grounds; blood in the urine; black, red, or tarry stools; bleeding from the gums; abnormal vaginal bleeding;  bruises without a cause or that get bigger; or bleeding you cannot stop.   Signs of lung or breathing problems like shortness of breath or other trouble breathing, cough, or fever.   Signs of a very bad skin reaction (Phelps-Luis syndrome/toxic epidermal necrolysis) like red, swollen, blistered, or peeling skin (with or without fever); red or irritated eyes; or sores in the mouth, throat, nose, or eyes.   Dizziness or passing out.   Fast or abnormal heartbeat.   A burning, numbness, or tingling feeling that is not normal.   Muscle weakness.   Any skin reaction.   This drug may cause tissue damage if the drug leaks from the vein. Tell your nurse if you have any redness, burning, pain, swelling, blisters, skin sores, or leaking of fluid where the drug is going into your body.   Liver problems have rarely happened with this drug. Sometimes, this has been deadly. Call your doctor right away if you have signs of liver problems like dark urine, tiredness, decreased appetite, upset stomach or stomach pain, light-colored stools, throwing up, or yellow skin or eyes.    What are some other side effects of this drug?   All drugs may cause side effects. However, many people have no side effects or only have minor side effects. Call your doctor or get medical help if any of these side effects or any other side effects bother you or do not go away:   Constipation, diarrhea, stomach pain, upset stomach, throwing up, or decreased appetite.   Mouth sores.   Feeling tired or weak.   Change in nails.   Change in taste.   Muscle or joint pain.   Flushing.   Eye redness.   More tears.   Weight loss.    Sugey Perez RN on 3/24/2023 at 10:18 AM      Reason for Disposition    MILD-MODERATE diarrhea (e.g., 1-6 times / day more than normal)    Protocols used: DIARRHEA-A-[place]

## 2023-03-27 ENCOUNTER — HOSPITAL ENCOUNTER (EMERGENCY)
Facility: OTHER | Age: 68
End: 2023-03-27
Payer: COMMERCIAL

## 2023-03-27 ENCOUNTER — NURSE TRIAGE (OUTPATIENT)
Dept: ONCOLOGY | Facility: OTHER | Age: 68
End: 2023-03-27
Payer: MEDICARE

## 2023-03-27 NOTE — TELEPHONE ENCOUNTER
S-(situation): patient calling and states he continues to have diarrhea    B-(background): patient had chemotherapy last Monday     A-(assessment): patient states has had diarrhea since chemotherapy so a week today.  States yesterday had 6 episodes watery diarrhea.  States when he drinks it comes right back out  States his mouth is very dry.  States has had about 10 pills of imodium since Friday and its not better.  Patient denies any vomiting, abdominal pain, fever, black/red stool.      R-(recommendations): informed patient will review with Dr. Perez for review and consideration.      Called and spoke with Dr. Fritz and due to patient had chemo last week and having diarrhea and no povider in oncology office patient should present to ED to make sure no infection and dehydration.  Called and informed patient and patient verbalized understanding.  Called and informed ED nurse.    Toma Smith RN on 3/27/2023 at 9:28 AM      Reason for Disposition    Drinking very little and has signs of dehydration (e.g., no urine > 12 hours, very dry mouth, very lightheaded)    Additional Information    Negative: Shock suspected (e.g., cold/pale/clammy skin, too weak to stand, low BP, rapid pulse)    Negative: Difficult to awaken or acting confused (e.g., disoriented, slurred speech)    Negative: Sounds like a life-threatening emergency to the triager    Negative: SEVERE diarrhea (e.g., 7 or more times / day more than normal) and age > 60 years    Negative: Constant abdominal pain lasting > 2 hours    Negative: SEVERE abdominal pain (e.g., excruciating) and present > 1 hour    Negative: SEVERE abdominal pain and age > 60 years    Negative: Bloody, black, or tarry bowel movements (Exception: chronic-unchanged black-grey bowel movements and is taking iron pills or Pepto-Bismol)    Negative: Vomiting also present and worse than the diarrhea    Negative: Blood in stool and without diarrhea    Protocols used:  DIARRHEA-A-[place]

## 2023-04-07 ENCOUNTER — TELEPHONE (OUTPATIENT)
Dept: ONCOLOGY | Facility: OTHER | Age: 68
End: 2023-04-07
Payer: MEDICARE

## 2023-04-07 DIAGNOSIS — F41.9 ANXIETY: Primary | ICD-10-CM

## 2023-04-07 RX ORDER — LORAZEPAM 0.5 MG/1
0.5 TABLET ORAL EVERY 6 HOURS PRN
Qty: 30 TABLET | Refills: 0 | Status: SHIPPED | OUTPATIENT
Start: 2023-04-07 | End: 2023-04-26

## 2023-04-07 NOTE — TELEPHONE ENCOUNTER
Diarrhea is better. Was taking Imodium. Burning and peeling of skin on top both hands. Numbness in fingers has improved. Has some fatigue. Is having hard time sleeping due to steroid and anxiety is getting worse. Was wondering about something for the anxiety. Will discuss with Cynthia Galvin NP and call patient back.  Elza Lundberg RN...........4/7/2023 10:35 AM

## 2023-04-07 NOTE — TELEPHONE ENCOUNTER
Per Cynthia Galvin NP will prescribe Ativan 0.5 mg and he will follow up with Jermain on Monday while in infusion.  Elza Lundberg RN...........4/7/2023 11:13 AM

## 2023-04-10 ENCOUNTER — LAB (OUTPATIENT)
Dept: LAB | Facility: OTHER | Age: 68
End: 2023-04-10
Attending: INTERNAL MEDICINE
Payer: COMMERCIAL

## 2023-04-10 ENCOUNTER — ONCOLOGY VISIT (OUTPATIENT)
Dept: ONCOLOGY | Facility: OTHER | Age: 68
End: 2023-04-10
Attending: INTERNAL MEDICINE
Payer: COMMERCIAL

## 2023-04-10 ENCOUNTER — HOSPITAL ENCOUNTER (OUTPATIENT)
Dept: INFUSION THERAPY | Facility: OTHER | Age: 68
Discharge: HOME | End: 2023-04-10
Attending: INTERNAL MEDICINE
Payer: COMMERCIAL

## 2023-04-10 VITALS
DIASTOLIC BLOOD PRESSURE: 67 MMHG | TEMPERATURE: 98.2 F | WEIGHT: 222 LBS | OXYGEN SATURATION: 95 % | SYSTOLIC BLOOD PRESSURE: 137 MMHG | HEART RATE: 72 BPM | BODY MASS INDEX: 32.31 KG/M2

## 2023-04-10 DIAGNOSIS — C79.51 MALIGNANT NEOPLASM METASTATIC TO BONE (H): Primary | ICD-10-CM

## 2023-04-10 DIAGNOSIS — R97.21 RISING PSA FOLLOWING TREATMENT FOR MALIGNANT NEOPLASM OF PROSTATE: ICD-10-CM

## 2023-04-10 DIAGNOSIS — C61 PROSTATE CANCER (H): Primary | ICD-10-CM

## 2023-04-10 DIAGNOSIS — C79.51 MALIGNANT NEOPLASM METASTATIC TO BONE (H): ICD-10-CM

## 2023-04-10 DIAGNOSIS — C61 MALIGNANT NEOPLASM OF PROSTATE (H): ICD-10-CM

## 2023-04-10 LAB
ALBUMIN SERPL BCG-MCNC: 3.9 G/DL (ref 3.5–5.2)
ALP SERPL-CCNC: 74 U/L (ref 40–129)
ALT SERPL W P-5'-P-CCNC: 9 U/L (ref 10–50)
ANION GAP SERPL CALCULATED.3IONS-SCNC: 9 MMOL/L (ref 7–15)
AST SERPL W P-5'-P-CCNC: 10 U/L (ref 10–50)
BASOPHILS # BLD AUTO: 0.1 10E3/UL (ref 0–0.2)
BASOPHILS NFR BLD AUTO: 1 %
BILIRUB SERPL-MCNC: 0.6 MG/DL
BUN SERPL-MCNC: 14.8 MG/DL (ref 8–23)
CALCIUM SERPL-MCNC: 9.1 MG/DL (ref 8.8–10.2)
CHLORIDE SERPL-SCNC: 105 MMOL/L (ref 98–107)
CREAT SERPL-MCNC: 0.58 MG/DL (ref 0.67–1.17)
DEPRECATED HCO3 PLAS-SCNC: 26 MMOL/L (ref 22–29)
EOSINOPHIL # BLD AUTO: 0 10E3/UL (ref 0–0.7)
EOSINOPHIL NFR BLD AUTO: 0 %
ERYTHROCYTE [DISTWIDTH] IN BLOOD BY AUTOMATED COUNT: 13.3 % (ref 10–15)
GFR SERPL CREATININE-BSD FRML MDRD: >90 ML/MIN/1.73M2
GLUCOSE SERPL-MCNC: 103 MG/DL (ref 70–99)
HCT VFR BLD AUTO: 35.1 % (ref 40–53)
HGB BLD-MCNC: 11.8 G/DL (ref 13.3–17.7)
IMM GRANULOCYTES # BLD: 0 10E3/UL
IMM GRANULOCYTES NFR BLD: 0 %
LYMPHOCYTES # BLD AUTO: 1.8 10E3/UL (ref 0.8–5.3)
LYMPHOCYTES NFR BLD AUTO: 24 %
MCH RBC QN AUTO: 29.4 PG (ref 26.5–33)
MCHC RBC AUTO-ENTMCNC: 33.6 G/DL (ref 31.5–36.5)
MCV RBC AUTO: 88 FL (ref 78–100)
MONOCYTES # BLD AUTO: 0.7 10E3/UL (ref 0–1.3)
MONOCYTES NFR BLD AUTO: 9 %
NEUTROPHILS # BLD AUTO: 4.9 10E3/UL (ref 1.6–8.3)
NEUTROPHILS NFR BLD AUTO: 66 %
NRBC # BLD AUTO: 0 10E3/UL
NRBC BLD AUTO-RTO: 0 /100
PLATELET # BLD AUTO: 285 10E3/UL (ref 150–450)
POTASSIUM SERPL-SCNC: 3.6 MMOL/L (ref 3.4–5.3)
PROT SERPL-MCNC: 6.4 G/DL (ref 6.4–8.3)
PSA SERPL DL<=0.01 NG/ML-MCNC: 7.6 NG/ML (ref 0–6.5)
RBC # BLD AUTO: 4.01 10E6/UL (ref 4.4–5.9)
SODIUM SERPL-SCNC: 140 MMOL/L (ref 136–145)
WBC # BLD AUTO: 7.4 10E3/UL (ref 4–11)

## 2023-04-10 PROCEDURE — 36415 COLL VENOUS BLD VENIPUNCTURE: CPT | Performed by: INTERNAL MEDICINE

## 2023-04-10 PROCEDURE — 250N000011 HC RX IP 250 OP 636: Performed by: INTERNAL MEDICINE

## 2023-04-10 PROCEDURE — 258N000003 HC RX IP 258 OP 636: Performed by: NURSE PRACTITIONER

## 2023-04-10 PROCEDURE — 99207 PR NO CHARGE LOS: CPT | Performed by: NURSE PRACTITIONER

## 2023-04-10 PROCEDURE — 250N000011 HC RX IP 250 OP 636: Performed by: NURSE PRACTITIONER

## 2023-04-10 PROCEDURE — G0103 PSA SCREENING: HCPCS | Performed by: INTERNAL MEDICINE

## 2023-04-10 PROCEDURE — 85004 AUTOMATED DIFF WBC COUNT: CPT | Performed by: INTERNAL MEDICINE

## 2023-04-10 PROCEDURE — 96413 CHEMO IV INFUSION 1 HR: CPT

## 2023-04-10 PROCEDURE — 258N000003 HC RX IP 258 OP 636: Performed by: INTERNAL MEDICINE

## 2023-04-10 PROCEDURE — 80053 COMPREHEN METABOLIC PANEL: CPT | Performed by: INTERNAL MEDICINE

## 2023-04-10 PROCEDURE — 96367 TX/PROPH/DG ADDL SEQ IV INF: CPT

## 2023-04-10 RX ORDER — DEXAMETHASONE 4 MG/1
TABLET ORAL
Status: ON HOLD | COMMUNITY
End: 2023-04-22

## 2023-04-10 RX ADMIN — SODIUM CHLORIDE 250 ML: 9 INJECTION, SOLUTION INTRAVENOUS at 12:12

## 2023-04-10 RX ADMIN — DEXAMETHASONE SODIUM PHOSPHATE: 10 INJECTION INTRAMUSCULAR; INTRAVENOUS at 12:12

## 2023-04-10 RX ADMIN — SODIUM CHLORIDE 132 MG: 9 INJECTION, SOLUTION INTRAVENOUS at 13:24

## 2023-04-10 NOTE — NURSING NOTE
Infusion Nursing Note:  Dilip Kan presents today for Docataxel cycle 2 day 1.    Patient seen by provider today: Yes: Cynthia Galvin NP   present during visit today: Not Applicable.    Note: Pt endorses various side effects following previous cycle, see flowsheets for details. SANDRA Marie made aware and discussed with patient. Dose reduced by 20%. Patient confirms that he is taking his dexamethasone and prednisone as ordered. Lupron is ordered q6 months, next due July 2023 per Cynthia Galvin NP.      Intravenous Access:  Labs drawn without difficulty.  Peripheral IV placed.    Treatment Conditions:  Lab Results   Component Value Date    HGB 11.8 (L) 04/10/2023    WBC 7.4 04/10/2023    ANEUTAUTO 4.9 04/10/2023     04/10/2023      Lab Results   Component Value Date     04/10/2023    POTASSIUM 3.6 04/10/2023    CR 0.58 (L) 04/10/2023    ROBERTO 9.1 04/10/2023    BILITOTAL 0.6 04/10/2023    ALBUMIN 3.9 04/10/2023    ALT 9 (L) 04/10/2023    AST 10 04/10/2023     Results reviewed, labs MET treatment parameters, ok to proceed with treatment.    Post Infusion Assessment:  Patient tolerated infusion without incident.  Blood return noted pre and post infusion.  Site patent and intact, free from redness, edema or discomfort.  No evidence of extravasations.  Access discontinued per protocol.     Discharge Plan:   Discharge instructions reviewed with: Patient.  Patient and/or family verbalized understanding of discharge instructions and all questions answered.  Copy of AVS reviewed with patient and/or family.  Patient will return 5/1/23 for next appointment.  AVS to patient via [facility].  Patient discharged in stable condition accompanied by: self.  Departure Mode: Ambulatory.      Josselin Smith RN

## 2023-04-10 NOTE — NURSING NOTE
Patient is being seen by provider in infusion where all required rooming assessments and vitals were done by the infusion RN.    Jacqueline Cortez CMA ([facility])................ 4/10/2023 11:01 AM

## 2023-04-10 NOTE — PROGRESS NOTES
Patient seen in infusion therapy for a status check. Patient is here for cycle 2 day 1 Docetaxel/prednisone for his prostate cancer. The patient was initially treated with apalutamide in addition to androgen deprivation therapy. Patient was started at a dose of apalutamide 240 mg daily.  His PSA dropped to 1.25, then began to rise 2.06.  We elected to switch him to abiraterone and prednisone, beginning 08/2022. He required dose reduction due to fatigue.  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 Patient was felt to be a candidate for chemotherapy with docetaxel and prednisone, which is indicated in this patient. Patient did complete palliative radiation therapy to the left hip. Plan was to treat with Docetaxel given at a dose 75 mg/m2 on day 1 of a 21-day cycle with prednisone 5 mg p.o. b.i.d. Patient reports he had severe diarrhea after cycle 1. He states the diarrhea started on about day 3 and went on for about 5 days. He did take imodium as instructed. He states he felt dehydrated. He also had burning and peeling of the skin on his hands. Skin is now intact. He reports diarrhea has resolved. Patient has ongoing fatigue. He has anxiety and trouble sleeping and took lorazepam which provided little relief. We did discuss not taking the lorazepam and pain medication together. He denies chest pain or shortness of breath. He denies fevers, no signs of infection. He denies bowel or urinary changes. Discussed symptoms with Dr Fritz. Will decrease dose by 20%. CBC is within treatment parameters. We did discuss port placement. Will wait until patient is restaged after his 3rd cycle to assess the need for port. Will proceed with treatment today as planned. Patient was encouraged to call if he has diarrhea or any other symptoms after this  cycle. Plan is to restage after 3 cycles.

## 2023-04-17 ENCOUNTER — TELEPHONE (OUTPATIENT)
Dept: ONCOLOGY | Facility: OTHER | Age: 68
End: 2023-04-17
Payer: MEDICARE

## 2023-04-17 NOTE — TELEPHONE ENCOUNTER
Per provider he needs to go to ED today to rule out PE. Patient was advised and states he will go in.  Elza Lundberg RN...........4/17/2023 10:09 AM

## 2023-04-17 NOTE — TELEPHONE ENCOUNTER
States he has significant shortness of breath with any activity. He has had mild shortness of breath in past. Does have chest tightness with this. No chest pain.  Elza Lundberg RN...........4/17/2023 9:34 AM

## 2023-04-18 NOTE — TELEPHONE ENCOUNTER
States that he has more blood in your urine. States that he has had blood in urine for 6 months or longer. States has spoke about this to provider before. Shortness of breath is better. Informed Jermain that Lupron will be managed by Erum Fritz MD an plan will need to be placed. Idiarrhea is much better since reduced dose.  Elza Lundberg RN...........4/18/2023 3:20 PM

## 2023-04-18 NOTE — TELEPHONE ENCOUNTER
Patient did not go in to be evaluated at ED. Attempted to reach patient to follow up on his shortness of breath.  Elza Lundberg RN...........4/18/2023 11:23 AM

## 2023-04-19 ENCOUNTER — HOSPITAL ENCOUNTER (INPATIENT)
Facility: OTHER | Age: 68
LOS: 3 days | Discharge: ANOTHER HOSPITAL | DRG: 689 | End: 2023-04-22
Attending: FAMILY MEDICINE | Admitting: INTERNAL MEDICINE
Payer: COMMERCIAL

## 2023-04-19 ENCOUNTER — APPOINTMENT (OUTPATIENT)
Dept: CT IMAGING | Facility: OTHER | Age: 68
DRG: 689 | End: 2023-04-19
Attending: INTERNAL MEDICINE
Payer: COMMERCIAL

## 2023-04-19 DIAGNOSIS — C79.51 MALIGNANT NEOPLASM METASTATIC TO BONE (H): ICD-10-CM

## 2023-04-19 DIAGNOSIS — K21.9 GASTROESOPHAGEAL REFLUX DISEASE WITHOUT ESOPHAGITIS: ICD-10-CM

## 2023-04-19 DIAGNOSIS — D70.9 NEUTROPENIA, UNSPECIFIED TYPE (H): ICD-10-CM

## 2023-04-19 DIAGNOSIS — C61 PROSTATE CANCER (H): ICD-10-CM

## 2023-04-19 DIAGNOSIS — C61 MALIGNANT NEOPLASM OF PROSTATE (H): ICD-10-CM

## 2023-04-19 DIAGNOSIS — C79.51 MALIGNANT NEOPLASM METASTATIC TO BONE (H): Primary | ICD-10-CM

## 2023-04-19 DIAGNOSIS — C79.51 METASTASIS TO BONE (H): ICD-10-CM

## 2023-04-19 DIAGNOSIS — R50.81 FEVER PRESENTING WITH CONDITIONS CLASSIFIED ELSEWHERE: ICD-10-CM

## 2023-04-19 DIAGNOSIS — F41.9 ANXIETY: ICD-10-CM

## 2023-04-19 DIAGNOSIS — R11.0 NAUSEA: ICD-10-CM

## 2023-04-19 DIAGNOSIS — D70.9 NEUTROPENIC FEVER (H): ICD-10-CM

## 2023-04-19 DIAGNOSIS — R50.81 NEUTROPENIC FEVER (H): ICD-10-CM

## 2023-04-19 DIAGNOSIS — A49.8 INFECTION DUE TO KLEBSIELLA PNEUMONIAE: ICD-10-CM

## 2023-04-19 PROBLEM — E11.9 DIABETES MELLITUS, TYPE 2 (H): Status: ACTIVE | Noted: 2020-11-23

## 2023-04-19 LAB
ALBUMIN UR-MCNC: 300 MG/DL
ANION GAP SERPL CALCULATED.3IONS-SCNC: 11 MMOL/L (ref 7–15)
APPEARANCE UR: ABNORMAL
BACTERIA #/AREA URNS HPF: ABNORMAL /HPF
BASOPHILS # BLD AUTO: 0 10E3/UL (ref 0–0.2)
BASOPHILS # BLD AUTO: 0 10E3/UL (ref 0–0.2)
BASOPHILS NFR BLD AUTO: 1 %
BASOPHILS NFR BLD AUTO: 1 %
BILIRUB UR QL STRIP: NEGATIVE
BUN SERPL-MCNC: 16.3 MG/DL (ref 8–23)
CALCIUM SERPL-MCNC: 8.5 MG/DL (ref 8.8–10.2)
CHLORIDE SERPL-SCNC: 103 MMOL/L (ref 98–107)
COLOR UR AUTO: ABNORMAL
CREAT SERPL-MCNC: 0.62 MG/DL (ref 0.67–1.17)
DEPRECATED HCO3 PLAS-SCNC: 24 MMOL/L (ref 22–29)
EOSINOPHIL # BLD AUTO: 0 10E3/UL (ref 0–0.7)
EOSINOPHIL # BLD AUTO: 0 10E3/UL (ref 0–0.7)
EOSINOPHIL NFR BLD AUTO: 1 %
EOSINOPHIL NFR BLD AUTO: 1 %
ERYTHROCYTE [DISTWIDTH] IN BLOOD BY AUTOMATED COUNT: 13.6 % (ref 10–15)
ERYTHROCYTE [DISTWIDTH] IN BLOOD BY AUTOMATED COUNT: 13.8 % (ref 10–15)
GFR SERPL CREATININE-BSD FRML MDRD: >90 ML/MIN/1.73M2
GLUCOSE SERPL-MCNC: 165 MG/DL (ref 70–99)
GLUCOSE UR STRIP-MCNC: NEGATIVE MG/DL
HCT VFR BLD AUTO: 27.9 % (ref 40–53)
HCT VFR BLD AUTO: 31.4 % (ref 40–53)
HGB BLD-MCNC: 10.4 G/DL (ref 13.3–17.7)
HGB BLD-MCNC: 9.4 G/DL (ref 13.3–17.7)
HGB UR QL STRIP: ABNORMAL
HOLD SPECIMEN: NORMAL
IMM GRANULOCYTES # BLD: 0 10E3/UL
IMM GRANULOCYTES # BLD: 0 10E3/UL
IMM GRANULOCYTES NFR BLD: 1 %
IMM GRANULOCYTES NFR BLD: 1 %
INR PPP: 1.08 (ref 0.85–1.15)
KETONES UR STRIP-MCNC: ABNORMAL MG/DL
LACTATE SERPL-SCNC: 1.8 MMOL/L (ref 0.7–2)
LEUKOCYTE ESTERASE UR QL STRIP: ABNORMAL
LYMPHOCYTES # BLD AUTO: 0.4 10E3/UL (ref 0.8–5.3)
LYMPHOCYTES # BLD AUTO: 0.5 10E3/UL (ref 0.8–5.3)
LYMPHOCYTES NFR BLD AUTO: 31 %
LYMPHOCYTES NFR BLD AUTO: 35 %
MCH RBC QN AUTO: 28.8 PG (ref 26.5–33)
MCH RBC QN AUTO: 29.2 PG (ref 26.5–33)
MCHC RBC AUTO-ENTMCNC: 33.1 G/DL (ref 31.5–36.5)
MCHC RBC AUTO-ENTMCNC: 33.7 G/DL (ref 31.5–36.5)
MCV RBC AUTO: 87 FL (ref 78–100)
MCV RBC AUTO: 87 FL (ref 78–100)
MONOCYTES # BLD AUTO: 0.3 10E3/UL (ref 0–1.3)
MONOCYTES # BLD AUTO: 0.3 10E3/UL (ref 0–1.3)
MONOCYTES NFR BLD AUTO: 20 %
MONOCYTES NFR BLD AUTO: 24 %
MUCOUS THREADS #/AREA URNS LPF: PRESENT /LPF
NEUTROPHILS # BLD AUTO: 0.6 10E3/UL (ref 1.6–8.3)
NEUTROPHILS # BLD AUTO: 0.7 10E3/UL (ref 1.6–8.3)
NEUTROPHILS NFR BLD AUTO: 42 %
NEUTROPHILS NFR BLD AUTO: 42 %
NITRATE UR QL: NEGATIVE
NRBC # BLD AUTO: 0 10E3/UL
NRBC # BLD AUTO: 0 10E3/UL
NRBC BLD AUTO-RTO: 0 /100
NRBC BLD AUTO-RTO: 0 /100
PH UR STRIP: 6.5 [PH] (ref 5–9)
PLATELET # BLD AUTO: 157 10E3/UL (ref 150–450)
PLATELET # BLD AUTO: 182 10E3/UL (ref 150–450)
POTASSIUM SERPL-SCNC: 3.8 MMOL/L (ref 3.4–5.3)
RBC # BLD AUTO: 3.22 10E6/UL (ref 4.4–5.9)
RBC # BLD AUTO: 3.61 10E6/UL (ref 4.4–5.9)
RBC URINE: >182 /HPF
SODIUM SERPL-SCNC: 138 MMOL/L (ref 136–145)
SP GR UR STRIP: 1.02 (ref 1–1.03)
UROBILINOGEN UR STRIP-MCNC: NORMAL MG/DL
WBC # BLD AUTO: 1.4 10E3/UL (ref 4–11)
WBC # BLD AUTO: 1.6 10E3/UL (ref 4–11)
WBC URINE: >182 /HPF
YEAST #/AREA URNS HPF: ABNORMAL /HPF

## 2023-04-19 PROCEDURE — 96365 THER/PROPH/DIAG IV INF INIT: CPT | Mod: XU | Performed by: FAMILY MEDICINE

## 2023-04-19 PROCEDURE — 51798 US URINE CAPACITY MEASURE: CPT | Performed by: FAMILY MEDICINE

## 2023-04-19 PROCEDURE — 3E1K78Z IRRIGATION OF GENITOURINARY TRACT USING IRRIGATING SUBSTANCE, VIA NATURAL OR ARTIFICIAL OPENING: ICD-10-PCS | Performed by: INTERNAL MEDICINE

## 2023-04-19 PROCEDURE — 80048 BASIC METABOLIC PNL TOTAL CA: CPT | Performed by: FAMILY MEDICINE

## 2023-04-19 PROCEDURE — 85610 PROTHROMBIN TIME: CPT | Performed by: FAMILY MEDICINE

## 2023-04-19 PROCEDURE — 250N000012 HC RX MED GY IP 250 OP 636 PS 637: Performed by: INTERNAL MEDICINE

## 2023-04-19 PROCEDURE — 96375 TX/PRO/DX INJ NEW DRUG ADDON: CPT | Performed by: FAMILY MEDICINE

## 2023-04-19 PROCEDURE — 250N000013 HC RX MED GY IP 250 OP 250 PS 637: Performed by: FAMILY MEDICINE

## 2023-04-19 PROCEDURE — 250N000009 HC RX 250: Performed by: FAMILY MEDICINE

## 2023-04-19 PROCEDURE — 250N000013 HC RX MED GY IP 250 OP 250 PS 637: Performed by: INTERNAL MEDICINE

## 2023-04-19 PROCEDURE — 258N000003 HC RX IP 258 OP 636: Performed by: FAMILY MEDICINE

## 2023-04-19 PROCEDURE — 250N000011 HC RX IP 250 OP 636: Performed by: FAMILY MEDICINE

## 2023-04-19 PROCEDURE — 85004 AUTOMATED DIFF WBC COUNT: CPT | Performed by: FAMILY MEDICINE

## 2023-04-19 PROCEDURE — 87077 CULTURE AEROBIC IDENTIFY: CPT | Performed by: FAMILY MEDICINE

## 2023-04-19 PROCEDURE — 258N000003 HC RX IP 258 OP 636: Performed by: INTERNAL MEDICINE

## 2023-04-19 PROCEDURE — 250N000011 HC RX IP 250 OP 636: Performed by: INTERNAL MEDICINE

## 2023-04-19 PROCEDURE — 83605 ASSAY OF LACTIC ACID: CPT | Performed by: FAMILY MEDICINE

## 2023-04-19 PROCEDURE — 36415 COLL VENOUS BLD VENIPUNCTURE: CPT | Performed by: FAMILY MEDICINE

## 2023-04-19 PROCEDURE — 85025 COMPLETE CBC W/AUTO DIFF WBC: CPT | Mod: 91 | Performed by: INTERNAL MEDICINE

## 2023-04-19 PROCEDURE — 258N000001 HC RX 258: Performed by: INTERNAL MEDICINE

## 2023-04-19 PROCEDURE — 87088 URINE BACTERIA CULTURE: CPT | Performed by: FAMILY MEDICINE

## 2023-04-19 PROCEDURE — 99223 1ST HOSP IP/OBS HIGH 75: CPT | Mod: AI | Performed by: INTERNAL MEDICINE

## 2023-04-19 PROCEDURE — 99285 EMERGENCY DEPT VISIT HI MDM: CPT | Mod: 25 | Performed by: FAMILY MEDICINE

## 2023-04-19 PROCEDURE — 120N000001 HC R&B MED SURG/OB

## 2023-04-19 PROCEDURE — 99284 EMERGENCY DEPT VISIT MOD MDM: CPT | Performed by: FAMILY MEDICINE

## 2023-04-19 PROCEDURE — 81001 URINALYSIS AUTO W/SCOPE: CPT | Performed by: FAMILY MEDICINE

## 2023-04-19 PROCEDURE — 51702 INSERT TEMP BLADDER CATH: CPT | Performed by: FAMILY MEDICINE

## 2023-04-19 PROCEDURE — 36415 COLL VENOUS BLD VENIPUNCTURE: CPT | Performed by: INTERNAL MEDICINE

## 2023-04-19 PROCEDURE — 74177 CT ABD & PELVIS W/CONTRAST: CPT

## 2023-04-19 RX ORDER — ONDANSETRON 4 MG/1
4 TABLET, ORALLY DISINTEGRATING ORAL EVERY 6 HOURS PRN
Status: DISCONTINUED | OUTPATIENT
Start: 2023-04-19 | End: 2023-04-22 | Stop reason: HOSPADM

## 2023-04-19 RX ORDER — NALOXONE HYDROCHLORIDE 0.4 MG/ML
0.4 INJECTION, SOLUTION INTRAMUSCULAR; INTRAVENOUS; SUBCUTANEOUS
Status: DISCONTINUED | OUTPATIENT
Start: 2023-04-19 | End: 2023-04-22 | Stop reason: HOSPADM

## 2023-04-19 RX ORDER — LORAZEPAM 2 MG/ML
1 INJECTION INTRAMUSCULAR EVERY 4 HOURS PRN
Status: DISCONTINUED | OUTPATIENT
Start: 2023-04-19 | End: 2023-04-22 | Stop reason: HOSPADM

## 2023-04-19 RX ORDER — ONDANSETRON 2 MG/ML
4 INJECTION INTRAMUSCULAR; INTRAVENOUS EVERY 6 HOURS PRN
Status: DISCONTINUED | OUTPATIENT
Start: 2023-04-19 | End: 2023-04-22 | Stop reason: HOSPADM

## 2023-04-19 RX ORDER — NAPROXEN 250 MG/1
500 TABLET ORAL 2 TIMES DAILY PRN
Status: DISCONTINUED | OUTPATIENT
Start: 2023-04-19 | End: 2023-04-19

## 2023-04-19 RX ORDER — AMOXICILLIN 250 MG
2 CAPSULE ORAL 2 TIMES DAILY PRN
Status: DISCONTINUED | OUTPATIENT
Start: 2023-04-19 | End: 2023-04-22 | Stop reason: HOSPADM

## 2023-04-19 RX ORDER — LIDOCAINE 40 MG/G
CREAM TOPICAL
Status: DISCONTINUED | OUTPATIENT
Start: 2023-04-19 | End: 2023-04-22 | Stop reason: HOSPADM

## 2023-04-19 RX ORDER — OXYCODONE AND ACETAMINOPHEN 5; 325 MG/1; MG/1
1 TABLET ORAL EVERY 6 HOURS PRN
Status: DISCONTINUED | OUTPATIENT
Start: 2023-04-19 | End: 2023-04-22 | Stop reason: HOSPADM

## 2023-04-19 RX ORDER — IOPAMIDOL 755 MG/ML
126 INJECTION, SOLUTION INTRAVASCULAR ONCE
Status: COMPLETED | OUTPATIENT
Start: 2023-04-19 | End: 2023-04-19

## 2023-04-19 RX ORDER — CEFEPIME HYDROCHLORIDE 2 G/1
2 INJECTION, POWDER, FOR SOLUTION INTRAVENOUS EVERY 8 HOURS
Status: DISCONTINUED | OUTPATIENT
Start: 2023-04-19 | End: 2023-04-20

## 2023-04-19 RX ORDER — EPINEPHRINE 1 MG/ML
0.3 INJECTION, SOLUTION, CONCENTRATE INTRAVENOUS EVERY 5 MIN PRN
Status: CANCELLED | OUTPATIENT
Start: 2023-04-19

## 2023-04-19 RX ORDER — CEFEPIME HYDROCHLORIDE 2 G/1
2 INJECTION, POWDER, FOR SOLUTION INTRAVENOUS EVERY 8 HOURS
Status: DISCONTINUED | OUTPATIENT
Start: 2023-04-19 | End: 2023-04-19

## 2023-04-19 RX ORDER — KETOROLAC TROMETHAMINE 15 MG/ML
15 INJECTION, SOLUTION INTRAMUSCULAR; INTRAVENOUS EVERY 6 HOURS PRN
Status: DISCONTINUED | OUTPATIENT
Start: 2023-04-19 | End: 2023-04-20

## 2023-04-19 RX ORDER — HYDROXYZINE HYDROCHLORIDE 10 MG/1
10 TABLET, FILM COATED ORAL EVERY 6 HOURS PRN
Status: DISCONTINUED | OUTPATIENT
Start: 2023-04-19 | End: 2023-04-22 | Stop reason: HOSPADM

## 2023-04-19 RX ORDER — ACETAMINOPHEN 325 MG/1
975 TABLET ORAL EVERY 6 HOURS PRN
Status: DISCONTINUED | OUTPATIENT
Start: 2023-04-19 | End: 2023-04-21

## 2023-04-19 RX ORDER — ACETAMINOPHEN 500 MG
1000 TABLET ORAL ONCE
Status: COMPLETED | OUTPATIENT
Start: 2023-04-19 | End: 2023-04-19

## 2023-04-19 RX ORDER — ATORVASTATIN CALCIUM 20 MG/1
20 TABLET, FILM COATED ORAL DAILY
Status: DISCONTINUED | OUTPATIENT
Start: 2023-04-19 | End: 2023-04-22 | Stop reason: HOSPADM

## 2023-04-19 RX ORDER — METHYLPREDNISOLONE SODIUM SUCCINATE 125 MG/2ML
125 INJECTION, POWDER, LYOPHILIZED, FOR SOLUTION INTRAMUSCULAR; INTRAVENOUS
Status: CANCELLED
Start: 2023-04-19

## 2023-04-19 RX ORDER — LISINOPRIL 40 MG/1
40 TABLET ORAL DAILY
Status: DISCONTINUED | OUTPATIENT
Start: 2023-04-19 | End: 2023-04-20

## 2023-04-19 RX ORDER — LIDOCAINE HYDROCHLORIDE 20 MG/ML
JELLY TOPICAL ONCE
Status: COMPLETED | OUTPATIENT
Start: 2023-04-19 | End: 2023-04-19

## 2023-04-19 RX ORDER — NALOXONE HYDROCHLORIDE 0.4 MG/ML
0.2 INJECTION, SOLUTION INTRAMUSCULAR; INTRAVENOUS; SUBCUTANEOUS
Status: DISCONTINUED | OUTPATIENT
Start: 2023-04-19 | End: 2023-04-22 | Stop reason: HOSPADM

## 2023-04-19 RX ORDER — CEFTRIAXONE SODIUM 2 G/50ML
2 INJECTION, SOLUTION INTRAVENOUS ONCE
Status: COMPLETED | OUTPATIENT
Start: 2023-04-19 | End: 2023-04-19

## 2023-04-19 RX ORDER — PREDNISONE 5 MG/1
5 TABLET ORAL 2 TIMES DAILY
Status: DISCONTINUED | OUTPATIENT
Start: 2023-04-19 | End: 2023-04-22 | Stop reason: HOSPADM

## 2023-04-19 RX ORDER — LIDOCAINE 40 MG/G
CREAM TOPICAL
Status: DISCONTINUED | OUTPATIENT
Start: 2023-04-19 | End: 2023-04-21

## 2023-04-19 RX ORDER — MEGESTROL ACETATE 20 MG/1
20 TABLET ORAL 2 TIMES DAILY
Status: DISCONTINUED | OUTPATIENT
Start: 2023-04-19 | End: 2023-04-19

## 2023-04-19 RX ORDER — LIDOCAINE HYDROCHLORIDE 20 MG/ML
JELLY TOPICAL ONCE
Status: DISCONTINUED | OUTPATIENT
Start: 2023-04-19 | End: 2023-04-21

## 2023-04-19 RX ORDER — LORAZEPAM 2 MG/ML
1 INJECTION INTRAMUSCULAR ONCE
Status: COMPLETED | OUTPATIENT
Start: 2023-04-19 | End: 2023-04-19

## 2023-04-19 RX ORDER — SODIUM CHLORIDE 9 MG/ML
INJECTION, SOLUTION INTRAVENOUS CONTINUOUS
Status: DISCONTINUED | OUTPATIENT
Start: 2023-04-19 | End: 2023-04-22 | Stop reason: HOSPADM

## 2023-04-19 RX ORDER — ALBUTEROL SULFATE 90 UG/1
1-2 AEROSOL, METERED RESPIRATORY (INHALATION)
Status: CANCELLED
Start: 2023-04-19

## 2023-04-19 RX ORDER — POLYETHYLENE GLYCOL 3350 17 G/17G
17 POWDER, FOR SOLUTION ORAL DAILY PRN
Status: DISCONTINUED | OUTPATIENT
Start: 2023-04-19 | End: 2023-04-22 | Stop reason: HOSPADM

## 2023-04-19 RX ORDER — ALBUTEROL SULFATE 0.83 MG/ML
2.5 SOLUTION RESPIRATORY (INHALATION)
Status: CANCELLED | OUTPATIENT
Start: 2023-04-19

## 2023-04-19 RX ORDER — LORAZEPAM 0.5 MG/1
0.5 TABLET ORAL EVERY 6 HOURS PRN
Status: DISCONTINUED | OUTPATIENT
Start: 2023-04-19 | End: 2023-04-22 | Stop reason: HOSPADM

## 2023-04-19 RX ORDER — BISACODYL 10 MG
10 SUPPOSITORY, RECTAL RECTAL DAILY PRN
Status: DISCONTINUED | OUTPATIENT
Start: 2023-04-19 | End: 2023-04-22 | Stop reason: HOSPADM

## 2023-04-19 RX ORDER — MEPERIDINE HYDROCHLORIDE 50 MG/ML
25 INJECTION INTRAMUSCULAR; INTRAVENOUS; SUBCUTANEOUS EVERY 30 MIN PRN
Status: CANCELLED | OUTPATIENT
Start: 2023-04-19

## 2023-04-19 RX ORDER — AMOXICILLIN 250 MG
1 CAPSULE ORAL 2 TIMES DAILY PRN
Status: DISCONTINUED | OUTPATIENT
Start: 2023-04-19 | End: 2023-04-22 | Stop reason: HOSPADM

## 2023-04-19 RX ORDER — DIPHENHYDRAMINE HYDROCHLORIDE 50 MG/ML
50 INJECTION INTRAMUSCULAR; INTRAVENOUS
Status: CANCELLED
Start: 2023-04-19

## 2023-04-19 RX ORDER — LORAZEPAM 2 MG/ML
0.5 INJECTION INTRAMUSCULAR ONCE
Status: COMPLETED | OUTPATIENT
Start: 2023-04-19 | End: 2023-04-19

## 2023-04-19 RX ADMIN — SODIUM CHLORIDE 3000 ML: 900 IRRIGANT IRRIGATION at 15:30

## 2023-04-19 RX ADMIN — VANCOMYCIN HYDROCHLORIDE 1500 MG: 10 INJECTION, POWDER, LYOPHILIZED, FOR SOLUTION INTRAVENOUS at 05:59

## 2023-04-19 RX ADMIN — ACETAMINOPHEN 975 MG: 325 TABLET ORAL at 11:14

## 2023-04-19 RX ADMIN — CEFEPIME 2 G: 2 INJECTION, POWDER, FOR SOLUTION INTRAVENOUS at 10:55

## 2023-04-19 RX ADMIN — SODIUM CHLORIDE 3000 ML: 900 IRRIGANT IRRIGATION at 21:23

## 2023-04-19 RX ADMIN — HYDROMORPHONE HYDROCHLORIDE 1 MG: 1 INJECTION, SOLUTION INTRAMUSCULAR; INTRAVENOUS; SUBCUTANEOUS at 12:45

## 2023-04-19 RX ADMIN — HYDROMORPHONE HYDROCHLORIDE 1 MG: 1 INJECTION, SOLUTION INTRAMUSCULAR; INTRAVENOUS; SUBCUTANEOUS at 10:16

## 2023-04-19 RX ADMIN — HYDROXYZINE HYDROCHLORIDE 10 MG: 10 TABLET ORAL at 21:18

## 2023-04-19 RX ADMIN — PREDNISONE 5 MG: 5 TABLET ORAL at 13:01

## 2023-04-19 RX ADMIN — OXYCODONE HYDROCHLORIDE AND ACETAMINOPHEN 1 TABLET: 5; 325 TABLET ORAL at 19:45

## 2023-04-19 RX ADMIN — HYDROMORPHONE HYDROCHLORIDE 1 MG: 1 INJECTION, SOLUTION INTRAMUSCULAR; INTRAVENOUS; SUBCUTANEOUS at 20:18

## 2023-04-19 RX ADMIN — SODIUM CHLORIDE 3000 ML: 900 IRRIGANT IRRIGATION at 16:02

## 2023-04-19 RX ADMIN — HYDROMORPHONE HYDROCHLORIDE 1 MG: 1 INJECTION, SOLUTION INTRAMUSCULAR; INTRAVENOUS; SUBCUTANEOUS at 02:47

## 2023-04-19 RX ADMIN — SODIUM CHLORIDE 3000 ML: 900 IRRIGANT IRRIGATION at 17:44

## 2023-04-19 RX ADMIN — LORAZEPAM 0.5 MG: 2 INJECTION INTRAMUSCULAR; INTRAVENOUS at 14:25

## 2023-04-19 RX ADMIN — PREGABALIN 150 MG: 25 CAPSULE ORAL at 12:58

## 2023-04-19 RX ADMIN — SODIUM CHLORIDE 3000 ML: 900 IRRIGANT IRRIGATION at 16:38

## 2023-04-19 RX ADMIN — HYDROMORPHONE HYDROCHLORIDE 1 MG: 1 INJECTION, SOLUTION INTRAMUSCULAR; INTRAVENOUS; SUBCUTANEOUS at 21:20

## 2023-04-19 RX ADMIN — IOPAMIDOL 113 ML: 755 INJECTION, SOLUTION INTRAVENOUS at 19:33

## 2023-04-19 RX ADMIN — LORAZEPAM 1 MG: 2 INJECTION INTRAMUSCULAR; INTRAVENOUS at 22:07

## 2023-04-19 RX ADMIN — LISINOPRIL 40 MG: 40 TABLET ORAL at 12:58

## 2023-04-19 RX ADMIN — HYDROMORPHONE HYDROCHLORIDE 1 MG: 1 INJECTION, SOLUTION INTRAMUSCULAR; INTRAVENOUS; SUBCUTANEOUS at 17:01

## 2023-04-19 RX ADMIN — CEFTRIAXONE SODIUM 2 G: 2 INJECTION, SOLUTION INTRAVENOUS at 05:27

## 2023-04-19 RX ADMIN — LIDOCAINE HYDROCHLORIDE: 20 JELLY TOPICAL at 02:48

## 2023-04-19 RX ADMIN — HYDROMORPHONE HYDROCHLORIDE 1 MG: 1 INJECTION, SOLUTION INTRAMUSCULAR; INTRAVENOUS; SUBCUTANEOUS at 19:19

## 2023-04-19 RX ADMIN — LORAZEPAM 1 MG: 2 INJECTION INTRAMUSCULAR; INTRAVENOUS at 02:47

## 2023-04-19 RX ADMIN — PREGABALIN 150 MG: 25 CAPSULE ORAL at 21:18

## 2023-04-19 RX ADMIN — HYDROMORPHONE HYDROCHLORIDE 1 MG: 1 INJECTION, SOLUTION INTRAMUSCULAR; INTRAVENOUS; SUBCUTANEOUS at 14:25

## 2023-04-19 RX ADMIN — HYDROMORPHONE HYDROCHLORIDE 1 MG: 1 INJECTION, SOLUTION INTRAMUSCULAR; INTRAVENOUS; SUBCUTANEOUS at 15:00

## 2023-04-19 RX ADMIN — ACETAMINOPHEN 975 MG: 325 TABLET ORAL at 15:56

## 2023-04-19 RX ADMIN — ATORVASTATIN CALCIUM 20 MG: 20 TABLET, FILM COATED ORAL at 12:58

## 2023-04-19 RX ADMIN — PREDNISONE 5 MG: 5 TABLET ORAL at 21:18

## 2023-04-19 RX ADMIN — HYDROMORPHONE HYDROCHLORIDE 1 MG: 1 INJECTION, SOLUTION INTRAMUSCULAR; INTRAVENOUS; SUBCUTANEOUS at 18:22

## 2023-04-19 RX ADMIN — ACETAMINOPHEN 1000 MG: 500 TABLET ORAL at 05:04

## 2023-04-19 RX ADMIN — HYDROMORPHONE HYDROCHLORIDE 1 MG: 1 INJECTION, SOLUTION INTRAMUSCULAR; INTRAVENOUS; SUBCUTANEOUS at 11:27

## 2023-04-19 RX ADMIN — VANCOMYCIN HYDROCHLORIDE 1500 MG: 10 INJECTION, POWDER, LYOPHILIZED, FOR SOLUTION INTRAVENOUS at 18:11

## 2023-04-19 RX ADMIN — LORAZEPAM 0.5 MG: 0.5 TABLET ORAL at 13:35

## 2023-04-19 RX ADMIN — SODIUM CHLORIDE: 9 INJECTION, SOLUTION INTRAVENOUS at 10:55

## 2023-04-19 RX ADMIN — CEFEPIME 2 G: 2 INJECTION, POWDER, FOR SOLUTION INTRAVENOUS at 20:25

## 2023-04-19 ASSESSMENT — ACTIVITIES OF DAILY LIVING (ADL)
DRESSING/BATHING_DIFFICULTY: NO
ADLS_ACUITY_SCORE: 35
DIFFICULTY_COMMUNICATING: NO
EQUIPMENT_CURRENTLY_USED_AT_HOME: CANE, STRAIGHT;WALKER, ROLLING
CHANGE_IN_FUNCTIONAL_STATUS_SINCE_ONSET_OF_CURRENT_ILLNESS/INJURY: NO
ADLS_ACUITY_SCORE: 22
FALL_HISTORY_WITHIN_LAST_SIX_MONTHS: NO
CONCENTRATING,_REMEMBERING_OR_MAKING_DECISIONS_DIFFICULTY: NO
HEARING_DIFFICULTY_OR_DEAF: NO
ADLS_ACUITY_SCORE: 22
ADLS_ACUITY_SCORE: 22
ADLS_ACUITY_SCORE: 24
WEAR_GLASSES_OR_BLIND: YES
ADLS_ACUITY_SCORE: 35
ADLS_ACUITY_SCORE: 22
ADLS_ACUITY_SCORE: 35
DIFFICULTY_EATING/SWALLOWING: NO
DOING_ERRANDS_INDEPENDENTLY_DIFFICULTY: YES
TOILETING_ISSUES: NO
WALKING_OR_CLIMBING_STAIRS_DIFFICULTY: NO
ADLS_ACUITY_SCORE: 35
ADLS_ACUITY_SCORE: 22
ADLS_ACUITY_SCORE: 22

## 2023-04-19 ASSESSMENT — ENCOUNTER SYMPTOMS
HEMATURIA: 1
DIFFICULTY URINATING: 1

## 2023-04-19 NOTE — PROGRESS NOTES
Weights reviewed per MST screen: pt has had slight wt loss in past month, ~4%. Appetite decreased at home. Potential for malnutrition. Will monitor intakes and make changes as needed. Follow up in 1-5 days  Diet: regular   Intakes: monitor   Wt Readings from Last 10 Encounters:   04/19/23 99.3 kg (219 lb)   04/10/23 100.7 kg (222 lb)   03/14/23 103.6 kg (228 lb 6.4 oz)   03/09/23 105.7 kg (233 lb)   02/28/23 103.9 kg (229 lb)   02/03/23 103.4 kg (228 lb)   01/26/23 107 kg (236 lb)   12/02/22 107 kg (236 lb)   10/25/22 107 kg (236 lb)   10/05/22 103 kg (227 lb)   Lizette Davila RD on 4/19/2023 at 10:16 AM

## 2023-04-19 NOTE — PROVIDER NOTIFICATION
04/19/23 0920   Valuables   Patient Belongings remains with patient   Patient Belongings Remaining with Patient clothing;shoes;glasses;purse/wallet   Did you bring any home meds/supplements to the hospital?  No     [facility] will make every effort per our policy to help keep your items safe while in the hospital.  If you choose to keep any items at the bedside, we cannot be held responsible for any items that are lost or broken.      List items sent to safe: none    I have reviewed my belongings list on admission and verify that it is correct.     Patient signature_______________________________  Date/Time_____________________    2nd Staff person if patient unable to sign __________________________  Date/Time ______________________      I have received all my belongings noted above at discharge.    Patient signature________________________________  Date/Time  __________________________

## 2023-04-19 NOTE — ED PROVIDER NOTES
History     Chief Complaint   Patient presents with     Hematuria     Urinary Retention     The history is provided by the patient, the spouse and medical records.     Dilip Kan is a 68 year old male with prostate cancer here with hematuria, urine retention and bladder spasm. He has been having problems getting urine out due to clots. The spasms are painful now and he cannot get urine out to relieve the pressure.     He is not on blood thinners.    Allergies:  No Known Allergies    Problem List:    Patient Active Problem List    Diagnosis Date Noted     Neutropenic fever (H) 04/19/2023     Priority: Medium     Bone metastasis 03/09/2023     Priority: Medium     Bone metastases 03/09/2023     Priority: Medium     Aneurysm of ascending aorta without rupture (H) 10/05/2022     Priority: Medium     Status post total right knee replacement 11/16/2021     Priority: Medium     Malignant neoplasm of prostate (H) 09/16/2021     Priority: Medium     Diabetes mellitus, type 2 (H) 11/23/2020     Priority: Medium     Plaque in heart artery-aorta 03/24/2020     Priority: Medium     Aortic valve stenosis with insufficiency, etiology of cardiac valve disease unspecified 03/22/2019     Priority: Medium     Encounter for examination required by Department of Transportation (DOT) 03/22/2019     Priority: Medium     Mild aortic stenosis 03/22/2019     Priority: Medium     Ascending aortic aneurysm-moderate at 4.6 cm on 3/11/20 03/22/2019     Priority: Medium     Aortic valve insufficiency-moderate to severe 03/22/2019     Priority: Medium     Hx of syncope 03/22/2019     Priority: Medium     History of tobacco abuse quitting 11/8/2002 03/22/2019     Priority: Medium     Hypertriglyceridemia 03/22/2019     Priority: Medium     Mild pulmonary hypertension (H) 03/22/2019     Priority: Medium     Palpitations 03/22/2019     Priority: Medium     Rising PSA following treatment for malignant neoplasm of prostate 03/29/2018      Priority: Medium     Chronic, continuous use of opioids 01/31/2018     Priority: Medium     Patient is followed by Wei Perez MD for ongoing prescription of pain medication.  All refills should be approved by this provider only at face-to-face appointments - not by phone request.    Medication(s): Hydrocodone.   Maximum quantity per month: 135  Clinic visit frequency required: Q 3 months   PDMP Review       Value Time User    State PDMP site checked  Yes 8/25/2021 10:41 AM Wei Perez MD        Controlled substance agreement:  Patient-Level CSA:    There are no patient-level csa.       Pain Clinic evaluation in the past: No    Opioid Risk Tool Total Score(s):  OPIOID RISK TOOL TOTAL SCORE 8/25/2021   Total Score 0   Total Risk Score Category Low Risk (0-3)            Other specified causes of urethral stricture 01/31/2018     Priority: Medium     GERD 04/28/2017     Priority: Medium     Essential hypertension 04/28/2017     Priority: Medium     Non morbid obesity due to excess calories 04/28/2017     Priority: Medium     Mixed hyperlipidemia 04/28/2017     Priority: Medium     Spinal stenosis of lumbar region with neurogenic claudication 04/28/2017     Priority: Medium     Controlled substance agreement updated 12- 01/15/2016     Priority: Medium     Backache 01/13/2014     Priority: Medium     Prostate cancer (H) 01/02/2013     Priority: Medium     Personal history of prostate cancer s/p prostatectomy and sEBRT 12/06/2012     Priority: Medium     Anemia due to acute blood loss 11/30/2012     Priority: Medium     Overview:   EBL 11/28/12:  1700 ml  EBL 11/28/12:  500 ml  Transfused 2 units pRBC's early on 11/29/12       Fever, postprocedural 11/30/2012     Priority: Medium     Overview:   11/30/12, mild       Pelvic pain in male 11/30/2012     Priority: Medium     Syncope 11/30/2012     Priority: Medium     Formatting of this note might be different from the original.  One episode, 11/28/12  evening          Past Medical History:    Past Medical History:   Diagnosis Date     Elevated prostate specific antigen (PSA)      Encounter for other administrative examinations      Esophagitis      Gastro-esophageal reflux disease without esophagitis      Low back pain      Malignant neoplasm of prostate (H)      Nicotine dependence, uncomplicated      Other intervertebral disc degeneration, lumbosacral region        Past Surgical History:    Past Surgical History:   Procedure Laterality Date     APPENDECTOMY OPEN  [id number]    Ruptured - [place]     ARTHROPLASTY KNEE Right 11/16/2021    Procedure: ARTHROPLASTY, KNEE, TOTAL;  Surgeon: Micah Rock MD;  Location: [facility] OR     COLONOSCOPY  08/31/2006    next due in 2016     DISKECTOMY, LUMBAR, SINGLE SP  03/16/2009    L 3-4 microdiskectomy, [facility]     ESOPHAGOSCOPY, GASTROSCOPY, DUODENOSCOPY (EGD), COMBINED  03/2003          ESOPHAGOSCOPY, GASTROSCOPY, DUODENOSCOPY (EGD), COMBINED  08/31/2006          PROSTATECTOMY PERINEAL RADICAL  11/28/2012    Nerve Sparring, Dr Warner     SPINE SURGERY N/A 04/28/2017    L3 to S1 spinal decompression L4/L5 fusion     TONSILLECTOMY, ADENOIDECTOMY, COMBINED      as a child     VARICOCELECTOMY  1959    INCISION AND DRAINAGE,EXCISE VARICOCELE       Family History:    Family History   Problem Relation Age of Onset     Heart Disease Father         Heart Disease, passed away from CHF,CAD     Colon Cancer Father         Cancer-colon     Hypertension Father         Hypertension     Other - See Comments Father         Stroke/Dementia     Hypertension Mother         Hypertension,HTN     Other - See Comments Mother          Parkinsons     Family History Negative Other         Good Health     Family History Negative Other         Good Health,previous marriage./previous marriage.     Family History Negative Other         Good Health,previous marriage.     Family History Negative Sister         Good Health,emotional problems.      Family History Negative Sister         Good Health     Other - See Comments Son         w/o major medical problems.     Other - See Comments Daughter         w/o major medical problems.       Social History:  Marital Status:   [2]  Social History     Tobacco Use     Smoking status: Former     Packs/day: 1.00     Years: 20.00     Pack years: 20.00     Types: Cigarettes     Quit date: 2002     Years since quittin.4     Passive exposure: Past     Smokeless tobacco: Current     Types: Snuff     Tobacco comments:     Can't remember when all he had passive exposure   Vaping Use     Vaping status: Never Used   Substance Use Topics     Alcohol use: Yes     Comment:  5 drinks a month     Drug use: No        Medications:    acetaminophen (TYLENOL) 325 MG tablet  amLODIPine (NORVASC) 10 MG tablet  aspirin EC 81 MG EC tablet  atorvastatin (LIPITOR) 20 MG tablet  dexamethasone (DECADRON) 4 MG tablet  HYDROcodone-acetaminophen (NORCO)  MG per tablet  HYDROcodone-acetaminophen (NORCO)  MG per tablet  HYDROcodone-acetaminophen (NORCO)  MG per tablet  leuprolide (LUPRON DEPOT) 45 MG kit  lisinopril (ZESTRIL) 40 MG tablet  LORazepam (ATIVAN) 0.5 MG tablet  meclizine (ANTIVERT) 25 MG tablet  megestrol (MEGACE) 20 MG tablet  metFORMIN (GLUCOPHAGE-XR) 500 MG 24 hr tablet  naproxen (NAPROSYN) 500 MG tablet  omeprazole (PRILOSEC) 40 MG DR capsule  ondansetron (ZOFRAN-ODT) 8 MG ODT tab  oxyCODONE-acetaminophen (PERCOCET) 5-325 MG tablet  predniSONE (DELTASONE) 5 MG tablet  pregabalin (LYRICA) 150 MG capsule  prochlorperazine (COMPAZINE) 10 MG tablet  traZODone (DESYREL) 50 MG tablet  vitamin B-12 (CYANOCOBALAMIN) 1000 MCG CR tablet  vitamin D3 (CHOLECALCIFEROL) 2000 units (50 mcg) tablet  ALPRAZolam (XANAX) 0.5 MG tablet  dexamethasone (DECADRON) 4 MG tablet  hydrOXYzine (ATARAX) 10 MG tablet          Review of Systems   Genitourinary: Positive for difficulty urinating, hematuria and penile pain.   All  other systems reviewed and are negative.      Physical Exam   BP: (!) 150/66  Pulse: 111  Temp: (!) 100.5  F (38.1  C)  Resp: 16  SpO2: 98 %      Physical Exam  Vitals and nursing note reviewed.   Constitutional:       General: He is in acute distress.      Appearance: Normal appearance. He is not ill-appearing, toxic-appearing or diaphoretic.   Abdominal:      Tenderness: There is abdominal tenderness. There is guarding.      Comments: He has suprapubic tenderness with even light palpation.   Genitourinary:     Penis: Normal.       Testes: Normal.   Skin:     General: Skin is warm and dry.   Neurological:      Mental Status: He is alert.         Results for orders placed or performed during the hospital encounter of 04/19/23 (from the past 24 hour(s))   [place] Draw    Narrative    The following orders were created for panel order [place] Draw.  Procedure                               Abnormality         Status                     ---------                               -----------         ------                     Extra Blue Top Tube[[id number]]                              Final result               Extra Red Top Tube[[id number]]                               Final result               Extra Green Top (Lithium...[[id number]]                      Final result               Extra Purple Top Tube[[id number]]                            Final result               Extra Green Top (Lithium...[[id number]]                      Final result                 Please view results for these tests on the individual orders.   Extra Blue Top Tube   Result Value Ref Range    Hold Specimen JIC    Extra Red Top Tube   Result Value Ref Range    Hold Specimen JIC    Extra Green Top (Lithium Heparin) Tube   Result Value Ref Range    Hold Specimen JIC    Extra Purple Top Tube   Result Value Ref Range    Hold Specimen JIC    Extra Green Top (Lithium Heparin) ON ICE   Result Value Ref Range    Hold Specimen JIC    CBC with platelets  differential    Narrative    The following orders were created for panel order CBC with platelets differential.  Procedure                               Abnormality         Status                     ---------                               -----------         ------                     CBC with platelets and d...[[id number]]  Abnormal            Final result                 Please view results for these tests on the individual orders.   Basic metabolic panel   Result Value Ref Range    Sodium 138 136 - 145 mmol/L    Potassium 3.8 3.4 - 5.3 mmol/L    Chloride 103 98 - 107 mmol/L    Carbon Dioxide (CO2) 24 22 - 29 mmol/L    Anion Gap 11 7 - 15 mmol/L    Urea Nitrogen 16.3 8.0 - 23.0 mg/dL    Creatinine 0.62 (L) 0.67 - 1.17 mg/dL    Calcium 8.5 (L) 8.8 - 10.2 mg/dL    Glucose 165 (H) 70 - 99 mg/dL    GFR Estimate >90 >60 mL/min/1.73m2   CBC with platelets and differential   Result Value Ref Range    WBC Count 1.6 (L) 4.0 - 11.0 10e3/uL    RBC Count 3.61 (L) 4.40 - 5.90 10e6/uL    Hemoglobin 10.4 (L) 13.3 - 17.7 g/dL    Hematocrit 31.4 (L) 40.0 - 53.0 %    MCV 87 78 - 100 fL    MCH 28.8 26.5 - 33.0 pg    MCHC 33.1 31.5 - 36.5 g/dL    RDW 13.6 10.0 - 15.0 %    Platelet Count 182 150 - 450 10e3/uL    % Neutrophils 42 %    % Lymphocytes 35 %    % Monocytes 20 %    % Eosinophils 1 %    % Basophils 1 %    % Immature Granulocytes 1 %    NRBCs per 100 WBC 0 <1 /100    Absolute Neutrophils 0.7 (L) 1.6 - 8.3 10e3/uL    Absolute Lymphocytes 0.5 (L) 0.8 - 5.3 10e3/uL    Absolute Monocytes 0.3 0.0 - 1.3 10e3/uL    Absolute Eosinophils 0.0 0.0 - 0.7 10e3/uL    Absolute Basophils 0.0 0.0 - 0.2 10e3/uL    Absolute Immature Granulocytes 0.0 <=0.4 10e3/uL    Absolute NRBCs 0.0 10e3/uL   Lactic Acid STAT   Result Value Ref Range    Lactic Acid 1.8 0.7 - 2.0 mmol/L   INR   Result Value Ref Range    INR 1.08 0.85 - 1.15   UA with Microscopic   Result Value Ref Range    Color Urine Dark Brown (A) Colorless, Straw, Light Yellow, Yellow     Appearance Urine Cloudy (A) Clear    Glucose Urine Negative Negative mg/dL    Bilirubin Urine Negative Negative    Ketones Urine Trace (A) Negative mg/dL    Specific Gravity Urine 1.023 1.000 - 1.030    Blood Urine Large (A) Negative    pH Urine 6.5 5.0 - 9.0    Protein Albumin Urine 300 (A) Negative mg/dL    Urobilinogen Urine Normal Normal, 2.0 mg/dL    Nitrite Urine Negative Negative    Leukocyte Esterase Urine Moderate (A) Negative    Bacteria Urine Many (A) None Seen /HPF    Budding Yeast Urine Many (A) None Seen /HPF    Mucus Urine Present (A) None Seen /LPF    RBC Urine >182 (H) <=2 /HPF    WBC Urine >182 (H) <=5 /HPF       Medications   HYDROmorphone (DILAUDID) injection 1 mg (1 mg Intravenous $Given 4/19/23 0247)   vancomycin (VANCOCIN) 1,500 mg in sodium chloride 0.9 % 500 mL intermittent infusion (1,500 mg Intravenous $New Bag 4/19/23 0559)   LORazepam (ATIVAN) injection 1 mg (1 mg Intravenous $Given 4/19/23 0247)   lidocaine (XYLOCAINE) 2 % external gel ( Topical $Given 4/19/23 0248)   cefTRIAXone IN D5W (ROCEPHIN) intermittent infusion 2 g (0 g Intravenous Stopped 4/19/23 0559)   acetaminophen (TYLENOL) tablet 1,000 mg (1,000 mg Oral $Given 4/19/23 0504)       Assessments & Plan (with Medical Decision Making)  Dilip Kan is a 68 year old male with prostate cancer here with hematuria, urine retention and bladder spasm. He has been having problems getting urine out due to clots. The spasms are painful now and he cannot get urine out to relieve the pressure. He is not on blood thinners. VS in the ED /60   Pulse 98   Temp (!) 101.1  F (38.4  C) (Oral)   Resp 21   SpO2 (!) 83%   Exam shows a normal appearing penis. He has suprapubic tenderness with palpation. Bladder scan showed about 500 mL of urine.  We gave IV dilaudid and Ativan and some Urojet in the penis. Labs show CBC with WBC 1,600, hgb 10.4 (both a bit decreased), BMP with glucose 165, lactic acid normal. Staff did get an 18 Fr  Caudae in and he had dark bloody urine out. He just started cycle 2 of Docetaxel/ prednisone on April 10.  He did not have anemia from the first cycle but anemia is a common adverse reaction to this medicine (65-97%).   4:13 AM  I thought we were going to get him home. He was feeling better with the Jim[place] in place. Recheck of temperature shows fever of 101.1  F orally so he has neutropenic fever. I spoke with he and his wife about this.  I have added blood culture, urine culture.  UA implies UTI, INR normal.  We will start treatment with Rocephin and vancomycin.   7:00 AM  I spoke with Dr Barnes about this patient and he will admit him to the floor.      I have reviewed the nursing notes.    I have reviewed the findings, diagnosis, plan and need for follow up with the patient.     Medical Decision Making  The patient's presentation was of moderate complexity (an acute illness with systemic symptoms).    The patient's evaluation involved:  an assessment requiring an independent historian (see separate area of note for details)  ordering and/or review of 3+ test(s) in this encounter (see separate area of note for details)    The patient's management necessitated high risk (a decision regarding hospitalization).      Final diagnoses:   Neutropenic fever (H)   Prostate cancer (H)   Bone metastasis       4/19/2023   [facility] DepartmentSte[facility] MD  04/19/23 0751

## 2023-04-19 NOTE — PHARMACY-VANCOMYCIN DOSING SERVICE
Pharmacy Vancomycin Initial Note  Date of Service 2023  Patient's  1955  68 year old, male    Indication: Neutropenic Fever    Current estimated CrCl = Estimated Creatinine Clearance: 130.9 mL/min (A) (based on SCr of 0.62 mg/dL (L)).    Creatinine for last 3 days  2023:  2:40 AM Creatinine 0.62 mg/dL    Recent Vancomycin Level(s) for last 3 days  No results found for requested labs within last 3 days.      Vancomycin IV Administrations (past 72 hours)                   vancomycin (VANCOCIN) 1,500 mg in sodium chloride 0.9 % 500 mL intermittent infusion (mg) 1,500 mg New Bag 23 0559                Nephrotoxins and other renal medications (From now, onward)    Start     Dose/Rate Route Frequency Ordered Stop    23 1000  lisinopril (ZESTRIL) tablet 40 mg        Note to Pharmacy: PTA Sig:Take 1 tablet (40 mg) by mouth daily      40 mg Oral DAILY 23 0916      23 0916  naproxen (NAPROSYN) tablet 500 mg        Note to Pharmacy: PTA Sig:Take 1 tablet (500 mg) by mouth 2 times daily as needed for moderate pain      500 mg Oral 2 TIMES DAILY PRN 23 0916      23 0425  vancomycin (VANCOCIN) 1,500 mg in sodium chloride 0.9 % 500 mL intermittent infusion         1,500 mg  over 90 Minutes Intravenous EVERY 12 HOURS 23 0423            Contrast Orders - past 72 hours (72h ago, onward)    None          [employer] Prediction of Planned Initial Vancomycin Regimen  Loading dose: N/A  Regimen: 1500 mg IV every 12 hours.  Start time: 17:59 on 2023  Exposure target: AUC24 (range)400-600 mg/L.hr   AUC24,ss: 551 mg/L.hr  Probability of AUC24 > 400: 81 %  Ctrough,ss: 16.8 mg/L  Probability of Ctrough,ss > 20: 37 %  Probability of nephrotoxicity (Lodise HERON ): 12 %          Plan:  1. Start vancomycin  1500 mg IV q12h. Will dose a bit aggressively and check a level/monitor kidney fucntion.  2. Vancomycin monitoring method: AUC  3. Vancomycin therapeutic monitoring goal:  400-600 mg*h/L  4. Pharmacy will check vancomycin levels as appropriate in 1-3 Days.    5. Serum creatinine levels will be ordered daily for the first week of therapy and at least twice weekly for subsequent weeks.      Kimmie Rose RPH

## 2023-04-19 NOTE — PHARMACY-ADMISSION MEDICATION HISTORY
"Pharmacist Admission Medication History    Admission medication history is complete. The information provided in this note is only as accurate as the sources available at the time of the update.    Medication reconciliation/reorder completed by provider prior to medication history? Yes    Information Source(s): Patient via in-person  -Surescripts  -3/9 - 4/10 Oncology notes      Pertinent Information:  -Hydrocodone: pt reports usually taking 2-4 tablets/day on average depending on pain severity.     -Oxycodone/Acetaminophen: prescribed by Dr. Fritz 3/9 for breakthrough pain related to metastatic cancer. Refilled on 3/22- pt reports he still has \"a few\" tablets at home.    -Pt cannot remember if he took his home medications yesterday or not prior to coming in. Call was made to patient's wife to determine last doses, but call went through to voicemail- voicemail left requesting call back.      Changes made to PTA medication list:    Added: None    Deleted: Alprazolam, duplicate Dexamethasone, Meclizine, Acetaminophen, Naproxen, Leuprolide 22.5 mg    Changed: Aspirin dose to 325 mg/day (per pt report)      Medication Affordability: no concerns noted       Allergies reviewed with patient and updates made in EHR: yes- no changes made at this time- patient confirmed NKDA.      Medication History Completed By: Richard Vazquez RPH 4/19/2023 1:27 PM    Prior to Admission medications    Medication Sig Last Dose Taking? Auth Provider Long Term End Date   amLODIPine (NORVASC) 10 MG tablet Take 1 tablet (10 mg) by mouth daily Past Week at AM Yes Wei Perez MD Yes    aspirin (ASA) 325 MG EC tablet Take 325 mg by mouth daily with food Past Week at AM Yes Reported, Patient     atorvastatin (LIPITOR) 20 MG tablet Take 1 tablet (20 mg) by mouth daily Past Week Yes Wei Perez MD Yes    dexamethasone (DECADRON) 4 MG tablet Take 8 mg (2 tablets) by mouth twice daily with food beginning the evening of docetaxel infusion. "  Take for a total of 3 doses with each chemotherapy cycle. 4/11/2023 at PM Yes Unknown, Entered By History Yes    HYDROcodone-acetaminophen (NORCO)  MG per tablet Take 1 tablet by mouth 5 times daily 4/18/2023 Yes Wei Perez MD     HYDROcodone-acetaminophen (NORCO)  MG per tablet Take 1 tablet by mouth 5 times daily 4/18/2023 Yes Wei Perez MD     HYDROcodone-acetaminophen (NORCO)  MG per tablet Take 1 tablet by mouth 5 times daily 4/18/2023 Yes Wei Perez MD     leuprolide (LUPRON DEPOT) 45 MG kit Inject 45 mg into the muscle every 6 months Next due 7/26/23 More than a month Yes Unknown, Entered By History     lisinopril (ZESTRIL) 40 MG tablet Take 1 tablet (40 mg) by mouth daily Past Week Yes Wei Perez MD Yes    LORazepam (ATIVAN) 0.5 MG tablet Take 1 tablet (0.5 mg) by mouth every 6 hours as needed for anxiety 4/17/2023 at PM Yes Cynthia Galvin APRN CNP     megestrol (MEGACE) 20 MG tablet Take 1 tablet (20 mg) by mouth 2 times daily For prevention of hot flashes Past Week Yes Celestine Arrington MD Yes    metFORMIN (GLUCOPHAGE-XR) 500 MG 24 hr tablet Take 2 tablets (1,000 mg) by mouth 2 times daily (with meals) Past Week Yes Wei Perez MD Yes    omeprazole (PRILOSEC) 40 MG DR capsule Take 1 capsule (40 mg) by mouth daily Past Week at AM Yes Wei Perez MD     ondansetron (ZOFRAN-ODT) 8 MG ODT tab Take 1 tablet (8 mg) by mouth every 8 hours as needed for nausea Past Week Yes Erum Fritz MD     oxyCODONE-acetaminophen (PERCOCET) 5-325 MG tablet Take 1 tablet by mouth every 6 hours as needed for severe pain (7-10) Unknown Yes Erum Fritz MD No    predniSONE (DELTASONE) 5 MG tablet Take 1 tablet (5 mg) by mouth 2 times daily Past Week Yes Erum rFitz MD     pregabalin (LYRICA) 150 MG capsule Take 1 capsule (150 mg) by mouth 2 times daily Past Week Yes ImWei tong MD Yes    prochlorperazine (COMPAZINE) 10 MG tablet  Take 1 tablet (10 mg) by mouth every 6 hours as needed for nausea or vomiting Past Month Yes Erum Fritz MD     traZODone (DESYREL) 50 MG tablet Take 1 tablet (50 mg) by mouth At Bedtime Unsure if taking Yes Erum Fritz MD Yes    vitamin B-12 (CYANOCOBALAMIN) 1000 MCG CR tablet Take 1,000 mcg by mouth daily Past Week at AM Yes Reported, Patient     vitamin D3 (CHOLECALCIFEROL) 2000 units (50 mcg) tablet Take 1 tablet (2,000 Units) by mouth daily Past Week at AM Yes Wei Perez MD     hydrOXYzine (ATARAX) 10 MG tablet Take 1 tablet (10 mg) by mouth every 6 hours as needed for itching or anxiety (with pain, moderate pain)   Haydee Abdul MD

## 2023-04-19 NOTE — ED TRIAGE NOTES
Pt arrived by private vehicle. Complaints of urinary retention and blood clots increasing over last 2 days with 10/10pain. Always has some blood in urine but no clots normally. Pt states thick clots and feeling of not emptying bladder. Pt has recent history of chemo treatment for prostate cancer. April 10th last chemotherapy day. Total of 13 weeks of therapy. BP (!) 150/66   Pulse 111   Temp (!) 100.5  F (38.1  C) (Tympanic)   SpO2 98%        Triage Assessment     Row Name 04/19/23 0208       Triage Assessment (Adult)    Airway WDL WDL       Respiratory WDL    Respiratory WDL WDL       Skin Circulation/Temperature WDL    Skin Circulation/Temperature WDL WDL       Cardiac WDL    Cardiac WDL X;rhythm    Pulse Rate & Regularity tachycardic       Peripheral/Neurovascular WDL    Peripheral Neurovascular WDL WDL       Cognitive/Neuro/Behavioral WDL    Cognitive/Neuro/Behavioral WDL WDL

## 2023-04-19 NOTE — PROGRESS NOTES
Order received to place a three way catheter for irrigation. Patient current hooks was clotting off. Was hand irrigated with removal of clots. And return of bloody urine. Unable to place the three way catheter, Was able to place a 16 fr 5cc hooks. Hooks was irrigated, clots returned and then bloody urine. Patient was given dilaudid for pain. Will update the md.

## 2023-04-19 NOTE — H&P
[facility]    History and Physical - Hospitalist Service       Date of Admission:  4/19/2023    Assessment & Plan      Dilip Kan is a 68 year old male admitted on 4/19/2023. He has a fever and gross hematuria with obstruction.     Principal Problem:    Neutropenic fever (H)  Assessment:  on admit, fever to 101.1, Last chemo was docataxel on 4/10. Likely related to obstructive uropathy. Wife related dyspnea on exertion at home, but he is not complaining of respiratory symptoms here.   Plan: Admit  Empiric antibiotic coverage with cefepime and vancomycin  Monitor urine culture and blood cultures    Gross hematuria with obstructive uropathy  Assessment: patient has a known mass in the region of his prostatectomy. A 3 way catheter was placed when the 16 Danish clotted up on multiple occasions. He had significant discomfort and catheter placement became an emergent need. The catheter was challenging to place but was performed and continuous bladder irrigation was started.   Plan: Continue continuous bladder irrigation. A CT of the abdomen and pelvis will be performed to compare to the March 6 CT. If hematuria does not clear with irrigation overnight, he may need to be transferred to a facility with urology services.    Active Problems:    Anemia due to acute blood loss  Assessment: present on admission   Plan: serial hemoglobin       Essential hypertension  Assessment: chronic      Prostate cancer (H)  Assessment: with metastases to left acetabulum and local soft tissues      Diabetes mellitus, type 2 (H)  Assessment: chronic  Plan: hold metformin, sliding scale insulin         Diet: Combination Diet Regular Diet Adult    DVT Prophylaxis: Pneumatic Compression Devices  Jiménez Catheter: PRESENT, indication:    Lines: None     Cardiac Monitoring: None  Code Status: Full Code                 Disposition Plan      Expected Discharge Date: 04/21/2023                  Topher Barnes,  MD  Hospitalist Service  [facility]  Securely message with [employer] (more info)  Text page via [facility] Paging/Directory     ______________________________________________________________________    Chief Complaint   Unable to void    History is obtained from the patient    History of Present Illness   Dilip Kan is a 68 year old male who presents to the emergency department with inability to void and bladder pain.  He has a history of metastatic prostate cancer and is treated with docetaxel, his last infusion was on April 10, 2023.  He has had gross hematuria for months but it appears that last night he began having clotting.  His wife also states that he has been hematuria has worsened since starting chemotherapy.    In the emergency department a catheter was placed and he had some relief.  There was gross hematuria in the Jiménez bag.  He was getting ready to be discharged home when he developed a fever of 101.1.  His laboratory studies showed an absolute neutrophil count of 700, he was admitted to the hospital for neutropenic fever.    He has had some mild dyspnea on exertion at home per his wife.  He denies any coughing.  No nausea vomiting or diarrhea.  He has no skin rashes or sores.  No nausea tender joints.  He has no sore throat, tooth pain,, sinus pressure or ear pain.    Upon arrival to the medical floor he began having obstructive uropathy again with clotting in his Jiménez catheter.  A second 16 Tongan catheter was inserted with some improvement.  This catheter then also clotted and nursing was unable to irrigate further.  The patient continued to have worsening pain and bladder distention.  At that time a three-way catheter was inserted, this was a challenging insertion but was accomplished with continuous bladder irrigation started.  The patient is currently more comfortable.       Past Medical History    Past Medical History:   Diagnosis Date     Elevated prostate specific antigen  (PSA)     4/10/2012     Encounter for other administrative examinations     1/31/2014     Esophagitis     No Comments Provided     Gastro-esophageal reflux disease without esophagitis     No Comments Provided     Low back pain     No Comments Provided     Malignant neoplasm of prostate (H)     9/6/2012     Nicotine dependence, uncomplicated     Quit - October 2007 with chantix     Other intervertebral disc degeneration, lumbosacral region     12/1/2008       Past Surgical History   Past Surgical History:   Procedure Laterality Date     APPENDECTOMY OPEN  [id number]    Ruptured - [facility]ger     ARTHROPLASTY KNEE Right 11/16/2021    Procedure: ARTHROPLASTY, KNEE, TOTAL;  Surgeon: Micah Rock MD;  Location: [facility] OR     COLONOSCOPY  08/31/2006    next due in 2016     DISKECTOMY, LUMBAR, SINGLE SP  03/16/2009    L 3-4 microdiskectomy, [facility]     ESOPHAGOSCOPY, GASTROSCOPY, DUODENOSCOPY (EGD), COMBINED  03/2003          ESOPHAGOSCOPY, GASTROSCOPY, DUODENOSCOPY (EGD), COMBINED  08/31/2006          PROSTATECTOMY PERINEAL RADICAL  11/28/2012    Nerve Sparring, Dr Warner     SPINE SURGERY N/A 04/28/2017    L3 to S1 spinal decompression L4/L5 fusion     TONSILLECTOMY, ADENOIDECTOMY, COMBINED      as a child     VARICOCELECTOMY  1959    INCISION AND DRAINAGE,EXCISE VARICOCELE       Prior to Admission Medications   Prior to Admission Medications   Prescriptions Last Dose Informant Patient Reported? Taking?   HYDROcodone-acetaminophen (NORCO)  MG per tablet 4/18/2023 Self No Yes   Sig: Take 1 tablet by mouth 5 times daily   HYDROcodone-acetaminophen (NORCO)  MG per tablet 4/18/2023 Self No Yes   Sig: Take 1 tablet by mouth 5 times daily   HYDROcodone-acetaminophen (NORCO)  MG per tablet 4/18/2023 Self No Yes   Sig: Take 1 tablet by mouth 5 times daily   LORazepam (ATIVAN) 0.5 MG tablet 4/17/2023 at PM Self No Yes   Sig: Take 1 tablet (0.5 mg) by mouth every 6 hours as needed for anxiety   amLODIPine  (NORVASC) 10 MG tablet Past Week at AM Self No Yes   Sig: Take 1 tablet (10 mg) by mouth daily   aspirin (ASA) 325 MG EC tablet Past Week at AM Self Yes Yes   Sig: Take 325 mg by mouth daily with food   atorvastatin (LIPITOR) 20 MG tablet Past Week Self No Yes   Sig: Take 1 tablet (20 mg) by mouth daily   dexamethasone (DECADRON) 4 MG tablet 4/11/2023 at PM Self Yes Yes   Sig: Take 8 mg (2 tablets) by mouth twice daily with food beginning the evening of docetaxel infusion.  Take for a total of 3 doses with each chemotherapy cycle.   hydrOXYzine (ATARAX) 10 MG tablet  Self No No   Sig: Take 1 tablet (10 mg) by mouth every 6 hours as needed for itching or anxiety (with pain, moderate pain)   leuprolide (LUPRON DEPOT) 45 MG kit More than a month Self Yes Yes   Sig: Inject 45 mg into the muscle every 6 months Next due 7/26/23   lisinopril (ZESTRIL) 40 MG tablet Past Week Self No Yes   Sig: Take 1 tablet (40 mg) by mouth daily   megestrol (MEGACE) 20 MG tablet Past Week Self No Yes   Sig: Take 1 tablet (20 mg) by mouth 2 times daily For prevention of hot flashes   metFORMIN (GLUCOPHAGE-XR) 500 MG 24 hr tablet Past Week Self No Yes   Sig: Take 2 tablets (1,000 mg) by mouth 2 times daily (with meals)   omeprazole (PRILOSEC) 40 MG DR capsule Past Week at AM Self No Yes   Sig: Take 1 capsule (40 mg) by mouth daily   ondansetron (ZOFRAN-ODT) 8 MG ODT tab Past Week Self No Yes   Sig: Take 1 tablet (8 mg) by mouth every 8 hours as needed for nausea   oxyCODONE-acetaminophen (PERCOCET) 5-325 MG tablet Unknown Self No Yes   Sig: Take 1 tablet by mouth every 6 hours as needed for severe pain (7-10)   predniSONE (DELTASONE) 5 MG tablet Past Week Self No Yes   Sig: Take 1 tablet (5 mg) by mouth 2 times daily   pregabalin (LYRICA) 150 MG capsule Past Week Self No Yes   Sig: Take 1 capsule (150 mg) by mouth 2 times daily   prochlorperazine (COMPAZINE) 10 MG tablet Past Month Self No Yes   Sig: Take 1 tablet (10 mg) by mouth every 6  hours as needed for nausea or vomiting   traZODone (DESYREL) 50 MG tablet Unsure if taking Self No Yes   Sig: Take 1 tablet (50 mg) by mouth At Bedtime   vitamin B-12 (CYANOCOBALAMIN) 1000 MCG CR tablet Past Week at AM Self Yes Yes   Sig: Take 1,000 mcg by mouth daily   vitamin D3 (CHOLECALCIFEROL) 2000 units (50 mcg) tablet Past Week at AM Self No Yes   Sig: Take 1 tablet (2,000 Units) by mouth daily      Facility-Administered Medications: None        Review of Systems    CONSTITUTIONAL:POSITIVE  for chills and fever   INTEGUMENTARY/SKIN: NEGATIVE for worrisome rashes, moles or lesions  EYES: NEGATIVE for vision changes or irritation  ENT/MOUTH: NEGATIVE for ear, mouth and throat problems  RESP: NEGATIVE for significant cough or SOB  BREAST: NEGATIVE for masses, tenderness or discharge  CV: NEGATIVE for chest pain, palpitations or peripheral edema  GI: NEGATIVE for nausea, abdominal pain, heartburn, or change in bowel habits   male :positive for, decreased urinary stream, dysuria, hematuria, hesitancy and retention  MUSCULOSKELETAL: NEGATIVE for significant arthralgias or myalgia  NEURO: NEGATIVE for weakness, dizziness or paresthesias  ENDOCRINE: NEGATIVE for temperature intolerance, skin/hair changes  HEME: NEGATIVE for bleeding problems  PSYCHIATRIC: NEGATIVE for changes in mood or affect    Social History   I have reviewed this patient's social history and updated it with pertinent information if needed.  Social History     Tobacco Use     Smoking status: Former     Packs/day: 1.00     Years: 20.00     Pack years: 20.00     Types: Cigarettes     Quit date: 2002     Years since quittin.4     Passive exposure: Past     Smokeless tobacco: Current     Types: Snuff     Tobacco comments:     Can't remember when all he had passive exposure   Vaping Use     Vaping status: Never Used   Substance Use Topics     Alcohol use: Yes     Comment:  5 drinks a month     Drug use: No       Family History   I have  reviewed this patient's family history and updated it with pertinent information if needed.  Family History   Problem Relation Age of Onset     Heart Disease Father         Heart Disease, passed away from CHF,CAD     Colon Cancer Father         Cancer-colon     Hypertension Father         Hypertension     Other - See Comments Father         Stroke/Dementia     Hypertension Mother         Hypertension,HTN     Other - See Comments Mother          Parkinsons     Family History Negative Other         Good Health     Family History Negative Other         Good Health,previous marriage./previous marriage.     Family History Negative Other         Good Health,previous marriage.     Family History Negative Sister         Good Health,emotional problems.     Family History Negative Sister         Good Health     Other - See Comments Son         w/o major medical problems.     Other - See Comments Daughter         w/o major medical problems.       Allergies   No Known Allergies     Physical Exam   Vital Signs: Temp: 100  F (37.8  C) Temp src: Tympanic BP: (!) 149/68 Pulse: 100   Resp: 20 SpO2: 98 % O2 Device: None (Room air)    Weight: 219 lbs 0 oz    Constitutional: In no apparent distress  Eyes: pupils reactive, extraocular movements intact. Anicteric sclera.   HEENT: Oropharynx nonerythematous. Neck supple, no JVD.  Respiratory: no crackles noted, no wheezes.  Cardiovascular: regular, no murmur. no lower extremity edema.  GI: soft, non-tender, bowel sounds present.  Lymph/Hematologic: no cervical or supraclavicular LAD.  Genitourinary: deferred  Skin: no rashes, or sores  Musculoskeletal: no joint erythema or swelling  Neurologic: cranial nerves symmetric. Neuro exam nonfocal  Psychiatric: alert and oriented x3. Interactive.       Medical Decision Making     90 MINUTES SPENT BY ME on the date of service doing chart review, history, exam, documentation & further activities per the note.      Data     I have personally  reviewed the following data over the past 24 hrs:    1.4 (L)  \   9.4 (L)   / 157     138 103 16.3 /  165 (H)   3.8 24 0.62 (L) \       Procal: N/A CRP: N/A Lactic Acid: 1.8       INR:  1.08 PTT:  N/A   D-dimer:  N/A Fibrinogen:  N/A

## 2023-04-19 NOTE — PROGRESS NOTES
MD updated patient febrile. Current temp 101.6, ok to give early dose of tylenol per provider.   Cynthia Hall RN on 4/19/2023 at 3:52 PM

## 2023-04-19 NOTE — PLAN OF CARE
Goal Outcome Evaluation:    Patient is alert and oriented. Febrile PRN tylenol given, ice packs placed in underarm areas, temperature slowly decreases and then will spike.  Will continue to provide interventions to decrease temp to appropriate range.  Jiménez continued to clot off with no return of urine. Several irrigating attempts to open catheter, without success. Charge nurse and provider notified. Three way catheter was placed for continuous irrigation and is currently running at moderate and return is clear, no clots noted. Pain controled with PRN medications. Appetite is good. LS clear. BS audible, passing gas.  Will continue to monitor.       Plan of Care Reviewed With: patient, spouse    Overall Patient Progress: no changeOverall Patient Progress: no change    Outcome Evaluation: Patient febrile, CBI.    Cynthia Hall RN on 4/19/2023 at 5:29 PM

## 2023-04-19 NOTE — PROGRESS NOTES
Was notified by nursing that the patients catheter has clotted off despite frequent flushes. This is the second catheter that has had to be removed due to clotting. In response I placed a three way catheter for continuous bladder irrigation as recommended by Dr. Barnes to avoid further urinary retention. Dilauded and ativan were given prior to placement. Placement confirmed by urine return.   Ren Singleton RN

## 2023-04-20 ENCOUNTER — TELEPHONE (OUTPATIENT)
Dept: ONCOLOGY | Facility: OTHER | Age: 68
End: 2023-04-20
Payer: MEDICARE

## 2023-04-20 LAB
ABO/RH(D): NORMAL
ANION GAP SERPL CALCULATED.3IONS-SCNC: 11 MMOL/L (ref 7–15)
ANTIBODY SCREEN: NEGATIVE
BUN SERPL-MCNC: 23.1 MG/DL (ref 8–23)
CALCIUM SERPL-MCNC: 8.4 MG/DL (ref 8.8–10.2)
CHLORIDE SERPL-SCNC: 103 MMOL/L (ref 98–107)
CREAT SERPL-MCNC: 2.23 MG/DL (ref 0.67–1.17)
DEPRECATED HCO3 PLAS-SCNC: 19 MMOL/L (ref 22–29)
ERYTHROCYTE [DISTWIDTH] IN BLOOD BY AUTOMATED COUNT: 14.1 % (ref 10–15)
GFR SERPL CREATININE-BSD FRML MDRD: 31 ML/MIN/1.73M2
GLUCOSE SERPL-MCNC: 189 MG/DL (ref 70–99)
HCT VFR BLD AUTO: 25.3 % (ref 40–53)
HGB BLD-MCNC: 8.3 G/DL (ref 13.3–17.7)
HOLD SPECIMEN: NORMAL
LACTATE SERPL-SCNC: 1.1 MMOL/L (ref 0.7–2)
LACTATE SERPL-SCNC: 1.4 MMOL/L (ref 0.7–2)
MAGNESIUM SERPL-MCNC: 1.5 MG/DL (ref 1.7–2.3)
MAGNESIUM SERPL-MCNC: 3 MG/DL (ref 1.7–2.3)
MCH RBC QN AUTO: 28.9 PG (ref 26.5–33)
MCHC RBC AUTO-ENTMCNC: 32.8 G/DL (ref 31.5–36.5)
MCV RBC AUTO: 88 FL (ref 78–100)
PLATELET # BLD AUTO: 154 10E3/UL (ref 150–450)
POTASSIUM SERPL-SCNC: 4.2 MMOL/L (ref 3.4–5.3)
RBC # BLD AUTO: 2.87 10E6/UL (ref 4.4–5.9)
SODIUM SERPL-SCNC: 133 MMOL/L (ref 136–145)
SPECIMEN EXPIRATION DATE: NORMAL
VANCOMYCIN SERPL-MCNC: 16.1 UG/ML
WBC # BLD AUTO: 2.1 10E3/UL (ref 4–11)

## 2023-04-20 PROCEDURE — 83605 ASSAY OF LACTIC ACID: CPT | Performed by: INTERNAL MEDICINE

## 2023-04-20 PROCEDURE — 80048 BASIC METABOLIC PNL TOTAL CA: CPT | Performed by: INTERNAL MEDICINE

## 2023-04-20 PROCEDURE — 250N000011 HC RX IP 250 OP 636: Performed by: INTERNAL MEDICINE

## 2023-04-20 PROCEDURE — 86923 COMPATIBILITY TEST ELECTRIC: CPT | Performed by: FAMILY MEDICINE

## 2023-04-20 PROCEDURE — 86850 RBC ANTIBODY SCREEN: CPT | Performed by: INTERNAL MEDICINE

## 2023-04-20 PROCEDURE — 250N000012 HC RX MED GY IP 250 OP 636 PS 637: Performed by: INTERNAL MEDICINE

## 2023-04-20 PROCEDURE — 250N000013 HC RX MED GY IP 250 OP 250 PS 637: Performed by: INTERNAL MEDICINE

## 2023-04-20 PROCEDURE — 83735 ASSAY OF MAGNESIUM: CPT | Performed by: INTERNAL MEDICINE

## 2023-04-20 PROCEDURE — 36415 COLL VENOUS BLD VENIPUNCTURE: CPT | Performed by: INTERNAL MEDICINE

## 2023-04-20 PROCEDURE — 99233 SBSQ HOSP IP/OBS HIGH 50: CPT | Performed by: INTERNAL MEDICINE

## 2023-04-20 PROCEDURE — 120N000001 HC R&B MED SURG/OB

## 2023-04-20 PROCEDURE — 250N000011 HC RX IP 250 OP 636: Performed by: FAMILY MEDICINE

## 2023-04-20 PROCEDURE — 258N000001 HC RX 258: Performed by: INTERNAL MEDICINE

## 2023-04-20 PROCEDURE — 85027 COMPLETE CBC AUTOMATED: CPT | Performed by: INTERNAL MEDICINE

## 2023-04-20 PROCEDURE — 258N000003 HC RX IP 258 OP 636: Performed by: INTERNAL MEDICINE

## 2023-04-20 PROCEDURE — 258N000003 HC RX IP 258 OP 636: Performed by: FAMILY MEDICINE

## 2023-04-20 PROCEDURE — 80202 ASSAY OF VANCOMYCIN: CPT | Performed by: INTERNAL MEDICINE

## 2023-04-20 RX ORDER — VANCOMYCIN/0.9 % SOD CHLORIDE 750MG/.15L
750 PLASTIC BAG, INJECTION (ML) INTRAVENOUS EVERY 24 HOURS
Status: DISCONTINUED | OUTPATIENT
Start: 2023-04-21 | End: 2023-04-20

## 2023-04-20 RX ORDER — MAGNESIUM SULFATE HEPTAHYDRATE 40 MG/ML
4 INJECTION, SOLUTION INTRAVENOUS ONCE
Status: COMPLETED | OUTPATIENT
Start: 2023-04-20 | End: 2023-04-20

## 2023-04-20 RX ORDER — CEFEPIME HYDROCHLORIDE 2 G/1
2 INJECTION, POWDER, FOR SOLUTION INTRAVENOUS EVERY 12 HOURS
Status: DISCONTINUED | OUTPATIENT
Start: 2023-04-20 | End: 2023-04-21

## 2023-04-20 RX ADMIN — LORAZEPAM 0.5 MG: 0.5 TABLET ORAL at 22:50

## 2023-04-20 RX ADMIN — HYDROMORPHONE HYDROCHLORIDE 1 MG: 1 INJECTION, SOLUTION INTRAMUSCULAR; INTRAVENOUS; SUBCUTANEOUS at 23:35

## 2023-04-20 RX ADMIN — SODIUM CHLORIDE: 9 INJECTION, SOLUTION INTRAVENOUS at 01:29

## 2023-04-20 RX ADMIN — SODIUM CHLORIDE 3000 ML: 900 IRRIGANT IRRIGATION at 12:21

## 2023-04-20 RX ADMIN — PREDNISONE 5 MG: 5 TABLET ORAL at 10:43

## 2023-04-20 RX ADMIN — PREGABALIN 150 MG: 25 CAPSULE ORAL at 21:49

## 2023-04-20 RX ADMIN — ONDANSETRON 4 MG: 4 TABLET, ORALLY DISINTEGRATING ORAL at 15:23

## 2023-04-20 RX ADMIN — PREDNISONE 5 MG: 5 TABLET ORAL at 21:49

## 2023-04-20 RX ADMIN — PREGABALIN 150 MG: 25 CAPSULE ORAL at 10:42

## 2023-04-20 RX ADMIN — SODIUM CHLORIDE: 9 INJECTION, SOLUTION INTRAVENOUS at 20:10

## 2023-04-20 RX ADMIN — VANCOMYCIN HYDROCHLORIDE 1500 MG: 10 INJECTION, POWDER, LYOPHILIZED, FOR SOLUTION INTRAVENOUS at 06:01

## 2023-04-20 RX ADMIN — HYDROXYZINE HYDROCHLORIDE 10 MG: 10 TABLET ORAL at 23:35

## 2023-04-20 RX ADMIN — OXYCODONE HYDROCHLORIDE AND ACETAMINOPHEN 1 TABLET: 5; 325 TABLET ORAL at 15:23

## 2023-04-20 RX ADMIN — OXYCODONE HYDROCHLORIDE AND ACETAMINOPHEN 1 TABLET: 5; 325 TABLET ORAL at 05:34

## 2023-04-20 RX ADMIN — HYDROMORPHONE HYDROCHLORIDE 1 MG: 1 INJECTION, SOLUTION INTRAMUSCULAR; INTRAVENOUS; SUBCUTANEOUS at 06:20

## 2023-04-20 RX ADMIN — HYDROMORPHONE HYDROCHLORIDE 1 MG: 1 INJECTION, SOLUTION INTRAMUSCULAR; INTRAVENOUS; SUBCUTANEOUS at 20:08

## 2023-04-20 RX ADMIN — CEFEPIME 2 G: 2 INJECTION, POWDER, FOR SOLUTION INTRAVENOUS at 02:47

## 2023-04-20 RX ADMIN — MAGNESIUM SULFATE HEPTAHYDRATE 4 G: 40 INJECTION, SOLUTION INTRAVENOUS at 08:27

## 2023-04-20 RX ADMIN — ATORVASTATIN CALCIUM 20 MG: 20 TABLET, FILM COATED ORAL at 10:43

## 2023-04-20 RX ADMIN — SODIUM CHLORIDE 3000 ML: 900 IRRIGANT IRRIGATION at 20:08

## 2023-04-20 RX ADMIN — SODIUM CHLORIDE 3000 ML: 900 IRRIGANT IRRIGATION at 02:51

## 2023-04-20 RX ADMIN — CEFEPIME 2 G: 2 INJECTION, POWDER, FOR SOLUTION INTRAVENOUS at 14:36

## 2023-04-20 RX ADMIN — SODIUM CHLORIDE 3000 ML: 900 IRRIGANT IRRIGATION at 01:31

## 2023-04-20 RX ADMIN — OXYCODONE HYDROCHLORIDE AND ACETAMINOPHEN 1 TABLET: 5; 325 TABLET ORAL at 21:49

## 2023-04-20 ASSESSMENT — ACTIVITIES OF DAILY LIVING (ADL)
ADLS_ACUITY_SCORE: 24

## 2023-04-20 NOTE — PLAN OF CARE
Goal Outcome Evaluation:    Patient slept most the day, woke easily by voice. Patient reported less pain, but did request a pain med for 6/10 gave PRN percocet, patient reported relief. Decreased appetite, patient did report he would order a light dinner. Educated the importance of a balanced diet, patient voiced understanding. Strict urine output monitoring. Will continue to monitor. Wife is at bedside.       Plan of Care Reviewed With: patient, spouse    Overall Patient Progress: improvingOverall Patient Progress: improving    Outcome Evaluation: CBI reduced to slow rate, minimal clots noted in urine bag, decreased pain.    Cynthia Hall RN on 4/20/2023 at 4:00 PM

## 2023-04-20 NOTE — TELEPHONE ENCOUNTER
I found out that Leuprolide (Lupron Depot) will NOT be covered with the diagnosis attached to the order (C79.51 - Malignant neoplasm metastatic to bone) by this patients insurance.  If Prostate Cancer (C61) was listed instead, that would be covered.  Can this be corrected?  Enedina Gómez on 4/20/2023 at 9:08 AM

## 2023-04-20 NOTE — PHARMACY-CONSULT NOTE
Pharmacy- Renal Dose Adjustment    Patient Active Problem List   Diagnosis     Anemia due to acute blood loss     Backache     Chronic, continuous use of opioids     Controlled substance agreement updated 12-     Fever, postprocedural     GERD     Essential hypertension     Prostate cancer (H)     Non morbid obesity due to excess calories     Other specified causes of urethral stricture     Pelvic pain in male     Personal history of prostate cancer s/p prostatectomy and sEBRT     Mixed hyperlipidemia     Spinal stenosis of lumbar region with neurogenic claudication     Rising PSA following treatment for malignant neoplasm of prostate     Aortic valve stenosis with insufficiency, etiology of cardiac valve disease unspecified     Encounter for examination required by Department of Transportation (DOT)     Mild aortic stenosis     Ascending aortic aneurysm-moderate at 4.6 cm on 3/11/20     Aortic valve insufficiency-moderate to severe     Hx of syncope     History of tobacco abuse quitting 11/8/2002     Hypertriglyceridemia     Mild pulmonary hypertension (H)     Palpitations     Plaque in heart artery-aorta     Diabetes mellitus, type 2 (H)     Syncope     Malignant neoplasm of prostate (H)     Status post total right knee replacement     Aneurysm of ascending aorta without rupture (H)     Bone metastasis     Bone metastases     Neutropenic fever (H)        Relevant Labs:  Recent Labs   Lab Test 04/20/23  0550 04/19/23  1417   WBC 2.1* 1.4*   HGB 8.3* 9.4*    157        CrCl: 37.1 mL/min      Intake/Output Summary (Last 24 hours) at 4/20/2023 1309  Last data filed at 4/20/2023 1222  Gross per 24 hour   Intake 2325 ml   Output 1475 ml   Net 850 ml          Per Renal Dose Adjustment Protocol, will adjust:  Cefepime to 2 gm IV Q12H      Will continue to follow and make adjustments accordingly. Thank You.    Kimmie Rose [facility] ....................  4/20/2023   1:09 PM

## 2023-04-20 NOTE — PROGRESS NOTES
[facility]    Medicine Progress Note - Hospitalist Service    Date of Admission:  4/19/2023    Assessment & Plan      Dilip Kan is a 68 year old male admitted on 4/19/2023. He has a fever and gross hematuria with obstruction. I spoke with his wife Dasha this afternoon and updated her. Discussed that his cancer appears widespread based on CT from 3/6 and 4/19. Also discussed that as long as he has no recurrent hematuria, or his kidney function doesn't worsen, I don't know what transfer to a tertiary care center would accomplish. She and patient are comfortable with staying here at this time.      Principal Problem:    Neutropenic fever (H)  Assessment:  on admit, fever to 101.1, Last chemo was docataxel on 4/10. Likely related to obstructive uropathy and urinary tract infection. Wife related dyspnea on exertion at home, but he is not complaining of respiratory symptoms here. Blood culture and urine culture growing gram negative rods.   Plan:   Empiric antibiotic coverage with cefepime and vancomycin switched to cefepime only with preliminary culture results.  Cefepime day 2  Monitor urine culture and blood cultures     Gross hematuria with obstructive uropathy  Assessment: patient has a known mass in the region of his prostatectomy. A 3 way catheter was placed when the 16 Mongolian clotted up on multiple occasions. He had significant discomfort and catheter placement became an emergent need. The catheter was challenging to place but was performed and continuous bladder irrigation was started. Hematuria has greatly improved. His continuous bladder irrigation rate is minimal, with minimal blood in the catheter bag. CT of the abdomen and pelvis shows mild left hydronephrosis.  Plan: Continue continuous bladder irrigation. If hematuria worsens, he may need to be transferred to a facility with urology services.     Active Problems:    Anemia due to acute blood loss  Assessment: present on  admission. Hemoglobin lower this AM.  Plan: serial hemoglobin  Transfuse if necessary       Essential hypertension  Assessment: chronic       Prostate cancer (H)  Assessment: with metastases to left acetabulum, manubrium, liver, rectum, surrounding muscle and other local soft tissues. I worry about the left hydronephrosis and invasive disease in the bladder or at the UVJ.    Acute kidney injury   Assessment: not present on admission. Likely related to antibiotics, medication, IV contrast and obstructive uropathy. Normal lactate and BP, making urine.  Plan: recheck in AM       Diabetes mellitus, type 2 (H)  Assessment: chronic  Plan: hold metformin, sliding scale insulin          Diet: Combination Diet Regular Diet Adult    DVT Prophylaxis: Pneumatic Compression Devices  Jiménez Catheter: PRESENT, indication: Retention  Lines: None     Cardiac Monitoring: None  Code Status: Full Code      Disposition Plan             Topher Barnes MD  Hospitalist Service  [facility]  Securely message with [employer] (more info)  Text page via [facility] Paging/Directory   ______________________________________________________________________    Interval History   Pain better controlled today. No dyspnea. No nausea, vomiting.     Physical Exam   Vital Signs: Temp: 100.2  F (37.9  C) Temp src: Tympanic BP: 132/65 Pulse: 94   Resp: 24 SpO2: 94 % O2 Device: None (Room air)    Weight: 227 lbs 8 oz    GENERAL: Comfortable, talkative, in no apparent distress.  HEENT: Anicteric, non-injected sclera, mouth moist.   NECK: No JVD.  CARDIOVASCULAR: regular rate and rhythm, no murmur. No lower extremity edema   RESPIRATORY: Clear to auscultation bilaterally, no wheezes, no crackles.  GI: Non-distended, normal bowel sounds, soft, non-tender.  SKIN: No rashes, sores.   NEUROLOGY: Alert and oriented x3, follows commands, speech and language normal.       Medical Decision Making     50 MINUTES SPENT BY ME on the date of service doing  chart review, history, exam, documentation & further activities per the note.      Data     I have personally reviewed the following data over the past 24 hrs:    2.1 (L)  \   8.3 (L)   / 154     133 (L) 103 23.1 (H) /  189 (H)   4.2 19 (L) 2.23 (H) \       Procal: N/A CRP: N/A Lactic Acid: 1.4         Imaging results reviewed over the past 24 hrs:   Recent Results (from the past 24 hour(s))   CT Abdomen pelvis w contrast*    Narrative    Exam:CT ABDOMEN PELVIS W CONTRAST    History: 68 years Male metastatic prostate cancer and gross hematuria.      Comparisons: 3/6/2023    Technique: Axial CT imaging of the abdomen and pelvis was performed  contrast. Coronal and sagittal reconstructions were obtained.   This exam was performed using one or more of the following dose  reduction techniques:  Automated exposure control, adjustment of the GER and/or KV according  to patient's size, and/or use of iterative reconstruction technique.       FINDINGS:  Lung bases:The lung bases are clear    Abdomen:  Liver:Unremarkable  Gallbladder and biliary tree:No calcified gallstones are present.  There is no evidence of biliary dilatation.  Pancreas:Unremarkable  Spleen:Unremarkable  Adrenals:Normal    Kidneys and ureters: There is mild left-sided hydronephrosis and  hydroureter. This has developed since the prior study. There is subtle  heterogeneous nonuniform cortical enhancement of the left kidney.    Lymph nodes:There is no significant lymphadenopathy    Bowel:No abnormally distended or thickened loops of bowel are present.  There is no evidence of bowel obstruction.    Appendix: Surgically absent.    Vessels:Unremarkable    Osseous structures: Surgical change of posterior lumbar spine fusion.  There is subtle osteosclerosis of the left acetabulum with a  nondisplaced fracture involving the left acetabulum. Again seen is a  nondisplaced fracture of the left pubic symphysis. Osseous metastatic  disease to the left acetabulum is  suspected.    Pelvis:A Jiménez catheter is present. There is hemorrhage in the bladder  lumen. There is a heterogeneous mass involving the prostate, left base  of the bladder which has decreased in size from the prior study.                Impression    IMPRESSION: The bladder is moderately distended. A Jiménez catheter is  present. There is hematoma in the bladder lumen.    Interval development of mild left-sided hydronephrosis and  hydroureter. There is heterogeneous cortical enhancement of the left  kidney. Consider possibility of pyelonephritis.    There is a left acetabular fracture without significant displacement.  There is increased radiotracer uptake in this region on the recent  bone scan. It is possible this is a pathologic fracture but this  cannot be said with absolute certainty. Nonacute fracture of the  anterior left pubic ramus is again seen.    YONATHAN MALLORY MD         SYSTEM ID:  [id number]

## 2023-04-20 NOTE — PROGRESS NOTES
SAFETY CHECKLIST  ID Bands and Risk clasps correct and in place (DNR, Fall risk, Allergy, Latex, Limb):  Yes  All Lines Reconciled and labeled correctly: Yes  Whiteboard updated:Yes  Environmental interventions: Yes  Verify Tele #: FABRICE Hall RN on 4/20/2023 at 7:19 AM

## 2023-04-21 PROBLEM — A49.8 INFECTION DUE TO KLEBSIELLA PNEUMONIAE: Status: ACTIVE | Noted: 2023-04-21

## 2023-04-21 PROBLEM — N17.9 ACUTE KIDNEY FAILURE, UNSPECIFIED (H): Status: ACTIVE | Noted: 2023-04-21

## 2023-04-21 LAB
ALBUMIN SERPL BCG-MCNC: 2.4 G/DL (ref 3.5–5.2)
ALP SERPL-CCNC: 42 U/L (ref 40–129)
ALT SERPL W P-5'-P-CCNC: 8 U/L (ref 10–50)
ANION GAP SERPL CALCULATED.3IONS-SCNC: 9 MMOL/L (ref 7–15)
AST SERPL W P-5'-P-CCNC: 9 U/L (ref 10–50)
BACTERIA BLD CULT: ABNORMAL
BACTERIA UR CULT: ABNORMAL
BILIRUB SERPL-MCNC: 0.4 MG/DL
BLD PROD TYP BPU: NORMAL
BLD PROD TYP BPU: NORMAL
BLOOD COMPONENT TYPE: NORMAL
BLOOD COMPONENT TYPE: NORMAL
BUN SERPL-MCNC: 36.3 MG/DL (ref 8–23)
CALCIUM SERPL-MCNC: 8.2 MG/DL (ref 8.8–10.2)
CHLORIDE SERPL-SCNC: 107 MMOL/L (ref 98–107)
CODING SYSTEM: NORMAL
CODING SYSTEM: NORMAL
CREAT SERPL-MCNC: 4 MG/DL (ref 0.67–1.17)
CROSSMATCH: NORMAL
CROSSMATCH: NORMAL
DEPRECATED HCO3 PLAS-SCNC: 18 MMOL/L (ref 22–29)
ERYTHROCYTE [DISTWIDTH] IN BLOOD BY AUTOMATED COUNT: 14.4 % (ref 10–15)
GFR SERPL CREATININE-BSD FRML MDRD: 15 ML/MIN/1.73M2
GLUCOSE SERPL-MCNC: 138 MG/DL (ref 70–99)
HCT VFR BLD AUTO: 19.4 % (ref 40–53)
HGB BLD-MCNC: 6.4 G/DL (ref 13.3–17.7)
HGB BLD-MCNC: 8.3 G/DL (ref 13.3–17.7)
ISSUE DATE AND TIME: NORMAL
ISSUE DATE AND TIME: NORMAL
LACTATE SERPL-SCNC: 1.7 MMOL/L (ref 0.7–2)
MAGNESIUM SERPL-MCNC: 2.6 MG/DL (ref 1.7–2.3)
MCH RBC QN AUTO: 29.2 PG (ref 26.5–33)
MCHC RBC AUTO-ENTMCNC: 33 G/DL (ref 31.5–36.5)
MCV RBC AUTO: 89 FL (ref 78–100)
PLATELET # BLD AUTO: 108 10E3/UL (ref 150–450)
POTASSIUM SERPL-SCNC: 4.5 MMOL/L (ref 3.4–5.3)
PROT SERPL-MCNC: 4.3 G/DL (ref 6.4–8.3)
RBC # BLD AUTO: 2.19 10E6/UL (ref 4.4–5.9)
SODIUM SERPL-SCNC: 134 MMOL/L (ref 136–145)
UNIT ABO/RH: NORMAL
UNIT ABO/RH: NORMAL
UNIT NUMBER: NORMAL
UNIT NUMBER: NORMAL
UNIT STATUS: NORMAL
UNIT STATUS: NORMAL
UNIT TYPE ISBT: 7300
UNIT TYPE ISBT: 7300
VANCOMYCIN SERPL-MCNC: 22.9 UG/ML
WBC # BLD AUTO: 1.8 10E3/UL (ref 4–11)

## 2023-04-21 PROCEDURE — 250N000013 HC RX MED GY IP 250 OP 250 PS 637: Performed by: INTERNAL MEDICINE

## 2023-04-21 PROCEDURE — 80202 ASSAY OF VANCOMYCIN: CPT | Performed by: INTERNAL MEDICINE

## 2023-04-21 PROCEDURE — 85027 COMPLETE CBC AUTOMATED: CPT | Performed by: INTERNAL MEDICINE

## 2023-04-21 PROCEDURE — 250N000012 HC RX MED GY IP 250 OP 636 PS 637: Performed by: INTERNAL MEDICINE

## 2023-04-21 PROCEDURE — 30233N1 TRANSFUSION OF NONAUTOLOGOUS RED BLOOD CELLS INTO PERIPHERAL VEIN, PERCUTANEOUS APPROACH: ICD-10-PCS | Performed by: FAMILY MEDICINE

## 2023-04-21 PROCEDURE — 80053 COMPREHEN METABOLIC PANEL: CPT | Performed by: INTERNAL MEDICINE

## 2023-04-21 PROCEDURE — 36415 COLL VENOUS BLD VENIPUNCTURE: CPT | Performed by: FAMILY MEDICINE

## 2023-04-21 PROCEDURE — 36415 COLL VENOUS BLD VENIPUNCTURE: CPT | Performed by: INTERNAL MEDICINE

## 2023-04-21 PROCEDURE — 85018 HEMOGLOBIN: CPT | Performed by: FAMILY MEDICINE

## 2023-04-21 PROCEDURE — 83605 ASSAY OF LACTIC ACID: CPT | Performed by: FAMILY MEDICINE

## 2023-04-21 PROCEDURE — 258N000003 HC RX IP 258 OP 636: Performed by: INTERNAL MEDICINE

## 2023-04-21 PROCEDURE — 250N000011 HC RX IP 250 OP 636: Performed by: FAMILY MEDICINE

## 2023-04-21 PROCEDURE — 83735 ASSAY OF MAGNESIUM: CPT | Performed by: INTERNAL MEDICINE

## 2023-04-21 PROCEDURE — 99233 SBSQ HOSP IP/OBS HIGH 50: CPT | Performed by: FAMILY MEDICINE

## 2023-04-21 PROCEDURE — 120N000001 HC R&B MED SURG/OB

## 2023-04-21 PROCEDURE — 250N000013 HC RX MED GY IP 250 OP 250 PS 637: Performed by: FAMILY MEDICINE

## 2023-04-21 PROCEDURE — 82310 ASSAY OF CALCIUM: CPT | Performed by: INTERNAL MEDICINE

## 2023-04-21 PROCEDURE — 258N000001 HC RX 258: Performed by: INTERNAL MEDICINE

## 2023-04-21 PROCEDURE — P9016 RBC LEUKOCYTES REDUCED: HCPCS | Performed by: FAMILY MEDICINE

## 2023-04-21 PROCEDURE — 250N000011 HC RX IP 250 OP 636: Performed by: INTERNAL MEDICINE

## 2023-04-21 RX ORDER — CALCIUM CARBONATE 500 MG/1
500-1000 TABLET, CHEWABLE ORAL 2 TIMES DAILY PRN
Status: DISCONTINUED | OUTPATIENT
Start: 2023-04-21 | End: 2023-04-22 | Stop reason: HOSPADM

## 2023-04-21 RX ORDER — ACETAMINOPHEN 325 MG/1
650-975 TABLET ORAL EVERY 6 HOURS PRN
Status: DISCONTINUED | OUTPATIENT
Start: 2023-04-21 | End: 2023-04-22 | Stop reason: HOSPADM

## 2023-04-21 RX ORDER — PREGABALIN 25 MG/1
75 CAPSULE ORAL 2 TIMES DAILY
Status: DISCONTINUED | OUTPATIENT
Start: 2023-04-21 | End: 2023-04-22 | Stop reason: HOSPADM

## 2023-04-21 RX ORDER — CEFEPIME HYDROCHLORIDE 2 G/1
2 INJECTION, POWDER, FOR SOLUTION INTRAVENOUS EVERY 24 HOURS
Status: DISCONTINUED | OUTPATIENT
Start: 2023-04-21 | End: 2023-04-21

## 2023-04-21 RX ORDER — CEFTRIAXONE SODIUM 2 G/50ML
2 INJECTION, SOLUTION INTRAVENOUS EVERY 24 HOURS
Status: DISCONTINUED | OUTPATIENT
Start: 2023-04-21 | End: 2023-04-22 | Stop reason: HOSPADM

## 2023-04-21 RX ADMIN — SODIUM CHLORIDE: 9 INJECTION, SOLUTION INTRAVENOUS at 06:35

## 2023-04-21 RX ADMIN — ACETAMINOPHEN 975 MG: 325 TABLET ORAL at 00:23

## 2023-04-21 RX ADMIN — HYDROMORPHONE HYDROCHLORIDE 1 MG: 1 INJECTION, SOLUTION INTRAMUSCULAR; INTRAVENOUS; SUBCUTANEOUS at 06:31

## 2023-04-21 RX ADMIN — CEFEPIME 2 G: 2 INJECTION, POWDER, FOR SOLUTION INTRAVENOUS at 02:22

## 2023-04-21 RX ADMIN — PREGABALIN 150 MG: 25 CAPSULE ORAL at 10:36

## 2023-04-21 RX ADMIN — SODIUM CHLORIDE 3000 ML: 900 IRRIGANT IRRIGATION at 02:28

## 2023-04-21 RX ADMIN — SODIUM CHLORIDE 3000 ML: 900 IRRIGANT IRRIGATION at 06:36

## 2023-04-21 RX ADMIN — OXYCODONE HYDROCHLORIDE AND ACETAMINOPHEN 1 TABLET: 5; 325 TABLET ORAL at 21:44

## 2023-04-21 RX ADMIN — ONDANSETRON 4 MG: 4 TABLET, ORALLY DISINTEGRATING ORAL at 10:36

## 2023-04-21 RX ADMIN — OXYCODONE HYDROCHLORIDE AND ACETAMINOPHEN 1 TABLET: 5; 325 TABLET ORAL at 15:42

## 2023-04-21 RX ADMIN — SODIUM CHLORIDE 3000 ML: 900 IRRIGANT IRRIGATION at 15:42

## 2023-04-21 RX ADMIN — PREDNISONE 5 MG: 5 TABLET ORAL at 21:44

## 2023-04-21 RX ADMIN — SODIUM CHLORIDE: 9 INJECTION, SOLUTION INTRAVENOUS at 10:32

## 2023-04-21 RX ADMIN — CEFTRIAXONE SODIUM 2 G: 2 INJECTION, SOLUTION INTRAVENOUS at 10:32

## 2023-04-21 RX ADMIN — LORAZEPAM 0.5 MG: 0.5 TABLET ORAL at 12:42

## 2023-04-21 RX ADMIN — CALCIUM CARBONATE (ANTACID) CHEW TAB 500 MG 1000 MG: 500 CHEW TAB at 17:37

## 2023-04-21 RX ADMIN — OXYCODONE HYDROCHLORIDE AND ACETAMINOPHEN 1 TABLET: 5; 325 TABLET ORAL at 08:32

## 2023-04-21 RX ADMIN — SODIUM CHLORIDE 3000 ML: 900 IRRIGANT IRRIGATION at 02:27

## 2023-04-21 RX ADMIN — SODIUM CHLORIDE 3000 ML: 900 IRRIGANT IRRIGATION at 18:24

## 2023-04-21 RX ADMIN — SODIUM CHLORIDE 3000 ML: 900 IRRIGANT IRRIGATION at 10:44

## 2023-04-21 RX ADMIN — ACETAMINOPHEN 650 MG: 325 TABLET ORAL at 16:01

## 2023-04-21 RX ADMIN — PREGABALIN 75 MG: 25 CAPSULE ORAL at 21:44

## 2023-04-21 RX ADMIN — PREDNISONE 5 MG: 5 TABLET ORAL at 10:36

## 2023-04-21 RX ADMIN — SODIUM CHLORIDE: 9 INJECTION, SOLUTION INTRAVENOUS at 19:32

## 2023-04-21 RX ADMIN — ATORVASTATIN CALCIUM 20 MG: 20 TABLET, FILM COATED ORAL at 10:36

## 2023-04-21 ASSESSMENT — ACTIVITIES OF DAILY LIVING (ADL)
ADLS_ACUITY_SCORE: 24
ADLS_ACUITY_SCORE: 28
ADLS_ACUITY_SCORE: 24
ADLS_ACUITY_SCORE: 24
ADLS_ACUITY_SCORE: 28
ADLS_ACUITY_SCORE: 28
ADLS_ACUITY_SCORE: 24
ADLS_ACUITY_SCORE: 24

## 2023-04-21 NOTE — PROGRESS NOTES
SAFETY CHECKLIST  ID Bands and Risk clasps correct and in place (DNR, Fall risk, Allergy, Latex, Limb):  Yes  All Lines Reconciled and labeled correctly: Yes  Whiteboard updated:Yes  Environmental interventions: Yes  Verify Tele #: na

## 2023-04-21 NOTE — PROVIDER NOTIFICATION
"MD Pineda notified of low b/p, pt not symptomatic. VORB to continue to monitor and update MD with any new concerns. Pt tolerating blood transfusion well.    BP (!) 88/46   Pulse 91   Temp 98.7  F (37.1  C) (Tympanic)   Resp 20   Ht 1.778 m (5' 10\")   Wt 103.2 kg (227 lb 8 oz)   SpO2 94%   BMI 32.64 kg/m      "

## 2023-04-21 NOTE — PROGRESS NOTES
DATE:  4/21/2023   TIME OF RECEIPT FROM LAB:  0615   LAB TEST:  Hgb  LAB VALUE:  6.4  RESULTS GIVEN WITH READ-BACK TO (PROVIDER): Dr. Simon  TIME LAB VALUE REPORTED TO PROVIDER:   0625    MD ordered One unit of blood.

## 2023-04-21 NOTE — PROGRESS NOTES
"[facility]    Hospitalist Progress Note      Assessment & Plan   Dilip Kan is a 68 year old male who was admitted on 4/19/2023. He has a fever and gross hematuria with obstruction. I spoke with his wife Pat this afternoon and updated her. Discussed that his cancer appears widespread based on CT from 3/6 and 4/19.  The initial plan was if you do not have recurrent hematuria and his kidney function did not worsen that he would not be transferred to a tertiary care center.  This was discussed with the patient and his wife.  Unfortunately last night, he had recurrent hematuria due to the bladder irrigation solution running out necessitating extensive clot evacuation.  His creatinine has worsened to 4.  He was found to be anemic with an acute blood loss anemia of down to 6.2.  He is currently getting transfused but his urine has cleared again.  We discussed whether or not it would be prudent to transfer him for tertiary care for urology and possible nephrology consultation the patient would like to wait another day to see if things turn around.  I think this is reasonable given his current stability.    Clinically Significant Risk Factors            # Hypomagnesemia: Lowest Mg = 1.5 mg/dL in last 2 days, will replace as needed   # Hypoalbuminemia: Lowest albumin = 2.4 g/dL at 4/21/2023  5:35 AM, will monitor as appropriate   # Thrombocytopenia: Lowest platelets = 108 in last 2 days, will monitor for bleeding        # DMII: A1C = 6.7 % (Ref range: 4.0 - 6.2 %) within past 6 months, PRESENT ON ADMISSION  # Obesity: Estimated body mass index is 32.64 kg/m  as calculated from the following:    Height as of this encounter: 1.778 m (5' 10\").    Weight as of this encounter: 103.2 kg (227 lb 8 oz)., PRESENT ON ADMISSION            Principal Problem:    Neutropenic fever (H)  Assessment:  on admit, fever to 101.1, Last chemo was docataxel on 4/10. Likely related to obstructive uropathy and " urinary tract infection. Wife related dyspnea on exertion at home, but he is not complaining of respiratory symptoms here. Blood culture and urine culture growing gram negative rods, now identified as Klebsiella pneumoniae. No longer febrile.    Plan:   Empiric antibiotic coverage with cefepime and vancomycin switched to cefepime only with preliminary culture results.  Cefepime day 3, Changed to Ceftriaxone on 4/21/23 based on sensitivities    Active Problems:    Anemia due to acute blood loss    Assessment: hemoglobin has dropped from 9.4 to 6.4.  No longer bleeding.     Plan: currently being transfused 2 units PRBCs.  Recheck hemoglobin in the morning.       Essential hypertension    Assessment: chronic      Prostate cancer (H)  Assessment: with metastases to left acetabulum, manubrium, liver, rectum, surrounding muscle and other local soft tissues. I worry about the left hydronephrosis and invasive disease in the bladder or at the UVJ.      Diabetes mellitus, type 2 (H)  Assessment: chronic  Plan: hold metformin, sliding scale insulin      Bone metastasis    Assessment: several pathologic fractures, not displaced.     Plan: monitor    Acute kidney injury   Assessment: not present on admission. Likely related to antibiotics, medication, IV contrast and obstructive uropathy. Normal lactate and BP, making urine. Last night clotted his cathter again and needed evacuation of clot. He now has clear urine again but creatinine increased after he was likely obstructed overnight.     Plan: discussed possible need for transfer for nephrology and urology, but he would like to give it another day to see if things begin to turn around.       Infection due to Klebsiella pneumoniae    Assessment: UTI likely source.  Sensitive to Rocephin    Plan: change to Rocephin 2 gm q 24 h  Day 1.     Diet: Combination Diet Regular Diet Adult    DVT Prophylaxis: Pneumatic Compression Devices  Jiménez Catheter: PRESENT, indication:  Retention;Gross Hematuria  Code Status: Full Code           Disposition Plan      Expected Discharge Date: 04/24/2023      Destination: home       Entered: Matt Pineda MD 04/21/2023, 9:09 AM       The patient's care was discussed with the Patient.    Matt Pineda MD  Hospitalist Service  [facility]  Contact information available via [facility] Paging/Directory    ______________________________________________________________________    Interval History     Miserable night but now feels better now that catheter is no longer clotted and CBD is working well and urine is clearing.      -Data reviewed today: I reviewed all new labs and imaging results over the last 24 hours. I personally reviewed no images or EKG's today.    Physical Exam   Temp: 98.6  F (37  C) Temp src: Tympanic BP: 101/49 Pulse: 82   Resp: 20 SpO2: 95 % O2 Device: None (Room air)    Vitals:    04/19/23 0916 04/20/23 0555   Weight: 99.3 kg (219 lb) 103.2 kg (227 lb 8 oz)     Vital Signs with Ranges  Temp:  [98.5  F (36.9  C)-99.4  F (37.4  C)] 98.6  F (37  C)  Pulse:  [82-98] 82  Resp:  [20] 20  BP: ()/(49-75) 101/49  SpO2:  [93 %-96 %] 95 %  I/O last 3 completed shifts:  In: 2196 [P.O.:480; I.V.:1716]  Out: 1125 [Urine:1125]    Constitutional: : Cooperative, no distress appears comfortable lying in his hospital bed  Respiratory: Lungs are clear, no rales rhonchi or wheezes are heard  Cardiovascular: Cardiac RRR grade 2 out of 6 systolic murmur heard over the right upper sternal border without radiation, possibly due to anemia  GI: Abdomen is soft and nontender  Skin/Integumen: No rashes or open areas  Other:      Medications     sodium chloride 0.9% (bag)       sodium chloride 100 mL/hr at 04/21/23 0635       atorvastatin  20 mg Oral Daily     cefTRIAXone  2 g Intravenous Q24H     lidocaine   Urethral Once     predniSONE  5 mg Oral BID     pregabalin  150 mg Oral BID     sodium chloride (PF)  3 mL Intracatheter Q8H        Data   Recent Labs   Lab 04/21/23  0535 04/20/23  0550 04/19/23  1417 04/19/23  0240   WBC 1.8* 2.1* 1.4* 1.6*   HGB 6.4* 8.3* 9.4* 10.4*   MCV 89 88 87 87   * 154 157 182   INR  --   --   --  1.08   * 133*  --  138   POTASSIUM 4.5 4.2  --  3.8   CHLORIDE 107 103  --  103   CO2 18* 19*  --  24   BUN 36.3* 23.1*  --  16.3   CR 4.00* 2.23*  --  0.62*   ANIONGAP 9 11  --  11   ROBERTO 8.2* 8.4*  --  8.5*   * 189*  --  165*   ALBUMIN 2.4*  --   --   --    PROTTOTAL 4.3*  --   --   --    BILITOTAL 0.4  --   --   --    ALKPHOS 42  --   --   --    ALT 8*  --   --   --    AST 9*  --   --   --        No results found for this or any previous visit (from the past 24 hour(s)).

## 2023-04-21 NOTE — PLAN OF CARE
Patient was pleasant. Vitals are stable. Patient CBI was running at a slow rate with a light pink color output. Patient pain started to increase and pain meds were barley helping. The CBI's drain rate was not matching up with the infusing rate. The CBI was manually irrigated and multiple large clots were removed and Patient's pain gradually decreased as more dark red output was removed with the syringe. Only 275ml was use to irrigate and 450ml of dark red out put and clots was removed. Patient's CBI was restarted and the rate was able to be weaned down again. Patient was able to get some sleep after the manual irrigation. No new concerns at this time.         Goal Outcome Evaluation:      Plan of Care Reviewed With: patient    Overall Patient Progress: no changeOverall Patient Progress: no change

## 2023-04-21 NOTE — PLAN OF CARE
"  Low grade temp, tylenol given at pt request, b/p now stable, HR tachycardic at times, sepsis BPA fired and lactic drawn, see results. 2 units PRBC, given and HGB now stable.   CBI running at slow rate, light pink urine, small clots earlier in shift, now resolved. Irrigated catheter x 1 with 40 ml ns due to discomfort with one small clot as a result.  Pt incontinent of stool at times. Pt repositions in bed independently,c/o chronic back pain and some indegestion, PRN percocet given and MD notified, will continue to monitor.    /60   Pulse 100   Temp 99.9  F (37.7  C) (Tympanic)   Resp 18   Ht 1.778 m (5' 10\")   Wt 103.2 kg (227 lb 8 oz)   SpO2 97%   BMI 32.64 kg/m        Goal Outcome Evaluation:      Plan of Care Reviewed With: patient    Overall Patient Progress: improving    4/21/2023 1657 by Jojo Lockhart RN  Outcome: Progressing     Problem: Plan of Care - These are the overarching goals to be used throughout the patient stay.    Goal: Patient-Specific Goal (Individualized)  4/21/2023 1204 by Jojo Lockhart RN  Flowsheets (Taken 4/21/2023 1204)  Individualized Care Needs: CBI, low HGB  Anxieties, Fears or Concerns: fear regarding current health  Patient/Family-Specific Goals (Include Timeframe): monitor I &O, vitals and HGB.       Problem: Plan of Care - These are the overarching goals to be used throughout the patient stay.    Goal: Absence of Hospital-Acquired Illness or Injury  Intervention: Identify and Manage Fall Risk  Recent Flowsheet Documentation  Taken 4/21/2023 1612 by Jojo Lockhart, RN  Safety Promotion/Fall Prevention:    activity supervised    clutter free environment maintained    nonskid shoes/slippers when out of bed    safety round/check completed  Taken 4/21/2023 0940 by Jojo Lockhart, RN  Safety Promotion/Fall Prevention:    activity supervised    clutter free environment maintained    nonskid shoes/slippers when out of bed    safety round/check completed   "   Problem: Plan of Care - These are the overarching goals to be used throughout the patient stay.    Goal: Absence of Hospital-Acquired Illness or Injury  Intervention: Prevent and Manage VTE (Venous Thromboembolism) Risk  Recent Flowsheet Documentation  Taken 4/21/2023 1612 by Jojo Lockhart RN  VTE Prevention/Management: SCDs (sequential compression devices) on  Taken 4/21/2023 0940 by Jojo Lockhart RN  VTE Prevention/Management: SCDs (sequential compression devices) on

## 2023-04-22 VITALS
HEART RATE: 86 BPM | RESPIRATION RATE: 16 BRPM | BODY MASS INDEX: 32.57 KG/M2 | WEIGHT: 227.5 LBS | TEMPERATURE: 98.2 F | SYSTOLIC BLOOD PRESSURE: 124 MMHG | OXYGEN SATURATION: 95 % | HEIGHT: 70 IN | DIASTOLIC BLOOD PRESSURE: 63 MMHG

## 2023-04-22 LAB
ANION GAP SERPL CALCULATED.3IONS-SCNC: 13 MMOL/L (ref 7–15)
BASOPHILS # BLD MANUAL: 0 10E3/UL (ref 0–0.2)
BASOPHILS NFR BLD MANUAL: 0 %
BUN SERPL-MCNC: 40 MG/DL (ref 8–23)
CALCIUM SERPL-MCNC: 8.2 MG/DL (ref 8.8–10.2)
CHLORIDE SERPL-SCNC: 102 MMOL/L (ref 98–107)
CREAT SERPL-MCNC: 5.29 MG/DL (ref 0.67–1.17)
DEPRECATED HCO3 PLAS-SCNC: 15 MMOL/L (ref 22–29)
EOSINOPHIL # BLD MANUAL: 0 10E3/UL (ref 0–0.7)
EOSINOPHIL NFR BLD MANUAL: 1 %
ERYTHROCYTE [DISTWIDTH] IN BLOOD BY AUTOMATED COUNT: 15.6 % (ref 10–15)
GFR SERPL CREATININE-BSD FRML MDRD: 11 ML/MIN/1.73M2
GLUCOSE SERPL-MCNC: 193 MG/DL (ref 70–99)
HCT VFR BLD AUTO: 24.9 % (ref 40–53)
HGB BLD-MCNC: 8.2 G/DL (ref 13.3–17.7)
HOLD SPECIMEN: NORMAL
LYMPHOCYTES # BLD MANUAL: 0.4 10E3/UL (ref 0.8–5.3)
LYMPHOCYTES NFR BLD MANUAL: 13 %
MAGNESIUM SERPL-MCNC: 2.4 MG/DL (ref 1.7–2.3)
MCH RBC QN AUTO: 28.2 PG (ref 26.5–33)
MCHC RBC AUTO-ENTMCNC: 32.9 G/DL (ref 31.5–36.5)
MCV RBC AUTO: 86 FL (ref 78–100)
MONOCYTES # BLD MANUAL: 0.3 10E3/UL (ref 0–1.3)
MONOCYTES NFR BLD MANUAL: 9 %
NEUTROPHILS # BLD MANUAL: 2.2 10E3/UL (ref 1.6–8.3)
NEUTROPHILS NFR BLD MANUAL: 77 %
PLAT MORPH BLD: ABNORMAL
PLATELET # BLD AUTO: 111 10E3/UL (ref 150–450)
POTASSIUM SERPL-SCNC: 4.6 MMOL/L (ref 3.4–5.3)
RBC # BLD AUTO: 2.91 10E6/UL (ref 4.4–5.9)
RBC MORPH BLD: ABNORMAL
SODIUM SERPL-SCNC: 130 MMOL/L (ref 136–145)
WBC # BLD AUTO: 2.9 10E3/UL (ref 4–11)

## 2023-04-22 PROCEDURE — 258N000001 HC RX 258: Performed by: INTERNAL MEDICINE

## 2023-04-22 PROCEDURE — 99239 HOSP IP/OBS DSCHRG MGMT >30: CPT | Performed by: FAMILY MEDICINE

## 2023-04-22 PROCEDURE — 250N000012 HC RX MED GY IP 250 OP 636 PS 637: Performed by: INTERNAL MEDICINE

## 2023-04-22 PROCEDURE — 80048 BASIC METABOLIC PNL TOTAL CA: CPT | Performed by: FAMILY MEDICINE

## 2023-04-22 PROCEDURE — 85007 BL SMEAR W/DIFF WBC COUNT: CPT | Performed by: FAMILY MEDICINE

## 2023-04-22 PROCEDURE — 85014 HEMATOCRIT: CPT | Performed by: FAMILY MEDICINE

## 2023-04-22 PROCEDURE — 258N000003 HC RX IP 258 OP 636: Performed by: INTERNAL MEDICINE

## 2023-04-22 PROCEDURE — 36415 COLL VENOUS BLD VENIPUNCTURE: CPT | Performed by: FAMILY MEDICINE

## 2023-04-22 PROCEDURE — 83735 ASSAY OF MAGNESIUM: CPT | Performed by: FAMILY MEDICINE

## 2023-04-22 PROCEDURE — 250N000013 HC RX MED GY IP 250 OP 250 PS 637: Performed by: INTERNAL MEDICINE

## 2023-04-22 PROCEDURE — 250N000011 HC RX IP 250 OP 636: Performed by: FAMILY MEDICINE

## 2023-04-22 PROCEDURE — 250N000011 HC RX IP 250 OP 636: Performed by: INTERNAL MEDICINE

## 2023-04-22 PROCEDURE — 250N000013 HC RX MED GY IP 250 OP 250 PS 637: Performed by: FAMILY MEDICINE

## 2023-04-22 RX ORDER — ACETAMINOPHEN 325 MG/1
650-975 TABLET ORAL EVERY 6 HOURS PRN
DISCHARGE
Start: 2023-04-22 | End: 2023-05-01

## 2023-04-22 RX ORDER — ONDANSETRON 4 MG/1
4 TABLET, ORALLY DISINTEGRATING ORAL EVERY 6 HOURS PRN
DISCHARGE
Start: 2023-04-22 | End: 2023-05-01

## 2023-04-22 RX ORDER — PANTOPRAZOLE SODIUM 40 MG/1
40 TABLET, DELAYED RELEASE ORAL
Status: DISCONTINUED | OUTPATIENT
Start: 2023-04-22 | End: 2023-04-22 | Stop reason: HOSPADM

## 2023-04-22 RX ORDER — PANTOPRAZOLE SODIUM 40 MG/1
40 TABLET, DELAYED RELEASE ORAL
Status: ON HOLD | DISCHARGE
Start: 2023-04-22 | End: 2023-05-01

## 2023-04-22 RX ORDER — LORAZEPAM 2 MG/ML
1 INJECTION INTRAMUSCULAR EVERY 4 HOURS PRN
Status: SHIPPED | DISCHARGE
Start: 2023-04-22 | End: 2023-05-01

## 2023-04-22 RX ORDER — CEFTRIAXONE SODIUM 2 G/50ML
2 INJECTION, SOLUTION INTRAVENOUS EVERY 24 HOURS
DISCHARGE
Start: 2023-04-22 | End: 2023-05-01

## 2023-04-22 RX ADMIN — PREGABALIN 75 MG: 25 CAPSULE ORAL at 09:59

## 2023-04-22 RX ADMIN — SODIUM CHLORIDE 3000 ML: 900 IRRIGANT IRRIGATION at 11:15

## 2023-04-22 RX ADMIN — HYDROMORPHONE HYDROCHLORIDE 1 MG: 1 INJECTION, SOLUTION INTRAMUSCULAR; INTRAVENOUS; SUBCUTANEOUS at 12:26

## 2023-04-22 RX ADMIN — PREDNISONE 5 MG: 5 TABLET ORAL at 09:59

## 2023-04-22 RX ADMIN — HYDROMORPHONE HYDROCHLORIDE 1 MG: 1 INJECTION, SOLUTION INTRAMUSCULAR; INTRAVENOUS; SUBCUTANEOUS at 00:06

## 2023-04-22 RX ADMIN — ATORVASTATIN CALCIUM 20 MG: 20 TABLET, FILM COATED ORAL at 09:59

## 2023-04-22 RX ADMIN — CEFTRIAXONE SODIUM 2 G: 2 INJECTION, SOLUTION INTRAVENOUS at 09:47

## 2023-04-22 RX ADMIN — SODIUM CHLORIDE 3000 ML: 900 IRRIGANT IRRIGATION at 14:41

## 2023-04-22 RX ADMIN — SODIUM CHLORIDE 3000 ML: 900 IRRIGANT IRRIGATION at 06:51

## 2023-04-22 RX ADMIN — OXYCODONE HYDROCHLORIDE AND ACETAMINOPHEN 1 TABLET: 5; 325 TABLET ORAL at 11:23

## 2023-04-22 RX ADMIN — OXYCODONE HYDROCHLORIDE AND ACETAMINOPHEN 1 TABLET: 5; 325 TABLET ORAL at 04:14

## 2023-04-22 RX ADMIN — SODIUM CHLORIDE 3000 ML: 900 IRRIGANT IRRIGATION at 14:56

## 2023-04-22 RX ADMIN — PANTOPRAZOLE SODIUM 40 MG: 40 TABLET, DELAYED RELEASE ORAL at 10:03

## 2023-04-22 RX ADMIN — SODIUM CHLORIDE: 9 INJECTION, SOLUTION INTRAVENOUS at 05:36

## 2023-04-22 RX ADMIN — CALCIUM CARBONATE (ANTACID) CHEW TAB 500 MG 1000 MG: 500 CHEW TAB at 00:01

## 2023-04-22 RX ADMIN — ONDANSETRON 4 MG: 4 TABLET, ORALLY DISINTEGRATING ORAL at 10:03

## 2023-04-22 RX ADMIN — SODIUM CHLORIDE 3000 ML: 900 IRRIGANT IRRIGATION at 00:17

## 2023-04-22 RX ADMIN — SODIUM CHLORIDE 3000 ML: 900 IRRIGANT IRRIGATION at 10:31

## 2023-04-22 RX ADMIN — HYDROMORPHONE HYDROCHLORIDE 1 MG: 1 INJECTION, SOLUTION INTRAMUSCULAR; INTRAVENOUS; SUBCUTANEOUS at 09:57

## 2023-04-22 RX ADMIN — SODIUM CHLORIDE 3000 ML: 900 IRRIGANT IRRIGATION at 13:39

## 2023-04-22 ASSESSMENT — ACTIVITIES OF DAILY LIVING (ADL)
ADLS_ACUITY_SCORE: 28

## 2023-04-22 NOTE — PROGRESS NOTES
[facility] notified of transfer request. They said there is not a ride available for at least 7 hours but patient is on their list and will keep us updated. Lidia Casiano RN 4/22/2023 10:25 AM

## 2023-04-22 NOTE — PROGRESS NOTES
[facility] picked up patient at 1600 for transfer to [facility] in [place].  [facility] was unable to transfer on a timely basis so they set up [facility].  Patient was sent with CBI running wide open with light cherry urine.  Patient had clotted off 2 times since 7am.  Patient has 2 saline locks.  I gave report to Ronna GONZALEZ earlier in the day and [facility] station was given  a heads up for new adjusted time of departure.  [facility] was also notified when patient left here.    Ambulance staff was educated on the CBI paticulars.  Extra big bags of normal saline were sent along with  equipment to flush the catheter if plugged off.    Back and groin pain of 5 when leaving here.    Patient finished breakfast about 9am then became NPO after that.

## 2023-04-22 NOTE — PLAN OF CARE
" Pt is A/O x 4. LS clear, apical pulse regular. Pt has a CBI, running at a slow rate, urine is light red, with minimal clots. Percocet given for moderate back pain, also helps with pain to meatus.     /62 (BP Location: Left arm, Patient Position: Semi-Vinson's, Cuff Size: Adult Regular)   Pulse 86   Temp 98.9  F (37.2  C) (Tympanic)   Resp 16   Ht 1.778 m (5' 10\")   Wt 103.2 kg (227 lb 8 oz)   SpO2 96%   BMI 32.64 kg/m            Goal Outcome Evaluation:      Plan of Care Reviewed With: patient    Overall Patient Progress: improvingOverall Patient Progress: improving           "

## 2023-04-22 NOTE — DISCHARGE SUMMARY
"[facility]    Discharge Summary  Hospitalist    Date of Admission:  4/19/2023  Date of Discharge:  4/22/2023  Discharging Provider: Matt Pineda MD  Date of Service (when I saw the patient): 04/22/23    Discharge Diagnoses   Principal Problem:    Neutropenic fever (H) (4/19/2023)  Active Problems:    Pancytopenia induced by antineoplastic chemotherapy, POA    Anemia due to acute blood loss (11/30/2012)    Essential hypertension (4/28/2017)    Prostate cancer (H) (1/2/2013)    Diabetes mellitus, type 2 (H) (11/23/2020)    Bone metastasis (3/9/2023)    Acute kidney failure, unspecified (H) (4/21/2023)    Infection due to Klebsiella pneumoniae (4/21/2023)    Obstructive uropathy due to gross hematuria with clot (4/19/23)    History of Present Illness   Dilip Kan is an 68 year old male who presented \" to the emergency department with inability to void and bladder pain.  He has a history of metastatic prostate cancer and is treated with docetaxel, his last infusion was on April 10, 2023.  He has had gross hematuria for months but it appears that last night he began having clotting.  His wife also states that he has been hematuria has worsened since starting chemotherapy.     In the emergency department a catheter was placed and he had some relief.  There was gross hematuria in the Jiménez bag.  He was getting ready to be discharged home when he developed a fever of 101.1.  His laboratory studies showed an absolute neutrophil count of 700, he was admitted to the hospital for neutropenic fever.     He has had some mild dyspnea on exertion at home per his wife.  He denies any coughing.  No nausea vomiting or diarrhea.  He has no skin rashes or sores.  No nausea tender joints.  He has no sore throat, tooth pain,, sinus pressure or ear pain.     Upon arrival to the medical floor he began having obstructive uropathy again with clotting in his Jiménez catheter.  A second 16 Tajik catheter was " "inserted with some improvement.  This catheter then also clotted and nursing was unable to irrigate further.  The patient continued to have worsening pain and bladder distention.  At that time a three-way catheter was inserted, this was a challenging insertion but was accomplished with continuous bladder irrigation started.  The patient is currently more comfortable.\"    Hospital Course   Dilip Kan was admitted on 4/19/2023.  He had a fever with gross hematuria and obstruction.  He does have widespread metastatic prostate cancer.  Once his catheter with three-way stopcock and continuous bladder irrigation was begun, he had improvement in his symptoms and seem to be doing better.  Unfortunately, more clots occurred and he was quite uncomfortable.  Those were evacuated with irrigation and suction and again he had bladder irrigation that continued.  Unfortunately, with his hematuria his hemoglobin dropped from 10.4 on admission down to 6.4.  He received 2 units of packed red blood cells in his post transfusion hemoglobin was stable at 8.2.  His creatinine on admission was 0.6 and worsened to 2.23 the following day then up to 4 the next day and today was 5.29.  Because of his worsening renal function and ongoing presumed obstructive uropathy, [facility] was contacted for transfer to a higher level of care.  I believe he needs urology consultation with clot evacuation and possible cautery to determine the cause of his bleeding and/or obstructive uropathy.  He may need dialysis but I am optimistic that once his obstruction is relieved that his acute kidney injury will improve.  He is no longer neutropenic and his ANC pineda from 700-20 200 today.  The source of his fever was Klebsiella pneumoniae from his urine and that also grew in his blood.  Sensitivities indicated that he was sensitive to ceftriaxone.  He was initially treated with cefepime and vancomycin for 2 days until culture results are back and then " ceftriaxone was chosen because of sensitivities.  He has not had any recurrent fever.    I spoke to Dr. Cammie Ace MD who is the accepting hospitalist at .  They will plan to consult Dr. Casas from urology for probable cystoscopy later today.  He will be made n.p.o. in anticipation of that.  He will be transferred via ALS ambulance later this morning.  Continuous bladder irrigation and Jiménez catheter will remain in IV fluids will continue as well.    Matt Pineda MD    Significant Results and Procedures       Pending Results     Unresulted Labs Ordered in the Past 30 Days of this Admission     Date and Time Order Name Status Description    4/19/2023  4:15 AM Blood Culture Peripheral Blood Preliminary           Code Status   Full Code       Primary Care Physician   Wei Perez    Physical Exam   Temp: 98.9  F (37.2  C) Temp src: Tympanic BP: 124/62 Pulse: 86   Resp: 16 SpO2: 96 % O2 Device: None (Room air)    Vitals:    04/19/23 0916 04/20/23 0555   Weight: 99.3 kg (219 lb) 103.2 kg (227 lb 8 oz)     Vital Signs with Ranges  Temp:  [98.7  F (37.1  C)-99.9  F (37.7  C)] 98.9  F (37.2  C)  Pulse:  [] 86  Resp:  [16-20] 16  BP: ()/(46-62) 124/62  SpO2:  [93 %-97 %] 96 %  I/O last 3 completed shifts:  In: 3810 [P.O.:480; I.V.:2730]  Out: 1325 [Urine:1325]    Constitutional: : Cooperative, no distress appears comfortable lying in his hospital bed  Respiratory:     Lungs are clear, no rales rhonchi or wheezes are heard  Cardiovascular: Cardiac RRR no murmur  GI: Abdomen is soft and nontender  Skin/Integumen: No rashes or open areas    Discharge Disposition   Transferred to [facility]  Condition at discharge: Stable    Consultations This Hospital Stay   PHARMACY TO DOSE VANCO  PHARMACY TO DOSE VANCO    Time Spent on this Encounter   IMatt MD, personally saw the patient today and spent greater than 30 minutes discharging this patient.    Discharge Orders       Jiménez catheter    To straight gravity drainage with CBI, continuous     Activity - Up with nursing assistance     Follow Up and recommended labs and tests    Follow up per urology at [facility].  Follow up with oncology here.     Reason for your hospital stay    Neutropenic fever, Klebsiella UTI, obstructive uropathy with TRINY, acute blood loss anemia     Full Code     Diet    Follow this diet upon discharge: Orders Placed This Encounter      Combination Diet Regular Diet Adult     Discharge Medications   Current Discharge Medication List      START taking these medications    Details   acetaminophen (TYLENOL) 325 MG tablet Take 2-3 tablets (650-975 mg) by mouth every 6 hours as needed for mild pain    Associated Diagnoses: Malignant neoplasm metastatic to bone (H)      cefTRIAXone IN D5W (ROCEPHIN) 40 MG/ML SOLN Inject 2 g into the vein every 24 hours    Associated Diagnoses: Infection due to Klebsiella pneumoniae      HYDROmorphone (DILAUDID) 1 MG/ML injection Inject 1 mL (1 mg) into the vein every hour as needed for severe pain  Refills: 0    Associated Diagnoses: Malignant neoplasm metastatic to bone (H)      LORazepam (ATIVAN) 2 MG/ML injection Inject 0.5 mLs (1 mg) into the vein every 4 hours as needed for anxiety, agitation or muscle spasms    Associated Diagnoses: Anxiety      !! ondansetron (ZOFRAN ODT) 4 MG ODT tab Take 1 tablet (4 mg) by mouth every 6 hours as needed for nausea or vomiting    Associated Diagnoses: Nausea      pantoprazole (PROTONIX) 40 MG EC tablet Take 1 tablet (40 mg) by mouth every morning (before breakfast)    Associated Diagnoses: Gastroesophageal reflux disease without esophagitis       !! - Potential duplicate medications found. Please discuss with provider.      CONTINUE these medications which have NOT CHANGED    Details   amLODIPine (NORVASC) 10 MG tablet Take 1 tablet (10 mg) by mouth daily  Qty: 90 tablet, Refills: 3    Associated Diagnoses: Essential hypertension       atorvastatin (LIPITOR) 20 MG tablet Take 1 tablet (20 mg) by mouth daily  Qty: 90 tablet, Refills: 4    Associated Diagnoses: Hyperlipidemia LDL goal <100      LORazepam (ATIVAN) 0.5 MG tablet Take 1 tablet (0.5 mg) by mouth every 6 hours as needed for anxiety  Qty: 30 tablet, Refills: 0    Associated Diagnoses: Anxiety      !! ondansetron (ZOFRAN-ODT) 8 MG ODT tab Take 1 tablet (8 mg) by mouth every 8 hours as needed for nausea  Qty: 90 tablet, Refills: 1    Associated Diagnoses: Nausea      oxyCODONE-acetaminophen (PERCOCET) 5-325 MG tablet Take 1 tablet by mouth every 6 hours as needed for severe pain (7-10)  Qty: 40 tablet, Refills: 0    Associated Diagnoses: Cancer associated pain      predniSONE (DELTASONE) 5 MG tablet Take 1 tablet (5 mg) by mouth 2 times daily  Qty: 60 tablet, Refills: 6    Associated Diagnoses: Malignant neoplasm of prostate (H)      hydrOXYzine (ATARAX) 10 MG tablet Take 1 tablet (10 mg) by mouth every 6 hours as needed for itching or anxiety (with pain, moderate pain)  Qty: 120 tablet, Refills: 11    Associated Diagnoses: Chronic pain of right knee       !! - Potential duplicate medications found. Please discuss with provider.      STOP taking these medications       aspirin (ASA) 325 MG EC tablet Comments:   Reason for Stopping:         dexamethasone (DECADRON) 4 MG tablet Comments:   Reason for Stopping:         HYDROcodone-acetaminophen (NORCO)  MG per tablet Comments:   Reason for Stopping:         HYDROcodone-acetaminophen (NORCO)  MG per tablet Comments:   Reason for Stopping:         HYDROcodone-acetaminophen (NORCO)  MG per tablet Comments:   Reason for Stopping:         leuprolide (LUPRON DEPOT) 45 MG kit Comments:   Reason for Stopping:         lisinopril (ZESTRIL) 40 MG tablet Comments:   Reason for Stopping:         megestrol (MEGACE) 20 MG tablet Comments:   Reason for Stopping:         metFORMIN (GLUCOPHAGE-XR) 500 MG 24 hr tablet Comments:   Reason for  Stopping:         omeprazole (PRILOSEC) 40 MG DR capsule Comments:   Reason for Stopping:         pregabalin (LYRICA) 150 MG capsule Comments:   Reason for Stopping:         prochlorperazine (COMPAZINE) 10 MG tablet Comments:   Reason for Stopping:         traZODone (DESYREL) 50 MG tablet Comments:   Reason for Stopping:         vitamin B-12 (CYANOCOBALAMIN) 1000 MCG CR tablet Comments:   Reason for Stopping:         vitamin D3 (CHOLECALCIFEROL) 2000 units (50 mcg) tablet Comments:   Reason for Stopping:             Allergies   No Known Allergies  Data   Most Recent 3 CBC's:  Recent Labs   Lab Test 04/22/23  0647 04/21/23  1555 04/21/23  0535 04/20/23  0550   WBC 2.9*  --  1.8* 2.1*   HGB 8.2* 8.3* 6.4* 8.3*   MCV 86  --  89 88   *  --  108* 154      Most Recent 3 BMP's:  Recent Labs   Lab Test 04/22/23  0647 04/21/23  0535 04/20/23  0550   * 134* 133*   POTASSIUM 4.6 4.5 4.2   CHLORIDE 102 107 103   CO2 15* 18* 19*   BUN 40.0* 36.3* 23.1*   CR 5.29* 4.00* 2.23*   ANIONGAP 13 9 11   ROBERTO 8.2* 8.2* 8.4*   * 138* 189*     Most Recent 2 LFT's:  Recent Labs   Lab Test 04/21/23  0535 04/10/23  1059   AST 9* 10   ALT 8* 9*   ALKPHOS 42 74   BILITOTAL 0.4 0.6     Most Recent INR's and Anticoagulation Dosing History:  Anticoagulation Dose History         Latest Ref Rng & Units 11/8/2012 4/7/2017 11/1/2018 4/19/2023   Recent Dosing and Labs   INR 0.85 - 1.15 1.0   1.1   0.95   1.08                Most Recent 3 Troponin's:No lab results found.  Most Recent Cholesterol Panel:  Recent Labs   Lab Test 06/27/22  1251   CHOL 203*   LDL 85   HDL 47   TRIG 353*     Most Recent 6 Bacteria Isolates From Any Culture (See EPIC Reports for Culture Details):No lab results found.  Most Recent TSH, T4 and A1c Labs:  Recent Labs   Lab Test 02/20/23  0900 10/13/22  0849 06/27/22  1251   TSH  --   --  1.52   A1C 6.7*   < > 6.1    < > = values in this interval not displayed.     Results for orders placed or performed during  the hospital encounter of 04/19/23   CT Abdomen pelvis w contrast*    Narrative    Exam:CT ABDOMEN PELVIS W CONTRAST    History: 68 years Male metastatic prostate cancer and gross hematuria.      Comparisons: 3/6/2023    Technique: Axial CT imaging of the abdomen and pelvis was performed  contrast. Coronal and sagittal reconstructions were obtained.   This exam was performed using one or more of the following dose  reduction techniques:  Automated exposure control, adjustment of the GER and/or KV according  to patient's size, and/or use of iterative reconstruction technique.       FINDINGS:  Lung bases:The lung bases are clear    Abdomen:  Liver:Unremarkable  Gallbladder and biliary tree:No calcified gallstones are present.  There is no evidence of biliary dilatation.  Pancreas:Unremarkable  Spleen:Unremarkable  Adrenals:Normal    Kidneys and ureters: There is mild left-sided hydronephrosis and  hydroureter. This has developed since the prior study. There is subtle  heterogeneous nonuniform cortical enhancement of the left kidney.    Lymph nodes:There is no significant lymphadenopathy    Bowel:No abnormally distended or thickened loops of bowel are present.  There is no evidence of bowel obstruction.    Appendix: Surgically absent.    Vessels:Unremarkable    Osseous structures: Surgical change of posterior lumbar spine fusion.  There is subtle osteosclerosis of the left acetabulum with a  nondisplaced fracture involving the left acetabulum. Again seen is a  nondisplaced fracture of the left pubic symphysis. Osseous metastatic  disease to the left acetabulum is suspected.    Pelvis:A Jiménez catheter is present. There is hemorrhage in the bladder  lumen. There is a heterogeneous mass involving the prostate, left base  of the bladder which has decreased in size from the prior study.                Impression    IMPRESSION: The bladder is moderately distended. A Jiménez catheter is  present. There is hematoma in the  bladder lumen.    Interval development of mild left-sided hydronephrosis and  hydroureter. There is heterogeneous cortical enhancement of the left  kidney. Consider possibility of pyelonephritis.    There is a left acetabular fracture without significant displacement.  There is increased radiotracer uptake in this region on the recent  bone scan. It is possible this is a pathologic fracture but this  cannot be said with absolute certainty. Nonacute fracture of the  anterior left pubic ramus is again seen.    YONATHAN MALLORY MD         SYSTEM ID:  [id number]

## 2023-04-24 ENCOUNTER — TELEPHONE (OUTPATIENT)
Dept: FAMILY MEDICINE | Facility: OTHER | Age: 68
End: 2023-04-24
Payer: MEDICARE

## 2023-04-24 LAB — BACTERIA BLD CULT: NO GROWTH

## 2023-04-24 NOTE — TELEPHONE ENCOUNTER
Patient transferred to higher level of care. No TCM call required per policy.     Tamanna Osborn RN on 4/24/2023 at 8:39 AM

## 2023-04-25 ENCOUNTER — ALLIED HEALTH/NURSE VISIT (OUTPATIENT)
Dept: UROLOGY | Facility: OTHER | Age: 68
End: 2023-04-25
Attending: FAMILY MEDICINE
Payer: COMMERCIAL

## 2023-04-25 DIAGNOSIS — R97.21 RISING PSA FOLLOWING TREATMENT FOR MALIGNANT NEOPLASM OF PROSTATE: ICD-10-CM

## 2023-04-25 DIAGNOSIS — C79.51 MALIGNANT NEOPLASM METASTATIC TO BONE (H): Primary | ICD-10-CM

## 2023-04-25 DIAGNOSIS — F41.9 ANXIETY: ICD-10-CM

## 2023-04-25 DIAGNOSIS — R31.0 GROSS HEMATURIA: Primary | ICD-10-CM

## 2023-04-25 DIAGNOSIS — C61 PROSTATE CANCER (H): Primary | ICD-10-CM

## 2023-04-25 DIAGNOSIS — C61 MALIGNANT NEOPLASM OF PROSTATE (H): ICD-10-CM

## 2023-04-25 PROCEDURE — 99207 PR NO CHARGE NURSE ONLY: CPT

## 2023-04-25 RX ORDER — DIPHENHYDRAMINE HYDROCHLORIDE 50 MG/ML
50 INJECTION INTRAMUSCULAR; INTRAVENOUS
Status: CANCELLED
Start: 2023-05-01

## 2023-04-25 RX ORDER — ALBUTEROL SULFATE 0.83 MG/ML
2.5 SOLUTION RESPIRATORY (INHALATION)
Status: CANCELLED | OUTPATIENT
Start: 2023-05-01

## 2023-04-25 RX ORDER — HEPARIN SODIUM,PORCINE 10 UNIT/ML
5 VIAL (ML) INTRAVENOUS
Status: CANCELLED | OUTPATIENT
Start: 2023-05-01

## 2023-04-25 RX ORDER — MEPERIDINE HYDROCHLORIDE 50 MG/ML
25 INJECTION INTRAMUSCULAR; INTRAVENOUS; SUBCUTANEOUS EVERY 30 MIN PRN
Status: CANCELLED | OUTPATIENT
Start: 2023-05-01

## 2023-04-25 RX ORDER — ALBUTEROL SULFATE 90 UG/1
1-2 AEROSOL, METERED RESPIRATORY (INHALATION)
Status: CANCELLED
Start: 2023-05-01

## 2023-04-25 RX ORDER — EPINEPHRINE 1 MG/ML
0.3 INJECTION, SOLUTION, CONCENTRATE INTRAVENOUS EVERY 5 MIN PRN
Status: CANCELLED | OUTPATIENT
Start: 2023-05-01

## 2023-04-25 RX ORDER — HEPARIN SODIUM (PORCINE) LOCK FLUSH IV SOLN 100 UNIT/ML 100 UNIT/ML
5 SOLUTION INTRAVENOUS
Status: CANCELLED | OUTPATIENT
Start: 2023-05-01

## 2023-04-25 RX ORDER — LORAZEPAM 2 MG/ML
0.5 INJECTION INTRAMUSCULAR EVERY 4 HOURS PRN
Status: CANCELLED | OUTPATIENT
Start: 2023-05-01

## 2023-04-25 RX ORDER — METHYLPREDNISOLONE SODIUM SUCCINATE 125 MG/2ML
125 INJECTION, POWDER, LYOPHILIZED, FOR SOLUTION INTRAMUSCULAR; INTRAVENOUS
Status: CANCELLED
Start: 2023-05-01

## 2023-04-25 NOTE — PROGRESS NOTES
Patient has appointment today for hooks catheter removal, PCP is out of office, routing to teamlet provider.  Per discharge note, patient to have catheter removed on 4/26.   Ellen Rebolledo RN on 4/25/2023 at 8:57 AM

## 2023-04-25 NOTE — PROGRESS NOTES
Patient presents for catheter removal. Placed on 4/24 after cysto.   Patient denies any fever, odor, burning. Urine clear, yellow. Catheter removed with no complication. Patient educated on returning if no urine output by 8 hours post removal. Advised to push fluids, especially water. Patient in agreement with plan.   Ellen Rebolledo RN on 4/25/2023 at 9:26 AM     Discontinue indwelling urinary catheter (Jiménez) [[id number]]    Electronically signed by: Ellen Springer PA-C on 04/25/23 0916 Status: Active

## 2023-04-26 ENCOUNTER — ONCOLOGY VISIT (OUTPATIENT)
Dept: ONCOLOGY | Facility: OTHER | Age: 68
End: 2023-04-26
Attending: INTERNAL MEDICINE
Payer: COMMERCIAL

## 2023-04-26 ENCOUNTER — HOSPITAL ENCOUNTER (EMERGENCY)
Facility: OTHER | Age: 68
Discharge: LEFT BEFORE DISCHARGE | End: 2023-04-27
Payer: COMMERCIAL

## 2023-04-26 ENCOUNTER — HOSPITAL ENCOUNTER (OUTPATIENT)
Dept: GENERAL RADIOLOGY | Facility: OTHER | Age: 68
Discharge: HOME | End: 2023-04-26
Attending: INTERNAL MEDICINE
Payer: COMMERCIAL

## 2023-04-26 ENCOUNTER — TELEPHONE (OUTPATIENT)
Dept: ONCOLOGY | Facility: OTHER | Age: 68
End: 2023-04-26
Payer: MEDICARE

## 2023-04-26 VITALS
SYSTOLIC BLOOD PRESSURE: 115 MMHG | HEIGHT: 70 IN | HEART RATE: 114 BPM | BODY MASS INDEX: 31.5 KG/M2 | TEMPERATURE: 97 F | DIASTOLIC BLOOD PRESSURE: 56 MMHG | WEIGHT: 220 LBS | OXYGEN SATURATION: 97 %

## 2023-04-26 VITALS
WEIGHT: 231 LBS | HEART RATE: 92 BPM | OXYGEN SATURATION: 97 % | DIASTOLIC BLOOD PRESSURE: 62 MMHG | BODY MASS INDEX: 33.15 KG/M2 | SYSTOLIC BLOOD PRESSURE: 132 MMHG | RESPIRATION RATE: 22 BRPM | TEMPERATURE: 99.5 F

## 2023-04-26 DIAGNOSIS — C61 MALIGNANT NEOPLASM OF PROSTATE (H): ICD-10-CM

## 2023-04-26 DIAGNOSIS — C79.51 MALIGNANT NEOPLASM METASTATIC TO BONE (H): ICD-10-CM

## 2023-04-26 DIAGNOSIS — R06.02 SHORTNESS OF BREATH: ICD-10-CM

## 2023-04-26 DIAGNOSIS — R97.21 RISING PSA FOLLOWING TREATMENT FOR MALIGNANT NEOPLASM OF PROSTATE: ICD-10-CM

## 2023-04-26 DIAGNOSIS — R06.02 SHORTNESS OF BREATH: Primary | ICD-10-CM

## 2023-04-26 LAB
ALBUMIN SERPL BCG-MCNC: 3 G/DL (ref 3.5–5.2)
ALP SERPL-CCNC: 64 U/L (ref 40–129)
ALT SERPL W P-5'-P-CCNC: 12 U/L (ref 10–50)
ANION GAP SERPL CALCULATED.3IONS-SCNC: 10 MMOL/L (ref 7–15)
AST SERPL W P-5'-P-CCNC: 12 U/L (ref 10–50)
BASOPHILS # BLD AUTO: 0 10E3/UL (ref 0–0.2)
BASOPHILS NFR BLD AUTO: 0 %
BILIRUB SERPL-MCNC: 0.4 MG/DL
BUN SERPL-MCNC: 5.1 MG/DL (ref 8–23)
CALCIUM SERPL-MCNC: 8.3 MG/DL (ref 8.8–10.2)
CHLORIDE SERPL-SCNC: 107 MMOL/L (ref 98–107)
CREAT SERPL-MCNC: 0.56 MG/DL (ref 0.67–1.17)
DEPRECATED HCO3 PLAS-SCNC: 23 MMOL/L (ref 22–29)
EOSINOPHIL # BLD AUTO: 0 10E3/UL (ref 0–0.7)
EOSINOPHIL NFR BLD AUTO: 0 %
ERYTHROCYTE [DISTWIDTH] IN BLOOD BY AUTOMATED COUNT: 16.1 % (ref 10–15)
GFR SERPL CREATININE-BSD FRML MDRD: >90 ML/MIN/1.73M2
GLUCOSE SERPL-MCNC: 123 MG/DL (ref 70–99)
HCT VFR BLD AUTO: 26.1 % (ref 40–53)
HGB BLD-MCNC: 8.6 G/DL (ref 13.3–17.7)
IMM GRANULOCYTES # BLD: 0.5 10E3/UL
IMM GRANULOCYTES NFR BLD: 6 %
LDH SERPL L TO P-CCNC: 279 U/L (ref 0–250)
LYMPHOCYTES # BLD AUTO: 1.3 10E3/UL (ref 0.8–5.3)
LYMPHOCYTES NFR BLD AUTO: 15 %
MCH RBC QN AUTO: 28.4 PG (ref 26.5–33)
MCHC RBC AUTO-ENTMCNC: 33 G/DL (ref 31.5–36.5)
MCV RBC AUTO: 86 FL (ref 78–100)
MONOCYTES # BLD AUTO: 0.8 10E3/UL (ref 0–1.3)
MONOCYTES NFR BLD AUTO: 9 %
NEUTROPHILS # BLD AUTO: 6.4 10E3/UL (ref 1.6–8.3)
NEUTROPHILS NFR BLD AUTO: 70 %
NRBC # BLD AUTO: 0.1 10E3/UL
NRBC BLD AUTO-RTO: 1 /100
PLATELET # BLD AUTO: 216 10E3/UL (ref 150–450)
POTASSIUM SERPL-SCNC: 3.8 MMOL/L (ref 3.4–5.3)
PROT SERPL-MCNC: 5.5 G/DL (ref 6.4–8.3)
RBC # BLD AUTO: 3.03 10E6/UL (ref 4.4–5.9)
SODIUM SERPL-SCNC: 140 MMOL/L (ref 136–145)
WBC # BLD AUTO: 9 10E3/UL (ref 4–11)

## 2023-04-26 PROCEDURE — 85025 COMPLETE CBC W/AUTO DIFF WBC: CPT | Mod: ZL | Performed by: INTERNAL MEDICINE

## 2023-04-26 PROCEDURE — 71046 X-RAY EXAM CHEST 2 VIEWS: CPT

## 2023-04-26 PROCEDURE — 99417 PROLNG OP E/M EACH 15 MIN: CPT | Performed by: INTERNAL MEDICINE

## 2023-04-26 PROCEDURE — 99215 OFFICE O/P EST HI 40 MIN: CPT | Performed by: INTERNAL MEDICINE

## 2023-04-26 PROCEDURE — 84153 ASSAY OF PSA TOTAL: CPT | Mod: ZL | Performed by: INTERNAL MEDICINE

## 2023-04-26 PROCEDURE — 36415 COLL VENOUS BLD VENIPUNCTURE: CPT | Mod: ZL | Performed by: INTERNAL MEDICINE

## 2023-04-26 PROCEDURE — G0463 HOSPITAL OUTPT CLINIC VISIT: HCPCS | Mod: 25

## 2023-04-26 PROCEDURE — 83615 LACTATE (LD) (LDH) ENZYME: CPT | Mod: ZL | Performed by: INTERNAL MEDICINE

## 2023-04-26 PROCEDURE — 80053 COMPREHEN METABOLIC PANEL: CPT | Mod: ZL | Performed by: INTERNAL MEDICINE

## 2023-04-26 PROCEDURE — G0463 HOSPITAL OUTPT CLINIC VISIT: HCPCS

## 2023-04-26 RX ORDER — CEFDINIR 300 MG/1
300 CAPSULE ORAL 2 TIMES DAILY
Status: ON HOLD | COMMUNITY
Start: 2023-04-24 | End: 2023-05-02

## 2023-04-26 RX ORDER — METHYLPREDNISOLONE SODIUM SUCCINATE 125 MG/2ML
125 INJECTION, POWDER, LYOPHILIZED, FOR SOLUTION INTRAMUSCULAR; INTRAVENOUS
Status: CANCELLED
Start: 2023-06-13

## 2023-04-26 RX ORDER — MEPERIDINE HYDROCHLORIDE 50 MG/ML
25 INJECTION INTRAMUSCULAR; INTRAVENOUS; SUBCUTANEOUS EVERY 30 MIN PRN
Status: CANCELLED | OUTPATIENT
Start: 2023-06-13

## 2023-04-26 RX ORDER — LORAZEPAM 0.5 MG/1
0.5 TABLET ORAL EVERY 6 HOURS PRN
Qty: 30 TABLET | Refills: 1 | Status: SHIPPED | OUTPATIENT
Start: 2023-04-26 | End: 2023-06-07

## 2023-04-26 RX ORDER — METFORMIN HCL 500 MG
1000 TABLET, EXTENDED RELEASE 24 HR ORAL 2 TIMES DAILY
Status: ON HOLD | COMMUNITY
Start: 2022-11-21 | End: 2023-06-30

## 2023-04-26 RX ORDER — PREGABALIN 150 MG/1
150 CAPSULE ORAL SEE ADMIN INSTRUCTIONS
COMMUNITY
Start: 2023-02-03 | End: 2023-11-20

## 2023-04-26 RX ORDER — DIPHENHYDRAMINE HYDROCHLORIDE 50 MG/ML
50 INJECTION INTRAMUSCULAR; INTRAVENOUS
Status: CANCELLED
Start: 2023-06-13

## 2023-04-26 RX ORDER — LORAZEPAM 2 MG/ML
0.5 INJECTION INTRAMUSCULAR EVERY 4 HOURS PRN
Status: CANCELLED | OUTPATIENT
Start: 2023-06-13

## 2023-04-26 RX ORDER — FUROSEMIDE 20 MG
20 TABLET ORAL DAILY PRN
Qty: 30 TABLET | Refills: 3 | Status: SHIPPED | OUTPATIENT
Start: 2023-04-26 | End: 2023-11-09

## 2023-04-26 RX ORDER — HEPARIN SODIUM (PORCINE) LOCK FLUSH IV SOLN 100 UNIT/ML 100 UNIT/ML
5 SOLUTION INTRAVENOUS
Status: CANCELLED | OUTPATIENT
Start: 2023-06-13

## 2023-04-26 RX ORDER — HEPARIN SODIUM,PORCINE 10 UNIT/ML
5 VIAL (ML) INTRAVENOUS
Status: CANCELLED | OUTPATIENT
Start: 2023-06-13

## 2023-04-26 RX ORDER — EPINEPHRINE 1 MG/ML
0.3 INJECTION, SOLUTION, CONCENTRATE INTRAVENOUS EVERY 5 MIN PRN
Status: CANCELLED | OUTPATIENT
Start: 2023-06-13

## 2023-04-26 RX ORDER — POTASSIUM CHLORIDE 1500 MG/1
20 TABLET, EXTENDED RELEASE ORAL DAILY PRN
Qty: 30 TABLET | Refills: 3 | Status: SHIPPED | OUTPATIENT
Start: 2023-04-26 | End: 2024-01-10

## 2023-04-26 RX ORDER — ALBUTEROL SULFATE 90 UG/1
1-2 AEROSOL, METERED RESPIRATORY (INHALATION)
Status: CANCELLED
Start: 2023-06-13

## 2023-04-26 RX ORDER — ALBUTEROL SULFATE 0.83 MG/ML
2.5 SOLUTION RESPIRATORY (INHALATION)
Status: CANCELLED | OUTPATIENT
Start: 2023-06-13

## 2023-04-26 RX ORDER — TRAZODONE HYDROCHLORIDE 50 MG/1
50 TABLET, FILM COATED ORAL
COMMUNITY
Start: 2023-03-22 | End: 2024-01-10

## 2023-04-26 ASSESSMENT — PAIN SCALES - GENERAL: PAINLEVEL: NO PAIN (1)

## 2023-04-26 NOTE — PATIENT INSTRUCTIONS
Approved for treatment next week; schedule for NP to see him.    Scans due after cycle 3.    Follow up with Dr Fritz with cycle 4.

## 2023-04-26 NOTE — NURSING NOTE
Chief Complaint   Patient presents with     Oncology Clinic Visit     F/U Prostate cancer - SOB after surgery     Medication Reconciliation: complete    Rochelle Toth CMA ([facility])

## 2023-04-26 NOTE — TELEPHONE ENCOUNTER
"Patient was called per provider to explain that oncologist recommends being seen in ER and that provider seeing him this afternoon would also recommend to go to ER. It was explained to patient that the ER is equipped with all the scans patient may need and it would be the quickest way to obtain these scans. Patient states he \"didn't want to waste another day in ER\" but did agree to recommendations. Appointment scheduled in clinic today was cancelled    Corinne R Thayer, RN on 4/26/2023 at 8:59 AM    "

## 2023-04-26 NOTE — TELEPHONE ENCOUNTER
Surgery last week for clots in urine. No more clots now per patient. Episodes of shortness of breath.  Per provider needs to go to ED. Patient refuses ED and will be seen on clinic today at 2pm. Needs refill lorazepam.  Elza Lundberg RN...........4/26/2023 8:42 AM

## 2023-04-27 ENCOUNTER — HOSPITAL ENCOUNTER (EMERGENCY)
Facility: OTHER | Age: 68
Discharge: HOME | End: 2023-04-27
Attending: PHYSICIAN ASSISTANT | Admitting: PHYSICIAN ASSISTANT
Payer: COMMERCIAL

## 2023-04-27 ENCOUNTER — TELEPHONE (OUTPATIENT)
Dept: ONCOLOGY | Facility: OTHER | Age: 68
End: 2023-04-27
Payer: MEDICARE

## 2023-04-27 VITALS
HEART RATE: 99 BPM | RESPIRATION RATE: 16 BRPM | TEMPERATURE: 98.6 F | SYSTOLIC BLOOD PRESSURE: 132 MMHG | OXYGEN SATURATION: 97 % | DIASTOLIC BLOOD PRESSURE: 73 MMHG

## 2023-04-27 DIAGNOSIS — C61 MALIGNANT NEOPLASM OF PROSTATE (H): Primary | ICD-10-CM

## 2023-04-27 DIAGNOSIS — R33.9 URINARY RETENTION: ICD-10-CM

## 2023-04-27 LAB — PSA SERPL DL<=0.01 NG/ML-MCNC: 4.43 NG/ML (ref 0–6.5)

## 2023-04-27 PROCEDURE — 99282 EMERGENCY DEPT VISIT SF MDM: CPT | Performed by: PHYSICIAN ASSISTANT

## 2023-04-27 PROCEDURE — 99284 EMERGENCY DEPT VISIT MOD MDM: CPT | Performed by: PHYSICIAN ASSISTANT

## 2023-04-27 PROCEDURE — 51798 US URINE CAPACITY MEASURE: CPT | Performed by: PHYSICIAN ASSISTANT

## 2023-04-27 PROCEDURE — 51702 INSERT TEMP BLADDER CATH: CPT | Performed by: PHYSICIAN ASSISTANT

## 2023-04-27 PROCEDURE — 87086 URINE CULTURE/COLONY COUNT: CPT | Performed by: PHYSICIAN ASSISTANT

## 2023-04-27 NOTE — ED TRIAGE NOTES
Pt presents to ED 1 day after catheter removal.  Voiding until 1500 today, clean yellow urine, unable to void since then.  Approx. 1-2 hours ago started experiencing pain in lower abdomen and lower back.  Percocet administered 1/2 hour before ED arrival     Triage Assessment     Row Name 04/26/23 9205       Triage Assessment (Adult)    Airway WDL WDL       Respiratory WDL    Respiratory WDL WDL       Skin Circulation/Temperature WDL    Skin Circulation/Temperature WDL WDL       Cardiac WDL    Cardiac WDL WDL       Peripheral/Neurovascular WDL    Peripheral Neurovascular WDL WDL       Cognitive/Neuro/Behavioral WDL    Cognitive/Neuro/Behavioral WDL WDL

## 2023-04-27 NOTE — PROGRESS NOTES
Visit Date: 04/26/2023    The patient is seen on an emergent basis.  He is currently being treated for metastatic castrate-resistant prostate cancer.  For details, see previous notes.  Most recently, the patient had progressed on abiraterone and prednisone.  We had seen the patient on 02/20/2023.  He was complaining of left hip pain that radiated down to his knee.  He had chronic back pain from previous back surgery.  His PSA was up to 8.3.  He had recently been placed on Megace by Dr. Arrington to help with his hot flashes.  Nonetheless, we ordered imaging studies including MRI of the left hip, which revealed evidence of bony metastasis, confirmed by bone scan as well.  Bone scan indicated there was a new region of increased uptake around the left acetabulum.  MRI of the hip revealed that there was a nondisplaced fracture of the left pubic bone adjacent to the pubic symphysis.  There was associated marrow edema.  There was a fairly large abnormal signal associated with the left obturator internus muscle belly with associated T2 hyperintense signal deep to the muscle belly against the pelvic wall, possibly a direct extension of tumor.  Hematoma delineated by pubic symphysis fracture.  There was amorphous abnormal signal in the expected region of the left prostate compatible with recurrent residual tumor.  CT chest, abdomen and pelvis also indicated progressive increasing volume and a locally-invasive mass adjacent to the prostate invading the obturator and levator ani muscles with a nondisplaced acute fracture to the left pubic symphysis.  There were multiple small hyperenhancing lesions throughout the liver compatible with diffuse hepatic metastatic disease.  When we saw the patient, we felt he had progression of disease with bony metastasis in the left acetabulum and left pubic symphysis.  We had contacted Dr. Hamilton of [facility], and the plan was to see the patient for radiation therapy.   Otherwise, we felt the patient would be a candidate for docetaxel chemotherapy.  Recommended docetaxel 75 mg/m2 on day 1 of a 20-day cycle with prednisone 5 mg p.o. b.i.d.  The patient was seen by Dr. Hamilton, and the plan was to proceed with palliative radiation to the left pubis.  He was given one treatment of 1000 cGy on 03/14/2023.  He did complete 2 cycles of docetaxel and, subsequently, he received cycle 2 of docetaxel on 04/10 and then subsequently was seen in the emergency room on 04/19 with complaints of inability to void and bladder pain.  Apparently, he was having hematuria with clots and, in the emergency department, a catheter was placed.  There was gross hematuria in the Jiménez bag.  He was subsequently noted to have a fever of 101.1.  He was also found to have a neutrophil count of 0.7.  He was admitted to the hospital with neutropenic fever.  He was subsequently noted to have clots in his Jiménez catheter and was noted to have an elevated creatinine of obstructive uropathy noted. Continued to have worsening pain with bladder distention.  He did require 2 units of packed red cells for hemoglobin of 6.4. His creatinine pineda to 5.29.  Because of worsening renal function and ongoing presumed obstructive uropathy, [facility] was contacted, and the plan was to have urology evaluate him.  He grew out Klebsiella pneumoniae in his urine as well as his blood.  He was subsequently transferred to [facility] and was seen by Dr. Casas with plans for cystoscopy and fulguration.  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  Cystoscopy was performed on 04/23/2023, and the findings were there was a large clot burden, which was evacuated. Approximately 300 mL of old clot was removed.  The bladder was reexamined and noted to have a large, actively bleeding vessel.  There were a few small bleeders of the bladder, so bipolar loop was used to fulgurate these vessels.  There were  mucosal changes throughout the bladder consistent with radiation cystitis.  He was subsequently having a good urine output with clear urine and was discharged the next day on 04/23 with mention of hyperbaric oxygen therapy for radiation cystitis.  The patient was unclear as to whether wanted to proceed with this.  The patient was continued on ceftriaxone and then discharged on cefepime.  The patient called today stating he was short of breath and wanted to be seen.  He is doing relatively well.  He denies any further hematuria, just shortness of breath.  We ordered a chest x-ray that was performed today that was negative.  He denies any hematuria.  He does complain of bilateral leg edema and swelling of his legs that includes fluid retention up to his thigh.  Otherwise, no complaints of fevers, night sweats, weight loss or bone pain.  It appears that he has left hip bone pain at this point.    PHYSICAL EXAMINATION:  He is an elderly, white male in no acute distress.  ECOG performance status is a 1.  VITAL SIGNS:  Reveal blood pressure is 132/62, pulse 92, respirations 22, temperature 99.5.  HEENT:  Atraumatic, normocephalic.  Oropharynx nonerythematous.  NECK:  Supple. No thyromegaly.  LUNGS:  Clear to auscultation and percussion.  HEART:  Regular rhythm.  S1, S2 normal.  ABDOMEN:  Soft, normoactive bowel sounds.  No mass, nontender.  LYMPHATICS:  No cervical, supraclavicular, axillary or inguinal nodes.  EXTREMITIES:  Revealed 1-2+ pitting edema up to his thighs.  NEUROLOGIC:  Grossly nonfocal.    LABORATORY DATA:  Reveal CBC white count 9.0, H and H 8.6 and 26.1, platelet count is 216.  BUN is 5.1, creatinine 0.56.  LDH is 279.  PSA is pending.    IMPRESSION:  Castrate-resistant nonmetastatic prostate cancer progressing now to castrate-resistant metastatic prostate cancer.  Initially, the patient, was treated for PSA doubling time less than 10 months with apalutamide, in addition to androgen deprivation therapy.   He was started on apalutamide 240 mg daily.  His PSA dropped to 1.25, then began to rise to 2.06.  We elected to switch him to abiraterone and prednisone beginning in 08/2022.  He required dose reduction due to fatigue.  His PSA began to rise and now has developed progression with bony metastasis in the left acetabulum with a left pubic symphysis nondisplaced fracture as well as locally-advanced disease as well as likely liver metastases.  The patient was started on docetaxel and prednisone and also received radiation therapy to the pubic symphysis.  Received 2 cycles of docetaxel.  Course complicated by neutropenic fever with a Klebsiella UTI, urosepsis as well as hematuria, resulting in acute renal failure requiring transfer to [facility], where the patient had clot evacuation as well as fulguration of multiple bleeding vessels in the bladder felt to be due to radiation cystitis.  The patient has now recovered.  He is doing much better.  He is currently on antibiotics.  There is no evidence of pneumonia or infection at this point. The plan is to proceed with cycle 3 of docetaxel next Monday with dose reduced by 20% due to the neutropenia and cytopenias.  He will still be able to continue antibiotics while he is getting treatment as he has no obvious evidence of infection.  We would like to restage the patient prior to his 4th cycle of docetaxel.  We will also obtain a PSA today, which is pending.      Otherwise, 85 minutes were spent on this patient.  Time was spent reviewing multiple physician provider notes, voluminous [facility] as well as [facility] reviewing imaging results, performing the history and physical, documenting history and physical and treating the patient for fluid overload due to docetaxel with Lasix 20 mg with K-Dur supplementation.  It was recommended he take Lasix p.r.n. leg edema.    Erum Fritz MD        D: 04/26/2023   T: 04/26/2023   MT:  so    Name:     LELAND CHAPMAN  MRN:      [id number]        Account:    [id number]   :      1955           Visit Date: 2023     Document: [id number]    cc:  MD DWAIN Puckett, APRN, CNP

## 2023-04-27 NOTE — TELEPHONE ENCOUNTER
Needs to re establish with urology per Erum Fritz MD   patient would prefer to go to [facility].  Elza Lundberg RN...........4/27/2023 9:04 AM

## 2023-04-28 NOTE — ED TRIAGE NOTES
"Pt arrives with c/o urinary retention. Pt got catheter removed Tuesday and has had \"nothing but issues since.\" Pt is requesting a bag be put on.      Triage Assessment     Row Name 04/27/23 1921       Triage Assessment (Adult)    Airway WDL WDL       Respiratory WDL    Respiratory WDL WDL       Skin Circulation/Temperature WDL    Skin Circulation/Temperature WDL WDL       Cardiac WDL    Cardiac WDL WDL       Peripheral/Neurovascular WDL    Peripheral Neurovascular WDL WDL       Cognitive/Neuro/Behavioral WDL    Cognitive/Neuro/Behavioral WDL WDL              "

## 2023-04-28 NOTE — DISCHARGE INSTRUCTIONS
Please call the [place] urology department at [phone]  This is to set up an appointment because you have a Jiménez catheter placed he will need to see the urologist for your urinary retention  Culture has been obtained we will follow cultures and contact you if there is any concern for infection.  If symptoms worsen if there is further concerns please return to the emergency department.

## 2023-04-28 NOTE — ED PROVIDER NOTES
Chief Complaint:  Urinary Retention       HPI   Dilip Kan is a 68 year old male who presents with family member for evaluation of urinary retention.  Is my understanding back on April 22, 2023 the patient was seen and evaluated and had a cystoscopy and a Jiménez was placed.  His Jiménez was recently removed on the beginning of this week and now has developed some urinary retention that prompted the visit today.    He was has a history of malignant neoplasm of the prostate metastasis to the bone diabetes hypertension kidney disease sepsis with septic shock bacteremia due to to Klebsiella and bladder outflow obstruction..    Independent Historian: Patient provides the history    Review of External Notes: I did review his medical records    ROS:  Please see HPI for pertinent positives and negatives.  All other systems reviewed and found to be negative.     Allergies:  No Known Allergies     Medications:    acetaminophen (TYLENOL) 325 MG tablet  amLODIPine (NORVASC) 10 MG tablet  atorvastatin (LIPITOR) 20 MG tablet  cefdinir (OMNICEF) 300 MG capsule  cefTRIAXone IN D5W (ROCEPHIN) 40 MG/ML SOLN  furosemide (LASIX) 20 MG tablet  HYDROmorphone (DILAUDID) 1 MG/ML injection  hydrOXYzine (ATARAX) 10 MG tablet  LORazepam (ATIVAN) 0.5 MG tablet  LORazepam (ATIVAN) 2 MG/ML injection  metFORMIN (GLUCOPHAGE XR) 500 MG 24 hr tablet  ondansetron (ZOFRAN ODT) 4 MG ODT tab  ondansetron (ZOFRAN-ODT) 8 MG ODT tab  oxyCODONE-acetaminophen (PERCOCET) 5-325 MG tablet  pantoprazole (PROTONIX) 40 MG EC tablet  potassium chloride ER (KLOR-CON M) 20 MEQ CR tablet  predniSONE (DELTASONE) 5 MG tablet  pregabalin (LYRICA) 150 MG capsule  traZODone (DESYREL) 50 MG tablet        Past Medical History:    Past Medical History:   Diagnosis Date     Elevated prostate specific antigen (PSA)      Encounter for other administrative examinations      Esophagitis      Gastro-esophageal reflux disease without esophagitis      Low back pain      Malignant  neoplasm of prostate (H)      Nicotine dependence, uncomplicated      Other intervertebral disc degeneration, lumbosacral region        Past Surgical History:    Past Surgical History:   Procedure Laterality Date     APPENDECTOMY OPEN  [id number]    Ruptured - [place]     ARTHROPLASTY KNEE Right 11/16/2021    Procedure: ARTHROPLASTY, KNEE, TOTAL;  Surgeon: Micah Rock MD;  Location: [facility] OR     COLONOSCOPY  08/31/2006    next due in 2016     DISKECTOMY, LUMBAR, SINGLE SP  03/16/2009    L 3-4 microdiskectomy, [facility]     ESOPHAGOSCOPY, GASTROSCOPY, DUODENOSCOPY (EGD), COMBINED  03/2003          ESOPHAGOSCOPY, GASTROSCOPY, DUODENOSCOPY (EGD), COMBINED  08/31/2006          PROSTATECTOMY PERINEAL RADICAL  11/28/2012    Nerve Sparring, Dr Warner     SPINE SURGERY N/A 04/28/2017    L3 to S1 spinal decompression L4/L5 fusion     TONSILLECTOMY, ADENOIDECTOMY, COMBINED      as a child     VARICOCELECTOMY  1959    INCISION AND DRAINAGE,EXCISE VARICOCELE        Family History:    family history includes Colon Cancer in his father; Family History Negative in his sister, sister and other family members; Heart Disease in his father; Hypertension in his father and mother; Other - See Comments in his daughter, father, mother, and son.    Social History:   reports that he quit smoking about 20 years ago. His smoking use included cigarettes. He has a 20.00 pack-year smoking history. He has been exposed to tobacco smoke. His smokeless tobacco use includes snuff. He reports current alcohol use. He reports that he does not use drugs.  PCP: Wei Perez     Physical Exam     Patient Vitals for the past 24 hrs:   BP Temp Pulse Resp SpO2   04/27/23 1922 132/73 98.6  F (37  C) 99 16 97 %        Vitals:    04/27/23 1922   BP: 132/73   Pulse: 99   Resp: 16   Temp: 98.6  F (37  C)   SpO2: 97%       Physical Exam  Exam:  Constitutional: healthy, alert and no distress  Head: Normocephalic.   Neck: Neck supple.   Gastrointestinal:  Abdomen soft, non-tender. BS normal. No masses, organomegaly  : Deferred  Musculoskeletal: extremities normal- no gross deformities noted, gait normal and normal muscle tone  Skin: no suspicious lesions or rashes  Neurologic: Gait normal. Reflexes normal and symmetric. Sensation grossly WNL.  Psychiatric: mentation appears normal and affect normal/bright        Emergency Department Course       Imaging:  No orders to display      Report per     Laboratory:  Labs Ordered and Resulted from Time of ED Arrival to Time of ED Departure - No data to display     Procedures   Jiménez catheter has been placed by nursing staff.  Urine is a pale yellow there is no blood noted    Emergency Department Course & Assessments:             Interventions:  Medications - No data to display     Orders Place This Encounter:  No orders of the defined types were placed in this encounter.      Independent Interpretation (X-rays, CTs, rhythm strip):  None    Consultations/Discussion of Management or Tests:  None     Social Determinants of Health affecting care:    Social Determinants of Health     Tobacco Use: High Risk (4/27/2023)    Patient History      Smoking Tobacco Use: Former      Smokeless Tobacco Use: Current      Passive Exposure: Past   Alcohol Use: Not on file   Financial Resource Strain: Not on file   Food Insecurity: Not on file   Transportation Needs: Not on file   Physical Activity: Not on file   Stress: Not on file   Social Connections: Not on file   Intimate Partner Violence: Not on file   Depression: Not at risk (4/26/2023)    PHQ-2      PHQ-2 Score: 0   Housing Stability: Not on file       At this time the patient does not have any concerns for food security, transportation, healthcare access and the patient currently lives at home.      Disposition:  The patient was discharged to home.     Impression & Plan    CMS Diagnoses:       Medical Decision Making:    RN & Ancillary Notes:    I have reviewed nursing & ancillary  notes, and agree with protocol as initiated.      Nery gentleman who presents emerged department for evaluation urinary retention a Jiménez catheter is in place.  Urine has been sent off for culture.  I did provide him with Dr. Geiger information for urology follow up in [place]    Diagnosis:    ICD-10-CM    1. Urinary retention  R33.9            Discharge Medications:  Discharge Medication List as of 4/27/2023  7:39 PM           4/27/2023   Hong Torres PA-C  [facility], [facility]       Hong Torres PA-C  04/27/23 2025

## 2023-04-28 NOTE — ED NOTES
Catheter education completed, pt taught back to writer instructions on leg bag and overnight bag. Pt's pain is gone at this time, states catheter gave him relief.

## 2023-04-30 LAB — BACTERIA UR CULT: NO GROWTH

## 2023-05-01 ENCOUNTER — LAB (OUTPATIENT)
Dept: LAB | Facility: OTHER | Age: 68
DRG: 176 | End: 2023-05-01
Attending: INTERNAL MEDICINE
Payer: COMMERCIAL

## 2023-05-01 ENCOUNTER — HOSPITAL ENCOUNTER (INPATIENT)
Facility: OTHER | Age: 68
LOS: 1 days | Discharge: HOME | DRG: 176 | End: 2023-05-02
Attending: STUDENT IN AN ORGANIZED HEALTH CARE EDUCATION/TRAINING PROGRAM | Admitting: FAMILY MEDICINE
Payer: COMMERCIAL

## 2023-05-01 ENCOUNTER — HOSPITAL ENCOUNTER (OUTPATIENT)
Dept: CT IMAGING | Facility: OTHER | Age: 68
Discharge: HOME | DRG: 176 | End: 2023-05-01
Attending: NURSE PRACTITIONER
Payer: COMMERCIAL

## 2023-05-01 ENCOUNTER — ONCOLOGY VISIT (OUTPATIENT)
Dept: ONCOLOGY | Facility: OTHER | Age: 68
DRG: 176 | End: 2023-05-01
Attending: NURSE PRACTITIONER
Payer: COMMERCIAL

## 2023-05-01 ENCOUNTER — HOSPITAL ENCOUNTER (OUTPATIENT)
Dept: ULTRASOUND IMAGING | Facility: OTHER | Age: 68
Discharge: HOME | DRG: 176 | End: 2023-05-01
Attending: NURSE PRACTITIONER
Payer: COMMERCIAL

## 2023-05-01 ENCOUNTER — HOSPITAL ENCOUNTER (OUTPATIENT)
Dept: INFUSION THERAPY | Facility: OTHER | Age: 68
Discharge: HOME | DRG: 176 | End: 2023-05-01
Attending: INTERNAL MEDICINE
Payer: COMMERCIAL

## 2023-05-01 VITALS
WEIGHT: 217.2 LBS | OXYGEN SATURATION: 95 % | TEMPERATURE: 98 F | DIASTOLIC BLOOD PRESSURE: 75 MMHG | SYSTOLIC BLOOD PRESSURE: 143 MMHG | BODY MASS INDEX: 31.16 KG/M2 | RESPIRATION RATE: 20 BRPM | HEART RATE: 100 BPM

## 2023-05-01 DIAGNOSIS — I82.4Y1 ACUTE DEEP VEIN THROMBOSIS (DVT) OF PROXIMAL VEIN OF RIGHT LOWER EXTREMITY (H): ICD-10-CM

## 2023-05-01 DIAGNOSIS — R97.21 RISING PSA FOLLOWING TREATMENT FOR MALIGNANT NEOPLASM OF PROSTATE: ICD-10-CM

## 2023-05-01 DIAGNOSIS — C61 MALIGNANT NEOPLASM OF PROSTATE (H): ICD-10-CM

## 2023-05-01 DIAGNOSIS — R79.89 ELEVATED D-DIMER: Primary | ICD-10-CM

## 2023-05-01 DIAGNOSIS — I82.4Y1 ACUTE EMBOLISM AND THROMBOSIS OF DEEP VEIN OF RIGHT PROXIMAL LOWER EXTREMITY (H): ICD-10-CM

## 2023-05-01 DIAGNOSIS — I26.99 OTHER ACUTE PULMONARY EMBOLISM WITHOUT ACUTE COR PULMONALE (H): ICD-10-CM

## 2023-05-01 DIAGNOSIS — C79.51 MALIGNANT NEOPLASM METASTATIC TO BONE (H): Primary | ICD-10-CM

## 2023-05-01 DIAGNOSIS — R79.89 ELEVATED D-DIMER: ICD-10-CM

## 2023-05-01 DIAGNOSIS — C61 PROSTATE CANCER (H): ICD-10-CM

## 2023-05-01 DIAGNOSIS — R06.02 SHORTNESS OF BREATH: ICD-10-CM

## 2023-05-01 DIAGNOSIS — I26.94 MULTIPLE SUBSEGMENTAL PULMONARY EMBOLI WITHOUT ACUTE COR PULMONALE (H): ICD-10-CM

## 2023-05-01 PROBLEM — Z02.89 ENCOUNTER FOR EXAMINATION REQUIRED BY DEPARTMENT OF TRANSPORTATION (DOT): Status: RESOLVED | Noted: 2019-03-22 | Resolved: 2023-05-01

## 2023-05-01 LAB
ALBUMIN SERPL BCG-MCNC: 3.6 G/DL (ref 3.5–5.2)
ALP SERPL-CCNC: 68 U/L (ref 40–129)
ALT SERPL W P-5'-P-CCNC: 12 U/L (ref 10–50)
ANION GAP SERPL CALCULATED.3IONS-SCNC: 11 MMOL/L (ref 7–15)
AST SERPL W P-5'-P-CCNC: 11 U/L (ref 10–50)
BASOPHILS # BLD AUTO: 0.1 10E3/UL (ref 0–0.2)
BASOPHILS NFR BLD AUTO: 0 %
BILIRUB SERPL-MCNC: 0.5 MG/DL
BUN SERPL-MCNC: 12 MG/DL (ref 8–23)
CALCIUM SERPL-MCNC: 9.1 MG/DL (ref 8.8–10.2)
CHLORIDE SERPL-SCNC: 104 MMOL/L (ref 98–107)
CREAT SERPL-MCNC: 0.63 MG/DL (ref 0.67–1.17)
D DIMER PPP FEU-MCNC: 10.74 UG/ML FEU (ref 0–0.5)
DEPRECATED HCO3 PLAS-SCNC: 24 MMOL/L (ref 22–29)
EOSINOPHIL # BLD AUTO: 0 10E3/UL (ref 0–0.7)
EOSINOPHIL NFR BLD AUTO: 0 %
ERYTHROCYTE [DISTWIDTH] IN BLOOD BY AUTOMATED COUNT: 17.1 % (ref 10–15)
GFR SERPL CREATININE-BSD FRML MDRD: >90 ML/MIN/1.73M2
GLUCOSE BLDC GLUCOMTR-MCNC: 144 MG/DL (ref 70–99)
GLUCOSE BLDC GLUCOMTR-MCNC: 152 MG/DL (ref 70–99)
GLUCOSE SERPL-MCNC: 116 MG/DL (ref 70–99)
HCT VFR BLD AUTO: 31.5 % (ref 40–53)
HGB BLD-MCNC: 10 G/DL (ref 13.3–17.7)
HOLD SPECIMEN: NORMAL
IMM GRANULOCYTES # BLD: 0.1 10E3/UL
IMM GRANULOCYTES NFR BLD: 1 %
LYMPHOCYTES # BLD AUTO: 1.2 10E3/UL (ref 0.8–5.3)
LYMPHOCYTES NFR BLD AUTO: 9 %
MCH RBC QN AUTO: 28.5 PG (ref 26.5–33)
MCHC RBC AUTO-ENTMCNC: 31.7 G/DL (ref 31.5–36.5)
MCV RBC AUTO: 90 FL (ref 78–100)
MONOCYTES # BLD AUTO: 0.8 10E3/UL (ref 0–1.3)
MONOCYTES NFR BLD AUTO: 6 %
NEUTROPHILS # BLD AUTO: 11.6 10E3/UL (ref 1.6–8.3)
NEUTROPHILS NFR BLD AUTO: 84 %
NRBC # BLD AUTO: 0 10E3/UL
NRBC BLD AUTO-RTO: 0 /100
NT-PROBNP SERPL-MCNC: 121 PG/ML (ref 0–900)
PLATELET # BLD AUTO: 322 10E3/UL (ref 150–450)
POTASSIUM SERPL-SCNC: 4.1 MMOL/L (ref 3.4–5.3)
PROT SERPL-MCNC: 6.5 G/DL (ref 6.4–8.3)
PSA SERPL DL<=0.01 NG/ML-MCNC: 4.15 NG/ML (ref 0–6.5)
RADIOLOGIST FLAGS: ABNORMAL
RBC # BLD AUTO: 3.51 10E6/UL (ref 4.4–5.9)
SODIUM SERPL-SCNC: 139 MMOL/L (ref 136–145)
TROPONIN T SERPL HS-MCNC: 25 NG/L
UFH PPP CHRO-ACNC: >1.1 IU/ML
WBC # BLD AUTO: 13.7 10E3/UL (ref 4–11)

## 2023-05-01 PROCEDURE — 84484 ASSAY OF TROPONIN QUANT: CPT | Mod: ZL | Performed by: NURSE PRACTITIONER

## 2023-05-01 PROCEDURE — 99291 CRITICAL CARE FIRST HOUR: CPT | Mod: 25 | Performed by: STUDENT IN AN ORGANIZED HEALTH CARE EDUCATION/TRAINING PROGRAM

## 2023-05-01 PROCEDURE — 36415 COLL VENOUS BLD VENIPUNCTURE: CPT | Mod: ZL

## 2023-05-01 PROCEDURE — 71275 CT ANGIOGRAPHY CHEST: CPT

## 2023-05-01 PROCEDURE — 99417 PROLNG OP E/M EACH 15 MIN: CPT | Performed by: NURSE PRACTITIONER

## 2023-05-01 PROCEDURE — 99291 CRITICAL CARE FIRST HOUR: CPT | Performed by: STUDENT IN AN ORGANIZED HEALTH CARE EDUCATION/TRAINING PROGRAM

## 2023-05-01 PROCEDURE — 250N000011 HC RX IP 250 OP 636: Performed by: STUDENT IN AN ORGANIZED HEALTH CARE EDUCATION/TRAINING PROGRAM

## 2023-05-01 PROCEDURE — 96374 THER/PROPH/DIAG INJ IV PUSH: CPT | Performed by: STUDENT IN AN ORGANIZED HEALTH CARE EDUCATION/TRAINING PROGRAM

## 2023-05-01 PROCEDURE — 250N000011 HC RX IP 250 OP 636: Performed by: NURSE PRACTITIONER

## 2023-05-01 PROCEDURE — 250N000013 HC RX MED GY IP 250 OP 250 PS 637: Performed by: FAMILY MEDICINE

## 2023-05-01 PROCEDURE — 36415 COLL VENOUS BLD VENIPUNCTURE: CPT | Performed by: FAMILY MEDICINE

## 2023-05-01 PROCEDURE — 99222 1ST HOSP IP/OBS MODERATE 55: CPT | Mod: AI | Performed by: FAMILY MEDICINE

## 2023-05-01 PROCEDURE — 36415 COLL VENOUS BLD VENIPUNCTURE: CPT | Performed by: NURSE PRACTITIONER

## 2023-05-01 PROCEDURE — G0463 HOSPITAL OUTPT CLINIC VISIT: HCPCS | Mod: 25 | Performed by: NURSE PRACTITIONER

## 2023-05-01 PROCEDURE — 96375 TX/PRO/DX INJ NEW DRUG ADDON: CPT | Performed by: STUDENT IN AN ORGANIZED HEALTH CARE EDUCATION/TRAINING PROGRAM

## 2023-05-01 PROCEDURE — 83735 ASSAY OF MAGNESIUM: CPT | Performed by: FAMILY MEDICINE

## 2023-05-01 PROCEDURE — 85025 COMPLETE CBC W/AUTO DIFF WBC: CPT | Performed by: INTERNAL MEDICINE

## 2023-05-01 PROCEDURE — 84153 ASSAY OF PSA TOTAL: CPT | Mod: ZL

## 2023-05-01 PROCEDURE — 83880 ASSAY OF NATRIURETIC PEPTIDE: CPT

## 2023-05-01 PROCEDURE — 85379 FIBRIN DEGRADATION QUANT: CPT | Performed by: NURSE PRACTITIONER

## 2023-05-01 PROCEDURE — 93971 EXTREMITY STUDY: CPT | Mod: RT

## 2023-05-01 PROCEDURE — 85520 HEPARIN ASSAY: CPT | Performed by: STUDENT IN AN ORGANIZED HEALTH CARE EDUCATION/TRAINING PROGRAM

## 2023-05-01 PROCEDURE — 120N000001 HC R&B MED SURG/OB

## 2023-05-01 PROCEDURE — 80053 COMPREHEN METABOLIC PANEL: CPT | Mod: ZL

## 2023-05-01 PROCEDURE — 99215 OFFICE O/P EST HI 40 MIN: CPT | Performed by: NURSE PRACTITIONER

## 2023-05-01 PROCEDURE — 250N000012 HC RX MED GY IP 250 OP 636 PS 637: Performed by: FAMILY MEDICINE

## 2023-05-01 RX ORDER — OXYCODONE AND ACETAMINOPHEN 5; 325 MG/1; MG/1
1 TABLET ORAL EVERY 6 HOURS PRN
Status: DISCONTINUED | OUTPATIENT
Start: 2023-05-01 | End: 2023-05-01

## 2023-05-01 RX ORDER — HYDROCODONE BITARTRATE AND ACETAMINOPHEN 10; 325 MG/1; MG/1
1 TABLET ORAL
COMMUNITY
End: 2023-05-05

## 2023-05-01 RX ORDER — ONDANSETRON 2 MG/ML
4 INJECTION INTRAMUSCULAR; INTRAVENOUS EVERY 6 HOURS PRN
Status: DISCONTINUED | OUTPATIENT
Start: 2023-05-01 | End: 2023-05-02 | Stop reason: HOSPADM

## 2023-05-01 RX ORDER — ONDANSETRON 2 MG/ML
4 INJECTION INTRAMUSCULAR; INTRAVENOUS ONCE
Status: COMPLETED | OUTPATIENT
Start: 2023-05-01 | End: 2023-05-01

## 2023-05-01 RX ORDER — ACETAMINOPHEN 650 MG/1
650 SUPPOSITORY RECTAL EVERY 6 HOURS PRN
Status: DISCONTINUED | OUTPATIENT
Start: 2023-05-01 | End: 2023-05-02 | Stop reason: HOSPADM

## 2023-05-01 RX ORDER — CEFDINIR 300 MG/1
300 CAPSULE ORAL 2 TIMES DAILY
Status: DISCONTINUED | OUTPATIENT
Start: 2023-05-01 | End: 2023-05-01

## 2023-05-01 RX ORDER — ACETAMINOPHEN 325 MG/1
650-975 TABLET ORAL EVERY 6 HOURS PRN
Status: DISCONTINUED | OUTPATIENT
Start: 2023-05-01 | End: 2023-05-01

## 2023-05-01 RX ORDER — ONDANSETRON 4 MG/1
4 TABLET, ORALLY DISINTEGRATING ORAL EVERY 6 HOURS PRN
Status: DISCONTINUED | OUTPATIENT
Start: 2023-05-01 | End: 2023-05-02 | Stop reason: HOSPADM

## 2023-05-01 RX ORDER — PROCHLORPERAZINE MALEATE 5 MG
5 TABLET ORAL EVERY 6 HOURS PRN
Status: DISCONTINUED | OUTPATIENT
Start: 2023-05-01 | End: 2023-05-02 | Stop reason: HOSPADM

## 2023-05-01 RX ORDER — HYDROMORPHONE HYDROCHLORIDE 1 MG/ML
0.5 INJECTION, SOLUTION INTRAMUSCULAR; INTRAVENOUS; SUBCUTANEOUS ONCE
Status: COMPLETED | OUTPATIENT
Start: 2023-05-01 | End: 2023-05-01

## 2023-05-01 RX ORDER — TRAZODONE HYDROCHLORIDE 50 MG/1
50 TABLET, FILM COATED ORAL AT BEDTIME
Status: DISCONTINUED | OUTPATIENT
Start: 2023-05-01 | End: 2023-05-02 | Stop reason: HOSPADM

## 2023-05-01 RX ORDER — NALOXONE HYDROCHLORIDE 0.4 MG/ML
0.2 INJECTION, SOLUTION INTRAMUSCULAR; INTRAVENOUS; SUBCUTANEOUS
Status: DISCONTINUED | OUTPATIENT
Start: 2023-05-01 | End: 2023-05-02 | Stop reason: HOSPADM

## 2023-05-01 RX ORDER — PANTOPRAZOLE SODIUM 40 MG/1
40 TABLET, DELAYED RELEASE ORAL DAILY
Status: DISCONTINUED | OUTPATIENT
Start: 2023-05-02 | End: 2023-05-02 | Stop reason: HOSPADM

## 2023-05-01 RX ORDER — METFORMIN HCL 500 MG
1000 TABLET, EXTENDED RELEASE 24 HR ORAL 2 TIMES DAILY
Status: DISCONTINUED | OUTPATIENT
Start: 2023-05-01 | End: 2023-05-01

## 2023-05-01 RX ORDER — IOPAMIDOL 755 MG/ML
88 INJECTION, SOLUTION INTRAVASCULAR ONCE
Status: COMPLETED | OUTPATIENT
Start: 2023-05-01 | End: 2023-05-01

## 2023-05-01 RX ORDER — OMEPRAZOLE 40 MG/1
40 CAPSULE, DELAYED RELEASE ORAL DAILY
COMMUNITY
End: 2023-08-02

## 2023-05-01 RX ORDER — ACETAMINOPHEN 325 MG/1
650 TABLET ORAL EVERY 6 HOURS PRN
Status: DISCONTINUED | OUTPATIENT
Start: 2023-05-01 | End: 2023-05-02 | Stop reason: HOSPADM

## 2023-05-01 RX ORDER — NALOXONE HYDROCHLORIDE 0.4 MG/ML
0.4 INJECTION, SOLUTION INTRAMUSCULAR; INTRAVENOUS; SUBCUTANEOUS
Status: DISCONTINUED | OUTPATIENT
Start: 2023-05-01 | End: 2023-05-02 | Stop reason: HOSPADM

## 2023-05-01 RX ORDER — ATORVASTATIN CALCIUM 20 MG/1
20 TABLET, FILM COATED ORAL AT BEDTIME
Status: DISCONTINUED | OUTPATIENT
Start: 2023-05-01 | End: 2023-05-02 | Stop reason: HOSPADM

## 2023-05-01 RX ORDER — PROCHLORPERAZINE 25 MG
12.5 SUPPOSITORY, RECTAL RECTAL EVERY 12 HOURS PRN
Status: DISCONTINUED | OUTPATIENT
Start: 2023-05-01 | End: 2023-05-02 | Stop reason: HOSPADM

## 2023-05-01 RX ORDER — CEFUROXIME AXETIL 250 MG/1
500 TABLET ORAL EVERY 12 HOURS SCHEDULED
Status: DISCONTINUED | OUTPATIENT
Start: 2023-05-01 | End: 2023-05-02 | Stop reason: HOSPADM

## 2023-05-01 RX ORDER — DEXTROSE MONOHYDRATE 25 G/50ML
25-50 INJECTION, SOLUTION INTRAVENOUS
Status: DISCONTINUED | OUTPATIENT
Start: 2023-05-01 | End: 2023-05-02 | Stop reason: HOSPADM

## 2023-05-01 RX ORDER — PROCHLORPERAZINE MALEATE 10 MG
10 TABLET ORAL EVERY 6 HOURS PRN
COMMUNITY
End: 2023-05-30

## 2023-05-01 RX ORDER — HYDROCODONE BITARTRATE AND ACETAMINOPHEN 10; 325 MG/1; MG/1
1 TABLET ORAL
Status: DISCONTINUED | OUTPATIENT
Start: 2023-05-01 | End: 2023-05-02 | Stop reason: HOSPADM

## 2023-05-01 RX ORDER — LIDOCAINE 40 MG/G
CREAM TOPICAL
Status: DISCONTINUED | OUTPATIENT
Start: 2023-05-01 | End: 2023-05-02 | Stop reason: HOSPADM

## 2023-05-01 RX ORDER — HYDROXYZINE HYDROCHLORIDE 10 MG/1
10 TABLET, FILM COATED ORAL EVERY 6 HOURS PRN
Status: DISCONTINUED | OUTPATIENT
Start: 2023-05-01 | End: 2023-05-02 | Stop reason: HOSPADM

## 2023-05-01 RX ORDER — APIXABAN 5 MG (74)
KIT ORAL
Qty: 72 EACH | Refills: 0 | Status: SHIPPED | OUTPATIENT
Start: 2023-05-01 | End: 2023-05-31

## 2023-05-01 RX ORDER — HYDROMORPHONE HYDROCHLORIDE 1 MG/ML
0.5 INJECTION, SOLUTION INTRAMUSCULAR; INTRAVENOUS; SUBCUTANEOUS
Status: DISCONTINUED | OUTPATIENT
Start: 2023-05-01 | End: 2023-05-02 | Stop reason: HOSPADM

## 2023-05-01 RX ORDER — PREDNISONE 5 MG/1
5 TABLET ORAL 2 TIMES DAILY WITH MEALS
Status: DISCONTINUED | OUTPATIENT
Start: 2023-05-01 | End: 2023-05-02 | Stop reason: HOSPADM

## 2023-05-01 RX ORDER — AMLODIPINE BESYLATE 10 MG/1
10 TABLET ORAL AT BEDTIME
Status: DISCONTINUED | OUTPATIENT
Start: 2023-05-01 | End: 2023-05-02 | Stop reason: HOSPADM

## 2023-05-01 RX ORDER — NICOTINE POLACRILEX 4 MG
15-30 LOZENGE BUCCAL
Status: DISCONTINUED | OUTPATIENT
Start: 2023-05-01 | End: 2023-05-02 | Stop reason: HOSPADM

## 2023-05-01 RX ORDER — HEPARIN SODIUM 10000 [USP'U]/100ML
0-5000 INJECTION, SOLUTION INTRAVENOUS CONTINUOUS
Status: DISCONTINUED | OUTPATIENT
Start: 2023-05-01 | End: 2023-05-02 | Stop reason: HOSPADM

## 2023-05-01 RX ADMIN — IOPAMIDOL 88 ML: 755 INJECTION, SOLUTION INTRAVENOUS at 13:19

## 2023-05-01 RX ADMIN — INSULIN ASPART 1 UNITS: 100 INJECTION, SOLUTION INTRAVENOUS; SUBCUTANEOUS at 20:08

## 2023-05-01 RX ADMIN — PREGABALIN 150 MG: 100 CAPSULE ORAL at 21:51

## 2023-05-01 RX ADMIN — ONDANSETRON 4 MG: 2 INJECTION INTRAMUSCULAR; INTRAVENOUS at 17:54

## 2023-05-01 RX ADMIN — CEFUROXIME AXETIL 500 MG: 250 TABLET, FILM COATED ORAL at 19:25

## 2023-05-01 RX ADMIN — AMLODIPINE BESYLATE 10 MG: 10 TABLET ORAL at 21:51

## 2023-05-01 RX ADMIN — HEPARIN SODIUM 1750 UNITS/HR: 10000 INJECTION, SOLUTION INTRAVENOUS at 16:47

## 2023-05-01 RX ADMIN — ATORVASTATIN CALCIUM 20 MG: 20 TABLET, FILM COATED ORAL at 21:51

## 2023-05-01 RX ADMIN — PREDNISONE 5 MG: 5 TABLET ORAL at 19:26

## 2023-05-01 RX ADMIN — HYDROMORPHONE HYDROCHLORIDE 0.5 MG: 1 INJECTION, SOLUTION INTRAMUSCULAR; INTRAVENOUS; SUBCUTANEOUS at 17:54

## 2023-05-01 RX ADMIN — HYDROCODONE BITARTRATE AND ACETAMINOPHEN 1 TABLET: 10; 325 TABLET ORAL at 21:51

## 2023-05-01 RX ADMIN — HYDROCODONE BITARTRATE AND ACETAMINOPHEN 1 TABLET: 10; 325 TABLET ORAL at 19:26

## 2023-05-01 RX ADMIN — TRAZODONE HYDROCHLORIDE 50 MG: 50 TABLET ORAL at 21:51

## 2023-05-01 ASSESSMENT — ACTIVITIES OF DAILY LIVING (ADL)
ADLS_ACUITY_SCORE: 32
WEAR_GLASSES_OR_BLIND: YES
HEARING_DIFFICULTY_OR_DEAF: NO
VISION_MANAGEMENT: GLASSES
ADLS_ACUITY_SCORE: 26
TOILETING: 0-->INDEPENDENT
WALKING_OR_CLIMBING_STAIRS_DIFFICULTY: NO
TOILETING_ASSISTANCE: TOILETING DIFFICULTY, REQUIRES EQUIPMENT
DIFFICULTY_COMMUNICATING: NO
ADLS_ACUITY_SCORE: 26
CONCENTRATING,_REMEMBERING_OR_MAKING_DECISIONS_DIFFICULTY: NO
TOILETING_ISSUES: YES
DIFFICULTY_EATING/SWALLOWING: NO
DOING_ERRANDS_INDEPENDENTLY_DIFFICULTY: YES
ADLS_ACUITY_SCORE: 35
CHANGE_IN_FUNCTIONAL_STATUS_SINCE_ONSET_OF_CURRENT_ILLNESS/INJURY: YES
DIFFICULTY_COMMUNICATING: NO
FALL_HISTORY_WITHIN_LAST_SIX_MONTHS: NO
DRESSING/BATHING_DIFFICULTY: NO
TOILETING: 0-->INDEPENDENT
EQUIPMENT_CURRENTLY_USED_AT_HOME: CANE, STRAIGHT

## 2023-05-01 NOTE — PROGRESS NOTES
Oncology Follow-up Visit:  May 1, 2023  Diagnosis:Metastatic prostate cancer    History Of Present Illness:  Patient seen in infusion therapy for a chemotherapy status check. For history, please see previous notes. Patient is here for cycle 3 day 1 Docetaxel/prednisone for his prostate cancer. The patient was initially treated with apalutamide in addition to androgen deprivation therapy. He now developed progression with bony metastases in the left acetabulum with a left pubic symphysis nondisplaced fracture as well as locally advanced disease as well as likely liver metastases. Patient was felt to be a candidate for chemotherapy with docetaxel and prednisone, which is indicated in this patient. Patient did complete palliative radiation therapy to the left hip. Plan was to treat with Docetaxel given at a dose 75 mg/m2 on day 1 of a 21-day cycle with prednisone 5 mg p.o. b.i.d. Patient reports he had severe diarrhea after cycle 1. He states the diarrhea started on about day 3 and went on for about 5 days.  He also had burning and peeling of the skin on his hands. Chemotherapy dose was reduced by 20% with cycle 2. Patient reports this helped with the diarrhea. Patient was seen multiple times in the ER. He was found to have hematuria.  He was admitted to the hospital with neutropenic fever.  He was subsequently noted to have clots in his Jiménez catheter and was noted to have an elevated creatinine of obstructive uropathy noted. Continued to have worsening pain with bladder distention.  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  Cystoscopy was performed on 04/23/2023, and the findings were there was a large clot burden, which was evacuated. Approximately 300 mL of old clot was removed. The bladder was reexamined and noted to have a large, actively bleeding vessel.  There were a few small bleeders of the bladder, so bipolar loop was used to fulgurate these vessels.  There were mucosal changes  throughout the bladder consistent with radiation cystitis. The patient was continued on ceftriaxone and then discharged on cefepime. Patient was seen by Dr Fritz on 4/26/23 with shortness of breath. CXR was clear. Patient has bilateral leg edema and swelling of his legs that was felt to be fluid retention up to his thigh. He is taking Lasix which has helped the edema. Right lower extremity remains swollen. CBC today shows an elevated WBC of 13.7. He does take prednisone daily. Patient denies fevers. He is currently on an antibiotic. D-dimer and BNP drawn today for shortness of breath. D-dimer is elevated at 10.74, BNP is normal at 121. CT chest PE and venous ultrasound of right lower extremity done. Patient has acute segmental and subsegmental pulmonary emboli scattered throughout both lungs involving all lobes, worst in the right and left lower  lobes. US right lower extremity shows occlusive deep venous thrombosis of the right femoral, popliteal, and  posterior tibial veins.        Review Of Systems:  Review Of Systems  Respiratory: reports shortness of breath since starting chemotherapy, now worsening  Cardiovascular: denies chest pain  Gastrointestinal: denies abdominal pain, no diarrhea  Genitourinary: denies hematuria, hooks catheter in place  Musculoskeletal: reports right lower extremity swelling, no new bone pain  Neurologic: denies headaches  Hematologic/Lymphatic/Immunologic: denies fevers or chills      Nursing Notes:   Leona Colon LPN  5/1/2023 10:55 AM  Signed  Patient is being seen by provider in infusion where part of required rooming assessments and vitals were done by the infusion RN.  Leona MAY LPN...........5/1/2023 10:52 AM          Past medical, social, surgical, and family histories reviewed.    Allergies:  Allergies as of 05/01/2023     (No Known Allergies)       Current Medications:  Current Outpatient Medications   Medication Sig Dispense Refill     acetaminophen (TYLENOL) 325 MG tablet Take  2-3 tablets (650-975 mg) by mouth every 6 hours as needed for mild pain       amLODIPine (NORVASC) 10 MG tablet Take 1 tablet (10 mg) by mouth daily 90 tablet 3     atorvastatin (LIPITOR) 20 MG tablet Take 1 tablet (20 mg) by mouth daily 90 tablet 4     cefdinir (OMNICEF) 300 MG capsule Take 300 mg by mouth 2 times daily       cefTRIAXone IN D5W (ROCEPHIN) 40 MG/ML SOLN Inject 2 g into the vein every 24 hours       furosemide (LASIX) 20 MG tablet Take 1 tablet (20 mg) by mouth daily as needed 30 tablet 3     HYDROmorphone (DILAUDID) 1 MG/ML injection Inject 1 mL (1 mg) into the vein every hour as needed for severe pain  0     hydrOXYzine (ATARAX) 10 MG tablet Take 1 tablet (10 mg) by mouth every 6 hours as needed for itching or anxiety (with pain, moderate pain) 120 tablet 11     LORazepam (ATIVAN) 0.5 MG tablet Take 1 tablet (0.5 mg) by mouth every 6 hours as needed for anxiety 30 tablet 1     LORazepam (ATIVAN) 2 MG/ML injection Inject 0.5 mLs (1 mg) into the vein every 4 hours as needed for anxiety, agitation or muscle spasms       metFORMIN (GLUCOPHAGE XR) 500 MG 24 hr tablet Take 1,000 mg by mouth 2 times daily       ondansetron (ZOFRAN ODT) 4 MG ODT tab Take 1 tablet (4 mg) by mouth every 6 hours as needed for nausea or vomiting       ondansetron (ZOFRAN-ODT) 8 MG ODT tab Take 1 tablet (8 mg) by mouth every 8 hours as needed for nausea 90 tablet 1     oxyCODONE-acetaminophen (PERCOCET) 5-325 MG tablet Take 1 tablet by mouth every 6 hours as needed for severe pain (7-10) 40 tablet 0     pantoprazole (PROTONIX) 40 MG EC tablet Take 1 tablet (40 mg) by mouth every morning (before breakfast)       potassium chloride ER (KLOR-CON M) 20 MEQ CR tablet Take 1 tablet (20 mEq) by mouth daily as needed for potassium supplementation (Take if you take furosemide) 30 tablet 3     predniSONE (DELTASONE) 5 MG tablet Take 1 tablet (5 mg) by mouth 2 times daily 60 tablet 6     pregabalin (LYRICA) 150 MG capsule Take 150 mg by  mouth 2 times daily       traZODone (DESYREL) 50 MG tablet Take 50 mg by mouth At Bedtime          Physical Exam:  There were no vitals taken for this visit.    GENERAL APPEARANCE: 68 year old male, alert and no distress     RESP: respirations regular and unlabored, O2 sats are 95-97% of RA     ABDOMEN:  soft, nontender     MUSCULOSKELETAL: right lower extremity swelling noted     SKIN: no suspicious lesions or rashes on exposed skin     PSYCHIATRIC: mentation appears normal and affect normal    Laboratory/Imaging Studies  Lab on 05/01/2023   Component Date Value Ref Range Status     Sodium 05/01/2023 139  136 - 145 mmol/L Final     Potassium 05/01/2023 4.1  3.4 - 5.3 mmol/L Final     Chloride 05/01/2023 104  98 - 107 mmol/L Final     Carbon Dioxide (CO2) 05/01/2023 24  22 - 29 mmol/L Final     Anion Gap 05/01/2023 11  7 - 15 mmol/L Final     Urea Nitrogen 05/01/2023 12.0  8.0 - 23.0 mg/dL Final     Creatinine 05/01/2023 0.63 (L)  0.67 - 1.17 mg/dL Final     Calcium 05/01/2023 9.1  8.8 - 10.2 mg/dL Final     Glucose 05/01/2023 116 (H)  70 - 99 mg/dL Final     Alkaline Phosphatase 05/01/2023 68  40 - 129 U/L Final     AST 05/01/2023 11  10 - 50 U/L Final     ALT 05/01/2023 12  10 - 50 U/L Final     Protein Total 05/01/2023 6.5  6.4 - 8.3 g/dL Final     Albumin 05/01/2023 3.6  3.5 - 5.2 g/dL Final     Bilirubin Total 05/01/2023 0.5  <=1.2 mg/dL Final     GFR Estimate 05/01/2023 >90  >60 mL/min/1.73m2 Final    eGFR calculated using 2021 CKD-EPI equation.     PSA Tumor Marker 05/01/2023 4.15  0.00 - 6.50 ng/mL Final     Hold Specimen 05/01/2023 [facility]   Final   Oncology Visit on 04/26/2023   Component Date Value Ref Range Status     Lactate Dehydrogenase 04/26/2023 279 (H)  0 - 250 U/L Final     Sodium 04/26/2023 140  136 - 145 mmol/L Final     Potassium 04/26/2023 3.8  3.4 - 5.3 mmol/L Final     Chloride 04/26/2023 107  98 - 107 mmol/L Final     Carbon Dioxide (CO2) 04/26/2023 23  22 - 29 mmol/L Final     Anion Gap  04/26/2023 10  7 - 15 mmol/L Final     Urea Nitrogen 04/26/2023 5.1 (L)  8.0 - 23.0 mg/dL Final     Creatinine 04/26/2023 0.56 (L)  0.67 - 1.17 mg/dL Final     Calcium 04/26/2023 8.3 (L)  8.8 - 10.2 mg/dL Final     Glucose 04/26/2023 123 (H)  70 - 99 mg/dL Final     Alkaline Phosphatase 04/26/2023 64  40 - 129 U/L Final     AST 04/26/2023 12  10 - 50 U/L Final     ALT 04/26/2023 12  10 - 50 U/L Final     Protein Total 04/26/2023 5.5 (L)  6.4 - 8.3 g/dL Final     Albumin 04/26/2023 3.0 (L)  3.5 - 5.2 g/dL Final     Bilirubin Total 04/26/2023 0.4  <=1.2 mg/dL Final     GFR Estimate 04/26/2023 >90  >60 mL/min/1.73m2 Final    eGFR calculated using 2021 CKD-EPI equation.     WBC Count 04/26/2023 9.0  4.0 - 11.0 10e3/uL Final     RBC Count 04/26/2023 3.03 (L)  4.40 - 5.90 10e6/uL Final     Hemoglobin 04/26/2023 8.6 (L)  13.3 - 17.7 g/dL Final     Hematocrit 04/26/2023 26.1 (L)  40.0 - 53.0 % Final     MCV 04/26/2023 86  78 - 100 fL Final     MCH 04/26/2023 28.4  26.5 - 33.0 pg Final     MCHC 04/26/2023 33.0  31.5 - 36.5 g/dL Final     RDW 04/26/2023 16.1 (H)  10.0 - 15.0 % Final     Platelet Count 04/26/2023 216  150 - 450 10e3/uL Final     % Neutrophils 04/26/2023 70  % Final     % Lymphocytes 04/26/2023 15  % Final     % Monocytes 04/26/2023 9  % Final     % Eosinophils 04/26/2023 0  % Final     % Basophils 04/26/2023 0  % Final     % Immature Granulocytes 04/26/2023 6  % Final     NRBCs per 100 WBC 04/26/2023 1 (H)  <1 /100 Final     Absolute Neutrophils 04/26/2023 6.4  1.6 - 8.3 10e3/uL Final     Absolute Lymphocytes 04/26/2023 1.3  0.8 - 5.3 10e3/uL Final     Absolute Monocytes 04/26/2023 0.8  0.0 - 1.3 10e3/uL Final     Absolute Eosinophils 04/26/2023 0.0  0.0 - 0.7 10e3/uL Final     Absolute Basophils 04/26/2023 0.0  0.0 - 0.2 10e3/uL Final     Absolute Immature Granulocytes 04/26/2023 0.5 (H)  <=0.4 10e3/uL Final     Absolute NRBCs 04/26/2023 0.1  10e3/uL Final     PSA Tumor Marker 04/26/2023 4.43  0.00 - 6.50  ng/mL Final        ASSESSMENT/PLAN:  1. 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  Initially, the patient, was treated for PSA doubling time less than 10 months with apalutamide, in addition to androgen deprivation therapy.  He was started on apalutamide 240 mg daily.  His PSA dropped to 1.25, then began to rise to 2.06.  We elected to switch him to abiraterone and prednisone beginning in 08/2022. His PSA began to rise and now has developed progression with bony metastasis. The patient was started on docetaxel and prednisone and also received radiation therapy to the pubic symphysis.  Received 2 cycles of docetaxel.  Course complicated by neutropenic fever with a Klebsiella UTI, urosepsis as well as hematuria, resulting in acute renal failure requiring transfer to [facility], where the patient had clot evacuation as well as fulguration of multiple bleeding vessels in the bladder felt to be due to radiation cystitis. He is currently on antibiotics. The plan was to proceed with cycle 3 of docetaxel with dose reduced by 25% due to the neutropenia and cytopenias. Chemotherapy will be held today due to DVT/PE.  Plan is to resume chemotherapy when patient is stable. He will return in 2 weeks for follow up with provider and recheck of D-dimer. Will plan to restage the patient prior to his 4th cycle of docetaxel.       2. Shortness of breath. Patient reports shortness of breath since starting chemotherapy, now worsening. Patient was seen by Dr Fritz on 4/26/23 with shortness of breath. CXR was clear. CT to rule out embolism was not done last week. Patient reports shortness of breath has worsened. D-dimer today was elevated at 10.74, BNP was normal. Patient set up for venous US of RLE and CT chest PE to evaluate shortness of breath, leg swelling and elevated D-dimer.  CT chest PE and venous ultrasound of right lower extremity done. Patient has acute  segmental and subsegmental pulmonary emboli scattered throughout both lungs involving all lobes, worst in the right and left lower lobes. US right lower extremity shows occlusive deep venous thrombosis of the right femoral, popliteal, and posterior tibial veins. Results reviewed with Dr Fritz who would like patient seen in the ER for possible IV heparin. Spoke with ER provider who recommended outpatient treatment with apixiban. Dr Fritz felt the patient would need Lovenox. Patient and wife concerned about giving injections. Spoke with hospitalist who felt patient would benefit from IV heparin. Patient will be transferred to ER to wait for ICU bed this evening.     117 minutes spent with this encounter with time spent reviewing patient records, counseling patient regarding disease process, interpretation and review of labs and scans with patient and his wife, obtaining a review of systems, discussing scan results with multiple providers, discussing plan for treatment of PE with patient, ordering labs and imaging,  documenting in EHR and coordination of care

## 2023-05-01 NOTE — PROGRESS NOTES
[facility] – [place], [place] ADMISSION NOTE    Patient admitted to room 0312 at approximately 1825 via wheel chair from emergency room. Patient was accompanied by transport tech and nurse.     Verbal SBAR report received from aN prior to patient arrival.     Patient ambulated to bed with stand-by assist. Patient alert and oriented X 3. Pain is controlled with current analgesics.  Medication(s) being used: narcotic analgesics including oxycodone (Oxycontin, Oxyir).  . Admission vital signs: Blood pressure (!) 148/71, pulse 87, temperature 98.6  F (37  C), temperature source Tympanic, resp. rate 20, weight 98.5 kg (217 lb 2.5 oz), SpO2 96 %. Patient was oriented to plan of care, call light, bed controls, tv, telephone, bathroom and visiting hours.     Risk Assessment    The following safety risks were identified during admission: fall. Yellow risk band applied: YES.     Skin Initial Assessment    This writer admitted this patient and completed a full skin assessment and Lopez score in the Adult PCS flowsheet. Appropriate interventions initiated as needed.     Lopez Risk Assessment  Sensory Perception: 3-->slightly limited  Moisture: 4-->rarely moist  Activity: 3-->walks occasionally  Mobility: 3-->slightly limited  Nutrition: 3-->adequate  Friction and Shear: 3-->no apparent problem  Lopez Score: 19  Mattress: Standard gel/foam mattress (IsoFlex, Atmos Air, etc.)  Bed Frame: Standard width and length    Education    Patient has a Blain to Observation order: No  Observation education completed and documented: N/A      Oleg Carter RN

## 2023-05-01 NOTE — PROGRESS NOTES
"Pt requested to keep IV in until after visits are finished today. He's \"sick of getting poked.\" Akosua Sesay on 5/1/2023 at 1:08 PM    "

## 2023-05-01 NOTE — ED TRIAGE NOTES
Pt arrives to ER from clinic with PE. Pt has IV established in L AC. Pt reports SOB with activity.     Triage Assessment     Row Name 05/01/23 0251       Triage Assessment (Adult)    Airway WDL WDL       Respiratory WDL    Respiratory WDL WDL       Skin Circulation/Temperature WDL    Skin Circulation/Temperature WDL WDL       Cardiac WDL    Cardiac WDL WDL       Peripheral/Neurovascular WDL    Peripheral Neurovascular WDL WDL       Cognitive/Neuro/Behavioral WDL    Cognitive/Neuro/Behavioral WDL WDL

## 2023-05-01 NOTE — NURSING NOTE
Patient is being seen by provider in infusion where part of required rooming assessments and vitals were done by the infusion RN.  Leona MAY LPN...........5/1/2023 10:52 AM

## 2023-05-01 NOTE — NURSING NOTE
Infusion Nursing Note:  Dilip Kan presents today for Docetaxel c3d1.    Patient seen by provider today: Yes: Cynthia Galvin NP   present during visit today: Not Applicable.    Note: Patients infusion deferred today for 2 weeks and is too see Erum Fritz MD when and repeat D Dimer lab with next infusion visit. Patient wheeled by myself to ER and handoff given to staff.   See Cynthia Galvin NP note.     Intravenous Access:  Peripheral IV placed by CT.     Treatment Conditions:  Lab Results   Component Value Date    HGB 10.0 (L) 05/01/2023    WBC 13.7 (H) 05/01/2023    ANEU 2.2 04/22/2023    ANEUTAUTO 11.6 (H) 05/01/2023     05/01/2023      Lab Results   Component Value Date     05/01/2023    POTASSIUM 4.1 05/01/2023    MAG 2.4 (H) 04/22/2023    CR 0.63 (L) 05/01/2023    ROBERTO 9.1 05/01/2023    BILITOTAL 0.5 05/01/2023    ALBUMIN 3.6 05/01/2023    ALT 12 05/01/2023    AST 11 05/01/2023       Post Infusion Assessment:  N/A.     Discharge Plan:   Discharged to ER with IV intact from CT.      Anh Steinberg RN

## 2023-05-01 NOTE — H&P
"[facility]    History and Physical - Hospitalist Service       Date of Admission:  5/1/2023    Assessment & Plan      Dilip Kan is a 68 year old male admitted on 5/1/2023. He presented to oncology clinic today with complaints of shortness of breath.  Exam concerning for DVT.  Imaging confirmed lower extremity DVT, D-dimer elevated and CT angiogram was done and was positive for multiple bilateral pulmonary emboli.    Lower extremity DVT, pulmonary embolus.  Patient has known metastatic prostate cancer, on chemotherapy, has been doing well.  Currently normotensive.  Recently admitted and treated for neutropenic fever.  -Admit inpatient  -Heparin drip with close monitoring  -Telemetry  -Elevate leg as able  -Regular diet as tolerated  -Will need close oncology follow-up.  Will discuss with them preference for medication for discharge.  Lovenox injections versus DOAC.    Urinary retention. Hooks placed last week. Recent klebsiella sepsis/UTI.  -continue hooks cares  -continue cefuroxime to complete course of antibiotic therapy  -has follow up with urology.        Diet: Combination Diet Regular Diet Adult  DVT Prophylaxis: heparin drip  Hooks Catheter: Not present  Lines: None     Cardiac Monitoring: ACTIVE order. Indication: Tachyarrhythmias, acute (48 hours)  Code Status: Full Code    Clinically Significant Risk Factors Present on Admission               # Drug Induced Coagulation Defect: home medication list includes an anticoagulant medication        # DMII: A1C = 6.7 % (Ref range: 4.0 - 6.2 %) within past 6 months    # Obesity: Estimated body mass index is 30.38 kg/m  as calculated from the following:    Height as of 4/26/23: 1.778 m (5' 10\").    Weight as of this encounter: 96 kg (211 lb 11.2 oz).           Disposition Plan      Expected Discharge Date: 05/03/2023      Destination: home with family            Mikaela MAYManan Schneider, DO  Hospitalist Service  [facility]  Securely message with [employer] (more info)  Text page via [facility] Paging/Directory     ______________________________________________________________________    Chief Complaint   shortness of breath and leg swelling    History is obtained from the patient    History of Present Illness   Dilip Kan is a 68 year old male who presents with increasing shortness of breath.  Symptoms have been progressive over the last couple of days.  He was at his regular infusion visit with his oncology provider, Cynthia Christine, and he described the symptoms.  He was able to ambulate in and around the house without difficulty but now has increasing shortness of breath with any ADLs including going to the bathroom or washing his hair.  She also noticed some right leg swelling.  Labs and imaging were ordered.  Lower extremity ultrasound was positive for DVT.  CT was positive for pulmonary embolism.  At time of discussion with her I did not have beds open to admit him.  He went through the emergency room.  Otherwise has been stable with stable vital signs including normal blood pressure.  He has been taking his medications as directed.    Past medical history significant for prostate cancer.  He is status post radiation and prostatectomy about 10 years ago.  He was recently restarted on chemotherapy and had pelvic radiation.  Recently admitted to the hospital for Klebsiella urinary tract infection.  Jiménez catheter has been placed for urinary retention issues.  There is concern that he may have stricture or other issue contributing to his urinary problems.  Prostate antigen has been coming down with chemotherapy.  On time of my exam he otherwise feels well.  He denies nausea or vomiting.  No abdominal pain.  Jiménez catheter has been functioning normally.      Past Medical History    Past Medical History:   Diagnosis Date     Elevated prostate specific antigen (PSA)     4/10/2012     Encounter for other administrative  examinations     1/31/2014     Esophagitis     No Comments Provided     Gastro-esophageal reflux disease without esophagitis     No Comments Provided     Low back pain     No Comments Provided     Malignant neoplasm of prostate (H)     9/6/2012     Nicotine dependence, uncomplicated     Quit - October 2007 with chantix     Other intervertebral disc degeneration, lumbosacral region     12/1/2008       Past Surgical History   Past Surgical History:   Procedure Laterality Date     APPENDECTOMY OPEN  [id number]    Ruptured - [place]     ARTHROPLASTY KNEE Right 11/16/2021    Procedure: ARTHROPLASTY, KNEE, TOTAL;  Surgeon: Micah Rock MD;  Location: [facility] OR     COLONOSCOPY  08/31/2006    next due in 2016     DISKECTOMY, LUMBAR, SINGLE SP  03/16/2009    L 3-4 microdiskectomy, [facility]     ESOPHAGOSCOPY, GASTROSCOPY, DUODENOSCOPY (EGD), COMBINED  03/2003          ESOPHAGOSCOPY, GASTROSCOPY, DUODENOSCOPY (EGD), COMBINED  08/31/2006          PROSTATECTOMY PERINEAL RADICAL  11/28/2012    Nerve Sparring, Dr Warner     SPINE SURGERY N/A 04/28/2017    L3 to S1 spinal decompression L4/L5 fusion     TONSILLECTOMY, ADENOIDECTOMY, COMBINED      as a child     VARICOCELECTOMY  1959    INCISION AND DRAINAGE,EXCISE VARICOCELE       Prior to Admission Medications   Prior to Admission Medications   Prescriptions Last Dose Informant Patient Reported? Taking?   Apixaban Starter Pack (ELIQUIS DVT/PE STARTER PACK) 5 MG TBPK Unknown at NOT PICKED UP YET  No Yes   Sig: Take 10 mg by mouth 2 times daily for 7 days, THEN 5 mg 2 times daily for 23 days.   HYDROcodone-acetaminophen (NORCO)  MG per tablet 5/1/2023 at 1 DOSE ONLY  Yes Yes   Sig: Take 1 tablet by mouth 5 times daily   LORazepam (ATIVAN) 0.5 MG tablet 4/30/2023 at pm  No Yes   Sig: Take 1 tablet (0.5 mg) by mouth every 6 hours as needed for anxiety   amLODIPine (NORVASC) 10 MG tablet 4/30/2023 at pm Self No Yes   Sig: Take 1 tablet (10 mg) by mouth daily   atorvastatin  (LIPITOR) 20 MG tablet 4/30/2023 at pm Self No Yes   Sig: Take 1 tablet (20 mg) by mouth daily   cefdinir (OMNICEF) 300 MG capsule 5/1/2023 at am  Yes Yes   Sig: Take 300 mg by mouth 2 times daily   furosemide (LASIX) 20 MG tablet 5/1/2023 at AM  No Yes   Sig: Take 1 tablet (20 mg) by mouth daily as needed   hydrOXYzine (ATARAX) 10 MG tablet 4/30/2023 at pm Self No Yes   Sig: Take 1 tablet (10 mg) by mouth every 6 hours as needed for itching or anxiety (with pain, moderate pain)   metFORMIN (GLUCOPHAGE XR) 500 MG 24 hr tablet 4/30/2023 at pm  Yes Yes   Sig: Take 1,000 mg by mouth 2 times daily   omeprazole (PRILOSEC) 40 MG DR capsule 5/1/2023 at AM  Yes Yes   Sig: Take 40 mg by mouth daily   ondansetron (ZOFRAN-ODT) 8 MG ODT tab Unknown at UNKNOWN Self No Yes   Sig: Take 1 tablet (8 mg) by mouth every 8 hours as needed for nausea   oxyCODONE-acetaminophen (PERCOCET) 5-325 MG tablet Past Week at PRN Self No Yes   Sig: Take 1 tablet by mouth every 6 hours as needed for severe pain (7-10)   potassium chloride ER (KLOR-CON M) 20 MEQ CR tablet 5/1/2023 at am  No Yes   Sig: Take 1 tablet (20 mEq) by mouth daily as needed for potassium supplementation (Take if you take furosemide)   predniSONE (DELTASONE) 5 MG tablet 5/1/2023 at am Self No Yes   Sig: Take 1 tablet (5 mg) by mouth 2 times daily   pregabalin (LYRICA) 150 MG capsule 4/30/2023 at pm  Yes Yes   Sig: Take 150 mg by mouth 2 times daily   prochlorperazine (COMPAZINE) 10 MG tablet 5/1/2023 at AM-USING DAILY  Yes Yes   Sig: Take 10 mg by mouth every 6 hours as needed for nausea or vomiting   traZODone (DESYREL) 50 MG tablet 4/30/2023 at pm  Yes Yes   Sig: Take 50 mg by mouth At Bedtime      Facility-Administered Medications: None        Review of Systems    The 10 point Review of Systems is negative other than noted in the HPI or here.      Physical Exam   Vital Signs: Temp: 98.6  F (37  C) Temp src: Tympanic BP: (!) 148/71 Pulse: 87   Resp: 20 SpO2: 96 % O2 Device:  None (Room air)    Weight: 211 lbs 11.2 oz    General Appearance: Awake and alert.  In no acute distress.  Pleasant cooperative and interactive.  Asks appropriate questions.  Eyes: Extraocular muscles intact.  Lids normal  HEENT: Normocephalic, atraumatic.  Moist mucous membranes.  Respiratory: Clear to auscultation bilaterally.  No wheezing rhonchi or rales.  Cardiovascular: Regular rate with positive systolic ejection murmur.  Positive bilateral lower extremity edema right greater than left.  GI: Abdomen soft, nontender, nondistended  Genitourinary: Jiménez catheter in place  Skin: Warm and dry.  No rashes  Musculoskeletal: Moves arms and legs equally normally  Neurologic: No focal deficits  Psychiatric: Appropriate affect and insight    Medical Decision Making   60 MINUTES SPENT BY ME on the date of service doing chart review, history, exam, documentation & further activities per the note.      Data   ------------------------- PAST 24 HR DATA REVIEWED -----------------------------------------------    I have personally reviewed the following data over the past 24 hrs:    13.7 (H)  \   10.0 (L)   / 322     139 104 12.0 /  152 (H)   4.1 24 0.63 (L) \       ALT: 12 AST: 11 AP: 68 TBILI: 0.5   ALB: 3.6 TOT PROTEIN: 6.5 LIPASE: N/A       Trop: 25 (H) BNP: 121       INR:  N/A PTT:  N/A   D-dimer:  10.74 (H) Fibrinogen:  N/A       Imaging results reviewed over the past 24 hrs:   Recent Results (from the past 24 hour(s))   CT Chest Pulmonary Embolism w Contrast    Narrative    Exam:  CT CHEST PULMONARY EMBOLISM W CONTRAST    Exam reason:  Malignant neoplasm of prostate (H); Shortness of breath;  Elevated d-dimer    Technique:  Contrast enhanced helical CT pulmonary angiography was  performed. Sagittal and coronal reformats as well as coronal MIP  reformats were obtained. This CT was performed using one or more of  the following dose reduction techniques: automated exposure control,  adjustment of the mA and/or kV according  to patient size, and/or use  of iterative reconstruction technique.    Meds/Contrast: Isovue-[id number]    Comparison:  4/26/2023, 3/6/2023    FINDINGS:    Technical quality: Diagnostic.    Cardiovascular:   -There are acute segmental and subsegmental pulmonary emboli scattered  throughout both lungs involving all lobes, worst in the right and left  lower lobes.   -RV/LV ratio: 1.1  -There is aneurysmal dilation of the ascending thoracic aorta  measuring up to 4.5 cm.  Lungs/Airways: No mass or consolidation. There is minimal bibasilar  atelectasis.  Julisa/Mediastinum: No adenopathy or mass.  Pleura: No pleural effusion or pneumothorax.  Chest Wall/Axilla: Unremarkable.    Visualized Upper Abdomen: Unchanged hypodensity in the lateral right  lobe of the liver.  Musculoskeletal: No acute osseous abnormalities. There is partially  visualized lumbar fusion hardware.      Impression    IMPRESSION:    Acute segmental and subsegmental pulmonary emboli scattered throughout  both lungs involving all lobes, worst in the right and left lower  lobes. RV/LV ratio is 1.1.    These results were discussed with Cynthia Galvin CNP by Aura Reese MD on 5/1/2023 1:31 PM.         AURA REESE MD         SYSTEM ID:  [id number]   US Lower Extremity Venous Duplex Right   Result Value    Radiologist flags occlusive RLE DVT (Urgent)    Narrative    EXAMINATION: DOPPLER VENOUS ULTRASOUND OF THE RIGHT LOWER EXTREMITY,  5/1/2023 1:52 PM     COMPARISON: Ultrasound 11/18/2021    HISTORY: Malignant neoplasm of prostate (H); Shortness of breath    TECHNIQUE:  Gray-scale evaluation with compression, spectral flow, and  color Doppler assessment of the deep venous system of the right leg  from groin to the calf.    FINDINGS:  In the right lower extremity, the common femoral, greater saphenous  origin, and deep femoral veins demonstrate normal compressibility and  blood flow.    The right femoral, popliteal, and posterior tibial veins  are  noncompressible and demonstrate no significant intraluminal filling.  Peroneal veins not definitely seen.      Impression    IMPRESSION:  Occlusive deep venous thrombosis of the right femoral, popliteal, and  posterior tibial veins.    [Urgent Result: occlusive RLE DVT]    Finding was identified on 5/1/2023 2:04 PM.     Elza Lundberg RN, was is on the care team with Cynthia CURIEL CNP, was contacted by Dr. Beyer at 5/1/2023 2:15 PM and verbalized  understanding of the urgent finding.     TARUN BEYER MD         SYSTEM ID:  [id number]

## 2023-05-01 NOTE — PROGRESS NOTES
05/01/23 1816   Initial Information   Patient Belongings remains with patient   Patient Belongings Remaining with Patient cell phone/electronics;clothing;glasses;watch;dental appliance/dentures;keys;cane   Did you bring any home meds/supplements to the hospital?  No                    Admission:  I am responsible for any personal items that are not sent to the safe or pharmacy.  [facility] is not responsible for loss, theft or damage of any property in my possession.    Signature:  _________________________________ Date: _______  Time: _____                                              Staff Signature:  ____________________________ Date: ________  Time: _____      2nd Staff person, if patient is unable/unwilling to sign:    Signature: ________________________________ Date: ________  Time: _____     Discharge:  [facility] has returned all of my personal belongings:    Signature: _________________________________ Date: ________  Time: _____                                          Staff Signature:  ____________________________ Date: ________  Time: _____

## 2023-05-01 NOTE — ED PROVIDER NOTES
History     Chief Complaint   Patient presents with     pulmonary embolism     From clinic       Dilip Kan is a 68 year old male who presents from clinic with need for treatment for acute PE and DVT.  2 weeks of exertional dyspnea.  Seen in clinic today with significant elevated D-dimer with subsequent acute segmental and subsegmental PEs scattered throughout both lungs involving all lobes worse in the right and left lower lobes with RV/LV ratio 1.1 and right lower extremity DVT.  Denies any chest pain or leg pain, fever, cough, lightheadedness.  He has had some pain in his right lower foot.  Symptoms started shortly after a recent surgery.  Currently be treated for metastatic prostate cancer.    No Known Allergies    Patient Active Problem List    Diagnosis Date Noted     Acute kidney failure, unspecified (H) 04/21/2023     Priority: Medium     Infection due to Klebsiella pneumoniae 04/21/2023     Priority: Medium     Neutropenic fever (H) 04/19/2023     Priority: Medium     Bone metastasis 03/09/2023     Priority: Medium     Bone metastases 03/09/2023     Priority: Medium     Aneurysm of ascending aorta without rupture (H) 10/05/2022     Priority: Medium     Status post total right knee replacement 11/16/2021     Priority: Medium     Malignant neoplasm of prostate (H) 09/16/2021     Priority: Medium     Diabetes mellitus, type 2 (H) 11/23/2020     Priority: Medium     Plaque in heart artery-aorta 03/24/2020     Priority: Medium     Aortic valve stenosis with insufficiency, etiology of cardiac valve disease unspecified 03/22/2019     Priority: Medium     Encounter for examination required by Department of Transportation (DOT) 03/22/2019     Priority: Medium     Mild aortic stenosis 03/22/2019     Priority: Medium     Ascending aortic aneurysm-moderate at 4.6 cm on 3/11/20 03/22/2019     Priority: Medium     Aortic valve insufficiency-moderate to severe 03/22/2019     Priority: Medium     Hx of syncope  03/22/2019     Priority: Medium     History of tobacco abuse quitting 11/8/2002 03/22/2019     Priority: Medium     Hypertriglyceridemia 03/22/2019     Priority: Medium     Mild pulmonary hypertension (H) 03/22/2019     Priority: Medium     Palpitations 03/22/2019     Priority: Medium     Rising PSA following treatment for malignant neoplasm of prostate 03/29/2018     Priority: Medium     Chronic, continuous use of opioids 01/31/2018     Priority: Medium     Patient is followed by Wei Perez MD for ongoing prescription of pain medication.  All refills should be approved by this provider only at face-to-face appointments - not by phone request.    Medication(s): Hydrocodone.   Maximum quantity per month: 135  Clinic visit frequency required: Q 3 months   PDMP Review       Value Time User    State PDMP site checked  Yes 8/25/2021 10:41 AM Wei Perez MD        Controlled substance agreement:  Patient-Level CSA:    There are no patient-level csa.       Pain Clinic evaluation in the past: No    Opioid Risk Tool Total Score(s):  OPIOID RISK TOOL TOTAL SCORE 8/25/2021   Total Score 0   Total Risk Score Category Low Risk (0-3)            Other specified causes of urethral stricture 01/31/2018     Priority: Medium     GERD 04/28/2017     Priority: Medium     Essential hypertension 04/28/2017     Priority: Medium     Non morbid obesity due to excess calories 04/28/2017     Priority: Medium     Mixed hyperlipidemia 04/28/2017     Priority: Medium     Spinal stenosis of lumbar region with neurogenic claudication 04/28/2017     Priority: Medium     Controlled substance agreement updated 12- 01/15/2016     Priority: Medium     Backache 01/13/2014     Priority: Medium     Prostate cancer (H) 01/02/2013     Priority: Medium     Personal history of prostate cancer s/p prostatectomy and sEBRT 12/06/2012     Priority: Medium     Anemia due to acute blood loss 11/30/2012     Priority: Medium     Overview:   EBL  11/28/12:  1700 ml  EBL 11/28/12:  500 ml  Transfused 2 units pRBC's early on 11/29/12       Fever, postprocedural 11/30/2012     Priority: Medium     Overview:   11/30/12, mild       Pelvic pain in male 11/30/2012     Priority: Medium     Syncope 11/30/2012     Priority: Medium     Formatting of this note might be different from the original.  One episode, 11/28/12 evening         Past Medical History:   Diagnosis Date     Elevated prostate specific antigen (PSA)      Encounter for other administrative examinations      Esophagitis      Gastro-esophageal reflux disease without esophagitis      Low back pain      Malignant neoplasm of prostate (H)      Nicotine dependence, uncomplicated      Other intervertebral disc degeneration, lumbosacral region        Past Surgical History:   Procedure Laterality Date     APPENDECTOMY OPEN  [id number]    Ruptured - [place]     ARTHROPLASTY KNEE Right 11/16/2021    Procedure: ARTHROPLASTY, KNEE, TOTAL;  Surgeon: Micah Rock MD;  Location: [facility] OR     COLONOSCOPY  08/31/2006    next due in 2016     DISKECTOMY, LUMBAR, SINGLE SP  03/16/2009    L 3-4 microdiskectomy, [facility]     ESOPHAGOSCOPY, GASTROSCOPY, DUODENOSCOPY (EGD), COMBINED  03/2003          ESOPHAGOSCOPY, GASTROSCOPY, DUODENOSCOPY (EGD), COMBINED  08/31/2006          PROSTATECTOMY PERINEAL RADICAL  11/28/2012    Nerve Sparring, Dr Warner     SPINE SURGERY N/A 04/28/2017    L3 to S1 spinal decompression L4/L5 fusion     TONSILLECTOMY, ADENOIDECTOMY, COMBINED      as a child     VARICOCELECTOMY  1959    INCISION AND DRAINAGE,EXCISE VARICOCELE       Family History   Problem Relation Age of Onset     Heart Disease Father         Heart Disease, passed away from CHF,CAD     Colon Cancer Father         Cancer-colon     Hypertension Father         Hypertension     Other - See Comments Father         Stroke/Dementia     Hypertension Mother         Hypertension,HTN     Other - See Comments Mother          Parkinsons      Family History Negative Other         Good Health     Family History Negative Other         Good Health,previous marriage./previous marriage.     Family History Negative Other         Good Health,previous marriage.     Family History Negative Sister         Good Health,emotional problems.     Family History Negative Sister         Good Health     Other - See Comments Son         w/o major medical problems.     Other - See Comments Daughter         w/o major medical problems.       Social History     Tobacco Use     Smoking status: Former     Packs/day: 1.00     Years: 20.00     Pack years: 20.00     Types: Cigarettes     Quit date: 2002     Years since quittin.4     Passive exposure: Past     Smokeless tobacco: Current     Types: Snuff     Tobacco comments:     Can't remember when all he had passive exposure   Vaping Use     Vaping status: Never Used   Substance Use Topics     Alcohol use: Not Currently     Comment:  5 drinks a month     Drug use: No       Medications:    acetaminophen (TYLENOL) 325 MG tablet  amLODIPine (NORVASC) 10 MG tablet  Apixaban Starter Pack (ELIQUIS DVT/PE STARTER PACK) 5 MG TBPK  atorvastatin (LIPITOR) 20 MG tablet  cefdinir (OMNICEF) 300 MG capsule  furosemide (LASIX) 20 MG tablet  hydrOXYzine (ATARAX) 10 MG tablet  LORazepam (ATIVAN) 0.5 MG tablet  metFORMIN (GLUCOPHAGE XR) 500 MG 24 hr tablet  ondansetron (ZOFRAN ODT) 4 MG ODT tab  oxyCODONE-acetaminophen (PERCOCET) 5-325 MG tablet  pantoprazole (PROTONIX) 40 MG EC tablet  potassium chloride ER (KLOR-CON M) 20 MEQ CR tablet  predniSONE (DELTASONE) 5 MG tablet  pregabalin (LYRICA) 150 MG capsule  traZODone (DESYREL) 50 MG tablet  cefTRIAXone IN D5W (ROCEPHIN) 40 MG/ML SOLN  HYDROmorphone (DILAUDID) 1 MG/ML injection  LORazepam (ATIVAN) 2 MG/ML injection  ondansetron (ZOFRAN-ODT) 8 MG ODT tab        Review of Systems: See HPI for pertinent negatives and positives. All other systems reviewed and found to be negative.    Physical  Exam   BP (!) 140/70   Pulse 85   Temp 97.6  F (36.4  C) (Tympanic)   Resp 18   Wt 98.5 kg (217 lb 2.5 oz)   SpO2 96%   BMI 31.16 kg/m       General: awake, comfortable  HEENT: atraumatic  Respiratory: normal effort, clear to auscultation bilaterally  Cardiovascular: regular rate and rhythm, no murmurs  Abdomen: soft, nondistended, nontender  Extremities: no deformities, edema, or tenderness, right lower leg urinary collection bag in place  Skin: warm, dry, no rashes  Neuro: alert, no focal deficits  Psych: appropriate mood and affect    ED Course           Results for orders placed or performed during the hospital encounter of 05/01/23 (from the past 24 hour(s))   US Lower Extremity Venous Duplex Right   Result Value Ref Range    Radiologist flags occlusive RLE DVT (Urgent)     Narrative    EXAMINATION: DOPPLER VENOUS ULTRASOUND OF THE RIGHT LOWER EXTREMITY,  5/1/2023 1:52 PM     COMPARISON: Ultrasound 11/18/2021    HISTORY: Malignant neoplasm of prostate (H); Shortness of breath    TECHNIQUE:  Gray-scale evaluation with compression, spectral flow, and  color Doppler assessment of the deep venous system of the right leg  from groin to the calf.    FINDINGS:  In the right lower extremity, the common femoral, greater saphenous  origin, and deep femoral veins demonstrate normal compressibility and  blood flow.    The right femoral, popliteal, and posterior tibial veins are  noncompressible and demonstrate no significant intraluminal filling.  Peroneal veins not definitely seen.      Impression    IMPRESSION:  Occlusive deep venous thrombosis of the right femoral, popliteal, and  posterior tibial veins.    [Urgent Result: occlusive RLE DVT]    Finding was identified on 5/1/2023 2:04 PM.     Elza Lundberg RN, was is on the care team with Cynthia CURIEL CNP, was contacted by Dr. Beyer at 5/1/2023 2:15 PM and verbalized  understanding of the urgent finding.     TARUN BEYER MD         SYSTEM ID:  [id number]        Medications   heparin ANTICOAGULANT Loading dose for HIGH INTENSITY TREATMENT * Give BEFORE starting heparin infusion (has no administration in time range)   heparin 25,000 units in 0.45% NaCl 250 mL ANTICOAGULANT infusion (has no administration in time range)       Assessments & Plan (with Medical Decision Making)     I have reviewed the nursing notes.    68 year old male evaluated for need for IV anticoagulation with likely cancer provoked PE and right DVT.  Right heart strain appears to be evident on CT scan with RV/LV ratio 1.1.  Normal vitals on room air.  IV heparin with loading dose ordered.  Plan to admit for close monitoring.  Dr. Schneider accepts admission.    I have reviewed the findings, diagnosis, plan with the patient.    30 minutes of critical care time was spent with this patient, exclusive of all other separately billable services.    New Prescriptions    No medications on file       Final diagnoses:   Multiple subsegmental pulmonary emboli without acute cor pulmonale (H)   Acute deep vein thrombosis (DVT) of proximal vein of right lower extremity (H)       5/1/2023   [facility] [place]     Barron Ruffin MD  05/01/23 [id number]

## 2023-05-02 ENCOUNTER — APPOINTMENT (OUTPATIENT)
Dept: CARDIOLOGY | Facility: OTHER | Age: 68
DRG: 176 | End: 2023-05-02
Attending: FAMILY MEDICINE
Payer: COMMERCIAL

## 2023-05-02 VITALS
TEMPERATURE: 99.4 F | HEART RATE: 78 BPM | BODY MASS INDEX: 30.38 KG/M2 | WEIGHT: 211.7 LBS | SYSTOLIC BLOOD PRESSURE: 147 MMHG | OXYGEN SATURATION: 96 % | RESPIRATION RATE: 18 BRPM | DIASTOLIC BLOOD PRESSURE: 76 MMHG

## 2023-05-02 LAB
ANION GAP SERPL CALCULATED.3IONS-SCNC: 10 MMOL/L (ref 7–15)
BUN SERPL-MCNC: 10 MG/DL (ref 8–23)
CALCIUM SERPL-MCNC: 8.4 MG/DL (ref 8.8–10.2)
CHLORIDE SERPL-SCNC: 108 MMOL/L (ref 98–107)
CREAT SERPL-MCNC: 0.52 MG/DL (ref 0.67–1.17)
DEPRECATED HCO3 PLAS-SCNC: 23 MMOL/L (ref 22–29)
ERYTHROCYTE [DISTWIDTH] IN BLOOD BY AUTOMATED COUNT: 17.2 % (ref 10–15)
GFR SERPL CREATININE-BSD FRML MDRD: >90 ML/MIN/1.73M2
GLUCOSE BLDC GLUCOMTR-MCNC: 103 MG/DL (ref 70–99)
GLUCOSE BLDC GLUCOMTR-MCNC: 166 MG/DL (ref 70–99)
GLUCOSE SERPL-MCNC: 156 MG/DL (ref 70–99)
HCT VFR BLD AUTO: 28 % (ref 40–53)
HGB BLD-MCNC: 8.9 G/DL (ref 13.3–17.7)
HOLD SPECIMEN: NORMAL
LVEF ECHO: NORMAL
MAGNESIUM SERPL-MCNC: 1.7 MG/DL (ref 1.7–2.3)
MAGNESIUM SERPL-MCNC: 1.7 MG/DL (ref 1.7–2.3)
MCH RBC QN AUTO: 28.8 PG (ref 26.5–33)
MCHC RBC AUTO-ENTMCNC: 31.8 G/DL (ref 31.5–36.5)
MCV RBC AUTO: 91 FL (ref 78–100)
PLATELET # BLD AUTO: 262 10E3/UL (ref 150–450)
POTASSIUM SERPL-SCNC: 4.1 MMOL/L (ref 3.4–5.3)
RBC # BLD AUTO: 3.09 10E6/UL (ref 4.4–5.9)
SODIUM SERPL-SCNC: 141 MMOL/L (ref 136–145)
UFH PPP CHRO-ACNC: 0.57 IU/ML
UFH PPP CHRO-ACNC: 0.61 IU/ML
WBC # BLD AUTO: 7 10E3/UL (ref 4–11)

## 2023-05-02 PROCEDURE — 83735 ASSAY OF MAGNESIUM: CPT | Performed by: FAMILY MEDICINE

## 2023-05-02 PROCEDURE — 36415 COLL VENOUS BLD VENIPUNCTURE: CPT | Performed by: FAMILY MEDICINE

## 2023-05-02 PROCEDURE — 85027 COMPLETE CBC AUTOMATED: CPT | Performed by: FAMILY MEDICINE

## 2023-05-02 PROCEDURE — 93325 DOPPLER ECHO COLOR FLOW MAPG: CPT | Mod: 26 | Performed by: INTERNAL MEDICINE

## 2023-05-02 PROCEDURE — 85520 HEPARIN ASSAY: CPT | Performed by: FAMILY MEDICINE

## 2023-05-02 PROCEDURE — 250N000012 HC RX MED GY IP 250 OP 636 PS 637: Performed by: FAMILY MEDICINE

## 2023-05-02 PROCEDURE — 80048 BASIC METABOLIC PNL TOTAL CA: CPT | Performed by: FAMILY MEDICINE

## 2023-05-02 PROCEDURE — 250N000011 HC RX IP 250 OP 636: Performed by: FAMILY MEDICINE

## 2023-05-02 PROCEDURE — 250N000013 HC RX MED GY IP 250 OP 250 PS 637: Performed by: FAMILY MEDICINE

## 2023-05-02 PROCEDURE — 93325 DOPPLER ECHO COLOR FLOW MAPG: CPT

## 2023-05-02 PROCEDURE — 99239 HOSP IP/OBS DSCHRG MGMT >30: CPT | Performed by: FAMILY MEDICINE

## 2023-05-02 PROCEDURE — 93308 TTE F-UP OR LMTD: CPT | Mod: 26 | Performed by: INTERNAL MEDICINE

## 2023-05-02 PROCEDURE — 93321 DOPPLER ECHO F-UP/LMTD STD: CPT | Mod: 26 | Performed by: INTERNAL MEDICINE

## 2023-05-02 PROCEDURE — 93321 DOPPLER ECHO F-UP/LMTD STD: CPT

## 2023-05-02 RX ADMIN — PANTOPRAZOLE SODIUM 40 MG: 40 TABLET, DELAYED RELEASE ORAL at 09:38

## 2023-05-02 RX ADMIN — HEPARIN SODIUM 1450 UNITS/HR: 10000 INJECTION, SOLUTION INTRAVENOUS at 03:57

## 2023-05-02 RX ADMIN — HYDROCODONE BITARTRATE AND ACETAMINOPHEN 1 TABLET: 10; 325 TABLET ORAL at 09:35

## 2023-05-02 RX ADMIN — HYDROCODONE BITARTRATE AND ACETAMINOPHEN 1 TABLET: 10; 325 TABLET ORAL at 03:48

## 2023-05-02 RX ADMIN — PREGABALIN 150 MG: 100 CAPSULE ORAL at 09:35

## 2023-05-02 RX ADMIN — CEFUROXIME AXETIL 500 MG: 250 TABLET, FILM COATED ORAL at 07:47

## 2023-05-02 RX ADMIN — HEPARIN SODIUM 1450 UNITS/HR: 10000 INJECTION, SOLUTION INTRAVENOUS at 01:04

## 2023-05-02 RX ADMIN — HYDROCODONE BITARTRATE AND ACETAMINOPHEN 1 TABLET: 10; 325 TABLET ORAL at 13:56

## 2023-05-02 RX ADMIN — INSULIN ASPART 1 UNITS: 100 INJECTION, SOLUTION INTRAVENOUS; SUBCUTANEOUS at 07:41

## 2023-05-02 RX ADMIN — PREDNISONE 5 MG: 5 TABLET ORAL at 07:50

## 2023-05-02 RX ADMIN — APIXABAN 10 MG: 5 TABLET, FILM COATED ORAL at 16:52

## 2023-05-02 RX ADMIN — HYDROXYZINE HYDROCHLORIDE 10 MG: 10 TABLET ORAL at 08:05

## 2023-05-02 ASSESSMENT — ACTIVITIES OF DAILY LIVING (ADL)
ADLS_ACUITY_SCORE: 32

## 2023-05-02 NOTE — DISCHARGE SUMMARY
[facility]  Hospitalist Discharge Summary      Date of Admission:  5/1/2023  Date of Discharge:  5/2/2023  Discharging Provider: Mikaela Schneider DO  Discharge Service: Hospitalist Service    Discharge Diagnoses   Acute segmental and subsegmental DVT and PE, POA. Not due to recent surgery.   Urinary retention, POA    Follow-ups Needed After Discharge   Follow-up Appointments     Follow-up and recommended labs and tests       Follow up with primary care provider, Wei Perez, as scheduled             Unresulted Labs Ordered in the Past 30 Days of this Admission     No orders found for last 31 day(s).      These results will be followed up by PCP, oncology. urology    Discharge Disposition   Discharged to home  Condition at discharge: Stable    Hospital Course     Lower extremity DVT, pulmonary embolus.  Patient has known metastatic prostate cancer, on chemotherapy, has been doing well.  Currently normotensive.  Recently admitted and treated for neutropenic fever.  Was normotensive and afebrile this admission.  Completed course of antibiotic therapy that was prescribed on last admission.  He was put on a heparin drip.  Echocardiogram was done.  He has close follow-up with oncology and urology scheduled.  He will discharge on Eliquis.  Of significance he did have hematuria.  He has urology follow-up scheduled for tomorrow morning.  If he has ongoing bleeding he would benefit him to get to this appointment anyway.  We do not have urology at our facility anymore.  Hemoglobin dropped from 10-8.9 but in part was delusional.  He understands signs and symptoms to look for and if has any worsening symptoms problems with clotting etc. he will return immediately.    Urinary retention. Hooks placed last week. Recent klebsiella sepsis/UTI.  -continue hooks cares  -continue cefuroxime to complete course of antibiotic therapy  -has follow up with urology.     Chronic pain. Continue home medications.   Chris saw patient today briefly and will follow up with him.     Consultations This Hospital Stay   PHARMACY IP CONSULT    Code Status   Full Code    Time Spent on this Encounter   I, Mikaela Schneider DO, personally saw the patient today and spent greater than 30 minutes discharging this patient.       Mikaela Schneider DO  [facility]  [address]  [place] [address]  Phone: [phone]  Fax: [phone]  ______________________________________________________________________    Physical Exam   Vital Signs: Temp: 98.4  F (36.9  C) Temp src: Tympanic BP: 117/62 Pulse: 78   Resp: 16 SpO2: 93 % O2 Device: None (Room air)    Weight: 211 lbs 11.2 oz  General Appearance: AAO. NAD  Respiratory: CTA B/L  Cardiovascular: RR +FAITH  GI: abd soft, NT, ND  Skin: warm, dry  Other:         Primary Care Physician   Wei Perez    Discharge Orders      Reason for your hospital stay    Blood clot of legs and lungs     Activity    Your activity upon discharge: activity as tolerated     Follow-up and recommended labs and tests     Follow up with primary care provider, Wei Perez, as scheduled     Diet    Follow this diet upon discharge: Orders Placed This Encounter      Combination Diet Regular Diet Adult       Significant Results and Procedures   Most Recent 3 CBC's:Recent Labs   Lab Test 05/02/23  0659 05/01/23  1051 04/26/23  1301   WBC 7.0 13.7* 9.0   HGB 8.9* 10.0* 8.6*   MCV 91 90 86    322 216     Most Recent 3 BMP's:Recent Labs   Lab Test 05/02/23  0730 05/02/23  0659 05/01/23 2127 05/01/23  1926 05/01/23  1051 04/26/23  1301   NA  --  141  --   --  139 140   POTASSIUM  --  4.1  --   --  4.1 3.8   CHLORIDE  --  108*  --   --  104 107   CO2  --  23  --   --  24 23   BUN  --  10.0  --   --  12.0 5.1*   CR  --  0.52*  --   --  0.63* 0.56*   ANIONGAP  --  10  --   --  11 10   ROBERTO  --  8.4*  --   --  9.1 8.3*   * 156* 144*   < > 116* 123*    < > = values in this  interval not displayed.     Most Recent 3 INR's:Recent Labs   Lab Test 04/19/23  0240 11/01/18  0849 04/07/17  1531   INR 1.08 0.95 1.1     Most Recent INR's and Anticoagulation Dosing History:  Anticoagulation Dose History         Latest Ref Rng & Units 11/8/2012 4/7/2017 11/1/2018 4/19/2023   Recent Dosing and Labs   INR 0.85 - 1.15 1.0   1.1   0.95   1.08                Most Recent D-dimer:Recent Labs   Lab Test 05/01/23  1124   DD 10.74*   ,   Results for orders placed or performed during the hospital encounter of 05/01/23   US Lower Extremity Venous Duplex Right     Value    Radiologist flags occlusive RLE DVT (Urgent)    Narrative    EXAMINATION: DOPPLER VENOUS ULTRASOUND OF THE RIGHT LOWER EXTREMITY,  5/1/2023 1:52 PM     COMPARISON: Ultrasound 11/18/2021    HISTORY: Malignant neoplasm of prostate (H); Shortness of breath    TECHNIQUE:  Gray-scale evaluation with compression, spectral flow, and  color Doppler assessment of the deep venous system of the right leg  from groin to the calf.    FINDINGS:  In the right lower extremity, the common femoral, greater saphenous  origin, and deep femoral veins demonstrate normal compressibility and  blood flow.    The right femoral, popliteal, and posterior tibial veins are  noncompressible and demonstrate no significant intraluminal filling.  Peroneal veins not definitely seen.      Impression    IMPRESSION:  Occlusive deep venous thrombosis of the right femoral, popliteal, and  posterior tibial veins.    [Urgent Result: occlusive RLE DVT]    Finding was identified on 5/1/2023 2:04 PM.     Elza Lundberg RN, was is on the care team with Cynthia CURIEL CNP, was contacted by Dr. Beyer at 5/1/2023 2:15 PM and verbalized  understanding of the urgent finding.     TARUN BEYER MD         SYSTEM ID:  [id number]       Discharge Medications   Current Discharge Medication List      CONTINUE these medications which have NOT CHANGED    Details   amLODIPine (NORVASC) 10 MG  tablet Take 1 tablet (10 mg) by mouth daily  Qty: 90 tablet, Refills: 3    Associated Diagnoses: Essential hypertension      Apixaban Starter Pack (ELIQUIS DVT/PE STARTER PACK) 5 MG TBPK Take 10 mg by mouth 2 times daily for 7 days, THEN 5 mg 2 times daily for 23 days.  Qty: 72 each, Refills: 0    Associated Diagnoses: Malignant neoplasm of prostate (H); Shortness of breath; Elevated d-dimer; Other acute pulmonary embolism without acute cor pulmonale (H)      atorvastatin (LIPITOR) 20 MG tablet Take 1 tablet (20 mg) by mouth daily  Qty: 90 tablet, Refills: 4    Associated Diagnoses: Hyperlipidemia LDL goal <100      furosemide (LASIX) 20 MG tablet Take 1 tablet (20 mg) by mouth daily as needed  Qty: 30 tablet, Refills: 3    Associated Diagnoses: Shortness of breath      HYDROcodone-acetaminophen (NORCO)  MG per tablet Take 1 tablet by mouth 5 times daily      hydrOXYzine (ATARAX) 10 MG tablet Take 1 tablet (10 mg) by mouth every 6 hours as needed for itching or anxiety (with pain, moderate pain)  Qty: 120 tablet, Refills: 11    Associated Diagnoses: Chronic pain of right knee      LORazepam (ATIVAN) 0.5 MG tablet Take 1 tablet (0.5 mg) by mouth every 6 hours as needed for anxiety  Qty: 30 tablet, Refills: 1    Associated Diagnoses: Anxiety      metFORMIN (GLUCOPHAGE XR) 500 MG 24 hr tablet Take 1,000 mg by mouth 2 times daily      omeprazole (PRILOSEC) 40 MG DR capsule Take 40 mg by mouth daily      ondansetron (ZOFRAN-ODT) 8 MG ODT tab Take 1 tablet (8 mg) by mouth every 8 hours as needed for nausea  Qty: 90 tablet, Refills: 1    Associated Diagnoses: Nausea      oxyCODONE-acetaminophen (PERCOCET) 5-325 MG tablet Take 1 tablet by mouth every 6 hours as needed for severe pain (7-10)  Qty: 40 tablet, Refills: 0    Associated Diagnoses: Cancer associated pain      potassium chloride ER (KLOR-CON M) 20 MEQ CR tablet Take 1 tablet (20 mEq) by mouth daily as needed for potassium supplementation (Take if you take  furosemide)  Qty: 30 tablet, Refills: 3    Associated Diagnoses: Malignant neoplasm metastatic to bone (H)      predniSONE (DELTASONE) 5 MG tablet Take 1 tablet (5 mg) by mouth 2 times daily  Qty: 60 tablet, Refills: 6    Associated Diagnoses: Malignant neoplasm of prostate (H)      pregabalin (LYRICA) 150 MG capsule Take 150 mg by mouth 2 times daily      prochlorperazine (COMPAZINE) 10 MG tablet Take 10 mg by mouth every 6 hours as needed for nausea or vomiting      traZODone (DESYREL) 50 MG tablet Take 50 mg by mouth At Bedtime         STOP taking these medications       cefdinir (OMNICEF) 300 MG capsule Comments:   Reason for Stopping:         pantoprazole (PROTONIX) 40 MG EC tablet Comments:   Reason for Stopping:             Allergies   No Known Allergies

## 2023-05-02 NOTE — PROGRESS NOTES
SAFETY CHECKLIST  ID Bands and Risk clasps correct and in place (DNR, Fall risk, Allergy, Latex, Limb):  Yes  All Lines Reconciled and labeled correctly: Yes  Whiteboard updated:Yes  Environmental interventions: Yes  Verify Tele #:   2

## 2023-05-02 NOTE — PHARMACY-ADMISSION MEDICATION HISTORY
Pharmacist Admission Medication History    Admission medication history is complete. The information provided in this note is only as accurate as the sources available at the time of the update.    Medication reconciliation/reorder completed by provider prior to medication history? Yes    Information Source(s): Patient and CareEverywhere/SureScripts via in-person    Pertinent Information: Patient states he has not yet picked up the Eliquis starter Pack that was prescribed to him earlier in the day.  Patient states he has 4 doses left of his antibiotic to complete his 14-day course.  Patient also states he has been taking his furosemide/potassium every AM.  Per chart review, lisinopril was discontinued at last hospitalization due to TRINY.  Patient has started taking trazodone, lorazepam and prn hydroxyzine for sleep since beginning prednisone.    Changes made to PTA medication list:    Added: prochlorperazine, omeprazole, hydrocodone/apap    Deleted: acetaminophen, ceftriaxone, hydromorphone injection, lorazepam injection, pantoprazole    Changed: None    Medication Affordability: no issues    Allergies reviewed with patient and updates made in EHR: yes    Medication History Completed By: Ana Paula Hameed [facility] 5/1/2023 7:15 PM    Prior to Admission medications    Medication Sig Last Dose Taking? Auth Provider Long Term End Date   amLODIPine (NORVASC) 10 MG tablet Take 1 tablet (10 mg) by mouth daily 4/30/2023 at pm Yes Wei Perez MD Yes    Apixaban Starter Pack (ELIQUIS DVT/PE STARTER PACK) 5 MG TBPK Take 10 mg by mouth 2 times daily for 7 days, THEN 5 mg 2 times daily for 23 days. Unknown at NOT PICKED UP YET Yes Cynthia Galvin, MOISÉS CNP  5/31/23   atorvastatin (LIPITOR) 20 MG tablet Take 1 tablet (20 mg) by mouth daily 4/30/2023 at pm Yes Wei Perez MD Yes    cefdinir (OMNICEF) 300 MG capsule Take 300 mg by mouth 2 times daily 5/1/2023 at am Yes Reported, Patient  5/3/23   furosemide (LASIX) 20 MG  tablet Take 1 tablet (20 mg) by mouth daily as needed 5/1/2023 at AM Yes Erum Fritz MD Yes    HYDROcodone-acetaminophen (NORCO)  MG per tablet Take 1 tablet by mouth 5 times daily 5/1/2023 at 1 DOSE ONLY Yes Unknown, Entered By History No    hydrOXYzine (ATARAX) 10 MG tablet Take 1 tablet (10 mg) by mouth every 6 hours as needed for itching or anxiety (with pain, moderate pain) 4/30/2023 at pm Yes Haydee Abdul MD     LORazepam (ATIVAN) 0.5 MG tablet Take 1 tablet (0.5 mg) by mouth every 6 hours as needed for anxiety 4/30/2023 at pm Yes Erum Fritz MD     metFORMIN (GLUCOPHAGE XR) 500 MG 24 hr tablet Take 1,000 mg by mouth 2 times daily 4/30/2023 at pm Yes Reported, Patient No    omeprazole (PRILOSEC) 40 MG DR capsule Take 40 mg by mouth daily 5/1/2023 at AM Yes Unknown, Entered By History No    ondansetron (ZOFRAN-ODT) 8 MG ODT tab Take 1 tablet (8 mg) by mouth every 8 hours as needed for nausea Unknown at UNKNOWN Yes Erum Fritz MD     oxyCODONE-acetaminophen (PERCOCET) 5-325 MG tablet Take 1 tablet by mouth every 6 hours as needed for severe pain (7-10) Past Week at PRN Yes Erum Fritz MD No    potassium chloride ER (KLOR-CON M) 20 MEQ CR tablet Take 1 tablet (20 mEq) by mouth daily as needed for potassium supplementation (Take if you take furosemide) 5/1/2023 at am Yes Erum Fritz MD     predniSONE (DELTASONE) 5 MG tablet Take 1 tablet (5 mg) by mouth 2 times daily 5/1/2023 at am Yes Erum Fritz MD     pregabalin (LYRICA) 150 MG capsule Take 150 mg by mouth 2 times daily 4/30/2023 at pm Yes Reported, Patient No    prochlorperazine (COMPAZINE) 10 MG tablet Take 10 mg by mouth every 6 hours as needed for nausea or vomiting 5/1/2023 at AM-USING DAILY Yes Unknown, Entered By History No    traZODone (DESYREL) 50 MG tablet Take 50 mg by mouth At Bedtime 4/30/2023 at pm Yes Reported, Patient No

## 2023-05-02 NOTE — PHARMACY - DISCHARGE MEDICATION RECONCILIATION AND EDUCATION
Pharmacy:  Discharge Counseling and Medication Reconciliation    Dilip MAILE Lucius  [address] [place] [id number] [phone] (home)   68 year old male  PCP: Wei Perez    Allergies: Patient has no known allergies.    Discharge Counseling:    Pharmacist met with patient today to review the medication portion of the After Visit Summary (with an emphasis on NEW medications) and to address patient's questions/concerns.    Summary of Education: Counseled patient on new medication of Eliquis, including indication, administration, and possible common side effects. Counseled patient that this medication is used to help treat blood clots in lungs and leg- he will need to continue on this medication for at least 3 months and likely longer. He was also counseled on dosing schedule- he is to take 2 tablets twice daily for 7 days, then decrease to 1 tablet twice daily thereafter. Counseled him that this medication can put him at a higher risk of bleeding- he is to follow up if he is to have large amount of blood in urine or large-scale bruising, increasing shortness of breath or chest pain. Also counseled patient to avoid Ibuprofen, Naproxen, and Aspirin, as these medications can put him at an increased risk of bleeding while on this medication. Patient is also to avoid grapefruit juice.    Materials Provided:  MedCounselor sheets printed from Clinical Pharmacology on: Eliquis    Discharge Medication Reconciliation:    It has been determined that the patient has an adequate supply of medications available or which can be obtained from the patient's preferred pharmacy, which he has confirmed as: [facility].    Thank you for the consult.    Richard Vazquez [facility]........May 2, 2023 10:43 AM

## 2023-05-02 NOTE — PLAN OF CARE
Goal Outcome Evaluation:      Plan of Care Reviewed With: patient, spouse      .BAIRON MUÑOZ DISCHARGE NOTE    Patient discharged to home at 5:27 PM via ambulation. Accompanied by spouse and staff. Discharge instructions reviewed with patient and spouse, opportunity offered to ask questions. Prescriptions sent to patients preferred pharmacy. All belongings sent with patient.    Ashley Pires RN

## 2023-05-02 NOTE — PLAN OF CARE
Goal Outcome Evaluation:    Pt admitted 5/1/23 with DVT and PEs. His LS are diminished. No shortness of breath. On a cntinuous heparin drip, started in the ER. His anti xa was drawn around 2330 and came back critical at 1.10. MD notified. Heparin held 1 hr per protocol and restarted at 1450 units/hr. Around 0400 his hooks output durned dark fruit punch in color. Pt was very anxious about this due to recent bladder surgery. Discussed risk vs benefit of heparin and side effects. Will monitor hooks output. Pt unable to sleep due to concern for bladder complications. Ongoing reassurance provided. MD aware. PRN norco for back pain.       Plan of Care Reviewed With: patient    Overall Patient Progress: no changeOverall Patient Progress: no change

## 2023-05-03 ENCOUNTER — PATIENT OUTREACH (OUTPATIENT)
Dept: FAMILY MEDICINE | Facility: OTHER | Age: 68
End: 2023-05-03
Payer: MEDICARE

## 2023-05-03 DIAGNOSIS — C61 MALIGNANT NEOPLASM OF PROSTATE (H): ICD-10-CM

## 2023-05-03 RX ORDER — PREDNISONE 5 MG/1
5 TABLET ORAL 2 TIMES DAILY
Qty: 60 TABLET | Refills: 11 | Status: ON HOLD | OUTPATIENT
Start: 2023-05-03 | End: 2023-12-07

## 2023-05-03 NOTE — TELEPHONE ENCOUNTER
Per Dr. Fritz, patient is to continue taking prednisone 5 mg BID. Verbal order given to refill x1 year. Sugey Perez RN on 5/3/2023 at 2:47 PM

## 2023-05-03 NOTE — TELEPHONE ENCOUNTER
Transitional Care Management Phone Call      Summary of hospitalization:  [facility] discharge summary reviewed    DISCHARGE DIAGNOSIS:   DVT and PE, POA  Urinary retention, POA    DATE OF DISCHARGE: 5/2/23    Diagnostic Tests/Treatments reviewed.  Follow up needed: none    Post Discharge Medication Reconciliation: discharge medications reconciled, continue medications without change      Medications reviewed by: by myself    Problems taking medications regularly:  None    Problems adhering to non-medication therapy:  None    Other Healthcare Providers Involved in Patient's Care:         None    Update since discharge: improved.     Plan of care communicated with: patient    Just a friendly reminder that you appointment is:  Next 5 appointments (look out 90 days)    May 05, 2023  9:40 AM  Office Visit with Wei Perez MD  [facility] ([facility] ) [address]   [place] [address]  [phone]   May 17, 2023 10:30 AM  Return Visit with Erum Fritz MD  [facility] ([facility] ) [address]   [place] [address]  [phone]   May 23, 2023  2:00 PM  Return Visit with Erum Fritz MD  [facility] ([facility] ) [address]   [place] [address]  [phone]            We encourage you to keep this appointment.    Please remember to bring all of your pills in their bottles (including any vitamins or over the counter pills) with you to your appointment.     The patient indicates understanding of these issues and agrees with the plan of care.   Yes    Was the patient contacted within the 2 business days or other approved timeframe?  Yes    Was the Medication reconciliation and management done since the patient was discharged? Yes  Corinne R Thayer, RN   5/3/2023 9:28 AM

## 2023-05-05 ENCOUNTER — OFFICE VISIT (OUTPATIENT)
Dept: FAMILY MEDICINE | Facility: OTHER | Age: 68
End: 2023-05-05
Attending: FAMILY MEDICINE
Payer: COMMERCIAL

## 2023-05-05 VITALS
HEART RATE: 72 BPM | OXYGEN SATURATION: 96 % | SYSTOLIC BLOOD PRESSURE: 108 MMHG | RESPIRATION RATE: 20 BRPM | BODY MASS INDEX: 30.85 KG/M2 | TEMPERATURE: 97.9 F | DIASTOLIC BLOOD PRESSURE: 60 MMHG | WEIGHT: 215 LBS

## 2023-05-05 DIAGNOSIS — D70.9 NEUTROPENIC FEVER (H): ICD-10-CM

## 2023-05-05 DIAGNOSIS — C79.51 MALIGNANT NEOPLASM OF PROSTATE METASTATIC TO BONE (H): Primary | ICD-10-CM

## 2023-05-05 DIAGNOSIS — R33.9 URINARY RETENTION: ICD-10-CM

## 2023-05-05 DIAGNOSIS — I26.99 BILATERAL PULMONARY EMBOLISM (H): ICD-10-CM

## 2023-05-05 DIAGNOSIS — F11.90 CHRONIC, CONTINUOUS USE OF OPIOIDS: ICD-10-CM

## 2023-05-05 DIAGNOSIS — C61 MALIGNANT NEOPLASM OF PROSTATE METASTATIC TO BONE (H): Primary | ICD-10-CM

## 2023-05-05 DIAGNOSIS — R50.81 NEUTROPENIC FEVER (H): ICD-10-CM

## 2023-05-05 DIAGNOSIS — M48.062 SPINAL STENOSIS OF LUMBAR REGION WITH NEUROGENIC CLAUDICATION: ICD-10-CM

## 2023-05-05 DIAGNOSIS — R31.0 GROSS HEMATURIA: ICD-10-CM

## 2023-05-05 PROCEDURE — 99496 TRANSJ CARE MGMT HIGH F2F 7D: CPT | Performed by: FAMILY MEDICINE

## 2023-05-05 PROCEDURE — G0463 HOSPITAL OUTPT CLINIC VISIT: HCPCS | Mod: 25

## 2023-05-05 RX ORDER — HYDROCODONE BITARTRATE AND ACETAMINOPHEN 10; 325 MG/1; MG/1
1 TABLET ORAL
Qty: 150 TABLET | Refills: 0 | Status: SHIPPED | OUTPATIENT
Start: 2023-07-03 | End: 2023-05-24

## 2023-05-05 RX ORDER — HYDROCODONE BITARTRATE AND ACETAMINOPHEN 10; 325 MG/1; MG/1
1 TABLET ORAL
Qty: 150 TABLET | Refills: 0 | Status: SHIPPED | OUTPATIENT
Start: 2023-05-05 | End: 2023-05-24

## 2023-05-05 RX ORDER — HYDROCODONE BITARTRATE AND ACETAMINOPHEN 10; 325 MG/1; MG/1
1 TABLET ORAL
Qty: 150 TABLET | Refills: 0 | Status: SHIPPED | OUTPATIENT
Start: 2023-06-02 | End: 2023-05-24

## 2023-05-05 ASSESSMENT — ANXIETY QUESTIONNAIRES
1. FEELING NERVOUS, ANXIOUS, OR ON EDGE: SEVERAL DAYS
7. FEELING AFRAID AS IF SOMETHING AWFUL MIGHT HAPPEN: SEVERAL DAYS
4. TROUBLE RELAXING: SEVERAL DAYS
6. BECOMING EASILY ANNOYED OR IRRITABLE: NOT AT ALL
7. FEELING AFRAID AS IF SOMETHING AWFUL MIGHT HAPPEN: SEVERAL DAYS
GAD7 TOTAL SCORE: 4
3. WORRYING TOO MUCH ABOUT DIFFERENT THINGS: NOT AT ALL
IF YOU CHECKED OFF ANY PROBLEMS ON THIS QUESTIONNAIRE, HOW DIFFICULT HAVE THESE PROBLEMS MADE IT FOR YOU TO DO YOUR WORK, TAKE CARE OF THINGS AT HOME, OR GET ALONG WITH OTHER PEOPLE: NOT DIFFICULT AT ALL
GAD7 TOTAL SCORE: 4
2. NOT BEING ABLE TO STOP OR CONTROL WORRYING: SEVERAL DAYS
8. IF YOU CHECKED OFF ANY PROBLEMS, HOW DIFFICULT HAVE THESE MADE IT FOR YOU TO DO YOUR WORK, TAKE CARE OF THINGS AT HOME, OR GET ALONG WITH OTHER PEOPLE?: NOT DIFFICULT AT ALL
GAD7 TOTAL SCORE: 4
5. BEING SO RESTLESS THAT IT IS HARD TO SIT STILL: NOT AT ALL

## 2023-05-05 ASSESSMENT — PATIENT HEALTH QUESTIONNAIRE - PHQ9
SUM OF ALL RESPONSES TO PHQ QUESTIONS 1-9: 3
10. IF YOU CHECKED OFF ANY PROBLEMS, HOW DIFFICULT HAVE THESE PROBLEMS MADE IT FOR YOU TO DO YOUR WORK, TAKE CARE OF THINGS AT HOME, OR GET ALONG WITH OTHER PEOPLE: NOT DIFFICULT AT ALL
SUM OF ALL RESPONSES TO PHQ QUESTIONS 1-9: 3

## 2023-05-05 ASSESSMENT — PAIN SCALES - GENERAL: PAINLEVEL: NO PAIN (0)

## 2023-05-05 NOTE — PATIENT INSTRUCTIONS
Continue Eliquis at the 2 pills twice daily for 1 week total, then 1 pill twice indefinitely.   Dr Fritz can outline if the medication should stop  Refilled hydrocodone the same for 3 months

## 2023-05-05 NOTE — PROGRESS NOTES
Assessment & Plan       ICD-10-CM    1. Malignant neoplasm of prostate metastatic to bone (H)  C61 HYDROcodone-acetaminophen (NORCO)  MG per tablet    C79.51 HYDROcodone-acetaminophen (NORCO)  MG per tablet     HYDROcodone-acetaminophen (NORCO)  MG per tablet      2. Spinal stenosis of lumbar region with neurogenic claudication  M48.062       3. Chronic, continuous use of opioids  F11.90 HYDROcodone-acetaminophen (NORCO)  MG per tablet     HYDROcodone-acetaminophen (NORCO)  MG per tablet     HYDROcodone-acetaminophen (NORCO)  MG per tablet      4. Bilateral pulmonary embolism (H)  I26.99       5. Gross hematuria  R31.0       6. Urinary retention  R33.9       7. Neutropenic fever (H)  D70.9     R50.81         We discussed chronic opioid use.  In the setting of long-term use for noncancer pain, goal is to limit use.  He is not dealing with metastatic prostate cancer.  I offered a potential increase in hydrocodone since he received oxycodone in March to help with bone metastases.  He is not using oxycodone any longer after receiving palliative radiation.  He feels that current hydrocodone taking 50 mg daily is sufficient.  Provided a 3-month supply.  PDMP Review       Value Time User    State PDMP site checked  Yes 5/5/2023 10:36 AM Wei Perez MD         Continue with oncology for prostate cancer cares    Given underlying cancer, likely requires lifelong anticoagulation.  He is still completing high-dose Eliquis 10 mg twice daily for the first week, then will reduce to 5 mg twice daily indefinitely.  Seeing oncology in 2 weeks, recommend discussing duration of treatment and receiving a refill for Eliquis at that time.    Gross hematuria and urinary retention.  Saw urology in [place].  He did require emergency transfer to [facility] for clot evacuation and bladder fulguration previously as noted below.  We spent some time discussing whether a suprapubic catheter is a good idea.   "He should ask if this increases the risk for gross hematuria since he will need to be on Eliquis long-term due to PE.    Treated for neutropenic fever 2 weeks ago. No fever since.       MED REC REQUIRED  Post Medication Reconciliation Status:  Discharge medications reconciled, continue medications without change    BMI:   Estimated body mass index is 30.85 kg/m  as calculated from the following:    Height as of 4/26/23: 1.778 m (5' 10\").    Weight as of this encounter: 97.5 kg (215 lb).     Return in about 3 months (around 8/5/2023).    Wei Perez MD  [facility]    Varun Aly is a 68 year old, presenting for the following health issues:  Hospital F/U        5/5/2023     9:59 AM   Additional Questions   Roomed by love cisse lpn   Accompanied by -         5/5/2023     9:59 AM   Patient Reported Additional Medications   Patient reports taking the following new medications -     [facility]       Hospital Follow-up Visit:    Hospital/Nursing Home/ Rehab Facility: [facility]  Date of Admission: 5-1-23  Date of Discharge: 5-2-23  Reason(s) for Admission: DVT and PE, POA    Was your hospitalization related to COVID-19? No   Problems taking medications regularly:  None  Medication changes since discharge: Add: Eliquis.  Discontinue: Protonix and Cefdinir  Problems adhering to non-medication therapy:  None    Summary of hospitalization:  [facility] discharge summary reviewed  Diagnostic Tests/Treatments reviewed.  Follow up needed: further labs  Other Healthcare Providers Involved in Patient s Care:         Specialist appointment - oncology and urology  Update since discharge: stable.         Plan of care communicated with patient     Hospitalized April 19 for neutropenic fever.  Developed gross hematuria.  Required transfer to [place] for urologic cares.  Had cystoscopy with clot evacuation and bladder fulguration.  Op report notes:  1. Large clot burden " evacuated  2. Minor oozing vessels at bladder neck fulgurated, no active arterial bleeding  3. Mucosal changes consistent with radiation cystitis present    Difficulties with urinary retention since then    Hospitalized May 1 for PE.  Started on Eliquis.  He initially had some slight hematuria, but that improved    Discharged and able to see urology in [place] May 3.  His catheter was replaced and is no longer leaking.  Due to discomfort, suprapubic catheter placement was discussed.  He is still considering that option.    Continues chemotherapy for prostate cancer metastatic to bone    Has been on hydrocodone chronically for low back pain status post 2 previous lumbar surgeries.  He received some oxycodone in March for bone pain.  Since radiation the bone pains improved.  Overall feels that hydrocodone is sufficient currently for pain.    Review of Systems   General: Denies general constitutional problems  Cardiovascular: Denies problems  Respiratory: Denies problems       Objective    /60 (BP Location: Right arm, Patient Position: Sitting, Cuff Size: Adult Large)   Pulse 72   Temp 97.9  F (36.6  C) (Tympanic)   Resp 20   Wt 97.5 kg (215 lb)   SpO2 96%   BMI 30.85 kg/m    Body mass index is 30.85 kg/m .  Physical Exam   General Appearance: Alert. No acute distress  Chest/Respiratory Exam: Clear to auscultation bilaterally  Cardiovascular Exam: Regular rate and rhythm. S1, S2, no murmur, gallop, or rubs.  Extremities: 2+ pedal pulses.  No lower extremity edema.  Psychiatric: Normal affect and mentation              Answers for HPI/ROS submitted by the patient on 5/5/2023  If you checked off any problems, how difficult have these problems made it for you to do your work, take care of things at home, or get along with other people?: Not difficult at all  PHQ9 TOTAL SCORE: 3  MARY 7 TOTAL SCORE: 4

## 2023-05-05 NOTE — NURSING NOTE
"Patient presents to the clinic for hospital follow up.    FOOD SECURITY SCREENING QUESTIONS:    The next two questions are to help us understand your food security.  If you are feeling you need any assistance in this area, we have resources available to support you today.    Hunger Vital Signs:  Within the past 12 months we worried whether our food would run out before we got money to buy more. Never  Within the past 12 months the food we bought just didn't last and we didn't have money to get more. Never    Advance Care Directive on file? no  Advance Care Directive provided to patient? Declined.      Chief Complaint   Patient presents with     Hospital F/U       Initial /60 (BP Location: Right arm, Patient Position: Sitting, Cuff Size: Adult Large)   Pulse 72   Temp 97.9  F (36.6  C) (Tympanic)   Resp 20   Wt 97.5 kg (215 lb)   SpO2 96%   BMI 30.85 kg/m   Estimated body mass index is 30.85 kg/m  as calculated from the following:    Height as of 4/26/23: 1.778 m (5' 10\").    Weight as of this encounter: 97.5 kg (215 lb).  Medication Reconciliation: complete        Leona Shell LPN       "

## 2023-05-10 ENCOUNTER — TELEPHONE (OUTPATIENT)
Dept: ONCOLOGY | Facility: OTHER | Age: 68
End: 2023-05-10
Payer: MEDICARE

## 2023-05-10 NOTE — TELEPHONE ENCOUNTER
Per Erum Fritz MD cycle 3 Docetaxel will be on 5/23 and scans should be pushed out to after cycle 3 is complete. Patient aware that scans will be rescheduled.  Elza Lundberg RN...........5/10/2023 3:43 PM

## 2023-05-12 ENCOUNTER — TELEPHONE (OUTPATIENT)
Dept: ONCOLOGY | Facility: OTHER | Age: 68
End: 2023-05-12
Payer: MEDICARE

## 2023-05-12 NOTE — TELEPHONE ENCOUNTER
Requesting refill of Lorazepam and Hydrocodone. Does also have controlled substance agreement for Hydrocodone  5 times daily. He still has remaining refill for lorazepam. States will use hydrocodone and reach out to Dr. Perze if needed.  Elza Lundberg RN...........5/12/2023 1:37 PM

## 2023-05-16 DIAGNOSIS — C79.51 MALIGNANT NEOPLASM METASTATIC TO BONE (H): Primary | ICD-10-CM

## 2023-05-16 DIAGNOSIS — R97.21 RISING PSA FOLLOWING TREATMENT FOR MALIGNANT NEOPLASM OF PROSTATE: ICD-10-CM

## 2023-05-16 DIAGNOSIS — C61 MALIGNANT NEOPLASM OF PROSTATE (H): ICD-10-CM

## 2023-05-16 RX ORDER — MEPERIDINE HYDROCHLORIDE 25 MG/ML
25 INJECTION INTRAMUSCULAR; INTRAVENOUS; SUBCUTANEOUS EVERY 30 MIN PRN
Status: CANCELLED | OUTPATIENT
Start: 2023-05-23

## 2023-05-16 RX ORDER — ALBUTEROL SULFATE 90 UG/1
1-2 AEROSOL, METERED RESPIRATORY (INHALATION)
Status: CANCELLED
Start: 2023-05-23

## 2023-05-16 RX ORDER — HEPARIN SODIUM (PORCINE) LOCK FLUSH IV SOLN 100 UNIT/ML 100 UNIT/ML
5 SOLUTION INTRAVENOUS
Status: CANCELLED | OUTPATIENT
Start: 2023-05-23

## 2023-05-16 RX ORDER — HEPARIN SODIUM,PORCINE 10 UNIT/ML
5 VIAL (ML) INTRAVENOUS
Status: CANCELLED | OUTPATIENT
Start: 2023-05-23

## 2023-05-16 RX ORDER — DIPHENHYDRAMINE HYDROCHLORIDE 50 MG/ML
50 INJECTION INTRAMUSCULAR; INTRAVENOUS
Status: CANCELLED
Start: 2023-05-23

## 2023-05-16 RX ORDER — ALBUTEROL SULFATE 0.83 MG/ML
2.5 SOLUTION RESPIRATORY (INHALATION)
Status: CANCELLED | OUTPATIENT
Start: 2023-05-23

## 2023-05-16 RX ORDER — METHYLPREDNISOLONE SODIUM SUCCINATE 125 MG/2ML
125 INJECTION, POWDER, LYOPHILIZED, FOR SOLUTION INTRAMUSCULAR; INTRAVENOUS
Status: CANCELLED
Start: 2023-05-23

## 2023-05-16 RX ORDER — LORAZEPAM 2 MG/ML
0.5 INJECTION INTRAMUSCULAR EVERY 4 HOURS PRN
Status: CANCELLED | OUTPATIENT
Start: 2023-05-23

## 2023-05-16 RX ORDER — EPINEPHRINE 1 MG/ML
0.3 INJECTION, SOLUTION, CONCENTRATE INTRAVENOUS EVERY 5 MIN PRN
Status: CANCELLED | OUTPATIENT
Start: 2023-05-23

## 2023-05-17 ENCOUNTER — TELEPHONE (OUTPATIENT)
Dept: ONCOLOGY | Facility: OTHER | Age: 68
End: 2023-05-17
Payer: MEDICARE

## 2023-05-17 NOTE — TELEPHONE ENCOUNTER
Patient was a no show on 5/17/23 for his appointment with Dr Fritz.  He is scheduled for treatment on 5/23/23.  This is ok to give as long as he is on an anticoagulant.  No future appointments scheduled at this time.  Will route to Dr Fritz to find out when he should follow up.

## 2023-05-18 NOTE — TELEPHONE ENCOUNTER
Needs to see Erum Fritz MD week of June 5 after scans complete.  Elza Lundberg RN...........5/18/2023 4:06 PM

## 2023-05-23 ENCOUNTER — ALLIED HEALTH/NURSE VISIT (OUTPATIENT)
Dept: FAMILY MEDICINE | Facility: OTHER | Age: 68
End: 2023-05-23
Attending: INTERNAL MEDICINE
Payer: COMMERCIAL

## 2023-05-23 ENCOUNTER — HOSPITAL ENCOUNTER (OUTPATIENT)
Dept: INFUSION THERAPY | Facility: OTHER | Age: 68
Discharge: HOME | End: 2023-05-23
Attending: INTERNAL MEDICINE
Payer: COMMERCIAL

## 2023-05-23 VITALS
DIASTOLIC BLOOD PRESSURE: 51 MMHG | TEMPERATURE: 97.8 F | WEIGHT: 222.2 LBS | SYSTOLIC BLOOD PRESSURE: 113 MMHG | RESPIRATION RATE: 20 BRPM | OXYGEN SATURATION: 98 % | HEART RATE: 75 BPM | BODY MASS INDEX: 31.88 KG/M2

## 2023-05-23 DIAGNOSIS — I26.99 OTHER ACUTE PULMONARY EMBOLISM WITHOUT ACUTE COR PULMONALE (H): ICD-10-CM

## 2023-05-23 DIAGNOSIS — C79.51 MALIGNANT NEOPLASM METASTATIC TO BONE (H): Primary | ICD-10-CM

## 2023-05-23 DIAGNOSIS — C79.51 MALIGNANT NEOPLASM METASTATIC TO BONE (H): ICD-10-CM

## 2023-05-23 DIAGNOSIS — R79.89 ELEVATED D-DIMER: ICD-10-CM

## 2023-05-23 DIAGNOSIS — R97.21 RISING PSA FOLLOWING TREATMENT FOR MALIGNANT NEOPLASM OF PROSTATE: ICD-10-CM

## 2023-05-23 DIAGNOSIS — C61 MALIGNANT NEOPLASM OF PROSTATE (H): ICD-10-CM

## 2023-05-23 LAB
ALBUMIN SERPL BCG-MCNC: 3.4 G/DL (ref 3.5–5.2)
ALP SERPL-CCNC: 66 U/L (ref 40–129)
ALT SERPL W P-5'-P-CCNC: 10 U/L (ref 10–50)
ANION GAP SERPL CALCULATED.3IONS-SCNC: 11 MMOL/L (ref 7–15)
AST SERPL W P-5'-P-CCNC: 25 U/L (ref 10–50)
BASOPHILS # BLD AUTO: 0 10E3/UL (ref 0–0.2)
BASOPHILS NFR BLD AUTO: 1 %
BILIRUB SERPL-MCNC: 0.4 MG/DL
BUN SERPL-MCNC: 15.1 MG/DL (ref 8–23)
CALCIUM SERPL-MCNC: 9.2 MG/DL (ref 8.8–10.2)
CALCIUM SERPL-MCNC: 9.2 MG/DL (ref 8.8–10.2)
CHLORIDE SERPL-SCNC: 104 MMOL/L (ref 98–107)
CREAT SERPL-MCNC: 0.54 MG/DL (ref 0.67–1.17)
D DIMER PPP FEU-MCNC: 0.88 UG/ML FEU (ref 0–0.5)
DEPRECATED HCO3 PLAS-SCNC: 21 MMOL/L (ref 22–29)
EOSINOPHIL # BLD AUTO: 0.2 10E3/UL (ref 0–0.7)
EOSINOPHIL NFR BLD AUTO: 3 %
ERYTHROCYTE [DISTWIDTH] IN BLOOD BY AUTOMATED COUNT: 15.6 % (ref 10–15)
GFR SERPL CREATININE-BSD FRML MDRD: >90 ML/MIN/1.73M2
GLUCOSE SERPL-MCNC: 147 MG/DL (ref 70–99)
HCT VFR BLD AUTO: 29.6 % (ref 40–53)
HGB BLD-MCNC: 9.5 G/DL (ref 13.3–17.7)
IMM GRANULOCYTES # BLD: 0 10E3/UL
IMM GRANULOCYTES NFR BLD: 0 %
LYMPHOCYTES # BLD AUTO: 1.2 10E3/UL (ref 0.8–5.3)
LYMPHOCYTES NFR BLD AUTO: 16 %
MCH RBC QN AUTO: 28.7 PG (ref 26.5–33)
MCHC RBC AUTO-ENTMCNC: 32.1 G/DL (ref 31.5–36.5)
MCV RBC AUTO: 89 FL (ref 78–100)
MONOCYTES # BLD AUTO: 0.5 10E3/UL (ref 0–1.3)
MONOCYTES NFR BLD AUTO: 7 %
NEUTROPHILS # BLD AUTO: 5.7 10E3/UL (ref 1.6–8.3)
NEUTROPHILS NFR BLD AUTO: 73 %
NRBC # BLD AUTO: 0 10E3/UL
NRBC BLD AUTO-RTO: 0 /100
PHOSPHATE SERPL-MCNC: 3.6 MG/DL (ref 2.5–4.5)
PLATELET # BLD AUTO: 225 10E3/UL (ref 150–450)
POTASSIUM SERPL-SCNC: 4.5 MMOL/L (ref 3.4–5.3)
PROT SERPL-MCNC: 6.2 G/DL (ref 6.4–8.3)
PSA SERPL DL<=0.01 NG/ML-MCNC: 2.24 NG/ML (ref 0–6.5)
RBC # BLD AUTO: 3.31 10E6/UL (ref 4.4–5.9)
SODIUM SERPL-SCNC: 136 MMOL/L (ref 136–145)
WBC # BLD AUTO: 7.7 10E3/UL (ref 4–11)

## 2023-05-23 PROCEDURE — 96367 TX/PROPH/DG ADDL SEQ IV INF: CPT

## 2023-05-23 PROCEDURE — 36415 COLL VENOUS BLD VENIPUNCTURE: CPT | Performed by: INTERNAL MEDICINE

## 2023-05-23 PROCEDURE — 84100 ASSAY OF PHOSPHORUS: CPT | Performed by: INTERNAL MEDICINE

## 2023-05-23 PROCEDURE — 96413 CHEMO IV INFUSION 1 HR: CPT

## 2023-05-23 PROCEDURE — 85025 COMPLETE CBC W/AUTO DIFF WBC: CPT | Performed by: INTERNAL MEDICINE

## 2023-05-23 PROCEDURE — 82310 ASSAY OF CALCIUM: CPT | Performed by: INTERNAL MEDICINE

## 2023-05-23 PROCEDURE — 250N000011 HC RX IP 250 OP 636: Performed by: INTERNAL MEDICINE

## 2023-05-23 PROCEDURE — 258N000003 HC RX IP 258 OP 636: Performed by: INTERNAL MEDICINE

## 2023-05-23 PROCEDURE — 36415 COLL VENOUS BLD VENIPUNCTURE: CPT

## 2023-05-23 PROCEDURE — 80053 COMPREHEN METABOLIC PANEL: CPT | Performed by: INTERNAL MEDICINE

## 2023-05-23 PROCEDURE — 85379 FIBRIN DEGRADATION QUANT: CPT

## 2023-05-23 PROCEDURE — 84153 ASSAY OF PSA TOTAL: CPT | Performed by: INTERNAL MEDICINE

## 2023-05-23 RX ORDER — DIPHENHYDRAMINE HYDROCHLORIDE 50 MG/ML
50 INJECTION INTRAMUSCULAR; INTRAVENOUS
Status: CANCELLED
Start: 2023-06-20

## 2023-05-23 RX ORDER — ALBUTEROL SULFATE 0.83 MG/ML
2.5 SOLUTION RESPIRATORY (INHALATION)
Status: CANCELLED | OUTPATIENT
Start: 2023-06-20

## 2023-05-23 RX ORDER — ALBUTEROL SULFATE 90 UG/1
1-2 AEROSOL, METERED RESPIRATORY (INHALATION)
Status: CANCELLED
Start: 2023-06-20

## 2023-05-23 RX ORDER — EPINEPHRINE 1 MG/ML
0.3 INJECTION, SOLUTION, CONCENTRATE INTRAVENOUS EVERY 5 MIN PRN
Status: CANCELLED | OUTPATIENT
Start: 2023-06-20

## 2023-05-23 RX ORDER — MEPERIDINE HYDROCHLORIDE 50 MG/ML
25 INJECTION INTRAMUSCULAR; INTRAVENOUS; SUBCUTANEOUS EVERY 30 MIN PRN
Status: CANCELLED | OUTPATIENT
Start: 2023-06-20

## 2023-05-23 RX ORDER — METHYLPREDNISOLONE SODIUM SUCCINATE 125 MG/2ML
125 INJECTION, POWDER, LYOPHILIZED, FOR SOLUTION INTRAMUSCULAR; INTRAVENOUS
Status: CANCELLED
Start: 2023-06-20

## 2023-05-23 RX ADMIN — DOCETAXEL 124 MG: 20 INJECTION, SOLUTION, CONCENTRATE INTRAVENOUS at 13:42

## 2023-05-23 RX ADMIN — SODIUM CHLORIDE 250 ML: 9 INJECTION, SOLUTION INTRAVENOUS at 12:38

## 2023-05-23 RX ADMIN — DEXAMETHASONE SODIUM PHOSPHATE: 10 INJECTION, SOLUTION INTRAMUSCULAR; INTRAVENOUS at 12:47

## 2023-05-23 NOTE — PROGRESS NOTES
Patient presented to clinic for xgeva injection. Patient had appointment in infusion and was to receive his xgeva injection there. Labs completed by infusion nurse. Medication was removed from nurse procedure room med fridge. Writer received a call from infusion RN stating that patient is refusing his Xgeva today. Documented in MAR and medication returned to fridge as instructed by pharmacist, Rozina. Writer called and spoke with CHANDRA Phillips, in oncology to notify team that patient did not receive his Xgeva today.    MICHELLE MATUTE RN on 5/23/2023 at 1:07 PM

## 2023-05-23 NOTE — NURSING NOTE
"Infusion Nursing Note:  Dilip Kan presents today for Docetaxel cycle 3 day 1.    Patient seen by provider today: No   present during visit today: Not Applicable.    Note: Pt confirms that he is currently still taking Eliquis and prednisone. Pt verbalizes that pain in L hip is \"unbearable.\" Pt states that he is taking his Norco with no relief and states he is going to \"run out early\" because he is taking his Norco for \"both his back pain and now his hip pain so he is having to take more.\" Discussed pain management plan with patient and he states that he has discussed his pain contract with Dr. Perez. Pt states that Dr. Perez will not make any changes at this time. Pt does not wish for Dr. Fritz to make any changes at this time as it will void his current pain contract. Pt also due for Xgeva injection today. While inserting IV, clarified with patient that labs should be drawn for this injection today and he was in agreement. Later, patient states that he does not want Xgeva today as he \"has one bad hip so you can't give me a shot in the other and give me 2 bad hips.\" Discussed benefits of injection and informed patient that labs for this injection had already been drawn. He verbalizes understanding and continues to decline injection today. Dr. Fritz and RN Care Coordinator LISA Bertrand informed of the above.       Intravenous Access:  Labs drawn without difficulty.  Peripheral IV placed.    Treatment Conditions:  Lab Results   Component Value Date    HGB 9.5 (L) 05/23/2023    WBC 7.7 05/23/2023    ANEU 2.2 04/22/2023    ANEUTAUTO 5.7 05/23/2023     05/23/2023      Lab Results   Component Value Date     05/23/2023    POTASSIUM 4.5 05/23/2023    MAG 1.7 05/02/2023    CR 0.54 (L) 05/23/2023    ROBERTO 9.2 05/23/2023    ROBERTO 9.2 05/23/2023    BILITOTAL 0.4 05/23/2023    ALBUMIN 3.4 (L) 05/23/2023    ALT 10 05/23/2023    AST 25 05/23/2023     Results reviewed, labs MET treatment parameters, " ok to proceed with treatment.      Post Infusion Assessment:  Patient tolerated infusion without incident.  Blood return noted pre and post infusion.  Site patent and intact, free from redness, edema or discomfort.  No evidence of extravasations.  Access discontinued per protocol.       Discharge Plan:   Discharge instructions reviewed with: Patient.  Patient and/or family verbalized understanding of discharge instructions and all questions answered.  Copy of AVS refused. Patient will return 6-13-23 for next appointment.  AVS to patient via MYCHART.  Patient discharged in stable condition accompanied by: self.  Departure Mode: Ambulatory with cane.      Josselin Smith RN

## 2023-05-24 DIAGNOSIS — C79.51 MALIGNANT NEOPLASM OF PROSTATE METASTATIC TO BONE (H): ICD-10-CM

## 2023-05-24 DIAGNOSIS — F11.90 CHRONIC, CONTINUOUS USE OF OPIOIDS: ICD-10-CM

## 2023-05-24 DIAGNOSIS — C61 MALIGNANT NEOPLASM OF PROSTATE METASTATIC TO BONE (H): ICD-10-CM

## 2023-05-24 RX ORDER — HYDROCODONE BITARTRATE AND ACETAMINOPHEN 10; 325 MG/1; MG/1
1 TABLET ORAL
Qty: 180 TABLET | Refills: 0 | Status: SHIPPED | OUTPATIENT
Start: 2023-05-26 | End: 2023-06-29 | Stop reason: ALTCHOICE

## 2023-05-24 RX ORDER — HYDROCODONE BITARTRATE AND ACETAMINOPHEN 10; 325 MG/1; MG/1
1 TABLET ORAL
Qty: 180 TABLET | Refills: 0 | Status: SHIPPED | OUTPATIENT
Start: 2023-06-23 | End: 2023-06-29 | Stop reason: ALTCHOICE

## 2023-05-24 RX ORDER — HYDROCODONE BITARTRATE AND ACETAMINOPHEN 10; 325 MG/1; MG/1
1 TABLET ORAL
Qty: 180 TABLET | Refills: 0 | Status: SHIPPED | OUTPATIENT
Start: 2023-07-24 | End: 2023-06-29 | Stop reason: ALTCHOICE

## 2023-05-24 NOTE — TELEPHONE ENCOUNTER
Reason for call: Medication or medication refill    Name of medication requested: Hydrocodone  And another one from [employer]  That he has not received in 2 months    Are you out of the medication? Out on Sunday he is using the Hydrocodone faster because the other one never came thru from [employer].    What pharmacy do you use?  [facility]    Preferred method for responding to this message: Telephone Call    Phone number patient can be reached at: Home number on file [phone] (home)    If we cannot reach you directly, may we leave a detailed response at the number you provided? Yes       Angelica Varela on 5/24/2023 at 10:25 AM

## 2023-05-24 NOTE — TELEPHONE ENCOUNTER
Sent in new prescription for 6/day, 180 pills each time starting 5/26.  Refill 6/23 (early due to a weekend), and then 7/24.  PDMP Review       Value Time User    State PDMP site checked  Yes 5/24/2023  3:36 PM Wei Perez MD

## 2023-05-24 NOTE — TELEPHONE ENCOUNTER
"Called and spoke to Patient after verifying last name and date of birth. Pt informed of provider's response. Pt states he was managing his back pain with 5 per day. Now with the hip pain, he is needing more. He will run short Sunday, and states, \"if I had 25 or 30 more, I wouldn't be calling.\" Pt requesting new prescription to extend out previous prescription at 6 per day.    PLEASE CALL PT WITH STATUS Friday MORNING!    Xiao Donald RN .............. 5/24/2023  3:12 PM    "

## 2023-05-24 NOTE — TELEPHONE ENCOUNTER
I asked him if he wanted to take more hydrocodone, but he felt 5/day was adequate.  He was not using oxycodone at all.  He should really only be on 1 of these 2 medications.  Okay to receive from oncology.  If he wants prescriptions from me, I need to know how many he is going to take per day. Usually insurance will cover 6 per day, looks like his average is 6.5 per day.

## 2023-05-25 NOTE — TELEPHONE ENCOUNTER
Message left with patient in regards to increase dose to 6 tablets daily and dates these can be filled    Corinne R Thayer, RN on 5/25/2023 at 12:54 PM

## 2023-05-25 NOTE — TELEPHONE ENCOUNTER
Patient called back and was notified of the below information. Sugey Perez RN on 5/25/2023 at 4:06 PM

## 2023-06-01 ENCOUNTER — HOSPITAL ENCOUNTER (OUTPATIENT)
Dept: NUCLEAR MEDICINE | Facility: OTHER | Age: 68
Discharge: HOME | End: 2023-06-01
Attending: NURSE PRACTITIONER
Payer: COMMERCIAL

## 2023-06-01 ENCOUNTER — HOSPITAL ENCOUNTER (OUTPATIENT)
Dept: CT IMAGING | Facility: OTHER | Age: 68
Discharge: HOME | End: 2023-06-01
Attending: NURSE PRACTITIONER
Payer: COMMERCIAL

## 2023-06-01 ENCOUNTER — HOSPITAL ENCOUNTER (OUTPATIENT)
Dept: NUCLEAR MEDICINE | Facility: OTHER | Age: 68
Setting detail: NUCLEAR MEDICINE
Discharge: HOME | End: 2023-06-01
Attending: NURSE PRACTITIONER
Payer: COMMERCIAL

## 2023-06-01 DIAGNOSIS — C79.51 MALIGNANT NEOPLASM METASTATIC TO BONE (H): ICD-10-CM

## 2023-06-01 DIAGNOSIS — C61 PROSTATE CANCER (H): ICD-10-CM

## 2023-06-01 PROCEDURE — 343N000001 HC RX 343: Performed by: NURSE PRACTITIONER

## 2023-06-01 PROCEDURE — 250N000011 HC RX IP 250 OP 636: Performed by: NURSE PRACTITIONER

## 2023-06-01 PROCEDURE — 78306 BONE IMAGING WHOLE BODY: CPT

## 2023-06-01 PROCEDURE — A9503 TC99M MEDRONATE: HCPCS | Performed by: NURSE PRACTITIONER

## 2023-06-01 PROCEDURE — 74177 CT ABD & PELVIS W/CONTRAST: CPT

## 2023-06-01 RX ORDER — IOPAMIDOL 755 MG/ML
128 INJECTION, SOLUTION INTRAVASCULAR ONCE
Status: COMPLETED | OUTPATIENT
Start: 2023-06-01 | End: 2023-06-01

## 2023-06-01 RX ORDER — TC 99M MEDRONATE 20 MG/10ML
27.4 INJECTION, POWDER, LYOPHILIZED, FOR SOLUTION INTRAVENOUS ONCE
Status: COMPLETED | OUTPATIENT
Start: 2023-06-01 | End: 2023-06-01

## 2023-06-01 RX ADMIN — IOPAMIDOL 128 ML: 755 INJECTION, SOLUTION INTRAVENOUS at 10:01

## 2023-06-01 RX ADMIN — TC 99M MEDRONATE 27.4 MILLICURIE: 20 INJECTION, POWDER, LYOPHILIZED, FOR SOLUTION INTRAVENOUS at 09:35

## 2023-06-08 ENCOUNTER — TELEPHONE (OUTPATIENT)
Dept: ONCOLOGY | Facility: OTHER | Age: 68
End: 2023-06-08
Payer: COMMERCIAL

## 2023-06-13 ENCOUNTER — HOSPITAL ENCOUNTER (OUTPATIENT)
Dept: INFUSION THERAPY | Facility: OTHER | Age: 68
Discharge: HOME | End: 2023-06-13
Attending: INTERNAL MEDICINE | Admitting: INTERNAL MEDICINE
Payer: COMMERCIAL

## 2023-06-13 VITALS
HEART RATE: 75 BPM | TEMPERATURE: 98.4 F | SYSTOLIC BLOOD PRESSURE: 134 MMHG | BODY MASS INDEX: 31.6 KG/M2 | RESPIRATION RATE: 18 BRPM | WEIGHT: 220.2 LBS | DIASTOLIC BLOOD PRESSURE: 63 MMHG

## 2023-06-13 DIAGNOSIS — C61 MALIGNANT NEOPLASM OF PROSTATE (H): ICD-10-CM

## 2023-06-13 DIAGNOSIS — R97.21 RISING PSA FOLLOWING TREATMENT FOR MALIGNANT NEOPLASM OF PROSTATE: ICD-10-CM

## 2023-06-13 DIAGNOSIS — C79.51 MALIGNANT NEOPLASM METASTATIC TO BONE (H): Primary | ICD-10-CM

## 2023-06-13 LAB
ALBUMIN SERPL BCG-MCNC: 3.3 G/DL (ref 3.5–5.2)
ALP SERPL-CCNC: 67 U/L (ref 40–129)
ALT SERPL W P-5'-P-CCNC: 20 U/L (ref 0–70)
ANION GAP SERPL CALCULATED.3IONS-SCNC: 11 MMOL/L (ref 7–15)
AST SERPL W P-5'-P-CCNC: 13 U/L (ref 0–45)
BASOPHILS # BLD AUTO: 0.1 10E3/UL (ref 0–0.2)
BASOPHILS NFR BLD AUTO: 0 %
BILIRUB SERPL-MCNC: 0.2 MG/DL
BUN SERPL-MCNC: 13.8 MG/DL (ref 8–23)
CALCIUM SERPL-MCNC: 9.1 MG/DL (ref 8.8–10.2)
CHLORIDE SERPL-SCNC: 104 MMOL/L (ref 98–107)
CREAT SERPL-MCNC: 0.67 MG/DL (ref 0.67–1.17)
DEPRECATED HCO3 PLAS-SCNC: 21 MMOL/L (ref 22–29)
EOSINOPHIL # BLD AUTO: 0 10E3/UL (ref 0–0.7)
EOSINOPHIL NFR BLD AUTO: 0 %
ERYTHROCYTE [DISTWIDTH] IN BLOOD BY AUTOMATED COUNT: 15.4 % (ref 10–15)
GFR SERPL CREATININE-BSD FRML MDRD: >90 ML/MIN/1.73M2
GLUCOSE SERPL-MCNC: 147 MG/DL (ref 70–99)
HCT VFR BLD AUTO: 30.2 % (ref 40–53)
HGB BLD-MCNC: 9.8 G/DL (ref 13.3–17.7)
IMM GRANULOCYTES # BLD: 0.1 10E3/UL
IMM GRANULOCYTES NFR BLD: 1 %
LYMPHOCYTES # BLD AUTO: 1.3 10E3/UL (ref 0.8–5.3)
LYMPHOCYTES NFR BLD AUTO: 10 %
MCH RBC QN AUTO: 28.1 PG (ref 26.5–33)
MCHC RBC AUTO-ENTMCNC: 32.5 G/DL (ref 31.5–36.5)
MCV RBC AUTO: 87 FL (ref 78–100)
MONOCYTES # BLD AUTO: 0.9 10E3/UL (ref 0–1.3)
MONOCYTES NFR BLD AUTO: 7 %
NEUTROPHILS # BLD AUTO: 10.2 10E3/UL (ref 1.6–8.3)
NEUTROPHILS NFR BLD AUTO: 82 %
NRBC # BLD AUTO: 0 10E3/UL
NRBC BLD AUTO-RTO: 0 /100
PLATELET # BLD AUTO: 405 10E3/UL (ref 150–450)
POTASSIUM SERPL-SCNC: 4.4 MMOL/L (ref 3.4–5.3)
PROT SERPL-MCNC: 6.3 G/DL (ref 6.4–8.3)
PSA SERPL DL<=0.01 NG/ML-MCNC: 1.38 NG/ML (ref 0–6.5)
RBC # BLD AUTO: 3.49 10E6/UL (ref 4.4–5.9)
SODIUM SERPL-SCNC: 136 MMOL/L (ref 136–145)
WBC # BLD AUTO: 12.5 10E3/UL (ref 4–11)

## 2023-06-13 PROCEDURE — 250N000011 HC RX IP 250 OP 636: Performed by: INTERNAL MEDICINE

## 2023-06-13 PROCEDURE — 84153 ASSAY OF PSA TOTAL: CPT | Performed by: INTERNAL MEDICINE

## 2023-06-13 PROCEDURE — 96367 TX/PROPH/DG ADDL SEQ IV INF: CPT

## 2023-06-13 PROCEDURE — 85025 COMPLETE CBC W/AUTO DIFF WBC: CPT | Performed by: INTERNAL MEDICINE

## 2023-06-13 PROCEDURE — 36415 COLL VENOUS BLD VENIPUNCTURE: CPT | Performed by: INTERNAL MEDICINE

## 2023-06-13 PROCEDURE — 258N000003 HC RX IP 258 OP 636: Performed by: INTERNAL MEDICINE

## 2023-06-13 PROCEDURE — 96413 CHEMO IV INFUSION 1 HR: CPT

## 2023-06-13 PROCEDURE — 80053 COMPREHEN METABOLIC PANEL: CPT | Performed by: INTERNAL MEDICINE

## 2023-06-13 RX ORDER — HEPARIN SODIUM (PORCINE) LOCK FLUSH IV SOLN 100 UNIT/ML 100 UNIT/ML
5 SOLUTION INTRAVENOUS
Status: DISCONTINUED | OUTPATIENT
Start: 2023-06-13 | End: 2023-06-14 | Stop reason: HOSPADM

## 2023-06-13 RX ADMIN — DEXAMETHASONE SODIUM PHOSPHATE: 10 INJECTION, SOLUTION INTRAMUSCULAR; INTRAVENOUS at 12:04

## 2023-06-13 RX ADMIN — SODIUM CHLORIDE 250 ML: 900 INJECTION, SOLUTION INTRAVENOUS at 12:02

## 2023-06-13 RX ADMIN — DOCETAXEL 124 MG: 20 INJECTION, SOLUTION, CONCENTRATE INTRAVENOUS at 12:54

## 2023-06-13 NOTE — NURSING NOTE
Infusion Nursing Note:  Dilip Kan presents today for Docetaxel Cycle 4 Day 1.    Patient seen by provider today: No   present during visit today: Not Applicable.    Note: N/A.      Intravenous Access:  Labs drawn without difficulty per .  Peripheral IV placed.    Treatment Conditions:  Lab Results   Component Value Date    HGB 9.8 (L) 06/13/2023    WBC 12.5 (H) 06/13/2023    ANEU 2.2 04/22/2023    ANEUTAUTO 10.2 (H) 06/13/2023     06/13/2023      Lab Results   Component Value Date     06/13/2023    POTASSIUM 4.4 06/13/2023    MAG 1.7 05/02/2023    CR 0.67 06/13/2023    ROBERTO 9.1 06/13/2023    BILITOTAL 0.2 06/13/2023    ALBUMIN 3.3 (L) 06/13/2023    ALT 20 06/13/2023    AST 13 06/13/2023     Results reviewed, labs MET treatment parameters, ok to proceed with treatment.      Post Infusion Assessment:  Patient tolerated infusion without incident.  Blood return noted pre and post infusion.  Site patent and intact, free from redness, edema or discomfort.  No evidence of extravasations.  Access discontinued per protocol.       Discharge Plan:   Discharge instructions reviewed with: Patient.  Patient and/or family verbalized understanding of discharge instructions and all questions answered.  Copy of AVS reviewed with patient and/or family.  Patient will return 7/5/2023 for next appointment.  Patient discharged in stable condition accompanied by: self.  Departure Mode: Ambulatory with cane.      Lizzeth Kumar RN

## 2023-06-26 ENCOUNTER — PATIENT OUTREACH (OUTPATIENT)
Dept: ONCOLOGY | Facility: OTHER | Age: 68
End: 2023-06-26
Payer: COMMERCIAL

## 2023-06-26 DIAGNOSIS — C61 MALIGNANT NEOPLASM OF PROSTATE (H): Primary | ICD-10-CM

## 2023-06-26 NOTE — PROGRESS NOTES
[facility]: Cancer Care Short Note                                    Discussion with Patient:                                                      Follow up call to discuss pain. States pain is not controlled unless he takes extra pain medication. Would like to discuss Erum Fritz MD managing pain at appointment 7/5.     Medication understanding/side effect: understands         Assessment:                                                      Assessment completed with:: Patient    Constitutional  Fatigue: Fatigue relieved by rest  Fever: Absent or within normal limits    Respiratory  Cough: Absent or within normal limits  Dyspnea: Shortness of breath with moderate exertion    Gastrointestinal  Anorexia: Absent or within normal limits (slight loss of appetite chemo)  Nausea: Absent or within normal limits  Vomiting: Absent or within normal limits  Dehydration: Absent or within normal limits  Mucositis Oral: Absent or within normal limits  Constipation: Absent or within normal limits  Diarrhea: Absent or within normal limits    Genitourinary  Patient Reported Genitourinary Symptoms?: No (has catheter)  Urinary Retention: Absent or within normal limits  Urinary Tract Pain: Absent or within normal limits    Integumentary  Rash Maculo-Papular: Absent or within normal limits    Pain  Patient Reported Pain?: Yes, but is new or different pain (patient having left hip pain not well controlled-would like ACP to manage pain med after visit 7/5)  Pain Score: Severe Pain (7) (rates 4 after he take pain med)  Pain Loc: Hip (left)  (DVPRS) Pain Rating Score : 6-Hard to ignore, avoid usual activities  Interfered with your usual ACTIVITY?: 8  Interfered with your SLEEP?: 4  Affected your MOOD?: Completely affects 10  Contributed to your STRESS?: Contributes a great deal 10  Pain Management Interventions: medication (see MAR)  RASS (Sosa Agitation-Sedation Scale): 0-->alert and calm  Response to Pain Interventions:  intervention not effective per patient (needs to take more than prescribed)    Patient Coping  Accepting    Clinic Utilization  Patient Aware of Next Appointment?: Yes    Other Patient Concerns  Other Patient Reported Concerns: No    RNCC Short Symptom Review:     Assessment completed with:: Patient    Constitutional  Fatigue: Fatigue relieved by rest  Fever: Absent or within normal limits    Respiratory  Cough: Absent or within normal limits  Dyspnea: Shortness of breath with moderate exertion    Gastrointestinal  Anorexia: Absent or within normal limits (slight loss of appetite chemo)  Nausea: Absent or within normal limits  Vomiting: Absent or within normal limits  Dehydration: Absent or within normal limits  Mucositis Oral: Absent or within normal limits  Constipation: Absent or within normal limits  Diarrhea: Absent or within normal limits    Genitourinary  Patient Reported Genitourinary Symptoms?: No (has catheter)  Urinary Retention: Absent or within normal limits  Urinary Tract Pain: Absent or within normal limits    Integumentary  Rash Maculo-Papular: Absent or within normal limits    Pain  Patient Reported Pain?: Yes, but is new or different pain (patient having left hip pain not well controlled-would like ACP to manage pain med after visit 7/5)  Pain Score: Severe Pain (7) (rates 4 after he take pain med)  Pain Loc: Hip (left)  (DVPRS) Pain Rating Score : 6-Hard to ignore, avoid usual activities  Interfered with your usual ACTIVITY?: 8  Interfered with your SLEEP?: 4  Affected your MOOD?: Completely affects 10  Contributed to your STRESS?: Contributes a great deal 10  Pain Management Interventions: medication (see MAR)  RASS (Sosa Agitation-Sedation Scale): 0-->alert and calm  Response to Pain Interventions: intervention not effective per patient (needs to take more than prescribed)    Patient Coping  Accepting    Clinic Utilization  Patient Aware of Next Appointment?: Yes    Other Patient  Concerns  Other Patient Reported Concerns: No    Intervention/Education provided during outreach:                                                       Patient will discuss pain management with Erum Fritz MD 7/5/23.    Patient to follow up as scheduled at next appt    Signature:  Elza Lundberg RN

## 2023-06-29 ENCOUNTER — ONCOLOGY VISIT (OUTPATIENT)
Dept: ONCOLOGY | Facility: OTHER | Age: 68
End: 2023-06-29
Attending: INTERNAL MEDICINE
Payer: COMMERCIAL

## 2023-06-29 ENCOUNTER — HOSPITAL ENCOUNTER (INPATIENT)
Facility: CLINIC | Age: 68
LOS: 7 days | Discharge: HOME | DRG: 871 | End: 2023-07-06
Attending: STUDENT IN AN ORGANIZED HEALTH CARE EDUCATION/TRAINING PROGRAM | Admitting: INTERNAL MEDICINE
Payer: COMMERCIAL

## 2023-06-29 ENCOUNTER — APPOINTMENT (OUTPATIENT)
Dept: GENERAL RADIOLOGY | Facility: OTHER | Age: 68
End: 2023-06-29
Attending: STUDENT IN AN ORGANIZED HEALTH CARE EDUCATION/TRAINING PROGRAM
Payer: COMMERCIAL

## 2023-06-29 ENCOUNTER — HOSPITAL ENCOUNTER (EMERGENCY)
Facility: OTHER | Age: 68
Discharge: ANOTHER HOSPITAL | End: 2023-06-29
Attending: STUDENT IN AN ORGANIZED HEALTH CARE EDUCATION/TRAINING PROGRAM | Admitting: STUDENT IN AN ORGANIZED HEALTH CARE EDUCATION/TRAINING PROGRAM
Payer: COMMERCIAL

## 2023-06-29 ENCOUNTER — APPOINTMENT (OUTPATIENT)
Dept: CT IMAGING | Facility: OTHER | Age: 68
End: 2023-06-29
Attending: STUDENT IN AN ORGANIZED HEALTH CARE EDUCATION/TRAINING PROGRAM
Payer: COMMERCIAL

## 2023-06-29 VITALS
RESPIRATION RATE: 14 BRPM | TEMPERATURE: 99.5 F | WEIGHT: 220 LBS | HEIGHT: 70 IN | DIASTOLIC BLOOD PRESSURE: 72 MMHG | BODY MASS INDEX: 31.5 KG/M2 | SYSTOLIC BLOOD PRESSURE: 168 MMHG | OXYGEN SATURATION: 95 % | HEART RATE: 91 BPM

## 2023-06-29 VITALS
HEART RATE: 131 BPM | OXYGEN SATURATION: 95 % | WEIGHT: 220 LBS | BODY MASS INDEX: 31.57 KG/M2 | RESPIRATION RATE: 20 BRPM | DIASTOLIC BLOOD PRESSURE: 58 MMHG | TEMPERATURE: 101.1 F | SYSTOLIC BLOOD PRESSURE: 80 MMHG

## 2023-06-29 DIAGNOSIS — C79.51 MALIGNANT NEOPLASM METASTATIC TO BONE (H): ICD-10-CM

## 2023-06-29 DIAGNOSIS — C61 MALIGNANT NEOPLASM OF PROSTATE (H): ICD-10-CM

## 2023-06-29 DIAGNOSIS — R97.21 RISING PSA FOLLOWING TREATMENT FOR MALIGNANT NEOPLASM OF PROSTATE: ICD-10-CM

## 2023-06-29 DIAGNOSIS — A41.9 SEPSIS, DUE TO UNSPECIFIED ORGANISM, UNSPECIFIED WHETHER ACUTE ORGAN DYSFUNCTION PRESENT (H): ICD-10-CM

## 2023-06-29 DIAGNOSIS — G89.3 CANCER ASSOCIATED PAIN: Primary | ICD-10-CM

## 2023-06-29 DIAGNOSIS — K65.1 PELVIC ABSCESS IN MALE (H): Primary | ICD-10-CM

## 2023-06-29 DIAGNOSIS — K65.1 PELVIC ABSCESS IN MALE (H): ICD-10-CM

## 2023-06-29 DIAGNOSIS — M86.9 OSTEOMYELITIS OF OTHER SITE, UNSPECIFIED TYPE (H): ICD-10-CM

## 2023-06-29 LAB
ALBUMIN SERPL BCG-MCNC: 3.3 G/DL (ref 3.5–5.2)
ALBUMIN UR-MCNC: 100 MG/DL
ALP SERPL-CCNC: 152 U/L (ref 40–129)
ALT SERPL W P-5'-P-CCNC: 124 U/L (ref 0–70)
ANION GAP SERPL CALCULATED.3IONS-SCNC: 15 MMOL/L (ref 7–15)
APPEARANCE UR: ABNORMAL
AST SERPL W P-5'-P-CCNC: 92 U/L (ref 0–45)
BACTERIA #/AREA URNS HPF: ABNORMAL /HPF
BASE EXCESS BLDV CALC-SCNC: 0.2 MMOL/L (ref -7.7–1.9)
BASOPHILS # BLD AUTO: 0 10E3/UL (ref 0–0.2)
BASOPHILS NFR BLD AUTO: 0 %
BILIRUB SERPL-MCNC: 0.5 MG/DL
BILIRUB UR QL STRIP: NEGATIVE
BUN SERPL-MCNC: 16.4 MG/DL (ref 8–23)
CALCIUM SERPL-MCNC: 9.6 MG/DL (ref 8.8–10.2)
CHLORIDE SERPL-SCNC: 96 MMOL/L (ref 98–107)
COLOR UR AUTO: YELLOW
CREAT SERPL-MCNC: 1.04 MG/DL (ref 0.67–1.17)
DEPRECATED HCO3 PLAS-SCNC: 22 MMOL/L (ref 22–29)
EOSINOPHIL # BLD AUTO: 0 10E3/UL (ref 0–0.7)
EOSINOPHIL NFR BLD AUTO: 0 %
ERYTHROCYTE [DISTWIDTH] IN BLOOD BY AUTOMATED COUNT: 16.4 % (ref 10–15)
FLUAV RNA SPEC QL NAA+PROBE: NEGATIVE
FLUBV RNA RESP QL NAA+PROBE: NEGATIVE
GFR SERPL CREATININE-BSD FRML MDRD: 77 ML/MIN/1.73M2
GLUCOSE BLDC GLUCOMTR-MCNC: 122 MG/DL (ref 70–99)
GLUCOSE SERPL-MCNC: 135 MG/DL (ref 70–99)
GLUCOSE UR STRIP-MCNC: NEGATIVE MG/DL
HCO3 BLDV-SCNC: 24 MMOL/L (ref 21–28)
HCT VFR BLD AUTO: 31 % (ref 40–53)
HGB BLD-MCNC: 10 G/DL (ref 13.3–17.7)
HGB UR QL STRIP: ABNORMAL
HOLD SPECIMEN: NORMAL
IMM GRANULOCYTES # BLD: 0.2 10E3/UL
IMM GRANULOCYTES NFR BLD: 1 %
KETONES UR STRIP-MCNC: NEGATIVE MG/DL
LACTATE SERPL-SCNC: 1 MMOL/L (ref 0.7–2)
LACTATE SERPL-SCNC: 3.3 MMOL/L (ref 0.7–2)
LEUKOCYTE ESTERASE UR QL STRIP: ABNORMAL
LYMPHOCYTES # BLD AUTO: 1.7 10E3/UL (ref 0.8–5.3)
LYMPHOCYTES NFR BLD AUTO: 9 %
MCH RBC QN AUTO: 26.8 PG (ref 26.5–33)
MCHC RBC AUTO-ENTMCNC: 32.3 G/DL (ref 31.5–36.5)
MCV RBC AUTO: 83 FL (ref 78–100)
MONOCYTES # BLD AUTO: 1.3 10E3/UL (ref 0–1.3)
MONOCYTES NFR BLD AUTO: 7 %
MUCOUS THREADS #/AREA URNS LPF: PRESENT /LPF
NEUTROPHILS # BLD AUTO: 14.9 10E3/UL (ref 1.6–8.3)
NEUTROPHILS NFR BLD AUTO: 83 %
NITRATE UR QL: NEGATIVE
NRBC # BLD AUTO: 0 10E3/UL
NRBC BLD AUTO-RTO: 0 /100
O2/TOTAL GAS SETTING VFR VENT: 0 %
OXYHGB MFR BLDV: 98 % (ref 70–75)
PCO2 BLDV: 34 MM HG (ref 40–50)
PH BLDV: 7.46 [PH] (ref 7.32–7.43)
PH UR STRIP: 6 [PH] (ref 5–9)
PLATELET # BLD AUTO: 501 10E3/UL (ref 150–450)
PO2 BLDV: 160 MM HG (ref 25–47)
POTASSIUM SERPL-SCNC: 4.4 MMOL/L (ref 3.4–5.3)
PROT SERPL-MCNC: 7.1 G/DL (ref 6.4–8.3)
RBC # BLD AUTO: 3.73 10E6/UL (ref 4.4–5.9)
RBC URINE: 4 /HPF
RSV RNA SPEC NAA+PROBE: NEGATIVE
SARS-COV-2 RNA RESP QL NAA+PROBE: NEGATIVE
SODIUM SERPL-SCNC: 133 MMOL/L (ref 136–145)
SP GR UR STRIP: 1.03 (ref 1–1.03)
UROBILINOGEN UR STRIP-MCNC: NORMAL MG/DL
WBC # BLD AUTO: 18.1 10E3/UL (ref 4–11)
WBC CLUMPS #/AREA URNS HPF: PRESENT /HPF
WBC URINE: 33 /HPF

## 2023-06-29 PROCEDURE — 36415 COLL VENOUS BLD VENIPUNCTURE: CPT | Performed by: STUDENT IN AN ORGANIZED HEALTH CARE EDUCATION/TRAINING PROGRAM

## 2023-06-29 PROCEDURE — 85041 AUTOMATED RBC COUNT: CPT | Performed by: PHYSICIAN ASSISTANT

## 2023-06-29 PROCEDURE — 96375 TX/PRO/DX INJ NEW DRUG ADDON: CPT | Mod: XU

## 2023-06-29 PROCEDURE — 250N000012 HC RX MED GY IP 250 OP 636 PS 637: Performed by: PHYSICIAN ASSISTANT

## 2023-06-29 PROCEDURE — 96374 THER/PROPH/DIAG INJ IV PUSH: CPT | Mod: XU

## 2023-06-29 PROCEDURE — 250N000011 HC RX IP 250 OP 636: Performed by: STUDENT IN AN ORGANIZED HEALTH CARE EDUCATION/TRAINING PROGRAM

## 2023-06-29 PROCEDURE — 81001 URINALYSIS AUTO W/SCOPE: CPT | Performed by: STUDENT IN AN ORGANIZED HEALTH CARE EDUCATION/TRAINING PROGRAM

## 2023-06-29 PROCEDURE — 36415 COLL VENOUS BLD VENIPUNCTURE: CPT | Performed by: PHYSICIAN ASSISTANT

## 2023-06-29 PROCEDURE — G0463 HOSPITAL OUTPT CLINIC VISIT: HCPCS | Mod: 25,27

## 2023-06-29 PROCEDURE — 99285 EMERGENCY DEPT VISIT HI MDM: CPT | Mod: 25

## 2023-06-29 PROCEDURE — 87086 URINE CULTURE/COLONY COUNT: CPT | Performed by: STUDENT IN AN ORGANIZED HEALTH CARE EDUCATION/TRAINING PROGRAM

## 2023-06-29 PROCEDURE — 250N000009 HC RX 250: Performed by: STUDENT IN AN ORGANIZED HEALTH CARE EDUCATION/TRAINING PROGRAM

## 2023-06-29 PROCEDURE — 83605 ASSAY OF LACTIC ACID: CPT | Performed by: STUDENT IN AN ORGANIZED HEALTH CARE EDUCATION/TRAINING PROGRAM

## 2023-06-29 PROCEDURE — 74177 CT ABD & PELVIS W/CONTRAST: CPT

## 2023-06-29 PROCEDURE — 93005 ELECTROCARDIOGRAM TRACING: CPT

## 2023-06-29 PROCEDURE — 120N000002 HC R&B MED SURG/OB UMMC

## 2023-06-29 PROCEDURE — 93010 ELECTROCARDIOGRAM REPORT: CPT | Performed by: INTERNAL MEDICINE

## 2023-06-29 PROCEDURE — 85025 COMPLETE CBC W/AUTO DIFF WBC: CPT | Performed by: STUDENT IN AN ORGANIZED HEALTH CARE EDUCATION/TRAINING PROGRAM

## 2023-06-29 PROCEDURE — 250N000013 HC RX MED GY IP 250 OP 250 PS 637: Performed by: PHYSICIAN ASSISTANT

## 2023-06-29 PROCEDURE — 99417 PROLNG OP E/M EACH 15 MIN: CPT | Performed by: INTERNAL MEDICINE

## 2023-06-29 PROCEDURE — 82805 BLOOD GASES W/O2 SATURATION: CPT | Performed by: STUDENT IN AN ORGANIZED HEALTH CARE EDUCATION/TRAINING PROGRAM

## 2023-06-29 PROCEDURE — 250N000011 HC RX IP 250 OP 636: Mod: JZ | Performed by: PHYSICIAN ASSISTANT

## 2023-06-29 PROCEDURE — 99215 OFFICE O/P EST HI 40 MIN: CPT | Performed by: INTERNAL MEDICINE

## 2023-06-29 PROCEDURE — 80053 COMPREHEN METABOLIC PANEL: CPT | Performed by: STUDENT IN AN ORGANIZED HEALTH CARE EDUCATION/TRAINING PROGRAM

## 2023-06-29 PROCEDURE — 96376 TX/PRO/DX INJ SAME DRUG ADON: CPT

## 2023-06-29 PROCEDURE — C9803 HOPD COVID-19 SPEC COLLECT: HCPCS

## 2023-06-29 PROCEDURE — 258N000003 HC RX IP 258 OP 636: Performed by: STUDENT IN AN ORGANIZED HEALTH CARE EDUCATION/TRAINING PROGRAM

## 2023-06-29 PROCEDURE — 71045 X-RAY EXAM CHEST 1 VIEW: CPT

## 2023-06-29 PROCEDURE — G0463 HOSPITAL OUTPT CLINIC VISIT: HCPCS

## 2023-06-29 PROCEDURE — 87637 SARSCOV2&INF A&B&RSV AMP PRB: CPT | Performed by: STUDENT IN AN ORGANIZED HEALTH CARE EDUCATION/TRAINING PROGRAM

## 2023-06-29 PROCEDURE — 87040 BLOOD CULTURE FOR BACTERIA: CPT | Performed by: STUDENT IN AN ORGANIZED HEALTH CARE EDUCATION/TRAINING PROGRAM

## 2023-06-29 PROCEDURE — 99223 1ST HOSP IP/OBS HIGH 75: CPT | Mod: AI | Performed by: PHYSICIAN ASSISTANT

## 2023-06-29 PROCEDURE — 51702 INSERT TEMP BLADDER CATH: CPT

## 2023-06-29 PROCEDURE — 99285 EMERGENCY DEPT VISIT HI MDM: CPT | Performed by: STUDENT IN AN ORGANIZED HEALTH CARE EDUCATION/TRAINING PROGRAM

## 2023-06-29 PROCEDURE — 250N000013 HC RX MED GY IP 250 OP 250 PS 637: Performed by: STUDENT IN AN ORGANIZED HEALTH CARE EDUCATION/TRAINING PROGRAM

## 2023-06-29 RX ORDER — PROCHLORPERAZINE MALEATE 5 MG
5 TABLET ORAL EVERY 6 HOURS PRN
Status: DISCONTINUED | OUTPATIENT
Start: 2023-06-29 | End: 2023-07-06 | Stop reason: HOSPADM

## 2023-06-29 RX ORDER — NICOTINE POLACRILEX 4 MG
15-30 LOZENGE BUCCAL
Status: DISCONTINUED | OUTPATIENT
Start: 2023-06-29 | End: 2023-07-06 | Stop reason: HOSPADM

## 2023-06-29 RX ORDER — ACETAMINOPHEN 325 MG/1
975 TABLET ORAL EVERY 8 HOURS
Status: DISCONTINUED | OUTPATIENT
Start: 2023-06-29 | End: 2023-07-06 | Stop reason: HOSPADM

## 2023-06-29 RX ORDER — CEFAZOLIN SODIUM 1 G/50ML
2000 SOLUTION INTRAVENOUS ONCE
Status: COMPLETED | OUTPATIENT
Start: 2023-06-29 | End: 2023-06-29

## 2023-06-29 RX ORDER — ONDANSETRON 2 MG/ML
4 INJECTION INTRAMUSCULAR; INTRAVENOUS EVERY 6 HOURS PRN
Status: DISCONTINUED | OUTPATIENT
Start: 2023-06-29 | End: 2023-07-06 | Stop reason: HOSPADM

## 2023-06-29 RX ORDER — NALOXONE HYDROCHLORIDE 0.4 MG/ML
0.4 INJECTION, SOLUTION INTRAMUSCULAR; INTRAVENOUS; SUBCUTANEOUS
Status: DISCONTINUED | OUTPATIENT
Start: 2023-06-29 | End: 2023-07-06 | Stop reason: HOSPADM

## 2023-06-29 RX ORDER — NALOXONE HYDROCHLORIDE 0.4 MG/ML
0.2 INJECTION, SOLUTION INTRAMUSCULAR; INTRAVENOUS; SUBCUTANEOUS
Status: DISCONTINUED | OUTPATIENT
Start: 2023-06-29 | End: 2023-07-06 | Stop reason: HOSPADM

## 2023-06-29 RX ORDER — HYDROMORPHONE HCL IN WATER/PF 6 MG/30 ML
0.2 PATIENT CONTROLLED ANALGESIA SYRINGE INTRAVENOUS
Status: DISCONTINUED | OUTPATIENT
Start: 2023-06-29 | End: 2023-07-01

## 2023-06-29 RX ORDER — OXYCODONE HCL 10 MG/1
20 TABLET, FILM COATED, EXTENDED RELEASE ORAL EVERY 12 HOURS
Status: DISCONTINUED | OUTPATIENT
Start: 2023-06-29 | End: 2023-07-06 | Stop reason: HOSPADM

## 2023-06-29 RX ORDER — BISACODYL 10 MG
10 SUPPOSITORY, RECTAL RECTAL DAILY PRN
Status: DISCONTINUED | OUTPATIENT
Start: 2023-06-29 | End: 2023-07-06 | Stop reason: HOSPADM

## 2023-06-29 RX ORDER — OXYCODONE HCL 20 MG/1
20 TABLET, FILM COATED, EXTENDED RELEASE ORAL EVERY 12 HOURS
Qty: 60 TABLET | Refills: 0 | Status: SHIPPED | OUTPATIENT
Start: 2023-06-29 | End: 2023-06-29

## 2023-06-29 RX ORDER — POLYETHYLENE GLYCOL 3350 17 G/17G
17 POWDER, FOR SOLUTION ORAL DAILY
Status: DISCONTINUED | OUTPATIENT
Start: 2023-06-30 | End: 2023-07-04

## 2023-06-29 RX ORDER — HYDROXYZINE HYDROCHLORIDE 10 MG/1
10 TABLET, FILM COATED ORAL EVERY 6 HOURS PRN
Status: DISCONTINUED | OUTPATIENT
Start: 2023-06-29 | End: 2023-07-06 | Stop reason: HOSPADM

## 2023-06-29 RX ORDER — LANOLIN ALCOHOL/MO/W.PET/CERES
6 CREAM (GRAM) TOPICAL
Status: DISCONTINUED | OUTPATIENT
Start: 2023-06-29 | End: 2023-07-06 | Stop reason: HOSPADM

## 2023-06-29 RX ORDER — AMOXICILLIN 250 MG
2 CAPSULE ORAL 2 TIMES DAILY PRN
Status: DISCONTINUED | OUTPATIENT
Start: 2023-06-29 | End: 2023-07-06 | Stop reason: HOSPADM

## 2023-06-29 RX ORDER — ACETAMINOPHEN 325 MG/1
650 TABLET ORAL ONCE
Status: COMPLETED | OUTPATIENT
Start: 2023-06-29 | End: 2023-06-29

## 2023-06-29 RX ORDER — ATORVASTATIN CALCIUM 20 MG/1
20 TABLET, FILM COATED ORAL DAILY
Status: DISCONTINUED | OUTPATIENT
Start: 2023-06-30 | End: 2023-07-06 | Stop reason: HOSPADM

## 2023-06-29 RX ORDER — OXYCODONE HYDROCHLORIDE 5 MG/1
5 TABLET ORAL EVERY 6 HOURS PRN
Status: DISCONTINUED | OUTPATIENT
Start: 2023-06-29 | End: 2023-07-02

## 2023-06-29 RX ORDER — METRONIDAZOLE 500 MG/100ML
500 INJECTION, SOLUTION INTRAVENOUS EVERY 8 HOURS
Status: DISCONTINUED | OUTPATIENT
Start: 2023-06-30 | End: 2023-07-02

## 2023-06-29 RX ORDER — DEXTROSE MONOHYDRATE 25 G/50ML
25-50 INJECTION, SOLUTION INTRAVENOUS
Status: DISCONTINUED | OUTPATIENT
Start: 2023-06-29 | End: 2023-07-06 | Stop reason: HOSPADM

## 2023-06-29 RX ORDER — AMOXICILLIN 250 MG
1 CAPSULE ORAL 2 TIMES DAILY PRN
Status: DISCONTINUED | OUTPATIENT
Start: 2023-06-29 | End: 2023-07-06 | Stop reason: HOSPADM

## 2023-06-29 RX ORDER — ONDANSETRON 4 MG/1
4 TABLET, ORALLY DISINTEGRATING ORAL EVERY 6 HOURS PRN
Status: DISCONTINUED | OUTPATIENT
Start: 2023-06-29 | End: 2023-07-06 | Stop reason: HOSPADM

## 2023-06-29 RX ORDER — HYDROCODONE BITARTRATE AND ACETAMINOPHEN 10; 325 MG/1; MG/1
1 TABLET ORAL EVERY 4 HOURS PRN
Status: ON HOLD | COMMUNITY
End: 2023-07-06

## 2023-06-29 RX ORDER — LORAZEPAM 0.5 MG/1
0.5 TABLET ORAL EVERY 6 HOURS PRN
Status: DISCONTINUED | OUTPATIENT
Start: 2023-06-29 | End: 2023-07-06 | Stop reason: HOSPADM

## 2023-06-29 RX ORDER — HYDROMORPHONE HYDROCHLORIDE 1 MG/ML
1 INJECTION, SOLUTION INTRAMUSCULAR; INTRAVENOUS; SUBCUTANEOUS ONCE
Status: COMPLETED | OUTPATIENT
Start: 2023-06-29 | End: 2023-06-29

## 2023-06-29 RX ORDER — TRAZODONE HYDROCHLORIDE 50 MG/1
50 TABLET, FILM COATED ORAL AT BEDTIME
Status: DISCONTINUED | OUTPATIENT
Start: 2023-06-29 | End: 2023-07-06 | Stop reason: HOSPADM

## 2023-06-29 RX ORDER — IOPAMIDOL 755 MG/ML
127 INJECTION, SOLUTION INTRAVASCULAR ONCE
Status: COMPLETED | OUTPATIENT
Start: 2023-06-29 | End: 2023-06-29

## 2023-06-29 RX ORDER — PREGABALIN 75 MG/1
150 CAPSULE ORAL 2 TIMES DAILY
Status: DISCONTINUED | OUTPATIENT
Start: 2023-06-29 | End: 2023-07-06 | Stop reason: HOSPADM

## 2023-06-29 RX ORDER — HYDROMORPHONE HYDROCHLORIDE 1 MG/ML
0.3 INJECTION, SOLUTION INTRAMUSCULAR; INTRAVENOUS; SUBCUTANEOUS
Status: DISCONTINUED | OUTPATIENT
Start: 2023-06-29 | End: 2023-06-29

## 2023-06-29 RX ORDER — OXYCODONE HYDROCHLORIDE 5 MG/1
5 TABLET ORAL EVERY 6 HOURS PRN
Qty: 90 TABLET | Refills: 0 | Status: SHIPPED | OUTPATIENT
Start: 2023-06-29 | End: 2023-06-29

## 2023-06-29 RX ORDER — LIDOCAINE HYDROCHLORIDE 20 MG/ML
JELLY TOPICAL ONCE
Status: COMPLETED | OUTPATIENT
Start: 2023-06-29 | End: 2023-06-29

## 2023-06-29 RX ORDER — PREDNISONE 5 MG/1
5 TABLET ORAL 2 TIMES DAILY
Status: DISCONTINUED | OUTPATIENT
Start: 2023-06-29 | End: 2023-07-06 | Stop reason: HOSPADM

## 2023-06-29 RX ORDER — CEFTRIAXONE 2 G/1
2 INJECTION, POWDER, FOR SOLUTION INTRAMUSCULAR; INTRAVENOUS ONCE
Status: COMPLETED | OUTPATIENT
Start: 2023-06-29 | End: 2023-06-29

## 2023-06-29 RX ORDER — CEFTRIAXONE 2 G/1
2 INJECTION, POWDER, FOR SOLUTION INTRAMUSCULAR; INTRAVENOUS EVERY 24 HOURS
Status: DISCONTINUED | OUTPATIENT
Start: 2023-06-30 | End: 2023-07-02

## 2023-06-29 RX ORDER — PANTOPRAZOLE SODIUM 40 MG/1
40 TABLET, DELAYED RELEASE ORAL 2 TIMES DAILY
Status: DISCONTINUED | OUTPATIENT
Start: 2023-06-29 | End: 2023-07-06 | Stop reason: HOSPADM

## 2023-06-29 RX ORDER — HEPARIN SODIUM 10000 [USP'U]/100ML
0-5000 INJECTION, SOLUTION INTRAVENOUS CONTINUOUS
Status: DISCONTINUED | OUTPATIENT
Start: 2023-06-29 | End: 2023-07-01

## 2023-06-29 RX ORDER — METRONIDAZOLE 500 MG/100ML
500 INJECTION, SOLUTION INTRAVENOUS ONCE
Status: COMPLETED | OUTPATIENT
Start: 2023-06-29 | End: 2023-06-29

## 2023-06-29 RX ORDER — PROCHLORPERAZINE 25 MG
12.5 SUPPOSITORY, RECTAL RECTAL EVERY 12 HOURS PRN
Status: DISCONTINUED | OUTPATIENT
Start: 2023-06-29 | End: 2023-07-06 | Stop reason: HOSPADM

## 2023-06-29 RX ORDER — LIDOCAINE 40 MG/G
CREAM TOPICAL
Status: DISCONTINUED | OUTPATIENT
Start: 2023-06-29 | End: 2023-07-06 | Stop reason: HOSPADM

## 2023-06-29 RX ADMIN — SODIUM CHLORIDE, POTASSIUM CHLORIDE, SODIUM LACTATE AND CALCIUM CHLORIDE 2994 ML: 600; 310; 30; 20 INJECTION, SOLUTION INTRAVENOUS at 10:04

## 2023-06-29 RX ADMIN — PREDNISONE 5 MG: 5 TABLET ORAL at 22:55

## 2023-06-29 RX ADMIN — VANCOMYCIN HYDROCHLORIDE 2000 MG: 10 INJECTION, POWDER, LYOPHILIZED, FOR SOLUTION INTRAVENOUS at 11:15

## 2023-06-29 RX ADMIN — HYDROMORPHONE HYDROCHLORIDE 1 MG: 1 INJECTION, SOLUTION INTRAMUSCULAR; INTRAVENOUS; SUBCUTANEOUS at 10:06

## 2023-06-29 RX ADMIN — HYDROMORPHONE HYDROCHLORIDE 1 MG: 1 INJECTION, SOLUTION INTRAMUSCULAR; INTRAVENOUS; SUBCUTANEOUS at 13:24

## 2023-06-29 RX ADMIN — ACETAMINOPHEN 650 MG: 325 TABLET ORAL at 10:06

## 2023-06-29 RX ADMIN — PANTOPRAZOLE SODIUM 40 MG: 40 TABLET, DELAYED RELEASE ORAL at 22:55

## 2023-06-29 RX ADMIN — CEFTRIAXONE 2 G: 2 INJECTION, POWDER, FOR SOLUTION INTRAMUSCULAR; INTRAVENOUS at 11:13

## 2023-06-29 RX ADMIN — ACETAMINOPHEN 975 MG: 325 TABLET, FILM COATED ORAL at 22:54

## 2023-06-29 RX ADMIN — HYDROMORPHONE HYDROCHLORIDE 1 MG: 1 INJECTION, SOLUTION INTRAMUSCULAR; INTRAVENOUS; SUBCUTANEOUS at 16:53

## 2023-06-29 RX ADMIN — HYDROMORPHONE HYDROCHLORIDE 1 MG: 1 INJECTION, SOLUTION INTRAMUSCULAR; INTRAVENOUS; SUBCUTANEOUS at 18:09

## 2023-06-29 RX ADMIN — OXYCODONE HYDROCHLORIDE 20 MG: 10 TABLET, FILM COATED, EXTENDED RELEASE ORAL at 22:55

## 2023-06-29 RX ADMIN — IOPAMIDOL 127 ML: 755 INJECTION, SOLUTION INTRAVENOUS at 11:49

## 2023-06-29 RX ADMIN — HYDROMORPHONE HYDROCHLORIDE 1 MG: 1 INJECTION, SOLUTION INTRAMUSCULAR; INTRAVENOUS; SUBCUTANEOUS at 22:03

## 2023-06-29 RX ADMIN — HYDROMORPHONE HYDROCHLORIDE 1 MG: 1 INJECTION, SOLUTION INTRAMUSCULAR; INTRAVENOUS; SUBCUTANEOUS at 14:57

## 2023-06-29 RX ADMIN — TRAZODONE HYDROCHLORIDE 50 MG: 50 TABLET ORAL at 22:55

## 2023-06-29 RX ADMIN — LIDOCAINE HYDROCHLORIDE: 20 JELLY TOPICAL at 14:47

## 2023-06-29 RX ADMIN — METRONIDAZOLE 500 MG: 500 INJECTION, SOLUTION INTRAVENOUS at 14:47

## 2023-06-29 RX ADMIN — HEPARIN SODIUM 1800 UNITS/HR: 10000 INJECTION, SOLUTION INTRAVENOUS at 23:22

## 2023-06-29 ASSESSMENT — ACTIVITIES OF DAILY LIVING (ADL)
TOILETING_ISSUES: NO
ADLS_ACUITY_SCORE: 35
EQUIPMENT_CURRENTLY_USED_AT_HOME: WALKER, ROLLING;CANE, STRAIGHT
CONCENTRATING,_REMEMBERING_OR_MAKING_DECISIONS_DIFFICULTY: YES
DOING_ERRANDS_INDEPENDENTLY_DIFFICULTY: NO
CHANGE_IN_FUNCTIONAL_STATUS_SINCE_ONSET_OF_CURRENT_ILLNESS/INJURY: NO
DIFFICULTY_EATING/SWALLOWING: NO
WALKING_OR_CLIMBING_STAIRS_DIFFICULTY: YES
WALKING_OR_CLIMBING_STAIRS: AMBULATION DIFFICULTY, REQUIRES EQUIPMENT
ADLS_ACUITY_SCORE: 35
WEAR_GLASSES_OR_BLIND: YES
ADLS_ACUITY_SCORE: 35
FALL_HISTORY_WITHIN_LAST_SIX_MONTHS: NO
VISION_MANAGEMENT: READING GLASSES
TRANSFERRING: 0-->ASSISTANCE NEEDED (DEVELOPMENTALLY APPROPRIATE)
TRANSFERRING: 1-->ASSISTANCE (EQUIPMENT/PERSON) NEEDED
ADLS_ACUITY_SCORE: 35
DRESSING/BATHING_DIFFICULTY: NO
ADLS_ACUITY_SCORE: 35

## 2023-06-29 ASSESSMENT — PAIN SCALES - GENERAL: PAINLEVEL: WORST PAIN (10)

## 2023-06-29 NOTE — PROGRESS NOTES
Pharmacy Vancomycin Initial Note  Date of Service 2023  Patient's  1955  68 year old, male    Indication: Urinary Tract Infection    Current estimated CrCl = Estimated Creatinine Clearance: 78.2 mL/min (based on SCr of 1.04 mg/dL).     Other antibiotics received:  Ceftriaxone 2 gm IV x 1 at 1044 today ().    Creatinine for last 3 days  2023:  9:39 AM Creatinine 1.04 mg/dL    Recent Vancomycin Level(s) for last 3 days  No results found for requested labs within last 3 days.      Vancomycin IV Administrations (past 72 hours)      No vancomycin orders with administrations in past 72 hours.                Nephrotoxins and other renal medications (From now, onward)    Start     Dose/Rate Route Frequency Ordered Stop    23 1050  vancomycin (VANCOCIN) 2,000 mg in sodium chloride 0.9 % 500 mL intermittent infusion         2,000 mg  over 2 Hours Intravenous ONCE 23 1048            Contrast Orders - past 72 hours (72h ago, onward)    None          Insight[employer] Prediction of Planned Initial Vancomycin Regimen  Loading dose x 1. No pharmacokinetics performed as only 1st dose.      Plan:  1. Loading dose of vancomycin at 2000 mg (20 mg/kg) IVPB x 1 dose.   2. Vancomycin monitoring method: AUC  3. Vancomycin therapeutic monitoring goal: 400-600 mg*h/L  4. Pharmacy will check vancomycin levels as appropriate in 1-3 Days.    5. Serum creatinine levels will be ordered daily for the first week of therapy and at least twice weekly for subsequent weeks.      Eliza Pineda, [facility]

## 2023-06-29 NOTE — NURSING NOTE
Chief Complaint   Patient presents with     Pain     Medication Reconciliation: complete    Jacqueline Cortez CMA ([facility]) 6/29/2023 9:14 AM

## 2023-06-29 NOTE — ED PROVIDER NOTES
Emergency Department Provider Note  : 1955 Age: 68 year old Sex: male MRN: [id number]    Chief Complaint   Patient presents with     Hypotension     Fever       Medical Decision Making / Assessment / Plan   68 year old male with a PMH of metastatic prostate cancer who presents for evaluation of hypotension and fever found this morning in oncology clinic.  Has had worsening left hip pain since the last round of chemo on the  of this month.  Vitals in clinic were notable for andrea hypotension in addition to low-grade fever.  On arrival here he has a low-grade fever to 100.5 but blood pressure is 112/54.  Patient is mentating well but is mildly tachycardic.  He appears to have left hip pain posteriorly on exam but otherwise no concerning findings that would obviously explain source of presumed infection.  We will plan on broad sepsis work-up, depending on results, likely imaging of the abdomen, pelvis and left hip, empiric antibiotics and admission.  Given sepsis bolus fluids, analgesia and antipyretics at this time.    ED Course as of 23 1755   Thu 2023   1021 WBC(!): 18.1   1021 Hemoglobin(!): 10.0   1021 Sodium(!): 133   1021 Potassium: 4.4   1021 Chloride(!): 96   1021 Carbon Dioxide (CO2): 22   1021 Anion Gap: 15   1021 Urea Nitrogen: 16.4   1021 Creatinine: 1.04   1021 Calcium: 9.6   1021 Alkaline Phosphatase(!): 152   1021 AST(!): 92   1021 ALT(!): 124   1022 Slight increase in alk phos and AST and ALT compared to recent measurements.   1025 Fortunately, not neutropenic but white count of 18 with absolute neutrophil count of 15.  When patient was straight catheter urine specimen, urine was thick and essentially purulent. Given ceftri/vanc. Will bladder scan to eval for retention as there was minimal output.   1109 Influenza A: Negative   1109 Influenza B: Negative   1109 Resp Syncytial Virus: Negative   1109 SARS CoV2 PCR: Negative   1231 Lactic Acid: 1.0   1327 Spoke with Dr. Casas of  urology at [facility] in [place].  He will touch base with IR to find out if this is something they can drain or if the patient will need to go to the emergency [place] to get this taken care of for potential more involved surgical procedure.      After further discussion, patient ultimately has been referred to the emergency [place].  Spoke with Dr. Duggan, hospitalist, who graciously excepted transfer with a plan to involve infectious disease, urology, oncology, potentially colorectal surgery.  Patient remained hemodynamically stable here in the ER fortunately.  Arrange for transport.    New Prescriptions    No medications on file       Final diagnoses:   Sepsis, due to unspecified organism, unspecified whether acute organ dysfunction present (H)   Pelvic abscess in male (H)   Osteomyelitis of other site, unspecified type (H)       Micah Smith MD  6/29/2023   Emergency Department    Subjective   Dilip is a 68 year old male with PMH of DM2, metastatic prostate CA on chemo who presents at  9:25 AM for evaluation of fever and hypotension found in clinic this morning.  Spoke with Dr. Fritz of oncology saw the patient this morning and sent him here to the ER.  Patient's last chemo was completed on June 13.  Sounds like he appears to be responding to chemotherapy from the standpoint of improvement in metastatic lesions but has a known metastatic lesion in the left hip and has had progressively worsening left hip pain since the 13th.  Is unaware that he had a fever this morning and is unclear if he had any fevers since his last round of chemo.  He denies any significant congestion or cough, nausea, vomiting, diarrhea, abdominal pain.  He straight caths.  Urine at home but denies any suprapubic pain.  Does report recent blood in his urine, however.      I have reviewed the Medications, Allergies, Past Medical and Surgical History, and Social History in the [employer] and with family.    Review of  "Systems:  Please see HPI for pertinent positives and negatives. All other systems reviewed and found to be negative.      Objective   BP: 112/54  Pulse: 98  Temp: (!) 100.5  F (38.1  C)  Resp: 22  Height: 177.8 cm (5' 10\")  Weight: 99.8 kg (220 lb)  SpO2: 92 %    Physical Exam:   Gen: C mildly uncomfortable appearing but no distress  HEENT: MMM. AT/NC.  Eye: EOMI.   CV:  Tachycardic with regular rhythm.  Well-perfused.  Pulm: Clear to auscultation bilaterally. Nonlabored, equal chest rise  Abd: ND.  Nontender to palpation throughout.  Ext:  Reproducible pain to palpation over the posterior lateral left hip.  : Normal appearing external male genitalia  Neuro: AOx3, no focal deficit noted  Psych: Appropriate affect, cognition intact    Procedures / Critical Care   Procedures    Critical Care Time: none         Medical/Surgical History:  Past Medical History:   Diagnosis Date     Elevated prostate specific antigen (PSA)     4/10/2012     Encounter for other administrative examinations     1/31/2014     Esophagitis     No Comments Provided     Gastro-esophageal reflux disease without esophagitis     No Comments Provided     Low back pain     No Comments Provided     Malignant neoplasm of prostate (H)     9/6/2012     Nicotine dependence, uncomplicated     Quit - October 2007 with chantix     Other intervertebral disc degeneration, lumbosacral region     12/1/2008     Past Surgical History:   Procedure Laterality Date     APPENDECTOMY OPEN  [id number]    Ruptured - [place]     ARTHROPLASTY KNEE Right 11/16/2021    Procedure: ARTHROPLASTY, KNEE, TOTAL;  Surgeon: Micah Rock MD;  Location: [facility] OR     COLONOSCOPY  08/31/2006    next due in 2016     DISKECTOMY, LUMBAR, SINGLE SP  03/16/2009    L 3-4 microdiskectomy, [facility]     ESOPHAGOSCOPY, GASTROSCOPY, DUODENOSCOPY (EGD), COMBINED  03/2003          ESOPHAGOSCOPY, GASTROSCOPY, DUODENOSCOPY (EGD), COMBINED  08/31/2006          PROSTATECTOMY PERINEAL RADICAL  " 11/28/2012    Nerve Dr Timmy Garsia     SPINE SURGERY N/A 04/28/2017    L3 to S1 spinal decompression L4/L5 fusion     TONSILLECTOMY, ADENOIDECTOMY, COMBINED      as a child     VARICOCELECTOMY  1959    INCISION AND DRAINAGE,EXCISE VARICOCELE       Medications:  Current Facility-Administered Medications   Medication     HYDROmorphone (DILAUDID) injection 1 mg     sodium chloride (PF) 0.9% PF flush 3 mL     sodium chloride (PF) 0.9% PF flush 3 mL     Current Outpatient Medications   Medication     amLODIPine (NORVASC) 10 MG tablet     apixaban ANTICOAGULANT (ELIQUIS) 5 MG tablet     atorvastatin (LIPITOR) 20 MG tablet     furosemide (LASIX) 20 MG tablet     HYDROcodone-acetaminophen (NORCO)  MG per tablet     hydrOXYzine (ATARAX) 10 MG tablet     LORazepam (ATIVAN) 0.5 MG tablet     metFORMIN (GLUCOPHAGE XR) 500 MG 24 hr tablet     omeprazole (PRILOSEC) 40 MG DR capsule     ondansetron (ZOFRAN ODT) 8 MG ODT tab     potassium chloride ER (KLOR-CON M) 20 MEQ CR tablet     predniSONE (DELTASONE) 5 MG tablet     pregabalin (LYRICA) 150 MG capsule     prochlorperazine (COMPAZINE) 10 MG tablet     traZODone (DESYREL) 50 MG tablet       Allergies:  Patient has no known allergies.    Relevant labs, images, EKGs, Epic and outside hospital (if applicable) charts were reviewed. The findings, diagnosis, plan, and need for follow up were discussed with the patient/family. Nursing notes were reviewed.      Micha Smith MD  06/29/23 [id number]

## 2023-06-29 NOTE — LETTER
Transition Communication Hand-off for Care Transitions to Next Level of Care Provider    Name: Dilip Kan  : 1955  MRN #: [id number]  Primary Care Provider: Wei Perez     Primary Clinic: [address]  [place] [address]     Reason for Hospitalization:  Sepsis (H) [A41.9]  Admit Date/Time: 2023  9:36 PM  Discharge Date: 23  Payor Source: Payor: [employer] / Plan: [employer]AGE / Product Type: HMO /              Reason for Communication Hand-off Referral: Other inpatient to outpatient    Discharge Plan:  Discharge to home     Concern for non-adherence with plan of care:   N  Discharge Needs Assessment:  Needs      Flowsheet Row Most Recent Value   Equipment Currently Used at Home walker, rolling              Follow-up plan:    Future Appointments   Date Time Provider Department Center   2023  1:30 PM UR PT WAITLIST [facility] [place]   2023  2:30 PM Erum Fritz MD [facility]   2023  1:15 PM [facility] INJECTION NURSE [facility]   2023  2:00 PM Erin Andres APRN CNP [facility]   2023  9:30 AM Osbaldo Acharya MD [facility]   8/15/2023  1:00 PM [facility] INJECTION NURSE [facility]       Any outstanding tests or procedures:        Radiology & Cardiology Orders       Future Labs/Procedures Complete By Expires    CT Abdomen Pelvis w Contrast  8/10/2023 (Approximate) 2024    Process Instructions:    Administration of IV contrast (contrast agent, dose, and amount) will be tailored to this examination per the appropriate written protocol listed in the Protocol E-Book, or by the interpreting provider.           Referrals       Future Labs/Procedures    Infectious Disease Referral     Comments:    Please be aware that coverage of these services is subject to the terms and limitations of your health insurance plan.  Call member services at your health plan with any benefit or coverage questions.  Please call  to schedule your appointment                Key Recommendations:      Stefani Louise RN    AVS/Discharge Summary is the source of truth; this is a helpful guide for improved communication of patient story

## 2023-06-29 NOTE — PROGRESS NOTES
Visit Date: 06/29/2023    HEMATOLOGY ONCOLOGY CONSULTATION    The patient was seen on an emergent basis.  For details, see history in previous notes.  He comes in today extremely ill with fever, night sweats, left hip pain.  He recently has completed 4 cycles of docetaxel for his metastatic castrate-resistant prostate cancer.  His last dose was given on 06/13/2023.  Prior to that, he had completed 3 cycles of docetaxel and had imaging studies to assess response.  CT chest, abdomen, and pelvis on 06/01/2023 revealed that liver lesions had resolved.  There was a decrease in size of the locally invasive mass on the left prostate.  There was no significant change in the mixed lytic and sclerotic appearance of the left acetabulum.  There was unchanged alignment of the left-sided subacute acetabular fracture and left inferior pubic ramus fracture.  Bone scan indicated there was uptake in the left acetabulum that was relatively stable.  His PSA had dropped significantly down to 2.24.  The plan was to continue docetaxel for 3 more cycles and reassess.  Today, he comes in complaining of excruciating left hip pain.  He is only on hydrocodone.  He has not had any change in meds.  This has been prescribed by Dr. Perez.    PHYSICAL EXAMINATION:   GENERAL:  This is an ill-appearing, elderly, white male in some distress due to pain.    VITAL SIGNS:  Reveal blood pressure 80/58, pulse 131, respirations 20, temperature 101.1.    HEENT:  Atraumatic, normocephalic.  Oropharynx is nonerythematous.    NECK:  Supple.    LUNGS:  Reveal few rhonchi on the right.    HEART:  Tachycardic.    ABDOMEN:  Soft, nontender.    EXTREMITIES:  Revealed tenderness over the left hip anterolaterally.   NEUROLOGIC:  Grossly nonfocal.    LABORATORY DATA:  Not done.    IMPRESSION:  History of castrate-resistant nonmetastatic prostate cancer, progressing now to castrate-resistant metastatic prostate cancer.  Initially, the patient was treated due to rising  PSA with a doubling time less than 12 months with apalutamide in addition to androgen deprivation therapy.  He was started on apalutamide 240 mg daily.  His PSA dropped to 1.25, then began to rise to 2.06.  We elected to switch him to abiraterone and prednisone, beginning 08/2022.  Required dose reduction due to fatigue.  His PSA began to rise and now has developed progression with bony metastasis in the left acetabulum with a left pubic symphysis nondisplaced fracture as well as locally advanced disease as well as distant liver metastases.  The patient was started on docetaxel, prednisone, and received radiation therapy to the pubic symphysis as well as the left hip.  He received 2 cycles of docetaxel.  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  The patient recovered and went on to receive cycle 3 of docetaxel at a 25% dose reduction with positive response with drop in PSA.  Now has completed 4 cycles, last dose of chemo was on 06/13/2023, and now he comes in febrile, tachycardic.  Suspect a septic presentation likely due to neutropenic sepsis.  He also has increasing left hip pain.  We will discontinue hydrocodone and start the patient on OxyContin 20 mg q.12 hours and oxycodone 5 mg q.6 hours p.r.n. breakthrough pain.     The patient likely will need to be admitted for septic shock.  We have called the Emergency Room and the patient is being transferred to the Emergency Room with Dr. Micah Smith who will take care of the patient.  We also recommended the patient see Orthopedics as he may have had progression of his left acetabular fracture and may need intervention.     Seventy-five minutes were spent on this patient.  Time was spent reviewing lab  results, chemotherapy with the patient, performing a history and physical, documenting History and Physical and discussing the case with Dr. Micah Smith at the Emergency Room and transferring the patient to the Emergency Room.  We will see the patient on 2023 prior to his cycle 5 of docetaxel.  If he is still in the hospital, we may have to delay cycle 5.    Erum Fritz MD        D: 2023   T: 2023   MT: mandy    Name:     LELAND CHAPMAN  MRN:      [id number]        Account:    [id number]   :      1955           Visit Date: 2023     Document: [id number]    cc:  Wei Perez MD

## 2023-06-29 NOTE — PROGRESS NOTES
Transfer Type: [facility]  Transfer Triage Note    Date of call: 06/29/23  Time of call: 4:30 PM    Current Patient Location:  [facility]    Current Level of Care: ED    Vitals: VSS  Diagnosis: sepsis 2/2 urinary source vs abscess with ?fistula formulation between bladder and rectum, metastatic prostate cancer  Is COVID-19 a concern? No  Reason for requested transfer: Further diagnostic work up, management, and consultation for specialized care   Isolation Needs: None    Outside Records: Available  Additional records may be faxed to [phone].    Transfer accepted: Yes  Stability of Patient: Patient is at risk for instability, however patient requires urgent transfer and does not meet ICU criteria   Level of Care Needed: Med Surg  Telemetry Needed:  None  Expected Time of Arrival for Transfer: 8-24 hours  Arrival Location:  [facility]    Recommendations for Management and Stabilization: Given    Additional Comments:   Patient with metastatic prostate cancer on chemotherapy (last on 6/13) presented to oncology clinic ill-appearing, febrile, hypotensive, concerning for sepsis. Does intermittently self-cath. Workup significant for WBC 18.1, Na 133, Cr 1.04 (BL 0.5-0.6), , AST 92, , lactate 3.3-->1.0, UA with LE, WBC, WBC clumps, and RBC. CT A/P with 4 cm abscess deep within the pelvis and possibly in communication with the bladder and L ventrolateral aspect of the rectum. Abscess abuts the L pubic bone at the symphysis with likely osteomyelitis with small gas bubbles in the abscess cavity and surrounding/within the L pubic bone. Started on vanc/CTX/metronidazole.  Is HDS, s/p 30 ml/kg bolus.    ED physician talked to urology and to IR in [place], too complex for their system. Recommending transfer to [facility].    Will need: IR, ID, oncology, urology given this pelvic abscess with possibly communication with the bladder and the rectum.  Question if he may need colorectal surgery but there are no [place] beds for several days and we agree that this patient is sick enough he should come to the [facility] first for evaluation by these specialists and transfer to [place] if he eventually needs evaluation by surgery.    Med/surg bed no tele to [facility]       Marleny Dailey (Kelsy) MD [facility]  Internal Medicine/Pediatrics  Hospitalist

## 2023-06-29 NOTE — PROGRESS NOTES
IV Contrast- Discharge Instructions After Your CT Scan      The IV contrast you received today will be filtered from your bloodstream by your kidneys during the next 24 hours and pass from the body in urine.  You will not be aware of this process and your urine will not change in color.  To help this process you should drink at least 4 additional glasses of water or juice today.  This reduces stress on your kidneys.    Most contrast reactions are immediate.  Should you develop symptoms of concern after discharge, contact the department at the number below.  After hours you should contact your personal physician.  If you develop breathing distress or wheezing, call 911.    1.  Has the patient had a previous reaction to IV contrast? no    2.  Does the patient have kidney disease? Yes GFR 70    3.  Is the patient on dialysis? NO    If YES to any of these questions, exam will be reviewed with a Radiologist before administering contrast.

## 2023-06-29 NOTE — PATIENT INSTRUCTIONS
To ER today for possible sepsis or other infection related to recent chemo.   Pain medication regimen changed.   Keep appointment on 7/5/23 for infusion / chemo treatment and see Dr Fritz then to follow up on ER visit.

## 2023-06-29 NOTE — ED TRIAGE NOTES
Pt comes into the ER today via wheelchair from 's clinic today with RN. Wife is with patient.   Per RN, pt was going to see Dr. Camarena today for left hip pain. Staff noticed that his temp was 101, blood pressure 80/50. Concern for infection. They brought him here  Pt has mets prostate CA and has received 3 treatments. Self caths- recent blood in urine.  No recent fall, trauma     Triage Assessment     Row Name 06/29/23 0927       Triage Assessment (Adult)    Airway WDL WDL       Respiratory WDL    Respiratory WDL X;rhythm/pattern    Rhythm/Pattern, Respiratory shortness of breath       Skin Circulation/Temperature WDL    Skin Circulation/Temperature WDL circulation;temperature  diaphoresis    Skin Circulation pallor    Skin Temperature warm       Cardiac WDL    Cardiac WDL rhythm    Pulse Rate & Regularity tachycardic       Peripheral/Neurovascular WDL    Peripheral Neurovascular WDL WDL       Cognitive/Neuro/Behavioral WDL    Cognitive/Neuro/Behavioral WDL WDL

## 2023-06-30 ENCOUNTER — APPOINTMENT (OUTPATIENT)
Dept: CT IMAGING | Facility: CLINIC | Age: 68
DRG: 871 | End: 2023-06-30
Attending: STUDENT IN AN ORGANIZED HEALTH CARE EDUCATION/TRAINING PROGRAM
Payer: COMMERCIAL

## 2023-06-30 ENCOUNTER — APPOINTMENT (OUTPATIENT)
Dept: MRI IMAGING | Facility: CLINIC | Age: 68
DRG: 871 | End: 2023-06-30
Attending: INTERNAL MEDICINE
Payer: COMMERCIAL

## 2023-06-30 LAB
ALBUMIN SERPL BCG-MCNC: 2.4 G/DL (ref 3.5–5.2)
ALP SERPL-CCNC: 132 U/L (ref 40–129)
ALT SERPL W P-5'-P-CCNC: 78 U/L (ref 0–70)
ANION GAP SERPL CALCULATED.3IONS-SCNC: 10 MMOL/L (ref 7–15)
APTT PPP: 82 SECONDS (ref 22–38)
APTT PPP: 84 SECONDS (ref 22–38)
APTT PPP: 87 SECONDS (ref 22–38)
AST SERPL W P-5'-P-CCNC: 36 U/L (ref 0–45)
BACTERIA UR CULT: NORMAL
BILIRUB SERPL-MCNC: 0.3 MG/DL
BUN SERPL-MCNC: 11 MG/DL (ref 8–23)
CALCIUM SERPL-MCNC: 9.1 MG/DL (ref 8.8–10.2)
CHLORIDE SERPL-SCNC: 102 MMOL/L (ref 98–107)
CREAT SERPL-MCNC: 0.62 MG/DL (ref 0.67–1.17)
DEPRECATED HCO3 PLAS-SCNC: 23 MMOL/L (ref 22–29)
ERYTHROCYTE [DISTWIDTH] IN BLOOD BY AUTOMATED COUNT: 16.5 % (ref 10–15)
ERYTHROCYTE [DISTWIDTH] IN BLOOD BY AUTOMATED COUNT: 17 % (ref 10–15)
GFR SERPL CREATININE-BSD FRML MDRD: >90 ML/MIN/1.73M2
GLUCOSE BLDC GLUCOMTR-MCNC: 118 MG/DL (ref 70–99)
GLUCOSE BLDC GLUCOMTR-MCNC: 125 MG/DL (ref 70–99)
GLUCOSE BLDC GLUCOMTR-MCNC: 128 MG/DL (ref 70–99)
GLUCOSE BLDC GLUCOMTR-MCNC: 149 MG/DL (ref 70–99)
GLUCOSE BLDC GLUCOMTR-MCNC: 176 MG/DL (ref 70–99)
GLUCOSE SERPL-MCNC: 160 MG/DL (ref 70–99)
HCT VFR BLD AUTO: 26.5 % (ref 40–53)
HCT VFR BLD AUTO: 27.7 % (ref 40–53)
HGB BLD-MCNC: 8.6 G/DL (ref 13.3–17.7)
HGB BLD-MCNC: 8.7 G/DL (ref 13.3–17.7)
INR PPP: 1.5 (ref 0.85–1.15)
MCH RBC QN AUTO: 26.6 PG (ref 26.5–33)
MCH RBC QN AUTO: 27 PG (ref 26.5–33)
MCHC RBC AUTO-ENTMCNC: 31.4 G/DL (ref 31.5–36.5)
MCHC RBC AUTO-ENTMCNC: 32.5 G/DL (ref 31.5–36.5)
MCV RBC AUTO: 83 FL (ref 78–100)
MCV RBC AUTO: 85 FL (ref 78–100)
PLATELET # BLD AUTO: 375 10E3/UL (ref 150–450)
PLATELET # BLD AUTO: ABNORMAL 10*3/UL
POTASSIUM SERPL-SCNC: 4.2 MMOL/L (ref 3.4–5.3)
PROT SERPL-MCNC: 5.6 G/DL (ref 6.4–8.3)
RBC # BLD AUTO: 3.19 10E6/UL (ref 4.4–5.9)
RBC # BLD AUTO: 3.27 10E6/UL (ref 4.4–5.9)
SODIUM SERPL-SCNC: 135 MMOL/L (ref 136–145)
UFH PPP CHRO-ACNC: >1.1 IU/ML
WBC # BLD AUTO: 14.7 10E3/UL (ref 4–11)
WBC # BLD AUTO: 15.5 10E3/UL (ref 4–11)

## 2023-06-30 PROCEDURE — 250N000012 HC RX MED GY IP 250 OP 636 PS 637: Performed by: PHYSICIAN ASSISTANT

## 2023-06-30 PROCEDURE — 250N000011 HC RX IP 250 OP 636: Mod: JZ | Performed by: PHYSICIAN ASSISTANT

## 2023-06-30 PROCEDURE — 99233 SBSQ HOSP IP/OBS HIGH 50: CPT | Performed by: INTERNAL MEDICINE

## 2023-06-30 PROCEDURE — 258N000003 HC RX IP 258 OP 636: Performed by: STUDENT IN AN ORGANIZED HEALTH CARE EDUCATION/TRAINING PROGRAM

## 2023-06-30 PROCEDURE — 80053 COMPREHEN METABOLIC PANEL: CPT | Performed by: PHYSICIAN ASSISTANT

## 2023-06-30 PROCEDURE — 250N000011 HC RX IP 250 OP 636: Performed by: STUDENT IN AN ORGANIZED HEALTH CARE EDUCATION/TRAINING PROGRAM

## 2023-06-30 PROCEDURE — 72194 CT PELVIS W/O & W/DYE: CPT | Mod: 26 | Performed by: RADIOLOGY

## 2023-06-30 PROCEDURE — 85610 PROTHROMBIN TIME: CPT | Performed by: PHYSICIAN ASSISTANT

## 2023-06-30 PROCEDURE — 99222 1ST HOSP IP/OBS MODERATE 55: CPT | Performed by: STUDENT IN AN ORGANIZED HEALTH CARE EDUCATION/TRAINING PROGRAM

## 2023-06-30 PROCEDURE — 120N000002 HC R&B MED SURG/OB UMMC

## 2023-06-30 PROCEDURE — 72195 MRI PELVIS W/O DYE: CPT

## 2023-06-30 PROCEDURE — 85027 COMPLETE CBC AUTOMATED: CPT | Performed by: PHYSICIAN ASSISTANT

## 2023-06-30 PROCEDURE — 36415 COLL VENOUS BLD VENIPUNCTURE: CPT | Performed by: PHYSICIAN ASSISTANT

## 2023-06-30 PROCEDURE — 250N000011 HC RX IP 250 OP 636: Mod: JZ | Performed by: STUDENT IN AN ORGANIZED HEALTH CARE EDUCATION/TRAINING PROGRAM

## 2023-06-30 PROCEDURE — 36415 COLL VENOUS BLD VENIPUNCTURE: CPT | Performed by: PEDIATRICS

## 2023-06-30 PROCEDURE — 250N000013 HC RX MED GY IP 250 OP 250 PS 637: Performed by: PHYSICIAN ASSISTANT

## 2023-06-30 PROCEDURE — 85520 HEPARIN ASSAY: CPT | Performed by: PEDIATRICS

## 2023-06-30 PROCEDURE — 99223 1ST HOSP IP/OBS HIGH 75: CPT | Mod: GC | Performed by: STUDENT IN AN ORGANIZED HEALTH CARE EDUCATION/TRAINING PROGRAM

## 2023-06-30 PROCEDURE — 72194 CT PELVIS W/O & W/DYE: CPT

## 2023-06-30 PROCEDURE — 85730 THROMBOPLASTIN TIME PARTIAL: CPT | Performed by: PHYSICIAN ASSISTANT

## 2023-06-30 PROCEDURE — 72195 MRI PELVIS W/O DYE: CPT | Mod: 26 | Performed by: RADIOLOGY

## 2023-06-30 PROCEDURE — 85730 THROMBOPLASTIN TIME PARTIAL: CPT | Performed by: PEDIATRICS

## 2023-06-30 RX ORDER — DIAZEPAM 5 MG
5 TABLET ORAL
Status: DISCONTINUED | OUTPATIENT
Start: 2023-06-30 | End: 2023-07-06 | Stop reason: HOSPADM

## 2023-06-30 RX ORDER — LISINOPRIL 40 MG/1
40 TABLET ORAL EVERY MORNING
COMMUNITY
End: 2024-01-10

## 2023-06-30 RX ORDER — MEGESTROL ACETATE 20 MG/1
20 TABLET ORAL DAILY
Status: DISCONTINUED | OUTPATIENT
Start: 2023-06-30 | End: 2023-07-06 | Stop reason: HOSPADM

## 2023-06-30 RX ORDER — VANCOMYCIN HYDROCHLORIDE 1 G/200ML
1000 INJECTION, SOLUTION INTRAVENOUS EVERY 12 HOURS
Status: DISCONTINUED | OUTPATIENT
Start: 2023-06-30 | End: 2023-06-30

## 2023-06-30 RX ORDER — IOPAMIDOL 755 MG/ML
50 INJECTION, SOLUTION INTRAVASCULAR ONCE
Status: COMPLETED | OUTPATIENT
Start: 2023-06-30 | End: 2023-06-30

## 2023-06-30 RX ORDER — MEGESTROL ACETATE 20 MG/1
20 TABLET ORAL 2 TIMES DAILY
COMMUNITY
End: 2024-07-18

## 2023-06-30 RX ORDER — OXYBUTYNIN CHLORIDE 5 MG/1
5 TABLET ORAL 4 TIMES DAILY PRN
Status: DISCONTINUED | OUTPATIENT
Start: 2023-06-30 | End: 2023-07-06 | Stop reason: HOSPADM

## 2023-06-30 RX ORDER — OXYBUTYNIN CHLORIDE 5 MG/1
5 TABLET ORAL 4 TIMES DAILY PRN
COMMUNITY
End: 2024-01-10

## 2023-06-30 RX ADMIN — IOPAMIDOL 25 ML: 755 INJECTION, SOLUTION INTRAVENOUS at 15:53

## 2023-06-30 RX ADMIN — OXYCODONE HYDROCHLORIDE 5 MG: 5 TABLET ORAL at 04:51

## 2023-06-30 RX ADMIN — PANTOPRAZOLE SODIUM 40 MG: 40 TABLET, DELAYED RELEASE ORAL at 09:11

## 2023-06-30 RX ADMIN — VANCOMYCIN HYDROCHLORIDE 1000 MG: 1 INJECTION, SOLUTION INTRAVENOUS at 01:47

## 2023-06-30 RX ADMIN — PREGABALIN 150 MG: 75 CAPSULE ORAL at 09:11

## 2023-06-30 RX ADMIN — OXYCODONE HYDROCHLORIDE 20 MG: 10 TABLET, FILM COATED, EXTENDED RELEASE ORAL at 21:46

## 2023-06-30 RX ADMIN — HEPARIN SODIUM 1800 UNITS/HR: 10000 INJECTION, SOLUTION INTRAVENOUS at 18:29

## 2023-06-30 RX ADMIN — ACETAMINOPHEN 975 MG: 325 TABLET, FILM COATED ORAL at 15:29

## 2023-06-30 RX ADMIN — HYDROXYZINE HYDROCHLORIDE 10 MG: 10 TABLET ORAL at 15:29

## 2023-06-30 RX ADMIN — PREDNISONE 5 MG: 5 TABLET ORAL at 09:12

## 2023-06-30 RX ADMIN — LORAZEPAM 0.5 MG: 0.5 TABLET ORAL at 15:29

## 2023-06-30 RX ADMIN — METRONIDAZOLE 500 MG: 500 INJECTION, SOLUTION INTRAVENOUS at 09:21

## 2023-06-30 RX ADMIN — CEFTRIAXONE SODIUM 2 G: 2 INJECTION, POWDER, FOR SOLUTION INTRAMUSCULAR; INTRAVENOUS at 10:45

## 2023-06-30 RX ADMIN — PREDNISONE 5 MG: 5 TABLET ORAL at 21:47

## 2023-06-30 RX ADMIN — TRAZODONE HYDROCHLORIDE 50 MG: 50 TABLET ORAL at 21:46

## 2023-06-30 RX ADMIN — ATORVASTATIN CALCIUM 20 MG: 20 TABLET, FILM COATED ORAL at 09:11

## 2023-06-30 RX ADMIN — PANTOPRAZOLE SODIUM 40 MG: 40 TABLET, DELAYED RELEASE ORAL at 21:47

## 2023-06-30 RX ADMIN — PREGABALIN 150 MG: 75 CAPSULE ORAL at 21:46

## 2023-06-30 RX ADMIN — ACETAMINOPHEN 975 MG: 325 TABLET, FILM COATED ORAL at 06:35

## 2023-06-30 RX ADMIN — METRONIDAZOLE 500 MG: 500 INJECTION, SOLUTION INTRAVENOUS at 01:19

## 2023-06-30 RX ADMIN — HYDROMORPHONE HYDROCHLORIDE 0.2 MG: 0.2 INJECTION, SOLUTION INTRAMUSCULAR; INTRAVENOUS; SUBCUTANEOUS at 09:12

## 2023-06-30 RX ADMIN — HYDROMORPHONE HYDROCHLORIDE 0.2 MG: 0.2 INJECTION, SOLUTION INTRAMUSCULAR; INTRAVENOUS; SUBCUTANEOUS at 18:06

## 2023-06-30 RX ADMIN — METRONIDAZOLE 500 MG: 500 INJECTION, SOLUTION INTRAVENOUS at 18:08

## 2023-06-30 RX ADMIN — HYDROMORPHONE HYDROCHLORIDE 0.2 MG: 0.2 INJECTION, SOLUTION INTRAMUSCULAR; INTRAVENOUS; SUBCUTANEOUS at 22:41

## 2023-06-30 RX ADMIN — OXYCODONE HYDROCHLORIDE 5 MG: 5 TABLET ORAL at 13:12

## 2023-06-30 RX ADMIN — VANCOMYCIN HYDROCHLORIDE 1500 MG: 10 INJECTION, POWDER, LYOPHILIZED, FOR SOLUTION INTRAVENOUS at 13:09

## 2023-06-30 RX ADMIN — HYDROMORPHONE HYDROCHLORIDE 0.2 MG: 0.2 INJECTION, SOLUTION INTRAMUSCULAR; INTRAVENOUS; SUBCUTANEOUS at 01:32

## 2023-06-30 RX ADMIN — OXYCODONE HYDROCHLORIDE 20 MG: 10 TABLET, FILM COATED, EXTENDED RELEASE ORAL at 10:45

## 2023-06-30 RX ADMIN — ACETAMINOPHEN 975 MG: 325 TABLET, FILM COATED ORAL at 21:46

## 2023-06-30 ASSESSMENT — ACTIVITIES OF DAILY LIVING (ADL)
ADLS_ACUITY_SCORE: 25
ADLS_ACUITY_SCORE: 25
ADLS_ACUITY_SCORE: 23
ADLS_ACUITY_SCORE: 25
ADLS_ACUITY_SCORE: 25
ADLS_ACUITY_SCORE: 23
ADLS_ACUITY_SCORE: 25
ADLS_ACUITY_SCORE: 23
ADLS_ACUITY_SCORE: 25
ADLS_ACUITY_SCORE: 25

## 2023-06-30 NOTE — UTILIZATION REVIEW
Admission Status; Secondary Review Determination       Under the authority of the Utilization Management Committee, the utilization review process indicated a secondary review on the above patient. The review outcome is based on review of the medical records, discussions with staff, and applying clinical experience noted on the date of the review.     (x) Inpatient Status Appropriate - This patient's medical care is consistent with medical management for inpatient care and reasonable inpatient medical practice.     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  Meets sepsis criteria with fever 100.5, tachycardia.  Receiving IV opiate pain medications.  Consultations pending from multiple services including hematology oncology, radiation oncology, urology.  Currently being treated with IV vancomycin, IV ceftriaxone and IV metronidazole antibiotics.  On continuous IV heparin infusion with anticipated procedure or surgery needed.  CT scan prior to transfer showed 4 cm abscess deep within the pelvis appearing to be in communication with the urinary bladder and left ventrolateral aspect of the rectum.  Appearance of osteomyelitis abutting the left pubic bone at the symphysis with small gas bubbles seen within the abscess cavity as well as surrounding and within the left pubic bone.    At the time of admission with the information available to the attending physician more than 2 nights hospital complex care was anticipated, based on patient risk of adverse outcome if treated as outpatient and  complex care required. Inpatient admission is appropriate based on the Medicare guidelines.     This document was produced using voice recognition software.    The information on this document is developed by the utilization review team in order for the business office to ensure compliance. This only denotes the appropriateness of proper admission status and does not reflect the quality of care rendered.   The definitions of Inpatient Status and Observation Status used in making the determination above are those provided in the CMS Coverage Manual, Chapter 1 and Chapter 6, section 70.4.     Sincerely,   Eliza Jade MD  Utilization Review  Physician Advisor  [facility].

## 2023-06-30 NOTE — PHARMACY-VANCOMYCIN DOSING SERVICE
Pharmacy Empiric Dose Change Per Policy    Original Dose Ordered: vancomycin 1000 mg IV q12h     Regimen: 1000 mg IV every 12 hours.  Exposure target: AUC24 (range)400-600 mg/L.hr   AUC24,ss: 371 mg/L.hr  Probability of AUC24 > 400: 42 %  Ctrough,ss: 11.2 mg/L  Probability of Ctrough,ss > 20: 12 %  Probability of nephrotoxicity (Lodise HERON 2009): 7 %    Dose Changed To: vancomycin 1500 mg IV q12h     Regimen: 1500 mg IV every 12 hours.  Exposure target: AUC24 (range)400-600 mg/L.hr   AUC24,ss: 553 mg/L.hr  Probability of AUC24 > 400: 80 %  Ctrough,ss: 17.1 mg/L  Probability of Ctrough,ss > 20: 38 %  Probability of nephrotoxicity (Lodise HERON 2009): 13 %      This dose was changed d/t improved SCr which cause predicted AUC to be subtherapeutic. Increased dose to ensure therapeutic concentrations.     Estimated Creatinine Clearance: 131.3 mL/min (A) (based on SCr of 0.62 mg/dL (L)).  Will continue to follow and modify dosage according to levels, organ function and clinical condition    Jd Jimenez, PharmD *[phone]

## 2023-06-30 NOTE — PHARMACY-VANCOMYCIN DOSING SERVICE
Pharmacy Vancomycin Initial Note  Date of Service 2023  Patient's  1955  68 year old, male    Indication: Sepsis    Current estimated CrCl = Estimated Creatinine Clearance: 78.2 mL/min (based on SCr of 1.04 mg/dL).    Creatinine for last 3 days  2023:  9:39 AM Creatinine 1.04 mg/dL    Recent Vancomycin Level(s) for last 3 days  No results found for requested labs within last 3 days.      Vancomycin IV Administrations (past 72 hours)                   vancomycin (VANCOCIN) 2,000 mg in sodium chloride 0.9 % 500 mL intermittent infusion (mg) 2,000 mg New Bag 23 1115                Nephrotoxins and other renal medications (From now, onward)    Start     Dose/Rate Route Frequency Ordered Stop    23 0030  vancomycin (VANCOCIN) 1000 mg in dextrose 5% 200 mL PREMIX         1,000 mg  200 mL/hr over 1 Hours Intravenous EVERY 12 HOURS 23 0008            Contrast Orders - past 72 hours (72h ago, onward)    None          [employer] Prediction of Planned Initial Vancomycin Regimen  Loading dose: N/A  Regimen: 1000 mg IV every 12 hours.  Start time: 00:03 on 2023  Exposure target: AUC24 (range)400-600 mg/L.hr   AUC24,ss: 531 mg/L.hr  Probability of AUC24 > 400: 79 %  Ctrough,ss: 17.8 mg/L  Probability of Ctrough,ss > 20: 39 %  Probability of nephrotoxicity (Lodise HERON ): 14 %    Plan:  1. Start vancomycin  1000 mg IV q12h.   2. Vancomycin monitoring method: AUC  3. Vancomycin therapeutic monitoring goal: 400-600 mg*h/L  4. Pharmacy will check vancomycin levels as appropriate in 1-3 Days.    5. Serum creatinine levels will be ordered every 48 hours.      Delfino Garcia [facility]

## 2023-06-30 NOTE — H&P
[facility]    History and Physical - Hospitalist Service       Date of Admission:  6/29/2023    Assessment & Plan      Dilip Kan is a 68 year old male with PMHx of HTN, DM2, bilateral PE on AC, and metastatic prostate cancer with bony and liver mets, s/p radiation c/b radiation cysitis and chemotherapy who sent in from oncology clinic with fever and hypotension to OSH, now transferred to [facility] and admitted on 6/29/2023 with sepsis likely 2/2 urinary source vs abscess with concern for rectovesical fistula.     # Severe Sepsis   # Pelvic abscess with possible rectovesical fistula  # Concern for pubic bone osteomyelitis   Patient presented with fevers to 101.1F and hypotension in clinic but was normotensive in the ED. Mild tachypnea and tachycardia.  Patient with leukocytosis to 18.1, with elevated lactic acid of 3.3 which improved after fluids to 1.0.  UA grossly positive with leukoesterase and WBCs, culture pending.  CXR clear. CT abdomen pelvis with 4 cm abscess deep within the pelvis, possibly communicating with the bladder and left ventrolateral aspect of the rectum.  Abscess also near her left pubic bone at the symphysis with concern for osteomyelitis and small gas bubbles within the abscess cavity surrounding the pubic bone.  Patient was started on broad-spectrum antibiotics and transferred to [facility] for urologic, interventional radiology, and infectious disease involvement.  - Continue vancomycin, ceftriaxone and Flagyl  - Follow-up blood cultures and urine culture  - IR consult, urology consult and infectious disease consult  - Likely involve colorectal surgery in coming days  - Trend CBC, CMP, and fever curve     # TRINY -baseline creatinine 0.5.  Creatinine elevated to 1.04 upon admission.  Possibly prerenal, ATN or postobstructive due to urinary retention.  As noted below Jiménez placed in the emergency department.  Patient received IV fluids in the ED  - Trend  BMP  - Renally dose medications  - Avoid nephrotoxic agents    # Hyponatremia -mild.  Sodium of 133.  Possibly secondary to hypovolemia.  Repeat BMP in the morning    # Transaminitis - , AST 92, . Elevated from several weeks ago. CT abd/pelvis with Mild intrahepatic biliary dilatation again seen, with 8mm low dense lesion again seen anteriorly within the right lobe. Suspect likely secondary to known liver mets.    - Trend CMP  - Low threshold to obtain abd US     # Metastatic prostate cancer - to bone and liver. patient following with oncology closely.  Please see progress note from 6/28/2023 for details.  Status post radiation and chemo.  Completed 4 cycles of docetaxel on 6/13/2023.   - Urology and oncology consults  - Continue PTA prednisone 5 mg BID    # Chronic cancer related pain   # L Hip pain - mets to left acetabulum and known left acetabular fracture, seen on CT similar to prior images. Patient reports inability to bear weight 2/2 pain. Oncologist just transitioned to long acting pain medications.   - Continue PTA lyrica 150 mg BID  - Tylenol 975 mg TID   - Start OxyContin 20 mg Q12H, oxycodone 5 mg Q6H PRN severe pain, and IV dilaudid 0.2 mg Q2H PRN breakthrough   - Consider orthopedics consult for acetabular fracture    # Urinary retention - 2/2 prostate cancer. Self caths at home. Hooks catheter placed in ED. Maintain hooks catheter for now.     # Acute PE and DVT - Dx 5/2023 on Eliquis 5 mg BID. With possible upcoming procedure, will hold Eliquis and transition to IV heparin.   - Hold PTA eliquis  - High intensity Heparin drip     # DM2 - Hemoglobin A1c 6.7 on 2/20/23. Hold PTA metformin with mild TRINY, and receiving IV contrast.   - Medium correctional sliding scale TIDAC and nightly     # HTN - Hold PTA amlodipine and lasix with recent hypotension with sepsis.     # HLD - Continue PTA lipitor 20 mg at bedtime     # Anxiety, insomnia- Continue PTA trazodone 50 mg at bedtime, ativan 0.5 mg  "Q6H PRN, melatonin PRN and atarax 10 mg Q6H PRN        Diet:  Regular, NPO after midnight   DVT Prophylaxis: DOAC, transitioning to heparin drip for possible procedure   Jiménez Catheter: Not present  Lines: None     Cardiac Monitoring: None  Code Status:   FULL CODE     Clinically Significant Risk Factors Present on Admission           # Hypercalcemia: corrected calcium is >10.1, will monitor as appropriate    # Hypoalbuminemia: Lowest albumin = 3.3 g/dL at 6/29/2023  9:39 AM, will monitor as appropriate  # Drug Induced Coagulation Defect: home medication list includes an anticoagulant medication    # Hypertension: Noted on problem list     # DMII: A1C = N/A within past 6 months    # Obesity: Estimated body mass index is 31.57 kg/m  as calculated from the following:    Height as of an earlier encounter on 6/29/23: 1.778 m (5' 10\").    Weight as of an earlier encounter on 6/29/23: 99.8 kg (220 lb).            Disposition Plan       The patient's care was discussed with the Attending Physician, Dr. Chucho Rosales, Bedside Nurse and Patient.    Ladi Alvares PA-C  Hospitalist Service  [facility]  Securely message with [employer] (more info)  Text page via [facility] Paging/Directory     ______________________________________________________________________    Chief Complaint   L leg, hip and buttock pain     History is obtained from the patient    History of Present Illness   Dilip Kan is a 68 year old male with PMHx of HTN, DM2, bilateral PE on AC, and metastatic prostate cancer with bony and liver mets, s/p radiation c/b radiation cysitis and chemotherapy who sent in from oncology clinic with fever and hypotension to OSH, now transferred to [facility] and admitted on 6/29/2023 with sepsis likely 2/2 urinary source vs abscess with concern for rectovestical fistula.     Patient reports progressive left upper thigh, groin, hip and buttock pain since his cancer diagnosis months ago.  " States the pain progressively worsened, to the point where he is unable to ambulate or bear weight due to pain.  Due to severity, he saw his oncologist yesterday.  During oncology visit he was found to have a fever and was hypotensive.  Patient reported feeling dizzy at that time which has since resolved.  He was sent into the emergency department for concern of sepsis.  Patient's oncologist adjusted his pain medications, with plan to start OxyContin and oxycodone as needed.      Patient states outside of his pain he has not felt unwell.  Denies any fevers, chills, chest pain, cough, nausea, vomiting, diarrhea, abdominal pain. He does reports some mild dyspnea, but can not provide further details.  Patient did note some super pubic discomfort yesterday that has since resolved.  States he straight caths for urinary retention.  Endorses chronic intermittent hematuria which is unchanged from baseline.  Endorses chronic back pain which is unchanged from baseline.      Past Medical History    Past Medical History:   Diagnosis Date     Elevated prostate specific antigen (PSA)     4/10/2012     Encounter for other administrative examinations     1/31/2014     Esophagitis     No Comments Provided     Gastro-esophageal reflux disease without esophagitis     No Comments Provided     Low back pain     No Comments Provided     Malignant neoplasm of prostate (H)     9/6/2012     Nicotine dependence, uncomplicated     Quit - October 2007 with chantix     Other intervertebral disc degeneration, lumbosacral region     12/1/2008       Past Surgical History   Past Surgical History:   Procedure Laterality Date     APPENDECTOMY OPEN  [id number]    Ruptured - [place]     ARTHROPLASTY KNEE Right 11/16/2021    Procedure: ARTHROPLASTY, KNEE, TOTAL;  Surgeon: Micah Rock MD;  Location: [facility] OR     COLONOSCOPY  08/31/2006    next due in 2016     DISKECTOMY, LUMBAR, SINGLE SP  03/16/2009    L 3-4 microdiskectomy, [facility]      ESOPHAGOSCOPY, GASTROSCOPY, DUODENOSCOPY (EGD), COMBINED  03/2003          ESOPHAGOSCOPY, GASTROSCOPY, DUODENOSCOPY (EGD), COMBINED  08/31/2006          PROSTATECTOMY PERINEAL RADICAL  11/28/2012    Nerve Sparring, Dr Warner     SPINE SURGERY N/A 04/28/2017    L3 to S1 spinal decompression L4/L5 fusion     TONSILLECTOMY, ADENOIDECTOMY, COMBINED      as a child     VARICOCELECTOMY  1959    INCISION AND DRAINAGE,EXCISE VARICOCELE       Prior to Admission Medications   Prior to Admission Medications   Prescriptions Last Dose Informant Patient Reported? Taking?   HYDROcodone-acetaminophen (NORCO)  MG per tablet   Yes No   Sig: Take 1 tablet by mouth every 4 hours as needed for severe pain   LORazepam (ATIVAN) 0.5 MG tablet   No No   Sig: Take 1 tablet (0.5 mg) by mouth every 6 hours as needed for anxiety   amLODIPine (NORVASC) 10 MG tablet  Self No No   Sig: Take 1 tablet (10 mg) by mouth daily   apixaban ANTICOAGULANT (ELIQUIS) 5 MG tablet   No No   Sig: Take 1 tablet (5 mg) by mouth 2 times daily   atorvastatin (LIPITOR) 20 MG tablet  Self No No   Sig: Take 1 tablet (20 mg) by mouth daily   furosemide (LASIX) 20 MG tablet   No No   Sig: Take 1 tablet (20 mg) by mouth daily as needed   hydrOXYzine (ATARAX) 10 MG tablet  Self No No   Sig: Take 1 tablet (10 mg) by mouth every 6 hours as needed for itching or anxiety (with pain, moderate pain)   metFORMIN (GLUCOPHAGE XR) 500 MG 24 hr tablet   Yes No   Sig: Take 1,000 mg by mouth 2 times daily   omeprazole (PRILOSEC) 40 MG DR capsule   Yes No   Sig: Take 40 mg by mouth daily   ondansetron (ZOFRAN ODT) 8 MG ODT tab   No No   Sig: Take 1 tablet (8 mg) by mouth every 8 hours as needed for nausea   potassium chloride ER (KLOR-CON M) 20 MEQ CR tablet   No No   Sig: Take 1 tablet (20 mEq) by mouth daily as needed for potassium supplementation (Take if you take furosemide)   predniSONE (DELTASONE) 5 MG tablet   No No   Sig: Take 1 tablet (5 mg) by mouth 2 times daily    pregabalin (LYRICA) 150 MG capsule   Yes No   Sig: Take 150 mg by mouth 2 times daily   prochlorperazine (COMPAZINE) 10 MG tablet   No No   Sig: Take 1 tablet (10 mg) by mouth every 6 hours as needed for nausea or vomiting   traZODone (DESYREL) 50 MG tablet   Yes No   Sig: Take 50 mg by mouth At Bedtime      Facility-Administered Medications: None      }     Physical Exam   Vital Signs: Temp: 99.6  F (37.6  C) Temp src: Oral BP: 138/64 Pulse: 103   Resp: 20 SpO2: 91 % O2 Device: None (Room air)    Weight: 0 lbs 0 oz  Blood pressure 138/64, pulse 103, temperature 99.6  F (37.6  C), temperature source Oral, resp. rate 20, SpO2 91 %.  GENERAL: Alert and awake. Answering questions appropriately. Oriented x 3. Chronically ill appearing. Appears uncomfortable.   HEENT: Anicteric sclera. Mucous membranes moist   CARDIOVASCULAR: Tachycardic rate, regular rhythm. S1, S2. No murmurs, rubs, or gallops.   RESPIRATORY: Effort normal on RA. Clear to auscultation bilaterally, no rales, rhonchi or wheezes  GI: Abdomen soft, non-tender abdomen without rebound or guarding, normoactive bowel sounds present  MUSCULOSKELETAL: L hip pain with passive flexion. No obvious deformity.   EXTREMITIES: Trace peripheral edema.  NEUROLOGICAL: CN II-XII grossly intact. Moving all extremities symmetrically.   SKIN: Intact. Warm and dry. No jaundice. Pale.     Medical Decision Making       90 MINUTES SPENT BY ME on the date of service doing chart review, history, exam, documentation & further activities per the note.      Data     I have personally reviewed the following data over the past 24 hrs:    18.1 (H)  \   10.0 (L)   / 501 (H)     133 (L) 96 (L) 16.4 /  135 (H)   4.4 22 1.04 \       ALT: 124 (H) AST: 92 (H) AP: 152 (H) TBILI: 0.5   ALB: 3.3 (L) TOT PROTEIN: 7.1 LIPASE: N/A       Procal: N/A CRP: N/A Lactic Acid: 1.0         Imaging results reviewed over the past 24 hrs:   Recent Results (from the past 24 hour(s))   XR Chest Port 1 View     Narrative    PROCEDURE:  XR CHEST PORT 1 VIEW    HISTORY: Fever    COMPARISON:  CT chest abdomen pelvis 6/1/2023    FINDINGS: Single view chest radiograph    Cardiomediastinal silhouette is within normal limits. There is  calcific aortic atherosclerosis.  No focal consolidation, effusion or pneumothorax.    No suspicious osseous lesion or subdiaphragmatic free air.      Impression    IMPRESSION:   No acute cardiopulmonary process.      TARUN MUÑOZ MD         SYSTEM ID:  [id number]   CT Abdomen Pelvis w Contrast    Narrative    CT ABDOMEN PELVIS W CONTRAST    CLINICAL HISTORY: Male, age 68 years,  sepsis;    Comparison:  CT scan chest abdomen pelvis 6/1/2023    TECHNIQUE:  CT Scanwas performed of the abdomen and pelvis with IV  contrast. Axial; sagittal and coronal MIP images were reviewed.    FINDINGS:  The lung bases and visualized portions of the heart demonstrate no  acute abnormality.    Stomach and duodenum: Unremarkable.    Liver: Unchanged. Mild intrahepatic biliary dilatation again seen. 8  mm low dense lesion again seen anteriorly within the right lobe    Gallbladder: Unremarkable. Dilatation of the extrahepatic portions of  the biliary tree is again seen.    Spleen: Unremarkable.    Pancreas: Unremarkable.    Adrenal glands: Unremarkable.    Kidneys: Unremarkable.    Ureters: Unremarkable.    Urinary bladder: Small volume of gas is now seen within the urinary  bladder lumen. Nodular wall thickening again seen, worse when compared  to prior examination. Wall thickening is most evident along the left  lateral and inferior lateral aspects of the bladder. The urinary  bladder lumen appears be continuous with a 4 cm x 4 cm x 3 cm  thick-walled abscess lying posterior to the symphysis pubis. This  abscess cavity contains a small volume of gas. There is also gas  within and surrounding the left pubic bone at the symphysis.    The enlarged prostate gland again abuts the left ventrolateral aspect  of the rectum.  There is a thin, low-density tract suggesting  communication between the rectum and the above-mentioned abscess which  is a new finding compared to the previous study.    Small volume of fluid is now seen within the presacral soft tissues.    Abdominal aorta and inferior vena cava demonstrate no acute  abnormality.    Retroperitoneal3 lymph nodes appear to be normal in size and number.    Bony structures: Nondisplaced fracture deformity again seen within the  left pubic bone at the symphysis, now filled with gas which is best  seen on the coronal images. A nondisplaced fracture involving the  medial wall the left hip acetabulum more evident on the current  examination coronal images.    Large and small bowel: Large volume of stool throughout the colon. No  distinct evidence of acute inflammatory process associated with the GI  tract.      Impression    IMPRESSION:   4 cm abscess deep within the pelvis appears to be in communication  with the urinary bladder and the left ventrolateral aspect of the  rectum.    Above-mentioned abscess abuts the left pubic bone at the symphysis  with likely osteomyelitis. Small gas bubbles are seen within the  abscess cavity as well as surrounding and within the left pubic bone.    Small volume of fluid now seen within the presacral soft tissues,  likely reactive.    Nonacute findings as described above are similar in appearance  compared to prior study..      This facility minimizes radiation dose by adjusting the mA and/or kV  according to each patient size.      This CT scan was performed using one or more the following dose  reduction techniques:    -Automated exposure control,  -Adjustment of the mA and/or kV according to patient's size, and/or,  -Use of iterative reconstruction technique.    KARLO FLYNN MD         SYSTEM ID:  [id number]

## 2023-06-30 NOTE — PROGRESS NOTES
[facility]    Medicine Progress Note - Hospitalist Service, GOLD TEAM 17    Date of Admission:  6/29/2023    Assessment & Plan   Dilip Kan is a 68 year old male with PMHx of HTN, DM2, bilateral PE on AC and metastatic prostate cancer with bony and liver mets, s/p radiation c/b radiation cysitis and chemotherapy who was sent in from oncology clinic with fever and hypotension to OSH from where he was transferred to [facility] on 6/29/23 for admission with sepsis likely 2/2 urinary source vs pelvic abscess with concern for rectovesical fistula and pelvic osteomyelitis.      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  ---   UA w/ large blood, large leuk esterase, - nitrite, wbc clumps, few bacteria  ---   CXR clear.   ---   SARS CoV-2, influenza A/B and RSV PCR negative  ---   NGTD from blood and urine cx  ---   CT abdomen pelvis with 4 cm abscess deep within the pelvis, possibly communicating with the bladder and left ventrolateral aspect of the rectum.  Abscess also near her left pubic bone at the symphysis with concern for osteomyelitis   ---   Urology consulted for concern of colovesical fistula.  Pt was already seen and rec is to get CT cystogram to evaluate the fistulization.  No surgical intervention is planned.  Keep hooks in for now  ---   I phone consulted w/ CRS today.  The 4 cm deep pelvis abscess not amenable to surgical intervention, too small and too deep.  Trial of IV abx and further eval w/ pelvic MRI recommended.  Diverting colostomy may be needed if the 4 cm pelvic abscess does not improve w/ IV antibiotics.  ---   IR reviewed pt's CT scan and felt that the 4 cm deep pelvic abscess not amenable to percutaneous drainage  ---    Hematology saw pt and rec is to hold chemo for now  ---   ID recommended to keep pt on current empiric vancomycin, ceftriaxone and Flagyl and obtain MRSA nasal swab and VRE rectal/stool screen     ---   Ortho consulted for probable left pubic bone biopsy to confirm osteomyelitis  ---   CT cystogram and MRI pelvis ordered  ---   Continue IV Vanco, IV Flagyl and IV Ceftriaxone     TRINY   ---   Baseline creatinine 0.5.    ---   Creatinine elevated to 1.04 upon admission.    ---   Possibly prerenal vs ATN vs postobstructive uropathy   ---   Jiménez cath placed in the ED  ---   Treated w/ IVF  ---   Creatinine down to 0.62  ---   Avoid nephrotoxic agents other IV Vanco     Hyponatremia  ---   Mild w/ Na level of 133 at admit  ---   Na level is up to 135 following IVF hydration     Transaminitis  ---   Due to prostate ca w/ met to liver  ---   Monitor      Metastatic prostate ca  ---   Met to bone and liver.   ---   Patient following with oncology closely.    ---   Please see progress note from 6/28/2023 for details.    ---   S/p radiation and chemo.  Completed 4 cycles of docetaxel on 6/13/2023.   ---   Urology and oncology consults following  ---   Continue PTA prednisone 5 mg BID     Chronic cancer related pain   ---   Left hip pain due prostate ca to mets to left acetabulum and known left acetabular fracture, seen on CT similar to prior images.   ---   Patient reports inability to bear weight 2/2 pain.   ---   Oncologist just transitioned pt to long acting pain medications.   ---   Continue PTA lyrica 150 mg BID  ---   Tylenol 975 mg TID   ---   Start OxyContin 20 mg Q12H, oxycodone 5 mg Q6H PRN severe pain, and IV dilaudid 0.2 mg Q2H PRN breakthrough   ---   Orthopedics service consulted for acetabular fracture and probable left pubic bone biopsy to confirm osteomyelitis     Urinary retention   ---   2/2 prostate ca.   ---   Self caths at home.   ---   Jiménez catheter placed in ED.     Acute PE and DVT   ---   Diagnosed  in 5/2023  ---   On Eliquis 5 mg BID.   ---   Eliquis on hold since admit and pt was initiated on heparin drip in case he end up needing surgery   ---   Continue high intensity heparin drip      T2DM  ---   Hgba1c was 6.7% on 2/20/23.   ---   Hold PTA metformin during hospitalization  ---   Continue Medium correctional sliding scale TIDAC and nightly      HTN   ---   Hold PTA amlodipine and lasix due to hypotension with sepsis.      HLD   ---   Continue PTA lipitor 20 mg at bedtime      Anxiety d/o  Insomnia  ---   Continue PTA trazodone 50 mg at bedtime, ativan 0.5 mg Q6H PRN, melatonin PRN and atarax 10 mg Q6H PRN     ACD  ---   Due prostate ca and recent chemo  ---   Hgb stable at 8.6 g  ---   Check CBC in am           Diet:  Regular  DVT Prophylaxis:  DOAC, transitioning to heparin drip for possible procedure   Jiménez Catheter:  Present  Lines: None     Cardiac Monitoring: None  Code Status:   FULL CODE   Disposition Plan    TBD            Galdino Snyder MD  Hospitalist Service, GOLD TEAM 17  [facility]  Securely message with [employer] (more info)  Text page via [facility] Paging/Directory   See signed in provider for up to date coverage information  ______________________________________________________________________    Interval History   No complaints  Uneventful night  Afebrile  [place] and wants to eat    Physical Exam   Vital Signs: Temp: 97.6  F (36.4  C) Temp src: Oral BP: 127/66 Pulse: 77   Resp: 18 SpO2: 96 % O2 Device: None (Room air)    Weight: 0 lbs 0 oz  General: aao x 3, NAD.  HEENT:  NC/AT, PERRL, EOMI, neck supple, no thyromegaly, op clear, mmm.  CVS:  NL s 1 and s2, 2/6 systolic murmur, no r/g.  Lungs:  CTA B/L.   Abd:  Soft, + bs, NT, no rebound or gaurding, no fluid shift.  Ext:  No c/c.  Lymph:  No edema.  Neuro:  Nonfocal.  Musculoskeletal: No calf tenderness to palpation.    Skin:  No rash.  Psychiatry:  Mood and affect appropriate.        Data     I  have personally reviewed the following data over the past 24 hrs:    15.5 (H)  \   8.6 (L)   / 375     135 (L) 102 11.0 /  118 (H)   4.2 23 0.62 (L) \       ALT: 78 (H) AST: 36 AP: 132 (H) TBILI: 0.3   ALB: 2.4 (L) TOT PROTEIN: 5.6 (L) LIPASE: N/A       INR:  1.50 (H) PTT:  82 (H); 87 (H)   D-dimer:  N/A Fibrinogen:  N/A       Imaging results reviewed over the past 24 hrs:   No results found for this or any previous visit (from the past 24 hour(s)).

## 2023-06-30 NOTE — PHARMACY-ADMISSION MEDICATION HISTORY
Pharmacist Admission Medication History    Admission medication history is complete. The information provided in this note is only as accurate as the sources available at the time of the update.    Medication reconciliation/reorder completed by provider prior to medication history? Yes    Information Source(s): Patient and CareEverywhere/SureScripts via in-person    Changes made to PTA medication list:    Added: lisinopril, megestrol, oxybutynin    Deleted: metformin (not filled since 11/2022, not current per pt)     Changed:   o Lasix and KCl from prn to daily (currently)   o Lyrica from BID to once to twice daily per patient  o Trazodone from at bedtime to at bedtime prn       Allergies reviewed with patient and updates made in EHR: yes    Medication History Completed By: Ankush Jimenez RPH 6/30/2023 2:09 PM    Prior to Admission medications    Medication Sig Last Dose Taking? Auth Provider Long Term End Date   amLODIPine (NORVASC) 10 MG tablet Take 1 tablet (10 mg) by mouth daily 6/28/2023 at AM Yes Wei Perez MD Yes    apixaban ANTICOAGULANT (ELIQUIS) 5 MG tablet Take 1 tablet (5 mg) by mouth 2 times daily 6/28/2023 Yes Erum Fritz MD     atorvastatin (LIPITOR) 20 MG tablet Take 1 tablet (20 mg) by mouth daily 6/28/2023 at AM Yes Wei Perez MD Yes    furosemide (LASIX) 20 MG tablet Take 1 tablet (20 mg) by mouth daily as needed  Patient taking differently: Take 20 mg by mouth daily 6/28/2023 at AM Yes Erum Fritz MD Yes    HYDROcodone-acetaminophen (NORCO)  MG per tablet Take 1 tablet by mouth every 4 hours as needed for severe pain Past Month Yes Reported, Patient No    hydrOXYzine (ATARAX) 10 MG tablet Take 1 tablet (10 mg) by mouth every 6 hours as needed for itching or anxiety (with pain, moderate pain) Past Month at prn Yes Haydee Abdul MD     lisinopril (ZESTRIL) 40 MG tablet Take 40 mg by mouth daily 6/28/2023 Yes Unknown, Entered By History No     LORazepam (ATIVAN) 0.5 MG tablet Take 1 tablet (0.5 mg) by mouth every 6 hours as needed for anxiety Past Week at prn Yes Erum Fritz MD     megestrol (MEGACE) 20 MG tablet Take 20 mg by mouth daily 6/28/2023 Yes Unknown, Entered By History No    omeprazole (PRILOSEC) 40 MG DR capsule Take 40 mg by mouth daily 6/28/2023 Yes Unknown, Entered By History No    ondansetron (ZOFRAN ODT) 8 MG ODT tab Take 1 tablet (8 mg) by mouth every 8 hours as needed for nausea Past Week at prn Yes Erum Fritz MD     oxybutynin (DITROPAN) 5 MG tablet Take 5 mg by mouth 4 times daily as needed for bladder spasms Past Month at prn Yes Unknown, Entered By History     predniSONE (DELTASONE) 5 MG tablet Take 1 tablet (5 mg) by mouth 2 times daily 6/28/2023 at AM Yes Erum Fritz MD     pregabalin (LYRICA) 150 MG capsule Take 150 mg by mouth See Admin Instructions As of 6/30/23, patient currently takes Lyrica one or twice daily 6/28/2023 Yes Reported, Patient No    prochlorperazine (COMPAZINE) 10 MG tablet Take 1 tablet (10 mg) by mouth every 6 hours as needed for nausea or vomiting Past Month at prn Yes Erum Fritz MD No    traZODone (DESYREL) 50 MG tablet Take 50 mg by mouth nightly as needed for sleep Past Week Yes Reported, Patient No    potassium chloride ER (KLOR-CON M) 20 MEQ CR tablet Take 1 tablet (20 mEq) by mouth daily as needed for potassium supplementation (Take if you take furosemide)  Patient taking differently: Take 20 mEq by mouth daily 6/28/2023 at AM  Erum Fritz MD

## 2023-06-30 NOTE — PROGRESS NOTES
VS: /63 (BP Location: Left arm)   Pulse 82   Temp 97.8  F (36.6  C) (Oral)   Resp 18   SpO2 95%      O2: Sating >90% on RA. Lung sounds clear. Denies chest pain and SOB.   Output: Hooks in place, pt. Up once to use the bathroom    Last BM: LBM 6/30   Activity: Up with SBA and walker    Skin: Intact old scar in the lower back middle    Pain: Pain was managed with  Oxy, IV dilaudid and tylenol    CMS: A&Ox4. Denies N/T.    Dressing: None    Diet: NPO. Ice chip only and exception for meds.    LDA: PIV to R  Upper  Infusing Heparin and  Hand lower  is S/L.    Equipment: IV pole, FWW, gait belt, and personal belongings. Call light within reach and uses appropriately.   Plan:  TBD    Additional Info:  New admission to the unit pt. Came in from [facility]  Pt. currently on IV abx and hooks in place output  dark lydia urine. Most of the night Pt. Did not sleep due to pain Pt.  Complained about L hip pain 8-10/10. Pt. On Heparin High intensity infusion, Lab re-draw ordered for around 1018. Continue  POC.

## 2023-06-30 NOTE — CONSULTS
[place] [facility] [place]:  Dilip Kan   Date of birth 1955, Medical record number [id number]  Date of Visit:  06/30/2023  Date of Admission: 6/29/2023  Consult Requester: Marleny Bautista          Assessment and Recommendations:   ASSESSMENT:  1. Sepsis  2. Pelvic abscess  3. Possible rectovesical fistula, pyuria noted without UCx growth  4. 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 Metastatic prostate cancer (liver, bone: acetabulum), recently receiving chemotherapy last dose 6/13/23  6. Recent neutropenic fever, thought secondary to Klebsiella UTI - not currently neutropenic     RECOMMENDATION:  -- Okay to continue current vancomycin, ceftriaxone, metronidazole regimen for now   - Will plan to adjust antibiotic tomorrow pending additional workup  -- Follow up pending BCx; UCx negative  -- Obtain MRSA screen (ordered for you)  -- Obtain VRE rectal/stool screen (ordered for you)  -- Appreciate involvement of surgical teams -> consider bone biopsy for osteomyelitis diagnosis/pathogen directed therapy      DISCUSSION:   67yo M with history of metastatic prostate cancer receiving chemotherapy earlier this month now presenting with fever and sepsis. Initial workup is concerning for a pelvic abscess, possibly associated with a vesicorectal fistula and also possibly with pubic bone osteomyelitis. Fortunately the patient is not neutropenic. IR was consulted for abscess drainage but did not feel they had an approach. At this point in time, it is unclear whether we would get any cultures. Ortho has been requested to consider bone biopsy. In anticipation of lack of culture data, and given the wide variety of potential pathogens involved, we can at least get MRSA and VRE swabs. Gut bugs are most likely, although given immunocompromised status other organisms are possible. Primary gap in current regimen would be Pseudomonas (more likely if neutropenic) and VRE. Okay to continue current  "regimen today but I might try to de-escalate tomorrow pending the overall clinical plan.     ID will follow over the weekend and assist with additional antibiotic management.      Thank you for this consult. ID will continue to follow.     Patient was discussed with hospital team.    Ibrahima Cannon MD  Infectious Diseases  [phone]  ________________________________________________________________    Consult Question:.  Admission Diagnosis: Sepsis (H) [A41.9]         History of Present Illness:   History obtained from chart review, and partially from the patient.    \"Dilip Kan is a 68 year old male with PMHx of HTN, DM2, bilateral PE on AC, and metastatic prostate cancer with bony and liver mets, s/p radiation c/b radiation cysitis and chemotherapy who sent in from oncology clinic with fever and hypotension to OSH, now transferred to [facility] and admitted on 6/29/2023 with sepsis likely 2/2 urinary source vs abscess with concern for rectovestical fistula.      Patient reports progressive left upper thigh, groin, hip and buttock pain since his cancer diagnosis months ago.  States the pain progressively worsened, to the point where he is unable to ambulate or bear weight due to pain.  Due to severity, he saw his oncologist yesterday.  During oncology visit he was found to have a fever and was hypotensive.  Patient reported feeling dizzy at that time which has since resolved.  He was sent into the emergency department for concern of sepsis.  Patient's oncologist adjusted his pain medications, with plan to start OxyContin and oxycodone as needed.       Patient states outside of his pain he has not felt unwell.  Denies any fevers, chills, chest pain, cough, nausea, vomiting, diarrhea, abdominal pain. He does reports some mild dyspnea, but can not provide further details.  States he straight caths for urinary retention.  Endorses chronic intermittent hematuria which is unchanged from baseline.  Endorses chronic back " "pain which is unchanged from baseline.\"    Patient provided with components of the same history, but had trouble focusing due to pain/pain meds. Denied chest pain, dyspnea, cough, abdominal pain, diarrhea to me. No neuro changes. States he is having significant pain in L groin region despite pain medication.         Review of Systems:   Complete 10pt ROS performed, see HPI for pertinent findings.          Past Medical History:     Past Medical History:   Diagnosis Date     Elevated prostate specific antigen (PSA)     4/10/2012     Encounter for other administrative examinations     1/31/2014     Esophagitis     No Comments Provided     Gastro-esophageal reflux disease without esophagitis     No Comments Provided     Low back pain     No Comments Provided     Malignant neoplasm of prostate (H)     9/6/2012     Nicotine dependence, uncomplicated     Quit - October 2007 with chantix     Other intervertebral disc degeneration, lumbosacral region     12/1/2008            Past Surgical History:     Past Surgical History:   Procedure Laterality Date     APPENDECTOMY OPEN  [id number]    Ruptured - [place]     ARTHROPLASTY KNEE Right 11/16/2021    Procedure: ARTHROPLASTY, KNEE, TOTAL;  Surgeon: Micah Rock MD;  Location: [facility] OR     COLONOSCOPY  08/31/2006    next due in 2016     DISKECTOMY, LUMBAR, SINGLE SP  03/16/2009    L 3-4 microdiskectomy, [facility]     ESOPHAGOSCOPY, GASTROSCOPY, DUODENOSCOPY (EGD), COMBINED  03/2003          ESOPHAGOSCOPY, GASTROSCOPY, DUODENOSCOPY (EGD), COMBINED  08/31/2006          PROSTATECTOMY PERINEAL RADICAL  11/28/2012    Nerve Sparring, Dr Warner     SPINE SURGERY N/A 04/28/2017    L3 to S1 spinal decompression L4/L5 fusion     TONSILLECTOMY, ADENOIDECTOMY, COMBINED      as a child     VARICOCELECTOMY  1959    INCISION AND DRAINAGE,EXCISE VARICOCELE            Family History:   Reviewed and non-contributory.   Family History   Problem Relation Age of Onset     Heart Disease Father  "        Heart Disease, passed away from CHF,CAD     Colon Cancer Father         Cancer-colon     Hypertension Father         Hypertension     Other - See Comments Father         Stroke/Dementia     Hypertension Mother         Hypertension,HTN     Other - See Comments Mother          Parkinsons     Family History Negative Other         Good Health     Family History Negative Other         Good Health,previous marriage./previous marriage.     Family History Negative Other         Good Health,previous marriage.     Family History Negative Sister         Good Health,emotional problems.     Family History Negative Sister         Good Health     Other - See Comments Son         w/o major medical problems.     Other - See Comments Daughter         w/o major medical problems.            Social History:     Social History     Tobacco Use     Smoking status: Former     Packs/day: 1.00     Years: 20.00     Pack years: 20.00     Types: Cigarettes     Quit date: 2002     Years since quittin.6     Passive exposure: Past     Smokeless tobacco: Current     Types: Snuff     Tobacco comments:     Can't remember when all he had passive exposure   Substance Use Topics     Alcohol use: Not Currently     Comment:  5 drinks a month     History   Sexual Activity     Sexual activity: Yes     Partners: Female     Birth control/ protection: None            Current Medications:       acetaminophen  975 mg Oral Q8H     atorvastatin  20 mg Oral Daily     cefTRIAXone  2 g Intravenous Q24H     insulin aspart  1-7 Units Subcutaneous TID AC     insulin aspart  1-5 Units Subcutaneous At Bedtime     metroNIDAZOLE  500 mg Intravenous Q8H     oxyCODONE  20 mg Oral Q12H     pantoprazole  40 mg Oral BID     polyethylene glycol  17 g Oral Daily     predniSONE  5 mg Oral BID     pregabalin  150 mg Oral BID     sodium chloride (PF)  3 mL Intracatheter Q8H     traZODone  50 mg Oral At Bedtime     vancomycin  1,500 mg Intravenous Q12H             Allergies:   No Known Allergies         Physical Exam:   Vitals were reviewed  Patient Vitals for the past 24 hrs:   BP Temp Temp src Pulse Resp SpO2   06/30/23 0814 127/66 97.6  F (36.4  C) Oral 77 18 96 %   06/30/23 0232 121/63 97.8  F (36.6  C) Oral 82 18 95 %   06/29/23 2143 138/64 99.6  F (37.6  C) Oral 103 20 91 %       Physical Examination:  GENERAL: Well-developed, well-nourished, elderly appearing male, in bed in some notable discomfort.  HEENT:  Head is normocephalic, atraumatic.   EYES:  Eyes have anicteric sclerae without conjunctival injection or stigmata of endocarditis.    ENT:  Oropharynx is moist without exudates or ulcers.   NECK:  Supple. No cervical lymphadenopathy.  LUNGS:  Clear to auscultation bilaterally.   CARDIOVASCULAR:  Regular rate and rhythm with no murmurs, gallops or rubs.  ABDOMEN:  Normal bowel sounds, soft, nontender. Distended. No suprapubic tenderness.  SKIN:  No acute rashes. No stigmata of endocarditis.  MSK: Patient endorsed significant hip/groin pain, but I couldn't reproduce the pain with palpation of the region.   NEUROLOGIC:  Grossly nonfocal. Active x4 extremities.         Laboratory Data:     Inflammatory Markers  No results found for: CRPI    Hematology Studies    Recent Labs   Lab Test 06/30/23 0418 06/29/23 2237 06/29/23  0939 06/13/23  1134 05/23/23  1205 05/02/23  0659 04/26/23  1301 04/22/23  0647   WBC 15.5* 14.7* 18.1* 12.5* 7.7 7.0   < > 2.9*   ANEU  --   --   --   --   --   --   --  2.2   AEOS  --   --   --   --   --   --   --  0.0   HGB 8.6* 8.7* 10.0* 9.8* 9.5* 8.9*   < > 8.2*   MCV 83 85 83 87 89 91   < > 86     --  501* 405 225 262   < > 111*    < > = values in this interval not displayed.       Metabolic Studies     Recent Labs   Lab Test 06/30/23 0418 06/29/23  0939 06/13/23  1134 05/23/23  1205 05/02/23  0659   * 133* 136 136 141   POTASSIUM 4.2 4.4 4.4 4.5 4.1   CHLORIDE 102 96* 104 104 108*   CO2 23 22 21* 21* 23   BUN 11.0 16.4 13.8  15.1 10.0   CR 0.62* 1.04 0.67 0.54* 0.52*   GFRESTIMATED >90 77 >90 >90 >90       Hepatic Studies    Recent Labs   Lab Test 06/30/23  0418 06/29/23  0939 06/13/23  1134 05/23/23  1205 05/01/23  1051 04/26/23  1301   BILITOTAL 0.3 0.5 0.2 0.4 0.5 0.4   ALKPHOS 132* 152* 67 66 68 64   ALBUMIN 2.4* 3.3* 3.3* 3.4* 3.6 3.0*   AST 36 92* 13 25 11 12   ALT 78* 124* 20 10 12 12       Microbiology:  Culture   Date Value Ref Range Status   06/29/2023 Mixed Urogenital Shonda  Final   04/27/2023 No Growth  Final   04/19/2023 Klebsiella pneumoniae (AA)  Final   04/19/2023 >100,000 CFU/mL Klebsiella pneumoniae (A)  Final   04/19/2023 No Growth  Final   11/17/2021 No Growth  Final   11/17/2021 No Growth  Final       Urine Studies    Recent Labs   Lab Test 06/29/23  1410 04/19/23  0443 03/29/22  1414 11/18/21  0526 03/08/21  0908 03/02/20  1321   LEUKEST Large* Moderate* Negative Negative Negative Negative   WBCU 33* >182*  --  1 1 4       Vancomycin Levels    Recent Labs   Lab Test 04/21/23  0535 04/20/23  0550   VANCOMYCIN 22.9 16.1       IMAGING  CT a/p 6/29   4 cm abscess deep within the pelvis appears to be in communication  with the urinary bladder and the left ventrolateral aspect of the  rectum.     Above-mentioned abscess abuts the left pubic bone at the symphysis  with likely osteomyelitis. Small gas bubbles are seen within the  abscess cavity as well as surrounding and within the left pubic bone.     Small volume of fluid now seen within the presacral soft tissues,  likely reactive.

## 2023-06-30 NOTE — PROGRESS NOTES
CLINICAL NUTRITION SERVICES - ASSESSMENT NOTE     Nutrition Prescription    RECOMMENDATIONS FOR MDs/PROVIDERS TO ORDER:  - none at present.     Malnutrition Status:    Patient does not meet two of the established criteria necessary for diagnosing malnutrition    Recommendations already ordered by Registered Dietitian (RD):  - Requested updated weight as suspect 6/29 weights from PTA were stated.    Future/Additional Recommendations:  - follow POC, diet advance and intake.  Offer snacks/supplements as needed.       REASON FOR ASSESSMENT  Dilip Kan is a/an 68 year old male assessed by the dietitian for Malnutrition Screening Tool--14-23 lb wt loss, (although poor intake was not noted).    Chart reviewed. Pt transferred from [facility].  Per chart: PMHx of HTN, DM2, bilateral PE on AC, and metastatic prostate cancer with bony and liver mets, s/p salvage radiation c/b radiation cysitis and chemotherapy who sent in from oncology clinic with fever and hypotension to OSH, now transferred to [facility] and admitted on 6/29/2023 with sepsis likely 2/2 urinary source vs abscess with concern for rectovesical fistula.   Per transfer note: CT A/P with 4 cm abscess deep within the pelvis and possibly in communication with the bladder and L ventrolateral aspect of the rectum. Abscess abuts the L pubic bone at the symphysis with likely osteomyelitis with small gas bubbles in the abscess cavity and surrounding/within the L pubic bone.     Completed 4 cycles of chemo on 6/13/23.    NUTRITION HISTORY  Pt states intake has been fine.  He usually eats 2 meals/day--breakfast and dinner.  Occasional snacks in between.  Didn't take or need any protein shakes, although mentioned he did use some protein water. States stools were usually loose during chemo and then would resolve.    Noted urine appeared quite concentrated but difficult to  as also blood tinged.  Pt noted he tries to drink a lot of water but might be a little  "dehydrated.       CURRENT NUTRITION ORDERS  Diet: NPO although may be allowed to resume regular diet per RN.    LABS  Labs reviewed  Na 135  K+ 4.2  BUN 11  Cr 0.62 (L)  Albumin 2.4 (c/w sepsis and decrease albumin synthesis)  Alk phos 132  ALT 78  A1c 6.7% 2/20/23 with Hgb of 12.7 (mildly low)  FSBG < 180 thus far while NPO.    MEDICATIONS  Medications reviewed  Novolog--medium resistance dosing ac and hs.  IV antibx  protonix  miralax   Prednisone 5mg    Pt didn't mention but Megace noted as PTA medication.   Home lasix not ordered.      GI Mild constipation PTA--no stool x 3 or more days. But stated he had a stool 6/29 and 6/30--loose/soft.      ANTHROPOMETRICS  Height: 5'10\"   Admission weight: Requested 6/30  Most Recent Weight: 99.8 kg 6/29 (suspect stated)  IBW: 75.5 kg (132% IBW)  BMI: Obesity Grade I BMI 30-34.9  Weight History:   Wt Readings from Last 20 Encounters:   06/29/23 99.8 kg (220 lb) presume stated from 6/13 06/29/23 99.8 kg (220 lb)   06/13/23 99.9 kg (220 lb 3.2 oz)   05/23/23 100.8 kg (222 lb 3.2 oz)   05/05/23 97.5 kg (215 lb)   05/01/23 96 kg (211 lb 11.2 oz)    05/01/23 98.5 kg (217 lb 3.2 oz)   04/26/23 104.8 kg (231 lb)    04/20/23 103.2 kg (227 lb 8 oz)   04/10/23 100.7 kg (222 lb) suspected stated   03/14/23 103.6 kg (228 lb 6.4 oz)   03/09/23 105.7 kg (233 lb)   02/28/23 103.9 kg (229 lb)   02/03/23 103.4 kg (228 lb)   01/26/23 107 kg (236 lb)   12/02/22 107 kg (236 lb)   10/25/22 107 kg (236 lb)   10/05/22 103 kg (227 lb)   08/05/22 106.1 kg (234 lb)   07/21/22 107 kg (235 lb 12.8 oz)     Weight review: Minimal wt loss over past month--although likely based on stated weights.  Current wt is pending. Wt loss over past year doesn't meet significance for malnutrition dx.  Wt down 3.3% within the past 3 months.  Wt down 6.7% within the past 12 months.      Dosing Weight: 81.6 kg    ASSESSED NUTRITION NEEDS  Estimated Energy Needs: 1632 -2040 kcals/day (20 - 25 kcals/kg)  Justification: " Obese  Estimated Protein Needs: 98 - 122 grams protein/day (1.2 - 1.5 grams of pro/kg)  Justification: Hypercatabolism with acute illness, recent chemo and potential need for surgical intervention still unclear  Estimated Fluid Needs: 2040 - 2448 mL/day (25 - 30 mL/kg)   Justification: Maintenance and Per provider pending fluid status    PHYSICAL FINDINGS  See malnutrition section below.    MALNUTRITION  % Intake: No decreased intake noted  % Weight Loss: Weight loss does not meet criteria (with current wt still pending)  Subcutaneous Fat Loss: None observed--suspect periorbital appearance related to possible dehydration  Muscle Loss: None observed--although difficult to  w/ body habitus  Fluid Accumulation/Edema: Does not meet criteria  Malnutrition Diagnosis: Patient does not meet two of the established criteria necessary for diagnosing malnutrition    NUTRITION DIAGNOSIS  No nutrition diagnosis at this time as thus far pt reports good intake and no weight loss over past month.     INTERVENTIONS  Implementation  Nutrition Education: Explained role of RD, reason for visit, explained NPFE.        Goals  Patient to consume % of nutritionally adequate meal trays TID, or the equivalent with supplements/snacks.     Monitoring/Evaluation  Progress toward goals will be monitored and evaluated per protocol.    Xiao Trujillo (Becky) RD, LD   6B and 8A Med/surg (M-F) Pager: [phone]  Weekend/holiday pager: [phone]

## 2023-06-30 NOTE — CONSULTS
Interventional Radiology  [facility] Consult Note  06/30/23   8:36 AM    Consult Requested: Pelvic Drain placement    Recommendations/Plan:  Patient with small abscess deep in the pelvis.  Patient's imaging evaluated by IR attending Dr. Joaquín Hall.  At this time there is no safe percutaneous window for drain placement due to the size and position of the abscess.     Case and imaging discussed with IR attending, Dr. Joaquín Hall. Recommendations were reviewed with Dr. Galdino Snyder.    This is a 68 year old male with past medical history of hypertension, diabetes, bilateral PE on anticoagulation and metastatic prostate cancer with bone and liver mets.  Patient is status post salvage radiation  complicated by radiation cystitis and on chemotherapy for prostate cancer.  Patient presents to the hospital with symptoms concerning for sepsis.  Patient's CT abdomen pelvis shows 4 cm abscess in the pelvis that communicates with the bladder and possibly the rectum.  Additionally the abscess abuts the pelvis with gas in the pelvic bone concerning for osteomyelitis.    Pertinent Imaging Reviewed:   CT Abdomen/Pelvis w/ Contrast      Expected date of discharge:  TBD    Vitals:   /66 (BP Location: Left arm)   Pulse 77   Temp 97.6  F (36.4  C) (Oral)   Resp 18   SpO2 96%     Pertinent Labs:   Lab Results   Component Value Date    WBC 15.5 (H) 06/30/2023    WBC 14.7 (H) 06/29/2023    WBC 18.1 (H) 06/29/2023    WBC 6.9 02/23/2021    WBC 5.5 07/09/2020    WBC 7.6 05/20/2019     Lab Results   Component Value Date    HGB 8.6 06/30/2023    HGB 8.7 06/29/2023    HGB 10.0 06/29/2023    HGB 14.1 02/23/2021    HGB 13.7 07/09/2020    HGB 14.2 05/20/2019     Lab Results   Component Value Date     06/30/2023    PLT  06/29/2023      Comment:      Platelets Clumped-Platelet Count Not Available     06/29/2023     02/23/2021     07/09/2020     05/20/2019     Lab Results   Component Value Date     INR 1.50 (H) 06/30/2023    INR 0.95 11/01/2018    PTT 84 (H) 06/30/2023     Lab Results   Component Value Date    POTASSIUM 4.2 06/30/2023    POTASSIUM 4.0 10/13/2022    POTASSIUM 4.2 02/23/2021        COVID-19 Antibody Results, Testing for Immunity         No data to display            COVID-19 PCR Results        11/12/2021    08:56 6/29/2023    10:08   COVID-19 PCR Results   SARS CoV2 PCR Negative  Negative        Len Masters PA-C  Interventional Radiology  Pager: [phone]

## 2023-06-30 NOTE — CONSULTS
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 He presented to his oncology outpatient appointment stating that he had fevers, night sweats and significant left hip pain. In the clinic he had a Tmax of 101.1F, HR of 131 and pressures of 80/58. He was then sent to the ED for evaluation. In the ED he was AF and VSS. A CBC was obtained and demonstrated a leukocytosis of 18.1. CMP demonstrated a creatinine of 1.04 (baseline 0.6). LA was 3.3. A UA was obtained demonstrating WBCs, few RBCs, bacteria and LE (nitrite negative). A CTAP was obtained that demonstrates a possible 4cm abscess in the pelvis that communicates with the bladder and possibly the rectum. Additionally the abscess abuts the pelvis with gas in the pelvic bone concerning for osteomyelitis. He has since been admitted to medicine and started on CTX/metronidazole/vanc with ID/onc/urology consults.     Discussed with the patient this morning and he confirms that he had a prostatectomy in the past with salvage radiation. He also has metastatic prostate cancer and his main complaint for presenting is his left hip pain (site of metastasis)           Past Medical History:     Past Medical History:   Diagnosis Date     Elevated prostate specific antigen (PSA)     4/10/2012     Encounter for other administrative examinations     1/31/2014     Esophagitis     No " Comments Provided     Gastro-esophageal reflux disease without esophagitis     No Comments Provided     Low back pain     No Comments Provided     Malignant neoplasm of prostate (H)     2012     Nicotine dependence, uncomplicated     Quit - 2007 with chantix     Other intervertebral disc degeneration, lumbosacral region     2008            Past Surgical History:     Past Surgical History:   Procedure Laterality Date     APPENDECTOMY OPEN  810256    Ruptured - [place]     ARTHROPLASTY KNEE Right 2021    Procedure: ARTHROPLASTY, KNEE, TOTAL;  Surgeon: Micah Rock MD;  Location: [facility] OR     COLONOSCOPY  2006    next due in 2016     DISKECTOMY, LUMBAR, SINGLE SP  2009    L 3-4 microdiskectomy, [facility]     ESOPHAGOSCOPY, GASTROSCOPY, DUODENOSCOPY (EGD), COMBINED  2003          ESOPHAGOSCOPY, GASTROSCOPY, DUODENOSCOPY (EGD), COMBINED  2006          PROSTATECTOMY PERINEAL RADICAL  2012    Nerve Sparring, Dr Warner     SPINE SURGERY N/A 2017    L3 to S1 spinal decompression L4/L5 fusion     TONSILLECTOMY, ADENOIDECTOMY, COMBINED      as a child     VARICOCELECTOMY      INCISION AND DRAINAGE,EXCISE VARICOCELE            Social History:     Social History     Tobacco Use     Smoking status: Former     Packs/day: 1.00     Years: 20.00     Pack years: 20.00     Types: Cigarettes     Quit date: 2002     Years since quittin.6     Passive exposure: Past     Smokeless tobacco: Current     Types: Snuff     Tobacco comments:     Can't remember when all he had passive exposure   Substance Use Topics     Alcohol use: Not Currently     Comment:  5 drinks a month            Family History:     Family History   Problem Relation Age of Onset     Heart Disease Father         Heart Disease, passed away from CHF,CAD     Colon Cancer Father         Cancer-colon     Hypertension Father         Hypertension     Other - See Comments Father         Stroke/Dementia      Hypertension Mother         Hypertension,HTN     Other - See Comments Mother          Parkinsons     Family History Negative Other         Good Health     Family History Negative Other         Good Health,previous marriage./previous marriage.     Family History Negative Other         Good Health,previous marriage.     Family History Negative Sister         Good Health,emotional problems.     Family History Negative Sister         Good Health     Other - See Comments Son         w/o major medical problems.     Other - See Comments Daughter         w/o major medical problems.            Allergies:   No Known Allergies         Medications:     Current Facility-Administered Medications   Medication     acetaminophen (TYLENOL) tablet 975 mg     atorvastatin (LIPITOR) tablet 20 mg     bisacodyl (DULCOLAX) suppository 10 mg     cefTRIAXone (ROCEPHIN) 2 g vial to attach to  ml bag for ADULTS or NS 50 ml bag for PEDS     glucose gel 15-30 g    Or     dextrose 50 % injection 25-50 mL    Or     glucagon injection 1 mg     heparin infusion 25,000 units in D5W 250 mL ANTICOAGULANT     HYDROmorphone (DILAUDID) injection 0.2 mg     hydrOXYzine (ATARAX) tablet 10 mg     insulin aspart (NovoLOG) injection (RAPID ACTING)     insulin aspart (NovoLOG) injection (RAPID ACTING)     lidocaine (LMX4) cream     lidocaine 1 % 0.1-1 mL     LORazepam (ATIVAN) tablet 0.5 mg     melatonin tablet 6 mg     metroNIDAZOLE (FLAGYL) infusion 500 mg     naloxone (NARCAN) injection 0.2 mg    Or     naloxone (NARCAN) injection 0.4 mg    Or     naloxone (NARCAN) injection 0.2 mg    Or     naloxone (NARCAN) injection 0.4 mg     ondansetron (ZOFRAN ODT) ODT tab 4 mg    Or     ondansetron (ZOFRAN) injection 4 mg     oxyCODONE (oxyCONTIN) 12 hr tablet 20 mg     oxyCODONE (ROXICODONE) tablet 5 mg     pantoprazole (PROTONIX) EC tablet 40 mg     Patient is already receiving anticoagulation with heparin, enoxaparin (LOVENOX), warfarin (COUMADIN)  or  other anticoagulant medication     polyethylene glycol (MIRALAX) Packet 17 g     predniSONE (DELTASONE) tablet 5 mg     pregabalin (LYRICA) capsule 150 mg     prochlorperazine (COMPAZINE) injection 5 mg    Or     prochlorperazine (COMPAZINE) tablet 5 mg    Or     prochlorperazine (COMPAZINE) suppository 12.5 mg     senna-docusate (SENOKOT-S/PERICOLACE) 8.6-50 MG per tablet 1 tablet    Or     senna-docusate (SENOKOT-S/PERICOLACE) 8.6-50 MG per tablet 2 tablet     sodium chloride (PF) 0.9% PF flush 3 mL     sodium chloride (PF) 0.9% PF flush 3 mL     traZODone (DESYREL) tablet 50 mg     vancomycin (VANCOCIN) 1000 mg in dextrose 5% 200 mL PREMIX             Review of Systems:    ROS: 10 point ROS neg other than the symptoms noted above in the HPI           Physical Exam:   VS:  T: 97.8    HR: 82    BP: 121/63    RR: 18   GEN:  AOx3.  NAD.    CV:  RRR  LUNGS: Non-labored breathing.   BACK:  No midline or CVA tenderness.  ABD:  Soft.  NT.  ND.  No rebound or guarding.  Pelvis non-tender.  :  circumcised.  Normal penile shaft.  MITCH:  No prostate, but hard tender tissue on anterior rectum, no purulence or urine seen on exam .  Jiménez: light pink tinged urine   EXT:  Warm, well perfused.   SKIN:  Warm.  Dry.  No rashes.  NEURO:  CN grossly intact.            Data:   All laboratory data reviewed:    Recent Labs   Lab 06/30/23 0418 06/29/23  2237 06/29/23  0939   WBC 15.5* 14.7* 18.1*   HGB 8.6* 8.7* 10.0*     --  501*     Recent Labs   Lab 06/30/23  0418 06/30/23  0257 06/29/23  2300 06/29/23  0939   *  --   --  133*   POTASSIUM 4.2  --   --  4.4   CHLORIDE 102  --   --  96*   CO2 23  --   --  22   BUN 11.0  --   --  16.4   CR 0.62*  --   --  1.04   * 176* 122* 135*   ROBERTO 9.1  --   --  9.6     Recent Labs   Lab 06/29/23  1410   COLOR Yellow   APPEARANCE Slightly Cloudy*   URINEGLC Negative   URINEBILI Negative   URINEKETONE Negative   SG 1.029   URINEPH 6.0   PROTEIN 100*   NITRITE Negative   LEUKEST  Large*   RBCU 4*   WBCU 33*       All pertinent imaging reviewed:    CT scan of the abdomen:   IMPRESSION:   4 cm abscess deep within the pelvis appears to be in communication  with the urinary bladder and the left ventrolateral aspect of the  rectum.     Above-mentioned abscess abuts the left pubic bone at the symphysis  with likely osteomyelitis. Small gas bubbles are seen within the  abscess cavity as well as surrounding and within the left pubic bone.              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 Patient is currently HDS and on abx with a catheter in place. For now agree with ID consult to weigh in on antibiotic recommendations and duration. Agree with oncology consult for management of his metastatic disease. Would recommend his catheter remain in place. The pelvic abscess does not appear to be in a location that would be easy to place a drain, and no urologic intervention would easily drain this abscess besides catheter placement. For now we will recommend conservative management and follow along. We will plan to set the patient up with Dr. Correa to discuss surgical options once improved from his acute illness.       Plan:     - agree with broad spectrum abx and ID consult, appreciate their recs    - agree with onc consult    - continue hooks catheter while acutely ill (if there is a fistula, this will partially act as a drain)  - recommend a CT cystogram to evaluate the fistulization (order placed)   - no plans for surgical intervention in the acute setting; if surgical intervention is to be considered this would likely involve a pubectomy with cystectomy and ileal  conduit and possibly a resection and large bowel diversion if a rectal fistula is present (urology unable to weigh in on this and would recommend a colorectal surgery consultation for evaluation of this) once clinically improved     - Urology will continue to follow. Please contact resident/PA on call with any questions or concerns.         This patient's exam findings, labs, and imaging discussed with urology staff surgeon Dr. Ace, who developed the treatment plan.    Jan Santo MD  Urology Resident

## 2023-06-30 NOTE — CONSULTS
Hematology / Oncology  Initial Consultation Note   Date of Service: 06/30/2023  Patient: Dilip Kan  MRN: [id number]  Admission Date: 6/29/2023  Hospital Day # 1  Cancer Diagnosis: MCRPC   Primary Outpatient Oncologist: Dr. Fritz  Current Treatment Plan: s/p 4 cycles Docetaxel (last dose 6/13/2023)      Reason for Consult: MCRPC    Summary & Recommendations:   - Further management for abscess / osteomyelitis primary team, CRS, ID, ORS.   - Hold further systemic therapy at this time, no plans for further systemic therapy while acutely infected. We will continue to follow along and adjust plan as needed   - Family communication: We will defer communication with family to primary team unless otherwise requested or noted here.     Assessment & Plan:   Dilip Kan is a 68 year old male with MCRPC s/p 4 cycles of docetaxel, last dose 6/13/2023. Patient presents to the hospital with symptoms concerning for sepsis.  Patient's CT abdomen pelvis shows 4 cm abscess in the pelvis that communicates with the bladder and possibly the rectum.  Additionally the abscess abuts the pelvis with gas in the pelvic bone concerning for osteomyelitis.     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  Initially, the patient was treated due to rising PSA with a doubling time less than 12 months with apalutamide in addition to androgen deprivation therapy.  He was started on apalutamide 240 mg daily.  His PSA dropped to 1.25, then began to rise to 2.06.  We elected to switch him to abiraterone and prednisone, beginning 08/2022.  Required dose reduction due to fatigue.  His PSA began to rise and now has developed progression with bony  metastasis in the left acetabulum with a left pubic symphysis nondisplaced fracture as well as locally advanced disease as well as distant liver metastases.  The patient was started on docetaxel, prednisone, and received radiation therapy to the pubic symphysis as well as the left hip.  He received 2 cycles of docetaxel.  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  The patient recovered and went on to receive cycle 3 of docetaxel at a 25% dose reduction with positive response with drop in PSA.  Now has completed 4 cycles, last dose of chemo was on 06/13/2023.    Patient was seen and plan of care was discussed with attending physician Dr. Hensley.    Thank you for the opportunity to partake in this patient's plan of care. Please do not hesitate to page with questions. We will continue to follow .     Wade Gupta MD  Hematology/Oncology/BMT Fellow  Pager: [phone]    ___________________________________________________________________    History of Present Illness:    68 year old male with past medical history of hypertension, diabetes, bilateral PE on anticoagulation and metastatic prostate cancer with bone and liver mets.  Patient is status post salvage radiation  complicated by radiation cystitis and on chemotherapy for prostate cancer.  Patient presents to the hospital with symptoms concerning for sepsis.  Patient's CT abdomen pelvis shows 4 cm abscess in the pelvis that communicates with the bladder and possibly the rectum.  Additionally the abscess abuts the pelvis with gas in the pelvic bone concerning for osteomyelitis. Patient was started on broad-spectrum antibiotics and transferred to [facility] for urologic, interventional radiology, and infectious  disease involvement.    Patient Now has completed 4 cycles of Docetaxel, last dose of chemo was on 06/13/2023.     He says he feels well but has mild pain over his hip. It is being controlled with pain medicine but is having breaktthrough. He is awaiting consult by ORS / Surgery.         Physical Exam:    Blood pressure 127/66, pulse 77, temperature 97.6  F (36.4  C), temperature source Oral, resp. rate 18, SpO2 96 %.     Patient seen via video visit:   GENERAL: Alert and awake. Answering questions appropriately. Oriented x 3. Chronically ill appearing. Appears uncomfortable.   HEENT: Anicteric sclera. Mucous membranes moist   SKIN: Intact. Warm and dry. No jaundice. Pale.     Labs & Studies: I personally reviewed the following studies:  ROUTINE LABS (Last four results):  CMP  Recent Labs   Lab 06/30/23  0812 06/30/23 0418 06/30/23 0257 06/29/23  2300 06/29/23  0939   NA  --  135*  --   --  133*   POTASSIUM  --  4.2  --   --  4.4   CHLORIDE  --  102  --   --  96*   CO2  --  23  --   --  22   ANIONGAP  --  10  --   --  15   * 160* 176* 122* 135*   BUN  --  11.0  --   --  16.4   CR  --  0.62*  --   --  1.04   GFRESTIMATED  --  >90  --   --  77   ROBERTO  --  9.1  --   --  9.6   PROTTOTAL  --  5.6*  --   --  7.1   ALBUMIN  --  2.4*  --   --  3.3*   BILITOTAL  --  0.3  --   --  0.5   ALKPHOS  --  132*  --   --  152*   AST  --  36  --   --  92*   ALT  --  78*  --   --  124*     CBC  Recent Labs   Lab 06/30/23 0418 06/29/23  2237 06/29/23  0939   WBC 15.5* 14.7* 18.1*   RBC 3.19* 3.27* 3.73*   HGB 8.6* 8.7* 10.0*   HCT 26.5* 27.7* 31.0*   MCV 83 85 83   MCH 27.0 26.6 26.8   MCHC 32.5 31.4* 32.3   RDW 16.5* 17.0* 16.4*     --  501*     INR  Recent Labs   Lab 06/30/23  0418   INR 1.50*     Medications list for reference:  Current Facility-Administered Medications   Medication     acetaminophen (TYLENOL) tablet 975 mg     atorvastatin (LIPITOR) tablet 20 mg     bisacodyl (DULCOLAX) suppository 10 mg      cefTRIAXone (ROCEPHIN) 2 g vial to attach to  ml bag for ADULTS or NS 50 ml bag for PEDS     glucose gel 15-30 g    Or     dextrose 50 % injection 25-50 mL    Or     glucagon injection 1 mg     heparin infusion 25,000 units in D5W 250 mL ANTICOAGULANT     HYDROmorphone (DILAUDID) injection 0.2 mg     hydrOXYzine (ATARAX) tablet 10 mg     insulin aspart (NovoLOG) injection (RAPID ACTING)     insulin aspart (NovoLOG) injection (RAPID ACTING)     lidocaine (LMX4) cream     lidocaine 1 % 0.1-1 mL     LORazepam (ATIVAN) tablet 0.5 mg     melatonin tablet 6 mg     metroNIDAZOLE (FLAGYL) infusion 500 mg     naloxone (NARCAN) injection 0.2 mg    Or     naloxone (NARCAN) injection 0.4 mg    Or     naloxone (NARCAN) injection 0.2 mg    Or     naloxone (NARCAN) injection 0.4 mg     ondansetron (ZOFRAN ODT) ODT tab 4 mg    Or     ondansetron (ZOFRAN) injection 4 mg     oxyCODONE (oxyCONTIN) 12 hr tablet 20 mg     oxyCODONE (ROXICODONE) tablet 5 mg     pantoprazole (PROTONIX) EC tablet 40 mg     Patient is already receiving anticoagulation with heparin, enoxaparin (LOVENOX), warfarin (COUMADIN)  or other anticoagulant medication     polyethylene glycol (MIRALAX) Packet 17 g     predniSONE (DELTASONE) tablet 5 mg     pregabalin (LYRICA) capsule 150 mg     prochlorperazine (COMPAZINE) injection 5 mg    Or     prochlorperazine (COMPAZINE) tablet 5 mg    Or     prochlorperazine (COMPAZINE) suppository 12.5 mg     senna-docusate (SENOKOT-S/PERICOLACE) 8.6-50 MG per tablet 1 tablet    Or     senna-docusate (SENOKOT-S/PERICOLACE) 8.6-50 MG per tablet 2 tablet     sodium chloride (PF) 0.9% PF flush 3 mL     sodium chloride (PF) 0.9% PF flush 3 mL     traZODone (DESYREL) tablet 50 mg     vancomycin (VANCOCIN) 1,500 mg in 0.9% NaCl 250 mL intermittent infusion

## 2023-07-01 LAB
ANION GAP SERPL CALCULATED.3IONS-SCNC: 9 MMOL/L (ref 7–15)
APTT PPP: 47 SECONDS (ref 22–38)
APTT PPP: 58 SECONDS (ref 22–38)
ATRIAL RATE - MUSE: 100 BPM
BUN SERPL-MCNC: 10.8 MG/DL (ref 8–23)
CALCIUM SERPL-MCNC: 9 MG/DL (ref 8.8–10.2)
CHLORIDE SERPL-SCNC: 104 MMOL/L (ref 98–107)
CREAT SERPL-MCNC: 0.54 MG/DL (ref 0.67–1.17)
CREAT SERPL-MCNC: 0.54 MG/DL (ref 0.67–1.17)
DEPRECATED HCO3 PLAS-SCNC: 25 MMOL/L (ref 22–29)
DIASTOLIC BLOOD PRESSURE - MUSE: NORMAL MMHG
ERYTHROCYTE [DISTWIDTH] IN BLOOD BY AUTOMATED COUNT: 16.7 % (ref 10–15)
GFR SERPL CREATININE-BSD FRML MDRD: >90 ML/MIN/1.73M2
GFR SERPL CREATININE-BSD FRML MDRD: >90 ML/MIN/1.73M2
GLUCOSE BLDC GLUCOMTR-MCNC: 113 MG/DL (ref 70–99)
GLUCOSE BLDC GLUCOMTR-MCNC: 131 MG/DL (ref 70–99)
GLUCOSE BLDC GLUCOMTR-MCNC: 144 MG/DL (ref 70–99)
GLUCOSE SERPL-MCNC: 126 MG/DL (ref 70–99)
HCT VFR BLD AUTO: 25.2 % (ref 40–53)
HGB BLD-MCNC: 8 G/DL (ref 13.3–17.7)
HOLD SPECIMEN: NORMAL
INTERPRETATION ECG - MUSE: NORMAL
MAGNESIUM SERPL-MCNC: 1.8 MG/DL (ref 1.7–2.3)
MCH RBC QN AUTO: 26.5 PG (ref 26.5–33)
MCHC RBC AUTO-ENTMCNC: 31.7 G/DL (ref 31.5–36.5)
MCV RBC AUTO: 83 FL (ref 78–100)
MRSA DNA SPEC QL NAA+PROBE: NEGATIVE
P AXIS - MUSE: 40 DEGREES
PLATELET # BLD AUTO: 377 10E3/UL (ref 150–450)
POTASSIUM SERPL-SCNC: 4.1 MMOL/L (ref 3.4–5.3)
PR INTERVAL - MUSE: 154 MS
QRS DURATION - MUSE: 86 MS
QT - MUSE: 318 MS
QTC - MUSE: 410 MS
R AXIS - MUSE: 3 DEGREES
RBC # BLD AUTO: 3.02 10E6/UL (ref 4.4–5.9)
SA TARGET DNA: POSITIVE
SODIUM SERPL-SCNC: 138 MMOL/L (ref 136–145)
SYSTOLIC BLOOD PRESSURE - MUSE: NORMAL MMHG
T AXIS - MUSE: 40 DEGREES
VANCOMYCIN SERPL-MCNC: 14.6 UG/ML
VENTRICULAR RATE- MUSE: 100 BPM
WBC # BLD AUTO: 13.5 10E3/UL (ref 4–11)

## 2023-07-01 PROCEDURE — 36415 COLL VENOUS BLD VENIPUNCTURE: CPT | Performed by: INTERNAL MEDICINE

## 2023-07-01 PROCEDURE — 87081 CULTURE SCREEN ONLY: CPT | Performed by: STUDENT IN AN ORGANIZED HEALTH CARE EDUCATION/TRAINING PROGRAM

## 2023-07-01 PROCEDURE — 120N000002 HC R&B MED SURG/OB UMMC

## 2023-07-01 PROCEDURE — 250N000013 HC RX MED GY IP 250 OP 250 PS 637: Performed by: INTERNAL MEDICINE

## 2023-07-01 PROCEDURE — 85730 THROMBOPLASTIN TIME PARTIAL: CPT | Performed by: INTERNAL MEDICINE

## 2023-07-01 PROCEDURE — 86140 C-REACTIVE PROTEIN: CPT | Performed by: STUDENT IN AN ORGANIZED HEALTH CARE EDUCATION/TRAINING PROGRAM

## 2023-07-01 PROCEDURE — 250N000011 HC RX IP 250 OP 636: Performed by: STUDENT IN AN ORGANIZED HEALTH CARE EDUCATION/TRAINING PROGRAM

## 2023-07-01 PROCEDURE — 250N000012 HC RX MED GY IP 250 OP 636 PS 637: Performed by: PHYSICIAN ASSISTANT

## 2023-07-01 PROCEDURE — 36415 COLL VENOUS BLD VENIPUNCTURE: CPT | Performed by: STUDENT IN AN ORGANIZED HEALTH CARE EDUCATION/TRAINING PROGRAM

## 2023-07-01 PROCEDURE — 80202 ASSAY OF VANCOMYCIN: CPT | Performed by: STUDENT IN AN ORGANIZED HEALTH CARE EDUCATION/TRAINING PROGRAM

## 2023-07-01 PROCEDURE — 85730 THROMBOPLASTIN TIME PARTIAL: CPT | Performed by: STUDENT IN AN ORGANIZED HEALTH CARE EDUCATION/TRAINING PROGRAM

## 2023-07-01 PROCEDURE — 85027 COMPLETE CBC AUTOMATED: CPT | Performed by: INTERNAL MEDICINE

## 2023-07-01 PROCEDURE — 250N000011 HC RX IP 250 OP 636: Performed by: INTERNAL MEDICINE

## 2023-07-01 PROCEDURE — 258N000003 HC RX IP 258 OP 636: Performed by: STUDENT IN AN ORGANIZED HEALTH CARE EDUCATION/TRAINING PROGRAM

## 2023-07-01 PROCEDURE — 83735 ASSAY OF MAGNESIUM: CPT | Performed by: INTERNAL MEDICINE

## 2023-07-01 PROCEDURE — 87641 MR-STAPH DNA AMP PROBE: CPT | Performed by: STUDENT IN AN ORGANIZED HEALTH CARE EDUCATION/TRAINING PROGRAM

## 2023-07-01 PROCEDURE — 80048 BASIC METABOLIC PNL TOTAL CA: CPT | Performed by: INTERNAL MEDICINE

## 2023-07-01 PROCEDURE — 87077 CULTURE AEROBIC IDENTIFY: CPT | Performed by: STUDENT IN AN ORGANIZED HEALTH CARE EDUCATION/TRAINING PROGRAM

## 2023-07-01 PROCEDURE — 250N000013 HC RX MED GY IP 250 OP 250 PS 637: Performed by: PHYSICIAN ASSISTANT

## 2023-07-01 PROCEDURE — 250N000011 HC RX IP 250 OP 636: Mod: JZ | Performed by: PHYSICIAN ASSISTANT

## 2023-07-01 PROCEDURE — 99233 SBSQ HOSP IP/OBS HIGH 50: CPT | Performed by: INTERNAL MEDICINE

## 2023-07-01 PROCEDURE — 99221 1ST HOSP IP/OBS SF/LOW 40: CPT | Mod: GC | Performed by: UROLOGY

## 2023-07-01 RX ORDER — LISINOPRIL 20 MG/1
40 TABLET ORAL DAILY
Status: DISCONTINUED | OUTPATIENT
Start: 2023-07-01 | End: 2023-07-06 | Stop reason: HOSPADM

## 2023-07-01 RX ORDER — HYDROMORPHONE HYDROCHLORIDE 1 MG/ML
0.5 INJECTION, SOLUTION INTRAMUSCULAR; INTRAVENOUS; SUBCUTANEOUS
Status: DISCONTINUED | OUTPATIENT
Start: 2023-07-01 | End: 2023-07-06 | Stop reason: HOSPADM

## 2023-07-01 RX ORDER — CEFAZOLIN SODIUM 1 G/50ML
1750 SOLUTION INTRAVENOUS EVERY 12 HOURS
Status: DISCONTINUED | OUTPATIENT
Start: 2023-07-01 | End: 2023-07-02

## 2023-07-01 RX ORDER — HYDROMORPHONE HYDROCHLORIDE 1 MG/ML
0.3 INJECTION, SOLUTION INTRAMUSCULAR; INTRAVENOUS; SUBCUTANEOUS
Status: DISCONTINUED | OUTPATIENT
Start: 2023-07-01 | End: 2023-07-01

## 2023-07-01 RX ADMIN — METRONIDAZOLE 500 MG: 500 INJECTION, SOLUTION INTRAVENOUS at 09:00

## 2023-07-01 RX ADMIN — ATORVASTATIN CALCIUM 20 MG: 20 TABLET, FILM COATED ORAL at 09:00

## 2023-07-01 RX ADMIN — LISINOPRIL 40 MG: 20 TABLET ORAL at 12:08

## 2023-07-01 RX ADMIN — HYDROMORPHONE HYDROCHLORIDE 0.2 MG: 0.2 INJECTION, SOLUTION INTRAMUSCULAR; INTRAVENOUS; SUBCUTANEOUS at 16:36

## 2023-07-01 RX ADMIN — APIXABAN 5 MG: 5 TABLET, FILM COATED ORAL at 21:12

## 2023-07-01 RX ADMIN — HYDROMORPHONE HYDROCHLORIDE 0.2 MG: 0.2 INJECTION, SOLUTION INTRAMUSCULAR; INTRAVENOUS; SUBCUTANEOUS at 14:22

## 2023-07-01 RX ADMIN — PANTOPRAZOLE SODIUM 40 MG: 40 TABLET, DELAYED RELEASE ORAL at 21:12

## 2023-07-01 RX ADMIN — TRAZODONE HYDROCHLORIDE 50 MG: 50 TABLET ORAL at 22:46

## 2023-07-01 RX ADMIN — HYDROMORPHONE HYDROCHLORIDE 0.2 MG: 0.2 INJECTION, SOLUTION INTRAMUSCULAR; INTRAVENOUS; SUBCUTANEOUS at 08:55

## 2023-07-01 RX ADMIN — ACETAMINOPHEN 975 MG: 325 TABLET, FILM COATED ORAL at 05:51

## 2023-07-01 RX ADMIN — VANCOMYCIN HYDROCHLORIDE 1500 MG: 10 INJECTION, POWDER, LYOPHILIZED, FOR SOLUTION INTRAVENOUS at 03:24

## 2023-07-01 RX ADMIN — HYDROMORPHONE HYDROCHLORIDE 0.2 MG: 0.2 INJECTION, SOLUTION INTRAMUSCULAR; INTRAVENOUS; SUBCUTANEOUS at 02:15

## 2023-07-01 RX ADMIN — ACETAMINOPHEN 975 MG: 325 TABLET, FILM COATED ORAL at 14:23

## 2023-07-01 RX ADMIN — HYDROMORPHONE HYDROCHLORIDE 0.3 MG: 1 INJECTION, SOLUTION INTRAMUSCULAR; INTRAVENOUS; SUBCUTANEOUS at 20:58

## 2023-07-01 RX ADMIN — PREDNISONE 5 MG: 5 TABLET ORAL at 09:00

## 2023-07-01 RX ADMIN — APIXABAN 5 MG: 5 TABLET, FILM COATED ORAL at 12:08

## 2023-07-01 RX ADMIN — OXYCODONE HYDROCHLORIDE 5 MG: 5 TABLET ORAL at 12:08

## 2023-07-01 RX ADMIN — PREGABALIN 150 MG: 75 CAPSULE ORAL at 21:12

## 2023-07-01 RX ADMIN — OXYCODONE HYDROCHLORIDE 5 MG: 5 TABLET ORAL at 18:34

## 2023-07-01 RX ADMIN — HEPARIN SODIUM 1950 UNITS/HR: 10000 INJECTION, SOLUTION INTRAVENOUS at 08:54

## 2023-07-01 RX ADMIN — PREDNISONE 5 MG: 5 TABLET ORAL at 21:11

## 2023-07-01 RX ADMIN — HYDROMORPHONE HYDROCHLORIDE 0.2 MG: 0.2 INJECTION, SOLUTION INTRAMUSCULAR; INTRAVENOUS; SUBCUTANEOUS at 18:52

## 2023-07-01 RX ADMIN — OXYCODONE HYDROCHLORIDE 20 MG: 10 TABLET, FILM COATED, EXTENDED RELEASE ORAL at 22:47

## 2023-07-01 RX ADMIN — VANCOMYCIN HYDROCHLORIDE 1750 MG: 1 INJECTION, POWDER, LYOPHILIZED, FOR SOLUTION INTRAVENOUS at 14:23

## 2023-07-01 RX ADMIN — HEPARIN SODIUM 1800 UNITS/HR: 10000 INJECTION, SOLUTION INTRAVENOUS at 05:07

## 2023-07-01 RX ADMIN — HYDROXYZINE HYDROCHLORIDE 10 MG: 10 TABLET ORAL at 02:16

## 2023-07-01 RX ADMIN — PREGABALIN 150 MG: 75 CAPSULE ORAL at 09:00

## 2023-07-01 RX ADMIN — CEFTRIAXONE SODIUM 2 G: 2 INJECTION, POWDER, FOR SOLUTION INTRAMUSCULAR; INTRAVENOUS at 12:16

## 2023-07-01 RX ADMIN — HYDROXYZINE HYDROCHLORIDE 10 MG: 10 TABLET ORAL at 14:23

## 2023-07-01 RX ADMIN — ACETAMINOPHEN 975 MG: 325 TABLET, FILM COATED ORAL at 22:45

## 2023-07-01 RX ADMIN — OXYCODONE HYDROCHLORIDE 20 MG: 10 TABLET, FILM COATED, EXTENDED RELEASE ORAL at 10:20

## 2023-07-01 RX ADMIN — PANTOPRAZOLE SODIUM 40 MG: 40 TABLET, DELAYED RELEASE ORAL at 09:00

## 2023-07-01 RX ADMIN — METRONIDAZOLE 500 MG: 500 INJECTION, SOLUTION INTRAVENOUS at 02:14

## 2023-07-01 RX ADMIN — HYDROMORPHONE HYDROCHLORIDE 0.2 MG: 0.2 INJECTION, SOLUTION INTRAMUSCULAR; INTRAVENOUS; SUBCUTANEOUS at 05:54

## 2023-07-01 RX ADMIN — MEGESTROL ACETATE 20 MG: 20 TABLET ORAL at 09:00

## 2023-07-01 RX ADMIN — METRONIDAZOLE 500 MG: 500 INJECTION, SOLUTION INTRAVENOUS at 18:34

## 2023-07-01 ASSESSMENT — ACTIVITIES OF DAILY LIVING (ADL)
ADLS_ACUITY_SCORE: 25
DEPENDENT_IADLS:: INDEPENDENT

## 2023-07-01 NOTE — PROGRESS NOTES
[facility]    Medicine Progress Note - Hospitalist Service, GOLD TEAM 17    Date of Admission:  6/29/2023    Assessment & Plan   Dilip Kan is a 68 year old male with PMHx of HTN, DM2, bilateral PE on AC and metastatic prostate cancer with bony and liver mets, s/p radiation c/b radiation cysitis and chemotherapy who was sent in from oncology clinic with fever and hypotension to OSH from where he was transferred to [facility] on 6/29/23 for admission with sepsis likely 2/2 urinary source vs pelvic abscess with concern for rectovesical fistula and pelvic osteomyelitis.      #  Severe Sepsis   #  Pelvic abscess   #  Rectovesical fistula ruled out by CT cystogram  #  Pubic bone osteomyelitis ruled out by MRI  ---   Met criteria for severe sepsis w/ fever (temp 101.1  F), hypotension, tachypnea, tachycardia, leukocytosis (wbc 18.1) and elevated lactic acid at 3.3.  ---   UA w/ large blood, large leuk esterase, - nitrite, wbc clumps, few bacteria  ---   CXR clear.   ---   SARS CoV-2, influenza A/B and RSV PCR negative  ---   NGTD from blood and urine cx  ---   CT abdomen pelvis with 4 cm abscess deep within the pelvis, possibly communicating with the bladder and left ventrolateral aspect of the rectum. Abscess also near her left pubic bone at the symphysis with concern for osteomyelitis   ---   IR reviewed pt's CT scan and felt that the 4 cm deep pelvic abscess not amenable to percutaneous drainage  ---   Urology consulted for concern of colovesical fistula. CT cystogram negative for fistula.  No surgical intervention is planned.  Keep hooks in for now  ---   I phone consulted w/ CRS on 6/30 and spoke with Dr. Campbell.  The 4 cm deep pelvis abscess not amenable to surgical intervention.  Trial of IV abx and further eval w/ pelvic MRI recommended.  Diverting colostomy may be needed if the 4 cm pelvic abscess does not improve w/ IV antibiotics.  ---   MRI pelvis 6/30 negative  for pelvic osteo but revealed pelvic abscess  ---   Hematology rec is to hold chemo for now  ---   ID recommended to keep pt on current empiric vancomycin, ceftriaxone and Flagyl and obtain MRSA nasal swab and VRE rectal/stool screen    ---   Ortho consulted to provide opinion on the chronic pathologic nondisplaced fracture of medial left acetabulum.  Pt not seen yet  ---   Continue IV Vanco, IV Flagyl and IV Ceftriaxone    TRINY   ---   Baseline creatinine 0.5.    ---   Creatinine elevated to 1.04 upon admission.    ---   Possibly prerenal vs ATN vs postobstructive uropathy   ---   Jiménez cath placed in the ED  ---   Treated w/ IVF  ---   Creatinine down to 0.54  ---   Avoid nephrotoxic agents other IV Vanco     Hyponatremia  ---   Mild w/ Na level of 133 at admit  ---   Na level is up to 138 following IVF hydration     Transaminitis  ---   Due to prostate ca w/ met to liver  ---   Monitor      Metastatic prostate ca  ---   Met to bone and liver.   ---   Patient following with oncology closely.    ---   Please see progress note from 6/28/2023 for details.    ---   S/p radiation and chemo.  Completed 4 cycles of docetaxel on 6/13/2023.   ---   Urology and oncology consults following  ---   Continue PTA prednisone 5 mg BID     Chronic cancer related pain   ---   Left hip pain due prostate ca w/ met to left acetabulum and known left acetabular pathologic fracture, seen on imaging studies  ---   Patient reports inability to bear weight 2/2 pain.   ---   Oncologist just transitioned pt to long acting pain medications.   ---   Continue PTA lyrica 150 mg BID  ---   Tylenol 975 mg TID   ---   Continue OxyContin 20 mg Q12H, oxycodone 5 mg Q6H PRN severe pain, and IV dilaudid 0.2 mg Q2H PRN breakthrough   ---   Orthopedics service consulted for the pathologic acetabular fracture      Urinary retention   ---   2/2 prostate ca.   ---   Self caths at home.   ---   Jiménez catheter placed in ED.     Acute PE and DVT   ---   Diagnosed  in 5/2023  ---   PTA Eliquis 5 mg BID on hold since admit.   ---   Pt has been on heparin drip in case he end up needing surgery   ---   Now it is clear that pt does not need surgery thus stop Heparin drip and re-introduce PTA Eliquis     T2DM  ---   Hgba1c was 6.7% on 2/20/23.   ---   Hold PTA metformin during hospitalization  ---   Continue Medium correctional sliding scale TIDAC and nightly      HTN   ---   Reintroduce PTA amlodipine and lasix since hypotension resolved     HLD   ---   Continue PTA lipitor 20 mg at bedtime      Anxiety d/o  Insomnia  ---   Continue PTA trazodone 50 mg at bedtime, ativan 0.5 mg Q6H PRN, melatonin PRN and atarax 10 mg Q6H PRN     ACD  ---   Due prostate ca and recent chemo  ---   Hgb stable at 8.0 g today  ---   Check CBC in am           Diet:  Regular  DVT Prophylaxis:  DOAC, transitioning to heparin drip for possible procedure   Jiménez Catheter:  Present  Lines: None     Cardiac Monitoring: None  Code Status:   FULL CODE   Disposition Plan    TBD            Galdino Snyder MD  Hospitalist Service, GOLD TEAM [address] [place] [address]  Securely message with [employer] (more info)  Text page via [employer] Paging/Directory   See signed in provider for up to date coverage information  ______________________________________________________________________    Interval History   Has chronic left hip pain  Non new complaints  Uneventful night  Afebrile    Physical Exam   Vital Signs: Temp: 97.6  F (36.4  C) Temp src: Oral BP: (!) 147/65 Pulse: 75   Resp: 18 SpO2: 97 % O2 Device: None (Room air)    Weight: 0 lbs 0 oz  General: aao x 3, NAD.  HEENT:  NC/AT, PERRL, EOMI, neck supple, no thyromegaly, op clear, mmm.  CVS:  NL s 1 and s2, 2/6 systolic murmur, no r/g.  Lungs:  CTA B/L.   Abd:  Soft, + bs, NT, no rebound or gaurding, no fluid shift.  Ext:  No c/c.  Lymph:  No edema.  Neuro:  Nonfocal.  Musculoskeletal: No calf tenderness to palpation.    Skin:  No  rash.  Psychiatry:  Mood and affect appropriate.        Data     I have personally reviewed the following data over the past 24 hrs:    13.5 (H)  \   8.0 (L)   / 377     138 104 10.8 /  126 (H)   4.1 25 0.54 (L); 0.54 (L) \       INR:  N/A PTT:  58 (H)   D-dimer:  N/A Fibrinogen:  N/A       Imaging results reviewed over the past 24 hrs:   Recent Results (from the past 24 hour(s))   CT Cystogram wo & w Contrast    Narrative    EXAMINATION: CT CYSTOGRAM WO & W CONTRAST, 6/30/2023 4:18 PM    TECHNIQUE:  Helical CT images from the lung bases through the  symphysis pubis were obtained with contrast.  Coronal reformatted  images were generated at a workstation for further assessment.    COMPARISON: CT 6/29/2023, 6/1/2023.    HISTORY: patient with concern for fistulizing pelvic abscess to the  bladder and rectum    FINDINGS:    Bladder: Jiménez bulb appears to be low lying within the previously  described abscess/fluid collection along the inferior bladder margin.  No findings to suggest extravasation of contrast outside of the  bladder/collection. Hyperdensities just posterior to the pubic ramus  (series 3, image 213) are favored to represent reactive bone formation  in the setting of suspected osteomyelitis. No evidence for fistula  formation to the rectum. Circumferential wall thickening of the  urinary bladder. Antidependant air, which is likely within the bladder  lumen, less likely within the wall.    Pelvic organs: Prostatectomy. Collection in the prostatectomy bed as  described above.    Gastrointestinal tract: No dilated loops of bowel. Circumferential  thickening and submucosal edema involving the rectum. Prior  appendectomy.     Lymph nodes: No suspicious lymphadenopathy.    Peritoneum/mesentery: Similar presacral edema and mild fluid.     Vessels: No aneurysmal dilatation of the visualized aorta or major  branch vessels.  No significant atherosclerotic disease.    Bones and soft tissues: Soft tissue gas and  reactive osseous changes  of the left pubic symphysis suggestive of osteomyelitis, similar to  prior. Similar sclerosis of the left acetabulum and left ischium.  Lumbar fusion changes at L4-5. Small fat containing bilateral inguinal  hernias.       Impression    IMPRESSION:     1. Redemonstrated air-fluid containing collection along the urinary  bladder neck with interval indwelling Jiménez bulb. The collection is  contiguous with the urinary bladder. No extravasation of contrast  beyond the collection or bladder lumen. No evidence of fistulous  communication with rectum.     2. Soft tissue gas and reactive osseous changes of the left pubic  symphysis suggestive of osteomyelitis, similar to prior. Unchanged  mild sclerotic changes in adjacent left acetabulum and left ischium.     3. Reactive circumferential wall thickening of the urinary bladder  secondary to ongoing infection/inflammation.    4. Circumferential thickening and submucosal edema involving the  rectum, suggestive of proctitis.     I have personally reviewed the examination and initial interpretation  and I agree with the findings.    BRENDA LOCKWOOD MD         SYSTEM ID:  [id number]   MR Pelvis (Intrapelvic Organs) wo Contrast    Narrative    Pelvis MRI without contrast    INDICATION: Pelvic abscess and osteomyelitis    COMPARISON: Left hip MRI 3/6/2023 abdomen pelvis CT 6/30/2023.    Contrast: None    FINDINGS: Low signal artifact from surgical hardware from spinal  surgery including pedicle screws bilaterally. Well-circumscribed  artifact from Jiménez catheter balloon in the decompressed urinary  bladder. No enlarged inguinal or pelvic lymph nodes. Small amount of  dependent pelvic free fluid. Sigmoid colon is mildly redundant.  Disc space narrowing throughout the lumbar spine included portion.  This consistent with underlying degenerative disc disease as well.  There are some low signal T1 changes in the left parasymphyseal pubic  bone and there is soft  tissue heterogeneity inferior the left superior  pubic ramus compared to the right. This correlates with the recent CT  abnormality of concern there are infection/inflammation. There is  asymmetric enlargement left obturator externus and internus muscles.  Extensive bone marrow edema noted in the medial acetabulum a left  ischium with cortical irregularity consistent with fracture. Other  muscle edema also noted on the left especially involving pectineus  muscle. No sacral edema. Mild edematous changes in the left femoral  head. Low signal T1 marrow changes noted at site of there appears to  be a fracture in the left ischial bone. There is an area of sharply  defined lucency on the recent CT in this area.      Impression    IMPRESSION: Lower lumbar spine surgical hardware. Apparent  nondisplaced fracture medial left acetabulum. Abnormalities in area of  previous air density and irregularity at left superior pubic ramus  very slightly irregular 4 marrow signal on this MRIi, this could be  old posttraumatic or old postinfectious. Marrow edema throughout the  medial left pelvic brain likely related to prior trauma with minimal  edema in the left femoral head which could represent bone contusion.  No visible fracture line in this area no other obvious avascular  necrosis in this area at this time. Extensive muscle edema likely  related to previous trauma in the left internal pelvic musculature.  Small amount of pelvic free fluid. Jiménez catheter.    JADIEL ROSSI MD         SYSTEM ID:  [id number]

## 2023-07-01 NOTE — PROGRESS NOTES
BRIEF ID NOTE    Fever and WBC curve downtrending. MR shows bony findings more consistent with fractures than osteomyelitis. Cystogram demonstrated that abscess is associated with bladder and prostate but does not make a fistula to the rectum.     Plan:  -- Continue current antibiotics - will plan to narrow tomorrow  -- Requested hospitalist help getting MRSA and VRE screening swabs      Ibrahima Cannon MD on 7/1/2023 at 4:12 PM

## 2023-07-01 NOTE — PROGRESS NOTES
Care Management Initial Consult    General Information  Assessment completed with: Patient, Jermain  Type of CM/SW Visit: Initial Assessment    Primary Care Provider verified and updated as needed: Yes   Readmission within the last 30 days: no previous admission in last 30 days      Reason for Consult: discharge planning  Advance Care Planning: Advance Care Planning Reviewed: no concerns identified          Communication Assessment  Patient's communication style: spoken language (English or Bilingual)    Hearing Difficulty or Deaf: no   Wear Glasses or Blind: yes    Cognitive  Cognitive/Neuro/Behavioral: WDL                      Living Environment:   People in home: spouse     Current living Arrangements: house      Able to return to prior arrangements:         Family/Social Support:  Care provided by: self  Provides care for: no one, unable/limited ability to care for self  Marital Status:   Wife, Children  Marybeth       Description of Support System: Supportive, Involved    Support Assessment: Adequate family and caregiver support    Current Resources:   Patient receiving home care services: No     Community Resources: None  Equipment currently used at home: cane, straight, walker, rolling  Supplies currently used at home: None    Employment/Financial:  Employment Status: retired        Financial Concerns: No concerns identified   Referral to Financial Worker: No       Does the patient's insurance plan have a 3 day qualifying hospital stay waiver?  No    Lifestyle & Psychosocial Needs:  Social Determinants of Health     Tobacco Use: High Risk (6/29/2023)    Patient History      Smoking Tobacco Use: Former      Smokeless Tobacco Use: Current      Passive Exposure: Past   Alcohol Use: Not on file   Financial Resource Strain: Not on file   Food Insecurity: Not on file   Transportation Needs: Not on file   Physical Activity: Not on file   Stress: Not on file   Social Connections: Not on file   Intimate Partner  Violence: Not on file   Depression: Not at risk (5/5/2023)    PHQ-2      PHQ-2 Score: 0   Housing Stability: Not on file       Functional Status:  Prior to admission patient needed assistance:   Dependent ADLs:: Independent  Dependent IADLs:: Independent       Mental Health Status:  Mental Health Status: No Current Concerns       Chemical Dependency Status:  Chemical Dependency Status: No Current Concerns             Values/Beliefs:  Spiritual, Cultural Beliefs, Restoration Practices, Values that affect care: no       Cultural/Restoration Practices Patient Routinely Participates In: prayer  Values/Beliefs Comment: Faith    Additional Information:  Per Chart review:   Patient is a68-year-old male with past medical history of hypertension, diabetes, bilateral PE on anticoagulation and metastatic prostate cancer with bone and liver mets.  Patient is status post salvage radiation complicated by radiation cystitis and on chemotherapy for prostate cancer.  Patient presents to the hospital with symptoms concerning for sepsis.  Patient's CT abdomen pelvis shows 4 cm abscess in the pelvis that communicates with the bladder and possibly the rectum.  Additionally, the abscess abuts the pelvis with gas in the pelvic bone concerning for osteomyelitis. Patient was started on broad-spectrum antibiotics and transferred to [facility] for urologic, interventional radiology, and infectious disease involvement.   Patient Now has completed 4 cycles of Docetaxel, last dose of chemo was on 06/13/2023.    He says he feels well but has mild pain over his hip. It is being controlled with pain medicine but is having breakthrough. He is awaiting consult by ORS / Surgery.    Current DX: sepsis 2/2 urinary source vs abscess with ?fistula formulation between bladder and rectum, metastatic prostate cancer    Will need: IR, ID, oncology, urology given this pelvic abscess with possibly communication with the bladder and the rectum. Question if he may need  colorectal surgery but there are no [place] beds for several days and we agree that this patient is sick enough he should come to the [facility] first for evaluation by these specialists and transfer to [place] if he eventually needs evaluation by surgery.    This RNCC completed initial assessment with patient and discussed discharge planning. Patient states he lives about [place] of [facility] [place]. Patient lives with his wife Marybeth and he has several children near by who help out if needed. Patient states he is independent  with all ADL's and IADL's prior to admit. Patient  has had home care in the past for PT after a surgery (used [facility] at that time.) Patient states one of his children will give him a ride home when  DC imminent.    Shawna MARTINN RN CCM  RN Care Coordinator 8A and 10 ICU  [address] [place] [address]  [email or web address]   Office: (10 ICU) [phone]   Pager: [phone]    For Weekend & Holiday on call RN Care Coordinator:  (Tasks: Home care, home infusion, medical equipment/oxygen, transportation, IMM & FAIRCHILD forms, etc.)   [place] & [facility] ([id number]) Saturday & Sunday; (1953-1996) [facility] Recognized Holidays  Pager #1: [phone] Units: 4A, 4C, 4E, 5A & 5B   Pager #2: [phone] Units: 6A, 6B, 6C, 6D  Pager #3: [phone] Units: 7A, 7B, 7C, 7D & 5C   Pager #4: [phone] Units: 5 Ortho, 8A, 10 ICU, & [facility]      For Weekend & Holiday on call Social Work:  (Tasks: TCU, transportation, Hospice, adjustment to illness counseling, Health Care Directives, Child Protection and Domestic Violence concerns, Vulnerable Adult, IMM forms, etc.)   [place] (0800 - 1630) Saturday and Sunday  Pager: [phone] Units: 4A, 4C, 4E, 5A and 5B   Pager: [phone] Units: 6A, 6B, 6C, 6D   Pager: [phone]: 7A, 7B, 7C, 7D, and 5C      [facility] ([id number]) Saturday and Sunday  Units: 5 Ortho, 8A, and  10 ICU   Pager: [phone]

## 2023-07-01 NOTE — PROGRESS NOTES
VS: Vital signs:  Temp: 97.4  F (36.3  C) Temp src: Oral BP: 127/66 Pulse: 88   Resp: 18 SpO2: 94 % O2 Device: None (Room air)           O2: Stable on RA   Output: Catheter intact    Last BM: 6/30/23   Activity: SBA   Skin: intact   Pain: 7/10   CMS: A&O x4, denies N/T   Dressing: None    Diet: Regular    LDA: R PIV x2    Equipment: IV pole, walker and gait belt, personal belongings    Plan: Continue with heparin and abx   Additional Info: Pt c/o of L hip pain and asking for pain meds.

## 2023-07-01 NOTE — PROGRESS NOTES
Urology  Progress Note    NAEO, AF/VSS  Having some hip pain  Denies fevers/chills    Exam  /66 (BP Location: Right arm)   Pulse 88   Temp 97.4  F (36.3  C) (Oral)   Resp 18   SpO2 94%   No acute distress  Unlabored breathing  Abdomen soft, nontender, nondistended.   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 CT cystogram demonstrated that abscess cavity is contiguous with urinary bladder and likely in the prostatic bed; no evidence of fistulous connection with rectum. Per urology, CRS, and IR, no indication for percutaneous or surgical drainage of pelvic fluid collection.     Recommend continued bladder drainage as Jiménez can act partially as a drain for that fluid collection, and continued antibiotic therapy. Urology will follow peripherally and we will plan to set the patient up with Dr. Correa to discuss surgical options once improved from his acute illness.     - agree with broad-spectrum abx and ID consult  - continue Jiménez catheter while acutely ill   - no indication for surgical intervention in the acute setting; will discuss further in outpatient setting     Seen and examined with the staff on call, Dr. Ace.    Angélica Barton MD, PGY-2  Urology Resident     Contacting the Urology Team     Please use the following job codes to reach the Urology Team. Note that you must use an in house phone and  that job codes cannot receive text pages.     On weekdays, dial [phone] (or star-star-star [phone] on the new [employer] telephones) then [phone] to reach the Adult Urology resident or PA on call    On weekdays, dial [phone] (or star-star-star [phone] on the new [employer] telephones) then [phone] to reach the Pediatric Urology resident    On weeknights and weekends, dial [phone] (or star-star-star 777 on the new [place] telephones) then [phone] to reach the Urology resident on call (for both Adult and Pediatrics)        I saw Dilip Kan on rounds and discussed his care with my resident team.  He is admitted for possible pelvic abscess.  I performed a history and physical exam. I discussed my findings with my resident team.  I have reviewed their note and agree with the listed findings, assesment, and plan.

## 2023-07-01 NOTE — PLAN OF CARE
"  VS: /60 (BP Location: Left arm)   Pulse 88   Temp 99.9  F (37.7  C) (Oral)   Resp 18   SpO2 92%     O2: Room air   Output: Patient is continent of B&B; has hooks catheter in place for urine elimination.    Last BM: 6/30/23   Activity: SBA    Skin: CDI   Pain: Patient c/o high pain to left hip/pelvis consistently through shift   CMS: Intact   Dressing: N/A   Diet: Regular   LDA: PIV back of right hand- saline locked & right forearm- NS 25mL/hr TKO   Equipment: IV pump/pole, walker, hooks catheter & supplies   Plan: TBD   Additional Info: Patient has blood glucose checks before meals & HS; glucose readings have been stable this shift & only required sliding scale at 1730. Pt is A&Ox4 and is able to make needs known. He is on IV abx for suspected abcess or osteomyelitis in left pelvis; awaiting results for VRE culture and MRSA/MSSA PCR.        Problem: Plan of Care - These are the overarching goals to be used throughout the patient stay.    Goal: Plan of Care Review  Description: The Plan of Care Review/Shift note should be completed every shift.  The Outcome Evaluation is a brief statement about your assessment that the patient is improving, declining, or no change.  This information will be displayed automatically on your shift note.  Outcome: Progressing  Flowsheets (Taken 7/1/2023 1802)  Plan of Care Reviewed With: patient  Overall Patient Progress: improving  Goal: Patient-Specific Goal (Individualized)  Description: You can add care plan individualizations to a care plan. Examples of Individualization might be:  \"Parent requests to be called daily at 9am for status\", \"I have a hard time hearing out of my right ear\", or \"Do not touch me to wake me up as it startles me\".  Outcome: Progressing  Goal: Absence of Hospital-Acquired Illness or Injury  Outcome: Progressing  Intervention: Identify and Manage Fall Risk  Recent Flowsheet Documentation  Taken 7/1/2023 0900 by Argentina Farfan, RN  Safety " Promotion/Fall Prevention:   assistive device/personal items within reach   nonskid shoes/slippers when out of bed  Goal: Optimal Comfort and Wellbeing  Outcome: Progressing  Goal: Readiness for Transition of Care  Outcome: Progressing     Problem: Pain Acute  Goal: Optimal Pain Control and Function  Outcome: Progressing     Problem: Pain Chronic (Persistent)  Goal: Optimal Pain Control and Function  Outcome: Progressing     Problem: Urinary Elimination Management  Goal: Effective Urinary Elimination/Continence  Outcome: Progressing     Problem: Urinary Retention  Goal: Effective Urinary Elimination  Outcome: Progressing     Problem: Infection  Goal: Absence of Infection Signs and Symptoms  Outcome: Progressing   Goal Outcome Evaluation:      Plan of Care Reviewed With: patient    Overall Patient Progress: improvingOverall Patient Progress: improving

## 2023-07-01 NOTE — PHARMACY-VANCOMYCIN DOSING SERVICE
Pharmacy Vancomycin Note  Date of Service 2023  Patient's  1955   68 year old, male    Indication: Sepsis  Day of Therapy: Since 23  Current vancomycin regimen:  1500 mg IV q12h  Current vancomycin monitoring method: AUC  Current vancomycin therapeutic monitoring goal: 400-600 mg*h/L    InsightRX Prediction of Current Vancomycin Regimen  Loading dose: N/A  Regimen: 1500 mg IV every 12 hours.  Start time: 15:24 on 2023  Exposure target: AUC24 (range)400-600 mg/L.hr   AUC24,ss: 450 mg/L.hr  Probability of AUC24 > 400: 68 %  Ctrough,ss: 14.1 mg/L  Probability of Ctrough,ss > 20: 20 %  Probability of nephrotoxicity (Lodise HERON ): 9 %      Current estimated CrCl = Estimated Creatinine Clearance: 150.7 mL/min (A) (based on SCr of 0.54 mg/dL (L)).    Creatinine for last 3 days  2023:  9:39 AM Creatinine 1.04 mg/dL  2023:  4:18 AM Creatinine 0.62 mg/dL  2023:  8:05 AM Creatinine 0.54 mg/dL;  8:05 AM Creatinine 0.54 mg/dL    Recent Vancomycin Levels (past 3 days)  2023:  8:05 AM Vancomycin 14.6 ug/mL    Vancomycin IV Administrations (past 72 hours)                   vancomycin (VANCOCIN) 1,500 mg in 0.9% NaCl 250 mL intermittent infusion (mg) 1,500 mg New Bag 23 0324     1,500 mg New Bag 23 1309    vancomycin (VANCOCIN) 1000 mg in dextrose 5% 200 mL PREMIX (mg) 1,000 mg New Bag 23 0147    vancomycin (VANCOCIN) 2,000 mg in sodium chloride 0.9 % 500 mL intermittent infusion (mg) 2,000 mg New Bag 23 1115                Nephrotoxins and other renal medications (From now, onward)    Start     Dose/Rate Route Frequency Ordered Stop    23 1200  lisinopril (ZESTRIL) tablet 40 mg        Note to Pharmacy: PTA Sig:Take 40 mg by mouth daily      40 mg Oral DAILY 23 1127      23 1300  vancomycin (VANCOCIN) 1,500 mg in 0.9% NaCl 250 mL intermittent infusion         1,500 mg  over 90 Minutes Intravenous EVERY 12 HOURS 23 0842                Contrast Orders - past 72 hours (72h ago, onward)    Start     Dose/Rate Route Frequency Stop    06/30/23 1530  iopamidol (ISOVUE-370) solution 50 mL         50 mL Intravenous ONCE 06/30/23 1553          Interpretation of levels and current regimen:  Vancomycin level is reflective of -600 although on lower side of therapeutic range with a lower percentage above  (68%).     Has serum creatinine changed greater than 50% in last 72 hours: Yes (decreased from 1.04 to 52 mg/dL)    Urine output:  good urine output    Renal Function: Improving    InsightRX Prediction of Planned New Vancomycin Regimen  Loading dose: N/A  Regimen: 1750 mg IV every 12 hours.  Start time: 15:24 on 07/01/2023  Exposure target: AUC24 (range)400-600 mg/L.hr   AUC24,ss: 524 mg/L.hr  Probability of AUC24 > 400: 86 %  Ctrough,ss: 16.6 mg/L  Probability of Ctrough,ss > 20: 33 %  Probability of nephrotoxicity (Lodise HERON 2009): 12 %      Plan:  1. Increase Dose to 1750 mg IV every 12 hours. Although previous level technically therapeutic, will increase dose to get higher percentage above AUC goal (400) and given the improvement of renal function.  2. Vancomycin monitoring method: AUC  3. Vancomycin therapeutic monitoring goal: 400-600 mg*h/L  4. Pharmacy will check vancomycin levels as appropriate in 1-3 Days.  5. Serum creatinine levels will be ordered daily for the first week of therapy and at least twice weekly for subsequent weeks.    Lesly Moon, [facility]

## 2023-07-02 ENCOUNTER — APPOINTMENT (OUTPATIENT)
Dept: GENERAL RADIOLOGY | Facility: CLINIC | Age: 68
DRG: 871 | End: 2023-07-02
Attending: STUDENT IN AN ORGANIZED HEALTH CARE EDUCATION/TRAINING PROGRAM
Payer: COMMERCIAL

## 2023-07-02 LAB
CRP SERPL-MCNC: 204.37 MG/L
ERYTHROCYTE [DISTWIDTH] IN BLOOD BY AUTOMATED COUNT: 16.8 % (ref 10–15)
GLUCOSE BLDC GLUCOMTR-MCNC: 111 MG/DL (ref 70–99)
GLUCOSE BLDC GLUCOMTR-MCNC: 122 MG/DL (ref 70–99)
GLUCOSE BLDC GLUCOMTR-MCNC: 127 MG/DL (ref 70–99)
GLUCOSE BLDC GLUCOMTR-MCNC: 185 MG/DL (ref 70–99)
GLUCOSE BLDC GLUCOMTR-MCNC: 190 MG/DL (ref 70–99)
HCT VFR BLD AUTO: 24.4 % (ref 40–53)
HGB BLD-MCNC: 7.6 G/DL (ref 13.3–17.7)
LACTATE SERPL-SCNC: 0.8 MMOL/L (ref 0.7–2)
MCH RBC QN AUTO: 26.3 PG (ref 26.5–33)
MCHC RBC AUTO-ENTMCNC: 31.1 G/DL (ref 31.5–36.5)
MCV RBC AUTO: 84 FL (ref 78–100)
PLATELET # BLD AUTO: 357 10E3/UL (ref 150–450)
RBC # BLD AUTO: 2.89 10E6/UL (ref 4.4–5.9)
WBC # BLD AUTO: 13 10E3/UL (ref 4–11)

## 2023-07-02 PROCEDURE — 250N000013 HC RX MED GY IP 250 OP 250 PS 637: Performed by: INTERNAL MEDICINE

## 2023-07-02 PROCEDURE — 99233 SBSQ HOSP IP/OBS HIGH 50: CPT | Performed by: STUDENT IN AN ORGANIZED HEALTH CARE EDUCATION/TRAINING PROGRAM

## 2023-07-02 PROCEDURE — 258N000003 HC RX IP 258 OP 636: Performed by: STUDENT IN AN ORGANIZED HEALTH CARE EDUCATION/TRAINING PROGRAM

## 2023-07-02 PROCEDURE — 120N000002 HC R&B MED SURG/OB UMMC

## 2023-07-02 PROCEDURE — 85027 COMPLETE CBC AUTOMATED: CPT | Performed by: INTERNAL MEDICINE

## 2023-07-02 PROCEDURE — 99233 SBSQ HOSP IP/OBS HIGH 50: CPT | Performed by: INTERNAL MEDICINE

## 2023-07-02 PROCEDURE — 250N000012 HC RX MED GY IP 250 OP 636 PS 637: Performed by: PHYSICIAN ASSISTANT

## 2023-07-02 PROCEDURE — 72170 X-RAY EXAM OF PELVIS: CPT

## 2023-07-02 PROCEDURE — 250N000011 HC RX IP 250 OP 636: Mod: JZ | Performed by: STUDENT IN AN ORGANIZED HEALTH CARE EDUCATION/TRAINING PROGRAM

## 2023-07-02 PROCEDURE — 83605 ASSAY OF LACTIC ACID: CPT | Performed by: INTERNAL MEDICINE

## 2023-07-02 PROCEDURE — 250N000013 HC RX MED GY IP 250 OP 250 PS 637: Performed by: PHYSICIAN ASSISTANT

## 2023-07-02 PROCEDURE — 250N000011 HC RX IP 250 OP 636: Mod: JZ | Performed by: PHYSICIAN ASSISTANT

## 2023-07-02 PROCEDURE — 36415 COLL VENOUS BLD VENIPUNCTURE: CPT | Performed by: INTERNAL MEDICINE

## 2023-07-02 PROCEDURE — 250N000011 HC RX IP 250 OP 636: Performed by: INTERNAL MEDICINE

## 2023-07-02 PROCEDURE — 250N000011 HC RX IP 250 OP 636: Performed by: STUDENT IN AN ORGANIZED HEALTH CARE EDUCATION/TRAINING PROGRAM

## 2023-07-02 RX ORDER — PIPERACILLIN SODIUM, TAZOBACTAM SODIUM 4; .5 G/20ML; G/20ML
4.5 INJECTION, POWDER, LYOPHILIZED, FOR SOLUTION INTRAVENOUS EVERY 6 HOURS
Status: DISCONTINUED | OUTPATIENT
Start: 2023-07-02 | End: 2023-07-06 | Stop reason: HOSPADM

## 2023-07-02 RX ORDER — OXYCODONE HYDROCHLORIDE 10 MG/1
10 TABLET ORAL EVERY 4 HOURS PRN
Status: DISCONTINUED | OUTPATIENT
Start: 2023-07-02 | End: 2023-07-06 | Stop reason: HOSPADM

## 2023-07-02 RX ORDER — LIDOCAINE 4 G/G
1-2 PATCH TOPICAL
Status: DISCONTINUED | OUTPATIENT
Start: 2023-07-02 | End: 2023-07-06 | Stop reason: HOSPADM

## 2023-07-02 RX ADMIN — APIXABAN 5 MG: 5 TABLET, FILM COATED ORAL at 09:44

## 2023-07-02 RX ADMIN — ACETAMINOPHEN 975 MG: 325 TABLET, FILM COATED ORAL at 06:14

## 2023-07-02 RX ADMIN — PREGABALIN 150 MG: 75 CAPSULE ORAL at 20:11

## 2023-07-02 RX ADMIN — OXYCODONE HYDROCHLORIDE 20 MG: 10 TABLET, FILM COATED, EXTENDED RELEASE ORAL at 22:20

## 2023-07-02 RX ADMIN — PREDNISONE 5 MG: 5 TABLET ORAL at 09:44

## 2023-07-02 RX ADMIN — PANTOPRAZOLE SODIUM 40 MG: 40 TABLET, DELAYED RELEASE ORAL at 09:44

## 2023-07-02 RX ADMIN — APIXABAN 5 MG: 5 TABLET, FILM COATED ORAL at 20:11

## 2023-07-02 RX ADMIN — METRONIDAZOLE 500 MG: 500 INJECTION, SOLUTION INTRAVENOUS at 09:56

## 2023-07-02 RX ADMIN — OXYCODONE HYDROCHLORIDE 5 MG: 5 TABLET ORAL at 11:52

## 2023-07-02 RX ADMIN — PREGABALIN 150 MG: 75 CAPSULE ORAL at 09:44

## 2023-07-02 RX ADMIN — OXYCODONE HYDROCHLORIDE 5 MG: 5 TABLET ORAL at 02:58

## 2023-07-02 RX ADMIN — HYDROMORPHONE HYDROCHLORIDE 0.5 MG: 1 INJECTION, SOLUTION INTRAMUSCULAR; INTRAVENOUS; SUBCUTANEOUS at 09:44

## 2023-07-02 RX ADMIN — OXYCODONE HYDROCHLORIDE 10 MG: 10 TABLET ORAL at 18:51

## 2023-07-02 RX ADMIN — PIPERACILLIN AND TAZOBACTAM 4.5 G: 4; .5 INJECTION, POWDER, FOR SOLUTION INTRAVENOUS at 22:23

## 2023-07-02 RX ADMIN — HYDROMORPHONE HYDROCHLORIDE 0.5 MG: 1 INJECTION, SOLUTION INTRAMUSCULAR; INTRAVENOUS; SUBCUTANEOUS at 20:05

## 2023-07-02 RX ADMIN — LIDOCAINE PATCH 4% 2 PATCH: 40 PATCH TOPICAL at 14:08

## 2023-07-02 RX ADMIN — LISINOPRIL 40 MG: 20 TABLET ORAL at 09:44

## 2023-07-02 RX ADMIN — PIPERACILLIN AND TAZOBACTAM 4.5 G: 4; .5 INJECTION, POWDER, FOR SOLUTION INTRAVENOUS at 15:33

## 2023-07-02 RX ADMIN — HYDROMORPHONE HYDROCHLORIDE 0.5 MG: 1 INJECTION, SOLUTION INTRAMUSCULAR; INTRAVENOUS; SUBCUTANEOUS at 16:49

## 2023-07-02 RX ADMIN — CEFTRIAXONE SODIUM 2 G: 2 INJECTION, POWDER, FOR SOLUTION INTRAMUSCULAR; INTRAVENOUS at 11:51

## 2023-07-02 RX ADMIN — HYDROMORPHONE HYDROCHLORIDE 0.5 MG: 1 INJECTION, SOLUTION INTRAMUSCULAR; INTRAVENOUS; SUBCUTANEOUS at 14:08

## 2023-07-02 RX ADMIN — VANCOMYCIN HYDROCHLORIDE 1750 MG: 1 INJECTION, POWDER, LYOPHILIZED, FOR SOLUTION INTRAVENOUS at 02:37

## 2023-07-02 RX ADMIN — HYDROMORPHONE HYDROCHLORIDE 0.5 MG: 1 INJECTION, SOLUTION INTRAMUSCULAR; INTRAVENOUS; SUBCUTANEOUS at 01:16

## 2023-07-02 RX ADMIN — ACETAMINOPHEN 975 MG: 325 TABLET, FILM COATED ORAL at 22:19

## 2023-07-02 RX ADMIN — PREDNISONE 5 MG: 5 TABLET ORAL at 20:11

## 2023-07-02 RX ADMIN — OXYCODONE HYDROCHLORIDE 20 MG: 10 TABLET, FILM COATED, EXTENDED RELEASE ORAL at 10:03

## 2023-07-02 RX ADMIN — HYDROMORPHONE HYDROCHLORIDE 0.5 MG: 1 INJECTION, SOLUTION INTRAMUSCULAR; INTRAVENOUS; SUBCUTANEOUS at 11:54

## 2023-07-02 RX ADMIN — PANTOPRAZOLE SODIUM 40 MG: 40 TABLET, DELAYED RELEASE ORAL at 20:11

## 2023-07-02 RX ADMIN — HYDROMORPHONE HYDROCHLORIDE 0.5 MG: 1 INJECTION, SOLUTION INTRAMUSCULAR; INTRAVENOUS; SUBCUTANEOUS at 22:14

## 2023-07-02 RX ADMIN — ACETAMINOPHEN 975 MG: 325 TABLET, FILM COATED ORAL at 14:07

## 2023-07-02 RX ADMIN — METRONIDAZOLE 500 MG: 500 INJECTION, SOLUTION INTRAVENOUS at 01:17

## 2023-07-02 RX ADMIN — HYDROMORPHONE HYDROCHLORIDE 0.5 MG: 1 INJECTION, SOLUTION INTRAMUSCULAR; INTRAVENOUS; SUBCUTANEOUS at 07:08

## 2023-07-02 RX ADMIN — TRAZODONE HYDROCHLORIDE 50 MG: 50 TABLET ORAL at 22:20

## 2023-07-02 RX ADMIN — HYDROMORPHONE HYDROCHLORIDE 0.5 MG: 1 INJECTION, SOLUTION INTRAMUSCULAR; INTRAVENOUS; SUBCUTANEOUS at 05:11

## 2023-07-02 RX ADMIN — VANCOMYCIN HYDROCHLORIDE 1750 MG: 1 INJECTION, POWDER, LYOPHILIZED, FOR SOLUTION INTRAVENOUS at 14:07

## 2023-07-02 RX ADMIN — MEGESTROL ACETATE 20 MG: 20 TABLET ORAL at 09:44

## 2023-07-02 RX ADMIN — ATORVASTATIN CALCIUM 20 MG: 20 TABLET, FILM COATED ORAL at 09:44

## 2023-07-02 ASSESSMENT — ACTIVITIES OF DAILY LIVING (ADL)
ADLS_ACUITY_SCORE: 25

## 2023-07-02 NOTE — CONSULTS
[facility]  Orthopedic Surgery Consult    Name: Dilip Kan  Age: 68 year old  MRN: [id number]  YOB: 1955    Reason for Consult: L. Acetabular Fracture/Osteomyelitis    Requesting Provider: Dr. Galdino Snyder    Assessment and Plan:     Assessment:  Mr. Kan is a 68-year-old male that presented to us for evaluation of chronic, non-displaced pathologic left medial acetabular wall fracture and osteomyelitis.    We reviewed imaging today that confirms acute vs. Chronic osteomyelitis of the left superior pubic ramus as well as a nondisplaced fracture medial left acetabulum.     Typically the treatment for osteomyelitis consists of 6 weeks of IV antibiotic.  We will defer to infectious disease/medicine to optimize duration and selection of antibiotics if they feel this is indicated.    At this time a biopsy is not necessary to confirm.  Harms and risks of an operative biopsy outweigh benefits.    For the fracture we recommend that the patient follows up outpatient with us in 2 to 3 weeks after discharge. This fracture is likely subacute and seen on the CT on 06/01/2023 and 04/19/2023. He has also been WBAT for the past 4 months. It is possible this is a pathologic fracture secondary to his cancer. There is no evidence on imaging of other bony metastasis. We will reach out to our scheduling department to set up the follow-up appointment.  He can be toe touch weightbearing for now.  We will additionally get Judet views today to further evaluate the fracture pattern. Our [facility] oncology team will reviewing imaging tomorrow to determine if we can safely advance WB status.    We will plan to follow this patient peripherally at this time. Please do not hesitate to reach out regarding any questions. Will update WB status as needed.     Plan:  - Plan for OR: None  - Anticoagulation/DVT prophylaxis: Defer to primary  - Antibiotics: Defer to ID/medicine for osteomyelitis   - Imaging:  Additional acetabular Judet views, ordered today  - Activity: TTWB on LLE, possibly advance WB status after review by ortho tumor team  - Weight bearing: Toe touch weightbearing  - Pain control: Defer to primary  - Diet: No restrictions from our standpoint, defer to primary  - Follow-up: F/u with our team in 2-3 weeks, 's messaged  - Disposition: Defer to primary    Staff: Edgar Clancy MD    Respectfully,    Pablito Lechuga MD  Orthopedic Surgery PGY1  [phone]    Please page me directly with any questions/concerns during regular weekday hours before 5 pm. If there is no response, if it is a weekend, or if it is during evening hours then please page the orthopedic surgery resident on call.    History of Present Illness:     Patient was seen and examined by me. History, PMH, Meds, SH, complete ROS (10 organ systems) and PE reviewed with patient and prior medical records.      Mr. Lubin is a 68-year-old male with a history of metastatic prostate cancer who presented early June with fever and sepsis.  His work-up at that time showed concern for a pelvic abscess and he is being followed by medicine and infectious disease.  We were consulted for osteomyelitis seen on MRI.    Patient states his pain started 4 months ago.  He has been bearing weight with a walker or cane during that time.  He points to the inside of his groin area as being most painful.  He expressed that he would not like surgery for any possible fractures.  He does not complain of any numbness, tingling, or weakness. He denies any trauma.      Past Medical History:     Past Medical History:   Diagnosis Date     Elevated prostate specific antigen (PSA)     4/10/2012     Encounter for other administrative examinations     1/31/2014     Esophagitis     No Comments Provided     Gastro-esophageal reflux disease without esophagitis     No Comments Provided     Low back pain     No Comments Provided     Malignant neoplasm of prostate (H)     9/6/2012      Nicotine dependence, uncomplicated     Quit - 2007 with chantix     Other intervertebral disc degeneration, lumbosacral region     2008       Past Surgical History:     Past Surgical History:   Procedure Laterality Date     APPENDECTOMY OPEN      Ruptured - [place]     ARTHROPLASTY KNEE Right 2021    Procedure: ARTHROPLASTY, KNEE, TOTAL;  Surgeon: Micah Rock MD;  Location: [facility] OR     COLONOSCOPY  2006    next due in 2016     DISKECTOMY, LUMBAR, SINGLE SP  2009    L 3-4 microdiskectomy, [facility]     ESOPHAGOSCOPY, GASTROSCOPY, DUODENOSCOPY (EGD), COMBINED  2003          ESOPHAGOSCOPY, GASTROSCOPY, DUODENOSCOPY (EGD), COMBINED  2006          PROSTATECTOMY PERINEAL RADICAL  2012    Nerve Sparring, Dr Warner     SPINE SURGERY N/A 2017    L3 to S1 spinal decompression L4/L5 fusion     TONSILLECTOMY, ADENOIDECTOMY, COMBINED      as a child     VARICOCELECTOMY      INCISION AND DRAINAGE,EXCISE VARICOCELE       Social History:     Social History     Socioeconomic History     Marital status:    Occupational History     Occupation:      Employer: OTHER   Tobacco Use     Smoking status: Former     Packs/day: 1.00     Years: 20.00     Pack years: 20.00     Types: Cigarettes     Quit date: 2002     Years since quittin.6     Passive exposure: Past     Smokeless tobacco: Current     Types: Snuff     Tobacco comments:     Can't remember when all he had passive exposure   Vaping Use     Vaping Use: Never used   Substance and Sexual Activity     Alcohol use: Not Currently     Comment:  5 drinks a month     Drug use: No     Sexual activity: Yes     Partners: Female     Birth control/protection: None   Social History Narrative    [employer] - retired.  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  Patient has quit smoking.  Lives in [place] a farm with wife.        Family History:     Family History   Problem Relation  Age of Onset     Heart Disease Father         Heart Disease, passed away from CHF,CAD     Colon Cancer Father         Cancer-colon     Hypertension Father         Hypertension     Other - See Comments Father         Stroke/Dementia     Hypertension Mother         Hypertension,HTN     Other - See Comments Mother          Parkinsons     Family History Negative Other         Good Health     Family History Negative Other         Good Health,previous marriage./previous marriage.     Family History Negative Other         Good Health,previous marriage.     Family History Negative Sister         Good Health,emotional problems.     Family History Negative Sister         Good Health     Other - See Comments Son         w/o major medical problems.     Other - See Comments Daughter         w/o major medical problems.       Medications:     Current Facility-Administered Medications   Medication     acetaminophen (TYLENOL) tablet 975 mg     apixaban ANTICOAGULANT (ELIQUIS) tablet 5 mg     atorvastatin (LIPITOR) tablet 20 mg     bisacodyl (DULCOLAX) suppository 10 mg     cefTRIAXone (ROCEPHIN) 2 g vial to attach to  ml bag for ADULTS or NS 50 ml bag for PEDS     glucose gel 15-30 g    Or     dextrose 50 % injection 25-50 mL    Or     glucagon injection 1 mg     diazepam (VALIUM) tablet 5 mg     HYDROmorphone (PF) (DILAUDID) injection 0.5 mg     hydrOXYzine (ATARAX) tablet 10 mg     insulin aspart (NovoLOG) injection (RAPID ACTING)     insulin aspart (NovoLOG) injection (RAPID ACTING)     lidocaine (LMX4) cream     lidocaine 1 % 0.1-1 mL     lisinopril (ZESTRIL) tablet 40 mg     LORazepam (ATIVAN) tablet 0.5 mg     megestrol (MEGACE) tablet 20 mg     melatonin tablet 6 mg     metroNIDAZOLE (FLAGYL) infusion 500 mg     naloxone (NARCAN) injection 0.2 mg    Or     naloxone (NARCAN) injection 0.4 mg    Or     naloxone (NARCAN) injection 0.2 mg    Or     naloxone (NARCAN) injection 0.4 mg     ondansetron (ZOFRAN ODT) ODT tab 4 mg     Or     ondansetron (ZOFRAN) injection 4 mg     oxybutynin (DITROPAN) tablet 5 mg     oxyCODONE (oxyCONTIN) 12 hr tablet 20 mg     oxyCODONE (ROXICODONE) tablet 5 mg     pantoprazole (PROTONIX) EC tablet 40 mg     Patient is already receiving anticoagulation with heparin, enoxaparin (LOVENOX), warfarin (COUMADIN)  or other anticoagulant medication     polyethylene glycol (MIRALAX) Packet 17 g     predniSONE (DELTASONE) tablet 5 mg     pregabalin (LYRICA) capsule 150 mg     prochlorperazine (COMPAZINE) injection 5 mg    Or     prochlorperazine (COMPAZINE) tablet 5 mg    Or     prochlorperazine (COMPAZINE) suppository 12.5 mg     senna-docusate (SENOKOT-S/PERICOLACE) 8.6-50 MG per tablet 1 tablet    Or     senna-docusate (SENOKOT-S/PERICOLACE) 8.6-50 MG per tablet 2 tablet     sodium chloride (PF) 0.9% PF flush 3 mL     sodium chloride (PF) 0.9% PF flush 3 mL     traZODone (DESYREL) tablet 50 mg     vancomycin (VANCOCIN) 1,750 mg in sodium chloride 0.9 % 500 mL intermittent infusion       Allergies:     No Known Allergies    Review of Systems:     A comprehensive 10 point review of systems (constitutional, ENT, cardiac, peripheral vascular, respiratory, GI, , musculoskeletal, skin, neurological) was performed and found to be negative except as described in this note.      Physical Exam:     Vital Signs: /56 (BP Location: Left arm)   Pulse 79   Temp 98.8  F (37.1  C) (Oral)   Resp 20   Wt 95.2 kg (209 lb 12.8 oz)   SpO2 96%   BMI 30.10 kg/m    General: Resting comfortably in bed, awake, alert, no apparent distress, appears stated age.    Musculoskeletal:    RLE: No gross deformity. Skin intact. Fires TA/Gastroc/EHL with 5/5 strength. SILT in sural, saphenous, deep peroneal, superficial peroneal, and tibial nerve distributions.    LLE: No gross deformity. Skin intact. 2+/5 SLR, limited due to pain. Fires TA/Gastroc/EHL with 5/5 strength. Mild pain with log roll. Moderate pain with active SLR, able to  weakly perform. No pain with stimulated WB of hip.      Imaging:     MRI reviewed today: Intact L-spine surgical hardware. Nondisplaced fracture through medial left acetabulum. Superior pubic ramus air densities consistent with osteomyelitis.     CT reviewed showed gas in left pubic bone and left hip acetabular medial wall nondispalced fx    Data:     CBC:  Lab Results   Component Value Date    WBC 13.0 (H) 07/02/2023    HGB 7.6 (L) 07/02/2023     07/02/2023     BMP:  Lab Results   Component Value Date     07/01/2023    POTASSIUM 4.1 07/01/2023    CHLORIDE 104 07/01/2023    CO2 25 07/01/2023    BUN 10.8 07/01/2023    CR 0.54 (L) 07/01/2023    CR 0.54 (L) 07/01/2023    ANIONGAP 9 07/01/2023    ROBERTO 9.0 07/01/2023     (H) 07/02/2023     Inflammatory Markers:  Lab Results   Component Value Date    WBC 13.0 (H) 07/02/2023    WBC 13.5 (H) 07/01/2023    WBC 15.5 (H) 06/30/2023

## 2023-07-02 NOTE — PROGRESS NOTES
Dilip Kan is a 68 year old male patient.  No diagnosis found.  Past Medical History:   Diagnosis Date     Elevated prostate specific antigen (PSA)     4/10/2012     Encounter for other administrative examinations     1/31/2014     Esophagitis     No Comments Provided     Gastro-esophageal reflux disease without esophagitis     No Comments Provided     Low back pain     No Comments Provided     Malignant neoplasm of prostate (H)     9/6/2012     Nicotine dependence, uncomplicated     Quit - October 2007 with chantix     Other intervertebral disc degeneration, lumbosacral region     12/1/2008     No current outpatient medications on file.     No Known Allergies  Principal Problem:    Sepsis (H)    Blood pressure 109/57, pulse 80, temperature 98.9  F (37.2  C), temperature source Oral, resp. rate 16, SpO2 93 %.    Alert and oriented X4   Denies numbness, tingling and SOB   Reports pain left hip managed with oxycodone and hydromorphone (see MAR)   Patient reported poorly controled pain at the beginning of the shift. I messaged the provider and PRN hydromorphone was increased to 0.5. This has brought pain to a more manageable level for the patient. Pt reports pain goal to be 4 or below  Patient triggered sepsis protocol q4 vitals were taken, lactic was ordered and provider notified   Lactic result were taking along time to result so I followed up with the lab and redraw was done. Lactic came back at 0.8  Jiménez draining well with no dependent loops  Jiménez cares completed on 07/1/2023        Grzegorz Perez, RN  7/2/2023

## 2023-07-02 NOTE — PROGRESS NOTES
[facility]    Medicine Progress Note - Hospitalist Service, GOLD TEAM 17    Date of Admission:  6/29/2023    Assessment & Plan   Dilip Kan is a 68 year old male with PMHx of HTN, DM2, bilateral PE on AC and metastatic prostate cancer with bony and liver mets, s/p radiation c/b radiation cysitis and chemotherapy who was sent in from oncology clinic with fever and hypotension to OSH from where he was transferred to [facility] on 6/29/23 for admission with sepsis likely 2/2 urinary source vs pelvic abscess with concern for rectovesical fistula and pelvic osteomyelitis.      #  Severe Sepsis   #  Pelvic abscess   #  Rectovesical fistula ruled out by CT cystogram  #  Pubic bone osteomyelitis ruled out by MRI  ---   Met criteria for severe sepsis w/ fever (temp 101.1  F), hypotension, tachypnea, tachycardia, leukocytosis (wbc 18.1) and elevated lactic acid at 3.3.  ---   UA w/ large blood, large leuk esterase, - nitrite, wbc clumps, few bacteria  ---   CXR clear.   ---   SARS CoV-2, influenza A/B and RSV PCR negative  ---   NGTD from blood and urine cx  ---   CT abdomen pelvis with 4 cm abscess deep within the pelvis, possibly communicating with the bladder and left ventrolateral aspect of the rectum. Abscess also near her left pubic bone at the symphysis with concern for osteomyelitis   ---   IR reviewed pt's CT scan and felt that the 4 cm deep pelvic abscess not amenable to percutaneous drainage  ---   Urology consulted for concern of colovesical fistula. CT cystogram negative for fistula.  No surgical intervention is planned.  Keep hooks in for now  ---   I phone consulted w/ [facility] on 6/30 and spoke with Dr. Campbell.  The 4 cm deep pelvis abscess not amenable to surgical intervention.  Trial of IV abx and further eval w/ pelvic MRI recommended.  Diverting colostomy may be needed if the 4 cm pelvic abscess does not improve w/ IV antibiotics.  ---   MRI pelvis 6/30 negative  for pelvic osteo but revealed pelvic abscess  ---   Hematology rec is to hold chemo for now  ---   ID recommended to keep pt on current empiric vancomycin, ceftriaxone and Flagyl and obtain MRSA nasal swab and VRE rectal/stool screen    ---   Ortho consulted to provide opinion on the chronic pathologic nondisplaced fracture of medial left acetabulum.    ---   Continue IV Vanco, IV Flagyl and IV Ceftriaxone    TRINY   ---   Baseline creatinine 0.5.    ---   Creatinine elevated to 1.04 upon admission.    ---   Possibly prerenal vs ATN vs postobstructive uropathy   ---   Jiménez cath placed in the ED  ---   Treated w/ IVF  ---   Creatinine down to 0.54 on 7/1  ---   Avoid nephrotoxic agents other IV Vanco     Hyponatremia  ---   Mild w/ Na level of 133 at admit  ---   Na level is up to 138 on 7/1 following IVF hydration     Transaminitis  ---   Due to prostate ca w/ met to liver  ---   Monitor      Metastatic prostate ca  ---   Met to bone and liver.   ---   Patient following with oncology closely.    ---   Please see progress note from 6/28/2023 for details.    ---   S/p radiation and chemo.  Completed 4 cycles of docetaxel on 6/13/2023.   ---   Urology and oncology consults following  ---   Continue PTA prednisone 5 mg BID     Chronic cancer related pain   ---   Left hip pain due prostate ca w/ met to left acetabulum and known left acetabular pathologic fracture, seen on imaging studies  ---   Patient reports inability to bear weight 2/2 pain.   ---   Pain not well controlled w/ current meds  ---   Oncologist just transitioned pt to long acting pain medications.   ---   Continue PTA lyrica 150 mg BID  ---   Tylenol 975 mg TID   ---   Continue OxyContin 20 mg Q12H, oxycodone 5 mg Q6H PRN severe pain, and IV dilaudid 0.2 mg Q2H PRN breakthrough   ---   Increase Oxycodone to 10 mg po q4 hr  ---   Add 2 Lidoderm patches  ---   Orthopedics service consulted for the pathologic acetabular fracture      Urinary retention   ---    2/2 prostate ca.   ---   Self caths at home.   ---   Jiménez catheter placed in ED.     Acute PE and DVT   ---   Diagnosed in 5/2023  ---   S/p heparin drip 6/29 - 7/1  ---   Back on PTA Eliquis on 7/1 since currently no plan for surgical intervention     T2DM  ---   Hgba1c was 6.7% on 2/20/23.   ---   Hold PTA metformin during hospitalization  ---   Continue Medium correctional sliding scale TIDAC and nightly      HTN   ---   Reintroduce PTA amlodipine and lasix since hypotension resolved     HLD   ---   Continue PTA lipitor 20 mg at bedtime      Anxiety d/o  Insomnia  ---   Continue PTA trazodone 50 mg at bedtime, ativan 0.5 mg Q6H PRN, melatonin PRN and atarax 10 mg Q6H PRN     ACD  ---   Due prostate ca and recent chemo  ---   Hgb stable at 7.6 g today  ---   Check CBC in am           Diet:  Regular  DVT Prophylaxis:  DOAC, transitioning to heparin drip for possible procedure   Jiménez Catheter:  Present  Lines: None     Cardiac Monitoring: None  Code Status:   FULL CODE   Disposition Plan    TBD            Galdino Snyder MD  Hospitalist Service, GOLD TEAM [address] [place] [address]  Securely message with [employer] (more info)  Text page via [employer] Paging/Directory   See signed in provider for up to date coverage information  ______________________________________________________________________    Interval History   Has chronic left hip pain, not controlled w/ current meds  Unable to bear wt on left leg  Uneventful night      Physical Exam   Vital Signs: Temp: 98.8  F (37.1  C) Temp src: Oral BP: 114/56 Pulse: 79   Resp: 20 SpO2: 96 % O2 Device: None (Room air)    Weight: 209 lbs 12.8 oz  General: aao x 3, NAD.  HEENT:  NC/AT, neck supple  CVS:  NL s 1 and s2, 2/6 systolic murmur, no r/g.  Lungs:  CTA B/L.   Abd:  Soft, + bs, NT, no rebound or gaurding, no fluid shift.  Ext:  No c/c.  Lymph:  No edema.  Neuro:  Nonfocal.  Musculoskeletal: No calf tenderness to palpation.    Skin:  No  rash.  Psychiatry:  Mood and affect appropriate.      Data     I have personally reviewed the following data over the past 24 hrs:    13.0 (H)  \   7.6 (L)   / 357     N/A N/A N/A /  111 (H)   N/A N/A N/A \       Procal: N/A CRP: N/A Lactic Acid: 0.8       INR:  N/A PTT:  47 (H)   D-dimer:  N/A Fibrinogen:  N/A       Imaging results reviewed over the past 24 hrs:   No results found for this or any previous visit (from the past 24 hour(s)).

## 2023-07-02 NOTE — PLAN OF CARE
"  VS: BP (!) 162/76 (BP Location: Left arm)   Pulse 102   Temp 98.9  F (37.2  C) (Oral)   Resp 14   Wt 95.2 kg (209 lb 12.8 oz)   SpO2 96%   BMI 30.10 kg/m      O2: Room air   Output: Continent of B&B; hooks catheter in place, patent, & draining well.   Last BM: 7/1/23   Activity: SBA w/ walker   Skin: CDI   Pain: Patient has significant pain the left pelvis/hip 2/2 abscess or infection   CMS: Intact   Dressing: N/A   Diet: Regular   LDA: PIV RUE x2- saline locked   Equipment: IV pump/pole, walker, Hooks catheter & supplies   Plan: TBD   Additional Info: Patient is A&Ox4       Problem: Plan of Care - These are the overarching goals to be used throughout the patient stay.    Goal: Plan of Care Review  Description: The Plan of Care Review/Shift note should be completed every shift.  The Outcome Evaluation is a brief statement about your assessment that the patient is improving, declining, or no change.  This information will be displayed automatically on your shift note.  Outcome: Progressing  Flowsheets (Taken 7/2/2023 1826)  Plan of Care Reviewed With: patient  Overall Patient Progress: improving  Goal: Patient-Specific Goal (Individualized)  Description: You can add care plan individualizations to a care plan. Examples of Individualization might be:  \"Parent requests to be called daily at 9am for status\", \"I have a hard time hearing out of my right ear\", or \"Do not touch me to wake me up as it startles me\".  Outcome: Progressing  Goal: Absence of Hospital-Acquired Illness or Injury  Outcome: Progressing  Goal: Optimal Comfort and Wellbeing  Outcome: Progressing  Goal: Readiness for Transition of Care  Outcome: Progressing     Problem: Pain Acute  Goal: Optimal Pain Control and Function  Outcome: Progressing     Problem: Pain Chronic (Persistent)  Goal: Optimal Pain Control and Function  Outcome: Progressing     Problem: Urinary Elimination Management  Goal: Effective Urinary Elimination/Continence  Outcome: " Progressing     Problem: Urinary Retention  Goal: Effective Urinary Elimination  Outcome: Progressing     Problem: Infection  Goal: Absence of Infection Signs and Symptoms  Outcome: Progressing   Goal Outcome Evaluation:      Plan of Care Reviewed With: patient    Overall Patient Progress: improvingOverall Patient Progress: improving

## 2023-07-02 NOTE — PROGRESS NOTES
GENERAL ID SERVICE PROGRESS NOTE - [facility]     Patient:  Dilip Kan   Date of birth 1955, Medical record number [id number]  Date of Visit:  07/02/2023  Date of Admission: 6/29/2023  Consult Requester: Marleny Bautista          Assessment and Recommendations:   ASSESSMENT:  1. Sepsis  2. Pelvic abscess  - Abscess communicates with bladder and prostate, but not the rectum based on cystogram  3. Abnormal MR signal on pelvis bones (left pubic bone, acetabulum, ischium) - unclear if related to fractures versus active osteomyelitis?  4. Metastatic prostate cancer (liver, bone: acetabulum), recently receiving chemotherapy last dose 6/13/23  5. Lumbar spine fusion (L4-L5)  6. Recent neutropenic fever, thought secondary to Klebsiella UTI - not currently neutropenic  7. MRSA screen negative     RECOMMENDATION:  -- Stop current antibiotics and start pip/tazo 4.5g Q6H (using higher dose given prior neutropenia and antibiotic exposure)   - Duration TBD, but at this point would consider a 6 week empiric IV course for treatment of pelvic abscess with possible osteomyelitis  -- Follow up pending BCx  -- Trend CRP  -- Pending VRE rectal/stool screen  -- Appreciate involvement of surgical teams      DISCUSSION:   67yo M with history of metastatic prostate cancer receiving chemotherapy earlier this month now presenting with fever and sepsis. Initial workup is concerning for a pelvic abscess, possibly associated with a vesicorectal fistula and also possibly with pubic bone osteomyelitis. Fortunately the patient is not neutropenic. IR was consulted for abscess drainage but did not feel they had an approach. At this point in time, it is unclear whether we would get any cultures. Ortho has been requested to consider bone biopsy. In anticipation of lack of culture data, and given the wide variety of potential pathogens involved, we can at least get MRSA and VRE swabs. Gut bugs are most likely, although given immunocompromised  "status other organisms are possible. Primary gap in current regimen would be Pseudomonas (more likely if neutropenic) and VRE.      Update 7/2: Patient clinically stable but still no direct sampling of abscess. Will switch regimen to pip/tazo to simplify coverage. VRE swab pending, but even if present, would not necessarily need to cover unless patient starts going in the wrong direction. Long-term plan TBD.        ID will continue to follow.   Patient was discussed with hospital team.    Ibrahima Cannon MD  Infectious Diseases  [phone]  ________________________________________________________________    Interval Hx: Afebrile. WBC slightly downtrending. Patient reports feeling much better from a systemic standpoint, though still has significant groin pain. Ortho consulted and not planning to obtain bone biopsy.          History of Present Illness:   History obtained from chart review, and partially from the patient.    \"Dilip Kan is a 68 year old male with PMHx of HTN, DM2, bilateral PE on AC, and metastatic prostate cancer with bony and liver mets, s/p radiation c/b radiation cysitis and chemotherapy who sent in from oncology clinic with fever and hypotension to OSH, now transferred to [facility] and admitted on 6/29/2023 with sepsis likely 2/2 urinary source vs abscess with concern for rectovestical fistula.      Patient reports progressive left upper thigh, groin, hip and buttock pain since his cancer diagnosis months ago.  States the pain progressively worsened, to the point where he is unable to ambulate or bear weight due to pain.  Due to severity, he saw his oncologist yesterday.  During oncology visit he was found to have a fever and was hypotensive.  Patient reported feeling dizzy at that time which has since resolved.  He was sent into the emergency department for concern of sepsis.  Patient's oncologist adjusted his pain medications, with plan to start OxyContin and oxycodone as needed.       Patient " "states outside of his pain he has not felt unwell.  Denies any fevers, chills, chest pain, cough, nausea, vomiting, diarrhea, abdominal pain. He does reports some mild dyspnea, but can not provide further details.  States he straight caths for urinary retention.  Endorses chronic intermittent hematuria which is unchanged from baseline.  Endorses chronic back pain which is unchanged from baseline.\"    Patient provided with components of the same history, but had trouble focusing due to pain/pain meds. Denied chest pain, dyspnea, cough, abdominal pain, diarrhea to me. No neuro changes. States he is having significant pain in L groin region despite pain medication.         Review of Systems:   5pt ROS performed, see interval hx for pertinent findings.          Current Medications:       acetaminophen  975 mg Oral Q8H     apixaban ANTICOAGULANT  5 mg Oral BID     atorvastatin  20 mg Oral Daily     cefTRIAXone  2 g Intravenous Q24H     insulin aspart  1-7 Units Subcutaneous TID AC     insulin aspart  1-5 Units Subcutaneous At Bedtime     lidocaine  1-2 patch Transdermal Q24H     lidocaine   Transdermal Q8H MELVIN     lisinopril  40 mg Oral Daily     megestrol  20 mg Oral Daily     metroNIDAZOLE  500 mg Intravenous Q8H     oxyCODONE  20 mg Oral Q12H     pantoprazole  40 mg Oral BID     polyethylene glycol  17 g Oral Daily     predniSONE  5 mg Oral BID     pregabalin  150 mg Oral BID     sodium chloride (PF)  3 mL Intracatheter Q8H     traZODone  50 mg Oral At Bedtime     vancomycin  1,750 mg Intravenous Q12H            Allergies:   No Known Allergies         Physical Exam:   Vitals were reviewed  Patient Vitals for the past 24 hrs:   BP Temp Temp src Pulse Resp SpO2 Weight   07/02/23 1145 (!) 162/76 98.9  F (37.2  C) Oral 102 14 96 % --   07/02/23 0740 114/56 98.8  F (37.1  C) Oral 79 20 96 % --   07/02/23 0646 108/57 99.1  F (37.3  C) Oral 79 18 93 % 95.2 kg (209 lb 12.8 oz)   07/02/23 0215 109/57 98.9  F (37.2  C) Oral 80 " -- 93 % --   07/01/23 2135 126/58 98.6  F (37  C) Oral 87 16 96 % --   07/01/23 2103 113/54 98.5  F (36.9  C) Oral 84 18 90 % --   07/01/23 2021 120/59 99.6  F (37.6  C) Oral 91 19 -- --   07/01/23 1605 110/60 99.9  F (37.7  C) Oral 88 18 92 % --       Physical Examination:  GENERAL: Well-developed, well-nourished, elderly appearing male. More comfortable appearing.  HEENT:  Head is normocephalic, atraumatic.   EYES:  Eyes have anicteric sclerae without conjunctival injection or stigmata of endocarditis.    ABDOMEN:  Normal bowel sounds, soft, nontender. Distended.   SKIN:  No acute rashes. No stigmata of endocarditis.  MSK: Patient endorsed significant hip/groin pain, but I couldn't reproduce the pain with palpation of the region.   NEUROLOGIC:  Grossly nonfocal. Active x4 extremities.         Laboratory Data:     Inflammatory Markers  No results found for: CRPI    Hematology Studies    Recent Labs   Lab Test 07/02/23  0734 07/01/23  0805 06/30/23  0418 06/29/23  2237 06/29/23  0939 06/13/23  1134 04/26/23  1301 04/22/23  0647   WBC 13.0* 13.5* 15.5* 14.7* 18.1* 12.5*   < > 2.9*   ANEU  --   --   --   --   --   --   --  2.2   AEOS  --   --   --   --   --   --   --  0.0   HGB 7.6* 8.0* 8.6* 8.7* 10.0* 9.8*   < > 8.2*   MCV 84 83 83 85 83 87   < > 86    377 375  --  501* 405   < > 111*    < > = values in this interval not displayed.       Metabolic Studies     Recent Labs   Lab Test 07/01/23  0805 06/30/23  0418 06/29/23  0939 06/13/23  1134 05/23/23  1205    135* 133* 136 136   POTASSIUM 4.1 4.2 4.4 4.4 4.5   CHLORIDE 104 102 96* 104 104   CO2 25 23 22 21* 21*   BUN 10.8 11.0 16.4 13.8 15.1   CR 0.54*  0.54* 0.62* 1.04 0.67 0.54*   GFRESTIMATED >90  >90 >90 77 >90 >90       Hepatic Studies    Recent Labs   Lab Test 06/30/23  0418 06/29/23  0939 06/13/23  1134 05/23/23  1205 05/01/23  1051 04/26/23  1301   BILITOTAL 0.3 0.5 0.2 0.4 0.5 0.4   ALKPHOS 132* 152* 67 66 68 64   ALBUMIN 2.4* 3.3* 3.3* 3.4*  3.6 3.0*   AST 36 92* 13 25 11 12   ALT 78* 124* 20 10 12 12       Microbiology:  Culture   Date Value Ref Range Status   07/01/2023 Culture in progress  Preliminary   06/29/2023 No Growth  Preliminary   06/29/2023 Mixed Urogenital Shonda  Final   06/29/2023 No Growth  Preliminary   04/27/2023 No Growth  Final   04/19/2023 Klebsiella pneumoniae (AA)  Final   04/19/2023 >100,000 CFU/mL Klebsiella pneumoniae (A)  Final   04/19/2023 No Growth  Final   11/17/2021 No Growth  Final   11/17/2021 No Growth  Final       Urine Studies    Recent Labs   Lab Test 06/29/23  1410 04/19/23  0443 03/29/22  1414 11/18/21  0526 03/08/21  0908 03/02/20  1321   LEUKEST Large* Moderate* Negative Negative Negative Negative   WBCU 33* >182*  --  1 1 4       Vancomycin Levels    Recent Labs   Lab Test 07/01/23  0805 04/21/23  0535 04/20/23  0550   VANCOMYCIN 14.6 22.9 16.1       IMAGING  CT a/p 6/29  4 cm abscess deep within the pelvis appears to be in communication  with the urinary bladder and the left ventrolateral aspect of the  rectum.     Above-mentioned abscess abuts the left pubic bone at the symphysis  with likely osteomyelitis. Small gas bubbles are seen within the  abscess cavity as well as surrounding and within the left pubic bone. Small volume of fluid now seen within the presacral soft tissues, likely reactive.      MRI pelvis 6/30/23  IMPRESSION: Lower lumbar spine surgical hardware. Apparent  nondisplaced fracture medial left acetabulum. Abnormalities in area of  previous air density and irregularity at left superior pubic ramus  very slightly irregular 4 marrow signal on this MRIi, this could be  old posttraumatic or old postinfectious. Marrow edema throughout the  medial left pelvic brain likely related to prior trauma with minimal  edema in the left femoral head which could represent bone contusion.  No visible fracture line in this area no other obvious avascular  necrosis in this area at this time. Extensive muscle  edema likely  related to previous trauma in the left internal pelvic musculature.  Small amount of pelvic free fluid. Jiménez catheter.

## 2023-07-03 ENCOUNTER — HOME INFUSION (PRE-WILLOW HOME INFUSION) (OUTPATIENT)
Dept: PHARMACY | Facility: CLINIC | Age: 68
End: 2023-07-03
Payer: COMMERCIAL

## 2023-07-03 LAB
CREAT SERPL-MCNC: 0.5 MG/DL (ref 0.67–1.17)
GFR SERPL CREATININE-BSD FRML MDRD: >90 ML/MIN/1.73M2
GLUCOSE BLDC GLUCOMTR-MCNC: 119 MG/DL (ref 70–99)
GLUCOSE BLDC GLUCOMTR-MCNC: 148 MG/DL (ref 70–99)
GLUCOSE BLDC GLUCOMTR-MCNC: 153 MG/DL (ref 70–99)
GLUCOSE BLDC GLUCOMTR-MCNC: 182 MG/DL (ref 70–99)
HGB BLD-MCNC: 7.9 G/DL (ref 13.3–17.7)

## 2023-07-03 PROCEDURE — 250N000013 HC RX MED GY IP 250 OP 250 PS 637: Performed by: INTERNAL MEDICINE

## 2023-07-03 PROCEDURE — 250N000013 HC RX MED GY IP 250 OP 250 PS 637: Performed by: PHYSICIAN ASSISTANT

## 2023-07-03 PROCEDURE — 250N000011 HC RX IP 250 OP 636: Mod: JZ | Performed by: STUDENT IN AN ORGANIZED HEALTH CARE EDUCATION/TRAINING PROGRAM

## 2023-07-03 PROCEDURE — 99233 SBSQ HOSP IP/OBS HIGH 50: CPT | Performed by: INTERNAL MEDICINE

## 2023-07-03 PROCEDURE — 250N000011 HC RX IP 250 OP 636: Performed by: INTERNAL MEDICINE

## 2023-07-03 PROCEDURE — 82565 ASSAY OF CREATININE: CPT | Performed by: STUDENT IN AN ORGANIZED HEALTH CARE EDUCATION/TRAINING PROGRAM

## 2023-07-03 PROCEDURE — 250N000012 HC RX MED GY IP 250 OP 636 PS 637: Performed by: PHYSICIAN ASSISTANT

## 2023-07-03 PROCEDURE — 120N000002 HC R&B MED SURG/OB UMMC

## 2023-07-03 PROCEDURE — 85018 HEMOGLOBIN: CPT | Performed by: INTERNAL MEDICINE

## 2023-07-03 PROCEDURE — 36415 COLL VENOUS BLD VENIPUNCTURE: CPT | Performed by: STUDENT IN AN ORGANIZED HEALTH CARE EDUCATION/TRAINING PROGRAM

## 2023-07-03 RX ORDER — LINEZOLID 600 MG/1
600 TABLET, FILM COATED ORAL EVERY 12 HOURS SCHEDULED
Status: DISCONTINUED | OUTPATIENT
Start: 2023-07-03 | End: 2023-07-06 | Stop reason: HOSPADM

## 2023-07-03 RX ADMIN — PIPERACILLIN AND TAZOBACTAM 4.5 G: 4; .5 INJECTION, POWDER, FOR SOLUTION INTRAVENOUS at 14:49

## 2023-07-03 RX ADMIN — HYDROMORPHONE HYDROCHLORIDE 0.5 MG: 1 INJECTION, SOLUTION INTRAMUSCULAR; INTRAVENOUS; SUBCUTANEOUS at 22:15

## 2023-07-03 RX ADMIN — HYDROMORPHONE HYDROCHLORIDE 0.5 MG: 1 INJECTION, SOLUTION INTRAMUSCULAR; INTRAVENOUS; SUBCUTANEOUS at 08:53

## 2023-07-03 RX ADMIN — PANTOPRAZOLE SODIUM 40 MG: 40 TABLET, DELAYED RELEASE ORAL at 20:04

## 2023-07-03 RX ADMIN — ACETAMINOPHEN 975 MG: 325 TABLET, FILM COATED ORAL at 13:48

## 2023-07-03 RX ADMIN — ACETAMINOPHEN 975 MG: 325 TABLET, FILM COATED ORAL at 06:44

## 2023-07-03 RX ADMIN — OXYCODONE HYDROCHLORIDE 20 MG: 10 TABLET, FILM COATED, EXTENDED RELEASE ORAL at 22:14

## 2023-07-03 RX ADMIN — LINEZOLID 600 MG: 600 TABLET, FILM COATED ORAL at 20:03

## 2023-07-03 RX ADMIN — HYDROMORPHONE HYDROCHLORIDE 0.5 MG: 1 INJECTION, SOLUTION INTRAMUSCULAR; INTRAVENOUS; SUBCUTANEOUS at 06:44

## 2023-07-03 RX ADMIN — HYDROMORPHONE HYDROCHLORIDE 0.5 MG: 1 INJECTION, SOLUTION INTRAMUSCULAR; INTRAVENOUS; SUBCUTANEOUS at 12:15

## 2023-07-03 RX ADMIN — OXYCODONE HYDROCHLORIDE 10 MG: 10 TABLET ORAL at 18:18

## 2023-07-03 RX ADMIN — ATORVASTATIN CALCIUM 20 MG: 20 TABLET, FILM COATED ORAL at 08:31

## 2023-07-03 RX ADMIN — HYDROXYZINE HYDROCHLORIDE 10 MG: 10 TABLET ORAL at 20:03

## 2023-07-03 RX ADMIN — OXYCODONE HYDROCHLORIDE 20 MG: 10 TABLET, FILM COATED, EXTENDED RELEASE ORAL at 10:53

## 2023-07-03 RX ADMIN — APIXABAN 5 MG: 5 TABLET, FILM COATED ORAL at 20:03

## 2023-07-03 RX ADMIN — LIDOCAINE PATCH 4% 2 PATCH: 40 PATCH TOPICAL at 08:31

## 2023-07-03 RX ADMIN — PIPERACILLIN AND TAZOBACTAM 4.5 G: 4; .5 INJECTION, POWDER, FOR SOLUTION INTRAVENOUS at 21:08

## 2023-07-03 RX ADMIN — HYDROMORPHONE HYDROCHLORIDE 0.5 MG: 1 INJECTION, SOLUTION INTRAMUSCULAR; INTRAVENOUS; SUBCUTANEOUS at 17:06

## 2023-07-03 RX ADMIN — LISINOPRIL 40 MG: 20 TABLET ORAL at 08:31

## 2023-07-03 RX ADMIN — HYDROMORPHONE HYDROCHLORIDE 0.5 MG: 1 INJECTION, SOLUTION INTRAMUSCULAR; INTRAVENOUS; SUBCUTANEOUS at 14:48

## 2023-07-03 RX ADMIN — PREGABALIN 150 MG: 75 CAPSULE ORAL at 20:04

## 2023-07-03 RX ADMIN — APIXABAN 5 MG: 5 TABLET, FILM COATED ORAL at 08:31

## 2023-07-03 RX ADMIN — PIPERACILLIN AND TAZOBACTAM 4.5 G: 4; .5 INJECTION, POWDER, FOR SOLUTION INTRAVENOUS at 03:20

## 2023-07-03 RX ADMIN — OXYCODONE HYDROCHLORIDE 10 MG: 10 TABLET ORAL at 05:18

## 2023-07-03 RX ADMIN — PREDNISONE 5 MG: 5 TABLET ORAL at 20:03

## 2023-07-03 RX ADMIN — PREGABALIN 150 MG: 75 CAPSULE ORAL at 08:31

## 2023-07-03 RX ADMIN — HYDROXYZINE HYDROCHLORIDE 10 MG: 10 TABLET ORAL at 13:48

## 2023-07-03 RX ADMIN — MEGESTROL ACETATE 20 MG: 20 TABLET ORAL at 08:31

## 2023-07-03 RX ADMIN — TRAZODONE HYDROCHLORIDE 50 MG: 50 TABLET ORAL at 22:15

## 2023-07-03 RX ADMIN — PANTOPRAZOLE SODIUM 40 MG: 40 TABLET, DELAYED RELEASE ORAL at 08:31

## 2023-07-03 RX ADMIN — ACETAMINOPHEN 975 MG: 325 TABLET, FILM COATED ORAL at 22:15

## 2023-07-03 RX ADMIN — HYDROMORPHONE HYDROCHLORIDE 0.5 MG: 1 INJECTION, SOLUTION INTRAMUSCULAR; INTRAVENOUS; SUBCUTANEOUS at 20:04

## 2023-07-03 RX ADMIN — OXYCODONE HYDROCHLORIDE 10 MG: 10 TABLET ORAL at 09:42

## 2023-07-03 RX ADMIN — LINEZOLID 600 MG: 600 TABLET, FILM COATED ORAL at 12:17

## 2023-07-03 RX ADMIN — HYDROMORPHONE HYDROCHLORIDE 0.5 MG: 1 INJECTION, SOLUTION INTRAMUSCULAR; INTRAVENOUS; SUBCUTANEOUS at 03:20

## 2023-07-03 RX ADMIN — SENNOSIDES AND DOCUSATE SODIUM 2 TABLET: 50; 8.6 TABLET ORAL at 18:23

## 2023-07-03 RX ADMIN — POLYETHYLENE GLYCOL 3350 17 G: 17 POWDER, FOR SOLUTION ORAL at 08:32

## 2023-07-03 RX ADMIN — OXYCODONE HYDROCHLORIDE 10 MG: 10 TABLET ORAL at 13:48

## 2023-07-03 RX ADMIN — HYDROMORPHONE HYDROCHLORIDE 0.5 MG: 1 INJECTION, SOLUTION INTRAMUSCULAR; INTRAVENOUS; SUBCUTANEOUS at 00:32

## 2023-07-03 RX ADMIN — PREDNISONE 5 MG: 5 TABLET ORAL at 08:31

## 2023-07-03 RX ADMIN — PIPERACILLIN AND TAZOBACTAM 4.5 G: 4; .5 INJECTION, POWDER, FOR SOLUTION INTRAVENOUS at 08:53

## 2023-07-03 ASSESSMENT — ACTIVITIES OF DAILY LIVING (ADL)
ADLS_ACUITY_SCORE: 31
ADLS_ACUITY_SCORE: 31
ADLS_ACUITY_SCORE: 25
ADLS_ACUITY_SCORE: 31

## 2023-07-03 NOTE — PROGRESS NOTES
GENERAL ID SERVICE PROGRESS NOTE - [facility]     Patient:  Dilip Kan   Date of birth 1955, Medical record number [id number]  Date of Visit:  07/03/2023  Date of Admission: 6/29/2023  Consult Requester: Marleny Bautista          Assessment and Recommendations:   ASSESSMENT:  1. Sepsis  2. Pelvic abscess  - Abscess communicates with bladder and prostate, but not the rectum based on cystogram  3. Abnormal MR signal on pelvis bones (left pubic bone, acetabulum, ischium) - unclear if related to fractures versus active osteomyelitis?  4. Metastatic prostate cancer (liver, bone: acetabulum), recently receiving chemotherapy last dose 6/13/23  5. Lumbar spine fusion (L4-L5)  6. Recent neutropenic fever, thought secondary to Klebsiella UTI - not currently neutropenic  7. MRSA screen negative  8. VRE Screen positive     RECOMMENDATION:  -- Start Linezolid 600 mg q12h (ordered for you) for VRE. Note: drug interactions with epi and Trazodone. I think the epi is just if something emergent happened. The Trazodone is just at night and usually ok to have linezolid + 1 antidepressant. However consider if sleep medication should be changed.  Cont pip/tazo 4.5g Q6H (using higher dose given prior neutropenia and antibiotic exposure)   - Duration TBD, but at this point would consider a 6 week empiric IV course for treatment of pelvic abscess with possible osteomyelitis  -- Follow up pending BCx  -- Trend CRP  -- Pending VRE rectal/stool screen  -- Appreciate involvement of surgical teams      DISCUSSION:   67yo M with history of metastatic prostate cancer receiving chemotherapy earlier this month now presenting with fever and sepsis. Initial workup is concerning for a pelvic abscess, possibly associated with a vesicorectal fistula and also possibly with pubic bone osteomyelitis. Fortunately the patient is not neutropenic. IR was consulted for abscess drainage but did not feel they had an approach. At this point in time, it is  "unclear whether we would get any cultures. Ortho has been requested to consider bone biopsy. In anticipation of lack of culture data, and given the wide variety of potential pathogens involved, we can at least get MRSA and VRE swabs. Gut bugs are most likely, although given immunocompromised status other organisms are possible. Primary gap in current regimen would be Pseudomonas (more likely if neutropenic) and VRE.      Update 7/3: Patient clinically stable but still no direct sampling of abscess. Yesterday antibiotics were switched to pip/tazo to simplify coverage. VRE swab positive. Could be colonizer however given inability to sample abscess I will cover. Note linezolid has interactions with epinephrine and trazodone. The epinephrine is likely only emergent. The trazodone is likely ok as we can often have 1 antidepressant and linezolid. However, primary team can consider if sleep medications should be changed.       ID will continue to follow.     Josselin Kay MD  Infectious Diseases  [phone]  ________________________________________________________________    Interval Hx: Afebrile. WBC slightly downtrending. Patient reports feeling much better from a systemic standpoint, though still has significant groin pain. Ortho consulted and not planning to obtain bone biopsy. IR also cannot sample.          History of Present Illness:   History obtained from chart review, and partially from the patient.    \"Dilip Kan is a 68 year old male with PMHx of HTN, DM2, bilateral PE on AC, and metastatic prostate cancer with bony and liver mets, s/p radiation c/b radiation cysitis and chemotherapy who sent in from oncology clinic with fever and hypotension to OSH, now transferred to [facility] and admitted on 6/29/2023 with sepsis likely 2/2 urinary source vs abscess with concern for rectovestical fistula.      Patient reports progressive left upper thigh, groin, hip and buttock pain since his cancer diagnosis months " "ago.  States the pain progressively worsened, to the point where he is unable to ambulate or bear weight due to pain.  Due to severity, he saw his oncologist yesterday.  During oncology visit he was found to have a fever and was hypotensive.  Patient reported feeling dizzy at that time which has since resolved.  He was sent into the emergency department for concern of sepsis.  Patient's oncologist adjusted his pain medications, with plan to start OxyContin and oxycodone as needed.       Patient states outside of his pain he has not felt unwell.  Denies any fevers, chills, chest pain, cough, nausea, vomiting, diarrhea, abdominal pain. He does reports some mild dyspnea, but can not provide further details.  States he straight caths for urinary retention.  Endorses chronic intermittent hematuria which is unchanged from baseline.  Endorses chronic back pain which is unchanged from baseline.\"    Patient provided with components of the same history, but had trouble focusing due to pain/pain meds. Denied chest pain, dyspnea, cough, abdominal pain, diarrhea to me. No neuro changes. States he is having significant pain in L groin region despite pain medication.         Review of Systems:   5pt ROS performed, see interval hx for pertinent findings.          Current Medications:       acetaminophen  975 mg Oral Q8H     apixaban ANTICOAGULANT  5 mg Oral BID     atorvastatin  20 mg Oral Daily     insulin aspart  1-7 Units Subcutaneous TID AC     insulin aspart  1-5 Units Subcutaneous At Bedtime     lidocaine  1-2 patch Transdermal Q24H     lidocaine   Transdermal Q8H [facility]     linezolid  600 mg Oral Q12H [facility] (08/20)     lisinopril  40 mg Oral Daily     megestrol  20 mg Oral Daily     oxyCODONE  20 mg Oral Q12H     pantoprazole  40 mg Oral BID     piperacillin-tazobactam  4.5 g Intravenous Q6H     polyethylene glycol  17 g Oral Daily     predniSONE  5 mg Oral BID     pregabalin  150 mg Oral BID     sodium chloride (PF)  3 mL " Intracatheter Q8H     traZODone  50 mg Oral At Bedtime            Allergies:   No Known Allergies         Physical Exam:   Vitals were reviewed  Patient Vitals for the past 24 hrs:   BP Temp Temp src Pulse Resp SpO2 Weight   07/03/23 0802 104/50 97.6  F (36.4  C) Oral 73 17 94 % --   07/03/23 0645 120/68 97.9  F (36.6  C) Oral 74 17 94 % 98.4 kg (216 lb 14.9 oz)   07/03/23 0330 110/57 97.7  F (36.5  C) Oral 73 16 95 % --   07/02/23 2226 (!) 153/74 98.6  F (37  C) Oral 87 16 96 % --   07/02/23 1145 (!) 162/76 98.9  F (37.2  C) Oral 102 14 96 % --       Physical Examination:  GENERAL: Well-developed, well-nourished, elderly appearing male. More comfortable appearing.  HEENT:  Head is normocephalic, atraumatic.   EYES:  Eyes have anicteric sclerae without conjunctival injection or stigmata of endocarditis.    ABDOMEN:  Normal bowel sounds, soft, nontender. Distended.   SKIN:  No acute rashes. No stigmata of endocarditis.  MSK: Patient endorsed significant hip/groin pain, but I couldn't reproduce the pain with palpation of the region.   NEUROLOGIC:  Grossly nonfocal. Active x4 extremities.         Laboratory Data:     Inflammatory Markers  CRP Inflammation   Date Value Ref Range Status   07/01/2023 204.37 (H) <5.00 mg/L Final       Hematology Studies    Recent Labs   Lab Test 07/03/23  0621 07/02/23  0734 07/01/23  0805 06/30/23  0418 06/29/23  2237 06/29/23  0939 06/13/23  1134 04/26/23  1301 04/22/23  0647   WBC  --  13.0* 13.5* 15.5* 14.7* 18.1* 12.5*   < > 2.9*   ANEU  --   --   --   --   --   --   --   --  2.2   AEOS  --   --   --   --   --   --   --   --  0.0   HGB 7.9* 7.6* 8.0* 8.6* 8.7* 10.0* 9.8*   < > 8.2*   MCV  --  84 83 83 85 83 87   < > 86   PLT  --  357 377 375  --  501* 405   < > 111*    < > = values in this interval not displayed.       Metabolic Studies     Recent Labs   Lab Test 07/03/23  0621 07/01/23  0805 06/30/23  0418 06/29/23  0939 06/13/23  1134 05/23/23  1205   NA  --  138 135* 133* 136 136    POTASSIUM  --  4.1 4.2 4.4 4.4 4.5   CHLORIDE  --  104 102 96* 104 104   CO2  --  25 23 22 21* 21*   BUN  --  10.8 11.0 16.4 13.8 15.1   CR 0.50* 0.54*  0.54* 0.62* 1.04 0.67 0.54*   GFRESTIMATED >90 >90  >90 >90 77 >90 >90       Hepatic Studies    Recent Labs   Lab Test 06/30/23  0418 06/29/23  0939 06/13/23  1134 05/23/23  1205 05/01/23  1051 04/26/23  1301   BILITOTAL 0.3 0.5 0.2 0.4 0.5 0.4   ALKPHOS 132* 152* 67 66 68 64   ALBUMIN 2.4* 3.3* 3.3* 3.4* 3.6 3.0*   AST 36 92* 13 25 11 12   ALT 78* 124* 20 10 12 12       Microbiology:  Culture   Date Value Ref Range Status   07/01/2023 Enterococcus faecium VRE (A)  Preliminary     Comment:     Preliminary result, confirmation pending.   06/29/2023 No Growth  Preliminary   06/29/2023 Mixed Urogenital Shonda  Final   06/29/2023 No Growth  Preliminary   04/27/2023 No Growth  Final   04/19/2023 Klebsiella pneumoniae (AA)  Final   04/19/2023 >100,000 CFU/mL Klebsiella pneumoniae (A)  Final   04/19/2023 No Growth  Final   11/17/2021 No Growth  Final   11/17/2021 No Growth  Final       Urine Studies    Recent Labs   Lab Test 06/29/23  1410 04/19/23  0443 03/29/22  1414 11/18/21  0526 03/08/21  0908 03/02/20  1321   LEUKEST Large* Moderate* Negative Negative Negative Negative   WBCU 33* >182*  --  1 1 4       Vancomycin Levels    Recent Labs   Lab Test 07/01/23  0805 04/21/23  0535 04/20/23  0550   VANCOMYCIN 14.6 22.9 16.1       IMAGING  CT a/p 6/29  4 cm abscess deep within the pelvis appears to be in communication  with the urinary bladder and the left ventrolateral aspect of the  rectum.     Above-mentioned abscess abuts the left pubic bone at the symphysis  with likely osteomyelitis. Small gas bubbles are seen within the  abscess cavity as well as surrounding and within the left pubic bone. Small volume of fluid now seen within the presacral soft tissues, likely reactive.      MRI pelvis 6/30/23  IMPRESSION: Lower lumbar spine surgical hardware.  Apparent  nondisplaced fracture medial left acetabulum. Abnormalities in area of  previous air density and irregularity at left superior pubic ramus  very slightly irregular 4 marrow signal on this MRIi, this could be  old posttraumatic or old postinfectious. Marrow edema throughout the  medial left pelvic brain likely related to prior trauma with minimal  edema in the left femoral head which could represent bone contusion.  No visible fracture line in this area no other obvious avascular  necrosis in this area at this time. Extensive muscle edema likely  related to previous trauma in the left internal pelvic musculature.  Small amount of pelvic free fluid. Jiménez catheter.

## 2023-07-03 NOTE — PROGRESS NOTES
[facility]    Medicine Progress Note - Hospitalist Service, GOLD TEAM 17    Date of Admission:  6/29/2023    Assessment & Plan   Dilip Kan is a 68 year old male with PMHx of HTN, DM2, bilateral PE on AC and metastatic prostate cancer with bony and liver mets, s/p radiation c/b radiation cysitis and chemotherapy who was sent in from oncology clinic with fever and hypotension to OSH from where he was transferred to [facility] on 6/29/23 for admission with sepsis likely 2/2 urinary source vs pelvic abscess with concern for rectovesical fistula and pelvic osteomyelitis.      #  Severe Sepsis   #  Pelvic abscess   #  Rectovesical fistula ruled out by CT cystogram  #  Pubic bone osteomyelitis ruled out by MRI  ---   Met criteria for severe sepsis w/ fever (temp 101.1  F), hypotension, tachypnea, tachycardia, leukocytosis (wbc 18.1) and elevated lactic acid at 3.3.  ---   UA w/ large blood, large leuk esterase, - nitrite, wbc clumps, few bacteria  ---   CXR clear.   ---   SARS CoV-2, influenza A/B and RSV PCR negative  ---   NGTD from blood and urine cx  ---   CT abdomen pelvis with 4 cm abscess deep within the pelvis, possibly communicating with the bladder and left ventrolateral aspect of the rectum. Abscess also near her left pubic bone at the symphysis with concern for osteomyelitis   ---   IR reviewed pt's CT scan and felt that the 4 cm deep pelvic abscess not amenable to percutaneous drainage  ---   Urology consulted for concern of colovesical fistula. CT cystogram negative for fistula.  No surgical intervention is planned.  Keep hooks in for now  ---   I phone consulted w/ CRS on 6/30 and spoke with Dr. Campbell.  The 4 cm deep pelvis abscess not amenable to surgical intervention.  Trial of IV abx and further eval w/ pelvic MRI recommended.  Diverting colostomy may be needed if the 4 cm pelvic abscess does not improve w/ IV antibiotics.  ---   MRI pelvis 6/30 negative  for pelvic osteo but revealed pelvic abscess  ---   Hematology rec is to hold chemo for now  ---   ID recommended to keep pt on current empiric vancomycin, ceftriaxone and Flagyl and obtain MRSA nasal swab and VRE rectal/stool screen +    ---   Ortho consulted to provide opinion on the chronic pathologic nondisplaced fracture of medial left acetabulum.  No surgical intervention.  Outpatient f/u recommended.  WBAT  ---   S/p IV Vanco, IV Flagyl and IV Ceftriaxone 6/29 - 7/2  ---   On IV Zosyn 4.5 g q6 hr since 7/2  ---   Linezolid 600 mg IV q12h added today (ordered for you) for + VRE in pt's stool.   ---   Drug interactions with epi and Trazodone.   ---   Epi is a prn med  ---   Trazodone is just at night and usually ok to have linezolid + 1 antidepressant.  ---   Duration of treatment to be determine per ID but at this point would consider a 6 week empiric IV course for treatment of pelvic abscess with possible osteomyelitis     TRINY   ---   Baseline creatinine 0.5.    ---   Creatinine elevated to 1.04 upon admission.    ---   Possibly prerenal vs ATN vs postobstructive uropathy   ---   Jiménez cath placed in the ED  ---   Treated w/ IVF  ---   Creatinine down to 0.50 today  ---   Avoid nephrotoxic agents      Hyponatremia  ---   Mild w/ Na level of 133 at admit  ---   Na level is up to 138 on 7/1 following IVF hydration     Transaminitis  ---   Due to prostate ca w/ met to liver  ---   Monitor      Metastatic prostate ca  ---   Met to bone and liver.   ---   Patient following with oncology closely.    ---   Please see progress note from 6/28/2023 for details.    ---   S/p radiation and chemo.  Completed 4 cycles of docetaxel on 6/13/2023.   ---   Urology and oncology consults following  ---   Continue PTA prednisone 5 mg BID     Chronic cancer related pain   ---   Left hip pain due prostate ca w/ met to left acetabulum and known left acetabular pathologic fracture, seen on imaging studies  ---   Patient reports  inability to bear weight 2/2 pain.   ---   Oncologist just transitioned pt to long acting pain medications.   ---   Continue PTA lyrica 150 mg BID  ---   Tylenol 975 mg TID   ---   Continue OxyContin 20 mg Q12H  ---   Increased PTA Oxycodone to 10 mg po q4 hr on 7/2  ---   Added Lidoderm 2 patches on 7/2  ---   Also started pt on IV dilaudid  ---   Pt is under better control today  ---   See by Orthopedics consult service on 7/2 and rec is non-operative management.  WBAT.  Pt will f/u w/ [facility] oncology ortho staff, Dr. Osbaldo Acharya in 1-3 weeks after discharge  ---   PT consulted     Urinary retention   ---   2/2 prostate ca.   ---   Self caths at home.   ---   Jiménez catheter placed in ED.     Acute PE and DVT   ---   Diagnosed in 5/2023  ---   S/p heparin drip 6/29 - 7/1  ---   Back on PTA Eliquis on 7/1 since currently no plan for surgical intervention     T2DM  ---   Hgba1c was 6.7% on 2/20/23.   ---   Hold PTA metformin during hospitalization  ---   Continue Medium correctional sliding scale TIDAC and nightly      HTN   ---   Reintroduce PTA amlodipine and lasix since hypotension resolved     HLD   ---   Continue PTA lipitor 20 mg at bedtime      Anxiety d/o  Insomnia  ---   Continue PTA trazodone 50 mg at bedtime, ativan 0.5 mg Q6H PRN, melatonin PRN and atarax 10 mg Q6H PRN     ACD  ---   Due prostate ca and recent chemo  ---   Hgb stable at 7.9 g today  ---   Check CBC in am           Diet:  Regular  DVT Prophylaxis:  DOAC, transitioning to heparin drip for possible procedure   Jiménez Catheter:  Present  Lines: None     Cardiac Monitoring: None  Code Status:   FULL CODE   Disposition Plan    TBD            Galdino Snyder MD  Hospitalist Service, GOLD TEAM 17  [facility]  Securely message with [employer] (more info)  Text page via [employer] Paging/Directory   See signed in provider for up to date coverage  information  ______________________________________________________________________    Interval History   Left hip pain w/ pain meds adjustment made on 7/2  No new issues  Uneventful night  HD remained stable      Physical Exam   Vital Signs: Temp: 98.4  F (36.9  C) Temp src: Oral BP: 119/58 Pulse: 74   Resp: 17 SpO2: 97 % O2 Device: None (Room air)    Weight: 216 lbs 14.92 oz  General: aao x 3, NAD.  HEENT:  NC/AT, neck supple  CVS:  NL s 1 and s2, 2/6 systolic murmur, no r/g.  Lungs:  CTA B/L.   Abd:  Soft, + bs, NT, no rebound or gaurding, no fluid shift.  Ext:  No c/c.  Lymph:  No edema.  Neuro:  Nonfocal.  Musculoskeletal: No calf tenderness to palpation.    Skin:  No rash.  Psychiatry:  Mood and affect appropriate.      Data     I have personally reviewed the following data over the past 24 hrs:    N/A  \   7.9 (L)   / N/A     N/A N/A N/A /  153 (H)   N/A N/A 0.50 (L) \       Procal: N/A CRP: N/A Lactic Acid: N/A         Imaging results reviewed over the past 24 hrs:   No results found for this or any previous visit (from the past 24 hour(s)).

## 2023-07-03 NOTE — PLAN OF CARE
VS: /61 (BP Location: Left arm)   Pulse 77   Temp 98.2  F (36.8  C) (Oral)   Resp 17   Wt 98.4 kg (216 lb 14.9 oz)   SpO2 94%   BMI 31.13 kg/m       O2: Sating >94% on RA, denies SOB/Chest pain. Denies N/V   Output: Jiménez in place, adequate output    Last BM: 7/1,  bowel sounds normoactive x4   Activity: Not oob this shift, able to shift weight    Up for meals? Yes   Skin: All visible skin intact.    Pain: Pain was managed with PRN oxycodone, tylenol and IV dilaudid    CMS: AO x4, Denies N/V   Dressing: None   Diet: Regular diet, thin liquids    LDA: L. PIV/SL   Equipment:  IV pole, personal belongings   Plan: Call light within reach, bed in a low position. Able to make needs known. Continue to monitor with POC.   Additional Info:         Problem: Pain Chronic (Persistent)  Goal: Optimal Pain Control and Function  Outcome: Progressing  Intervention: Develop Pain Management Plan  Recent Flowsheet Documentation  Taken 7/3/2023 1348 by Crystal Wild RN  Pain Management Interventions: medication (see MAR)  Taken 7/3/2023 1053 by Crystal Wild RN  Pain Management Interventions: medication (see MAR)  Taken 7/3/2023 0942 by Crystal Wild RN  Pain Management Interventions: medication (see MAR)  Taken 7/3/2023 0802 by Crystal Wild RN  Pain Management Interventions: medication (see MAR)  Intervention: Manage Persistent Pain  Recent Flowsheet Documentation  Taken 7/3/2023 0802 by Crystal Wild RN  Medication Review/Management: medications reviewed   Goal Outcome Evaluation:

## 2023-07-03 NOTE — PROGRESS NOTES
We discussed this patient's left medial wall nondisplaced acetabular fracture with our [facility] [place], Dr. Osbaldo Acharya. He can be advanced to WBAT. We will have him follow up outpatient 2-3 weeks after discharge    Respectfully,    Pablito Lechuga MD  Orthopedic Surgery PGY1  [phone]    Please page me directly with any questions/concerns during regular weekday hours before 5 pm. If there is no response, if it is a weekend, or if it is during evening hours then please page the orthopedic surgery resident on call.

## 2023-07-03 NOTE — PROGRESS NOTES
VS: Vital signs:  Temp: 97.7  F (36.5  C) Temp src: Oral BP: 110/57 Pulse: 73   Resp: 16 SpO2: 95 % O2 Device: None (Room air)          O2: Stable on RA   Output: Urinary catheter intact    Last BM: 7/1/23   Activity: SBA with gait belt and walker    Skin: Intact    Pain: 8/10 manage with schedule oxycodone and dilaudid    CMS: A&O x4, tingling to toes, denies numbness     Dressing: N/A   Diet: Regular    LDA: R & L PIV    Equipment: IV pole, walker, gait belt, and personal belongings    Plan: TBD   Additional Info:

## 2023-07-03 NOTE — PROGRESS NOTES
Patient does not have coverage for iv abx with their [employer] Medicare Advantage plan. Drug would be billed to the part D and supplies would be self-pay. Based on Zosyn 4.5g q6h, total cost is $57.60/day for drug and supplies.     For nursing, patient should have coverage if homebound, however [facility] is not contracted with Medicare and an outside nursing agency would be utilized instead. If patient is not homebound, there is no coverage and I can see patient if patient agrees to self-pay for $90 per visit.    Patient should have coverage in a TCU or infusion center.     (UR) In reference to admission date 06/29/2023.    Please contact Intake with any questions, [phone] or In Basket pool, [facility] ([phone]).

## 2023-07-04 ENCOUNTER — APPOINTMENT (OUTPATIENT)
Dept: PHYSICAL THERAPY | Facility: CLINIC | Age: 68
DRG: 871 | End: 2023-07-04
Attending: INTERNAL MEDICINE
Payer: COMMERCIAL

## 2023-07-04 LAB
BACTERIA SPEC CULT: ABNORMAL
GLUCOSE BLDC GLUCOMTR-MCNC: 134 MG/DL (ref 70–99)
GLUCOSE BLDC GLUCOMTR-MCNC: 152 MG/DL (ref 70–99)
GLUCOSE BLDC GLUCOMTR-MCNC: 153 MG/DL (ref 70–99)
GLUCOSE BLDC GLUCOMTR-MCNC: 158 MG/DL (ref 70–99)

## 2023-07-04 PROCEDURE — 250N000011 HC RX IP 250 OP 636: Performed by: INTERNAL MEDICINE

## 2023-07-04 PROCEDURE — 250N000013 HC RX MED GY IP 250 OP 250 PS 637: Performed by: PHYSICIAN ASSISTANT

## 2023-07-04 PROCEDURE — 93005 ELECTROCARDIOGRAM TRACING: CPT

## 2023-07-04 PROCEDURE — 99207 PR CDG-CUT & PASTE-POTENTIAL IMPACT ON LEVEL: CPT | Performed by: INTERNAL MEDICINE

## 2023-07-04 PROCEDURE — 99233 SBSQ HOSP IP/OBS HIGH 50: CPT | Performed by: INTERNAL MEDICINE

## 2023-07-04 PROCEDURE — 250N000011 HC RX IP 250 OP 636: Mod: JZ | Performed by: STUDENT IN AN ORGANIZED HEALTH CARE EDUCATION/TRAINING PROGRAM

## 2023-07-04 PROCEDURE — 120N000002 HC R&B MED SURG/OB UMMC

## 2023-07-04 PROCEDURE — 250N000013 HC RX MED GY IP 250 OP 250 PS 637: Performed by: INTERNAL MEDICINE

## 2023-07-04 PROCEDURE — 97161 PT EVAL LOW COMPLEX 20 MIN: CPT | Mod: GP

## 2023-07-04 PROCEDURE — 97530 THERAPEUTIC ACTIVITIES: CPT | Mod: GP

## 2023-07-04 PROCEDURE — 97110 THERAPEUTIC EXERCISES: CPT | Mod: GP

## 2023-07-04 PROCEDURE — 250N000012 HC RX MED GY IP 250 OP 636 PS 637: Performed by: PHYSICIAN ASSISTANT

## 2023-07-04 RX ORDER — POLYETHYLENE GLYCOL 3350 17 G/17G
17 POWDER, FOR SOLUTION ORAL 3 TIMES DAILY
Status: DISCONTINUED | OUTPATIENT
Start: 2023-07-04 | End: 2023-07-06 | Stop reason: HOSPADM

## 2023-07-04 RX ORDER — AMOXICILLIN 250 MG
2 CAPSULE ORAL 2 TIMES DAILY
Status: DISCONTINUED | OUTPATIENT
Start: 2023-07-04 | End: 2023-07-06 | Stop reason: HOSPADM

## 2023-07-04 RX ADMIN — POLYETHYLENE GLYCOL 3350 17 G: 17 POWDER, FOR SOLUTION ORAL at 14:09

## 2023-07-04 RX ADMIN — HYDROMORPHONE HYDROCHLORIDE 0.5 MG: 1 INJECTION, SOLUTION INTRAMUSCULAR; INTRAVENOUS; SUBCUTANEOUS at 11:22

## 2023-07-04 RX ADMIN — LISINOPRIL 40 MG: 20 TABLET ORAL at 08:20

## 2023-07-04 RX ADMIN — OXYCODONE HYDROCHLORIDE 10 MG: 10 TABLET ORAL at 03:33

## 2023-07-04 RX ADMIN — HYDROMORPHONE HYDROCHLORIDE 0.5 MG: 1 INJECTION, SOLUTION INTRAMUSCULAR; INTRAVENOUS; SUBCUTANEOUS at 14:09

## 2023-07-04 RX ADMIN — LIDOCAINE PATCH 4% 2 PATCH: 40 PATCH TOPICAL at 08:21

## 2023-07-04 RX ADMIN — HYDROMORPHONE HYDROCHLORIDE 0.5 MG: 1 INJECTION, SOLUTION INTRAMUSCULAR; INTRAVENOUS; SUBCUTANEOUS at 05:53

## 2023-07-04 RX ADMIN — PREDNISONE 5 MG: 5 TABLET ORAL at 08:20

## 2023-07-04 RX ADMIN — LINEZOLID 600 MG: 600 TABLET, FILM COATED ORAL at 19:46

## 2023-07-04 RX ADMIN — OXYCODONE HYDROCHLORIDE 10 MG: 10 TABLET ORAL at 08:58

## 2023-07-04 RX ADMIN — OXYCODONE HYDROCHLORIDE 10 MG: 10 TABLET ORAL at 19:47

## 2023-07-04 RX ADMIN — TRAZODONE HYDROCHLORIDE 50 MG: 50 TABLET ORAL at 22:41

## 2023-07-04 RX ADMIN — OXYCODONE HYDROCHLORIDE 10 MG: 10 TABLET ORAL at 15:29

## 2023-07-04 RX ADMIN — SENNOSIDES AND DOCUSATE SODIUM 2 TABLET: 50; 8.6 TABLET ORAL at 11:22

## 2023-07-04 RX ADMIN — ACETAMINOPHEN 975 MG: 325 TABLET, FILM COATED ORAL at 14:09

## 2023-07-04 RX ADMIN — SENNOSIDES AND DOCUSATE SODIUM 2 TABLET: 50; 8.6 TABLET ORAL at 19:47

## 2023-07-04 RX ADMIN — MEGESTROL ACETATE 20 MG: 20 TABLET ORAL at 08:20

## 2023-07-04 RX ADMIN — ACETAMINOPHEN 975 MG: 325 TABLET, FILM COATED ORAL at 05:52

## 2023-07-04 RX ADMIN — HYDROMORPHONE HYDROCHLORIDE 0.5 MG: 1 INJECTION, SOLUTION INTRAMUSCULAR; INTRAVENOUS; SUBCUTANEOUS at 08:17

## 2023-07-04 RX ADMIN — APIXABAN 5 MG: 5 TABLET, FILM COATED ORAL at 08:21

## 2023-07-04 RX ADMIN — ACETAMINOPHEN 975 MG: 325 TABLET, FILM COATED ORAL at 22:42

## 2023-07-04 RX ADMIN — PREGABALIN 150 MG: 75 CAPSULE ORAL at 08:20

## 2023-07-04 RX ADMIN — PIPERACILLIN AND TAZOBACTAM 4.5 G: 4; .5 INJECTION, POWDER, FOR SOLUTION INTRAVENOUS at 15:29

## 2023-07-04 RX ADMIN — POLYETHYLENE GLYCOL 3350 17 G: 17 POWDER, FOR SOLUTION ORAL at 19:44

## 2023-07-04 RX ADMIN — PANTOPRAZOLE SODIUM 40 MG: 40 TABLET, DELAYED RELEASE ORAL at 08:20

## 2023-07-04 RX ADMIN — APIXABAN 5 MG: 5 TABLET, FILM COATED ORAL at 19:47

## 2023-07-04 RX ADMIN — ATORVASTATIN CALCIUM 20 MG: 20 TABLET, FILM COATED ORAL at 08:20

## 2023-07-04 RX ADMIN — PREGABALIN 150 MG: 75 CAPSULE ORAL at 19:46

## 2023-07-04 RX ADMIN — PREDNISONE 5 MG: 5 TABLET ORAL at 19:48

## 2023-07-04 RX ADMIN — HYDROMORPHONE HYDROCHLORIDE 0.5 MG: 1 INJECTION, SOLUTION INTRAMUSCULAR; INTRAVENOUS; SUBCUTANEOUS at 01:15

## 2023-07-04 RX ADMIN — PIPERACILLIN AND TAZOBACTAM 4.5 G: 4; .5 INJECTION, POWDER, FOR SOLUTION INTRAVENOUS at 21:13

## 2023-07-04 RX ADMIN — OXYCODONE HYDROCHLORIDE 20 MG: 10 TABLET, FILM COATED, EXTENDED RELEASE ORAL at 10:30

## 2023-07-04 RX ADMIN — HYDROMORPHONE HYDROCHLORIDE 0.5 MG: 1 INJECTION, SOLUTION INTRAMUSCULAR; INTRAVENOUS; SUBCUTANEOUS at 21:09

## 2023-07-04 RX ADMIN — HYDROMORPHONE HYDROCHLORIDE 0.5 MG: 1 INJECTION, SOLUTION INTRAMUSCULAR; INTRAVENOUS; SUBCUTANEOUS at 18:43

## 2023-07-04 RX ADMIN — PIPERACILLIN AND TAZOBACTAM 4.5 G: 4; .5 INJECTION, POWDER, FOR SOLUTION INTRAVENOUS at 03:18

## 2023-07-04 RX ADMIN — POLYETHYLENE GLYCOL 3350 17 G: 17 POWDER, FOR SOLUTION ORAL at 08:21

## 2023-07-04 RX ADMIN — OXYCODONE HYDROCHLORIDE 20 MG: 10 TABLET, FILM COATED, EXTENDED RELEASE ORAL at 22:46

## 2023-07-04 RX ADMIN — PIPERACILLIN AND TAZOBACTAM 4.5 G: 4; .5 INJECTION, POWDER, FOR SOLUTION INTRAVENOUS at 10:30

## 2023-07-04 RX ADMIN — PANTOPRAZOLE SODIUM 40 MG: 40 TABLET, DELAYED RELEASE ORAL at 19:48

## 2023-07-04 RX ADMIN — LINEZOLID 600 MG: 600 TABLET, FILM COATED ORAL at 08:21

## 2023-07-04 ASSESSMENT — ACTIVITIES OF DAILY LIVING (ADL)
ADLS_ACUITY_SCORE: 31
ADLS_ACUITY_SCORE: 27
ADLS_ACUITY_SCORE: 31
ADLS_ACUITY_SCORE: 27
ADLS_ACUITY_SCORE: 27
ADLS_ACUITY_SCORE: 31
ADLS_ACUITY_SCORE: 27

## 2023-07-04 NOTE — PROGRESS NOTES
"Care Management Follow Up    Length of Stay (days): 5    Expected Discharge Date: 07/05/2023     Concerns to be Addressed:       Patient plan of care discussed at interdisciplinary rounds: Yes    Anticipated Discharge Disposition: Home     Anticipated Discharge Services: None  Anticipated Discharge DME: None    Referrals Placed by CM/SW:    Private pay costs discussed: insurance coverage for outpt IV antbx    Additional Information:      Per ID note:  Patient is on zosyn and linezolid for extended GNR and anaerobic coverage along with VRE coverage. It appears from care coordinator's note patient does not have good IV coverage. I will discuss plans with them.  Today I will get an EKG to consider if levofloxacin and flagyl could work instead of zosyn. We could not likely do linezolid for 6 weeks but perhaps a short course    Previous CC note:    Referral sent to  [facility] for IV antibiotics.   Informed by  [facility]:  \"Patient does not have coverage for iv abx with their [employer] Medicare Advantage plan. Drug would be billed to the part D and supplies would be self-pay. Based on Zosyn 4.5g q6h, total cost is $57.60/day for drug and supplies. For nursing, patient should have coverage if homebound, however [facility] is not contracted with Medicare and an outside nursing agency would be utilized instead. If patient is not homebound, there is no coverage and [facility] can see patient if patient agrees to self-pay for $90 per visit.\"    CC spoke to provider about the possibility of a daily dose at an outpt infusion center.  Plan of care still not finalized.  CC spoke to to pt regarding coverage.  He is aware of above quote.  He would like to get outpt infusion at [facility] where he is already getting his chemo.      Joey Dhaliwal, RN        "

## 2023-07-04 NOTE — PROGRESS NOTES
"Care Management Follow Up    Length of Stay (days): 4    Expected Discharge Date: 07/05/2023     Concerns to be Addressed:  Discharge planning     Patient plan of care discussed at interdisciplinary rounds: Yes    Anticipated Discharge Disposition:  Home     Anticipated Discharge Services:  IV antibiotics  Anticipated Discharge DME:     Patient/family educated on Medicare website which has current facility and service quality ratings: no  Education Provided on the Discharge Plan:    Patient/Family in Agreement with the Plan: yes    Referrals Placed by CM/SW:    Private pay costs discussed: Not applicable    Additional Information:  In 6 Med/Surg Discharge Rounds it was reported pt is WBAT. Infectious Disease consulting.   Per Medicine note on 7-: \"68 year old male with PMHx of HTN, DM2, bilateral PE on AC and metastatic prostate cancer with bony and liver mets, s/p radiation c/b radiation cysitis and chemotherapy who was sent in from oncology clinic with fever and hypotension to OSH from where he was transferred to [facility] on 6/29/23 for admission with sepsis likely 2/2 urinary source vs pelvic abscess with concern for rectovesical fistula and pelvic osteomyelitis.\"     Infectious Disease on 7-02: \"Duration TBD, but at this point would consider a 6 week empiric IV course for treatment of pelvic abscess with possible osteomyelitis\"  Currently on Zosyn IV every 6 hours.  Dilaudid IV prn: received x9 yesterday.     Referral sent to  [facility] for IV antibiotics.   Informed by [facility]:  \"Patient does not have coverage for iv abx with their [employer] Medicare Advantage plan. Drug would be billed to the part D and supplies would be self-pay. Based on Zosyn 4.5g q6h, total cost is $57.60/day for drug and supplies. For nursing, patient should have coverage if homebound, however [facility] is not contracted with Medicare and an outside nursing agency would be utilized instead. If patient is not homebound, " "there is no coverage and I can see patient if patient agrees to self-pay for $90 per visit.\"    RNCC will continue to follow for plan of care.        Eloina Alfred, RN Care Coordinator  [facility]   On 7- RNCC coverage for Unit 6 Med/Surg, [facility]. Call [phone].      [facility] (IV antibiotics)  Phone: [phone]    "

## 2023-07-04 NOTE — PROGRESS NOTES
[facility]    Medicine Progress Note - Hospitalist Service, GOLD TEAM 17    Date of Admission:  6/29/2023    Assessment & Plan   Dilip Kan is a 68 year old male with PMHx of HTN, DM2, bilateral PE on AC and metastatic prostate cancer with bony and liver mets, s/p radiation c/b radiation cysitis and chemotherapy who was sent in from oncology clinic with fever and hypotension to OSH from where he was transferred to [facility] on 6/29/23 for admission with sepsis likely 2/2 urinary source vs pelvic abscess with concern for rectovesical fistula and pelvic osteomyelitis.      #Severe Sepsis   #Pelvic abscess   #Rectovesical fistula ruled out by CT cystogram  #Pubic bone osteomyelitis ruled out by MRI  -Met criteria for severe sepsis w/ fever (temp 101.1  F), hypotension, tachypnea, tachycardia, leukocytosis (wbc 18.1) and elevated lactic acid at 3.3.  -UA w/ large blood, large leuk esterase, - nitrite, wbc clumps, few bacteria  -CXR clear.   -SARS CoV-2, influenza A/B and RSV PCR negative  -NGTD from blood and urine cx  -CT abdomen pelvis with 4 cm abscess deep within the pelvis, possibly communicating with the bladder and left ventrolateral aspect of the rectum. Abscess also near her left pubic bone at the symphysis with concern for osteomyelitis   -IR reviewed pt's CT scan and felt that the 4 cm deep pelvic abscess not amenable to percutaneous drainage  -Urology consulted for concern of colovesical fistula. CT cystogram negative for fistula.  No surgical intervention is planned.  Keep hooks in for now  -Case discussed via phone w/ CRS on 6/30; the 4 cm deep pelvis abscess not amenable to surgical intervention.  Trial of IV abx and further eval w/ pelvic MRI recommended.  Diverting colostomy may be needed if the 4 cm pelvic abscess does not improve w/ IV antibiotics.  -MRI pelvis 6/30 negative for pelvic osteo but revealed pelvic abscess  -Hematology rec is to hold chemo for  now  - ID recommended to keep pt on current empiric vancomycin, ceftriaxone and Flagyl and obtain MRSA nasal swab and VRE rectal/stool screen +    -Ortho consulted to provide opinion on the chronic pathologic nondisplaced fracture of medial left acetabulum.  No surgical intervention.  Outpatient f/u recommended.  WBAT  -S/p IV Vanco, IV Flagyl and IV Ceftriaxone 6/29 - 7/2    On IV Zosyn 4.5 g q6 hr since 7/2     Linezolid 600 mg IV q12h added today (ordered for you) for + VRE in pt's stool.   -Drug interactions with epi and Trazodone.   -Epi is a prn med  -Trazodone is just at night and usually ok to have linezolid + 1 antidepressant.  -Duration of treatment to be determine per ID but at this point would consider a 6 week empiric IV course for treatment of pelvic abscess with possible osteomyelitis     TRINY   ---   Baseline creatinine 0.5.    ---   Creatinine elevated to 1.04 upon admission.    ---   Possibly prerenal vs ATN vs postobstructive uropathy   ---   Jiménez cath placed in the ED  ---   Treated w/ IVF  ---   Creatinine down to 0.50 today  ---   Avoid nephrotoxic agents      Hyponatremia  ---   Mild w/ Na level of 133 at admit  ---   Na level is up to 138 on 7/1 following IVF hydration     Transaminitis  ---   Due to prostate ca w/ met to liver  ---   Monitor      Metastatic prostate ca  ---   Met to bone and liver.   ---   Patient following with oncology closely.    ---   Please see progress note from 6/28/2023 for details.    ---   S/p radiation and chemo.  Completed 4 cycles of docetaxel on 6/13/2023.   ---   Urology and oncology consults following  ---   Continue PTA prednisone 5 mg BID     Chronic cancer related pain   ---   Left hip pain due prostate ca w/ met to left acetabulum and known left acetabular pathologic fracture, seen on imaging studies  ---   Patient reports inability to bear weight 2/2 pain.   ---   Oncologist just transitioned pt to long acting pain medications.   ---   Continue PTA  lyrica 150 mg BID  ---   Tylenol 975 mg TID   ---   Continue OxyContin 20 mg Q12H  ---   Increased PTA Oxycodone to 10 mg po q4 hr on 7/2  ---   Added Lidoderm 2 patches on 7/2  ---   Also started pt on IV dilaudid  ---   Pt is under better control today  ---   See by Orthopedics consult service on 7/2 and rec is non-operative management.  WBAT.  Pt will f/u w/ [facility] oncology ortho staff, Dr. Osbaldo Acharya in 1-3 weeks after discharge  ---   PT consulted     Urinary retention   ---   2/2 prostate ca.   ---   Self caths at home.   ---   Jiménez catheter placed in ED.     Acute PE and DVT   ---   Diagnosed in 5/2023  ---   S/p heparin drip 6/29 - 7/1  ---   Back on PTA Eliquis on 7/1 since currently no plan for surgical intervention     T2DM  ---   Hgba1c was 6.7% on 2/20/23.   ---   Hold PTA metformin during hospitalization  ---   Continue Medium correctional sliding scale TIDAC and nightly      HTN   ---   Reintroduce PTA amlodipine and lasix since hypotension resolved     HLD   ---   Continue PTA lipitor 20 mg at bedtime      Anxiety d/o  Insomnia  ---   Continue PTA trazodone 50 mg at bedtime, ativan 0.5 mg Q6H PRN, melatonin PRN and atarax 10 mg Q6H PRN     ACD  ---   Due prostate ca and recent chemo  ---   Hgb stable at 7.9 g today  ---   Check CBC in am           Diet:  Regular  DVT Prophylaxis:  DOAC, transitioning to heparin drip for possible procedure   Jiménez Catheter:  Present  Lines: None     Cardiac Monitoring: None  Code Status:   FULL CODE   Disposition Plan    TBD            KAMALJIT BETTENCOURT MD  Hospitalist Service, GOLD TEAM [address] [place] [address]  Securely message with [employer] (more info)  Text page via [employer] Paging/Directory   See signed in provider for up to date coverage information  ______________________________________________________________________    Interval History   -No new issues  -Uneventful night  -HD remained stable      Physical Exam   Vital Signs:  Temp: 98  F (36.7  C) Temp src: Oral BP: 129/71 Pulse: 79   Resp: 18 SpO2: 97 % O2 Device: None (Room air)    Weight: 214 lbs 15.18 oz  General: aao x 3, NAD.  HEENT:  NC/AT, neck supple  CVS:  NL s 1 and s2, 2/6 systolic murmur, no r/g.  Lungs:  CTA B/L.   Abd:  Soft, + bs, NT, no rebound or gaurding, no fluid shift.  Ext:  No c/c.  Lymph:  No edema.  Neuro:  Nonfocal.  Musculoskeletal: No calf tenderness to palpation.    Skin:  No rash.  Psychiatry:  Mood and affect appropriate.      Data         Imaging results reviewed over the past 24 hrs:   No results found for this or any previous visit (from the past 24 hour(s)).

## 2023-07-04 NOTE — PROGRESS NOTES
VS: /68 (BP Location: Left arm, Patient Position: Semi-Vinson's, Cuff Size: Adult Regular)   Pulse 79   Temp 98.5  F (36.9  C) (Oral)   Resp 18   Wt 97.5 kg (214 lb 15.2 oz)   SpO2 96%   BMI 30.84 kg/m       O2: Sating >95% on RA, denies SOB/Chest pain. Denies N/V   Output: Voids spontaneously in bathroom   Last BM: 7/1, bowel sounds hypoactive.    Activity: Up with A/1, walker    Up for meals? Yes   Skin: All visible skin intact   Pain: Pain was managed with PRN  Oxycodone/dilaudid and tylenol    CMS: AO x4, Denies N/V   Dressing: None   Diet: Regular diet, thin liquids    LDA: Right PIV/SL   Equipment:  IV pole, personal belongings, walker   Plan: Call light within reach, bed in a low position. Able to make needs known. Continue to monitor with POC.   Additional Info:

## 2023-07-04 NOTE — PROGRESS NOTES
GENERAL ID SERVICE PROGRESS NOTE - [facility] [place]     Patient:  Dilip Kan   Date of birth 1955, Medical record number [id number]  Date of Visit:  07/04/2023  Date of Admission: 6/29/2023  Consult Requester: Marleny Bautista          Assessment and Recommendations:   ASSESSMENT:  1. Sepsis  2. Pelvic abscess  - Abscess communicates with bladder and prostate, but not the rectum based on cystogram  3. Abnormal MR signal on pelvis bones (left pubic bone, acetabulum, ischium) - unclear if related to fractures versus active osteomyelitis?  4. Metastatic prostate cancer (liver, bone: acetabulum), recently receiving chemotherapy last dose 6/13/23  5. Lumbar spine fusion (L4-L5)  6. Recent neutropenic fever, thought secondary to Klebsiella UTI - not currently neutropenic  7. MRSA screen negative  8. VRE Screen positive     RECOMMENDATION:  -- Continue Linezolid 600 mg q12h    -- Cont pip/tazo 4.5g Q6H (using higher dose given prior neutropenia and antibiotic exposure)   - Duration TBD, but at this point would consider a 6 week empiric IV course for treatment of pelvic abscess with possible osteomyelitis, will also consider if there are oral options given poor coverage.   -- Follow up pending BCx  -- Trend CRP  -- Pending VRE rectal/stool screen  -- Appreciate involvement of surgical teams      DISCUSSION:   67yo M with history of metastatic prostate cancer receiving chemotherapy earlier this month now presenting with fever and sepsis. Initial workup is concerning for a pelvic abscess, possibly associated with a vesicorectal fistula and also possibly with pubic bone osteomyelitis. Fortunately the patient is not neutropenic. IR was consulted for abscess drainage but did not feel they had an approach. At this point in time, it is unclear whether we would get any cultures. Ortho has been requested to consider bone biopsy. In anticipation of lack of culture data, and given the wide variety of potential pathogens involved,  "we can at least get MRSA and VRE swabs. Gut bugs are most likely, although given immunocompromised status other organisms are possible. Primary gap in current regimen would be Pseudomonas (more likely if neutropenic) and VRE.      Update 7/4: Patient clinically stable but still no direct sampling of abscess. Patient is on zosyn and linezolid for extended GNR and anaerobic coverage along with VRE coverage. It appears from care coordinator's note patient does not have good IV coverage. I will discuss plans with them.  Today I will get an EKG to consider if levofloxacin and flagyl could work instead of zosyn. We could not likely do linezolid for 6 weeks but perhaps a short course.     ID will continue to follow.     Josselin Kay MD  Infectious Diseases  [phone]  ________________________________________________________________    Interval Hx: Afebrile. WBC slightly downtrending. Patient reports feeling much better from a systemic standpoint, though still has significant groin pain. No other complaints.         History of Present Illness:   History obtained from chart review, and partially from the patient.    \"Dilip Kan is a 68 year old male with PMHx of HTN, DM2, bilateral PE on AC, and metastatic prostate cancer with bony and liver mets, s/p radiation c/b radiation cysitis and chemotherapy who sent in from oncology clinic with fever and hypotension to OSH, now transferred to [facility] and admitted on 6/29/2023 with sepsis likely 2/2 urinary source vs abscess with concern for rectovestical fistula.      Patient reports progressive left upper thigh, groin, hip and buttock pain since his cancer diagnosis months ago.  States the pain progressively worsened, to the point where he is unable to ambulate or bear weight due to pain.  Due to severity, he saw his oncologist yesterday.  During oncology visit he was found to have a fever and was hypotensive.  Patient reported feeling dizzy at that time which has since " "resolved.  He was sent into the emergency department for concern of sepsis.  Patient's oncologist adjusted his pain medications, with plan to start OxyContin and oxycodone as needed.       Patient states outside of his pain he has not felt unwell.  Denies any fevers, chills, chest pain, cough, nausea, vomiting, diarrhea, abdominal pain. He does reports some mild dyspnea, but can not provide further details.  States he straight caths for urinary retention.  Endorses chronic intermittent hematuria which is unchanged from baseline.  Endorses chronic back pain which is unchanged from baseline.\"    Patient provided with components of the same history, but had trouble focusing due to pain/pain meds. Denied chest pain, dyspnea, cough, abdominal pain, diarrhea to me. No neuro changes. States he is having significant pain in L groin region despite pain medication.         Review of Systems:   5pt ROS performed, see interval hx for pertinent findings.          Current Medications:       acetaminophen  975 mg Oral Q8H     apixaban ANTICOAGULANT  5 mg Oral BID     atorvastatin  20 mg Oral Daily     insulin aspart  1-7 Units Subcutaneous TID AC     insulin aspart  1-5 Units Subcutaneous At Bedtime     lidocaine  1-2 patch Transdermal Q24H     lidocaine   Transdermal Q8H MELVIN     linezolid  600 mg Oral Q12H MELVIN (08/20)     lisinopril  40 mg Oral Daily     megestrol  20 mg Oral Daily     oxyCODONE  20 mg Oral Q12H     pantoprazole  40 mg Oral BID     piperacillin-tazobactam  4.5 g Intravenous Q6H     polyethylene glycol  17 g Oral Daily     predniSONE  5 mg Oral BID     pregabalin  150 mg Oral BID     sodium chloride (PF)  3 mL Intracatheter Q8H     traZODone  50 mg Oral At Bedtime            Allergies:   No Known Allergies         Physical Exam:   Vitals were reviewed  Patient Vitals for the past 24 hrs:   BP Temp Temp src Pulse Resp SpO2 Weight   07/04/23 0732 117/62 98.2  F (36.8  C) Oral 69 18 95 % --   07/04/23 0600 -- -- -- " -- -- -- 97.5 kg (214 lb 15.2 oz)   07/04/23 0158 121/65 98.5  F (36.9  C) Oral 82 18 96 % --   07/03/23 1924 114/62 99.1  F (37.3  C) Oral 76 17 96 % --   07/03/23 1520 120/61 98.2  F (36.8  C) Oral 77 17 94 % --   07/03/23 1156 119/58 98.4  F (36.9  C) Oral 74 17 97 % --       Physical Examination:  GENERAL: Well-developed, well-nourished, elderly appearing male. More comfortable appearing.  HEENT:  Head is normocephalic, atraumatic.   EYES:  Eyes have anicteric sclerae without conjunctival injection or stigmata of endocarditis.    ABDOMEN:  Normal bowel sounds, soft, nontender. Distended.   SKIN:  No acute rashes. No stigmata of endocarditis.  MSK: Patient endorsed significant hip/groin pain, but I couldn't reproduce the pain with palpation of the region.   NEUROLOGIC:  Grossly nonfocal. Active x4 extremities.         Laboratory Data:     Inflammatory Markers  CRP Inflammation   Date Value Ref Range Status   07/01/2023 204.37 (H) <5.00 mg/L Final       Hematology Studies    Recent Labs   Lab Test 07/03/23  0621 07/02/23  0734 07/01/23  0805 06/30/23  0418 06/29/23  2237 06/29/23  0939 06/13/23  1134 04/26/23  1301 04/22/23  0647   WBC  --  13.0* 13.5* 15.5* 14.7* 18.1* 12.5*   < > 2.9*   ANEU  --   --   --   --   --   --   --   --  2.2   AEOS  --   --   --   --   --   --   --   --  0.0   HGB 7.9* 7.6* 8.0* 8.6* 8.7* 10.0* 9.8*   < > 8.2*   MCV  --  84 83 83 85 83 87   < > 86   PLT  --  357 377 375  --  501* 405   < > 111*    < > = values in this interval not displayed.       Metabolic Studies     Recent Labs   Lab Test 07/03/23  0621 07/01/23  0805 06/30/23  0418 06/29/23  0939 06/13/23  1134 05/23/23  1205   NA  --  138 135* 133* 136 136   POTASSIUM  --  4.1 4.2 4.4 4.4 4.5   CHLORIDE  --  104 102 96* 104 104   CO2  --  25 23 22 21* 21*   BUN  --  10.8 11.0 16.4 13.8 15.1   CR 0.50* 0.54*  0.54* 0.62* 1.04 0.67 0.54*   GFRESTIMATED >90 >90  >90 >90 77 >90 >90       Hepatic Studies    Recent Labs   Lab Test  06/30/23  0418 06/29/23  0939 06/13/23  1134 05/23/23  1205 05/01/23  1051 04/26/23  1301   BILITOTAL 0.3 0.5 0.2 0.4 0.5 0.4   ALKPHOS 132* 152* 67 66 68 64   ALBUMIN 2.4* 3.3* 3.3* 3.4* 3.6 3.0*   AST 36 92* 13 25 11 12   ALT 78* 124* 20 10 12 12       Microbiology:  Culture   Date Value Ref Range Status   07/01/2023 Enterococcus faecium VRE (A)  Final   06/29/2023 No Growth  Preliminary   06/29/2023 Mixed Urogenital Shonda  Final   06/29/2023 No Growth  Preliminary   04/27/2023 No Growth  Final   04/19/2023 Klebsiella pneumoniae (AA)  Final   04/19/2023 >100,000 CFU/mL Klebsiella pneumoniae (A)  Final   04/19/2023 No Growth  Final   11/17/2021 No Growth  Final   11/17/2021 No Growth  Final       Urine Studies    Recent Labs   Lab Test 06/29/23  1410 04/19/23  0443 03/29/22  1414 11/18/21  0526 03/08/21  0908 03/02/20  1321   LEUKEST Large* Moderate* Negative Negative Negative Negative   WBCU 33* >182*  --  1 1 4       Vancomycin Levels    Recent Labs   Lab Test 07/01/23  0805 04/21/23  0535 04/20/23  0550   VANCOMYCIN 14.6 22.9 16.1       IMAGING  CT a/p 6/29  4 cm abscess deep within the pelvis appears to be in communication  with the urinary bladder and the left ventrolateral aspect of the  rectum.     Above-mentioned abscess abuts the left pubic bone at the symphysis  with likely osteomyelitis. Small gas bubbles are seen within the  abscess cavity as well as surrounding and within the left pubic bone. Small volume of fluid now seen within the presacral soft tissues, likely reactive.      MRI pelvis 6/30/23  IMPRESSION: Lower lumbar spine surgical hardware. Apparent  nondisplaced fracture medial left acetabulum. Abnormalities in area of  previous air density and irregularity at left superior pubic ramus  very slightly irregular 4 marrow signal on this MRIi, this could be  old posttraumatic or old postinfectious. Marrow edema throughout the  medial left pelvic brain likely related to prior trauma with  minimal  edema in the left femoral head which could represent bone contusion.  No visible fracture line in this area no other obvious avascular  necrosis in this area at this time. Extensive muscle edema likely  related to previous trauma in the left internal pelvic musculature.  Small amount of pelvic free fluid. Jiménez catheter.

## 2023-07-04 NOTE — PROGRESS NOTES
VS: Vital signs:  Temp: 98.5  F (36.9  C) Temp src: Oral BP: 121/65 Pulse: 82   Resp: 18 SpO2: 96 % O2 Device: None (Room air)        O2: Stable on RA   Output: Urinary catheter intact   Last BM: 7/1/23   Activity: Not oob, position change independently    Skin: Intact    Pain: 7/10   CMS: A&O x4, tingling in toes, denies numbness     Dressing: N/A   Diet: Regular    LDA: R PIV SL   Equipment: IV pole, walker, gait belt, and personal belongings    Plan: TBD   Additional Info:

## 2023-07-05 ENCOUNTER — TELEPHONE (OUTPATIENT)
Dept: ORTHOPEDICS | Facility: CLINIC | Age: 68
End: 2023-07-05
Payer: COMMERCIAL

## 2023-07-05 ENCOUNTER — APPOINTMENT (OUTPATIENT)
Dept: PHYSICAL THERAPY | Facility: CLINIC | Age: 68
DRG: 871 | End: 2023-07-05
Attending: STUDENT IN AN ORGANIZED HEALTH CARE EDUCATION/TRAINING PROGRAM
Payer: COMMERCIAL

## 2023-07-05 LAB
BACTERIA BLD CULT: NO GROWTH
BACTERIA BLD CULT: NO GROWTH
CREAT SERPL-MCNC: 0.53 MG/DL (ref 0.67–1.17)
ERYTHROCYTE [DISTWIDTH] IN BLOOD BY AUTOMATED COUNT: 17.2 % (ref 10–15)
GFR SERPL CREATININE-BSD FRML MDRD: >90 ML/MIN/1.73M2
GLUCOSE BLDC GLUCOMTR-MCNC: 130 MG/DL (ref 70–99)
GLUCOSE BLDC GLUCOMTR-MCNC: 131 MG/DL (ref 70–99)
GLUCOSE BLDC GLUCOMTR-MCNC: 135 MG/DL (ref 70–99)
GLUCOSE BLDC GLUCOMTR-MCNC: 138 MG/DL (ref 70–99)
GLUCOSE BLDC GLUCOMTR-MCNC: 160 MG/DL (ref 70–99)
GLUCOSE BLDC GLUCOMTR-MCNC: 164 MG/DL (ref 70–99)
GLUCOSE BLDC GLUCOMTR-MCNC: 167 MG/DL (ref 70–99)
HCT VFR BLD AUTO: 25.7 % (ref 40–53)
HGB BLD-MCNC: 8.4 G/DL (ref 13.3–17.7)
MCH RBC QN AUTO: 26.8 PG (ref 26.5–33)
MCHC RBC AUTO-ENTMCNC: 32.7 G/DL (ref 31.5–36.5)
MCV RBC AUTO: 82 FL (ref 78–100)
PLATELET # BLD AUTO: 446 10E3/UL (ref 150–450)
RBC # BLD AUTO: 3.14 10E6/UL (ref 4.4–5.9)
WBC # BLD AUTO: 14 10E3/UL (ref 4–11)

## 2023-07-05 PROCEDURE — 250N000013 HC RX MED GY IP 250 OP 250 PS 637: Performed by: PHYSICIAN ASSISTANT

## 2023-07-05 PROCEDURE — 120N000002 HC R&B MED SURG/OB UMMC

## 2023-07-05 PROCEDURE — 97530 THERAPEUTIC ACTIVITIES: CPT | Mod: GP | Performed by: PHYSICAL THERAPIST

## 2023-07-05 PROCEDURE — 250N000013 HC RX MED GY IP 250 OP 250 PS 637: Performed by: INTERNAL MEDICINE

## 2023-07-05 PROCEDURE — 99207 PR CDG-CUT & PASTE-POTENTIAL IMPACT ON LEVEL: CPT | Performed by: INTERNAL MEDICINE

## 2023-07-05 PROCEDURE — 250N000011 HC RX IP 250 OP 636: Performed by: INTERNAL MEDICINE

## 2023-07-05 PROCEDURE — 250N000011 HC RX IP 250 OP 636: Mod: JZ | Performed by: STUDENT IN AN ORGANIZED HEALTH CARE EDUCATION/TRAINING PROGRAM

## 2023-07-05 PROCEDURE — 82565 ASSAY OF CREATININE: CPT | Performed by: STUDENT IN AN ORGANIZED HEALTH CARE EDUCATION/TRAINING PROGRAM

## 2023-07-05 PROCEDURE — 99233 SBSQ HOSP IP/OBS HIGH 50: CPT | Performed by: INTERNAL MEDICINE

## 2023-07-05 PROCEDURE — 250N000012 HC RX MED GY IP 250 OP 636 PS 637: Performed by: PHYSICIAN ASSISTANT

## 2023-07-05 PROCEDURE — 85027 COMPLETE CBC AUTOMATED: CPT | Performed by: INTERNAL MEDICINE

## 2023-07-05 PROCEDURE — 36415 COLL VENOUS BLD VENIPUNCTURE: CPT | Performed by: INTERNAL MEDICINE

## 2023-07-05 PROCEDURE — 36415 COLL VENOUS BLD VENIPUNCTURE: CPT | Performed by: STUDENT IN AN ORGANIZED HEALTH CARE EDUCATION/TRAINING PROGRAM

## 2023-07-05 RX ORDER — LACTOBACILLUS RHAMNOSUS GG 10B CELL
1 CAPSULE ORAL 2 TIMES DAILY
Status: DISCONTINUED | OUTPATIENT
Start: 2023-07-05 | End: 2023-07-06 | Stop reason: HOSPADM

## 2023-07-05 RX ADMIN — HYDROMORPHONE HYDROCHLORIDE 0.5 MG: 1 INJECTION, SOLUTION INTRAMUSCULAR; INTRAVENOUS; SUBCUTANEOUS at 19:43

## 2023-07-05 RX ADMIN — PIPERACILLIN AND TAZOBACTAM 4.5 G: 4; .5 INJECTION, POWDER, FOR SOLUTION INTRAVENOUS at 09:27

## 2023-07-05 RX ADMIN — PIPERACILLIN AND TAZOBACTAM 4.5 G: 4; .5 INJECTION, POWDER, FOR SOLUTION INTRAVENOUS at 21:17

## 2023-07-05 RX ADMIN — HYDROMORPHONE HYDROCHLORIDE 0.5 MG: 1 INJECTION, SOLUTION INTRAMUSCULAR; INTRAVENOUS; SUBCUTANEOUS at 23:38

## 2023-07-05 RX ADMIN — MEGESTROL ACETATE 20 MG: 20 TABLET ORAL at 09:25

## 2023-07-05 RX ADMIN — LIDOCAINE PATCH 4% 2 PATCH: 40 PATCH TOPICAL at 09:26

## 2023-07-05 RX ADMIN — PREGABALIN 150 MG: 75 CAPSULE ORAL at 09:25

## 2023-07-05 RX ADMIN — Medication 1 CAPSULE: at 22:27

## 2023-07-05 RX ADMIN — LINEZOLID 600 MG: 600 TABLET, FILM COATED ORAL at 09:26

## 2023-07-05 RX ADMIN — PIPERACILLIN AND TAZOBACTAM 4.5 G: 4; .5 INJECTION, POWDER, FOR SOLUTION INTRAVENOUS at 15:51

## 2023-07-05 RX ADMIN — HYDROMORPHONE HYDROCHLORIDE 0.5 MG: 1 INJECTION, SOLUTION INTRAMUSCULAR; INTRAVENOUS; SUBCUTANEOUS at 12:40

## 2023-07-05 RX ADMIN — PANTOPRAZOLE SODIUM 40 MG: 40 TABLET, DELAYED RELEASE ORAL at 09:26

## 2023-07-05 RX ADMIN — ACETAMINOPHEN 975 MG: 325 TABLET, FILM COATED ORAL at 05:40

## 2023-07-05 RX ADMIN — OXYCODONE HYDROCHLORIDE 10 MG: 10 TABLET ORAL at 09:26

## 2023-07-05 RX ADMIN — LINEZOLID 600 MG: 600 TABLET, FILM COATED ORAL at 21:17

## 2023-07-05 RX ADMIN — ACETAMINOPHEN 975 MG: 325 TABLET, FILM COATED ORAL at 22:27

## 2023-07-05 RX ADMIN — APIXABAN 5 MG: 5 TABLET, FILM COATED ORAL at 09:26

## 2023-07-05 RX ADMIN — PANTOPRAZOLE SODIUM 40 MG: 40 TABLET, DELAYED RELEASE ORAL at 21:17

## 2023-07-05 RX ADMIN — OXYCODONE HYDROCHLORIDE 20 MG: 10 TABLET, FILM COATED, EXTENDED RELEASE ORAL at 22:27

## 2023-07-05 RX ADMIN — PREGABALIN 150 MG: 75 CAPSULE ORAL at 21:17

## 2023-07-05 RX ADMIN — HYDROMORPHONE HYDROCHLORIDE 0.5 MG: 1 INJECTION, SOLUTION INTRAMUSCULAR; INTRAVENOUS; SUBCUTANEOUS at 04:25

## 2023-07-05 RX ADMIN — PREDNISONE 5 MG: 5 TABLET ORAL at 09:25

## 2023-07-05 RX ADMIN — LISINOPRIL 40 MG: 20 TABLET ORAL at 10:40

## 2023-07-05 RX ADMIN — HYDROMORPHONE HYDROCHLORIDE 0.5 MG: 1 INJECTION, SOLUTION INTRAMUSCULAR; INTRAVENOUS; SUBCUTANEOUS at 15:35

## 2023-07-05 RX ADMIN — OXYCODONE HYDROCHLORIDE 20 MG: 10 TABLET, FILM COATED, EXTENDED RELEASE ORAL at 10:39

## 2023-07-05 RX ADMIN — ACETAMINOPHEN 975 MG: 325 TABLET, FILM COATED ORAL at 15:51

## 2023-07-05 RX ADMIN — OXYCODONE HYDROCHLORIDE 10 MG: 10 TABLET ORAL at 17:47

## 2023-07-05 RX ADMIN — PIPERACILLIN AND TAZOBACTAM 4.5 G: 4; .5 INJECTION, POWDER, FOR SOLUTION INTRAVENOUS at 03:05

## 2023-07-05 RX ADMIN — OXYCODONE HYDROCHLORIDE 10 MG: 10 TABLET ORAL at 05:40

## 2023-07-05 RX ADMIN — OXYCODONE HYDROCHLORIDE 10 MG: 10 TABLET ORAL at 14:12

## 2023-07-05 RX ADMIN — PREDNISONE 5 MG: 5 TABLET ORAL at 21:17

## 2023-07-05 RX ADMIN — APIXABAN 5 MG: 5 TABLET, FILM COATED ORAL at 21:17

## 2023-07-05 RX ADMIN — TRAZODONE HYDROCHLORIDE 50 MG: 50 TABLET ORAL at 22:27

## 2023-07-05 RX ADMIN — HYDROXYZINE HYDROCHLORIDE 10 MG: 10 TABLET ORAL at 09:25

## 2023-07-05 RX ADMIN — ATORVASTATIN CALCIUM 20 MG: 20 TABLET, FILM COATED ORAL at 09:26

## 2023-07-05 ASSESSMENT — ACTIVITIES OF DAILY LIVING (ADL)
ADLS_ACUITY_SCORE: 29
ADLS_ACUITY_SCORE: 27
ADLS_ACUITY_SCORE: 27
ADLS_ACUITY_SCORE: 29
ADLS_ACUITY_SCORE: 31
ADLS_ACUITY_SCORE: 29
ADLS_ACUITY_SCORE: 31
ADLS_ACUITY_SCORE: 29
ADLS_ACUITY_SCORE: 31
ADLS_ACUITY_SCORE: 27

## 2023-07-05 NOTE — PROGRESS NOTES
"1900 - 0700    General survey: Alert, oriented x 4, able to verbalize needs.  Diet: Regular Diet  Activity: on bed throughout the shift    Vital Signs:  Temp: 98.9  F (37.2  C) Temp src: Oral BP: 132/71 Pulse: 84   Resp: 18 SpO2: 94 % O2 Device: None (Room air)      Skin: Visible skin is intact; PIV on the right arm, infusing well  Cardio: denies chest discomfort  Respiratory: denies SOB /   Abdomen: Last BM . Scheduled Miralax and Senna given.  Genito - urinary: on hooks draining to a watermelon color output.  Extremities: able to move all extremities. Verbalized weakness on lower extremities describing as \"baby steps\", if going to the bathroom.     Pain: Complained pain of generalized pain, rating of 7/10. PRN and scheduled pain medication given. Effective - sleep well throughout the night.      "

## 2023-07-05 NOTE — PROGRESS NOTES
GENERAL ID SERVICE PROGRESS NOTE - [facility]     Patient:  Dilip Kan   Date of birth 1955, Medical record number [id number]  Date of Visit:  07/05/2023  Date of Admission: 6/29/2023  Consult Requester: Marleny Bautista          Assessment and Recommendations:   ASSESSMENT:  1. Sepsis  2. Pelvic abscess  - Abscess communicates with bladder and prostate, but not the rectum based on cystogram  3. Abnormal MR signal on pelvis bones (left pubic bone, acetabulum, ischium) - unclear if related to fractures versus active osteomyelitis?  4. Metastatic prostate cancer (liver, bone: acetabulum), recently receiving chemotherapy last dose 6/13/23  5. Lumbar spine fusion (L4-L5)  6. Recent neutropenic fever, thought secondary to Klebsiella UTI - not currently neutropenic  7. MRSA screen negative  8. VRE Screen positive     RECOMMENDATION:  -- Continue Linezolid 600 mg q12h, he should get for 2 weeks after discharge  -- Cont pip/tazo 4.5g Q6H (using higher dose given prior neutropenia and antibiotic exposure) while in the hospital   - After discharge he can go on Levofloxacin 750 mg daily and Flagyl 500 mg q8h  --Discharge per primary team  --I will set up follow up with the patient  --I would like labs every other week, CBC, CMP, ESR, CRP  --He will need CT abd/pelvis in about 6 weeks      DISCUSSION:   69yo M with history of metastatic prostate cancer receiving chemotherapy earlier this month now presenting with fever and sepsis. Initial workup is concerning for a pelvic abscess, possibly associated with a vesicorectal fistula and also possibly with pubic bone osteomyelitis. Fortunately the patient is not neutropenic. IR was consulted for abscess drainage but did not feel they had an approach. At this point in time, it is unclear whether we would get any cultures. Ortho has been requested to consider bone biopsy. In anticipation of lack of culture data, and given the wide variety of potential pathogens involved, we  "can at least get MRSA and VRE swabs. Gut bugs are most likely, although given immunocompromised status other organisms are possible. Primary gap in current regimen would be Pseudomonas (more likely if neutropenic) and VRE.      Update 7/5: Patient is doing well and close to discharge. He can go on Linezolid 600 mg BID for 14 days after discharge to cover VRE. It is likely not the main  but since he is colonized and I cannot sample the abscess I want to cover it. Then he will need to be on Levofloxacin 750 daily and Flagyl 500 mg TID for another 6-8 weeks following discharge. Duration will depend on progression of the abscess. He should get a CT abdomen/Pelvis with and without contrast before the appointment in 6 weeks.     I will set up follow up with our NP in 2 weeks and with myself in about 6 weeks    ID will sign off. Please call with questions. Plan was discussed with the primary team.     Josselin Kay MD  Infectious Diseases  [phone]  ________________________________________________________________    Interval Hx: Afebrile. WBC slightly downtrending. Patient reports feeling much better from a systemic standpoint, though still has significant groin pain. No other complaints.         History of Present Illness:   History obtained from chart review, and partially from the patient.    \"Dilip Kan is a 68 year old male with PMHx of HTN, DM2, bilateral PE on AC, and metastatic prostate cancer with bony and liver mets, s/p radiation c/b radiation cysitis and chemotherapy who sent in from oncology clinic with fever and hypotension to OSH, now transferred to [facility] and admitted on 6/29/2023 with sepsis likely 2/2 urinary source vs abscess with concern for rectovestical fistula.      Patient reports progressive left upper thigh, groin, hip and buttock pain since his cancer diagnosis months ago.  States the pain progressively worsened, to the point where he is unable to ambulate or bear weight due to " "pain.  Due to severity, he saw his oncologist yesterday.  During oncology visit he was found to have a fever and was hypotensive.  Patient reported feeling dizzy at that time which has since resolved.  He was sent into the emergency department for concern of sepsis.  Patient's oncologist adjusted his pain medications, with plan to start OxyContin and oxycodone as needed.       Patient states outside of his pain he has not felt unwell.  Denies any fevers, chills, chest pain, cough, nausea, vomiting, diarrhea, abdominal pain. He does reports some mild dyspnea, but can not provide further details.  States he straight caths for urinary retention.  Endorses chronic intermittent hematuria which is unchanged from baseline.  Endorses chronic back pain which is unchanged from baseline.\"    Patient provided with components of the same history, but had trouble focusing due to pain/pain meds. Denied chest pain, dyspnea, cough, abdominal pain, diarrhea to me. No neuro changes. States he is having significant pain in L groin region despite pain medication.         Review of Systems:   5pt ROS performed, see interval hx for pertinent findings.          Current Medications:       acetaminophen  975 mg Oral Q8H     apixaban ANTICOAGULANT  5 mg Oral BID     atorvastatin  20 mg Oral Daily     insulin aspart  1-7 Units Subcutaneous TID AC     insulin aspart  1-5 Units Subcutaneous At Bedtime     lidocaine  1-2 patch Transdermal Q24H     lidocaine   Transdermal Q8H MELVIN     linezolid  600 mg Oral Q12H [facility] (08/20)     lisinopril  40 mg Oral Daily     megestrol  20 mg Oral Daily     oxyCODONE  20 mg Oral Q12H     pantoprazole  40 mg Oral BID     piperacillin-tazobactam  4.5 g Intravenous Q6H     polyethylene glycol  17 g Oral TID     predniSONE  5 mg Oral BID     pregabalin  150 mg Oral BID     senna-docusate  2 tablet Oral BID     sodium chloride (PF)  3 mL Intracatheter Q8H     traZODone  50 mg Oral At Bedtime            Allergies:   No " Known Allergies         Physical Exam:   Vitals were reviewed  Patient Vitals for the past 24 hrs:   BP Temp Temp src Pulse Resp SpO2 Weight   07/05/23 0905 136/71 97.8  F (36.6  C) Oral 70 18 95 % --   07/05/23 0540 116/60 98.4  F (36.9  C) Oral 74 19 95 % 93.8 kg (206 lb 12.7 oz)   07/04/23 2244 132/71 98.9  F (37.2  C) Oral 84 -- 94 % --   07/04/23 2242 -- 98.9  F (37.2  C) -- -- -- -- --   07/04/23 1913 123/62 97.9  F (36.6  C) Oral 77 18 96 % --   07/04/23 1535 118/68 98.5  F (36.9  C) Oral 79 18 96 % --   07/04/23 1130 129/71 98  F (36.7  C) Oral 79 18 97 % --       Physical Examination:  GENERAL: Well-developed, well-nourished, elderly appearing male. More comfortable appearing.  HEENT:  Head is normocephalic, atraumatic.   EYES:  Eyes have anicteric sclerae without conjunctival injection or stigmata of endocarditis.    ABDOMEN:  Normal bowel sounds, soft, nontender. Distended.   SKIN:  No acute rashes. No stigmata of endocarditis.  MSK: Patient endorsed significant hip/groin pain, but I couldn't reproduce the pain with palpation of the region.   NEUROLOGIC:  Grossly nonfocal. Active x4 extremities.         Laboratory Data:     Inflammatory Markers  CRP Inflammation   Date Value Ref Range Status   07/01/2023 204.37 (H) <5.00 mg/L Final       Hematology Studies    Recent Labs   Lab Test 07/05/23  0923 07/03/23  0621 07/02/23  0734 07/01/23  0805 06/30/23  0418 06/29/23  2237 06/29/23  0939 04/26/23  1301 04/22/23  0647   WBC 14.0*  --  13.0* 13.5* 15.5* 14.7* 18.1*   < > 2.9*   ANEU  --   --   --   --   --   --   --   --  2.2   AEOS  --   --   --   --   --   --   --   --  0.0   HGB 8.4* 7.9* 7.6* 8.0* 8.6* 8.7* 10.0*   < > 8.2*   MCV 82  --  84 83 83 85 83   < > 86     --  357 377 375  --  501*   < > 111*    < > = values in this interval not displayed.       Metabolic Studies     Recent Labs   Lab Test 07/05/23  0529 07/03/23  0621 07/01/23  0805 06/30/23  0418 06/29/23  0939 06/13/23  1134  05/23/23  1205   NA  --   --  138 135* 133* 136 136   POTASSIUM  --   --  4.1 4.2 4.4 4.4 4.5   CHLORIDE  --   --  104 102 96* 104 104   CO2  --   --  25 23 22 21* 21*   BUN  --   --  10.8 11.0 16.4 13.8 15.1   CR 0.53* 0.50* 0.54*  0.54* 0.62* 1.04 0.67 0.54*   GFRESTIMATED >90 >90 >90  >90 >90 77 >90 >90       Hepatic Studies    Recent Labs   Lab Test 06/30/23  0418 06/29/23  0939 06/13/23  1134 05/23/23  1205 05/01/23  1051 04/26/23  1301   BILITOTAL 0.3 0.5 0.2 0.4 0.5 0.4   ALKPHOS 132* 152* 67 66 68 64   ALBUMIN 2.4* 3.3* 3.3* 3.4* 3.6 3.0*   AST 36 92* 13 25 11 12   ALT 78* 124* 20 10 12 12       Microbiology:  Culture   Date Value Ref Range Status   07/01/2023 Enterococcus faecium VRE (A)  Final   06/29/2023 No Growth  Final   06/29/2023 Mixed Urogenital Shonda  Final   06/29/2023 No Growth  Final   04/27/2023 No Growth  Final   04/19/2023 Klebsiella pneumoniae (AA)  Final   04/19/2023 >100,000 CFU/mL Klebsiella pneumoniae (A)  Final   04/19/2023 No Growth  Final   11/17/2021 No Growth  Final   11/17/2021 No Growth  Final       Urine Studies    Recent Labs   Lab Test 06/29/23  1410 04/19/23  0443 03/29/22  1414 11/18/21  0526 03/08/21  0908 03/02/20  1321   LEUKEST Large* Moderate* Negative Negative Negative Negative   WBCU 33* >182*  --  1 1 4       Vancomycin Levels    Recent Labs   Lab Test 07/01/23  0805 04/21/23  0535 04/20/23  0550   VANCOMYCIN 14.6 22.9 16.1       IMAGING  CT a/p 6/29  4 cm abscess deep within the pelvis appears to be in communication  with the urinary bladder and the left ventrolateral aspect of the  rectum.     Above-mentioned abscess abuts the left pubic bone at the symphysis  with likely osteomyelitis. Small gas bubbles are seen within the  abscess cavity as well as surrounding and within the left pubic bone. Small volume of fluid now seen within the presacral soft tissues, likely reactive.      MRI pelvis 6/30/23  IMPRESSION: Lower lumbar spine surgical hardware.  Apparent  nondisplaced fracture medial left acetabulum. Abnormalities in area of  previous air density and irregularity at left superior pubic ramus  very slightly irregular 4 marrow signal on this MRIi, this could be  old posttraumatic or old postinfectious. Marrow edema throughout the  medial left pelvic brain likely related to prior trauma with minimal  edema in the left femoral head which could represent bone contusion.  No visible fracture line in this area no other obvious avascular  necrosis in this area at this time. Extensive muscle edema likely  related to previous trauma in the left internal pelvic musculature.  Small amount of pelvic free fluid. Jiménez catheter.

## 2023-07-05 NOTE — PROGRESS NOTES
CARE MANAGEMENT    7/5-CHW completed the IMM with the Patient. Patient received a copy of the form.        José Miguel PAT[facility] Worker

## 2023-07-05 NOTE — PROGRESS NOTES
[facility]    Medicine Progress Note - Hospitalist Service, GOLD TEAM 17    Date of Admission:  6/29/2023    Assessment & Plan   Dilip Kan is a 68 year old male with PMHx of HTN, DM2, bilateral PE on AC and metastatic prostate cancer with bony and liver mets, s/p radiation c/b radiation cysitis and chemotherapy who was sent in from oncology clinic with fever and hypotension to OSH from where he was transferred to [facility] on 6/29/23 for admission with sepsis likely 2/2 urinary source vs pelvic abscess with concern for rectovesical fistula and pelvic osteomyelitis.      #Severe Sepsis   #Pelvic abscess   #Rectovesical fistula ruled out by CT cystogram  #Pubic bone osteomyelitis ruled out by MRI  -Met criteria for severe sepsis w/ fever (temp 101.1  F), hypotension, tachypnea, tachycardia, leukocytosis (wbc 18.1) and elevated lactic acid at 3.3.  -UA w/ large blood, large leuk esterase, - nitrite, wbc clumps, few bacteria  -CXR clear.   -SARS CoV-2, influenza A/B and RSV PCR negative  -NGTD from blood and urine cx  -CT abdomen pelvis with 4 cm abscess deep within the pelvis, possibly communicating with the bladder and left ventrolateral aspect of the rectum. Abscess also near her left pubic bone at the symphysis with concern for osteomyelitis   -IR reviewed pt's CT scan and felt that the 4 cm deep pelvic abscess not amenable to percutaneous drainage  -Urology consulted for concern of colovesical fistula. CT cystogram negative for fistula with rectum.  No surgical intervention is planned.      Per discussion with urology, okay to discontinue Jiménez catheter.    -Case discussed via phone w/ CRS on 6/30; the 4 cm deep pelvis abscess not amenable to surgical intervention.  Trial of IV abx and further eval w/ pelvic MRI recommended.  Diverting colostomy may be needed if the 4 cm pelvic abscess does not improve w/ IV antibiotics.  -MRI pelvis 6/30 negative for pelvic osteo but  revealed pelvic abscess  -Hematology rec is to hold chemo for now  - ID recommended to keep pt on current empiric vancomycin, ceftriaxone and Flagyl and obtain MRSA nasal swab and VRE rectal/stool screen +    -Ortho consulted to provide opinion on the chronic pathologic nondisplaced fracture of medial left acetabulum.  No surgical intervention.  Outpatient f/u recommended.  WBAT  -S/p IV Vanco, IV Flagyl and IV Ceftriaxone 6/29 - 7/2    On IV Zosyn 4.5 g q6 hr since 7/2     Linezolid 600 mg IV q12h added given + VRE in pt's stool.   -Drug interactions with epi and Trazodone.   -Epi is a prn med  -Trazodone is just at night and usually ok to have linezolid + 1 antidepressant.  -Duration of treatment to be determine per ID but at this point would consider a 6 week empiric IV course for treatment of pelvic abscess with possible osteomyelitis     Antibiotic regimen at the time of discharge per ID:    Linezolid 600 mg every 12 hours x2 weeks after discharge    Levofloxacin 750 mg daily    Metronidazole 500 mg every 8 hours    Repeat CT abdomen pelvis in 6 weeks.    #TRINY   -Baseline creatinine 0.5.    -Creatinine elevated to 1.04 upon admission.    -Possibly prerenal vs ATN vs postobstructive uropathy   -Jiménez cath placed in the ED  -Treated w/ IVF  -Creatinine now normal     Avoid nephrotoxic agents      #Hyponatremia - resolved  -Mild w/ Na level of 133 at admit     #Transaminitis  -Due to prostate ca w/ met to liver  -Monitor      #Metastatic prostate ca  -Met to bone and liver.   -Patient following with oncology closely.    -Please see progress note from 6/28/2023 for details.    -S/p radiation and chemo.  Completed 4 cycles of docetaxel on 6/13/2023.   -Urology and oncology consults following    Continue PTA prednisone 5 mg BID     #Chronic cancer related pain   ---   Left hip pain due prostate ca w/ met to left acetabulum and known left acetabular pathologic fracture, seen on imaging studies  ---   Patient reports  inability to bear weight 2/2 pain.   ---   Oncologist just transitioned pt to long acting pain medications.   ---   Continue PTA lyrica 150 mg BID  ---   Tylenol 975 mg TID   ---   Continue OxyContin 20 mg Q12H  ---   Increased PTA Oxycodone to 10 mg po q4 hr on 7/2  ---   Added Lidoderm 2 patches on 7/2  ---   Also started pt on IV dilaudid  ---   Pt is under better control today  ---   See by Orthopedics consult service on 7/2 and rec is non-operative management.  WBAT.  Pt will f/u w/ [facility] oncology ortho staff, Dr. Osbaldo Acharya in 1-3 weeks after discharge  ---   PT consulted     #Urinary retention   ---   2/2 prostate ca.   ---   Self caths at home.   ---   Jiménez catheter placed in ED.     #Acute PE and DVT   ---   Diagnosed in 5/2023  ---   S/p heparin drip 6/29 - 7/1  ---   Back on PTA Eliquis on 7/1 since currently no plan for surgical intervention     #T2DM  ---   Hgba1c was 6.7% on 2/20/23.   ---   Hold PTA metformin during hospitalization  ---   Continue Medium correctional sliding scale TIDAC and nightly      #HTN   ---   Reintroduce PTA amlodipine and lasix since hypotension resolved     #HLD   ---   Continue PTA lipitor 20 mg at bedtime      #Anxiety d/o  #Insomnia  ---   Continue PTA trazodone 50 mg at bedtime, ativan 0.5 mg Q6H PRN, melatonin PRN and atarax 10 mg Q6H PRN     #ACD  ---   Due prostate ca and recent chemo  ---   Hgb stable at 7.9 g today  ---   Check CBC in am           Diet:  Regular  DVT Prophylaxis: DOAC  Jiménez Catheter:  Present  Lines: None     Cardiac Monitoring: None  Code Status: Full Code  Disposition Plan   Patient to be discharged morning of 7/6/2023.  Plan to discontinue Jiménez catheter today, followed by trial void.    KAMALJIT BETTENCOURT MD  Hospitalist Service, GOLD TEAM [address] [place] [address]  Securely message with [employer] (more info)  Text page via [employer] Paging/Directory   See signed in provider for up to date coverage  information  ______________________________________________________________________    Interval History   -No new issues  -Uneventful night  -HD remained stable  -No hematuria, or abdominal pain.  -Seen by PT yesterday recommending home with therapy.      Physical Exam   Vital Signs: Temp: 98.4  F (36.9  C) Temp src: Oral BP: 116/60 Pulse: 74   Resp: 19 SpO2: 95 % O2 Device: None (Room air)    Weight: 206 lbs 12.66 oz  General: aao x 3, NAD.  HEENT:  NC/AT, neck supple  CVS:  NL s 1 and s2, 2/6 systolic murmur, no r/g.  Lungs:  CTA B/L.   Abd:  Soft, + bs, NT, no rebound or gaurding, no fluid shift.  Ext:  No c/c.  Lymph:  No edema.  Neuro:  Nonfocal.  Musculoskeletal: No calf tenderness to palpation.    Skin:  No rash.  Psychiatry:  Mood and affect appropriate.      Data     I have personally reviewed the following data over the past 24 hrs:    N/A  \   N/A   / N/A     N/A N/A N/A /  160 (H)   N/A N/A 0.53 (L) \       Imaging results reviewed over the past 24 hrs:   No results found for this or any previous visit (from the past 24 hour(s)).

## 2023-07-05 NOTE — PROGRESS NOTES
"SPIRITUAL HEALTH SERVICES Progress Note  [facility] ([facility]) 6B    Saw pt Dilip Kan per length of stay ( self-initiated).    Patient/Family Understanding of Illness and Goals of Care - Jermain was brought to [facility] from a hospital [facility] with sepsis/infection. It was a bit worrisome to be transferred suddenly but Jermain is going through chemotherapy for cancer and his cancer markers are down, which is good, but \"chemo kills the good and bad cells and my good cells can't fight off this infection on their own.\" He believes he may be getting discharged in the near future and [place]     Distress and Loss - Jermain did not express distress.     Strengths, Coping, and Resources - Jermain shared that he \"tries not to dwell on the negative and get depressed and spiral,\" and instead has focused on his diane and family. His children and grandchildren keep him going and at \"only age 68\" he hopes for many more years of life.     Meaning, Beliefs, and Spirituality - Jermain is [employer] and says his prayers regularly. Unfortunately because of his illness he has not been able to attend [employer] but helpful to his coping is saying his rosary and staying connected to God.     Plan of Care - This  provided a prayer. In addition, Jermain did not have his rosary so this  returned later with a rosary for Jermain to have.     Tristan Crawford MDiv  Chaplain Resident  Pager [phone]    * [facility] remains available 24/7 for emergent requests/referrals, either by having the switchboard page the on-call  or by entering an ASAP/STAT consult in Epic (this will also page the on-call ). Routine Epic consults receive an initial response within 24 hours.*    "

## 2023-07-05 NOTE — TELEPHONE ENCOUNTER
Apt scheduled with Dr. Acharya in about 2.5 wk. Pt is still in-patient. Apt will appear on discharge paper.         -MARTHA Lockwood- Orthopedics      ----- Message from Debi Ledesma LPN sent at 7/3/2023 11:11 AM CDT -----  Regarding: Patient has not been discharged.    ----- Message -----  From: Pablito Lechuga MD  Sent: 7/3/2023   8:11 AM CDT  To: [facility] [place]    Please schedule this patient with Dr. Acharya 2-3 weeks after discharge from his current inpatient stay.     Thank you very much    - Pablito

## 2023-07-05 NOTE — PROGRESS NOTES
END OF SHIFT SUMMARY NOTE: For vital signs and complete assessments, please see documentation flowsheets.     Assessment:  Afebrile,Alert, oriented x 4, able to verbalize needs,Regular Diet, woked with PT today, RA, denies chest pain , Visible skin is intact; PIV on the right arm, infusing well, denies chest discomfort, denies SOB /  Last BM . Scheduled Miralax and Senna given refused, on hooks draining to light yellow /red  color output, able to move all extremities.       Major Shift Events:  Complained pain of generalized pain, rating of 7/10, refused catheter removal as patient self cath at home, wanting to see the self cath sizes we have here in med/surg before removing his Hooks catheter, well pass on to the next shift  Plan:PRN and scheduled pain medication          : PT          : Ambulate          : Hooks removal as ordered when patient allows

## 2023-07-06 ENCOUNTER — PATIENT OUTREACH (OUTPATIENT)
Dept: ONCOLOGY | Facility: OTHER | Age: 68
End: 2023-07-06
Payer: COMMERCIAL

## 2023-07-06 ENCOUNTER — TELEPHONE (OUTPATIENT)
Dept: UROLOGY | Facility: CLINIC | Age: 68
End: 2023-07-06

## 2023-07-06 ENCOUNTER — APPOINTMENT (OUTPATIENT)
Dept: PHYSICAL THERAPY | Facility: CLINIC | Age: 68
DRG: 871 | End: 2023-07-06
Attending: STUDENT IN AN ORGANIZED HEALTH CARE EDUCATION/TRAINING PROGRAM
Payer: COMMERCIAL

## 2023-07-06 VITALS
DIASTOLIC BLOOD PRESSURE: 69 MMHG | RESPIRATION RATE: 16 BRPM | SYSTOLIC BLOOD PRESSURE: 127 MMHG | TEMPERATURE: 98.8 F | BODY MASS INDEX: 29.67 KG/M2 | HEART RATE: 81 BPM | OXYGEN SATURATION: 97 % | WEIGHT: 206.79 LBS

## 2023-07-06 DIAGNOSIS — C61 MALIGNANT NEOPLASM OF PROSTATE (H): Primary | ICD-10-CM

## 2023-07-06 DIAGNOSIS — G89.3 CANCER ASSOCIATED PAIN: ICD-10-CM

## 2023-07-06 DIAGNOSIS — C79.51 MALIGNANT NEOPLASM METASTATIC TO BONE (H): ICD-10-CM

## 2023-07-06 LAB
GLUCOSE BLDC GLUCOMTR-MCNC: 125 MG/DL (ref 70–99)
GLUCOSE BLDC GLUCOMTR-MCNC: 129 MG/DL (ref 70–99)
GLUCOSE BLDC GLUCOMTR-MCNC: 134 MG/DL (ref 70–99)
GLUCOSE BLDC GLUCOMTR-MCNC: 144 MG/DL (ref 70–99)
GLUCOSE BLDC GLUCOMTR-MCNC: 145 MG/DL (ref 70–99)

## 2023-07-06 PROCEDURE — 250N000013 HC RX MED GY IP 250 OP 250 PS 637: Performed by: PHYSICIAN ASSISTANT

## 2023-07-06 PROCEDURE — 99239 HOSP IP/OBS DSCHRG MGMT >30: CPT | Performed by: INTERNAL MEDICINE

## 2023-07-06 PROCEDURE — 97530 THERAPEUTIC ACTIVITIES: CPT | Mod: GP

## 2023-07-06 PROCEDURE — 250N000013 HC RX MED GY IP 250 OP 250 PS 637: Performed by: INTERNAL MEDICINE

## 2023-07-06 PROCEDURE — 97110 THERAPEUTIC EXERCISES: CPT | Mod: GP

## 2023-07-06 PROCEDURE — 250N000011 HC RX IP 250 OP 636: Mod: JZ | Performed by: STUDENT IN AN ORGANIZED HEALTH CARE EDUCATION/TRAINING PROGRAM

## 2023-07-06 PROCEDURE — 250N000011 HC RX IP 250 OP 636: Performed by: INTERNAL MEDICINE

## 2023-07-06 PROCEDURE — 250N000012 HC RX MED GY IP 250 OP 636 PS 637: Performed by: PHYSICIAN ASSISTANT

## 2023-07-06 PROCEDURE — 97116 GAIT TRAINING THERAPY: CPT | Mod: GP

## 2023-07-06 RX ORDER — LEVOFLOXACIN 750 MG/1
750 TABLET, FILM COATED ORAL DAILY
Qty: 42 TABLET | Refills: 0 | Status: SHIPPED | OUTPATIENT
Start: 2023-07-06 | End: 2023-08-17

## 2023-07-06 RX ORDER — METRONIDAZOLE 500 MG/1
500 TABLET ORAL 3 TIMES DAILY
Qty: 126 TABLET | Refills: 0 | Status: SHIPPED | OUTPATIENT
Start: 2023-07-06 | End: 2023-08-17

## 2023-07-06 RX ORDER — OXYCODONE HYDROCHLORIDE 10 MG/1
10 TABLET ORAL EVERY 6 HOURS PRN
Qty: 12 TABLET | Refills: 0 | Status: SHIPPED | OUTPATIENT
Start: 2023-07-06 | End: 2023-07-07

## 2023-07-06 RX ORDER — LINEZOLID 600 MG/1
600 TABLET, FILM COATED ORAL EVERY 12 HOURS
Qty: 28 TABLET | Refills: 0 | Status: SHIPPED | OUTPATIENT
Start: 2023-07-06 | End: 2023-07-20

## 2023-07-06 RX ORDER — OXYCODONE HCL 20 MG/1
20 TABLET, FILM COATED, EXTENDED RELEASE ORAL EVERY 12 HOURS
Qty: 16 TABLET | Refills: 0 | Status: SHIPPED | OUTPATIENT
Start: 2023-07-06 | End: 2023-07-07

## 2023-07-06 RX ADMIN — OXYCODONE HYDROCHLORIDE 10 MG: 10 TABLET ORAL at 15:42

## 2023-07-06 RX ADMIN — APIXABAN 5 MG: 5 TABLET, FILM COATED ORAL at 08:22

## 2023-07-06 RX ADMIN — Medication 1 CAPSULE: at 08:22

## 2023-07-06 RX ADMIN — LIDOCAINE PATCH 4% 2 PATCH: 40 PATCH TOPICAL at 08:22

## 2023-07-06 RX ADMIN — PREDNISONE 5 MG: 5 TABLET ORAL at 08:22

## 2023-07-06 RX ADMIN — LINEZOLID 600 MG: 600 TABLET, FILM COATED ORAL at 08:22

## 2023-07-06 RX ADMIN — HYDROMORPHONE HYDROCHLORIDE 0.5 MG: 1 INJECTION, SOLUTION INTRAMUSCULAR; INTRAVENOUS; SUBCUTANEOUS at 13:42

## 2023-07-06 RX ADMIN — OXYCODONE HYDROCHLORIDE 10 MG: 10 TABLET ORAL at 08:22

## 2023-07-06 RX ADMIN — OXYCODONE HYDROCHLORIDE 20 MG: 10 TABLET, FILM COATED, EXTENDED RELEASE ORAL at 10:25

## 2023-07-06 RX ADMIN — HYDROMORPHONE HYDROCHLORIDE 0.5 MG: 1 INJECTION, SOLUTION INTRAMUSCULAR; INTRAVENOUS; SUBCUTANEOUS at 06:47

## 2023-07-06 RX ADMIN — PIPERACILLIN AND TAZOBACTAM 4.5 G: 4; .5 INJECTION, POWDER, FOR SOLUTION INTRAVENOUS at 03:30

## 2023-07-06 RX ADMIN — PIPERACILLIN AND TAZOBACTAM 4.5 G: 4; .5 INJECTION, POWDER, FOR SOLUTION INTRAVENOUS at 10:25

## 2023-07-06 RX ADMIN — MEGESTROL ACETATE 20 MG: 20 TABLET ORAL at 08:22

## 2023-07-06 RX ADMIN — OXYCODONE HYDROCHLORIDE 10 MG: 10 TABLET ORAL at 03:37

## 2023-07-06 RX ADMIN — ACETAMINOPHEN 975 MG: 325 TABLET, FILM COATED ORAL at 06:45

## 2023-07-06 RX ADMIN — ACETAMINOPHEN 975 MG: 325 TABLET, FILM COATED ORAL at 13:42

## 2023-07-06 RX ADMIN — PREGABALIN 150 MG: 75 CAPSULE ORAL at 08:22

## 2023-07-06 RX ADMIN — HYDROMORPHONE HYDROCHLORIDE 0.5 MG: 1 INJECTION, SOLUTION INTRAMUSCULAR; INTRAVENOUS; SUBCUTANEOUS at 16:50

## 2023-07-06 RX ADMIN — LISINOPRIL 40 MG: 20 TABLET ORAL at 08:22

## 2023-07-06 RX ADMIN — ATORVASTATIN CALCIUM 20 MG: 20 TABLET, FILM COATED ORAL at 08:22

## 2023-07-06 RX ADMIN — PIPERACILLIN AND TAZOBACTAM 4.5 G: 4; .5 INJECTION, POWDER, FOR SOLUTION INTRAVENOUS at 15:42

## 2023-07-06 RX ADMIN — PANTOPRAZOLE SODIUM 40 MG: 40 TABLET, DELAYED RELEASE ORAL at 08:22

## 2023-07-06 RX ADMIN — HYDROXYZINE HYDROCHLORIDE 10 MG: 10 TABLET ORAL at 15:42

## 2023-07-06 ASSESSMENT — ACTIVITIES OF DAILY LIVING (ADL)
ADLS_ACUITY_SCORE: 31

## 2023-07-06 NOTE — DISCHARGE SUMMARY
"[facility]  Hospitalist Discharge Summary      Date of Admission:  6/29/2023  Date of Discharge:  7/6/2023  Discharging Provider: KAMALJIT BETTENCOURT MD  Discharge Service: Hospitalist Service, GOLD TEAM 17    Discharge Diagnoses   #Severe Sepsis   #Pelvic abscess   #Rectovesical fistula ruled out by CT cystogram  #Pubic bone osteomyelitis ruled out by MRI  #Left hip pain  #Left acetabular pathologic fracture  #Acute kidney injury  #Hyponatremia-resolved  #Transaminitis  #History of metastatic prostate cancer  #Chronic pain related to malignancy  #History of urinary retention- patient to continue intermittent catheterization  #History of PE, DVT related to malignancy  #Anemia of chronic disease  #Hyperlipidemia  #Type 2 diabetes mellitus    Clinically Significant Risk Factors     # DMII: A1C = N/A within past 6 months  # Overweight: Estimated body mass index is 29.67 kg/m  as calculated from the following:    Height as of an earlier encounter on 6/29/23: 1.778 m (5' 10\").    Weight as of this encounter: 93.8 kg (206 lb 12.7 oz).       Follow-ups Needed After Discharge   Follow-up Appointments     Adult [facility] Follow-up and recommended labs and tests      Follow up with primary care provider, Wei Perez, within 7 days   for hospital follow- up.  The following labs/tests are recommended:   Biweekly CBC, CMP, ESR, CRP.      Patient also needs follow-up with infectious disease, orthopedic surgery,   as well as oncology.    Appointments on [place] and/or [facility] (with [facility] or [facility]   provider or service). Call [phone] if you haven't heard regarding   these appointments within 7 days of discharge.              Discharge Disposition   Discharged to home  Condition at discharge: Good    Hospital Course   Dilip Kan is a 68 year old male with PMHx of HTN, DM2, bilateral PE on AC and metastatic prostate cancer with bony and liver mets, s/p radiation c/b " radiation cysitis and chemotherapy who was sent in from oncology clinic with fever and hypotension to OSH from where he was transferred to [facility] on 6/29/23 for admission with sepsis likely 2/2 urinary source vs pelvic abscess with concern for rectovesical fistula and pelvic osteomyelitis.      #Severe Sepsis   #Pelvic abscess   #Rectovesical fistula ruled out by CT cystogram  #Pubic bone osteomyelitis ruled out by MRI  -Met criteria for severe sepsis w/ fever (temp 101.1  F), hypotension, tachypnea, tachycardia, leukocytosis (wbc 18.1) and elevated lactic acid at 3.3.  -UA w/ large blood, large leuk esterase, - nitrite, wbc clumps, few bacteria  -CXR clear.   -SARS CoV-2, influenza A/B and RSV PCR negative  -NGTD from blood and urine cx  -CT abdomen pelvis with 4 cm abscess deep within the pelvis, possibly communicating with the bladder and left ventrolateral aspect of the rectum. Abscess also near her left pubic bone at the symphysis with concern for osteomyelitis   -IR reviewed pt's CT scan and felt that the 4 cm deep pelvic abscess not amenable to percutaneous drainage  -Urology consulted for concern of colovesical fistula. CT cystogram negative for fistula with rectum.  No surgical intervention is planned.      Per discussion with urology, okay to discontinue Jiménez catheter.    -Case discussed via phone w/ CRS on 6/30; the 4 cm deep pelvis abscess not amenable to surgical intervention.  Trial of IV abx and further eval w/ pelvic MRI recommended.  Diverting colostomy may be needed if the 4 cm pelvic abscess does not improve w/ IV antibiotics.  -MRI pelvis 6/30 negative for pelvic osteo but revealed pelvic abscess  -Hematology rec is to hold chemo while inpatient.  -Initially per ID recommendation, patient was on empiric vancomycin, ceftriaxone and Flagyl and obtain MRSA nasal swab and VRE rectal/stool screen +    -S/p IV Vanco, IV Flagyl and IV Ceftriaxone 6/29 - 7/2    On IV Zosyn 4.5 g q6 hr since 7/2 onto the  time of discharge.     Linezolid 600 mg IV q12h added given + VRE in pt's stool.   -Drug interactions with epi and Trazodone.   -Epi is a prn med  -Trazodone is just at night and usually ok to have linezolid + 1 antidepressant.  -Duration of treatment to be determine per ID but at this point would consider a 6 week empiric IV course for treatment of pelvic abscess with possible osteomyelitis     Antibiotic regimen at the time of discharge per ID:  ? Linezolid 600 mg every 12 hours x2 weeks after discharge  ? Levofloxacin 750 mg daily  ? Metronidazole 500 mg every 8 hours  ? Repeat CT abdomen pelvis in 6 weeks.     #TRINY - resolved  -Baseline creatinine 0.5.    -Creatinine elevated to 1.04 upon admission.    -Possibly prerenal vs ATN vs postobstructive uropathy   -Jiménez cath placed in the ED  -Treated w/ IVF  -Creatinine now normal     #Hyponatremia - resolved     #Transaminitis  -Due to prostate ca w/ met to liver  -Monitor      #Metastatic prostate ca  -Met to bone and liver.   -Patient following with oncology closely.    -Please see progress note from 6/28/2023 for details.    -S/p radiation and chemo.  Completed 4 cycles of docetaxel on 6/13/2023.     Patient to follow-up with urology and oncology on discharge.    Continue PTA prednisone 5 mg BID     #Chronic cancer related pain  #Left acetabulum pathologic fracture  -Left hip pain due prostate ca w/ met to left acetabulum and known left acetabular pathologic fracture, seen on imaging studies  -Patient reports inability to bear weight 2/2 pain.   -Oncologist just transitioned pt to long acting pain medications.     Patient to continue PTA lyrica 150 mg BID    Patient to Tylenol 975 mg TID     Patient to continue OxyContin 20 mg, as well as as needed oxycodone.    Patient was seen by Orthopedics consult service on 7/2 and rec is non-operative management.      WBAT.      Pt will f/u w/ [facility] oncology ortho staff, Dr. Osbaldo Acharya in 1-3 weeks after  discharge     #Urinary retention     Patient to continue self caths at home.     #Acute PE and DVT   -Diagnosed in 5/2023  -S/p heparin drip 6/29 - 7/1    Patient now back on PTA Eliquis on 7/1 since currently no plan for surgical intervention     #T2DM  -Hgba1c was 6.7% on 2/20/23.     Patient to continue PTA metformin.     #HTN     Patient to continue PTA amlodipine and lasix since hypotension resolved     #HLD     Patient to continue PTA lipitor 20 mg at bedtime      #Anxiety d/o  #Insomnia    Patient to continue PTA trazodone 50 mg at bedtime, ativan 0.5 mg Q6H PRN, melatonin PRN and atarax 10 mg Q6H PRN        Consultations This Hospital Stay   UROLOGY IP CONSULT  INTERVENTIONAL RADIOLOGY ADULT/PEDS IP CONSULT  INFECTIOUS DISEASE [facility] ADULT IP CONSULT  PHARMACY IP CONSULT  ONCOLOGY ADULT IP CONSULT  PHARMACY TO DOSE VANCO  COLORECTAL SURGERY ADULT IP CONSULT  ORTHOPAEDIC SURGERY ADULT/PEDS IP CONSULT  CARE MANAGEMENT / SOCIAL WORK IP CONSULT  PHYSICAL THERAPY ADULT IP CONSULT    Code Status   Full Code    Time Spent on this Encounter   I, KAMALJIT BETTENCOURT MD, personally saw the patient today and spent greater than 30 minutes discharging this patient.       KAMALJIT BETTENCOURT MD  [facility] MED SURG  [facility] [address]  Phone: [phone]  Fax: [phone]  ______________________________________________________________________    Physical Exam   Vital Signs: Temp: 98  F (36.7  C) Temp src: Oral BP: 131/69 Pulse: 71   Resp: 16 SpO2: 96 % O2 Device: None (Room air)    Weight: 206 lbs 12.66 oz    General Appearance: Lying comfortably in bed, on room air, in no acute distress or discomfort.  HEENT: PERRL: EOMI; moist mucous membrane w/o lesions  Neck: No JVD  Pulmonary: Clear to auscultation bilaterally, no wheezes or crackles  CVS: Regular rhythm, systolic murmur, no rubs or gallops.   GI: BS (+), soft nontender, no rebound or guarding   Extremities: Moves all  extremities spontaneously  Skin: No rashes or lesions  Neurologic: A&O x3  : Jiménez catheter in place, draining urine.         Primary Care Physician   Wei Perez    Discharge Orders      CT Abdomen Pelvis w Contrast     CBC with platelets     Comprehensive metabolic panel (BMP + Alb, Alk Phos, ALT, AST, Total. Bili, TP)     ESR: Erythrocyte sedimentation rate     CRP, inflammation     Infectious Disease Referral      Reason for your hospital stay    Patient presented to the hospital due to hypotension, found to be in sepsis in the setting of pelvic abscess requiring broad-spectrum IV antibiotics.  Imaging obtained on admission shows communication between pelvic abscess and bladder.  Patient was seen by urology recommending no acute intervention in the inpatient setting.  Infectious disease also consulted, recommending ongoing antibiotics for about 6 to 8 weeks.  Patient will also need repeat imaging in about 6 weeks.  Of note, patient had Jiménez catheter placed.  Patient will not be going home to continue intermittent catheterization.  Patient was also seen by orthopedic surgery given left medial wall nondisplaced acetabular fracture, Ortho will follow patient outside of the hospital and recommends weightbearing as tolerated.  Patient will need to follow orthopedic surgery in 2 to 3 weeks after discharge.     Activity    Your activity upon discharge: activity as tolerated     Adult [facility] Follow-up and recommended labs and tests    Follow up with primary care provider, Wei Perez, within 7 days for hospital follow- up.  The following labs/tests are recommended: Biweekly CBC, CMP, ESR, CRP.      Patient also needs follow-up with infectious disease, orthopedic surgery, as well as oncology.    Appointments on [place] and/or [facility] (with [facility] or [facility] provider or service). Call [phone] if you haven't heard regarding these appointments within 7 days of discharge.     Diet    Follow this  diet upon discharge: Orders Placed This Encounter      Regular Diet Adult       Significant Results and Procedures   Most Recent 3 CBC's:Recent Labs   Lab Test 07/05/23  0923 07/03/23  0621 07/02/23  0734 07/01/23  0805   WBC 14.0*  --  13.0* 13.5*   HGB 8.4* 7.9* 7.6* 8.0*   MCV 82  --  84 83     --  357 377     Most Recent 3 BMP's:Recent Labs   Lab Test 07/06/23  1645 07/06/23  1259 07/06/23  1113 07/05/23  0723 07/05/23  0529 07/03/23  0806 07/03/23  0621 07/01/23  1223 07/01/23  0805 06/30/23  0812 06/30/23  0418 06/29/23  2300 06/29/23  0939   NA  --   --   --   --   --   --   --   --  138  --  135*  --  133*   POTASSIUM  --   --   --   --   --   --   --   --  4.1  --  4.2  --  4.4   CHLORIDE  --   --   --   --   --   --   --   --  104  --  102  --  96*   CO2  --   --   --   --   --   --   --   --  25  --  23  --  22   BUN  --   --   --   --   --   --   --   --  10.8  --  11.0  --  16.4   CR  --   --   --   --  0.53*  --  0.50*  --  0.54*  0.54*  --  0.62*  --  1.04   ANIONGAP  --   --   --   --   --   --   --   --  9  --  10  --  15   ROBERTO  --   --   --   --   --   --   --   --  9.0  --  9.1  --  9.6   * 134* 145*   < >  --    < >  --    < > 126*   < > 160*   < > 135*    < > = values in this interval not displayed.     Most Recent 2 LFT's:Recent Labs   Lab Test 06/30/23 0418 06/29/23  0939   AST 36 92*   ALT 78* 124*   ALKPHOS 132* 152*   BILITOTAL 0.3 0.5     Most Recent 3 INR's:Recent Labs   Lab Test 06/30/23  0418 04/19/23  0240 11/01/18  0849   INR 1.50* 1.08 0.95   ,   Results for orders placed or performed during the hospital encounter of 06/29/23   CT Cystogram wo & w Contrast    Narrative    EXAMINATION: CT CYSTOGRAM WO & W CONTRAST, 6/30/2023 4:18 PM    TECHNIQUE:  Helical CT images from the lung bases through the  symphysis pubis were obtained with contrast.  Coronal reformatted  images were generated at a workstation for further assessment.    COMPARISON: CT 6/29/2023,  6/1/2023.    HISTORY: patient with concern for fistulizing pelvic abscess to the  bladder and rectum    FINDINGS:    Bladder: Jiménez bulb appears to be low lying within the previously  described abscess/fluid collection along the inferior bladder margin.  No findings to suggest extravasation of contrast outside of the  bladder/collection. Hyperdensities just posterior to the pubic ramus  (series 3, image 213) are favored to represent reactive bone formation  in the setting of suspected osteomyelitis. No evidence for fistula  formation to the rectum. Circumferential wall thickening of the  urinary bladder. Antidependant air, which is likely within the bladder  lumen, less likely within the wall.    Pelvic organs: Prostatectomy. Collection in the prostatectomy bed as  described above.    Gastrointestinal tract: No dilated loops of bowel. Circumferential  thickening and submucosal edema involving the rectum. Prior  appendectomy.     Lymph nodes: No suspicious lymphadenopathy.    Peritoneum/mesentery: Similar presacral edema and mild fluid.     Vessels: No aneurysmal dilatation of the visualized aorta or major  branch vessels.  No significant atherosclerotic disease.    Bones and soft tissues: Soft tissue gas and reactive osseous changes  of the left pubic symphysis suggestive of osteomyelitis, similar to  prior. Similar sclerosis of the left acetabulum and left ischium.  Lumbar fusion changes at L4-5. Small fat containing bilateral inguinal  hernias.       Impression    IMPRESSION:     1. Redemonstrated air-fluid containing collection along the urinary  bladder neck with interval indwelling Jiménez bulb. The collection is  contiguous with the urinary bladder. No extravasation of contrast  beyond the collection or bladder lumen. No evidence of fistulous  communication with rectum.     2. Soft tissue gas and reactive osseous changes of the left pubic  symphysis suggestive of osteomyelitis, similar to prior. Unchanged  mild  sclerotic changes in adjacent left acetabulum and left ischium.     3. Reactive circumferential wall thickening of the urinary bladder  secondary to ongoing infection/inflammation.    4. Circumferential thickening and submucosal edema involving the  rectum, suggestive of proctitis.     I have personally reviewed the examination and initial interpretation  and I agree with the findings.    BRENDA LOCKWOOD MD         SYSTEM ID:  [id number]   MR Pelvis (Intrapelvic Organs) wo Contrast    Narrative    Pelvis MRI without contrast    INDICATION: Pelvic abscess and osteomyelitis    COMPARISON: Left hip MRI 3/6/2023 abdomen pelvis CT 6/30/2023.    Contrast: None    FINDINGS: Low signal artifact from surgical hardware from spinal  surgery including pedicle screws bilaterally. Well-circumscribed  artifact from Jiménez catheter balloon in the decompressed urinary  bladder. No enlarged inguinal or pelvic lymph nodes. Small amount of  dependent pelvic free fluid. Sigmoid colon is mildly redundant.  Disc space narrowing throughout the lumbar spine included portion.  This consistent with underlying degenerative disc disease as well.  There are some low signal T1 changes in the left parasymphyseal pubic  bone and there is soft tissue heterogeneity inferior the left superior  pubic ramus compared to the right. This correlates with the recent CT  abnormality of concern there are infection/inflammation. There is  asymmetric enlargement left obturator externus and internus muscles.  Extensive bone marrow edema noted in the medial acetabulum a left  ischium with cortical irregularity consistent with fracture. Other  muscle edema also noted on the left especially involving pectineus  muscle. No sacral edema. Mild edematous changes in the left femoral  head. Low signal T1 marrow changes noted at site of there appears to  be a fracture in the left ischial bone. There is an area of sharply  defined lucency on the recent CT in this area.       Impression    IMPRESSION: Lower lumbar spine surgical hardware. Apparent  nondisplaced fracture medial left acetabulum. Abnormalities in area of  previous air density and irregularity at left superior pubic ramus  very slightly irregular 4 marrow signal on this MRIi, this could be  old posttraumatic or old postinfectious. Marrow edema throughout the  medial left pelvic brain likely related to prior trauma with minimal  edema in the left femoral head which could represent bone contusion.  No visible fracture line in this area no other obvious avascular  necrosis in this area at this time. Extensive muscle edema likely  related to previous trauma in the left internal pelvic musculature.  Small amount of pelvic free fluid. Jiménez catheter.    JADIEL ROSSI MD         SYSTEM ID:  [id number]   XR Pelvis 1/2 Views    Narrative    EXAM: XR PELVIS 1/2 VIEWS  LOCATION: [facility]  DATE: 7/2/2023    INDICATION: Pelvic pain  COMPARISON: 06/30/2023 MRI.      Impression    IMPRESSION:   1.  Nondisplaced fracture of the left medial and posterior acetabulum.  2.  Nondisplaced fracture of the left inferior pubic ramus.  3.  Advanced left hip degenerative arthrosis.  4.  Mild/moderate right hip degenerative arthrosis.  5.  Instrumented fusion in the lower lumbar spine noted.  6.  Jiménez catheter.        Discharge Medications   Current Discharge Medication List      START taking these medications    Details   levofloxacin (LEVAQUIN) 750 MG tablet Take 1 tablet (750 mg) by mouth daily for 42 days  Qty: 42 tablet, Refills: 0    Associated Diagnoses: Pelvic abscess in male (H)      linezolid (ZYVOX) 600 MG tablet Take 1 tablet (600 mg) by mouth every 12 hours for 14 days  Qty: 28 tablet, Refills: 0    Associated Diagnoses: Pelvic abscess in male (H)      metroNIDAZOLE (FLAGYL) 500 MG tablet Take 1 tablet (500 mg) by mouth 3 times daily for 42 days  Qty: 126 tablet, Refills: 0    Associated  Diagnoses: Pelvic abscess in male (H)      oxyCODONE (OXYCONTIN) 20 MG 12 hr tablet Take 1 tablet (20 mg) by mouth every 12 hours  Qty: 16 tablet, Refills: 0    Associated Diagnoses: Pelvic abscess in male (H)      oxyCODONE IR (ROXICODONE) 10 MG tablet Take 1 tablet (10 mg) by mouth every 6 hours as needed for moderate to severe pain  Qty: 12 tablet, Refills: 0    Associated Diagnoses: Pelvic abscess in male (H)         CONTINUE these medications which have NOT CHANGED    Details   amLODIPine (NORVASC) 10 MG tablet Take 1 tablet (10 mg) by mouth daily  Qty: 90 tablet, Refills: 3    Associated Diagnoses: Essential hypertension      apixaban ANTICOAGULANT (ELIQUIS) 5 MG tablet Take 1 tablet (5 mg) by mouth 2 times daily  Qty: 60 tablet, Refills: 3    Associated Diagnoses: Malignant neoplasm of prostate (H); Shortness of breath; Elevated d-dimer; Other acute pulmonary embolism without acute cor pulmonale (H)      atorvastatin (LIPITOR) 20 MG tablet Take 1 tablet (20 mg) by mouth daily  Qty: 90 tablet, Refills: 4    Associated Diagnoses: Hyperlipidemia LDL goal <100      furosemide (LASIX) 20 MG tablet Take 1 tablet (20 mg) by mouth daily as needed  Qty: 30 tablet, Refills: 3    Associated Diagnoses: Shortness of breath      hydrOXYzine (ATARAX) 10 MG tablet Take 1 tablet (10 mg) by mouth every 6 hours as needed for itching or anxiety (with pain, moderate pain)  Qty: 120 tablet, Refills: 11    Associated Diagnoses: Chronic pain of right knee      lisinopril (ZESTRIL) 40 MG tablet Take 40 mg by mouth daily      LORazepam (ATIVAN) 0.5 MG tablet Take 1 tablet (0.5 mg) by mouth every 6 hours as needed for anxiety  Qty: 30 tablet, Refills: 1    Associated Diagnoses: Anxiety      megestrol (MEGACE) 20 MG tablet Take 20 mg by mouth daily      omeprazole (PRILOSEC) 40 MG DR capsule Take 40 mg by mouth daily      ondansetron (ZOFRAN ODT) 8 MG ODT tab Take 1 tablet (8 mg) by mouth every 8 hours as needed for nausea  Qty: 90  tablet, Refills: 1    Associated Diagnoses: Nausea      oxybutynin (DITROPAN) 5 MG tablet Take 5 mg by mouth 4 times daily as needed for bladder spasms      predniSONE (DELTASONE) 5 MG tablet Take 1 tablet (5 mg) by mouth 2 times daily  Qty: 60 tablet, Refills: 11    Comments: Patient would like to pick this up tomorrow AM - 5/4/23.  Associated Diagnoses: Malignant neoplasm of prostate (H)      pregabalin (LYRICA) 150 MG capsule Take 150 mg by mouth See Admin Instructions As of 6/30/23, patient currently takes Lyrica one or twice daily      prochlorperazine (COMPAZINE) 10 MG tablet Take 1 tablet (10 mg) by mouth every 6 hours as needed for nausea or vomiting  Qty: 30 tablet, Refills: 3    Associated Diagnoses: Malignant neoplasm of prostate (H); Nausea      traZODone (DESYREL) 50 MG tablet Take 50 mg by mouth nightly as needed for sleep      potassium chloride ER (KLOR-CON M) 20 MEQ CR tablet Take 1 tablet (20 mEq) by mouth daily as needed for potassium supplementation (Take if you take furosemide)  Qty: 30 tablet, Refills: 3    Associated Diagnoses: Malignant neoplasm metastatic to bone (H)         STOP taking these medications       HYDROcodone-acetaminophen (NORCO)  MG per tablet Comments:   Reason for Stopping:             Allergies   No Known Allergies

## 2023-07-06 NOTE — TELEPHONE ENCOUNTER
lvm in regards to message below to assist in scheduling pt. Provided direct number for a callback [phone]

## 2023-07-06 NOTE — PROGRESS NOTES
Care Management Discharge Note    Discharge Date: 07/06/2023       Discharge Disposition: Home    Discharge Services: None    Discharge DME: None    Discharge Transportation: family or friend will provide    Private pay costs discussed: Not applicable    Does the patient's insurance plan have a 3 day qualifying hospital stay waiver?  No    PAS Confirmation Code:  NA  Patient/family educated on Medicare website which has current facility and service quality ratings: no    Education Provided on the Discharge Plan:  NA  Persons Notified of Discharge Plans: patient, PCP  Patient/Family in Agreement with the Plan: yes    Handoff Referral Completed: No    Additional Information:    Discharge to home with family transportation.    Stefani Louise RN, BA  Care Coordinator  6 Med Surg  [phone], pager [phone]      For weekend social work needs, contact information below and available on [facility]:      Weekend [place] Pager: [phone]   Weekend 6MS, 8A, 10ICU- Pager: [phone]     For weekend RN care coordinator needs (home discharge with needs including home care, assisted living facility returns, durable medical equip, IV antibiotics) - page [phone]

## 2023-07-06 NOTE — PROGRESS NOTES
END OF SHIFT SUMMARY NOTE: For vital signs and complete assessments, please see documentation flowsheets.      Assessment:  Afebrile,Alert, oriented x 4, able to verbalize needs,Regular Diet, woked with PT today, RA, denies chest pain , Visible skin is intact; PIV on the right arm,  denies chest discomfort, denies SOB /  Last BM . Scheduled Miralax and Senna given refused, on hooks draining to light yellow /red  color output, able to move all extremities.       Major Shift Events:  Complained pain of generalized pain, rating of 7/10  -patient wife will be bringing their self cath materials they use from home  - Hooks catheter removed as ordered  -PIV removed  -patient discharge orders were made and medications for discharge was filled by our pharmacy.  - discharge instruction given to patient    Plan:PRN and scheduled pain medication          : PT          : Ambulate          : Hooks removal as ordered when patient allows          : Discharged at 610 pm today, all patient belongings were checked by NST and given to the patient,   Transported by nursing aid  to lobby via wheelchair, patient will be transported by patient wife back to their house. Patient has no subjective complaint during discharge.

## 2023-07-06 NOTE — PROVIDER NOTIFICATION
Notified MD at 1318 PM regarding MRN:  [id number]  room 620, as per pharmacy his insurance wont cover Linezolid its worth 653 dollars for a 14day supply, patient asking if there are any other antibiotics we can try that could be covered?.      Spoke with: Greg    Orders pending.    Comments: waiting for orders

## 2023-07-06 NOTE — PROGRESS NOTES
VS: /69 (BP Location: Left arm)   Pulse 82   Temp 98.6  F (37  C) (Oral)   Resp 18   Wt 93.8 kg (206 lb 12.7 oz)   SpO2 97%   BMI 29.67 kg/m     O2: 97% RA, denies SOB   Output: Jiménez cath in place, continent of bowel. Pt c/o multiple small loose stools throughout the day, held bowel medications   Last BM: 7/5   Activity: Ax1 with walker and gait belt    Up for meals?    Skin: Intact   Pain: C/o hip pain, 7/10. Managed with PRN and scheduled pain medication (see MAR)              CMS: A&Ox4, denies chest pain, n/v, numbness/tingling, and dizziness   Dressing: N/A   Diet: Regular   LDA: R PIV SL   Equipment: IV pole, personal belongings   Plan: Call light in reach, continue POC   Additional Info: Pt refused catheter removal as we did not have the self cath supplies he would like to use. Would like to have reassessed in AM    Bed alarm on

## 2023-07-06 NOTE — TELEPHONE ENCOUNTER
----- Message from Diana Tavares CMA sent at 7/3/2023  8:13 AM CDT -----  Please call to schedule pt with Dr. Correa. He can be a video visit.    Thank you,  Diana  ----- Message -----  From: Angélica Barton MD  Sent: 7/2/2023   3:39 PM CDT  To: CHANDRA Zamora,    We were consulted on this patient regarding a pelvic abscess/concern for fistula/pelvic osteomyelitis. We recommended antibiotic therapy and conservative management while admitted. The plan is to set him up for follow-up with Dr. Correa to discuss POSSIBLE surgical management of his radiation cystitis and osteomyelitis. I know [facility] has limited clinic spots in the next few months; it is not a particularly urgent appointment.       Thank you,  Angélica

## 2023-07-07 ENCOUNTER — TELEPHONE (OUTPATIENT)
Facility: CLINIC | Age: 68
End: 2023-07-07
Payer: COMMERCIAL

## 2023-07-07 DIAGNOSIS — K65.1 PELVIC ABSCESS IN MALE (H): ICD-10-CM

## 2023-07-07 DIAGNOSIS — G89.3 CANCER ASSOCIATED PAIN: Primary | ICD-10-CM

## 2023-07-07 NOTE — PLAN OF CARE
Physical Therapy Discharge Summary    Reason for therapy discharge:    Discharged to home with home therapy.    Progress towards therapy goal(s). See goals on Care Plan in [facility] electronic health record for goal details.  Goals partially met.  Barriers to achieving goals:   discharge from facility.    Therapy recommendation(s):    Continued therapy is recommended.  Rationale/Recommendations:  progress functional IND and home safety eval.

## 2023-07-07 NOTE — TELEPHONE ENCOUNTER
Prior Authorization Approval    Medication: LINEZOLID 600 MG PO TABS  Authorization Effective Date: 6/6/2023  Authorization Expiration Date: 7/5/2024  Reference #: CMM Key: [id number] - [place] Case ID: [id number]   Insurance Company: [employer] - Phone [phone] Fax [phone]  Expected CoPay: $653.13     CoPay Card Available: No    Which Pharmacy is filling the prescription: [facility] [place] [id number]  Pharmacy Notified: Yes  Patient Notified: Yes    **Patient has very high copay due to coverage gap phase. Linezolid 600mg tablets are also available from [employer] for about $32, but would take some time to order, fill, and ship.    Tonia Paz  Pharmacy Liaison  Teams: Tonia Paz  Phone: [phone]  Email: [email or web address]  July 7, 2023

## 2023-07-07 NOTE — TELEPHONE ENCOUNTER
Was given pain medication at discharge. Only given #16 of the OxyContin 20 mg every 12 hours and #12 of oxycodone 10 mg every 6 hours PRN.  Elza Lundberg RN...........7/7/2023 11:34 AM

## 2023-07-07 NOTE — PROGRESS NOTES
[facility]: Cancer Care Short Note                                    Discussion with Patient:                                                      Patient states that he just got home from hospital. States that he is having pain in his hip and that is only concern. Discussed pain regime with him.      Medication understanding/side effect: explained how to take pain medication and that he need to follow the directions for optimal relief      Goals        General    Patient will adhere to medication regimen           Assessment:                                                      Assessment completed with:: Patient    Constitutional  Fatigue: Fatigue relieved by rest  Fever: Absent or within normal limits    Respiratory  Cough: Absent or within normal limits  Dyspnea: Absent or within normal limits    Gastrointestinal  Anorexia: Absent or within normal limits  Nausea: Absent or within normal limits  Vomiting: Absent or within normal limits  Dehydration: Absent or within normal limits  Mucositis Oral: Absent or within normal limits  Constipation: Absent or within normal limits  Diarrhea: Increase of less than 4 stools per day over baseline OR mild increase in ostomy output compared to baseline    Genitourinary  Patient Reported Genitourinary Symptoms?: No  Urinary Retention: Absent or within normal limits  Urinary Tract Pain: Absent or within normal limits    Integumentary  Rash Maculo-Papular: Absent or within normal limits    Pain  Patient Reported Pain?: Yes, is currently well-managed  Pain Score: Extreme Pain (8)  Pain Loc: Hip  (DVPRS) Pain Rating Score : 7-Focus of attention, prevents doing daily activities  Interfered with your usual ACTIVITY?: Completely interferes 10  Interfered with your SLEEP?: Completely interferes 10  Affected your MOOD?: Completely affects 10  Contributed to your STRESS?: Contributes a great deal 10    Patient Coping  Accepting    Clinic Utilization  Patient Aware of Next  Appointment?: Yes    Other Patient Concerns  Other Patient Reported Concerns: Yes, see notes (concerned that pain medication do not work)    [facility]: Post-Discharge Note  SITUATION                                                      Admission:    Admission Date: 06/29/23   Reason for Admission: sepsis in the setting of pelvic abscess  Discharge:   Discharge Date: 07/06/23  Discharge Diagnosis: same    BACKGROUND                                                      Per hospital discharge summary and inpatient provider notes.  Needs follow up with infectious disease, orthopedic surgery, and oncology    ASSESSMENT           Discharge Assessment  How are you doing now that you are home?: states that he is still having left hip pain, otherwise well  How are your symptoms? (Red Flag symptoms escalate to triage hotline per guidelines): Improved  Do you feel your condition is stable enough to be safe at home until your provider visit?: Yes  Does the patient have their discharge instructions? : Yes  Does the patient have questions regarding their discharge instructions? : No  Were you started on any new medications or were there changes to any of your previous medications? : Yes (new pain medications)  Does the patient have all of their medications?: Yes  Do you have questions regarding any of your medications? : Yes (see comment) (discussed how to take pain medications)  Do you have all of your needed medical supplies or equipment (DME)?  (i.e. oxygen tank, CPAP, cane, etc.): Yes  Discharge follow-up appointment scheduled within 14 calendar days? : Yes  Discharge Follow Up Appointment Date: 07/13/23  Discharge Follow Up Appointment Scheduled with?: Specialty Care Provider (Dr. Fritz)    Post-op ([facility] CTA Only)  If the patient had a surgery or procedure, do they have any questions for a nurse?: No    Post-op (Clinicians Only)  Did the patient have surgery or a procedure: No  Fever: No  Eating & Drinking: eating  and drinking without complaints/concerns  PO Intake: regular diet  Bowel Function: loose stools (taking Imodium)  Urinary Status:  (has catheter for urinationg)    [facility]: [facility]  SITUATION                                                      Admission:    Admission Date: 06/29/23   Reason for Admission: sepsis in the setting of pelvic abscess  Discharge:   Discharge Date: 07/06/23  Discharge Diagnosis: same    BACKGROUND                                                      Per hospital discharge summary and inpatient provider notes.  See note    ASSESSMENT           Discharge Assessment  How are you doing now that you are home?: states that he is still having left hip pain, otherwise well  How are your symptoms? (Red Flag symptoms escalate to triage hotline per guidelines): Improved  Do you feel your condition is stable enough to be safe at home until your provider visit?: Yes  Does the patient have their discharge instructions? : Yes  Does the patient have questions regarding their discharge instructions? : No  Were you started on any new medications or were there changes to any of your previous medications? : Yes (new pain medications)  Does the patient have all of their medications?: Yes  Do you have questions regarding any of your medications? : Yes (see comment) (discussed how to take pain medications)  Do you have all of your needed medical supplies or equipment (DME)?  (i.e. oxygen tank, CPAP, cane, etc.): Yes  Discharge follow-up appointment scheduled within 14 calendar days? : Yes  Discharge Follow Up Appointment Date: 07/13/23  Discharge Follow Up Appointment Scheduled with?: Specialty Care Provider (Dr. Fritz)    Post-op ([facility] CTA Only)  If the patient had a surgery or procedure, do they have any questions for a nurse?: No    Post-op (Clinicians Only)  Did the patient have surgery or a procedure: No  Fever: No  Eating & Drinking: eating and drinking without  complaints/concerns  PO Intake: regular diet  Bowel Function: loose stools (taking Imodium)  Urinary Status:  (has catheter for urinationg)        PLAN                                                      Outpatient Plan:  Follow up with oncology regarding cancer and management. Needs follow up with Wei Perez     Future Appointments   Date Time Provider Department [place]   7/13/2023  2:30 PM Erum Fritz MD [facility]   7/18/2023  1:15 PM [facility] INJECTION NURSE [facility] [place]   7/18/2023  2:00 PM Erin Andres APRN [facility]   7/21/2023  9:30 AM Osbaldo Acharya MD [facility]   8/15/2023  1:00 PM [facility] INJECTION NURSE [facility]         For any urgent concerns, please contact our 24 hour clinic line:          Elza Lundberg RN                  PLAN                                                      Outpatient Plan:  Follow up with Erum Fritz MD     Future Appointments   Date Time Provider Department [place]   7/13/2023  2:30 PM Erum Fritz MD [facility]   7/18/2023  1:15 PM [facility] INJECTION NURSE [facility] [place]   7/18/2023  2:00 PM Erin Andres APRN [facility]   7/21/2023  9:30 AM Osbaldo Acharya MD [facility]   8/15/2023  1:00 PM [facility] INJECTION NURSE [facility] [place]         For any urgent concerns, please contact our 24 hour clinic line:          Elza Lundberg RN                  Intervention/Education provided during outreach:                                                       Patient to follow up as scheduled at next appt    Signature:  Elza Lundberg RN

## 2023-07-07 NOTE — TELEPHONE ENCOUNTER
Prior Authorization Approval    Medication: OXYCONTIN 20 MG PO T12A  Authorization Effective Date: 6/6/2023  Authorization Expiration Date: 7/6/2024  Reference #: CMM Key: [id number] - PA Case ID: [id number]   Insurance Company: [employer] - Phone [phone] Fax [phone]  Expected CoPay: $36.48     Which Pharmacy is filling the prescription: [facility] [place] [id number] [address]    Tonia Paz  Pharmacy Liaison  Teams: Tonia Paz  Phone: [phone]  Email: [email or web address]  July 7, 2023

## 2023-07-08 ENCOUNTER — HOSPITAL ENCOUNTER (EMERGENCY)
Facility: OTHER | Age: 68
Discharge: HOME | End: 2023-07-08
Attending: EMERGENCY MEDICINE | Admitting: EMERGENCY MEDICINE
Payer: COMMERCIAL

## 2023-07-08 VITALS
RESPIRATION RATE: 18 BRPM | OXYGEN SATURATION: 97 % | DIASTOLIC BLOOD PRESSURE: 74 MMHG | HEART RATE: 89 BPM | TEMPERATURE: 97.9 F | SYSTOLIC BLOOD PRESSURE: 136 MMHG

## 2023-07-08 DIAGNOSIS — R33.9 URINARY RETENTION: ICD-10-CM

## 2023-07-08 PROCEDURE — 51702 INSERT TEMP BLADDER CATH: CPT | Performed by: EMERGENCY MEDICINE

## 2023-07-08 PROCEDURE — 99283 EMERGENCY DEPT VISIT LOW MDM: CPT | Performed by: EMERGENCY MEDICINE

## 2023-07-08 PROCEDURE — 250N000009 HC RX 250: Performed by: EMERGENCY MEDICINE

## 2023-07-08 RX ORDER — LIDOCAINE HYDROCHLORIDE 20 MG/ML
JELLY TOPICAL ONCE
Status: COMPLETED | OUTPATIENT
Start: 2023-07-08 | End: 2023-07-08

## 2023-07-08 RX ADMIN — LIDOCAINE HYDROCHLORIDE: 20 JELLY TOPICAL at 08:31

## 2023-07-08 ASSESSMENT — ENCOUNTER SYMPTOMS
NAUSEA: 0
ARTHRALGIAS: 0
ABDOMINAL PAIN: 1
CHEST TIGHTNESS: 0
VOMITING: 0
SHORTNESS OF BREATH: 0
LIGHT-HEADEDNESS: 0
AGITATION: 0
FEVER: 0
CHILLS: 0

## 2023-07-08 ASSESSMENT — ACTIVITIES OF DAILY LIVING (ADL): ADLS_ACUITY_SCORE: 35

## 2023-07-08 NOTE — ED TRIAGE NOTES
Pt arrives to ER via [facility] from home with c/o urine retention and abd pain. Pt reports that [facility], [facility], while at the  he had a hooks in place. Pt attempted to straight cath today and had no urine return, pt reports no urine output sine last night.  Pt received 50Mcg of fentanyl in route by [facility] with some relief.

## 2023-07-08 NOTE — ED NOTES
"Straight cathed pt using a 16f coude, immediate dark lydia urine return. 1100 mL of urine. Pt states \"I feel 100% better.\"  "

## 2023-07-08 NOTE — ED PROVIDER NOTES
History     Chief Complaint   Patient presents with     URINE RETENTION     Abdominal Pain     HPI  Dilip Kan is a 68 year old male who is here with suprapubic pain.  Recently hospitalized for sepsis.  Had a Jiménez catheter in while he was there.  Just discharged home yesterday from [facility].  He has a history of needing to self cath himself, but since he got home he has been unable to get any urine back.  He says he feels like this is just a distended bladder as he has had that before.  The pain is right in this same area as previously.  He is quite beside himself with pain at this time, but does not think there is anything else going on.  He is not feeling feverish.  No chest pain or shortness of breath.    Allergies:  No Known Allergies    Problem List:    Patient Active Problem List    Diagnosis Date Noted     Sepsis (H) 06/29/2023     Priority: Medium     Acute deep vein thrombosis (DVT) of proximal vein of right lower extremity (H) 05/01/2023     Priority: Medium     Multiple subsegmental pulmonary emboli without acute cor pulmonale (H) 05/01/2023     Priority: Medium     Acute kidney failure, unspecified (H) 04/21/2023     Priority: Medium     Infection due to Klebsiella pneumoniae 04/21/2023     Priority: Medium     Neutropenic fever (H) 04/19/2023     Priority: Medium     Bone metastasis 03/09/2023     Priority: Medium     Bone metastases 03/09/2023     Priority: Medium     Aneurysm of ascending aorta without rupture (H) 10/05/2022     Priority: Medium     Status post total right knee replacement 11/16/2021     Priority: Medium     Malignant neoplasm of prostate (H) 09/16/2021     Priority: Medium     Diabetes mellitus, type 2 (H) 11/23/2020     Priority: Medium     Plaque in heart artery-aorta 03/24/2020     Priority: Medium     Aortic valve stenosis with insufficiency, etiology of cardiac valve disease unspecified 03/22/2019     Priority: Medium     Mild aortic stenosis 03/22/2019      Priority: Medium     Ascending aortic aneurysm-moderate at 4.6 cm on 3/11/20 03/22/2019     Priority: Medium     Aortic valve insufficiency-moderate to severe 03/22/2019     Priority: Medium     Hx of syncope 03/22/2019     Priority: Medium     History of tobacco abuse quitting 11/8/2002 03/22/2019     Priority: Medium     Hypertriglyceridemia 03/22/2019     Priority: Medium     Mild pulmonary hypertension (H) 03/22/2019     Priority: Medium     Palpitations 03/22/2019     Priority: Medium     Rising PSA following treatment for malignant neoplasm of prostate 03/29/2018     Priority: Medium     Chronic, continuous use of opioids 01/31/2018     Priority: Medium     Patient is followed by Wei Perez MD for ongoing prescription of pain medication.  All refills should be approved by this provider only at face-to-face appointments - not by phone request.    Medication(s): Hydrocodone.   Maximum quantity per month: 135  Clinic visit frequency required: Q 3 months   PDMP Review       Value Time User    State PDMP site checked  Yes 8/25/2021 10:41 AM Wei Perez MD        Controlled substance agreement:  Patient-Level CSA:    There are no patient-level csa.       Pain Clinic evaluation in the past: No    Opioid Risk Tool Total Score(s):  OPIOID RISK TOOL TOTAL SCORE 8/25/2021   Total Score 0   Total Risk Score Category Low Risk (0-3)            Other specified causes of urethral stricture 01/31/2018     Priority: Medium     GERD 04/28/2017     Priority: Medium     Essential hypertension 04/28/2017     Priority: Medium     Non morbid obesity due to excess calories 04/28/2017     Priority: Medium     Mixed hyperlipidemia 04/28/2017     Priority: Medium     Spinal stenosis of lumbar region with neurogenic claudication 04/28/2017     Priority: Medium     Controlled substance agreement updated 12- 01/15/2016     Priority: Medium     Backache 01/13/2014     Priority: Medium     Prostate cancer (H) 01/02/2013      Priority: Medium     Personal history of prostate cancer s/p prostatectomy and sEBRT 12/06/2012     Priority: Medium     Anemia due to acute blood loss 11/30/2012     Priority: Medium     Overview:   EBL 11/28/12:  1700 ml  EBL 11/28/12:  500 ml  Transfused 2 units pRBC's early on 11/29/12       Pelvic pain in male 11/30/2012     Priority: Medium        Past Medical History:    Past Medical History:   Diagnosis Date     Elevated prostate specific antigen (PSA)      Encounter for other administrative examinations      Esophagitis      Gastro-esophageal reflux disease without esophagitis      Low back pain      Malignant neoplasm of prostate (H)      Nicotine dependence, uncomplicated      Other intervertebral disc degeneration, lumbosacral region        Past Surgical History:    Past Surgical History:   Procedure Laterality Date     APPENDECTOMY OPEN  [id number]    Ruptured - [place]     ARTHROPLASTY KNEE Right 11/16/2021    Procedure: ARTHROPLASTY, KNEE, TOTAL;  Surgeon: Micah Rock MD;  Location: [facility] OR     COLONOSCOPY  08/31/2006    next due in 2016     DISKECTOMY, LUMBAR, SINGLE SP  03/16/2009    L 3-4 microdiskectomy, [facility]     ESOPHAGOSCOPY, GASTROSCOPY, DUODENOSCOPY (EGD), COMBINED  03/2003          ESOPHAGOSCOPY, GASTROSCOPY, DUODENOSCOPY (EGD), COMBINED  08/31/2006          PROSTATECTOMY PERINEAL RADICAL  11/28/2012    Nerve Sparring, Dr Warner     SPINE SURGERY N/A 04/28/2017    L3 to S1 spinal decompression L4/L5 fusion     TONSILLECTOMY, ADENOIDECTOMY, COMBINED      as a child     VARICOCELECTOMY  1959    INCISION AND DRAINAGE,EXCISE VARICOCELE       Family History:    Family History   Problem Relation Age of Onset     Heart Disease Father         Heart Disease, passed away from CHF,CAD     Colon Cancer Father         Cancer-colon     Hypertension Father         Hypertension     Other - See Comments Father         Stroke/Dementia     Hypertension Mother         Hypertension,HTN     Other -  See Comments Mother          Parkinsons     Family History Negative Other         Good Health     Family History Negative Other         Good Health,previous marriage./previous marriage.     Family History Negative Other         Good Health,previous marriage.     Family History Negative Sister         Good Health,emotional problems.     Family History Negative Sister         Good Health     Other - See Comments Son         w/o major medical problems.     Other - See Comments Daughter         w/o major medical problems.       Social History:  Marital Status:   [2]  Social History     Tobacco Use     Smoking status: Former     Packs/day: 1.00     Years: 20.00     Pack years: 20.00     Types: Cigarettes     Quit date: 2002     Years since quittin.6     Passive exposure: Past     Smokeless tobacco: Current     Types: Snuff     Tobacco comments:     Can't remember when all he had passive exposure   Vaping Use     Vaping Use: Never used   Substance Use Topics     Alcohol use: Not Currently     Comment:  5 drinks a month     Drug use: No        Medications:    amLODIPine (NORVASC) 10 MG tablet  apixaban ANTICOAGULANT (ELIQUIS) 5 MG tablet  atorvastatin (LIPITOR) 20 MG tablet  furosemide (LASIX) 20 MG tablet  hydrOXYzine (ATARAX) 10 MG tablet  levofloxacin (LEVAQUIN) 750 MG tablet  linezolid (ZYVOX) 600 MG tablet  lisinopril (ZESTRIL) 40 MG tablet  LORazepam (ATIVAN) 0.5 MG tablet  megestrol (MEGACE) 20 MG tablet  metroNIDAZOLE (FLAGYL) 500 MG tablet  omeprazole (PRILOSEC) 40 MG DR capsule  ondansetron (ZOFRAN ODT) 8 MG ODT tab  oxybutynin (DITROPAN) 5 MG tablet  oxyCODONE (OXYCONTIN) 20 MG 12 hr tablet  oxyCODONE IR (ROXICODONE) 10 MG tablet  potassium chloride ER (KLOR-CON M) 20 MEQ CR tablet  predniSONE (DELTASONE) 5 MG tablet  pregabalin (LYRICA) 150 MG capsule  prochlorperazine (COMPAZINE) 10 MG tablet  traZODone (DESYREL) 50 MG tablet          Review of Systems   Constitutional: Negative for chills and  fever.   HENT: Negative for congestion.    Eyes: Negative for visual disturbance.   Respiratory: Negative for chest tightness and shortness of breath.    Cardiovascular: Negative for chest pain.   Gastrointestinal: Positive for abdominal pain. Negative for nausea and vomiting.   Genitourinary: Positive for decreased urine volume.   Musculoskeletal: Negative for arthralgias.   Skin: Negative for rash.   Neurological: Negative for light-headedness.   Psychiatric/Behavioral: Negative for agitation.       Physical Exam   BP: (!) 144/73  Pulse: 104  Temp: 97.9  F (36.6  C)  Resp: 20  SpO2: 97 %      Physical Exam  Vitals and nursing note reviewed.   Constitutional:       General: He is in acute distress.      Appearance: He is well-developed.   HENT:      Head: Normocephalic and atraumatic.      Mouth/Throat:      Mouth: Mucous membranes are moist.   Eyes:      Conjunctiva/sclera: Conjunctivae normal.   Cardiovascular:      Rate and Rhythm: Normal rate.   Pulmonary:      Effort: Pulmonary effort is normal.   Skin:     General: Skin is warm and dry.   Neurological:      Mental Status: He is alert and oriented to person, place, and time.   Psychiatric:         Behavior: Behavior normal.         ED Course                 Procedures                No results found for this or any previous visit (from the past 24 hour(s)).    Medications   lidocaine (XYLOCAINE) 2 % external gel ( Topical $Given 7/8/23 7844)       Assessments & Plan (with Medical Decision Making)     I have reviewed the nursing notes.    I have reviewed the findings, diagnosis, plan and need for follow up with the patient.  Straight cath returned over a liter of fluid.  He is feeling markedly better.  He is worried about this happening again as he was unable to catheterize himself at home.  He would like a Jiménez catheter placed at this time.  He does have relationship with urology in [place] and he can call them next week as well.  Will be discharged at this  time.      New Prescriptions    No medications on file       Final diagnoses:   Urinary retention       7/8/2023   [facility] AND \[facility] [place] [facility]\""     Ankush Ram MD  07/08/23 0959

## 2023-07-09 RX ORDER — OXYCODONE HYDROCHLORIDE 10 MG/1
10 TABLET ORAL EVERY 6 HOURS PRN
Qty: 120 TABLET | Refills: 0 | Status: SHIPPED | OUTPATIENT
Start: 2023-07-09 | End: 2023-08-15

## 2023-07-09 RX ORDER — OXYCODONE HCL 20 MG/1
20 TABLET, FILM COATED, EXTENDED RELEASE ORAL EVERY 12 HOURS
Qty: 60 TABLET | Refills: 0 | Status: SHIPPED | OUTPATIENT
Start: 2023-07-09 | End: 2023-08-15

## 2023-07-10 ENCOUNTER — TELEPHONE (OUTPATIENT)
Dept: INFECTIOUS DISEASES | Facility: CLINIC | Age: 68
End: 2023-07-10

## 2023-07-10 ENCOUNTER — TELEPHONE (OUTPATIENT)
Dept: UROLOGY | Facility: CLINIC | Age: 68
End: 2023-07-10
Payer: COMMERCIAL

## 2023-07-10 NOTE — TELEPHONE ENCOUNTER
----- Message from Lazaro Fischer RN sent at 7/7/2023  3:26 PM CDT -----  Regarding: FW: Sending on orals but would like follow up with Erin in 2 weeks and myself in 6 weeks  Can you assist in scheduling CT  ----- Message -----  From: Josselin Kay MD  Sent: 7/5/2023  11:31 AM CDT  To: #  Subject: Sending on orals but would like follow up wi#    Hello,    This patient has a pelvic abscess that cannot be drained or sampled. I was going to put him on zosyn but he did not have coverage so I will send on Levofloxacin/flagyl. I will also cover him on linezolid for 2 weeks as he is colonized with VRE. This is likely not the  but I do not want it to become so while he is on the other antibiotics.     I would like to have Erin to see him in 2 weeks and myself in 6 weeks. The duration is likely 6-8 weeks but it will depend on a CT abd/pelvis.     Thanks    Josselin

## 2023-07-10 NOTE — TELEPHONE ENCOUNTER
Left voice mail for patient to call back.    Please see message below.  Patient needs CT scan and f/u appointment scheduled with provider

## 2023-07-13 ENCOUNTER — VIRTUAL VISIT (OUTPATIENT)
Dept: ONCOLOGY | Facility: OTHER | Age: 68
End: 2023-07-13
Attending: INTERNAL MEDICINE
Payer: COMMERCIAL

## 2023-07-13 DIAGNOSIS — C79.51 MALIGNANT NEOPLASM METASTATIC TO BONE (H): Primary | ICD-10-CM

## 2023-07-13 DIAGNOSIS — C61 MALIGNANT NEOPLASM OF PROSTATE (H): ICD-10-CM

## 2023-07-13 PROCEDURE — 99215 OFFICE O/P EST HI 40 MIN: CPT | Mod: TEL | Performed by: INTERNAL MEDICINE

## 2023-07-13 RX ORDER — TAMSULOSIN HYDROCHLORIDE 0.4 MG/1
1 CAPSULE ORAL EVERY MORNING
COMMUNITY
Start: 2023-07-10 | End: 2024-01-10

## 2023-07-13 RX ORDER — ACETAMINOPHEN 325 MG/1
975 TABLET ORAL EVERY 8 HOURS PRN
COMMUNITY

## 2023-07-13 ASSESSMENT — PAIN SCALES - GENERAL: PAINLEVEL: MODERATE PAIN (5)

## 2023-07-13 NOTE — NURSING NOTE
Patient requests telephone visit today for follow up from hospital stay at the [facility]. He could not get a ride in today and can't drive himself right now.  Medication list reviewed.  Jacqueline Cortez CMA([facility])..................7/13/2023   1:22 PM

## 2023-07-13 NOTE — PATIENT INSTRUCTIONS
Schedule Lupron shot in about a month from now to complete the one due June/July 2023.     Hold Chemo for two months and see Dr Fritz in early September with labs same day-one hour prior. He will decide then how to proceed.

## 2023-07-14 NOTE — PROGRESS NOTES
Visit Date: 07/13/2023    HEMATOLOGY ONCOLOGY CONSULTATION    HISTORY OF PRESENT ILLNESS:  Mr. 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  The patient was unable to get a ride today to be seen in the clinic.  Most recently, we had seen him on 06/29/2023 after 4 cycles of docetaxel chemotherapy for his metastatic castrate-resistant prostate cancer. His last dose was given on 06/13/2023.  Prior to that, he completed 3 cycles of docetaxel and had imaging studies to assess response.  CT chest, abdomen, and pelvis on 06/01/2023 revealed that liver lesions had resolved.  There was a decrease in size of locally invasive mass on the left prostate. There was no significant change in mixed lytic sclerotic appearance of the left acetabulum.  Unchanged alignment of left-sided subacute acetabular fracture, left inferior pubic ramus fracture.  Bone scan indicated there was uptake in the left acetabulum that was relatively stable.  His PSA dropped significantly, down to 2.24.  When we saw him on 06/29/2023, he came in with excruciating left hip pain with a temperature of 101.1.  He was hypotensive and appeared septic.  He had significant tenderness of the left hip.  We recommended transfer to the emergency room for likely sepsis.     He subsequently had imaging done in the emergency room on 06/29/2023. CT abdomen and pelvis revealed that there was a 4 cm abscess deep within the pelvis, which appeared to be in communication with the urinary bladder and the left ventral lateral aspect of the rectum.  The abscess abutted the left pubic bone at the symphysis with likely osteomyelitis.  There were small gas bubbles seen within the abscess cavity as well as surrounding it within the left pubic bone.  A small volume of fluid seen within the presacral soft tissues.  CT cystogram was also performed  with and without contrast.  This revealed air-fluid collection along the primary bladder neck.  There was no evidence of fistulous connection, communication with the rectum.  There was soft tissue gas and reactive osseous changes of the left pubic symphysis suggestive of osteomyelitis.  There was a reactive circumferential wall thickening of the urinary bladder secondary to ongoing infection inflammation.  Circumferential thickening and submucosal edema involving the rectum, suggestive of proctitis.     He also had an MRI of the pelvis done, which was read as lower lumbar spine surgical hardware.  Apparent nondisplaced fracture, medial left acetabulum.  Abnormalities in area of previous air density and irregularity at left superior, inferior pubic ramus, very slightly irregular marrow signal, marrow edema throughout the medial left pelvis.  X-ray of the pelvis revealed nondisplaced fracture of the left medial and posterior acetabulum, nondisplaced fracture of the left inferior pubic ramus.  Advanced left hip degenerative osteoarthrosis.     The patient was found to have leukocytosis, elevated lactic acid.  Temperature of 101.1.  The patient was transferred to the [facility] Interventional Radiology reviewed the CT scan had developed a 4 cm deep pelvic abscess that was not amenable to percutaneous drainage.  Urology was consulted and it was felt that it was okay to discontinue Jiménez catheter.  The plan was to treat with IV antibiotic.  If there was no response, the patient would likely need a diverting colostomy to drain the pelvic abscess.     Hematology was consulted and recommended holding chemotherapy while inpatient.  The patient was initially treated with empiric vancomycin, ceftriaxone and Flagyl and was switched to IV Zosyn 4.5 grams q.6 hours beginning on 07/02/2023 and was started on linezolid 600 mg IV q. 12 hours, given the fact he was found to have positive VRA in the stool.  The plan was to discharge on a 6-week empiric IV course for treatment of pelvic abscess and possible osteomyelitis.  The patient was discharged on linezolid 600 mg every 12 hours, Levaquin 750 daily, metronidazole 500 mg every 8 hours.  The plan was to repeat CT abdomen and pelvis in 6 weeks.  He had acute injury, which resolved.  It was felt to be likely prerenal versus ATN versus postobstructive uropathy.  The patient was continued on pain meds including Lyrica 150 mg b.i.d., Tylenol 160 mg t.i.d., OxyContin 20 mg q. 12, as well as oxycodone p.r.n. breakthrough pain.  The plan was to follow up with Dr. Osbaldo Acharya of Orthopedics in 1-3 weeks after discharge.    The patient had been on heparin drip for his PE and then was transitioned to Eliquis.  The patient was due to receive Lupron on 06/30/2023 but has not received it as of yet.  He says he is still having ongoing pain in the left hip and still carries a Jiménez catheter.  He apparently had been seen by Urology locally by Bee Brito CNP on 07/10/2023 for evaluation of hematuria secondary to the 3-way catheter having a small lumen.  A 2-way Foley_ catheter was placed and the patient was instructed to irrigate if the catheter becomes plugged.  The patient was provided with 60 mL syringes. The patient otherwise continues on his narcotic pain regimen.  Denies any fevers, night sweats, weight loss.    PHYSICAL EXAMINATION:  Could not be performed due to this telephone virtual visit.    LABORATORY DATA:  Not done, other than a CBC that was drawn on 07/10/2023, which revealed a white count 11.1, H and H 8.9 and 29.0. Platelet count was 711,000.    IMPRESSION:  History of castrate-resistant nonmetastatic prostate cancer, progressing now to castrate-resistant metastatic prostate cancer.  Initially, the patient was treated due to rising PSA with doubling time less than 12 months with apalutamide in addition to androgen deprivation therapy and he was started on  apalutamide 240 mg tablet daily.  His PSA dropped to 1.25, then began to rise to 2.06.  We switched him to abiraterone and prednisone, beginning in 08/2022.  He required dose reduction due to fatigue.  He developed progression of disease with rising PSA with bony metastasis, left acetabulum and left pubic symphysis, nondisplaced fracture as well as locally advanced disease, as well as distant liver metastases.  The patient was started on docetaxel, prednisone and received radiation therapy to the pubic symphysis, as well as left hip.  He received 2 cycles of docetaxel.  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  The patient recovered and went on to receive cycle 3 of docetaxel, 25% dose reduction, with a positive response with drop in PSA.  After 4 cycles, PSA dropped significantly down to 1.26.  Course was complicated by pelvic abscess, septic shock, osteomyelitis requiring transfer to the [facility] and he was treated with IV antibiotics.  He is currently on a 6-week course of antibiotics with Flagyl, linezolid and Levaquin.  We will hold chemotherapy until he completes the course of antibiotics.  We will proceed with Lupron as scheduled within a month.  We will follow up with Dr. Acharya, Orthopedics, to determine potential osteomyelitis versus pelvic abscess treatment.  We will hold denosumab for now until infectious process is resolved.    TIME SPENT:  Seventy minutes were spent on this patient.  Time was spent reviewing multiple voluminous records from Emergency Room Department, as well as [facility], as well as urology notes, performing history and physical, documenting history and physical.      The plan is to obtain CBC, CMP, LDH monthly with PSA.  We will see the patient in approximately 6-8 weeks.  Will resume docetaxel if the patient has recovered.  He will continue on prednisone for now and Lupron 45 mg q.6 hours.    Erum Fritz MD        D: 2023   T: 2023   MT: [id number]    Name:     LELAND CHAPMAN  MRN:      [id number]        Account:    [id number]   :      1955           Visit Date: 2023     Document: [id number]    cc:  MD Bee Puckett NP

## 2023-07-17 ENCOUNTER — PATIENT OUTREACH (OUTPATIENT)
Dept: ONCOLOGY | Facility: OTHER | Age: 68
End: 2023-07-17
Payer: COMMERCIAL

## 2023-07-18 ENCOUNTER — APPOINTMENT (OUTPATIENT)
Dept: CT IMAGING | Facility: OTHER | Age: 68
End: 2023-07-18
Attending: PHYSICIAN ASSISTANT
Payer: COMMERCIAL

## 2023-07-18 ENCOUNTER — HOSPITAL ENCOUNTER (EMERGENCY)
Facility: OTHER | Age: 68
Discharge: HOME | End: 2023-07-18
Attending: PHYSICIAN ASSISTANT | Admitting: PHYSICIAN ASSISTANT
Payer: COMMERCIAL

## 2023-07-18 VITALS
HEART RATE: 81 BPM | WEIGHT: 207 LBS | TEMPERATURE: 97.5 F | OXYGEN SATURATION: 96 % | RESPIRATION RATE: 16 BRPM | SYSTOLIC BLOOD PRESSURE: 111 MMHG | DIASTOLIC BLOOD PRESSURE: 61 MMHG | HEIGHT: 70 IN | BODY MASS INDEX: 29.63 KG/M2

## 2023-07-18 DIAGNOSIS — R06.02 SHORTNESS OF BREATH: ICD-10-CM

## 2023-07-18 DIAGNOSIS — R53.81 PHYSICAL DECONDITIONING: ICD-10-CM

## 2023-07-18 DIAGNOSIS — K65.1 PELVIC ABSCESS IN MALE (H): ICD-10-CM

## 2023-07-18 DIAGNOSIS — A49.8 INFECTION DUE TO KLEBSIELLA PNEUMONIAE: ICD-10-CM

## 2023-07-18 DIAGNOSIS — Z79.2 LONG TERM (CURRENT) USE OF ANTIBIOTICS: ICD-10-CM

## 2023-07-18 DIAGNOSIS — C61 MALIGNANT NEOPLASM OF PROSTATE (H): ICD-10-CM

## 2023-07-18 LAB
ANION GAP SERPL CALCULATED.3IONS-SCNC: 13 MMOL/L (ref 7–15)
APTT PPP: 40 SECONDS (ref 22–38)
BASOPHILS # BLD AUTO: 0 10E3/UL (ref 0–0.2)
BASOPHILS NFR BLD AUTO: 0 %
BUN SERPL-MCNC: 12.6 MG/DL (ref 8–23)
CALCIUM SERPL-MCNC: 9.1 MG/DL (ref 8.8–10.2)
CHLORIDE SERPL-SCNC: 97 MMOL/L (ref 98–107)
CREAT SERPL-MCNC: 0.66 MG/DL (ref 0.67–1.17)
DEPRECATED HCO3 PLAS-SCNC: 21 MMOL/L (ref 22–29)
EOSINOPHIL # BLD AUTO: 0 10E3/UL (ref 0–0.7)
EOSINOPHIL NFR BLD AUTO: 0 %
ERYTHROCYTE [DISTWIDTH] IN BLOOD BY AUTOMATED COUNT: 18.2 % (ref 10–15)
GFR SERPL CREATININE-BSD FRML MDRD: >90 ML/MIN/1.73M2
GLUCOSE SERPL-MCNC: 163 MG/DL (ref 70–99)
HCT VFR BLD AUTO: 28.7 % (ref 40–53)
HGB BLD-MCNC: 9.1 G/DL (ref 13.3–17.7)
HOLD SPECIMEN: NORMAL
IMM GRANULOCYTES # BLD: 0 10E3/UL
IMM GRANULOCYTES NFR BLD: 0 %
INR PPP: 1.74 (ref 0.85–1.15)
LYMPHOCYTES # BLD AUTO: 1 10E3/UL (ref 0.8–5.3)
LYMPHOCYTES NFR BLD AUTO: 11 %
MCH RBC QN AUTO: 25.8 PG (ref 26.5–33)
MCHC RBC AUTO-ENTMCNC: 31.7 G/DL (ref 31.5–36.5)
MCV RBC AUTO: 81 FL (ref 78–100)
MONOCYTES # BLD AUTO: 0.4 10E3/UL (ref 0–1.3)
MONOCYTES NFR BLD AUTO: 5 %
NEUTROPHILS # BLD AUTO: 7.1 10E3/UL (ref 1.6–8.3)
NEUTROPHILS NFR BLD AUTO: 84 %
NRBC # BLD AUTO: 0 10E3/UL
NRBC BLD AUTO-RTO: 0 /100
NT-PROBNP SERPL-MCNC: 195 PG/ML (ref 0–900)
PLATELET # BLD AUTO: 348 10E3/UL (ref 150–450)
POTASSIUM SERPL-SCNC: 4 MMOL/L (ref 3.4–5.3)
RBC # BLD AUTO: 3.53 10E6/UL (ref 4.4–5.9)
SODIUM SERPL-SCNC: 131 MMOL/L (ref 136–145)
TROPONIN T SERPL HS-MCNC: 18 NG/L
WBC # BLD AUTO: 8.6 10E3/UL (ref 4–11)

## 2023-07-18 PROCEDURE — 85730 THROMBOPLASTIN TIME PARTIAL: CPT | Performed by: PHYSICIAN ASSISTANT

## 2023-07-18 PROCEDURE — 71275 CT ANGIOGRAPHY CHEST: CPT

## 2023-07-18 PROCEDURE — 85610 PROTHROMBIN TIME: CPT | Performed by: PHYSICIAN ASSISTANT

## 2023-07-18 PROCEDURE — 99285 EMERGENCY DEPT VISIT HI MDM: CPT | Mod: 25 | Performed by: PHYSICIAN ASSISTANT

## 2023-07-18 PROCEDURE — 250N000011 HC RX IP 250 OP 636: Performed by: PHYSICIAN ASSISTANT

## 2023-07-18 PROCEDURE — 85025 COMPLETE CBC W/AUTO DIFF WBC: CPT | Performed by: PHYSICIAN ASSISTANT

## 2023-07-18 PROCEDURE — 80053 COMPREHEN METABOLIC PANEL: CPT | Performed by: PHYSICIAN ASSISTANT

## 2023-07-18 PROCEDURE — 36415 COLL VENOUS BLD VENIPUNCTURE: CPT | Performed by: PHYSICIAN ASSISTANT

## 2023-07-18 PROCEDURE — 82248 BILIRUBIN DIRECT: CPT

## 2023-07-18 PROCEDURE — 99284 EMERGENCY DEPT VISIT MOD MDM: CPT | Performed by: PHYSICIAN ASSISTANT

## 2023-07-18 PROCEDURE — 84484 ASSAY OF TROPONIN QUANT: CPT | Performed by: PHYSICIAN ASSISTANT

## 2023-07-18 PROCEDURE — 83880 ASSAY OF NATRIURETIC PEPTIDE: CPT | Performed by: PHYSICIAN ASSISTANT

## 2023-07-18 RX ORDER — IOPAMIDOL 755 MG/ML
86 INJECTION, SOLUTION INTRAVASCULAR ONCE
Status: COMPLETED | OUTPATIENT
Start: 2023-07-18 | End: 2023-07-18

## 2023-07-18 RX ORDER — PREDNISONE 20 MG/1
TABLET ORAL
Qty: 10 TABLET | Refills: 0 | Status: SHIPPED | OUTPATIENT
Start: 2023-07-18 | End: 2023-11-20

## 2023-07-18 RX ADMIN — IOPAMIDOL 86 ML: 755 INJECTION, SOLUTION INTRAVENOUS at 18:03

## 2023-07-18 ASSESSMENT — ACTIVITIES OF DAILY LIVING (ADL): ADLS_ACUITY_SCORE: 35

## 2023-07-18 NOTE — ED PROVIDER NOTES
History     Chief Complaint   Patient presents with     Shortness of Breath     HPI  Dilip Kan is a 68 year old male who arrives to emergency department shortness of breath ongoing for 1 week.  Past medical history of anemia, controlled substance agreement, GERD, hypertension, metastatic prostate cancer, nonmorbid obesity, high cholesterol, spinal stenosis, aortic valve insufficiency, history of syncope, pulmonary hypertension, type 2 diabetes, aneurysm of a sand aortic without rupture, TRINY, DVT and PE.    Patient started having shortness of breath about a week ago his last chemotherapy treatment was 4 weeks ago.  He occasionally coughs up phlegm he states in the morning otherwise nonproductive.  Shortness of breath is worse with movement 2 days ago patient could walk to the bathroom without him to stop currently he does have to stop for a very short distance.    Patient denies chills sweats body aches blurry vision double vision difficulty swallowing ringing in the ears denies chest pain palpitation shortness of breath and cough as mentioned above no abdominal pain no nausea no vomiting no diarrhea.    Allergies:  No Known Allergies    Problem List:    Patient Active Problem List    Diagnosis Date Noted     Sepsis (H) 06/29/2023     Priority: Medium     Acute deep vein thrombosis (DVT) of proximal vein of right lower extremity (H) 05/01/2023     Priority: Medium     Multiple subsegmental pulmonary emboli without acute cor pulmonale (H) 05/01/2023     Priority: Medium     Acute kidney failure, unspecified (H) 04/21/2023     Priority: Medium     Infection due to Klebsiella pneumoniae 04/21/2023     Priority: Medium     Neutropenic fever (H) 04/19/2023     Priority: Medium     Bone metastasis 03/09/2023     Priority: Medium     Bone metastases 03/09/2023     Priority: Medium     Aneurysm of ascending aorta without rupture (H) 10/05/2022     Priority: Medium     Status post total right knee replacement  11/16/2021     Priority: Medium     Malignant neoplasm of prostate (H) 09/16/2021     Priority: Medium     Diabetes mellitus, type 2 (H) 11/23/2020     Priority: Medium     Plaque in heart artery-aorta 03/24/2020     Priority: Medium     Aortic valve stenosis with insufficiency, etiology of cardiac valve disease unspecified 03/22/2019     Priority: Medium     Mild aortic stenosis 03/22/2019     Priority: Medium     Ascending aortic aneurysm-moderate at 4.6 cm on 3/11/20 03/22/2019     Priority: Medium     Aortic valve insufficiency-moderate to severe 03/22/2019     Priority: Medium     Hx of syncope 03/22/2019     Priority: Medium     History of tobacco abuse quitting 11/8/2002 03/22/2019     Priority: Medium     Hypertriglyceridemia 03/22/2019     Priority: Medium     Mild pulmonary hypertension (H) 03/22/2019     Priority: Medium     Palpitations 03/22/2019     Priority: Medium     Rising PSA following treatment for malignant neoplasm of prostate 03/29/2018     Priority: Medium     Chronic, continuous use of opioids 01/31/2018     Priority: Medium     Patient is followed by Wei Perez MD for ongoing prescription of pain medication.  All refills should be approved by this provider only at face-to-face appointments - not by phone request.    Medication(s): Hydrocodone.   Maximum quantity per month: 135  Clinic visit frequency required: Q 3 months   PDMP Review       Value Time User    State PDMP site checked  Yes 8/25/2021 10:41 AM Wei Perez MD        Controlled substance agreement:  Patient-Level CSA:    There are no patient-level csa.       Pain Clinic evaluation in the past: No    Opioid Risk Tool Total Score(s):  OPIOID RISK TOOL TOTAL SCORE 8/25/2021   Total Score 0   Total Risk Score Category Low Risk (0-3)            Other specified causes of urethral stricture 01/31/2018     Priority: Medium     GERD 04/28/2017     Priority: Medium     Essential hypertension 04/28/2017     Priority: Medium      Non morbid obesity due to excess calories 04/28/2017     Priority: Medium     Mixed hyperlipidemia 04/28/2017     Priority: Medium     Spinal stenosis of lumbar region with neurogenic claudication 04/28/2017     Priority: Medium     Controlled substance agreement updated 12- 01/15/2016     Priority: Medium     Backache 01/13/2014     Priority: Medium     Prostate cancer (H) 01/02/2013     Priority: Medium     Personal history of prostate cancer s/p prostatectomy and sEBRT 12/06/2012     Priority: Medium     Anemia due to acute blood loss 11/30/2012     Priority: Medium     Overview:   EBL 11/28/12:  1700 ml  EBL 11/28/12:  500 ml  Transfused 2 units pRBC's early on 11/29/12       Pelvic pain in male 11/30/2012     Priority: Medium        Past Medical History:    Past Medical History:   Diagnosis Date     Elevated prostate specific antigen (PSA)      Encounter for other administrative examinations      Esophagitis      Gastro-esophageal reflux disease without esophagitis      Low back pain      Malignant neoplasm of prostate (H)      Nicotine dependence, uncomplicated      Other intervertebral disc degeneration, lumbosacral region        Past Surgical History:    Past Surgical History:   Procedure Laterality Date     APPENDECTOMY OPEN  [id number]    Ruptured - [place]     ARTHROPLASTY KNEE Right 11/16/2021    Procedure: ARTHROPLASTY, KNEE, TOTAL;  Surgeon: Micah Rock MD;  Location: [facility] OR     COLONOSCOPY  08/31/2006    next due in 2016     DISKECTOMY, LUMBAR, SINGLE SP  03/16/2009    L 3-4 microdiskectomy, [facility]     ESOPHAGOSCOPY, GASTROSCOPY, DUODENOSCOPY (EGD), COMBINED  03/2003          ESOPHAGOSCOPY, GASTROSCOPY, DUODENOSCOPY (EGD), COMBINED  08/31/2006          PROSTATECTOMY PERINEAL RADICAL  11/28/2012    N[facility]     SPINE SURGERY N/A 04/28/2017    L3 to S1 spinal decompression L4/L5 fusion     TONSILLECTOMY, ADENOIDECTOMY, COMBINED      as a child     VARICOCELECTOMY   1959    INCISION AND DRAINAGE,EXCISE VARICOCELE       Family History:    Family History   Problem Relation Age of Onset     Heart Disease Father         Heart Disease, passed away from CHF,CAD     Colon Cancer Father         Cancer-colon     Hypertension Father         Hypertension     Other - See Comments Father         Stroke/Dementia     Hypertension Mother         Hypertension,HTN     Other - See Comments Mother          Parkinsons     Family History Negative Other         Good Health     Family History Negative Other         Good Health,previous marriage./previous marriage.     Family History Negative Other         Good Health,previous marriage.     Family History Negative Sister         Good Health,emotional problems.     Family History Negative Sister         Good Health     Other - See Comments Son         w/o major medical problems.     Other - See Comments Daughter         w/o major medical problems.       Social History:  Marital Status:   [2]  Social History     Tobacco Use     Smoking status: Former     Packs/day: 1.00     Years: 20.00     Pack years: 20.00     Types: Cigarettes     Quit date: 2002     Years since quittin.7     Passive exposure: Past     Smokeless tobacco: Current     Types: Snuff     Tobacco comments:     Can't remember when all he had passive exposure   Vaping Use     Vaping Use: Never used   Substance Use Topics     Alcohol use: Not Currently     Comment:  5 drinks a month     Drug use: No        Medications:    predniSONE (DELTASONE) 20 MG tablet  acetaminophen (TYLENOL) 325 MG tablet  amLODIPine (NORVASC) 10 MG tablet  apixaban ANTICOAGULANT (ELIQUIS) 5 MG tablet  atorvastatin (LIPITOR) 20 MG tablet  furosemide (LASIX) 20 MG tablet  hydrOXYzine (ATARAX) 10 MG tablet  levofloxacin (LEVAQUIN) 750 MG tablet  linezolid (ZYVOX) 600 MG tablet  lisinopril (ZESTRIL) 40 MG tablet  LORazepam (ATIVAN) 0.5 MG tablet  megestrol (MEGACE) 20 MG tablet  metroNIDAZOLE (FLAGYL) 500  "MG tablet  omeprazole (PRILOSEC) 40 MG DR capsule  ondansetron (ZOFRAN ODT) 8 MG ODT tab  oxybutynin (DITROPAN) 5 MG tablet  oxyCODONE (OXYCONTIN) 20 MG 12 hr tablet  oxyCODONE IR (ROXICODONE) 10 MG tablet  potassium chloride ER (KLOR-CON M) 20 MEQ CR tablet  predniSONE (DELTASONE) 5 MG tablet  pregabalin (LYRICA) 150 MG capsule  prochlorperazine (COMPAZINE) 10 MG tablet  tamsulosin (FLOMAX) 0.4 MG capsule  traZODone (DESYREL) 50 MG tablet          Review of Systems ROS is listed in HPI    Physical Exam   BP: 109/70  Pulse: (!) 133  Temp: 97.5  F (36.4  C)  Resp: 24  Height: 177.8 cm (5' 10\")  Weight: 93.9 kg (207 lb)  SpO2: 98 %      Physical Exam  Vitals: Pressure 109/70, pulse 133, temperature is 97.5, is 98% on room air his current pulse is in the 80s.  General: alert, oriented to person, place, time  Head: atraumatic  Eyes: PERRL, corneas clear and conjunctivae clear  Nose: no rhinorrhea/nasal discharge  Chest/Pulmonary: chest clear with equal lung sounds bilaterally, no chest wall tenderness or deformities, no tachypnea and no cyanosis  Cardiovascular: S1, S2 normal, regular rate and rhythm, no murmur and no pedal edema  Abdomen: soft, non-tender, no guarding, no rebound tenderness  Musculoskeletal/Extremities: normal extremities, no edema, erythema, tenderness and 5/5 strength in all 4 extremities  Skin: no diaphoresis and skin color normal  Neuro: speech clear and gait stable  Psychiatric: affect/mood normal, cooperative, normal judgement/insight and memory intact  ED Course              ED Course as of 07/18/23 1946 Tue Jul 18, 2023   1800 CBC with platelets differential(!)  No leukocytosis chronic stable anemia   1832 Basic metabolic panel(!)  Mild hyponatremia decreased bicarb not elevation in anion gap kidney functions appropriate glucose elevated patient nonfasting   1832 Troponin T, High Sensitivity  Troponin is negative.   1832 Nt probnp inpatient (BNP)  BNP is reassuring   1832 INR(!)  INR is " subtherapeutic   1850 CT Chest Pulmonary Embolism w Contrast  IMPRESSION:     No acute pulmonary emboli to the subsegmental level.     Procedures                  Results for orders placed or performed during the hospital encounter of 07/18/23 (from the past 24 hour(s))   Extra Tube    Narrative    The following orders were created for panel order Extra Tube.  Procedure                               Abnormality         Status                     ---------                               -----------         ------                     Extra Blue Top Tube[[id number]]                              Final result               Extra Red Top Tube[[id number]]                               Final result               Extra Green Top (Lithium...[[id number]]                      Final result               Extra Purple Top Tube[[id number]]                            Final result               Extra Green Top (Lithium...[[id number]]                      Final result                 Please view results for these tests on the individual orders.   Extra Blue Top Tube   Result Value Ref Range    Hold Specimen x    Extra Red Top Tube   Result Value Ref Range    Hold Specimen x    Extra Green Top (Lithium Heparin) Tube   Result Value Ref Range    Hold Specimen x    Extra Purple Top Tube   Result Value Ref Range    Hold Specimen x    Extra Green Top (Lithium Heparin) ON ICE   Result Value Ref Range    Hold Specimen x    CBC with platelets differential    Narrative    The following orders were created for panel order CBC with platelets differential.  Procedure                               Abnormality         Status                     ---------                               -----------         ------                     CBC with platelets and d...[[id number]]  Abnormal            Final result                 Please view results for these tests on the individual orders.   INR   Result Value Ref Range    INR 1.74 (H) 0.85 - 1.15   Partial  thromboplastin time   Result Value Ref Range    aPTT 40 (H) 22 - 38 Seconds   Basic metabolic panel   Result Value Ref Range    Sodium 131 (L) 136 - 145 mmol/L    Potassium 4.0 3.4 - 5.3 mmol/L    Chloride 97 (L) 98 - 107 mmol/L    Carbon Dioxide (CO2) 21 (L) 22 - 29 mmol/L    Anion Gap 13 7 - 15 mmol/L    Urea Nitrogen 12.6 8.0 - 23.0 mg/dL    Creatinine 0.66 (L) 0.67 - 1.17 mg/dL    Calcium 9.1 8.8 - 10.2 mg/dL    Glucose 163 (H) 70 - 99 mg/dL    GFR Estimate >90 >60 mL/min/1.73m2   Troponin T, High Sensitivity   Result Value Ref Range    Troponin T, High Sensitivity 18 <=22 ng/L   CBC with platelets and differential   Result Value Ref Range    WBC Count 8.6 4.0 - 11.0 10e3/uL    RBC Count 3.53 (L) 4.40 - 5.90 10e6/uL    Hemoglobin 9.1 (L) 13.3 - 17.7 g/dL    Hematocrit 28.7 (L) 40.0 - 53.0 %    MCV 81 78 - 100 fL    MCH 25.8 (L) 26.5 - 33.0 pg    MCHC 31.7 31.5 - 36.5 g/dL    RDW 18.2 (H) 10.0 - 15.0 %    Platelet Count 348 150 - 450 10e3/uL    % Neutrophils 84 %    % Lymphocytes 11 %    % Monocytes 5 %    % Eosinophils 0 %    % Basophils 0 %    % Immature Granulocytes 0 %    NRBCs per 100 WBC 0 <1 /100    Absolute Neutrophils 7.1 1.6 - 8.3 10e3/uL    Absolute Lymphocytes 1.0 0.8 - 5.3 10e3/uL    Absolute Monocytes 0.4 0.0 - 1.3 10e3/uL    Absolute Eosinophils 0.0 0.0 - 0.7 10e3/uL    Absolute Basophils 0.0 0.0 - 0.2 10e3/uL    Absolute Immature Granulocytes 0.0 <=0.4 10e3/uL    Absolute NRBCs 0.0 10e3/uL   Nt probnp inpatient (BNP)   Result Value Ref Range    N terminal Pro BNP Inpatient 195 0 - 900 pg/mL   CT Chest Pulmonary Embolism w Contrast    Narrative    PROCEDURE:  CT CHEST PULMONARY EMBOLISM W CONTRAST.    HISTORY:  Evaluate for pulmonary embolism.  cancer sob hx clots; Male  sex; No imaging to r/o PE in the last 24 hours; Pulmonary Embolism  Rule-Out Criteria (PERC) score > 0; Revised Spencer Score (RGS) not >=  11; No D-dimer result available; D-dimer not ordered    TECHNIQUE:  Initial pre-contrast   and localizer images were  obtained.  Contrast enhanced helical CT pulmonary angiography was then  performed.  Routine transaxial and post-processed (multiplanar and/or  MIP) reformations were obtained. This CT exam was performed using one  or more the following dose reduction techniques: automated exposure  control, adjustment of the mA and/or kV according to patient size,  and/or iterative reconstruction technique.    COMPARISON:  6/1/23    MEDS/CONTRAST: 86 mL Isovue-370    PULMONARY CTA FINDINGS:  This is a diagnostic quality helical CT  pulmonary angiogram.  There is no acute pulmonary embolism to the  first subsegmental pulmonary artery level.    OTHER FINDINGS:      The heart is unchanged in size. Coronary calcifications are seen.  There is aneurysmal dilatation of mid ascending aorta measuring 4.4  cm, previously 4.5 cm on 5/1/23. There is no pericardial or pleural  effusion. No abnormal thoracic adenopathy is identified. Debris is  noted in the trachea.    Limited views of the upper abdomen reveal no adrenal mass or acute  process.     The pleura is without thickening or effusions. The central airways are  unremarkable. No focal consolidation or intrapulmonary mass is  identified. Minimal dependent atelectasis is present.    No new suspicious osseous lesion is identified. Old healed right rib  fractures are noted.      Impression    IMPRESSION:    No acute pulmonary emboli to the subsegmental level.    KATHI OSPINA MD         SYSTEM ID:  [id number]       Medications   iopamidol (ISOVUE-370) solution 86 mL (86 mLs Intravenous $Given 7/18/23 1803)       Assessments & Plan (with Medical Decision Making)     I have reviewed the nursing notes.    I have reviewed the findings, diagnosis, plan and need for follow up with the patient.          Medical Decision Making    Differential diagnosis includes was not limited to ACS, pneumonia, heart failure, PE, AAA, and viral illness.    Patient is  anticoagulated however he states he has had small subsegmental PEs with being on anticoagulation in the past.  We will obtain a CT PE study.  We will also check his coags.  CBC, BMP, troponin.  Also a BNP twelve-lead EKG.      New Prescriptions    PREDNISONE (DELTASONE) 20 MG TABLET    Take two tablets (= 40mg) each day for 5 (five) days       Final diagnoses:   Shortness of breath   Physical deconditioning       7/18/2023   [facility] [place]     Ren Cabrera PA-C  07/18/23 1946

## 2023-07-18 NOTE — PROGRESS NOTES
[facility]: Cancer Care Short Note                                    Discussion with Patient:                                                      Called patient to see if he is feeling better with change in pain medication. States that he is feeling much more pain relief now.  States that he is having shortness of breath. States that he can hardly walk to bathroom. He was found to have pulmonary emboli 5/1/23.     Goals        General    Patient will adhere to medication regimen           Assessment:                                                      Assessment completed with:: Patient    Constitutional  Fatigue: Fatigue relieved by rest  Fever: Absent or within normal limits    Respiratory  Cough: Absent or within normal limits  Dyspnea: Shortness of breath with minimal exertion OR limiting instrumental ADL    Gastrointestinal  Anorexia: Loss of appetite without alteration in eating habits  Nausea: Absent or within normal limits  Vomiting: Absent or within normal limits  Dehydration: Increased oral fluids indicated OR dry mucous membranes OR diminished skin turgor  Mucositis Oral: Absent or within normal limits  Constipation: Absent or within normal limits  Diarrhea: Absent or within normal limits    Genitourinary  Patient Reported Genitourinary Symptoms?: No  Urinary Retention: Absent or within normal limits (catheter)  Urinary Tract Pain: Absent or within normal limits    Integumentary  Rash Maculo-Papular: Absent or within normal limits    Pain  Patient Reported Pain?: Yes, is currently well-managed  Pain Score: Moderate Pain (4)  Interfered with your usual ACTIVITY?: Does not interfere 0  Interfered with your SLEEP?: Does not interfere 0  Affected your MOOD?: Does not affect 0  Contributed to your STRESS?: Does not contribute 0  Pain Management Interventions: medication (see MAR)    Patient Coping  Accepting    Clinic Utilization  Patient Aware of Next Appointment?: Yes    Other Patient  Concerns  Other Patient Reported Concerns: No    RNCC Short Symptom Review:     Assessment completed with:: Patient    Constitutional  Fatigue: Fatigue relieved by rest  Fever: Absent or within normal limits    Respiratory  Cough: Absent or within normal limits  Dyspnea: Shortness of breath with minimal exertion OR limiting instrumental ADL    Gastrointestinal  Anorexia: Loss of appetite without alteration in eating habits  Nausea: Absent or within normal limits  Vomiting: Absent or within normal limits  Dehydration: Increased oral fluids indicated OR dry mucous membranes OR diminished skin turgor  Mucositis Oral: Absent or within normal limits  Constipation: Absent or within normal limits  Diarrhea: Absent or within normal limits    Genitourinary  Patient Reported Genitourinary Symptoms?: No  Urinary Retention: Absent or within normal limits (catheter)  Urinary Tract Pain: Absent or within normal limits    Integumentary  Rash Maculo-Papular: Absent or within normal limits    Pain  Patient Reported Pain?: Yes, is currently well-managed  Pain Score: Moderate Pain (4)  Interfered with your usual ACTIVITY?: Does not interfere 0  Interfered with your SLEEP?: Does not interfere 0  Affected your MOOD?: Does not affect 0  Contributed to your STRESS?: Does not contribute 0  Pain Management Interventions: medication (see MAR)    Patient Coping  Accepting    Clinic Utilization  Patient Aware of Next Appointment?: Yes    Other Patient Concerns  Other Patient Reported Concerns: No    Intervention/Education provided during outreach:                                                       Spoke with provider and Jermain was advised to go to ED.      Follow up call in 1-2 weeks    Signature:  Elza Lundberg RN

## 2023-07-18 NOTE — ED TRIAGE NOTES
"Pt states \"I can't breathe\" and has been going on for over a week.  Pt states he was not seen for this.  Pt states he does have cancer and a hx of blood clots.  Pt states he talked with Elza from infusion and she thought it could be blood clots again, despite the fact that he takes blood thinners.  Pt reports feeling weak and can't walk very well.  Pt's last chemo run was 3 weeks ago.       Triage Assessment     Row Name 07/18/23 5668       Triage Assessment (Adult)    Airway WDL WDL       Respiratory WDL    Respiratory WDL X;rhythm/pattern       Cardiac WDL    Cardiac WDL WDL       Peripheral/Neurovascular WDL    Peripheral Neurovascular WDL WDL       Cognitive/Neuro/Behavioral WDL    Cognitive/Neuro/Behavioral WDL WDL              "

## 2023-07-19 ENCOUNTER — TELEPHONE (OUTPATIENT)
Dept: ONCOLOGY | Facility: OTHER | Age: 68
End: 2023-07-19
Payer: COMMERCIAL

## 2023-07-19 NOTE — DISCHARGE INSTRUCTIONS
You are seen in the emergency department today for shortness of breath.  Your blood work shows no signs of systemic infection stable anemia, your CT of your chest showed no signs of pulmonary embolism no signs of pneumonia no sign of fluid in your lungs your EKG was reassuring your cardiac enzyme was negative.  This may be a viral illness as well as physical deconditioning.  You are currently taking 10 mg of prednisone a day-going to increase you to 40 mg for the next 5 days.  Please follow-up with your clinic provider.  If at any point you start having severe chest pain or coughing up blood please return to ER

## 2023-07-19 NOTE — TELEPHONE ENCOUNTER
Patient calls to report he was seen in ER and was told they ruled out clots and said his symptoms are side effects of chemotherapy as they did not see anything major. Please follow-up with him re: any further questions or concerns at [phone]. Sugey Perez RN on 7/19/2023 at 2:53 PM

## 2023-07-19 NOTE — TELEPHONE ENCOUNTER
Action July 19, 2023 9:54 AM MT   Action Taken Sent a request for imaging from [facility].      DIAGNOSIS: Left Acetabular Fx/Osteomyelitis   APPOINTMENT DATE: 07/21/2023   NOTES STATUS DETAILS   OFFICE NOTE from other specialist Internal [facility]   DISCHARGE SUMMARY from hospital Internal 06/29/2023 to 07/06/2023 - [facility]   OPERATIVE REPORT Care Everywhere 11/14/2018, 04/28/2017 - Lumbar Decompression and Fusion     03/16/2009 - Lumbar Microdiscectomy   MEDICATION LIST Internal  Care Everywhere    IMPLANT RECORD/STICKER Care Everywhere    LABS     MRI PACS Internal    [facility]:  02/16/2017, 07/22/2015, 05/02/2013 - L Spine   CT SCAN PACS Internal   XRAYS (IMAGES & REPORTS) PACS Internal    [facility]:  10/03/2019, 11/14/2018, 07/20/2017, 04/28/2017, 04/24/2013 - L Spine

## 2023-07-20 ENCOUNTER — PATIENT OUTREACH (OUTPATIENT)
Dept: ONCOLOGY | Facility: OTHER | Age: 68
End: 2023-07-20
Payer: COMMERCIAL

## 2023-07-20 ENCOUNTER — TELEPHONE (OUTPATIENT)
Dept: ORTHOPEDICS | Facility: CLINIC | Age: 68
End: 2023-07-20

## 2023-07-20 ENCOUNTER — VIRTUAL VISIT (OUTPATIENT)
Dept: CT IMAGING | Facility: CLINIC | Age: 68
End: 2023-07-20
Attending: FAMILY MEDICINE
Payer: COMMERCIAL

## 2023-07-20 DIAGNOSIS — Z79.2 LONG TERM (CURRENT) USE OF ANTIBIOTICS: ICD-10-CM

## 2023-07-20 DIAGNOSIS — C61 MALIGNANT NEOPLASM OF PROSTATE (H): ICD-10-CM

## 2023-07-20 DIAGNOSIS — R06.02 SHORTNESS OF BREATH: Primary | ICD-10-CM

## 2023-07-20 DIAGNOSIS — A49.8 INFECTION DUE TO KLEBSIELLA PNEUMONIAE: Primary | ICD-10-CM

## 2023-07-20 DIAGNOSIS — K65.1 PELVIC ABSCESS IN MALE (H): ICD-10-CM

## 2023-07-20 DIAGNOSIS — C79.51 MALIGNANT NEOPLASM METASTATIC TO BONE (H): ICD-10-CM

## 2023-07-20 LAB
ALBUMIN SERPL BCG-MCNC: 3.3 G/DL (ref 3.5–5.2)
ALP SERPL-CCNC: 69 U/L (ref 40–129)
ALT SERPL W P-5'-P-CCNC: 8 U/L (ref 0–70)
AST SERPL W P-5'-P-CCNC: 26 U/L (ref 0–45)
BILIRUB DIRECT SERPL-MCNC: <0.2 MG/DL (ref 0–0.3)
BILIRUB SERPL-MCNC: 0.5 MG/DL
PROT SERPL-MCNC: 6.4 G/DL (ref 6.4–8.3)

## 2023-07-20 PROCEDURE — 99215 OFFICE O/P EST HI 40 MIN: CPT | Mod: 93 | Performed by: REGISTERED NURSE

## 2023-07-20 NOTE — TELEPHONE ENCOUNTER
- A call was placed to the patient.     - Switched his appointment to virtual     - Patient verbalized understanding of plan and all questions were answered. Call back number to clinic was given and patient was told to call if they had an further questions.

## 2023-07-20 NOTE — PROGRESS NOTES
[facility]: Cancer Care Short Note                                    Discussion with Patient:                                                      Called patient to follow up on ED vsit. Patient states that he was prescribed Prednisone. Is still having some shortness of breath with activity.     Medication understanding/side effect: states will take Prednisone as directed by ED provider for shortness of breath     Goals        General    Patient will adhere to medication regimen           Assessment:                                                      Assessment completed with:: Patient    Constitutional  Fatigue: Fatigue relieved by rest  Fever: Absent or within normal limits    Respiratory  Cough: Absent or within normal limits  Dyspnea: Shortness of breath with moderate exertion    Gastrointestinal  Anorexia: Loss of appetite without alteration in eating habits  Nausea: Absent or within normal limits  Vomiting: Absent or within normal limits  Dehydration: Absent or within normal limits  Mucositis Oral: Absent or within normal limits  Constipation: Absent or within normal limits  Diarrhea: Absent or within normal limits    Genitourinary  Patient Reported Genitourinary Symptoms?: No  Urinary Retention: Absent or within normal limits  Urinary Tract Pain: Absent or within normal limits    Integumentary  Rash Maculo-Papular: Absent or within normal limits    Pain  Patient Reported Pain?: Yes, is currently well-managed  Pain Score: Moderate Pain (4)  Pain Management Interventions: medication (see MAR)    Patient Coping  Accepting    Clinic Utilization  Patient Aware of Next Appointment?: Yes    Other Patient Concerns  Other Patient Reported Concerns: No    RNCC Short Symptom Review:     Assessment completed with:: Patient    Constitutional  Fatigue: Fatigue relieved by rest  Fever: Absent or within normal limits    Respiratory  Cough: Absent or within normal limits  Dyspnea: Shortness of breath with moderate  exertion    Gastrointestinal  Anorexia: Loss of appetite without alteration in eating habits  Nausea: Absent or within normal limits  Vomiting: Absent or within normal limits  Dehydration: Absent or within normal limits  Mucositis Oral: Absent or within normal limits  Constipation: Absent or within normal limits  Diarrhea: Absent or within normal limits    Genitourinary  Patient Reported Genitourinary Symptoms?: No  Urinary Retention: Absent or within normal limits  Urinary Tract Pain: Absent or within normal limits    Integumentary  Rash Maculo-Papular: Absent or within normal limits    Pain  Patient Reported Pain?: Yes, is currently well-managed  Pain Score: Moderate Pain (4)  Pain Management Interventions: medication (see MAR)    Patient Coping  Accepting    Clinic Utilization  Patient Aware of Next Appointment?: Yes    Other Patient Concerns  Other Patient Reported Concerns: No    Intervention/Education provided during outreach:                                                       Discussed medication and that he is feeling much better with new pain management.    Follow up call in 1-2 weeks    Signature:  Elza Lundberg RN

## 2023-07-20 NOTE — PROGRESS NOTES
[facility]  Infectious Disease Outpatient ID Clinic Note: telephone visit    Telephone visit  Start: 12:35 PM  Stop: 1:06 PM     Patient:  Dilip Kan, Date of birth 1955  Medical record number [id number]  Date of Visit: 7/20/2023      Assessment and Recommendations   Problem List:  1. Sepsis  2. Pelvic abscess  - Abscess communicates with bladder and prostate, but not the rectum based on cystogram  3. Abnormal MR signal on pelvis bones (left pubic bone, acetabulum, ischium) - unclear if related to fractures versus active osteomyelitis?  4. Metastatic prostate cancer (liver, bone: acetabulum), recently receiving chemotherapy last dose 6/13/23  5. Lumbar spine fusion (L4-L5)  6. Recent neutropenic fever, thought secondary to Klebsiella UTI - not currently neutropenic  7. MRSA screen negative  8. 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 Continue Levofloxacin 750 mg daily and Flagyl 500 mg q8h; continue treatment until ID follow up around 8/15; enough doses to continue until next appointment, if needing to extend will need new prescription   2. Repeat CT A/P w/contrast around 8/14; will order today and someone will call to schedule  3. Labs in two weeks: CBC, CMP, ESR, CRP  4. EKG in two weeks  5. Follow up with Dr. Kay on 8/15      Jermain is feeling ok, but pain is almost not present in his abdomen now with pain medication. He has been taking his antibiotics as prescribed and tolerating well, may have missed two doses of Linezolid, unclear why he has a day and a half of medication left. Labs from recent ED visit with improving anemia and show resolution of leukocytosis. CT PE negative (7/18). QTc WNL at 410ms on 6/29. Will recheck labs and EKG in two weeks for QTc while on levofloxacin and flagyl. Plan to repeat CT A/P to reassess pelvic abscess prior to Dr. Kay's visit. Exam not possible related to nature of visit being telephone call. Discussed symptoms  "concerning for adverse effects r/t levofloxacin and metronidazole to contact us if he experiences. Discussed s&s of C diff diarrhea and risk while on antibiotics.      I spent 55 minutes as part of a visit on the date of the encounter doing chart review, history and exam, documentation, and care coordination.      MOISÉS Guerra, CNP  Infectious Diseases  Pager# [phone] or Graciela (preferred)          Interval History:   Seen in ED (7/18) recently for fatigue and DOA but workup negative for worsening infectious or cardiac etiology; felt to be related to adverse effects of chemotherapy.    Feeling \"so so\", fatigued and out of breath (when getting up and walking). Per patient it's likely the chemotherapy. Taking oxycodone for the pain, which is helping. Does have numbness and tingling in feet bilaterally but started with chemotherapy (stable). Had diarrhea for awhile but improved recently, was taking imodium but stopped taking this and no recurrence of diarrhea. No drains present.    Where would you like to get labs drawn and have CT performed? [place] for labs, EKG, and repeat CT Chest/abdomen/pelvis    How are you taking your antibiotics? Levofloxacin (750 mg once daily) and metronidazole (500 mg three times daily)  Finished Linezolid? Still taking, has three left  Any missed doses? Possible with Linezolid since has some pills left, does not think he has misses any others  Ankle pain? denies      Denies headache, fever, chills, night sweats, fatigue, sore throat, chest pain, palpitations, cough, dyspnea, nausea, vomiting, abdominal pain, diarrhea, dysuria, myalgias, arthralgias, lymphadenopathy, acute rash, or new wounds.        History of Present Illness:   Per Dr. Kay's note 7/5/23:  \"67yo M with history of metastatic prostate cancer receiving chemotherapy earlier this month now presenting with fever and sepsis. Initial workup is concerning for a pelvic abscess, possibly associated with a vesicorectal " "fistula and also possibly with pubic bone osteomyelitis. Fortunately the patient is not neutropenic. IR was consulted for abscess drainage but did not feel they had an approach. At this point in time, it is unclear whether we would get any cultures. Ortho has been requested to consider bone biopsy. In anticipation of lack of culture data, and given the wide variety of potential pathogens involved, we can at least get MRSA and VRE swabs. Gut bugs are most likely, although given immunocompromised status other organisms are possible. Primary gap in current regimen would be Pseudomonas (more likely if neutropenic) and VRE.       Update 7/5: Patient is doing well and close to discharge. He can go on Linezolid 600 mg BID for 14 days after discharge to cover VRE. It is likely not the main  but since he is colonized and I cannot sample the abscess I want to cover it. Then he will need to be on Levofloxacin 750 daily and Flagyl 500 mg TID for another 6-8 weeks following discharge. Duration will depend on progression of the abscess. He should get a CT abdomen/Pelvis with and without contrast before the appointment in 6 weeks. \"          Review of Systems:     12-point ROS performed; pertinent positives and negatives per above         Medications & Allergies:     Current Outpatient Medications   Medication     acetaminophen (TYLENOL) 325 MG tablet     amLODIPine (NORVASC) 10 MG tablet     apixaban ANTICOAGULANT (ELIQUIS) 5 MG tablet     atorvastatin (LIPITOR) 20 MG tablet     furosemide (LASIX) 20 MG tablet     hydrOXYzine (ATARAX) 10 MG tablet     levofloxacin (LEVAQUIN) 750 MG tablet     linezolid (ZYVOX) 600 MG tablet     lisinopril (ZESTRIL) 40 MG tablet     LORazepam (ATIVAN) 0.5 MG tablet     megestrol (MEGACE) 20 MG tablet     metroNIDAZOLE (FLAGYL) 500 MG tablet     omeprazole (PRILOSEC) 40 MG DR capsule     ondansetron (ZOFRAN ODT) 8 MG ODT tab     oxybutynin (DITROPAN) 5 MG tablet     oxyCODONE (OXYCONTIN) 20 " MG 12 hr tablet     oxyCODONE IR (ROXICODONE) 10 MG tablet     potassium chloride ER (KLOR-CON M) 20 MEQ CR tablet     predniSONE (DELTASONE) 20 MG tablet     predniSONE (DELTASONE) 5 MG tablet     pregabalin (LYRICA) 150 MG capsule     prochlorperazine (COMPAZINE) 10 MG tablet     tamsulosin (FLOMAX) 0.4 MG capsule     traZODone (DESYREL) 50 MG tablet     No current facility-administered medications for this visit.         No Known Allergies         Physical Exam:     This was a telephone visit, no vitals taken    Physical Examination:  GENERAL: Healthy, alert and no distress  RESP: No audible wheeze, cough. Speaking in full sentences  NEURO:  Mentation and speech appropriate for age.  PSYCH: Mentation appears normal, judgement and insight intact, normal speech       Microbiology Data   Pertinent Micro:    Culture   Date Value Ref Range Status   07/01/2023 Enterococcus faecium VRE (A)  Final   06/29/2023 No Growth  Final   06/29/2023 Mixed Urogenital Shonda  Final   06/29/2023 No Growth  Final   04/27/2023 No Growth  Final   04/19/2023 Klebsiella pneumoniae (AA)  Final   04/19/2023 >100,000 CFU/mL Klebsiella pneumoniae (A)  Final   04/19/2023 No Growth  Final   11/17/2021 No Growth  Final   11/17/2021 No Growth  Final       Last check of C difficile  No results found for: CDBPCT    Urine Studies     Recent Labs   Lab Test 06/29/23  1410 04/19/23  0443 03/29/22  1414 11/18/21  0526 03/08/21  0908 03/02/20  1321   URINEPH 6.0 6.5 5.5 5.5 5.5 6.0   NITRITE Negative Negative Negative Negative Negative Negative   LEUKEST Large* Moderate* Negative Negative Negative Negative   WBCU 33* >182*  --  1 1 4   UYEAST  --  Many*  --   --   --   --        CSF testing   No lab results found.    Invalid input(s): CADAM, EVPCR, ENTPCR, ENTEROVIRUS    Laboratory Data:   Metabolic Studies       Recent Labs   Lab Test 07/18/23  1733 07/05/23  0723 07/05/23  0529 07/02/23  0209 07/02/23  0139 07/01/23  1223 07/01/23  0805 06/29/23  3995  06/29/23  1229 06/13/23  1134 05/23/23  1205 03/09/23  1406 02/20/23  0900   *  --   --   --   --   --  138   < >  --    < > 136   < > 138   POTASSIUM 4.0  --   --   --   --   --  4.1   < >  --    < > 4.5   < > 4.6   CHLORIDE 97*  --   --   --   --   --  104   < >  --    < > 104   < > 105   CO2 21*  --   --   --   --   --  25   < >  --    < > 21*   < > 25   ANIONGAP 13  --   --   --   --   --  9   < >  --    < > 11   < > 8   BUN 12.6  --   --   --   --   --  10.8   < >  --    < > 15.1   < > 12.8   CR 0.66*  --  0.53*   < >  --   --  0.54*  0.54*   < >  --    < > 0.54*   < > 0.64*   GFRESTIMATED >90  --  >90   < >  --   --  >90  >90   < >  --    < > >90   < > >90   *   < >  --    < >  --    < > 126*   < >  --    < > 147*   < > 115*   A1C  --   --   --   --   --   --   --   --   --   --   --   --  6.7*   ROBERTO 9.1  --   --   --   --   --  9.0   < >  --    < > 9.2  9.2   < > 9.4   PHOS  --   --   --   --   --   --   --   --   --   --  3.6  --   --    MAG  --   --   --   --   --   --  1.8  --   --   --   --    < >  --    LACT  --   --   --   --  0.8  --   --   --  1.0   < >  --    < >  --     < > = values in this interval not displayed.       Hepatic Studies    Recent Labs   Lab Test 06/30/23  0418 06/29/23  0939 06/13/23  1134 05/01/23  1051 04/26/23  1301 09/29/22  0914 08/01/22  0750   BILITOTAL 0.3 0.5 0.2   < > 0.4   < > 0.3   ALKPHOS 132* 152* 67   < > 64   < > 68   PROTTOTAL 5.6* 7.1 6.3*   < > 5.5*   < > 6.4   ALBUMIN 2.4* 3.3* 3.3*   < > 3.0*   < > 3.8   AST 36 92* 13   < > 12   < > 8*   ALT 78* 124* 20   < > 12   < > 7   LDH  --   --   --   --  279*  --  137*    < > = values in this interval not displayed.       Hematology Studies      Recent Labs   Lab Test 07/18/23  1733 07/05/23  0923 07/03/23  0621 07/02/23  0734 07/01/23  0805 06/30/23  0418 06/29/23  2237 04/26/23  1301 04/22/23  0647   WBC 8.6 14.0*  --  13.0* 13.5* 15.5* 14.7*   < > 2.9*   ANEU  --   --   --   --   --   --   --   --  2.2    ALYM  --   --   --   --   --   --   --   --  0.4*   TELLO  --   --   --   --   --   --   --   --  0.3   AEOS  --   --   --   --   --   --   --   --  0.0   HGB 9.1* 8.4*   < > 7.6* 8.0* 8.6* 8.7*   < > 8.2*   HCT 28.7* 25.7*  --  24.4* 25.2* 26.5* 27.7*   < > 24.9*    446  --  357 377 375  --    < > 111*    < > = values in this interval not displayed.       Medication levels    Recent Labs   Lab Test 07/01/23  0805   VANCOMYCIN 14.6       Inflammatory Markers  No lab results found.    Invalid input(s): RATE, AUTO, ESR, WESR    Imaging:  Results for orders placed or performed during the hospital encounter of 06/29/23   CT Cystogram wo & w Contrast    Narrative    EXAMINATION: CT CYSTOGRAM WO & W CONTRAST, 6/30/2023 4:18 PM    TECHNIQUE:  Helical CT images from the lung bases through the  symphysis pubis were obtained with contrast.  Coronal reformatted  images were generated at a workstation for further assessment.    COMPARISON: CT 6/29/2023, 6/1/2023.    HISTORY: patient with concern for fistulizing pelvic abscess to the  bladder and rectum    FINDINGS:    Bladder: Jiménez bulb appears to be low lying within the previously  described abscess/fluid collection along the inferior bladder margin.  No findings to suggest extravasation of contrast outside of the  bladder/collection. Hyperdensities just posterior to the pubic ramus  (series 3, image 213) are favored to represent reactive bone formation  in the setting of suspected osteomyelitis. No evidence for fistula  formation to the rectum. Circumferential wall thickening of the  urinary bladder. Antidependant air, which is likely within the bladder  lumen, less likely within the wall.    Pelvic organs: Prostatectomy. Collection in the prostatectomy bed as  described above.    Gastrointestinal tract: No dilated loops of bowel. Circumferential  thickening and submucosal edema involving the rectum. Prior  appendectomy.     Lymph nodes: No suspicious  lymphadenopathy.    Peritoneum/mesentery: Similar presacral edema and mild fluid.     Vessels: No aneurysmal dilatation of the visualized aorta or major  branch vessels.  No significant atherosclerotic disease.    Bones and soft tissues: Soft tissue gas and reactive osseous changes  of the left pubic symphysis suggestive of osteomyelitis, similar to  prior. Similar sclerosis of the left acetabulum and left ischium.  Lumbar fusion changes at L4-5. Small fat containing bilateral inguinal  hernias.       Impression    IMPRESSION:     1. Redemonstrated air-fluid containing collection along the urinary  bladder neck with interval indwelling Jiménez bulb. The collection is  contiguous with the urinary bladder. No extravasation of contrast  beyond the collection or bladder lumen. No evidence of fistulous  communication with rectum.     2. Soft tissue gas and reactive osseous changes of the left pubic  symphysis suggestive of osteomyelitis, similar to prior. Unchanged  mild sclerotic changes in adjacent left acetabulum and left ischium.     3. Reactive circumferential wall thickening of the urinary bladder  secondary to ongoing infection/inflammation.    4. Circumferential thickening and submucosal edema involving the  rectum, suggestive of proctitis.     I have personally reviewed the examination and initial interpretation  and I agree with the findings.    BRENDA LOCKWOOD MD         SYSTEM ID:  [id number]   MR Pelvis (Intrapelvic Organs) wo Contrast    Narrative    Pelvis MRI without contrast    INDICATION: Pelvic abscess and osteomyelitis    COMPARISON: Left hip MRI 3/6/2023 abdomen pelvis CT 6/30/2023.    Contrast: None    FINDINGS: Low signal artifact from surgical hardware from spinal  surgery including pedicle screws bilaterally. Well-circumscribed  artifact from Jiménez catheter balloon in the decompressed urinary  bladder. No enlarged inguinal or pelvic lymph nodes. Small amount of  dependent pelvic free fluid. Sigmoid  colon is mildly redundant.  Disc space narrowing throughout the lumbar spine included portion.  This consistent with underlying degenerative disc disease as well.  There are some low signal T1 changes in the left parasymphyseal pubic  bone and there is soft tissue heterogeneity inferior the left superior  pubic ramus compared to the right. This correlates with the recent CT  abnormality of concern there are infection/inflammation. There is  asymmetric enlargement left obturator externus and internus muscles.  Extensive bone marrow edema noted in the medial acetabulum a left  ischium with cortical irregularity consistent with fracture. Other  muscle edema also noted on the left especially involving pectineus  muscle. No sacral edema. Mild edematous changes in the left femoral  head. Low signal T1 marrow changes noted at site of there appears to  be a fracture in the left ischial bone. There is an area of sharply  defined lucency on the recent CT in this area.      Impression    IMPRESSION: Lower lumbar spine surgical hardware. Apparent  nondisplaced fracture medial left acetabulum. Abnormalities in area of  previous air density and irregularity at left superior pubic ramus  very slightly irregular 4 marrow signal on this MRIi, this could be  old posttraumatic or old postinfectious. Marrow edema throughout the  medial left pelvic brain likely related to prior trauma with minimal  edema in the left femoral head which could represent bone contusion.  No visible fracture line in this area no other obvious avascular  necrosis in this area at this time. Extensive muscle edema likely  related to previous trauma in the left internal pelvic musculature.  Small amount of pelvic free fluid. Jiménez catheter.    JADIEL ROSSI MD         SYSTEM ID:  [id number]   XR Pelvis 1/2 Views    Narrative    EXAM: XR PELVIS 1/2 VIEWS  LOCATION: [facility]  DATE: 7/2/2023    INDICATION: Pelvic  pain  COMPARISON: 06/30/2023 MRI.      Impression    IMPRESSION:   1.  Nondisplaced fracture of the left medial and posterior acetabulum.  2.  Nondisplaced fracture of the left inferior pubic ramus.  3.  Advanced left hip degenerative arthrosis.  4.  Mild/moderate right hip degenerative arthrosis.  5.  Instrumented fusion in the lower lumbar spine noted.  6.  Jiménez catheter.

## 2023-07-20 NOTE — TELEPHONE ENCOUNTER
[facility] Call Center    Phone Message    May a detailed message be left on voicemail: yes     Reason for Call: Other: Pt scheduled with DR. Acharya on 07/21 for follow up for ED follow up - L. Acetabular Fracture/Osteomyelitis - pt advising he does not have transportation to get to the appointment.  Pt is very frustrated.  Asking why he needs to come in for appointment since ED  Told pt that there is nothing that can be done to help pt.  Offered to schedule phone visit with Dr. Acharya.  Spoke to Maicol at [facility] and he advised Dr. Acharya prefers to see  pt in person.  Pt is needing to reschedule and asking for call back.  Pt can be reached at  [phone]      Action Taken: Other: [facility]:  Dr. Osbaldo Acharya    Travel Screening: Not Applicable

## 2023-07-21 ENCOUNTER — PRE VISIT (OUTPATIENT)
Dept: ORTHOPEDICS | Facility: CLINIC | Age: 68
End: 2023-07-21

## 2023-07-21 ENCOUNTER — VIRTUAL VISIT (OUTPATIENT)
Dept: ORTHOPEDICS | Facility: CLINIC | Age: 68
End: 2023-07-21
Payer: COMMERCIAL

## 2023-07-21 DIAGNOSIS — M84.550A PATHOLOGICAL FRACTURE OF PELVIS DUE TO NEOPLASTIC DISEASE, INITIAL ENCOUNTER: ICD-10-CM

## 2023-07-21 DIAGNOSIS — C79.51 PAIN FROM BONE METASTASES (H): Primary | ICD-10-CM

## 2023-07-21 DIAGNOSIS — G89.3 PAIN FROM BONE METASTASES (H): Primary | ICD-10-CM

## 2023-07-21 PROCEDURE — 99214 OFFICE O/P EST MOD 30 MIN: CPT | Mod: VID | Performed by: ORTHOPAEDIC SURGERY

## 2023-07-21 NOTE — NURSING NOTE
Reason For Visit:   Chief Complaint   Patient presents with     Consult     ED followup left acetabular fracture        There were no vitals taken for this visit.    Pain Assessment  Patient Currently in Pain: Yes  0-10 Pain Scale: 4  Primary Pain Location: Hip (left hip area)      Fabián Carvalho ATC

## 2023-07-21 NOTE — LETTER
7/21/2023         RE: Dilip Kan  [address] [place] [address]        Dear Colleague,    Thank you for referring your patient, Dilip Kan, to the [facility] [place]. Please see a copy of my visit note below.    This patient is a history of metastatic prostate cancer and was recently in the emergency department for some neutropenic fevers and infection.  Was placed on antibiotics and is now home and feeling somewhat better.  He is describes that he is a little short of breath still.    Examination over the video visit he was alert oriented and in no acute distress.  Respirations were regular and unlabored.  Eyes were nonicteric.    Reviewed the patient's a myriad of imaging.  He has what seems to be metastatic disease from the prostate affecting the posterior aspect of the acetabulum with a minimally displaced fracture there.  He also has significant osteoarthritis of the left hip with complete loss of joint space and large osteophytes.  Additionally in the symphysis on that left side he has air pockets which extend into the subcutaneous space and seem contiguous with the catheter that was in his bladder.  It seems that he may have an infection in that area that involves the bone.  He has lots of edema in the surrounding musculature on MRI and edema versus a marrow replacement throughout the symphysis and into the acetabulum.    This patient may weight-bear as tolerated as the chief weightbearing areas of the left acetabulum are intact and in good condition.  Expect he will have a painful stiff hip from his arthritis at the least.  He seems to be benefiting from oral narcotics with regard to the left hip pain.  He is to continue on antibiotics to address his infection.  I do not see significant abscess or a sequestrum that would benefit from surgical intervention.  If there is a question as to the bacteria then after being off of antibiotics he could have a needle  biopsy of that symphysis bone.  If the patient did clear his infection and could take a month or more off of the chemotherapy he may be a candidate for hip replacement to address his arthritis and painful pathologic fracture.  This seems to inadvisable at the current time as he would be at very high risk for infection complication.  I have asked the patient to return to see me as needed if he has a increase in his hip pain and then I would evaluate his recent studies to see if there is been some change.  In the event that some of his other acute issues have settled down so that he would be low risk for hip surgery would be happy to talk to him again.  I have answered all his questions to the      Osbaldo Acharya MD

## 2023-07-21 NOTE — PROGRESS NOTES
This patient is a history of metastatic prostate cancer and was recently in the emergency department for some neutropenic fevers and infection.  Was placed on antibiotics and is now home and feeling somewhat better.  He is describes that he is a little short of breath still.    Examination over the video visit he was alert oriented and in no acute distress.  Respirations were regular and unlabored.  Eyes were nonicteric.    Reviewed the patient's a myriad of imaging.  He has what seems to be metastatic disease from the prostate affecting the posterior aspect of the acetabulum with a minimally displaced fracture there.  He also has significant osteoarthritis of the left hip with complete loss of joint space and large osteophytes.  Additionally in the symphysis on that left side he has air pockets which extend into the subcutaneous space and seem contiguous with the catheter that was in his bladder.  It seems that he may have an infection in that area that involves the bone.  He has lots of edema in the surrounding musculature on MRI and edema versus a marrow replacement throughout the symphysis and into the acetabulum.    This patient may weight-bear as tolerated as the chief weightbearing areas of the left acetabulum are intact and in good condition.  Expect he will have a painful stiff hip from his arthritis at the least.  He seems to be benefiting from oral narcotics with regard to the left hip pain.  He is to continue on antibiotics to address his infection.  I do not see significant abscess or a sequestrum that would benefit from surgical intervention.  If there is a question as to the bacteria then after being off of antibiotics he could have a needle biopsy of that symphysis bone.  If the patient did clear his infection and could take a month or more off of the chemotherapy he may be a candidate for hip replacement to address his arthritis and painful pathologic fracture.  This seems to inadvisable at the  current time as he would be at very high risk for infection complication.  I have asked the patient to return to see me as needed if he has a increase in his hip pain and then I would evaluate his recent studies to see if there is been some change.  In the event that some of his other acute issues have settled down so that he would be low risk for hip surgery would be happy to talk to him again.  I have answered all his questions to the

## 2023-07-23 ENCOUNTER — HEALTH MAINTENANCE LETTER (OUTPATIENT)
Age: 68
End: 2023-07-23

## 2023-07-31 ENCOUNTER — ALLIED HEALTH/NURSE VISIT (OUTPATIENT)
Dept: FAMILY MEDICINE | Facility: OTHER | Age: 68
End: 2023-07-31
Attending: INTERNAL MEDICINE
Payer: COMMERCIAL

## 2023-07-31 DIAGNOSIS — C79.51 MALIGNANT NEOPLASM METASTATIC TO BONE (H): ICD-10-CM

## 2023-07-31 DIAGNOSIS — C61 MALIGNANT NEOPLASM OF PROSTATE (H): ICD-10-CM

## 2023-07-31 DIAGNOSIS — C79.51 MALIGNANT NEOPLASM METASTATIC TO BONE (H): Primary | ICD-10-CM

## 2023-07-31 LAB
ALBUMIN SERPL BCG-MCNC: 3.3 G/DL (ref 3.5–5.2)
ALP SERPL-CCNC: 65 U/L (ref 40–129)
ALT SERPL W P-5'-P-CCNC: <5 U/L (ref 0–70)
ANION GAP SERPL CALCULATED.3IONS-SCNC: 11 MMOL/L (ref 7–15)
AST SERPL W P-5'-P-CCNC: 8 U/L (ref 0–45)
BASOPHILS # BLD AUTO: 0 10E3/UL (ref 0–0.2)
BASOPHILS NFR BLD AUTO: 0 %
BILIRUB SERPL-MCNC: 0.4 MG/DL
BUN SERPL-MCNC: 9 MG/DL (ref 8–23)
CALCIUM SERPL-MCNC: 9.2 MG/DL (ref 8.8–10.2)
CHLORIDE SERPL-SCNC: 103 MMOL/L (ref 98–107)
CREAT SERPL-MCNC: 0.76 MG/DL (ref 0.67–1.17)
DEPRECATED HCO3 PLAS-SCNC: 23 MMOL/L (ref 22–29)
EOSINOPHIL # BLD AUTO: 0.1 10E3/UL (ref 0–0.7)
EOSINOPHIL NFR BLD AUTO: 1 %
ERYTHROCYTE [DISTWIDTH] IN BLOOD BY AUTOMATED COUNT: 20.5 % (ref 10–15)
FERRITIN SERPL-MCNC: 115 NG/ML (ref 31–409)
GFR SERPL CREATININE-BSD FRML MDRD: >90 ML/MIN/1.73M2
GLUCOSE SERPL-MCNC: 154 MG/DL (ref 70–99)
HCT VFR BLD AUTO: 32.2 % (ref 40–53)
HGB BLD-MCNC: 10 G/DL (ref 13.3–17.7)
HOLD SPECIMEN: NORMAL
HOLD SPECIMEN: NORMAL
IMM GRANULOCYTES # BLD: 0.1 10E3/UL
IMM GRANULOCYTES NFR BLD: 1 %
IRON BINDING CAPACITY (ROCHE): 180 UG/DL (ref 240–430)
IRON SATN MFR SERPL: 31 % (ref 15–46)
IRON SERPL-MCNC: 55 UG/DL (ref 61–157)
LDH SERPL L TO P-CCNC: 122 U/L (ref 0–250)
LYMPHOCYTES # BLD AUTO: 1 10E3/UL (ref 0.8–5.3)
LYMPHOCYTES NFR BLD AUTO: 11 %
MCH RBC QN AUTO: 25.9 PG (ref 26.5–33)
MCHC RBC AUTO-ENTMCNC: 31.1 G/DL (ref 31.5–36.5)
MCV RBC AUTO: 83 FL (ref 78–100)
MONOCYTES # BLD AUTO: 0.6 10E3/UL (ref 0–1.3)
MONOCYTES NFR BLD AUTO: 6 %
NEUTROPHILS # BLD AUTO: 7.7 10E3/UL (ref 1.6–8.3)
NEUTROPHILS NFR BLD AUTO: 81 %
NRBC # BLD AUTO: 0 10E3/UL
NRBC BLD AUTO-RTO: 0 /100
PLATELET # BLD AUTO: 269 10E3/UL (ref 150–450)
POTASSIUM SERPL-SCNC: 4 MMOL/L (ref 3.4–5.3)
PROT SERPL-MCNC: 5.9 G/DL (ref 6.4–8.3)
PSA SERPL DL<=0.01 NG/ML-MCNC: 1.53 NG/ML (ref 0–6.5)
RBC # BLD AUTO: 3.86 10E6/UL (ref 4.4–5.9)
SODIUM SERPL-SCNC: 137 MMOL/L (ref 136–145)
WBC # BLD AUTO: 9.5 10E3/UL (ref 4–11)

## 2023-07-31 PROCEDURE — 82728 ASSAY OF FERRITIN: CPT | Mod: ZL

## 2023-07-31 PROCEDURE — 83615 LACTATE (LD) (LDH) ENZYME: CPT | Mod: ZL

## 2023-07-31 PROCEDURE — 36415 COLL VENOUS BLD VENIPUNCTURE: CPT | Mod: ZL

## 2023-07-31 PROCEDURE — 80053 COMPREHEN METABOLIC PANEL: CPT | Mod: ZL

## 2023-07-31 PROCEDURE — 84153 ASSAY OF PSA TOTAL: CPT | Mod: ZL

## 2023-07-31 PROCEDURE — 250N000011 HC RX IP 250 OP 636: Mod: JZ | Performed by: INTERNAL MEDICINE

## 2023-07-31 PROCEDURE — 96402 CHEMO HORMON ANTINEOPL SQ/IM: CPT

## 2023-07-31 PROCEDURE — 85025 COMPLETE CBC W/AUTO DIFF WBC: CPT | Mod: ZL

## 2023-07-31 PROCEDURE — 83550 IRON BINDING TEST: CPT | Mod: ZL

## 2023-07-31 RX ADMIN — LEUPROLIDE ACETATE 45 MG: KIT at 10:13

## 2023-07-31 ASSESSMENT — ANXIETY QUESTIONNAIRES
IF YOU CHECKED OFF ANY PROBLEMS ON THIS QUESTIONNAIRE, HOW DIFFICULT HAVE THESE PROBLEMS MADE IT FOR YOU TO DO YOUR WORK, TAKE CARE OF THINGS AT HOME, OR GET ALONG WITH OTHER PEOPLE: NOT DIFFICULT AT ALL
GAD7 TOTAL SCORE: 5
4. TROUBLE RELAXING: SEVERAL DAYS
6. BECOMING EASILY ANNOYED OR IRRITABLE: SEVERAL DAYS
2. NOT BEING ABLE TO STOP OR CONTROL WORRYING: SEVERAL DAYS
7. FEELING AFRAID AS IF SOMETHING AWFUL MIGHT HAPPEN: SEVERAL DAYS
3. WORRYING TOO MUCH ABOUT DIFFERENT THINGS: NOT AT ALL
5. BEING SO RESTLESS THAT IT IS HARD TO SIT STILL: NOT AT ALL
8. IF YOU CHECKED OFF ANY PROBLEMS, HOW DIFFICULT HAVE THESE MADE IT FOR YOU TO DO YOUR WORK, TAKE CARE OF THINGS AT HOME, OR GET ALONG WITH OTHER PEOPLE?: NOT DIFFICULT AT ALL
GAD7 TOTAL SCORE: 5
1. FEELING NERVOUS, ANXIOUS, OR ON EDGE: SEVERAL DAYS
7. FEELING AFRAID AS IF SOMETHING AWFUL MIGHT HAPPEN: SEVERAL DAYS

## 2023-07-31 NOTE — PROGRESS NOTES
Clinic Administered Medication Documentation    Injectable Medication Documentation    Is there an active order (written within the past 365 days, with administrations remaining, not ) in the chart? Yes.     Patient was given  Lupron . Prior to medication administration, verified patient's identity using patient s name and date of birth. Please see MAR and medication order for additional information. Patient instructed to remain in clinic for 15 minutes and report any adverse reaction to staff immediately but patient declined.    Vial/Syringe: Syringe  Was this medication supplied by the patient? No  Is this a medication the patient will need to receive again? Yes. Verified that the patient has refills remaining in their prescription.    MICHELLE MATUTE RN on 2023 at 10:13 AM

## 2023-08-01 ENCOUNTER — TELEPHONE (OUTPATIENT)
Dept: ONCOLOGY | Facility: OTHER | Age: 68
End: 2023-08-01
Payer: COMMERCIAL

## 2023-08-01 DIAGNOSIS — K21.9 GASTRO-ESOPHAGEAL REFLUX DISEASE WITHOUT ESOPHAGITIS: ICD-10-CM

## 2023-08-01 NOTE — TELEPHONE ENCOUNTER
Patient calling inquiring about medical marijuana and if this is something that could help him.    Patient states that after every chemotherapy he always ends up in the hospital for one reason or another.  Patient states his treatment in on hold right now due to infections he has.  States he is on pain pills due to fracure he has in his hip.    Patient states that he was just looking into and wondering if this is something that would work for him or not.  Patient states that he doesn't want to pursue it if it isnt something that will work.    Informed patient Elza and Dr. Fritz are out of office.  Informed patient Dr. Fritz back in office on 8/15 .  Patient states its no hurry he is just wondering if it is something that might work for him in his situation.    Toma Smith RN on 8/1/2023 at 9:12 AM

## 2023-08-02 RX ORDER — OMEPRAZOLE 40 MG/1
CAPSULE, DELAYED RELEASE ORAL
Qty: 90 CAPSULE | Refills: 0 | Status: SHIPPED | OUTPATIENT
Start: 2023-08-02 | End: 2024-01-10

## 2023-08-02 NOTE — TELEPHONE ENCOUNTER
[facility] sent Rx request for the following:      omeprazole (PRILOSEC) 40 MG DR capsule     No   Sig - Route: Take 40 mg by mouth daily - Oral   Class: Historical     Last Office Visit:              5/5/23   Future Office visit:           None    Per LOV note:  Return in about 3 months (around 8/5/2023).     Pt due for follow up appointment around 8/5/23. Routing to provider for refill consideration. Routing to Unit scheduling pool, to assist Pt in scheduling appointment.     Xiao Donald RN .............. 8/2/2023  9:06 AM

## 2023-08-07 ENCOUNTER — PATIENT OUTREACH (OUTPATIENT)
Dept: ONCOLOGY | Facility: OTHER | Age: 68
End: 2023-08-07
Payer: COMMERCIAL

## 2023-08-07 NOTE — PATIENT INSTRUCTIONS
LVM notifying patient to keep appt for cryo.   Patient will call [facility] regarding medical marijuana per patient request for information

## 2023-08-07 NOTE — PROGRESS NOTES
[facility]: Cancer Care Short Note                                    Discussion with Patient:                                                      Patient called regarding medical marijuana. Information given to patient to call [facility] in [facility]. States that his breathing has improved. Hip is still hurting, but current pain management is helping.      Antiemetics: uses antiemetics as needed     Goals          General    Patient will adhere to medication regimen             Assessment:                                                      Assessment completed with:: Patient    Constitutional  Fatigue: Fatigue relieved by rest  Fever: Absent or within normal limits    Respiratory  Cough: Absent or within normal limits  Dyspnea: Shortness of breath with moderate exertion (breathing is improved)    Gastrointestinal  Anorexia: Absent or within normal limits  Nausea: Absent or within normal limits  Vomiting: Absent or within normal limits  Dehydration: Absent or within normal limits  Mucositis Oral: Absent or within normal limits  Constipation: Absent or within normal limits  Diarrhea: Absent or within normal limits    Genitourinary  Patient Reported Genitourinary Symptoms?: No  Urinary Retention: Absent or within normal limits  Urinary Tract Pain: Absent or within normal limits    Integumentary  Rash Maculo-Papular: Absent or within normal limits    Pain  Patient Reported Pain?: Yes, is currently well-managed  Pain Management Interventions: medication (see MAR)    Patient Coping  Accepting    Clinic Utilization  Patient Aware of Next Appointment?: Yes    Other Patient Concerns  Other Patient Reported Concerns: No    No assessment indicated    Intervention/Education provided during outreach:                                                       Patient will follow up with Erum Fritz MD 9/13/23.    Patient has direct number to reach care coordinator.    Signature:  Elza Lundberg RN

## 2023-08-07 NOTE — TELEPHONE ENCOUNTER
Patient notified that Erum Fritz MD does not do certification for medical marijuana. Provided number to [facility] in [facility].  Elza Lundberg RN...........8/7/2023 10:22 AM

## 2023-08-10 ENCOUNTER — TELEPHONE (OUTPATIENT)
Dept: ONCOLOGY | Facility: OTHER | Age: 68
End: 2023-08-10
Payer: COMMERCIAL

## 2023-08-10 NOTE — TELEPHONE ENCOUNTER
Jermain called and is wondering if his pain medications can be increased. He states sometimes he can get it controlled, but sometimes it does not seem to control pain. He is aware Erum Fritz MD if out until next week and is fine waiting.  Elza Lundberg RN...........8/10/2023 9:14 AM

## 2023-08-14 ENCOUNTER — HOSPITAL ENCOUNTER (OUTPATIENT)
Dept: CT IMAGING | Facility: OTHER | Age: 68
Discharge: HOME | End: 2023-08-14
Attending: INTERNAL MEDICINE
Payer: COMMERCIAL

## 2023-08-14 ENCOUNTER — LAB (OUTPATIENT)
Dept: LAB | Facility: OTHER | Age: 68
End: 2023-08-14
Attending: INTERNAL MEDICINE
Payer: COMMERCIAL

## 2023-08-14 DIAGNOSIS — K65.1 PELVIC ABSCESS IN MALE (H): ICD-10-CM

## 2023-08-14 DIAGNOSIS — C79.51 MALIGNANT NEOPLASM METASTATIC TO BONE (H): ICD-10-CM

## 2023-08-14 DIAGNOSIS — A49.8 INFECTION DUE TO KLEBSIELLA PNEUMONIAE: ICD-10-CM

## 2023-08-14 DIAGNOSIS — C61 MALIGNANT NEOPLASM OF PROSTATE (H): ICD-10-CM

## 2023-08-14 DIAGNOSIS — F41.9 ANXIETY: ICD-10-CM

## 2023-08-14 LAB
ALBUMIN SERPL BCG-MCNC: 3.4 G/DL (ref 3.5–5.2)
ALP SERPL-CCNC: 76 U/L (ref 40–129)
ALT SERPL W P-5'-P-CCNC: 5 U/L (ref 0–70)
ANION GAP SERPL CALCULATED.3IONS-SCNC: 11 MMOL/L (ref 7–15)
AST SERPL W P-5'-P-CCNC: 7 U/L (ref 0–45)
BASOPHILS # BLD AUTO: 0 10E3/UL (ref 0–0.2)
BASOPHILS NFR BLD AUTO: 0 %
BILIRUB SERPL-MCNC: 0.2 MG/DL
BUN SERPL-MCNC: 11.5 MG/DL (ref 8–23)
CALCIUM SERPL-MCNC: 9.3 MG/DL (ref 8.8–10.2)
CHLORIDE SERPL-SCNC: 103 MMOL/L (ref 98–107)
CREAT SERPL-MCNC: 0.76 MG/DL (ref 0.67–1.17)
DEPRECATED HCO3 PLAS-SCNC: 23 MMOL/L (ref 22–29)
EOSINOPHIL # BLD AUTO: 0.1 10E3/UL (ref 0–0.7)
EOSINOPHIL NFR BLD AUTO: 1 %
ERYTHROCYTE [DISTWIDTH] IN BLOOD BY AUTOMATED COUNT: 19.9 % (ref 10–15)
FERRITIN SERPL-MCNC: 50 NG/ML (ref 31–409)
GFR SERPL CREATININE-BSD FRML MDRD: >90 ML/MIN/1.73M2
GLUCOSE SERPL-MCNC: 207 MG/DL (ref 70–99)
HCT VFR BLD AUTO: 34.5 % (ref 40–53)
HGB BLD-MCNC: 10.7 G/DL (ref 13.3–17.7)
HOLD SPECIMEN: NORMAL
IMM GRANULOCYTES # BLD: 0 10E3/UL
IMM GRANULOCYTES NFR BLD: 0 %
IRON BINDING CAPACITY (ROCHE): 176 UG/DL (ref 240–430)
IRON SATN MFR SERPL: 20 % (ref 15–46)
IRON SERPL-MCNC: 35 UG/DL (ref 61–157)
LDH SERPL L TO P-CCNC: 140 U/L (ref 0–250)
LYMPHOCYTES # BLD AUTO: 1 10E3/UL (ref 0.8–5.3)
LYMPHOCYTES NFR BLD AUTO: 13 %
MCH RBC QN AUTO: 26 PG (ref 26.5–33)
MCHC RBC AUTO-ENTMCNC: 31 G/DL (ref 31.5–36.5)
MCV RBC AUTO: 84 FL (ref 78–100)
MONOCYTES # BLD AUTO: 0.4 10E3/UL (ref 0–1.3)
MONOCYTES NFR BLD AUTO: 6 %
NEUTROPHILS # BLD AUTO: 5.7 10E3/UL (ref 1.6–8.3)
NEUTROPHILS NFR BLD AUTO: 80 %
NRBC # BLD AUTO: 0 10E3/UL
NRBC BLD AUTO-RTO: 0 /100
PLATELET # BLD AUTO: 290 10E3/UL (ref 150–450)
POTASSIUM SERPL-SCNC: 4.1 MMOL/L (ref 3.4–5.3)
PROT SERPL-MCNC: 6.1 G/DL (ref 6.4–8.3)
PSA SERPL DL<=0.01 NG/ML-MCNC: 1.81 NG/ML (ref 0–6.5)
RBC # BLD AUTO: 4.12 10E6/UL (ref 4.4–5.9)
SODIUM SERPL-SCNC: 137 MMOL/L (ref 136–145)
WBC # BLD AUTO: 7.2 10E3/UL (ref 4–11)

## 2023-08-14 PROCEDURE — 83550 IRON BINDING TEST: CPT | Mod: ZL

## 2023-08-14 PROCEDURE — 80053 COMPREHEN METABOLIC PANEL: CPT | Mod: ZL

## 2023-08-14 PROCEDURE — 250N000011 HC RX IP 250 OP 636: Performed by: INTERNAL MEDICINE

## 2023-08-14 PROCEDURE — 85004 AUTOMATED DIFF WBC COUNT: CPT | Mod: ZL

## 2023-08-14 PROCEDURE — 86140 C-REACTIVE PROTEIN: CPT | Mod: ZL

## 2023-08-14 PROCEDURE — 74177 CT ABD & PELVIS W/CONTRAST: CPT

## 2023-08-14 PROCEDURE — 83615 LACTATE (LD) (LDH) ENZYME: CPT | Mod: ZL

## 2023-08-14 PROCEDURE — 36415 COLL VENOUS BLD VENIPUNCTURE: CPT | Mod: ZL

## 2023-08-14 PROCEDURE — 84153 ASSAY OF PSA TOTAL: CPT | Mod: ZL

## 2023-08-14 PROCEDURE — 82728 ASSAY OF FERRITIN: CPT | Mod: ZL

## 2023-08-14 RX ORDER — LORAZEPAM 0.5 MG/1
0.5 TABLET ORAL EVERY 6 HOURS PRN
Qty: 30 TABLET | Refills: 1 | Status: SHIPPED | OUTPATIENT
Start: 2023-08-14 | End: 2024-01-10

## 2023-08-14 RX ORDER — IOPAMIDOL 755 MG/ML
119 INJECTION, SOLUTION INTRAVASCULAR ONCE
Status: COMPLETED | OUTPATIENT
Start: 2023-08-14 | End: 2023-08-14

## 2023-08-14 RX ADMIN — IOPAMIDOL 119 ML: 755 INJECTION, SOLUTION INTRAVENOUS at 14:45

## 2023-08-14 NOTE — PROGRESS NOTES
IV Contrast- Discharge Instructions After Your CT Scan      The IV contrast you received today will be filtered from your bloodstream by your kidneys during the next 24 hours and pass from the body in urine.  You will not be aware of this process and your urine will not change in color.  To help this process you should drink at least 4 additional glasses of water or juice today.  This reduces stress on your kidneys.    Most contrast reactions are immediate.  Should you develop symptoms of concern after discharge, contact the department at the number below.  After hours you should contact your personal physician.  If you develop breathing distress or wheezing, call 911.    1.  Has the patient had a previous reaction to IV contrast? No    2.  Does the patient have kidney disease? YES    3.  Is the patient on dialysis? No    If YES to any of these questions, exam will be reviewed with a Radiologist before administering contrast.

## 2023-08-14 NOTE — TELEPHONE ENCOUNTER
Patient called in regards to refills on medications.  Informed patient that message had already been sent to Dr. Fritz, who is out of the office until 8/15/23, and we would get back to him tomorrow with refill information.    Mayra Hackett LPN on 8/14/2023 at 8:43 AM

## 2023-08-15 ENCOUNTER — VIRTUAL VISIT (OUTPATIENT)
Dept: INFECTIOUS DISEASES | Facility: CLINIC | Age: 68
End: 2023-08-15
Attending: INTERNAL MEDICINE
Payer: COMMERCIAL

## 2023-08-15 ENCOUNTER — TELEPHONE (OUTPATIENT)
Dept: INFECTIOUS DISEASES | Facility: CLINIC | Age: 68
End: 2023-08-15

## 2023-08-15 VITALS — WEIGHT: 210 LBS | BODY MASS INDEX: 30.13 KG/M2

## 2023-08-15 DIAGNOSIS — A49.8 INFECTION DUE TO KLEBSIELLA PNEUMONIAE: Primary | ICD-10-CM

## 2023-08-15 DIAGNOSIS — K65.1 PELVIC ABSCESS IN MALE (H): ICD-10-CM

## 2023-08-15 DIAGNOSIS — G89.3 CANCER ASSOCIATED PAIN: ICD-10-CM

## 2023-08-15 DIAGNOSIS — L02.91 ABSCESS: ICD-10-CM

## 2023-08-15 LAB — CRP SERPL-MCNC: 5.03 MG/L

## 2023-08-15 PROCEDURE — 99215 OFFICE O/P EST HI 40 MIN: CPT | Mod: VID | Performed by: INTERNAL MEDICINE

## 2023-08-15 RX ORDER — OXYCODONE HCL 20 MG/1
20 TABLET, FILM COATED, EXTENDED RELEASE ORAL EVERY 12 HOURS
Qty: 60 TABLET | Refills: 0 | Status: SHIPPED | OUTPATIENT
Start: 2023-08-15 | End: 2023-09-13

## 2023-08-15 RX ORDER — METRONIDAZOLE 500 MG/1
500 TABLET ORAL 3 TIMES DAILY
Qty: 42 TABLET | Refills: 0 | Status: SHIPPED | OUTPATIENT
Start: 2023-08-15 | End: 2023-08-29

## 2023-08-15 RX ORDER — LEVOFLOXACIN 750 MG/1
750 TABLET, FILM COATED ORAL DAILY
Qty: 14 TABLET | Refills: 0 | Status: SHIPPED | OUTPATIENT
Start: 2023-08-15 | End: 2023-09-25

## 2023-08-15 RX ORDER — OXYCODONE HYDROCHLORIDE 10 MG/1
10 TABLET ORAL EVERY 6 HOURS PRN
Qty: 120 TABLET | Refills: 0 | Status: SHIPPED | OUTPATIENT
Start: 2023-08-15 | End: 2023-09-13

## 2023-08-15 ASSESSMENT — PAIN SCALES - GENERAL: PAINLEVEL: NO PAIN (0)

## 2023-08-15 NOTE — PROGRESS NOTES
Virtual Visit Details    Type of service:  Video Visit   Video Start Time:  1:01 PM  Video End Time: 1:21 PM    Originating Location (pt. Location): Home  Distant Location (provider location):  On-site  Platform used for Video Visit: [facility]  Dr. Josselin Kay, [facility], Floor [address]  [address] [place] [address]   Patient:  Dilip Kan, Date of birth 1955, Medical record number [id number]  Date of Visit:  08/15/2023         Assessment and Recommendations:     Pt with pelvic abscess that could not be drained as it is so deep. Now s/p 2 weeks of po linezolid, 6 weeks of levofloxacin and flagyl. His CRP is 5.03 from 200 during admission. I would like CRP to be under 5 so I will continue levofloxacin and flagyl x 2 more weeks. I am reassured by labs and clinical status but a little nervous that the abscess is the same to larger in size. There was originally concern the abscess with communicating with the bladder. He say Urology recently but before this CT yesterday.  -continue levofloxacin and flagyl x 2 more weeks  -repeat ESR, CRP, CBC, CMP in 2 weeks  -I messaged my nurse to see if Urology will look at CT and see if they see communication with bladder or other concern on imaging.     Josselin Kay MD  Division of Infectious Diseases and International Medicine  [phone]    RTC in 2 weeks         History of Infectious Disease Illness:     67yo M with history of metastatic prostate cancer receiving chemotherapy earlier this month now presenting with fever and sepsis. Initial workup is concerning for a pelvic abscess, possibly associated with a vesicorectal fistula and also possibly with pubic bone osteomyelitis. Fortunately the patient is not neutropenic. IR was consulted for abscess drainage but did not feel they had an approach. Orthopedics surgery also declined a biopsy. Given he is colonized with VRE we gave him 2 weeks of  linezolid as it is not the main  of infection but potentially present. We then continued for 6 weeks of levofloxacin and flagyl.     He has hip pain. He has a break in his hip that is causing pain.     He sees oncology on Sept 5th.     He says his energy is better. SOB is much improved.     No fevers or chills.     He had had a hooks until 2 weeks ago but now he is urinating normally. He reports his urine is clear. He also reports Urology did a cystoscopy that was normal.     He says he initially got diarrhea with antibiotics but is feeling better. He reports he only gets 1 stool a day.         Past Medical and Surgical History:     Past Medical History:   Diagnosis Date    Elevated prostate specific antigen (PSA)     4/10/2012    Encounter for other administrative examinations     1/31/2014    Esophagitis     No Comments Provided    Gastro-esophageal reflux disease without esophagitis     No Comments Provided    Low back pain     No Comments Provided    Malignant neoplasm of prostate (H)     9/6/2012    Nicotine dependence, uncomplicated     Quit - October 2007 with chantix    Other intervertebral disc degeneration, lumbosacral region     12/1/2008       Past Surgical History:   Procedure Laterality Date    APPENDECTOMY OPEN  091909    Ruptured - [place]    ARTHROPLASTY KNEE Right 11/16/2021    Procedure: ARTHROPLASTY, KNEE, TOTAL;  Surgeon: Micah Rock MD;  Location: [facility] OR    COLONOSCOPY  08/31/2006    next due in 2016    DISKECTOMY, LUMBAR, SINGLE SP  03/16/2009    L 3-4 microdiskectomy, [facility]    ESOPHAGOSCOPY, GASTROSCOPY, DUODENOSCOPY (EGD), COMBINED  03/2003         ESOPHAGOSCOPY, GASTROSCOPY, DUODENOSCOPY (EGD), COMBINED  08/31/2006         PROSTATECTOMY PERINEAL RADICAL  11/28/2012    Nerve Sparring, Dr Warner    SPINE SURGERY N/A 04/28/2017    L3 to S1 spinal decompression L4/L5 fusion    TONSILLECTOMY, ADENOIDECTOMY, COMBINED      as a child    VARICOCELECTOMY  1959    INCISION AND  DRAINAGE,EXCISE VARICOCELE           Family History:     Family History   Problem Relation Age of Onset    Heart Disease Father         Heart Disease, passed away from CHF,CAD    Colon Cancer Father         Cancer-colon    Hypertension Father         Hypertension    Other - See Comments Father         Stroke/Dementia    Hypertension Mother         Hypertension,HTN    Other - See Comments Mother          Parkinsons    Family History Negative Other         Good Health    Family History Negative Other         Good Health,previous marriage./previous marriage.    Family History Negative Other         Good Health,previous marriage.    Family History Negative Sister         Good Health,emotional problems.    Family History Negative Sister         Good Health    Other - See Comments Son         w/o major medical problems.    Other - See Comments Daughter         w/o major medical problems.           Social History:     Social History     Tobacco Use    Smoking status: Former     Packs/day: 1.00     Years: 20.00     Pack years: 20.00     Types: Cigarettes     Quit date: 2002     Years since quittin.7     Passive exposure: Past    Smokeless tobacco: Current     Types: Snuff    Tobacco comments:     Can't remember when all he had passive exposure   Vaping Use    Vaping Use: Never used   Substance Use Topics    Alcohol use: Not Currently     Comment:  5 drinks a month    Drug use: No     Social History     Social History Narrative    [employer] - retired.  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  Patient has quit smoking.  Lives in [facility] on a farm with wife.             Review of Systems:   CONSTITUTIONAL:  No fevers or chills  EYES: negative for icterus  ENT:  negative for hearing loss, tinnitus, sore throat  RESPIRATORY:  negative for cough, sputum or dyspnea  CARDIOVASCULAR:  negative for chest pain, palpitations  GASTROINTESTINAL:  negative for nausea, vomiting, diarrhea or  constipation  GENITOURINARY:  negative for dysuria  HEME:  No easy bruising  INTEGUMENT:  negative for rash or pruritus  NEURO:  Negative for headache         Current Medications:     Current Outpatient Medications   Medication Sig Dispense Refill    acetaminophen (TYLENOL) 325 MG tablet Take 975 mg by mouth every 8 hours as needed for mild pain      amLODIPine (NORVASC) 10 MG tablet Take 1 tablet (10 mg) by mouth daily 90 tablet 3    apixaban ANTICOAGULANT (ELIQUIS) 5 MG tablet Take 1 tablet (5 mg) by mouth 2 times daily 60 tablet 3    atorvastatin (LIPITOR) 20 MG tablet Take 1 tablet (20 mg) by mouth daily 90 tablet 4    furosemide (LASIX) 20 MG tablet Take 1 tablet (20 mg) by mouth daily as needed (Patient taking differently: Take 20 mg by mouth daily) 30 tablet 3    hydrOXYzine (ATARAX) 10 MG tablet Take 1 tablet (10 mg) by mouth every 6 hours as needed for itching or anxiety (with pain, moderate pain) 120 tablet 11    levofloxacin (LEVAQUIN) 750 MG tablet Take 1 tablet (750 mg) by mouth daily for 42 days 42 tablet 0    lisinopril (ZESTRIL) 40 MG tablet Take 40 mg by mouth daily      LORazepam (ATIVAN) 0.5 MG tablet Take 1 tablet (0.5 mg) by mouth every 6 hours as needed for anxiety 30 tablet 1    megestrol (MEGACE) 20 MG tablet Take 20 mg by mouth daily      metroNIDAZOLE (FLAGYL) 500 MG tablet Take 1 tablet (500 mg) by mouth 3 times daily for 42 days 126 tablet 0    omeprazole (PRILOSEC) 40 MG DR capsule Take 1 capsule (40 mg) by mouth daily 90 capsule 0    ondansetron (ZOFRAN ODT) 8 MG ODT tab Take 1 tablet (8 mg) by mouth every 8 hours as needed for nausea 90 tablet 1    oxybutynin (DITROPAN) 5 MG tablet Take 5 mg by mouth 4 times daily as needed for bladder spasms      oxyCODONE (OXYCONTIN) 20 MG 12 hr tablet Take 1 tablet (20 mg) by mouth every 12 hours 60 tablet 0    oxyCODONE IR (ROXICODONE) 10 MG tablet Take 1 tablet (10 mg) by mouth every 6 hours as needed for moderate to severe pain 120 tablet 0     potassium chloride ER (KLOR-CON M) 20 MEQ CR tablet Take 1 tablet (20 mEq) by mouth daily as needed for potassium supplementation (Take if you take furosemide) (Patient taking differently: Take 20 mEq by mouth daily) 30 tablet 3    predniSONE (DELTASONE) 20 MG tablet Take two tablets (= 40mg) each day for 5 (five) days 10 tablet 0    predniSONE (DELTASONE) 5 MG tablet Take 1 tablet (5 mg) by mouth 2 times daily 60 tablet 11    pregabalin (LYRICA) 150 MG capsule Take 150 mg by mouth See Admin Instructions As of 6/30/23, patient currently takes Lyrica one or twice daily      prochlorperazine (COMPAZINE) 10 MG tablet Take 1 tablet (10 mg) by mouth every 6 hours as needed for nausea or vomiting 30 tablet 3    tamsulosin (FLOMAX) 0.4 MG capsule Take 1 capsule by mouth daily      traZODone (DESYREL) 50 MG tablet Take 50 mg by mouth nightly as needed for sleep              Immunization History:     Immunization History   Administered Date(s) Administered    COVID-19 Bivalent 12+ (Pfizer) 12/02/2022    COVID-19 MONOVALENT 12+ (Pfizer) 01/25/2022    COVID-19 Vaccine (Sofiya) 03/06/2021    Influenza (High Dose) 3 valent vaccine 11/01/2018, 09/30/2019    Influenza (IIV3) PF 10/15/2008, 11/17/2011, 11/09/2012    Influenza Vaccine 65+ (Fluzone HD) 10/05/2020, 10/26/2021, 10/05/2022    Influenza Vaccine >6 months (Alfuria,Fluzone) 10/16/2014, 11/13/2015, 11/03/2016, 09/25/2017    Pneumo Conj 13-V (2010&after) 10/05/2020    Pneumococcal 23 valent 10/05/2022    TDAP Vaccine (Adacel) 11/09/2012            Allergies:   No Known Allergies         Physical Exam:   Vital signs:  Wt 95.3 kg (210 lb)   BMI 30.13 kg/m      Physical Examination:  GENERAL:  well-developed, well-nourished, seated in no acute distress.  HEENT:  Head is normocephalic, atraumatic   EYES:  Eyes have anicteric sclerae without conjunctival injection   ENT:  Oropharynx is moist without exudates or ulcers. Tongue is midline  NECK:  Supple. No cervical  lymphadenopathy  LUNGS:  Clear to auscultation bilateral.   CARDIOVASCULAR:  Regular rate and rhythm with no murmurs, gallops or rubs.  ABDOMEN:  Normal bowel sounds, soft, nontender. No appreciable hepatosplenomegaly.  SKIN:  No acute rashes.    NEUROLOGIC:  Grossly nonfocal. Active x4 extremities         Laboratory Data:     Metabolic Studies   Sodium   Date Value Ref Range Status   08/14/2023 137 136 - 145 mmol/L Final   07/31/2023 137 136 - 145 mmol/L Final   02/23/2021 140 134 - 144 mmol/L Final   10/05/2020 142 134 - 144 mmol/L Final     Potassium   Date Value Ref Range Status   08/14/2023 4.1 3.4 - 5.3 mmol/L Final   07/31/2023 4.0 3.4 - 5.3 mmol/L Final   10/13/2022 4.0 3.5 - 5.1 mmol/L Final   09/29/2022 4.0 3.5 - 5.1 mmol/L Final   02/23/2021 4.2 3.5 - 5.1 mmol/L Final   10/05/2020 4.4 3.5 - 5.1 mmol/L Final     Chloride   Date Value Ref Range Status   08/14/2023 103 98 - 107 mmol/L Final   07/31/2023 103 98 - 107 mmol/L Final   10/13/2022 103 98 - 107 mmol/L Final   09/29/2022 105 98 - 107 mmol/L Final   02/23/2021 103 98 - 107 mmol/L Final   10/05/2020 105 98 - 107 mmol/L Final     Carbon Dioxide   Date Value Ref Range Status   02/23/2021 29 21 - 31 mmol/L Final   10/05/2020 30 21 - 31 mmol/L Final     Carbon Dioxide (CO2)   Date Value Ref Range Status   08/14/2023 23 22 - 29 mmol/L Final   07/31/2023 23 22 - 29 mmol/L Final   10/13/2022 30 21 - 31 mmol/L Final   09/29/2022 29 21 - 31 mmol/L Final     Anion Gap   Date Value Ref Range Status   08/14/2023 11 7 - 15 mmol/L Final   07/31/2023 11 7 - 15 mmol/L Final   10/13/2022 6 3 - 14 mmol/L Final   09/29/2022 7 3 - 14 mmol/L Final   02/23/2021 8 3 - 14 mmol/L Final   10/05/2020 7 3 - 14 mmol/L Final     Urea Nitrogen   Date Value Ref Range Status   08/14/2023 11.5 8.0 - 23.0 mg/dL Final   07/31/2023 9.0 8.0 - 23.0 mg/dL Final   10/13/2022 15 7 - 25 mg/dL Final   09/29/2022 19 7 - 25 mg/dL Final   02/23/2021 17 7 - 25 mg/dL Final   10/05/2020 18 7 - 25  mg/dL Final     Creatinine   Date Value Ref Range Status   08/14/2023 0.76 0.67 - 1.17 mg/dL Final   07/31/2023 0.76 0.67 - 1.17 mg/dL Final   02/23/2021 0.73 0.70 - 1.30 mg/dL Final   11/23/2020 0.71 0.70 - 1.30 mg/dL Final     GFR Estimate   Date Value Ref Range Status   08/14/2023 >90 >60 mL/min/1.73m2 Final   07/31/2023 >90 >60 mL/min/1.73m2 Final   02/23/2021 >90 >60 mL/min/[1.73_m2] Final   11/23/2020 >90 >60 mL/min/[1.73_m2] Final     Glucose   Date Value Ref Range Status   08/14/2023 207 (H) 70 - 99 mg/dL Final   07/31/2023 154 (H) 70 - 99 mg/dL Final   10/13/2022 100 70 - 105 mg/dL Final   09/29/2022 99 70 - 105 mg/dL Final   02/23/2021 139 (H) 70 - 105 mg/dL Final   10/05/2020 153 (H) 70 - 105 mg/dL Final     GLUCOSE BY METER POCT   Date Value Ref Range Status   07/06/2023 129 (H) 70 - 99 mg/dL Final     Comment:     /RN Notified   07/06/2023 134 (H) 70 - 99 mg/dL Final     Hemoglobin A1C   Date Value Ref Range Status   02/20/2023 6.7 (H) 4.0 - 6.2 % Final   05/24/2021 6.0 4.0 - 6.0 % Final     Calcium   Date Value Ref Range Status   08/14/2023 9.3 8.8 - 10.2 mg/dL Final   07/31/2023 9.2 8.8 - 10.2 mg/dL Final   02/23/2021 10.0 8.6 - 10.3 mg/dL Final   10/05/2020 9.5 8.6 - 10.3 mg/dL Final     Phosphorus   Date Value Ref Range Status   05/23/2023 3.6 2.5 - 4.5 mg/dL Final     Magnesium   Date Value Ref Range Status   07/01/2023 1.8 1.7 - 2.3 mg/dL Final   05/02/2023 1.7 1.7 - 2.3 mg/dL Final     Lactic Acid   Date Value Ref Range Status   07/02/2023 0.8 0.7 - 2.0 mmol/L Final   06/29/2023 1.0 0.7 - 2.0 mmol/L Final       Inflammatory Markers No results found for: CRP    Hepatic Studies    Bilirubin Total   Date Value Ref Range Status   08/14/2023 0.2 <=1.2 mg/dL Final   07/31/2023 0.4 <=1.2 mg/dL Final   02/23/2021 0.8 0.3 - 1.0 mg/dL Final   04/07/2017 0.5 0.3 - 1.0 mg/dL      Alkaline Phosphatase   Date Value Ref Range Status   08/14/2023 76 40 - 129 U/L Final   07/31/2023 65 40 - 129 U/L Final    02/23/2021 66 34 - 104 U/L Final   04/07/2017 67 34 - 104 IU/L      Albumin   Date Value Ref Range Status   08/14/2023 3.4 (L) 3.5 - 5.2 g/dL Final   07/31/2023 3.3 (L) 3.5 - 5.2 g/dL Final   10/13/2022 3.8 3.5 - 5.7 g/dL Final   09/29/2022 3.8 3.5 - 5.7 g/dL Final   02/23/2021 4.3 3.5 - 5.7 g/dL Final   04/07/2017 4.0 3.5 - 5.7 g/dL      AST   Date Value Ref Range Status   08/14/2023 7 0 - 45 U/L Final     Comment:     Reference intervals for this test were updated on 6/12/2023 to more accurately reflect our healthy population. There may be differences in the flagging of prior results with similar values performed with this method. Interpretation of those prior results can be made in the context of the updated reference intervals.   07/31/2023 8 0 - 45 U/L Final     Comment:     Reference intervals for this test were updated on 6/12/2023 to more accurately reflect our healthy population. There may be differences in the flagging of prior results with similar values performed with this method. Interpretation of those prior results can be made in the context of the updated reference intervals.   02/23/2021 15 13 - 39 U/L Final     ALT   Date Value Ref Range Status   08/14/2023 5 0 - 70 U/L Final     Comment:     Reference intervals for this test were updated on 6/12/2023 to more accurately reflect our healthy population. There may be differences in the flagging of prior results with similar values performed with this method. Interpretation of those prior results can be made in the context of the updated reference intervals.     07/31/2023 <5 0 - 70 U/L Final     Comment:     Reference intervals for this test were updated on 6/12/2023 to more accurately reflect our healthy population. There may be differences in the flagging of prior results with similar values performed with this method. Interpretation of those prior results can be made in the context of the updated reference intervals.     02/23/2021 16 7 - 52 U/L  Final       Hematology Studies      WBC   Date Value Ref Range Status   02/23/2021 6.9 4.0 - 11.0 10e9/L Final   07/09/2020 5.5 4.0 - 11.0 10e9/L Final     WBC Count   Date Value Ref Range Status   08/14/2023 7.2 4.0 - 11.0 10e3/uL Final   07/31/2023 9.5 4.0 - 11.0 10e3/uL Final     Absolute Neutrophils   Date Value Ref Range Status   04/22/2023 2.2 1.6 - 8.3 10e3/uL Final     Absolute Lymphocytes   Date Value Ref Range Status   04/22/2023 0.4 (L) 0.8 - 5.3 10e3/uL Final     Absolute Monocytes   Date Value Ref Range Status   04/22/2023 0.3 0.0 - 1.3 10e3/uL Final     Absolute Eosinophils   Date Value Ref Range Status   04/22/2023 0.0 0.0 - 0.7 10e3/uL Final     Hemoglobin   Date Value Ref Range Status   08/14/2023 10.7 (L) 13.3 - 17.7 g/dL Final   07/31/2023 10.0 (L) 13.3 - 17.7 g/dL Final   02/23/2021 14.1 13.3 - 17.7 g/dL Final   07/09/2020 13.7 13.3 - 17.7 g/dL Final     Hematocrit   Date Value Ref Range Status   08/14/2023 34.5 (L) 40.0 - 53.0 % Final   07/31/2023 32.2 (L) 40.0 - 53.0 % Final   02/23/2021 42.5 40.0 - 53.0 % Final   07/09/2020 41.7 40.0 - 53.0 % Final     Platelet Count   Date Value Ref Range Status   08/14/2023 290 150 - 450 10e3/uL Final   07/31/2023 269 150 - 450 10e3/uL Final   02/23/2021 200 150 - 450 10e9/L Final   07/09/2020 203 150 - 450 10e9/L Final       Imaging:  [unfilled]          Home

## 2023-08-15 NOTE — LETTER
8/15/2023       RE: Dilip Kan  [id number] [place] [address]     Dear Colleague,    Thank you for referring your patient, Dilip Kan, to the [facility] IN[facility] DISEASE CLINIC [place] at [facility]. Please see a copy of my visit note below.    Virtual Visit Details    Type of service:  Video Visit   Video Start Time:  1:01 PM  Video End Time: 1:21 PM    Originating Location (pt. Location): Home  Distant Location (provider location):  On-site  Platform used for Video Visit: [facility]  Dr. Josselin Kay, [facility], Floor [address]  [address] [place] [address]   Patient:  Dilip Kan, Date of birth 1955, Medical record number [id number]  Date of Visit:  08/15/2023         Assessment and Recommendations:     Pt with pelvic abscess that could not be drained as it is so deep. Now s/p 2 weeks of po linezolid, 6 weeks of levofloxacin and flagyl. His CRP is 5.03 from 200 during admission. I would like CRP to be under 5 so I will continue levofloxacin and flagyl x 2 more weeks. I am reassured by labs and clinical status but a little nervous that the abscess is the same to larger in size. There was originally concern the abscess with communicating with the bladder. He say Urology recently but before this CT yesterday.  -continue levofloxacin and flagyl x 2 more weeks  -repeat ESR, CRP, CBC, CMP in 2 weeks  -I messaged my nurse to see if Urology will look at CT and see if they see communication with bladder or other concern on imaging.     Josselin Kay MD  Division of Infectious Diseases and International Medicine  [phone]    RTC in 2 weeks         History of Infectious Disease Illness:     67yo M with history of metastatic prostate cancer receiving chemotherapy earlier this month now presenting with fever and sepsis. Initial workup is concerning for a pelvic abscess,  possibly associated with a vesicorectal fistula and also possibly with pubic bone osteomyelitis. Fortunately the patient is not neutropenic. IR was consulted for abscess drainage but did not feel they had an approach. Orthopedics surgery also declined a biopsy. Given he is colonized with VRE we gave him 2 weeks of linezolid as it is not the main  of infection but potentially present. We then continued for 6 weeks of levofloxacin and flagyl.     He has hip pain. He has a break in his hip that is causing pain.     He sees oncology on Sept 5th.     He says his energy is better. SOB is much improved.     No fevers or chills.     He had had a hooks until 2 weeks ago but now he is urinating normally. He reports his urine is clear. He also reports Urology did a cystoscopy that was normal.     He says he initially got diarrhea with antibiotics but is feeling better. He reports he only gets 1 stool a day.         Past Medical and Surgical History:     Past Medical History:   Diagnosis Date    Elevated prostate specific antigen (PSA)     4/10/2012    Encounter for other administrative examinations     1/31/2014    Esophagitis     No Comments Provided    Gastro-esophageal reflux disease without esophagitis     No Comments Provided    Low back pain     No Comments Provided    Malignant neoplasm of prostate (H)     9/6/2012    Nicotine dependence, uncomplicated     Quit - October 2007 with chantix    Other intervertebral disc degeneration, lumbosacral region     12/1/2008       Past Surgical History:   Procedure Laterality Date    APPENDECTOMY OPEN  091909    Ruptured - [place]    ARTHROPLASTY KNEE Right 11/16/2021    Procedure: ARTHROPLASTY, KNEE, TOTAL;  Surgeon: Micah Rock MD;  Location: [facility] OR    COLONOSCOPY  08/31/2006    next due in 2016    DISKECTOMY, LUMBAR, SINGLE SP  03/16/2009    L 3-4 microdiskectomy, [facility]    ESOPHAGOSCOPY, GASTROSCOPY, DUODENOSCOPY (EGD), COMBINED  03/2003          ESOPHAGOSCOPY, GASTROSCOPY, DUODENOSCOPY (EGD), COMBINED  2006         PROSTATECTOMY PERINEAL RADICAL  2012    Nerve Sparring, Dr Warner    SPINE SURGERY N/A 2017    L3 to S1 spinal decompression L4/L5 fusion    TONSILLECTOMY, ADENOIDECTOMY, COMBINED      as a child    VARICOCELECTOMY      INCISION AND DRAINAGE,EXCISE VARICOCELE           Family History:     Family History   Problem Relation Age of Onset    Heart Disease Father         Heart Disease, passed away from CHF,CAD    Colon Cancer Father         Cancer-colon    Hypertension Father         Hypertension    Other - See Comments Father         Stroke/Dementia    Hypertension Mother         Hypertension,HTN    Other - See Comments Mother          Parkinsons    Family History Negative Other         Good Health    Family History Negative Other         Good Health,previous marriage./previous marriage.    Family History Negative Other         Good Health,previous marriage.    Family History Negative Sister         Good Health,emotional problems.    Family History Negative Sister         Good Health    Other - See Comments Son         w/o major medical problems.    Other - See Comments Daughter         w/o major medical problems.           Social History:     Social History     Tobacco Use    Smoking status: Former     Packs/day: 1.00     Years: 20.00     Pack years: 20.00     Types: Cigarettes     Quit date: 2002     Years since quittin.7     Passive exposure: Past    Smokeless tobacco: Current     Types: Snuff    Tobacco comments:     Can't remember when all he had passive exposure   Vaping Use    Vaping Use: Never used   Substance Use Topics    Alcohol use: Not Currently     Comment:  5 drinks a month    Drug use: No     Social History     Social History Narrative    [employer] - retired.  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  Patient has quit smoking.  Lives in [place] on a farm with wife.             Review of  Systems:   CONSTITUTIONAL:  No fevers or chills  EYES: negative for icterus  ENT:  negative for hearing loss, tinnitus, sore throat  RESPIRATORY:  negative for cough, sputum or dyspnea  CARDIOVASCULAR:  negative for chest pain, palpitations  GASTROINTESTINAL:  negative for nausea, vomiting, diarrhea or constipation  GENITOURINARY:  negative for dysuria  HEME:  No easy bruising  INTEGUMENT:  negative for rash or pruritus  NEURO:  Negative for headache         Current Medications:     Current Outpatient Medications   Medication Sig Dispense Refill    acetaminophen (TYLENOL) 325 MG tablet Take 975 mg by mouth every 8 hours as needed for mild pain      amLODIPine (NORVASC) 10 MG tablet Take 1 tablet (10 mg) by mouth daily 90 tablet 3    apixaban ANTICOAGULANT (ELIQUIS) 5 MG tablet Take 1 tablet (5 mg) by mouth 2 times daily 60 tablet 3    atorvastatin (LIPITOR) 20 MG tablet Take 1 tablet (20 mg) by mouth daily 90 tablet 4    furosemide (LASIX) 20 MG tablet Take 1 tablet (20 mg) by mouth daily as needed (Patient taking differently: Take 20 mg by mouth daily) 30 tablet 3    hydrOXYzine (ATARAX) 10 MG tablet Take 1 tablet (10 mg) by mouth every 6 hours as needed for itching or anxiety (with pain, moderate pain) 120 tablet 11    levofloxacin (LEVAQUIN) 750 MG tablet Take 1 tablet (750 mg) by mouth daily for 42 days 42 tablet 0    lisinopril (ZESTRIL) 40 MG tablet Take 40 mg by mouth daily      LORazepam (ATIVAN) 0.5 MG tablet Take 1 tablet (0.5 mg) by mouth every 6 hours as needed for anxiety 30 tablet 1    megestrol (MEGACE) 20 MG tablet Take 20 mg by mouth daily      metroNIDAZOLE (FLAGYL) 500 MG tablet Take 1 tablet (500 mg) by mouth 3 times daily for 42 days 126 tablet 0    omeprazole (PRILOSEC) 40 MG DR capsule Take 1 capsule (40 mg) by mouth daily 90 capsule 0    ondansetron (ZOFRAN ODT) 8 MG ODT tab Take 1 tablet (8 mg) by mouth every 8 hours as needed for nausea 90 tablet 1    oxybutynin (DITROPAN) 5 MG tablet Take  5 mg by mouth 4 times daily as needed for bladder spasms      oxyCODONE (OXYCONTIN) 20 MG 12 hr tablet Take 1 tablet (20 mg) by mouth every 12 hours 60 tablet 0    oxyCODONE IR (ROXICODONE) 10 MG tablet Take 1 tablet (10 mg) by mouth every 6 hours as needed for moderate to severe pain 120 tablet 0    potassium chloride ER (KLOR-CON M) 20 MEQ CR tablet Take 1 tablet (20 mEq) by mouth daily as needed for potassium supplementation (Take if you take furosemide) (Patient taking differently: Take 20 mEq by mouth daily) 30 tablet 3    predniSONE (DELTASONE) 20 MG tablet Take two tablets (= 40mg) each day for 5 (five) days 10 tablet 0    predniSONE (DELTASONE) 5 MG tablet Take 1 tablet (5 mg) by mouth 2 times daily 60 tablet 11    pregabalin (LYRICA) 150 MG capsule Take 150 mg by mouth See Admin Instructions As of 6/30/23, patient currently takes Lyrica one or twice daily      prochlorperazine (COMPAZINE) 10 MG tablet Take 1 tablet (10 mg) by mouth every 6 hours as needed for nausea or vomiting 30 tablet 3    tamsulosin (FLOMAX) 0.4 MG capsule Take 1 capsule by mouth daily      traZODone (DESYREL) 50 MG tablet Take 50 mg by mouth nightly as needed for sleep              Immunization History:     Immunization History   Administered Date(s) Administered    COVID-19 Bivalent 12+ (Pfizer) 12/02/2022    COVID-19 MONOVALENT 12+ (Pfizer) 01/25/2022    COVID-19 Vaccine (Sofiya) 03/06/2021    Influenza (High Dose) 3 valent vaccine 11/01/2018, 09/30/2019    Influenza (IIV3) PF 10/15/2008, 11/17/2011, 11/09/2012    Influenza Vaccine 65+ (Fluzone HD) 10/05/2020, 10/26/2021, 10/05/2022    Influenza Vaccine >6 months (Alfuria,Fluzone) 10/16/2014, 11/13/2015, 11/03/2016, 09/25/2017    Pneumo Conj 13-V (2010&after) 10/05/2020    Pneumococcal 23 valent 10/05/2022    TDAP Vaccine (Adacel) 11/09/2012            Allergies:   No Known Allergies         Physical Exam:   Vital signs:  Wt 95.3 kg (210 lb)   BMI 30.13 kg/m      Physical  Examination:  GENERAL:  well-developed, well-nourished, seated in no acute distress.  HEENT:  Head is normocephalic, atraumatic   EYES:  Eyes have anicteric sclerae without conjunctival injection   ENT:  Oropharynx is moist without exudates or ulcers. Tongue is midline  NECK:  Supple. No cervical lymphadenopathy  LUNGS:  Clear to auscultation bilateral.   CARDIOVASCULAR:  Regular rate and rhythm with no murmurs, gallops or rubs.  ABDOMEN:  Normal bowel sounds, soft, nontender. No appreciable hepatosplenomegaly.  SKIN:  No acute rashes.    NEUROLOGIC:  Grossly nonfocal. Active x4 extremities         Laboratory Data:     Metabolic Studies   Sodium   Date Value Ref Range Status   08/14/2023 137 136 - 145 mmol/L Final   07/31/2023 137 136 - 145 mmol/L Final   02/23/2021 140 134 - 144 mmol/L Final   10/05/2020 142 134 - 144 mmol/L Final     Potassium   Date Value Ref Range Status   08/14/2023 4.1 3.4 - 5.3 mmol/L Final   07/31/2023 4.0 3.4 - 5.3 mmol/L Final   10/13/2022 4.0 3.5 - 5.1 mmol/L Final   09/29/2022 4.0 3.5 - 5.1 mmol/L Final   02/23/2021 4.2 3.5 - 5.1 mmol/L Final   10/05/2020 4.4 3.5 - 5.1 mmol/L Final     Chloride   Date Value Ref Range Status   08/14/2023 103 98 - 107 mmol/L Final   07/31/2023 103 98 - 107 mmol/L Final   10/13/2022 103 98 - 107 mmol/L Final   09/29/2022 105 98 - 107 mmol/L Final   02/23/2021 103 98 - 107 mmol/L Final   10/05/2020 105 98 - 107 mmol/L Final     Carbon Dioxide   Date Value Ref Range Status   02/23/2021 29 21 - 31 mmol/L Final   10/05/2020 30 21 - 31 mmol/L Final     Carbon Dioxide (CO2)   Date Value Ref Range Status   08/14/2023 23 22 - 29 mmol/L Final   07/31/2023 23 22 - 29 mmol/L Final   10/13/2022 30 21 - 31 mmol/L Final   09/29/2022 29 21 - 31 mmol/L Final     Anion Gap   Date Value Ref Range Status   08/14/2023 11 7 - 15 mmol/L Final   07/31/2023 11 7 - 15 mmol/L Final   10/13/2022 6 3 - 14 mmol/L Final   09/29/2022 7 3 - 14 mmol/L Final   02/23/2021 8 3 - 14 mmol/L  Final   10/05/2020 7 3 - 14 mmol/L Final     Urea Nitrogen   Date Value Ref Range Status   08/14/2023 11.5 8.0 - 23.0 mg/dL Final   07/31/2023 9.0 8.0 - 23.0 mg/dL Final   10/13/2022 15 7 - 25 mg/dL Final   09/29/2022 19 7 - 25 mg/dL Final   02/23/2021 17 7 - 25 mg/dL Final   10/05/2020 18 7 - 25 mg/dL Final     Creatinine   Date Value Ref Range Status   08/14/2023 0.76 0.67 - 1.17 mg/dL Final   07/31/2023 0.76 0.67 - 1.17 mg/dL Final   02/23/2021 0.73 0.70 - 1.30 mg/dL Final   11/23/2020 0.71 0.70 - 1.30 mg/dL Final     GFR Estimate   Date Value Ref Range Status   08/14/2023 >90 >60 mL/min/1.73m2 Final   07/31/2023 >90 >60 mL/min/1.73m2 Final   02/23/2021 >90 >60 mL/min/[1.73_m2] Final   11/23/2020 >90 >60 mL/min/[1.73_m2] Final     Glucose   Date Value Ref Range Status   08/14/2023 207 (H) 70 - 99 mg/dL Final   07/31/2023 154 (H) 70 - 99 mg/dL Final   10/13/2022 100 70 - 105 mg/dL Final   09/29/2022 99 70 - 105 mg/dL Final   02/23/2021 139 (H) 70 - 105 mg/dL Final   10/05/2020 153 (H) 70 - 105 mg/dL Final     GLUCOSE BY METER POCT   Date Value Ref Range Status   07/06/2023 129 (H) 70 - 99 mg/dL Final     Comment:     /RN Notified   07/06/2023 134 (H) 70 - 99 mg/dL Final     Hemoglobin A1C   Date Value Ref Range Status   02/20/2023 6.7 (H) 4.0 - 6.2 % Final   05/24/2021 6.0 4.0 - 6.0 % Final     Calcium   Date Value Ref Range Status   08/14/2023 9.3 8.8 - 10.2 mg/dL Final   07/31/2023 9.2 8.8 - 10.2 mg/dL Final   02/23/2021 10.0 8.6 - 10.3 mg/dL Final   10/05/2020 9.5 8.6 - 10.3 mg/dL Final     Phosphorus   Date Value Ref Range Status   05/23/2023 3.6 2.5 - 4.5 mg/dL Final     Magnesium   Date Value Ref Range Status   07/01/2023 1.8 1.7 - 2.3 mg/dL Final   05/02/2023 1.7 1.7 - 2.3 mg/dL Final     Lactic Acid   Date Value Ref Range Status   07/02/2023 0.8 0.7 - 2.0 mmol/L Final   06/29/2023 1.0 0.7 - 2.0 mmol/L Final       Inflammatory Markers No results found for: CRP    Hepatic Studies    Bilirubin Total   Date  Value Ref Range Status   08/14/2023 0.2 <=1.2 mg/dL Final   07/31/2023 0.4 <=1.2 mg/dL Final   02/23/2021 0.8 0.3 - 1.0 mg/dL Final   04/07/2017 0.5 0.3 - 1.0 mg/dL      Alkaline Phosphatase   Date Value Ref Range Status   08/14/2023 76 40 - 129 U/L Final   07/31/2023 65 40 - 129 U/L Final   02/23/2021 66 34 - 104 U/L Final   04/07/2017 67 34 - 104 IU/L      Albumin   Date Value Ref Range Status   08/14/2023 3.4 (L) 3.5 - 5.2 g/dL Final   07/31/2023 3.3 (L) 3.5 - 5.2 g/dL Final   10/13/2022 3.8 3.5 - 5.7 g/dL Final   09/29/2022 3.8 3.5 - 5.7 g/dL Final   02/23/2021 4.3 3.5 - 5.7 g/dL Final   04/07/2017 4.0 3.5 - 5.7 g/dL      AST   Date Value Ref Range Status   08/14/2023 7 0 - 45 U/L Final     Comment:     Reference intervals for this test were updated on 6/12/2023 to more accurately reflect our healthy population. There may be differences in the flagging of prior results with similar values performed with this method. Interpretation of those prior results can be made in the context of the updated reference intervals.   07/31/2023 8 0 - 45 U/L Final     Comment:     Reference intervals for this test were updated on 6/12/2023 to more accurately reflect our healthy population. There may be differences in the flagging of prior results with similar values performed with this method. Interpretation of those prior results can be made in the context of the updated reference intervals.   02/23/2021 15 13 - 39 U/L Final     ALT   Date Value Ref Range Status   08/14/2023 5 0 - 70 U/L Final     Comment:     Reference intervals for this test were updated on 6/12/2023 to more accurately reflect our healthy population. There may be differences in the flagging of prior results with similar values performed with this method. Interpretation of those prior results can be made in the context of the updated reference intervals.     07/31/2023 <5 0 - 70 U/L Final     Comment:     Reference intervals for this test were updated on  6/12/2023 to more accurately reflect our healthy population. There may be differences in the flagging of prior results with similar values performed with this method. Interpretation of those prior results can be made in the context of the updated reference intervals.     02/23/2021 16 7 - 52 U/L Final       Hematology Studies      WBC   Date Value Ref Range Status   02/23/2021 6.9 4.0 - 11.0 10e9/L Final   07/09/2020 5.5 4.0 - 11.0 10e9/L Final     WBC Count   Date Value Ref Range Status   08/14/2023 7.2 4.0 - 11.0 10e3/uL Final   07/31/2023 9.5 4.0 - 11.0 10e3/uL Final     Absolute Neutrophils   Date Value Ref Range Status   04/22/2023 2.2 1.6 - 8.3 10e3/uL Final     Absolute Lymphocytes   Date Value Ref Range Status   04/22/2023 0.4 (L) 0.8 - 5.3 10e3/uL Final     Absolute Monocytes   Date Value Ref Range Status   04/22/2023 0.3 0.0 - 1.3 10e3/uL Final     Absolute Eosinophils   Date Value Ref Range Status   04/22/2023 0.0 0.0 - 0.7 10e3/uL Final     Hemoglobin   Date Value Ref Range Status   08/14/2023 10.7 (L) 13.3 - 17.7 g/dL Final   07/31/2023 10.0 (L) 13.3 - 17.7 g/dL Final   02/23/2021 14.1 13.3 - 17.7 g/dL Final   07/09/2020 13.7 13.3 - 17.7 g/dL Final     Hematocrit   Date Value Ref Range Status   08/14/2023 34.5 (L) 40.0 - 53.0 % Final   07/31/2023 32.2 (L) 40.0 - 53.0 % Final   02/23/2021 42.5 40.0 - 53.0 % Final   07/09/2020 41.7 40.0 - 53.0 % Final     Platelet Count   Date Value Ref Range Status   08/14/2023 290 150 - 450 10e3/uL Final   07/31/2023 269 150 - 450 10e3/uL Final   02/23/2021 200 150 - 450 10e9/L Final   07/09/2020 203 879 - 382 10e9/L Final       Imaging:  [unfilled]      Josselin Letitia Kay MD

## 2023-08-15 NOTE — NURSING NOTE
Is the patient currently in the state of [place]? YES    Visit mode:VIDEO    If the visit is dropped, the patient can be reconnected by: VIDEO VISIT: Text to cell phone: [phone]    Will anyone else be joining the visit? NO      How would you like to obtain your AVS? MyChart    Are changes needed to the allergy or medication list? NO    Reason for visit: No chief complaint on file.

## 2023-08-15 NOTE — TELEPHONE ENCOUNTER
----- Message from Josselin Kay MD sent at 8/15/2023  1:58 PM CDT -----  Regarding: Urology  Ludin Dominguez,    Could you help me with this patient? He had/has an abscess in his pelvis. Initially there was concern with the abscess communicating with his bladder and then that's not in the current CT read.    He feels much better after 6 weeks of antibiotics for abscess and CRP is 5 from 200. However CT is unchanged as to size. If it is or was communicating maybe that is why the size is not better while he is better.     I am wondering if we could ask his Urologist about the CT that was done yesterday? You could give him my cell phone.     He saw this Urologist 8/3/23    [facility]   [address] [place] [id number] [phone]  Tristan Geiger MD   [address], [place] [address] [phone] (Work)   [phone] (Fax)     Thanks    Josselin

## 2023-08-16 NOTE — TELEPHONE ENCOUNTER
Dr. Geiger is out of the office all this week. Gave RN providers phone number and was told to expect a call early next week as he is not aware of any of the most current imaging. He has not see the patient in office in over a month and half.       Alberto Marcos RN  Infectious Disease on 8/16/2023 at 8:18 AM

## 2023-08-25 ENCOUNTER — TELEPHONE (OUTPATIENT)
Dept: INFECTIOUS DISEASES | Facility: CLINIC | Age: 68
End: 2023-08-25
Payer: COMMERCIAL

## 2023-08-25 NOTE — TELEPHONE ENCOUNTER
MAX ANDRADEM 8/25 to let pt know about 8/29 video follow up with Dr. Kay per checkout notes from 8/15 and to call ID back if day and time doesn't work.

## 2023-08-28 ENCOUNTER — HOSPITAL ENCOUNTER (EMERGENCY)
Facility: OTHER | Age: 68
Discharge: LEFT BEFORE DISCHARGE | End: 2023-08-28
Payer: COMMERCIAL

## 2023-08-28 VITALS
RESPIRATION RATE: 24 BRPM | SYSTOLIC BLOOD PRESSURE: 123 MMHG | DIASTOLIC BLOOD PRESSURE: 79 MMHG | OXYGEN SATURATION: 99 % | BODY MASS INDEX: 30.06 KG/M2 | TEMPERATURE: 98.2 F | HEART RATE: 96 BPM | HEIGHT: 70 IN | WEIGHT: 210 LBS

## 2023-08-28 NOTE — ED TRIAGE NOTES
"Pt presents to ED with c/o left hip and groin pain. Pt reports some incontinence as well. Pt has known fracture in left hip which he states is from the cancer. Pt also c/o SOB. /79   Pulse 96   Temp 98.2  F (36.8  C) (Tympanic)   Resp 24   Ht 1.778 m (5' 10\")   Wt 95.3 kg (210 lb)   SpO2 99%   BMI 30.13 kg/m         Triage Assessment       Row Name 08/28/23 1205       Respiratory WDL    Respiratory WDL X;all    Rhythm/Pattern, Respiratory shortness of breath       Cognitive/Neuro/Behavioral WDL    Cognitive/Neuro/Behavioral WDL WDL                    "

## 2023-08-29 ENCOUNTER — VIRTUAL VISIT (OUTPATIENT)
Dept: INFECTIOUS DISEASES | Facility: CLINIC | Age: 68
End: 2023-08-29
Attending: INTERNAL MEDICINE
Payer: COMMERCIAL

## 2023-08-29 DIAGNOSIS — A49.8 INFECTION DUE TO KLEBSIELLA PNEUMONIAE: Primary | ICD-10-CM

## 2023-08-29 DIAGNOSIS — L02.91 ABSCESS: ICD-10-CM

## 2023-08-29 PROCEDURE — 99213 OFFICE O/P EST LOW 20 MIN: CPT | Mod: VID | Performed by: INTERNAL MEDICINE

## 2023-08-29 NOTE — PROGRESS NOTES
Virtual Visit Details    Type of service:  Video Visit   Video Start Time:  1:00 PM  Video End Time: 1:15 PM    Originating Location (pt. Location): Home    Distant Location (provider location):  On-site  Platform used for Video Visit: [facility] [address] [address]  Phone: [phone]  Fax: [phone]    Patient:  Dilip Kan, Date of birth 1955  Date of Visit:  08/29/2023  Referring Provider Josselin Kay MD  Reason for visit: follow up for pelvic abscess    Plan     Urologist is back. Will have my nurse try calling again.     He will go to [facility] for labs    He would like to get hip replaced. I will ensure ESR, CRP are normal and stay normal in 1 month. We will discuss the Ct with Urology. If all is ok he can proceed with hip replacement per orthopedics.     Assessment   Pt is a 68 year old with a pelvic abscess and resulting hip fracture who is here for follow up. He seems to be improving but I would like to ensure his CRP and ESR are wnl. I also would like his Urologist to look at his recent CT abd/pelvis       History of Present Illness     Pertinent history obtain from: the patient    He feels well. However, he continues to have groin pain he associates with his hip fracture. No fevers or chills. He is tolerating his antibiotics. No issues.    He lives near [facility] and can get labs there.     Specialty Problems          Infectious Diseases    Infection due to Klebsiella pneumoniae           Specialty Problems          Infectious Diseases    Infection due to Klebsiella pneumoniae            Physical Exam     Vital signs:  There were no vitals taken for this visit.    I saw him over the video.   Awake and alert, appropriate  He was well appearing  Breathing comfortably on RA  No skin rashes    Data   Laboratory data and imaging listed below was reviewed prior to this encounter.     Inflammatory  Markers: No lab results found.

## 2023-08-29 NOTE — NURSING NOTE
Is the patient currently in the state of [place]? YES    Visit mode:VIDEO    If the visit is dropped, the patient can be reconnected by: VIDEO VISIT: Text to cell phone:   Telephone Information:   Mobile [phone]       Will anyone else be joining the visit? Wife, Dasha, is present  (If patient encounters technical issues they should call [phone]    How would you like to obtain your AVS? MyChart    Are changes needed to the allergy or medication list? Pt stated no changes to allergies and Pt stated no med changes    Reason for visit: ZAIDA NICHOLE

## 2023-08-29 NOTE — LETTER
8/29/2023       RE: Dilip Kan  [address] [place] [address]     Dear Colleague,    Thank you for referring your patient, Dilip Kan, to the [facility] INFECTIOUS DISEASE CLINIC [facility] at [facility]. Please see a copy of my visit note below.    Virtual Visit Details    Type of service:  Video Visit   Video Start Time:  1:00 PM  Video End Time: 1:15 PM    Originating Location (pt. Location): Home    Distant Location (provider location):  On-site  Platform used for Video Visit: [facility] INFECTIOUS DISEASE CLINIC [facility]  [address] [address]  Phone: [phone]  Fax: [phone]    Patient:  Dilip Kan, Date of birth 1955  Date of Visit:  08/29/2023  Referring Provider Josselin Kay MD  Reason for visit: follow up for pelvic abscess    Plan    Urologist is back. Will have my nurse try calling again.     He will go to [facility] for labs    He would like to get hip replaced. I will ensure ESR, CRP are normal and stay normal in 1 month. We will discuss the Ct with Urology. If all is ok he can proceed with hip replacement per orthopedics.     Assessment  Pt is a 68 year old with a pelvic abscess and resulting hip fracture who is here for follow up. He seems to be improving but I would like to ensure his CRP and ESR are wnl. I also would like his Urologist to look at his recent CT abd/pelvis     History of Present Illness    Pertinent history obtain from: the patient    He feels well. However, he continues to have groin pain he associates with his hip fracture. No fevers or chills. He is tolerating his antibiotics. No issues.    He lives near [facility] and can get labs there.     Specialty Problems          Infectious Diseases    Infection due to Klebsiella pneumoniae           Specialty Problems          Infectious Diseases    Infection due to Klebsiella pneumoniae           Physical Exam    Vital signs:  There were no vitals taken for this visit.    I saw him over the video.   Awake and alert, appropriate  He was well appearing  Breathing comfortably on RA  No skin rashes    Data  Laboratory data and imaging listed below was reviewed prior to this encounter.     Inflammatory Markers: No lab results found.            Again, thank you for allowing me to participate in the care of your patient.      Sincerely,    Josselin Kay MD

## 2023-08-31 ENCOUNTER — LAB (OUTPATIENT)
Dept: LAB | Facility: OTHER | Age: 68
End: 2023-08-31
Attending: INTERNAL MEDICINE
Payer: COMMERCIAL

## 2023-08-31 DIAGNOSIS — L02.91 ABSCESS: ICD-10-CM

## 2023-08-31 LAB
ALBUMIN SERPL BCG-MCNC: 2.9 G/DL (ref 3.5–5.2)
ALP SERPL-CCNC: 60 U/L (ref 40–129)
ALT SERPL W P-5'-P-CCNC: 6 U/L (ref 0–70)
ANION GAP SERPL CALCULATED.3IONS-SCNC: 10 MMOL/L (ref 7–15)
AST SERPL W P-5'-P-CCNC: 9 U/L (ref 0–45)
BASOPHILS # BLD AUTO: 0 10E3/UL (ref 0–0.2)
BASOPHILS NFR BLD AUTO: 0 %
BILIRUB SERPL-MCNC: 0.3 MG/DL
BUN SERPL-MCNC: 7.7 MG/DL (ref 8–23)
CALCIUM SERPL-MCNC: 9.4 MG/DL (ref 8.8–10.2)
CHLORIDE SERPL-SCNC: 100 MMOL/L (ref 98–107)
CREAT SERPL-MCNC: 0.72 MG/DL (ref 0.67–1.17)
CRP SERPL-MCNC: 71.42 MG/L
DEPRECATED HCO3 PLAS-SCNC: 24 MMOL/L (ref 22–29)
EOSINOPHIL # BLD AUTO: 0.1 10E3/UL (ref 0–0.7)
EOSINOPHIL NFR BLD AUTO: 1 %
ERYTHROCYTE [DISTWIDTH] IN BLOOD BY AUTOMATED COUNT: 18.8 % (ref 10–15)
ERYTHROCYTE [SEDIMENTATION RATE] IN BLOOD BY WESTERGREN METHOD: 49 MM/HR (ref 0–20)
GFR SERPL CREATININE-BSD FRML MDRD: >90 ML/MIN/1.73M2
GLUCOSE SERPL-MCNC: 152 MG/DL (ref 70–99)
HCT VFR BLD AUTO: 36.2 % (ref 40–53)
HGB BLD-MCNC: 11.4 G/DL (ref 13.3–17.7)
IMM GRANULOCYTES # BLD: 0.1 10E3/UL
IMM GRANULOCYTES NFR BLD: 1 %
LYMPHOCYTES # BLD AUTO: 1.5 10E3/UL (ref 0.8–5.3)
LYMPHOCYTES NFR BLD AUTO: 13 %
MCH RBC QN AUTO: 25.9 PG (ref 26.5–33)
MCHC RBC AUTO-ENTMCNC: 31.5 G/DL (ref 31.5–36.5)
MCV RBC AUTO: 82 FL (ref 78–100)
MONOCYTES # BLD AUTO: 0.6 10E3/UL (ref 0–1.3)
MONOCYTES NFR BLD AUTO: 6 %
NEUTROPHILS # BLD AUTO: 8.5 10E3/UL (ref 1.6–8.3)
NEUTROPHILS NFR BLD AUTO: 79 %
NRBC # BLD AUTO: 0 10E3/UL
NRBC BLD AUTO-RTO: 0 /100
PLATELET # BLD AUTO: 375 10E3/UL (ref 150–450)
POTASSIUM SERPL-SCNC: 4 MMOL/L (ref 3.4–5.3)
PROT SERPL-MCNC: 6.4 G/DL (ref 6.4–8.3)
RBC # BLD AUTO: 4.41 10E6/UL (ref 4.4–5.9)
SODIUM SERPL-SCNC: 134 MMOL/L (ref 136–145)
WBC # BLD AUTO: 10.8 10E3/UL (ref 4–11)

## 2023-08-31 PROCEDURE — 36415 COLL VENOUS BLD VENIPUNCTURE: CPT | Mod: ZL

## 2023-08-31 PROCEDURE — 80053 COMPREHEN METABOLIC PANEL: CPT | Mod: ZL

## 2023-08-31 PROCEDURE — 85652 RBC SED RATE AUTOMATED: CPT | Mod: ZL

## 2023-08-31 PROCEDURE — 85025 COMPLETE CBC W/AUTO DIFF WBC: CPT | Mod: ZL

## 2023-08-31 PROCEDURE — 86140 C-REACTIVE PROTEIN: CPT | Mod: ZL

## 2023-09-12 DIAGNOSIS — A49.8 INFECTION DUE TO KLEBSIELLA PNEUMONIAE: Primary | ICD-10-CM

## 2023-09-13 ENCOUNTER — TELEPHONE (OUTPATIENT)
Dept: INFECTIOUS DISEASES | Facility: CLINIC | Age: 68
End: 2023-09-13

## 2023-09-13 ENCOUNTER — ONCOLOGY VISIT (OUTPATIENT)
Dept: ONCOLOGY | Facility: OTHER | Age: 68
End: 2023-09-13
Attending: INTERNAL MEDICINE
Payer: COMMERCIAL

## 2023-09-13 VITALS
OXYGEN SATURATION: 99 % | RESPIRATION RATE: 20 BRPM | WEIGHT: 185 LBS | TEMPERATURE: 98.2 F | BODY MASS INDEX: 26.54 KG/M2 | HEART RATE: 118 BPM | SYSTOLIC BLOOD PRESSURE: 122 MMHG | DIASTOLIC BLOOD PRESSURE: 82 MMHG

## 2023-09-13 DIAGNOSIS — G89.3 CANCER ASSOCIATED PAIN: ICD-10-CM

## 2023-09-13 DIAGNOSIS — K65.1 PELVIC ABSCESS IN MALE (H): ICD-10-CM

## 2023-09-13 DIAGNOSIS — C79.51 MALIGNANT NEOPLASM METASTATIC TO BONE (H): ICD-10-CM

## 2023-09-13 DIAGNOSIS — L02.91 ABSCESS: ICD-10-CM

## 2023-09-13 DIAGNOSIS — C61 MALIGNANT NEOPLASM OF PROSTATE (H): ICD-10-CM

## 2023-09-13 DIAGNOSIS — C79.51 MALIGNANT NEOPLASM METASTATIC TO BONE (H): Primary | ICD-10-CM

## 2023-09-13 LAB
ALBUMIN SERPL BCG-MCNC: 3.1 G/DL (ref 3.5–5.2)
ALP SERPL-CCNC: 80 U/L (ref 40–129)
ALT SERPL W P-5'-P-CCNC: 19 U/L (ref 0–70)
ANION GAP SERPL CALCULATED.3IONS-SCNC: 14 MMOL/L (ref 7–15)
AST SERPL W P-5'-P-CCNC: 14 U/L (ref 0–45)
BASOPHILS # BLD AUTO: 0 10E3/UL (ref 0–0.2)
BASOPHILS NFR BLD AUTO: 0 %
BILIRUB SERPL-MCNC: 0.2 MG/DL
BUN SERPL-MCNC: 11.5 MG/DL (ref 8–23)
CALCIUM SERPL-MCNC: 9.6 MG/DL (ref 8.8–10.2)
CHLORIDE SERPL-SCNC: 97 MMOL/L (ref 98–107)
CREAT SERPL-MCNC: 0.65 MG/DL (ref 0.67–1.17)
CRP SERPL-MCNC: 102.8 MG/L
DEPRECATED HCO3 PLAS-SCNC: 24 MMOL/L (ref 22–29)
EGFRCR SERPLBLD CKD-EPI 2021: >90 ML/MIN/1.73M2
EOSINOPHIL # BLD AUTO: 0.1 10E3/UL (ref 0–0.7)
EOSINOPHIL NFR BLD AUTO: 1 %
ERYTHROCYTE [DISTWIDTH] IN BLOOD BY AUTOMATED COUNT: 16.7 % (ref 10–15)
ERYTHROCYTE [SEDIMENTATION RATE] IN BLOOD BY WESTERGREN METHOD: 62 MM/HR (ref 0–20)
FERRITIN SERPL-MCNC: 268 NG/ML (ref 31–409)
GLUCOSE SERPL-MCNC: 191 MG/DL (ref 70–99)
HCT VFR BLD AUTO: 34.7 % (ref 40–53)
HGB BLD-MCNC: 11.3 G/DL (ref 13.3–17.7)
IMM GRANULOCYTES # BLD: 0 10E3/UL
IMM GRANULOCYTES NFR BLD: 0 %
IRON BINDING CAPACITY (ROCHE): 155 UG/DL (ref 240–430)
IRON SATN MFR SERPL: 18 % (ref 15–46)
IRON SERPL-MCNC: 28 UG/DL (ref 61–157)
LDH SERPL L TO P-CCNC: 148 U/L (ref 0–250)
LYMPHOCYTES # BLD AUTO: 1.2 10E3/UL (ref 0.8–5.3)
LYMPHOCYTES NFR BLD AUTO: 13 %
MCH RBC QN AUTO: 25.5 PG (ref 26.5–33)
MCHC RBC AUTO-ENTMCNC: 32.6 G/DL (ref 31.5–36.5)
MCV RBC AUTO: 78 FL (ref 78–100)
MONOCYTES # BLD AUTO: 0.6 10E3/UL (ref 0–1.3)
MONOCYTES NFR BLD AUTO: 6 %
NEUTROPHILS # BLD AUTO: 7 10E3/UL (ref 1.6–8.3)
NEUTROPHILS NFR BLD AUTO: 80 %
NRBC # BLD AUTO: 0 10E3/UL
NRBC BLD AUTO-RTO: 0 /100
PLATELET # BLD AUTO: 432 10E3/UL (ref 150–450)
POTASSIUM SERPL-SCNC: 3.2 MMOL/L (ref 3.4–5.3)
PROT SERPL-MCNC: 6.9 G/DL (ref 6.4–8.3)
PSA SERPL DL<=0.01 NG/ML-MCNC: 2.94 NG/ML (ref 0–6.5)
RBC # BLD AUTO: 4.43 10E6/UL (ref 4.4–5.9)
SODIUM SERPL-SCNC: 135 MMOL/L (ref 136–145)
WBC # BLD AUTO: 8.9 10E3/UL (ref 4–11)

## 2023-09-13 PROCEDURE — 99417 PROLNG OP E/M EACH 15 MIN: CPT | Performed by: INTERNAL MEDICINE

## 2023-09-13 PROCEDURE — 99215 OFFICE O/P EST HI 40 MIN: CPT | Performed by: INTERNAL MEDICINE

## 2023-09-13 PROCEDURE — 84153 ASSAY OF PSA TOTAL: CPT | Mod: ZL | Performed by: INTERNAL MEDICINE

## 2023-09-13 PROCEDURE — 80053 COMPREHEN METABOLIC PANEL: CPT | Mod: ZL | Performed by: INTERNAL MEDICINE

## 2023-09-13 PROCEDURE — 85652 RBC SED RATE AUTOMATED: CPT | Mod: ZL | Performed by: INTERNAL MEDICINE

## 2023-09-13 PROCEDURE — 82728 ASSAY OF FERRITIN: CPT | Mod: ZL | Performed by: INTERNAL MEDICINE

## 2023-09-13 PROCEDURE — 83615 LACTATE (LD) (LDH) ENZYME: CPT | Mod: ZL | Performed by: INTERNAL MEDICINE

## 2023-09-13 PROCEDURE — 85025 COMPLETE CBC W/AUTO DIFF WBC: CPT | Mod: ZL,TEL,95 | Performed by: INTERNAL MEDICINE

## 2023-09-13 PROCEDURE — 86140 C-REACTIVE PROTEIN: CPT | Mod: ZL | Performed by: INTERNAL MEDICINE

## 2023-09-13 PROCEDURE — 83550 IRON BINDING TEST: CPT | Mod: ZL | Performed by: INTERNAL MEDICINE

## 2023-09-13 PROCEDURE — G0463 HOSPITAL OUTPT CLINIC VISIT: HCPCS

## 2023-09-13 PROCEDURE — 36415 COLL VENOUS BLD VENIPUNCTURE: CPT | Mod: ZL | Performed by: INTERNAL MEDICINE

## 2023-09-13 RX ORDER — OXYCODONE HYDROCHLORIDE 10 MG/1
10 TABLET ORAL EVERY 6 HOURS PRN
Qty: 120 TABLET | Refills: 0 | Status: SHIPPED | OUTPATIENT
Start: 2023-09-13 | End: 2023-10-26

## 2023-09-13 RX ORDER — OXYCODONE HCL 40 MG/1
40 TABLET, FILM COATED, EXTENDED RELEASE ORAL EVERY 12 HOURS
Qty: 60 TABLET | Refills: 0 | Status: SHIPPED | OUTPATIENT
Start: 2023-09-13 | End: 2023-10-13

## 2023-09-13 ASSESSMENT — PAIN SCALES - GENERAL: PAINLEVEL: EXTREME PAIN (8)

## 2023-09-13 NOTE — PROGRESS NOTES
[facility] Hematology and Oncology Progress Note    Patient: Dilip Kan  MRN: [id number]  Date of Service: Sep 13, 2023         Reason for Visit    Chief Complaint   Patient presents with    Oncology Clinic Visit     Metastatic Prostate CA       ECOG Performance    3 - Confined to bed/chair > 50% of time, capable of limited self care      Encounter Diagnoses: Metastatic prostate cancer.    Problem List Items Addressed This Visit          Oncology Diagnoses    Malignant neoplasm of prostate (H)    Bone metastasis - Primary    Relevant Medications    oxyCODONE (OXYCONTIN) 40 MG 12 hr tablet    oxyCODONE IR (ROXICODONE) 10 MG tablet    Other Relevant Orders    Infusion Appointment Request     Other Visit Diagnoses       Abscess        Cancer associated pain        Relevant Medications    oxyCODONE (OXYCONTIN) 40 MG 12 hr tablet    oxyCODONE IR (ROXICODONE) 10 MG tablet    Pelvic abscess in male (H)        Relevant Medications    oxyCODONE (OXYCONTIN) 40 MG 12 hr tablet    oxyCODONE IR (ROXICODONE) 10 MG tablet             History of Present Illness    Mr. Dilip Kan returns for follow-up metastatic castrate resistant prostate cancer.  For details of previous history see previous notes.He is currently being treated for metastatic castrate-resistant prostate cancer. For details, see previous notes. Most recently, the patient had progressed on abiraterone and prednisone. We had seen the patient on 02/20/2023. He was complaining of left hip pain that radiated down to his knee. He had chronic back pain from previous back surgery. His PSA was up to 8.3. He had recently been placed on Megace by Dr. Arrington to help with his hot flashes. Nonetheless, we ordered imaging studies including MRI of the left hip, which revealed evidence of bony metastasis, confirmed by bone scan as well. Bone scan indicated there was a new region of increased uptake around the left acetabulum. MRI of the hip revealed that there was a  nondisplaced fracture of the left pubic bone adjacent to the pubic symphysis. There was associated marrow edema. There was a fairly large abnormal signal associated with the left obturator internus muscle belly with associated T2 hyperintense signal deep to the muscle belly against the pelvic wall, possibly a direct extension of tumor. Hematoma delineated by pubic symphysis fracture. There was amorphous abnormal signal in the expected region of the left prostate compatible with recurrent residual tumor. CT chest, abdomen and pelvis also indicated progressive increasing volume and a locally-invasive mass adjacent to the prostate invading the obturator and levator ani muscles with a nondisplaced acute fracture to the left pubic symphysis. There were multiple small hyperenhancing lesions throughout the liver compatible with diffuse hepatic metastatic disease. When we saw the patient, we felt he had progression of disease with bony metastasis in the left acetabulum and left pubic symphysis. We had contacted Dr. Hamilton of [facility], and the plan was to see the patient for radiation therapy. Otherwise, we felt the patient would be a candidate for docetaxel chemotherapy. Recommended docetaxel 75 mg/m2 on day 1 of a 20-day cycle with prednisone 5 mg p.o. b.i.d. The patient was seen by Dr. Hamilton, and the plan was to proceed with palliative radiation to the left pubis. He was given one treatment of 1000 cGy on 03/14/2023. He did complete 2 cycles of docetaxel and, subsequently, he received cycle 2 of docetaxel on 04/10 and then subsequently was seen in the emergency room on 04/19 with complaints of inability to void and bladder pain. Apparently, he was having hematuria with clots and, in the emergency department, a catheter was placed. There was gross hematuria in the Jiménez bag. He was subsequently noted to have a fever of 101.1. He was also found to have a neutrophil count of 0.7. He was admitted to  the hospital with neutropenic fever. He was subsequently noted to have clots in his Jiménez catheter and was noted to have an elevated creatinine of obstructive uropathy noted. Continued to have worsening pain with bladder distention. He did require 2 units of packed red cells for hemoglobin of 6.4. His creatinine pineda to 5.29. Because of worsening renal function and ongoing presumed obstructive uropathy, [facility] was contacted, and the plan was to have urology evaluate him. He grew out Klebsiella pneumoniae in his urine as well as his blood. He was subsequently transferred to [facility] and was seen by Dr. Casas with plans for cystoscopy and fulguration. 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 Cystoscopy was performed on 04/23/2023, and the indings were there was a large clot burden, which was evacuated. Approximately 300 mL of old clot was removed. The bladder was reexamined and noted to have a large, actively bleeding vessel. There were a few small bleeders of the bladder, so bipolar loop was used to fulgurate these vessels. There were mucosal changes throughout the bladder consistent with radiation cystitis. He was subsequently having a good urine output with clear urine and was discharged the next day on 04/23 with mention of hyperbaric oxygen therapy for radiation cystitis. The patient was unclear as to whether wanted to proceed with this. The patient was continued on ceftriaxone and then discharged on cefepime. The patient called today stating he was short of breath and wanted to be seen. He is doing relatively well. He denies any further hematuria, just shortness of breath. We ordered a chest x-ray that was was negative.  He completed 3 cycles of docetaxel chemotherapy with his last cycle being dose reduced by 20%.  Imaging was performed on June 1, 2023 and the findings were on CT chest abdomen pelvis the patient had a positive response with all of the liver  lesions having resolved, there was a decrease in size of a locally invasive mass in the left prostate, there is no significant change in the mixed lytic and sclerotic appearance of the left acetabulum.  There is unchanged alignment of the left sided subacute acetabular fracture and left inferior pubic ramus fracture.  Bone scan indicated there was uptake in the left acetabulum was relatively stable.  His PSA continued to drop and was down to 2.24.  We went on to receive cycle 4 of docetaxel and then was seen on an emergent basis on June 29, 2023 with complaints of fevers night sweats left hip pain that was excruciating was febrile to 101.1  F, tachycardic, hypotensive and we were concerned about sepsis.  He was seen in the emergency room on the same day CT abdomen pelvis revealed there was a 4 cm abscess deep within the pelvis, which appeared to be in communication with the urinary bladder in the left ventral lateral aspect of the rectum.  The abscess abutted the left pubic bone at the symphysis likely osteomyelitis.  There are small gas bubbles seen within the abscess cavity as well as surrounding it within the left pubic bone.  A small volume of fluid was seen within the presacral soft tissues.  CT cystogram was also performed with and without contrast and revealed air-fluid collection along the primary bladder neck.  There was no evidence of fistulous connection or communication within the rectum.  There was soft tissue gas and reactive osseous changes of the left pubic symphysis suggestive of osteomyelitis.  There was reactive circumferential wall thickening of the urinary bladder secondary to ongoing infection or inflammation.  There was circumferential thickening and submucosal edema involving the rectum, suggestive of proctitis.  He also had an MRI of the pelvis done which was read as lower lumbar spine surgical hardware with apparent nondisplaced fracture, involving the medial left acetabulum.  Abnormalities  in the area of previous air density was seen including irregularity of the left superior, inferior pubic ramus, there was marrow edema throughout the medial left pelvis.  X-ray of the pelvis revealed nondisplaced fracture of the left medial and posterior acetabulum, nondisplaced fracture of the left inferior pubic ramus.  There was advanced left hip degenerative osteoarthrosis.  The patient was found to have leukocytosis elevated lactic acid and a temp of one 1.1 and was transferred to the [facility].  Interventional radiology reviewed the CT scan and noted that the patient developed a 4 cm deep pelvic abscess that was not amenable to percutaneous drainage.  Urology was consulted and it was felt that it was okay to discontinue the Jiménez catheter.  Plan was to treat with IV antibiotics with no response the patient will likely need a diverting colostomy to drain the pelvic abscess.  Hematology was also consulted and recommended holding chemotherapy while inpatient patient initially treated with empiric vancomycin, ceftriaxone and Flagyl was switched to IV Zosyn beginning on July 2, 2023 was started on linezolid 600 mg IV every 12 hours, given the fact he was found to have positive VRE in the stool.  The plan was to discharge on a every 6 week empiric IV course for treatment of pelvic abscess and possible osteomyelitis.  Patient was discharged on linezolid Levaquin and metronidazole.  The plan was to repeat CT abdomen pelvis in 6 weeks it was noted that he had acute kidney injury which resolved and was thought to be likely due to prerenal versus ATN versus postobstructive uropathy.  Patient was continued on Lyrica and OxyContin 20 mg p.o. every 12 hours as well as oxycodone 10 mg every 6 hours as needed breakthrough pain plan was to be seen by Dr. Osbaldo Acharya orthopedics for potential hip replacement.  Patient also had been treated for pulmonary embolus with IV heparin drip which was  transitioned to Eliquis.  We had seen the patient virtually on July 13, 2023 at that time is complaining of ongoing left hip pain he was due to receive his Lupron but did not receive it in June and therefore was subsequent given on July 31, 2023.  We elected to hold docetaxel due to his ongoing infectious process.  We held off on denosumab as well.  His PSA tumor marker on July 31 was down to 1.53.  He has been followed by infectious diseases at the [facility] by Dr. Kay who has been evaluating his CRP and sed rate as well as ordering CT abdomen/pelvis on August 14, 2023 which revealed recurrent fluid collection deep in the pelvis at the site of previous abscess formation there was left pubic bone fracture surrounded by fluid more pronounced/evident there is nondisplaced fracture involving the left ischial bone which was similar in appearance.  Most recently Dr. Kay had ordered a CRP which was elevated was concerned about recurrent infection and recommended CT abdomen pelvis which she ordered this is still pending.  She felt once his infection was cleared that he would be a candidate for hip replacement by Dr. Acharya at the [facility].  Patient comes in today with declining performance status of 3.  He has lost at least 25 pounds since June.  Needs complaining of ongoing left hip pain and states the OxyContin 20 mg every 12 hours is not helping.  His PSA today is up to 2.94.  His CRP is 102.8 and his sed rate is 62.  He denies fevers night sweats abdominal pain headaches or bone pain except for his is left hip pain is primarily emanating from the left groin.      ______________________________________________________________________________    Past History    Past Medical History:   Diagnosis Date    Elevated prostate specific antigen (PSA)     4/10/2012    Encounter for other administrative examinations     1/31/2014    Esophagitis     No Comments Provided    Gastro-esophageal reflux disease without  esophagitis     No Comments Provided    Low back pain     No Comments Provided    Malignant neoplasm of prostate (H)     9/6/2012    Nicotine dependence, uncomplicated     Quit - October 2007 with chantix    Other intervertebral disc degeneration, lumbosacral region     12/1/2008       Past Surgical History:   Procedure Laterality Date    APPENDECTOMY OPEN  [id number]    Ruptured - [facility]    ARTHROPLASTY KNEE Right 11/16/2021    Procedure: ARTHROPLASTY, KNEE, TOTAL;  Surgeon: Micah Rock MD;  Location: [facility] OR    COLONOSCOPY  08/31/2006    next due in 2016    DISKECTOMY, LUMBAR, SINGLE SP  03/16/2009    L 3-4 microdiskectomy, [facility]    ESOPHAGOSCOPY, GASTROSCOPY, DUODENOSCOPY (EGD), COMBINED  03/2003         ESOPHAGOSCOPY, GASTROSCOPY, DUODENOSCOPY (EGD), COMBINED  08/31/2006         PROSTATECTOMY PERINEAL RADICAL  11/28/2012    Nerve Sparring, Dr Warner    SPINE SURGERY N/A 04/28/2017    L3 to S1 spinal decompression L4/L5 fusion    TONSILLECTOMY, ADENOIDECTOMY, COMBINED      as a child    VARICOCELECTOMY  1959    INCISION AND DRAINAGE,EXCISE VARICOCELE           Review of systems.  CNS: There are no headaches, no blurred vision, no change in mental status,   ENT: There is no hearing loss.  Respiratory: There is no shortness of breath, hemoptysis, cough  Cardiac: There is no chest pain, orthopnea, PND, or ankle edema.  GI: There is no bright red blood per rectum, no hematemesis, no reflux, no diarrhea or constipation  Musculoskeletal: There is ongoing left hip pain..  : There is no urinary frequency, hematuria.  Constitutional: There is no fevers, night sweats, weight loss.  Endocrine: There are are no hot flashes, fatigue, or galactorrhea.  Neuro: There is no tingling or numbness in the hands or feet.  Hematologic: There is no gingival bleeding, epistaxis, or easy bruisability.  Dermatologic: There is no skin rash.  A 14 point review of systems is otherwise negative.          Physical Exam    BP  122/82 (BP Location: Right arm, Patient Position: Sitting, Cuff Size: Adult Regular)   Pulse 118   Temp 98.2  F (36.8  C) (Tympanic)   Resp 20   Wt 83.9 kg (185 lb)   SpO2 99%   BMI 26.54 kg/m        GENERAL: Alert and oriented to time place and person. Seated comfortably. In no distress.    HEAD: Atraumatic and normocephalic.    EYES: KRUPA, EOMI.  No pallor.  No icterus.    Oral cavity: no mucosal lesion or tonsillar enlargement.    NECK: supple. JVP normal.  No thyroid enlargement.    LYMPH NODES: No palpable cervical, supraclavicular, or axillary nodes.    CHEST: clear to auscultation bilaterally.  Resonant to percussion throughout bilaterally.  Symmetrical breath movements bilaterally.    CVS: S1 and S2 are heard. Regular rate and rhythm.  No murmur or gallop or rub heard.  No peripheral edema.    ABDOMEN: Soft. Not tender. Not distended.  No palpable hepatomegaly or splenomegaly.  No other mass palpable.  Bowel sounds heard.    EXTREMITIES: There is trace ankle edema.  There is limited mobility of the left hip with primary tenderness involving the left inguinal region.    SKIN: no rash, or bruising or purpura.    NEURO: Grossly non-focal.    Lab Results    Recent Results (from the past 168 hour(s))   PSA tumor marker   Result Value Ref Range    PSA Tumor Marker 2.94 0.00 - 6.50 ng/mL   Ferritin   Result Value Ref Range    Ferritin 268 31 - 409 ng/mL   Iron & Iron Binding Capacity   Result Value Ref Range    Iron 28 (L) 61 - 157 ug/dL    Iron Binding Capacity 155 (L) 240 - 430 ug/dL    Iron Sat Index 18 15 - 46 %   Lactate Dehydrogenase   Result Value Ref Range    Lactate Dehydrogenase 148 0 - 250 U/L   Comprehensive metabolic panel   Result Value Ref Range    Sodium 135 (L) 136 - 145 mmol/L    Potassium 3.2 (L) 3.4 - 5.3 mmol/L    Chloride 97 (L) 98 - 107 mmol/L    Carbon Dioxide (CO2) 24 22 - 29 mmol/L    Anion Gap 14 7 - 15 mmol/L    Urea Nitrogen 11.5 8.0 - 23.0 mg/dL    Creatinine 0.65 (L) 0.67 -  1.17 mg/dL    Calcium 9.6 8.8 - 10.2 mg/dL    Glucose 191 (H) 70 - 99 mg/dL    Alkaline Phosphatase 80 40 - 129 U/L    AST 14 0 - 45 U/L    ALT 19 0 - 70 U/L    Protein Total 6.9 6.4 - 8.3 g/dL    Albumin 3.1 (L) 3.5 - 5.2 g/dL    Bilirubin Total 0.2 <=1.2 mg/dL    GFR Estimate >90 >60 mL/min/1.73m2   Erythrocyte sedimentation rate auto   Result Value Ref Range    Erythrocyte Sedimentation Rate 62 (H) 0 - 20 mm/hr   CRP inflammation   Result Value Ref Range    CRP Inflammation 102.80 (H) <5.00 mg/L   CBC with platelets and differential   Result Value Ref Range    WBC Count 8.9 4.0 - 11.0 10e3/uL    RBC Count 4.43 4.40 - 5.90 10e6/uL    Hemoglobin 11.3 (L) 13.3 - 17.7 g/dL    Hematocrit 34.7 (L) 40.0 - 53.0 %    MCV 78 78 - 100 fL    MCH 25.5 (L) 26.5 - 33.0 pg    MCHC 32.6 31.5 - 36.5 g/dL    RDW 16.7 (H) 10.0 - 15.0 %    Platelet Count 432 150 - 450 10e3/uL    % Neutrophils 80 %    % Lymphocytes 13 %    % Monocytes 6 %    % Eosinophils 1 %    % Basophils 0 %    % Immature Granulocytes 0 %    NRBCs per 100 WBC 0 <1 /100    Absolute Neutrophils 7.0 1.6 - 8.3 10e3/uL    Absolute Lymphocytes 1.2 0.8 - 5.3 10e3/uL    Absolute Monocytes 0.6 0.0 - 1.3 10e3/uL    Absolute Eosinophils 0.1 0.0 - 0.7 10e3/uL    Absolute Basophils 0.0 0.0 - 0.2 10e3/uL    Absolute Immature Granulocytes 0.0 <=0.4 10e3/uL    Absolute NRBCs 0.0 10e3/uL       Imaging    No results found.    Assessment and Plan:History of castrate-resistant nonmetastatic prostate cancer, progressing now to castrate-resistant metastatic prostate cancer.  Initially, the patient was treated due to rising PSA with doubling time less than 12 months with apalutamide in addition to androgen deprivation therapy and he was started on apalutamide 240 mg tablet daily.  His PSA dropped to 1.25, then began to rise to 2.06.  We switched him to abiraterone and prednisone, beginning in 08/2022.  He required dose reduction due to fatigue.  He developed progression of disease with  rising PSA with bony metastasis, left acetabulum and left pubic symphysis, nondisplaced fracture as well as locally advanced disease, as well as distant liver metastases.  The patient was started on docetaxel, prednisone and received radiation therapy to the pubic symphysis, as well as left hip.  He received 2 cycles of docetaxel.  Course was complicated by neutropenic fever, Klebsiella UTI, urosepsis, as well as hematuria, resulting in acute renal failure requiring transfer to [facility] where the patient had clot evacuation, as well as fulguration of multiple bleeding vessels in the bladder, felt to be due to radiation cystitis.  The patient recovered and went on to receive cycle 3 of docetaxel, 25% dose reduction, with a positive response with drop in PSA.  After 4 cycles, PSA dropped significantly down to 1.26.  Course was complicated by pelvic abscess, septic shock, osteomyelitis requiring transfer to the [facility] and he was treated with IV antibiotics., followed by a 6-week course of antibiotics with Flagyl, linezolid and Levaquin.  Chemotherapy was held as well as denosumab.  Patient has been followed by infectious diseases and has a rising CRP and sed rate he is scheduled to have a CT abdomen/pelvis and follow-up with infectious disease Dr. Kay is currently not on any antibiotics.  His PSA has begun to rise slowly given his poor performance status he is not a candidate for IV docetaxel we will continue Lupron for now we may consider enzalutamide in the future but for now would like him to to have his infectious process treated adequately.  He will likely need to follow-up with Dr. Acharya as well for potential hip replacement.  Otherwise we will see the patient in approximately 4 to 5 weeks repeat CBC CMP LDH PSA tumor marker and consider instituting enzalutamide.  #2 left hip pain: We will increase his OxyContin to 40 mg p.o. every 12 hours, he will continue oxycodone 10 mg  every 4 hours as needed breakthrough pain.        Time spent: 98 minutes was spent on this patient visit: Reviewed multiple physician provider notes including infectious disease notes from the [facility], reviewed multiple imaging results lab results including PSA tumor marker, we performed a history and physical, documented history and physical, ordered follow-up labs and continue Lupron.       Cancer Staging   No matching staging information was found for the patient.      Signed by: Erum Fritz MD    CC: Wei Perez MD

## 2023-09-13 NOTE — TELEPHONE ENCOUNTER
[facility] Call Center    Phone Message    May a detailed message be left on voicemail: yes     Reason for Call: Medication Refill Request    Has the patient contacted the pharmacy for the refill? Yes   Name of medication being requested: levofloxacin (LEVAQUIN) 750 MG tablet   Provider who prescribed the medication:   Pharmacy:   [address]     Date medication is needed: ASAP    Per pt would like to know if he should get  this refill and continue to take this medication? Please call pt back thank you!       Action Taken: Message routed to:  [facility] ([facility]): ID    Travel Screening: Not Applicable

## 2023-09-13 NOTE — PATIENT INSTRUCTIONS
Follow up with Dr Fritz in about 5 weeks with monthly lab prior.      Please come prior for lab work.

## 2023-09-13 NOTE — NURSING NOTE
Chief Complaint   Patient presents with    Oncology Clinic Visit     Metastatic Prostate CA     Medication Reconciliation: complete    Jacqueline Cortez CMA (AAMA) 9/13/2023 2:19 PM  
normal...

## 2023-09-14 ENCOUNTER — PATIENT OUTREACH (OUTPATIENT)
Dept: ONCOLOGY | Facility: OTHER | Age: 68
End: 2023-09-14
Payer: COMMERCIAL

## 2023-09-14 ENCOUNTER — TELEPHONE (OUTPATIENT)
Dept: ONCOLOGY | Facility: OTHER | Age: 68
End: 2023-09-14
Payer: COMMERCIAL

## 2023-09-14 ENCOUNTER — TELEPHONE (OUTPATIENT)
Dept: INFECTIOUS DISEASES | Facility: CLINIC | Age: 68
End: 2023-09-14
Payer: COMMERCIAL

## 2023-09-14 DIAGNOSIS — E87.6 HYPOKALEMIA: Primary | ICD-10-CM

## 2023-09-14 DIAGNOSIS — F41.9 ANXIETY: ICD-10-CM

## 2023-09-14 NOTE — TELEPHONE ENCOUNTER
Patient potassium low. Will start Kdur 20 meq daily. Jermain also mentions he has been having anxiety and would appreciate something to help with that.  Elza Lundberg RN...........9/14/2023 4:17 PM

## 2023-09-14 NOTE — TELEPHONE ENCOUNTER
Patient will schedule CT in [place] please send order to his clinic there, please advise patient if this medication will be refilled asap as patient is out

## 2023-09-14 NOTE — PROGRESS NOTES
[facility]: Cancer Care Short Note                                    Discussion with Patient:                                                      States that pain is better in left hip since change in pain medication. He is hoping to have his hip repaired. Currently seeing infectious control provider. Due to his hip pain he is not able to do much at home.          Goals          General    Patient will adhere to medication regimen             Assessment:                                                      Assessment completed with:: Patient    Constitutional  Fatigue: Fatigue not relieved by rest OR limiting instrumental ADL  Fever: Absent or within normal limits    Respiratory  Cough: Mild symptoms OR nonprescription intervention indicated  Dyspnea: Shortness of breath with moderate exertion    Gastrointestinal  Anorexia: Loss of appetite without alteration in eating habits  Nausea: Loss of appetite without alteration in eating habits  Vomiting: Absent or within normal limits  Dehydration: Absent or within normal limits  Mucositis Oral: Absent or within normal limits  Constipation: Absent or within normal limits  Diarrhea: Absent or within normal limits    Genitourinary  Patient Reported Genitourinary Symptoms?: No  Urinary Retention: Absent or within normal limits  Urinary Tract Pain: Absent or within normal limits    Integumentary  Rash Maculo-Papular: Absent or within normal limits    Pain  Patient Reported Pain?: Yes, is currently well-managed  Pain Score: Moderate Pain (4)  Pain Loc: Hip  (DVPRS) Pain Rating Score : 8-Awful, hard to do anything  Interfered with your usual ACTIVITY?: Completely interferes 10  Interfered with your SLEEP?: Completely interferes 10  Affected your MOOD?: Completely affects 10  Contributed to your STRESS?: Contributes a great deal 10  Pain Management Interventions: medication (see MAR)    Patient Coping  Accepting    Clinic Utilization  Patient Aware of Next Appointment?:  Yes    Other Patient Concerns  Other Patient Reported Concerns: No    No assessment indicated    Intervention/Education provided during outreach:                                                       Patient will follow up with Erum Fritz MD as scheduled.     Signature:  Elza Lundberg RN

## 2023-09-15 RX ORDER — LORAZEPAM 0.5 MG/1
0.5 TABLET ORAL EVERY 8 HOURS PRN
Qty: 30 TABLET | Refills: 1 | COMMUNITY
Start: 2023-09-15 | End: 2023-11-20

## 2023-09-15 RX ORDER — POTASSIUM CHLORIDE 1500 MG/1
20 TABLET, EXTENDED RELEASE ORAL 2 TIMES DAILY
Qty: 30 TABLET | Refills: 3 | COMMUNITY
Start: 2023-09-15 | End: 2023-11-20

## 2023-09-20 ENCOUNTER — HOSPITAL ENCOUNTER (OUTPATIENT)
Dept: CT IMAGING | Facility: OTHER | Age: 68
Discharge: HOME | End: 2023-09-20
Attending: INTERNAL MEDICINE | Admitting: INTERNAL MEDICINE
Payer: COMMERCIAL

## 2023-09-20 DIAGNOSIS — A49.8 INFECTION DUE TO KLEBSIELLA PNEUMONIAE: ICD-10-CM

## 2023-09-20 PROCEDURE — 250N000011 HC RX IP 250 OP 636: Performed by: INTERNAL MEDICINE

## 2023-09-20 PROCEDURE — 74177 CT ABD & PELVIS W/CONTRAST: CPT

## 2023-09-20 RX ORDER — IOPAMIDOL 755 MG/ML
107 INJECTION, SOLUTION INTRAVASCULAR ONCE
Status: COMPLETED | OUTPATIENT
Start: 2023-09-20 | End: 2023-09-20

## 2023-09-20 RX ADMIN — IOPAMIDOL 107 ML: 755 INJECTION, SOLUTION INTRAVENOUS at 15:39

## 2023-09-21 NOTE — TELEPHONE ENCOUNTER
[facility] Call Center    Phone Message    May a detailed message be left on voicemail: yes     Reason for Call: Other: Per pt would like to know if his video visit schedule on 10/05/23 would be an okay time frame for the follow up? Per pt was told that appt is needed asap once he is done with his CT Abdomen Pelvis w Contrast order. Pt Lab was done so it can help his surgery for his hip to move along. Please get back to pt if that appt is appropriate . Please and thank you!     Action Taken: Message routed to:  [facility] ([facility]): ID    Travel Screening: Not Applicable

## 2023-09-25 ENCOUNTER — TELEPHONE (OUTPATIENT)
Dept: INFECTIOUS DISEASES | Facility: CLINIC | Age: 68
End: 2023-09-25
Payer: COMMERCIAL

## 2023-09-25 RX ORDER — LEVOFLOXACIN 750 MG/1
750 TABLET, FILM COATED ORAL DAILY
Qty: 30 TABLET | Refills: 0 | Status: SHIPPED | OUTPATIENT
Start: 2023-09-25 | End: 2023-11-20

## 2023-09-25 NOTE — TELEPHONE ENCOUNTER
Called patient- no answer. LVM letting him know CT scan was reassuring, but his CRP is still elevated. Asked him to call back to discuss any symptoms he may be having that could explain the rising CRP. Provided new ID phone number.   Hydroxychloroquine Counseling:  I discussed with the patient that a baseline ophthalmologic exam is needed at the start of therapy and every year thereafter while on therapy. A CBC may also be warranted for monitoring.  The side effects of this medication were discussed with the patient, including but not limited to agranulocytosis, aplastic anemia, seizures, rashes, retinopathy, and liver toxicity. Patient instructed to call the office should any adverse effect occur.  The patient verbalized understanding of the proper use and possible adverse effects of Plaquenil.  All the patient's questions and concerns were addressed. 2020 01:56

## 2023-09-25 NOTE — TELEPHONE ENCOUNTER
[facility] Call Center    Phone Message    May a detailed message be left on voicemail: yes     Reason for Call: Other: Pt called back to update care team of symptoms. He has not had any fevers since stopping the antibiotics      Action Taken: Other: [facility]    Travel Screening: Not Applicable

## 2023-10-10 ENCOUNTER — VIRTUAL VISIT (OUTPATIENT)
Dept: INFECTIOUS DISEASES | Facility: CLINIC | Age: 68
End: 2023-10-10
Attending: INTERNAL MEDICINE
Payer: COMMERCIAL

## 2023-10-10 DIAGNOSIS — R79.82 ELEVATED C-REACTIVE PROTEIN (CRP): ICD-10-CM

## 2023-10-10 DIAGNOSIS — A49.8 INFECTION DUE TO KLEBSIELLA PNEUMONIAE: Primary | ICD-10-CM

## 2023-10-10 DIAGNOSIS — F41.9 ANXIETY: ICD-10-CM

## 2023-10-10 PROCEDURE — 99214 OFFICE O/P EST MOD 30 MIN: CPT | Mod: VID | Performed by: INTERNAL MEDICINE

## 2023-10-10 ASSESSMENT — PAIN SCALES - GENERAL: PAINLEVEL: EXTREME PAIN (8)

## 2023-10-10 NOTE — LETTER
10/10/2023       RE: Dilip Kan  [address] [place] [address]     Dear Colleague,    Thank you for referring your patient, Dilip Kan, to the [facility] [place] [facility]. Please see a copy of my visit note below.    Virtual Visit Details    Type of service:  Video Visit   Video Start Time:  1:00 pm  Video End Time: 1:25 PM    Originating Location (pt. Location): Home  Distant Location (provider location):  On-site  Platform used for Video Visit: [facility]  Dr. Josselin Kay, [facility], [address]  [address] [place] [address]   Patient:  Dilip Kan, Date of birth 1955, Medical record number [id number]  Date of Visit:  10/10/2023         Assessment and Recommendations:     Pt is a 68 year old with a hx of pelvic abscess that was never drained and hip fracture who is here for follow up. In April his urine and blood cultures grew Klebsiella. He also screened positive for VRE although this never grew in any cultures. He was improving after a course of zosyn in the hospital, 2 weeks of linzolid for VRE, and discharged on levofloxacin 750 daily and flagyl 500 mg q8 h which was given for 8 weeks. He tolerated that fine but then his CRP has been climbing. I got a CT which shows his pelvic abscess is similar to smaller.    I discussed the case with my colleague who does more bone infections and we think he needs an MRI. The patient does not tolerate them well but is willing to try an open one.     I will order an MRI pelvis with and without contrast.   -I will also order an Rx for ativan given his anxiety    He also has repeat CBC, ESR, CRP, PSA, and procalcitonin.     Josselin Kay MD  Division of Infectious Diseases and International Medicine  [phone]    RTC pending workup     Total visit including reviewing records on the day of  the visit was 30 minutes.          History of Infectious Disease Illness:     He reports he feels well. He says he still has the groin pain.     No history of autoimmune disease. No RA.     He says he does not tolerate MRIs anymore. He gets very claustrophobic. He could potentially do an open MRI. There is one in [facility] he thinks. (I know there is one in [place]).         Past Medical and Surgical History:     Past Medical History:   Diagnosis Date    Elevated prostate specific antigen (PSA)     4/10/2012    Encounter for other administrative examinations     1/31/2014    Esophagitis     No Comments Provided    Gastro-esophageal reflux disease without esophagitis     No Comments Provided    Low back pain     No Comments Provided    Malignant neoplasm of prostate (H)     9/6/2012    Nicotine dependence, uncomplicated     Quit - October 2007 with chantix    Other intervertebral disc degeneration, lumbosacral region     12/1/2008       Past Surgical History:   Procedure Laterality Date    APPENDECTOMY OPEN  [id number]    Ruptured - [place]    ARTHROPLASTY KNEE Right 11/16/2021    Procedure: ARTHROPLASTY, KNEE, TOTAL;  Surgeon: Micah Rock MD;  Location: [facility] OR    COLONOSCOPY  08/31/2006    next due in 2016    DISKECTOMY, LUMBAR, SINGLE SP  03/16/2009    L 3-4 microdiskectomy, [facility]    ESOPHAGOSCOPY, GASTROSCOPY, DUODENOSCOPY (EGD), COMBINED  03/2003         ESOPHAGOSCOPY, GASTROSCOPY, DUODENOSCOPY (EGD), COMBINED  08/31/2006         PROSTATECTOMY PERINEAL RADICAL  11/28/2012    Nerve Sparring, Dr Warner    SPINE SURGERY N/A 04/28/2017    L3 to S1 spinal decompression L4/L5 fusion    TONSILLECTOMY, ADENOIDECTOMY, COMBINED      as a child    VARICOCELECTOMY  1959    INCISION AND DRAINAGE,EXCISE VARICOCELE           Family History:     Family History   Problem Relation Age of Onset    Heart Disease Father         Heart Disease, passed away from CHF,CAD    Colon Cancer Father         Cancer-colon     Hypertension Father         Hypertension    Other - See Comments Father         Stroke/Dementia    Hypertension Mother         Hypertension,HTN    Other - See Comments Mother          Parkinsons    Family History Negative Other         Good Health    Family History Negative Other         Good Health,previous marriage./previous marriage.    Family History Negative Other         Good Health,previous marriage.    Family History Negative Sister         Good Health,emotional problems.    Family History Negative Sister         Good Health    Other - See Comments Son         w/o major medical problems.    Other - See Comments Daughter         w/o major medical problems.           Social History:     Social History     Tobacco Use    Smoking status: Former     Packs/day: 1.00     Years: 20.00     Pack years: 20.00     Types: Cigarettes     Quit date: 2002     Years since quittin.9     Passive exposure: Past    Smokeless tobacco: Current     Types: Snuff    Tobacco comments:     Can't remember when all he had passive exposure   Vaping Use    Vaping Use: Never used   Substance Use Topics    Alcohol use: Not Currently     Comment:  5 drinks a month    Drug use: No     Social History     Social History Narrative    [employer] - retired.  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  Patient has quit smoking.  Lives in [place] on a farm with wife.             Review of Systems:   CONSTITUTIONAL:  No fevers or chills  EYES: negative for icterus  ENT:  negative for hearing loss, tinnitus, sore throat  RESPIRATORY:  negative for cough, sputum or dyspnea  CARDIOVASCULAR:  negative for chest pain, palpitations  GASTROINTESTINAL:  negative for nausea, vomiting, diarrhea or constipation  GENITOURINARY:  negative for dysuria  HEME:  No easy bruising  INTEGUMENT:  negative for rash or pruritus  NEURO:  Negative for headache         Current Medications:     Current Outpatient Medications   Medication Sig Dispense  Refill    acetaminophen (TYLENOL) 325 MG tablet Take 975 mg by mouth every 8 hours as needed for mild pain      amLODIPine (NORVASC) 10 MG tablet Take 1 tablet (10 mg) by mouth daily 90 tablet 3    apixaban ANTICOAGULANT (ELIQUIS) 5 MG tablet Take 1 tablet (5 mg) by mouth 2 times daily 60 tablet 3    atorvastatin (LIPITOR) 20 MG tablet Take 1 tablet (20 mg) by mouth daily 90 tablet 4    furosemide (LASIX) 20 MG tablet Take 1 tablet (20 mg) by mouth daily as needed (Patient taking differently: Take 20 mg by mouth daily) 30 tablet 3    hydrOXYzine (ATARAX) 10 MG tablet Take 1 tablet (10 mg) by mouth every 6 hours as needed for itching or anxiety (with pain, moderate pain) 120 tablet 11    levofloxacin (LEVAQUIN) 750 MG tablet Take 1 tablet (750 mg) by mouth daily 30 tablet 0    lisinopril (ZESTRIL) 40 MG tablet Take 40 mg by mouth daily      LORazepam (ATIVAN) 0.5 MG tablet Take 1 tablet (0.5 mg) by mouth every 8 hours as needed for anxiety 30 tablet 1    LORazepam (ATIVAN) 0.5 MG tablet Take 1 tablet (0.5 mg) by mouth every 6 hours as needed for anxiety 30 tablet 1    megestrol (MEGACE) 20 MG tablet Take 20 mg by mouth daily      omeprazole (PRILOSEC) 40 MG DR capsule Take 1 capsule (40 mg) by mouth daily 90 capsule 0    ondansetron (ZOFRAN ODT) 8 MG ODT tab Take 1 tablet (8 mg) by mouth every 8 hours as needed for nausea 90 tablet 1    oxybutynin (DITROPAN) 5 MG tablet Take 5 mg by mouth 4 times daily as needed for bladder spasms      oxyCODONE (OXYCONTIN) 40 MG 12 hr tablet Take 1 tablet (40 mg) by mouth every 12 hours 60 tablet 0    oxyCODONE IR (ROXICODONE) 10 MG tablet Take 1 tablet (10 mg) by mouth every 6 hours as needed for moderate to severe pain 120 tablet 0    potassium chloride ER (KLOR-CON M) 20 MEQ CR tablet Take 1 tablet (20 mEq) by mouth daily as needed for potassium supplementation (Take if you take furosemide) (Patient taking differently: Take 20 mEq by mouth daily) 30 tablet 3    predniSONE  (DELTASONE) 20 MG tablet Take two tablets (= 40mg) each day for 5 (five) days 10 tablet 0    predniSONE (DELTASONE) 5 MG tablet Take 1 tablet (5 mg) by mouth 2 times daily 60 tablet 11    prochlorperazine (COMPAZINE) 10 MG tablet Take 1 tablet (10 mg) by mouth every 6 hours as needed for nausea or vomiting 30 tablet 3    tamsulosin (FLOMAX) 0.4 MG capsule Take 1 capsule by mouth daily      traZODone (DESYREL) 50 MG tablet Take 50 mg by mouth nightly as needed for sleep      potassium chloride ER (KLOR-CON M) 20 MEQ CR tablet Take 1 tablet (20 mEq) by mouth 2 times daily 30 tablet 3    pregabalin (LYRICA) 150 MG capsule Take 150 mg by mouth See Admin Instructions As of 6/30/23, patient currently takes Lyrica one or twice daily (Patient not taking: Reported on 10/10/2023)              Immunization History:     Immunization History   Administered Date(s) Administered    COVID-19 Bivalent 12+ (Pfizer) 12/02/2022    COVID-19 MONOVALENT 12+ (Pfizer) 01/25/2022    COVID-19 Vaccine (Sofiya) 03/06/2021    Influenza (High Dose) 3 valent vaccine 11/01/2018, 09/30/2019    Influenza (IIV3) PF 10/15/2008, 11/17/2011, 11/09/2012    Influenza Vaccine 65+ (Fluzone HD) 10/05/2020, 10/26/2021, 10/05/2022    Influenza Vaccine >6 months (Alfuria,Fluzone) 10/16/2014, 11/13/2015, 11/03/2016, 09/25/2017    Pneumo Conj 13-V (2010&after) 10/05/2020    Pneumococcal 23 valent 10/05/2022    TDAP Vaccine (Adacel) 11/09/2012            Allergies:   No Known Allergies         Physical Exam:   Vital signs:  There were no vitals taken for this visit.    Physical Examination:  GENERAL:  well-developed, well-nourished, seated in no acute distress.  HEENT:  Head is normocephalic, atraumatic   EYES:  Eyes have anicteric sclerae without conjunctival injection   ENT:  Oropharynx is moist without exudates or ulcers. Tongue is midline  NECK:  Supple. No cervical lymphadenopathy  LUNGS:  Clear to auscultation bilateral.   CARDIOVASCULAR:  Regular rate and  rhythm with no murmurs, gallops or rubs.  ABDOMEN:  Normal bowel sounds, soft, nontender. No appreciable hepatosplenomegaly.  SKIN:  No acute rashes.    NEUROLOGIC:  Grossly nonfocal. Active x4 extremities         Laboratory Data:     Metabolic Studies   Sodium   Date Value Ref Range Status   09/13/2023 135 (L) 136 - 145 mmol/L Final   08/31/2023 134 (L) 136 - 145 mmol/L Final   02/23/2021 140 134 - 144 mmol/L Final   10/05/2020 142 134 - 144 mmol/L Final     Potassium   Date Value Ref Range Status   09/13/2023 3.2 (L) 3.4 - 5.3 mmol/L Final   08/31/2023 4.0 3.4 - 5.3 mmol/L Final   10/13/2022 4.0 3.5 - 5.1 mmol/L Final   09/29/2022 4.0 3.5 - 5.1 mmol/L Final   02/23/2021 4.2 3.5 - 5.1 mmol/L Final   10/05/2020 4.4 3.5 - 5.1 mmol/L Final     Chloride   Date Value Ref Range Status   09/13/2023 97 (L) 98 - 107 mmol/L Final   08/31/2023 100 98 - 107 mmol/L Final   10/13/2022 103 98 - 107 mmol/L Final   09/29/2022 105 98 - 107 mmol/L Final   02/23/2021 103 98 - 107 mmol/L Final   10/05/2020 105 98 - 107 mmol/L Final     Carbon Dioxide   Date Value Ref Range Status   02/23/2021 29 21 - 31 mmol/L Final   10/05/2020 30 21 - 31 mmol/L Final     Carbon Dioxide (CO2)   Date Value Ref Range Status   09/13/2023 24 22 - 29 mmol/L Final   08/31/2023 24 22 - 29 mmol/L Final   10/13/2022 30 21 - 31 mmol/L Final   09/29/2022 29 21 - 31 mmol/L Final     Anion Gap   Date Value Ref Range Status   09/13/2023 14 7 - 15 mmol/L Final   08/31/2023 10 7 - 15 mmol/L Final   10/13/2022 6 3 - 14 mmol/L Final   09/29/2022 7 3 - 14 mmol/L Final   02/23/2021 8 3 - 14 mmol/L Final   10/05/2020 7 3 - 14 mmol/L Final     Urea Nitrogen   Date Value Ref Range Status   09/13/2023 11.5 8.0 - 23.0 mg/dL Final   08/31/2023 7.7 (L) 8.0 - 23.0 mg/dL Final   10/13/2022 15 7 - 25 mg/dL Final   09/29/2022 19 7 - 25 mg/dL Final   02/23/2021 17 7 - 25 mg/dL Final   10/05/2020 18 7 - 25 mg/dL Final     Creatinine   Date Value Ref Range Status   09/13/2023 0.65 (L)  0.67 - 1.17 mg/dL Final   08/31/2023 0.72 0.67 - 1.17 mg/dL Final   02/23/2021 0.73 0.70 - 1.30 mg/dL Final   11/23/2020 0.71 0.70 - 1.30 mg/dL Final     GFR Estimate   Date Value Ref Range Status   09/13/2023 >90 >60 mL/min/1.73m2 Final   08/31/2023 >90 >60 mL/min/1.73m2 Final   02/23/2021 >90 >60 mL/min/[1.73_m2] Final   11/23/2020 >90 >60 mL/min/[1.73_m2] Final     Glucose   Date Value Ref Range Status   09/13/2023 191 (H) 70 - 99 mg/dL Final   08/31/2023 152 (H) 70 - 99 mg/dL Final   10/13/2022 100 70 - 105 mg/dL Final   09/29/2022 99 70 - 105 mg/dL Final   02/23/2021 139 (H) 70 - 105 mg/dL Final   10/05/2020 153 (H) 70 - 105 mg/dL Final     GLUCOSE BY METER POCT   Date Value Ref Range Status   07/06/2023 129 (H) 70 - 99 mg/dL Final     Comment:     /RN Notified   07/06/2023 134 (H) 70 - 99 mg/dL Final     Hemoglobin A1C   Date Value Ref Range Status   02/20/2023 6.7 (H) 4.0 - 6.2 % Final   05/24/2021 6.0 4.0 - 6.0 % Final     Calcium   Date Value Ref Range Status   09/13/2023 9.6 8.8 - 10.2 mg/dL Final   08/31/2023 9.4 8.8 - 10.2 mg/dL Final   02/23/2021 10.0 8.6 - 10.3 mg/dL Final   10/05/2020 9.5 8.6 - 10.3 mg/dL Final     Phosphorus   Date Value Ref Range Status   05/23/2023 3.6 2.5 - 4.5 mg/dL Final     Magnesium   Date Value Ref Range Status   07/01/2023 1.8 1.7 - 2.3 mg/dL Final   05/02/2023 1.7 1.7 - 2.3 mg/dL Final     Lactic Acid   Date Value Ref Range Status   07/02/2023 0.8 0.7 - 2.0 mmol/L Final   06/29/2023 1.0 0.7 - 2.0 mmol/L Final       Inflammatory Markers No results found for: CRP    Hepatic Studies    Bilirubin Total   Date Value Ref Range Status   09/13/2023 0.2 <=1.2 mg/dL Final   08/31/2023 0.3 <=1.2 mg/dL Final   02/23/2021 0.8 0.3 - 1.0 mg/dL Final   04/07/2017 0.5 0.3 - 1.0 mg/dL      Alkaline Phosphatase   Date Value Ref Range Status   09/13/2023 80 40 - 129 U/L Final   08/31/2023 60 40 - 129 U/L Final   02/23/2021 66 34 - 104 U/L Final   04/07/2017 67 34 - 104 IU/L      Albumin    Date Value Ref Range Status   09/13/2023 3.1 (L) 3.5 - 5.2 g/dL Final   08/31/2023 2.9 (L) 3.5 - 5.2 g/dL Final   10/13/2022 3.8 3.5 - 5.7 g/dL Final   09/29/2022 3.8 3.5 - 5.7 g/dL Final   02/23/2021 4.3 3.5 - 5.7 g/dL Final   04/07/2017 4.0 3.5 - 5.7 g/dL      AST   Date Value Ref Range Status   09/13/2023 14 0 - 45 U/L Final     Comment:     Reference intervals for this test were updated on 6/12/2023 to more accurately reflect our healthy population. There may be differences in the flagging of prior results with similar values performed with this method. Interpretation of those prior results can be made in the context of the updated reference intervals.   08/31/2023 9 0 - 45 U/L Final     Comment:     Reference intervals for this test were updated on 6/12/2023 to more accurately reflect our healthy population. There may be differences in the flagging of prior results with similar values performed with this method. Interpretation of those prior results can be made in the context of the updated reference intervals.   02/23/2021 15 13 - 39 U/L Final     ALT   Date Value Ref Range Status   09/13/2023 19 0 - 70 U/L Final     Comment:     Reference intervals for this test were updated on 6/12/2023 to more accurately reflect our healthy population. There may be differences in the flagging of prior results with similar values performed with this method. Interpretation of those prior results can be made in the context of the updated reference intervals.     08/31/2023 6 0 - 70 U/L Final     Comment:     Reference intervals for this test were updated on 6/12/2023 to more accurately reflect our healthy population. There may be differences in the flagging of prior results with similar values performed with this method. Interpretation of those prior results can be made in the context of the updated reference intervals.     02/23/2021 16 7 - 52 U/L Final       Hematology Studies      WBC   Date Value Ref Range Status    02/23/2021 6.9 4.0 - 11.0 10e9/L Final   07/09/2020 5.5 4.0 - 11.0 10e9/L Final     WBC Count   Date Value Ref Range Status   09/13/2023 8.9 4.0 - 11.0 10e3/uL Final   08/31/2023 10.8 4.0 - 11.0 10e3/uL Final     Absolute Neutrophils   Date Value Ref Range Status   04/22/2023 2.2 1.6 - 8.3 10e3/uL Final     Absolute Lymphocytes   Date Value Ref Range Status   04/22/2023 0.4 (L) 0.8 - 5.3 10e3/uL Final     Absolute Monocytes   Date Value Ref Range Status   04/22/2023 0.3 0.0 - 1.3 10e3/uL Final     Absolute Eosinophils   Date Value Ref Range Status   04/22/2023 0.0 0.0 - 0.7 10e3/uL Final     Hemoglobin   Date Value Ref Range Status   09/13/2023 11.3 (L) 13.3 - 17.7 g/dL Final   08/31/2023 11.4 (L) 13.3 - 17.7 g/dL Final   02/23/2021 14.1 13.3 - 17.7 g/dL Final   07/09/2020 13.7 13.3 - 17.7 g/dL Final     Hematocrit   Date Value Ref Range Status   09/13/2023 34.7 (L) 40.0 - 53.0 % Final   08/31/2023 36.2 (L) 40.0 - 53.0 % Final   02/23/2021 42.5 40.0 - 53.0 % Final   07/09/2020 41.7 40.0 - 53.0 % Final     Platelet Count   Date Value Ref Range Status   09/13/2023 432 150 - 450 10e3/uL Final   08/31/2023 375 150 - 450 10e3/uL Final   02/23/2021 200 150 - 450 10e9/L Final   07/09/2020 203 150 - 450 10e9/L Final       Imaging:  [unfilled]      Josselin Kay MD

## 2023-10-10 NOTE — PROGRESS NOTES
Virtual Visit Details    Type of service:  Video Visit   Video Start Time:  1:00 pm  Video End Time: 1:25 PM    Originating Location (pt. Location): Home  Distant Location (provider location):  On-site  Platform used for Video Visit: [facility]  Dr. Josselin Kay, [facility], Floor 3  [address] [place] [address]   Patient:  Dilip Kan, Date of birth 1955, Medical record number [id number]  Date of Visit:  10/10/2023         Assessment and Recommendations:     Pt is a 68 year old with a hx of pelvic abscess that was never drained and hip fracture who is here for follow up. In April his urine and blood cultures grew Klebsiella. He also screened positive for VRE although this never grew in any cultures. He was improving after a course of zosyn in the hospital, 2 weeks of linzolid for VRE, and discharged on levofloxacin 750 daily and flagyl 500 mg q8 h which was given for 8 weeks. He tolerated that fine but then his CRP has been climbing. I got a CT which shows his pelvic abscess is similar to smaller.    I discussed the case with my colleague who does more bone infections and we think he needs an MRI. The patient does not tolerate them well but is willing to try an open one.     I will order an MRI pelvis with and without contrast.   -I will also order an Rx for ativan given his anxiety    He also has repeat CBC, ESR, CRP, PSA, and procalcitonin.     Josselin Kay MD  Division of Infectious Diseases and International Medicine  [phone]    RT pending workup     Total visit including reviewing records on the day of the visit was 30 minutes.          History of Infectious Disease Illness:     He reports he feels well. He says he still has the groin pain.     No history of autoimmune disease. No RA.     He says he does not tolerate MRIs anymore. He gets very claustrophobic. He could potentially do an open MRI. There is one in [place] he  thinks. (I know there is one in [place]).         Past Medical and Surgical History:     Past Medical History:   Diagnosis Date    Elevated prostate specific antigen (PSA)     4/10/2012    Encounter for other administrative examinations     1/31/2014    Esophagitis     No Comments Provided    Gastro-esophageal reflux disease without esophagitis     No Comments Provided    Low back pain     No Comments Provided    Malignant neoplasm of prostate (H)     9/6/2012    Nicotine dependence, uncomplicated     Quit - October 2007 with chantix    Other intervertebral disc degeneration, lumbosacral region     12/1/2008       Past Surgical History:   Procedure Laterality Date    APPENDECTOMY OPEN  [id number]    Ruptured - [place]    ARTHROPLASTY KNEE Right 11/16/2021    Procedure: ARTHROPLASTY, KNEE, TOTAL;  Surgeon: Micah Rock MD;  Location: [facility] OR    COLONOSCOPY  08/31/2006    next due in 2016    DISKECTOMY, LUMBAR, SINGLE SP  03/16/2009    L 3-4 microdiskectomy, [facility]    ESOPHAGOSCOPY, GASTROSCOPY, DUODENOSCOPY (EGD), COMBINED  03/2003         ESOPHAGOSCOPY, GASTROSCOPY, DUODENOSCOPY (EGD), COMBINED  08/31/2006         PROSTATECTOMY PERINEAL RADICAL  11/28/2012    Nerve Sparring, Dr Warner    SPINE SURGERY N/A 04/28/2017    L3 to S1 spinal decompression L4/L5 fusion    TONSILLECTOMY, ADENOIDECTOMY, COMBINED      as a child    VARICOCELECTOMY  1959    INCISION AND DRAINAGE,EXCISE VARICOCELE           Family History:     Family History   Problem Relation Age of Onset    Heart Disease Father         Heart Disease, passed away from CHF,CAD    Colon Cancer Father         Cancer-colon    Hypertension Father         Hypertension    Other - See Comments Father         Stroke/Dementia    Hypertension Mother         Hypertension,HTN    Other - See Comments Mother          Parkinsons    Family History Negative Other         Good Health    Family History Negative Other         Good Health,previous marriage./previous  marriage.    Family History Negative Other         Good Health,previous marriage.    Family History Negative Sister         Good Health,emotional problems.    Family History Negative Sister         Good Health    Other - See Comments Son         w/o major medical problems.    Other - See Comments Daughter         w/o major medical problems.           Social History:     Social History     Tobacco Use    Smoking status: Former     Packs/day: 1.00     Years: 20.00     Pack years: 20.00     Types: Cigarettes     Quit date: 2002     Years since quittin.9     Passive exposure: Past    Smokeless tobacco: Current     Types: Snuff    Tobacco comments:     Can't remember when all he had passive exposure   Vaping Use    Vaping Use: Never used   Substance Use Topics    Alcohol use: Not Currently     Comment:  5 drinks a month    Drug use: No     Social History     Social History Narrative    [employer] - retired.  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  Patient has quit smoking.  Lives in [place] a farm with wife.             Review of Systems:   CONSTITUTIONAL:  No fevers or chills  EYES: negative for icterus  ENT:  negative for hearing loss, tinnitus, sore throat  RESPIRATORY:  negative for cough, sputum or dyspnea  CARDIOVASCULAR:  negative for chest pain, palpitations  GASTROINTESTINAL:  negative for nausea, vomiting, diarrhea or constipation  GENITOURINARY:  negative for dysuria  HEME:  No easy bruising  INTEGUMENT:  negative for rash or pruritus  NEURO:  Negative for headache         Current Medications:     Current Outpatient Medications   Medication Sig Dispense Refill    acetaminophen (TYLENOL) 325 MG tablet Take 975 mg by mouth every 8 hours as needed for mild pain      amLODIPine (NORVASC) 10 MG tablet Take 1 tablet (10 mg) by mouth daily 90 tablet 3    apixaban ANTICOAGULANT (ELIQUIS) 5 MG tablet Take 1 tablet (5 mg) by mouth 2 times daily 60 tablet 3    atorvastatin (LIPITOR) 20 MG  tablet Take 1 tablet (20 mg) by mouth daily 90 tablet 4    furosemide (LASIX) 20 MG tablet Take 1 tablet (20 mg) by mouth daily as needed (Patient taking differently: Take 20 mg by mouth daily) 30 tablet 3    hydrOXYzine (ATARAX) 10 MG tablet Take 1 tablet (10 mg) by mouth every 6 hours as needed for itching or anxiety (with pain, moderate pain) 120 tablet 11    levofloxacin (LEVAQUIN) 750 MG tablet Take 1 tablet (750 mg) by mouth daily 30 tablet 0    lisinopril (ZESTRIL) 40 MG tablet Take 40 mg by mouth daily      LORazepam (ATIVAN) 0.5 MG tablet Take 1 tablet (0.5 mg) by mouth every 8 hours as needed for anxiety 30 tablet 1    LORazepam (ATIVAN) 0.5 MG tablet Take 1 tablet (0.5 mg) by mouth every 6 hours as needed for anxiety 30 tablet 1    megestrol (MEGACE) 20 MG tablet Take 20 mg by mouth daily      omeprazole (PRILOSEC) 40 MG DR capsule Take 1 capsule (40 mg) by mouth daily 90 capsule 0    ondansetron (ZOFRAN ODT) 8 MG ODT tab Take 1 tablet (8 mg) by mouth every 8 hours as needed for nausea 90 tablet 1    oxybutynin (DITROPAN) 5 MG tablet Take 5 mg by mouth 4 times daily as needed for bladder spasms      oxyCODONE (OXYCONTIN) 40 MG 12 hr tablet Take 1 tablet (40 mg) by mouth every 12 hours 60 tablet 0    oxyCODONE IR (ROXICODONE) 10 MG tablet Take 1 tablet (10 mg) by mouth every 6 hours as needed for moderate to severe pain 120 tablet 0    potassium chloride ER (KLOR-CON M) 20 MEQ CR tablet Take 1 tablet (20 mEq) by mouth daily as needed for potassium supplementation (Take if you take furosemide) (Patient taking differently: Take 20 mEq by mouth daily) 30 tablet 3    predniSONE (DELTASONE) 20 MG tablet Take two tablets (= 40mg) each day for 5 (five) days 10 tablet 0    predniSONE (DELTASONE) 5 MG tablet Take 1 tablet (5 mg) by mouth 2 times daily 60 tablet 11    prochlorperazine (COMPAZINE) 10 MG tablet Take 1 tablet (10 mg) by mouth every 6 hours as needed for nausea or vomiting 30 tablet 3    tamsulosin  (FLOMAX) 0.4 MG capsule Take 1 capsule by mouth daily      traZODone (DESYREL) 50 MG tablet Take 50 mg by mouth nightly as needed for sleep      potassium chloride ER (KLOR-CON M) 20 MEQ CR tablet Take 1 tablet (20 mEq) by mouth 2 times daily 30 tablet 3    pregabalin (LYRICA) 150 MG capsule Take 150 mg by mouth See Admin Instructions As of 6/30/23, patient currently takes Lyrica one or twice daily (Patient not taking: Reported on 10/10/2023)              Immunization History:     Immunization History   Administered Date(s) Administered    COVID-19 Bivalent 12+ (Pfizer) 12/02/2022    COVID-19 MONOVALENT 12+ (Pfizer) 01/25/2022    COVID-19 Vaccine (Sofiya) 03/06/2021    Influenza (High Dose) 3 valent vaccine 11/01/2018, 09/30/2019    Influenza (IIV3) PF 10/15/2008, 11/17/2011, 11/09/2012    Influenza Vaccine 65+ (Fluzone HD) 10/05/2020, 10/26/2021, 10/05/2022    Influenza Vaccine >6 months (Alfuria,Fluzone) 10/16/2014, 11/13/2015, 11/03/2016, 09/25/2017    Pneumo Conj 13-V (2010&after) 10/05/2020    Pneumococcal 23 valent 10/05/2022    TDAP Vaccine (Adacel) 11/09/2012            Allergies:   No Known Allergies         Physical Exam:   Vital signs:  There were no vitals taken for this visit.    Physical Examination:  GENERAL:  well-developed, well-nourished, seated in no acute distress.  HEENT:  Head is normocephalic, atraumatic   EYES:  Eyes have anicteric sclerae without conjunctival injection   ENT:  Oropharynx is moist without exudates or ulcers. Tongue is midline  NECK:  Supple. No cervical lymphadenopathy  LUNGS:  Clear to auscultation bilateral.   CARDIOVASCULAR:  Regular rate and rhythm with no murmurs, gallops or rubs.  ABDOMEN:  Normal bowel sounds, soft, nontender. No appreciable hepatosplenomegaly.  SKIN:  No acute rashes.    NEUROLOGIC:  Grossly nonfocal. Active x4 extremities         Laboratory Data:     Metabolic Studies   Sodium   Date Value Ref Range Status   09/13/2023 135 (L) 136 - 145 mmol/L Final    08/31/2023 134 (L) 136 - 145 mmol/L Final   02/23/2021 140 134 - 144 mmol/L Final   10/05/2020 142 134 - 144 mmol/L Final     Potassium   Date Value Ref Range Status   09/13/2023 3.2 (L) 3.4 - 5.3 mmol/L Final   08/31/2023 4.0 3.4 - 5.3 mmol/L Final   10/13/2022 4.0 3.5 - 5.1 mmol/L Final   09/29/2022 4.0 3.5 - 5.1 mmol/L Final   02/23/2021 4.2 3.5 - 5.1 mmol/L Final   10/05/2020 4.4 3.5 - 5.1 mmol/L Final     Chloride   Date Value Ref Range Status   09/13/2023 97 (L) 98 - 107 mmol/L Final   08/31/2023 100 98 - 107 mmol/L Final   10/13/2022 103 98 - 107 mmol/L Final   09/29/2022 105 98 - 107 mmol/L Final   02/23/2021 103 98 - 107 mmol/L Final   10/05/2020 105 98 - 107 mmol/L Final     Carbon Dioxide   Date Value Ref Range Status   02/23/2021 29 21 - 31 mmol/L Final   10/05/2020 30 21 - 31 mmol/L Final     Carbon Dioxide (CO2)   Date Value Ref Range Status   09/13/2023 24 22 - 29 mmol/L Final   08/31/2023 24 22 - 29 mmol/L Final   10/13/2022 30 21 - 31 mmol/L Final   09/29/2022 29 21 - 31 mmol/L Final     Anion Gap   Date Value Ref Range Status   09/13/2023 14 7 - 15 mmol/L Final   08/31/2023 10 7 - 15 mmol/L Final   10/13/2022 6 3 - 14 mmol/L Final   09/29/2022 7 3 - 14 mmol/L Final   02/23/2021 8 3 - 14 mmol/L Final   10/05/2020 7 3 - 14 mmol/L Final     Urea Nitrogen   Date Value Ref Range Status   09/13/2023 11.5 8.0 - 23.0 mg/dL Final   08/31/2023 7.7 (L) 8.0 - 23.0 mg/dL Final   10/13/2022 15 7 - 25 mg/dL Final   09/29/2022 19 7 - 25 mg/dL Final   02/23/2021 17 7 - 25 mg/dL Final   10/05/2020 18 7 - 25 mg/dL Final     Creatinine   Date Value Ref Range Status   09/13/2023 0.65 (L) 0.67 - 1.17 mg/dL Final   08/31/2023 0.72 0.67 - 1.17 mg/dL Final   02/23/2021 0.73 0.70 - 1.30 mg/dL Final   11/23/2020 0.71 0.70 - 1.30 mg/dL Final     GFR Estimate   Date Value Ref Range Status   09/13/2023 >90 >60 mL/min/1.73m2 Final   08/31/2023 >90 >60 mL/min/1.73m2 Final   02/23/2021 >90 >60 mL/min/[1.73_m2] Final   11/23/2020  >90 >60 mL/min/[1.73_m2] Final     Glucose   Date Value Ref Range Status   09/13/2023 191 (H) 70 - 99 mg/dL Final   08/31/2023 152 (H) 70 - 99 mg/dL Final   10/13/2022 100 70 - 105 mg/dL Final   09/29/2022 99 70 - 105 mg/dL Final   02/23/2021 139 (H) 70 - 105 mg/dL Final   10/05/2020 153 (H) 70 - 105 mg/dL Final     GLUCOSE BY METER POCT   Date Value Ref Range Status   07/06/2023 129 (H) 70 - 99 mg/dL Final     Comment:     /RN Notified   07/06/2023 134 (H) 70 - 99 mg/dL Final     Hemoglobin A1C   Date Value Ref Range Status   02/20/2023 6.7 (H) 4.0 - 6.2 % Final   05/24/2021 6.0 4.0 - 6.0 % Final     Calcium   Date Value Ref Range Status   09/13/2023 9.6 8.8 - 10.2 mg/dL Final   08/31/2023 9.4 8.8 - 10.2 mg/dL Final   02/23/2021 10.0 8.6 - 10.3 mg/dL Final   10/05/2020 9.5 8.6 - 10.3 mg/dL Final     Phosphorus   Date Value Ref Range Status   05/23/2023 3.6 2.5 - 4.5 mg/dL Final     Magnesium   Date Value Ref Range Status   07/01/2023 1.8 1.7 - 2.3 mg/dL Final   05/02/2023 1.7 1.7 - 2.3 mg/dL Final     Lactic Acid   Date Value Ref Range Status   07/02/2023 0.8 0.7 - 2.0 mmol/L Final   06/29/2023 1.0 0.7 - 2.0 mmol/L Final       Inflammatory Markers No results found for: CRP    Hepatic Studies    Bilirubin Total   Date Value Ref Range Status   09/13/2023 0.2 <=1.2 mg/dL Final   08/31/2023 0.3 <=1.2 mg/dL Final   02/23/2021 0.8 0.3 - 1.0 mg/dL Final   04/07/2017 0.5 0.3 - 1.0 mg/dL      Alkaline Phosphatase   Date Value Ref Range Status   09/13/2023 80 40 - 129 U/L Final   08/31/2023 60 40 - 129 U/L Final   02/23/2021 66 34 - 104 U/L Final   04/07/2017 67 34 - 104 IU/L      Albumin   Date Value Ref Range Status   09/13/2023 3.1 (L) 3.5 - 5.2 g/dL Final   08/31/2023 2.9 (L) 3.5 - 5.2 g/dL Final   10/13/2022 3.8 3.5 - 5.7 g/dL Final   09/29/2022 3.8 3.5 - 5.7 g/dL Final   02/23/2021 4.3 3.5 - 5.7 g/dL Final   04/07/2017 4.0 3.5 - 5.7 g/dL      AST   Date Value Ref Range Status   09/13/2023 14 0 - 45 U/L Final      Comment:     Reference intervals for this test were updated on 6/12/2023 to more accurately reflect our healthy population. There may be differences in the flagging of prior results with similar values performed with this method. Interpretation of those prior results can be made in the context of the updated reference intervals.   08/31/2023 9 0 - 45 U/L Final     Comment:     Reference intervals for this test were updated on 6/12/2023 to more accurately reflect our healthy population. There may be differences in the flagging of prior results with similar values performed with this method. Interpretation of those prior results can be made in the context of the updated reference intervals.   02/23/2021 15 13 - 39 U/L Final     ALT   Date Value Ref Range Status   09/13/2023 19 0 - 70 U/L Final     Comment:     Reference intervals for this test were updated on 6/12/2023 to more accurately reflect our healthy population. There may be differences in the flagging of prior results with similar values performed with this method. Interpretation of those prior results can be made in the context of the updated reference intervals.     08/31/2023 6 0 - 70 U/L Final     Comment:     Reference intervals for this test were updated on 6/12/2023 to more accurately reflect our healthy population. There may be differences in the flagging of prior results with similar values performed with this method. Interpretation of those prior results can be made in the context of the updated reference intervals.     02/23/2021 16 7 - 52 U/L Final       Hematology Studies      WBC   Date Value Ref Range Status   02/23/2021 6.9 4.0 - 11.0 10e9/L Final   07/09/2020 5.5 4.0 - 11.0 10e9/L Final     WBC Count   Date Value Ref Range Status   09/13/2023 8.9 4.0 - 11.0 10e3/uL Final   08/31/2023 10.8 4.0 - 11.0 10e3/uL Final     Absolute Neutrophils   Date Value Ref Range Status   04/22/2023 2.2 1.6 - 8.3 10e3/uL Final     Absolute Lymphocytes   Date  Value Ref Range Status   04/22/2023 0.4 (L) 0.8 - 5.3 10e3/uL Final     Absolute Monocytes   Date Value Ref Range Status   04/22/2023 0.3 0.0 - 1.3 10e3/uL Final     Absolute Eosinophils   Date Value Ref Range Status   04/22/2023 0.0 0.0 - 0.7 10e3/uL Final     Hemoglobin   Date Value Ref Range Status   09/13/2023 11.3 (L) 13.3 - 17.7 g/dL Final   08/31/2023 11.4 (L) 13.3 - 17.7 g/dL Final   02/23/2021 14.1 13.3 - 17.7 g/dL Final   07/09/2020 13.7 13.3 - 17.7 g/dL Final     Hematocrit   Date Value Ref Range Status   09/13/2023 34.7 (L) 40.0 - 53.0 % Final   08/31/2023 36.2 (L) 40.0 - 53.0 % Final   02/23/2021 42.5 40.0 - 53.0 % Final   07/09/2020 41.7 40.0 - 53.0 % Final     Platelet Count   Date Value Ref Range Status   09/13/2023 432 150 - 450 10e3/uL Final   08/31/2023 375 150 - 450 10e3/uL Final   02/23/2021 200 150 - 450 10e9/L Final   07/09/2020 203 150 - 450 10e9/L Final       Imaging:  [unfilled]

## 2023-10-10 NOTE — NURSING NOTE
Is the patient currently in the state of [place]? YES    Visit mode:VIDEO    If the visit is dropped, the patient can be reconnected by: VIDEO VISIT: Text to cell phone:   Telephone Information:   Mobile [phone]       Will anyone else be joining the visit? NO  (If patient encounters technical issues they should call [phone]    How would you like to obtain your AVS? MyChart    Are changes needed to the allergy or medication list? No    Reason for visit: RECHECK and Infection (Follow up on infection)    Patient takes temperature everyday and reports that temp is between 97.8 - 99.7.    Caro NICHOLE

## 2023-10-11 ENCOUNTER — TELEPHONE (OUTPATIENT)
Dept: ONCOLOGY | Facility: OTHER | Age: 68
End: 2023-10-11
Payer: COMMERCIAL

## 2023-10-11 NOTE — TELEPHONE ENCOUNTER
Jermain called and left a message.  States that he is experiencing severe hip pain and would like someone to call him back.  [phone].    Bee Caballero on 10/11/2023 at 1:32 PM

## 2023-10-11 NOTE — CONFIDENTIAL NOTE
Patient calling and states that he spoke with  [facility]ran[facility] and she will be follow up with a group of people on Monday in infectious disease in regards to patients left hip pain.    Patient states he continues to have left hip pain rating it a 10/10 with walking and when at rest 5-6/10.  Patient states he takes his Oxycodone which does help.    Patient wanted Dr. Fritz updated.  Toma Smith RN on 10/11/2023 at 3:14 PM

## 2023-10-13 ENCOUNTER — PATIENT OUTREACH (OUTPATIENT)
Dept: ONCOLOGY | Facility: OTHER | Age: 68
End: 2023-10-13
Payer: COMMERCIAL

## 2023-10-13 ENCOUNTER — TELEPHONE (OUTPATIENT)
Dept: INFECTIOUS DISEASES | Facility: CLINIC | Age: 68
End: 2023-10-13
Payer: COMMERCIAL

## 2023-10-13 ENCOUNTER — TELEPHONE (OUTPATIENT)
Dept: ONCOLOGY | Facility: OTHER | Age: 68
End: 2023-10-13
Payer: COMMERCIAL

## 2023-10-13 DIAGNOSIS — K65.1 PELVIC ABSCESS IN MALE (H): ICD-10-CM

## 2023-10-13 DIAGNOSIS — G89.3 CANCER ASSOCIATED PAIN: ICD-10-CM

## 2023-10-13 RX ORDER — OXYCODONE HCL 40 MG/1
40 TABLET, FILM COATED, EXTENDED RELEASE ORAL EVERY 12 HOURS
Qty: 60 TABLET | Refills: 0 | Status: SHIPPED | OUTPATIENT
Start: 2023-10-13 | End: 2023-11-09

## 2023-10-13 RX ORDER — LORAZEPAM 0.5 MG/1
0.5 TABLET ORAL ONCE
Qty: 1 TABLET | Refills: 0 | Status: SHIPPED | OUTPATIENT
Start: 2023-10-13 | End: 2023-10-13

## 2023-10-13 NOTE — TELEPHONE ENCOUNTER
I called patient about need for MRI, given his recent infection and rising CRP. I left a voicemail for him.     I ordered the study. My team will contact him about it.     Josselin Kay MD

## 2023-10-13 NOTE — TELEPHONE ENCOUNTER
[facility] Call Center    Phone Message    May a detailed message be left on voicemail: yes     Reason for Call: Other: Per pt would like his MRI [facility] , their Ph# is . Per pt would like to request to have their lab call him to schedule once the order is received. Per pt would also like if Dr. Kay is able to call him again since he has some questions. Please and thank you!     Action Taken: Message routed to:  [facility] ([facility] ID    Travel Screening: Not Applicable

## 2023-10-13 NOTE — PROGRESS NOTES
[facility]: Cancer Care Follow-Up Note                                    Discussion with Patient:                                                      Patient spoke with Dr. Paula Kay at [place] and there could be possible infection lingering in left hip and she is ordering MRI. He aslo needs refill of pain medication.     Antiemetics: take as needed     Goals          General     Being Active-pain relief (pt-stated)      Notes - Note created  10/13/2023 11:00 AM by Elza Lundberg RN     Goal Statement: I will establish a plan for preventing and managing pain.  Date Goal set: 10/13/2023  Barriers: disease burden and multiple diagnoses  Strengths: support, coping, motivation, and involvement with care team  Date to Achieve By: 6 months  Patient expressed understanding of goal: Yes  Action steps to achieve this goal:  I will take medications as prescribed.   I will call triage with new or worsening pain not controlled by medication.   I will use alternative therapies as directed. (Ice, heat, massage, etc)   I will call triage for medication refills when there are 2-3 days of medication remaining.         Patient will adhere to medication regimen             Dates of Treatment:                                                      Infusion given in last 28 days       None          Treatment Plan Medications       OP ONC Prostate Cancer - DOCEtaxel Every 3 Weeks / predniSONE       Take Home Medications       Medication Sig Start/End Day/Cycle Status    predniSONE (DELTASONE) 5 MG tablet Take 1 tablet (5 mg) by mouth 2 times daily for 21 days S to S+21 Day 1, Cycle 5 - 7/4/2023 Unsigned                            Assessment:                                                        RNCC Short Symptom Review:     Assessment completed with:: Patient    General/Short Assessment  Does the patient have all their medications?: Yes  Is the patient experiencing any new or worsening symptoms?: No  Discussion with  patient: Answered patient's questions and addressed concerns;Reviewed how and when to contact clinic;Reviewed patient's future appointments    Pain  Patient Reported Pain?: Yes, is currently well-managed  Pain Score: Severe Pain (6)  Pain Loc: Hip (left hip radiation to groin)  Interfered with your usual ACTIVITY?: Completely interferes 10  Interfered with your SLEEP?: Somewhat interferes 5  Affected your MOOD?: Completely affects 10  Contributed to your STRESS?: Contributes a great deal 10  Pain Management Interventions: medication (see MAR)    Patient Coping  Accepting    Clinic Utilization  Patient Aware of Next Appointment?: Yes    Other Patient Concerns  Other Patient Reported Concerns: No    Intervention/Education provided during outreach:                                                       Pain refill request to Erum Fritz MD to sign on Monday.  Patient to follow up as scheduled at next appt  Confirmed patient has clinic and triage numbers    Signature:  Elza Lundberg RN

## 2023-10-13 NOTE — TELEPHONE ENCOUNTER
Reason for call: Medication or medication refill    Name of medication requested: Oxy 40mg    How many days of medication do you have left? Yes, but will run out Monday morning    What pharmacy do you use? [facility]    Preferred method for responding to this message: Telephone Call    Phone number patient can be reached at: Home number on file [phone] (home)    If we cannot reach you directly, may we leave a detailed response at the number you provided? Yes

## 2023-10-16 ENCOUNTER — LAB (OUTPATIENT)
Dept: LAB | Facility: OTHER | Age: 68
End: 2023-10-16
Attending: INTERNAL MEDICINE
Payer: COMMERCIAL

## 2023-10-16 DIAGNOSIS — R79.82 ELEVATED C-REACTIVE PROTEIN (CRP): ICD-10-CM

## 2023-10-16 DIAGNOSIS — A49.8 INFECTION DUE TO KLEBSIELLA PNEUMONIAE: ICD-10-CM

## 2023-10-16 DIAGNOSIS — C79.51 MALIGNANT NEOPLASM METASTATIC TO BONE (H): ICD-10-CM

## 2023-10-16 DIAGNOSIS — C61 MALIGNANT NEOPLASM OF PROSTATE (H): ICD-10-CM

## 2023-10-16 DIAGNOSIS — L02.91 ABSCESS: ICD-10-CM

## 2023-10-16 LAB
ALBUMIN SERPL BCG-MCNC: 3 G/DL (ref 3.5–5.2)
ALP SERPL-CCNC: 92 U/L (ref 40–129)
ALT SERPL W P-5'-P-CCNC: 13 U/L (ref 0–70)
ANION GAP SERPL CALCULATED.3IONS-SCNC: 11 MMOL/L (ref 7–15)
AST SERPL W P-5'-P-CCNC: 14 U/L (ref 0–45)
BASO+EOS+MONOS # BLD AUTO: ABNORMAL 10*3/UL
BASO+EOS+MONOS NFR BLD AUTO: ABNORMAL %
BASOPHILS # BLD AUTO: 0 10E3/UL (ref 0–0.2)
BASOPHILS NFR BLD AUTO: 0 %
BILIRUB SERPL-MCNC: 0.3 MG/DL
BUN SERPL-MCNC: 7 MG/DL (ref 8–23)
CALCIUM SERPL-MCNC: 9.4 MG/DL (ref 8.8–10.2)
CHLORIDE SERPL-SCNC: 100 MMOL/L (ref 98–107)
CREAT SERPL-MCNC: 0.58 MG/DL (ref 0.67–1.17)
CRP SERPL-MCNC: 74.53 MG/L
DEPRECATED HCO3 PLAS-SCNC: 25 MMOL/L (ref 22–29)
EGFRCR SERPLBLD CKD-EPI 2021: >90 ML/MIN/1.73M2
EOSINOPHIL # BLD AUTO: 0.1 10E3/UL (ref 0–0.7)
EOSINOPHIL NFR BLD AUTO: 1 %
ERYTHROCYTE [DISTWIDTH] IN BLOOD BY AUTOMATED COUNT: 16.2 % (ref 10–15)
ERYTHROCYTE [SEDIMENTATION RATE] IN BLOOD BY WESTERGREN METHOD: 44 MM/HR (ref 0–20)
GLUCOSE SERPL-MCNC: 167 MG/DL (ref 70–99)
HCT VFR BLD AUTO: 31.6 % (ref 40–53)
HGB BLD-MCNC: 9.8 G/DL (ref 13.3–17.7)
IMM GRANULOCYTES # BLD: 0 10E3/UL
IMM GRANULOCYTES NFR BLD: 0 %
LDH SERPL L TO P-CCNC: 167 U/L (ref 0–250)
LYMPHOCYTES # BLD AUTO: 1.3 10E3/UL (ref 0.8–5.3)
LYMPHOCYTES NFR BLD AUTO: 13 %
MCH RBC QN AUTO: 24.4 PG (ref 26.5–33)
MCHC RBC AUTO-ENTMCNC: 31 G/DL (ref 31.5–36.5)
MCV RBC AUTO: 79 FL (ref 78–100)
MONOCYTES # BLD AUTO: 0.5 10E3/UL (ref 0–1.3)
MONOCYTES NFR BLD AUTO: 5 %
NEUTROPHILS # BLD AUTO: 7.7 10E3/UL (ref 1.6–8.3)
NEUTROPHILS NFR BLD AUTO: 81 %
NRBC # BLD AUTO: 0 10E3/UL
NRBC BLD AUTO-RTO: 0 /100
PLATELET # BLD AUTO: 521 10E3/UL (ref 150–450)
POTASSIUM SERPL-SCNC: 4.1 MMOL/L (ref 3.4–5.3)
PROCALCITONIN SERPL IA-MCNC: 0.04 NG/ML
PROT SERPL-MCNC: 7 G/DL (ref 6.4–8.3)
PSA FREE MFR SERPL: 15.52 %
PSA FREE SERPL-MCNC: 0.7 NG/ML
PSA SERPL DL<=0.01 NG/ML-MCNC: 4.22 NG/ML (ref 0–6.5)
PSA SERPL DL<=0.01 NG/ML-MCNC: 4.51 NG/ML (ref 0–6.5)
RBC # BLD AUTO: 4.01 10E6/UL (ref 4.4–5.9)
SODIUM SERPL-SCNC: 136 MMOL/L (ref 135–145)
WBC # BLD AUTO: 9.6 10E3/UL (ref 4–11)

## 2023-10-16 PROCEDURE — 80053 COMPREHEN METABOLIC PANEL: CPT | Mod: ZL

## 2023-10-16 PROCEDURE — 85025 COMPLETE CBC W/AUTO DIFF WBC: CPT | Mod: ZL | Performed by: INTERNAL MEDICINE

## 2023-10-16 PROCEDURE — 36415 COLL VENOUS BLD VENIPUNCTURE: CPT | Mod: ZL

## 2023-10-16 PROCEDURE — 84154 ASSAY OF PSA FREE: CPT | Mod: ZL,GZ

## 2023-10-16 PROCEDURE — 84153 ASSAY OF PSA TOTAL: CPT | Mod: ZL

## 2023-10-16 PROCEDURE — 85652 RBC SED RATE AUTOMATED: CPT | Mod: ZL

## 2023-10-16 PROCEDURE — 83615 LACTATE (LD) (LDH) ENZYME: CPT | Mod: ZL

## 2023-10-16 PROCEDURE — 84145 PROCALCITONIN (PCT): CPT | Mod: ZL

## 2023-10-16 PROCEDURE — 86140 C-REACTIVE PROTEIN: CPT | Mod: ZL

## 2023-10-24 ENCOUNTER — PATIENT OUTREACH (OUTPATIENT)
Dept: ONCOLOGY | Facility: OTHER | Age: 68
End: 2023-10-24
Payer: COMMERCIAL

## 2023-10-24 DIAGNOSIS — K65.1 PELVIC ABSCESS IN MALE (H): ICD-10-CM

## 2023-10-24 DIAGNOSIS — G89.3 CANCER ASSOCIATED PAIN: ICD-10-CM

## 2023-10-24 NOTE — PROGRESS NOTES
Attempted to reach patient because he did not show up for his appointment today. Unable to leave message.  Elza Lundberg RN...........10/24/2023 3:24 PM

## 2023-10-26 RX ORDER — OXYCODONE HYDROCHLORIDE 10 MG/1
10 TABLET ORAL EVERY 6 HOURS PRN
Qty: 120 TABLET | Refills: 0 | Status: ON HOLD | OUTPATIENT
Start: 2023-10-26 | End: 2023-12-07

## 2023-10-26 NOTE — PROGRESS NOTES
[facility]: Cancer Care Follow-Up Note                                    Discussion with Patient:                                                      Patient recently had MRI done and is anxious to receive results. He is doing fairly well other than pain. He is requesting pain medication to be refilled.he has no other concerns. He did recently miss an appointment with oncology and writer will ask for him to receive call and be rescheduled.           Goals          General     Being Active-pain relief (pt-stated)      Notes - Note created  10/13/2023 11:00 AM by Elza Lundberg RN     Goal Statement: I will establish a plan for preventing and managing pain.  Date Goal set: 10/13/2023  Barriers: disease burden and multiple diagnoses  Strengths: support, coping, motivation, and involvement with care team  Date to Achieve By: 6 months  Patient expressed understanding of goal: Yes  Action steps to achieve this goal:  I will take medications as prescribed.   I will call triage with new or worsening pain not controlled by medication.   I will use alternative therapies as directed. (Ice, heat, massage, etc)   I will call triage for medication refills when there are 2-3 days of medication remaining.         Patient will adhere to medication regimen             Dates of Treatment:                                                      Infusion given in last 28 days       None          Treatment Plan Medications       OP ONC Prostate Cancer - DOCEtaxel Every 3 Weeks / predniSONE       Take Home Medications       Medication Sig Start/End Day/Cycle Status    predniSONE (DELTASONE) 5 MG tablet Take 1 tablet (5 mg) by mouth 2 times daily for 21 days S to S+21 Day 1, Cycle 5 - 7/4/2023 Unsigned                            Assessment:                                                          RNCC Short Symptom Review:     Assessment completed with:: Patient    General/Short Assessment  Does the patient have all their  medications?: Yes  Is the patient experiencing any new or worsening symptoms?: No  Discussion with patient: Answered patient's questions and addressed concerns;Reviewed how and when to contact clinic;Reviewed patient's future appointments    Pain  Patient Reported Pain?: Yes, is currently well-managed  Pain Score: Severe Pain (7)  Pain Loc: Hip (left hip and leg,abdomen,right leg)  (DVPRS) Pain Rating Score : 6-Hard to ignore, avoid usual activities  Interfered with your usual ACTIVITY?: 6  Interfered with your SLEEP?: 6  Affected your MOOD?: 6  Contributed to your STRESS?: 6  Pain Management Interventions: medication (see MAR)    Patient Coping  Accepting    Clinic Utilization  Patient Aware of Next Appointment?: No, Reviewed with Patient (needs to be scheduled)    Other Patient Concerns  Other Patient Reported Concerns: No    Intervention/Education provided during outreach:                                                       Asked  to call and get Jermain rescheduled. Advised him to call provider or facility where he had MRI for results    Patient to follow up as scheduled at next appt  Patient to call/[employer] message with updates  Confirmed patient has clinic and triage numbers    Signature:  Elza Lundberg RN

## 2023-11-08 ENCOUNTER — TELEPHONE (OUTPATIENT)
Dept: INFECTIOUS DISEASES | Facility: CLINIC | Age: 68
End: 2023-11-08
Payer: COMMERCIAL

## 2023-11-08 NOTE — TELEPHONE ENCOUNTER
[facility] Call Center    Phone Message    May a detailed message be left on voicemail: yes     Reason for Call: Other: please contact Jermain about his MRI results, his my chart password not working so please call him asap to assist     Action Taken: Other: ID    Travel Screening: Not Applicable

## 2023-11-09 ENCOUNTER — LAB (OUTPATIENT)
Dept: LAB | Facility: OTHER | Age: 68
End: 2023-11-09
Attending: INTERNAL MEDICINE
Payer: COMMERCIAL

## 2023-11-09 DIAGNOSIS — K65.1 PELVIC ABSCESS IN MALE (H): ICD-10-CM

## 2023-11-09 DIAGNOSIS — E78.5 HYPERLIPIDEMIA LDL GOAL <100: ICD-10-CM

## 2023-11-09 DIAGNOSIS — R06.02 SHORTNESS OF BREATH: ICD-10-CM

## 2023-11-09 DIAGNOSIS — G89.3 CANCER ASSOCIATED PAIN: ICD-10-CM

## 2023-11-09 DIAGNOSIS — C61 MALIGNANT NEOPLASM OF PROSTATE (H): ICD-10-CM

## 2023-11-09 DIAGNOSIS — C79.51 MALIGNANT NEOPLASM METASTATIC TO BONE (H): ICD-10-CM

## 2023-11-09 LAB
ALBUMIN SERPL BCG-MCNC: 3 G/DL (ref 3.5–5.2)
ALP SERPL-CCNC: 87 U/L (ref 40–129)
ALT SERPL W P-5'-P-CCNC: <5 U/L (ref 0–70)
ANION GAP SERPL CALCULATED.3IONS-SCNC: 12 MMOL/L (ref 7–15)
AST SERPL W P-5'-P-CCNC: 14 U/L (ref 0–45)
BASOPHILS # BLD AUTO: 0.1 10E3/UL (ref 0–0.2)
BASOPHILS NFR BLD AUTO: 1 %
BILIRUB SERPL-MCNC: 0.4 MG/DL
BUN SERPL-MCNC: 13.1 MG/DL (ref 8–23)
CALCIUM SERPL-MCNC: 9.3 MG/DL (ref 8.8–10.2)
CHLORIDE SERPL-SCNC: 100 MMOL/L (ref 98–107)
CREAT SERPL-MCNC: 0.66 MG/DL (ref 0.67–1.17)
DEPRECATED HCO3 PLAS-SCNC: 21 MMOL/L (ref 22–29)
EGFRCR SERPLBLD CKD-EPI 2021: >90 ML/MIN/1.73M2
EOSINOPHIL # BLD AUTO: 0.1 10E3/UL (ref 0–0.7)
EOSINOPHIL NFR BLD AUTO: 1 %
ERYTHROCYTE [DISTWIDTH] IN BLOOD BY AUTOMATED COUNT: 16.7 % (ref 10–15)
GLUCOSE SERPL-MCNC: 206 MG/DL (ref 70–99)
HCT VFR BLD AUTO: 32.8 % (ref 40–53)
HGB BLD-MCNC: 10 G/DL (ref 13.3–17.7)
IMM GRANULOCYTES # BLD: 0 10E3/UL
IMM GRANULOCYTES NFR BLD: 0 %
LDH SERPL L TO P-CCNC: 228 U/L (ref 0–250)
LYMPHOCYTES # BLD AUTO: 0.9 10E3/UL (ref 0.8–5.3)
LYMPHOCYTES NFR BLD AUTO: 9 %
MCH RBC QN AUTO: 23.3 PG (ref 26.5–33)
MCHC RBC AUTO-ENTMCNC: 30.5 G/DL (ref 31.5–36.5)
MCV RBC AUTO: 76 FL (ref 78–100)
MONOCYTES # BLD AUTO: 0.4 10E3/UL (ref 0–1.3)
MONOCYTES NFR BLD AUTO: 4 %
NEUTROPHILS # BLD AUTO: 7.9 10E3/UL (ref 1.6–8.3)
NEUTROPHILS NFR BLD AUTO: 85 %
NRBC # BLD AUTO: 0 10E3/UL
NRBC BLD AUTO-RTO: 0 /100
PLATELET # BLD AUTO: 419 10E3/UL (ref 150–450)
POTASSIUM SERPL-SCNC: 4 MMOL/L (ref 3.4–5.3)
PROT SERPL-MCNC: 6.7 G/DL (ref 6.4–8.3)
PSA SERPL DL<=0.01 NG/ML-MCNC: 6.25 NG/ML (ref 0–6.5)
RBC # BLD AUTO: 4.3 10E6/UL (ref 4.4–5.9)
SODIUM SERPL-SCNC: 133 MMOL/L (ref 135–145)
WBC # BLD AUTO: 9.2 10E3/UL (ref 4–11)

## 2023-11-09 PROCEDURE — 80053 COMPREHEN METABOLIC PANEL: CPT | Mod: ZL,TEL,95 | Performed by: INTERNAL MEDICINE

## 2023-11-09 PROCEDURE — 36415 COLL VENOUS BLD VENIPUNCTURE: CPT | Mod: ZL

## 2023-11-09 PROCEDURE — 85025 COMPLETE CBC W/AUTO DIFF WBC: CPT | Mod: ZL

## 2023-11-09 PROCEDURE — 83615 LACTATE (LD) (LDH) ENZYME: CPT | Mod: ZL,TEL,95 | Performed by: INTERNAL MEDICINE

## 2023-11-09 PROCEDURE — 84153 ASSAY OF PSA TOTAL: CPT | Mod: ZL

## 2023-11-09 RX ORDER — FUROSEMIDE 20 MG
20 TABLET ORAL DAILY PRN
Qty: 30 TABLET | Refills: 3 | Status: SHIPPED | OUTPATIENT
Start: 2023-11-09 | End: 2024-02-27

## 2023-11-09 RX ORDER — OXYCODONE HCL 40 MG/1
40 TABLET, FILM COATED, EXTENDED RELEASE ORAL EVERY 12 HOURS
Qty: 60 TABLET | Refills: 0 | Status: ON HOLD | OUTPATIENT
Start: 2023-11-09 | End: 2023-12-07

## 2023-11-09 NOTE — TELEPHONE ENCOUNTER
Pt is calling back, he is upset for he has not heard anyone call him back regarding his MRI results and it has been over a week.  Please do not send [employer] messages to the Pt, he is unable to access his [employer] at this time.    Please contact the Pt back at [phone] , ok to leave a detailed message if it goes to a voicemail.

## 2023-11-10 ENCOUNTER — PATIENT OUTREACH (OUTPATIENT)
Dept: ONCOLOGY | Facility: OTHER | Age: 68
End: 2023-11-10
Payer: COMMERCIAL

## 2023-11-10 DIAGNOSIS — C79.51 MALIGNANT NEOPLASM METASTATIC TO BONE (H): ICD-10-CM

## 2023-11-10 DIAGNOSIS — M25.552 HIP PAIN, LEFT: Primary | ICD-10-CM

## 2023-11-10 NOTE — PROGRESS NOTES
Erum Fritz MD would like him to see Dr. Lares orthopedic oncology prior to seeing him.  notified. Patient aware that he will be getting a call to schedule.  Elza Lundberg RN...........11/10/2023 12:51 PM

## 2023-11-13 ENCOUNTER — TELEPHONE (OUTPATIENT)
Dept: INFECTIOUS DISEASES | Facility: CLINIC | Age: 68
End: 2023-11-13
Payer: COMMERCIAL

## 2023-11-13 NOTE — TELEPHONE ENCOUNTER
I called the patient this morning at 11:24 am. End at 11:32 am.    Dilip Kan is a 68 year old with a hx of Metastatic prostate cancer (liver, bone: acetabulum), was receiving chemotherapy last dose 6/13/23. I saw him the end of June at [facility] where he'd had fever and sepsis. He was found to have a pelvic abscess that was thought not to be reachable for drainage and hip fracture osteomyelitis versus metastasis. His urine and blood cultures grew Klebsiella. He also screened positive for VRE although this never grew in any cultures. He was improving after a course of zosyn in the hospital, 2 weeks of linzolid for VRE, and discharged on levofloxacin 750 daily and flagyl 500 mg q8 h which was given for 8 weeks. He tolerated that fine and felt much improved in terms of fevers and appetite. CRP was down to 5. He has worked with an outside orthopedic surgeon who would do surgery if we can clear his infection. The patient has significant pelvic pain but otherwise feels much improved. However, after we stopped antibiotics his CRP jumped to 100. I discussed with his outside urologist who did not think there was communication with the bladder.     We got a CT 9/20 in our system and then an MRI (he has much anxiety with MRIs but with meds and an open MRI we completed it). The MRI is in care [facility] from [facility]. The patient lives up near [facility].     MRI pelvis w and w/o contrast 10/27/23   1. Findings concerning for either postoperative, traumatic, surgical, infectious or some combination, related to the anterior bladder wall defect and subjacent fistula.  Likely chronic.  Associated osteomyelitis, pathologic fracturing and likely chronic, treated pubic symphysis septic arthropathy.   2. Muscle findings nonspecific.  Infectious myositis, radiotherapy necrosis, muscle infarcts, or some combination as primary considerations   3. Multifocal bone findings.  Much of this likely chronic, treated osteomyelitis.   Metastatic treated disease may coexist.  Pubic fractures likely chronic and from either insufficiency or infection related.  Please correlate with the patient's full extent of outside imaging which is not currently   available for review.     I discussed the MRI with colleagues and we are not convinced that we can clear the infection without further intervention.     I was recommended to talk to Dr. Miki Paul in Urology. He is the  for Urology and the head of reconstructive Urology at [facility]. I messaged him and he has agreed to see the patient urgently.     We are discussing with orthopedics if they want to see the patient at the same time or if they want to wait to see what Dr. Paul says.     I called the patient. He is very eager to get his hip replaced and have better pain control. He was upset to hear about the bladder and potential need for further intervention before he could move forward on orthopedic intervention. He reports he is basically stuck in a wheelchair at present given his fracture. However, he is willing to see Dr. Paul and see what is being offered. (Very pleasant patient usually just frustrated given ongoing pain).     Josselin Kay MD

## 2023-11-14 ENCOUNTER — TELEPHONE (OUTPATIENT)
Dept: UROLOGY | Facility: CLINIC | Age: 68
End: 2023-11-14
Payer: COMMERCIAL

## 2023-11-14 NOTE — TELEPHONE ENCOUNTER
[facility] sent Rx request for the following:      Requested Prescriptions   Pending Prescriptions Disp Refills    atorvastatin (LIPITOR) 20 MG tablet [Pharmacy Med Name: atorvastatin 20 mg tablet] 90 tablet 4     Sig: Take 1 tablet (20 mg) by mouth daily       Statins Protocol Failed - 11/9/2023 10:50 AM     Last Prescription Date:   7/13/22  Last Fill Qty/Refills:         90, R-4    Last Office Visit:              5/5/23   Future Office visit:           none   Routing to covering provider for refill consideration, as PCP/provider is out of clinic >48 hours or Pt is completely out of medication and provider is out of the clinic today.  Ellen Rebolledo RN on 11/14/2023 at 2:32 PM

## 2023-11-15 ENCOUNTER — TELEPHONE (OUTPATIENT)
Dept: UROLOGY | Facility: CLINIC | Age: 68
End: 2023-11-15
Payer: COMMERCIAL

## 2023-11-15 DIAGNOSIS — N36.0 URINARY FISTULA: ICD-10-CM

## 2023-11-15 DIAGNOSIS — N30.40 RADIATION CYSTITIS: Primary | ICD-10-CM

## 2023-11-15 RX ORDER — ATORVASTATIN CALCIUM 20 MG/1
20 TABLET, FILM COATED ORAL DAILY
Qty: 90 TABLET | Refills: 0 | Status: SHIPPED | OUTPATIENT
Start: 2023-11-15 | End: 2024-01-10

## 2023-11-15 RX ORDER — HEPARIN SODIUM 5000 [USP'U]/.5ML
5000 INJECTION, SOLUTION INTRAVENOUS; SUBCUTANEOUS
Status: CANCELLED | OUTPATIENT
Start: 2023-11-15

## 2023-11-15 NOTE — TELEPHONE ENCOUNTER
Called Jermain to offer Friday appointment at 11:30am, no answer.  Will attempt another call this afternoon.      Naomi Falk RN   9:31 AM

## 2023-11-15 NOTE — TELEPHONE ENCOUNTER
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    REFERRAL INFORMATION:  Referring provider:  Erum Fritz MD in  ONCOLOGY CLINIC      RECORDS REQUESTED FOR VISIT                                                     NOTES  STATUS/DETAILS   OFFICE NOTE from other specialist  yes   DISCHARGE SUMMARY from hospital  yes   DISCHARGE REPORT from the ER  yes, 2023 -- [facility] ED   OPERATIVE REPORT  yes   MEDICATION LIST  yes   LABS     URINALYSIS (UA)  yes   PROSTATE CANCER     IMAGES  yes, INTERNAL   2023, 2023, 2023, 2023, 2023, 2022 -- CT ABD PELVIS  2023 -- CT CHEST ABD PELVIS  2023 -- XR PELVIS  2023 -- MR PELVIS  2023, 2023, 2022 -- NM BONE SCAN   PATHOLOGY REPORT & SLIDES  yes, 2012 -- PROSTATE -- [id number]   PSA (LAB)  yes     PRE-VISIT CHECKLIST      Joint diagnostic appointment coordinated correctly          (ensure right order & amount of time) Yes   RECORD COLLECTION COMPLETE Yes

## 2023-11-15 NOTE — TELEPHONE ENCOUNTER
"Called patient. No scheduled visit.     HPI:  Dilip Kan is a 68 year old male being seen for pubovesical fistula.   RP+EBRT  TUIBN   GH with clots once, admitted to hospital.   Incontinent..  Left hip pain for 1 year    \"Chemo\" for prostate cancer but stopped it recently due to infection.     Exam:  None    Review of Imaging:  The following imaging exams were independently viewed and interpreted by me and discussed with patient:  CT Scan Abd/Pelvis: Abnormal: see MRI  MRI Abd/Pelvis: Abnormal: cavity between bladder and prostate. Pubovesical fistula (large) to left of midline. Left inf ramus fx. 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 Normal L hip.     Review of Labs:  The following labs were reviewed by me and discussed with the patient:  Recent Results (from the past 720 hour(s))   Comprehensive metabolic panel    Collection Time: 11/09/23  3:41 PM   Result Value Ref Range    Sodium 133 (L) 135 - 145 mmol/L    Potassium 4.0 3.4 - 5.3 mmol/L    Carbon Dioxide (CO2) 21 (L) 22 - 29 mmol/L    Anion Gap 12 7 - 15 mmol/L    Urea Nitrogen 13.1 8.0 - 23.0 mg/dL    Creatinine 0.66 (L) 0.67 - 1.17 mg/dL    GFR Estimate >90 >60 mL/min/1.73m2    Calcium 9.3 8.8 - 10.2 mg/dL    Chloride 100 98 - 107 mmol/L    Glucose 206 (H) 70 - 99 mg/dL    Alkaline Phosphatase 87 40 - 129 U/L    AST 14 0 - 45 U/L    ALT <5 0 - 70 U/L    Protein Total 6.7 6.4 - 8.3 g/dL    Albumin 3.0 (L) 3.5 - 5.2 g/dL    Bilirubin Total 0.4 <=1.2 mg/dL   Lactate Dehydrogenase    Collection Time: 11/09/23  3:41 PM   Result Value Ref Range    Lactate Dehydrogenase 228 0 - 250 U/L   PSA tumor marker    Collection Time: 11/09/23  3:41 PM   Result Value Ref Range    PSA Tumor Marker 6.25 0.00 - 6.50 ng/mL   CBC with platelets and differential    Collection Time: 11/09/23  3:41 PM   Result Value Ref Range    WBC Count 9.2 4.0 - 11.0 10e3/uL    RBC Count 4.30 (L) 4.40 - 5.90 10e6/uL    Hemoglobin 10.0 (L) 13.3 - 17.7 g/dL    " Hematocrit 32.8 (L) 40.0 - 53.0 %    MCV 76 (L) 78 - 100 fL    MCH 23.3 (L) 26.5 - 33.0 pg    MCHC 30.5 (L) 31.5 - 36.5 g/dL    RDW 16.7 (H) 10.0 - 15.0 %    Platelet Count 419 150 - 450 10e3/uL    % Neutrophils 85 %    % Lymphocytes 9 %    % Monocytes 4 %    % Eosinophils 1 %    % Basophils 1 %    % Immature Granulocytes 0 %    NRBCs per 100 WBC 0 <1 /100    Absolute Neutrophils 7.9 1.6 - 8.3 10e3/uL    Absolute Lymphocytes 0.9 0.8 - 5.3 10e3/uL    Absolute Monocytes 0.4 0.0 - 1.3 10e3/uL    Absolute Eosinophils 0.1 0.0 - 0.7 10e3/uL    Absolute Basophils 0.1 0.0 - 0.2 10e3/uL    Absolute Immature Granulocytes 0.0 <=0.4 10e3/uL    Absolute NRBCs 0.0 10e3/uL         Assessment & Plan   1. 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   2. R/B/A of cystectomy, ileal conduit, L VRAM, pubectomy discussed. Will proceed on 11/30. Major surgery with significant risks especially due to infection present now. Understands most but not all of his pain should be better. Will not fix his ramus fx.     Miki Correa MD  [facility] [place]

## 2023-11-15 NOTE — TELEPHONE ENCOUNTER
Attempted to call Jermain again to communicate information about appointment on 11/17.  He stated that he needs appointment changed to virtual because he has a broken hip and cannot sit in the car for the long drive to get here.  Appointment will be changed to virtual.      Naomi Falk RN   1:51 PM

## 2023-11-16 ENCOUNTER — PATIENT OUTREACH (OUTPATIENT)
Dept: PLASTIC SURGERY | Facility: CLINIC | Age: 68
End: 2023-11-16
Payer: COMMERCIAL

## 2023-11-16 ENCOUNTER — TELEPHONE (OUTPATIENT)
Dept: UROLOGY | Facility: CLINIC | Age: 68
End: 2023-11-16
Payer: COMMERCIAL

## 2023-11-16 NOTE — TELEPHONE ENCOUNTER
Left message for patient to return call to [phone] for scheduling an appointment with Dr Guadalupe on 11/22/23

## 2023-11-16 NOTE — TELEPHONE ENCOUNTER
Called Jermain to see if he has been able to find a ride to see Dr. Correa on Monday or Wednesday  Left voicemail with this writers number.  This writer has spoken with Dr. Guadalupe's RNCC who confirmed they can work him in on Wednesday, will also try and coordinate PAC visit when Jermain returns this writers call.     Naomi Falk RN   11:10 AM

## 2023-11-17 ENCOUNTER — TELEPHONE (OUTPATIENT)
Dept: UROLOGY | Facility: CLINIC | Age: 68
End: 2023-11-17

## 2023-11-17 ENCOUNTER — PATIENT OUTREACH (OUTPATIENT)
Dept: ONCOLOGY | Facility: OTHER | Age: 68
End: 2023-11-17

## 2023-11-17 ENCOUNTER — PRE VISIT (OUTPATIENT)
Dept: UROLOGY | Facility: CLINIC | Age: 68
End: 2023-11-17

## 2023-11-17 NOTE — PROGRESS NOTES
[facility]: Cancer Care Follow-Up Note                                    Discussion with Patient:                                                      Patient states that he spoke with  who will be removing his bladder. Per patient Dr. Correa told patient that pain would improve by 90 percent after surgery. Otherwise hip pain is getting worse.        Goals          General     Being Active-pain relief (pt-stated)      Notes - Note created  10/13/2023 11:00 AM by Elza Lundberg RN     Goal Statement: I will establish a plan for preventing and managing pain.  Date Goal set: 10/13/2023  Barriers: disease burden and multiple diagnoses  Strengths: support, coping, motivation, and involvement with care team  Date to Achieve By: 6 months  Patient expressed understanding of goal: Yes  Action steps to achieve this goal:  I will take medications as prescribed.   I will call triage with new or worsening pain not controlled by medication.   I will use alternative therapies as directed. (Ice, heat, massage, etc)   I will call triage for medication refills when there are 2-3 days of medication remaining.         Patient will adhere to medication regimen             Dates of Treatment:                                                      Infusion given in last 28 days       None          Treatment Plan Medications       OP ONC Prostate Cancer - DOCEtaxel Every 3 Weeks / predniSONE       Take Home Medications       Medication Sig Start/End Day/Cycle Status    predniSONE (DELTASONE) 5 MG tablet Take 1 tablet (5 mg) by mouth 2 times daily for 21 days S to S+21 Day 1, Cycle 5 - 7/4/2023 Unsigned                            Assessment:                                                          RNCC Short Symptom Review:     Assessment completed with:: Patient    General/Short Assessment  Does the patient have all their medications?: Yes  Is the patient experiencing any new or worsening symptoms?: No  Discussion with  patient: Answered patient's questions and addressed concerns;Reviewed how and when to contact clinic;Reviewed patient's future appointments    Pain  Patient Reported Pain?: Yes, is currently well-managed  Pain Score: Severe Pain (7)  Pain Loc: Hip (left hip)    Patient Coping  Accepting    Clinic Utilization  Patient Aware of Next Appointment?: No, Reviewed with Patient (will see after surgey)    Other Patient Concerns  Other Patient Reported Concerns: No    Intervention/Education provided during outreach:                                                       Follow up call in 3 weeks. Patient will call if he hears anything further regarding scheduling surgery.    Patient to call/[employer]OM[employer] message with updates  Confirmed patient has clinic and triage numbers    Signature:  Elza Lundberg, RN

## 2023-11-17 NOTE — TELEPHONE ENCOUNTER
[facility] Call Center    Phone Message    May a detailed message be left on voicemail: yes     Reason for Call: Pt returning call, said he cannot find the message with nurse's number. Please call pt back. Jermain said he thought today's appointment should have been rescheduled.   Please call pt back. Thank you    Action Taken: Message routed to:  [facility] U[facility]    Travel Screening: Not Applicable

## 2023-11-17 NOTE — TELEPHONE ENCOUNTER
Called and spoke to Jermain about appointment for Monday.  He stated he could be here by 10am, he understands that he will not see platics as Dr. Guadalupe is only available on Wednesday.  We will work on getting him a PAC appointment.      Naomi Falk RN   10:11 AM

## 2023-11-19 LAB
ABO/RH(D): NORMAL
ANTIBODY SCREEN: NEGATIVE
SPECIMEN EXPIRATION DATE: NORMAL

## 2023-11-20 ENCOUNTER — OFFICE VISIT (OUTPATIENT)
Dept: SURGERY | Facility: CLINIC | Age: 68
End: 2023-11-20
Payer: COMMERCIAL

## 2023-11-20 ENCOUNTER — OFFICE VISIT (OUTPATIENT)
Dept: UROLOGY | Facility: CLINIC | Age: 68
End: 2023-11-20
Payer: COMMERCIAL

## 2023-11-20 ENCOUNTER — PREP FOR PROCEDURE (OUTPATIENT)
Dept: PLASTIC SURGERY | Facility: CLINIC | Age: 68
End: 2023-11-20

## 2023-11-20 ENCOUNTER — ANESTHESIA EVENT (OUTPATIENT)
Dept: SURGERY | Facility: CLINIC | Age: 68
End: 2023-11-20

## 2023-11-20 ENCOUNTER — LAB (OUTPATIENT)
Dept: LAB | Facility: CLINIC | Age: 68
End: 2023-11-20
Payer: COMMERCIAL

## 2023-11-20 ENCOUNTER — OFFICE VISIT (OUTPATIENT)
Dept: WOUND CARE | Facility: CLINIC | Age: 68
End: 2023-11-20
Payer: COMMERCIAL

## 2023-11-20 VITALS
DIASTOLIC BLOOD PRESSURE: 68 MMHG | HEIGHT: 70 IN | BODY MASS INDEX: 25.91 KG/M2 | TEMPERATURE: 97.6 F | OXYGEN SATURATION: 97 % | WEIGHT: 181 LBS | HEART RATE: 116 BPM | SYSTOLIC BLOOD PRESSURE: 100 MMHG

## 2023-11-20 DIAGNOSIS — D50.8 IRON DEFICIENCY ANEMIA SECONDARY TO INADEQUATE DIETARY IRON INTAKE: ICD-10-CM

## 2023-11-20 DIAGNOSIS — L98.8 FISTULA: Primary | ICD-10-CM

## 2023-11-20 DIAGNOSIS — N32.2 FISTULA, BLADDER: ICD-10-CM

## 2023-11-20 DIAGNOSIS — C61 PROSTATE CANCER (H): Primary | ICD-10-CM

## 2023-11-20 DIAGNOSIS — N32.2 BLADDER FISTULA: ICD-10-CM

## 2023-11-20 DIAGNOSIS — Z01.818 PREOP EXAMINATION: ICD-10-CM

## 2023-11-20 DIAGNOSIS — N36.0 URINARY FISTULA: Primary | ICD-10-CM

## 2023-11-20 DIAGNOSIS — D50.8 IRON DEFICIENCY ANEMIA SECONDARY TO INADEQUATE DIETARY IRON INTAKE: Primary | ICD-10-CM

## 2023-11-20 DIAGNOSIS — C79.51 MALIGNANT NEOPLASM METASTATIC TO BONE (H): ICD-10-CM

## 2023-11-20 DIAGNOSIS — Z01.818 PREOP EXAMINATION: Primary | ICD-10-CM

## 2023-11-20 LAB
ANION GAP SERPL CALCULATED.3IONS-SCNC: 12 MMOL/L (ref 7–15)
BUN SERPL-MCNC: 11.6 MG/DL (ref 8–23)
CALCIUM SERPL-MCNC: 9.7 MG/DL (ref 8.8–10.2)
CHLORIDE SERPL-SCNC: 100 MMOL/L (ref 98–107)
CREAT SERPL-MCNC: 0.76 MG/DL (ref 0.67–1.17)
DEPRECATED HCO3 PLAS-SCNC: 22 MMOL/L (ref 22–29)
EGFRCR SERPLBLD CKD-EPI 2021: >90 ML/MIN/1.73M2
ERYTHROCYTE [DISTWIDTH] IN BLOOD BY AUTOMATED COUNT: 17.2 % (ref 10–15)
GLUCOSE SERPL-MCNC: 116 MG/DL (ref 70–99)
HBA1C MFR BLD: 6.5 %
HCT VFR BLD AUTO: 35.9 % (ref 40–53)
HGB BLD-MCNC: 11 G/DL (ref 13.3–17.7)
MCH RBC QN AUTO: 23.2 PG (ref 26.5–33)
MCHC RBC AUTO-ENTMCNC: 30.6 G/DL (ref 31.5–36.5)
MCV RBC AUTO: 76 FL (ref 78–100)
PLATELET # BLD AUTO: 454 10E3/UL (ref 150–450)
POTASSIUM SERPL-SCNC: 4.2 MMOL/L (ref 3.4–5.3)
RBC # BLD AUTO: 4.75 10E6/UL (ref 4.4–5.9)
SODIUM SERPL-SCNC: 134 MMOL/L (ref 135–145)
WBC # BLD AUTO: 9.7 10E3/UL (ref 4–11)

## 2023-11-20 PROCEDURE — 99000 SPECIMEN HANDLING OFFICE-LAB: CPT | Performed by: PATHOLOGY

## 2023-11-20 PROCEDURE — 80048 BASIC METABOLIC PNL TOTAL CA: CPT | Performed by: PATHOLOGY

## 2023-11-20 PROCEDURE — 99205 OFFICE O/P NEW HI 60 MIN: CPT | Performed by: UROLOGY

## 2023-11-20 PROCEDURE — 99211 OFF/OP EST MAY X REQ PHY/QHP: CPT

## 2023-11-20 PROCEDURE — 86901 BLOOD TYPING SEROLOGIC RH(D): CPT | Performed by: PHYSICIAN ASSISTANT

## 2023-11-20 PROCEDURE — 36415 COLL VENOUS BLD VENIPUNCTURE: CPT | Performed by: PATHOLOGY

## 2023-11-20 PROCEDURE — 99204 OFFICE O/P NEW MOD 45 MIN: CPT | Performed by: PHYSICIAN ASSISTANT

## 2023-11-20 PROCEDURE — 85027 COMPLETE CBC AUTOMATED: CPT | Performed by: PATHOLOGY

## 2023-11-20 PROCEDURE — 83036 HEMOGLOBIN GLYCOSYLATED A1C: CPT | Performed by: PHYSICIAN ASSISTANT

## 2023-11-20 RX ORDER — CEFAZOLIN SODIUM 2 G/50ML
2 SOLUTION INTRAVENOUS
Status: CANCELLED | OUTPATIENT
Start: 2023-11-20

## 2023-11-20 RX ORDER — DOCUSATE SODIUM 100 MG/1
100 CAPSULE, LIQUID FILLED ORAL DAILY
COMMUNITY
End: 2024-02-27

## 2023-11-20 RX ORDER — NITROFURANTOIN 25; 75 MG/1; MG/1
100 CAPSULE ORAL 2 TIMES DAILY
Qty: 6 CAPSULE | Refills: 0 | Status: ON HOLD | OUTPATIENT
Start: 2023-11-27 | End: 2023-12-07

## 2023-11-20 RX ORDER — CEFAZOLIN SODIUM 2 G/50ML
2 SOLUTION INTRAVENOUS SEE ADMIN INSTRUCTIONS
Status: CANCELLED | OUTPATIENT
Start: 2023-11-20

## 2023-11-20 ASSESSMENT — COPD QUESTIONNAIRES: COPD: 0

## 2023-11-20 ASSESSMENT — LIFESTYLE VARIABLES: TOBACCO_USE: 1

## 2023-11-20 ASSESSMENT — ENCOUNTER SYMPTOMS: SEIZURES: 0

## 2023-11-20 ASSESSMENT — PAIN SCALES - GENERAL
PAINLEVEL: SEVERE PAIN (7)
PAINLEVEL: SEVERE PAIN (7)

## 2023-11-20 NOTE — PHARMACY - PREOPERATIVE ASSESSMENT CENTER
PREOPERATIVE PAIN CONSULT FOR POSTOPERATIVE PAIN MANAGEMENT  Dilip Kan was interviewed via phone on November 20, 2023 prior to PAC Clinic appointment. Patient is preparing for the planned procedure with Dr. Correa and Dr. Guadalupe on 11/30/23 at the [facility] for CYSTECTOMY, WITH URETEROILEAL CONDUIT CREATION; Reconstruction of the perineum with abdominal/thigh muscle flap, spy.  These recommendations are intended for patients admitted to the hospital after a procedure and are only valid for 30 days from the date of service. If there are significant changes in opioid dosing between today and day of procedure the below recommendations may have to be adjusted.      OUTPATIENT PRIOR TO ADMISSION MEDICATIONS (related to pain management):  -- Long-acting opioid: oxycodone ER (OXYCONTIN) 40 mg PO q12 hr scheduled  -- Short-acting opioid: Oxycodone immediate release 10 mg tablets-take 1 tablet every 6 hours as needed for pain (takes an average of 1 to 2 tablets/day)  -- Oral adjuvant(s): Acetaminophen as needed  -- Topicals: None  -- Bowel regimen: Docusate sodium 100 mg every morning      Verbal consent was given by patient to access pharmacy records and [place] Prescription Monitoring Profile: Yes  Outpatient opioids prescribed by heme/onc Dr. Fritz  Outpatient opioid oral morphine equivalent (OME): 150 mg/day     RECOMMENDATIONS:   The following pain management recommendations are made based on information from today's visit and should not replace medical decision-making based on patient condition at the time of procedure or postoperatively.      - PREOPERATIVE:  + Long acting opioid -OxyContin. Take usual dose on the morning of procedure.  + Before procedure recommend acetaminophen 975 mg PO in pre-op (Written in pre-op by PAC SHREYA)    - INTRAOPERATIVE (Anesthesiologist/CRNA to consider):   + Regional anesthesia - Defer to RAPS team - patient agreeable to long acting nerve block.    + Ketamine IV intraoperatively   + Avoid remifentanil - to reduce risk of developing hyperalgesia    - POSTOPERATIVE INPATIENT MANAGEMENT:  Opioid analgesic:  + If able to take oral medications:           -- Continue home dose of OxyContin ER 40 mg every 12 hours scheduled (hold or reduce dose as needed if patient has s/sx sedation or respiratory depression)          -- Start oxycodone 10 to 15 mg every 3 hours as needed for moderate to severe pain.  If pain is uncontrolled on the 15 mg dose could increase to 15 to 20 mg every 3 hours as needed for moderate to severe pain          -- Start hydromorphone IV 0.3-0.5 mg IV every 2 hr PRN breakthrough pain not controlled by PO medication or prior to significant activity (eg. PT/OT sessions).       + If unable to take oral medications:           -- Continue home dose of OxyContin ER 40 mg every 12 hours scheduled (hold or reduce dose as needed if patient has s/sx sedation or respiratory depression)         --HYDROmorphone (Dilaudid) PCA recommended dose range 0.2-0.4 mg Q 10 min lockout interval with NO continuous rate. Start with 0.2 mg PCA dose Q 10 min lockout interval. If necessary for pain control and improvement in physical function, notify provider for PCA dose increase orders per recommended dose range. Hold or reduce dose for sedation.            + Note: if neuraxial opioid or other long acting opioid (eg. Methadone) is given contact on-call pain provider for adjustment in opioid plan    Nonopioid analgesics:   + Regional anesthesia management per RAPS team  + Start acetaminophen 975 mg PO every 6 hr scheduled if no concern about masking fevers  + Defer initiation of gabapentin lites to inpatient team.  Patient did not find much benefit with pregabalin in the past.    + Defer use of NSAID to surgeon, note may want to avoid this given patient's need for anticoagulation postoperatively  + Ice/Heat PRN   + Only if no long acting nerve block - Initiate lidocaine  4% patch - 1-3 patches every 24 hours scheduled (12 hr on 12 hr off). Apply to intact skin only.      Muscle Relaxant:   + Start methocarbamol 500 mg every 6 hours as needed for muscle spasms    Stool softeners/Laxatives:   + When appropriate start senna-docusate 1-2 tabs PO BID and Miralax 17 g daily to prevent opioid induced constipation.     Other:  + Please consult the inpatient pain management service for postoperative pain management recommendations.     + Ketamine IV infusion.  If needed for acute postop uncontrolled pain, the primary service may decide to start ketamine infusion at 5 mg/hr. could increase to 7.5 mg/h if patient is tolerating.  Ketamine for acute pain management will be used as an analgesic medication in addition to opioids when usual analgesics are suboptimal.Only start ketamine IV if patient is stable from a cardiovascular standpoint.  Low dose ketamine may be administered on a regular nursing unit according to [facility] ketamine-low dose policy.  Please see policy for details on contraindications, adverse effects and monitoring.  Of note, sialorrhea is another potential side effect and must weight benefit/risk if patient having trouble protecting airway.  [email or web address]/Policies/Category/MedicationManagementPharmacy/Hospital/[email or web address]     + Recommend close monitoring of respiratory status postoperatively with capnography and continuous SpO2 monitoring. Would recommend continuing capnography beyond the usual 24 hr due to patient's opioid tolerance.   -------------------------------------------------------------------------------------------------------------------  ASSESSMENT:    Dilip Kan has a history of metastatic prostate cancer, left hip pain secondary to bony metastasis,and a nondisplaced fracture of the left pubic bone adjacent to the pubic symphysis, as well as chronic back pain status post 3 previous back surgeries.  He has been on chronic opioid therapy for over  10 years primarily due to his back issues and then on the higher doses over the past year secondary to his pain related to his metastatic prostate cancer.  Today he indicates his pain is worst in his bladder and at his fracture site and prevents him from being able to walk.  He rates his pain as a 7 out of 10 on a 0-10 numeric scale.  Pain is worse with movement, sitting or standing straight up.  Relieving factors include oxycodone and laying down flat.  He is able to ambulate in his house using a walker for short distances, however anytime he leaves the house or goes longer distances he needs a wheelchair.  He denies any issues of sedation with his pain medications, he reports issues with constipation, reports he may go 5 days between bowel movements, he attributes a lot of this to not having any appetite and eating very little.  He denies any history of sleep apnea.  He denies any alcohol use in the past 7 months, he denies any recreational drug use he denies any opioid abuse misuse or diversion and is not a smoker.  Patient is open to a multimodal pain management approach as outlined above, and all questions were answered to patient's apparent satisfaction.     Other pain therapies tried in the past (not an all inclusive list):   Norco-used this for many years, was effective for his back pain  Oxycodone, OxyContin-this is his current regimen, tolerating  Muscle relaxants-states he had been on some many years ago but none in the past 5 years or so.  Open to using these if needed for muscle spasms in the postoperative setting  Pregabalin-tried this earlier this year, was not effective and he stopped this  NSAIDs-patient avoids these due to his ongoing anticoagulation  Acetaminophen-tolerates, helpful    Pain therapies never tried (not an all inclusive list):   PCA -patient has not used a PCA but is open to using this modality for pain management postoperatively  Ketamine - denies seizures, uncontrolled  "hypertension, cardiac arrhythmias, PTSD, BPAD, schizophrenia, psychiatric disorders (eg. hallucinations/delirium), history of brain cancer, cardiac failure, previous CVA and increased IOP/glaucoma and is open to using ketamine for pain control.     If immediate assistance is needed please contact the pain service at the number below.     Pito Lopez [facility]  November 20, 2023  3:59 PM    If questions or concerns, please contact the Inpatient Pain Service via [facility]: \"PAIN MANAGEMENT ACUTE INPATIENT/ [facility] or [facility] (depending on location of patient)    "

## 2023-11-20 NOTE — PROGRESS NOTES
Pre Op Teaching Flowsheet       Pre and Post op Patient Education  Surgical procedure:  CYSTECTOMY, WITH URETEROILEAL CONDUIT CREATION   Teaching Topic:  Pre and post op teaching  Person Involved in teaching: Yes    Motivation Level:  Asks Questions: Yes  Eager to Learn: Yes  Cooperative: Yes  Receptive (willing/able to accept information):  Yes    Patient demonstrates understanding of the following:  Date of surgery:  11/30/23  Location of surgery:  [address] [place] [address]  History and Physical and any other testing necessary prior to surgery: Yes  Required time line for completion of History and Physical and any pre-op testing: Yes    Patient demonstrates understanding of the following:  Pre-op bowel prep:  Yes, 4L golytely.  Ordered to pharmacy, patient will start 11/28/23.  Antibiotics:  PT does not feel he can give a good urine sample, will order 3 day course of antibiotics.   Pre-op showering/scrub information with PCMX Soap: Yes  Blood thinner medications discussed and when to stop (if applicable):  Yes  Discussed no visitor's at this time due to increase Covid-19 cases and how we need to make sure everyone stays safe.    Infection Prevention:   Patient demonstrates understanding of the following:  Surgical procedure site care taught: Yes  Signs and symptoms of infection taught: Yes      Post-op follow-up:  Discussed how to contact the hospital, nurse, and clinic scheduling staff if necessary. (See packet information)    Instructional materials used/given/mailed:  [place] [facility] Packet, post op teaching sheet, Map, Soap, and with the arrival/location information to come closer to the surgery date.    Surgical instructions packet given to patient in office:  N/A    Follow up: Discussed arranging for someone to drive you home. ( No public transportation)  Someone needed to stay the first twenty hours after surgery: Yes     referral: No      home:  Wife, Pat     Care Giver:  Wife,  Pat     PCP:  MD Naomi Castellano RN

## 2023-11-20 NOTE — H&P
"  Pre-Operative H & P     CC:  Preoperative exam to assess for increased cardiopulmonary risk while undergoing surgery and anesthesia.    Date of Encounter: 11/20/2023  Primary Care Physician:  Wei Perez     Reason for visit:   Encounter Diagnoses   Name Primary?    Preop examination Yes    Bladder fistula        HPI  Dilip Kan is a 68 year old male who presents for pre-operative H & P in preparation for  Procedure Information       Date/Time: ***     Procedure: ***    Anesthesia type: ***    Pre-op diagnosis: ***    Location: {Location:830390}    Providers: ***            ***    {History obtained from:7314372::\"History is obtained from the patient\"} and chart review    Hx of abnormal bleeding or anti-platelet use: ***      Past Medical History  Past Medical History:   Diagnosis Date    Elevated prostate specific antigen (PSA)     4/10/2012    Encounter for other administrative examinations     1/31/2014    Esophagitis     No Comments Provided    Gastro-esophageal reflux disease without esophagitis     No Comments Provided    Low back pain     No Comments Provided    Malignant neoplasm of prostate (H)     9/6/2012    Nicotine dependence, uncomplicated     Quit - October 2007 with chantix    Other intervertebral disc degeneration, lumbosacral region     12/1/2008       Past Surgical History  Past Surgical History:   Procedure Laterality Date    APPENDECTOMY OPEN  [id number]    Ruptured - [place]    ARTHROPLASTY KNEE Right 11/16/2021    Procedure: ARTHROPLASTY, KNEE, TOTAL;  Surgeon: Micah Rock MD;  Location: [facility]    COLONOSCOPY  08/31/2006    next due in 2016    DISKECTOMY, LUMBAR, SINGLE SP  03/16/2009    L 3-4 microdiskectomy, [facility]    ESOPHAGOSCOPY, GASTROSCOPY, DUODENOSCOPY (EGD), COMBINED  03/2003         ESOPHAGOSCOPY, GASTROSCOPY, DUODENOSCOPY (EGD), COMBINED  08/31/2006         PROSTATECTOMY PERINEAL RADICAL  11/28/2012    Nerve Sparring, Dr Warner    SPINE SURGERY N/A 04/28/2017 "    L3 to S1 spinal decompression L4/L5 fusion    TONSILLECTOMY, ADENOIDECTOMY, COMBINED      as a child    VARICOCELECTOMY  1959    INCISION AND DRAINAGE,EXCISE VARICOCELE       Prior to Admission Medications  Current Outpatient Medications   Medication Sig Dispense Refill    acetaminophen (TYLENOL) 325 MG tablet Take 975 mg by mouth every 8 hours as needed for mild pain      amLODIPine (NORVASC) 10 MG tablet Take 1 tablet (10 mg) by mouth daily (Patient taking differently: Take 10 mg by mouth every morning) 90 tablet 3    apixaban ANTICOAGULANT (ELIQUIS) 5 MG tablet Take 1 tablet (5 mg) by mouth 2 times daily 60 tablet 3    atorvastatin (LIPITOR) 20 MG tablet Take 1 tablet (20 mg) by mouth daily (Patient taking differently: Take 20 mg by mouth every morning) 90 tablet 0    furosemide (LASIX) 20 MG tablet Take 1 tablet (20 mg) by mouth daily as needed 30 tablet 3    hydrOXYzine (ATARAX) 10 MG tablet Take 1 tablet (10 mg) by mouth every 6 hours as needed for itching or anxiety (with pain, moderate pain) 120 tablet 11    lisinopril (ZESTRIL) 40 MG tablet Take 40 mg by mouth every morning      LORazepam (ATIVAN) 0.5 MG tablet Take 1 tablet (0.5 mg) by mouth every 6 hours as needed for anxiety 30 tablet 1    omeprazole (PRILOSEC) 40 MG DR capsule Take 1 capsule (40 mg) by mouth daily (Patient taking differently: 40 mg every morning) 90 capsule 0    ondansetron (ZOFRAN ODT) 8 MG ODT tab Take 1 tablet (8 mg) by mouth every 8 hours as needed for nausea 90 tablet 1    oxybutynin (DITROPAN) 5 MG tablet Take 5 mg by mouth 4 times daily as needed for bladder spasms      oxyCODONE (OXYCONTIN) 40 MG 12 hr tablet Take 1 tablet (40 mg) by mouth every 12 hours 60 tablet 0    oxyCODONE IR (ROXICODONE) 10 MG tablet Take 1 tablet (10 mg) by mouth every 6 hours as needed for moderate to severe pain 120 tablet 0    potassium chloride ER (KLOR-CON M) 20 MEQ CR tablet Take 1 tablet (20 mEq) by mouth daily as needed for potassium  supplementation (Take if you take furosemide) (Patient taking differently: Take 20 mEq by mouth 2 times daily) 30 tablet 3    predniSONE (DELTASONE) 5 MG tablet Take 1 tablet (5 mg) by mouth 2 times daily 60 tablet 11    prochlorperazine (COMPAZINE) 10 MG tablet Take 1 tablet (10 mg) by mouth every 6 hours as needed for nausea or vomiting 30 tablet 3    tamsulosin (FLOMAX) 0.4 MG capsule Take 1 capsule by mouth every morning      traZODone (DESYREL) 50 MG tablet Take 50 mg by mouth nightly as needed for sleep      levofloxacin (LEVAQUIN) 750 MG tablet Take 1 tablet (750 mg) by mouth daily (Patient taking differently: Take 750 mg by mouth every morning) 30 tablet 0    LORazepam (ATIVAN) 0.5 MG tablet Take 0.5 mg by mouth every 8 hours as needed for anxiety 30 tablet 1    megestrol (MEGACE) 20 MG tablet Take 20 mg by mouth daily (Patient not taking: Reported on 2023)      potassium chloride ER (KLOR-CON M) 20 MEQ CR tablet Take 20 mEq by mouth 2 times daily 30 tablet 3    predniSONE (DELTASONE) 20 MG tablet Take two tablets (= 40mg) each day for 5 (five) days (Patient taking differently: every morning Take two tablets (= 40mg) each day for 5 (five) days) 10 tablet 0    pregabalin (LYRICA) 150 MG capsule Take 150 mg by mouth See Admin Instructions As of 23, patient currently takes Lyrica one or twice daily (Patient not taking: Reported on 2023)         Allergies  No Known Allergies    Social History  Social History     Socioeconomic History    Marital status:      Spouse name: Not on file    Number of children: Not on file    Years of education: Not on file    Highest education level: Not on file   Occupational History    Occupation:      Employer: OTHER   Tobacco Use    Smoking status: Former     Packs/day: 1.00     Years: 20.00     Additional pack years: 0.00     Total pack years: 20.00     Types: Cigarettes     Quit date: 2002     Years since quittin.0     Passive exposure:  Past    Smokeless tobacco: Current     Types: Snuff    Tobacco comments:     Can't remember when all he had passive exposure   Vaping Use    Vaping Use: Never used   Substance and Sexual Activity    Alcohol use: Not Currently    Drug use: No    Sexual activity: Yes     Partners: Female     Birth control/protection: None   Other Topics Concern    Parent/sibling w/ CABG, MI or angioplasty before 65F 55M? Not Asked   Social History Narrative    [employer] - retired.  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  Patient has quit smoking.  Lives in [place] a farm with wife.      Social Determinants of Health     Financial Resource Strain: Not on file   Food Insecurity: Not on file   Transportation Needs: Not on file   Physical Activity: Not on file   Stress: Not on file   Social Connections: Not on file   Interpersonal Safety: Not on file   Housing Stability: Not on file       Family History  Family History   Problem Relation Age of Onset    Hypertension Mother         Hypertension,HTN    Other - See Comments Mother          Parkinsons    Heart Disease Father         Heart Disease, passed away from CHF,CAD    Colon Cancer Father         Cancer-colon    Hypertension Father         Hypertension    Other - See Comments Father         Stroke/Dementia    Family History Negative Sister         Good Health,emotional problems.    Family History Negative Sister         Good Health    Other - See Comments Daughter         w/o major medical problems.    Other - See Comments Son         w/o major medical problems.    Family History Negative Other         Good Health    Family History Negative Other         Good Health,previous marriage./previous marriage.    Family History Negative Other         Good Health,previous marriage.    Anesthesia Reaction No family hx of     Venous thrombosis No family hx of        Review of Systems  The complete review of systems is negative other than noted in the HPI or here.  "  Anesthesia Evaluation   Pt has had prior anesthetic.     No history of anesthetic complications       ROS/MED HX  ENT/Pulmonary:     (+)     JAMES risk factors,  hypertension,         tobacco use (snuff),                    (-) asthma and recent URI   Neurologic:  - neg neurologic ROS  (-) no seizures and no CVA   Cardiovascular:     (+) Dyslipidemia hypertension- -   -  - -                                 Previous cardiac testing     METS/Exercise Tolerance:  Comment: <4   Hematologic:     (+) History of blood clots,    pt is anticoagulated,  history of blood transfusion, no previous transfusion reaction,        Musculoskeletal: Comment: Pathologic hip/pelvic fractures      GI/Hepatic:     (+) GERD, Asymptomatic on medication,                  Renal/Genitourinary:     (+) renal disease,             Endo:     (+)  type II DM,                 (-) thyroid disease   Psychiatric/Substance Use:  - neg psychiatric ROS     Infectious Disease:  - neg infectious disease ROS     Malignancy:   (+) Malignancy, History of Prostate.    Other:  - neg other ROS          /68 (BP Location: Right arm, Patient Position: Sitting, Cuff Size: Adult Large)   Pulse 116   Temp 97.6  F (36.4  C) (Oral)   Ht 1.778 m (5' 10\")   Wt 82.1 kg (181 lb)   SpO2 97%   BMI 25.97 kg/m      Physical Exam   Constitutional: Awake, alert, cooperative, no apparent distress, and appears stated age.  Eyes: Pupils equal, round and reactive to light, extra ocular muscles intact, sclera clear, conjunctiva normal.  HENT: Normocephalic, oral pharynx with moist mucus membranes, good dentition. No goiter appreciated.   Respiratory: Clear to auscultation bilaterally, no crackles or wheezing.  Cardiovascular: Regular rate and rhythm, normal S1 and S2, and no murmur noted.  Carotids +2, no bruits. No edema. Palpable pulses to radial  DP and PT arteries.   GI: Normal bowel sounds, soft, non-distended, non-tender, no masses palpated, no hepatosplenomegaly.  " Surgical scars: ***  Lymph/Hematologic: No cervical lymphadenopathy and no supraclavicular lymphadenopathy.  Genitourinary:  ***  Skin: Warm and dry.  No rashes at anticipated surgical site.   Musculoskeletal: Full ROM of neck. There is no redness, warmth, or swelling of the joints. Gross motor strength is normal.    Neurologic: Awake, alert, oriented to name, place and time. Cranial nerves II-XII are grossly intact. Gait is normal.   Neuropsychiatric: Calm, cooperative. Normal affect.     Prior Labs/Diagnostic Studies   All labs and imaging personally reviewed     EKG/ stress test - if available please see in ROS above   Echo result w/o MOPS: Interpretation SummaryGlobal and regional left ventricular function is normal with an EF of 55-60%.Global right ventricular function is normal.Mild aortic insufficiency is present. Mild aortic stenosis is present. Themean gradient across the aortic valve is 14 mmHg.Ascending aorta 4.0 cm.Mean RA pressure is 8 mmHg.No pericardial effusion is present.           No data to display                  The patient's records and results personally reviewed by this provider.     Outside records reviewed from: {[facility] PAC RECORDS REVIEWED:590205}    LAB/DIAGNOSTIC STUDIES TODAY:  ***    Assessment  {Refresh the note after risk documentation is completed Link to Preoperative Risk Scoring :138009}  Dilip Kan is a 68 year old male seen as a PAC referral for risk assessment and optimization for anesthesia.    Plan/Recommendations  Pt will be optimized for the proposed procedure.  See below for details on the assessment, risk, and preoperative recommendations    NEUROLOGY  {History:671850}  {Chronic Pain (Optional):761750}  -Post Op delirium risk factors:  {Post Op Delirium:048658}    ENT  {Airway:001175}  Mallampati: {Mallampati:666591}  TM: {TM results:974140}    CARDIAC  {Cardiac:383782}  - METS (Metabolic Equivalents)  Patient performs 4 or more METS exercise without symptoms          "   Total Score: -        Criteria with incomplete data:    Functional Capacity: Unable to complete 4 METS      - This score is not calculated because of inadequate data     RCRI-Very low risk: Class 1 0.4% complication rate            Total Score: -        Criteria with incomplete data:    RCRI: High Risk Surgery    RCRI: CAD    RCRI: CHF    RCRI: Cerebrovascular Disease     RCRI: Diabetes    RCRI: Elevated Creatinine      - This score is not calculated because of inadequate data       PULMONARY  {JAMES (Optional):614257}  JAMES Low Risk            Total Score: -        Criteria with incomplete data:    JAMES: Snores loudly    JAMES: Often tired    JAMES: Observed stopped breathing    JAMES: Hypertension    JAMES: BMI over 35 kg/m2    JAMES: Over 50 ys old    JAMES: Neck Circum >16 in    JAMES: Male      - This score is not calculated because of inadequate data     {Asthma/COPD:248997}  - Tobacco History  {Refresh the note if updates are made Link to Tobacco History :947728}  History   Smoking Status    Former    Packs/day: 1.00    Years: 20.00    Types: Cigarettes    Quit date: 11/8/2002   Smokeless Tobacco    Current    Types: Snuff       GI  {GERD/GI (Optional):005676}  PONV Medium Risk  Total Score: 2           1 AN PONV: Patient is not a current smoker    1 AN PONV: Intended Post Op Opioids        /RENAL  - Baseline Creatinine  ***    ENDOCRINE    - BMI: Estimated body mass index is 25.97 kg/m  as calculated from the following:    Height as of this encounter: 1.778 m (5' 10\").    Weight as of this encounter: 82.1 kg (181 lb).  {BMI Classification (Optional):728639}  {Endocrine (Optional):088446}    HEME  VTE Low Risk 0.26%            Total Score: 0      {Coagulopathy:843618}  {Anemia (Optional):821129}    MSK  Patient is NOT Frail            Total Score: -        Criteria with incomplete data:    Frailty: Weight loss 10 lbs or greater    Frailty: Slower walking speed    Frailty: Decrease in strength    Frailty: Increased " exhaustion    Frailty: Overall lack of energy      - This score is not calculated because of inadequate data     {Frailty Follow Up (Optional):811224}    PSYCH  - ***    Different anesthesia methods/types have been discussed with the patient, but they are aware that the final plan will be decided by the assigned anesthesia provider on the date of service.  Patient was discussed with {[facility] Providers:550600342}    The patient is optimized for their procedure. AVS with information on surgery time/arrival time, meds and NPO status given by nursing staff. No further diagnostic testing indicated.      On the day of service:     Prep time: *** minutes  Visit time: *** minutes  Documentation time: *** minutes  ------------------------------------------  Total time: *** minutes      Paula Alcala PA-C  Preoperative Assessment Center  [facility]  Phone: [phone]  Fax: [phone]

## 2023-11-20 NOTE — NURSING NOTE
Chief Complaint   Patient presents with    Consult     Surgical consult       There were no vitals taken for this visit. There is no height or weight on file to calculate BMI.    Patient Active Problem List   Diagnosis    Anemia due to acute blood loss    Backache    Chronic, continuous use of opioids    Controlled substance agreement updated 12-    GERD    Essential hypertension    Prostate cancer (H)    Non morbid obesity due to excess calories    Other specified causes of urethral stricture    Pelvic pain in male    Personal history of prostate cancer s/p prostatectomy and sEBRT    Mixed hyperlipidemia    Spinal stenosis of lumbar region with neurogenic claudication    Rising PSA following treatment for malignant neoplasm of prostate    Aortic valve stenosis with insufficiency, etiology of cardiac valve disease unspecified    Mild aortic stenosis    Ascending aortic aneurysm-moderate at 4.6 cm on 3/11/20    Aortic valve insufficiency-moderate to severe    Hx of syncope    History of tobacco abuse quitting 11/8/2002    Hypertriglyceridemia    Mild pulmonary hypertension (H)    Palpitations    Plaque in heart artery-aorta    Diabetes mellitus, type 2 (H)    Malignant neoplasm of prostate (H)    Status post total right knee replacement    Aneurysm of ascending aorta without rupture (H24)    Bone metastasis    Bone metastases    Neutropenic fever     Acute kidney failure, unspecified (H24)    Infection due to Klebsiella pneumoniae    Acute deep vein thrombosis (DVT) of proximal vein of right lower extremity (H)    Multiple subsegmental pulmonary emboli without acute cor pulmonale (H)    Sepsis (H)       No Known Allergies    Current Outpatient Medications   Medication Sig Dispense Refill    acetaminophen (TYLENOL) 325 MG tablet Take 975 mg by mouth every 8 hours as needed for mild pain      amLODIPine (NORVASC) 10 MG tablet Take 1 tablet (10 mg) by mouth daily (Patient taking differently: Take 10 mg by mouth  every morning) 90 tablet 3    apixaban ANTICOAGULANT (ELIQUIS) 5 MG tablet Take 1 tablet (5 mg) by mouth 2 times daily 60 tablet 3    atorvastatin (LIPITOR) 20 MG tablet Take 1 tablet (20 mg) by mouth daily (Patient taking differently: Take 20 mg by mouth every morning) 90 tablet 0    furosemide (LASIX) 20 MG tablet Take 1 tablet (20 mg) by mouth daily as needed 30 tablet 3    hydrOXYzine (ATARAX) 10 MG tablet Take 1 tablet (10 mg) by mouth every 6 hours as needed for itching or anxiety (with pain, moderate pain) 120 tablet 11    lisinopril (ZESTRIL) 40 MG tablet Take 40 mg by mouth every morning      LORazepam (ATIVAN) 0.5 MG tablet Take 0.5 mg by mouth every 8 hours as needed for anxiety 30 tablet 1    LORazepam (ATIVAN) 0.5 MG tablet Take 1 tablet (0.5 mg) by mouth every 6 hours as needed for anxiety 30 tablet 1    megestrol (MEGACE) 20 MG tablet Take 20 mg by mouth 2 times daily      omeprazole (PRILOSEC) 40 MG DR capsule Take 1 capsule (40 mg) by mouth daily (Patient taking differently: 40 mg every morning) 90 capsule 0    ondansetron (ZOFRAN ODT) 8 MG ODT tab Take 1 tablet (8 mg) by mouth every 8 hours as needed for nausea 90 tablet 1    oxybutynin (DITROPAN) 5 MG tablet Take 5 mg by mouth 4 times daily as needed for bladder spasms      oxyCODONE (OXYCONTIN) 40 MG 12 hr tablet Take 1 tablet (40 mg) by mouth every 12 hours 60 tablet 0    oxyCODONE IR (ROXICODONE) 10 MG tablet Take 1 tablet (10 mg) by mouth every 6 hours as needed for moderate to severe pain 120 tablet 0    potassium chloride ER (KLOR-CON M) 20 MEQ CR tablet Take 20 mEq by mouth 2 times daily 30 tablet 3    potassium chloride ER (KLOR-CON M) 20 MEQ CR tablet Take 1 tablet (20 mEq) by mouth daily as needed for potassium supplementation (Take if you take furosemide) (Patient taking differently: Take 20 mEq by mouth 2 times daily) 30 tablet 3    predniSONE (DELTASONE) 20 MG tablet Take two tablets (= 40mg) each day for 5 (five) days (Patient taking  differently: every morning Take two tablets (= 40mg) each day for 5 (five) days) 10 tablet 0    predniSONE (DELTASONE) 5 MG tablet Take 1 tablet (5 mg) by mouth 2 times daily 60 tablet 11    pregabalin (LYRICA) 150 MG capsule Take 150 mg by mouth See Admin Instructions As of 23, patient currently takes Lyrica one or twice daily (Patient not taking: Reported on 2023)      prochlorperazine (COMPAZINE) 10 MG tablet Take 1 tablet (10 mg) by mouth every 6 hours as needed for nausea or vomiting 30 tablet 3    tamsulosin (FLOMAX) 0.4 MG capsule Take 1 capsule by mouth every morning      traZODone (DESYREL) 50 MG tablet Take 50 mg by mouth nightly as needed for sleep         Social History     Tobacco Use    Smoking status: Former     Packs/day: 1.00     Years: 20.00     Additional pack years: 0.00     Total pack years: 20.00     Types: Cigarettes     Quit date: 2002     Years since quittin.0     Passive exposure: Past    Smokeless tobacco: Current     Types: Snuff    Tobacco comments:     Can't remember when all he had passive exposure   Vaping Use    Vaping Use: Never used   Substance Use Topics    Alcohol use: Not Currently    Drug use: No       Diana Tavares CMA  2023  11:39 AM

## 2023-11-20 NOTE — TELEPHONE ENCOUNTER
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

## 2023-11-20 NOTE — H&P
Pre-Operative H & P     CC:  Preoperative exam to assess for increased cardiopulmonary risk while undergoing surgery and anesthesia.    Date of Encounter: 11/20/2023  Primary Care Physician:  Wei Perez     Reason for visit:   Encounter Diagnoses   Name Primary?    Preop examination Yes    Bladder fistula        HPI  Dilip Kan is a 68 year old male who presents for pre-operative H & P in preparation for  Procedure Information       Date/Time: 11/30/23     Procedure: CYSTECTOMY, WITH URETEROILEAL CONDUIT CREATION, Graft flap pedicle transverse rectus abdominis myocutaneous     Anesthesia type: general with block    Pre-op diagnosis: bladder fistula, radiation cystitis    Location: [facility]    Providers: Dr. Correa            Mr. Kan has a PMH significant for hypertension, dyslipidemia, moderate aortic insufficiency, mild aortic stenosis, ascending aortic aneurysm, history of DVT /PE 5/2/2023, non-insulin-dependent diabetes, GERD, metastatic prostate cancer to bone and liver status post prostatectomy and radiation approximately 10 years ago, chronic pain, and pubovesical fistula and pathologic pelvic fracture.    He was hospitalized in late June early July for severe sepsis, pelvic abscess.  He states he is feeling well without fevers, but has significant pain from his pelivs/hip. He is in a wheelchair.    He will meet with Dr. Correa today for final planning for surgery.      History is obtained from the patient and chart review    Hx of abnormal bleeding or anti-platelet use: Eliquis      Past Medical History  Past Medical History:   Diagnosis Date    Elevated prostate specific antigen (PSA)     4/10/2012    Encounter for other administrative examinations     1/31/2014    Esophagitis     No Comments Provided    Gastro-esophageal reflux disease without esophagitis     No Comments Provided    Low back pain     No Comments Provided    Malignant neoplasm of prostate (H)     9/6/2012    Nicotine dependence,  uncomplicated     Quit - October 2007 with chantix    Other intervertebral disc degeneration, lumbosacral region     12/1/2008       Past Surgical History  Past Surgical History:   Procedure Laterality Date    APPENDECTOMY OPEN  [id number]    Ruptured - [place]    ARTHROPLASTY KNEE Right 11/16/2021    Procedure: ARTHROPLASTY, KNEE, TOTAL;  Surgeon: Micah Rock MD;  Location: [facility]    COLONOSCOPY  08/31/2006    next due in 2016    DISKECTOMY, LUMBAR, SINGLE SP  03/16/2009    L 3-4 microdiskectomy, [facility]    ESOPHAGOSCOPY, GASTROSCOPY, DUODENOSCOPY (EGD), COMBINED  03/2003         ESOPHAGOSCOPY, GASTROSCOPY, DUODENOSCOPY (EGD), COMBINED  08/31/2006         PROSTATECTOMY PERINEAL RADICAL  11/28/2012    Nerve Sparring, Dr Warner    SPINE SURGERY N/A 04/28/2017    L3 to S1 spinal decompression L4/L5 fusion    TONSILLECTOMY, ADENOIDECTOMY, COMBINED      as a child    VARICOCELECTOMY  1959    INCISION AND DRAINAGE,EXCISE VARICOCELE       Prior to Admission Medications  Current Outpatient Medications   Medication Sig Dispense Refill    acetaminophen (TYLENOL) 325 MG tablet Take 975 mg by mouth every 8 hours as needed for mild pain      amLODIPine (NORVASC) 10 MG tablet Take 1 tablet (10 mg) by mouth daily (Patient taking differently: Take 10 mg by mouth every morning) 90 tablet 3    apixaban ANTICOAGULANT (ELIQUIS) 5 MG tablet Take 1 tablet (5 mg) by mouth 2 times daily 60 tablet 3    atorvastatin (LIPITOR) 20 MG tablet Take 1 tablet (20 mg) by mouth daily (Patient taking differently: Take 20 mg by mouth every morning) 90 tablet 0    furosemide (LASIX) 20 MG tablet Take 1 tablet (20 mg) by mouth daily as needed 30 tablet 3    hydrOXYzine (ATARAX) 10 MG tablet Take 1 tablet (10 mg) by mouth every 6 hours as needed for itching or anxiety (with pain, moderate pain) 120 tablet 11    lisinopril (ZESTRIL) 40 MG tablet Take 40 mg by mouth every morning      LORazepam (ATIVAN) 0.5 MG tablet Take 1 tablet (0.5 mg) by  mouth every 6 hours as needed for anxiety 30 tablet 1    megestrol (MEGACE) 20 MG tablet Take 20 mg by mouth 2 times daily      omeprazole (PRILOSEC) 40 MG DR capsule Take 1 capsule (40 mg) by mouth daily (Patient taking differently: 40 mg every morning) 90 capsule 0    ondansetron (ZOFRAN ODT) 8 MG ODT tab Take 1 tablet (8 mg) by mouth every 8 hours as needed for nausea 90 tablet 1    oxybutynin (DITROPAN) 5 MG tablet Take 5 mg by mouth 4 times daily as needed for bladder spasms      oxyCODONE (OXYCONTIN) 40 MG 12 hr tablet Take 1 tablet (40 mg) by mouth every 12 hours 60 tablet 0    oxyCODONE IR (ROXICODONE) 10 MG tablet Take 1 tablet (10 mg) by mouth every 6 hours as needed for moderate to severe pain 120 tablet 0    potassium chloride ER (KLOR-CON M) 20 MEQ CR tablet Take 1 tablet (20 mEq) by mouth daily as needed for potassium supplementation (Take if you take furosemide) (Patient taking differently: Take 20 mEq by mouth 2 times daily) 30 tablet 3    predniSONE (DELTASONE) 5 MG tablet Take 1 tablet (5 mg) by mouth 2 times daily 60 tablet 11    prochlorperazine (COMPAZINE) 10 MG tablet Take 1 tablet (10 mg) by mouth every 6 hours as needed for nausea or vomiting 30 tablet 3    tamsulosin (FLOMAX) 0.4 MG capsule Take 1 capsule by mouth every morning      traZODone (DESYREL) 50 MG tablet Take 50 mg by mouth nightly as needed for sleep      LORazepam (ATIVAN) 0.5 MG tablet Take 0.5 mg by mouth every 8 hours as needed for anxiety 30 tablet 1    potassium chloride ER (KLOR-CON M) 20 MEQ CR tablet Take 20 mEq by mouth 2 times daily 30 tablet 3    predniSONE (DELTASONE) 20 MG tablet Take two tablets (= 40mg) each day for 5 (five) days (Patient taking differently: every morning Take two tablets (= 40mg) each day for 5 (five) days) 10 tablet 0    pregabalin (LYRICA) 150 MG capsule Take 150 mg by mouth See Admin Instructions As of 6/30/23, patient currently takes Lyrica one or twice daily (Patient not taking: Reported on  2023)         Allergies  No Known Allergies    Social History  Social History     Socioeconomic History    Marital status:      Spouse name: Not on file    Number of children: Not on file    Years of education: Not on file    Highest education level: Not on file   Occupational History    Occupation:      Employer: OTHER   Tobacco Use    Smoking status: Former     Packs/day: 1.00     Years: 20.00     Additional pack years: 0.00     Total pack years: 20.00     Types: Cigarettes     Quit date: 2002     Years since quittin.0     Passive exposure: Past    Smokeless tobacco: Current     Types: Snuff    Tobacco comments:     Can't remember when all he had passive exposure   Vaping Use    Vaping Use: Never used   Substance and Sexual Activity    Alcohol use: Not Currently    Drug use: No    Sexual activity: Yes     Partners: Female     Birth control/protection: None   Other Topics Concern    Parent/sibling w/ CABG, MI or angioplasty before 65F 55M? Not Asked   Social History Narrative    [employer] - retired.  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  Patient has quit smoking.  Lives in [place] [facility] [place] a farm with wife.      Social Determinants of Health     Financial Resource Strain: Not on file   Food Insecurity: Not on file   Transportation Needs: Not on file   Physical Activity: Not on file   Stress: Not on file   Social Connections: Not on file   Interpersonal Safety: Not on file   Housing Stability: Not on file       Family History  Family History   Problem Relation Age of Onset    Hypertension Mother         Hypertension,HTN    Other - See Comments Mother          Parkinsons    Heart Disease Father         Heart Disease, passed away from CHF,CAD    Colon Cancer Father         Cancer-colon    Hypertension Father         Hypertension    Other - See Comments Father         Stroke/Dementia    Family History Negative Sister         Good Health,emotional problems.    Family History  Negative Sister         Good Health    Other - See Comments Daughter         w/o major medical problems.    Other - See Comments Son         w/o major medical problems.    Family History Negative Other         Good Health    Family History Negative Other         Good Health,previous marriage./previous marriage.    Family History Negative Other         Good Health,previous marriage.    Anesthesia Reaction No family hx of     Venous thrombosis No family hx of        Review of Systems  The complete review of systems is negative other than noted in the HPI or here.     Anesthesia Evaluation   Pt has had prior anesthetic.     No history of anesthetic complications       ROS/MED HX  ENT/Pulmonary:     (+)                tobacco use (snuff currently), Past use,                   (-) asthma, COPD and recent URI   Neurologic:  - neg neurologic ROS     Cardiovascular:     (+) Dyslipidemia hypertension- -   -  - -   Taking blood thinners                              Previous cardiac testing   Echo: Date: 3/6/23 Results:  Interpretation Summary  Global and regional left ventricular function is normal with an EF of 60-65%.  Global right ventricular function is normal.  Moderate aortic insufficiency and mild aortic stenosis.  Estimated pulmonary artery systolic pressure is 30 mmHg plus right atrial  pressure.  IVC diameter and respiratory changes fall into an intermediate range  suggesting an RA pressure of 8 mmHg.  Dilated thoracic aorta, Sinuses of Valsalva 4.2 cm, ascending aorta 4.6 cm.     This study was compared with the study from 3/4/22. AI appears worse; no  significant change in aorta caliber.    Stress Test:  Date: Results:    ECG Reviewed:  Date: Results:    Cath:  Date: Results:      METS/Exercise Tolerance:  Comment: <4, patient very limited due to pathologic fracture in pelvis/hip   Hematologic:     (+) History of blood clots,    pt is anticoagulated, anemia, history of blood transfusion, no previous transfusion  "reaction,        Musculoskeletal: Comment: H/o lumbar fusion  Pathologic fracture pelvis/hip      GI/Hepatic:     (+) GERD, Asymptomatic on medication,                  Renal/Genitourinary:     (+) renal disease (h/o TRINY),             Endo:     (+)  type II DM,             Obesity,       Psychiatric/Substance Use:  - neg psychiatric ROS     Infectious Disease:       Malignancy:   (+) Malignancy, History of Prostate.Prostate CA status post Surgery and Radiation.      Other:  - neg other ROS    (+)  , H/O Chronic Pain,         /68 (BP Location: Right arm, Patient Position: Sitting, Cuff Size: Adult Large)   Pulse 116   Temp 97.6  F (36.4  C) (Oral)   Ht 1.778 m (5' 10\")   Wt 82.1 kg (181 lb)   SpO2 97%   BMI 25.97 kg/m      Physical Exam   Constitutional: Awake, alert, cooperative, no apparent distress, and appears stated age.  Eyes: Pupils equal, round and reactive to light, extra ocular muscles intact, sclera clear, conjunctiva normal.  HENT: Normocephalic, oral pharynx with moist mucus membranes, fair dentition, upper removable bridge. No goiter appreciated.   Respiratory: Clear to auscultation bilaterally, no crackles or wheezing.  Cardiovascular: Regular rate and rhythm, normal S1 and S2, and no murmur noted.  Carotids +2, no bruits. No edema. Palpable pulses to radial and PT arteries.   GI: Not assessed  Lymph/Hematologic: No cervical lymphadenopathy and no supraclavicular lymphadenopathy.  Genitourinary:  deferred  Skin: Warm and dry.    Musculoskeletal: Full ROM of neck. There is no redness, warmth, or swelling of the joints. Gross motor strength is normal.    Neurologic: Awake, alert, oriented to name, place and time. Cranial nerves II-XII are grossly intact. Neuropsychiatric: Calm, cooperative. Normal affect.     Prior Labs/Diagnostic Studies   All labs and imaging personally reviewed     Component      Latest Ref Rng 11/20/2023  12:49 PM   WBC      4.0 - 11.0 10e3/uL 9.7    RBC Count      4.40 - " 5.90 10e6/uL 4.75    Hemoglobin      13.3 - 17.7 g/dL 11.0 (L)    Hematocrit      40.0 - 53.0 % 35.9 (L)    MCV      78 - 100 fL 76 (L)    MCH      26.5 - 33.0 pg 23.2 (L)    MCHC      31.5 - 36.5 g/dL 30.6 (L)    RDW      10.0 - 15.0 % 17.2 (H)    Platelet Count      150 - 450 10e3/uL 454 (H)       Legend:  (L) Low  (H) High    Component      Latest Ref Rng 11/20/2023  12:49 PM   Sodium      135 - 145 mmol/L 134 (L)    Potassium      3.4 - 5.3 mmol/L 4.2    Chloride      98 - 107 mmol/L 100    Carbon Dioxide (CO2)      22 - 29 mmol/L 22    Anion Gap      7 - 15 mmol/L 12    Urea Nitrogen      8.0 - 23.0 mg/dL 11.6    Creatinine      0.67 - 1.17 mg/dL 0.76    GFR Estimate      >60 mL/min/1.73m2 >90    Calcium      8.8 - 10.2 mg/dL 9.7    Glucose      70 - 99 mg/dL 116 (H)       Legend:  (L) Low  (H) High    Component      Latest Ref Rng 11/20/2023  12:49 PM   Hemoglobin A1C      <5.7 % 6.5 (H)       Legend:  (H) High    EKG/ stress test - if available please see in ROS above   Echo result w/o MOPS: Interpretation SummaryGlobal and regional left ventricular function is normal with an EF of 55-60%.Global right ventricular function is normal.Mild aortic insufficiency is present. Mild aortic stenosis is present. Themean gradient across the aortic valve is 14 mmHg.Ascending aorta 4.0 cm.Mean RA pressure is 8 mmHg.No pericardial effusion is present.    MRI pelvis w and w/o contrast 10/27/23   1. Findings concerning for either postoperative, traumatic, surgical, infectious or some combination, related to the anterior bladder wall defect and subjacent fistula.  Likely chronic.  Associated osteomyelitis, pathologic fracturing and likely chronic, treated pubic symphysis septic arthropathy.   2. Muscle findings nonspecific.  Infectious myositis, radiotherapy necrosis, muscle infarcts, or some combination as primary considerations   3. Multifocal bone findings.  Much of this likely chronic, treated osteomyelitis.  Metastatic  treated disease may coexist.  Pubic fractures likely chronic and from either insufficiency or infection related.  Please correlate with the patient's full extent of outside imaging which is not currently available for review.     The patient's records and results personally reviewed by this provider.     Outside records reviewed from: Care Everywhere    LAB/DIAGNOSTIC STUDIES TODAY:  Type and screen, CBC, BNP, A1c    Assessment    Dilip Kan is a 68 year old male seen as a PAC referral for risk assessment and optimization for anesthesia.    Plan/Recommendations  Pt will be optimized for the proposed procedure.  See below for details on the assessment, risk, and preoperative recommendations    NEUROLOGY  - No history of TIA, CVA or seizure  - Chronic Pain  On chronic opiates, please access the PAC pharmacist's note for full details.  -Post Op delirium risk factors:  Age    ENT  - No current airway concerns.  Will need to be reassessed day of surgery.  Mallampati: III  TM: > 3    CARDIAC  - Hypertension, hold lasix and lisinopril (has been out of for last 3-4 days) on DOS, continue amlodipine  - Hyperlipidemia, continue Lipitor  - denies chest pain, chest pressure  - reports some SOB since chemotherapy when up and moving but he reports he hasn't really been able to walk much since his hospitalization in July.  - see echo 3/6/23 above in ROS. No wall motion abnormalities, EF 60-65%. Moderate AI, mild AS, ascending aorta 4.6cm.    - METS (Metabolic Equivalents)  Patient CANNOT perform 4 METS exercise without symptoms            Total Score: 1    Functional Capacity: Unable to complete 4 METS      RCRI-Low risk: Class 2 0.9% complication rate            Total Score: 1    RCRI: High Risk Surgery        PULMONARY    JAMES Medium Risk            Total Score: 4    JAMES: Snores loudly    JAMES: Hypertension    JAMES: Over 50 ys old    JAMES: Male      - Denies asthma or inhaler use  - Tobacco History    History   Smoking Status  "   Former    Packs/day: 1.00    Years: 20.00    Types: Cigarettes    Quit date: 11/8/2002   Smokeless Tobacco    Current    Types: Snuff       GI  - GERD  Controlled on medications: Proton Pump Inhibitor  PONV Medium Risk  Total Score: 2           1 AN PONV: Patient is not a current smoker    1 AN PONV: Intended Post Op Opioids        /RENAL  - Baseline Creatinine  0.76    ENDOCRINE    - BMI: Estimated body mass index is 25.97 kg/m  as calculated from the following:    Height as of this encounter: 1.778 m (5' 10\").    Weight as of this encounter: 82.1 kg (181 lb).  Overweight (BMI 25.0-29.9)  - Diabetes  Diabetes Mellitus, Type 2, non-insulin dependent.  Hold morning oral hypoglycemic medications. Recommend close monitoring of the patient's blood glucose levels throughout the perioperative period.  - last A1c of 6.7 (2/20/23), no current medications  - A1c today of 6.5    - please note pt taking prednisone 5mg bid. Consider stress dose steroids. Final decision per anesthesia DOS.    HEME  VTE High Risk 3%            Total Score: 11    VTE: Greater than 59 yrs old    VTE: Male    VTE: Pt history of VTE    VTE: Current cancer      - Coagulopathy second to Apixaban (Eliquis). Hold Eliquis for 72 hours prior to DOS. Last dose will be 11/26/23 PM.  - anemia, Hb today of 11.0    - Recommend perioperative use of blood conservation techniques intraoperatively and close monitoring for postoperative bleeding.  - ordered iron infusion for before surgery, ok per oncology.   - A type and screen has been ordered for this patient    MSK  - h/o lumbar fusion  - metastatic disease from the prostate affecting the posterior aspect of the left acetabulum with a minimally displaced fracture   - left hip OA      Different anesthesia methods/types have been discussed with the patient, but they are aware that the final plan will be decided by the assigned anesthesia provider on the date of service.    Patient was discussed with " Wahr    The patient is optimized for their procedure. AVS with information on surgery time/arrival time, meds and NPO status given by nursing staff. No further diagnostic testing indicated.      On the day of service:     Prep time: 20 minutes  Visit time: 12 minutes  Documentation time: 17 minutes  ------------------------------------------  Total time: 49 minutes      Paula Alcala PA-C  Preoperative Assessment Center  [facility]  Phone: [phone]  Fax: [phone]

## 2023-11-20 NOTE — ANESTHESIA PREPROCEDURE EVALUATION
Anesthesia Pre-Procedure Evaluation    Patient: Dilip Kan   MRN: [id number] : 1955        Procedure :   PAC EVALUATION       Past Medical History:   Diagnosis Date     Elevated prostate specific antigen (PSA)     4/10/2012     Encounter for other administrative examinations     2014     Esophagitis     No Comments Provided     Gastro-esophageal reflux disease without esophagitis     No Comments Provided     Low back pain     No Comments Provided     Malignant neoplasm of prostate (H)     2012     Nicotine dependence, uncomplicated     Quit - 2007 with chantix     Other intervertebral disc degeneration, lumbosacral region     2008      Past Surgical History:   Procedure Laterality Date     APPENDECTOMY OPEN      Ruptured - [place]     ARTHROPLASTY KNEE Right 2021    Procedure: ARTHROPLASTY, KNEE, TOTAL;  Surgeon: Micah Rock MD;  Location: [facility] OR     COLONOSCOPY  2006    next due in 2016     DISKECTOMY, LUMBAR, SINGLE SP  2009    L 3-4 microdiskectomy, [facility]     ESOPHAGOSCOPY, GASTROSCOPY, DUODENOSCOPY (EGD), COMBINED  2003          ESOPHAGOSCOPY, GASTROSCOPY, DUODENOSCOPY (EGD), COMBINED  2006          PROSTATECTOMY PERINEAL RADICAL  2012    Nerve Sparring, Dr Warner     SPINE SURGERY N/A 2017    L3 to S1 spinal decompression L4/L5 fusion     TONSILLECTOMY, ADENOIDECTOMY, COMBINED      as a child     VARICOCELECTOMY      INCISION AND DRAINAGE,EXCISE VARICOCELE      No Known Allergies   Social History     Tobacco Use     Smoking status: Former     Packs/day: 1.00     Years: 20.00     Additional pack years: 0.00     Total pack years: 20.00     Types: Cigarettes     Quit date: 2002     Years since quittin.0     Passive exposure: Past     Smokeless tobacco: Current     Types: Snuff     Tobacco comments:     Can't remember when all he had passive exposure   Substance Use Topics     Alcohol use: Not Currently     "  Wt Readings from Last 1 Encounters:   11/20/23 82.1 kg (181 lb)              OUTSIDE LABS:  CBC:   Lab Results   Component Value Date    WBC 9.7 11/20/2023    WBC 9.2 11/09/2023    HGB 11.0 (L) 11/20/2023    HGB 10.0 (L) 11/09/2023    HCT 35.9 (L) 11/20/2023    HCT 32.8 (L) 11/09/2023     (H) 11/20/2023     11/09/2023     BMP:   Lab Results   Component Value Date     (L) 11/20/2023     (L) 11/09/2023    POTASSIUM 4.2 11/20/2023    POTASSIUM 4.0 11/09/2023    CHLORIDE 100 11/20/2023    CHLORIDE 100 11/09/2023    CO2 22 11/20/2023    CO2 21 (L) 11/09/2023    BUN 11.6 11/20/2023    BUN 13.1 11/09/2023    CR 0.76 11/20/2023    CR 0.66 (L) 11/09/2023     (H) 11/20/2023     (H) 11/09/2023     COAGS:   Lab Results   Component Value Date    PTT 40 (H) 07/18/2023    INR 1.74 (H) 07/18/2023     POC: No results found for: \"BGM\", \"HCG\", \"HCGS\"  HEPATIC:   Lab Results   Component Value Date    ALBUMIN 3.0 (L) 11/09/2023    PROTTOTAL 6.7 11/09/2023    ALT <5 11/09/2023    AST 14 11/09/2023    ALKPHOS 87 11/09/2023    BILITOTAL 0.4 11/09/2023     OTHER:   Lab Results   Component Value Date    LACT 0.8 07/02/2023    A1C 6.5 (H) 11/20/2023    ROBERTO 9.7 11/20/2023    PHOS 3.6 05/23/2023    MAG 1.8 07/01/2023    TSH 1.52 06/27/2022    SED 44 (H) 10/16/2023             PAC Discussion and Assessment                                                      PAC Pharmacist Assessment: PREOPERATIVE PAIN CONSULT FOR POSTOPERATIVE PAIN MANAGEMENT  Dilip Kan was interviewed via phone on November 20, 2023 prior to PAC Clinic appointment. Patient is preparing for the planned procedure with Dr. Correa and Dr. Guadalupe on 11/30/23 at the [facility] [place] for CYSTECTOMY, WITH URETEROILEAL CONDUIT CREATION; Reconstruction of the perineum with abdominal/thigh muscle flap, spy.  These recommendations are intended for patients admitted to the hospital after a procedure and are only valid " for 30 days from the date of service. If there are significant changes in opioid dosing between today and day of procedure the below recommendations may have to be adjusted.      OUTPATIENT PRIOR TO ADMISSION MEDICATIONS (related to pain management):  -- Long-acting opioid: oxycodone ER (OXYCONTIN) 40 mg PO q12 hr scheduled  -- Short-acting opioid: Oxycodone immediate release 10 mg tablets-take 1 tablet every 6 hours as needed for pain (takes an average of 1 to 2 tablets/day)  -- Oral adjuvant(s): Acetaminophen as needed  -- Topicals: None  -- Bowel regimen: Docusate sodium 100 mg every morning      Verbal consent was given by patient to access pharmacy records and [place] Prescription Monitoring Profile: Yes  Outpatient opioids prescribed by heme/onc Dr. Fritz  Outpatient opioid oral morphine equivalent (OME): 150 mg/day     RECOMMENDATIONS:   The following pain management recommendations are made based on information from today's visit and should not replace medical decision-making based on patient condition at the time of procedure or postoperatively.      - PREOPERATIVE:  + Long acting opioid -OxyContin. Take usual dose on the morning of procedure.  + Before procedure recommend acetaminophen 975 mg PO in pre-op (Written in pre-op by PAC SHREYA)    - INTRAOPERATIVE (Anesthesiologist/CRNA to consider):   + Regional anesthesia - Defer to RAPS team - patient agreeable to long acting nerve block.   + Ketamine IV intraoperatively   + Avoid remifentanil - to reduce risk of developing hyperalgesia    - POSTOPERATIVE INPATIENT MANAGEMENT:  Opioid analgesic:  + If able to take oral medications:           -- Continue home dose of OxyContin ER 40 mg every 12 hours scheduled (hold or reduce dose as needed if patient has s/sx sedation or respiratory depression)          -- Start oxycodone 10 to 15 mg every 3 hours as needed for moderate to severe pain.  If pain is uncontrolled on the 15 mg dose could increase to 15 to 20 mg  every 3 hours as needed for moderate to severe pain          -- Start hydromorphone IV 0.3-0.5 mg IV every 2 hr PRN breakthrough pain not controlled by PO medication or prior to significant activity (eg. PT/OT sessions).       + If unable to take oral medications:           -- Continue home dose of OxyContin ER 40 mg every 12 hours scheduled (hold or reduce dose as needed if patient has s/sx sedation or respiratory depression)         --HYDROmorphone (Dilaudid) PCA recommended dose range 0.2-0.4 mg Q 10 min lockout interval with NO continuous rate. Start with 0.2 mg PCA dose Q 10 min lockout interval. If necessary for pain control and improvement in physical function, notify provider for PCA dose increase orders per recommended dose range. Hold or reduce dose for sedation.            + Note: if neuraxial opioid or other long acting opioid (eg. Methadone) is given contact on-call pain provider for adjustment in opioid plan    Nonopioid analgesics:   + Regional anesthesia management per RAPS team  + Start acetaminophen 975 mg PO every 6 hr scheduled if no concern about masking fevers  + Defer initiation of gabapentin lites to inpatient team.  Patient did not find much benefit with pregabalin in the past.    + Defer use of NSAID to surgeon, note may want to avoid this given patient's need for anticoagulation postoperatively  + Ice/Heat PRN   + Only if no long acting nerve block - Initiate lidocaine 4% patch - 1-3 patches every 24 hours scheduled (12 hr on 12 hr off). Apply to intact skin only.      Muscle Relaxant:   + Start methocarbamol 500 mg every 6 hours as needed for muscle spasms    Stool softeners/Laxatives:   + When appropriate start senna-docusate 1-2 tabs PO BID and Miralax 17 g daily to prevent opioid induced constipation.     Other:  + Please consult the inpatient pain management service for postoperative pain management recommendations.     + Ketamine IV infusion.  If needed for acute postop uncontrolled  no chest pain, no cough, and no shortness of breath. pain, the primary service may decide to start ketamine infusion at 5 mg/hr. could increase to 7.5 mg/h if patient is tolerating.  Ketamine for acute pain management will be used as an analgesic medication in addition to opioids when usual analgesics are suboptimal.Only start ketamine IV if patient is stable from a cardiovascular standpoint.  Low dose ketamine may be administered on a regular nursing unit according to [facility] ketamine-low dose policy.  Please see policy for details on contraindications, adverse effects and monitoring.  Of note, sialorrhea is another potential side effect and must weight benefit/risk if patient having trouble protecting airway.  [email or web address]     + Recommend close monitoring of respiratory status postoperatively with capnography and continuous SpO2 monitoring. Would recommend continuing capnography beyond the usual 24 hr due to patient's opioid tolerance.   -------------------------------------------------------------------------------------------------------------------  ASSESSMENT:    Dilip Kan has a history of metastatic prostate cancer, left hip pain secondary to bony metastasis,and a nondisplaced fracture of the left pubic bone adjacent to the pubic symphysis, as well as chronic back pain status post 3 previous back surgeries.  He has been on chronic opioid therapy for over 10 years primarily due to his back issues and then on the higher doses over the past year secondary to his pain related to his metastatic prostate cancer.  Today he indicates his pain is worst in his bladder and at his fracture site and prevents him from being able to walk.  He rates his pain as a 7 out of 10 on a 0-10 numeric scale.  Pain is worse with movement, sitting or standing straight up.  Relieving factors include oxycodone and laying down flat.  He is able to ambulate in his house using a walker for short  "distances, however anytime he leaves the house or goes longer distances he needs a wheelchair.  He denies any issues of sedation with his pain medications, he reports issues with constipation, reports he may go 5 days between bowel movements, he attributes a lot of this to not having any appetite and eating very little.  He denies any history of sleep apnea.  He denies any alcohol use in the past 7 months, he denies any recreational drug use he denies any opioid abuse misuse or diversion and is not a smoker.  Patient is open to a multimodal pain management approach as outlined above, and all questions were answered to patient's apparent satisfaction.     Other pain therapies tried in the past (not an all inclusive list):   Norco-used this for many years, was effective for his back pain  Oxycodone, OxyContin-this is his current regimen, tolerating  Muscle relaxants-states he had been on some many years ago but none in the past 5 years or so.  Open to using these if needed for muscle spasms in the postoperative setting  Pregabalin-tried this earlier this year, was not effective and he stopped this  NSAIDs-patient avoids these due to his ongoing anticoagulation  Acetaminophen-tolerates, helpful    Pain therapies never tried (not an all inclusive list):   PCA -patient has not used a PCA but is open to using this modality for pain management postoperatively  Ketamine - denies seizures, uncontrolled hypertension, cardiac arrhythmias, PTSD, BPAD, schizophrenia, psychiatric disorders (eg. hallucinations/delirium), history of brain cancer, cardiac failure, previous CVA and increased IOP/glaucoma and is open to using ketamine for pain control.     If immediate assistance is needed please contact the pain service at the number below.     Pito Lopez [facility]  November 20, 2023  3:59 PM    If questions or concerns, please contact the Inpatient Pain Service via [facility]: \"PAIN MANAGEMENT ACUTE INPATIENT/ [facility] or [facility] " (depending on location of patient)    Reviewed and Signed by PAC Pharmacist  Pharmacist: TANA  Date: 11/20/23  Time: 6300   Pito Lopez [facility]

## 2023-11-20 NOTE — PROGRESS NOTES
HPI:  Dilip Kan is a 68 year old male being seen for pubovesical fistula.   RP+EBRT  TUIBN   [facility] with clots once, admitted to hospital.   Incontinent..  Left hip pain for 1 year    His energy and mobility have gone downhill a lot since July 2023. He cannot walk well.      Has been on ADT for years  Chemo for prostate cancer (last in June) but stopped it recently due to infection.     Has lost 50 lbs in the last 6-12 months     SH:   retired  long haul  chews tobacco . Used to smoke  Has lost 50 lbs in the last 6-12 months      Exam:  GENERAL: Healthy, alert and no distress  EYES: Eyes grossly normal to inspection.  No discharge or erythema, or obvious scleral/conjunctival abnormalities.  RESP: No audible wheeze, cough, or visible cyanosis.  No visible retractions or increased work of breathing.    SKIN: Visible skin clear. No significant rash, abnormal pigmentation or lesions.  NEURO: Cranial nerves grossly intact.  Mentation and speech appropriate for age.  PSYCH: Mentation appears normal, affect normal/bright, judgement and insight intact, normal speech and appearance well-groomed.  Pain with hip adduction  Tenderness of pubic symphysis.   No drainage from penis except incontinence  No LLQ scars     Review of Imaging:  The following imaging exams were independently viewed and interpreted by me and discussed with patient:  CT Scan Abd/Pelvis: Abnormal: see MRI  MRI Abd/Pelvis: Abnormal: cavity between bladder and prostate. Pubovesical fistula (large) to left of midline. Left inf ramus fx. 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 Normal L hip.      Review of Labs:  The following labs were reviewed by me and discussed with the patient:        Recent Results (from the past 720 hour(s))   Comprehensive metabolic panel     Collection Time: 11/09/23  3:41 PM   Result Value Ref Range     Sodium 133 (L) 135 - 145 mmol/L     Potassium 4.0 3.4 - 5.3 mmol/L     Carbon Dioxide (CO2)  21 (L) 22 - 29 mmol/L     Anion Gap 12 7 - 15 mmol/L     Urea Nitrogen 13.1 8.0 - 23.0 mg/dL     Creatinine 0.66 (L) 0.67 - 1.17 mg/dL     GFR Estimate >90 >60 mL/min/1.73m2     Calcium 9.3 8.8 - 10.2 mg/dL     Chloride 100 98 - 107 mmol/L     Glucose 206 (H) 70 - 99 mg/dL     Alkaline Phosphatase 87 40 - 129 U/L     AST 14 0 - 45 U/L     ALT <5 0 - 70 U/L     Protein Total 6.7 6.4 - 8.3 g/dL     Albumin 3.0 (L) 3.5 - 5.2 g/dL     Bilirubin Total 0.4 <=1.2 mg/dL   Lactate Dehydrogenase     Collection Time: 11/09/23  3:41 PM   Result Value Ref Range     Lactate Dehydrogenase 228 0 - 250 U/L   PSA tumor marker     Collection Time: 11/09/23  3:41 PM   Result Value Ref Range     PSA Tumor Marker 6.25 0.00 - 6.50 ng/mL   CBC with platelets and differential     Collection Time: 11/09/23  3:41 PM   Result Value Ref Range     WBC Count 9.2 4.0 - 11.0 10e3/uL     RBC Count 4.30 (L) 4.40 - 5.90 10e6/uL     Hemoglobin 10.0 (L) 13.3 - 17.7 g/dL     Hematocrit 32.8 (L) 40.0 - 53.0 %     MCV 76 (L) 78 - 100 fL     MCH 23.3 (L) 26.5 - 33.0 pg     MCHC 30.5 (L) 31.5 - 36.5 g/dL     RDW 16.7 (H) 10.0 - 15.0 %     Platelet Count 419 150 - 450 10e3/uL     % Neutrophils 85 %     % Lymphocytes 9 %     % Monocytes 4 %     % Eosinophils 1 %     % Basophils 1 %     % Immature Granulocytes 0 %     NRBCs per 100 WBC 0 <1 /100     Absolute Neutrophils 7.9 1.6 - 8.3 10e3/uL     Absolute Lymphocytes 0.9 0.8 - 5.3 10e3/uL     Absolute Monocytes 0.4 0.0 - 1.3 10e3/uL     Absolute Eosinophils 0.1 0.0 - 0.7 10e3/uL     Absolute Basophils 0.1 0.0 - 0.2 10e3/uL     Absolute Immature Granulocytes 0.0 <=0.4 10e3/uL     Absolute NRBCs 0.0 10e3/uL            Assessment & Plan   1. 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   2. R/B/A of cystectomy, ileal conduit, L VRAM, pubectomy discussed. Will proceed on 11/30. Major surgery with significant risks especially due to infection present now. Understands most but not all of his pain  should be better. Will not fix his ramus fx.   3. Will culture bone and he understands he will probably need 6 weeks ABx after surgery.   4. Rectus flap  5. Discussed this is a high-risk surgery. Risk of death, pneumonia, MI, bowel obstruction, hernia, persistent infection.      Miki Correa MD  [facility] [place]              Additional Coding Information:    Problems:  5 -- one or more chronic illnesses with severe exacerbation or side effects    Data Reviewed  Review of the result(s) of each unique test - labs as above    Independent interpretation of a test performed by another physician/other qualified health care professional (not separately reported) - MRI and CT    I discussed the case with Dr Guadalupe (plastic surg)  by phone over the weekend.     Level of risk:  5 -- major surgery with patient or procedure risks

## 2023-11-20 NOTE — PATIENT INSTRUCTIONS
Preparing for Your Surgery      Name:  Dilip Kan   MRN:  [id number]   :  1955   Today's Date:  2023       Arriving for surgery:  Surgery date:  Surgery not scheduled yet.  Arrival time:      Pre-Admissions will call you 1-3 business days prior to surgery to confirm surgery date, arrival time, location, and give you additional instructions. (Pre Admission Nursing Office at [phone].)    Please come to: To be determined     [facility] [address]  [address] [place]  [address]       [facility] [address] [facility]     (Address takes you to the [facility].)      [address] [place]  [address]    What can I eat or drink? Pre-Admissions will give you exact times.  -  Begin Clear Liquids the day prior to surgery.  -  You may have clear liquids until 2 hours prior to arrival time.     Examples of clear liquids:  Water  Clear broth  Juices (apple, white grape, white cranberry  and cider) without pulp  Noncarbonated, powder based beverages  (lemonade and Cayden-Aid)  Sodas (Sprite, 7-Up, ginger ale and seltzer)  Coffee or tea (without milk or cream)  Gatorade    -  No Alcohol or cannabis products for at least 24 hours before surgery.     Which medicines can I take?  Hold Aspirin for 7 days before surgery.   Hold Multivitamins for 7 days before surgery.  Hold Supplements for 7 days before surgery.  Hold Ibuprofen (Advil, Motrin) for 1 day before surgery--unless otherwise directed by surgeon.  Hold Naproxen (Aleve) for 4 days before surgery.  Acetaminophen (Tylenol) is okay to take if needed.    - Hold Apixaban (Eliquis) for 72 hours prior to surgery.    -  DO NOT take these medications the day of surgery:  Furosemide (Lasix)  Lisinopril (Zestril)  Potassium Chloride ER (KLOR-CON M)  Hydroxyzine (Atarax)      -  PLEASE TAKE these medications the day of  surgery:  Amlodipine (Norvasc)  Atorvastatin (Lipitor)  Omeprazole (Prilosec)  Oxycodone (Oxycontin)  Prednisone (Deltasone)  Tamsulosin (Flomax)  Acetaminophen (Tylenol) if needed  Lorazepam (Ativan) if needed  Ondansetron (Zofran) if needed OR Prochlorperazine (Compazine) if needed  Oxycodone IR (Roxicodone) if needed  Oxybutynin (Ditropan) if needed    How do I prepare myself?  - Please take 2 showers (one the night prior to surgery and one the morning of surgery) using Scrubcare or Hibiclens soap.    Use this soap only from the neck to your toes.     Leave the soap on your skin for one minute--then rinse thoroughly.      You may use your own shampoo and conditioner. No other hair products.   - Please remove all jewelry and body piercings.  - No lotions, deodorants or fragrance.  - Bring your ID and insurance card.    -If you use a CPAP machine, please bring the CPAP machine, tubing, and mask to hospital.    -If you have a Deep Brain Stimulator, Spinal Cord Stimulator, or any Neuro Stimulator device---you must bring the remote control to the hospital.      ALL PATIENTS GOING HOME THE SAME DAY OF SURGERY ARE REQUIRED TO HAVE A RESPONSIBLE ADULT TO DRIVE AND BE IN ATTENDANCE WITH THEM FOR 24 HOURS FOLLOWING SURGERY.    Covid testing policy as of 12/06/2022  Your surgeon will notify and schedule you for a COVID test if one is needed before surgery--please direct any questions or COVID symptoms to your surgeon      Questions or Concerns:    - For any questions regarding the day of surgery or your hospital stay, please contact the Pre Admission Nursing Office at [phone].       - If you have health changes between today and your surgery, please call your surgeon.       - For questions after surgery, please call your surgeons office.           Current Visitor Guidelines    You may have 2 visitors in the pre op area.    Visiting hours: 8 a.m. to 8:30 p.m.    You may have four visitors during your inpatient hospital  stay.    Patients confirmed or suspected to have symptoms of COVID 19 or flu:     No visitors allowed for adult patients.   Children (under age 18) can have 1 named visitor.     People who are sick or showing symptoms of COVID 19 or flu:    Are not allowed to visit patients--we can only make exceptions in special situations.       Please follow these guidelines for your visit:          Please maintain social distance          Masking is optional--however at times you may be asked to wear a mask for the safety of yourself and others     Clean your hands with alcohol hand . Do this when you arrive at and leave the building and patient room,    And again after you touch your mask or anything in the room.     Go directly to and from the room you are visiting.     Stay in the patient s room during your visit. Limit going to other places in the hospital as much as possible     Leave bags and jackets at home or in the car.     For everyone s health, please don t come and go during your visit. That includes for smoking   during your visit.

## 2023-11-20 NOTE — PROGRESS NOTES
Preoperative Assessment [facility] Medication History Note  Medication history completed on November 20, 2023 by this writer prior to patient's PAC appointment. See Epic admission navigator for prior to admission medications. Operating room staff will still need to confirm medications and last dose information on day of surgery.     Medication history interview sources  Patient and CareEverywhere/SureScripts via phone    Changes made to PTA medication list  Added: lupron, docusate.   Deleted: kdur duplicate, lyrica (stopped, not effective, has been off for months),   Changed: prednisone,     Allergies reviewed with patient and updates made in EHR: yes    -- Note pt has been out of lisinopril and atorvastatin for the past 3-4 days. He is working on getting refills from his new provider (old prescriber not available any longer).     -- Xgeva - received 5/2023 (see therapy plan)  -- No recent (within 30 days) course of antibiotics  -- is on prednisone 5 mg BID   -- Reports being on blood thinning medications - Eliquis - see other notes for details on perioperative plan.   -- Declines being on any other prescription or over-the-counter medications    Prior to Admission medications    Medication Sig Last Dose Taking? Auth Provider Long Term End Date   acetaminophen (TYLENOL) 325 MG tablet Take 975 mg by mouth every 8 hours as needed for mild pain Taking Yes Reported, Patient     amLODIPine (NORVASC) 10 MG tablet Take 1 tablet (10 mg) by mouth daily  Patient taking differently: Take 10 mg by mouth every morning Taking Yes Wei Perez MD Yes    apixaban ANTICOAGULANT (ELIQUIS) 5 MG tablet Take 1 tablet (5 mg) by mouth 2 times daily Taking Yes Erum Fritz MD     docusate sodium (COLACE) 100 MG capsule Take 100 mg by mouth daily Taking Yes Unknown, Entered By History     furosemide (LASIX) 20 MG tablet Take 1 tablet (20 mg) by mouth daily as needed  Patient taking differently: Take 20 mg by mouth daily as needed  (edema) Taking Yes Erum Fritz MD Yes    hydrOXYzine (ATARAX) 10 MG tablet Take 1 tablet (10 mg) by mouth every 6 hours as needed for itching or anxiety (with pain, moderate pain) Taking Yes Haydee Abdul MD     leuprolide (LUPRON DEPOT) 45 MG kit Inject 45 mg into the muscle every 6 months Taking Yes Unknown, Entered By History No    LORazepam (ATIVAN) 0.5 MG tablet Take 1 tablet (0.5 mg) by mouth every 6 hours as needed for anxiety Taking Yes Erum Fritz MD     megestrol (MEGACE) 20 MG tablet Take 20 mg by mouth 2 times daily Taking Yes Unknown, Entered By History No    omeprazole (PRILOSEC) 40 MG DR capsule Take 1 capsule (40 mg) by mouth daily  Patient taking differently: 40 mg every morning Taking Yes ImholWei hicks MD No    ondansetron (ZOFRAN ODT) 8 MG ODT tab Take 1 tablet (8 mg) by mouth every 8 hours as needed for nausea Taking Yes Erum Fritz MD     oxybutynin (DITROPAN) 5 MG tablet Take 5 mg by mouth 4 times daily as needed for bladder spasms Taking Yes Unknown, Entered By History     oxyCODONE (OXYCONTIN) 40 MG 12 hr tablet Take 1 tablet (40 mg) by mouth every 12 hours Taking Yes Erum Fritz MD No    oxyCODONE IR (ROXICODONE) 10 MG tablet Take 1 tablet (10 mg) by mouth every 6 hours as needed for moderate to severe pain Taking Yes Erum Fritz MD No    potassium chloride ER (KLOR-CON M) 20 MEQ CR tablet Take 1 tablet (20 mEq) by mouth daily as needed for potassium supplementation (Take if you take furosemide)  Patient taking differently: Take 20 mEq by mouth 2 times daily Taking Yes Erum Fritz MD     predniSONE (DELTASONE) 5 MG tablet Take 1 tablet (5 mg) by mouth 2 times daily Taking Yes Erum Fritz MD     prochlorperazine (COMPAZINE) 10 MG tablet Take 1 tablet (10 mg) by mouth every 6 hours as needed for nausea or vomiting Taking Yes Erum Fritz MD No    tamsulosin (FLOMAX) 0.4 MG capsule Take 1 capsule by mouth  every morning Taking Yes Reported, Patient     traZODone (DESYREL) 50 MG tablet Take 50 mg by mouth nightly as needed for sleep Taking Yes Reported, Patient No    atorvastatin (LIPITOR) 20 MG tablet Take 1 tablet (20 mg) by mouth daily  Patient not taking: Reported on 11/20/2023 Not Taking  Ezequiel Alejandra MD Yes    lisinopril (ZESTRIL) 40 MG tablet Take 40 mg by mouth every morning  Patient not taking: Reported on 11/20/2023 Not Taking  Unknown, Entered By History No         Medication History Completed By: Pito Lopez RPH 11/20/2023 3:27 PM

## 2023-11-20 NOTE — PROGRESS NOTES
WOC Preoperative Ostomy Consult    Referral from Dr. Paul  Consult attended by patient and spouse  Diagnosis:    Prostate cancer (H)  Fistula, bladder Date of Surgery: 11/30/2023   Type of Surgery: CYSTECTOMY, WITH URETEROILEAL CONDUIT CREATION and Reconstruction of the perineum with abdominal/thigh muscle flap, spy with dr Guadalupe  Emotional readiness for surgery: no acute distress  Physical Limitations: Without limitations  Abdomen assessed for site by: Patient's ability to visiualize area, avoidance of skin creases and scars, palpating for rectus abdominus muscle, and clothing fit  Teaching: Anatomy/picture of stoma, stoma/bowel function postop, intro to pouches, written/media offered, and role of WOC/postop followup explained.  Stoma site marking:  Marking pen and tegaderm     Location chosen: RUQ due to deep appendectomy scar in the lower right  [employer]'s Ostomy packet given to patient  Karolina Sahu NP was available for supervision of care if needed or if questions should arise and regarding plan of care.  Mary Morales RN CWON

## 2023-11-20 NOTE — LETTER
11/20/2023       RE: Dilip Kan  [id number] [place] [address]     Dear Colleague,    Thank you for referring your patient, Dilip Kan, to the [facility] UROLOGY CLINIC [place] at [facility]. Please see a copy of my visit note below.      HPI:  Dilip Kan is a 68 year old male being seen for pubovesical fistula.   RP+EBRT  TUIBN   [facility] with clots once, admitted to hospital.   Incontinent..  Left hip pain for 1 year    His energy and mobility have gone downhill a lot since July 2023. He cannot walk well.      Has been on ADT for years  Chemo for prostate cancer (last in June) but stopped it recently due to infection.     Has lost 50 lbs in the last 6-12 months     SH:   retired  long haul  chews tobacco . Used to smoke  Has lost 50 lbs in the last 6-12 months      Exam:  GENERAL: Healthy, alert and no distress  EYES: Eyes grossly normal to inspection.  No discharge or erythema, or obvious scleral/conjunctival abnormalities.  RESP: No audible wheeze, cough, or visible cyanosis.  No visible retractions or increased work of breathing.    SKIN: Visible skin clear. No significant rash, abnormal pigmentation or lesions.  NEURO: Cranial nerves grossly intact.  Mentation and speech appropriate for age.  PSYCH: Mentation appears normal, affect normal/bright, judgement and insight intact, normal speech and appearance well-groomed.  Pain with hip adduction  Tenderness of pubic symphysis.   No drainage from penis except incontinence  No LLQ scars     Review of Imaging:  The following imaging exams were independently viewed and interpreted by me and discussed with patient:  CT Scan Abd/Pelvis: Abnormal: see MRI  MRI Abd/Pelvis: Abnormal: cavity between bladder and prostate. Pubovesical fistula (large) to left of midline. Left inf ramus fx. 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 Normal L hip.      Review of  Labs:  The following labs were reviewed by me and discussed with the patient:        Recent Results (from the past 720 hour(s))   Comprehensive metabolic panel     Collection Time: 11/09/23  3:41 PM   Result Value Ref Range     Sodium 133 (L) 135 - 145 mmol/L     Potassium 4.0 3.4 - 5.3 mmol/L     Carbon Dioxide (CO2) 21 (L) 22 - 29 mmol/L     Anion Gap 12 7 - 15 mmol/L     Urea Nitrogen 13.1 8.0 - 23.0 mg/dL     Creatinine 0.66 (L) 0.67 - 1.17 mg/dL     GFR Estimate >90 >60 mL/min/1.73m2     Calcium 9.3 8.8 - 10.2 mg/dL     Chloride 100 98 - 107 mmol/L     Glucose 206 (H) 70 - 99 mg/dL     Alkaline Phosphatase 87 40 - 129 U/L     AST 14 0 - 45 U/L     ALT <5 0 - 70 U/L     Protein Total 6.7 6.4 - 8.3 g/dL     Albumin 3.0 (L) 3.5 - 5.2 g/dL     Bilirubin Total 0.4 <=1.2 mg/dL   Lactate Dehydrogenase     Collection Time: 11/09/23  3:41 PM   Result Value Ref Range     Lactate Dehydrogenase 228 0 - 250 U/L   PSA tumor marker     Collection Time: 11/09/23  3:41 PM   Result Value Ref Range     PSA Tumor Marker 6.25 0.00 - 6.50 ng/mL   CBC with platelets and differential     Collection Time: 11/09/23  3:41 PM   Result Value Ref Range     WBC Count 9.2 4.0 - 11.0 10e3/uL     RBC Count 4.30 (L) 4.40 - 5.90 10e6/uL     Hemoglobin 10.0 (L) 13.3 - 17.7 g/dL     Hematocrit 32.8 (L) 40.0 - 53.0 %     MCV 76 (L) 78 - 100 fL     MCH 23.3 (L) 26.5 - 33.0 pg     MCHC 30.5 (L) 31.5 - 36.5 g/dL     RDW 16.7 (H) 10.0 - 15.0 %     Platelet Count 419 150 - 450 10e3/uL     % Neutrophils 85 %     % Lymphocytes 9 %     % Monocytes 4 %     % Eosinophils 1 %     % Basophils 1 %     % Immature Granulocytes 0 %     NRBCs per 100 WBC 0 <1 /100     Absolute Neutrophils 7.9 1.6 - 8.3 10e3/uL     Absolute Lymphocytes 0.9 0.8 - 5.3 10e3/uL     Absolute Monocytes 0.4 0.0 - 1.3 10e3/uL     Absolute Eosinophils 0.1 0.0 - 0.7 10e3/uL     Absolute Basophils 0.1 0.0 - 0.2 10e3/uL     Absolute Immature Granulocytes 0.0 <=0.4 10e3/uL     Absolute NRBCs 0.0  10e3/uL            Assessment & Plan   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   R/B/A of cystectomy, ileal conduit, L VRAM, pubectomy discussed. Will proceed on 11/30. Major surgery with significant risks especially due to infection present now. Understands most but not all of his pain should be better. Will not fix his ramus fx.   Will culture bone and he understands he will probably need 6 weeks ABx after surgery.   Rectus flap  Discussed this is a high-risk surgery. Risk of death, pneumonia, MI, bowel obstruction, hernia, persistent infection.      Miki Correa MD  [facility] UROLOGY CLINIC [place]              Additional Coding Information:    Problems:  5 -- one or more chronic illnesses with severe exacerbation or side effects    Data Reviewed  Review of the result(s) of each unique test - labs as above    Independent interpretation of a test performed by another physician/other qualified health care professional (not separately reported) - MRI and CT    I discussed the case with Dr Guadalupe (plastic surg)  by phone over the weekend.     Level of risk:  5 -- major surgery with patient or procedure risks                Pre Op Teaching Flowsheet       Pre and Post op Patient Education  Surgical procedure:  CYSTECTOMY, WITH URETEROILEAL CONDUIT CREATION   Teaching Topic:  Pre and post op teaching  Person Involved in teaching: Yes    Motivation Level:  Asks Questions: Yes  Eager to Learn: Yes  Cooperative: Yes  Receptive (willing/able to accept information):  Yes    Patient demonstrates understanding of the following:  Date of surgery:  11/30/23  Location of surgery:  3rd Floor[facility]  History and Physical and any other testing necessary prior to surgery: Yes  Required time line for completion of History and Physical and any pre-op testing: Yes    Patient demonstrates understanding of the following:  Pre-op bowel prep:  Yes, 4L golytely.  Ordered to  pharmacy, patient will start 11/28/23.  Antibiotics:  PT does not feel he can give a good urine sample, will order 3 day course of antibiotics.   Pre-op showering/scrub information with PCMX Soap: Yes  Blood thinner medications discussed and when to stop (if applicable):  Yes  Discussed no visitor's at this time due to increase Covid-19 cases and how we need to make sure everyone stays safe.    Infection Prevention:   Patient demonstrates understanding of the following:  Surgical procedure site care taught: Yes  Signs and symptoms of infection taught: Yes      Post-op follow-up:  Discussed how to contact the hospital, nurse, and clinic scheduling staff if necessary. (See packet information)    Instructional materials used/given/mailed:  [place] Surgery Packet, post op teaching sheet, Map, Soap, and with the arrival/location information to come closer to the surgery date.    Surgical instructions packet given to patient in office:  N/A    Follow up: Discussed arranging for someone to drive you home. ( No public transportation)  Someone needed to stay the first twenty hours after surgery: Yes     referral: No      home:  Wife, Pat     Care Giver:  Wife, Pat     PCP:  MD Naomi Castellano RN

## 2023-11-21 ENCOUNTER — VIRTUAL VISIT (OUTPATIENT)
Dept: PLASTIC SURGERY | Facility: CLINIC | Age: 68
End: 2023-11-21
Attending: PLASTIC SURGERY
Payer: COMMERCIAL

## 2023-11-21 ENCOUNTER — PRE VISIT (OUTPATIENT)
Dept: PLASTIC SURGERY | Facility: CLINIC | Age: 68
End: 2023-11-21
Payer: COMMERCIAL

## 2023-11-21 VITALS — HEIGHT: 70 IN | WEIGHT: 175 LBS | BODY MASS INDEX: 25.05 KG/M2

## 2023-11-21 DIAGNOSIS — L98.8 FISTULA: Primary | ICD-10-CM

## 2023-11-21 PROCEDURE — 99203 OFFICE O/P NEW LOW 30 MIN: CPT | Mod: 93 | Performed by: PLASTIC SURGERY

## 2023-11-21 RX ORDER — SODIUM CHLORIDE, SODIUM LACTATE, POTASSIUM CHLORIDE, CALCIUM CHLORIDE 600; 310; 30; 20 MG/100ML; MG/100ML; MG/100ML; MG/100ML
INJECTION, SOLUTION INTRAVENOUS CONTINUOUS
Status: CANCELLED | OUTPATIENT
Start: 2023-11-21

## 2023-11-21 RX ORDER — ACETAMINOPHEN 325 MG/1
975 TABLET ORAL ONCE
Status: CANCELLED | OUTPATIENT
Start: 2023-11-21 | End: 2023-11-21

## 2023-11-21 RX ORDER — LIDOCAINE 40 MG/G
CREAM TOPICAL
Status: CANCELLED | OUTPATIENT
Start: 2023-11-21

## 2023-11-21 NOTE — PROGRESS NOTES
Referring Provider:  Josselin Self, PT  No address on file     Primary Care Provider:  Wei Perez      RE: Dilip Kan.  : 1955.  KATHE: 2023.    Reason for visit: Pubovesical fistula.    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  He is undergoing a cystectomy and pubic bone debridement and requires a rectus muscle flap.    This was a telephone conversation with the patient due to logistic reasons.    Medical history:  Past Medical History:   Diagnosis Date    Elevated prostate specific antigen (PSA)     4/10/2012    Encounter for other administrative examinations     2014    Esophagitis     No Comments Provided    Gastro-esophageal reflux disease without esophagitis     No Comments Provided    Low back pain     No Comments Provided    Malignant neoplasm of prostate (H)     2012    Nicotine dependence, uncomplicated     Quit - 2007 with chantix    Other intervertebral disc degeneration, lumbosacral region     2008       Surgical history:  Past Surgical History:   Procedure Laterality Date    APPENDECTOMY OPEN      Ruptured - [place]    ARTHROPLASTY KNEE Right 2021    Procedure: ARTHROPLASTY, KNEE, TOTAL;  Surgeon: Micah Rock MD;  Location: [facility] OR    COLONOSCOPY  2006    next due in 2016    DISKECTOMY, LUMBAR, SINGLE SP  2009    L 3-4 microdiskectomy, [facility]    ESOPHAGOSCOPY, GASTROSCOPY, DUODENOSCOPY (EGD), COMBINED  2003         ESOPHAGOSCOPY, GASTROSCOPY, DUODENOSCOPY (EGD), COMBINED  2006         PROSTATECTOMY PERINEAL RADICAL  2012    Nerve Sparring, Dr Warner    SPINE SURGERY N/A 2017    L3 to S1 spinal decompression L4/L5 fusion    TONSILLECTOMY, ADENOIDECTOMY, COMBINED      as a child    VARICOCELECTOMY      INCISION AND DRAINAGE,EXCISE VARICOCELE       Family history:  Family History   Problem Relation  Age of Onset    Hypertension Mother         Hypertension,HTN    Other - See Comments Mother          Parkinsons    Heart Disease Father         Heart Disease, passed away from CHF,CAD    Colon Cancer Father         Cancer-colon    Hypertension Father         Hypertension    Other - See Comments Father         Stroke/Dementia    Family History Negative Sister         Good Health,emotional problems.    Family History Negative Sister         Good Health    Other - See Comments Daughter         w/o major medical problems.    Other - See Comments Son         w/o major medical problems.    Family History Negative Other         Good Health    Family History Negative Other         Good Health,previous marriage./previous marriage.    Family History Negative Other         Good Health,previous marriage.    Anesthesia Reaction No family hx of     Venous thrombosis No family hx of        Medications:  Current Outpatient Medications   Medication Sig Dispense Refill    amLODIPine (NORVASC) 10 MG tablet Take 1 tablet (10 mg) by mouth daily (Patient taking differently: Take 10 mg by mouth every morning) 90 tablet 3    apixaban ANTICOAGULANT (ELIQUIS) 5 MG tablet Take 1 tablet (5 mg) by mouth 2 times daily 60 tablet 3    atorvastatin (LIPITOR) 20 MG tablet Take 1 tablet (20 mg) by mouth daily 90 tablet 0    docusate sodium (COLACE) 100 MG capsule Take 100 mg by mouth daily      furosemide (LASIX) 20 MG tablet Take 1 tablet (20 mg) by mouth daily as needed (Patient taking differently: Take 20 mg by mouth daily as needed (edema)) 30 tablet 3    hydrOXYzine (ATARAX) 10 MG tablet Take 1 tablet (10 mg) by mouth every 6 hours as needed for itching or anxiety (with pain, moderate pain) 120 tablet 11    leuprolide (LUPRON DEPOT) 45 MG kit Inject 45 mg into the muscle every 6 months      lisinopril (ZESTRIL) 40 MG tablet Take 40 mg by mouth every morning      LORazepam (ATIVAN) 0.5 MG tablet Take 1 tablet (0.5 mg) by mouth every 6 hours as  needed for anxiety 30 tablet 1    megestrol (MEGACE) 20 MG tablet Take 20 mg by mouth 2 times daily      [START ON 11/27/2023] nitroFURantoin macrocrystal-monohydrate (MACROBID) 100 MG capsule Take 1 capsule (100 mg) by mouth 2 times daily for 3 days 6 capsule 0    omeprazole (PRILOSEC) 40 MG DR capsule Take 1 capsule (40 mg) by mouth daily (Patient taking differently: 40 mg every morning) 90 capsule 0    ondansetron (ZOFRAN ODT) 8 MG ODT tab Take 1 tablet (8 mg) by mouth every 8 hours as needed for nausea 90 tablet 1    oxybutynin (DITROPAN) 5 MG tablet Take 5 mg by mouth 4 times daily as needed for bladder spasms      oxyCODONE (OXYCONTIN) 40 MG 12 hr tablet Take 1 tablet (40 mg) by mouth every 12 hours 60 tablet 0    oxyCODONE IR (ROXICODONE) 10 MG tablet Take 1 tablet (10 mg) by mouth every 6 hours as needed for moderate to severe pain 120 tablet 0    potassium chloride ER (KLOR-CON M) 20 MEQ CR tablet Take 1 tablet (20 mEq) by mouth daily as needed for potassium supplementation (Take if you take furosemide) (Patient taking differently: Take 20 mEq by mouth 2 times daily) 30 tablet 3    predniSONE (DELTASONE) 5 MG tablet Take 1 tablet (5 mg) by mouth 2 times daily 60 tablet 11    prochlorperazine (COMPAZINE) 10 MG tablet Take 1 tablet (10 mg) by mouth every 6 hours as needed for nausea or vomiting 30 tablet 3    tamsulosin (FLOMAX) 0.4 MG capsule Take 1 capsule by mouth every morning      traZODone (DESYREL) 50 MG tablet Take 50 mg by mouth nightly as needed for sleep      acetaminophen (TYLENOL) 325 MG tablet Take 975 mg by mouth every 8 hours as needed for mild pain (Patient not taking: Reported on 11/21/2023)      [START ON 11/28/2023] polyethylene glycol (GOLYTELY) 236 g suspension Take 4,000 mLs by mouth once for 1 dose (Patient not taking: Reported on 11/21/2023) 4000 mL 0       Allergies:  No Known Allergies    Social history:   Social History     Tobacco Use    Smoking status: Former     Packs/day: 1.00  "    Years: 20.00     Additional pack years: 0.00     Total pack years: 20.00     Types: Cigarettes     Quit date: 2002     Years since quittin.0     Passive exposure: Past    Smokeless tobacco: Current     Types: Snuff    Tobacco comments:     Can't remember when all he had passive exposure   Substance Use Topics    Alcohol use: Not Currently         Physical Examination:  Ht 1.778 m (5' 10\")   Wt 79.4 kg (175 lb)   BMI 25.11 kg/m    Body mass index is 25.11 kg/m .    General: No acute distress.    Did not examine him as this was a telephone conversation.        ASSESMENT and PLAN:     Based upon the above findings, a diagnosis of pubovesical fistula with history of radiation therapy to pelvis was made.  I had a andrea, detailed discussion with the patient, in the presence of my nurse, who was present from beginning to end.  I discussed with the patient the pathophysiology behind the problem, the concept behind the procedure/treatment proposed, expectations of the planned procedure(s), and all lynn-operative steps.     I discussed with the patient the concept behind the rectus muscle flap to the pelvis to help with healing after the excision is done.  All questions were answered in detail.  He understood everything.    All risks, benefits and alternatives, including but not limited to (what applies), pain, infection, bleeding, scarring, asymmetry, seromas, hematomas, wound breakdown, wound dehiscence, loss of the implants/flaps, abdominal wall-healing issues, abdominal wall weakness, bulges, hernias, sensation loss, requirement of further staged procedures, Implant specific issues and complications as discussed above, removal of infected or exposed implants, pneumothoraces, contour abnormalities, cannula injuries to deeper structures, hernias, fat necrosis, lumps and bumps, loss of grafted material, loss of flap, requirement of further surgeries, injury to deeper structures, DVT, PE, MI, CVA, pneumonia, " renal failure and death, were explained. They were understood and agreed upon by the patient, they were acknowledged by the patient, all the patient's questions were answered in detail to the patient's fullest understanding that they acknowledged, the team approach for treatment in the operating room was agreed upon by the patient, and proceeding with surgery was agreed upon by the patient.    He is scheduled for surgery on 11/30/2023.  I am happy to help.    All questions were answered. The patient was happy with the visit. I look forward to helping the patient out in the near future as indicated.       Total time spent in the encounter today including chart review, visit itself, and post-visit paperwork was 45 minutes.     The telephone conversation was from 7:45 AM to 8:05 AM.      Melody Guadalupe MD    Chief, Division of Plastic Surgery  Department of Surgery  [facility]      CC: Josselin Self PT  No address on file  CC: Wei Perez

## 2023-11-21 NOTE — NURSING NOTE
Is the patient currently in the state of [place]? YES    Visit mode:VIDEO    If the visit is dropped, the patient can be reconnected by: TELEPHONE VISIT: Phone number: [phone]    Will anyone else be joining the visit? NO  (If patient encounters technical issues they should call [phone]    How would you like to obtain your AVS? MyChart    Are changes needed to the allergy or medication list? No    Reason for visit: Consult    Jackie NICHOLE

## 2023-11-21 NOTE — LETTER
2023         RE: Dilip Kan  [address] [place] [place] [address]        Dear Colleague,    Thank you for referring your patient, Dilip Kan, to the [facility]. Please see a copy of my visit note below.    Referring Provider:  Josselin Self, PT  No address on file     Primary Care Provider:  Wei Perez      RE: Dilip Kan.  : 1955.  KATHE: 2023.    Reason for visit: Pubovesical fistula.    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  He is undergoing a cystectomy and pubic bone debridement and requires a rectus muscle flap.    This was a telephone conversation with the patient due to logistic reasons.    Medical history:  Past Medical History:   Diagnosis Date    Elevated prostate specific antigen (PSA)     4/10/2012    Encounter for other administrative examinations     2014    Esophagitis     No Comments Provided    Gastro-esophageal reflux disease without esophagitis     No Comments Provided    Low back pain     No Comments Provided    Malignant neoplasm of prostate (H)     2012    Nicotine dependence, uncomplicated     Quit - 2007 with chantix    Other intervertebral disc degeneration, lumbosacral region     2008       Surgical history:  Past Surgical History:   Procedure Laterality Date    APPENDECTOMY OPEN      Ruptured - [place]    ARTHROPLASTY KNEE Right 2021    Procedure: ARTHROPLASTY, KNEE, TOTAL;  Surgeon: Micah Rock MD;  Location: [facility] OR    COLONOSCOPY  2006    next due in 2016    DISKECTOMY, LUMBAR, SINGLE SP  2009    L 3-4 microdiskectomy, [facility]    ESOPHAGOSCOPY, GASTROSCOPY, DUODENOSCOPY (EGD), COMBINED  2003         ESOPHAGOSCOPY, GASTROSCOPY, DUODENOSCOPY (EGD), COMBINED  2006         PROSTATECTOMY PERINEAL RADICAL  2012    Nerve Sparring, Dr Warner     SPINE SURGERY N/A 04/28/2017    L3 to S1 spinal decompression L4/L5 fusion    TONSILLECTOMY, ADENOIDECTOMY, COMBINED      as a child    VARICOCELECTOMY  1959    INCISION AND DRAINAGE,EXCISE VARICOCELE       Family history:  Family History   Problem Relation Age of Onset    Hypertension Mother         Hypertension,HTN    Other - See Comments Mother          Parkinsons    Heart Disease Father         Heart Disease, passed away from CHF,CAD    Colon Cancer Father         Cancer-colon    Hypertension Father         Hypertension    Other - See Comments Father         Stroke/Dementia    Family History Negative Sister         Good Health,emotional problems.    Family History Negative Sister         Good Health    Other - See Comments Daughter         w/o major medical problems.    Other - See Comments Son         w/o major medical problems.    Family History Negative Other         Good Health    Family History Negative Other         Good Health,previous marriage./previous marriage.    Family History Negative Other         Good Health,previous marriage.    Anesthesia Reaction No family hx of     Venous thrombosis No family hx of        Medications:  Current Outpatient Medications   Medication Sig Dispense Refill    amLODIPine (NORVASC) 10 MG tablet Take 1 tablet (10 mg) by mouth daily (Patient taking differently: Take 10 mg by mouth every morning) 90 tablet 3    apixaban ANTICOAGULANT (ELIQUIS) 5 MG tablet Take 1 tablet (5 mg) by mouth 2 times daily 60 tablet 3    atorvastatin (LIPITOR) 20 MG tablet Take 1 tablet (20 mg) by mouth daily 90 tablet 0    docusate sodium (COLACE) 100 MG capsule Take 100 mg by mouth daily      furosemide (LASIX) 20 MG tablet Take 1 tablet (20 mg) by mouth daily as needed (Patient taking differently: Take 20 mg by mouth daily as needed (edema)) 30 tablet 3    hydrOXYzine (ATARAX) 10 MG tablet Take 1 tablet (10 mg) by mouth every 6 hours as needed for itching or anxiety (with pain, moderate  pain) 120 tablet 11    leuprolide (LUPRON DEPOT) 45 MG kit Inject 45 mg into the muscle every 6 months      lisinopril (ZESTRIL) 40 MG tablet Take 40 mg by mouth every morning      LORazepam (ATIVAN) 0.5 MG tablet Take 1 tablet (0.5 mg) by mouth every 6 hours as needed for anxiety 30 tablet 1    megestrol (MEGACE) 20 MG tablet Take 20 mg by mouth 2 times daily      [START ON 11/27/2023] nitroFURantoin macrocrystal-monohydrate (MACROBID) 100 MG capsule Take 1 capsule (100 mg) by mouth 2 times daily for 3 days 6 capsule 0    omeprazole (PRILOSEC) 40 MG DR capsule Take 1 capsule (40 mg) by mouth daily (Patient taking differently: 40 mg every morning) 90 capsule 0    ondansetron (ZOFRAN ODT) 8 MG ODT tab Take 1 tablet (8 mg) by mouth every 8 hours as needed for nausea 90 tablet 1    oxybutynin (DITROPAN) 5 MG tablet Take 5 mg by mouth 4 times daily as needed for bladder spasms      oxyCODONE (OXYCONTIN) 40 MG 12 hr tablet Take 1 tablet (40 mg) by mouth every 12 hours 60 tablet 0    oxyCODONE IR (ROXICODONE) 10 MG tablet Take 1 tablet (10 mg) by mouth every 6 hours as needed for moderate to severe pain 120 tablet 0    potassium chloride ER (KLOR-CON M) 20 MEQ CR tablet Take 1 tablet (20 mEq) by mouth daily as needed for potassium supplementation (Take if you take furosemide) (Patient taking differently: Take 20 mEq by mouth 2 times daily) 30 tablet 3    predniSONE (DELTASONE) 5 MG tablet Take 1 tablet (5 mg) by mouth 2 times daily 60 tablet 11    prochlorperazine (COMPAZINE) 10 MG tablet Take 1 tablet (10 mg) by mouth every 6 hours as needed for nausea or vomiting 30 tablet 3    tamsulosin (FLOMAX) 0.4 MG capsule Take 1 capsule by mouth every morning      traZODone (DESYREL) 50 MG tablet Take 50 mg by mouth nightly as needed for sleep      acetaminophen (TYLENOL) 325 MG tablet Take 975 mg by mouth every 8 hours as needed for mild pain (Patient not taking: Reported on 11/21/2023)      [START ON 11/28/2023] polyethylene  "glycol (GOLYTELY) 236 g suspension Take 4,000 mLs by mouth once for 1 dose (Patient not taking: Reported on 2023) 4000 mL 0       Allergies:  No Known Allergies    Social history:   Social History     Tobacco Use    Smoking status: Former     Packs/day: 1.00     Years: 20.00     Additional pack years: 0.00     Total pack years: 20.00     Types: Cigarettes     Quit date: 2002     Years since quittin.0     Passive exposure: Past    Smokeless tobacco: Current     Types: Snuff    Tobacco comments:     Can't remember when all he had passive exposure   Substance Use Topics    Alcohol use: Not Currently         Physical Examination:  Ht 1.778 m (5' 10\")   Wt 79.4 kg (175 lb)   BMI 25.11 kg/m    Body mass index is 25.11 kg/m .    General: No acute distress.    Did not examine him as this was a telephone conversation.        ASSESMENT and PLAN:     Based upon the above findings, a diagnosis of pubovesical fistula with history of radiation therapy to pelvis was made.  I had a andrea, detailed discussion with the patient, in the presence of my nurse, who was present from beginning to end.  I discussed with the patient the pathophysiology behind the problem, the concept behind the procedure/treatment proposed, expectations of the planned procedure(s), and all lynn-operative steps.     I discussed with the patient the concept behind the rectus muscle flap to the pelvis to help with healing after the excision is done.  All questions were answered in detail.  He understood everything.    All risks, benefits and alternatives, including but not limited to (what applies), pain, infection, bleeding, scarring, asymmetry, seromas, hematomas, wound breakdown, wound dehiscence, loss of the implants/flaps, abdominal wall-healing issues, abdominal wall weakness, bulges, hernias, sensation loss, requirement of further staged procedures, Implant specific issues and complications as discussed above, removal of infected or " exposed implants, pneumothoraces, contour abnormalities, cannula injuries to deeper structures, hernias, fat necrosis, lumps and bumps, loss of grafted material, loss of flap, requirement of further surgeries, injury to deeper structures, DVT, PE, MI, CVA, pneumonia, renal failure and death, were explained. They were understood and agreed upon by the patient, they were acknowledged by the patient, all the patient's questions were answered in detail to the patient's fullest understanding that they acknowledged, the team approach for treatment in the operating room was agreed upon by the patient, and proceeding with surgery was agreed upon by the patient.    He is scheduled for surgery on 11/30/2023.  I am happy to help.    All questions were answered. The patient was happy with the visit. I look forward to helping the patient out in the near future as indicated.       Total time spent in the encounter today including chart review, visit itself, and post-visit paperwork was 45 minutes.     The telephone conversation was from 7:45 AM to 8:05 AM.      Melody Guadalupe MD    Chief, Division of Plastic Surgery  Department of Surgery  [facility]

## 2023-11-21 NOTE — PROGRESS NOTES
"Virtual Visit Details    Type of service:  Video Visit   Video Start Time: {video visit start/end time for provider to select:640152}  Video End Time:{video visit start/end time for provider to select:616069}    Originating Location (pt. Location): {video visit patient location:779395::\"Home\"}  {PROVIDER LOCATION On-site should be selected for visits conducted from your clinic location or adjoining [facility] hospital, academic office, or other nearby [facility] building. Off-site should be selected for all other provider locations, including home:[address]}  Distant Location (provider location):  {virtual location provider:508106}  Platform used for Video Visit: {Virtual Visit Platforms:916007::\"[employer]\"}  "

## 2023-11-22 ENCOUNTER — TELEPHONE (OUTPATIENT)
Dept: SURGERY | Facility: CLINIC | Age: 68
End: 2023-11-22
Payer: COMMERCIAL

## 2023-11-22 RX ORDER — ALBUTEROL SULFATE 90 UG/1
1-2 AEROSOL, METERED RESPIRATORY (INHALATION)
Status: CANCELLED
Start: 2023-11-22

## 2023-11-22 RX ORDER — MEPERIDINE HYDROCHLORIDE 25 MG/ML
25 INJECTION INTRAMUSCULAR; INTRAVENOUS; SUBCUTANEOUS EVERY 30 MIN PRN
Status: CANCELLED | OUTPATIENT
Start: 2023-11-22

## 2023-11-22 RX ORDER — DIPHENHYDRAMINE HYDROCHLORIDE 50 MG/ML
50 INJECTION INTRAMUSCULAR; INTRAVENOUS
Status: CANCELLED
Start: 2023-11-22

## 2023-11-22 RX ORDER — METHYLPREDNISOLONE SODIUM SUCCINATE 125 MG/2ML
125 INJECTION, POWDER, LYOPHILIZED, FOR SOLUTION INTRAMUSCULAR; INTRAVENOUS
Status: CANCELLED
Start: 2023-11-22

## 2023-11-22 RX ORDER — HEPARIN SODIUM,PORCINE 10 UNIT/ML
5-20 VIAL (ML) INTRAVENOUS DAILY PRN
Status: CANCELLED | OUTPATIENT
Start: 2023-11-22

## 2023-11-22 RX ORDER — ALBUTEROL SULFATE 0.83 MG/ML
2.5 SOLUTION RESPIRATORY (INHALATION)
Status: CANCELLED | OUTPATIENT
Start: 2023-11-22

## 2023-11-22 RX ORDER — EPINEPHRINE 1 MG/ML
0.3 INJECTION, SOLUTION, CONCENTRATE INTRAVENOUS EVERY 5 MIN PRN
Status: CANCELLED | OUTPATIENT
Start: 2023-11-22

## 2023-11-22 RX ORDER — HEPARIN SODIUM (PORCINE) LOCK FLUSH IV SOLN 100 UNIT/ML 100 UNIT/ML
5 SOLUTION INTRAVENOUS
Status: CANCELLED | OUTPATIENT
Start: 2023-11-22

## 2023-11-22 NOTE — TELEPHONE ENCOUNTER
Called [facility] to follow up about [facility] getting set up prior to surgery on 11/30. Spoke to [facility] @ [phone]. They are waiting for prior authorization from insurance and will then scheduled. They are hopeful it will be prior to surgery. Will follow up as needed.

## 2023-11-22 NOTE — OR NURSING
Attempting to get patient iron infusion prior to surgery on 10/30 at [facility]. Waiting on prior authorization. Provider requests to reach out if order changed to [facility] he could be seen early. Talked with Charlotte at [facility] [phone], she spoke to prior authorization at clinic, would help to change order. PAC provider notified and will work on. Will continue to follow up.

## 2023-11-24 ENCOUNTER — TELEPHONE (OUTPATIENT)
Dept: INFUSION THERAPY | Facility: OTHER | Age: 68
End: 2023-11-24
Payer: COMMERCIAL

## 2023-11-24 NOTE — NURSING NOTE
Patient called regarding iron infusion and questioning surgery prep and returning call.  [facility] will call [facility] and speak to surgeon's office. Alerted patient he is in our schedule on the 29th. Monica Cheng RN on 11/24/2023 at 1:31 PM

## 2023-11-29 ENCOUNTER — TELEPHONE (OUTPATIENT)
Dept: UROLOGY | Facility: CLINIC | Age: 68
End: 2023-11-29
Payer: COMMERCIAL

## 2023-11-29 ENCOUNTER — ANESTHESIA EVENT (OUTPATIENT)
Dept: SURGERY | Facility: CLINIC | Age: 68
DRG: 653 | End: 2023-11-29
Payer: COMMERCIAL

## 2023-11-29 NOTE — TELEPHONE ENCOUNTER
This writer returning the patients call.  He was wondering if his wife is allowed to stay in the room with him after surgery  This writer let patient know that it will depend on room availability.  Jermain expressed he is feeling very anxious about surgery tomorrow.  He is aware of his surgery time, check in time, and when to stop drinking clear liquids.  He will reach out to this writer if he has any other questions or concerns.  He states that he has been taking the golytly and also will have his last dose of antibiotics tonight.      Naomi Falk RN   3:01 PM

## 2023-11-30 ENCOUNTER — ANESTHESIA (OUTPATIENT)
Dept: SURGERY | Facility: CLINIC | Age: 68
DRG: 653 | End: 2023-11-30
Payer: COMMERCIAL

## 2023-11-30 ENCOUNTER — HOSPITAL ENCOUNTER (INPATIENT)
Facility: CLINIC | Age: 68
LOS: 11 days | Discharge: HOME | DRG: 653 | End: 2023-12-11
Attending: UROLOGY | Admitting: UROLOGY
Payer: COMMERCIAL

## 2023-11-30 ENCOUNTER — APPOINTMENT (OUTPATIENT)
Dept: GENERAL RADIOLOGY | Facility: CLINIC | Age: 68
DRG: 653 | End: 2023-11-30
Attending: UROLOGY
Payer: COMMERCIAL

## 2023-11-30 DIAGNOSIS — N30.40 RADIATION CYSTITIS: ICD-10-CM

## 2023-11-30 DIAGNOSIS — Z43.6 ATTENTION TO UROSTOMY (H): Primary | ICD-10-CM

## 2023-11-30 DIAGNOSIS — N36.0 URINARY FISTULA: ICD-10-CM

## 2023-11-30 LAB
ANION GAP SERPL CALCULATED.3IONS-SCNC: 13 MMOL/L (ref 7–15)
BUN SERPL-MCNC: 7.5 MG/DL (ref 8–23)
CALCIUM SERPL-MCNC: 9 MG/DL (ref 8.8–10.2)
CHLORIDE SERPL-SCNC: 103 MMOL/L (ref 98–107)
CREAT SERPL-MCNC: 0.63 MG/DL (ref 0.67–1.17)
DEPRECATED HCO3 PLAS-SCNC: 21 MMOL/L (ref 22–29)
EGFRCR SERPLBLD CKD-EPI 2021: >90 ML/MIN/1.73M2
GLUCOSE BLDC GLUCOMTR-MCNC: 138 MG/DL (ref 70–99)
GLUCOSE BLDC GLUCOMTR-MCNC: 144 MG/DL (ref 70–99)
GLUCOSE BLDC GLUCOMTR-MCNC: 166 MG/DL (ref 70–99)
GLUCOSE BLDC GLUCOMTR-MCNC: 168 MG/DL (ref 70–99)
GLUCOSE BLDC GLUCOMTR-MCNC: 181 MG/DL (ref 70–99)
GLUCOSE BLDC GLUCOMTR-MCNC: 196 MG/DL (ref 70–99)
GLUCOSE SERPL-MCNC: 155 MG/DL (ref 70–99)
HGB BLD-MCNC: 9.4 G/DL (ref 13.3–17.7)
PLATELET # BLD AUTO: 379 10E3/UL (ref 150–450)
POTASSIUM SERPL-SCNC: 4.1 MMOL/L (ref 3.4–5.3)
SODIUM SERPL-SCNC: 137 MMOL/L (ref 135–145)

## 2023-11-30 PROCEDURE — 250N000025 HC SEVOFLURANE, PER MIN: Performed by: UROLOGY

## 2023-11-30 PROCEDURE — 0QB30ZX EXCISION OF LEFT PELVIC BONE, OPEN APPROACH, DIAGNOSTIC: ICD-10-PCS | Performed by: UROLOGY

## 2023-11-30 PROCEDURE — 88305 TISSUE EXAM BY PATHOLOGIST: CPT | Mod: 26 | Performed by: PATHOLOGY

## 2023-11-30 PROCEDURE — 250N000013 HC RX MED GY IP 250 OP 250 PS 637

## 2023-11-30 PROCEDURE — 88342 IMHCHEM/IMCYTCHM 1ST ANTB: CPT | Mod: TC | Performed by: UROLOGY

## 2023-11-30 PROCEDURE — 36415 COLL VENOUS BLD VENIPUNCTURE: CPT

## 2023-11-30 PROCEDURE — 87102 FUNGUS ISOLATION CULTURE: CPT | Performed by: UROLOGY

## 2023-11-30 PROCEDURE — 999N000065 XR ABDOMEN PORT 1 VIEW

## 2023-11-30 PROCEDURE — 999N000141 HC STATISTIC PRE-PROCEDURE NURSING ASSESSMENT: Performed by: UROLOGY

## 2023-11-30 PROCEDURE — 88311 DECALCIFY TISSUE: CPT | Mod: TC | Performed by: UROLOGY

## 2023-11-30 PROCEDURE — 0DNW0ZZ RELEASE PERITONEUM, OPEN APPROACH: ICD-10-PCS | Performed by: UROLOGY

## 2023-11-30 PROCEDURE — 0TTB0ZZ RESECTION OF BLADDER, OPEN APPROACH: ICD-10-PCS | Performed by: UROLOGY

## 2023-11-30 PROCEDURE — 250N000011 HC RX IP 250 OP 636: Mod: JZ | Performed by: NURSE ANESTHETIST, CERTIFIED REGISTERED

## 2023-11-30 PROCEDURE — 250N000011 HC RX IP 250 OP 636: Mod: JZ | Performed by: UROLOGY

## 2023-11-30 PROCEDURE — 87106 FUNGI IDENTIFICATION YEAST: CPT | Performed by: UROLOGY

## 2023-11-30 PROCEDURE — 250N000009 HC RX 250: Performed by: NURSE ANESTHETIST, CERTIFIED REGISTERED

## 2023-11-30 PROCEDURE — C1769 GUIDE WIRE: HCPCS | Performed by: UROLOGY

## 2023-11-30 PROCEDURE — 360N000078 HC SURGERY LEVEL 5, PER MIN: Performed by: UROLOGY

## 2023-11-30 PROCEDURE — 15734 MUSCLE-SKIN GRAFT TRUNK: CPT | Mod: 62 | Performed by: PLASTIC SURGERY

## 2023-11-30 PROCEDURE — 88342 IMHCHEM/IMCYTCHM 1ST ANTB: CPT | Mod: 26 | Performed by: PATHOLOGY

## 2023-11-30 PROCEDURE — 85018 HEMOGLOBIN: CPT

## 2023-11-30 PROCEDURE — 88341 IMHCHEM/IMCYTCHM EA ADD ANTB: CPT | Mod: 26 | Performed by: PATHOLOGY

## 2023-11-30 PROCEDURE — 0DBB0ZZ EXCISION OF ILEUM, OPEN APPROACH: ICD-10-PCS | Performed by: UROLOGY

## 2023-11-30 PROCEDURE — C9290 INJ, BUPIVACAINE LIPOSOME: HCPCS | Performed by: STUDENT IN AN ORGANIZED HEALTH CARE EDUCATION/TRAINING PROGRAM

## 2023-11-30 PROCEDURE — 0KXL0Z2 TRANSFER LEFT ABDOMEN MUSCLE WITH SKIN AND SUBCUTANEOUS TISSUE, OPEN APPROACH: ICD-10-PCS | Performed by: PLASTIC SURGERY

## 2023-11-30 PROCEDURE — 250N000011 HC RX IP 250 OP 636: Mod: JZ

## 2023-11-30 PROCEDURE — 120N000002 HC R&B MED SURG/OB UMMC

## 2023-11-30 PROCEDURE — 272N000001 HC OR GENERAL SUPPLY STERILE: Performed by: UROLOGY

## 2023-11-30 PROCEDURE — 250N000011 HC RX IP 250 OP 636: Mod: JZ | Performed by: STUDENT IN AN ORGANIZED HEALTH CARE EDUCATION/TRAINING PROGRAM

## 2023-11-30 PROCEDURE — 250N000013 HC RX MED GY IP 250 OP 250 PS 637: Performed by: PHYSICIAN ASSISTANT

## 2023-11-30 PROCEDURE — 370N000017 HC ANESTHESIA TECHNICAL FEE, PER MIN: Performed by: UROLOGY

## 2023-11-30 PROCEDURE — 710N000010 HC RECOVERY PHASE 1, LEVEL 2, PER MIN: Performed by: UROLOGY

## 2023-11-30 PROCEDURE — 80048 BASIC METABOLIC PNL TOTAL CA: CPT

## 2023-11-30 PROCEDURE — 0T1807C BYPASS BILATERAL URETERS TO ILEOCUTANEOUS WITH AUTOLOGOUS TISSUE SUBSTITUTE, OPEN APPROACH: ICD-10-PCS | Performed by: UROLOGY

## 2023-11-30 PROCEDURE — 74018 RADEX ABDOMEN 1 VIEW: CPT | Mod: 26 | Performed by: RADIOLOGY

## 2023-11-30 PROCEDURE — 85049 AUTOMATED PLATELET COUNT: CPT

## 2023-11-30 PROCEDURE — C2617 STENT, NON-COR, TEM W/O DEL: HCPCS | Performed by: UROLOGY

## 2023-11-30 PROCEDURE — 258N000003 HC RX IP 258 OP 636

## 2023-11-30 PROCEDURE — 87075 CULTR BACTERIA EXCEPT BLOOD: CPT | Performed by: UROLOGY

## 2023-11-30 PROCEDURE — 250N000011 HC RX IP 250 OP 636: Performed by: STUDENT IN AN ORGANIZED HEALTH CARE EDUCATION/TRAINING PROGRAM

## 2023-11-30 PROCEDURE — 272N000002 HC OR SUPPLY OTHER OPNP: Performed by: UROLOGY

## 2023-11-30 PROCEDURE — 86923 COMPATIBILITY TEST ELECTRIC: CPT | Performed by: PHYSICIAN ASSISTANT

## 2023-11-30 PROCEDURE — 88311 DECALCIFY TISSUE: CPT | Mod: 26 | Performed by: PATHOLOGY

## 2023-11-30 PROCEDURE — 258N000003 HC RX IP 258 OP 636: Performed by: NURSE ANESTHETIST, CERTIFIED REGISTERED

## 2023-11-30 DEVICE — URINARY DIVERSION STENT SET
Type: IMPLANTABLE DEVICE | Site: URETER | Status: FUNCTIONAL
Brand: PERCUFLEX™ URINARY DIVERSION STENT SET

## 2023-11-30 RX ORDER — LIDOCAINE 40 MG/G
CREAM TOPICAL
Status: ACTIVE | OUTPATIENT
Start: 2023-11-30 | End: 2023-12-03

## 2023-11-30 RX ORDER — HALOPERIDOL 5 MG/ML
1 INJECTION INTRAMUSCULAR
Status: DISCONTINUED | OUTPATIENT
Start: 2023-11-30 | End: 2023-11-30 | Stop reason: HOSPADM

## 2023-11-30 RX ORDER — FENTANYL CITRATE 50 UG/ML
25 INJECTION, SOLUTION INTRAMUSCULAR; INTRAVENOUS EVERY 5 MIN PRN
Status: DISCONTINUED | OUTPATIENT
Start: 2023-11-30 | End: 2023-11-30 | Stop reason: HOSPADM

## 2023-11-30 RX ORDER — HYDROMORPHONE HCL IN WATER/PF 6 MG/30 ML
.3-.5 PATIENT CONTROLLED ANALGESIA SYRINGE INTRAVENOUS
Status: DISCONTINUED | OUTPATIENT
Start: 2023-11-30 | End: 2023-12-06

## 2023-11-30 RX ORDER — ALBUTEROL SULFATE 0.83 MG/ML
2.5 SOLUTION RESPIRATORY (INHALATION) EVERY 4 HOURS PRN
Status: DISCONTINUED | OUTPATIENT
Start: 2023-11-30 | End: 2023-11-30 | Stop reason: HOSPADM

## 2023-11-30 RX ORDER — SODIUM CHLORIDE 9 MG/ML
INJECTION, SOLUTION INTRAVENOUS CONTINUOUS
Status: DISCONTINUED | OUTPATIENT
Start: 2023-11-30 | End: 2023-12-03

## 2023-11-30 RX ORDER — ONDANSETRON 2 MG/ML
4 INJECTION INTRAMUSCULAR; INTRAVENOUS EVERY 30 MIN PRN
Status: DISCONTINUED | OUTPATIENT
Start: 2023-11-30 | End: 2023-11-30 | Stop reason: HOSPADM

## 2023-11-30 RX ORDER — HYDROXYZINE HYDROCHLORIDE 10 MG/1
10 TABLET, FILM COATED ORAL EVERY 6 HOURS PRN
Status: DISCONTINUED | OUTPATIENT
Start: 2023-11-30 | End: 2023-12-11 | Stop reason: HOSPADM

## 2023-11-30 RX ORDER — CEFAZOLIN SODIUM/WATER 2 G/20 ML
2 SYRINGE (ML) INTRAVENOUS
Status: DISCONTINUED | OUTPATIENT
Start: 2023-11-30 | End: 2023-11-30 | Stop reason: HOSPADM

## 2023-11-30 RX ORDER — HEPARIN SODIUM 5000 [USP'U]/.5ML
5000 INJECTION, SOLUTION INTRAVENOUS; SUBCUTANEOUS
Status: DISCONTINUED | OUTPATIENT
Start: 2023-11-30 | End: 2023-11-30 | Stop reason: HOSPADM

## 2023-11-30 RX ORDER — SODIUM CHLORIDE, SODIUM LACTATE, POTASSIUM CHLORIDE, CALCIUM CHLORIDE 600; 310; 30; 20 MG/100ML; MG/100ML; MG/100ML; MG/100ML
INJECTION, SOLUTION INTRAVENOUS CONTINUOUS
Status: DISCONTINUED | OUTPATIENT
Start: 2023-11-30 | End: 2023-11-30 | Stop reason: HOSPADM

## 2023-11-30 RX ORDER — ONDANSETRON 4 MG/1
4 TABLET, ORALLY DISINTEGRATING ORAL EVERY 30 MIN PRN
Status: DISCONTINUED | OUTPATIENT
Start: 2023-11-30 | End: 2023-11-30 | Stop reason: HOSPADM

## 2023-11-30 RX ORDER — HYDRALAZINE HYDROCHLORIDE 20 MG/ML
2.5-5 INJECTION INTRAMUSCULAR; INTRAVENOUS EVERY 10 MIN PRN
Status: DISCONTINUED | OUTPATIENT
Start: 2023-11-30 | End: 2023-11-30 | Stop reason: HOSPADM

## 2023-11-30 RX ORDER — KETAMINE HYDROCHLORIDE 10 MG/ML
INJECTION INTRAMUSCULAR; INTRAVENOUS PRN
Status: DISCONTINUED | OUTPATIENT
Start: 2023-11-30 | End: 2023-11-30

## 2023-11-30 RX ORDER — TRAZODONE HYDROCHLORIDE 50 MG/1
50 TABLET, FILM COATED ORAL
Status: DISCONTINUED | OUTPATIENT
Start: 2023-11-30 | End: 2023-12-11 | Stop reason: HOSPADM

## 2023-11-30 RX ORDER — LORAZEPAM 0.5 MG/1
0.5 TABLET ORAL EVERY 6 HOURS PRN
Status: DISCONTINUED | OUTPATIENT
Start: 2023-11-30 | End: 2023-12-11 | Stop reason: HOSPADM

## 2023-11-30 RX ORDER — KETOROLAC TROMETHAMINE 30 MG/ML
15 INJECTION, SOLUTION INTRAMUSCULAR; INTRAVENOUS ONCE
Status: COMPLETED | OUTPATIENT
Start: 2023-11-30 | End: 2023-11-30

## 2023-11-30 RX ORDER — LANOLIN ALCOHOL/MO/W.PET/CERES
3 CREAM (GRAM) TOPICAL
Status: DISCONTINUED | OUTPATIENT
Start: 2023-11-30 | End: 2023-12-11 | Stop reason: HOSPADM

## 2023-11-30 RX ORDER — LABETALOL HYDROCHLORIDE 5 MG/ML
20 INJECTION, SOLUTION INTRAVENOUS EVERY 6 HOURS PRN
Status: DISCONTINUED | OUTPATIENT
Start: 2023-11-30 | End: 2023-12-11 | Stop reason: HOSPADM

## 2023-11-30 RX ORDER — HEPARIN SODIUM 5000 [USP'U]/.5ML
5000 INJECTION, SOLUTION INTRAVENOUS; SUBCUTANEOUS EVERY 8 HOURS
Status: DISCONTINUED | OUTPATIENT
Start: 2023-11-30 | End: 2023-12-02

## 2023-11-30 RX ORDER — LIDOCAINE 40 MG/G
CREAM TOPICAL
Status: DISCONTINUED | OUTPATIENT
Start: 2023-11-30 | End: 2023-11-30 | Stop reason: HOSPADM

## 2023-11-30 RX ORDER — NALOXONE HYDROCHLORIDE 0.4 MG/ML
0.4 INJECTION, SOLUTION INTRAMUSCULAR; INTRAVENOUS; SUBCUTANEOUS
Status: DISCONTINUED | OUTPATIENT
Start: 2023-11-30 | End: 2023-11-30 | Stop reason: HOSPADM

## 2023-11-30 RX ORDER — AMLODIPINE BESYLATE 10 MG/1
10 TABLET ORAL EVERY MORNING
Status: DISCONTINUED | OUTPATIENT
Start: 2023-12-01 | End: 2023-12-11 | Stop reason: HOSPADM

## 2023-11-30 RX ORDER — NALOXONE HYDROCHLORIDE 0.4 MG/ML
0.2 INJECTION, SOLUTION INTRAMUSCULAR; INTRAVENOUS; SUBCUTANEOUS
Status: DISCONTINUED | OUTPATIENT
Start: 2023-11-30 | End: 2023-11-30 | Stop reason: HOSPADM

## 2023-11-30 RX ORDER — ERTAPENEM 1 G/1
1 INJECTION, POWDER, LYOPHILIZED, FOR SOLUTION INTRAMUSCULAR; INTRAVENOUS EVERY 24 HOURS
Status: DISCONTINUED | OUTPATIENT
Start: 2023-11-30 | End: 2023-11-30 | Stop reason: HOSPADM

## 2023-11-30 RX ORDER — FLUMAZENIL 0.1 MG/ML
0.2 INJECTION, SOLUTION INTRAVENOUS
Status: DISCONTINUED | OUTPATIENT
Start: 2023-11-30 | End: 2023-11-30 | Stop reason: HOSPADM

## 2023-11-30 RX ORDER — METHOCARBAMOL 100 MG/ML
500 INJECTION, SOLUTION INTRAMUSCULAR; INTRAVENOUS EVERY 6 HOURS
Status: DISCONTINUED | OUTPATIENT
Start: 2023-11-30 | End: 2023-11-30

## 2023-11-30 RX ORDER — ERTAPENEM 1 G/1
1 INJECTION, POWDER, LYOPHILIZED, FOR SOLUTION INTRAMUSCULAR; INTRAVENOUS EVERY 24 HOURS
Status: DISCONTINUED | OUTPATIENT
Start: 2023-12-01 | End: 2023-12-03

## 2023-11-30 RX ORDER — LABETALOL HYDROCHLORIDE 5 MG/ML
10 INJECTION, SOLUTION INTRAVENOUS
Status: DISCONTINUED | OUTPATIENT
Start: 2023-11-30 | End: 2023-11-30 | Stop reason: HOSPADM

## 2023-11-30 RX ORDER — SODIUM CHLORIDE, SODIUM LACTATE, POTASSIUM CHLORIDE, CALCIUM CHLORIDE 600; 310; 30; 20 MG/100ML; MG/100ML; MG/100ML; MG/100ML
INJECTION, SOLUTION INTRAVENOUS CONTINUOUS PRN
Status: DISCONTINUED | OUTPATIENT
Start: 2023-11-30 | End: 2023-11-30

## 2023-11-30 RX ORDER — ACETAMINOPHEN 325 MG/1
975 TABLET ORAL ONCE
Status: COMPLETED | OUTPATIENT
Start: 2023-11-30 | End: 2023-11-30

## 2023-11-30 RX ORDER — METHOCARBAMOL 500 MG/1
500 TABLET, FILM COATED ORAL 4 TIMES DAILY
Status: DISCONTINUED | OUTPATIENT
Start: 2023-11-30 | End: 2023-11-30

## 2023-11-30 RX ORDER — OXYCODONE HYDROCHLORIDE 10 MG/1
10 TABLET ORAL
Status: DISCONTINUED | OUTPATIENT
Start: 2023-11-30 | End: 2023-12-01

## 2023-11-30 RX ORDER — ACETAMINOPHEN 325 MG/1
975 TABLET ORAL EVERY 8 HOURS
Status: DISCONTINUED | OUTPATIENT
Start: 2023-11-30 | End: 2023-12-11 | Stop reason: HOSPADM

## 2023-11-30 RX ORDER — LIDOCAINE 40 MG/G
CREAM TOPICAL
Status: DISCONTINUED | OUTPATIENT
Start: 2023-11-30 | End: 2023-12-11 | Stop reason: HOSPADM

## 2023-11-30 RX ORDER — HYDROMORPHONE HCL IN WATER/PF 6 MG/30 ML
0.4 PATIENT CONTROLLED ANALGESIA SYRINGE INTRAVENOUS EVERY 5 MIN PRN
Status: DISCONTINUED | OUTPATIENT
Start: 2023-11-30 | End: 2023-11-30 | Stop reason: HOSPADM

## 2023-11-30 RX ORDER — FENTANYL CITRATE 50 UG/ML
50 INJECTION, SOLUTION INTRAMUSCULAR; INTRAVENOUS EVERY 5 MIN PRN
Status: DISCONTINUED | OUTPATIENT
Start: 2023-11-30 | End: 2023-11-30 | Stop reason: HOSPADM

## 2023-11-30 RX ORDER — ONDANSETRON 2 MG/ML
INJECTION INTRAMUSCULAR; INTRAVENOUS PRN
Status: DISCONTINUED | OUTPATIENT
Start: 2023-11-30 | End: 2023-11-30

## 2023-11-30 RX ORDER — CEFAZOLIN SODIUM/WATER 2 G/20 ML
2 SYRINGE (ML) INTRAVENOUS SEE ADMIN INSTRUCTIONS
Status: DISCONTINUED | OUTPATIENT
Start: 2023-11-30 | End: 2023-11-30 | Stop reason: HOSPADM

## 2023-11-30 RX ORDER — FENTANYL CITRATE 50 UG/ML
25-50 INJECTION, SOLUTION INTRAMUSCULAR; INTRAVENOUS
Status: DISCONTINUED | OUTPATIENT
Start: 2023-11-30 | End: 2023-11-30 | Stop reason: HOSPADM

## 2023-11-30 RX ORDER — PROPOFOL 10 MG/ML
INJECTION, EMULSION INTRAVENOUS PRN
Status: DISCONTINUED | OUTPATIENT
Start: 2023-11-30 | End: 2023-11-30

## 2023-11-30 RX ORDER — BUPIVACAINE HYDROCHLORIDE 2.5 MG/ML
INJECTION, SOLUTION EPIDURAL; INFILTRATION; INTRACAUDAL
Status: COMPLETED | OUTPATIENT
Start: 2023-11-30 | End: 2023-11-30

## 2023-11-30 RX ORDER — SIMETHICONE 80 MG
80 TABLET,CHEWABLE ORAL 4 TIMES DAILY PRN
Status: DISCONTINUED | OUTPATIENT
Start: 2023-11-30 | End: 2023-12-11 | Stop reason: HOSPADM

## 2023-11-30 RX ORDER — HYDROMORPHONE HCL IN WATER/PF 6 MG/30 ML
0.2 PATIENT CONTROLLED ANALGESIA SYRINGE INTRAVENOUS EVERY 5 MIN PRN
Status: DISCONTINUED | OUTPATIENT
Start: 2023-11-30 | End: 2023-11-30 | Stop reason: HOSPADM

## 2023-11-30 RX ORDER — LIDOCAINE HYDROCHLORIDE 20 MG/ML
INJECTION, SOLUTION INFILTRATION; PERINEURAL PRN
Status: DISCONTINUED | OUTPATIENT
Start: 2023-11-30 | End: 2023-11-30

## 2023-11-30 RX ORDER — OXYCODONE HCL 40 MG/1
40 TABLET, FILM COATED, EXTENDED RELEASE ORAL EVERY 12 HOURS
Status: DISCONTINUED | OUTPATIENT
Start: 2023-11-30 | End: 2023-12-11 | Stop reason: HOSPADM

## 2023-11-30 RX ORDER — PREDNISONE 5 MG/1
5 TABLET ORAL 2 TIMES DAILY
Status: DISCONTINUED | OUTPATIENT
Start: 2023-11-30 | End: 2023-12-11 | Stop reason: HOSPADM

## 2023-11-30 RX ORDER — METHOCARBAMOL 500 MG/1
500 TABLET, FILM COATED ORAL 4 TIMES DAILY
Status: DISCONTINUED | OUTPATIENT
Start: 2023-11-30 | End: 2023-12-11 | Stop reason: HOSPADM

## 2023-11-30 RX ORDER — DEXAMETHASONE SODIUM PHOSPHATE 4 MG/ML
INJECTION, SOLUTION INTRA-ARTICULAR; INTRALESIONAL; INTRAMUSCULAR; INTRAVENOUS; SOFT TISSUE PRN
Status: DISCONTINUED | OUTPATIENT
Start: 2023-11-30 | End: 2023-11-30

## 2023-11-30 RX ORDER — FENTANYL CITRATE 50 UG/ML
INJECTION, SOLUTION INTRAMUSCULAR; INTRAVENOUS PRN
Status: DISCONTINUED | OUTPATIENT
Start: 2023-11-30 | End: 2023-11-30

## 2023-11-30 RX ORDER — MEGESTROL ACETATE 20 MG/1
20 TABLET ORAL 2 TIMES DAILY
Status: DISCONTINUED | OUTPATIENT
Start: 2023-11-30 | End: 2023-12-11 | Stop reason: HOSPADM

## 2023-11-30 RX ADMIN — MIDAZOLAM 0.5 MG: 1 INJECTION INTRAMUSCULAR; INTRAVENOUS at 08:55

## 2023-11-30 RX ADMIN — ACETAMINOPHEN 975 MG: 325 TABLET, FILM COATED ORAL at 17:44

## 2023-11-30 RX ADMIN — ERTAPENEM SODIUM 1 G: 1 INJECTION, POWDER, LYOPHILIZED, FOR SOLUTION INTRAMUSCULAR; INTRAVENOUS at 10:25

## 2023-11-30 RX ADMIN — FENTANYL CITRATE 50 MCG: 50 INJECTION, SOLUTION INTRAMUSCULAR; INTRAVENOUS at 17:18

## 2023-11-30 RX ADMIN — OXYCODONE HYDROCHLORIDE 10 MG: 10 TABLET ORAL at 18:00

## 2023-11-30 RX ADMIN — Medication 10 MG: at 12:00

## 2023-11-30 RX ADMIN — DEXAMETHASONE SODIUM PHOSPHATE 8 MG: 4 INJECTION, SOLUTION INTRA-ARTICULAR; INTRALESIONAL; INTRAMUSCULAR; INTRAVENOUS; SOFT TISSUE at 10:55

## 2023-11-30 RX ADMIN — FENTANYL CITRATE 50 MCG: 50 INJECTION, SOLUTION INTRAMUSCULAR; INTRAVENOUS at 17:40

## 2023-11-30 RX ADMIN — FENTANYL CITRATE 50 MCG: 50 INJECTION, SOLUTION INTRAMUSCULAR; INTRAVENOUS at 17:11

## 2023-11-30 RX ADMIN — Medication 10 MG: at 15:53

## 2023-11-30 RX ADMIN — MIDAZOLAM 1 MG: 1 INJECTION INTRAMUSCULAR; INTRAVENOUS at 15:15

## 2023-11-30 RX ADMIN — Medication 20 MG: at 13:36

## 2023-11-30 RX ADMIN — FENTANYL CITRATE 50 MCG: 50 INJECTION INTRAMUSCULAR; INTRAVENOUS at 10:32

## 2023-11-30 RX ADMIN — KETOROLAC TROMETHAMINE 15 MG: 30 INJECTION INTRAMUSCULAR; INTRAVENOUS at 19:25

## 2023-11-30 RX ADMIN — FENTANYL CITRATE 100 MCG: 50 INJECTION INTRAMUSCULAR; INTRAVENOUS at 11:19

## 2023-11-30 RX ADMIN — SODIUM CHLORIDE, PRESERVATIVE FREE: 5 INJECTION INTRAVENOUS at 18:04

## 2023-11-30 RX ADMIN — INSULIN HUMAN 1.5 UNITS/HR: 1 INJECTION, SOLUTION INTRAVENOUS at 13:29

## 2023-11-30 RX ADMIN — ONDANSETRON 4 MG: 2 INJECTION INTRAMUSCULAR; INTRAVENOUS at 17:23

## 2023-11-30 RX ADMIN — OXYCODONE HYDROCHLORIDE 40 MG: 40 TABLET, FILM COATED, EXTENDED RELEASE ORAL at 18:00

## 2023-11-30 RX ADMIN — FENTANYL CITRATE 50 MCG: 50 INJECTION, SOLUTION INTRAMUSCULAR; INTRAVENOUS at 08:51

## 2023-11-30 RX ADMIN — LIDOCAINE HYDROCHLORIDE 80 MG: 20 INJECTION, SOLUTION INFILTRATION; PERINEURAL at 10:39

## 2023-11-30 RX ADMIN — SODIUM CHLORIDE, POTASSIUM CHLORIDE, SODIUM LACTATE AND CALCIUM CHLORIDE: 600; 310; 30; 20 INJECTION, SOLUTION INTRAVENOUS at 10:26

## 2023-11-30 RX ADMIN — HYDROMORPHONE HYDROCHLORIDE 0.5 MG: 1 INJECTION, SOLUTION INTRAMUSCULAR; INTRAVENOUS; SUBCUTANEOUS at 12:25

## 2023-11-30 RX ADMIN — ACETAMINOPHEN 975 MG: 325 TABLET, FILM COATED ORAL at 08:18

## 2023-11-30 RX ADMIN — HEPARIN SODIUM 5000 UNITS: 10000 INJECTION, SOLUTION INTRAVENOUS; SUBCUTANEOUS at 21:34

## 2023-11-30 RX ADMIN — MIDAZOLAM 0.5 MG: 1 INJECTION INTRAMUSCULAR; INTRAVENOUS at 08:56

## 2023-11-30 RX ADMIN — FENTANYL CITRATE 50 MCG: 50 INJECTION INTRAMUSCULAR; INTRAVENOUS at 16:34

## 2023-11-30 RX ADMIN — FENTANYL CITRATE 50 MCG: 50 INJECTION, SOLUTION INTRAMUSCULAR; INTRAVENOUS at 08:55

## 2023-11-30 RX ADMIN — Medication 10 MG: at 13:00

## 2023-11-30 RX ADMIN — HYDROMORPHONE HYDROCHLORIDE 0.5 MG: 1 INJECTION, SOLUTION INTRAMUSCULAR; INTRAVENOUS; SUBCUTANEOUS at 16:40

## 2023-11-30 RX ADMIN — HYDROMORPHONE HYDROCHLORIDE 0.5 MG: 1 INJECTION, SOLUTION INTRAMUSCULAR; INTRAVENOUS; SUBCUTANEOUS at 16:24

## 2023-11-30 RX ADMIN — Medication 20 MG: at 12:31

## 2023-11-30 RX ADMIN — PHENYLEPHRINE HYDROCHLORIDE 100 MCG: 10 INJECTION INTRAVENOUS at 11:24

## 2023-11-30 RX ADMIN — HYDROMORPHONE HYDROCHLORIDE 0.5 MG: 0.2 INJECTION, SOLUTION INTRAMUSCULAR; INTRAVENOUS; SUBCUTANEOUS at 18:21

## 2023-11-30 RX ADMIN — HYDROMORPHONE HYDROCHLORIDE 0.5 MG: 0.2 INJECTION, SOLUTION INTRAMUSCULAR; INTRAVENOUS; SUBCUTANEOUS at 20:17

## 2023-11-30 RX ADMIN — ONDANSETRON 4 MG: 2 INJECTION INTRAMUSCULAR; INTRAVENOUS at 16:26

## 2023-11-30 RX ADMIN — HYDROMORPHONE HYDROCHLORIDE 0.5 MG: 1 INJECTION, SOLUTION INTRAMUSCULAR; INTRAVENOUS; SUBCUTANEOUS at 16:28

## 2023-11-30 RX ADMIN — PHENYLEPHRINE HYDROCHLORIDE 100 MCG: 10 INJECTION INTRAVENOUS at 14:56

## 2023-11-30 RX ADMIN — METHOCARBAMOL 500 MG: 500 TABLET ORAL at 19:08

## 2023-11-30 RX ADMIN — Medication 10 MG: at 14:00

## 2023-11-30 RX ADMIN — Medication 10 MG: at 16:03

## 2023-11-30 RX ADMIN — MIDAZOLAM 1 MG: 1 INJECTION INTRAMUSCULAR; INTRAVENOUS at 10:50

## 2023-11-30 RX ADMIN — DEXMEDETOMIDINE HYDROCHLORIDE 0.5 MCG/KG/HR: 100 INJECTION, SOLUTION INTRAVENOUS at 11:01

## 2023-11-30 RX ADMIN — MEGESTROL ACETATE 20 MG: 20 TABLET ORAL at 21:39

## 2023-11-30 RX ADMIN — Medication 10 MG: at 15:00

## 2023-11-30 RX ADMIN — Medication 20 MG: at 11:13

## 2023-11-30 RX ADMIN — PHENYLEPHRINE HYDROCHLORIDE 100 MCG: 10 INJECTION INTRAVENOUS at 16:11

## 2023-11-30 RX ADMIN — Medication 10 MG: at 16:28

## 2023-11-30 RX ADMIN — Medication 50 MG: at 10:40

## 2023-11-30 RX ADMIN — BUPIVACAINE 20 ML: 13.3 INJECTION, SUSPENSION, LIPOSOMAL INFILTRATION at 08:50

## 2023-11-30 RX ADMIN — FENTANYL CITRATE 50 MCG: 50 INJECTION INTRAMUSCULAR; INTRAVENOUS at 16:30

## 2023-11-30 RX ADMIN — FENTANYL CITRATE 50 MCG: 50 INJECTION INTRAMUSCULAR; INTRAVENOUS at 17:05

## 2023-11-30 RX ADMIN — PROPOFOL 120 MG: 10 INJECTION, EMULSION INTRAVENOUS at 10:40

## 2023-11-30 RX ADMIN — Medication 10 MG: at 15:11

## 2023-11-30 RX ADMIN — OXYCODONE HYDROCHLORIDE 15 MG: 5 TABLET ORAL at 21:40

## 2023-11-30 RX ADMIN — SODIUM CHLORIDE, SODIUM LACTATE, POTASSIUM CHLORIDE, CALCIUM CHLORIDE: 600; 310; 30; 20 INJECTION, SOLUTION INTRAVENOUS at 10:58

## 2023-11-30 RX ADMIN — Medication 20 MG: at 11:37

## 2023-11-30 RX ADMIN — FENTANYL CITRATE 50 MCG: 50 INJECTION, SOLUTION INTRAMUSCULAR; INTRAVENOUS at 17:35

## 2023-11-30 RX ADMIN — SUGAMMADEX 200 MG: 100 INJECTION, SOLUTION INTRAVENOUS at 16:26

## 2023-11-30 RX ADMIN — HYDROMORPHONE HYDROCHLORIDE 0.5 MG: 0.2 INJECTION, SOLUTION INTRAMUSCULAR; INTRAVENOUS; SUBCUTANEOUS at 23:08

## 2023-11-30 RX ADMIN — PHENYLEPHRINE HYDROCHLORIDE 100 MCG: 10 INJECTION INTRAVENOUS at 15:26

## 2023-11-30 RX ADMIN — BUPIVACAINE HYDROCHLORIDE 20 ML: 2.5 INJECTION, SOLUTION EPIDURAL; INFILTRATION; INTRACAUDAL; PERINEURAL at 08:50

## 2023-11-30 RX ADMIN — SODIUM CHLORIDE, SODIUM LACTATE, POTASSIUM CHLORIDE, CALCIUM CHLORIDE: 600; 310; 30; 20 INJECTION, SOLUTION INTRAVENOUS at 13:30

## 2023-11-30 ASSESSMENT — ACTIVITIES OF DAILY LIVING (ADL)
ADLS_ACUITY_SCORE: 31
ADLS_ACUITY_SCORE: 35
ADLS_ACUITY_SCORE: 38
ADLS_ACUITY_SCORE: 35
ADLS_ACUITY_SCORE: 35
ADLS_ACUITY_SCORE: 31

## 2023-11-30 ASSESSMENT — LIFESTYLE VARIABLES: TOBACCO_USE: 1

## 2023-11-30 ASSESSMENT — COPD QUESTIONNAIRES: COPD: 0

## 2023-11-30 NOTE — ANESTHESIA PREPROCEDURE EVALUATION
Anesthesia Pre-Procedure Evaluation    Patient: Dilip Kan   MRN: [id number] : 1955        Procedure : Procedure(s):  CYSTECTOMY, WITH URETEROILEAL CONDUIT CREATION  Reconstruction of the perineum with abdominal/thigh muscle flap, spy          Past Medical History:   Diagnosis Date    Elevated prostate specific antigen (PSA)     4/10/2012    Encounter for other administrative examinations     2014    Esophagitis     No Comments Provided    Gastro-esophageal reflux disease without esophagitis     No Comments Provided    Low back pain     No Comments Provided    Malignant neoplasm of prostate (H)     2012    Nicotine dependence, uncomplicated     Quit - 2007 with chantix    Other intervertebral disc degeneration, lumbosacral region     2008      Past Surgical History:   Procedure Laterality Date    APPENDECTOMY OPEN      Ruptured - [place]    ARTHROPLASTY KNEE Right 2021    Procedure: ARTHROPLASTY, KNEE, TOTAL;  Surgeon: Micah Rock MD;  Location: [facility] OR    COLONOSCOPY  2006    next due in 2016    DISKECTOMY, LUMBAR, SINGLE SP  2009    L 3-4 microdiskectomy, [facility]    ESOPHAGOSCOPY, GASTROSCOPY, DUODENOSCOPY (EGD), COMBINED  2003         ESOPHAGOSCOPY, GASTROSCOPY, DUODENOSCOPY (EGD), COMBINED  2006         PROSTATECTOMY PERINEAL RADICAL  2012    Nerve Sparring, Dr Warner    SPINE SURGERY N/A 2017    L3 to S1 spinal decompression L4/L5 fusion    TONSILLECTOMY, ADENOIDECTOMY, COMBINED      as a child    VARICOCELECTOMY      INCISION AND DRAINAGE,EXCISE VARICOCELE      No Known Allergies   Social History     Tobacco Use    Smoking status: Former     Packs/day: 1.00     Years: 20.00     Additional pack years: 0.00     Total pack years: 20.00     Types: Cigarettes     Quit date: 2002     Years since quittin.0     Passive exposure: Past    Smokeless tobacco: Current     Types: Snuff    Tobacco comments:     Can't  remember when all he had passive exposure   Substance Use Topics    Alcohol use: Not Currently      Wt Readings from Last 1 Encounters:   11/30/23 81.6 kg (179 lb 14.3 oz)        Anesthesia Evaluation   Pt has had prior anesthetic.     No history of anesthetic complications       ROS/MED HX  ENT/Pulmonary:     (+)                tobacco use (snuff currently), Past use,                   (-) asthma, COPD and recent URI   Neurologic:  - neg neurologic ROS     Cardiovascular:     (+) Dyslipidemia hypertension- -   -  - -   Taking blood thinners                              Previous cardiac testing   Echo: Date: 3/6/23 Results:  Interpretation Summary  Global and regional left ventricular function is normal with an EF of 60-65%.  Global right ventricular function is normal.  Moderate aortic insufficiency and mild aortic stenosis.  Estimated pulmonary artery systolic pressure is 30 mmHg plus right atrial  pressure.  IVC diameter and respiratory changes fall into an intermediate range  suggesting an RA pressure of 8 mmHg.  Dilated thoracic aorta, Sinuses of Valsalva 4.2 cm, ascending aorta 4.6 cm.     This study was compared with the study from 3/4/22. AI appears worse; no  significant change in aorta caliber.    Stress Test:  Date: Results:    ECG Reviewed:  Date: Results:    Cath:  Date: Results:      METS/Exercise Tolerance:  Comment: <4, patient very limited due to pathologic fracture in pelvis/hip   Hematologic:     (+) History of blood clots,    pt is anticoagulated, anemia, history of blood transfusion, no previous transfusion reaction,        Musculoskeletal: Comment: H/o lumbar fusion  Pathologic fracture pelvis/hip      GI/Hepatic:     (+) GERD, Asymptomatic on medication,                  Renal/Genitourinary:     (+) renal disease (h/o TRINY),             Endo:     (+)  type II DM,             Obesity,       Psychiatric/Substance Use:  - neg psychiatric ROS     Infectious Disease:       Malignancy:   (+)  "Malignancy, History of Prostate.Prostate CA status post Surgery and Radiation.      Other:  - neg other ROS    (+)  , H/O Chronic Pain,         Physical Exam    Airway        Mallampati: I       Respiratory Devices and Support         Dental       (+) Removable bridges or other hardware      Cardiovascular             Pulmonary                   OUTSIDE LABS:  CBC:   Lab Results   Component Value Date    WBC 9.7 11/20/2023    WBC 9.2 11/09/2023    HGB 11.0 (L) 11/20/2023    HGB 10.0 (L) 11/09/2023    HCT 35.9 (L) 11/20/2023    HCT 32.8 (L) 11/09/2023     (H) 11/20/2023     11/09/2023     BMP:   Lab Results   Component Value Date     (L) 11/20/2023     (L) 11/09/2023    POTASSIUM 4.2 11/20/2023    POTASSIUM 4.0 11/09/2023    CHLORIDE 100 11/20/2023    CHLORIDE 100 11/09/2023    CO2 22 11/20/2023    CO2 21 (L) 11/09/2023    BUN 11.6 11/20/2023    BUN 13.1 11/09/2023    CR 0.76 11/20/2023    CR 0.66 (L) 11/09/2023     (H) 11/20/2023     (H) 11/09/2023     COAGS:   Lab Results   Component Value Date    PTT 40 (H) 07/18/2023    INR 1.74 (H) 07/18/2023     POC: No results found for: \"BGM\", \"HCG\", \"HCGS\"  HEPATIC:   Lab Results   Component Value Date    ALBUMIN 3.0 (L) 11/09/2023    PROTTOTAL 6.7 11/09/2023    ALT <5 11/09/2023    AST 14 11/09/2023    ALKPHOS 87 11/09/2023    BILITOTAL 0.4 11/09/2023     OTHER:   Lab Results   Component Value Date    LACT 0.8 07/02/2023    A1C 6.5 (H) 11/20/2023    ROBERTO 9.7 11/20/2023    PHOS 3.6 05/23/2023    MAG 1.8 07/01/2023    TSH 1.52 06/27/2022    SED 44 (H) 10/16/2023       Anesthesia Plan    ASA Status:  3    NPO Status:  NPO Appropriate    Anesthesia Type: General.     - Airway: ETT   Induction: Intravenous.   Maintenance: Inhalation.   Techniques and Equipment:     - Lines/Monitors: 2nd IV, BIS     Consents    Anesthesia Plan(s) and associated risks, benefits, and realistic alternatives discussed. Questions answered and " "patient/representative(s) expressed understanding.     - Discussed:     - Discussed with:  Patient            Postoperative Care    Pain management: IV analgesics, Oral pain medications.   PONV prophylaxis: Ondansetron (or other 5HT-3), Dexamethasone or Solumedrol     Comments:           H&P reviewed: Unable to attach H&P to encounter due to EHR limitations. H&P Update: appropriate H&P reviewed, patient examined. No interval changes since H&P (within 30 days).        Taylor Lund MD    I have reviewed the pertinent notes and labs in the chart from the past 30 days and (re)examined the patient.  Any updates or changes from those notes are reflected in this note.     # Hyponatremia: Lowest Na = 133 mmol/L in last 30 days, will monitor as appropriate      # Hypoalbuminemia: Lowest albumin = 3 g/dL in the past 30 days , will monitor as appropriate   # Drug Induced Coagulation Defect: home medication list includes an anticoagulant medication   # DMII: A1C = 6.5 % (Ref range: <5.7 %) within past 6 months  # Overweight: Estimated body mass index is 25.81 kg/m  as calculated from the following:    Height as of this encounter: 1.778 m (5' 10\").    Weight as of this encounter: 81.6 kg (179 lb 14.3 oz).      "

## 2023-11-30 NOTE — ANESTHESIA CARE TRANSFER NOTE
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    Anesthesia Type:   General     Note:    Oropharynx: oropharynx clear of all foreign objects and spontaneously breathing  Level of Consciousness: drowsy  Oxygen Supplementation: face mask  Level of Supplemental Oxygen (L/min / FiO2): 6  Independent Airway: airway patency satisfactory and stable  Dentition: dentition unchanged  Vital Signs Stable: post-procedure vital signs reviewed and stable  Report to RN Given: handoff report given  Patient transferred to: PACU    Handoff Report: Identifed the Patient, Identified the Reponsible Provider, Reviewed the pertinent medical history, Discussed the surgical course, Reviewed Intra-OP anesthesia mangement and issues during anesthesia, Set expectations for post-procedure period and Allowed opportunity for questions and acknowledgement of understanding      Vitals:  Vitals Value Taken Time   /62 11/30/23 1700   Temp     Pulse 71 11/30/23 1705   Resp 11 11/30/23 1705   SpO2 100 % 11/30/23 1705   Vitals shown include unfiled device data.    Electronically Signed By: MOISÉS Quinones CRNA  November 30, 2023  5:07 PM

## 2023-11-30 NOTE — OP NOTE
OP NOTE  November 30, 2023      PREOPERATIVE DIAGNOSIS:    Radiation Cystitis, pubovesical fistula, pubic osteomyelitis     POSTOPERATIVE DIAGNOSIS:    Radiation Cystitis, pubovesical fistula, pubic osteomyelitis     PROCEDURE PERFORMED:    1. Lysis of adhesions  2. Total cystectomy and Creation of ileal conduit urinary diversion [id number]  3. Total Pubectomy (80060) for osteomyelitis  4. Rectus flap fixation into pelvis    Modifier 22: cystectomy made more difficult by fistula, infection, radiation and significant scarring.      STAFF SURGEON:  Miki Correa MD   RESIDENT SURGEON: Dieudonne Abraham MD     ANESTHESIA: General     ESTIMATED BLOOD LOSS:  150 mL.      DRAINS AND TUBES: 19Fr CHRISTIANO drain (RLQ), 7-Kenyan single J diversion stent to both kidneys     SPECIMENS OBTAINED:     ID Type Source Tests Collected by Time Destination   1 : Pubic Bone Bone Biopsy Bone SURGICAL PATHOLOGY EXAM Miki Correa MD 11/30/2023 12:18 PM    2 : Bladder Tissue Urinary Bladder SURGICAL PATHOLOGY EXAM Miki Correa MD 11/30/2023  1:02 PM    A : Pubic Bone Bone Biopsy Pelvis ANAEROBIC BACTERIAL CULTURE ROUTINE, FUNGAL OR YEAST CULTURE ROUTINE, AEROBIC BACTERIAL CULTURE ROUTINE Miki Correa MD 11/30/2023 12:17 PM    B : Pubic abscess Abscess Pelvis ANAEROBIC BACTERIAL CULTURE ROUTINE, FUNGAL OR YEAST CULTURE ROUTINE, AEROBIC BACTERIAL CULTURE ROUTINE Miki Correa MD 11/30/2023 12:21 PM              COMPLICATIONS:  None     DISPOSITION:  PACU     INDICATIONS: Dilip Kan is a 68 year old male with a 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      The risks and benefits of cystectomy and diversion and pubectomy were discussed including  bleeding, infection, intestinal  leak, bowel or urinary tract obstruction possibly requiring reoperation, abdominal wall hernia, pyelonephritis, and persistent symptoms. The patient would like to proceed.      OPERATION PERFORMED:  The patient was met in the preoperative holding area and consent for surgery was obtained after again reviewing the risks and benefits. The patient was then brought to the operating theater and general anesthesia was induced. A timeout was then performed verifying the correct patient's site and procedure to be performed. The patient was placed in supine position and was prepped and draped in the usual sterile fashion and antibiotics were given prior to incision. A 16 Romansh urethral catheter was placed on the field.     We began by making a midline incision from the pubis to the umbilicus. We dissected through Mckayla's fascia down to the level of the deep fascia. We incised the fascia and then proceeded to divide through the rectus abdominis fascia through the midline.  The urachus was identified and grasped. The lateral attachments of the bladder were divided using electrocautery.      We then proceeded to the cystectomy portion of the case. Given the patient's past prostatectomy and radiation, the tissue surrounding bladder had dense adhesions to the surrounding tissue. The space of retzius was developed by following a plane demarcated by the pubic bone.  We did encounter the fistula from the bladder to the pubic bone slightly left of the patient's midline. Laterally, after careful dissection, the bilateral bladder pedicles were ligated with a Ligasure device.     In order to dissect the anterior bladder down to the bladder neck, we had to remove all of the pubic bone. Using an ostetome, a total/radical pubectomy was performed to get better access to the bladder neck and to remove all of the infected bone. Total size removed was ~3w5n7wc. This included the entire symphysis up to but not  including the superior and inferior rami on both sides. Samples of infected tissue were sent for culture. We also sent a culture from a suspected abscess under the pubic bone but upon closer inspection it appeared to be necrotic bladder tissue adhered to the posterior surface of our dissection. Some of this was removed but being mindful of the rectum an aggressive attempt to remove all this tissue was not performed.      Attention was then turned to the left retroperitoneal space where a peritoneal incision was made and the left distal ureter was identified, dissected down to the bladder and ligated with a clip, we freed it up proximally over the common iliac artery.  A similar procedure was done on the right side and the right ureter was also clipped just proximal to the bladder. A mesenteric window was created under the sigmoid colon to allow passage of the left ureter under to reach the right sided conduit to be created.      Then, we turned our attention to the small bowel to create our urinary conduit. The right lower quadrant bowel including the cecum and terminal ileum had many adhesions that took approximately ~20 minutes of lysis of adhesions to free up the terminal ileum and identify the segment of bowel to become the urinary conduit. The cecum was densely adhered to the terminal ileum at the staple line of his prior appendectomy.     We went 15cm proximally to the ileocecal valve and harvested a 20cm segment of ileum that would serve as our ileal conduit. The bowel was harvested using bovie cautery and a handsewn anastomosis was performed in a standard fashion using a 2- layer vicryl closure. The conduit was irrigated to clear and we determined that the mesentery of the ileal conduit would lie best in a peristaltic fashion with the stoma at the right lower quadrant at this previously marked area.      The left ureter was tunneled under the sigmoid colon from left to right. The bilateral ureters were  spatulated several centimeters and using 5-0 PDS a Elliott anastomosis was performed first by sewing the medial aspect of each ureter together, and sewing this with full thickness 5-0 PDS bites to the proximal end of the conduit. Prior to completion of the anastomosis, two 7-Irish single-J ureteral stents were passed up the ureter to both kidneys and brought through the end of the conduit.      At the right mid quadrant, a disk of skin was removed at the pre-marked stoma site. The underlying fat was removed down to the fascia. A cruciate incision was made in the fascia with Bovie cautery. Fascial anchoring sutures of 2-0 Vicryl were placed in 3 corners avoiding the ileal mesentery. The stoma and the stents were brought out through the fascial defect which easily accepted 2 fingers. The previously placed fascial anchoring sutures were sewn to the conduit. The conduit was then matured with 3-0 Vicryl sutures in a rosebud fashion. The edges of the skin were then sewn to the edges of the conduit stoma using a running 4-0 Vicryl suture.     At that point, we consulted plastic surgery to mobilized the rectus muscle on the left to create a left rotational flap. See plastics operative note for more details. Plastics left the room and we (urology) then used interrupted 3-0 Vicryl sutures to secure the rectus flap to the pelvis to cover the rectum, urethra and pubis at the site of the removed pubic bone.     At that point, we removed all laps and irrigated the abdomen. We placed a 19Fr-Man drain in the right lower quadrant avoiding the inferior epigastric vessels; this was placed deep to the rectus flap in the pubis area.  All lap, needle, and instrument counts were correct at the conclusion of the case. The fascia was closed with #1 non-looped PDS.      The subcutaneous tissue was re approximated with 3-0 Vicryl in an interrupted fashion. The skin was stapled and a new ostomy appliance was placed. The patient was  awakened, extubated and transferred to the recovery room in stable condition.      As the attending surgeon I, Miki Correa, was present and scrubbed throughout the procedure.

## 2023-11-30 NOTE — ANESTHESIA PROCEDURE NOTES
Airway       Patient location during procedure: OR  Staff -        Anesthesiologist:  Taylor Lund MD       CRNA: Cele Viera APRN CRNA       Performed By: CRNAIndications and Patient Condition       Indications for airway management: lynn-procedural       Induction type:intravenous       Mask difficulty assessment: 2 - vent by mask + OA or adjuvant +/- NMBA    Final Airway Details       Final airway type: endotracheal airway       Successful airway: ETT - single  Endotracheal Airway Details        ETT size (mm): 7.5       Cuffed: yes       Successful intubation technique: direct laryngoscopy       DL Blade Type: Peterson 2       Grade View of Cords: 1       Adjucts: stylet       Position: Right       Measured from: lips       Secured at (cm): 23       Bite block used: None    Post intubation assessment        Placement verified by: capnometry, equal breath sounds and chest rise        Number of attempts at approach: 1       Number of other approaches attempted: 0       Secured with: tape       Ease of procedure: easy       Dentition: Intact and Unchanged

## 2023-11-30 NOTE — OP NOTE
PREOPERATIVE DIAGNOSIS: Pubovesical fistula requiring flap reconstruction of the pelvis    POSTOPERATIVE DIAGNOSIS: As above    PROCEDURES:   1.  Left rectus abdominis muscle flap to the pelvis     SURGEON: Melody Guadalupe MD      RESIDENT: None     ANESTHESIA: General anesthesia with endotracheal intubation.      COMPLICATIONS: Nil.      DRAINS: None     SPECIMENS: None     BLOOD LOSS: 25 mL        DESCRIPTION OF PROCEDURE: After informed consent was taken from the patient, the proper site and procedure was ascertained with them and they were appropriately marked, they were taken to the operating room. They were placed in a supine position with their knees comfortably flexed with pillows underneath them, and pneumoboots placed and running prior to induction of anesthesia. Preoperative antibiotics were given in the OR and appropriately redosed during the case. General anesthesia was administered without any complications.      Urology began the case and carried out their portion until they required my services.  I was called to the operating room.  On evaluation of the abdomen a midline incision around the umbilicus down to the pubis had been made.  Tubectomy had been carried out.  An ileal conduit had been carried out to the right side of the abdomen.  The left side of the abdomen was untouched.  I checked for a palpable deep inferior epigastric vessel which was there.  I then decided to use the left-sided rectus muscle as a muscle flap into the pelvis to fill in the dead space around the pubic area.  I began by first extending my incision in the midline up to the subxiphoid area.  Dissected down to the linea alba.  I then elevated the skin and subcutaneous tissue in the lateral direction over the anterior rectus sheath on the left side.  I then opened up the anterior rectus sheath from the subcostal region down towards the midline incision that had already been made by urology.  I then elevated the anterior  rectus sheath off of the rectus muscle all the way from the subcostal region down to the suprapubic region.  I then took down the muscle from the subcostal region ensured hemostasis and then elevated the muscle out of the rectus sheath off of the posterior rectus sheath all the way to the arcuate line.  Care was taken to ensure hemostasis and clip ligate any perforators as well as protect the deep inferior epigastric pedicle.  Once the entire muscle had been elevated and could easily rotate into the pelvis, I then ensured hemostasis.  I then closed off the posterior rectus sheath to the anterior rectus sheath with a running 0 PDS suture at the arcuate line.  At this point I handed over the flap and the patient to the urologist who would then continue there portion of the case.  They will closed the abdomen. The patient tolerated the procedure well. All counts were correct at the end of when I was in the OR..  The patient was stable throughout the portion I was in the OR.

## 2023-11-30 NOTE — ANESTHESIA PROCEDURE NOTES
TAP Procedure Note    Pre-Procedure   Staff -        Anesthesiologist:  Bentley Whiteside MD       Resident/Fellow: Rosi Sanon MD       Performed By: resident and with fellow       Procedure performed by resident/fellow/CRNA in presence of a teaching physician.         Location: pre-op       Procedure Start/Stop Times: 11/30/2023 8:50 AM and 11/30/2023 9:01 AM       Pre-Anesthestic Checklist: patient identified, IV checked, risks and benefits discussed, informed consent, monitors and equipment checked, at physician/surgeon's request and post-op pain management  Timeout:       Correct Patient: Yes        Correct Procedure: Yes        Correct Site: Yes        Correct Position: Yes        Correct Laterality: Yes   Procedure Documentation  Procedure: TAP       Laterality: bilateral       Patient Position: supine       Patient Prep/Sterile Barriers: sterile gloves, mask       Skin prep: Chloraprep       Needle Type: short bevel       Needle Gauge: 21.        Needle Length (millimeters): 110        Ultrasound guided       1. Ultrasound was used to identify targeted nerve, plexus, vascular marker, or fascial plane and place a needle adjacent to it in real-time.       2. Ultrasound was used to visualize the spread of anesthetic in close proximity to the above referenced structure.       3. A permanent image is entered into the patient's record.    Assessment/Narrative         The placement was negative for: blood aspirated, painful injection and site bleeding       Bolus given via needle..        Secured via.        Insertion/Infusion Method: Single Shot       Complications: none       Injection made incrementally with aspirations every 5 mL.    Medication(s) Administered   Bupivacaine 0.25% PF (Infiltration) - Infiltration   20 mL - 11/30/2023 8:50:00 AM  Bupivacaine liposome (Exparel) 1.3% LA inj susp (Infiltration) - Infiltration   20 mL - 11/30/2023 8:50:00 AM  Medication Administration Time: 11/30/2023 8:50 AM      FOR  "[facility] ONLY:   Pain Team Contact information: please page the Pain Team Via [employer]. Search \"Pain\". During daytime hours, please page the attending first. At night please page the resident first.      "

## 2023-11-30 NOTE — BRIEF OP NOTE
[facility]    Brief Operative Note    Pre-operative diagnosis: 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]  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]  Post-operative diagnosis Same as pre-operative diagnosis    Procedure: CYSTECTOMY, WITH URETEROILEAL CONDUIT CREATION and Pubectomy, N/A - Abdomen  Left Rectus Abdominis flap, N/A - Perineum    Surgeon: Surgeon(s) and Role:  Panel 1:     * Miki Correa MD - Primary     * Dieudonne Abraham MD - Resident - Assisting  Panel 2:     * YADIRA Guadalupe MD - Primary  Anesthesia: General with Block   Estimated Blood Loss: 160 mL from 11/30/2023 10:30 AM to 11/30/2023  4:52 PM      Drains: 19 F round drain, bilateral ureteral stents   Specimens:   ID Type Source Tests Collected by Time Destination   1 : Pubic Bone Bone Biopsy Bone SURGICAL PATHOLOGY EXAM Miki Correa MD 11/30/2023 12:18 PM    2 : Bladder Tissue Urinary Bladder SURGICAL PATHOLOGY EXAM Miki Correa MD 11/30/2023  1:02 PM    A : Pubic Bone Bone Biopsy Pelvis ANAEROBIC BACTERIAL CULTURE ROUTINE, FUNGAL OR YEAST CULTURE ROUTINE, AEROBIC BACTERIAL CULTURE ROUTINE Miki Correa MD 11/30/2023 12:17 PM    B : Pubic abscess Abscess Pelvis ANAEROBIC BACTERIAL CULTURE ROUTINE, FUNGAL OR YEAST CULTURE ROUTINE, AEROBIC BACTERIAL CULTURE ROUTINE Miki Correa MD 11/30/2023 12:21 PM      Findings:   Unremarkable cystectomy, pubectomy, ileal conduit creation. Rectus flap attached to area around pubic symphysis  .  Complications: None.  Implants:   Implant Name Type Inv. Item Serial No.  Lot No. LRB No. Used Action   STENT URINARY DIVERSION PERCFLEX SET [id number] - [id number] Stent STENT URINARY DIVERSION PERCFLEX SET [id number]  [employer] [id number] Bilateral 1 Implanted       Neuro: Pain plan per outpatient pain visit   CV: May restart home antihypertensives tomorrow if blood pressures remain elevated  Pulm: Encourage  IS   FEN/GI: NS@100, NPO with sips until POD2. Will start TPN and get a picc given his baseline very poor nutritional status.   : Stents to remain in place for 6 weeks. CHRISTIANO drain to bulb suction.   Endo: MARC  ID: Awaiting bone and intra-op cultures. Will obtain a PICC and continue ertapenem until cultures result. ID consult when cultures result.   Heme: PACU and AM labs   Ppx: SQH to start POD0, PPI   Consult: WOC, PT, OT   Medications held: Prednisone, blood thinner

## 2023-11-30 NOTE — LETTER
[facility]          Sender: [address] @ [phone]          Date: December 7, 2023  Patient Name:  Dilip Kan  Patient YOB: 1955  Routing Message:  Pt looking for short term rehab placement        The documents accompanying this notice contain confidential information belonging to the sender.  This information is intended only for the use of the individual or entity named above.  The authorized recipient of this information is prohibited from disclosing this information to any other party and is required to destroy the information after its stated need has been fulfilled, unless otherwise required by state law.    If you are not the intended recipient, you are hereby notified that any disclosure, copy, distribution or action taken in reliance on the contents of these documents is strictly prohibited.  If you have received this document in error, please return it by fax to [phone] with a note on the cover sheet explaining why you are returning it (e.g. not your patient, not your provider, etc.).  If you need further assistance, please call .  Documents may also be returned by mail to [employer], , [facility] [address], [place], [place] [address].

## 2023-11-30 NOTE — PROGRESS NOTES
SPIRITUAL HEALTH SERVICES  [facility]C   PRE-SURGERY VISIT    Had pre-surgery visit with pt.  Provided spiritual support, prayer. Pt is Cheondoism    Rev. Aby Cortez MDiv, [facility]  Staff    Pager [phone]

## 2023-11-30 NOTE — LETTER
Recipient: [facility]          Sender: NICHO SERRANO @ [phone]          Date: December 7, 2023  Patient Name:  Dilip Kan  Patient YOB: 1955  Routing Message:  Referral for short term rehab        The documents accompanying this notice contain confidential information belonging to the sender.  This information is intended only for the use of the individual or entity named above.  The authorized recipient of this information is prohibited from disclosing this information to any other party and is required to destroy the information after its stated need has been fulfilled, unless otherwise required by state law.    If you are not the intended recipient, you are hereby notified that any disclosure, copy, distribution or action taken in reliance on the contents of these documents is strictly prohibited.  If you have received this document in error, please return it by fax to [phone] with a note on the cover sheet explaining why you are returning it (e.g. not your patient, not your provider, etc.).  If you need further assistance, please call .  Documents may also be returned by mail to [employer], , [facility] [address], [place], [place] [address].

## 2023-12-01 LAB
ALP SERPL-CCNC: 61 U/L (ref 40–150)
ALT SERPL W P-5'-P-CCNC: 6 U/L (ref 0–70)
ANION GAP SERPL CALCULATED.3IONS-SCNC: 8 MMOL/L (ref 7–15)
AST SERPL W P-5'-P-CCNC: 15 U/L (ref 0–45)
BILIRUB SERPL-MCNC: 1 MG/DL
BLD PROD TYP BPU: NORMAL
BLD PROD TYP BPU: NORMAL
BLOOD COMPONENT TYPE: NORMAL
BLOOD COMPONENT TYPE: NORMAL
BUN SERPL-MCNC: 9.8 MG/DL (ref 8–23)
CALCIUM SERPL-MCNC: 8.2 MG/DL (ref 8.8–10.2)
CHLORIDE SERPL-SCNC: 103 MMOL/L (ref 98–107)
CODING SYSTEM: NORMAL
CODING SYSTEM: NORMAL
CREAT SERPL-MCNC: 0.72 MG/DL (ref 0.67–1.17)
CROSSMATCH: NORMAL
CROSSMATCH: NORMAL
DEPRECATED HCO3 PLAS-SCNC: 22 MMOL/L (ref 22–29)
EGFRCR SERPLBLD CKD-EPI 2021: >90 ML/MIN/1.73M2
ERYTHROCYTE [DISTWIDTH] IN BLOOD BY AUTOMATED COUNT: 17.4 % (ref 10–15)
GLUCOSE BLDC GLUCOMTR-MCNC: 156 MG/DL (ref 70–99)
GLUCOSE SERPL-MCNC: 130 MG/DL (ref 70–99)
HCT VFR BLD AUTO: 23.3 % (ref 40–53)
HGB BLD-MCNC: 7 G/DL (ref 13.3–17.7)
HGB BLD-MCNC: 7.8 G/DL (ref 13.3–17.7)
ISSUE DATE AND TIME: NORMAL
ISSUE DATE AND TIME: NORMAL
MAGNESIUM SERPL-MCNC: 1.7 MG/DL (ref 1.7–2.3)
MCH RBC QN AUTO: 22.9 PG (ref 26.5–33)
MCHC RBC AUTO-ENTMCNC: 30 G/DL (ref 31.5–36.5)
MCV RBC AUTO: 76 FL (ref 78–100)
PHOSPHATE SERPL-MCNC: 4.5 MG/DL (ref 2.5–4.5)
PLATELET # BLD AUTO: 287 10E3/UL (ref 150–450)
POTASSIUM SERPL-SCNC: 4.1 MMOL/L (ref 3.4–5.3)
RBC # BLD AUTO: 3.06 10E6/UL (ref 4.4–5.9)
SODIUM SERPL-SCNC: 133 MMOL/L (ref 135–145)
TRIGL SERPL-MCNC: 55 MG/DL
UNIT ABO/RH: NORMAL
UNIT ABO/RH: NORMAL
UNIT NUMBER: NORMAL
UNIT NUMBER: NORMAL
UNIT STATUS: NORMAL
UNIT STATUS: NORMAL
UNIT TYPE ISBT: 7300
UNIT TYPE ISBT: 7300
WBC # BLD AUTO: 9.7 10E3/UL (ref 4–11)

## 2023-12-01 PROCEDURE — C9113 INJ PANTOPRAZOLE SODIUM, VIA: HCPCS | Mod: JZ

## 2023-12-01 PROCEDURE — 120N000002 HC R&B MED SURG/OB UMMC

## 2023-12-01 PROCEDURE — B4185 PARENTERAL SOL 10 GM LIPIDS: HCPCS | Mod: JZ | Performed by: UROLOGY

## 2023-12-01 PROCEDURE — 250N000011 HC RX IP 250 OP 636: Performed by: PHYSICIAN ASSISTANT

## 2023-12-01 PROCEDURE — 85027 COMPLETE CBC AUTOMATED: CPT

## 2023-12-01 PROCEDURE — 85018 HEMOGLOBIN: CPT | Performed by: UROLOGY

## 2023-12-01 PROCEDURE — P9016 RBC LEUKOCYTES REDUCED: HCPCS | Performed by: PHYSICIAN ASSISTANT

## 2023-12-01 PROCEDURE — G0463 HOSPITAL OUTPT CLINIC VISIT: HCPCS

## 2023-12-01 PROCEDURE — 250N000013 HC RX MED GY IP 250 OP 250 PS 637: Performed by: PHYSICIAN ASSISTANT

## 2023-12-01 PROCEDURE — 84100 ASSAY OF PHOSPHORUS: CPT | Performed by: PHYSICIAN ASSISTANT

## 2023-12-01 PROCEDURE — 250N000013 HC RX MED GY IP 250 OP 250 PS 637

## 2023-12-01 PROCEDURE — 84460 ALANINE AMINO (ALT) (SGPT): CPT | Performed by: PHYSICIAN ASSISTANT

## 2023-12-01 PROCEDURE — 250N000009 HC RX 250: Performed by: UROLOGY

## 2023-12-01 PROCEDURE — 83735 ASSAY OF MAGNESIUM: CPT | Performed by: PHYSICIAN ASSISTANT

## 2023-12-01 PROCEDURE — 84478 ASSAY OF TRIGLYCERIDES: CPT | Performed by: PHYSICIAN ASSISTANT

## 2023-12-01 PROCEDURE — 80048 BASIC METABOLIC PNL TOTAL CA: CPT

## 2023-12-01 PROCEDURE — 258N000003 HC RX IP 258 OP 636

## 2023-12-01 PROCEDURE — 36415 COLL VENOUS BLD VENIPUNCTURE: CPT

## 2023-12-01 PROCEDURE — 250N000009 HC RX 250: Performed by: PHYSICIAN ASSISTANT

## 2023-12-01 PROCEDURE — 82247 BILIRUBIN TOTAL: CPT | Performed by: PHYSICIAN ASSISTANT

## 2023-12-01 PROCEDURE — 36592 COLLECT BLOOD FROM PICC: CPT | Performed by: UROLOGY

## 2023-12-01 PROCEDURE — 272N000451 HC KIT SHRLOCK 5FR POWER PICC DOUBLE LUMEN

## 2023-12-01 PROCEDURE — 84450 TRANSFERASE (AST) (SGOT): CPT | Performed by: PHYSICIAN ASSISTANT

## 2023-12-01 PROCEDURE — 36569 INSJ PICC 5 YR+ W/O IMAGING: CPT

## 2023-12-01 PROCEDURE — 250N000011 HC RX IP 250 OP 636: Mod: JZ

## 2023-12-01 PROCEDURE — 84075 ASSAY ALKALINE PHOSPHATASE: CPT | Performed by: PHYSICIAN ASSISTANT

## 2023-12-01 RX ORDER — HYDROMORPHONE HYDROCHLORIDE 1 MG/ML
0.5 INJECTION, SOLUTION INTRAMUSCULAR; INTRAVENOUS; SUBCUTANEOUS ONCE
Status: COMPLETED | OUTPATIENT
Start: 2023-12-01 | End: 2023-12-01

## 2023-12-01 RX ORDER — HEPARIN SODIUM,PORCINE 10 UNIT/ML
5-20 VIAL (ML) INTRAVENOUS EVERY 24 HOURS
Status: DISCONTINUED | OUTPATIENT
Start: 2023-12-01 | End: 2023-12-11 | Stop reason: HOSPADM

## 2023-12-01 RX ORDER — NALOXONE HYDROCHLORIDE 0.4 MG/ML
0.2 INJECTION, SOLUTION INTRAMUSCULAR; INTRAVENOUS; SUBCUTANEOUS
Status: DISCONTINUED | OUTPATIENT
Start: 2023-12-01 | End: 2023-12-11 | Stop reason: HOSPADM

## 2023-12-01 RX ORDER — DEXTROSE MONOHYDRATE 100 MG/ML
INJECTION, SOLUTION INTRAVENOUS CONTINUOUS PRN
Status: DISCONTINUED | OUTPATIENT
Start: 2023-12-01 | End: 2023-12-05

## 2023-12-01 RX ORDER — OXYCODONE HYDROCHLORIDE 10 MG/1
20 TABLET ORAL
Status: DISCONTINUED | OUTPATIENT
Start: 2023-12-01 | End: 2023-12-11 | Stop reason: HOSPADM

## 2023-12-01 RX ORDER — NALOXONE HYDROCHLORIDE 0.4 MG/ML
0.4 INJECTION, SOLUTION INTRAMUSCULAR; INTRAVENOUS; SUBCUTANEOUS
Status: DISCONTINUED | OUTPATIENT
Start: 2023-12-01 | End: 2023-12-11 | Stop reason: HOSPADM

## 2023-12-01 RX ORDER — KETOROLAC TROMETHAMINE 15 MG/ML
15 INJECTION, SOLUTION INTRAMUSCULAR; INTRAVENOUS EVERY 6 HOURS
Status: COMPLETED | OUTPATIENT
Start: 2023-12-01 | End: 2023-12-04

## 2023-12-01 RX ORDER — LIDOCAINE 40 MG/G
CREAM TOPICAL
Status: ACTIVE | OUTPATIENT
Start: 2023-12-01 | End: 2023-12-04

## 2023-12-01 RX ORDER — HEPARIN SODIUM,PORCINE 10 UNIT/ML
5-20 VIAL (ML) INTRAVENOUS
Status: DISCONTINUED | OUTPATIENT
Start: 2023-12-01 | End: 2023-12-11 | Stop reason: HOSPADM

## 2023-12-01 RX ADMIN — METHOCARBAMOL 500 MG: 500 TABLET ORAL at 17:40

## 2023-12-01 RX ADMIN — HYDROMORPHONE HYDROCHLORIDE 0.5 MG: 0.2 INJECTION, SOLUTION INTRAMUSCULAR; INTRAVENOUS; SUBCUTANEOUS at 08:04

## 2023-12-01 RX ADMIN — MEGESTROL ACETATE 20 MG: 20 TABLET ORAL at 08:10

## 2023-12-01 RX ADMIN — OXYCODONE HYDROCHLORIDE 15 MG: 5 TABLET ORAL at 06:16

## 2023-12-01 RX ADMIN — LIDOCAINE HYDROCHLORIDE ANHYDROUS 3 ML: 10 INJECTION, SOLUTION INFILTRATION at 11:08

## 2023-12-01 RX ADMIN — OXYCODONE HYDROCHLORIDE 20 MG: 10 TABLET ORAL at 16:01

## 2023-12-01 RX ADMIN — KETOROLAC TROMETHAMINE 15 MG: 15 INJECTION, SOLUTION INTRAMUSCULAR; INTRAVENOUS at 22:16

## 2023-12-01 RX ADMIN — OXYCODONE HYDROCHLORIDE 15 MG: 5 TABLET ORAL at 02:28

## 2023-12-01 RX ADMIN — PANTOPRAZOLE SODIUM 40 MG: 40 INJECTION, POWDER, FOR SOLUTION INTRAVENOUS at 08:10

## 2023-12-01 RX ADMIN — KETOROLAC TROMETHAMINE 15 MG: 15 INJECTION, SOLUTION INTRAMUSCULAR; INTRAVENOUS at 16:01

## 2023-12-01 RX ADMIN — OXYCODONE HYDROCHLORIDE 40 MG: 40 TABLET, FILM COATED, EXTENDED RELEASE ORAL at 17:41

## 2023-12-01 RX ADMIN — ACETAMINOPHEN 975 MG: 325 TABLET, FILM COATED ORAL at 02:26

## 2023-12-01 RX ADMIN — HYDROMORPHONE HYDROCHLORIDE 1 MG: 1 INJECTION, SOLUTION INTRAMUSCULAR; INTRAVENOUS; SUBCUTANEOUS at 13:39

## 2023-12-01 RX ADMIN — SODIUM CHLORIDE, PRESERVATIVE FREE: 5 INJECTION INTRAVENOUS at 13:05

## 2023-12-01 RX ADMIN — SODIUM CHLORIDE, PRESERVATIVE FREE: 5 INJECTION INTRAVENOUS at 02:26

## 2023-12-01 RX ADMIN — OXYCODONE HYDROCHLORIDE 20 MG: 10 TABLET ORAL at 10:03

## 2023-12-01 RX ADMIN — ERTAPENEM SODIUM 1 G: 1 INJECTION, POWDER, LYOPHILIZED, FOR SOLUTION INTRAMUSCULAR; INTRAVENOUS at 08:10

## 2023-12-01 RX ADMIN — HYDROXYZINE HYDROCHLORIDE 10 MG: 10 TABLET ORAL at 18:40

## 2023-12-01 RX ADMIN — OXYCODONE HYDROCHLORIDE 20 MG: 10 TABLET ORAL at 23:08

## 2023-12-01 RX ADMIN — MEGESTROL ACETATE 20 MG: 20 TABLET ORAL at 20:00

## 2023-12-01 RX ADMIN — OLIVE OIL AND SOYBEAN OIL 250 ML: 16; 4 INJECTION, EMULSION INTRAVENOUS at 20:01

## 2023-12-01 RX ADMIN — KETOROLAC TROMETHAMINE 15 MG: 15 INJECTION, SOLUTION INTRAMUSCULAR; INTRAVENOUS at 10:03

## 2023-12-01 RX ADMIN — HYDROMORPHONE HYDROCHLORIDE 0.5 MG: 1 INJECTION, SOLUTION INTRAMUSCULAR; INTRAVENOUS; SUBCUTANEOUS at 08:55

## 2023-12-01 RX ADMIN — METHOCARBAMOL 500 MG: 500 TABLET ORAL at 13:39

## 2023-12-01 RX ADMIN — HYDROMORPHONE HYDROCHLORIDE 0.5 MG: 0.2 INJECTION, SOLUTION INTRAMUSCULAR; INTRAVENOUS; SUBCUTANEOUS at 18:40

## 2023-12-01 RX ADMIN — OXYCODONE HYDROCHLORIDE 20 MG: 10 TABLET ORAL at 20:00

## 2023-12-01 RX ADMIN — ACETAMINOPHEN 975 MG: 325 TABLET, FILM COATED ORAL at 17:40

## 2023-12-01 RX ADMIN — ACETAMINOPHEN 975 MG: 325 TABLET, FILM COATED ORAL at 08:55

## 2023-12-01 RX ADMIN — HYDROMORPHONE HYDROCHLORIDE 0.5 MG: 0.2 INJECTION, SOLUTION INTRAMUSCULAR; INTRAVENOUS; SUBCUTANEOUS at 22:31

## 2023-12-01 RX ADMIN — MAGNESIUM SULFATE HEPTAHYDRATE: 500 INJECTION, SOLUTION INTRAMUSCULAR; INTRAVENOUS at 20:01

## 2023-12-01 RX ADMIN — OXYCODONE HYDROCHLORIDE 40 MG: 40 TABLET, FILM COATED, EXTENDED RELEASE ORAL at 05:51

## 2023-12-01 RX ADMIN — HYDROMORPHONE HYDROCHLORIDE 0.5 MG: 0.2 INJECTION, SOLUTION INTRAMUSCULAR; INTRAVENOUS; SUBCUTANEOUS at 01:29

## 2023-12-01 RX ADMIN — METHOCARBAMOL 500 MG: 500 TABLET ORAL at 08:10

## 2023-12-01 RX ADMIN — METHOCARBAMOL 500 MG: 500 TABLET ORAL at 20:00

## 2023-12-01 ASSESSMENT — ACTIVITIES OF DAILY LIVING (ADL)
ADLS_ACUITY_SCORE: 32
ADLS_ACUITY_SCORE: 32
ADLS_ACUITY_SCORE: 30
ADLS_ACUITY_SCORE: 32

## 2023-12-01 NOTE — PROGRESS NOTES
Temp: 98.6  F (37  C) Temp src: Oral BP: 103/51 Pulse: 68   Resp: 16 SpO2: 95 % O2 Device: None (Room air)     A&O x4, on RA. Soft BP. Constant pain managed with PRN Dilaudid, Oxycodone, and scheduled Toradol, Robaxin, and OxyContin. Denies nausea. Hgb dropped to 7, 2 units of blood infused. DL PICC was placed on R arm. NS infusing @100 mL/hr. Urostomy in place with bloody urine output. R HCRISTIANO leaking at the site, dressing changed. Midline abd incision, with dried drainage. Remains NPO except ice chips. Plan to get TPN started tonight. Continue with POC.

## 2023-12-01 NOTE — PROGRESS NOTES
Plastic Surgery Progress Note  December 1, 2023    POD1 s/p total cystectomy and creation of ileal conduit urinary diversion, total pubectomy for osteomyelitis, and elevation of rectus flap by Dr. Guadalupe.    Doing well overnight, pain well controlled, NPO, not passing flatus. Incisional dressings intact.     Temp:  [97.6  F (36.4  C)-98.5  F (36.9  C)] 98.1  F (36.7  C)  Pulse:  [61-82] 61  Resp:  [10-21] 16  BP: ()/(47-79) 101/47  SpO2:  [92 %-100 %] 93 %    Intake/Output Summary (Last 24 hours) at 12/1/2023 0632  Last data filed at 12/1/2023 0600  Gross per 24 hour   Intake 3120 ml   Output 555 ml   Net 2565 ml       General: Alert, well-appearing in no acute distress.  Chest wall: Symmetric thorax.   Respiratory: Non-labored breathing on RA  Cardiovascular: Regular rate and rhythm.   Gastrointestinal: Abdomen non-distended, incision with primapore dressing with mild saturation. CHRISTIANO drain with serosanguinous output.   Extremities: wwp   Skin: No rashes or lesions appreciated.    Drains:   95 since 11/30 1500    Labs/Imaging:   Hg 7 (9.4)   WBC 9.7 stable    Dilip Kan is a 68 year old male with total cystectomy and creation of ileal conduit urinary diversion, total pubectomy for osteomyelitis, and elevation of rectus flap by Dr. Guadalupe now POD1.     -Progressing well after procedure  -Incision and CHRISTIANO drain management per Urology   -Rest of cares per Urology, please page plastic surgery for any concerns or questions.       Juarez Sahu MD  Plastic & Reconstructive Surgery PGY-1  Please see [facility] for On-Call Provider

## 2023-12-01 NOTE — PLAN OF CARE
Goal Outcome Evaluation:    Problem: Adult Inpatient Plan of Care  Goal: Optimal Comfort and Wellbeing  Outcome: Progressing     Temp: 98.1  F (36.7  C) Temp src: Oral BP: 101/47 Pulse: 61   Resp: 16 SpO2: 93 % O2 Device: None (Room air) Oxygen Delivery: 2 LPM       Time of care: 4973-1563  Reason for admission: POD 0-1 ileal conduit creation    NEURO: A&Ox4  RESPIRATORY: WDL on RA.   CARDIAC: intermittent hypotension, but VSS. Denies cardiac chest pain.   GI/: urostomy patent w/ bloody output. -flatus, hypo BS.   DIET: NPO ex meds, ice chips  PAIN/NAUSEA: pain managed w/ scheduled meds, prn IV dilaudid and prn oxycodone. Reminded to splint abdomen. Denies nausea  INCISION/DRAINS/SKIN: no new drainage midline incision. RLQ CHRISTIANO, drsg reinforced. Urostomy w/ 2 stents.   IV ACCESS: PIV w/ MIVF  ACTIVITY: not oob, WC bound at BL   LAB: reviewed, bg 150s. Hgb 7 this AM down from 9.4 yesterday, team notified in person.   CHANGES: hgb drop, heparin held and team notified. Pain controlled overnight, otherwise no acute major changes.   PLAN: place PICC today. Give blood. Encourage IS. Continue w POC

## 2023-12-01 NOTE — OR NURSING
Report given to floor RN Taylor. Patient's wife Marybeth called and updated and instructed on which room the patient would be transported to. Patient transport placed.

## 2023-12-01 NOTE — PLAN OF CARE
"BP 93/52 (BP Location: Right arm)   Pulse 63   Temp 98.1  F (36.7  C) (Oral)   Resp 14   Ht 1.778 m (5' 10\")   Wt 81.6 kg (179 lb 14.3 oz)   SpO2 93%   BMI 25.81 kg/m      Neuro: A&Ox4. Able to make needs known.   Respiratory: WDL denies SOB   Cardiac: WDL denies cardiac chest pain   GI/: Ileal conduit w/ red output. +BS LBM 11/29  Diet: NPO ice chips   Pain: Pain managed w/ PRN oxycodone and dilaudid   Incisions/Drains: R CHRISTIANO site, ileal conduit, midline incision w/ drainage marked.   IV Access: L PIV   Labs: reviewed   Activity: not OOB       New changes this shift: Pt arrived to floor from PACU   Plan:  Continue POC    Admitted/transferred from:   2 RN full   skin assessment completed by Taylor Bey RN and Dinora.  Skin assessment finding: issues found Blanchable redness on coccyx, new CHRISTIANO site, new ileal conduit site, midline incision w/ dressing marked, skin tear on L hand, small scab on R shin, scattered bruising L arm    Interventions/actions: other Mepilex placed on sacrum, frequent repositioning, primapore on L hand skin tear.       Bedside Emergency Equipment Present:  Suction Regulator: Yes  Suction Canister: Yes  Tubing between Regulator and Canister: Yes  O2 Regulator with Tree: Yes  Ambu Bag: Yes     "

## 2023-12-01 NOTE — PROCEDURES
[facility]    Double Lumen PICC Placement    Date/Time: 12/1/2023 10:51 AM    Performed by: Shanelle Hidalgo RN  Authorized by: Dieudonne Abraham MD  Indications: TPN/ antibiotics.      UNIVERSAL PROTOCOL   Site Marked: Yes  Prior Images Obtained and Reviewed:  Yes  Required items: Required blood products, implants, devices and special equipment available    Patient identity confirmed:  Verbally with patient, arm band, provided demographic data and hospital-assigned identification number  Patient was reevaluated immediately before administering moderate or deep sedation or anesthesia  Confirmation Checklist:  Patient's identity using two indicators, relevant allergies, procedure was appropriate and matched the consent or emergent situation and correct equipment/implants were available  Time out: Immediately prior to the procedure a time out was called    Universal Protocol: the Joint Commission Universal Protocol was followed    Preparation: Patient was prepped and draped in usual sterile fashion       ANESTHESIA    Anesthesia:  See MAR for details  Local Anesthetic:  Lidocaine 1% with epinephrine  Anesthetic Total (mL):  3      SEDATION    Patient Sedated: No        Preparation: skin prepped with ChloraPrep  Skin prep agent: skin prep agent completely dried prior to procedure  Sterile barriers: maximum sterile barriers were used: cap, mask, sterile gown, sterile gloves, and large sterile sheet  Hand hygiene: hand hygiene performed prior to central venous catheter insertion  Type of line used: PICC  Catheter type: double lumen  Lumen type: non-valved and power PICC  Lumen Identification: Purple and Red  Catheter size: 5 Fr  Brand: Bard  Lot number: [id number]  Placement method: venipuncture, MST, ultrasound and tip navigation system  Number of attempts: 1  Difficulty threading catheter: no  Successful placement: yes  Orientation: right  Catheter to Vein (%): 20  Location:  basilic vein (vein diameter- 0.89cm)  Arm circumference: adults 10 cm  Extremity circumference: 32  Visible catheter length: 2  Total catheter length: 43  Dressing and securement: alcohol impregnated caps, blood cleaned with CHG, chlorhexidine disc applied, glue, site cleansed, statlock, sterile dressing applied and transparent dressing  Post procedure assessment: blood return through all ports, free fluid flow and placement verified by 3CG technology  PROCEDURE   Patient Tolerance:  Patient tolerated the procedure well with no immediate complicationsDescribe Procedure: PICC placement verified by [employer] 3CG tip confirmation system. PICC okay to use.  Disposal: sharps and needle count correct at the end of procedure, needles and guidewire disposed in sharps container

## 2023-12-01 NOTE — OR NURSING
"PAR Anesthesiologist at patient bedside. Patient given home pain regimen along with additional IV pain medications and continues to state 7-8/10 pain. Patient asked what his typical pain at home is like and states \"on a good day 4-5/10, bad day 7-8/10\". Patient educated that he will have some pain after this procedure. RN sitting at bedside with patient notes that the patient is intermittently resting between cares. RN paged Surgical team to see if the patient can have Toradol. Order for 15 mg of Toradol placed by Surgical Resident and given by RN. Patient cleared by anesthesiology to go to the floor.  "

## 2023-12-01 NOTE — PLAN OF CARE
Goal Outcome Evaluation:      Plan of Care Reviewed With: patient    Overall Patient Progress: no changeOverall Patient Progress: no change    Outcome Evaluation: see RD progress note

## 2023-12-01 NOTE — PROGRESS NOTES
"CLINICAL NUTRITION SERVICES - ASSESSMENT NOTE     Nutrition Prescription    RECOMMENDATIONS FOR MDs/PROVIDERS TO ORDER:  Adjust IV fluids per provider discretion when TPN starts.  TPN will run @ 75 mL/hr continuous    Please alert RD or pharmacist if pt requires adjustments to PN fluid volume    Malnutrition Status:    Severe malnutrition in the context of chronic illness    Recommendations already ordered by Registered Dietitian (RD):  -- Once central line in place and ready to use:  Dosing weight:  82 kg  Access: Central (once obtained)  Initial parameters (per day)  Volume:  1800 mL  Dextrose: 115 g  AA: 120 g  Lipids: 250 mL 20%, 7 days per week   Dextrose titration: Monitor lytes and if within acceptable parameters (Mg++ > or = 1.5, K+ is > or = 3, and PO4 > or = 1.9), increase dextrose by 50 g/day to goal of 315 g dextrose.  Additives: Infuvite and trace minerals per pharmacist. Add 100 mg/day thiamine x 7 days    Goal PN provides 315 g dextrose, 120 g AA, and 250 mL 20% lipids 7 days per week for total provision of 2051 Kcals (25 Kcals/kg), ~1.5 g/kg protein, GIR 2.7 mg/kg/minute, and 24% fat kcals on average daily.      -- Ordered lab add-ons: Mg++, Phos, Alk Phos, ALT, AST, Tbili, and TG    Future/Additional Recommendations:  Monitor TPN tolerance, wt trends, future diet advancement; inpatient RD will continue to follow per protocol     REASON FOR ASSESSMENT  Dilip Kan is a/an 68 year old male assessed by the dietitian for Pharmacy/Nutrition to Start and Manage PN (Line Type: \"Central Line NOT in place (Line has been ordered, but not yet inserted)\"; Indication: \"Malnutrition, ileus, anticipating prolonged npo \")    NUTRITION HISTORY  Patient reports poor PO intake since starting chemo and subsequent ~50 lb wt loss.  Pt reports poor PO intake due to no appetite and food not being appetizing anymore.  Pt has been drinking Boost/Ensure, but not regularly (maybe 1 every few days).  Pt confirms no food " "allergies.    Per chart, pt is POD1 cystectomy with IC, pubectomy, and rectus flap for chronic pubic osteomyelitis     CURRENT NUTRITION ORDERS  Diet: NPO  Intake/Tolerance: NPO since admission yesterday    LABS  Labs reviewed  - Na+ 133 (L)  - K+, Mg++, Phos WNL  - Alk Phos, ALT, AST, Tbili, and TG WNL    MEDICATIONS  Medications reviewed  - Megace BID  - IV fluids @ 100 mL/hr    ANTHROPOMETRICS  Height: 177.8 cm (5' 10\")  Most Recent Weight: 81.6 kg (179 lb 14.3 oz)    IBW: 75.5 kg   BMI: Overweight BMI 25-29.9  Weight History: 57 lb wt loss x 10 months (24%)  Wt Readings from Last 25 Encounters:   11/30/23 81.6 kg (179 lb 14.3 oz)   11/21/23 79.4 kg (175 lb)   11/20/23 82.1 kg (181 lb)   09/13/23 83.9 kg (185 lb)   08/15/23 95.3 kg (210 lb)   07/18/23 93.9 kg (207 lb)   07/06/23 93.8 kg (206 lb 12.7 oz)   06/29/23 99.8 kg (220 lb)   06/29/23 99.8 kg (220 lb)   06/13/23 99.9 kg (220 lb 3.2 oz)   05/23/23 100.8 kg (222 lb 3.2 oz)   05/05/23 97.5 kg (215 lb)   05/01/23 96 kg (211 lb 11.2 oz)   05/01/23 98.5 kg (217 lb 3.2 oz)   04/26/23 104.8 kg (231 lb)   04/20/23 103.2 kg (227 lb 8 oz)   04/10/23 100.7 kg (222 lb)   03/14/23 103.6 kg (228 lb 6.4 oz)   03/09/23 105.7 kg (233 lb)   02/28/23 103.9 kg (229 lb)   02/03/23 103.4 kg (228 lb)   01/26/23 107 kg (236 lb)   12/02/22 107 kg (236 lb)   10/25/22 107 kg (236 lb)   10/05/22 103 kg (227 lb)      Dosing Weight: 82 kg (actual)    ASSESSED NUTRITION NEEDS  Estimated Energy Needs: 3761-0215 kcals/day (25 - 30 kcals/kg)  Justification: Maintenance, aim lower end with PN  Estimated Protein Needs:  grams protein/day (1.2 - 1.5 grams of pro/kg)  Justification: Post-op and Repletion  Estimated Fluid Needs: (1 mL/kcal)   Justification: Maintenance, or other per provider pending fluid status    PHYSICAL FINDINGS  See malnutrition section below.    MALNUTRITION  % Intake: </=50% for >/= 1 month (severe)  % Weight Loss: > 20% in 1 year (severe)  Subcutaneous Fat Loss: " Facial region, Upper arm, and Lower arm:  mild  Muscle Loss: Temporal, Facial & jaw region, Upper arm (bicep, tricep), and Lower arm  (forearm):  severe  Fluid Accumulation/Edema: None noted per chart review  Malnutrition Diagnosis: Severe malnutrition in the context of chronic illness    NUTRITION DIAGNOSIS  Inadequate oral intake related to poor appetite as evidenced by 24% wt loss x 10 months      INTERVENTIONS  Implementation  Nutrition Education: Provided education on role of RD and nutrition POC (TPN)   Collaboration with other providers and Parenteral Nutrition/IV Fluids - Initiate ; paged provider with above IV fluid recs    Goals  Once TPN reaches goal, total avg nutritional intake to meet a minimum of 25 kcal/kg and 1.2 g PRO/kg daily (per dosing wt 82 kg).     Monitoring/Evaluation  Progress toward goals will be monitored and evaluated per protocol.     Wilma Bright, RD, , LD  Weekday Pager: [phone]  Weekday Units covered: 5A (7354-3400) and 7B (9442-8277)  Weekend/Holiday RD Pager: [phone]

## 2023-12-01 NOTE — CONSULTS
[facility]  WOC Nurse Inpatient Assessment     Consulted for: New Ileal Conduit    Summary: Spouse not able to help with pouching, patient concerned with insurance coverage for [facility]. Encouraged use of [facility] for assistance.    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   RP+EBRT  TUIBN   GH with clots once, admitted to hospital.   Incontinent..  Left hip pain for 1 year    Assessment:      Areas visualized during today's visit: Focused: and ileal conduit    Assessment of new end Ileal Conduit and Ileal conduit urinary diversion:  Diagnosis Pertinent to Stoma:  prostate cancer, fistula       Surgery Date: 11/30, Lysis of adhesions  Total cystectomy and Creation of ileal conduit urinary diversion 94846-64  Total Pubectomy (77047) for osteomyelitis   Rectus flap fixation into pelvis  Surgeon:Dr. Abraham SManan, Dr. Paul SManna       [facility]ospital[facility]  Pouching system in place on assessment today: Yeni two piece, cut to fit, and post op pouch   Pouch barrier status: intact  Pouch last changed/wear time: 11/30  Reason for pouch change today: initial post-op assessment  Effectiveness of current pouching/ supply plan: Attempting new system, will re-evaluate next assessment  Change made with ostomy management today: Yes  Pouching system placed today: pouch intact, not changed today. Patient having too much pain at time of education session  Supplies: gathered and discussed with patient     Stoma location: RUQ  Stoma size: 1 1/4 inches, Ureteral stents  Stoma appearance: healthy, red, round, moist, edematous, and protruberant  Mucocutaneous junction:  intact  Peristomal complication(s): none   Output: blood and mucoid  Output volume emptied during visit: 0ml, connected to bedside drainage bag  Abdominal assessment: Distended  Surgical site(s): open to air  NG still in place? No  Pain: Fullness  Is  patient still on a PCA? No    Ostomy education assessment:  Participant of teaching session today: patient   Education completed today: Initial fitting, Stoma assessment, Pouching system assessment , Evaluate leakage issue, Refitting of appliance , Adjustment of pouching plan, Pouch change demonstration, Introduction to pouches, Pouch emptying demonstration, Infection prevention/hygiene , Hernia prevention, Lifestyle adjustments , and Discharge instructions  Educational materials/methods: Verbal, Written, Demonstration, Return demonstration, FOD [place] education hand out, Product sample, Hands on, Addressed patient/caregiver questions/concerns, and Left packet of information at bedside   Education still needed: Stoma assessment, Pouching system assessment , Evaluate leakage issue, Refitting of appliance , Adjustment of pouching plan, Pouch change return demonstration, Ostomy accessory product use , Peristomal skin care, Pouch emptying return demonstration, Intake and output recording, Fluid and electrolyte balance , Importance of hydration, When to seek medical attention, and Discharge instructions  Learning Comprehension:   Psychosocial assessment: attentive, participating  Patient readiness for education today: observing, attentive, and active participation  Following today's visit: patient  is able to demonstrate;         1. How to empty their pouch? No, Demo provided , with heavy assist, and Needs additional practice         2. How to change their pouch? No, Demo provided , with heavy assist, and Needs additional practice         3. How to read and record intake and output correctly? No, Demo provided , with heavy assist, and Needs additional practice   Preparation for discharge completed: No discharge preparation started yet  Preparation for discharge still needed: No discharge preparation started yet  Pt support system on discharge: family  WOC recommend home care? Yes and By WOC RN if possible  Face to face  "time: 35 minutes          Treatment Plan:     RUQ Ileal Conduit and Ileal conduit urinary diversion pouching plan:   Pouching system: ostomy supplies pouches: [place] 57 URINE (081836) ostomy supplies barrier: Yeni 57mm FLAT (030042)  Accessories used: [facility] ostomy accessories: 2\" Deya Ring (102192), Powder (477126), Medium Belt (208570), Urostomy Adapter (899127), Night Drainage Bottle (896340), and Cavilon no sting barrier film (598570)   Frequency of pouch changes: PRN leakage and Three times a week  WOC follow up plan: Daily Monday-Friday (as able)  Bedside RN interventions: Change pouch PRN if leaking using the supplies above, Empty pouch when 1/3 to 1/2 full, ensure to clean pouch outlet after emptying to prevent odor, Notify WOC for ongoing pouch leakage, Document stoma appearance and output volume, color, and consistency every shift, and Encourage patient to empty pouch with assist      Pressure Injury Prevention (PIP) Plan:  If patient is declining pressure injury prevention interventions: Explore reason why and address patient's concerns, Educate on pressure injury risk and prevention intervention(s), If patient is still declining, document \"informed refusal\" , and Ensure Care team is aware ( provider, charge nurse, etc)  Mattress: Follow bed algorithm, reassess daily and order specialty mattress, if indicated.  HOB: Maintain at or below 30 degrees, unless contraindicated  Repositioning in bed: Every 1-2 hours , Left/right positioning; avoid supine, Raise foot of bed prior to raising head of bed, to reduce patient sliding down (shear), and Frequent microturns using TAPS wedges, as patient tolerates  Heels: Keep elevated off mattress, Pillows under calves, and Heel lift boots  Protective Dressing: Sacral Mepilex for prevention (#165543),  especially for the agitated patient   Positioning Equipment: None  Chair positioning: Chair cushion (#603649) , Assist patient to reposition hourly, and Do NOT use a " donut for sitting (this increases pressure to smaller area and creates a higher potential for injury)    If patient has a buttock pressure injury, or high risk for PI use chair cushion or SPS.  Moisture Management: Perineal cleansing /protection: Follow Incontinence Protocol, Avoid brief in bed, Clean and dry skin folds with bathing , Consider InterDry (#286751) between folds, and Moisturize dry skin  Under Devices: Inspect skin under all medical devices during skin inspection , Ensure tubes are stabilized without tension, and Ensure patient is not lying on medical devices or equipment when repositioned  Ask provider to discontinue device when no longer needed.      Orders: Written    RECOMMEND PRIMARY TEAM ORDER: None, at this time  Education provided: importance of repositioning, plan of care, wound progress, Infection prevention , Moisture management, Hygiene, and Off-loading pressure  Discussed plan of care with: Patient  WOC nurse follow-up plan: daily and prn( M-Fri)  Notify WOC if wound(s) deteriorate.  Nursing to notify the Provider(s) and re-consult the WOC Nurse if new skin concern.    DATA:     Current support surface: Standard  Standard gel/foam mattress (IsoFlex, Atmos air, etc)  Containment of urine/stool: Incontinence Protocol, Incontinent pad in bed, and Ileostomy pouch  BMI: Body mass index is 25.81 kg/m .   Active diet order: Orders Placed This Encounter      NPO for Medical/Clinical Reasons Except for: Meds, Ice Chips     Output: I/O last 3 completed shifts:  In: 3120 [P.O.:120; I.V.:3000]  Out: 555 [Urine:300; Drains:95; Blood:160]     Labs:   Recent Labs   Lab 12/01/23  0448   HGB 7.0*   WBC 9.7     Pressure injury risk assessment:   Sensory Perception: 4-->no impairment  Moisture: 4-->rarely moist  Activity: 1-->bedfast  Mobility: 2-->very limited  Nutrition: 2-->probably inadequate  Friction and Shear: 2-->potential problem  Lopez Score: 15    Rhea De, RN, BSN, CWON  Pager no longer is  use, please contact through [employer] group: [facility] Nurse [place]  Dept. Office Number: [phone]

## 2023-12-01 NOTE — PROGRESS NOTES
"Urology  Progress Note    24 hour events/Subjective:     - No acute events overnight   - Pain well controlled on current regimen   - Remains NPO ; no nausea or vomiting   - not passing flatus         Exam  /47 (BP Location: Right arm)   Pulse 61   Temp 98.1  F (36.7  C) (Oral)   Resp 16   Ht 1.778 m (5' 10\")   Wt 81.6 kg (179 lb 14.3 oz)   SpO2 93%   BMI 25.81 kg/m    No acute distress  Unlabored breathing on RA  Abdomen soft, appropriately tender, nondistended. midline incision cdi, conduit healthy with two stents in place  CHRISTIANO serosanguinous  Jiménez in place in new conduit draining light cherry urine in tubing. CHRISTIANO serosang      Intake/Output Summary (Last 24 hours) at 12/1/2023 0552  Last data filed at 11/30/2023 2145  Gross per 24 hour   Intake 3000 ml   Output 375 ml   Net 2625 ml       /150  CHRISTIANO 65/-    Labs  Recent Labs   Lab Test 12/01/23  0448 11/30/23  1833 11/20/23  1249 11/09/23  1541   WBC 9.7  --  9.7 9.2   HGB 7.0* 9.4* 11.0* 10.0*   CR 0.72 0.63* 0.76 0.66*        AM labs pending    7-Day Micro Results       Collected Updated Procedure Result Status      11/30/2023 1221 11/30/2023 1502 Anaerobic Bacterial Culture Routine [[id number]]   Abscess from Pelvis    In process Component Value   No component results               11/30/2023 1221 11/30/2023 1502 Fungal or Yeast Culture Routine [[id number]]   Abscess from Pelvis    In process Component Value   No component results               11/30/2023 1221 11/30/2023 1502 Abscess Aerobic Bacterial Culture Routine [[id number]]   Abscess from Pelvis    In process Component Value   No component results               11/30/2023 1217 11/30/2023 1501 Anaerobic Bacterial Culture Routine [[id number]]   Bone Biopsy from Pelvis    In process Component Value   No component results               11/30/2023 1217 11/30/2023 1501 Fungal or Yeast Culture Routine [[id number]]   Bone Biopsy from Pelvis    In process Component Value   No component " results               11/30/2023 1217 11/30/2023 1501 Bone Biopsy Aerobic Bacterial Culture Routine [[id number]]   Bone Biopsy from Pelvis    In process Component Value   No component results                      Assessment/Plan  68 year old y/o male POD#1 s/p cystectomy with IC, pubectomy, and rectus flap for chronic pubic osteomyelitis. WOC consult.     Note - plan changes for today are in bold below.    Neuro: pain control: PRN oxycodone 5-10 mg q3h , PRN dilaudid 0.2 - 0.4 mg q2h, and scheduled tylenol   CV: HDS   Pulm: incentive spirometry while awake  FEN/GI: NS@100, NPO with sips until POD2. Will start TPN and get a picc given his baseline very poor nutritional status.   Endo: relatively euglycemic  : hooks in place in conduit, stents in place, conduit appears pink and viable. Stents to remain in place for 6 weeks. CHRISTIANO drain to bulb suction.   Heme/ID: monitor for s/s of infection; monitor Hb and transfuse as indicated. Awaiting bone and intra-op cultures. Will obtain a PICC and continue ertapenem until cultures result. ID consult when cultures result.   Activity: Up as tolerated  Ambulate at least TID  PT/OT consulted   Ppx: Heparin 5000 U TID  Home RX on HOLD: none  Dispo: anticipate discharge to home.  PT/OT recommendations: pending      Seen and examined with the chief resident. Will discuss with Miki Correa MD.    Roque Santana MD, PGY-2  Urology Resident      PTA medications:   Prior to Admission medications    Medication Sig Start Date End Date Taking? Authorizing Provider   acetaminophen (TYLENOL) 325 MG tablet Take 975 mg by mouth every 8 hours as needed for mild pain   Yes Reported, Patient   amLODIPine (NORVASC) 10 MG tablet Take 1 tablet (10 mg) by mouth daily  Patient taking differently: Take 10 mg by mouth every morning 2/3/23  Yes Wei Perez MD   atorvastatin (LIPITOR) 20 MG tablet Take 1 tablet (20 mg) by mouth daily 11/15/23  Yes Ezequiel Alejandra MD   furosemide  (LASIX) 20 MG tablet Take 1 tablet (20 mg) by mouth daily as needed  Patient taking differently: Take 20 mg by mouth daily as needed (edema) 11/9/23  Yes Erum Fritz MD   hydrOXYzine (ATARAX) 10 MG tablet Take 1 tablet (10 mg) by mouth every 6 hours as needed for itching or anxiety (with pain, moderate pain) 2/7/23  Yes Haydee Abdul MD   leuprolide (LUPRON DEPOT) 45 MG kit Inject 45 mg into the muscle every 6 months   Yes Unknown, Entered By History   lisinopril (ZESTRIL) 40 MG tablet Take 40 mg by mouth every morning   Yes Unknown, Entered By History   LORazepam (ATIVAN) 0.5 MG tablet Take 1 tablet (0.5 mg) by mouth every 6 hours as needed for anxiety 8/14/23  Yes Erum Fritz MD   megestrol (MEGACE) 20 MG tablet Take 20 mg by mouth 2 times daily   Yes Unknown, Entered By History   nitroFURantoin macrocrystal-monohydrate (MACROBID) 100 MG capsule Take 1 capsule (100 mg) by mouth 2 times daily for 3 days 11/27/23 11/30/23 Yes Miki Correa MD   omeprazole (PRILOSEC) 40 MG DR capsule Take 1 capsule (40 mg) by mouth daily  Patient taking differently: 40 mg every morning 8/2/23  Yes Wei Perez MD   ondansetron (ZOFRAN ODT) 8 MG ODT tab Take 1 tablet (8 mg) by mouth every 8 hours as needed for nausea 6/1/23  Yes Erum Fritz MD   oxybutynin (DITROPAN) 5 MG tablet Take 5 mg by mouth 4 times daily as needed for bladder spasms   Yes Unknown, Entered By History   oxyCODONE (OXYCONTIN) 40 MG 12 hr tablet Take 1 tablet (40 mg) by mouth every 12 hours 11/9/23  Yes Erum Fritz MD   oxyCODONE IR (ROXICODONE) 10 MG tablet Take 1 tablet (10 mg) by mouth every 6 hours as needed for moderate to severe pain 10/26/23  Yes Erum Fritz MD   potassium chloride ER (KLOR-CON M) 20 MEQ CR tablet Take 1 tablet (20 mEq) by mouth daily as needed for potassium supplementation (Take if you take furosemide)  Patient taking differently: Take 20 mEq by mouth 2 times daily  "4/26/23  Yes Erum Fritz MD   prochlorperazine (COMPAZINE) 10 MG tablet Take 1 tablet (10 mg) by mouth every 6 hours as needed for nausea or vomiting 5/30/23  Yes Erum Fritz MD   tamsulosin (FLOMAX) 0.4 MG capsule Take 1 capsule by mouth every morning 7/10/23  Yes Reported, Patient   traZODone (DESYREL) 50 MG tablet Take 50 mg by mouth nightly as needed for sleep 3/22/23  Yes Reported, Patient   apixaban ANTICOAGULANT (ELIQUIS) 5 MG tablet Take 1 tablet (5 mg) by mouth 2 times daily 5/31/23   Erum Fritz MD   docusate sodium (COLACE) 100 MG capsule Take 100 mg by mouth daily    Unknown, Entered By History   predniSONE (DELTASONE) 5 MG tablet Take 1 tablet (5 mg) by mouth 2 times daily 5/3/23   Erum Fritz MD          Contacting the urology team: Please see [facility] and page on-call clinician with any questions or concerns regarding this patient. Note writer may be unavailable.     To access [facility] from intranet: under \"Applications\" --> \"Business Applications\" select \"[employer]\" and search \"Urology Adult & Pediatric/[facility].\" Please note that any question about a urology inpatient, [place] or [place], should go to job code [id number].     "

## 2023-12-01 NOTE — ANESTHESIA POSTPROCEDURE EVALUATION
Patient: Dilip Kan    Procedure: Procedure(s):  CYSTECTOMY, WITH URETEROILEAL CONDUIT CREATION and Pubectomy  Left Rectus Abdominis flap       Anesthesia Type:  General    Note:  Disposition: Admission   Postop Pain Control: Uneventful            Sign Out: Well controlled pain   PONV: No   Neuro/Psych: Uneventful            Sign Out: Acceptable/Baseline neuro status   Airway/Respiratory: Uneventful            Sign Out: Acceptable/Baseline resp. status   CV/Hemodynamics: Uneventful            Sign Out: Acceptable CV status; No obvious hypovolemia; No obvious fluid overload   Other NRE: NONE   DID A NON-ROUTINE EVENT OCCUR? No           Last vitals:  Vitals Value Taken Time   /68 11/30/23 1815   Temp     Pulse 78 11/30/23 1816   Resp 14 11/30/23 1816   SpO2 98 % 11/30/23 1816   Vitals shown include unfiled device data.    Electronically Signed By: Roque Mercer MD  November 30, 2023  6:17 PM

## 2023-12-02 ENCOUNTER — APPOINTMENT (OUTPATIENT)
Dept: PHYSICAL THERAPY | Facility: CLINIC | Age: 68
DRG: 653 | End: 2023-12-02
Attending: UROLOGY
Payer: COMMERCIAL

## 2023-12-02 ENCOUNTER — HOME INFUSION (PRE-WILLOW HOME INFUSION) (OUTPATIENT)
Dept: PHARMACY | Facility: CLINIC | Age: 68
End: 2023-12-02
Payer: COMMERCIAL

## 2023-12-02 ENCOUNTER — APPOINTMENT (OUTPATIENT)
Dept: OCCUPATIONAL THERAPY | Facility: CLINIC | Age: 68
DRG: 653 | End: 2023-12-02
Attending: UROLOGY
Payer: COMMERCIAL

## 2023-12-02 LAB
ALBUMIN SERPL BCG-MCNC: 2.1 G/DL (ref 3.5–5.2)
ALP SERPL-CCNC: 61 U/L (ref 40–150)
ALT SERPL W P-5'-P-CCNC: 8 U/L (ref 0–70)
ANION GAP SERPL CALCULATED.3IONS-SCNC: 5 MMOL/L (ref 7–15)
AST SERPL W P-5'-P-CCNC: 22 U/L (ref 0–45)
BACTERIA ABSC ANAEROBE+AEROBE CULT: ABNORMAL
BILIRUB DIRECT SERPL-MCNC: 1.06 MG/DL (ref 0–0.3)
BILIRUB SERPL-MCNC: 1.5 MG/DL
BUN SERPL-MCNC: 15.6 MG/DL (ref 8–23)
CALCIUM SERPL-MCNC: 8.3 MG/DL (ref 8.8–10.2)
CHLORIDE SERPL-SCNC: 107 MMOL/L (ref 98–107)
CREAT SERPL-MCNC: 0.59 MG/DL (ref 0.67–1.17)
DEPRECATED HCO3 PLAS-SCNC: 22 MMOL/L (ref 22–29)
EGFRCR SERPLBLD CKD-EPI 2021: >90 ML/MIN/1.73M2
ERYTHROCYTE [DISTWIDTH] IN BLOOD BY AUTOMATED COUNT: 17 % (ref 10–15)
GLUCOSE BLDC GLUCOMTR-MCNC: 107 MG/DL (ref 70–99)
GLUCOSE BLDC GLUCOMTR-MCNC: 110 MG/DL (ref 70–99)
GLUCOSE BLDC GLUCOMTR-MCNC: 113 MG/DL (ref 70–99)
GLUCOSE BLDC GLUCOMTR-MCNC: 117 MG/DL (ref 70–99)
GLUCOSE SERPL-MCNC: 112 MG/DL (ref 70–99)
HCT VFR BLD AUTO: 26.6 % (ref 40–53)
HGB BLD-MCNC: 8 G/DL (ref 13.3–17.7)
INR PPP: 1.28 (ref 0.85–1.15)
MAGNESIUM SERPL-MCNC: 2 MG/DL (ref 1.7–2.3)
MCH RBC QN AUTO: 24.4 PG (ref 26.5–33)
MCHC RBC AUTO-ENTMCNC: 30.1 G/DL (ref 31.5–36.5)
MCV RBC AUTO: 81 FL (ref 78–100)
PHOSPHATE SERPL-MCNC: 2.7 MG/DL (ref 2.5–4.5)
PLATELET # BLD AUTO: 251 10E3/UL (ref 150–450)
POTASSIUM SERPL-SCNC: 4 MMOL/L (ref 3.4–5.3)
PREALB SERPL-MCNC: 5.2 MG/DL (ref 20–40)
PROT SERPL-MCNC: 4.6 G/DL (ref 6.4–8.3)
RBC # BLD AUTO: 3.28 10E6/UL (ref 4.4–5.9)
SODIUM SERPL-SCNC: 134 MMOL/L (ref 135–145)
WBC # BLD AUTO: 7.9 10E3/UL (ref 4–11)

## 2023-12-02 PROCEDURE — 97530 THERAPEUTIC ACTIVITIES: CPT | Mod: GP

## 2023-12-02 PROCEDURE — 250N000011 HC RX IP 250 OP 636: Mod: JZ | Performed by: PHYSICIAN ASSISTANT

## 2023-12-02 PROCEDURE — 258N000003 HC RX IP 258 OP 636

## 2023-12-02 PROCEDURE — 84100 ASSAY OF PHOSPHORUS: CPT | Performed by: UROLOGY

## 2023-12-02 PROCEDURE — 82248 BILIRUBIN DIRECT: CPT | Performed by: UROLOGY

## 2023-12-02 PROCEDURE — 83735 ASSAY OF MAGNESIUM: CPT | Performed by: UROLOGY

## 2023-12-02 PROCEDURE — 250N000009 HC RX 250: Performed by: UROLOGY

## 2023-12-02 PROCEDURE — 97530 THERAPEUTIC ACTIVITIES: CPT | Mod: GO

## 2023-12-02 PROCEDURE — 250N000013 HC RX MED GY IP 250 OP 250 PS 637: Performed by: PHYSICIAN ASSISTANT

## 2023-12-02 PROCEDURE — 250N000013 HC RX MED GY IP 250 OP 250 PS 637

## 2023-12-02 PROCEDURE — 97161 PT EVAL LOW COMPLEX 20 MIN: CPT | Mod: GP

## 2023-12-02 PROCEDURE — 258N000003 HC RX IP 258 OP 636: Performed by: UROLOGY

## 2023-12-02 PROCEDURE — 84134 ASSAY OF PREALBUMIN: CPT | Performed by: UROLOGY

## 2023-12-02 PROCEDURE — 80053 COMPREHEN METABOLIC PANEL: CPT | Performed by: UROLOGY

## 2023-12-02 PROCEDURE — 36415 COLL VENOUS BLD VENIPUNCTURE: CPT | Performed by: UROLOGY

## 2023-12-02 PROCEDURE — 97166 OT EVAL MOD COMPLEX 45 MIN: CPT | Mod: GO

## 2023-12-02 PROCEDURE — 250N000011 HC RX IP 250 OP 636: Mod: JZ | Performed by: UROLOGY

## 2023-12-02 PROCEDURE — 120N000002 HC R&B MED SURG/OB UMMC

## 2023-12-02 PROCEDURE — 97535 SELF CARE MNGMENT TRAINING: CPT | Mod: GO

## 2023-12-02 PROCEDURE — 250N000011 HC RX IP 250 OP 636

## 2023-12-02 PROCEDURE — C9113 INJ PANTOPRAZOLE SODIUM, VIA: HCPCS

## 2023-12-02 PROCEDURE — 85610 PROTHROMBIN TIME: CPT | Performed by: UROLOGY

## 2023-12-02 PROCEDURE — B4185 PARENTERAL SOL 10 GM LIPIDS: HCPCS | Mod: JZ | Performed by: UROLOGY

## 2023-12-02 PROCEDURE — 85027 COMPLETE CBC AUTOMATED: CPT

## 2023-12-02 RX ORDER — HEPARIN SODIUM 5000 [USP'U]/.5ML
5000 INJECTION, SOLUTION INTRAVENOUS; SUBCUTANEOUS EVERY 8 HOURS
Status: DISCONTINUED | OUTPATIENT
Start: 2023-12-02 | End: 2023-12-04

## 2023-12-02 RX ADMIN — METHOCARBAMOL 500 MG: 500 TABLET ORAL at 07:55

## 2023-12-02 RX ADMIN — KETOROLAC TROMETHAMINE 15 MG: 15 INJECTION, SOLUTION INTRAMUSCULAR; INTRAVENOUS at 16:13

## 2023-12-02 RX ADMIN — ACETAMINOPHEN 975 MG: 325 TABLET, FILM COATED ORAL at 20:00

## 2023-12-02 RX ADMIN — OLIVE OIL AND SOYBEAN OIL 250 ML: 16; 4 INJECTION, EMULSION INTRAVENOUS at 20:07

## 2023-12-02 RX ADMIN — OXYCODONE HYDROCHLORIDE 20 MG: 10 TABLET ORAL at 23:07

## 2023-12-02 RX ADMIN — KETOROLAC TROMETHAMINE 15 MG: 15 INJECTION, SOLUTION INTRAMUSCULAR; INTRAVENOUS at 22:12

## 2023-12-02 RX ADMIN — HYDROMORPHONE HYDROCHLORIDE 0.5 MG: 0.2 INJECTION, SOLUTION INTRAMUSCULAR; INTRAVENOUS; SUBCUTANEOUS at 00:57

## 2023-12-02 RX ADMIN — HYDROMORPHONE HYDROCHLORIDE 0.5 MG: 0.2 INJECTION, SOLUTION INTRAMUSCULAR; INTRAVENOUS; SUBCUTANEOUS at 11:41

## 2023-12-02 RX ADMIN — KETOROLAC TROMETHAMINE 15 MG: 15 INJECTION, SOLUTION INTRAMUSCULAR; INTRAVENOUS at 09:42

## 2023-12-02 RX ADMIN — KETOROLAC TROMETHAMINE 15 MG: 15 INJECTION, SOLUTION INTRAMUSCULAR; INTRAVENOUS at 03:55

## 2023-12-02 RX ADMIN — MAGNESIUM SULFATE HEPTAHYDRATE: 500 INJECTION, SOLUTION INTRAMUSCULAR; INTRAVENOUS at 20:07

## 2023-12-02 RX ADMIN — OXYCODONE HYDROCHLORIDE 20 MG: 10 TABLET ORAL at 20:00

## 2023-12-02 RX ADMIN — OXYCODONE HYDROCHLORIDE 20 MG: 10 TABLET ORAL at 06:14

## 2023-12-02 RX ADMIN — MEGESTROL ACETATE 20 MG: 20 TABLET ORAL at 07:55

## 2023-12-02 RX ADMIN — OXYCODONE HYDROCHLORIDE 20 MG: 10 TABLET ORAL at 02:05

## 2023-12-02 RX ADMIN — METHOCARBAMOL 500 MG: 500 TABLET ORAL at 20:00

## 2023-12-02 RX ADMIN — MEGESTROL ACETATE 20 MG: 20 TABLET ORAL at 20:00

## 2023-12-02 RX ADMIN — ERTAPENEM SODIUM 1 G: 1 INJECTION, POWDER, LYOPHILIZED, FOR SOLUTION INTRAMUSCULAR; INTRAVENOUS at 07:55

## 2023-12-02 RX ADMIN — OXYCODONE HYDROCHLORIDE 20 MG: 10 TABLET ORAL at 16:13

## 2023-12-02 RX ADMIN — SODIUM CHLORIDE, PRESERVATIVE FREE: 5 INJECTION INTRAVENOUS at 05:54

## 2023-12-02 RX ADMIN — ACETAMINOPHEN 975 MG: 325 TABLET, FILM COATED ORAL at 09:42

## 2023-12-02 RX ADMIN — ACETAMINOPHEN 975 MG: 325 TABLET, FILM COATED ORAL at 02:04

## 2023-12-02 RX ADMIN — OXYCODONE HYDROCHLORIDE 40 MG: 40 TABLET, FILM COATED, EXTENDED RELEASE ORAL at 20:00

## 2023-12-02 RX ADMIN — OXYCODONE HYDROCHLORIDE 40 MG: 40 TABLET, FILM COATED, EXTENDED RELEASE ORAL at 07:55

## 2023-12-02 RX ADMIN — SODIUM CHLORIDE 100 MG: 9 INJECTION, SOLUTION INTRAVENOUS at 11:42

## 2023-12-02 RX ADMIN — METHOCARBAMOL 500 MG: 500 TABLET ORAL at 16:13

## 2023-12-02 RX ADMIN — OXYCODONE HYDROCHLORIDE 20 MG: 10 TABLET ORAL at 09:42

## 2023-12-02 RX ADMIN — HYDROXYZINE HYDROCHLORIDE 10 MG: 10 TABLET ORAL at 07:55

## 2023-12-02 RX ADMIN — PANTOPRAZOLE SODIUM 40 MG: 40 INJECTION, POWDER, FOR SOLUTION INTRAVENOUS at 07:57

## 2023-12-02 RX ADMIN — HEPARIN SODIUM 5000 UNITS: 5000 INJECTION, SOLUTION INTRAVENOUS; SUBCUTANEOUS at 14:51

## 2023-12-02 RX ADMIN — HYDROMORPHONE HYDROCHLORIDE 0.5 MG: 0.2 INJECTION, SOLUTION INTRAMUSCULAR; INTRAVENOUS; SUBCUTANEOUS at 14:50

## 2023-12-02 RX ADMIN — METHOCARBAMOL 500 MG: 500 TABLET ORAL at 11:41

## 2023-12-02 RX ADMIN — HYDROMORPHONE HYDROCHLORIDE 0.3 MG: 0.2 INJECTION, SOLUTION INTRAMUSCULAR; INTRAVENOUS; SUBCUTANEOUS at 21:15

## 2023-12-02 RX ADMIN — HEPARIN SODIUM 5000 UNITS: 5000 INJECTION, SOLUTION INTRAVENOUS; SUBCUTANEOUS at 22:12

## 2023-12-02 ASSESSMENT — ACTIVITIES OF DAILY LIVING (ADL)
ADLS_ACUITY_SCORE: 32
DEPENDENT_IADLS:: INDEPENDENT
ADLS_ACUITY_SCORE: 32

## 2023-12-02 NOTE — CONSULTS
Care Management Initial Consult    General Information  Assessment completed with: Patient, Jermain  Type of CM/SW Visit: Initial Assessment    Primary Care Provider verified and updated as needed: Yes   Readmission within the last 30 days: no previous admission in last 30 days      Reason for Consult: discharge planning  Advance Care Planning: Advance Care Planning Reviewed: no concerns identified          Communication Assessment  Patient's communication style: spoken language (English or Bilingual)    Hearing Difficulty or Deaf: no   Wear Glasses or Blind: yes    Cognitive  Cognitive/Neuro/Behavioral: WDL  Level of Consciousness: alert  Arousal Level: opens eyes spontaneously, arouses to voice, arouses to touch/gentle shaking  Orientation: oriented x 4  Mood/Behavior: calm, cooperative, anxious  Best Language: 0 - No aphasia  Speech: spontaneous, slurred    Living Environment:   People in home: spouse  Marybeth  Current living Arrangements: house      Able to return to prior arrangements: TBD- PT recs TCU vs home with assist       Family/Social Support:  Care provided by: spouse/significant other  Provides care for: no one, unable/limited ability to care for self  Marital Status:   Wife  Marybeth       Description of Support System: Supportive, Involved         Current Resources:   Patient receiving home care services: No     Community Resources: None  Equipment currently used at home: walker, rolling  Supplies currently used at home: None    Employment/Financial:  Employment Status: retired        Financial Concerns: none           Does the patient's insurance plan have a 3 day qualifying hospital stay waiver?  No    Lifestyle & Psychosocial Needs:  Social Determinants of Health     Food Insecurity: Not on file   Depression: Not at risk (8/29/2023)    PHQ-2     PHQ-2 Score: 0   Housing Stability: Not on file   Tobacco Use: High Risk (12/1/2023)    Patient History     Smoking Tobacco Use: Former     Smokeless  Tobacco Use: Current     Passive Exposure: Past   Financial Resource Strain: Not on file   Alcohol Use: Not on file   Transportation Needs: Not on file   Physical Activity: Not on file   Interpersonal Safety: Not on file   Stress: Not on file   Social Connections: Not on file       Functional Status:  Prior to admission patient needed assistance:   Dependent ADLs:: Independent  Dependent IADLs:: Independent  Assesssment of Functional Status: Not at baseline with ADL Functioning, Not at baseline with mobility, Not at  functional baseline, Needs placement in a SNF/TCF for rehabilitation    Mental Health Status:  Mental Health Status: No Current Concerns       Chemical Dependency Status:  Chemical Dependency Status: No Current Concerns             Values/Beliefs:  Spiritual, Cultural Beliefs, Mormonism Practices, Values that affect care: no  Description of Beliefs that Will Affect Care: Latter-day            Additional Information:    68 year old y/o male POD#1 s/p cystectomy with IC, pubectomy, and rectus flap for chronic pubic osteomyelitis.     RNCC met with patient at bedside to introduce role and for an initial consult. Patient lives in a house with his wife Marybeth who is able to provide assistance as needed. Patient has a new urostomy. WOC consult done. Home care referral was initiated through the [employer] hub. Home care agency- [facility] in [place], [place] accepted.     PT recs are TCU at this time. OT recs are still pending. Briefly discussed TCU with patient. Patient is open to TCU placement, but has concerns about insurance coverage, and patient would like a TCU close to his home in [place], [place]. Writer updated the weekend SW who states she will speak with patient further about TCU tomorrow.     RNCC will continue to assist with discharge needs and planning.     [facility]  Phone: [phone]          DEBRA Santana   12/2/2023  Nurse Coordinator      Social Work and  Care Management Department       SEARCHABLE in [employer] - search CARE COORDINATOR       [place] [facility] [place] ([phone]) Saturday & Sunday; ([phone]) [facility] Recognized Holidays     Units: 5A & 5C  Pager: [phone]    Units: 6B & 6C   Pager: [phone]    Units: 7A, 7B, & 7C   Pager: [phone]    Units: 6A & ICU   Pager: [phone]    Units: 5 Ortho, 5MS & WB ED Pager: [phone]    Units: 6MS, 8A & 10 ICU  Pager [phone]

## 2023-12-02 NOTE — PROGRESS NOTES
Therapy: IV Abx  Insurance: [employer] Adv     Pt has all Medicare products, which does not cover IV ABX in the home. (Pt would have coverage for short term TCU or IC). Below is what pt would be responsible for if pt wanted to go w [facility] home infusion    Drug would go to Part-D (pt would be responsible for the co-pay per dispense)  Pt would have to self-pay for the per-giovanni (daily)  If not homebound, nursing would also be self-pay (per visit)    Here is what we are anticipating for out of pocket costs, based on the above information (Estimate has also been attached for your review):    Name of Medication, dose & frequency Cost of drugs & supplies per day Cost per RN visit if patient is not Homebound   Ertapenem 1gm  q24h $37.21 This cost will be dependent on what the Homecare Agency charges. Could range from $90-$200       Please contact Intake with any questions, [phone] or In Basket pool, [facility] ([id number]).    Belén Medina

## 2023-12-02 NOTE — PLAN OF CARE
"Goal Outcome Evaluation:      Plan of Care Reviewed With: patient    Overall Patient Progress: no changeOverall Patient Progress: no change    1930-0730     BP 98/61   Pulse 73   Temp 98.2  F (36.8  C) (Oral)   Resp 16   Ht 1.778 m (5' 10\")   Wt 81.6 kg (179 lb 14.3 oz)   SpO2 95%   BMI 25.81 kg/m      Activity: rested in bed, self turns.  Did not ambulate overnight due to \"pain\"   Neuros: alert and orientated x 4, calm and cooperative   Cardiac: BP 90's/50-60's.  HR 60-70's, asymptomatic   Respiratory: O2 sats mid 90's on RA,unlabored    GI/: abdomen soft/tender.  Pt passing gas, but no BM.  Urostomy with watermelon colored urine   Diet: NPO except ice and meds   Lines: PICC, PIV   Incisions/Drains: midline abdominal dressing CDI.  Abdominal CHRISTIANO with serosang drainage.  CHRISTIANO leaking around insertion site    Labs: reviewed   Pain/nausea: \"partial\" pain control taking prn scheduled tylenol and toradol with prn oxycodone and IV dilaudid.  No nausea    New changes this shift: none    Plan: encourage ambulation.  Continue POC               "

## 2023-12-02 NOTE — PROGRESS NOTES
"Urology  Progress Note    24 hour events/Subjective:     - No acute events overnight   - Pain well controlled on current regimen   - Remains NPO ; no nausea or vomiting   - S/p 1u pRBC with good response  - Feeling well this AM         Exam  BP 98/61   Pulse 73   Temp 98.2  F (36.8  C) (Oral)   Resp 16   Ht 1.778 m (5' 10\")   Wt 81.6 kg (179 lb 14.3 oz)   SpO2 95%   BMI 25.81 kg/m    No acute distress  Unlabored breathing on RA  Abdomen soft, appropriately tender, nondistended. midline incision cdi, conduit healthy with two stents in place  CHRISTIANO serosanguinous  Jiménez in place in new conduit draining light cherry urine in tubing. CHRISTIANO serosang      Intake/Output Summary (Last 24 hours) at 12/1/2023 0552  Last data filed at 11/30/2023 2145  Gross per 24 hour   Intake 3000 ml   Output 375 ml   Net 2625 ml       UOP 1075/400  CHRISTIANO 110/20    Labs  Recent Labs   Lab Test 12/02/23  0644 12/01/23  2221 12/01/23  0448 11/30/23  1833 11/20/23  1249   WBC 7.9  --  9.7  --  9.7   HGB 8.0* 7.8* 7.0* 9.4* 11.0*   CR 0.59*  --  0.72 0.63* 0.76          7-Day Micro Results       Collected Updated Procedure Result Status      11/30/2023 1221 12/01/2023 1632 Anaerobic Bacterial Culture Routine [[id number]]    Abscess from Pelvis    Preliminary result Component Value   Culture No anaerobic organisms isolated after 1 day  [P]                11/30/2023 1221 12/01/2023 1632 Fungal or Yeast Culture Routine [[id number]]    Abscess from Pelvis    Preliminary result Component Value   Culture No growth after 1 day  [P]                11/30/2023 1221 12/01/2023 1416 Abscess Aerobic Bacterial Culture Routine [[id number]]    (Abnormal)   Abscess from Pelvis    Preliminary result Component Value   Culture Culture in progress  [P]     1+ Yeast  [P]                11/30/2023 1217 12/01/2023 1616 Anaerobic Bacterial Culture Routine [[id number]]   Bone Biopsy from Pelvis    Preliminary result Component Value   Culture No anaerobic organisms " isolated after 1 day  [P]                11/30/2023 1217 12/01/2023 1616 Fungal or Yeast Culture Routine [[id number]]   Bone Biopsy from Pelvis    Preliminary result Component Value   Culture No growth after 1 day  [P]                11/30/2023 1217 12/01/2023 1416 Bone Biopsy Aerobic Bacterial Culture Routine [[id number]]   (Abnormal)   Bone Biopsy from Pelvis    Preliminary result Component Value   Culture Culture in progress  [P]     1+ Yeast  [P]                       Assessment/Plan  68 year old y/o male POD#2 s/p cystectomy with IC, pubectomy, and rectus flap for chronic pubic osteomyelitis. WOC consulted.     Note - plan changes for today are in bold below.    Neuro: pain control: PRN oxycodone 5-10 mg q3h , PRN dilaudid 0.2 - 0.4 mg q2h, and scheduled tylenol   CV: HDS   Pulm: incentive spirometry while awake  FEN/GI: NS titration to TPN, CLD and TPN   Endo: relatively euglycemic  : hooks in place in conduit, stents in place, conduit appears pink and viable. Stents to remain in place for 6 weeks. CHRISTIANO drain to bulb suction.   Heme/ID: monitor for s/s of infection; monitor Hb and transfuse as indicated. Awaiting bone and intra-op cultures. PICC and continue ertapenem until cultures result. ID consult when cultures result.   Activity: Up as tolerated  Ambulate at least TID  PT/OT consulted   Ppx: Heparin 5000 U TID --> Resume  Home RX on HOLD: none  Dispo: anticipate discharge to home.  PT/OT recommendations: pending      Seen and examined with the chief resident. Will discuss with Miki Correa MD.    Cole Smith MD  Urology PGY-4      PTA medications:   Prior to Admission medications    Medication Sig Start Date End Date Taking? Authorizing Provider   acetaminophen (TYLENOL) 325 MG tablet Take 975 mg by mouth every 8 hours as needed for mild pain   Yes Reported, Patient   amLODIPine (NORVASC) 10 MG tablet Take 1 tablet (10 mg) by mouth daily  Patient taking differently: Take 10 mg by mouth  every morning 2/3/23  Yes Wei Perez MD   atorvastatin (LIPITOR) 20 MG tablet Take 1 tablet (20 mg) by mouth daily 11/15/23  Yes Ezequiel Alejandra MD   furosemide (LASIX) 20 MG tablet Take 1 tablet (20 mg) by mouth daily as needed  Patient taking differently: Take 20 mg by mouth daily as needed (edema) 11/9/23  Yes Erum Fritz MD   hydrOXYzine (ATARAX) 10 MG tablet Take 1 tablet (10 mg) by mouth every 6 hours as needed for itching or anxiety (with pain, moderate pain) 2/7/23  Yes Haydee Abdul MD   leuprolide (LUPRON DEPOT) 45 MG kit Inject 45 mg into the muscle every 6 months   Yes Unknown, Entered By History   lisinopril (ZESTRIL) 40 MG tablet Take 40 mg by mouth every morning   Yes Unknown, Entered By History   LORazepam (ATIVAN) 0.5 MG tablet Take 1 tablet (0.5 mg) by mouth every 6 hours as needed for anxiety 8/14/23  Yes Erum Fritz MD   megestrol (MEGACE) 20 MG tablet Take 20 mg by mouth 2 times daily   Yes Unknown, Entered By History   nitroFURantoin macrocrystal-monohydrate (MACROBID) 100 MG capsule Take 1 capsule (100 mg) by mouth 2 times daily for 3 days 11/27/23 11/30/23 Yes Miki Correa MD   omeprazole (PRILOSEC) 40 MG DR capsule Take 1 capsule (40 mg) by mouth daily  Patient taking differently: 40 mg every morning 8/2/23  Yes Wei Perez MD   ondansetron (ZOFRAN ODT) 8 MG ODT tab Take 1 tablet (8 mg) by mouth every 8 hours as needed for nausea 6/1/23  Yes Erum Fritz MD   oxybutynin (DITROPAN) 5 MG tablet Take 5 mg by mouth 4 times daily as needed for bladder spasms   Yes Unknown, Entered By History   oxyCODONE (OXYCONTIN) 40 MG 12 hr tablet Take 1 tablet (40 mg) by mouth every 12 hours 11/9/23  Yes Erum Fritz MD   oxyCODONE IR (ROXICODONE) 10 MG tablet Take 1 tablet (10 mg) by mouth every 6 hours as needed for moderate to severe pain 10/26/23  Yes Erum Fritz MD   potassium chloride ER (KLOR-CON M) 20 MEQ CR tablet Take  "1 tablet (20 mEq) by mouth daily as needed for potassium supplementation (Take if you take furosemide)  Patient taking differently: Take 20 mEq by mouth 2 times daily 4/26/23  Yes Erum Fritz MD   prochlorperazine (COMPAZINE) 10 MG tablet Take 1 tablet (10 mg) by mouth every 6 hours as needed for nausea or vomiting 5/30/23  Yes Erum Fritz MD   tamsulosin (FLOMAX) 0.4 MG capsule Take 1 capsule by mouth every morning 7/10/23  Yes Reported, Patient   traZODone (DESYREL) 50 MG tablet Take 50 mg by mouth nightly as needed for sleep 3/22/23  Yes Reported, Patient   apixaban ANTICOAGULANT (ELIQUIS) 5 MG tablet Take 1 tablet (5 mg) by mouth 2 times daily 5/31/23   Erum Fritz MD   docusate sodium (COLACE) 100 MG capsule Take 100 mg by mouth daily    Unknown, Entered By History   predniSONE (DELTASONE) 5 MG tablet Take 1 tablet (5 mg) by mouth 2 times daily 5/3/23   Erum Fritz MD          Contacting the urology team: Please see [facility] and page on-call clinician with any questions or concerns regarding this patient. Note writer may be unavailable.     To access [employer] from intranet: under \"Applications\" --> \"Business Applications\" select \"[employer]\" and search \"Urology Adult & Pediatric/[facility].\" Please note that any question about a urology inpatient, [place] or [place], should go to job code [id number].     "

## 2023-12-02 NOTE — PLAN OF CARE
Goal Outcome Evaluation:      Plan of Care Reviewed With: patient    Overall Patient Progress: improvingOverall Patient Progress: improving     Temp: 97.9  F (36.6  C) Temp src: Oral BP: 100/57 Pulse: 66   Resp: 18 SpO2: 97 % O2 Device: None (Room air)       A&O x4, on RA. Persistent abdominal pain managed with PRN Dilaudid, Oxycodone, and scheduled Toradol, Robaxin, and OxyContin. Denies nausea. DL PICC  infusing continuous TPN @75 mL/hr and NS @25mL/hr. Urostomy in place with adequate output. R CHRISTIANO leaking at the site, dressing changed. Midline abd incision CDI. Clear liquid diet, tolerating. Pt got OOB with assist x2, pivoted to the recliner and sat up for about 30 min. Continue with POC.

## 2023-12-02 NOTE — PROGRESS NOTES
"   12/02/23 1205   Appointment Info   Signing Clinician's Name / Credentials (PT) Connie Lopez, PT, DPT   Living Environment   People in Home spouse   Current Living Arrangements house   Home Accessibility stairs to enter home   Number of Stairs, Main Entrance 3   Transportation Anticipated family or friend will provide   Living Environment Comments Patient reports he only has 3 stairs to enter his home then is able to live on the main level. Per chart review, patient reported to OT that he lives in a 3 story home.   Self-Care   Usual Activity Tolerance moderate   Current Activity Tolerance poor   Equipment Currently Used at Home walker, rolling;wheelchair, manual   Fall history within last six months no   Activity/Exercise/Self-Care Comment Patient reports previous mod I with FWW for short distance ambulation between his bed, bathroom, and recliner. Unsure if patient is a reliable historian at this time as he also states that he has not ambulated in \"4 months\". States that his wife has to assist him with bed mobility.   General Information   Onset of Illness/Injury or Date of Surgery 11/30/23   Referring Physician Dieudonne Abraham MD   Patient/Family Therapy Goals Statement (PT) None stated   Pertinent History of Current Problem (include personal factors and/or comorbidities that impact the POC) Per chart review \"s/p total cystectomy and creation of ileal conduit urinary diversion, total pubectomy for osteomyelitis, and elevation of rectus flap by Dr. Guadalupe.\"   Existing Precautions/Restrictions fall;abdominal   Cognition   Affect/Mental Status (Cognition) WFL   Orientation Status (Cognition) disoriented to;time  (States that the date is \"December 13th, 2023, or whatever the clock says up there\")   Follows Commands (Cognition) WFL   Range of Motion (ROM)   Range of Motion ROM deficits secondary to weakness   Strength (Manual Muscle Testing)   Strength (Manual Muscle Testing) Deficits observed during functional " mobility   Bed Mobility   Bed Mobility supine-sit   Supine-Sit [place] (Bed Mobility) moderate assist (50% patient effort);2 person assist   Impairments Contributing to Impaired Bed Mobility pain   Assistive Device (Bed Mobility) bed rails   Transfers   Transfers sit-stand transfer   Sit-Stand Transfer   Sit-Stand [place] (Transfers) minimum assist (75% patient effort);2 person assist   Assistive Device (Sit-Stand Transfers) walker, front-wheeled   Gait/Stairs (Locomotion)   [place] Level (Gait) minimum assist (75% patient effort);2 person assist   Assistive Device (Gait) other (see comments)  (Bilateral hand held assist)   Distance in Feet (Gait) 2  (Forward towards windows and back to bedside chair.)   Pattern (Gait) step-to   Deviations/Abnormal Patterns (Gait) antalgic;gait speed decreased;nithin decreased;stride length decreased;weight shifting decreased   Comment, (Gait/Stairs) Unable to progress to stairs due to increased pain   Balance   Balance Comments Patient benefits from BUE support for safe mobility at this time   Clinical Impression   Criteria for Skilled Therapeutic Intervention Yes, treatment indicated   PT Diagnosis (PT) Impaired functional mobility   Influenced by the following impairments Pain, weakness   Functional limitations due to impairments Bed mobility, transfers, gait, stairs   Clinical Presentation (PT Evaluation Complexity) stable   Clinical Presentation Rationale Patient presents as medically diagnosed   Clinical Decision Making (Complexity) low complexity   Planned Therapy Interventions (PT) balance training;bed mobility training;gait training;home exercise program;neuromuscular re-education;patient/family education;stair training;strengthening;transfer training   Risk & Benefits of therapy have been explained evaluation/treatment results reviewed;care plan/treatment goals reviewed;participants voiced agreement with care plan;participants included;patient   PT Total  Evaluation Time   PT Valentina Low Complexity Minutes (58534) 14   Physical Therapy Goals   PT Frequency Daily   PT Predicted Duration/Target Date for Goal Attainment 01/02/24   PT Goals Bed Mobility;Transfers;Gait;Stairs   PT: Bed Mobility Supervision/stand-by assist;Supine to/from sit;Within precautions   PT: Transfers Modified independent;Sit to/from stand;Bed to/from chair;Assistive device;Within precautions   PT: Gait Modified independent;Assistive device;100 feet;Within precautions   PT: Stairs Supervision/stand-by assist;3 stairs   Interventions   Interventions Quick Adds Therapeutic Activity   Therapeutic Activity   Therapeutic Activities: dynamic activities to improve functional performance Minutes (66293) 8   Symptoms Noted During/After Treatment Increased pain;Fatigue   Treatment Detail/Skilled Intervention Patient supine in bed on arrival, agreeable to therapy. Verbal cueing and hands on physical assistance required for log roll technique. Hands on physical assistance provided at BLE. Increased time required to perform sidelying to sitting due to increased pain. Sitting EOB with SBA x 2. Scooting towards EOB with SBA x 2. Height of bed elevated for sit to stand transfer in order to mimic home environment. Chair transfer with min ax 2 and bilateral hand held assist. Scooting hips backwards in chair with SBA x 2. Assistance with lines throughout session. Patient seated in bedside chair with call light within reach and end of session.   PT Discharge Planning   PT Plan Bed mobility, transfers, gait as able with [facility]   PT Discharge Recommendation (DC Rec) Transitional Care Facility;home;home with assist   PT Rationale for DC Rec Patient currently requires assist of 2 for bed mobility, transfers, and short distance gait. He lives in a house with his wife who is able to provide assistance as needed. He has access to a walker and wheelchair at home and has used them in recent months. At this time recommend  patient discharges to TCU in order to improve strength and overall independence with mobility. Pending progress with therapy and pain control, patient may be able to discharge home with assist from spouse once he is able to successfully demonstrate safe bed mobility, transfers, gait, and stair navigation.   PT Brief overview of current status Assist of 2 for bed mobility, transfers, and gait today   PT Equipment Needed at Discharge walker, rolling   Total Session Time   Timed Code Treatment Minutes 8   Total Session Time (sum of timed and untimed services) 22     Connie Lopez, PT, DPT

## 2023-12-02 NOTE — PROGRESS NOTES
Occupational Therapy Evaluation 12/02/23 1005   Appointment Info   Signing Clinician's Name / Credentials (OT) Roma Milligan OTR/L   Rehab Comments (OT) Coordinate pain meds       Present no   Living Environment   People in Home spouse   Current Living Arrangements house   Home Accessibility stairs to enter home;stairs within home   Number of Stairs, Main Entrance 3   Number of Stairs, Within Home, Primary greater than 10 stairs   Stair Railings, Within Home, Primary railings safe and in good condition   Transportation Anticipated family or friend will provide   Living Environment Comments Pt reports he lives in a 3-level house with his spouse, 3 BELEM. Pt's bedroom and bathroom located on main level, has a tub/shower in the bathroom without grab bars or chair/bench.   Self-Care   Usual Activity Tolerance moderate   Current Activity Tolerance poor   Regular Exercise No   Equipment Currently Used at Home walker, rolling;wheelchair, manual   Fall history within last six months no   Activity/Exercise/Self-Care Comment Pt reports he completes grooming, self-feeding, toileting, and bathing with Mod IND using his walker at baseline. His spouse assists for dressing and bed mobility.   Instrumental Activities of Daily Living (IADL)   IADL Comments Pt reports his spouse completes house chores, cooking, and driving. Pt reports he manages his meds IND.   General Information   Onset of Illness/Injury or Date of Surgery 11/30/23   Referring Physician Dieudonne Abraham MD   Patient/Family Therapy Goal Statement (OT) Regain strength, be able to walk.   Additional Occupational Profile Info/Pertinent History of Current Problem 68 year old y/o male POD#2 s/p cystectomy with IC, pubectomy, and rectus flap for chronic pubic osteomyelitis.   Existing Precautions/Restrictions abdominal;fall   Limitations/Impairments safety/cognitive   Left Upper Extremity (Weight-bearing Status) partial weight-bearing (PWB)  (10 lbs)    Right Upper Extremity (Weight-bearing Status) partial weight-bearing (PWB)  (10 lbs)   Left Lower Extremity (Weight-bearing Status) full weight-bearing (FWB)   Right Lower Extremity (Weight-bearing Status) full weight-bearing (FWB)   Cognitive Status Examination   Orientation Status orientation to person, place and time   Affect/Mental Status (Cognitive) WFL   Follows Commands WFL   Memory Deficit minimal deficit;recall, recent events;working memory   Cognitive Status Comments Pt appears to be a poor historian, provides conflicting information during eval. For example, pt states he has not ambulated in 3 months, then states it has been 4 months, then states it has been since March. However, pt also states he has been ambulating to the bathroom at home in the past month using his walker.   Visual Perception   Visual Impairment/Limitations corrective lenses full-time   Pain Assessment   Patient Currently in Pain Yes, see Vital Sign flowsheet  (Reports 15/10 pain)   Range of Motion Comprehensive   General Range of Motion no range of motion deficits identified   Strength Comprehensive (MMT)   General Manual Muscle Testing (MMT) Assessment other (see comments)  (Generalized weakness)   Coordination   Upper Extremity Coordination No deficits were identified   Bed Mobility   Bed Mobility supine-sit   Supine-Sit [place] (Bed Mobility) moderate assist (50% patient effort);2 person assist;verbal cues   Assistive Device (Bed Mobility) bed rails   Transfers   Transfers sit-stand transfer   Sit-Stand Transfer   Sit-Stand [place] (Transfers) minimum assist (75% patient effort);2 person assist;verbal cues   Activities of Daily Living   BADL Assessment/Intervention bathing;lower body dressing;grooming;toileting   Bathing Assessment/Intervention   [place] Level (Bathing) maximum assist (25% patient effort);set up   Comment, (Bathing) Per clinical judgement   Assistive Devices (Bathing) shower chair;grab bar,  tub/shower   Lower Body Dressing Assessment/Training   Comment, (Lower Body Dressing) Per clinical judgement   [place] Level (Lower Body Dressing) maximum assist (25% patient effort);set up   Grooming Assessment/Training   [place] Level (Grooming) minimum assist (75% patient effort);set up   Comment, (Grooming) Per clinical judgement   Toileting   Comment, (Toileting) Per clinical judgement   [place] Level (Toileting) maximum assist (25% patient effort);set up   Clinical Impression   Criteria for Skilled Therapeutic Interventions Met (OT) Yes, treatment indicated   OT Diagnosis Impaired ADL IND and safety   OT Problem List-Impairments impacting ADL problems related to;activity tolerance impaired;cognition;mobility;strength;pain;post-surgical precautions   Assessment of Occupational Performance 5 or more Performance Deficits   Identified Performance Deficits Bathing, dressing, toileting, grooming, functional transfers   Planned Therapy Interventions (OT) ADL retraining;cognition;strengthening;transfer training;progressive activity/exercise   Clinical Decision Making Complexity (OT) detailed assessment/moderate complexity   Clinical Impression Comments Pt presents to IP OT with impaired ADL IND and safety. Pt would benefit from OT services to progress functional performance and support safe d/c planning.   OT Goals   Therapy Frequency (OT) 5 times/week   OT Predicted Duration/Target Date for Goal Attainment 12/23/23   OT Goals Hygiene/Grooming;Lower Body Dressing;Lower Body Bathing;Toilet Transfer/Toileting;Transfers;Cognition   OT: Hygiene/Grooming supervision/stand-by assist;while standing   OT: Lower Body Dressing Minimal assist;within precautions   OT: Lower Body Bathing Minimal assist;with precautions   OT: Transfer Supervision/stand-by assist  (Shower transfer)   OT: Toilet Transfer/Toileting Supervision/stand-by assist;toilet transfer;cleaning and garment management;within precautions   OT:  Cognitive Patient/caregiver will verbalize understanding of cognitive assessment results/recommendations as needed for safe discharge planning   Interventions   Interventions Quick Adds Therapeutic Activity;Self-Care/Home Management   OT Discharge Planning   OT Plan Review precautions, monitor cognition and screen if appropriate, toilet transfers, grooming while sitting/standing EOB   OT Discharge Recommendation (DC Rec) Transitional Care Facility   OT Rationale for DC Rec Pt currently performing well below functional baseline, limited by uncontrolled pain, post-surgical precautions, activity tolerance, strength. Pt currently needing A x 2 for bed mobility and transfers, A x 1-2 for ADLs. Recommend TCU at d/c to continue progressing functional IND and safety prior to return home.   OT Brief overview of current status Mod A x 2 for bed mobility, Min A x 2 for STS transfer

## 2023-12-03 LAB
ANION GAP SERPL CALCULATED.3IONS-SCNC: 8 MMOL/L (ref 7–15)
BUN SERPL-MCNC: 22.3 MG/DL (ref 8–23)
CALCIUM SERPL-MCNC: 8.3 MG/DL (ref 8.8–10.2)
CHLORIDE SERPL-SCNC: 106 MMOL/L (ref 98–107)
CREAT SERPL-MCNC: 0.52 MG/DL (ref 0.67–1.17)
DEPRECATED HCO3 PLAS-SCNC: 23 MMOL/L (ref 22–29)
EGFRCR SERPLBLD CKD-EPI 2021: >90 ML/MIN/1.73M2
ERYTHROCYTE [DISTWIDTH] IN BLOOD BY AUTOMATED COUNT: 17.2 % (ref 10–15)
GLUCOSE BLDC GLUCOMTR-MCNC: 105 MG/DL (ref 70–99)
GLUCOSE BLDC GLUCOMTR-MCNC: 107 MG/DL (ref 70–99)
GLUCOSE BLDC GLUCOMTR-MCNC: 135 MG/DL (ref 70–99)
GLUCOSE BLDC GLUCOMTR-MCNC: 96 MG/DL (ref 70–99)
GLUCOSE BLDC GLUCOMTR-MCNC: 97 MG/DL (ref 70–99)
GLUCOSE SERPL-MCNC: 104 MG/DL (ref 70–99)
HCT VFR BLD AUTO: 27.7 % (ref 40–53)
HGB BLD-MCNC: 8.2 G/DL (ref 13.3–17.7)
MAGNESIUM SERPL-MCNC: 2.3 MG/DL (ref 1.7–2.3)
MCH RBC QN AUTO: 23.8 PG (ref 26.5–33)
MCHC RBC AUTO-ENTMCNC: 29.6 G/DL (ref 31.5–36.5)
MCV RBC AUTO: 81 FL (ref 78–100)
PHOSPHATE SERPL-MCNC: 3.4 MG/DL (ref 2.5–4.5)
PLATELET # BLD AUTO: 253 10E3/UL (ref 150–450)
POTASSIUM SERPL-SCNC: 3.8 MMOL/L (ref 3.4–5.3)
RBC # BLD AUTO: 3.44 10E6/UL (ref 4.4–5.9)
SODIUM SERPL-SCNC: 137 MMOL/L (ref 135–145)
WBC # BLD AUTO: 6.9 10E3/UL (ref 4–11)

## 2023-12-03 PROCEDURE — 250N000011 HC RX IP 250 OP 636: Mod: JZ

## 2023-12-03 PROCEDURE — 93010 ELECTROCARDIOGRAM REPORT: CPT | Performed by: INTERNAL MEDICINE

## 2023-12-03 PROCEDURE — 36415 COLL VENOUS BLD VENIPUNCTURE: CPT | Performed by: UROLOGY

## 2023-12-03 PROCEDURE — 250N000011 HC RX IP 250 OP 636: Mod: JZ | Performed by: PHYSICIAN ASSISTANT

## 2023-12-03 PROCEDURE — 120N000002 HC R&B MED SURG/OB UMMC

## 2023-12-03 PROCEDURE — 85027 COMPLETE CBC AUTOMATED: CPT

## 2023-12-03 PROCEDURE — 250N000009 HC RX 250: Performed by: UROLOGY

## 2023-12-03 PROCEDURE — 80048 BASIC METABOLIC PNL TOTAL CA: CPT

## 2023-12-03 PROCEDURE — 250N000013 HC RX MED GY IP 250 OP 250 PS 637: Performed by: PHYSICIAN ASSISTANT

## 2023-12-03 PROCEDURE — 99222 1ST HOSP IP/OBS MODERATE 55: CPT | Performed by: STUDENT IN AN ORGANIZED HEALTH CARE EDUCATION/TRAINING PROGRAM

## 2023-12-03 PROCEDURE — 258N000003 HC RX IP 258 OP 636: Performed by: UROLOGY

## 2023-12-03 PROCEDURE — B4185 PARENTERAL SOL 10 GM LIPIDS: HCPCS | Mod: JZ | Performed by: UROLOGY

## 2023-12-03 PROCEDURE — 250N000011 HC RX IP 250 OP 636: Mod: JZ | Performed by: UROLOGY

## 2023-12-03 PROCEDURE — 83735 ASSAY OF MAGNESIUM: CPT | Performed by: UROLOGY

## 2023-12-03 PROCEDURE — 250N000013 HC RX MED GY IP 250 OP 250 PS 637: Performed by: UROLOGY

## 2023-12-03 PROCEDURE — 250N000013 HC RX MED GY IP 250 OP 250 PS 637

## 2023-12-03 PROCEDURE — C9113 INJ PANTOPRAZOLE SODIUM, VIA: HCPCS

## 2023-12-03 PROCEDURE — 84100 ASSAY OF PHOSPHORUS: CPT | Performed by: UROLOGY

## 2023-12-03 PROCEDURE — 93005 ELECTROCARDIOGRAM TRACING: CPT

## 2023-12-03 RX ORDER — CEFTRIAXONE 2 G/1
2 INJECTION, POWDER, FOR SOLUTION INTRAMUSCULAR; INTRAVENOUS EVERY 24 HOURS
Status: DISCONTINUED | OUTPATIENT
Start: 2023-12-04 | End: 2023-12-05

## 2023-12-03 RX ORDER — PANTOPRAZOLE SODIUM 40 MG/1
40 TABLET, DELAYED RELEASE ORAL
Status: DISCONTINUED | OUTPATIENT
Start: 2023-12-04 | End: 2023-12-11 | Stop reason: HOSPADM

## 2023-12-03 RX ORDER — METRONIDAZOLE 500 MG/1
500 TABLET ORAL 3 TIMES DAILY
Status: DISCONTINUED | OUTPATIENT
Start: 2023-12-03 | End: 2023-12-05

## 2023-12-03 RX ADMIN — MEGESTROL ACETATE 20 MG: 20 TABLET ORAL at 11:03

## 2023-12-03 RX ADMIN — PANTOPRAZOLE SODIUM 40 MG: 40 INJECTION, POWDER, FOR SOLUTION INTRAVENOUS at 08:21

## 2023-12-03 RX ADMIN — OXYCODONE HYDROCHLORIDE 20 MG: 10 TABLET ORAL at 23:23

## 2023-12-03 RX ADMIN — ACETAMINOPHEN 975 MG: 325 TABLET, FILM COATED ORAL at 20:13

## 2023-12-03 RX ADMIN — SODIUM CHLORIDE 100 MG: 9 INJECTION, SOLUTION INTRAVENOUS at 11:03

## 2023-12-03 RX ADMIN — MEGESTROL ACETATE 20 MG: 20 TABLET ORAL at 20:29

## 2023-12-03 RX ADMIN — KETOROLAC TROMETHAMINE 15 MG: 15 INJECTION, SOLUTION INTRAMUSCULAR; INTRAVENOUS at 16:53

## 2023-12-03 RX ADMIN — METRONIDAZOLE 500 MG: 500 TABLET ORAL at 20:13

## 2023-12-03 RX ADMIN — HYDROMORPHONE HYDROCHLORIDE 0.5 MG: 0.2 INJECTION, SOLUTION INTRAMUSCULAR; INTRAVENOUS; SUBCUTANEOUS at 22:43

## 2023-12-03 RX ADMIN — OXYCODONE HYDROCHLORIDE 20 MG: 10 TABLET ORAL at 11:02

## 2023-12-03 RX ADMIN — HEPARIN SODIUM 5000 UNITS: 5000 INJECTION, SOLUTION INTRAVENOUS; SUBCUTANEOUS at 05:07

## 2023-12-03 RX ADMIN — ACETAMINOPHEN 975 MG: 325 TABLET, FILM COATED ORAL at 04:10

## 2023-12-03 RX ADMIN — OXYCODONE HYDROCHLORIDE 40 MG: 40 TABLET, FILM COATED, EXTENDED RELEASE ORAL at 20:13

## 2023-12-03 RX ADMIN — KETOROLAC TROMETHAMINE 15 MG: 15 INJECTION, SOLUTION INTRAMUSCULAR; INTRAVENOUS at 11:02

## 2023-12-03 RX ADMIN — HYDROXYZINE HYDROCHLORIDE 10 MG: 10 TABLET ORAL at 22:44

## 2023-12-03 RX ADMIN — OXYCODONE HYDROCHLORIDE 20 MG: 10 TABLET ORAL at 15:43

## 2023-12-03 RX ADMIN — OLIVE OIL AND SOYBEAN OIL 250 ML: 16; 4 INJECTION, EMULSION INTRAVENOUS at 20:22

## 2023-12-03 RX ADMIN — KETOROLAC TROMETHAMINE 15 MG: 15 INJECTION, SOLUTION INTRAMUSCULAR; INTRAVENOUS at 04:10

## 2023-12-03 RX ADMIN — MAGNESIUM SULFATE HEPTAHYDRATE: 500 INJECTION, SOLUTION INTRAMUSCULAR; INTRAVENOUS at 20:22

## 2023-12-03 RX ADMIN — METRONIDAZOLE 500 MG: 500 TABLET ORAL at 15:42

## 2023-12-03 RX ADMIN — METHOCARBAMOL 500 MG: 500 TABLET ORAL at 20:13

## 2023-12-03 RX ADMIN — HEPARIN SODIUM 5000 UNITS: 5000 INJECTION, SOLUTION INTRAVENOUS; SUBCUTANEOUS at 22:43

## 2023-12-03 RX ADMIN — OXYCODONE HYDROCHLORIDE 40 MG: 40 TABLET, FILM COATED, EXTENDED RELEASE ORAL at 08:59

## 2023-12-03 RX ADMIN — OXYCODONE HYDROCHLORIDE 20 MG: 10 TABLET ORAL at 20:22

## 2023-12-03 RX ADMIN — OXYCODONE HYDROCHLORIDE 20 MG: 10 TABLET ORAL at 02:04

## 2023-12-03 RX ADMIN — ACETAMINOPHEN 975 MG: 325 TABLET, FILM COATED ORAL at 15:43

## 2023-12-03 RX ADMIN — HYDROMORPHONE HYDROCHLORIDE 0.5 MG: 0.2 INJECTION, SOLUTION INTRAMUSCULAR; INTRAVENOUS; SUBCUTANEOUS at 08:59

## 2023-12-03 RX ADMIN — METHOCARBAMOL 500 MG: 500 TABLET ORAL at 08:20

## 2023-12-03 RX ADMIN — HYDROMORPHONE HYDROCHLORIDE 0.5 MG: 0.2 INJECTION, SOLUTION INTRAMUSCULAR; INTRAVENOUS; SUBCUTANEOUS at 19:10

## 2023-12-03 RX ADMIN — KETOROLAC TROMETHAMINE 15 MG: 15 INJECTION, SOLUTION INTRAMUSCULAR; INTRAVENOUS at 22:43

## 2023-12-03 RX ADMIN — HEPARIN SODIUM 5000 UNITS: 5000 INJECTION, SOLUTION INTRAVENOUS; SUBCUTANEOUS at 15:43

## 2023-12-03 RX ADMIN — METHOCARBAMOL 500 MG: 500 TABLET ORAL at 15:43

## 2023-12-03 RX ADMIN — ERTAPENEM SODIUM 1 G: 1 INJECTION, POWDER, LYOPHILIZED, FOR SOLUTION INTRAMUSCULAR; INTRAVENOUS at 08:21

## 2023-12-03 RX ADMIN — OXYCODONE HYDROCHLORIDE 20 MG: 10 TABLET ORAL at 05:07

## 2023-12-03 ASSESSMENT — ACTIVITIES OF DAILY LIVING (ADL)
ADLS_ACUITY_SCORE: 36
ADLS_ACUITY_SCORE: 32
ADLS_ACUITY_SCORE: 36
ADLS_ACUITY_SCORE: 32
ADLS_ACUITY_SCORE: 36
ADLS_ACUITY_SCORE: 32

## 2023-12-03 NOTE — PHARMACY-ADMISSION MEDICATION HISTORY
Pharmacy Intern Admission Medication History    Admission medication history is complete. The information provided in this note is only as accurate as the sources available at the time of the update.    Information Source(s): Patient and CareEverywhere/SureScripts via in-person    Pertinent Information: Spoke with patient who was fairly knowledgeable about his meds.    Changes made to PTA medication list:  Added: None  Deleted: None  Changed: None       Allergies reviewed with patient and updates made in EHR: yes    Medication History Completed By: Lorri Hinojosa 12/3/2023 5:52 PM    Prior to Admission medications    Medication Sig Last Dose Taking? Auth Provider Long Term End Date   acetaminophen (TYLENOL) 325 MG tablet Take 975 mg by mouth every 8 hours as needed for mild pain 11/30/2023 at 0800 Yes Reported, Patient     amLODIPine (NORVASC) 10 MG tablet Take 1 tablet (10 mg) by mouth daily  Patient taking differently: Take 10 mg by mouth every morning 11/30/2023 at 0330 Yes Wei Perez MD Yes    atorvastatin (LIPITOR) 20 MG tablet Take 1 tablet (20 mg) by mouth daily 11/29/2023 at 2200 Yes Ezequiel Alejandra MD Yes    furosemide (LASIX) 20 MG tablet Take 1 tablet (20 mg) by mouth daily as needed  Patient taking differently: Take 20 mg by mouth daily as needed (edema) Past Week Yes Erum Fritz MD Yes    hydrOXYzine (ATARAX) 10 MG tablet Take 1 tablet (10 mg) by mouth every 6 hours as needed for itching or anxiety (with pain, moderate pain) Past Month Yes Haydee Abdul MD     leuprolide (LUPRON DEPOT) 45 MG kit Inject 45 mg into the muscle every 6 months Unknown Yes Unknown, Entered By History No    lisinopril (ZESTRIL) 40 MG tablet Take 40 mg by mouth every morning 11/29/2023 at 1200 Yes Unknown, Entered By History No    LORazepam (ATIVAN) 0.5 MG tablet Take 1 tablet (0.5 mg) by mouth every 6 hours as needed for anxiety More than a month Yes Erum Fritz MD     megestrol (MEGACE)  20 MG tablet Take 20 mg by mouth 2 times daily Unknown Yes Unknown, Entered By History No    omeprazole (PRILOSEC) 40 MG DR capsule Take 1 capsule (40 mg) by mouth daily  Patient taking differently: 40 mg every morning 11/29/2023 at 1200 Yes Wei Perez MD No    ondansetron (ZOFRAN ODT) 8 MG ODT tab Take 1 tablet (8 mg) by mouth every 8 hours as needed for nausea 11/29/2023 at 1200 Yes Erum Fritz MD     oxybutynin (DITROPAN) 5 MG tablet Take 5 mg by mouth 4 times daily as needed for bladder spasms Past Week Yes Unknown, Entered By History     oxyCODONE (OXYCONTIN) 40 MG 12 hr tablet Take 1 tablet (40 mg) by mouth every 12 hours 11/30/2023 at 0330 Yes Erum Fritz MD No    oxyCODONE IR (ROXICODONE) 10 MG tablet Take 1 tablet (10 mg) by mouth every 6 hours as needed for moderate to severe pain 11/30/2023 at 0330 Yes Erum Fritz MD No    potassium chloride ER (KLOR-CON M) 20 MEQ CR tablet Take 1 tablet (20 mEq) by mouth daily as needed for potassium supplementation (Take if you take furosemide)  Patient taking differently: Take 20 mEq by mouth 2 times daily 11/30/2023 at 0330 Yes Erum Fritz MD     prochlorperazine (COMPAZINE) 10 MG tablet Take 1 tablet (10 mg) by mouth every 6 hours as needed for nausea or vomiting Past Week Yes Erum Fritz MD No    tamsulosin (FLOMAX) 0.4 MG capsule Take 1 capsule by mouth every morning Past Month Yes Reported, Patient     traZODone (DESYREL) 50 MG tablet Take 50 mg by mouth nightly as needed for sleep More than a month Yes Reported, Patient No    apixaban ANTICOAGULANT (ELIQUIS) 5 MG tablet Take 1 tablet (5 mg) by mouth 2 times daily 11/27/2023  Erum Fritz MD     docusate sodium (COLACE) 100 MG capsule Take 100 mg by mouth daily 11/28/2023  Unknown, Entered By History     predniSONE (DELTASONE) 5 MG tablet Take 1 tablet (5 mg) by mouth 2 times daily 11/29/2023 at PM Yes Erum Fritz MD

## 2023-12-03 NOTE — PLAN OF CARE
"Goal Outcome Evaluation:      Plan of Care Reviewed With: patient    Overall Patient Progress: no changeOverall Patient Progress: no change    1930-0730    /53 (BP Location: Left arm)   Pulse 62   Temp 98.5  F (36.9  C) (Oral)   Resp 16   Ht 1.778 m (5' 10\")   Wt 81.6 kg (179 lb 14.3 oz)   SpO2 97%   BMI 25.81 kg/m        Activity: rested in bed  Neuros: alert and orientated x 4, calm and cooperative   Cardiac: WNL   Respiratory: O2 sats mid 90's on RA, unlabored   GI/: abdomen soft/tender.  Pt passing gas, but no BM.  Urostomy with watermelon colored urine   Diet: tolerating clears    Lines: PICC, PIV   Incisions/Drains: midline abdominal inc CDI with staples.  Abdominal CHRISTIANO with serosang drainage.  CHRISTIANO leaking around insertion site    Labs: reviewed   Pain/nausea: \"partial\" pain control taking scheduled tylenol and toradol with prn oxycodone and IV dilaudid.  No nausea    New changes this shift: none   Plan: encourage ambulation.  Continue POC             "

## 2023-12-03 NOTE — PROGRESS NOTES
"Urology  Progress Note    24 hour events/Subjective:     - No acute events overnight   - Pain a little worse this morning, dull ache of abdomen   - Tolerating CLD, + gas, endorses hunger         Exam  BP 99/46   Pulse 60   Temp 98.2  F (36.8  C) (Oral)   Resp 16   Ht 1.778 m (5' 10\")   Wt 81.6 kg (179 lb 14.3 oz)   SpO2 96%   BMI 25.81 kg/m    No acute distress  Unlabored breathing on RA  Abdomen soft, appropriately tender, nondistended. midline incision cdi, conduit healthy with two stents in place  CHRISTIANO serosanguinous  Jiménez in place in new conduit draining clear to light cherry urine in tubing. CHRISTIANO serosang      Intake/Output Summary (Last 24 hours) at 12/1/2023 0552  Last data filed at 11/30/2023 2145  Gross per 24 hour   Intake 3000 ml   Output 375 ml   Net 2625 ml       UOP 1850/600  CHRISTIANO 60/20    Labs  Recent Labs   Lab Test 12/03/23  0712 12/02/23  0644 12/01/23  2221 12/01/23  0448   WBC 6.9 7.9  --  9.7   HGB 8.2* 8.0* 7.8* 7.0*   CR 0.52* 0.59*  --  0.72          7-Day Micro Results       Collected Updated Procedure Result Status      11/30/2023 1221 12/02/2023 1631 Anaerobic Bacterial Culture Routine [[id number]]    Abscess from Pelvis    Preliminary result Component Value   Culture No anaerobic organisms isolated after 2 days  [P]                11/30/2023 1221 12/02/2023 0923 Fungal or Yeast Culture Routine [[id number]]    (Abnormal)   Abscess from Pelvis    Preliminary result Component Value   Culture Yeast  [P]                11/30/2023 1221 12/02/2023 1459 Abscess Aerobic Bacterial Culture Routine [[id number]]    (Abnormal)   Abscess from Pelvis    Final result Component Value   Culture 2+ Candida albicans    Susceptibilities not routinely done, refer to antibiogram to view typical susceptibility profiles               11/30/2023 1217 12/02/2023 1616 Anaerobic Bacterial Culture Routine [[id number]]   Bone Biopsy from Pelvis    Preliminary result Component Value   Culture No anaerobic " organisms isolated after 2 days  [P]                11/30/2023 1217 12/02/2023 0914 Fungal or Yeast Culture Routine [[id number]]   (Abnormal)   Bone Biopsy from Pelvis    Preliminary result Component Value   Culture Yeast  [P]                11/30/2023 1217 12/02/2023 1205 Bone Biopsy Aerobic Bacterial Culture Routine [[id number]]   (Abnormal)   Bone Biopsy from Pelvis    Preliminary result Component Value   Culture Culture in progress  [P]     1+ Candida albicans  [P]     Susceptibilities not routinely done, refer to antibiogram to view typical susceptibility profiles                      Assessment/Plan  68 year old y/o male POD#3 s/p cystectomy with IC, pubectomy, and rectus flap for chronic pubic osteomyelitis. WOC consulted.     Note - plan changes for today are in bold below.    Neuro: pain control: PRN oxycodone 5-10 mg q3h , PRN dilaudid 0.2 - 0.4 mg q2h, and scheduled tylenol   CV: HDS   Pulm: incentive spirometry while awake  FEN/GI: Regular diet + TPN, bowel regimen  Endo: relatively euglycemic  : hooks in place in conduit, stents in place, conduit appears pink and viable. Stents to remain in place for 6 weeks. CHRISTIANO drain to bulb suction.   Heme/ID: monitor for s/s of infection; monitor Hb and transfuse as indicated. Awaiting bone and intra-op cultures. PICC and continue ertapenem until cultures result. ID consult when cultures result. 2+ yeast for now, on micafungin. ID consult  Activity: Up as tolerated  Ambulate at least TID  PT/OT consulted   Ppx: Heparin 5000 U TID   Home RX on HOLD: none  Dispo: anticipate medically ready to discharge to TCU likely Tuesday  PT/OT recommendations: TCU      Seen and examined with the chief resident. Will discuss with Miki Correa MD.    Cole Smith MD  Urology PGY-4      PTA medications:   Prior to Admission medications    Medication Sig Start Date End Date Taking? Authorizing Provider   acetaminophen (TYLENOL) 325 MG tablet Take 975 mg by mouth every  8 hours as needed for mild pain   Yes Reported, Patient   amLODIPine (NORVASC) 10 MG tablet Take 1 tablet (10 mg) by mouth daily  Patient taking differently: Take 10 mg by mouth every morning 2/3/23  Yes Wei Perez MD   atorvastatin (LIPITOR) 20 MG tablet Take 1 tablet (20 mg) by mouth daily 11/15/23  Yes Ezequiel Alejandra MD   furosemide (LASIX) 20 MG tablet Take 1 tablet (20 mg) by mouth daily as needed  Patient taking differently: Take 20 mg by mouth daily as needed (edema) 11/9/23  Yes Erum Fritz MD   hydrOXYzine (ATARAX) 10 MG tablet Take 1 tablet (10 mg) by mouth every 6 hours as needed for itching or anxiety (with pain, moderate pain) 2/7/23  Yes Haydee Abdul MD   leuprolide (LUPRON DEPOT) 45 MG kit Inject 45 mg into the muscle every 6 months   Yes Unknown, Entered By History   lisinopril (ZESTRIL) 40 MG tablet Take 40 mg by mouth every morning   Yes Unknown, Entered By History   LORazepam (ATIVAN) 0.5 MG tablet Take 1 tablet (0.5 mg) by mouth every 6 hours as needed for anxiety 8/14/23  Yes Erum Fritz MD   megestrol (MEGACE) 20 MG tablet Take 20 mg by mouth 2 times daily   Yes Unknown, Entered By History   nitroFURantoin macrocrystal-monohydrate (MACROBID) 100 MG capsule Take 1 capsule (100 mg) by mouth 2 times daily for 3 days 11/27/23 11/30/23 Yes Miki Correa MD   omeprazole (PRILOSEC) 40 MG DR capsule Take 1 capsule (40 mg) by mouth daily  Patient taking differently: 40 mg every morning 8/2/23  Yes Wei Perez MD   ondansetron (ZOFRAN ODT) 8 MG ODT tab Take 1 tablet (8 mg) by mouth every 8 hours as needed for nausea 6/1/23  Yes Erum Fritz MD   oxybutynin (DITROPAN) 5 MG tablet Take 5 mg by mouth 4 times daily as needed for bladder spasms   Yes Unknown, Entered By History   oxyCODONE (OXYCONTIN) 40 MG 12 hr tablet Take 1 tablet (40 mg) by mouth every 12 hours 11/9/23  Yes Erum Fritz MD   oxyCODONE IR (ROXICODONE) 10 MG tablet  "Take 1 tablet (10 mg) by mouth every 6 hours as needed for moderate to severe pain 10/26/23  Yes Erum Fritz MD   potassium chloride ER (KLOR-CON M) 20 MEQ CR tablet Take 1 tablet (20 mEq) by mouth daily as needed for potassium supplementation (Take if you take furosemide)  Patient taking differently: Take 20 mEq by mouth 2 times daily 4/26/23  Yes Erum Fritz MD   prochlorperazine (COMPAZINE) 10 MG tablet Take 1 tablet (10 mg) by mouth every 6 hours as needed for nausea or vomiting 5/30/23  Yes Erum Fritz MD   tamsulosin (FLOMAX) 0.4 MG capsule Take 1 capsule by mouth every morning 7/10/23  Yes Reported, Patient   traZODone (DESYREL) 50 MG tablet Take 50 mg by mouth nightly as needed for sleep 3/22/23  Yes Reported, Patient   apixaban ANTICOAGULANT (ELIQUIS) 5 MG tablet Take 1 tablet (5 mg) by mouth 2 times daily 5/31/23   Erum Fritz MD   docusate sodium (COLACE) 100 MG capsule Take 100 mg by mouth daily    Unknown, Entered By History   predniSONE (DELTASONE) 5 MG tablet Take 1 tablet (5 mg) by mouth 2 times daily 5/3/23   Erum Fritz MD          Contacting the urology team: Please see [facility] and page on-call clinician with any questions or concerns regarding this patient. Note writer may be unavailable.     To access [facility] from intranet: under \"Applications\" --> \"Business Applications\" select \"[employer]\" and search \"Urology Adult & Pediatric/[facility].\" Please note that any question about a urology inpatient, [place] or [place], should go to job code [id number].     "

## 2023-12-03 NOTE — CONSULTS
[facility] ID SERVICE: NEW CONSULTATION  Patient:  Dilip Kan, Date of birth 1955, Medical record number [id number]  Date of Admission: 11/30/2023  Date of Visit:  12/3/2023  Requesting Provider: Miki Correa         Assessment and Recommendations:     ID Problem List:  Chronic pubic osteomyelitis and anterior bladder wall defect with fistula s/p cystectomy with ileal conduit, pubectomy, rectus flap 11/30/2023  Intra-op cultures growing Candida albicans, Str agalactiae  Hx of pelvic abscess s/p 6 weeks of empiric antibiotic course without complete resolution (June-Aug, 2023)  No microbiology data  Hx of bacteremia, Kleb pneumoniae with similar isolate from urine culture 4/19/2023  Prostate cancer with metastasis (liver, bone: acetabulum) s/p chemoradiation  Positive VRE screen, per rectal swab 7/1/2023    Discussion:  67 yo M with pubic symphysis osteomyelitis s/p cystectomy with ileal conduit, pubectomy with rectus flap on 11/30/2023. Intra-op cultures grew Candida albicans, Str agalactiae. Per patient, has been off of antibiotics for months. Currently on empiric ertapenem, and micafungin for yeast growth. Recommend to stop ertapenem, as no prior hx of ESBL organism. Start iv ceftriaxone which would cover Strep agalactiae and other Gram-negative as likely GI/ pathogens in this scenario. Would also add metronidazole for anaerobic coverage. Agree with continue iv micafungin, would increase dose to 150mg daily. Follow up intra-op cultures and pathology. Check EKG, if can use po fluconazole based on susceptibility of Candida isolate.     Recommendations:  Stop iv ertapenem  Start iv ceftriaxone 2g daily  Start po metronidazole 500mg TID  Agree with iv micafungin, increase dose 150mg daily   Check EKG for QTc at baseline, and if to consider po fluconazole  Follow up intra-op cultures   Called and requested susceptibility on Candida albicans isolate.   Await pathology report  Anticipate 6 weeks  course, at least     Recommendations discussed with primary team.    Thank you for this consult. ID team will continue to follow this patient. Please reach out if any questions or concerns.     Total time spent during this encounter (chart review, documentation, MDM, coordination of care): 70 minutes     Montse Ramírez MD  Infectious Diseases Staff Physician  Pager: [phone]  [facility] [employer] ld   Date: 12/3/2023       History of Present Illness:     Dilip Kan is a 69 yo M with PMHx of HTN, DM2, bilateral PE on AC, and metastatic prostate cancer (liver, bone:acetabulum), s/p radiation c/b radiation cysitis and chemotherapy (last dose 6/13/2023), hospitalized (Jun, 2023) with fever sepsis 2/2 pelvic abscess. He was found to have a pelvic abscess that was thought not to be reachable for drainage and hip fracture osteomyelitis versus metastasis. Of note, communication of abscess with bladder, prostate and not the rectum per cystogram. His urine and blood cultures grew Klebsiella. He also screened positive for VRE although this never grew in any cultures. He was improving after a course of zosyn in the hospital, 2 weeks of linzolid for VRE, and discharged on levofloxacin 750 daily and flagyl 500 mg q8 h which was given for 8 weeks. He tolerated that fine and felt much improved in terms of fevers and appetite. CRP was down to 5. However, noted CRP elevated to 100, when stopped antibiotics.     CT 9/20 ([facility]) showed similar to smaller pelvic abscess, which prompted MRI pelvis 10/27/2023 ([facility]), whereby concerning findings of chronic osteomyelitis involving pubic symphysis septic arthropathy, along with anterior bladder wall defect and subjacent fistula. Because of these findings, seen by Urologist (Dr. Paul) and admitted for elective surgery. Underwent cystectomy with ileal conduit, pubectomy, with rectus flap by urology and plastic surgery teams on 11/30/2023. Intra-op cultures growing yeast and Str agalactiae. ID  consult is requested for antimicrobial management. Per patient, has been off of antibiotics for quite sometime.          Review of Systems:     Complete 12 point review of systems negative except as noted in HPI.         Recent Antimicrobials::     Current:  IV micafungin 12/2 - present  IV ertapenem 11/30 - present    Prior:  Cefazolin (lynn-op) 11/30  Levofloxacin 7/6 - 8/29 (~8 weeks)  Metronidazole 7/6 - 8/29  Linezolid 7/6 - 7/20       Past Medical History:     Past Medical History:   Diagnosis Date    Elevated prostate specific antigen (PSA)     4/10/2012    Encounter for other administrative examinations     1/31/2014    Esophagitis     No Comments Provided    Gastro-esophageal reflux disease without esophagitis     No Comments Provided    Low back pain     No Comments Provided    Malignant neoplasm of prostate (H)     9/6/2012    Nicotine dependence, uncomplicated     Quit - October 2007 with chantix    Other intervertebral disc degeneration, lumbosacral region     12/1/2008         Allergies:    No Known Allergies       Family History:   Reviewed and noncontributory.   Family History   Problem Relation Age of Onset    Hypertension Mother         Hypertension,HTN    Other - See Comments Mother          Parkinsons    Heart Disease Father         Heart Disease, passed away from CHF,CAD    Colon Cancer Father         Cancer-colon    Hypertension Father         Hypertension    Other - See Comments Father         Stroke/Dementia    Family History Negative Sister         Good Health,emotional problems.    Family History Negative Sister         Good Health    Other - See Comments Daughter         w/o major medical problems.    Other - See Comments Son         w/o major medical problems.    Family History Negative Other         Good Health    Family History Negative Other         Good Health,previous marriage./previous marriage.    Family History Negative Other         Good Health,previous marriage.    Anesthesia  "Reaction No family hx of     Venous thrombosis No family hx of           Social History:     Social History     Socioeconomic History    Marital status:      Spouse name: Not on file    Number of children: Not on file    Years of education: Not on file    Highest education level: Not on file   Occupational History    Occupation:      Employer: OTHER   Tobacco Use    Smoking status: Former     Packs/day: 1.00     Years: 20.00     Additional pack years: 0.00     Total pack years: 20.00     Types: Cigarettes     Quit date: 2002     Years since quittin.0     Passive exposure: Past    Smokeless tobacco: Current     Types: Snuff    Tobacco comments:     Can't remember when all he had passive exposure   Vaping Use    Vaping Use: Never used   Substance and Sexual Activity    Alcohol use: Not Currently    Drug use: No    Sexual activity: Yes     Partners: Female     Birth control/protection: None   Other Topics Concern    Parent/sibling w/ CABG, MI or angioplasty before 65F 55M? Not Asked   Social History Narrative    [employer] - retired.  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  Patient has quit smoking.  Lives in [place] a farm with wife.      Social Determinants of Health     Financial Resource Strain: Not on file   Food Insecurity: Not on file   Transportation Needs: Not on file   Physical Activity: Not on file   Stress: Not on file   Social Connections: Not on file   Interpersonal Safety: Not on file   Housing Stability: Not on file            Physical Exam:     BP 99/46   Pulse 60   Temp 98.2  F (36.8  C) (Oral)   Resp 16   Ht 1.778 m (5' 10\")   Wt 81.6 kg (179 lb 14.3 oz)   SpO2 96%   BMI 25.81 kg/m       Exam:  GENERAL:  Well-developed, well-nourished, sitting in bed in no acute distress.   Head: normocephalic, atraumatic.   EYES: PERRLA, EOMI, conjunctiva clear, anicteric sclerae.    ENT:  Oropharynx is moist without exudates or ulcers.  NECK:  Supple.  LUNGS:  " Breathing comfortably, on room air, clear to auscultation bilaterally, no w/r/r.  CARDIOVASCULAR:  Regular rate and rhythm, +S1S2 with no murmurs, gallops or rubs.  ABDOMEN:  Normal bowel sounds, soft, nontender. No appreciable hepatosplenomegaly.   EXT: Extremities warm and without edema.  SKIN:  mid abdominal surgical incision well approximate, c/d/i   NEUROLOGIC:  Grossly nonfocal.     Lines and devices:   PIV is in place without any surrounding erythema.  +ileal conduit    Labs, Microbiology and Imaging studies are reviewed.   Leukocytes, S Cr wnl             Laboratory Data:     Inflammatory Markers    Recent Labs   Lab Test 10/16/23  1001 09/13/23  1422 08/31/23  1539   SED 44* 62* 49*       Hematology Studies    Recent Labs   Lab Test 12/03/23  0712 12/02/23  0644 12/01/23  2221 12/01/23 0448 11/30/23  1833 11/20/23  1249 11/09/23  1541 10/16/23  1001 04/26/23  1301 04/22/23  0647   WBC 6.9 7.9  --  9.7  --  9.7 9.2 9.6   < > 2.9*   ANEU  --   --   --   --   --   --   --   --   --  2.2   AEOS  --   --   --   --   --   --   --   --   --  0.0   HGB 8.2* 8.0* 7.8* 7.0* 9.4* 11.0* 10.0* 9.8*   < > 8.2*   MCV 81 81  --  76*  --  76* 76* 79   < > 86    251  --  287 379 454* 419 521*   < > 111*    < > = values in this interval not displayed.       Metabolic Studies     Recent Labs   Lab Test 12/03/23  0712 12/02/23  0644 12/01/23 0448 11/30/23  1833 11/20/23  1249    134* 133* 137 134*   POTASSIUM 3.8 4.0 4.1 4.1 4.2   CHLORIDE 106 107 103 103 100   CO2 23 22 22 21* 22   BUN 22.3 15.6 9.8 7.5* 11.6   CR 0.52* 0.59* 0.72 0.63* 0.76   GFRESTIMATED >90 >90 >90 >90 >90       Hepatic Studies    Recent Labs   Lab Test 12/02/23  0644 12/01/23  0448 11/09/23  1541 10/16/23  1001 09/13/23  1421 08/31/23  1539 08/14/23  1340   BILITOTAL 1.5* 1.0 0.4 0.3 0.2 0.3 0.2   ALKPHOS 61 61 87 92 80 60 76   ALBUMIN 2.1*  --  3.0* 3.0* 3.1* 2.9* 3.4*   AST 22 15 14 14 14 9 7   ALT 8 6 <5 13 19 6 5       Vancomycin Levels     Recent Labs   Lab Test 07/01/23  0805 04/21/23  0535 04/20/23  0550   VANCOMYCIN 14.6 22.9 16.1       Microbiology:    Lab Results   Component Value Date    CULTURE No anaerobic organisms isolated after 2 days 11/30/2023    CULTURE Yeast (A) 11/30/2023    CULTURE 2+ Candida albicans (A) 11/30/2023        Imaging:   MRI pelvis w and w/o contrast 10/27/23   1. Findings concerning for either postoperative, traumatic, surgical, infectious or some combination, related to the anterior bladder wall defect and subjacent fistula.  Likely chronic.  Associated osteomyelitis, pathologic fracturing and likely chronic, treated pubic symphysis septic arthropathy.   2. Muscle findings nonspecific.  Infectious myositis, radiotherapy necrosis, muscle infarcts, or some combination as primary considerations   3. Multifocal bone findings.  Much of this likely chronic, treated osteomyelitis.  Metastatic treated disease may coexist.  Pubic fractures likely chronic and from either insufficiency or infection related.  Please correlate with the patient's full extent of outside imaging which is not currently available for review.

## 2023-12-03 NOTE — PROGRESS NOTES
"Care Management Follow Up    Length of Stay (days): 3    Expected Discharge Date: 12/06/2023     Concerns to be Addressed: discharge planning     Patient plan of care discussed at interdisciplinary rounds: Yes    Anticipated Discharge Disposition: Transitional Care     Anticipated Discharge Services:    Anticipated Discharge DME:      Patient/family educated on Medicare website which has current facility and service quality ratings:    Education Provided on the Discharge Plan:    Patient/Family in Agreement with the Plan: yes    Referrals Placed by CM/SW:    Private pay costs discussed: insurance costs co-pays. Jermain was specifically wondering about out of pocket costs for TCU.     Additional Information:  I met with Jermain at bedside and introduced self. He is open to TCU placement, but is concerned about insurance coverage. Her the [employer] Medicare Advantage plans in his county, his could have one of two coverages for SNF.     \"In-network: $0 copay per day for days 1-20, $196 copay per day for days 21-80; $0 copay per day for days   Out-of-network: 30% coinsurance per day for days 1-100\"  OR   \"In-network: $0 copay per day for days 1-20, $196 copay per day for days   Out-of-network: 20% coinsurance per day for days 1-100\"    He discussed that he will likely be there for IV abx. Patient would like to have some more information on his IV abx needs before he makes a decision on TCU. His preferences are below. No referrals were made today as he is still considering his options. He would like his wife to be involved in later discussions about TCU.    - [facility]      Lisa Moffett, [facility]  12/3/2023       Social Work and Care Management Department       SEARCHABLE in [employer] - search SOCIAL WORK       [place] ([phone]) Saturday and Sunday     Units: 4A, 4C, & 4E Pager: [phone]     Units: 5A & 5C Pager: [phone]     Units: 6A & " 6B   Pager: [phone]     Units: 6C Pager: [phone]     Units: 7A & 7B  Pager: [phone]     Units: 7C Pager: [phone]     Unit: [place] [facility] Pager: [phone]      [facility] [place] ([phone]) Saturday and Sunday      Units: 5 Ortho, 5 Med/Surg & WB ED  Pager:[phone]     Units: 6 Med/Surg, 8A, & 10A ICU  Pager: [phone]        After hours (1630 - 0000) Saturday & Sunday; ([phone]) Mon-Fri; ([phone]) [facility] Recognized Holidays     Units: ALL  Pager: [phone]

## 2023-12-04 ENCOUNTER — APPOINTMENT (OUTPATIENT)
Dept: OCCUPATIONAL THERAPY | Facility: CLINIC | Age: 68
DRG: 653 | End: 2023-12-04
Attending: UROLOGY
Payer: COMMERCIAL

## 2023-12-04 ENCOUNTER — APPOINTMENT (OUTPATIENT)
Dept: PHYSICAL THERAPY | Facility: CLINIC | Age: 68
DRG: 653 | End: 2023-12-04
Attending: UROLOGY
Payer: COMMERCIAL

## 2023-12-04 LAB
ALBUMIN SERPL BCG-MCNC: 2 G/DL (ref 3.5–5.2)
ALP SERPL-CCNC: 151 U/L (ref 40–150)
ALT SERPL W P-5'-P-CCNC: 45 U/L (ref 0–70)
ANION GAP SERPL CALCULATED.3IONS-SCNC: 7 MMOL/L (ref 7–15)
AST SERPL W P-5'-P-CCNC: 69 U/L (ref 0–45)
ATRIAL RATE - MUSE: 65 BPM
BILIRUB SERPL-MCNC: 0.9 MG/DL
BUN SERPL-MCNC: 18.6 MG/DL (ref 8–23)
CALCIUM SERPL-MCNC: 8 MG/DL (ref 8.8–10.2)
CHLORIDE SERPL-SCNC: 111 MMOL/L (ref 98–107)
CREAT FLD-MCNC: 0.5 MG/DL
CREAT SERPL-MCNC: 0.44 MG/DL (ref 0.67–1.17)
CREATININE BODY FLUID SOURCE: NORMAL
DEPRECATED HCO3 PLAS-SCNC: 20 MMOL/L (ref 22–29)
DIASTOLIC BLOOD PRESSURE - MUSE: NORMAL MMHG
EGFRCR SERPLBLD CKD-EPI 2021: >90 ML/MIN/1.73M2
ERYTHROCYTE [DISTWIDTH] IN BLOOD BY AUTOMATED COUNT: 17.8 % (ref 10–15)
GLUCOSE BLDC GLUCOMTR-MCNC: 110 MG/DL (ref 70–99)
GLUCOSE BLDC GLUCOMTR-MCNC: 117 MG/DL (ref 70–99)
GLUCOSE BLDC GLUCOMTR-MCNC: 118 MG/DL (ref 70–99)
GLUCOSE BLDC GLUCOMTR-MCNC: 118 MG/DL (ref 70–99)
GLUCOSE SERPL-MCNC: 118 MG/DL (ref 70–99)
HCT VFR BLD AUTO: 28 % (ref 40–53)
HGB BLD-MCNC: 8.3 G/DL (ref 13.3–17.7)
INR PPP: 1.16 (ref 0.85–1.15)
INTERPRETATION ECG - MUSE: NORMAL
MAGNESIUM SERPL-MCNC: 2.1 MG/DL (ref 1.7–2.3)
MCH RBC QN AUTO: 24 PG (ref 26.5–33)
MCHC RBC AUTO-ENTMCNC: 29.6 G/DL (ref 31.5–36.5)
MCV RBC AUTO: 81 FL (ref 78–100)
P AXIS - MUSE: 41 DEGREES
PHOSPHATE SERPL-MCNC: 4 MG/DL (ref 2.5–4.5)
PLATELET # BLD AUTO: 283 10E3/UL (ref 150–450)
POTASSIUM SERPL-SCNC: 4.1 MMOL/L (ref 3.4–5.3)
PR INTERVAL - MUSE: 192 MS
PREALB SERPL-MCNC: 7.2 MG/DL (ref 20–40)
PROT SERPL-MCNC: 4.5 G/DL (ref 6.4–8.3)
QRS DURATION - MUSE: 92 MS
QT - MUSE: 380 MS
QTC - MUSE: 395 MS
R AXIS - MUSE: 5 DEGREES
RBC # BLD AUTO: 3.46 10E6/UL (ref 4.4–5.9)
SODIUM SERPL-SCNC: 138 MMOL/L (ref 135–145)
SYSTOLIC BLOOD PRESSURE - MUSE: NORMAL MMHG
T AXIS - MUSE: 20 DEGREES
TRIGL SERPL-MCNC: 133 MG/DL
VENTRICULAR RATE- MUSE: 65 BPM
WBC # BLD AUTO: 5.7 10E3/UL (ref 4–11)

## 2023-12-04 PROCEDURE — 250N000011 HC RX IP 250 OP 636: Mod: JZ | Performed by: PHYSICIAN ASSISTANT

## 2023-12-04 PROCEDURE — 84478 ASSAY OF TRIGLYCERIDES: CPT | Performed by: UROLOGY

## 2023-12-04 PROCEDURE — 250N000011 HC RX IP 250 OP 636: Mod: JZ

## 2023-12-04 PROCEDURE — 258N000003 HC RX IP 258 OP 636: Performed by: UROLOGY

## 2023-12-04 PROCEDURE — 250N000013 HC RX MED GY IP 250 OP 250 PS 637

## 2023-12-04 PROCEDURE — 250N000011 HC RX IP 250 OP 636: Mod: JZ | Performed by: UROLOGY

## 2023-12-04 PROCEDURE — G0463 HOSPITAL OUTPT CLINIC VISIT: HCPCS

## 2023-12-04 PROCEDURE — 84134 ASSAY OF PREALBUMIN: CPT | Performed by: UROLOGY

## 2023-12-04 PROCEDURE — 82570 ASSAY OF URINE CREATININE: CPT

## 2023-12-04 PROCEDURE — 80053 COMPREHEN METABOLIC PANEL: CPT | Performed by: UROLOGY

## 2023-12-04 PROCEDURE — 250N000013 HC RX MED GY IP 250 OP 250 PS 637: Performed by: UROLOGY

## 2023-12-04 PROCEDURE — 250N000009 HC RX 250: Performed by: UROLOGY

## 2023-12-04 PROCEDURE — 36592 COLLECT BLOOD FROM PICC: CPT | Performed by: UROLOGY

## 2023-12-04 PROCEDURE — B4185 PARENTERAL SOL 10 GM LIPIDS: HCPCS | Mod: JZ | Performed by: UROLOGY

## 2023-12-04 PROCEDURE — 97535 SELF CARE MNGMENT TRAINING: CPT | Mod: GO

## 2023-12-04 PROCEDURE — 120N000002 HC R&B MED SURG/OB UMMC

## 2023-12-04 PROCEDURE — 250N000013 HC RX MED GY IP 250 OP 250 PS 637: Performed by: PHYSICIAN ASSISTANT

## 2023-12-04 PROCEDURE — 83735 ASSAY OF MAGNESIUM: CPT | Performed by: UROLOGY

## 2023-12-04 PROCEDURE — 85610 PROTHROMBIN TIME: CPT | Performed by: UROLOGY

## 2023-12-04 PROCEDURE — 85027 COMPLETE CBC AUTOMATED: CPT

## 2023-12-04 PROCEDURE — 97530 THERAPEUTIC ACTIVITIES: CPT | Mod: GO

## 2023-12-04 PROCEDURE — 84100 ASSAY OF PHOSPHORUS: CPT | Performed by: UROLOGY

## 2023-12-04 RX ADMIN — MAGNESIUM SULFATE HEPTAHYDRATE: 500 INJECTION, SOLUTION INTRAMUSCULAR; INTRAVENOUS at 21:00

## 2023-12-04 RX ADMIN — OXYCODONE HYDROCHLORIDE 20 MG: 10 TABLET ORAL at 09:50

## 2023-12-04 RX ADMIN — HYDROMORPHONE HYDROCHLORIDE 0.5 MG: 0.2 INJECTION, SOLUTION INTRAMUSCULAR; INTRAVENOUS; SUBCUTANEOUS at 11:02

## 2023-12-04 RX ADMIN — KETOROLAC TROMETHAMINE 15 MG: 15 INJECTION, SOLUTION INTRAMUSCULAR; INTRAVENOUS at 04:02

## 2023-12-04 RX ADMIN — CEFTRIAXONE SODIUM 2 G: 2 INJECTION, POWDER, FOR SOLUTION INTRAMUSCULAR; INTRAVENOUS at 08:11

## 2023-12-04 RX ADMIN — OXYCODONE HYDROCHLORIDE 20 MG: 10 TABLET ORAL at 21:23

## 2023-12-04 RX ADMIN — HYDROXYZINE HYDROCHLORIDE 10 MG: 10 TABLET ORAL at 22:27

## 2023-12-04 RX ADMIN — OXYCODONE HYDROCHLORIDE 40 MG: 40 TABLET, FILM COATED, EXTENDED RELEASE ORAL at 21:01

## 2023-12-04 RX ADMIN — HEPARIN SODIUM 5000 UNITS: 5000 INJECTION, SOLUTION INTRAVENOUS; SUBCUTANEOUS at 06:03

## 2023-12-04 RX ADMIN — HYDROMORPHONE HYDROCHLORIDE 0.5 MG: 0.2 INJECTION, SOLUTION INTRAMUSCULAR; INTRAVENOUS; SUBCUTANEOUS at 22:27

## 2023-12-04 RX ADMIN — METRONIDAZOLE 500 MG: 500 TABLET ORAL at 08:11

## 2023-12-04 RX ADMIN — SODIUM CHLORIDE 150 MG: 9 INJECTION, SOLUTION INTRAVENOUS at 11:57

## 2023-12-04 RX ADMIN — METHOCARBAMOL 500 MG: 500 TABLET ORAL at 16:07

## 2023-12-04 RX ADMIN — ACETAMINOPHEN 975 MG: 325 TABLET, FILM COATED ORAL at 12:00

## 2023-12-04 RX ADMIN — ACETAMINOPHEN 975 MG: 325 TABLET, FILM COATED ORAL at 21:01

## 2023-12-04 RX ADMIN — OXYCODONE HYDROCHLORIDE 20 MG: 10 TABLET ORAL at 06:03

## 2023-12-04 RX ADMIN — HEPARIN SODIUM 5000 UNITS: 5000 INJECTION, SOLUTION INTRAVENOUS; SUBCUTANEOUS at 14:12

## 2023-12-04 RX ADMIN — MEGESTROL ACETATE 20 MG: 20 TABLET ORAL at 21:01

## 2023-12-04 RX ADMIN — AMLODIPINE BESYLATE 10 MG: 10 TABLET ORAL at 08:11

## 2023-12-04 RX ADMIN — PANTOPRAZOLE SODIUM 40 MG: 40 TABLET, DELAYED RELEASE ORAL at 08:11

## 2023-12-04 RX ADMIN — HYDROMORPHONE HYDROCHLORIDE 0.3 MG: 0.2 INJECTION, SOLUTION INTRAMUSCULAR; INTRAVENOUS; SUBCUTANEOUS at 08:12

## 2023-12-04 RX ADMIN — APIXABAN 5 MG: 5 TABLET, FILM COATED ORAL at 21:01

## 2023-12-04 RX ADMIN — ACETAMINOPHEN 975 MG: 325 TABLET, FILM COATED ORAL at 04:02

## 2023-12-04 RX ADMIN — OXYCODONE HYDROCHLORIDE 40 MG: 40 TABLET, FILM COATED, EXTENDED RELEASE ORAL at 08:10

## 2023-12-04 RX ADMIN — MEGESTROL ACETATE 20 MG: 20 TABLET ORAL at 10:56

## 2023-12-04 RX ADMIN — OLIVE OIL AND SOYBEAN OIL 250 ML: 16; 4 INJECTION, EMULSION INTRAVENOUS at 21:00

## 2023-12-04 RX ADMIN — HYDROMORPHONE HYDROCHLORIDE 0.5 MG: 0.2 INJECTION, SOLUTION INTRAMUSCULAR; INTRAVENOUS; SUBCUTANEOUS at 19:56

## 2023-12-04 RX ADMIN — HYDROMORPHONE HYDROCHLORIDE 0.5 MG: 0.2 INJECTION, SOLUTION INTRAMUSCULAR; INTRAVENOUS; SUBCUTANEOUS at 16:07

## 2023-12-04 RX ADMIN — METHOCARBAMOL 500 MG: 500 TABLET ORAL at 21:01

## 2023-12-04 RX ADMIN — METHOCARBAMOL 500 MG: 500 TABLET ORAL at 08:11

## 2023-12-04 RX ADMIN — OXYCODONE HYDROCHLORIDE 20 MG: 10 TABLET ORAL at 14:12

## 2023-12-04 RX ADMIN — OXYCODONE HYDROCHLORIDE 20 MG: 10 TABLET ORAL at 17:17

## 2023-12-04 RX ADMIN — METRONIDAZOLE 500 MG: 500 TABLET ORAL at 21:01

## 2023-12-04 RX ADMIN — METHOCARBAMOL 500 MG: 500 TABLET ORAL at 12:00

## 2023-12-04 RX ADMIN — METRONIDAZOLE 500 MG: 500 TABLET ORAL at 14:12

## 2023-12-04 RX ADMIN — OXYCODONE HYDROCHLORIDE 20 MG: 10 TABLET ORAL at 02:28

## 2023-12-04 ASSESSMENT — ACTIVITIES OF DAILY LIVING (ADL)
ADLS_ACUITY_SCORE: 37
ADLS_ACUITY_SCORE: 37
ADLS_ACUITY_SCORE: 36
ADLS_ACUITY_SCORE: 36
ADLS_ACUITY_SCORE: 37
ADLS_ACUITY_SCORE: 36
ADLS_ACUITY_SCORE: 37
ADLS_ACUITY_SCORE: 37
ADLS_ACUITY_SCORE: 36
ADLS_ACUITY_SCORE: 36
ADLS_ACUITY_SCORE: 37
ADLS_ACUITY_SCORE: 37

## 2023-12-04 NOTE — PROGRESS NOTES
[facility]  WOC Nurse Inpatient Assessment     Consulted for: New Ileal Conduit    Summary: Spouse not able to help with pouching, patient concerned with insurance coverage for [facility]. Encouraged use of [facility] for assistance. 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   RP+EBRT  TUIBN   GH with clots once, admitted to hospital.   Incontinent..  Left hip pain for 1 year    Assessment:      Areas visualized during today's visit: Focused: and ileal conduit    Assessment of new end Ileal Conduit and Ileal conduit urinary diversion:  Diagnosis Pertinent to Stoma:  prostate cancer, fistula       Surgery Date: 11/30, Lysis of adhesions  Total cystectomy and Creation of ileal conduit urinary diversion 00355-74  Total Pubectomy (03414) for osteomyelitis   Rectus flap fixation into pelvis  Surgeon:DAMIAN Sims, DAMIAN Monahan       Hospital: [facility]  Pouching system in place on assessment today: Yeni two piece, cut to fit, and post op pouch   Pouch barrier status: Minimal melting  Pouch last changed/wear time: 12/4/23  Reason for pouch change today: ostomy education and routine schedule  Effectiveness of current pouching/ supply plan: Attempting new system, will re-evaluate next assessment  Change made with ostomy management today: Yes  Pouching system placed today: [place] 2 pc, cut to fit Urine pouching system with adapt skin barrier ring  Supplies: gathered and discussed with patient     Stoma location: RUQ  Stoma size: 1 1/4 inches, Ureteral stents  Stoma appearance: healthy, red, round, moist, edematous, and protruberant  Mucocutaneous junction:  intact  Peristomal complication(s): none   Output: blood and mucoid  Output volume emptied during visit: 0ml, connected to bedside drainage bag  Abdominal assessment: Distended,painful to touch when  pouching system was changed  Surgical site(s): open to air  NG still in place? No  Pain: Fullness  Is patient still on a PCA? No    Ostomy education assessment:  Participant of teaching session today: patient   Education completed today: Stoma assessment, Pouching system assessment , Refitting of appliance , Adjustment of pouching plan, Pouch change demonstration, Pouch emptying return demonstration, Fluid and electrolyte balance , Importance of hydration, When to seek medical attention, Infection prevention/hygiene , Hernia prevention, and Discharge instructions  Educational materials/methods: Verbal, Written, Demonstration, Return demonstration, FOD [place] education hand out, Product sample, Hands on, Addressed patient/caregiver questions/concerns, and Left packet of information at bedside   Education still needed: Stoma assessment, Pouching system assessment , Pouch change return demonstration, Ostomy accessory product use , Fluid and electrolyte balance , Importance of hydration, and Discharge instructions  Learning Comprehension:   Psychosocial assessment: attentive, participating  Patient readiness for education today: observing, attentive, and active participation  Following today's visit: patient  is able to demonstrate;         1. How to empty their pouch? Yes, Demo provided , with minimal assist, and Needs additional practice         2. How to change their pouch? Yes, Demo provided , with moderate assist, Able to verbalize steps, and Needs additional practice         3. How to read and record intake and output correctly? Yes, Demo provided , with heavy assist, and Needs additional practice   Preparation for discharge completed: Ensured patient has extra supplies for discharge, Ordered samples from  after gaining consent from patient/caregiver, Discussed how and when to make an outpatient WOC nurse appointment after discharge, and Discuss signs/symptoms of when to seek medical  "attention  Preparation for discharge still needed: Placed prescription recommendations in discharge navigator for MD to sign, Discussed how to order supplies after discharge , and Prepared for discharge home with home care  Pt support system on discharge: family  [facility] recommend home care? Yes and By [facility] RN if possible  Face to face time: 25 minutes          Treatment Plan:     RUQ Ileal Conduit and Ileal conduit urinary diversion pouching plan:   Pouching system: ostomy supplies pouches: [place] 57 URINE (914411) ostomy supplies barrier: [place] 57mm FLAT (608813)  Accessories used: [facility] ostomy accessories: 2\" Deya Ring (994601), Powder (516776), Medium Belt (759954), Urostomy Adapter (651950), Night Drainage Bottle (034502), and Cavilon no sting barrier film (689645)   Frequency of pouch changes: PRN leakage and Three times a week  [facility] follow up plan: Daily Monday-Friday (as able)  Bedside RN interventions: Change pouch PRN if leaking using the supplies above, Empty pouch when 1/3 to 1/2 full, ensure to clean pouch outlet after emptying to prevent odor, Notify [facility] for ongoing pouch leakage, Document stoma appearance and output volume, color, and consistency every shift, and Encourage patient to empty pouch with assist      Pressure Injury Prevention (PIP) Plan:  If patient is declining pressure injury prevention interventions: Explore reason why and address patient's concerns, Educate on pressure injury risk and prevention intervention(s), If patient is still declining, document \"informed refusal\" , and Ensure Care team is aware ( provider, charge nurse, etc)  Mattress: Follow bed algorithm, reassess daily and order specialty mattress, if indicated.  HOB: Maintain at or below 30 degrees, unless contraindicated  Repositioning in bed: Every 1-2 hours , Left/right positioning; avoid supine, Raise foot of bed prior to raising head of bed, to reduce patient sliding down (shear), and Frequent microturns using TAPS " wedges, as patient tolerates  Heels: Keep elevated off mattress, Pillows under calves, and Heel lift boots  Protective Dressing: Sacral Mepilex for prevention (#985808),  especially for the agitated patient   Positioning Equipment: None  Chair positioning: Chair cushion (#962804) , Assist patient to reposition hourly, and Do NOT use a donut for sitting (this increases pressure to smaller area and creates a higher potential for injury)    If patient has a buttock pressure injury, or high risk for PI use chair cushion or SPS.  Moisture Management: Perineal cleansing /protection: Follow Incontinence Protocol, Avoid brief in bed, Clean and dry skin folds with bathing , Consider InterDry (#274490) between folds, and Moisturize dry skin  Under Devices: Inspect skin under all medical devices during skin inspection , Ensure tubes are stabilized without tension, and Ensure patient is not lying on medical devices or equipment when repositioned  Ask provider to discontinue device when no longer needed.      Orders: Reviewed    RECOMMEND PRIMARY TEAM ORDER: None, at this time  Education provided: importance of repositioning, plan of care, wound progress, Infection prevention , Moisture management, Hygiene, and Off-loading pressure  Discussed plan of care with: Patient  WOC nurse follow-up plan: daily and prn( M-Fri)  Notify WOC if wound(s) deteriorate.  Nursing to notify the Provider(s) and re-consult the WOC Nurse if new skin concern.    DATA:     Current support surface: Standard  Standard gel/foam mattress (IsoFlex, Atmos air, etc)  Containment of urine/stool: Incontinence Protocol, Incontinent pad in bed, and Ileostomy pouch  BMI: Body mass index is 25.81 kg/m .   Active diet order: Orders Placed This Encounter      Regular Diet Adult     Output: I/O last 3 completed shifts:  In: 2211.6 [P.O.:1050; I.V.:30]  Out: 2615 [Urine:2550; Drains:65]     Labs:   Recent Labs   Lab 12/04/23  0726   ALBUMIN 2.0*   PREALB 7.2*   HGB  8.3*   INR 1.16*   WBC 5.7     Pressure injury risk assessment:   Sensory Perception: 4-->no impairment  Moisture: 4-->rarely moist  Activity: 2-->chairfast  Mobility: 2-->very limited  Nutrition: 3-->adequate  Friction and Shear: 2-->potential problem  Lopez Score: 17    Rhea De RN, BSN, CWON  Pager no longer is use, please contact through [employer] group: [facility] Nurse [place]  Dept. Office Number: [phone]

## 2023-12-04 NOTE — PLAN OF CARE
Temp: 98.8  F (37.1  C) Temp src: Oral BP: 106/50 Pulse: 69   Resp: 16 SpO2: 96 % O2 Device: None (Room air)       A&O x4, on RA. abdominal pain managed with PRN Dilaudid, Oxycodone, and scheduled meds. Denies nausea. DL PICC  infusing continuous TPN @75 mL/hr. Urostomy in place with adequate output. R CHRISTIANO leaking at the site, dressing changed. Midline abd incision intact. Advanced to regular diet, tolerating. Pt got OOB with assist x2 and ambulated in there room. Continue with POC.

## 2023-12-04 NOTE — PROGRESS NOTES
CLINICAL NUTRITION SERVICES - BRIEF NOTE     Nutrition Prescription    RECOMMENDATIONS FOR MDs/PROVIDERS TO ORDER:  Recommend patient be able to consistently consume at least 1300 kcal and 60 g protein daily (~60% estimated kcal and protein needs) before stop nutrition support    Recommendations already ordered by Registered Dietitian (RD):  1. Discussed with pharmacist, keep TPN dextrose at 215 g (new goal for now) now that diet has advanced    Updated goal TPN: 1800 mL (75 mL/hr) with 215 g dextrose, 120 g AA, and 250 mL 20% IV lipids daily to provide 1711 kcal (~21 kcal/kg), ~1.5 g/kg PRO, GIR 1.8, and 29% kcal from fat --> this meets 83% estimated kcal needs and 100% estimated protein needs    2. PRN supplements       *See RD note on 12/1 for nutrition assessment note    EVALUATION OF THE PROGRESS TOWARD GOALS   Diet: Regular    Nutrition Support: TPN currently - 1800 mL (75 mL/hr) with 215 g dextrose, 120 g AA, and 250 mL 20% IV lipids daily to provide 1711 kcal (~21 kcal/kg), ~1.5 g/kg PRO, GIR  PRN supplements    Intake: TPN started 12/1 with initial 115 g dex; adv to 165 g dex 12/2 and adv to 215 g dex last night 12/3.  Diet advnaced to regular yesterday, tolerated dinner last night per chart review     NEW FINDINGS   Labs: K+, Mg++, Phos WNL; Alk Phos 151 (H), AST 69 (H); ALT and Tbili WNL; TG WNL; BGs stable       INTERVENTIONS  Collaboration with other providers: primary team request to keep TPN going for now to further monitor PO intake tolerance. Discussed above TPN plan with pharmacist.    Monitoring/Evaluation  Progress toward goals will be monitored and evaluated per protocol.      Wilma Bright RD, , LD  Weekday Pager: [phone]  Weekday Units covered: 5A (8588-6468) and 7B (9462-0104)  Weekend/Holiday RD Pager: [phone]

## 2023-12-04 NOTE — PROGRESS NOTES
"Urology  Progress Note    24 hour events/Subjective:     - No acute events overnight   - Pain with walking but controlled overall   - Tolerating CLD, + gas, endorses hunger      Exam  /81 (BP Location: Left arm)   Pulse 65   Temp 98.1  F (36.7  C) (Oral)   Resp 16   Ht 1.778 m (5' 10\")   Wt 81.6 kg (179 lb 14.3 oz)   SpO2 98%   BMI 25.81 kg/m    No acute distress  Unlabored breathing on RA  Abdomen soft, appropriately tender, nondistended. midline incision cdi, conduit healthy with two stents in place  CHRISTIANO serosanguinous  Jiménez in place in new conduit draining clear to light cherry urine in tubing. CHRISTIANO serosang      Intake/Output Summary (Last 24 hours) at 12/1/2023 0552  Last data filed at 11/30/2023 2145  Gross per 24 hour   Intake 3000 ml   Output 375 ml   Net 2625 ml       UOP 2550/600  CHRISTIANO 55/15    Labs  Recent Labs   Lab Test 12/03/23  0712 12/02/23  0644 12/01/23  2221 12/01/23  0448   WBC 6.9 7.9  --  9.7   HGB 8.2* 8.0* 7.8* 7.0*   CR 0.52* 0.59*  --  0.72          7-Day Micro Results       Collected Updated Procedure Result Status      11/30/2023 1221 12/03/2023 1316 Anaerobic Bacterial Culture Routine [[id number]]    (Abnormal)   Abscess from Pelvis    Preliminary result Component Value   Culture No anaerobic organisms isolated  [P]     1+ Streptococcus agalactiae (Group B Streptococcus)  [P]     This organism is susceptible to ampicillin, penicillin, vancomycin and the cephalosporins. If treatment is required and your patient is allergic to penicillin, contact the microbiology lab within 5 days to request susceptibility testing.  Not isolated or reported on routine aerobic culture               11/30/2023 1221 12/02/2023 0923 Fungal or Yeast Culture Routine [[id number]]    (Abnormal)   Abscess from Pelvis    Preliminary result Component Value   Culture Yeast  [P]                11/30/2023 1221 12/02/2023 1459 Abscess Aerobic Bacterial Culture Routine [[id number]]    (Abnormal)   Abscess " from Pelvis    Final result Component Value   Culture 2+ Candida albicans    Susceptibilities not routinely done, refer to antibiogram to view typical susceptibility profiles               11/30/2023 1217 12/03/2023 1616 Anaerobic Bacterial Culture Routine [[id number]]   Bone Biopsy from Pelvis    Preliminary result Component Value   Culture No anaerobic organisms isolated after 3 days  [P]                11/30/2023 1217 12/02/2023 0914 Fungal or Yeast Culture Routine [[id number]]   (Abnormal)   Bone Biopsy from Pelvis    Preliminary result Component Value   Culture Yeast  [P]                11/30/2023 1217 12/03/2023 1208 Bone Biopsy Aerobic Bacterial Culture Routine [[id number]]    (Abnormal)   Bone Biopsy from Pelvis    Preliminary result Component Value   Culture 1+ Candida albicans  [P]     Susceptibilities not routinely done, refer to antibiogram to view typical susceptibility profiles    1+ Streptococcus agalactiae (Group B Streptococcus)  [P]     This organism is susceptible to ampicillin, penicillin, vancomycin and the cephalosporins. If treatment is required and your patient is allergic to penicillin, contact the microbiology lab within 5 days to request susceptibility testing.                      Assessment/Plan  68 year old y/o male POD#4 s/p cystectomy with IC, pubectomy, and rectus flap for chronic pubic osteomyelitis. WOC consulted.     Note - plan changes for today are in bold below.    Neuro: pain control: PRN oxycodone 5-10 mg q3h , PRN dilaudid 0.2 - 0.4 mg q2h, and scheduled tylenol   CV: HDS   Pulm: incentive spirometry while awake  FEN/GI: Regular diet + TPN, bowel regimen  Endo: relatively euglycemic  : hooks in place in conduit, stents in place, conduit appears pink and viable. Stents to remain in place for 6 weeks. CHRISTIANO drain to bulb suction.   Heme/ID: monitor for s/s of infection; monitor Hb and transfuse as indicated. Awaiting bone and intra-op cultures. PICC and continue ertapenem  until cultures result. ID consult when cultures result. 2+ yeast for now, on micafungin. ID consult, ceftri, flagyl onboard  Activity: Up as tolerated  Ambulate at least TID  PT/OT consulted   Ppx: Heparin 5000 U TID   Home RX on HOLD: none  Dispo: anticipate medically ready to discharge to TCU likely Tuesday  PT/OT recommendations: TCU      Seen and examined with the chief resident. Will discuss with Miki Correa MD.    Cole Smith MD  Urology PGY-4      PTA medications:   Prior to Admission medications    Medication Sig Start Date End Date Taking? Authorizing Provider   acetaminophen (TYLENOL) 325 MG tablet Take 975 mg by mouth every 8 hours as needed for mild pain   Yes Reported, Patient   amLODIPine (NORVASC) 10 MG tablet Take 1 tablet (10 mg) by mouth daily  Patient taking differently: Take 10 mg by mouth every morning 2/3/23  Yes Wei Perez MD   atorvastatin (LIPITOR) 20 MG tablet Take 1 tablet (20 mg) by mouth daily 11/15/23  Yes Ezequiel Alejandra MD   furosemide (LASIX) 20 MG tablet Take 1 tablet (20 mg) by mouth daily as needed  Patient taking differently: Take 20 mg by mouth daily as needed (edema) 11/9/23  Yes Erum Fritz MD   hydrOXYzine (ATARAX) 10 MG tablet Take 1 tablet (10 mg) by mouth every 6 hours as needed for itching or anxiety (with pain, moderate pain) 2/7/23  Yes Haydee Abdul MD   leuprolide (LUPRON DEPOT) 45 MG kit Inject 45 mg into the muscle every 6 months   Yes Unknown, Entered By History   lisinopril (ZESTRIL) 40 MG tablet Take 40 mg by mouth every morning   Yes Unknown, Entered By History   LORazepam (ATIVAN) 0.5 MG tablet Take 1 tablet (0.5 mg) by mouth every 6 hours as needed for anxiety 8/14/23  Yes Erum Fritz MD   megestrol (MEGACE) 20 MG tablet Take 20 mg by mouth 2 times daily   Yes Unknown, Entered By History   nitroFURantoin macrocrystal-monohydrate (MACROBID) 100 MG capsule Take 1 capsule (100 mg) by mouth 2 times daily for 3  days 11/27/23 11/30/23 Yes Miki Correa MD   omeprazole (PRILOSEC) 40 MG DR capsule Take 1 capsule (40 mg) by mouth daily  Patient taking differently: 40 mg every morning 8/2/23  Yes Wei Perez MD   ondansetron (ZOFRAN ODT) 8 MG ODT tab Take 1 tablet (8 mg) by mouth every 8 hours as needed for nausea 6/1/23  Yes Erum Fritz MD   oxybutynin (DITROPAN) 5 MG tablet Take 5 mg by mouth 4 times daily as needed for bladder spasms   Yes Unknown, Entered By History   oxyCODONE (OXYCONTIN) 40 MG 12 hr tablet Take 1 tablet (40 mg) by mouth every 12 hours 11/9/23  Yes Erum Fritz MD   oxyCODONE IR (ROXICODONE) 10 MG tablet Take 1 tablet (10 mg) by mouth every 6 hours as needed for moderate to severe pain 10/26/23  Yes Erum Fritz MD   potassium chloride ER (KLOR-CON M) 20 MEQ CR tablet Take 1 tablet (20 mEq) by mouth daily as needed for potassium supplementation (Take if you take furosemide)  Patient taking differently: Take 20 mEq by mouth 2 times daily 4/26/23  Yes Erum Fritz MD   prochlorperazine (COMPAZINE) 10 MG tablet Take 1 tablet (10 mg) by mouth every 6 hours as needed for nausea or vomiting 5/30/23  Yes Erum Fritz MD   tamsulosin (FLOMAX) 0.4 MG capsule Take 1 capsule by mouth every morning 7/10/23  Yes Reported, Patient   traZODone (DESYREL) 50 MG tablet Take 50 mg by mouth nightly as needed for sleep 3/22/23  Yes Reported, Patient   apixaban ANTICOAGULANT (ELIQUIS) 5 MG tablet Take 1 tablet (5 mg) by mouth 2 times daily 5/31/23   Erum Fritz MD   docusate sodium (COLACE) 100 MG capsule Take 100 mg by mouth daily    Unknown, Entered By History   predniSONE (DELTASONE) 5 MG tablet Take 1 tablet (5 mg) by mouth 2 times daily 5/3/23   Erum Fritz MD          Contacting the urology team: Please see [employer] and page on-call clinician with any questions or concerns regarding this patient. Note writer may be unavailable.     To access [facility]  "from intranet: under \"Applications\" --> \"Business Applications\" select \"[employer]\" and search \"Urology Adult & Pediatric/[facility].\" Please note that any question about a urology inpatient, [place] or [place], should go to job code [id number].     "

## 2023-12-04 NOTE — PROGRESS NOTES
Care Management Follow Up    Length of Stay (days): 4    Expected Discharge Date: 12/06/2023     Concerns to be Addressed: discharge planning     Patient plan of care discussed at interdisciplinary rounds: Yes    Anticipated Discharge Disposition: Transitional Care     Anticipated Discharge Services:    Anticipated Discharge DME:      Patient/family educated on Medicare website which has current facility and service quality ratings:    Education Provided on the Discharge Plan:    Patient/Family in Agreement with the Plan: yes    Referrals Placed by CM/SW:  TCU's    [facility]  [address]  [facility] [address]  Ph: [phone]  F: [phone]    [facility]  Address   [address] [facility] [address]             Contact Information    [phone]        [facility]  Address   [address] [facility] [address]             Contact Information    [phone]        [facility]  Address   [address] [facility] [address]             Contact Information    [phone]        [facility]  Address   [address]  [facility] [address]             Contact Information    [phone]        [facility]  Address   [address] [facility] [address]             Contact Information    [phone]        Private pay costs discussed: Not applicable    Additional Information:    SW met with patient at bedside to discuss discharge planning. Introduced self and role. SW explained PT/OT discharge  recommendations. Patient is agreeable to TCU placement and would like to be near home. He said anywhere near the [place], [place] or [place], [place] area is okay to make referrals. Patient did not have any specific preferences. Please see referrals sent above.    MAGGIE spoke with Trini, urology [facility]RE[facility]- per Trini, when ID finalizes the  patients abx plan, the patient will be ready to discharge to TCU. Abx plan is stilling pending at this time.      NILESH Matson LS   7B    Phone: [phone]  Pager: [phone]  Graciela

## 2023-12-04 NOTE — PLAN OF CARE
"Goal Outcome Evaluation:      Plan of Care Reviewed With: patient    Overall Patient Progress: no changeOverall Patient Progress: no change    1930-730    /81 (BP Location: Left arm)   Pulse 65   Temp 98.1  F (36.7  C) (Oral)   Resp 16   Ht 1.778 m (5' 10\")   Wt 81.6 kg (179 lb 14.3 oz)   SpO2 98%   BMI 25.81 kg/m      Activity: rested in bed  Neuros: alert and orientated x 4, calm and cooperative   Cardiac: WNL   Respiratory: O2 sats mid 90's on RA, unlabored   GI/: abdomen soft/tender.  Pt passing gas, but no BM.  Urostomy with watermelon colored urine   Diet: tolerating clears  overnight.  Pt had regular diet for supper   Lines: PICC, PIV   Incisions/Drains: midline abdominal inc CDI with staples.  Abdominal CHRISTIANO with serosang drainage.  CHRISTIANO leaking around insertion site    Labs: reviewed   Pain/nausea: \"partial\" pain control taking scheduled tylenol and toradol with prn oxycodone and IV dilaudid.  No nausea    New changes this shift: none   Plan: encourage ambulation.  Continue POC                     "

## 2023-12-05 ENCOUNTER — APPOINTMENT (OUTPATIENT)
Dept: OCCUPATIONAL THERAPY | Facility: CLINIC | Age: 68
DRG: 653 | End: 2023-12-05
Attending: UROLOGY
Payer: COMMERCIAL

## 2023-12-05 ENCOUNTER — APPOINTMENT (OUTPATIENT)
Dept: PHYSICAL THERAPY | Facility: CLINIC | Age: 68
DRG: 653 | End: 2023-12-05
Attending: UROLOGY
Payer: COMMERCIAL

## 2023-12-05 LAB
ALBUMIN SERPL BCG-MCNC: 2 G/DL (ref 3.5–5.2)
ALP SERPL-CCNC: 458 U/L (ref 40–150)
ALT SERPL W P-5'-P-CCNC: 137 U/L (ref 0–70)
ANION GAP SERPL CALCULATED.3IONS-SCNC: 8 MMOL/L (ref 7–15)
AST SERPL W P-5'-P-CCNC: 217 U/L (ref 0–45)
BACTERIA BONE ANAEROBE+AEROBE CULT: ABNORMAL
BACTERIA BONE ANAEROBE+AEROBE CULT: ABNORMAL
BILIRUB DIRECT SERPL-MCNC: 0.83 MG/DL (ref 0–0.3)
BILIRUB SERPL-MCNC: 1.1 MG/DL
BUN SERPL-MCNC: 17 MG/DL (ref 8–23)
CALCIUM SERPL-MCNC: 8 MG/DL (ref 8.8–10.2)
CHLORIDE SERPL-SCNC: 110 MMOL/L (ref 98–107)
CREAT SERPL-MCNC: 0.46 MG/DL (ref 0.67–1.17)
CRP SERPL-MCNC: 126 MG/L
DEPRECATED HCO3 PLAS-SCNC: 21 MMOL/L (ref 22–29)
EGFRCR SERPLBLD CKD-EPI 2021: >90 ML/MIN/1.73M2
ERYTHROCYTE [DISTWIDTH] IN BLOOD BY AUTOMATED COUNT: 18.1 % (ref 10–15)
GLUCOSE SERPL-MCNC: 132 MG/DL (ref 70–99)
HCT VFR BLD AUTO: 27.8 % (ref 40–53)
HGB BLD-MCNC: 8.5 G/DL (ref 13.3–17.7)
MCH RBC QN AUTO: 24 PG (ref 26.5–33)
MCHC RBC AUTO-ENTMCNC: 30.6 G/DL (ref 31.5–36.5)
MCV RBC AUTO: 79 FL (ref 78–100)
PLATELET # BLD AUTO: 294 10E3/UL (ref 150–450)
POTASSIUM SERPL-SCNC: 4.1 MMOL/L (ref 3.4–5.3)
PROT SERPL-MCNC: 4.7 G/DL (ref 6.4–8.3)
RBC # BLD AUTO: 3.54 10E6/UL (ref 4.4–5.9)
SODIUM SERPL-SCNC: 139 MMOL/L (ref 135–145)
WBC # BLD AUTO: 5.3 10E3/UL (ref 4–11)

## 2023-12-05 PROCEDURE — 86140 C-REACTIVE PROTEIN: CPT | Performed by: STUDENT IN AN ORGANIZED HEALTH CARE EDUCATION/TRAINING PROGRAM

## 2023-12-05 PROCEDURE — 82248 BILIRUBIN DIRECT: CPT | Performed by: STUDENT IN AN ORGANIZED HEALTH CARE EDUCATION/TRAINING PROGRAM

## 2023-12-05 PROCEDURE — 97530 THERAPEUTIC ACTIVITIES: CPT | Mod: GP

## 2023-12-05 PROCEDURE — 258N000003 HC RX IP 258 OP 636: Performed by: UROLOGY

## 2023-12-05 PROCEDURE — G0463 HOSPITAL OUTPT CLINIC VISIT: HCPCS

## 2023-12-05 PROCEDURE — 80048 BASIC METABOLIC PNL TOTAL CA: CPT

## 2023-12-05 PROCEDURE — 97530 THERAPEUTIC ACTIVITIES: CPT | Mod: GO

## 2023-12-05 PROCEDURE — 999N000198 HC STATISTIC WOC PT EDUCATION, 16-30 MIN

## 2023-12-05 PROCEDURE — 250N000011 HC RX IP 250 OP 636: Mod: JZ | Performed by: UROLOGY

## 2023-12-05 PROCEDURE — 97116 GAIT TRAINING THERAPY: CPT | Mod: GP

## 2023-12-05 PROCEDURE — 250N000013 HC RX MED GY IP 250 OP 250 PS 637

## 2023-12-05 PROCEDURE — 99233 SBSQ HOSP IP/OBS HIGH 50: CPT | Performed by: STUDENT IN AN ORGANIZED HEALTH CARE EDUCATION/TRAINING PROGRAM

## 2023-12-05 PROCEDURE — 250N000013 HC RX MED GY IP 250 OP 250 PS 637: Performed by: UROLOGY

## 2023-12-05 PROCEDURE — 120N000002 HC R&B MED SURG/OB UMMC

## 2023-12-05 PROCEDURE — 250N000013 HC RX MED GY IP 250 OP 250 PS 637: Performed by: PHYSICIAN ASSISTANT

## 2023-12-05 PROCEDURE — 250N000011 HC RX IP 250 OP 636: Mod: JZ

## 2023-12-05 PROCEDURE — 85027 COMPLETE CBC AUTOMATED: CPT

## 2023-12-05 PROCEDURE — 36592 COLLECT BLOOD FROM PICC: CPT

## 2023-12-05 RX ORDER — FLUCONAZOLE 200 MG/1
400 TABLET ORAL DAILY
Status: DISCONTINUED | OUTPATIENT
Start: 2023-12-05 | End: 2023-12-11 | Stop reason: HOSPADM

## 2023-12-05 RX ORDER — CALCIUM CARBONATE 500 MG/1
1000 TABLET, CHEWABLE ORAL 3 TIMES DAILY PRN
Status: DISCONTINUED | OUTPATIENT
Start: 2023-12-05 | End: 2023-12-11 | Stop reason: HOSPADM

## 2023-12-05 RX ADMIN — CEFTRIAXONE SODIUM 2 G: 2 INJECTION, POWDER, FOR SOLUTION INTRAMUSCULAR; INTRAVENOUS at 08:05

## 2023-12-05 RX ADMIN — OXYCODONE HYDROCHLORIDE 40 MG: 40 TABLET, FILM COATED, EXTENDED RELEASE ORAL at 08:03

## 2023-12-05 RX ADMIN — AMLODIPINE BESYLATE 10 MG: 10 TABLET ORAL at 08:10

## 2023-12-05 RX ADMIN — AMOXICILLIN AND CLAVULANATE POTASSIUM 1 TABLET: 875; 125 TABLET, FILM COATED ORAL at 20:26

## 2023-12-05 RX ADMIN — PANTOPRAZOLE SODIUM 40 MG: 40 TABLET, DELAYED RELEASE ORAL at 08:09

## 2023-12-05 RX ADMIN — METHOCARBAMOL 500 MG: 500 TABLET ORAL at 20:26

## 2023-12-05 RX ADMIN — OXYCODONE HYDROCHLORIDE 20 MG: 10 TABLET ORAL at 03:24

## 2023-12-05 RX ADMIN — HYDROMORPHONE HYDROCHLORIDE 0.5 MG: 0.2 INJECTION, SOLUTION INTRAMUSCULAR; INTRAVENOUS; SUBCUTANEOUS at 23:48

## 2023-12-05 RX ADMIN — CALCIUM CARBONATE (ANTACID) CHEW TAB 500 MG 1000 MG: 500 CHEW TAB at 10:07

## 2023-12-05 RX ADMIN — OXYCODONE HYDROCHLORIDE 40 MG: 40 TABLET, FILM COATED, EXTENDED RELEASE ORAL at 20:26

## 2023-12-05 RX ADMIN — ACETAMINOPHEN 975 MG: 325 TABLET, FILM COATED ORAL at 12:14

## 2023-12-05 RX ADMIN — ACETAMINOPHEN 975 MG: 325 TABLET, FILM COATED ORAL at 03:25

## 2023-12-05 RX ADMIN — METHOCARBAMOL 500 MG: 500 TABLET ORAL at 12:14

## 2023-12-05 RX ADMIN — OXYCODONE HYDROCHLORIDE 20 MG: 10 TABLET ORAL at 18:00

## 2023-12-05 RX ADMIN — APIXABAN 5 MG: 5 TABLET, FILM COATED ORAL at 20:27

## 2023-12-05 RX ADMIN — MEGESTROL ACETATE 20 MG: 20 TABLET ORAL at 08:09

## 2023-12-05 RX ADMIN — OXYCODONE HYDROCHLORIDE 20 MG: 10 TABLET ORAL at 14:31

## 2023-12-05 RX ADMIN — SODIUM CHLORIDE 150 MG: 9 INJECTION, SOLUTION INTRAVENOUS at 11:29

## 2023-12-05 RX ADMIN — CALCIUM CARBONATE (ANTACID) CHEW TAB 500 MG 1000 MG: 500 CHEW TAB at 18:53

## 2023-12-05 RX ADMIN — APIXABAN 5 MG: 5 TABLET, FILM COATED ORAL at 08:12

## 2023-12-05 RX ADMIN — HYDROMORPHONE HYDROCHLORIDE 0.5 MG: 0.2 INJECTION, SOLUTION INTRAMUSCULAR; INTRAVENOUS; SUBCUTANEOUS at 06:32

## 2023-12-05 RX ADMIN — OXYCODONE HYDROCHLORIDE 20 MG: 10 TABLET ORAL at 08:03

## 2023-12-05 RX ADMIN — OXYCODONE HYDROCHLORIDE 20 MG: 10 TABLET ORAL at 22:02

## 2023-12-05 RX ADMIN — MEGESTROL ACETATE 20 MG: 20 TABLET ORAL at 20:27

## 2023-12-05 RX ADMIN — METRONIDAZOLE 500 MG: 500 TABLET ORAL at 08:09

## 2023-12-05 RX ADMIN — METRONIDAZOLE 500 MG: 500 TABLET ORAL at 13:43

## 2023-12-05 RX ADMIN — HYDROMORPHONE HYDROCHLORIDE 0.5 MG: 0.2 INJECTION, SOLUTION INTRAMUSCULAR; INTRAVENOUS; SUBCUTANEOUS at 11:20

## 2023-12-05 RX ADMIN — FLUCONAZOLE 400 MG: 200 TABLET ORAL at 18:06

## 2023-12-05 RX ADMIN — ACETAMINOPHEN 975 MG: 325 TABLET, FILM COATED ORAL at 20:26

## 2023-12-05 RX ADMIN — HYDROMORPHONE HYDROCHLORIDE 0.5 MG: 0.2 INJECTION, SOLUTION INTRAMUSCULAR; INTRAVENOUS; SUBCUTANEOUS at 16:10

## 2023-12-05 RX ADMIN — METHOCARBAMOL 500 MG: 500 TABLET ORAL at 08:09

## 2023-12-05 ASSESSMENT — ACTIVITIES OF DAILY LIVING (ADL)
ADLS_ACUITY_SCORE: 37

## 2023-12-05 NOTE — PROVIDER NOTIFICATION
7240-1 Dilip Kan     Pt. reporting heartburn, can we get a PRN tums order?     Thanks,   Maureen DIAZ [phone]    Paged: Matthew Beltran MD

## 2023-12-05 NOTE — PROGRESS NOTES
Care Management Follow Up    Length of Stay (days): 5    Expected Discharge Date: 12/06/2023     Concerns to be Addressed: discharge planning     Patient plan of care discussed at interdisciplinary rounds: Yes    Anticipated Discharge Disposition: Transitional Care     Anticipated Discharge Services:  none  Anticipated Discharge DME:  none    Patient/family educated on Medicare website which has current facility and service quality ratings:  yes  Education Provided on the Discharge Plan:  yes  Patient/Family in Agreement with the Plan: yes    Referrals Placed by CM/SW:      [facility] [place]  [address]  [facility] [address]  Julia admissions [phone]  F: [phone]  12/5: LM for Julia in admissions.     [facility]  Address   [address] [facility] [address]             Contact Information    [phone]      12/5: LM for admissions     [facility]  Address   [address] [facility] [address]             Contact Information    [phone]      12/5: no beds available,gave admissions 7B  phone number to call back if anything changes     [facility]  Address   [address] [facility] [address]             Contact Information    [phone]      12/5: LM for Taylor in admissions.      [facility]  Address   [address]  [place] [address]             Contact Information    [phone]      12/5: LM for Chloé in admissions     [facility]  Address   [address] [facility] [address]             Contact Information    [phone]      12/5: LM in admissions.     Private pay costs discussed: Not applicable    Additional Information:      NILESH MatsonW   7B    Phone: [phone]  Pager: [phone]  Graciela

## 2023-12-05 NOTE — DISCHARGE INSTRUCTIONS
"    [facility]     Name: Jermain Kan  Date: 12/5/23    To order your ostomy supplies    The ostomy Supplier needs this supply list  to process your order. You will need to fax/deliver this list, along with your Insurance information. Your home care nurse can assist with this process.    List of Ostomy Distributors      [employer]  Ph. [phone] Fax # [phone]  [employer]   Ph. [phone]  [employer]   Ph. [phone]  [facility]   Ph. [phone] [phone]  Or Call your insurance provider for their preferred supplier    Your Medical Supplier will need your surgeon's name, phone and fax number    Clinic:                     Phone                            Fax  Urology Surgery:              [phone]  Verbal Order for ostomy supplies for 1 Month per:                                             Rhea De RN, CWON                                                                    Authorizing MD: DAMIAN Monahan    Quantity of pouches:    20 /mo.    Request the following supplies:      Casey        2 piece flat wafer 57mm  # [id number]                                Urine pouch 57mm- # [id number]                        Accessories    2  Deya ring #[id number]      Adapt powder #[id number]      No sting film barrier # [id number]                                                 Bard urine drainage bag #[id number]                           Coloplast leg bag #[id number]      Yeni belt medium #7300 (23-43 )       Bannish II liquid urine deoderizer #[id number]               Urikleen  # [id number]      Night drainage bottle # [id number]       or Angela 1/2 gallon Bottle with 6\" tubing                   Change your pouch three times a week, more often if leaking.   If you are cutting a hole in the wafer of your pouch, recheck stoma size and adjust pouch opening as needed every week    . Call the Ostomy Nurse at [facility] " [facility]       [address] [place] [address], [place] : [phone]   Schedule a follow-up visit in 2 to 4 weeks after your surgery, sooner if having problems Bring a complete set of pouch-changing supplies to this visit       Problems you should Report  - The stoma turns blue or darker in color.  - Cuts or sores around the stoma.  - Red, raw or painful skin around the stoma.  - Any bulging of the skin around the stoma.  - A pouch that leaks every day.  - Problems making the right size hole in the pouch wafer.   Please call with any questions or concerns.

## 2023-12-05 NOTE — PROGRESS NOTES
"Urology  Progress Note    24 hour events/Subjective:     - No acute events overnight   - Pain with walking but controlled overall   - Tolerating reg diet, + gas, endorses hunger   - TPN discontinued      Exam  BP (!) 140/70 (BP Location: Left arm, Cuff Size: Adult Regular)   Pulse 94   Temp 99  F (37.2  C) (Oral)   Resp 18   Ht 1.778 m (5' 10\")   Wt 81.6 kg (179 lb 14.3 oz)   SpO2 97%   BMI 25.81 kg/m    No acute distress  Unlabored breathing on RA  Abdomen soft, appropriately tender, nondistended. midline incision cdi, conduit healthy with two stents in place  CHRISTIANO serosanguinous  Jiménez in place in new conduit draining clear yellow urine in tubing. CHRISTIANO serosang      Intake/Output Summary (Last 24 hours) at 12/1/2023 0552  Last data filed at 11/30/2023 2145  Gross per 24 hour   Intake 3000 ml   Output 375 ml   Net 2625 ml       UOP 2900/-  CHRISTIANO 40/-    Labs  Recent Labs   Lab Test 12/04/23  0726 12/03/23  0712 12/02/23  0644   WBC 5.7 6.9 7.9   HGB 8.3* 8.2* 8.0*   CR 0.44* 0.52* 0.59*          7-Day Micro Results       Collected Updated Procedure Result Status      12/04/2023 0621 12/04/2023 0713 Creatinine fluid [[id number]]    Body fluid, unsp from Other    Final result Component Value Units   Creatinine Fluid Source Drain    Creatinine fluid 0.5 mg/dL            11/30/2023 1221 12/04/2023 0808 Anaerobic Bacterial Culture Routine [[id number]]    (Abnormal)   Abscess from Pelvis    Preliminary result Component Value   Culture No anaerobic organisms isolated after 3 days  [P]     1+ Streptococcus agalactiae (Group B Streptococcus)  [P]     This organism is susceptible to ampicillin, penicillin, vancomycin and the cephalosporins. If treatment is required and your patient is allergic to penicillin, contact the microbiology lab within 5 days to request susceptibility testing.  Not isolated or reported on routine aerobic culture               11/30/2023 1221 12/04/2023 1148 Fungal or Yeast Culture Routine " "[[id number]]    (Abnormal)   Abscess from Pelvis    Preliminary result Component Value   Culture Candida albicans  [P]                11/30/2023 1221 12/02/2023 1459 Abscess Aerobic Bacterial Culture Routine [[id number]]    (Abnormal)   Abscess from Pelvis    Final result Component Value   Culture 2+ Candida albicans    Susceptibilities not routinely done, refer to antibiogram to view typical susceptibility profiles               11/30/2023 1217 12/04/2023 1617 Anaerobic Bacterial Culture Routine [[id number]]   Bone Biopsy from Pelvis    Preliminary result Component Value   Culture No anaerobic organisms isolated after 4 days  [P]                11/30/2023 1217 12/04/2023 1147 Fungal or Yeast Culture Routine [[id number]]   (Abnormal)   Bone Biopsy from Pelvis    Preliminary result Component Value   Culture Candida albicans  [P]                11/30/2023 1217 12/04/2023 1303 Bone Biopsy Aerobic Bacterial Culture Routine [[id number]]     (Abnormal)   Bone Biopsy from Pelvis    Preliminary result Component Value   Culture 1+ Candida albicans  [P]     Susceptibilities not routinely done, refer to antibiogram to view typical susceptibility profiles    1+ Streptococcus agalactiae (Group B Streptococcus)  [P]     This organism is susceptible to ampicillin, penicillin, vancomycin and the cephalosporins. If treatment is required and your patient is allergic to penicillin, contact the microbiology lab within 5 days to request susceptibility testing.        Susceptibility        Candida albicans      MALDONADO      Amphotericin B 0.5 ug/mL No interpretation available      Fluconazole 0.5 ug/mL Susceptible      Micafungin <=0.03 ug/mL Susceptible      Voriconazole <=0.008 ug/mL Susceptible                      Susceptibility Comments       Candida albicans    Antibiotics listed as \"No Interpretation\" have no regulatory guidelines for susceptibility/resistance available.  Testing for antifungal agents was validated in house " by the Infect[facility] Diagnostic Laboratory ([facility]) [place], [place]                      Assessment/Plan  68 year old y/o male POD#5 s/p cystectomy with IC, pubectomy, and rectus flap for chronic pubic osteomyelitis. WOC consulted.    Note - plan changes for today are in bold below.    Neuro: pain control: PRN oxycodone 5-10 mg q3h , PRN dilaudid 0.2 - 0.4 mg q2h, and scheduled tylenol   CV: HDS   Pulm: incentive spirometry while awake  FEN/GI: Regular diet. bowel regimen  Endo: relatively euglycemic  : stents in place, conduit appears pink and viable. Stents to remain in place for 6 weeks. CHRISTIANO drain to bulb suction. CHRISTIANO Cr wnl  Heme/ID: monitor for s/s of infection; monitor Hb and transfuse as indicated. Awaiting bone and intra-op cultures. PICC and continue ertapenem until cultures result. ID consult when cultures result. 2+ yeast for now, on micafungin. ID consult, ceftri, flagyl onboard  Activity: Up as tolerated  Ambulate at least TID  PT/OT consulted   Ppx: Heparin 5000 U TID   Home RX on HOLD: none  Dispo: anticipate medically ready to discharge to TCU likely Tuesday  PT/OT recommendations: TCU      Seen and examined with the chief resident. Will discuss with Miki Correa MD.    Nilay Santana MD  Urology PGY-2      PTA medications:   Prior to Admission medications    Medication Sig Start Date End Date Taking? Authorizing Provider   acetaminophen (TYLENOL) 325 MG tablet Take 975 mg by mouth every 8 hours as needed for mild pain   Yes Reported, Patient   amLODIPine (NORVASC) 10 MG tablet Take 1 tablet (10 mg) by mouth daily  Patient taking differently: Take 10 mg by mouth every morning 2/3/23  Yes Wei Perez MD   atorvastatin (LIPITOR) 20 MG tablet Take 1 tablet (20 mg) by mouth daily 11/15/23  Yes Ezequiel Alejandra MD   furosemide (LASIX) 20 MG tablet Take 1 tablet (20 mg) by mouth daily as needed  Patient taking differently: Take 20 mg by mouth daily as needed (edema) 11/9/23   Yes Erum Fritz MD   hydrOXYzine (ATARAX) 10 MG tablet Take 1 tablet (10 mg) by mouth every 6 hours as needed for itching or anxiety (with pain, moderate pain) 2/7/23  Yes Haydee Abdul MD   leuprolide (LUPRON DEPOT) 45 MG kit Inject 45 mg into the muscle every 6 months   Yes Unknown, Entered By History   lisinopril (ZESTRIL) 40 MG tablet Take 40 mg by mouth every morning   Yes Unknown, Entered By History   LORazepam (ATIVAN) 0.5 MG tablet Take 1 tablet (0.5 mg) by mouth every 6 hours as needed for anxiety 8/14/23  Yes Erum Fritz MD   megestrol (MEGACE) 20 MG tablet Take 20 mg by mouth 2 times daily   Yes Unknown, Entered By History   nitroFURantoin macrocrystal-monohydrate (MACROBID) 100 MG capsule Take 1 capsule (100 mg) by mouth 2 times daily for 3 days 11/27/23 11/30/23 Yes Miki Correa MD   omeprazole (PRILOSEC) 40 MG DR capsule Take 1 capsule (40 mg) by mouth daily  Patient taking differently: 40 mg every morning 8/2/23  Yes Wei Perez MD   ondansetron (ZOFRAN ODT) 8 MG ODT tab Take 1 tablet (8 mg) by mouth every 8 hours as needed for nausea 6/1/23  Yes Erum Fritz MD   oxybutynin (DITROPAN) 5 MG tablet Take 5 mg by mouth 4 times daily as needed for bladder spasms   Yes Unknown, Entered By History   oxyCODONE (OXYCONTIN) 40 MG 12 hr tablet Take 1 tablet (40 mg) by mouth every 12 hours 11/9/23  Yes Erum Fritz MD   oxyCODONE IR (ROXICODONE) 10 MG tablet Take 1 tablet (10 mg) by mouth every 6 hours as needed for moderate to severe pain 10/26/23  Yes Erum Fritz MD   potassium chloride ER (KLOR-CON M) 20 MEQ CR tablet Take 1 tablet (20 mEq) by mouth daily as needed for potassium supplementation (Take if you take furosemide)  Patient taking differently: Take 20 mEq by mouth 2 times daily 4/26/23  Yes Erum Fritz MD   prochlorperazine (COMPAZINE) 10 MG tablet Take 1 tablet (10 mg) by mouth every 6 hours as needed for nausea or  "vomiting 5/30/23  Yes Erum Fritz MD   tamsulosin (FLOMAX) 0.4 MG capsule Take 1 capsule by mouth every morning 7/10/23  Yes Reported, Patient   traZODone (DESYREL) 50 MG tablet Take 50 mg by mouth nightly as needed for sleep 3/22/23  Yes Reported, Patient   apixaban ANTICOAGULANT (ELIQUIS) 5 MG tablet Take 1 tablet (5 mg) by mouth 2 times daily 5/31/23   Erum Fritz MD   docusate sodium (COLACE) 100 MG capsule Take 100 mg by mouth daily    Unknown, Entered By History   predniSONE (DELTASONE) 5 MG tablet Take 1 tablet (5 mg) by mouth 2 times daily 5/3/23   Erum Fritz MD          Contacting the urology team: Please see [facility] and page on-call clinician with any questions or concerns regarding this patient. Note writer may be unavailable.     To access [employer] from intranet: under \"Applications\" --> \"Business Applications\" select \"[employer] Smartweb\" and search \"Urology Adult & Pediatric/[facility].\" Please note that any question about a urology inpatient, [place], should go to job code [id number].     "

## 2023-12-05 NOTE — PROGRESS NOTES
[facility]IOUS DISEASES: SIGN OFF NOTE      Patient:  Dilip Kan   YOB: 1955, MRN: [id number]  Date of Visit: 12/05/2023  Date of Admission: 11/30/2023  Consult Requester: Miki Correa MD          Assessment and Recommendations:     ID Problem List:  Chronic pubic osteomyelitis and anterior bladder wall defect with fistula s/p cystectomy with ileal conduit, pubectomy, rectus flap 11/30/2023  Intra-op cultures growing Candida albicans, Str agalactiae  Hx of pelvic abscess s/p 6 weeks of empiric antibiotic course without complete resolution (June-Aug, 2023)  No microbiology data  Hx of bacteremia, Kleb pneumoniae with similar isolate from urine culture 4/19/2023  Prostate cancer with metastasis (liver, bone: acetabulum) s/p chemoradiation  Positive VRE screen, per rectal swab 7/1/2023    Discussion:  69 yo M with pubic symphysis osteomyelitis s/p cystectomy with ileal conduit, pubectomy with rectus flap on 11/30/2023. Intra-op cultures grew Candida albicans, Str agalactiae. Per patient, has been off of antibiotics since around 10/25/2023. Based on culture data, initially remained on micafungin, ceftriaxone, metronidazole while awaiting susceptibility on Candida isolate. Recommend to switch to oral regimen: PO Fluconazole 400mg daily (QTc 395 on 12/3/2023), and po augmentin 875mg BID. Both has good bioavailability in tissue and bone. Of note, does not have insurance coverage for home iv antibiotics.     Recommendations:  Stop micafungin, ceftriaxone, metronidazole  Start PO Fluconazole 400mg daily  Start PO Augmentin 875mg BID   Would not need PICC line given oral regimen, per ID perspective.   Check baseline CRP and LFT prior to discharge (ordered add-on today's lab).   Anticipate 6 weeks duration.   Kindly provide adequate supply on discharge and instructed the patient, not to stop antimicrobials until seen in ID clinic.   ID clinic follow up in about 3 weeks, sent message  to ID clinic schedulers.     Recommendations are discussed with the primary team.     ID team will sign off at this time. Please reach out if any questions or concerns.     Total time spent during this encounter (chart review, documentation, MDM, coordination of care): 50 minutes    Montse Ramírez MD   Infectious Diseases Staff Physician  Pager: [phone]  [employer] [facility]   12/05/2023         Interval History and Events:     Seen and evaluated at bedside. Reports pain associated with recent surgery, though able to tolerate. No fever, chills, sweating. Surgical incision appears well. Focusing on PT and looking forward to eventually discharge home. Another focus is nutrition, so can get off of TPN via RUE PICC line.          HPI as adopted from initial ID consult on 12/3/2023:     Dilip Kan is a 69 yo M with PMHx of HTN, DM2, bilateral PE on AC, and metastatic prostate cancer (liver, bone:acetabulum), s/p radiation c/b radiation cysitis and chemotherapy (last dose 6/13/2023), hospitalized (Jun, 2023) with fever sepsis 2/2 pelvic abscess. He was found to have a pelvic abscess that was thought not to be reachable for drainage and hip fracture osteomyelitis versus metastasis. Of note, communication of abscess with bladder, prostate and not the rectum per cystogram. His urine and blood cultures grew Klebsiella. He also screened positive for VRE although this never grew in any cultures. He was improving after a course of zosyn in the hospital, 2 weeks of linzolid for VRE, and discharged on levofloxacin 750 daily and flagyl 500 mg q8 h which was given for 8 weeks. He tolerated that fine and felt much improved in terms of fevers and appetite. CRP was down to 5. However, noted CRP elevated to 100, when stopped antibiotics.      CT 9/20 ([facility]) showed similar to smaller pelvic abscess, which prompted MRI pelvis 10/27/2023 ([facility]), whereby concerning findings of chronic osteomyelitis involving pubic symphysis septic  arthropathy, along with anterior bladder wall defect and subjacent fistula. Because of these findings, seen by Urologist (Dr. Paul) and admitted for elective surgery. Underwent cystectomy with ileal conduit, pubectomy, with rectus flap by urology and plastic surgery teams on 11/30/2023. Intra-op cultures growing yeast and Str agalactiae. ID consult is requested for antimicrobial management. Per patient, has been off of antibiotics for quite sometime.          Review of Systems:   Targeted 10 point ROS was completed with pertinent positives and negatives are detailed above.         Antimicrobial history:   Current:  IV micafungin 12/2 - present  IV ceftriaxone 12/3 - present  PO metronidazole 12/3 - present     Prior:  IV ertapenem 11/30 - 12/3  Cefazolin (lynn-op) 11/30  Levofloxacin 7/6 - 8/29 (~8 weeks), then through 10/25/2023  Metronidazole 7/6 - 8/29  Linezolid 7/6 - 7/20           Physical Examination:   Temp:  [97.9  F (36.6  C)-99  F (37.2  C)] 97.9  F (36.6  C)  Pulse:  [66-94] 66  Resp:  [16-18] 18  BP: (118-140)/(54-70) 122/54  SpO2:  [97 %-99 %] 99 %    I/O last 3 completed shifts:  In: 1985 [P.O.:960; I.V.:200]  Out: 2840 [Urine:2800; Drains:40]    Vitals:    11/30/23 0753   Weight: 81.6 kg (179 lb 14.3 oz)       Exam:  GENERAL:  Well-developed, well-nourished, sitting in bed in no acute distress.   Head: normocephalic, atraumatic.   EYES: PERRLA, EOMI, conjunctiva clear, anicteric sclerae.    ENT:  Oropharynx is moist without exudates or ulcers.  NECK:  Supple.  LUNGS:  Breathing comfortably, on room air, clear to auscultation bilaterally, no w/r/r.  CARDIOVASCULAR:  Regular rate and rhythm, +S1S2 with no murmurs, gallops or rubs.  ABDOMEN:  Normal bowel sounds, soft, nontender. No appreciable hepatosplenomegaly.   EXT: Extremities warm and without edema.  SKIN:  mid abdominal surgical incision well approximate, c/d/i   NEUROLOGIC:  Grossly nonfocal.      Lines and devices:   RUE PICC and PIV is in  place without any surrounding erythema.  +ileal conduit     Labs, Microbiology and Imaging studies are reviewed.   Leukocytes, S Cr wnl    11/30/2023 intra-op cultures:   Candida albicans, Str agalactiae          Laboratory Data:   Inflammatory Markers    Recent Labs   Lab Test 10/16/23  1001 09/13/23  1422 08/31/23  1539   SED 44* 62* 49*       Hematology Studies    Recent Labs   Lab Test 12/05/23  0702 12/04/23  0726 12/03/23  0712 12/02/23  0644 12/01/23  2221 12/01/23  0448 11/30/23  1833 11/20/23  1249 04/26/23  1301 04/22/23  0647   WBC 5.3 5.7 6.9 7.9  --  9.7  --  9.7   < > 2.9*   ANEU  --   --   --   --   --   --   --   --   --  2.2   AEOS  --   --   --   --   --   --   --   --   --  0.0   HGB 8.5* 8.3* 8.2* 8.0* 7.8* 7.0*   < > 11.0*   < > 8.2*   MCV 79 81 81 81  --  76*  --  76*   < > 86    283 253 251  --  287   < > 454*   < > 111*    < > = values in this interval not displayed.       Metabolic Studies     Recent Labs   Lab Test 12/05/23  0702 12/04/23  0726 12/03/23  0712 12/02/23  0644 12/01/23  0448    138 137 134* 133*   POTASSIUM 4.1 4.1 3.8 4.0 4.1   CHLORIDE 110* 111* 106 107 103   CO2 21* 20* 23 22 22   BUN 17.0 18.6 22.3 15.6 9.8   CR 0.46* 0.44* 0.52* 0.59* 0.72   GFRESTIMATED >90 >90 >90 >90 >90       Hepatic Studies    Recent Labs   Lab Test 12/04/23  0726 12/02/23  0644 12/01/23  0448 11/09/23  1541 10/16/23  1001 09/13/23  1421 08/31/23  1539   BILITOTAL 0.9 1.5* 1.0 0.4 0.3 0.2 0.3   ALKPHOS 151* 61 61 87 92 80 60   ALBUMIN 2.0* 2.1*  --  3.0* 3.0* 3.1* 2.9*   AST 69* 22 15 14 14 14 9   ALT 45 8 6 <5 13 19 6       Urine Studies    Recent Labs   Lab Test 06/29/23  1410 04/19/23  0443 03/29/22  1414 11/18/21  0526 03/08/21  0908 03/02/20  1321   LEUKEST Large* Moderate* Negative Negative Negative Negative   WBCU 33* >182*  --  1 1 4       Microbiology:  Lab Results   Component Value Date    CULTURE No anaerobic organisms isolated after 3 days 11/30/2023    CULTURE (A) 11/30/2023      1+ Streptococcus agalactiae (Group B Streptococcus)    CULTURE Candida albicans (A) 11/30/2023    CULTURE 2+ Candida albicans (A) 11/30/2023             Imaging:     MRI pelvis w and w/o contrast 10/27/23   1. Findings concerning for either postoperative, traumatic, surgical, infectious or some combination, related to the anterior bladder wall defect and subjacent fistula.  Likely chronic.  Associated osteomyelitis, pathologic fracturing and likely chronic, treated pubic symphysis septic arthropathy.   2. Muscle findings nonspecific.  Infectious myositis, radiotherapy necrosis, muscle infarcts, or some combination as primary considerations   3. Multifocal bone findings.  Much of this likely chronic, treated osteomyelitis.  Metastatic treated disease may coexist.  Pubic fractures likely chronic and from either insufficiency or infection related.  Please correlate with the patient's full extent of outside imaging which is not currently available for review.     CT-AP w/ contrast 9/20/2023:  IMPRESSION: Complex persistent fluid collection in the anterior pelvis, probably slightly smaller than on the prior exam. Infected fluid cannot be excluded but no gas is seen within the collection.

## 2023-12-05 NOTE — PROGRESS NOTES
CLINICAL NUTRITION SERVICES - BRIEF NOTE    *See RD notes on 12/1 and 12/4 for previous nutrition notes    Diet: Regular. Tolerating per chart review.    Nutrition Support: TPN via central line, 1800 mL (75 mL/hr) with 215 g dextrose, 120 g AA, and 250 mL 20% IV lipids daily to provide 1711 kcal (~21 kcal/kg), ~1.5 g/kg PRO, GIR 1.8, and 29% kcal from fat --> this meets 83% estimated kcal needs and 100% estimated protein needs        INTERVENTIONS  Per pharmacist, primary team requests to wean off TPN (run out rest of the bag).    Monitoring/Evaluation  Progress toward goals will be monitored and evaluated per protocol.      Wilma Bright RD, , LD  Weekday Pager: [phone]  Weekday Units covered: 5A (4147-3460) and 7B (4876-0150)  Weekend/Holiday RD Pager: [phone]

## 2023-12-05 NOTE — PLAN OF CARE
"Goal Outcome Evaluation:      Plan of Care Reviewed With: patient    Overall Patient Progress: improving    Outcome Evaluation: No acute changes this shift; VSS; RA; c/o lower bilateral abdominal pain. Patient ambulating in halls with encouragement, x2 with therapy this shift. Urosotmy w/ clear lydia output, x2 stents in place. Pt passing gas, no BM this shift, audible bowel sounds. Patient receiving PRN oxycodone & dilaudid; scheduled tylenol & robaxin. Patient tolerating regular diet w/ fair to good appetite; denies nausea. Midline incision STEPHEN w/ staples. PICC infusing TPN & TKO for abx.    /57 (BP Location: Left arm)   Pulse 67   Temp 98.4  F (36.9  C) (Oral)   Resp 16   Ht 1.778 m (5' 10\")   Wt 81.6 kg (179 lb 14.3 oz)   SpO2 98%   BMI 25.81 kg/m          "

## 2023-12-05 NOTE — PLAN OF CARE
Goal Outcome Evaluation:      Plan of Care Reviewed With: patient    Overall Patient Progress: improvingOverall Patient Progress: improving    Outcome Evaluation: Pain managed on current regimen. Ambulated twice in the halls. Decreasing TPN in anticipation to stop it.    VSS. RA. LS clear. Denies SOB. Pain managed with PRN oxycodone and IV Dilaudid. Denies N/V. Ate 100% of one meal. R DL PICC- cont TPN now at 37.5mL/hr until it runs out. IV antibiotics administered. R CHRISTIANO- leaking at site, dressing changed x2. Urostomy with 2 stents- red urine. No BM, passing flatus. Midline abd incision- stapled and STEPHEN, no drainage. A x 1 with walker and gait belt.

## 2023-12-05 NOTE — PROGRESS NOTES
[facility]  WOC Nurse Inpatient Assessment     Consulted for: New Ileal Conduit    Summary: Spouse not able to help with pouching, patient concerned with insurance coverage for HH. Encouraged use of [facility] for assistance.   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   RP+EBRT  TUIBN   GH with clots once, admitted to hospital.   Incontinent..  Left hip pain for 1 year    Assessment:      Areas visualized during today's visit: Focused: and ileal conduit    Assessment of new end Ileal Conduit and Ileal conduit urinary diversion:  Diagnosis Pertinent to Stoma:  prostate cancer, fistula       Surgery Date: 11/30, Lysis of adhesions  Total cystectomy and Creation of ileal conduit urinary diversion 74125-09  Total Pubectomy (81109) for osteomyelitis   Rectus flap fixation into pelvis  Surgeon:Dr. Abraham SManan, Dr. Paul SManan       Hospital: [facility]  Pouching system in place on assessment today: Yeni two piece, cut to fit, and post op pouch   Pouch barrier status: Minimal melting  Pouch last changed/wear time: 12/4/23  Reason for pouch change today: ostomy education and routine schedule  Effectiveness of current pouching/ supply plan: Recommend continuing current plan  Change made with ostomy management today: No  Pouching system placed today: Yeni 2 pc, cut to fit Urine pouching system with adapt skin barrier ring  Supplies: gathered and discussed with patient , ordered supplied for discharge, at bedside    Stoma location: RUQ  Stoma size: 1 1/4 inches, Ureteral stents  Stoma appearance: healthy, red, round, moist, edematous, and protruberant  Mucocutaneous junction:  intact  Peristomal complication(s): none   Output: blood and mucoid  Output volume emptied during visit: 0ml, connected to bedside drainage bag  Abdominal assessment:  Soft,painful to touch when pouching system was changed  Surgical site(s): open to air  NG still in place? No  Pain: Fullness  Is patient still on a PCA? No    Ostomy education assessment:  Participant of teaching session today: patient   Education completed today: Stoma assessment, Pouching system assessment , When to seek medical attention, Infection prevention/hygiene , Hernia prevention, and Discharge instructions  Educational materials/methods: Verbal, Written, Demonstration, Return demonstration, FOD [place] education hand out, Product sample, Hands on, Addressed patient/caregiver questions/concerns, and Left packet of information at bedside   Education still needed: Discharge instructions, AVS instructions given to patient and discussed how to order home supplies. Patient needs to return demo how to change pouch, he is able to measure stooma,trace pattern and cut skin barrier with no complications  Learning Comprehension:   Psychosocial assessment: attentive, participating  Patient readiness for education today: observing, attentive, and active participation  Following today's visit: patient  is able to demonstrate;         1. How to empty their pouch? Yes, Demo provided , with minimal assist, and Needs additional practice         2. How to change their pouch? Yes, Demo provided , with moderate assist, Able to verbalize steps, and Needs additional practice         3. How to read and record intake and output correctly? Yes, Demo provided , with heavy assist, and Needs additional practice   Preparation for discharge completed: Placed prescription recommendations in discharge navigator for MD to sign, Ensured patient has extra supplies for discharge, Discussed how to order supplies after discharge , Ordered samples from  after gaining consent from patient/caregiver, Discussed how and when to make an outpatient WOC nurse appointment after discharge, Prepared for discharge home with home care, and  "Discuss signs/symptoms of when to seek medical attention  Preparation for discharge still needed: Prepared for discharge home with home care, patient to transfer to TCU, discharge instructions reviewed with patient.  Pt support system on discharge: family  WOC recommend home care? Yes and By WOC RN if possible  Face to face time: 20 minutes          Treatment Plan:     RUQ Ileal Conduit and Ileal conduit urinary diversion pouching plan:   Pouching system: ostomy supplies pouches: [place] 57 URINE (396355) ostomy supplies barrier: [place] 57mm FLAT (296659)  Accessories used: [facility] ostomy accessories: 2\" Deya Ring (940827), Powder (317208), Medium Belt (997696), Urostomy Adapter (728142), Night Drainage Bottle (511381), and Cavilon no sting barrier film (907160)   Frequency of pouch changes: PRN leakage and Three times a week  WO follow up plan: Daily Monday-Friday (as able)  Bedside RN interventions: Change pouch PRN if leaking using the supplies above, Empty pouch when 1/3 to 1/2 full, ensure to clean pouch outlet after emptying to prevent odor, Notify [facility] for ongoing pouch leakage, Document stoma appearance and output volume, color, and consistency every shift, and Encourage patient to empty pouch with assist      Pressure Injury Prevention (PIP) Plan:  If patient is declining pressure injury prevention interventions: Explore reason why and address patient's concerns, Educate on pressure injury risk and prevention intervention(s), If patient is still declining, document \"informed refusal\" , and Ensure Care team is aware ( provider, charge nurse, etc)  Mattress: Follow bed algorithm, reassess daily and order specialty mattress, if indicated.  HOB: Maintain at or below 30 degrees, unless contraindicated  Repositioning in bed: Every 1-2 hours , Left/right positioning; avoid supine, Raise foot of bed prior to raising head of bed, to reduce patient sliding down (shear), and Frequent microturns using TAPS wedges, as " patient tolerates  Heels: Keep elevated off mattress, Pillows under calves, and Heel lift boots  Protective Dressing: Sacral Mepilex for prevention (#138779),  especially for the agitated patient   Positioning Equipment: None  Chair positioning: Chair cushion (#400248) , Assist patient to reposition hourly, and Do NOT use a donut for sitting (this increases pressure to smaller area and creates a higher potential for injury)    If patient has a buttock pressure injury, or high risk for PI use chair cushion or SPS.  Moisture Management: Perineal cleansing /protection: Follow Incontinence Protocol, Avoid brief in bed, Clean and dry skin folds with bathing , Consider InterDry (#577058) between folds, and Moisturize dry skin  Under Devices: Inspect skin under all medical devices during skin inspection , Ensure tubes are stabilized without tension, and Ensure patient is not lying on medical devices or equipment when repositioned  Ask provider to discontinue device when no longer needed.      Orders: Reviewed    RECOMMEND PRIMARY TEAM ORDER: None, at this time  Education provided: importance of repositioning, plan of care, wound progress, Infection prevention , Moisture management, Hygiene, and Off-loading pressure  Discussed plan of care with: Patient  WOC nurse follow-up plan: daily and prn( M-Fri)  Notify WOC if wound(s) deteriorate.  Nursing to notify the Provider(s) and re-consult the WOC Nurse if new skin concern.    DATA:     Current support surface: Standard  Standard gel/foam mattress (IsoFlex, Atmos air, etc)  Containment of urine/stool: Incontinence Protocol, Incontinent pad in bed, and Ileostomy pouch  BMI: Body mass index is 25.81 kg/m .   Active diet order: Orders Placed This Encounter      Regular Diet Adult     Output: I/O last 3 completed shifts:  In: 1985 [P.O.:960; I.V.:200]  Out: 2840 [Urine:2800; Drains:40]     Labs:   Recent Labs   Lab 12/05/23  0702 12/04/23  0726   ALBUMIN  --  2.0*   PREALB  --   7.2*   HGB 8.5* 8.3*   INR  --  1.16*   WBC 5.3 5.7     Pressure injury risk assessment:   Sensory Perception: 4-->no impairment  Moisture: 4-->rarely moist  Activity: 3-->walks occasionally  Mobility: 3-->slightly limited  Nutrition: 3-->adequate  Friction and Shear: 3-->no apparent problem  Lopez Score: 20    Rhea De RN, BSN, CWON  Pager no longer is use, please contact through [employer] group: [facility] Nurse [place]  Dept. Office Number: [phone]        [facility]     Name: Jermain Kan  Date: 12/5/23    To order your ostomy supplies    The ostomy Supplier needs this supply list  to process your order. You will need to fax/deliver this list, along with your Insurance information. Your home care nurse can assist with this process.    List of Ostomy Distributors      [facility]  Ph. [phone] Fax # [phone]  [facility]   Ph. [phone] [phone]  [facility] [facility]   Ph. [phone]  [facility]   Ph. [phone] [phone]  Or Call your insurance provider for their preferred supplier    Your Medical Supplier will need your surgeon's name, phone and fax number    Clinic:                     Phone                            Fax  Urology Surgery:              [phone]  Verbal Order for ostomy supplies for 1 Month per:                                             Rhea De RN, CWON                                                                    Authorizing MD: DAMIAN Monahan    Quantity of pouches:    20 /mo.    Request the following supplies:      Yeni        2 piece flat wafer 57mm  # [id number]                                Urine pouch 57mm- # [id number]                        Accessories    2  Deya ring #[id number]      Adapt powder #[id number]      No sting film barrier # 0207                                                 Bard urine drainage bag #[id number]                           Coloplast leg bag  "#05231      [facility] medium #7300 (23-43 )       Bannish II liquid urine deoderizer #[id number]               Urikleen  # [id number]      Night drainage bottle # [id number]       or Angela 1/2 gallon Bottle with 6\" tubing                   Change your pouch three times a week, more often if leaking.   If you are cutting a hole in the wafer of your pouch, recheck stoma size and adjust pouch opening as needed every week    . Call the Ostomy Nurse at [facility] [address], [place] : [phone]   Schedule a follow-up visit in 2 to 4 weeks after your surgery, sooner if having problems Bring a complete set of pouch-changing supplies to this visit       Problems you should Report  - The stoma turns blue or darker in color.  - Cuts or sores around the stoma.  - Red, raw or painful skin around the stoma.  - Any bulging of the skin around the stoma.  - A pouch that leaks every day.  - Problems making the right size hole in the pouch wafer.   Please call with any questions or concerns.  "

## 2023-12-05 NOTE — PLAN OF CARE
"BP (!) 140/70 (BP Location: Left arm, Cuff Size: Adult Regular)   Pulse 94   Temp 99  F (37.2  C) (Oral)   Resp 18   Ht 1.778 m (5' 10\")   Wt 81.6 kg (179 lb 14.3 oz)   SpO2 97%   BMI 25.81 kg/m        A&O x 4. Pain 8/10 managed with PRN IV dilaudid, PRN oxycodone, PRN atarax and scheduled robaxin. Midline abdominal incision-staples. CHRISTIANO left urostomy x 2 stents-AUOP brown yellow with sediment. PICC line infusing -TPN cont. 75 mL/hr and lipids and TKO. Regular diet. Manolo counts 12/5/2023-12/7/2023. Denies Nausea. Reports heartburn. Passing flatus. Last BM prior to procedure.       Goal Outcome Evaluation: Ongoing.       Plan of Care Reviewed With: patient    "

## 2023-12-06 ENCOUNTER — APPOINTMENT (OUTPATIENT)
Dept: OCCUPATIONAL THERAPY | Facility: CLINIC | Age: 68
DRG: 653 | End: 2023-12-06
Attending: UROLOGY
Payer: COMMERCIAL

## 2023-12-06 ENCOUNTER — APPOINTMENT (OUTPATIENT)
Dept: PHYSICAL THERAPY | Facility: CLINIC | Age: 68
DRG: 653 | End: 2023-12-06
Attending: UROLOGY
Payer: COMMERCIAL

## 2023-12-06 LAB
HOLD SPECIMEN: NORMAL
MAGNESIUM SERPL-MCNC: 1.6 MG/DL (ref 1.7–2.3)
PHOSPHATE SERPL-MCNC: 3.4 MG/DL (ref 2.5–4.5)

## 2023-12-06 PROCEDURE — 999N000007 HC SITE CHECK

## 2023-12-06 PROCEDURE — 250N000011 HC RX IP 250 OP 636: Mod: JZ

## 2023-12-06 PROCEDURE — 97535 SELF CARE MNGMENT TRAINING: CPT | Mod: GO

## 2023-12-06 PROCEDURE — 36415 COLL VENOUS BLD VENIPUNCTURE: CPT | Performed by: UROLOGY

## 2023-12-06 PROCEDURE — 120N000002 HC R&B MED SURG/OB UMMC

## 2023-12-06 PROCEDURE — 99222 1ST HOSP IP/OBS MODERATE 55: CPT | Performed by: PHYSICIAN ASSISTANT

## 2023-12-06 PROCEDURE — 84100 ASSAY OF PHOSPHORUS: CPT | Performed by: UROLOGY

## 2023-12-06 PROCEDURE — 250N000013 HC RX MED GY IP 250 OP 250 PS 637

## 2023-12-06 PROCEDURE — 83735 ASSAY OF MAGNESIUM: CPT | Performed by: UROLOGY

## 2023-12-06 PROCEDURE — 97530 THERAPEUTIC ACTIVITIES: CPT | Mod: GP

## 2023-12-06 PROCEDURE — 250N000013 HC RX MED GY IP 250 OP 250 PS 637: Performed by: UROLOGY

## 2023-12-06 PROCEDURE — 250N000013 HC RX MED GY IP 250 OP 250 PS 637: Performed by: PHYSICIAN ASSISTANT

## 2023-12-06 PROCEDURE — G0463 HOSPITAL OUTPT CLINIC VISIT: HCPCS

## 2023-12-06 PROCEDURE — 97116 GAIT TRAINING THERAPY: CPT | Mod: GP

## 2023-12-06 RX ORDER — LIDOCAINE 4 G/G
1-3 PATCH TOPICAL
Status: DISCONTINUED | OUTPATIENT
Start: 2023-12-07 | End: 2023-12-11 | Stop reason: HOSPADM

## 2023-12-06 RX ORDER — MAGNESIUM OXIDE 400 MG/1
400 TABLET ORAL EVERY 4 HOURS
Status: COMPLETED | OUTPATIENT
Start: 2023-12-06 | End: 2023-12-06

## 2023-12-06 RX ORDER — POLYETHYLENE GLYCOL 3350 17 G/17G
17 POWDER, FOR SOLUTION ORAL DAILY
Status: DISCONTINUED | OUTPATIENT
Start: 2023-12-06 | End: 2023-12-11 | Stop reason: HOSPADM

## 2023-12-06 RX ORDER — AMOXICILLIN 250 MG
1 CAPSULE ORAL 2 TIMES DAILY
Status: DISCONTINUED | OUTPATIENT
Start: 2023-12-06 | End: 2023-12-11 | Stop reason: HOSPADM

## 2023-12-06 RX ADMIN — POLYETHYLENE GLYCOL 3350 17 G: 17 POWDER, FOR SOLUTION ORAL at 08:08

## 2023-12-06 RX ADMIN — APIXABAN 5 MG: 5 TABLET, FILM COATED ORAL at 08:12

## 2023-12-06 RX ADMIN — METHOCARBAMOL 500 MG: 500 TABLET ORAL at 08:12

## 2023-12-06 RX ADMIN — MAGNESIUM OXIDE TAB 400 MG (241.3 MG ELEMENTAL MG) 400 MG: 400 (241.3 MG) TAB at 14:45

## 2023-12-06 RX ADMIN — MAGNESIUM OXIDE TAB 400 MG (241.3 MG ELEMENTAL MG) 400 MG: 400 (241.3 MG) TAB at 11:29

## 2023-12-06 RX ADMIN — Medication 5 ML: at 11:29

## 2023-12-06 RX ADMIN — OXYCODONE HYDROCHLORIDE 40 MG: 40 TABLET, FILM COATED, EXTENDED RELEASE ORAL at 20:09

## 2023-12-06 RX ADMIN — ACETAMINOPHEN 975 MG: 325 TABLET, FILM COATED ORAL at 11:29

## 2023-12-06 RX ADMIN — OXYCODONE HYDROCHLORIDE 40 MG: 40 TABLET, FILM COATED, EXTENDED RELEASE ORAL at 08:12

## 2023-12-06 RX ADMIN — OXYCODONE HYDROCHLORIDE 15 MG: 10 TABLET ORAL at 21:43

## 2023-12-06 RX ADMIN — ACETAMINOPHEN 975 MG: 325 TABLET, FILM COATED ORAL at 20:08

## 2023-12-06 RX ADMIN — AMOXICILLIN AND CLAVULANATE POTASSIUM 1 TABLET: 875; 125 TABLET, FILM COATED ORAL at 08:12

## 2023-12-06 RX ADMIN — METHOCARBAMOL 500 MG: 500 TABLET ORAL at 11:29

## 2023-12-06 RX ADMIN — OXYCODONE HYDROCHLORIDE 20 MG: 10 TABLET ORAL at 04:39

## 2023-12-06 RX ADMIN — APIXABAN 5 MG: 5 TABLET, FILM COATED ORAL at 20:08

## 2023-12-06 RX ADMIN — DOCUSATE SODIUM AND SENNOSIDES 1 TABLET: 8.6; 5 TABLET, FILM COATED ORAL at 08:12

## 2023-12-06 RX ADMIN — HYDROXYZINE HYDROCHLORIDE 10 MG: 10 TABLET ORAL at 00:09

## 2023-12-06 RX ADMIN — Medication 5 ML: at 16:23

## 2023-12-06 RX ADMIN — METHOCARBAMOL 500 MG: 500 TABLET ORAL at 20:09

## 2023-12-06 RX ADMIN — AMOXICILLIN AND CLAVULANATE POTASSIUM 1 TABLET: 875; 125 TABLET, FILM COATED ORAL at 20:09

## 2023-12-06 RX ADMIN — MENTHOL 1 PATCH: 205.5 PATCH TOPICAL at 14:45

## 2023-12-06 RX ADMIN — MEGESTROL ACETATE 20 MG: 20 TABLET ORAL at 20:08

## 2023-12-06 RX ADMIN — OXYCODONE HYDROCHLORIDE 20 MG: 10 TABLET ORAL at 18:40

## 2023-12-06 RX ADMIN — ACETAMINOPHEN 975 MG: 325 TABLET, FILM COATED ORAL at 04:39

## 2023-12-06 RX ADMIN — PANTOPRAZOLE SODIUM 40 MG: 40 TABLET, DELAYED RELEASE ORAL at 08:12

## 2023-12-06 RX ADMIN — OXYCODONE HYDROCHLORIDE 20 MG: 10 TABLET ORAL at 14:45

## 2023-12-06 RX ADMIN — METHOCARBAMOL 500 MG: 500 TABLET ORAL at 16:25

## 2023-12-06 RX ADMIN — FLUCONAZOLE 400 MG: 200 TABLET ORAL at 08:13

## 2023-12-06 RX ADMIN — MAGNESIUM HYDROXIDE 30 ML: 400 SUSPENSION ORAL at 08:10

## 2023-12-06 RX ADMIN — OXYCODONE HYDROCHLORIDE 20 MG: 10 TABLET ORAL at 08:12

## 2023-12-06 RX ADMIN — OXYCODONE HYDROCHLORIDE 20 MG: 10 TABLET ORAL at 11:29

## 2023-12-06 RX ADMIN — MEGESTROL ACETATE 20 MG: 20 TABLET ORAL at 08:11

## 2023-12-06 ASSESSMENT — ACTIVITIES OF DAILY LIVING (ADL)
ADLS_ACUITY_SCORE: 37

## 2023-12-06 NOTE — PLAN OF CARE
"Goal Outcome Evaluation:      Plan of Care Reviewed With: patient    Overall Patient Progress: improving    /56 (BP Location: Left arm)   Pulse 76   Temp 98.7  F (37.1  C) (Oral)   Resp 16   Ht 1.778 m (5' 10\")   Wt 86.5 kg (190 lb 11.2 oz)   SpO2 96%   BMI 27.36 kg/m      NEURO: A&ox4, calls appropriately   RESP: RA, denies SOB  CARDIAC: WNL, denies cardiac chest pain   GI/: No bm this shift, passing flatus, urostomy   PAIN/NAUSEA: C/O 6-7/10 pain managed w/ schedule pain meds & PRN pain meds (oxycodone & IV dilaudid)  SKIN: Midline incision  LDAs:  CHRISTIANO bulb, PICC, urostomy  ACTIVITY: Ax1 w/ walker/gaitbelt  New Changes this shift: Started on oral abx (amoxicillin & fluconazole)  PLAN: continue POC       "

## 2023-12-06 NOTE — PLAN OF CARE
"Vital signs:  Temp: 98.7  F (37.1  C) Temp src: Oral BP: 114/56 Pulse: 76   Resp: 16 SpO2: 96 % O2 Device: None (Room air)  Height: 177.8 cm (5' 10\") Weight: 86.5 kg (190 lb 11.2 oz)    [id number]:    Activity: Repositions self in bed. Walker, assist of one, gait belt.   Neuros: A & O x4. Neuro intact.   Cardiac: WDL. Asymptomatic.   Respiratory: LS clear. O2 sats above 92% on RA. Denies SOB. Unlabored.   GI/: BS+, passing flatus, no BM. Urostomy with two stents intact, pink stoma, has adequate cherry red urine output.    Diet: Regular diet. Calorie counts.  Skin: Midline incision stapled, STEPHEN, dried drainage noted.  Lines: TPN completed,PICC saline locked, one port TKO. PIV saline locked.   Drains: CHRISTIANO to bulb suction with 10cc serous drainage, dressing had minimal drainage.  Labs: Reviewed.   Pain/nausea: PRN IV Dilaudid 0.5mg x1, PRN oxycodone 20mg x1, and scheduled Tylenol effective for mid lower abdominal pain control, patient slept in between cares. Denies nausea.   New changes this shift: None.   Plan: Continue POC.         "

## 2023-12-06 NOTE — PLAN OF CARE
Goal Outcome Evaluation:      Plan of Care Reviewed With: patient    Overall Patient Progress: improvingOverall Patient Progress: improving    Outcome Evaluation: Discontinued IV Dilaudid. CHRISTIANO removed.    VSS. RA. LS clear. A&Ox4. Baseline N/T in bilateral feet from chemo. Pain managed with PRN oxycodone. R DL PICC- HL and TKO. Mg replaced orally- recheck in AM. Urostomy (2 stents)- red urine, AOP. Old CHRISTIANO site-CDI. +BS and passing flatus, no BM. Assist x1 with walker and gaitbelt- very weak, ambulated twice. Regular diet- aravind counts. Midline abd incision- stapled and STEPHEN.

## 2023-12-06 NOTE — PROGRESS NOTES
[facility] [place]  WOC Nurse Inpatient Assessment     Consulted for: New Ileal Conduit    Summary: Spouse not able to help with pouching, patient concerned with insurance coverage for HHC. Encouraged use of [facility] for assistance.   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   RP+EBRT  TUIBN   GH with clots once, admitted to hospital.   Incontinent..  Left hip pain for 1 year    Assessment:      Areas visualized during today's visit: Focused: and ileal conduit    Assessment of new end Ileal Conduit and Ileal conduit urinary diversion:  Diagnosis Pertinent to Stoma:  prostate cancer, fistula       Surgery Date: 11/30, Lysis of adhesions  Total cystectomy and Creation of ileal conduit urinary diversion 97129-97  Total Pubectomy (46162) for osteomyelitis   Rectus flap fixation into pelvis  Surgeon:Dr. Abraham SManan, Dr. Paul SManan       Hospital: [facility]  Pouching system in place on assessment today: Yeni two piece, cut to fit, and post op pouch   Pouch barrier status: Minimal melting  Pouch last changed/wear time: 2 days, changed 12/6/23  Reason for pouch change today: ostomy education and routine schedule  Effectiveness of current pouching/ supply plan: Recommend continuing current plan  Change made with ostomy management today: No  Pouching system placed today: Yeni 2 pc, cut to fit Urine pouching system with adapt skin barrier ring  Supplies: gathered and discussed with patient , ordered supplied for discharge, at bedside    Stoma location: RUQ  Stoma size: 1 1/4 inches, Ureteral stents  Stoma appearance: healthy, red, round, moist, edematous, and protruberant  Mucocutaneous junction:  intact  Peristomal complication(s): none   Output: blood and mucoid  Output volume emptied during visit: 0ml, connected to bedside drainage bag  Abdominal  assessment: Soft,painful to touch when pouching system was changed  Surgical site(s): open to air  NG still in place? No  Pain: Fullness  Is patient still on a PCA? No    Ostomy education assessment:  Participant of teaching session today: patient   Education completed today: Stoma assessment, Pouching system assessment , Pouch change return demonstration, and Discharge instructions  Educational materials/methods: Verbal, Written, Demonstration, Return demonstration, FOD [place] education hand out, Product sample, Hands on, Addressed patient/caregiver questions/concerns, and Left packet of information at bedside   Education still needed: Discharge instructions, AVS instructions given to patient and discussed how to order home supplies. Patient was able to return demo how to change pouch, he is able to measure stooma, trace pattern and cut skin barrier  and apply pouching system with no complications, need more practice  Learning Comprehension:   Psychosocial assessment: attentive, participating  Patient readiness for education today: observing, attentive, and active participation  Following today's visit: patient  is able to demonstrate;         1. How to empty their pouch? Yes, Independent, and Needs additional practice         2. How to change their pouch? Yes, with minimal assist, Able to verbalize steps, and Needs additional practice         3. How to read and record intake and output correctly? Yes, with minimal assist, and Needs additional practice   Preparation for discharge completed: Placed prescription recommendations in discharge navigator for MD to sign, Ensured patient has extra supplies for discharge, Discussed how to order supplies after discharge , Ordered samples from  after gaining consent from patient/caregiver, Discussed how and when to make an outpatient WOC nurse appointment after discharge, Prepared for discharge home with home care, and Discuss signs/symptoms of when to seek  "medical attention  Preparation for discharge still needed: Prepared for discharge home with home care, patient to transfer to TCU, discharge instructions reviewed with patient.  Pt support system on discharge: family  WOC recommend home care? Yes and By WOC RN if possible  Face to face time: 30 minutes          Treatment Plan:     RUQ Ileal Conduit and Ileal conduit urinary diversion pouching plan:   Pouching system: ostomy supplies pouches: Yeni 57 URINE (745726) ostomy supplies barrier: Converse 57mm FLAT (283270)  Accessories used: [facility] ostomy accessories: 2\" Deya Ring (667359), Powder (258364), Medium Belt (242019), Urostomy Adapter (110125), Night Drainage Bottle (984366), and Cavilon no sting barrier film (692227)   Frequency of pouch changes: PRN leakage and Three times a week  WO follow up plan: Daily Monday-Friday (as able)  Bedside RN interventions: Change pouch PRN if leaking using the supplies above, Empty pouch when 1/3 to 1/2 full, ensure to clean pouch outlet after emptying to prevent odor, Notify WO for ongoing pouch leakage, Document stoma appearance and output volume, color, and consistency every shift, and Encourage patient to empty pouch with assist      Pressure Injury Prevention (PIP) Plan:  If patient is declining pressure injury prevention interventions: Explore reason why and address patient's concerns, Educate on pressure injury risk and prevention intervention(s), If patient is still declining, document \"informed refusal\" , and Ensure Care team is aware ( provider, charge nurse, etc)  Mattress: Follow bed algorithm, reassess daily and order specialty mattress, if indicated.  HOB: Maintain at or below 30 degrees, unless contraindicated  Repositioning in bed: Every 1-2 hours , Left/right positioning; avoid supine, Raise foot of bed prior to raising head of bed, to reduce patient sliding down (shear), and Frequent microturns using TAPS wedges, as patient tolerates  Heels: Keep " elevated off mattress, Pillows under calves, and Heel lift boots  Protective Dressing: Sacral Mepilex for prevention (#480485),  especially for the agitated patient   Positioning Equipment: None  Chair positioning: Chair cushion (#424159) , Assist patient to reposition hourly, and Do NOT use a donut for sitting (this increases pressure to smaller area and creates a higher potential for injury)    If patient has a buttock pressure injury, or high risk for PI use chair cushion or SPS.  Moisture Management: Perineal cleansing /protection: Follow Incontinence Protocol, Avoid brief in bed, Clean and dry skin folds with bathing , Consider InterDry (#074646) between folds, and Moisturize dry skin  Under Devices: Inspect skin under all medical devices during skin inspection , Ensure tubes are stabilized without tension, and Ensure patient is not lying on medical devices or equipment when repositioned  Ask provider to discontinue device when no longer needed.      Orders: Reviewed    RECOMMEND PRIMARY TEAM ORDER: None, at this time  Education provided: importance of repositioning, plan of care, wound progress, Infection prevention , Moisture management, Hygiene, and Off-loading pressure  Discussed plan of care with: Patient  WOC nurse follow-up plan: PRN, please call WOC RN if pouch leaks  Notify WOC if wound(s) deteriorate.  Nursing to notify the Provider(s) and re-consult the WOC Nurse if new skin concern.    DATA:     Current support surface: Standard  Standard gel/foam mattress (IsoFlex, Atmos air, etc)  Containment of urine/stool: Incontinence Protocol, Incontinent pad in bed, and Ileostomy pouch  BMI: Body mass index is 27.36 kg/m .   Active diet order: Orders Placed This Encounter      Regular Diet Adult     Output: I/O last 3 completed shifts:  In: 2587.5 [P.O.:580]  Out: 1350 [Urine:1325; Drains:25]     Labs:   Recent Labs   Lab 12/05/23  0702 12/04/23  0726   ALBUMIN 2.0* 2.0*   PREALB  --  7.2*   HGB 8.5* 8.3*    INR  --  1.16*   WBC 5.3 5.7     Pressure injury risk assessment:   Sensory Perception: 3-->slightly limited  Moisture: 4-->rarely moist  Activity: 3-->walks occasionally  Mobility: 3-->slightly limited  Nutrition: 3-->adequate  Friction and Shear: 3-->no apparent problem  Lopez Score: 19    Rhea De RN, BSN, CWON  Pager no longer is use, please contact through [employer] group: [facility] Nurse [place]  Dept. Office Number: [phone]        [facility]     Name: Jermain Kan  Date: 12/5/23    To order your ostomy supplies    The ostomy Supplier needs this supply list  to process your order. You will need to fax/deliver this list, along with your Insurance information. Your home care nurse can assist with this process.    List of Ostomy Distributors      [facility]  Ph. [phone] Fax # [phone]  [facility]   Ph. [phone]  [employer] [facility]   Ph. [phone]  [facility]   Ph. [phone] [phone]  Or Call your insurance provider for their preferred supplier    Your Medical Supplier will need your surgeon's name, phone and fax number    Clinic:                     Phone                            Fax  Urology Surgery:              [phone]  Verbal Order for ostomy supplies for 1 Month per:                                             Rhea De RN, CWON                                                                    Authorizing MD: DAMIAN Monahan    Quantity of pouches:    20 /mo.    Request the following supplies:      [place]        2 piece flat wafer 57mm  # 84136                                Urine pouch 57mm- # 75268                        Accessories    2  Deya ring #[id number]      Adapt powder #7906      No sting film barrier # 6835                                                 Bard urine drainage bag #[id number]                           Coloplast leg bag #40823      Yeni  "belt medium #7300 (23-43 )       Bannish II liquid urine deoderizer #[id number]               Urikleen  # [id number]      Night drainage bottle # [id number]       or Angela 1/2 gallon Bottle with 6\" tubing                   Change your pouch three times a week, more often if leaking.   If you are cutting a hole in the wafer of your pouch, recheck stoma size and adjust pouch opening as needed every week    . Call the Ostomy Nurse at [facility] [address] [place] : [phone]   Schedule a follow-up visit in 2 to 4 weeks after your surgery, sooner if having problems Bring a complete set of pouch-changing supplies to this visit       Problems you should Report  - The stoma turns blue or darker in color.  - Cuts or sores around the stoma.  - Red, raw or painful skin around the stoma.  - Any bulging of the skin around the stoma.  - A pouch that leaks every day.  - Problems making the right size hole in the pouch wafer.   Please call with any questions or concerns.  "

## 2023-12-06 NOTE — CONSULTS
"Pain Service Consultation Note  [facility]      Patient Name: Dilip Kan  MRN: [id number]   Age: 68 year old  Sex: male  Date: December 6, 2023                                      Reviewed: Yes    Referring Provider:  Anisa Damian PA   Referring Service:  urology  Reason for Consultation: \"    Please assist with homegoing opioid taper regimen.  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  He was admitted on 11/30/23 for cystectomy with IC, pubectomy, and rectus flap for chronic pubic osteomyelitis. He is progressing as expected and is ready for discharge to TCU for rehabilitation.    Pain service is consulted to assist with pain management, specifically tapering plan for outpatient pain management.    Jermain reports pain in across the lower pelvis at site of surgery.  Pain is currently 8/10 but improving as visit progressed.  States pain now is different as it's from the incision as PTA hip/pelvis pain has improved.  He feels that overall, his pain has improved since admission/surgery.  He is ready for discharge to TCU for rehab and is looking forward to getting stronger and walking for longer distances.  Note that PTA, he states he was unable to walk and so far since surgery, he has been able to walk with stand by assist and walker.    We spent extensive time discussion his chronic pain history of lower back pain s/p spine surgeries with chronic use of Vicodin.  Then he was diagnosed with " prostate cancer (2013) and required oxycodone and OxyContin for pain management.  We discussed indications, risks, and benefits of opioids.  Discussed for now, goal is to manage acute post op pain to support physical healing.  The goal is taper down and return to baseline opioid use over the next few weeks.  He expresses understanding and agreement with the pain plan below.      Plan:   Continue PTA dose of OxyContin 40mg PO Q 12 hours.  While inpatient, change to oxycodone 15-20mg PO Q 4 hours PRN.  He prefers to use oxycodone 20mg for now.    -on 12/7/23 and upon discharge, change to oxycodone 15mg PO Q 4 hours PRN (max 6 tabs/day) x 4 days   then decrease to 5 tabs/day PRN dose at least 4 hours apart x 4 days   then decrease to 4 tabs/day PRN  dose at least 4 hours apart x 4 days  then decrease to 4 tabs/day PRN dose at least 4 hours apart x 4 days   then decrease to 3 tabs/day PRN at least 4 hours apart x 4 days  then resume baseline dose of oxycodone 10mg TID PRN (prescribed by outpatient oncologist).    Note, that this is a slower taper, note that dosing is PRN.  It's okay if Jermain is using less and returning to baseline sooner.     Stop IV Dilaudid.  Acetaminophen 975mg PO Q 8 hours.  Lidocaine patch, 1-3 patches Q 24 hours, 12 hours on and 12 hours off.  Menthol patch, 1 patch TD Q 8 hours  Robaxin 500mg PO Q 6 hours PRN  Bowel regimen    Thank you for the opportunity to participate in the care of Dilip Kan  Pain Service will sign off.    Discussed with attending anesthesiologist  Primary Service Contacted with Recommendations? Yes    Johanna Rojas PA-C  12/6/2023        Per [place]  review pulled from system on 12/06/23.    11/09/2023 11/09/2023 1 Oxycontin Er 40 Mg Tablet 60.00 30   10/26/2023 10/26/2023 1 Oxycodone Hcl (Ir) 10 Mg Tab 120.00 30   10/13/2023 10/13/2023 1 Oxycontin Er 40 Mg Tablet 60.00 30   10/12/2023 09/15/2023 1 Lorazepam 0.5 Mg Tablet 30.00 10   09/15/2023 09/15/2023 1 Lorazepam 0.5  Mg Tablet 30.00 10   09/13/2023 09/13/2023 1 Oxycontin Er 40 Mg Tablet 60.00 30   09/13/2023 09/13/2023 1 Oxycodone Hcl (Ir) 10 Mg Tab 120.00 30   08/15/2023 08/15/2023 1 Oxycontin Er 20 Mg Tablet 60.00 30   08/15/2023 08/15/2023 1 Oxycodone Hcl (Ir) 10 Mg Tab 120.00 30   08/14/2023 08/14/2023 1 Lorazepam 0.5 Mg Tablet 30.00 7   07/12/2023 07/09/2023 1 Oxycontin Er 20 Mg Tablet 60.00 30   07/10/2023 06/07/2023 1 Lorazepam 0.5 Mg Tablet 30.00 8       Pain Medications/Prescriber: Erum Fritz (oncology)    Past Medical History:  Past Medical History:   Diagnosis Date    Elevated prostate specific antigen (PSA)     4/10/2012    Encounter for other administrative examinations     1/31/2014    Esophagitis     No Comments Provided    Gastro-esophageal reflux disease without esophagitis     No Comments Provided    Low back pain     No Comments Provided    Malignant neoplasm of prostate (H)     9/6/2012    Nicotine dependence, uncomplicated     Quit - October 2007 with chantix    Other intervertebral disc degeneration, lumbosacral region     12/1/2008         Family History:    Family History   Problem Relation Age of Onset    Hypertension Mother         Hypertension,HTN    Other - See Comments Mother          Parkinsons    Heart Disease Father         Heart Disease, passed away from CHF,CAD    Colon Cancer Father         Cancer-colon    Hypertension Father         Hypertension    Other - See Comments Father         Stroke/Dementia    Family History Negative Sister         Good Health,emotional problems.    Family History Negative Sister         Good Health    Other - See Comments Daughter         w/o major medical problems.    Other - See Comments Son         w/o major medical problems.    Family History Negative Other         Good Health    Family History Negative Other         Good Health,previous marriage./previous marriage.    Family History Negative Other         Good Health,previous marriage.    Anesthesia  Reaction No family hx of     Venous thrombosis No family hx of        Social History:  Social History     Tobacco Use    Smoking status: Former     Packs/day: 1.00     Years: 20.00     Additional pack years: 0.00     Total pack years: 20.00     Types: Cigarettes     Quit date: 2002     Years since quittin.0     Passive exposure: Past    Smokeless tobacco: Current     Types: Snuff    Tobacco comments:     Can't remember when all he had passive exposure   Substance Use Topics    Alcohol use: Not Currently               Review of Systems:  Complete ROS reviewed. Unless otherwise noted, all other systems found to be negative.        Laboratory Results:  Recent Labs   Lab Test 23  0702 23  0726 23  1012 23  1733   INR  --  1.16*   < > 1.74*    283   < > 348   PTT  --   --   --  40*   BUN 17.0 18.6   < > 12.6    < > = values in this interval not displayed.       Allergies:  No Known Allergies      Current Pain Related Medications:  Medications related to Pain Management (From now, onward)      Start     Dose/Rate Route Frequency Ordered Stop    23 0800  polyethylene glycol (MIRALAX) Packet 17 g         17 g Oral DAILY 23 0650      23 0800  senna-docusate (SENOKOT-S/PERICOLACE) 8.6-50 MG per tablet 1 tablet         1 tablet Oral 2 TIMES DAILY 23 0650      23 0800  magnesium hydroxide (MILK OF MAGNESIA) suspension 30 mL         30 mL Oral DAILY 23 0652      23 0828  oxyCODONE (ROXICODONE) tablet 15 mg        See Hyperspace for full Linked Orders Report.    15 mg Oral EVERY 3 HOURS PRN 23 0829      23 0828  oxyCODONE IR (ROXICODONE) tablet 20 mg        See Hyperspace for full Linked Orders Report.    20 mg Oral EVERY 3 HOURS PRN 23 0829      23 1930  methocarbamol (ROBAXIN) tablet 500 mg         500 mg Oral 4 TIMES DAILY 23 1902      23 1730  oxyCODONE (OxyCONTIN) 12 hr tablet 40 mg        Note to Pharmacy:  "PTA Sig:Take 1 tablet (40 mg) by mouth every 12 hours      40 mg Oral EVERY 12 HOURS 11/30/23 1728      11/30/23 1730  acetaminophen (TYLENOL) tablet 975 mg         975 mg Oral EVERY 8 HOURS 11/30/23 1728      11/30/23 1728  simethicone (MYLICON) chewable tablet 80 mg         80 mg Oral 4 TIMES DAILY PRN 11/30/23 1728      11/30/23 1728  hydrOXYzine (ATARAX) tablet 10 mg        Note to Pharmacy: PTA Sig:Take 1 tablet (10 mg) by mouth every 6 hours as needed for itching or anxiety (with pain, moderate pain)      10 mg Oral EVERY 6 HOURS PRN 11/30/23 1728      11/30/23 1728  LORazepam (ATIVAN) tablet 0.5 mg        Note to Pharmacy: PTA Sig:Take 1 tablet (0.5 mg) by mouth every 6 hours as needed for anxiety      0.5 mg Oral EVERY 6 HOURS PRN 11/30/23 1728      11/30/23 1728  lidocaine 1 % 0.1-1 mL         0.1-1 mL Other EVERY 1 HOUR PRN 11/30/23 1728      11/30/23 1728  lidocaine (LMX4) cream          Topical EVERY 1 HOUR PRN 11/30/23 1728                Physical Exam:  Vitals: /53 (BP Location: Left arm)   Pulse 75   Temp 98.6  F (37  C) (Oral)   Resp 16   Ht 1.778 m (5' 10\")   Wt 86.5 kg (190 lb 11.2 oz)   SpO2 97%   BMI 27.36 kg/m      Physical Exam:     CONSTITUTIONAL/GENERAL APPEARANCE:  voices pain especially with coughing.  EYES: EOMI, sclera anicteric, PERRLA  ENT/NECK: atraumatic, lips and oral mucous membranes dry  RESPIRATORY: non-labored breathing. No cough, wheeze  CV: HR within normal limits  ABDOMEN: incision-c,d,I.  MUSCULOSKELETAL/BACK/SPINE/EXTREMITIES: Moves all extremities purposefully.  No edema or obvious joint deformities   NEURO: Alert and Oriented x3. Answers questions appropriately  SKIN/VASCULAR EXAM:  No jaundice, no visible rashes or lesions            Acute Inpatient Pain Service [facility]  Hours of pain coverage 24/7   Page via Amcom- Please Page the Pain Team Via Amcom: \"PAIN MANAGEMENT ACUTE INPATIENT/ [facility]\"           "

## 2023-12-06 NOTE — PROGRESS NOTES
"Urology  Progress Note    24 hour events/Subjective:     - No acute events overnight   - Pain with walking but controlled overall   - Tolerating reg diet, + gas, endorses hunger   - TPN discontinued, abx plan finalized, apixaban restarted      Exam  /56 (BP Location: Left arm)   Pulse 76   Temp 98.7  F (37.1  C) (Oral)   Resp 16   Ht 1.778 m (5' 10\")   Wt 86.5 kg (190 lb 11.2 oz)   SpO2 96%   BMI 27.36 kg/m    No acute distress  Unlabored breathing on RA  Abdomen soft, appropriately tender, nondistended. midline incision cdi with staples, conduit healthy with two stents in place  CHRISTIANO serosanguinous  Jiménez in place in new conduit draining light red in tubing. CHRISTIANO serosang      Intake/Output Summary (Last 24 hours) at 12/1/2023 0552  Last data filed at 11/30/2023 2145  Gross per 24 hour   Intake 3000 ml   Output 375 ml   Net 2625 ml       UOP 1300/525  CHRISTIANO 45/10    Labs  Recent Labs   Lab Test 12/05/23  0702 12/04/23  0726 12/03/23  0712   WBC 5.3 5.7 6.9   HGB 8.5* 8.3* 8.2*   CR 0.46* 0.44* 0.52*          7-Day Micro Results       Collected Updated Procedure Result Status      12/04/2023 0621 12/04/2023 0713 Creatinine fluid [[id number]]    Body fluid, unsp from Other    Final result Component Value Units   Creatinine Fluid Source Drain    Creatinine fluid 0.5 mg/dL            11/30/2023 1221 12/04/2023 0808 Anaerobic Bacterial Culture Routine [[id number]]    (Abnormal)   Abscess from Pelvis    Preliminary result Component Value   Culture No anaerobic organisms isolated after 3 days  [P]     1+ Streptococcus agalactiae (Group B Streptococcus)  [P]     This organism is susceptible to ampicillin, penicillin, vancomycin and the cephalosporins. If treatment is required and your patient is allergic to penicillin, contact the microbiology lab within 5 days to request susceptibility testing.  Not isolated or reported on routine aerobic culture               11/30/2023 1221 12/04/2023 1148 Fungal or Yeast " "Culture Routine [[id number]]    (Abnormal)   Abscess from Pelvis    Preliminary result Component Value   Culture Candida albicans  [P]                11/30/2023 1221 12/02/2023 1459 Abscess Aerobic Bacterial Culture Routine [[id number]]    (Abnormal)   Abscess from Pelvis    Final result Component Value   Culture 2+ Candida albicans    Susceptibilities not routinely done, refer to antibiogram to view typical susceptibility profiles               11/30/2023 1217 12/05/2023 1617 Anaerobic Bacterial Culture Routine [[id number]]   Bone Biopsy from Pelvis    Preliminary result Component Value   Culture No anaerobic organisms isolated after 5 days  [P]                11/30/2023 1217 12/04/2023 1147 Fungal or Yeast Culture Routine [[id number]]   (Abnormal)   Bone Biopsy from Pelvis    Preliminary result Component Value   Culture Candida albicans  [P]                11/30/2023 1217 12/05/2023 1500 Bone Biopsy Aerobic Bacterial Culture Routine [[id number]]     (Abnormal)   Bone Biopsy from Pelvis    Edited Result - FINAL Component Value   Culture 1+ Candida albicans    Susceptibilities not routinely done, refer to antibiogram to view typical susceptibility profiles    1+ Streptococcus agalactiae (Group B Streptococcus)    This organism is susceptible to ampicillin, penicillin, vancomycin and the cephalosporins. If treatment is required and your patient is allergic to penicillin, contact the microbiology lab within 5 days to request susceptibility testing.        Susceptibility        Candida albicans      MALDONADO      Amphotericin B 0.5 ug/mL No interpretation available      Fluconazole 0.5 ug/mL Susceptible      Micafungin <=0.03 ug/mL Susceptible      Voriconazole <=0.008 ug/mL Susceptible                      Susceptibility Comments       Candida albicans    Antibiotics listed as \"No Interpretation\" have no regulatory guidelines for susceptibility/resistance available.  Testing for antifungal agents was validated in " house by the [facility] ([facility]) [place], [place]                      Assessment/Plan  68 year old y/o male POD#6 s/p cystectomy with IC, pubectomy, and rectus flap for chronic pubic osteomyelitis. WOC consulted.  AC restarted, abx plan finalized. PT OT recommend TCU    Note - plan changes for today are in bold below.    Neuro: pain control: PRN oxycodone 5-10 mg q3h , PRN dilaudid 0.2 - 0.4 mg q2h, and scheduled tylenol   CV: HDS   Pulm: incentive spirometry while awake    FEN/GI: Regular diet. bowel regimen    Endo: relatively euglycemic    : stents in place, conduit appears pink and viable. Stents to remain in place for 6 weeks. CHRISTIANO drain to bulb suction. CHRISTIANO Cr wnl    Heme/ID: monitor for s/s of infection; monitor Hb and transfuse as indicated. Awaiting bone and intra-op cultures. Will discuss discontinue PICC.  ID consult when cultures result. 2+ yeast for now. ID consult, PO Augmentin and fluconazole    Activity: Up as tolerated  Ambulate at least TID  PT/OT consulted     Ppx: apixaban  Home RX on HOLD: none    Dispo: medically ready for TCU  PT/OT recommendations: TCU      Seen and examined with the chief resident. Will discuss with Miki Correa MD.    Matthew Beltran MD  Urology PGY-2      PTA medications:   Prior to Admission medications    Medication Sig Start Date End Date Taking? Authorizing Provider   acetaminophen (TYLENOL) 325 MG tablet Take 975 mg by mouth every 8 hours as needed for mild pain   Yes Reported, Patient   amLODIPine (NORVASC) 10 MG tablet Take 1 tablet (10 mg) by mouth daily  Patient taking differently: Take 10 mg by mouth every morning 2/3/23  Yes Wei Perez MD   atorvastatin (LIPITOR) 20 MG tablet Take 1 tablet (20 mg) by mouth daily 11/15/23  Yes Ezequiel Alejandra MD   furosemide (LASIX) 20 MG tablet Take 1 tablet (20 mg) by mouth daily as needed  Patient taking differently: Take 20 mg by mouth daily as needed (edema) 11/9/23   Yes Erum Fritz MD   hydrOXYzine (ATARAX) 10 MG tablet Take 1 tablet (10 mg) by mouth every 6 hours as needed for itching or anxiety (with pain, moderate pain) 2/7/23  Yes Haydee Abdul MD   leuprolide (LUPRON DEPOT) 45 MG kit Inject 45 mg into the muscle every 6 months   Yes Unknown, Entered By History   lisinopril (ZESTRIL) 40 MG tablet Take 40 mg by mouth every morning   Yes Unknown, Entered By History   LORazepam (ATIVAN) 0.5 MG tablet Take 1 tablet (0.5 mg) by mouth every 6 hours as needed for anxiety 8/14/23  Yes Erum Fritz MD   megestrol (MEGACE) 20 MG tablet Take 20 mg by mouth 2 times daily   Yes Unknown, Entered By History   nitroFURantoin macrocrystal-monohydrate (MACROBID) 100 MG capsule Take 1 capsule (100 mg) by mouth 2 times daily for 3 days 11/27/23 11/30/23 Yes Miki Correa MD   omeprazole (PRILOSEC) 40 MG DR capsule Take 1 capsule (40 mg) by mouth daily  Patient taking differently: 40 mg every morning 8/2/23  Yes Wei Perez MD   ondansetron (ZOFRAN ODT) 8 MG ODT tab Take 1 tablet (8 mg) by mouth every 8 hours as needed for nausea 6/1/23  Yes Erum Fritz MD   oxybutynin (DITROPAN) 5 MG tablet Take 5 mg by mouth 4 times daily as needed for bladder spasms   Yes Unknown, Entered By History   oxyCODONE (OXYCONTIN) 40 MG 12 hr tablet Take 1 tablet (40 mg) by mouth every 12 hours 11/9/23  Yes Erum Fritz MD   oxyCODONE IR (ROXICODONE) 10 MG tablet Take 1 tablet (10 mg) by mouth every 6 hours as needed for moderate to severe pain 10/26/23  Yes Erum Fritz MD   potassium chloride ER (KLOR-CON M) 20 MEQ CR tablet Take 1 tablet (20 mEq) by mouth daily as needed for potassium supplementation (Take if you take furosemide)  Patient taking differently: Take 20 mEq by mouth 2 times daily 4/26/23  Yes Erum Fritz MD   prochlorperazine (COMPAZINE) 10 MG tablet Take 1 tablet (10 mg) by mouth every 6 hours as needed for nausea or  "vomiting 5/30/23  Yes Erum Fritz MD   tamsulosin (FLOMAX) 0.4 MG capsule Take 1 capsule by mouth every morning 7/10/23  Yes Reported, Patient   traZODone (DESYREL) 50 MG tablet Take 50 mg by mouth nightly as needed for sleep 3/22/23  Yes Reported, Patient   apixaban ANTICOAGULANT (ELIQUIS) 5 MG tablet Take 1 tablet (5 mg) by mouth 2 times daily 5/31/23   Erum Fritz MD   docusate sodium (COLACE) 100 MG capsule Take 100 mg by mouth daily    Unknown, Entered By History   predniSONE (DELTASONE) 5 MG tablet Take 1 tablet (5 mg) by mouth 2 times daily 5/3/23   Erum Fritz MD          Contacting the urology team: Please see [facility] and page on-call clinician with any questions or concerns regarding this patient. Note writer may be unavailable.     To access [employer] from intranet: under \"Applications\" --> \"Business Applications\" select \"[employer]\" and search \"Urology Adult & Pediatric/[facility].\" Please note that any question about a urology inpatient, [place] or [place], should go to job code [id number].     "

## 2023-12-06 NOTE — PROGRESS NOTES
Care Management Follow Up    Length of Stay (days): 6    Expected Discharge Date: 12/07/2023     Concerns to be Addressed: discharge planning     Patient plan of care discussed at interdisciplinary rounds: Yes    Anticipated Discharge Disposition: Transitional Care     Anticipated Discharge Services:  n/a   Anticipated Discharge DME:  n/a     Patient/family educated on Medicare website which has current facility and service quality ratings:  yes   Education Provided on the Discharge Plan:  yes   Patient/Family in Agreement with the Plan: yes    Referrals Placed by CM/SW:    Private pay costs discussed: Not applicable    Additional Information:  [facility] followed up with TCU referrals for Pt who is medically ready and in need of TCU placement. Currently all referrals are pending and awaiting call back.     [facility]  [address]  [facility] [address]  Julia admissions [phone]  F: [phone]  12/6: VM left with Julia requesting call back  12/5: LM for Julia in admissions.     [facility]  Address   [address] [address]             Contact Information    [phone]      12/6: VM left Rohinialejandra Lu requesting call back  12/5: LM for admissions         [facility]  Address   [address] [address]             Contact Information    [phone]      12/6: VM left with Taylor requesting call back  12/5: LM for Taylor in admissions.      [facility]  Address   [address]  [place] [address]             Contact Information    [phone]      12/6: VM left with Chloé requesting call back  12/5: LM for Chloé in admissions     [facility]  Address   [address] [place] [address]             Contact Information    [phone]      12/6: VM left with Marine requesting call back  12/5: LM in admissions.     Cele Bates,  LISA  [facility]   [phone]

## 2023-12-06 NOTE — PROGRESS NOTES
Calorie Count  Intake recorded for: 12/5  Total Kcals: 424 Total Protein: 14g  Kcals from Hospital Food: 424  Protein: 14g  Kcals from Outside Food (average):0 Protein: 0g  # Meals Ordered from Kitchen: 2 meals  # Meals Recorded: 1 meal (First - 100% hot black tea w/ honey & sugar, honey nut cheerios w/ whole milk, 50% sausage isiah)  # Supplements Recorded: 0

## 2023-12-07 ENCOUNTER — APPOINTMENT (OUTPATIENT)
Dept: PHYSICAL THERAPY | Facility: CLINIC | Age: 68
DRG: 653 | End: 2023-12-07
Attending: UROLOGY
Payer: COMMERCIAL

## 2023-12-07 LAB
BACTERIA ABSC ANAEROBE+AEROBE CULT: ABNORMAL
BACTERIA ABSC ANAEROBE+AEROBE CULT: ABNORMAL
BACTERIA BONE ANAEROBE+AEROBE CULT: NORMAL
HOLD SPECIMEN: NORMAL
MAGNESIUM SERPL-MCNC: 1.8 MG/DL (ref 1.7–2.3)
PATH REPORT.COMMENTS IMP SPEC: ABNORMAL
PATH REPORT.COMMENTS IMP SPEC: YES
PATH REPORT.FINAL DX SPEC: ABNORMAL
PATH REPORT.GROSS SPEC: ABNORMAL
PATH REPORT.RELEVANT HX SPEC: ABNORMAL
PHOTO IMAGE: ABNORMAL

## 2023-12-07 PROCEDURE — 250N000011 HC RX IP 250 OP 636: Performed by: UROLOGY

## 2023-12-07 PROCEDURE — 36592 COLLECT BLOOD FROM PICC: CPT | Performed by: UROLOGY

## 2023-12-07 PROCEDURE — 250N000011 HC RX IP 250 OP 636

## 2023-12-07 PROCEDURE — 97530 THERAPEUTIC ACTIVITIES: CPT | Mod: GP

## 2023-12-07 PROCEDURE — 250N000013 HC RX MED GY IP 250 OP 250 PS 637

## 2023-12-07 PROCEDURE — 83735 ASSAY OF MAGNESIUM: CPT | Performed by: UROLOGY

## 2023-12-07 PROCEDURE — 250N000013 HC RX MED GY IP 250 OP 250 PS 637: Performed by: PHYSICIAN ASSISTANT

## 2023-12-07 PROCEDURE — 120N000002 HC R&B MED SURG/OB UMMC

## 2023-12-07 PROCEDURE — 250N000013 HC RX MED GY IP 250 OP 250 PS 637: Performed by: UROLOGY

## 2023-12-07 RX ORDER — SENNA AND DOCUSATE SODIUM 50; 8.6 MG/1; MG/1
1 TABLET, FILM COATED ORAL AT BEDTIME
Qty: 30 TABLET | Refills: 0 | Status: SHIPPED | OUTPATIENT
Start: 2023-12-07 | End: 2023-12-08

## 2023-12-07 RX ORDER — OXYCODONE HYDROCHLORIDE 5 MG/1
5 TABLET ORAL EVERY 6 HOURS PRN
Qty: 12 TABLET | Refills: 0 | Status: CANCELLED | OUTPATIENT
Start: 2023-12-07 | End: 2023-12-10

## 2023-12-07 RX ORDER — FLUCONAZOLE 200 MG/1
400 TABLET ORAL DAILY
Qty: 84 TABLET | Refills: 0 | Status: SHIPPED | OUTPATIENT
Start: 2023-12-08 | End: 2023-12-08

## 2023-12-07 RX ORDER — OXYCODONE HYDROCHLORIDE 5 MG/1
TABLET ORAL
Qty: 88 TABLET | Refills: 0 | Status: SHIPPED | OUTPATIENT
Start: 2023-12-07 | End: 2023-12-08

## 2023-12-07 RX ORDER — MAGNESIUM SULFATE HEPTAHYDRATE 40 MG/ML
2 INJECTION, SOLUTION INTRAVENOUS ONCE
Status: COMPLETED | OUTPATIENT
Start: 2023-12-07 | End: 2023-12-07

## 2023-12-07 RX ADMIN — METHOCARBAMOL 500 MG: 500 TABLET ORAL at 23:43

## 2023-12-07 RX ADMIN — PANTOPRAZOLE SODIUM 40 MG: 40 TABLET, DELAYED RELEASE ORAL at 08:30

## 2023-12-07 RX ADMIN — MEGESTROL ACETATE 20 MG: 20 TABLET ORAL at 08:30

## 2023-12-07 RX ADMIN — OXYCODONE HYDROCHLORIDE 40 MG: 40 TABLET, FILM COATED, EXTENDED RELEASE ORAL at 08:14

## 2023-12-07 RX ADMIN — MEGESTROL ACETATE 20 MG: 20 TABLET ORAL at 20:49

## 2023-12-07 RX ADMIN — APIXABAN 5 MG: 5 TABLET, FILM COATED ORAL at 20:49

## 2023-12-07 RX ADMIN — AMOXICILLIN AND CLAVULANATE POTASSIUM 1 TABLET: 875; 125 TABLET, FILM COATED ORAL at 08:30

## 2023-12-07 RX ADMIN — Medication 10 ML: at 10:45

## 2023-12-07 RX ADMIN — METHOCARBAMOL 500 MG: 500 TABLET ORAL at 08:29

## 2023-12-07 RX ADMIN — Medication 5 ML: at 01:39

## 2023-12-07 RX ADMIN — FLUCONAZOLE 400 MG: 200 TABLET ORAL at 08:54

## 2023-12-07 RX ADMIN — METHOCARBAMOL 500 MG: 500 TABLET ORAL at 17:19

## 2023-12-07 RX ADMIN — OXYCODONE HYDROCHLORIDE 20 MG: 10 TABLET ORAL at 17:19

## 2023-12-07 RX ADMIN — OXYCODONE HYDROCHLORIDE 40 MG: 40 TABLET, FILM COATED, EXTENDED RELEASE ORAL at 20:52

## 2023-12-07 RX ADMIN — OXYCODONE HYDROCHLORIDE 20 MG: 10 TABLET ORAL at 06:53

## 2023-12-07 RX ADMIN — AMLODIPINE BESYLATE 10 MG: 10 TABLET ORAL at 08:28

## 2023-12-07 RX ADMIN — APIXABAN 5 MG: 5 TABLET, FILM COATED ORAL at 08:30

## 2023-12-07 RX ADMIN — ACETAMINOPHEN 975 MG: 325 TABLET, FILM COATED ORAL at 20:49

## 2023-12-07 RX ADMIN — HYDROXYZINE HYDROCHLORIDE 10 MG: 10 TABLET ORAL at 23:43

## 2023-12-07 RX ADMIN — LIDOCAINE 1 PATCH: 4 PATCH TOPICAL at 08:30

## 2023-12-07 RX ADMIN — OXYCODONE HYDROCHLORIDE 20 MG: 10 TABLET ORAL at 01:38

## 2023-12-07 RX ADMIN — METHOCARBAMOL 500 MG: 500 TABLET ORAL at 11:45

## 2023-12-07 RX ADMIN — ACETAMINOPHEN 975 MG: 325 TABLET, FILM COATED ORAL at 08:54

## 2023-12-07 RX ADMIN — OXYCODONE HYDROCHLORIDE 20 MG: 10 TABLET ORAL at 23:43

## 2023-12-07 RX ADMIN — AMOXICILLIN AND CLAVULANATE POTASSIUM 1 TABLET: 875; 125 TABLET, FILM COATED ORAL at 20:49

## 2023-12-07 RX ADMIN — OXYCODONE HYDROCHLORIDE 20 MG: 10 TABLET ORAL at 13:47

## 2023-12-07 RX ADMIN — MAGNESIUM SULFATE IN WATER 2 G: 40 INJECTION, SOLUTION INTRAVENOUS at 10:45

## 2023-12-07 RX ADMIN — OXYCODONE HYDROCHLORIDE 20 MG: 10 TABLET ORAL at 20:48

## 2023-12-07 RX ADMIN — OXYCODONE HYDROCHLORIDE 20 MG: 10 TABLET ORAL at 09:54

## 2023-12-07 RX ADMIN — ACETAMINOPHEN 975 MG: 325 TABLET, FILM COATED ORAL at 11:45

## 2023-12-07 ASSESSMENT — ACTIVITIES OF DAILY LIVING (ADL)
ADLS_ACUITY_SCORE: 37

## 2023-12-07 NOTE — PLAN OF CARE
Vitals: VSS  Neuros: intact  IV: PIV SL'd  Labs/Electrolytes: WNL  Resp/trach: WNL  Diet: tolerating regular diet without difficulty, on aravind counts  Bowel status: BM this shift, soft  : urostomy stents with adequate, serosanguinous output  Skin: midline incision staples intact  Pain: managed effectively with scheduled and prn oxycodone and scheduled muscle relaxers  Activity: up with 1, GB to pivot

## 2023-12-07 NOTE — PROGRESS NOTES
"Urology  Progress Note    24 hour events/Subjective:     - No acute events overnight   - Pain with walking but controlled overall   - Tolerating reg diet, + gas, endorses hunger   - TPN discontinued, abx plan finalized, apixaban restarted   - passing stool      Exam  /66 (BP Location: Left arm, Patient Position: Sitting, Cuff Size: Adult Regular)   Pulse 107   Temp 98.2  F (36.8  C) (Oral)   Resp 18   Ht 1.778 m (5' 10\")   Wt 85.9 kg (189 lb 4.8 oz)   SpO2 95%   BMI 27.16 kg/m    No acute distress  Unlabored breathing on RA  Abdomen soft, appropriately tender, nondistended. midline incision cdi with staples, conduit healthy with two stents in place  CHRISTIANO serosanguinous  Jiménez in place in new conduit draining light red in tubing. CHRISTIANO serosang      Intake/Output Summary (Last 24 hours) at 12/1/2023 0552  Last data filed at 11/30/2023 2145  Gross per 24 hour   Intake 3000 ml   Output 375 ml   Net 2625 ml       UOP 1700/225  CHRISTIANO removed  Stool 3x/1x    Labs  Recent Labs   Lab Test 12/05/23  0702 12/04/23  0726 12/03/23  0712   WBC 5.3 5.7 6.9   HGB 8.5* 8.3* 8.2*   CR 0.46* 0.44* 0.52*          7-Day Micro Results       Collected Updated Procedure Result Status      12/04/2023 0621 12/04/2023 0713 Creatinine fluid [[id number]]    Body fluid, unsp from Other    Final result Component Value Units   Creatinine Fluid Source Drain    Creatinine fluid 0.5 mg/dL            11/30/2023 1221 12/04/2023 0808 Anaerobic Bacterial Culture Routine [[id number]]    (Abnormal)   Abscess from Pelvis    Preliminary result Component Value   Culture No anaerobic organisms isolated after 3 days  [P]     1+ Streptococcus agalactiae (Group B Streptococcus)  [P]     This organism is susceptible to ampicillin, penicillin, vancomycin and the cephalosporins. If treatment is required and your patient is allergic to penicillin, contact the microbiology lab within 5 days to request susceptibility testing.  Not isolated or reported on " "routine aerobic culture               11/30/2023 1221 12/04/2023 1148 Fungal or Yeast Culture Routine [[id number]]    (Abnormal)   Abscess from Pelvis    Preliminary result Component Value   Culture Candida albicans  [P]                11/30/2023 1221 12/02/2023 1459 Abscess Aerobic Bacterial Culture Routine [[id number]]    (Abnormal)   Abscess from Pelvis    Final result Component Value   Culture 2+ Candida albicans    Susceptibilities not routinely done, refer to antibiogram to view typical susceptibility profiles               11/30/2023 1217 12/06/2023 1617 Anaerobic Bacterial Culture Routine [[id number]]   Bone Biopsy from Pelvis    Preliminary result Component Value   Culture No anaerobic organisms isolated after 6 days  [P]                11/30/2023 1217 12/04/2023 1147 Fungal or Yeast Culture Routine [[id number]]   (Abnormal)   Bone Biopsy from Pelvis    Preliminary result Component Value   Culture Candida albicans  [P]                11/30/2023 1217 12/05/2023 1500 Bone Biopsy Aerobic Bacterial Culture Routine [[id number]]     (Abnormal)   Bone Biopsy from Pelvis    Edited Result - FINAL Component Value   Culture 1+ Candida albicans    Susceptibilities not routinely done, refer to antibiogram to view typical susceptibility profiles    1+ Streptococcus agalactiae (Group B Streptococcus)    This organism is susceptible to ampicillin, penicillin, vancomycin and the cephalosporins. If treatment is required and your patient is allergic to penicillin, contact the microbiology lab within 5 days to request susceptibility testing.        Susceptibility        Candida albicans      MALDONADO      Amphotericin B 0.5 ug/mL No interpretation available      Fluconazole 0.5 ug/mL Susceptible      Micafungin <=0.03 ug/mL Susceptible      Voriconazole <=0.008 ug/mL Susceptible                      Susceptibility Comments       Candida albicans    Antibiotics listed as \"No Interpretation\" have no regulatory guidelines " for susceptibility/resistance available.  Testing for antifungal agents was validated in house by the [facility] Diseases Diagnostic Laboratory ([facility]) [place], [place]                      Assessment/Plan  68 year old y/o male POD#7 s/p cystectomy with IC, pubectomy, and rectus flap for chronic pubic osteomyelitis. WOC consulted.  AC restarted, abx plan finalized. PT OT recommend TCU    Note - plan changes for today are in bold below.    Neuro: pain control: PRN oxycodone 5-10 mg q3h , PRN dilaudid 0.2 - 0.4 mg q2h, and scheduled tylenol   CV: HDS   Pulm: incentive spirometry while awake    FEN/GI: Regular diet. bowel regimen    Endo: relatively euglycemic    : stents in place, conduit appears pink and viable. Stents to remain in place for 6 weeks. CHRISTIANO drain to bulb suction. CHRISTIANO Cr wnl    Heme/ID: monitor for s/s of infection; monitor Hb and transfuse as indicated. Awaiting bone and intra-op cultures. Will discuss discontinue PICC.  ID consult when cultures result. 2+ yeast for now. ID consult, PO Augmentin and fluconazole    Activity: Up as tolerated  Ambulate at least TID  PT/OT consulted     Ppx: apixaban  Home RX on HOLD: none    Dispo: medically ready for TCU, will discuss taper with pain team  PT/OT recommendations: TCU      Seen and examined with the chief resident. Will discuss with Miki Correa MD.    Matthew Beltran MD  Urology PGY-2      PTA medications:   Prior to Admission medications    Medication Sig Start Date End Date Taking? Authorizing Provider   acetaminophen (TYLENOL) 325 MG tablet Take 975 mg by mouth every 8 hours as needed for mild pain   Yes Reported, Patient   amLODIPine (NORVASC) 10 MG tablet Take 1 tablet (10 mg) by mouth daily  Patient taking differently: Take 10 mg by mouth every morning 2/3/23  Yes Wei Perez MD   atorvastatin (LIPITOR) 20 MG tablet Take 1 tablet (20 mg) by mouth daily 11/15/23  Yes Ezequiel Alejandra MD   furosemide (LASIX) 20 MG tablet Take 1  tablet (20 mg) by mouth daily as needed  Patient taking differently: Take 20 mg by mouth daily as needed (edema) 11/9/23  Yes Erum Fritz MD   hydrOXYzine (ATARAX) 10 MG tablet Take 1 tablet (10 mg) by mouth every 6 hours as needed for itching or anxiety (with pain, moderate pain) 2/7/23  Yes Haydee Abdul MD   leuprolide (LUPRON DEPOT) 45 MG kit Inject 45 mg into the muscle every 6 months   Yes Unknown, Entered By History   lisinopril (ZESTRIL) 40 MG tablet Take 40 mg by mouth every morning   Yes Unknown, Entered By History   LORazepam (ATIVAN) 0.5 MG tablet Take 1 tablet (0.5 mg) by mouth every 6 hours as needed for anxiety 8/14/23  Yes Erum Fritz MD   megestrol (MEGACE) 20 MG tablet Take 20 mg by mouth 2 times daily   Yes Unknown, Entered By History   nitroFURantoin macrocrystal-monohydrate (MACROBID) 100 MG capsule Take 1 capsule (100 mg) by mouth 2 times daily for 3 days 11/27/23 11/30/23 Yes Miki Correa MD   omeprazole (PRILOSEC) 40 MG DR capsule Take 1 capsule (40 mg) by mouth daily  Patient taking differently: 40 mg every morning 8/2/23  Yes Wie Perez MD   ondansetron (ZOFRAN ODT) 8 MG ODT tab Take 1 tablet (8 mg) by mouth every 8 hours as needed for nausea 6/1/23  Yes Erum Fritz MD   oxybutynin (DITROPAN) 5 MG tablet Take 5 mg by mouth 4 times daily as needed for bladder spasms   Yes Unknown, Entered By History   oxyCODONE (OXYCONTIN) 40 MG 12 hr tablet Take 1 tablet (40 mg) by mouth every 12 hours 11/9/23  Yes Erum Fritz MD   oxyCODONE IR (ROXICODONE) 10 MG tablet Take 1 tablet (10 mg) by mouth every 6 hours as needed for moderate to severe pain 10/26/23  Yes Erum Fritz MD   potassium chloride ER (KLOR-CON M) 20 MEQ CR tablet Take 1 tablet (20 mEq) by mouth daily as needed for potassium supplementation (Take if you take furosemide)  Patient taking differently: Take 20 mEq by mouth 2 times daily 4/26/23  Yes Erum Fritz  "MD DONALD   prochlorperazine (COMPAZINE) 10 MG tablet Take 1 tablet (10 mg) by mouth every 6 hours as needed for nausea or vomiting 5/30/23  Yes Erum Fritz MD   tamsulosin (FLOMAX) 0.4 MG capsule Take 1 capsule by mouth every morning 7/10/23  Yes Reported, Patient   traZODone (DESYREL) 50 MG tablet Take 50 mg by mouth nightly as needed for sleep 3/22/23  Yes Reported, Patient   apixaban ANTICOAGULANT (ELIQUIS) 5 MG tablet Take 1 tablet (5 mg) by mouth 2 times daily 5/31/23   Erum Fritz MD   docusate sodium (COLACE) 100 MG capsule Take 100 mg by mouth daily    Unknown, Entered By History   predniSONE (DELTASONE) 5 MG tablet Take 1 tablet (5 mg) by mouth 2 times daily 5/3/23   Erum Fritz MD          Contacting the urology team: Please see [facility] and page on-call clinician with any questions or concerns regarding this patient. Note writer may be unavailable.     To access [email or web address] from intranet: under \"Applications\" --> \"Business Applications\" select \"[email or web address] Smart[employer]\" and search \"Urology Adult & Pediatric/[facility].\" Please note that any question about a urology inpatient, [place] or [place], should go to job code [id number].       Coding query:    Acute blood loss anemia, required 2 units previously, now stable      Severe Malnutrition in the setting of chronic illness, required TPN, now discontinued, following with nutrition  "

## 2023-12-07 NOTE — PLAN OF CARE
"Vital signs:  Temp: 98.2  F (36.8  C) Temp src: Oral BP: 111/66 Pulse: 107   Resp: 18 SpO2: 95 % O2 Device: None (Room air)  Height: 177.8 cm (5' 10\") Weight: 85.9 kg (189 lb 4.8 oz)    5420-8144:    Activity: Up to commode with walker and assist of 1-2.  Neuros: A & O x4. Neuro intact.   Cardiac: Slightly tachy in the low 100s. /66. Asymptomatic.   Respiratory: LS clear. O2 sats above 92% on RA. Denies SOB. Unlabored.   GI/: BS+, passing flatus, had small BM. Urostomy with two stents intact, pink stoma, has adequate cherry red urine output.    Diet: Regular diet. Calorie counts.  Skin: Midline incision stapled, STEPHEN, dried drainage noted.  Lines: Double lumen PICC hep locked. PIV saline locked.   Drains: None.  Labs: Reviewed.   Pain/nausea: PRN oxycodone 20mg x1 effective for mid lower abdominal pain control, patient slept in between cares. Denies nausea.   New changes this shift: None.   Plan: Continue POC.   " Periorbital Skin Units: 36

## 2023-12-07 NOTE — PROGRESS NOTES
Care Management Follow Up    Length of Stay (days): 7    Expected Discharge Date: 12/08/2023     Concerns to be Addressed: discharge planning       Patient plan of care discussed at interdisciplinary rounds: Yes    Anticipated Discharge Disposition: Transitional Care     Anticipated Discharge Services: Therapies    Anticipated Discharge DME: n/a    Patient/family educated on Medicare website which has current facility and service quality ratings: Yes    Education Provided on the Discharge Plan: Yes     Patient/Family in Agreement with the Plan: Yes    Referrals Placed by CM/SW:      Pending:  [facility]  [address]  [place] [address]  [place] liaison (Kendra): [phone]  12/5: VM left  12/6: VM left  12/7: VM left and noted pt isn't on TPN anymore    [facility]  [address]  [place] [address]  P: [phone]  F: [phone]  12/5: VM left  12/6: VM left  12/7: Re-faxed referral and left VM    [facility]  [address] [facility]  [place] [address]  P: [phone]  12/5: VM left  12/6: VM left  12/7: Re-faxed referral and left VM    Declined:  [facility]  [address] [place] [address]  P: [phone]  12/7: Declined due to staffing shortages at the facility, could try back in one week    [facility]  [address]  [place] [address]  P: [phone]  12/7: Declined, bed not available    [facility]  [address] [address]  P: [phone]  12/6: Declined, bed not available    Private pay costs discussed:  Not at this time    Additional Information: MAGGIE followed up on TCU referrals.    VIRGIL Stapleton   Assisting 7B MAGGIE (Phone: [phone])

## 2023-12-07 NOTE — PLAN OF CARE
Goal Outcome Evaluation:      Plan of Care Reviewed With: patient    Overall Patient Progress: improvingOverall Patient Progress: improving    Outcome Evaluation: Having BMs. Pain controlled on current regimen    VSS. RA. LS clear. A&Ox4. Baseline N/T in bilateral feet from chemo. Pain managed with PRN oxycodone. R DL PICC- HL. Mg replaced IV- recheck in AM. Urostomy (2 stents)- red urine, AOP. Old CHRISTIANO site-CDI. +BS and passing flatus, several BMs today. Assist x1 with walker and gaitbelt- generalized weakness, ambulated in halls. Regular diet- aravind counts. Midline abd incision- stapled and STEPHEN

## 2023-12-07 NOTE — PROGRESS NOTES
Calorie Count  Intake recorded for: 12/6  Total Kcals: 436 Total Protein: 22g  Kcals from Hospital Food: 436  Protein: 22g  Kcals from Outside Food (average):0 Protein: 0g  # Meals Ordered from Kitchen: 2 meals   # Meals Recorded: 1 meal (First - 100% hot black tea w/ honey, 75% omelet w/ garza, sausage, onion & cheese, ketchup)  # Supplements Recorded: 0

## 2023-12-08 ENCOUNTER — APPOINTMENT (OUTPATIENT)
Dept: OCCUPATIONAL THERAPY | Facility: CLINIC | Age: 68
DRG: 653 | End: 2023-12-08
Attending: UROLOGY
Payer: COMMERCIAL

## 2023-12-08 LAB
HOLD SPECIMEN: NORMAL
MAGNESIUM SERPL-MCNC: 1.9 MG/DL (ref 1.7–2.3)

## 2023-12-08 PROCEDURE — 250N000013 HC RX MED GY IP 250 OP 250 PS 637: Performed by: PHYSICIAN ASSISTANT

## 2023-12-08 PROCEDURE — 120N000002 HC R&B MED SURG/OB UMMC

## 2023-12-08 PROCEDURE — 97535 SELF CARE MNGMENT TRAINING: CPT | Mod: GO

## 2023-12-08 PROCEDURE — G0463 HOSPITAL OUTPT CLINIC VISIT: HCPCS

## 2023-12-08 PROCEDURE — 250N000013 HC RX MED GY IP 250 OP 250 PS 637

## 2023-12-08 PROCEDURE — 36592 COLLECT BLOOD FROM PICC: CPT | Performed by: UROLOGY

## 2023-12-08 PROCEDURE — 999N000200 HC STATISTIC WOC PT EDUCATION, 46-60 MIN

## 2023-12-08 PROCEDURE — 250N000013 HC RX MED GY IP 250 OP 250 PS 637: Performed by: UROLOGY

## 2023-12-08 PROCEDURE — 250N000011 HC RX IP 250 OP 636: Mod: JZ | Performed by: UROLOGY

## 2023-12-08 PROCEDURE — 83735 ASSAY OF MAGNESIUM: CPT | Performed by: UROLOGY

## 2023-12-08 PROCEDURE — 97110 THERAPEUTIC EXERCISES: CPT | Mod: GO

## 2023-12-08 PROCEDURE — 250N000011 HC RX IP 250 OP 636: Mod: JZ

## 2023-12-08 RX ORDER — FLUCONAZOLE 200 MG/1
400 TABLET ORAL DAILY
DISCHARGE
Start: 2023-12-08 | End: 2024-01-26

## 2023-12-08 RX ORDER — POLYETHYLENE GLYCOL 3350 17 G/17G
17 POWDER, FOR SOLUTION ORAL DAILY
DISCHARGE
Start: 2023-12-08 | End: 2024-02-27

## 2023-12-08 RX ORDER — SENNA AND DOCUSATE SODIUM 50; 8.6 MG/1; MG/1
1-2 TABLET, FILM COATED ORAL 2 TIMES DAILY
DISCHARGE
Start: 2023-12-08 | End: 2024-01-10

## 2023-12-08 RX ORDER — METHOCARBAMOL 500 MG/1
500 TABLET, FILM COATED ORAL 4 TIMES DAILY PRN
DISCHARGE
Start: 2023-12-08 | End: 2024-01-10

## 2023-12-08 RX ORDER — OXYCODONE HYDROCHLORIDE 5 MG/1
TABLET ORAL
Qty: 88 TABLET | Refills: 0 | Status: SHIPPED | OUTPATIENT
Start: 2023-12-08 | End: 2023-12-19

## 2023-12-08 RX ORDER — MAGNESIUM SULFATE HEPTAHYDRATE 40 MG/ML
2 INJECTION, SOLUTION INTRAVENOUS ONCE
Status: COMPLETED | OUTPATIENT
Start: 2023-12-08 | End: 2023-12-08

## 2023-12-08 RX ORDER — LIDOCAINE 4 G/G
1-3 PATCH TOPICAL EVERY 24 HOURS
DISCHARGE
Start: 2023-12-08 | End: 2024-01-10

## 2023-12-08 RX ADMIN — APIXABAN 5 MG: 5 TABLET, FILM COATED ORAL at 20:12

## 2023-12-08 RX ADMIN — ACETAMINOPHEN 975 MG: 325 TABLET, FILM COATED ORAL at 05:09

## 2023-12-08 RX ADMIN — OXYCODONE HYDROCHLORIDE 40 MG: 40 TABLET, FILM COATED, EXTENDED RELEASE ORAL at 20:14

## 2023-12-08 RX ADMIN — OXYCODONE HYDROCHLORIDE 20 MG: 10 TABLET ORAL at 09:23

## 2023-12-08 RX ADMIN — PANTOPRAZOLE SODIUM 40 MG: 40 TABLET, DELAYED RELEASE ORAL at 09:28

## 2023-12-08 RX ADMIN — METHOCARBAMOL 500 MG: 500 TABLET ORAL at 20:12

## 2023-12-08 RX ADMIN — LIDOCAINE 2 PATCH: 4 PATCH TOPICAL at 09:41

## 2023-12-08 RX ADMIN — MAGNESIUM SULFATE HEPTAHYDRATE 2 G: 40 INJECTION, SOLUTION INTRAVENOUS at 09:25

## 2023-12-08 RX ADMIN — ACETAMINOPHEN 975 MG: 325 TABLET, FILM COATED ORAL at 12:31

## 2023-12-08 RX ADMIN — OXYCODONE HYDROCHLORIDE 20 MG: 10 TABLET ORAL at 15:51

## 2023-12-08 RX ADMIN — OXYCODONE HYDROCHLORIDE 40 MG: 40 TABLET, FILM COATED, EXTENDED RELEASE ORAL at 09:23

## 2023-12-08 RX ADMIN — AMOXICILLIN AND CLAVULANATE POTASSIUM 1 TABLET: 875; 125 TABLET, FILM COATED ORAL at 20:11

## 2023-12-08 RX ADMIN — MEGESTROL ACETATE 20 MG: 20 TABLET ORAL at 20:12

## 2023-12-08 RX ADMIN — OXYCODONE HYDROCHLORIDE 20 MG: 10 TABLET ORAL at 12:31

## 2023-12-08 RX ADMIN — APIXABAN 5 MG: 5 TABLET, FILM COATED ORAL at 09:27

## 2023-12-08 RX ADMIN — ACETAMINOPHEN 975 MG: 325 TABLET, FILM COATED ORAL at 20:11

## 2023-12-08 RX ADMIN — Medication 10 ML: at 12:31

## 2023-12-08 RX ADMIN — MEGESTROL ACETATE 20 MG: 20 TABLET ORAL at 09:29

## 2023-12-08 RX ADMIN — METHOCARBAMOL 500 MG: 500 TABLET ORAL at 12:31

## 2023-12-08 RX ADMIN — AMOXICILLIN AND CLAVULANATE POTASSIUM 1 TABLET: 875; 125 TABLET, FILM COATED ORAL at 09:27

## 2023-12-08 RX ADMIN — METHOCARBAMOL 500 MG: 500 TABLET ORAL at 09:29

## 2023-12-08 RX ADMIN — FLUCONAZOLE 400 MG: 200 TABLET ORAL at 09:30

## 2023-12-08 RX ADMIN — METHOCARBAMOL 500 MG: 500 TABLET ORAL at 15:51

## 2023-12-08 RX ADMIN — OXYCODONE HYDROCHLORIDE 15 MG: 10 TABLET ORAL at 05:09

## 2023-12-08 RX ADMIN — OXYCODONE HYDROCHLORIDE 20 MG: 10 TABLET ORAL at 20:12

## 2023-12-08 RX ADMIN — Medication 5 ML: at 07:10

## 2023-12-08 ASSESSMENT — ACTIVITIES OF DAILY LIVING (ADL)
ADLS_ACUITY_SCORE: 39
ADLS_ACUITY_SCORE: 37
ADLS_ACUITY_SCORE: 37
ADLS_ACUITY_SCORE: 39
ADLS_ACUITY_SCORE: 37
ADLS_ACUITY_SCORE: 37
ADLS_ACUITY_SCORE: 39

## 2023-12-08 NOTE — PLAN OF CARE
"[id number]  /56 (BP Location: Left arm)   Pulse 67   Temp 98.4  F (36.9  C) (Oral)   Resp 20   Ht 1.778 m (5' 10\")   Wt 86.1 kg (189 lb 14.4 oz)   SpO2 96%   BMI 27.25 kg/m      Goal Outcome Evaluation:      Plan of Care Reviewed With: patient    Overall Patient Progress: no changeOverall Patient Progress: no change    Outcome Evaluation: A&Ox4, calls approprialtey. VSS on room air. Up with Ax2 and gb/walker, ambulated in [place] x1 on shift. Pain tolerable with abdominal lidocaine patches and PRN oxycodone x1. Denies nausea. Regular diet, good appetite. Urostomy w/ red output. 1 BM on shift. Medically ready for discharge, awaiting TCU. L PIV saline locked. R PICC saline locked, order to remove however, pt refused d/t reporting to be a hard poke for lab draws. Provider notified.      "

## 2023-12-08 NOTE — DISCHARGE SUMMARY
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]  Metastatic prostate cancer  Past Medical History:   Diagnosis Date    Elevated prostate specific antigen (PSA)     4/10/2012    Encounter for other administrative examinations     2014    Esophagitis     No Comments Provided    Gastro-esophageal reflux disease without esophagitis     No Comments Provided    Low back pain     No Comments Provided    Malignant neoplasm of prostate (H)     2012    Nicotine dependence, uncomplicated     Quit - 2007 with chantix    Other intervertebral disc degeneration, lumbosacral region     2008             Discharge Diagnosis:     - 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]  - 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]  - Pubic osteomyelitis  - Severe malnutrition related to chronic illness  - acute blood loss anemia  -Metastatic prostate cancer    Past Medical History:   Diagnosis Date    Elevated prostate specific antigen (PSA)     4/10/2012    Encounter for other administrative examinations     2014    Esophagitis     No Comments Provided    Gastro-esophageal reflux disease without esophagitis     No Comments Provided    Low back pain     No Comments Provided    Malignant neoplasm of prostate (H)     2012    Nicotine dependence, uncomplicated     Quit - 2007 with chantix    Other intervertebral disc degeneration, lumbosacral region     2008              Procedures:     Procedure(s): 23   PROCEDURE PERFORMED:    1. Lysis of adhesions  2. Total cystectomy and Creation of ileal conduit urinary diversion 48641-53  3. Total Pubectomy (50524) for osteomyelitis  4. Rectus flap fixation into pelvis    Miki Correa             Medications Prior to Admission:     No medications prior to admission.             Discharge Medications:     Discharge Medication List as of 12/11/2023 10:47 AM        START taking these medications    Details   Lidocaine (LIDOCARE) 4 % Patch Place 1-3 patches onto the skin every 24 hours To prevent lidocaine toxicity, patient should be patch free for 12 hrs daily.Transitional      menthol (ICY HOT) 5 % PTCH Apply 1 patch topically every 8 hours as needed for muscle soreness, Disp-15 patch, R-1, E-Prescribe      methocarbamol (ROBAXIN) 500 MG tablet Take 1 tablet (500 mg) by mouth 4 times daily as needed for muscle spasms (Abdominal pain), Transitional      polyethylene glycol (MIRALAX) 17 GM/Dose powder Take 17 g by mouth daily, Transitional           CONTINUE these medications which have CHANGED    Details   amoxicillin-clavulanate (AUGMENTIN) 875-125 MG tablet Take 1 tablet by mouth every 12 hours for 49 days (DO not stop abx until instructed by Infectious Disease), Transitional      fluconazole (DIFLUCAN) 200 MG tablet Take 2 tablets (400 mg) by mouth daily for 49 days (DO NOT stop until instructed by Infectious Disease), Transitional      oxyCODONE (ROXICODONE) 5 MG tablet Take 3 tablets (15 mg) by mouth every 4 hours as needed for moderate pain or moderate to severe pain for 4 days, THEN 3 tablets (15 mg) every 6 hours as needed for moderate pain or moderate to severe pain for 8 days, THEN 3 tablets (15 mg) 3 times daily  for 4 days., Disp-88 tablet, R-0, Local Print      SENNA-docusate sodium (SENNA S) 8.6-50 MG tablet Take 1-2 tablets by mouth 2 times daily To prevent or treat constipation, Transitional           CONTINUE these medications which have NOT CHANGED    Details   acetaminophen (TYLENOL) 325 MG tablet Take 975 mg by mouth every 8 hours as needed for mild pain, Historical      amLODIPine (NORVASC) 10 MG tablet Take 1 tablet (10 mg) by mouth daily, Disp-90 tablet, R-3,  E-Prescribe      apixaban ANTICOAGULANT (ELIQUIS) 5 MG tablet Take 1 tablet (5 mg) by mouth 2 times daily, Disp-60 tablet, R-3, E-Prescribe      atorvastatin (LIPITOR) 20 MG tablet Take 1 tablet (20 mg) by mouth daily, Disp-90 tablet, R-0, E-Prescribe      docusate sodium (COLACE) 100 MG capsule Take 100 mg by mouth daily, Historical      furosemide (LASIX) 20 MG tablet Take 1 tablet (20 mg) by mouth daily as needed, Disp-30 tablet, R-3, E-Prescribe      hydrOXYzine (ATARAX) 10 MG tablet Take 1 tablet (10 mg) by mouth every 6 hours as needed for itching or anxiety (with pain, moderate pain), Disp-120 tablet, R-11, E-Prescribe      leuprolide (LUPRON DEPOT) 45 MG kit Inject 45 mg into the muscle every 6 months, Historical      lisinopril (ZESTRIL) 40 MG tablet Take 40 mg by mouth every morning, Historical      LORazepam (ATIVAN) 0.5 MG tablet Take 1 tablet (0.5 mg) by mouth every 6 hours as needed for anxiety, Disp-30 tablet, R-1, E-Prescribe      megestrol (MEGACE) 20 MG tablet Take 20 mg by mouth 2 times daily, Historical      omeprazole (PRILOSEC) 40 MG DR capsule Take 1 capsule (40 mg) by mouth daily, Disp-90 capsule, R-0, E-Prescribe      ondansetron (ZOFRAN ODT) 8 MG ODT tab Take 1 tablet (8 mg) by mouth every 8 hours as needed for nausea, Disp-90 tablet, R-1, E-Prescribe      oxybutynin (DITROPAN) 5 MG tablet Take 5 mg by mouth 4 times daily as needed for bladder spasms, Historical      potassium chloride ER (KLOR-CON M) 20 MEQ CR tablet Take 1 tablet (20 mEq) by mouth daily as needed for potassium supplementation (Take if you take furosemide), Disp-30 tablet, R-3, E-Prescribe      prochlorperazine (COMPAZINE) 10 MG tablet Take 1 tablet (10 mg) by mouth every 6 hours as needed for nausea or vomiting, Disp-30 tablet, R-3, E-Prescribe      tamsulosin (FLOMAX) 0.4 MG capsule Take 1 capsule by mouth every morning, Historical      traZODone (DESYREL) 50 MG tablet Take 50 mg by mouth nightly as needed for sleep,  Historical           STOP taking these medications       nitroFURantoin macrocrystal-monohydrate (MACROBID) 100 MG capsule Comments:   Reason for Stopping:         oxyCODONE (OXYCONTIN) 40 MG 12 hr tablet Comments:   Reason for Stopping:         predniSONE (DELTASONE) 5 MG tablet Comments:   Reason for Stopping:                     Consultations:   Consultation during this admission received:   PHYSICAL THERAPY ADULT IP CONSULT  OCCUPATIONAL THERAPY ADULT IP CONSULT  WOUND OSTOMY CONTINENCE NURSE  IP CONSULT  VASCULAR ACCESS FOR PICC PLACEMENT ADULT IP CONSULT  PHARMACY/NUTRITION TO START AND MANAGE TPN  PHARMACY IP CONSULT  CARE MANAGEMENT / SOCIAL WORK IP CONSULT  INFECTIOUS DISEASE GENERAL ADULT IP CONSULT  PAIN MANAGEMENT ADULT IP CONSULT  PHYSICAL THERAPY ADULT IP CONSULT  OCCUPATIONAL THERAPY ADULT IP CONSULT  NURSING TO CONSULT FOR VASCULAR ACCESS CARE IP CONSULT          Brief History of Illness:   Reason for admission requiring a surgical or invasive procedure:   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    Please refer to the full operative summary for details.           Hospital Course:     Patient had cystectomy, ileal conduit creation, pubectomy, and elevation of rectus flap for radiation cystitis, chronic pubic osteomyelitis, and urinary fistula 11/30/23.    Dilip Kan is a 68 year old male who has PMH  hypertension, dyslipidemia, moderate aortic insufficiency, mild aortic stenosis, ascending aortic aneurysm, history of DVT /PE (5/2/2023), non-insulin-dependent diabetes, GERD, metastatic prostate cancer to bone and liver status post prostatectomy and radiation approximately 10 years ago, chronic lower back pain s/p spine surgeries, on chronic opioid management and pubovesical fistula and pathologic pelvic fracture.        TPN was initiated from 12/2/23 to  "12/7/23 for severe malnutrition related to chronic illness. 2 units pRBC was given. Intra-op cultures grew Candida albicans, Str agalactiae.  He was first put on ertapenem and micafungin for yeast growth, then switched to ceftriaxone and metronidazole while remaining on micafungin  as recommended by ID. Later he was switched to PO Augmentin and fluconazole for discharge.     Patient was on high dose of oxycodone  for cancer pain before admission. Tapered down from 40 mg  long acting as needed to 15 mg q4h as needed at discharge. Taper regimen provided - see above.     Patient recovered as anticipated and experienced no post-operative complications.    Stables removed prior to discharge.  Small area of separation was packed with Nugauze and will heal by secondary intention.  No evidence of wound infection.     On POD 11 he was ambulating without assitance, tolerating the discharge diet, had pain controlled with PO medications to go home with, and was requiring no IV medications or fluids. He was discharged to TCU with appropriate contact information, follow-up and instructions as seen below in the discharge paperwork.         Discharge Labs:     Lab Results   Component Value Date    PSA 6.25 11/09/2023    PSA 4.22 10/16/2023    PSA 4.51 10/16/2023    PSA 2.94 09/13/2023    PSA 1.81 08/14/2023    PSA 2.86 10/13/2022    PSA 2.37 09/29/2022    PSA 2.02 08/01/2022    PSA 2.06 06/27/2022    PSA 1.25 03/07/2022    PSA <0.13 11/13/2013     No lab results found in last 7 days.      Recent Labs   Lab 12/11/23  0653 12/10/23  0817   MAG 1.8 1.8     No lab results found in last 7 days.    Invalid input(s): \"URINEBLOOD\"  Results for orders placed or performed during the hospital encounter of 06/29/23   Urine Culture    Specimen: Urine, Catheter   Result Value Ref Range    Culture Mixed Urogenital Shonda      Lab Test 12/03/23  0712 12/02/23  0644 12/01/23  2221 12/01/23  0448   WBC 6.9 7.9  --  9.7   HGB 8.2* 8.0* 7.8* 7.0*   CR " 0.52* 0.59*  --  0.72            7-Day Micro Results         Collected Updated Procedure Result Status       11/30/2023 1221 12/03/2023 1316 Anaerobic Bacterial Culture Routine [[id number]]    (Abnormal)   Abscess from Pelvis    Preliminary result Component Value   Culture No anaerobic organisms isolated  [P]      1+ Streptococcus agalactiae (Group B Streptococcus)  [P]      This organism is susceptible to ampicillin, penicillin, vancomycin and the cephalosporins. If treatment is required and your patient is allergic to penicillin, contact the microbiology lab within 5 days to request susceptibility testing.  Not isolated or reported on routine aerobic culture                   11/30/2023 1221 12/02/2023 0923 Fungal or Yeast Culture Routine [[id number]]    (Abnormal)   Abscess from Pelvis    Preliminary result Component Value   Culture Yeast  [P]                    11/30/2023 1221 12/02/2023 1459 Abscess Aerobic Bacterial Culture Routine [[id number]]    (Abnormal)   Abscess from Pelvis    Final result Component Value   Culture 2+ Candida albicans     Susceptibilities not routinely done, refer to antibiogram to view typical susceptibility profiles                   11/30/2023 1217 12/03/2023 1616 Anaerobic Bacterial Culture Routine [[id number]]   Bone Biopsy from Pelvis    Preliminary result Component Value   Culture No anaerobic organisms isolated after 3 days  [P]                    11/30/2023 1217 12/02/2023 0914 Fungal or Yeast Culture Routine [[id number]]   (Abnormal)   Bone Biopsy from Pelvis    Preliminary result Component Value   Culture Yeast  [P]                    11/30/2023 1217 12/03/2023 1208 Bone Biopsy Aerobic Bacterial Culture Routine [[id number]]    (Abnormal)   Bone Biopsy from Pelvis    Preliminary result Component Value   Culture 1+ Candida albicans  [P]      Susceptibilities not routinely done, refer to antibiogram to view typical susceptibility profiles     1+ Streptococcus  agalactiae (Group B Streptococcus)  [P]      This organism is susceptible to ampicillin, penicillin, vancomycin and the cephalosporins. If treatment is required and your patient is allergic to penicillin, contact the microbiology lab within 5 days to request susceptibility testing.                          Surgical Pathology Exam on 11/30/23.    Specimens:   A) - Bone, Pubic Bone                                                                                B) - Urinary Bladder, Bladder                                                              Final Diagnosis   A. Pubic Bone:  -Bone, unremarkable     B. Bladder:  -Focal (high-grade) prostatic adenocarcinoma with comedonecrosis   Electronically signed by Nas Vang MD on 12/7/2023 at  1:07 PM   Comment  [id number]   The microscopic carcinoma located near the bladder defect area.  The carcinoma cells are positive for NKX3.1, (focal) p16; negative for CDX2.  PIN 4 triple IHC stains also confirm the above diagnosis.            Discharge Instructions and Follow-Up:   Activity:   - No strenuous exercise for 6 weeks.   - No lifting, pushing, pulling more than 10 pounds for 6 weeks. Take care when pushing with your arms to stand up.  - Do not strain your belly area.  When you bend, sit up or twice, you could strain the area around your incision.    - Do not strain with bowel movements.    - Do not drive until you can press the brake pedal quickly and fully without pain.   - Do not operate a motor vehicle while taking narcotic pain medications.     Medications:   1) PAIN: To reduce your Opiate use, multiple non-opiate medications should be taken together FIRST.  First take Tylenol (acetaminophen/APAP).  Then add muscle spasm medications, such as Robaxen (methocarbamol).  In addition to these, medications such as Lidocaine patches and Menthol patches can also be added to help with pain.  Some of these have been prescribed for you.  Always follow prescribing guidelines.   Do not take more than 3,200 - 4,000mg of Tylenol (acetaminophen/ APAP) from all sources in any 24 hour period since this can cause liver damage.      If you still have pain after using non-opiates, add Oxycodone, an Opiate medication that has been prescribed for pain.  Never drive, operate machinery or drink alcoholic beverages while you are taking Opiate pain medications. Opiates will cause sleepiness and constipation and can become addictive, therefore it is best to stop or reduce them as soon as you can.  Any left over Opiates should be disposed of with an Authorized  for unneeded medications.  Contact your [facility]'s or [facility]'s household trash and recycling service to learn about medication disposal options and guidelines for your area.  If you decide to store this medication at home it should be kept in a locked cabinet to prevent access to children or visitors.     Patient was previously on high dose oxycodone before admission. Regimen for tapering oxycodone was provided at discharge. Plan is to follow up with oncology to for future management of oxycodone.    2) CONSTIPATION: Surgery, pain medications and bladder spasm medications can all make you constipated.  Pericolace (senna/docusate sodium) can be taken twice daily for prevention and treatment of constipation.  Other over the counter solutions such as prune juice, miralax, fiber products, senna, and dulcolax can also be used if you prefer.  Please reduce or stop these if you develop loose stools. If you are taking these but still have not had a bowel movement in 3 days, start over-the-counter Milk of Magnesia taken twice daily until you have a good result.  Call the office with any concerns.     3) ANTIBIOTICS:  - Continue Augmentin 875mg bid and fluconazole 400mg daily for 6 weeks or until followup with Infectious Disease, whichever is LONGER    Incisions (WOUND PACKING INSTRUCTIONS) :   - Staples have been removed  - There is a ~1cm  "incisional opening just inferior to the umbilicus that will need packing twice daily with 1/4\" or 1/2\" Nugauze.  INSTRUCTIONS:  Twice daily:  pack abdominal wound with 1/4\" or 1/2\" Nugauze using a sterile Qtip.  Cover with a 4x4.  Continue packing until wound is too shallow to pack.  May pack more frequently if dressing becomes soiled or wet.  Monitor drainage for purulence.  Monitor surrounding skin for erythema, induration.    - If steri-strips were used you may wash over them normally, as you would wash your remaining skin.  Steri-strips should fall off on their own within 5-10 days.   - Daily showering is important, and you may get incisions wet starting 48 hours after surgery.  - Do not scrub incisions or soak in a bath, pool, hot tub, etc. until staples have been removed and incisions have fully healed, typically 4 weeks.   - It is preferable for the incision to be left uncovered, but you may cover with gauze if needed for comfort or to protect clothing from drainage.    Tubes / drains:  - Your Abdominal drain has been removed.  FOR YOUR DRAIN SITE - change gauze dressing daily or more frequently as soiled.  Should leave site OPEN TO THE AIR, once a scab forms and the site is dry (typically 24-48 hours after drain removal)     Follow-Up:   - Schedule an appointment to be seen by your primary care provider within 7 days of discharge for a postoperative checkup.   - Follow up with Dr. Santacruz as scheduled on 1/15/23  - Follow up with Infectious Disease in 6 weeks.  Continue antibiotics until this appointment  - Call or return sooner than your regularly scheduled visit if you develop any of the following:  Fever (greater than 101.3F), uncontrolled pain, uncontrolled nausea or vomiting, concerns about bowel function, as well as increased redness, swelling, drainage from your wound or any concerns about urinating or urinary catheter drainage.      Phone numbers:   - Monday through Friday 8am to 4:30pm: Call " "[phone] with questions, requests for medication refills, or to schedule or confirm an appointment.  - Nights, weekends, or holidays: call the after hours emergency pager - [phone] and tell the  \"I would like to page the Urology Resident on call.\" Typically, the on-call provider should return your call within 30 minutes.  Please page the on-call provider again if you haven't been contacted as expected.  Rarely, the on-call provider will be unable to promptly return a call due to a hospital emergency.  If you have paged twice and are still not contacted, ask the hospital  to page the \"urology CHIEF-RESIDENT on call\".   - Please note that due to prescribing laws, resident physicians are unable to prescribe narcotics after-hours. If you feel as though you will need a refill of a narcotic pain medication, you will need to call the clinic during business hours OR seek emergency care.  - For emergencies, call 911         Discharge Disposition:     Discharged to rehabilitation facility      Attestation: I have reviewed today's vital signs, notes, medications, labs and imaging.    Heather Franklin, SANDRA  [facility]         Please call Job Code:   [phone] to reach the Urology resident or PA on call - Weekdays  [phone] to reach the Urology resident or PA on call - Weeknights and weekends      December 11, 2023         "

## 2023-12-08 NOTE — PROGRESS NOTES
Calorie Count  Intake recorded for: 12/7  Total Kcals: 1254 Total Protein: 63g  Kcals from Hospital Food: 1254   Protein: 63g  Kcals from Outside Food (average):0 Protein: 0g  # Meals Ordered from Kitchen: 2 meals  # Meals Recorded: 2 meals  (First - 100% scrambled eggs w/ cheese, 2 garza strips, 50% yogurt)  (Second - 75% 2 beef tacos w/ cheese, lettuce, tomato, and sour cream, 2 bags of chips, 1 Pepsi)  # Supplements Recorded: 0

## 2023-12-08 NOTE — PROGRESS NOTES
"Urology  Progress Note    24 hour events/Subjective:     - No acute events overnight   - ambulating   - Tolerating reg diet, + gas, endorses hunger   - TPN discontinued, abx plan finalized, apixaban restarted   - passing stool    Exam  /52 (BP Location: Left arm)   Pulse 74   Temp 99  F (37.2  C) (Oral)   Resp 20   Ht 1.778 m (5' 10\")   Wt 86.1 kg (189 lb 14.4 oz)   SpO2 95%   BMI 27.25 kg/m    No acute distress  Unlabored breathing on RA  Abdomen soft, appropriately tender, nondistended. midline incision cdi with staples, conduit healthy with two stents in place  CHRISTIANO serosanguinous  Jiménez in place in new conduit draining light red in tubing. CHRISTIANO serosang      Intake/Output Summary (Last 24 hours) at 12/1/2023 0552  Last data filed at 11/30/2023 2145  Gross per 24 hour   Intake 3000 ml   Output 375 ml   Net 2625 ml       UOP 1750/650  CHRISTIANO removed  Stool 3x/-    Labs  Recent Labs   Lab Test 12/05/23  0702 12/04/23  0726 12/03/23  0712   WBC 5.3 5.7 6.9   HGB 8.5* 8.3* 8.2*   CR 0.46* 0.44* 0.52*          7-Day Micro Results       Collected Updated Procedure Result Status      12/04/2023 0621 12/04/2023 0713 Creatinine fluid [[id number]]    Body fluid, unsp from Other    Final result Component Value Units   Creatinine Fluid Source Drain    Creatinine fluid 0.5 mg/dL                   Assessment/Plan  68 year old y/o male POD#8 s/p cystectomy with IC, pubectomy, and rectus flap for chronic pubic osteomyelitis. WOC consulted.  AC restarted, abx plan finalized. PT OT recommend TCU    Note - plan changes for today are in bold below.    Neuro: pain control: PRN oxycodone 5-10 mg q3h , PRN dilaudid 0.2 - 0.4 mg q2h, and scheduled tylenol   CV: HDS   Pulm: incentive spirometry while awake  FEN/GI: Regular diet. bowel regimen  Endo: relatively euglycemic  : stents in place, conduit appears pink and viable. Stents to remain in place for 6 weeks. CHRISTIANO drain to bulb suction. CHRISTIANO Cr wnl  Heme/ID: monitor for s/s of " infection; monitor Hb and transfuse as indicated. Awaiting bone and intra-op cultures. PICC out prior to discharge. ID consult when cultures result. 2+ yeast for now. ID consult, PO Augmentin and fluconazole  Activity: Up as tolerated  Ambulate at least TID  PT/OT consulted   Ppx: apixaban  Home RX on HOLD: none  Dispo: medically ready for TCU  PT/OT recommendations: TCU      Seen and examined with the chief resident. Will discuss with Miki Correa MD.    Matthew Beltran MD  Urology PGY-2      PTA medications:   Prior to Admission medications    Medication Sig Start Date End Date Taking? Authorizing Provider   acetaminophen (TYLENOL) 325 MG tablet Take 975 mg by mouth every 8 hours as needed for mild pain   Yes Reported, Patient   amLODIPine (NORVASC) 10 MG tablet Take 1 tablet (10 mg) by mouth daily  Patient taking differently: Take 10 mg by mouth every morning 2/3/23  Yes Wei Perez MD   atorvastatin (LIPITOR) 20 MG tablet Take 1 tablet (20 mg) by mouth daily 11/15/23  Yes Ezequiel Alejandra MD   furosemide (LASIX) 20 MG tablet Take 1 tablet (20 mg) by mouth daily as needed  Patient taking differently: Take 20 mg by mouth daily as needed (edema) 11/9/23  Yes Erum Fritz MD   hydrOXYzine (ATARAX) 10 MG tablet Take 1 tablet (10 mg) by mouth every 6 hours as needed for itching or anxiety (with pain, moderate pain) 2/7/23  Yes Haydee Abdul MD   leuprolide (LUPRON DEPOT) 45 MG kit Inject 45 mg into the muscle every 6 months   Yes Unknown, Entered By History   lisinopril (ZESTRIL) 40 MG tablet Take 40 mg by mouth every morning   Yes Unknown, Entered By History   LORazepam (ATIVAN) 0.5 MG tablet Take 1 tablet (0.5 mg) by mouth every 6 hours as needed for anxiety 8/14/23  Yes Erum Fritz MD   megestrol (MEGACE) 20 MG tablet Take 20 mg by mouth 2 times daily   Yes Unknown, Entered By History   nitroFURantoin macrocrystal-monohydrate (MACROBID) 100 MG capsule Take 1 capsule (100  mg) by mouth 2 times daily for 3 days 11/27/23 11/30/23 Yes Miki Correa MD   omeprazole (PRILOSEC) 40 MG DR capsule Take 1 capsule (40 mg) by mouth daily  Patient taking differently: 40 mg every morning 8/2/23  Yes Wei Perez MD   ondansetron (ZOFRAN ODT) 8 MG ODT tab Take 1 tablet (8 mg) by mouth every 8 hours as needed for nausea 6/1/23  Yes Erum Fritz MD   oxybutynin (DITROPAN) 5 MG tablet Take 5 mg by mouth 4 times daily as needed for bladder spasms   Yes Unknown, Entered By History   oxyCODONE (OXYCONTIN) 40 MG 12 hr tablet Take 1 tablet (40 mg) by mouth every 12 hours 11/9/23  Yes Erum Fritz MD   oxyCODONE IR (ROXICODONE) 10 MG tablet Take 1 tablet (10 mg) by mouth every 6 hours as needed for moderate to severe pain 10/26/23  Yes Erum Fritz MD   potassium chloride ER (KLOR-CON M) 20 MEQ CR tablet Take 1 tablet (20 mEq) by mouth daily as needed for potassium supplementation (Take if you take furosemide)  Patient taking differently: Take 20 mEq by mouth 2 times daily 4/26/23  Yes Erum Fritz MD   prochlorperazine (COMPAZINE) 10 MG tablet Take 1 tablet (10 mg) by mouth every 6 hours as needed for nausea or vomiting 5/30/23  Yes Erum Fritz MD   tamsulosin (FLOMAX) 0.4 MG capsule Take 1 capsule by mouth every morning 7/10/23  Yes Reported, Patient   traZODone (DESYREL) 50 MG tablet Take 50 mg by mouth nightly as needed for sleep 3/22/23  Yes Reported, Patient   apixaban ANTICOAGULANT (ELIQUIS) 5 MG tablet Take 1 tablet (5 mg) by mouth 2 times daily 5/31/23   Erum Fritz MD   docusate sodium (COLACE) 100 MG capsule Take 100 mg by mouth daily    Unknown, Entered By History   predniSONE (DELTASONE) 5 MG tablet Take 1 tablet (5 mg) by mouth 2 times daily 5/3/23   Erum Fritz MD          Contacting the urology team: Please see [employer] and page on-call clinician with any questions or concerns regarding this patient. Note writer may be  "unavailable.     To access [employer] from intranet: under \"Applications\" --> \"Business Applications\" select \"[employer]\" and search \"Urology Adult & Pediatric/[facility].\" Please note that any question about a urology inpatient, [place] or [place], should go to job code [id number].       Coding query:    Acute blood loss anemia, required 2 units previously, now stable      Severe Malnutrition in the setting of chronic illness, required TPN, now discontinued, following with nutrition  "

## 2023-12-08 NOTE — PLAN OF CARE
Problem: Adult Inpatient Plan of Care  Goal: Absence of Hospital-Acquired Illness or Injury  Outcome: Progressing  Goal: Optimal Comfort and Wellbeing  Outcome: Progressing     Problem: Adult Inpatient Plan of Care  Goal: Optimal Comfort and Wellbeing  Outcome: Progressing   Goal Outcome Evaluation:    Temp: 99  F (37.2  C) Temp src: Oral BP: 100/52 Pulse: 74   Resp: 20 SpO2: 95 % O2 Device: None (Room air)       Reason for admission: POD 7-8 ileal conduit creation     NEURO: A&Ox4  RESPIRATORY: WDL on RA.   CARDIAC: VSS. Denies cardiac chest pain.   GI/: urostomy patent w/ bloody output. 2 soft, formed Bms   DIET: regular  PAIN/NAUSEA: pain managed w/ scheduled meds, prn oxycodone and atarax. Reminded to splint abdomen. Denies nausea  INCISION/DRAINS/SKIN: midline incision.Urostomy w/ 2 stents.   IV ACCESS: PIV SL, PICC SL  ACTIVITY: up to bedside commode x2  LAB: reviewed.  CHANGES: no acute major changes.   PLAN: Continue w POC

## 2023-12-08 NOTE — PROGRESS NOTES
CLINICAL NUTRITION SERVICES - REASSESSMENT NOTE     Nutrition Prescription    RECOMMENDATIONS FOR MDs/PROVIDERS TO ORDER:  None at this time     Malnutrition Status:    Severe malnutrition in the context of chronic illness    Recommendations already ordered by Registered Dietitian (RD):  None at this time     Future/Additional Recommendations:  Monitor PO intakes, wt trends; inpatient RD will continue to follow per protocol     EVALUATION OF THE PROGRESS TOWARD GOALS   Diet: Regular  - Supplements: PRN    Intake: Pt was on TPN 12/1 -12/5 (started initial dextrose 115 g on 12/1, adv to 165 g dex 12/2, reached goal 215 g dex 12/3). TPN discontinued 12/5 after tolerating regular diet.  Good appetite per chart review. Pt feeling hungry per provider note today.  Has been on regular diet 12/3, PO intake/aravind counts improving (see below):    Calorie Counts  12/7: 1254 kcal and 63 g protein (~60% estimated kcal and protein needs)  12/6: 436 kcal and 22 g protein (~20% estimated kcal and protein needs)  12/5: 424 kcal and 14 g protein (~20% estimated kcal needs and ~15% estimated protein needs)     NEW FINDINGS   Weight: trending up since admit, suspect fluid related and/or scale discrepancy at least in part    MALNUTRITION  % Intake: Decreased intake does not meet criteria  % Weight Loss: > 20% in 1 year (severe)   Subcutaneous Fat Loss: Facial region, Upper arm, and Lower arm:  mild per RD note on 12/1  Muscle Loss: Temporal, Facial & jaw region, Upper arm (bicep, tricep), and Lower arm  (forearm):  severe per RD note on 12/1  Fluid Accumulation/Edema: Mild per flowsheets  Malnutrition Diagnosis: Severe malnutrition in the context of chronic illness    Previous Goals   Once TPN reaches goal, total avg nutritional intake to meet a minimum of 25 kcal/kg and 1.2 g PRO/kg daily (per dosing wt 82 kg).   Evaluation: Not met consistently    Previous Nutrition Diagnosis  Inadequate oral intake related to poor appetite as evidenced  by 24% wt loss x 10 months     Evaluation: Improving    CURRENT NUTRITION DIAGNOSIS  Inadequate oral intake related to variable/improving PO intake as evidenced by aravind counts ~20-60% estimated needs but is improving and with good appetite      INTERVENTIONS  Implementation  Chart review, pt with other cares during attempts to visit    Goals  Patient to consume % of nutritionally adequate meal trays TID, or the equivalent with supplements/snacks.    Monitoring/Evaluation  Progress toward goals will be monitored and evaluated per protocol.     Wilma Bright RD, , LD  Weekday Pager: [phone]  Weekday Units covered: 5A (6868-3546) and 7B (1083-2379)  Weekend/Holiday RD Pager: [phone]

## 2023-12-08 NOTE — PROGRESS NOTES
Care Management Follow Up    Length of Stay (days): 8    Expected Discharge Date: 12/11/2023     Concerns to be Addressed: discharge planning     Patient plan of care discussed at interdisciplinary rounds: Yes    Anticipated Discharge Disposition: Transitional Care    ACCEPTED:    [facility] [place]  [address]  [place] [address]  Yaneli de anda (Kendra): [phone]     Anticipated Discharge Services:    Anticipated Discharge DME:      Patient/family educated on Medicare website which has current facility and service quality ratings: yes  Education Provided on the Discharge Plan:    Patient/Family in Agreement with the Plan: yes    Referrals Placed by CM/SW: Post Acute Facilities    Pending:     [address]  [place] [address]  P: [phone]  F: [phone]  12/8: left multiple VM's for different people in the organization. Awaiting a call back.    [facility]  [address] [facility] [address]  Ph: [phone]  F: [phone]  12/8: referral sent today     [facility] [phone]  12/8: left VM inquiring about bed availability. No referral sent yet.     Declined:    [facility]  [address] [place] [address]  P: [phone]  12/7: Declined due to staffing shortages at the facility, could try back in one week     [facility]  [address]   [place] [address]  P: [phone]  Elza in admissions: [phone]  12/7: Declined, bed not available. May have beds next week.     [facility]  [address] [place] [address]  P: [phone]  12/6: Declined, bed not available    Guardian Leni Bergeron - 12/8: no bed available, facility is full through next week    Private pay costs discussed: Not applicable    Additional Information:    ADDENDUM 4:20PM: SW received a call from Yaneli Gardner, confirming the [facility] [place] can  accept the patient Monday morning. Kendra requested patients ride be scheduled earlier in the morning. Kendra also asked if patient could come with a weeks worth of ostomy supplies.    MAGGIE spoke with patient at bedside to provide TCU acceptance update- patient agreeable to plan and is going to see if his wife is able to transport him on Monday to [facility]. Otherwise, he will need a stretcher ride due to chronic pain, and where his surgery was done, the bedside RN does not feel he could tolerate a wheelchair ride to [place], [place]. MAGGIE explained to patient a stretcher ride may or may not be covered under insurance. Patient understood.     MAGGIE paged urology to update them. Updated bedside RN and charge RN of plan. Will put patient on the weekend list for a stretcher ride to be scheduled.       NILESH Matson [facility]    Phone: [phone]  Pager: [phone]  Graciela

## 2023-12-09 ENCOUNTER — APPOINTMENT (OUTPATIENT)
Dept: PHYSICAL THERAPY | Facility: CLINIC | Age: 68
DRG: 653 | End: 2023-12-09
Attending: UROLOGY
Payer: COMMERCIAL

## 2023-12-09 LAB
HOLD SPECIMEN: NORMAL
MAGNESIUM SERPL-MCNC: 1.9 MG/DL (ref 1.7–2.3)

## 2023-12-09 PROCEDURE — 250N000013 HC RX MED GY IP 250 OP 250 PS 637

## 2023-12-09 PROCEDURE — 36592 COLLECT BLOOD FROM PICC: CPT | Performed by: UROLOGY

## 2023-12-09 PROCEDURE — 83735 ASSAY OF MAGNESIUM: CPT | Performed by: UROLOGY

## 2023-12-09 PROCEDURE — 97116 GAIT TRAINING THERAPY: CPT | Mod: GP

## 2023-12-09 PROCEDURE — 250N000013 HC RX MED GY IP 250 OP 250 PS 637: Performed by: PHYSICIAN ASSISTANT

## 2023-12-09 PROCEDURE — 250N000011 HC RX IP 250 OP 636

## 2023-12-09 PROCEDURE — 97530 THERAPEUTIC ACTIVITIES: CPT | Mod: GP

## 2023-12-09 PROCEDURE — 250N000013 HC RX MED GY IP 250 OP 250 PS 637: Performed by: UROLOGY

## 2023-12-09 PROCEDURE — 120N000002 HC R&B MED SURG/OB UMMC

## 2023-12-09 PROCEDURE — 999N000044 HC STATISTIC CVC DRESSING CHANGE

## 2023-12-09 PROCEDURE — 250N000011 HC RX IP 250 OP 636: Mod: JZ | Performed by: UROLOGY

## 2023-12-09 RX ORDER — MAGNESIUM SULFATE HEPTAHYDRATE 40 MG/ML
2 INJECTION, SOLUTION INTRAVENOUS ONCE
Status: COMPLETED | OUTPATIENT
Start: 2023-12-09 | End: 2023-12-09

## 2023-12-09 RX ADMIN — OXYCODONE HYDROCHLORIDE 20 MG: 10 TABLET ORAL at 03:35

## 2023-12-09 RX ADMIN — MEGESTROL ACETATE 20 MG: 20 TABLET ORAL at 09:05

## 2023-12-09 RX ADMIN — METHOCARBAMOL 500 MG: 500 TABLET ORAL at 20:18

## 2023-12-09 RX ADMIN — OXYCODONE HYDROCHLORIDE 15 MG: 10 TABLET ORAL at 10:27

## 2023-12-09 RX ADMIN — OXYCODONE HYDROCHLORIDE 20 MG: 10 TABLET ORAL at 23:45

## 2023-12-09 RX ADMIN — OXYCODONE HYDROCHLORIDE 40 MG: 40 TABLET, FILM COATED, EXTENDED RELEASE ORAL at 09:07

## 2023-12-09 RX ADMIN — METHOCARBAMOL 500 MG: 500 TABLET ORAL at 12:10

## 2023-12-09 RX ADMIN — METHOCARBAMOL 500 MG: 500 TABLET ORAL at 17:13

## 2023-12-09 RX ADMIN — APIXABAN 5 MG: 5 TABLET, FILM COATED ORAL at 09:08

## 2023-12-09 RX ADMIN — PANTOPRAZOLE SODIUM 40 MG: 40 TABLET, DELAYED RELEASE ORAL at 09:08

## 2023-12-09 RX ADMIN — OXYCODONE HYDROCHLORIDE 15 MG: 10 TABLET ORAL at 15:49

## 2023-12-09 RX ADMIN — OXYCODONE HYDROCHLORIDE 20 MG: 10 TABLET ORAL at 20:19

## 2023-12-09 RX ADMIN — AMOXICILLIN AND CLAVULANATE POTASSIUM 1 TABLET: 875; 125 TABLET, FILM COATED ORAL at 09:08

## 2023-12-09 RX ADMIN — OXYCODONE HYDROCHLORIDE 20 MG: 10 TABLET ORAL at 06:39

## 2023-12-09 RX ADMIN — MAGNESIUM SULFATE HEPTAHYDRATE 2 G: 40 INJECTION, SOLUTION INTRAVENOUS at 10:28

## 2023-12-09 RX ADMIN — ACETAMINOPHEN 975 MG: 325 TABLET, FILM COATED ORAL at 20:19

## 2023-12-09 RX ADMIN — OXYCODONE HYDROCHLORIDE 20 MG: 10 TABLET ORAL at 00:34

## 2023-12-09 RX ADMIN — OXYCODONE HYDROCHLORIDE 40 MG: 40 TABLET, FILM COATED, EXTENDED RELEASE ORAL at 20:19

## 2023-12-09 RX ADMIN — ACETAMINOPHEN 975 MG: 325 TABLET, FILM COATED ORAL at 12:10

## 2023-12-09 RX ADMIN — METHOCARBAMOL 500 MG: 500 TABLET ORAL at 09:08

## 2023-12-09 RX ADMIN — LIDOCAINE 2 PATCH: 4 PATCH TOPICAL at 09:08

## 2023-12-09 RX ADMIN — Medication 10 ML: at 12:10

## 2023-12-09 RX ADMIN — APIXABAN 5 MG: 5 TABLET, FILM COATED ORAL at 20:19

## 2023-12-09 RX ADMIN — MEGESTROL ACETATE 20 MG: 20 TABLET ORAL at 20:18

## 2023-12-09 RX ADMIN — ACETAMINOPHEN 975 MG: 325 TABLET, FILM COATED ORAL at 03:35

## 2023-12-09 RX ADMIN — AMOXICILLIN AND CLAVULANATE POTASSIUM 1 TABLET: 875; 125 TABLET, FILM COATED ORAL at 20:18

## 2023-12-09 RX ADMIN — FLUCONAZOLE 400 MG: 200 TABLET ORAL at 09:08

## 2023-12-09 ASSESSMENT — ACTIVITIES OF DAILY LIVING (ADL)
ADLS_ACUITY_SCORE: 36
ADLS_ACUITY_SCORE: 34
ADLS_ACUITY_SCORE: 36
ADLS_ACUITY_SCORE: 37
ADLS_ACUITY_SCORE: 37
ADLS_ACUITY_SCORE: 34

## 2023-12-09 NOTE — PLAN OF CARE
Goal Outcome Evaluation:      Plan of Care Reviewed With: patient    Overall Patient Progress: improvingOverall Patient Progress: improving    Outcome Evaluation: found TCU placement tentatively for Monday. Ambulating several times.    VSS. RA. LS clear. A&Ox4. Baseline N/T in bilateral feet from chemo. Pain managed with PRN oxycodone. R DL PICC- HL. Mg replaced IV- recheck in AM. Urostomy (2 stents)- red urine, AOP. Old CHRISTIANO site-CDI. +BS and passing flatus, several BMs today. Assist x2 with walker and gaitbelt- generalized weakness, ambulated in halls. Regular diet- fair PO intake. Midline abd incision- stapled and STEPHEN

## 2023-12-09 NOTE — PROGRESS NOTES
"/60 (BP Location: Left arm, Patient Position: Semi-Vinson's, Cuff Size: Adult Regular)   Pulse 89   Temp 98.3  F (36.8  C) (Oral)   Resp 19   Ht 1.778 m (5' 10\")   Wt 85.2 kg (187 lb 14.4 oz)   SpO2 98%   BMI 26.96 kg/m      A x1-2 w/ FWW + gaitbelt. 7/10 pain managed w/ PRN oxycodone, sched oxycontin, sched tylenol. Urostomy draining red urine, 2 stents. DL PICC - HL. X1 BM on this shift. Midline abd incision - STEPHEN.   "

## 2023-12-09 NOTE — PROGRESS NOTES
"Urology  Progress Note    24 hour events/Subjective:     - No acute events overnight   - ambulating   - Tolerating reg diet, + gas, endorses hunger   - TPN discontinued, abx plan finalized, apixaban restarted   - passing stool    Exam  /60 (BP Location: Left arm, Patient Position: Semi-Vinson's, Cuff Size: Adult Regular)   Pulse 89   Temp 98.3  F (36.8  C) (Oral)   Resp 19   Ht 1.778 m (5' 10\")   Wt 85.2 kg (187 lb 14.4 oz)   SpO2 98%   BMI 26.96 kg/m    No acute distress  Unlabored breathing on RA  Abdomen soft, appropriately tender, nondistended. midline incision cdi with staples, conduit healthy with two stents in place  CHRISTIANO serosanguinous  Jiménez in place in new conduit draining light red in tubing. CHRISTIANO serosang      Intake/Output Summary (Last 24 hours) at 12/1/2023 0552  Last data filed at 11/30/2023 2145  Gross per 24 hour   Intake 3000 ml   Output 375 ml   Net 2625 ml       UOP 1550/900  Stool 3x/-    Labs  Recent Labs   Lab Test 12/05/23  0702 12/04/23  0726 12/03/23  0712   WBC 5.3 5.7 6.9   HGB 8.5* 8.3* 8.2*   CR 0.46* 0.44* 0.52*          7-Day Micro Results       Collected Updated Procedure Result Status      12/04/2023 0621 12/04/2023 0713 Creatinine fluid [[id number]]    Body fluid, unsp from Other    Final result Component Value Units   Creatinine Fluid Source Drain    Creatinine fluid 0.5 mg/dL                   Assessment/Plan  68 year old y/o male POD#9 s/p cystectomy with IC, pubectomy, and rectus flap for chronic pubic osteomyelitis. WOC consulted.  AC restarted, abx plan finalized. PT OT recommend TCU, to TCU Monday.    Note - plan changes for today are in bold below.    Neuro: pain control: PRN oxycodone 5-10 mg q3h , PRN dilaudid 0.2 - 0.4 mg q2h, and scheduled tylenol   CV: HDS   Pulm: incentive spirometry while awake  FEN/GI: Regular diet. bowel regimen. Staples to come out on monday  Endo: relatively euglycemic  : stents in place, conduit appears pink and viable. Stents to " remain in place for 6 weeks. CHRISTIANO drain to bulb suction. CHRISTIANO Cr wnl  Heme/ID: monitor for s/s of infection; monitor Hb and transfuse as indicated. Awaiting bone and intra-op cultures. PICC out prior to discharge. ID consult when cultures result. 2+ yeast for now. ID consult, PO Augmentin and fluconazole  Activity: Up as tolerated  Ambulate at least TID  PT/OT consulted   Ppx: apixaban  Home RX on HOLD: none  Dispo: to TCU Monday   PT/OT recommendations: TCU      Seen and examined with the chief resident. Will discuss with Miki Correa MD.    Matthew Beltran MD  Urology PGY-2      PTA medications:   Prior to Admission medications    Medication Sig Start Date End Date Taking? Authorizing Provider   acetaminophen (TYLENOL) 325 MG tablet Take 975 mg by mouth every 8 hours as needed for mild pain   Yes Reported, Patient   amLODIPine (NORVASC) 10 MG tablet Take 1 tablet (10 mg) by mouth daily  Patient taking differently: Take 10 mg by mouth every morning 2/3/23  Yes Wei Perez MD   atorvastatin (LIPITOR) 20 MG tablet Take 1 tablet (20 mg) by mouth daily 11/15/23  Yes Ezequiel Alejandra MD   furosemide (LASIX) 20 MG tablet Take 1 tablet (20 mg) by mouth daily as needed  Patient taking differently: Take 20 mg by mouth daily as needed (edema) 11/9/23  Yes Erum Fritz MD   hydrOXYzine (ATARAX) 10 MG tablet Take 1 tablet (10 mg) by mouth every 6 hours as needed for itching or anxiety (with pain, moderate pain) 2/7/23  Yes Haydee Abdul MD   leuprolide (LUPRON DEPOT) 45 MG kit Inject 45 mg into the muscle every 6 months   Yes Unknown, Entered By History   lisinopril (ZESTRIL) 40 MG tablet Take 40 mg by mouth every morning   Yes Unknown, Entered By History   LORazepam (ATIVAN) 0.5 MG tablet Take 1 tablet (0.5 mg) by mouth every 6 hours as needed for anxiety 8/14/23  Yes Erum Fritz MD   megestrol (MEGACE) 20 MG tablet Take 20 mg by mouth 2 times daily   Yes Unknown, Entered By History    nitroFURantoin macrocrystal-monohydrate (MACROBID) 100 MG capsule Take 1 capsule (100 mg) by mouth 2 times daily for 3 days 11/27/23 11/30/23 Yes Miki Correa MD   omeprazole (PRILOSEC) 40 MG DR capsule Take 1 capsule (40 mg) by mouth daily  Patient taking differently: 40 mg every morning 8/2/23  Yes Wei Perez MD   ondansetron (ZOFRAN ODT) 8 MG ODT tab Take 1 tablet (8 mg) by mouth every 8 hours as needed for nausea 6/1/23  Yes Erum Fritz MD   oxybutynin (DITROPAN) 5 MG tablet Take 5 mg by mouth 4 times daily as needed for bladder spasms   Yes Unknown, Entered By History   oxyCODONE (OXYCONTIN) 40 MG 12 hr tablet Take 1 tablet (40 mg) by mouth every 12 hours 11/9/23  Yes Erum Fritz MD   oxyCODONE IR (ROXICODONE) 10 MG tablet Take 1 tablet (10 mg) by mouth every 6 hours as needed for moderate to severe pain 10/26/23  Yes Erum Fritz MD   potassium chloride ER (KLOR-CON M) 20 MEQ CR tablet Take 1 tablet (20 mEq) by mouth daily as needed for potassium supplementation (Take if you take furosemide)  Patient taking differently: Take 20 mEq by mouth 2 times daily 4/26/23  Yes Erum Fritz MD   prochlorperazine (COMPAZINE) 10 MG tablet Take 1 tablet (10 mg) by mouth every 6 hours as needed for nausea or vomiting 5/30/23  Yes Erum Fritz MD   tamsulosin (FLOMAX) 0.4 MG capsule Take 1 capsule by mouth every morning 7/10/23  Yes Reported, Patient   traZODone (DESYREL) 50 MG tablet Take 50 mg by mouth nightly as needed for sleep 3/22/23  Yes Reported, Patient   apixaban ANTICOAGULANT (ELIQUIS) 5 MG tablet Take 1 tablet (5 mg) by mouth 2 times daily 5/31/23   Erum Fritz MD   docusate sodium (COLACE) 100 MG capsule Take 100 mg by mouth daily    Unknown, Entered By History   predniSONE (DELTASONE) 5 MG tablet Take 1 tablet (5 mg) by mouth 2 times daily 5/3/23   Erum Fritz MD          Contacting the urology team: Please see [employer] and page on-call  "clinician with any questions or concerns regarding this patient. Note writer may be unavailable.     To access [employer] from intranet: under \"Applications\" --> \"Business Applications\" select \"[employer]\" and search \"Urology Adult & Pediatric/[facility].\" Please note that any question about a urology inpatient, [place] or [place], should go to job code [id number].       Coding query:    Acute blood loss anemia, required 2 units previously, now stable      Severe Malnutrition in the setting of chronic illness, required TPN, now discontinued, following with nutrition  "

## 2023-12-09 NOTE — PROGRESS NOTES
Care Management Follow Up    Length of Stay (days): 9    Expected Discharge Date: 12/11/2023     Concerns to be Addressed: discharge planning     Patient plan of care discussed at interdisciplinary rounds: Yes    Anticipated Discharge Disposition: Transitional Care      ACCEPTED:     [facility] [place]  [address]  [place] [address]  Yaneli liaison (Kendra): [phone]     Anticipated Discharge Services:  therapies  Anticipated Discharge DME:  n/a    Patient/family educated on Medicare website which has current facility and service quality ratings: yes  Education Provided on the Discharge Plan:  yes  Patient/Family in Agreement with the Plan: yes    Referrals Placed by CM/SW: Post Acute Facilities  Private pay costs discussed: stretcher and wheelchair transportation costs    Additional Information:  I consulted with patient's bedside RN John Guevara and charge RN (Ana Paula) regarding the transportation options (stretcher, wheelchair and patient's SUV).  They feel patient could ride with patient's wife in her SUV if that is his preference.     I called [employer] ([phone]) and requested quotes for stretcher and wheelchair transportation. Stretcher is approximately $6388 (based on 180 miles, $1228.70 base and $28.67/mile).  Wheelchair transportation would be $1132.18 (based on 180 miles, $89.98 base and $5.79/mile).  I also called [employer] and obtained a stretcher quote ($1400 total, [phone], [place]).    I presented the above information to patient. Jermain consulted with his wife Dasha, and they made the decision to have Dasha transport Jermain to the [facility] [place] on Monday, 12/11/23.  She will arrive at the hospital at 11am.    JOSEPHINE Rodriguez, LICSW  12/9/2023       Social Work and Care Management Department       SEARCHABLE in [facility] - search SOCIAL WORK       [place] ([phone]) Saturday and Sunday     Units: 4A, 4C, & 4E Pager: [phone]      Units: 5A & 5C Pager: [phone]     Units: 6A & 6B   Pager: [phone]     Units: 6C Pager: [phone]     Units: 7A & 7B  Pager: [phone]     Units: 7C Pager: [phone]     Unit: [place] ED Pager: [phone]      [facility] ([phone]) Saturday and Sunday      Units: 5 Ortho, 5 Med/Surg & WB ED  Pager:[phone]     Units: 6 Med/Surg, 8A, & 10A ICU  Pager: [phone]        After hours (1630 - 0000) Saturday & Sunday; (1484-0226) Mon-Fri; ([phone]) [facility] Recognized Holidays     Units: ALL  Pager: [phone]

## 2023-12-10 LAB
HOLD SPECIMEN: NORMAL
MAGNESIUM SERPL-MCNC: 1.8 MG/DL (ref 1.7–2.3)

## 2023-12-10 PROCEDURE — 250N000013 HC RX MED GY IP 250 OP 250 PS 637

## 2023-12-10 PROCEDURE — 250N000013 HC RX MED GY IP 250 OP 250 PS 637: Performed by: UROLOGY

## 2023-12-10 PROCEDURE — 120N000002 HC R&B MED SURG/OB UMMC

## 2023-12-10 PROCEDURE — 250N000013 HC RX MED GY IP 250 OP 250 PS 637: Performed by: PHYSICIAN ASSISTANT

## 2023-12-10 PROCEDURE — 36415 COLL VENOUS BLD VENIPUNCTURE: CPT | Performed by: UROLOGY

## 2023-12-10 PROCEDURE — 83735 ASSAY OF MAGNESIUM: CPT | Performed by: UROLOGY

## 2023-12-10 PROCEDURE — 250N000011 HC RX IP 250 OP 636: Mod: JZ

## 2023-12-10 RX ORDER — MAGNESIUM OXIDE 400 MG/1
400 TABLET ORAL EVERY 4 HOURS
Status: COMPLETED | OUTPATIENT
Start: 2023-12-10 | End: 2023-12-10

## 2023-12-10 RX ADMIN — AMOXICILLIN AND CLAVULANATE POTASSIUM 1 TABLET: 875; 125 TABLET, FILM COATED ORAL at 09:10

## 2023-12-10 RX ADMIN — MEGESTROL ACETATE 20 MG: 20 TABLET ORAL at 19:53

## 2023-12-10 RX ADMIN — METHOCARBAMOL 500 MG: 500 TABLET ORAL at 17:38

## 2023-12-10 RX ADMIN — OXYCODONE HYDROCHLORIDE 15 MG: 10 TABLET ORAL at 17:38

## 2023-12-10 RX ADMIN — OXYCODONE HYDROCHLORIDE 20 MG: 10 TABLET ORAL at 14:44

## 2023-12-10 RX ADMIN — PANTOPRAZOLE SODIUM 40 MG: 40 TABLET, DELAYED RELEASE ORAL at 09:11

## 2023-12-10 RX ADMIN — METHOCARBAMOL 500 MG: 500 TABLET ORAL at 19:53

## 2023-12-10 RX ADMIN — Medication 10 ML: at 12:32

## 2023-12-10 RX ADMIN — ACETAMINOPHEN 975 MG: 325 TABLET, FILM COATED ORAL at 12:32

## 2023-12-10 RX ADMIN — OXYCODONE HYDROCHLORIDE 40 MG: 40 TABLET, FILM COATED, EXTENDED RELEASE ORAL at 09:11

## 2023-12-10 RX ADMIN — OXYCODONE HYDROCHLORIDE 20 MG: 10 TABLET ORAL at 11:07

## 2023-12-10 RX ADMIN — OXYCODONE HYDROCHLORIDE 40 MG: 40 TABLET, FILM COATED, EXTENDED RELEASE ORAL at 19:53

## 2023-12-10 RX ADMIN — MAGNESIUM OXIDE TAB 400 MG (241.3 MG ELEMENTAL MG) 400 MG: 400 (241.3 MG) TAB at 12:33

## 2023-12-10 RX ADMIN — LIDOCAINE 2 PATCH: 4 PATCH TOPICAL at 09:14

## 2023-12-10 RX ADMIN — FLUCONAZOLE 400 MG: 200 TABLET ORAL at 09:11

## 2023-12-10 RX ADMIN — Medication 5 ML: at 08:10

## 2023-12-10 RX ADMIN — METHOCARBAMOL 500 MG: 500 TABLET ORAL at 12:33

## 2023-12-10 RX ADMIN — METHOCARBAMOL 500 MG: 500 TABLET ORAL at 09:11

## 2023-12-10 RX ADMIN — AMOXICILLIN AND CLAVULANATE POTASSIUM 1 TABLET: 875; 125 TABLET, FILM COATED ORAL at 19:52

## 2023-12-10 RX ADMIN — APIXABAN 5 MG: 5 TABLET, FILM COATED ORAL at 09:11

## 2023-12-10 RX ADMIN — OXYCODONE HYDROCHLORIDE 20 MG: 10 TABLET ORAL at 06:50

## 2023-12-10 RX ADMIN — MEGESTROL ACETATE 20 MG: 20 TABLET ORAL at 09:12

## 2023-12-10 RX ADMIN — ACETAMINOPHEN 975 MG: 325 TABLET, FILM COATED ORAL at 19:53

## 2023-12-10 RX ADMIN — APIXABAN 5 MG: 5 TABLET, FILM COATED ORAL at 19:53

## 2023-12-10 RX ADMIN — AMLODIPINE BESYLATE 10 MG: 10 TABLET ORAL at 09:11

## 2023-12-10 RX ADMIN — MAGNESIUM OXIDE TAB 400 MG (241.3 MG ELEMENTAL MG) 400 MG: 400 (241.3 MG) TAB at 14:44

## 2023-12-10 ASSESSMENT — ACTIVITIES OF DAILY LIVING (ADL)
ADLS_ACUITY_SCORE: 34

## 2023-12-10 NOTE — PROVIDER NOTIFICATION
Provider notified (name): Matthew Beltran, Urology   Reason for notification: Can you order hgb for pt? Pt is still having red/bloody output from his urostomy and last hgb check was on 12/5 with a hgb of 8.5.  Recommendation/request given to provider: Lab order for hgb.   Response from provider: Per MD, pt does not need Hgb testing at this time.

## 2023-12-10 NOTE — PROGRESS NOTES
"/50 (BP Location: Left arm)   Pulse 79   Temp 97.9  F (36.6  C) (Oral)   Resp 20   Ht 1.778 m (5' 10\")   Wt 85.2 kg (187 lb 14.4 oz)   SpO2 97%   BMI 26.96 kg/m      A x1-2 w/ FWW + gaitbelt. 7/10 pain managed w/ PRN oxycodone, sched oxycontin, sched tylenol. Urostomy draining red urine, 2 stents. DL PICC - HL. X1 BM on this shift. Midline abd incision - STEPHEN.   "

## 2023-12-10 NOTE — PROGRESS NOTES
"Urology  Progress Note    24 hour events/Subjective:     - No acute events overnight   - ambulating   - Tolerating reg diet, + gas, endorses hunger   - TPN discontinued, abx plan finalized, apixaban restarted   - passing stool    Exam  /50 (BP Location: Left arm)   Pulse 79   Temp 97.9  F (36.6  C) (Oral)   Resp 20   Ht 1.778 m (5' 10\")   Wt 85.2 kg (187 lb 14.4 oz)   SpO2 97%   BMI 26.96 kg/m    No acute distress  Unlabored breathing on RA  Abdomen soft, appropriately tender, nondistended. midline incision cdi with staples, conduit healthy with two stents in place  Jiménez in place in new conduit draining light red in tubing.       Intake/Output Summary (Last 24 hours) at 12/1/2023 0552  Last data filed at 11/30/2023 2145  Gross per 24 hour   Intake 3000 ml   Output 375 ml   Net 2625 ml       UOP 1700/750  Stool 1x/-    Labs  Recent Labs   Lab Test 12/05/23  0702 12/04/23  0726 12/03/23  0712   WBC 5.3 5.7 6.9   HGB 8.5* 8.3* 8.2*   CR 0.46* 0.44* 0.52*          7-Day Micro Results       Collected Updated Procedure Result Status      12/04/2023 0621 12/04/2023 0713 Creatinine fluid [[id number]]    Body fluid, unsp from Other    Final result Component Value Units   Creatinine Fluid Source Drain    Creatinine fluid 0.5 mg/dL                   Assessment/Plan  68 year old y/o male POD#10 s/p cystectomy with IC, pubectomy, and rectus flap for chronic pubic osteomyelitis. WOC consulted.  AC restarted, abx plan finalized. PT OT recommend TCU, to TCU Monday.    Note - plan changes for today are in bold below.    Neuro: pain control: PRN oxycodone 5-10 mg q3h , PRN dilaudid 0.2 - 0.4 mg q2h, and scheduled tylenol   CV: HDS   Pulm: incentive spirometry while awake  FEN/GI: Regular diet. bowel regimen. Staples to come out on monday  Endo: relatively euglycemic  : stents in place, conduit appears pink and viable. Stents to remain in place for 6 weeks. CHRISTIANO drain to bulb suction. CHRISTIANO Cr wnl  Heme/ID: monitor for " s/s of infection; monitor Hb and transfuse as indicated. Awaiting bone and intra-op cultures. PICC out prior to discharge. ID consult when cultures result. 2+ yeast for now. ID consult, PO Augmentin and fluconazole  Activity: Up as tolerated  Ambulate at least TID  PT/OT consulted   Ppx: apixaban  Home RX on HOLD: none  Dispo: to TCU Monday   PT/OT recommendations: TCU      Seen and examined with the chief resident. Will discuss with Miki Correa MD.    Matthew Beltran MD  Urology PGY-2      PTA medications:   Prior to Admission medications    Medication Sig Start Date End Date Taking? Authorizing Provider   acetaminophen (TYLENOL) 325 MG tablet Take 975 mg by mouth every 8 hours as needed for mild pain   Yes Reported, Patient   amLODIPine (NORVASC) 10 MG tablet Take 1 tablet (10 mg) by mouth daily  Patient taking differently: Take 10 mg by mouth every morning 2/3/23  Yes Wie Perez MD   atorvastatin (LIPITOR) 20 MG tablet Take 1 tablet (20 mg) by mouth daily 11/15/23  Yes Ezequiel Alejandra MD   furosemide (LASIX) 20 MG tablet Take 1 tablet (20 mg) by mouth daily as needed  Patient taking differently: Take 20 mg by mouth daily as needed (edema) 11/9/23  Yes Erum Fritz MD   hydrOXYzine (ATARAX) 10 MG tablet Take 1 tablet (10 mg) by mouth every 6 hours as needed for itching or anxiety (with pain, moderate pain) 2/7/23  Yes Haydee Abdul MD   leuprolide (LUPRON DEPOT) 45 MG kit Inject 45 mg into the muscle every 6 months   Yes Unknown, Entered By History   lisinopril (ZESTRIL) 40 MG tablet Take 40 mg by mouth every morning   Yes Unknown, Entered By History   LORazepam (ATIVAN) 0.5 MG tablet Take 1 tablet (0.5 mg) by mouth every 6 hours as needed for anxiety 8/14/23  Yes Erum Fritz MD   megestrol (MEGACE) 20 MG tablet Take 20 mg by mouth 2 times daily   Yes Unknown, Entered By History   nitroFURantoin macrocrystal-monohydrate (MACROBID) 100 MG capsule Take 1 capsule (100 mg)  by mouth 2 times daily for 3 days 11/27/23 11/30/23 Yes Miki Correa MD   omeprazole (PRILOSEC) 40 MG DR capsule Take 1 capsule (40 mg) by mouth daily  Patient taking differently: 40 mg every morning 8/2/23  Yes Wei Perez MD   ondansetron (ZOFRAN ODT) 8 MG ODT tab Take 1 tablet (8 mg) by mouth every 8 hours as needed for nausea 6/1/23  Yes Erum Fritz MD   oxybutynin (DITROPAN) 5 MG tablet Take 5 mg by mouth 4 times daily as needed for bladder spasms   Yes Unknown, Entered By History   oxyCODONE (OXYCONTIN) 40 MG 12 hr tablet Take 1 tablet (40 mg) by mouth every 12 hours 11/9/23  Yes Erum Fritz MD   oxyCODONE IR (ROXICODONE) 10 MG tablet Take 1 tablet (10 mg) by mouth every 6 hours as needed for moderate to severe pain 10/26/23  Yes Erum Fritz MD   potassium chloride ER (KLOR-CON M) 20 MEQ CR tablet Take 1 tablet (20 mEq) by mouth daily as needed for potassium supplementation (Take if you take furosemide)  Patient taking differently: Take 20 mEq by mouth 2 times daily 4/26/23  Yes Erum Fritz MD   prochlorperazine (COMPAZINE) 10 MG tablet Take 1 tablet (10 mg) by mouth every 6 hours as needed for nausea or vomiting 5/30/23  Yes Erum Fritz MD   tamsulosin (FLOMAX) 0.4 MG capsule Take 1 capsule by mouth every morning 7/10/23  Yes Reported, Patient   traZODone (DESYREL) 50 MG tablet Take 50 mg by mouth nightly as needed for sleep 3/22/23  Yes Reported, Patient   apixaban ANTICOAGULANT (ELIQUIS) 5 MG tablet Take 1 tablet (5 mg) by mouth 2 times daily 5/31/23   Erum Fritz MD   docusate sodium (COLACE) 100 MG capsule Take 100 mg by mouth daily    Unknown, Entered By History   predniSONE (DELTASONE) 5 MG tablet Take 1 tablet (5 mg) by mouth 2 times daily 5/3/23   Erum Fritz MD          Contacting the urology team: Please see [employer] and page on-call clinician with any questions or concerns regarding this patient. Note writer may be  "unavailable.     To access [employer] from intranet: under \"Applications\" --> \"Business Applications\" select \"[employer]\" and search \"Urology Adult & Pediatric/[facility].\" Please note that any question about a urology inpatient, [place] or [place], should go to job code [id number].       Coding query:    Acute blood loss anemia, required 2 units previously, now stable      Severe Malnutrition in the setting of chronic illness, required TPN, now discontinued, following with nutrition    Seen and agree  Doing well  Tolerating diet  No n/v  +flatus, +bm  Ambulating    Abd soft  Urine clear red from urostomy  Stents in place    Plan for TCU tomorrow ,staples out tomorrow  "

## 2023-12-10 NOTE — PLAN OF CARE
Goal Outcome Evaluation:    Temp: 98.1  F (36.7  C) Temp src: Oral BP: 106/59 Pulse: 81   Resp: 16 SpO2: 98 % O2 Device: None (Room air)      VSS. RA. LS clear. A&Ox4. Baseline N/T in bilateral feet from chemo. Bilateral LE edema and edematous scrotum. Pain managed with PRN oxycodone X2 and scheduled Oxycontin. R DL PICC- HL. Mg replaced IV- recheck in AM. Urostomy (2 stents)- red urine, AOP. Old CHRISTIANO site-CDI. +BS and passing flatus, pt had 1 BM today. Assist x2 with walker and gaitbelt- generalized weakness, ambulated in halls X 2. Regular diet- fair PO intake. Midline abd incision- stapled and STEPHEN

## 2023-12-11 VITALS
RESPIRATION RATE: 20 BRPM | BODY MASS INDEX: 26.9 KG/M2 | WEIGHT: 187.9 LBS | OXYGEN SATURATION: 97 % | SYSTOLIC BLOOD PRESSURE: 114 MMHG | TEMPERATURE: 98.7 F | DIASTOLIC BLOOD PRESSURE: 60 MMHG | HEART RATE: 86 BPM | HEIGHT: 70 IN

## 2023-12-11 LAB
HOLD SPECIMEN: NORMAL
MAGNESIUM SERPL-MCNC: 1.8 MG/DL (ref 1.7–2.3)

## 2023-12-11 PROCEDURE — 250N000013 HC RX MED GY IP 250 OP 250 PS 637: Performed by: PHYSICIAN ASSISTANT

## 2023-12-11 PROCEDURE — 250N000013 HC RX MED GY IP 250 OP 250 PS 637

## 2023-12-11 PROCEDURE — 36415 COLL VENOUS BLD VENIPUNCTURE: CPT | Performed by: UROLOGY

## 2023-12-11 PROCEDURE — 250N000013 HC RX MED GY IP 250 OP 250 PS 637: Performed by: UROLOGY

## 2023-12-11 PROCEDURE — 83735 ASSAY OF MAGNESIUM: CPT | Performed by: UROLOGY

## 2023-12-11 RX ADMIN — APIXABAN 5 MG: 5 TABLET, FILM COATED ORAL at 08:08

## 2023-12-11 RX ADMIN — OXYCODONE HYDROCHLORIDE 40 MG: 40 TABLET, FILM COATED, EXTENDED RELEASE ORAL at 08:08

## 2023-12-11 RX ADMIN — MEGESTROL ACETATE 20 MG: 20 TABLET ORAL at 08:08

## 2023-12-11 RX ADMIN — OXYCODONE HYDROCHLORIDE 20 MG: 10 TABLET ORAL at 10:18

## 2023-12-11 RX ADMIN — ACETAMINOPHEN 975 MG: 325 TABLET, FILM COATED ORAL at 10:18

## 2023-12-11 RX ADMIN — OXYCODONE HYDROCHLORIDE 20 MG: 10 TABLET ORAL at 01:16

## 2023-12-11 RX ADMIN — PANTOPRAZOLE SODIUM 40 MG: 40 TABLET, DELAYED RELEASE ORAL at 08:08

## 2023-12-11 RX ADMIN — FLUCONAZOLE 400 MG: 200 TABLET ORAL at 08:07

## 2023-12-11 RX ADMIN — AMOXICILLIN AND CLAVULANATE POTASSIUM 1 TABLET: 875; 125 TABLET, FILM COATED ORAL at 08:08

## 2023-12-11 RX ADMIN — AMLODIPINE BESYLATE 10 MG: 10 TABLET ORAL at 08:08

## 2023-12-11 RX ADMIN — METHOCARBAMOL 500 MG: 500 TABLET ORAL at 08:08

## 2023-12-11 RX ADMIN — LIDOCAINE 1 PATCH: 4 PATCH TOPICAL at 08:09

## 2023-12-11 ASSESSMENT — ACTIVITIES OF DAILY LIVING (ADL)
ADLS_ACUITY_SCORE: 34

## 2023-12-11 NOTE — PROGRESS NOTES
"/74 (BP Location: Left arm)   Pulse 81   Temp 97.6  F (36.4  C) (Oral)   Resp 15   Ht 1.778 m (5' 10\")   Wt 85.2 kg (187 lb 14.4 oz)   SpO2 97%   BMI 26.96 kg/m      A x1 w/ FWW + gaitbelt. 7/10 pain managed w/ PRN oxycodone, sched oxycontin, sched tylenol, lidocaine patches. Urostomy draining red urine, 2 stents. DL PICC - HL. X1 BM on this shift. Midline abd incision - STEPHEN.   "

## 2023-12-11 NOTE — PROGRESS NOTES
Care Management Discharge Note    Discharge Date: 12/11/2023       Discharge Disposition: Transitional Care    [facility]  [address]  [place] [address]  Yaneli de anda (Kendra): [phone]    Discharge Services:  none    Discharge DME:  none    Discharge Transportation: Patients wife is transporting to TCU. She will be to the hospital at 11am today.    Private pay costs discussed: transportation costs    Does the patient's insurance plan have a 3 day qualifying hospital stay waiver?  No    PAS Confirmation Code: [id number]  Patient/family educated on Medicare website which has current facility and service quality ratings: yes    Education Provided on the Discharge Plan:  yes  Persons Notified of Discharge Plans: patient, patients wife, medical team, bedside RN, charge RN, left a message for Yaneli de anda   Patient/Family in Agreement with the Plan: yes    Handoff Referral Completed: Yes    Additional Information:    Orders faxed via in basket to Yaneli Gardner.     NILESH MatsonW   [address]    Phone: [phone]  Pager: [phone]  Graciela

## 2023-12-11 NOTE — PROGRESS NOTES
"Urology  Progress Note    24 hour events/Subjective:     - No acute events overnight   - ambulating   - Tolerating reg diet, + gas, endorses hunger   - TPN discontinued, abx plan finalized, apixaban restarted   - passing stool    Exam  /74 (BP Location: Left arm)   Pulse 81   Temp 97.6  F (36.4  C) (Oral)   Resp 15   Ht 1.778 m (5' 10\")   Wt 85.2 kg (187 lb 14.4 oz)   SpO2 97%   BMI 26.96 kg/m    No acute distress  Unlabored breathing on RA  Abdomen soft, appropriately tender, nondistended. midline incision cdi with staples, conduit healthy with two stents in place  Bag in place over new conduit draining light red in tubing.       Intake/Output Summary (Last 24 hours) at 12/1/2023 0552  Last data filed at 11/30/2023 2145  Gross per 24 hour   Intake 3000 ml   Output 375 ml   Net 2625 ml       UOP 2200/825  Stool x1/x1    Labs  Recent Labs   Lab Test 12/05/23  0702 12/04/23  0726 12/03/23  0712   WBC 5.3 5.7 6.9   HGB 8.5* 8.3* 8.2*   CR 0.46* 0.44* 0.52*          7-Day Micro Results       Collected Updated Procedure Result Status      12/04/2023 0621 12/04/2023 0713 Creatinine fluid [[id number]]    Body fluid, unsp from Other    Final result Component Value Units   Creatinine Fluid Source Drain    Creatinine fluid 0.5 mg/dL                   Assessment/Plan  68 year old y/o male POD#11 s/p cystectomy with IC, pubectomy, and rectus flap for chronic pubic osteomyelitis. WOC consulted.  AC restarted, abx plan finalized. PT OT recommend TCU, to TCU Monday.    Note - plan changes for today are in bold below.    Neuro: pain control: PRN oxycodone 5-10 mg q3h , PRN dilaudid 0.2 - 0.4 mg q2h, and scheduled tylenol   CV: HDS   Pulm: incentive spirometry while awake  FEN/GI: Regular diet. bowel regimen. Staples to come out on monday  Endo: relatively euglycemic  : stents in place, conduit appears pink and viable. Stents to remain in place for 6 weeks. CHRISTIANO drain to bulb suction. CHRISTIANO Cr wnl  Heme/ID: monitor for " s/s of infection; monitor Hb and transfuse as indicated. Awaiting bone and intra-op cultures. PICC out prior to discharge. ID consult when cultures result. 2+ yeast for now. ID consult, PO Augmentin and fluconazole  Activity: Up as tolerated  Ambulate at least TID  PT/OT consulted   Ppx: apixaban  Home RX on HOLD: none  Dispo: to TCU Monday   PT/OT recommendations: TCU      Seen and examined with the chief resident. Will discuss with Miki Correa MD.    Nilay Santana MD  Urology PGY-2      PTA medications:   Prior to Admission medications    Medication Sig Start Date End Date Taking? Authorizing Provider   acetaminophen (TYLENOL) 325 MG tablet Take 975 mg by mouth every 8 hours as needed for mild pain   Yes Reported, Patient   amLODIPine (NORVASC) 10 MG tablet Take 1 tablet (10 mg) by mouth daily  Patient taking differently: Take 10 mg by mouth every morning 2/3/23  Yes Wei Perez MD   atorvastatin (LIPITOR) 20 MG tablet Take 1 tablet (20 mg) by mouth daily 11/15/23  Yes Ezequiel Alejandra MD   furosemide (LASIX) 20 MG tablet Take 1 tablet (20 mg) by mouth daily as needed  Patient taking differently: Take 20 mg by mouth daily as needed (edema) 11/9/23  Yes Erum Fritz MD   hydrOXYzine (ATARAX) 10 MG tablet Take 1 tablet (10 mg) by mouth every 6 hours as needed for itching or anxiety (with pain, moderate pain) 2/7/23  Yes Haydee Abdul MD   leuprolide (LUPRON DEPOT) 45 MG kit Inject 45 mg into the muscle every 6 months   Yes Unknown, Entered By History   lisinopril (ZESTRIL) 40 MG tablet Take 40 mg by mouth every morning   Yes Unknown, Entered By History   LORazepam (ATIVAN) 0.5 MG tablet Take 1 tablet (0.5 mg) by mouth every 6 hours as needed for anxiety 8/14/23  Yes Erum Fritz MD   megestrol (MEGACE) 20 MG tablet Take 20 mg by mouth 2 times daily   Yes Unknown, Entered By History   nitroFURantoin macrocrystal-monohydrate (MACROBID) 100 MG capsule Take 1 capsule (100 mg)  by mouth 2 times daily for 3 days 11/27/23 11/30/23 Yes Miki Correa MD   omeprazole (PRILOSEC) 40 MG DR capsule Take 1 capsule (40 mg) by mouth daily  Patient taking differently: 40 mg every morning 8/2/23  Yes Wei Perez MD   ondansetron (ZOFRAN ODT) 8 MG ODT tab Take 1 tablet (8 mg) by mouth every 8 hours as needed for nausea 6/1/23  Yes Erum Fritz MD   oxybutynin (DITROPAN) 5 MG tablet Take 5 mg by mouth 4 times daily as needed for bladder spasms   Yes Unknown, Entered By History   oxyCODONE (OXYCONTIN) 40 MG 12 hr tablet Take 1 tablet (40 mg) by mouth every 12 hours 11/9/23  Yes Erum Fritz MD   oxyCODONE IR (ROXICODONE) 10 MG tablet Take 1 tablet (10 mg) by mouth every 6 hours as needed for moderate to severe pain 10/26/23  Yes Erum Fritz MD   potassium chloride ER (KLOR-CON M) 20 MEQ CR tablet Take 1 tablet (20 mEq) by mouth daily as needed for potassium supplementation (Take if you take furosemide)  Patient taking differently: Take 20 mEq by mouth 2 times daily 4/26/23  Yes Erum Fritz MD   prochlorperazine (COMPAZINE) 10 MG tablet Take 1 tablet (10 mg) by mouth every 6 hours as needed for nausea or vomiting 5/30/23  Yes Erum Fritz MD   tamsulosin (FLOMAX) 0.4 MG capsule Take 1 capsule by mouth every morning 7/10/23  Yes Reported, Patient   traZODone (DESYREL) 50 MG tablet Take 50 mg by mouth nightly as needed for sleep 3/22/23  Yes Reported, Patient   apixaban ANTICOAGULANT (ELIQUIS) 5 MG tablet Take 1 tablet (5 mg) by mouth 2 times daily 5/31/23   Erum Fritz MD   docusate sodium (COLACE) 100 MG capsule Take 100 mg by mouth daily    Unknown, Entered By History   predniSONE (DELTASONE) 5 MG tablet Take 1 tablet (5 mg) by mouth 2 times daily 5/3/23   Erum Fritz MD          Contacting the urology team: Please see [employer] and page on-call clinician with any questions or concerns regarding this patient. Note writer may be  "unavailable.     To access [employer] from intranet: under \"Applications\" --> \"Business Applications\" select \"[employer]\" and search \"Urology Adult & Pediatric/[facility].\" Please note that any question about a urology inpatient, [place] or [place], should go to job code [id number].       Coding query:    Acute blood loss anemia, required 2 units previously, now stable      Severe Malnutrition in the setting of chronic illness, required TPN, now discontinued, following with nutrition    Seen and agree  Doing well  Tolerating diet  No n/v  +flatus, +bm  Ambulating    Abd soft  Urine clear red from urostomy  Stents in place    Plan for TCU tomorrow ,staples out tomorrow  "

## 2023-12-11 NOTE — PROGRESS NOTES
Pt discharged to: [facility]   Via: car  Time: 1100  Reason not before 11am: NA   Accompanied by: Wife  Belongings: with pt   Teaching: discharge and urostomy teaching   Report called/faxed: Yes     AVSS on RA. Pt reports pain at abdominal incision site, on scheduled oxy. PRN oxy x1. Tylenol x1 for HA. Pt denies nausea, SOB, cough. Abdominal staples removed. PICC line removed. Pt has urostomy, draining pink/red urine. No BM this shift. Ax1 with walker. Pt is ready for discharge to TCU.

## 2023-12-11 NOTE — PROGRESS NOTES
"[facility]  WOC Nurse Inpatient Assessment     Consulted for: New Ileal Conduit    Summary: Spouse not able to help with pouching, patient concerned with insurance coverage for [facility]. Encouraged use of [facility] for assistance.     12/11/23: patient discharged prior to WOC visit      Patient History (according to provider note(s):      Dilip Kan is a 68 year old male being seen for pubovesical fistula.   RP+EBRT  TUIBN   GH with clots once, admitted to hospital.   Incontinent..  Left hip pain for 1 year    Assessment:      Areas visualized during today's visit: Focused: and ileal conduit    Assessment of new end Ileal Conduit and Ileal conduit urinary diversion:  Diagnosis Pertinent to Stoma:  prostate cancer, fistula       Surgery Date: 11/30, Lysis of adhesions  Total cystectomy and Creation of ileal conduit urinary diversion 25736-54  Total Pubectomy (26289) for osteomyelitis   Rectus flap fixation into pelvis  Surgeon:DAMIAN Sims, DAMIAN Monahan       Hospital: [facility]  Pouching system in place on assessment today: Yeni two piece, cut to fit, and Deya ring  Pouch barrier status:  30% melted  Pouch last changed/wear time: 12/6/23  Reason for pouch change today: ostomy education and routine schedule  Effectiveness of current pouching/ supply plan: Recommend continuing current plan  Change made with ostomy management today: No  Pouching system placed today: Yeni 2 pc, cut to fit Urine pouching system with Deya skin barrier ring  Supplies: gathered and discussed with patient ,2 week supply at bedside    Stoma location: RUQ  Stoma size: 1 inches tall 1 1/4\" wide, Ureteral stents  Stoma appearance: healthy, red, round, moist, edematous, and protruberant  Mucocutaneous junction:  intact  Peristomal complication(s): none   Output: blood and mucoid  Output volume emptied during visit: 0ml, connected to bedside drainage bag  Abdominal assessment: Soft,painful to " "touch when pouching system was changed  Surgical site(s): open to air  NG still in place? No  Pain: Fullness  Is patient still on a PCA? No    Ostomy education assessment:  Participant of teaching session today: patient   Education completed today: Pouch change return demonstration, Infection prevention/hygiene , and Lifestyle adjustments   Educational materials/methods: Verbal, Return demonstration, Hands on, and Addressed patient/caregiver questions/concerns  Education still needed:  all topics covered  Learning Comprehension:   Psychosocial assessment: attentive, participating  Patient readiness for education today: observing, attentive, and active participation  Following today's visit: patient  is able to demonstrate;         1. How to empty their pouch? Yes and Independent        2. How to change their pouch? Yes, with minimal assist, and Able to verbalize steps  Preparation for discharge completed: Placed prescription recommendations in discharge navigator for MD to sign, Ensured patient has extra supplies for discharge, Discussed how to order supplies after discharge , Ordered samples from  after gaining consent from patient/caregiver, Discussed how and when to make an outpatient WOC nurse appointment after discharge, Prepared for discharge home with home care, and Discuss signs/symptoms of when to seek medical attention  Preparation for discharge still needed: Prepared for discharge home with home care  Pt support system on discharge: family  WOC recommend home care? Yes and By WOC RN if possible  Face to face time: 60 minutes          Treatment Plan:     RUQ Ileal Conduit and Ileal conduit urinary diversion pouching plan:   Pouching system: ostomy supplies pouches: [place] 57 URINE (483220) ostomy supplies barrier: [place] 57mm FLAT (188490)  Accessories used: [facility] ostomy accessories: 2\" Deya Ring (247464), Powder (832965), Medium Belt (604583), Urostomy Adapter (489778), Night Drainage " "Bottle (329412), and Cavilon no sting barrier film (792636)   Frequency of pouch changes: PRN leakage and Three times a week  WOC follow up plan: Daily Monday-Friday (as able)  Bedside RN interventions: Change pouch PRN if leaking using the supplies above, Empty pouch when 1/3 to 1/2 full, ensure to clean pouch outlet after emptying to prevent odor, Notify WOC for ongoing pouch leakage, Document stoma appearance and output volume, color, and consistency every shift, and Encourage patient to empty pouch with assist      Pressure Injury Prevention (PIP) Plan:  If patient is declining pressure injury prevention interventions: Explore reason why and address patient's concerns, Educate on pressure injury risk and prevention intervention(s), If patient is still declining, document \"informed refusal\" , and Ensure Care team is aware ( provider, charge nurse, etc)  Mattress: Follow bed algorithm, reassess daily and order specialty mattress, if indicated.  HOB: Maintain at or below 30 degrees, unless contraindicated  Repositioning in bed: Every 1-2 hours , Left/right positioning; avoid supine, Raise foot of bed prior to raising head of bed, to reduce patient sliding down (shear), and Frequent microturns using TAPS wedges, as patient tolerates  Heels: Keep elevated off mattress, Pillows under calves, and Heel lift boots  Protective Dressing: Sacral Mepilex for prevention (#362086),  especially for the agitated patient   Positioning Equipment: None  Chair positioning: Chair cushion (#006345) , Assist patient to reposition hourly, and Do NOT use a donut for sitting (this increases pressure to smaller area and creates a higher potential for injury)    If patient has a buttock pressure injury, or high risk for PI use chair cushion or SPS.  Moisture Management: Perineal cleansing /protection: Follow Incontinence Protocol, Avoid brief in bed, Clean and dry skin folds with bathing , Consider InterDry (#884311) between folds, and " Moisturize dry skin  Under Devices: Inspect skin under all medical devices during skin inspection , Ensure tubes are stabilized without tension, and Ensure patient is not lying on medical devices or equipment when repositioned  Ask provider to discontinue device when no longer needed.      Orders: Reviewed    RECOMMEND PRIMARY TEAM ORDER: None, at this time  WOC nurse follow-up plan: MWF   Notify WOC if wound(s) deteriorate.  Nursing to notify the Provider(s) and re-consult the WOC Nurse if new skin concern.    DATA:     Current support surface: Standard  Standard gel/foam mattress (IsoFlex, Atmos air, etc)  Containment of urine/stool: Incontinence Protocol, Incontinent pad in bed, and Ileostomy pouch  BMI: Body mass index is 26.96 kg/m .   Active diet order: Orders Placed This Encounter      Regular Diet Adult      Diet     Output: I/O last 3 completed shifts:  In: 490 [P.O.:480; I.V.:10]  Out: 2275 [Urine:2275]     Labs:   Recent Labs   Lab 12/05/23  0702   ALBUMIN 2.0*   HGB 8.5*   WBC 5.3     Pressure injury risk assessment:   Sensory Perception: 3-->slightly limited  Moisture: 4-->rarely moist  Activity: 3-->walks occasionally  Mobility: 3-->slightly limited  Nutrition: 3-->adequate  Friction and Shear: 3-->no apparent problem  Lopez Score: 19    Ana Echols RN, CWOCN  Pager no longer is use, please contact through [employer] group: [facility] Nurse  Dept. Office Number: [phone]          [facility]     Name: Jermain Kan  Date: 12/5/23    To order your ostomy supplies    The ostomy Supplier needs this supply list  to process your order. You will need to fax/deliver this list, along with your Insurance information. Your home care nurse can assist with this process.    List of Ostomy Distributors      [facility]  Ph. [phone] Fax # [phone]  [facility]   Ph. [phone]  [facility]   Ph. [phone]  [place]  "[facility]. [phone] [facility]-[address]  Or Call your insurance provider for their preferred supplier    Your Medical Supplier will need your surgeon's name, phone and fax number    Clinic:                     Phone                            Fax  Urology Surgery:              [phone]  Verbal Order for ostomy supplies for 1 Month per:                                             Rhea De RN, CWON                                                                    Authorizing MD: DAMIAN Monahan    Quantity of pouches:    20 /mo.    Request the following supplies:      Yeni        2 piece flat wafer 57mm  # 50925                                Urine pouch 57mm- # 49569                        Accessories    2  Deya ring #[id number]      Adapt powder #7906      No sting film barrier # 3345                                                 Bard urine drainage bag #[id number]                           Coloplast leg bag #81673      Yeni belt medium #7300 (23-43 )       Bannish II liquid urine deoderizer #[id number]               Urikleen  # [id number]      Night drainage bottle # [id number]       or Angela 1/2 gallon Bottle with 6\" tubing                   Change your pouch three times a week, more often if leaking.   If you are cutting a hole in the wafer of your pouch, recheck stoma size and adjust pouch opening as needed every week    . Call the Ostomy Nurse at [facility]       [address] [place] [address] [place] : [phone]   Schedule a follow-up visit in 2 to 4 weeks after your surgery, sooner if having problems Bring a complete set of pouch-changing supplies to this visit       Problems you should Report  - The stoma turns blue or darker in color.  - Cuts or sores around the stoma.  - Red, raw or painful skin around the stoma.  - Any bulging of the skin around the stoma.  - A pouch that leaks every day.  - Problems making the right size hole in the pouch wafer.   " Please call with any questions or concerns.

## 2023-12-11 NOTE — PLAN OF CARE
Goal Outcome Evaluation:    VSS. RA. LS clear. A&Ox4. Baseline N/T in bilateral feet from chemo. Bilateral LE edema and edematous scrotum. Pain managed with PRN oxycodone X3 and scheduled Oxycontin. R DL PICC- HL. Mg replaced IV- recheck in AM. Urostomy (2 stents)- red urine, AOP. Old CHRISTIANO site-CDI. +BS and passing flatus, pt had 2 soft  BM today. Assist x2 with walker and gaitbelt- generalized weakness, ambulated in halls X 2. Regular diet- fair PO intake. Midline abd incision- stapled and STEPHEN. Pt scheduled to discharge to a TCU tomorrow.

## 2023-12-11 NOTE — PLAN OF CARE
Physical Therapy Discharge Summary    Reason for therapy discharge:    Discharged to transitional care facility.    Progress towards therapy goal(s). See goals on Care Plan in [facility] electronic health record for goal details.  Goals partially met.  Barriers to achieving goals:   discharge from facility.    Therapy recommendation(s):    Continued therapy is recommended.  Rationale/Recommendations:  Recommend TCU to improve strength and overall independence with mobiltiy.

## 2023-12-12 DIAGNOSIS — Z09 HOSPITAL DISCHARGE FOLLOW-UP: ICD-10-CM

## 2023-12-13 ENCOUNTER — TELEPHONE (OUTPATIENT)
Dept: INFECTIOUS DISEASES | Facility: CLINIC | Age: 68
End: 2023-12-13
Payer: COMMERCIAL

## 2023-12-13 NOTE — TELEPHONE ENCOUNTER
EP LVM 12/13 to see if pt wanted 12/18 follow up with Dr. Ramírez to be in-person or video per provider.       ----- Message from Alberto Marcos RN sent at 12/12/2023 12:55 PM CST -----    ----- Message -----  From: Montse Ramírez MD  Sent: 12/12/2023  12:49 PM CST  To: [facility] Team,     Would it be possible to follow up with patient and see if needs to switch from in-person to virtual?     He needs follow up, scheduled with me on 12/18.     Thanks,   Montse

## 2023-12-15 ENCOUNTER — TELEPHONE (OUTPATIENT)
Dept: FAMILY MEDICINE | Facility: OTHER | Age: 68
End: 2023-12-15
Payer: COMMERCIAL

## 2023-12-15 NOTE — TELEPHONE ENCOUNTER
MTM referral from: Transitions of Care (recent hospital discharge or ED visit)    MT referral outreach attempt #2 on December 15, 2023 at 2:53 PM      Outcome: Patient not reachable after several attempts, will route to [facility] Pharmacist/Provider as an FYI.  [facility] scheduling number is .  Thank you for the referral.    Use  part d map for the carrier/Plan on the flowsheet      Sha-Shat Message Sent    BOYD Haider

## 2023-12-18 ENCOUNTER — VIRTUAL VISIT (OUTPATIENT)
Dept: INFECTIOUS DISEASES | Facility: CLINIC | Age: 68
End: 2023-12-18
Attending: STUDENT IN AN ORGANIZED HEALTH CARE EDUCATION/TRAINING PROGRAM
Payer: COMMERCIAL

## 2023-12-18 VITALS — HEIGHT: 70 IN | WEIGHT: 189 LBS | BODY MASS INDEX: 27.06 KG/M2

## 2023-12-18 DIAGNOSIS — M86.68 CHRONIC OSTEOMYELITIS OF SYMPHYSIS PUBIS (H): Primary | ICD-10-CM

## 2023-12-18 PROCEDURE — 99215 OFFICE O/P EST HI 40 MIN: CPT | Mod: 24 | Performed by: STUDENT IN AN ORGANIZED HEALTH CARE EDUCATION/TRAINING PROGRAM

## 2023-12-18 ASSESSMENT — PAIN SCALES - GENERAL: PAINLEVEL: SEVERE PAIN (6)

## 2023-12-18 NOTE — PROGRESS NOTES
Virtual Visit Details    Type of service:  Video Visit   Video Start Time:  2:40PM  Video End Time: 3:00PM    Originating Location (pt. 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   Hx of pelvic abscess s/p 6 weeks of empiric antibiotic course without complete resolution (June-Aug, 2023)  No microbiology data  Hx of bacteremia, Kleb pneumoniae with similar isolate from urine culture 4/19/2023  Prostate cancer with metastasis (liver, bone: acetabulum) s/p chemoradiation  Positive VRE screen, per rectal swab 7/1/2023     Discussion:  69 yo M with pubic symphysis osteomyelitis s/p cystectomy with ileal conduit, pubectomy with rectus flap on 11/30/2023. Intra-op cultures grew Candida albicans, Str agalactiae. Per patient, has been off of antibiotics since around 10/25/2023. Based on culture data, initially remained on micafungin, ceftriaxone, metronidazole while awaiting susceptibility on Candida isolate. Recommend to switch to oral regimen: PO Fluconazole 400mg daily (QTc 395 on 12/3/2023), and po augmentin 875mg  BID. Both has good bioavailability in tissue and bone. Of note, does not have insurance coverage for home iv antibiotics.     On today's visit, clinically doing well. Recommend to continue po augmentin, fluconazole until seen in ID clinic. Labs ordered today. Emphasize on local wound care, which does not appear infected. Jermain will reach out if any concerns of infection. Has follow up with Urology and then ID in January, 2024.     Recommendations:  Continue po fluconazole 400mg daily  Continue po Augmentin 875mg BID  Check LFTs, EKG (ordered, fax over to facility #[phone])  Duration of 6 weeks from date of surgery 11/30/2023  Recommend to continue until seen in ID clinic  ID clinic follow up on 1/23/2024  Continue local wound care, and advised to reach out to ID and/or Urology clinics sooner if any concerns of infection.       40 minutes spent by me on the date of the encounter doing chart review, history and exam, documentation and further activities per the note.        History of Present Illness     Pertinent history obtain from: chart review and the patient    Interval history:   Jermain presents for hospital discharge follow up visit. Denies any particular complaints, focusing on rehab and hoping to discharge home soon. When asked, then mentioned about wound gap at the lower end of surgical incision which seems known at the time of hospital discharge. TCU RN at bedside, to assist with wound exam.     HPI per inpatient ID consult (December, 2023):   Dilip Kan is a 69 yo M with PMHx of HTN, DM2, bilateral PE on AC, and metastatic prostate cancer (liver, bone:acetabulum), s/p radiation c/b radiation cysitis and chemotherapy (last dose 6/13/2023), hospitalized (Jun, 2023) with fever sepsis 2/2 pelvic abscess. He was found to have a pelvic abscess that was thought not to be reachable for drainage and hip fracture osteomyelitis versus metastasis. Of note, communication of abscess with bladder, prostate and not  the rectum per cystogram. His urine and blood cultures grew Klebsiella. He also screened positive for VRE although this never grew in any cultures. He was improving after a course of zosyn in the hospital, 2 weeks of linzolid for VRE, and discharged on levofloxacin 750 daily and flagyl 500 mg q8 h which was given for 8 weeks. He tolerated that fine and felt much improved in terms of fevers and appetite. CRP was down to 5. However, noted CRP elevated to 100, when stopped antibiotics.      CT 9/20 ([facility]) showed similar to smaller pelvic abscess, which prompted MRI pelvis 10/27/2023 ([facility]), whereby concerning findings of chronic osteomyelitis involving pubic symphysis septic arthropathy, along with anterior bladder wall defect and subjacent fistula. Because of these findings, seen by Urologist (Dr. Paul) and admitted for elective surgery. Underwent cystectomy with ileal conduit, pubectomy, with rectus flap by urology and plastic surgery teams on 11/30/2023. Intra-op cultures growing yeast and Str agalactiae. ID consult is requested for antimicrobial management. Per patient, has been off of antibiotics for quite sometime.     Specialty Problems          Infectious Diseases    Infection due to Klebsiella pneumoniae            Physical Exam     GENERAL: alert and no distress  Wound, surgical incision - mid abdominal lower end, w/o foul odor, purulence, +serous oozing, non tender, no slough on the wound floor but on margins, +tunneling of 3cm at 7'O clock  Otherwise deferred due to virtual video visit    Data   Laboratory data and imaging listed below was reviewed prior to this encounter.     Microbiology:    Culture   Date Value Ref Range Status   11/30/2023 No anaerobic organisms isolated  Final   11/30/2023 (A)  Final    1+ Streptococcus agalactiae (Group B Streptococcus)     Comment:     This organism is susceptible to ampicillin, penicillin, vancomycin and the cephalosporins. If treatment is required and your  patient is allergic to penicillin, contact the microbiology lab within 5 days to request susceptibility testing.  Not isolated or reported on routine aerobic culture   11/30/2023 Candida albicans (A)  Preliminary   11/30/2023 2+ Candida albicans (A)  Final     Comment:     Susceptibilities not routinely done, refer to antibiogram to view typical susceptibility profiles   11/30/2023 No anaerobic organisms isolated  Final   11/30/2023 Candida albicans (A)  Preliminary   11/30/2023 1+ Candida albicans (A)  Final     Comment:     Susceptibilities not routinely done, refer to antibiogram to view typical susceptibility profiles   11/30/2023 (A)  Final    1+ Streptococcus agalactiae (Group B Streptococcus)     Comment:     This organism is susceptible to ampicillin, penicillin, vancomycin and the cephalosporins. If treatment is required and your patient is allergic to penicillin, contact the microbiology lab within 5 days to request susceptibility testing.   07/01/2023 Enterococcus faecium VRE (A)  Final   06/29/2023 No Growth  Final   06/29/2023 Mixed Urogenital Shonda  Final   06/29/2023 No Growth  Final   04/27/2023 No Growth  Final   04/19/2023 Klebsiella pneumoniae (AA)  Final   04/19/2023 >100,000 CFU/mL Klebsiella pneumoniae (A)  Final   04/19/2023 No Growth  Final   11/17/2021 No Growth  Final   11/17/2021 No Growth  Final     Inflammatory Markers:   Recent Labs   Lab Test 10/16/23  1001 09/13/23  1422 08/31/23  1539   SED 44* 62* 49*     Hematology Studies:    Recent Labs   Lab Test 12/05/23  0702 12/04/23  0726 12/03/23  0712 12/02/23  0644 12/01/23  2221 12/01/23  0448 11/30/23  1833 11/20/23  1249 04/26/23  1301 04/22/23  0647   WBC 5.3 5.7 6.9 7.9  --  9.7  --  9.7   < > 2.9*   ANEU  --   --   --   --   --   --   --   --   --  2.2   AEOS  --   --   --   --   --   --   --   --   --  0.0   HGB 8.5* 8.3* 8.2* 8.0* 7.8* 7.0*   < > 11.0*   < > 8.2*   MCV 79 81 81 81  --  76*  --  76*   < > 86    981 013 431   --  287   < > 454*   < > 111*    < > = values in this interval not displayed.      Metabolic Studies:   Recent Labs   Lab Test 12/05/23  0702 12/04/23  0726 12/03/23  0712 12/02/23  0644 12/01/23  0448    138 137 134* 133*   POTASSIUM 4.1 4.1 3.8 4.0 4.1   CHLORIDE 110* 111* 106 107 103   CO2 21* 20* 23 22 22   BUN 17.0 18.6 22.3 15.6 9.8   CR 0.46* 0.44* 0.52* 0.59* 0.72   GFRESTIMATED >90 >90 >90 >90 >90     Hepatic Studies:   Recent Labs   Lab Test 12/05/23  0702 12/04/23  0726 12/02/23  0644 12/01/23  0448 11/09/23  1541 10/16/23  1001 09/13/23  1421   BILITOTAL 1.1 0.9 1.5* 1.0 0.4 0.3 0.2   ALKPHOS 458* 151* 61 61 87 92 80   ALBUMIN 2.0* 2.0* 2.1*  --  3.0* 3.0* 3.1*   * 69* 22 15 14 14 14   * 45 8 6 <5 13 19

## 2023-12-18 NOTE — LETTER
12/18/2023       RE: Dilip Kan  [address] [place] [address]     Dear Colleague,    Thank you for referring your patient, Dilip Kan, to the [facility] [place] [facility]. Please see a copy of my visit note below.    Virtual Visit Details    Type of service:  Video Visit   Video Start Time:  2:40PM  Video End Time: 3:00PM    Originating Location (pt. 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   Hx of pelvic abscess s/p 6 weeks of empiric antibiotic course without complete resolution (June-Aug, 2023)  No microbiology data  Hx of bacteremia, Kleb pneumoniae with similar isolate from urine culture 4/19/2023  Prostate cancer with metastasis (liver, bone: acetabulum) s/p chemoradiation  Positive VRE screen, per rectal swab 7/1/2023     Discussion:  69 yo M with pubic symphysis osteomyelitis s/p cystectomy with ileal conduit, pubectomy with rectus flap on 11/30/2023. Intra-op cultures grew Candida albicans, Str  agalactiae. Per patient, has been off of antibiotics since around 10/25/2023. Based on culture data, initially remained on micafungin, ceftriaxone, metronidazole while awaiting susceptibility on Candida isolate. Recommend to switch to oral regimen: PO Fluconazole 400mg daily (QTc 395 on 12/3/2023), and po augmentin 875mg BID. Both has good bioavailability in tissue and bone. Of note, does not have insurance coverage for home iv antibiotics.     On today's visit, clinically doing well. Recommend to continue po augmentin, fluconazole until seen in ID clinic. Labs ordered today. Emphasize on local wound care, which does not appear infected. Jermain will reach out if any concerns of infection. Has follow up with Urology and then ID in January, 2024.     Recommendations:  Continue po fluconazole 400mg daily  Continue po Augmentin 875mg BID  Check LFTs, EKG (ordered, fax over to facility #[phone])  Duration of 6 weeks from date of surgery 11/30/2023  Recommend to continue until seen in ID clinic  ID clinic follow up on 1/23/2024  Continue local wound care, and advised to reach out to ID and/or Urology clinics sooner if any concerns of infection.       40 minutes spent by me on the date of the encounter doing chart review, history and exam, documentation and further activities per the note.        History of Present Illness    Pertinent history obtain from: chart review and the patient    Interval history:   Jermain presents for hospital discharge follow up visit. Denies any particular complaints, focusing on rehab and hoping to discharge home soon. When asked, then mentioned about wound gap at the lower end of surgical incision which seems known at the time of hospital discharge. TCU RN at bedside, to assist with wound exam.     HPI per inpatient ID consult (December, 2023):   Dilip Kan is a 69 yo M with PMHx of HTN, DM2, bilateral PE on AC, and metastatic prostate cancer (liver, bone:acetabulum), s/p radiation c/b  radiation cysitis and chemotherapy (last dose 6/13/2023), hospitalized (Jun, 2023) with fever sepsis 2/2 pelvic abscess. He was found to have a pelvic abscess that was thought not to be reachable for drainage and hip fracture osteomyelitis versus metastasis. Of note, communication of abscess with bladder, prostate and not the rectum per cystogram. His urine and blood cultures grew Klebsiella. He also screened positive for VRE although this never grew in any cultures. He was improving after a course of zosyn in the hospital, 2 weeks of linzolid for VRE, and discharged on levofloxacin 750 daily and flagyl 500 mg q8 h which was given for 8 weeks. He tolerated that fine and felt much improved in terms of fevers and appetite. CRP was down to 5. However, noted CRP elevated to 100, when stopped antibiotics.      CT 9/20 ([facility]) showed similar to smaller pelvic abscess, which prompted MRI pelvis 10/27/2023 ([facility]), whereby concerning findings of chronic osteomyelitis involving pubic symphysis septic arthropathy, along with anterior bladder wall defect and subjacent fistula. Because of these findings, seen by Urologist (Dr. Paul) and admitted for elective surgery. Underwent cystectomy with ileal conduit, pubectomy, with rectus flap by urology and plastic surgery teams on 11/30/2023. Intra-op cultures growing yeast and Str agalactiae. ID consult is requested for antimicrobial management. Per patient, has been off of antibiotics for quite sometime.     Specialty Problems          Infectious Diseases    Infection due to Klebsiella pneumoniae            Physical Exam    GENERAL: alert and no distress  Wound, surgical incision - mid abdominal lower end, w/o foul odor, purulence, +serous oozing, non tender, no slough on the wound floor but on margins, +tunneling of 3cm at 7'O clock  Otherwise deferred due to virtual video visit    Data  Laboratory data and imaging listed below was reviewed prior to this encounter.      Microbiology:    Culture   Date Value Ref Range Status   11/30/2023 No anaerobic organisms isolated  Final   11/30/2023 (A)  Final    1+ Streptococcus agalactiae (Group B Streptococcus)     Comment:     This organism is susceptible to ampicillin, penicillin, vancomycin and the cephalosporins. If treatment is required and your patient is allergic to penicillin, contact the microbiology lab within 5 days to request susceptibility testing.  Not isolated or reported on routine aerobic culture   11/30/2023 Candida albicans (A)  Preliminary   11/30/2023 2+ Candida albicans (A)  Final     Comment:     Susceptibilities not routinely done, refer to antibiogram to view typical susceptibility profiles   11/30/2023 No anaerobic organisms isolated  Final   11/30/2023 Candida albicans (A)  Preliminary   11/30/2023 1+ Candida albicans (A)  Final     Comment:     Susceptibilities not routinely done, refer to antibiogram to view typical susceptibility profiles   11/30/2023 (A)  Final    1+ Streptococcus agalactiae (Group B Streptococcus)     Comment:     This organism is susceptible to ampicillin, penicillin, vancomycin and the cephalosporins. If treatment is required and your patient is allergic to penicillin, contact the microbiology lab within 5 days to request susceptibility testing.   07/01/2023 Enterococcus faecium VRE (A)  Final   06/29/2023 No Growth  Final   06/29/2023 Mixed Urogenital Shonda  Final   06/29/2023 No Growth  Final   04/27/2023 No Growth  Final   04/19/2023 Klebsiella pneumoniae (AA)  Final   04/19/2023 >100,000 CFU/mL Klebsiella pneumoniae (A)  Final   04/19/2023 No Growth  Final   11/17/2021 No Growth  Final   11/17/2021 No Growth  Final     Inflammatory Markers:   Recent Labs   Lab Test 10/16/23  1001 09/13/23  1422 08/31/23  1539   SED 44* 62* 49*     Hematology Studies:    Recent Labs   Lab Test 12/05/23  0702 12/04/23  0726 12/03/23  0712 12/02/23  0644 12/01/23  2221 12/01/23  0448 11/30/23  1833  11/20/23  1249 04/26/23  1301 04/22/23  0647   WBC 5.3 5.7 6.9 7.9  --  9.7  --  9.7   < > 2.9*   ANEU  --   --   --   --   --   --   --   --   --  2.2   AEOS  --   --   --   --   --   --   --   --   --  0.0   HGB 8.5* 8.3* 8.2* 8.0* 7.8* 7.0*   < > 11.0*   < > 8.2*   MCV 79 81 81 81  --  76*  --  76*   < > 86    283 253 251  --  287   < > 454*   < > 111*    < > = values in this interval not displayed.      Metabolic Studies:   Recent Labs   Lab Test 12/05/23  0702 12/04/23  0726 12/03/23  0712 12/02/23  0644 12/01/23  0448    138 137 134* 133*   POTASSIUM 4.1 4.1 3.8 4.0 4.1   CHLORIDE 110* 111* 106 107 103   CO2 21* 20* 23 22 22   BUN 17.0 18.6 22.3 15.6 9.8   CR 0.46* 0.44* 0.52* 0.59* 0.72   GFRESTIMATED >90 >90 >90 >90 >90     Hepatic Studies:   Recent Labs   Lab Test 12/05/23  0702 12/04/23  0726 12/02/23  0644 12/01/23  0448 11/09/23  1541 10/16/23  1001 09/13/23  1421   BILITOTAL 1.1 0.9 1.5* 1.0 0.4 0.3 0.2   ALKPHOS 458* 151* 61 61 87 92 80   ALBUMIN 2.0* 2.0* 2.1*  --  3.0* 3.0* 3.1*   * 69* 22 15 14 14 14   * 45 8 6 <5 13 19            Montse Ramírez MD

## 2023-12-18 NOTE — NURSING NOTE
Is the patient currently in the state of [place]? YES    Visit mode:VIDEO    If the visit is dropped, the patient can be reconnected by: VIDEO VISIT: Text to cell phone:   Telephone Information:   Mobile [phone]       Will anyone else be joining the visit? NO  (If patient encounters technical issues they should call [phone]    How would you like to obtain your AVS? MyChart    Are changes needed to the allergy or medication list? Pt stated no changes to allergies and Pt stated no med changes    Pelvic region and some pain in the lower abdomen.    Reason for visit: RECHECK    PT location -[facility]    Candace BHATTF

## 2023-12-19 ENCOUNTER — PATIENT OUTREACH (OUTPATIENT)
Dept: ONCOLOGY | Facility: OTHER | Age: 68
End: 2023-12-19
Payer: COMMERCIAL

## 2023-12-19 DIAGNOSIS — N36.0 URINARY FISTULA: ICD-10-CM

## 2023-12-19 RX ORDER — OXYCODONE HYDROCHLORIDE 15 MG/1
15 TABLET ORAL EVERY 6 HOURS PRN
Qty: 88 TABLET | Refills: 0 | Status: SHIPPED | OUTPATIENT
Start: 2023-12-19 | End: 2024-01-10

## 2023-12-19 RX ORDER — OXYCODONE HYDROCHLORIDE 5 MG/1
TABLET ORAL
Qty: 88 TABLET | Refills: 0 | Status: CANCELLED | OUTPATIENT
Start: 2023-12-19 | End: 2024-01-04

## 2023-12-19 NOTE — PROGRESS NOTES
[facility]: Post-Discharge Note  SITUATION                                                      Admission: 11/30/23          Discharge: 12/11/23       BACKGROUND                                                      Per hospital discharge summary and inpatient provider notes.  Consultation during this admission received:   PHYSICAL THERAPY ADULT IP CONSULT  OCCUPATIONAL THERAPY ADULT IP CONSULT  WOUND OSTOMY CONTINENCE NURSE  IP CONSULT  VASCULAR ACCESS FOR PICC PLACEMENT ADULT IP CONSULT  PHARMACY/NUTRITION TO START AND MANAGE TPN  PHARMACY IP CONSULT  CARE MANAGEMENT / SOCIAL WORK IP CONSULT  INFECTIOUS DISEASE GENERAL ADULT IP CONSULT  PAIN MANAGEMENT ADULT IP CONSULT  PHYSICAL THERAPY ADULT IP CONSULT  OCCUPATIONAL THERAPY ADULT IP CONSULT  NURSING TO CONSULT FOR VASCULAR ACCESS CARE IP CONSULT          Brief History of Illness:   Reason for admission requiring a surgical or invasive procedure:    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    Please refer to the full operative summary for details.           Hospital Course:      Patient had cystectomy, ileal conduit creation, pubectomy, and elevation of rectus flap for radiation cystitis, chronic pubic osteomyelitis, and urinary fistula 11/30/23.     Dilip Kan is a 68 year old male who has PMH  hypertension, dyslipidemia, moderate aortic insufficiency, mild aortic stenosis, ascending aortic aneurysm, history of DVT /PE (5/2/2023), non-insulin-dependent diabetes, GERD, metastatic prostate cancer to bone and liver status post prostatectomy and radiation approximately 10 years ago, chronic lower back pain s/p spine surgeries, on chronic opioid management and pubovesical fistula and pathologic pelvic fracture.          TPN was initiated from 12/2/23 to 12/7/23 for severe malnutrition related to chronic illness. 2 units  pRBC was given. Intra-op cultures grew Candida albicans, Str agalactiae.  He was first put on ertapenem and micafungin for yeast growth, then switched to ceftriaxone and metronidazole while remaining on micafungin  as recommended by ID. Later he was switched to PO Augmentin and fluconazole for discharge.      Patient was on high dose of oxycodone  for cancer pain before admission. Tapered down from 40 mg  long acting as needed to 15 mg q4h as needed at discharge. Taper regimen provided - see above.      Patient recovered as anticipated and experienced no post-operative complications.    Stables removed prior to discharge.  Small area of separation was packed with Nugauze and will heal by secondary intention.  No evidence of wound infection.      On POD 11 he was ambulating without assitance, tolerating the discharge diet, had pain controlled with PO medications to go home with, and was requiring no IV medications or fluids. He was discharged to TCU with appropriate contact information, follow-up and instructions as seen below in the discharge paperwork.       ASSESSMENT       See assessment  Patient would like refill pain medication.                 PLAN                                                      Outpatient Plan:  see above. Follow up with Erum Fritz MD 1/4/23    Future Appointments   Date Time Provider Department Center   1/15/2024  2:30 PM Ebony Santacruz MD [facility]   1/23/2024  3:30 PM Josselin Kay MD [facility]                Elza Lundberg RN

## 2023-12-19 NOTE — TELEPHONE ENCOUNTER
Dr. Correa was weaning Jermain down on his pain medication. No more long acting pain medication. Jermain is concerned about pain and would like 15 mg every 4-6 hours until he is able to see you 1/4/23.  Elza Lundberg RN...........12/19/2023 8:55 AM

## 2023-12-21 ENCOUNTER — TELEPHONE (OUTPATIENT)
Dept: ONCOLOGY | Facility: OTHER | Age: 68
End: 2023-12-21
Payer: COMMERCIAL
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
## 2023-12-21 NOTE — TELEPHONE ENCOUNTER
Agree with the above clarified directions, but will not be given further refills from me.  Pain management will need to be taken over by primary care.  This has been discussed with him before.

## 2023-12-22 ENCOUNTER — TELEPHONE (OUTPATIENT)
Dept: ONCOLOGY | Facility: OTHER | Age: 68
End: 2023-12-22
Payer: COMMERCIAL

## 2023-12-22 NOTE — TELEPHONE ENCOUNTER
After birth date and last name were verified, spoke with the patient regarding his pain medication and further refills.  He understands and will establish care with Ezequiel Alejandra MD as soon as possible.  I did transfer him to the appointment line to schedule for an establish care and pain management.  I stressed that he should use the pain meds only when he absolutely needs them because he will not get further refills from Dr Fritz.  He was apologetic for how the refill request was made and filled stating he did not know he had to go through [facility] for it.  I tried to give him some information regarding this, but requested he speak to the business office there for the information.

## 2023-12-22 NOTE — TELEPHONE ENCOUNTER
Shelby at [facility] needs a call back ASAP- Patient has oxi pills- and she has 2 different orders- Was calling back for Elza I could not reach- someone please call

## 2023-12-22 NOTE — TELEPHONE ENCOUNTER
Clarified orders from Eurm Fritz MD.  Will attempt to re-fax order.  Elza Lundberg RN...........12/22/2023 10:41 AM

## 2023-12-26 ENCOUNTER — TELEPHONE (OUTPATIENT)
Dept: FAMILY MEDICINE | Facility: OTHER | Age: 68
End: 2023-12-26
Payer: COMMERCIAL

## 2023-12-26 DIAGNOSIS — R14.3 EXCESSIVE GAS: Primary | ICD-10-CM

## 2023-12-26 RX ORDER — SIMETHICONE 125 MG
125 TABLET,CHEWABLE ORAL 4 TIMES DAILY PRN
Qty: 120 TABLET | Refills: 11 | Status: SHIPPED | OUTPATIENT
Start: 2023-12-26 | End: 2024-01-10

## 2023-12-26 NOTE — TELEPHONE ENCOUNTER
Nursing staff would like PRN gas medication as needed and after meals.  They use "[facility] [employer] in [place].    They also would need this order to be faxed to [phone] Attn: Cathy.      Leona Shell LPN 12/26/2023 4:14 PM

## 2023-12-26 NOTE — TELEPHONE ENCOUNTER
[facility]  ED Nurse Handoff Report    ED Chief complaint: Shortness of Breath      ED Diagnosis:   Final diagnoses:   Shortness of breath   Elevated troponin       Code Status: Hospitlaist to address     Allergies:   Allergies   Allergen Reactions     Sulfamethoxazole Anaphylaxis and Hives       Patient Story: Patient presents to the ED complaining of increased shortness of breath.    Focused Assessment:  Patient alert and oriented, no complaints of pain.  Ambulates to the room without difficulty.  States 02 sats are typically around 90.  On home O2    Treatments and/or interventions provided: labs, imaging   Patient's response to treatments and/or interventions:   Labs Ordered and Resulted from Time of ED Arrival to Time of ED Departure   D DIMER QUANTITATIVE - Abnormal       Result Value    D-Dimer Quantitative 0.60 (*)    COMPREHENSIVE METABOLIC PANEL - Abnormal    Sodium 135      Potassium 4.2      Chloride 101      Carbon Dioxide (CO2) 26      Anion Gap 8      Urea Nitrogen 26      Creatinine 0.62      Calcium 10.0      Glucose 365 (*)     Alkaline Phosphatase 58      AST 15      ALT 32      Protein Total 6.2 (*)     Albumin 3.1 (*)     Bilirubin Total 0.5      GFR Estimate 84     TROPONIN I - Abnormal    Troponin I High Sensitivity 75 (*)    CBC WITH PLATELETS AND DIFFERENTIAL - Abnormal    WBC Count 8.9      RBC Count 4.84      Hemoglobin 12.6      Hematocrit 40.4      MCV 84      MCH 26.0 (*)     MCHC 31.2 (*)     RDW 14.4      Platelet Count 174      % Neutrophils 76      % Lymphocytes 12      % Monocytes 10      % Eosinophils 0      % Basophils 0      % Immature Granulocytes 2      NRBCs per 100 WBC 0      Absolute Neutrophils 6.7      Absolute Lymphocytes 1.1      Absolute Monocytes 0.9      Absolute Eosinophils 0.0      Absolute Basophils 0.0      Absolute Immature Granulocytes 0.2      Absolute NRBCs 0.0     TSH WITH FREE T4 REFLEX - Normal    TSH 0.54     NT PROBNP INPATIENT - Normal  Shari is looking for an order to give patient gas X or something for bloating [facility] is the pharmacy  they use    Please call and advise    Thank You    Vee Mejia on 12/26/2023 at 3:49 PM     "   [facility] terminal Pro BNP Inpatient 608           To be done/followed up on inpatient unit:      Does this patient have any cognitive concerns?: none    Activity level - Baseline/Home:  Independent  Activity Level - Current:   Stand with Assist    Patient's Preferred language: English   Needed?: No    Isolation: None  Infection: Not Applicable  Patient tested for COVID 19 prior to admission: YES  Bariatric?: No    Vital Signs:   Vitals:    02/21/22 1011 02/21/22 1133 02/21/22 1134   BP: 130/62 (!) 141/74    Pulse: 116  85   Resp: 18     Temp: 98.3  F (36.8  C)     TempSrc: Temporal     SpO2: 91% 95% 94%   Height: 1.6 m (5' 3\")         Cardiac Rhythm:     Was the PSS-3 completed:   Yes  What interventions are required if any?               Family Comments: Daughter available, at bedside currently   OBS brochure/video discussed/provided to patient/family: Yes              Name of person given brochure if not patient:               Relationship to patient:     For the majority of the shift this patient's behavior was Green.   Behavioral interventions performed were .    ED NURSE PHONE NUMBER: *[phone]         "

## 2023-12-28 LAB
BACTERIA ABSC ANAEROBE+AEROBE CULT: ABNORMAL
BACTERIA BONE ANAEROBE+AEROBE CULT: ABNORMAL

## 2024-01-01 DIAGNOSIS — R97.21 RISING PSA FOLLOWING TREATMENT FOR MALIGNANT NEOPLASM OF PROSTATE: ICD-10-CM

## 2024-01-01 DIAGNOSIS — C61 MALIGNANT NEOPLASM OF PROSTATE (H): ICD-10-CM

## 2024-01-01 DIAGNOSIS — C79.51 MALIGNANT NEOPLASM METASTATIC TO BONE (H): Primary | ICD-10-CM

## 2024-01-03 ENCOUNTER — TELEPHONE (OUTPATIENT)
Dept: UROLOGY | Facility: CLINIC | Age: 69
End: 2024-01-03
Payer: COMMERCIAL

## 2024-01-03 NOTE — TELEPHONE ENCOUNTER
Left Voicemail (1st Attempt) for the patient to call back and schedule the following:    Appointment type: Post op  Provider: Dr. Santacruz  Return date: Jan 19th   Specialty phone number: [phone]  Additional appointment(s) needed: N/A  Additonal Notes: Reschedule Jan 15th appt

## 2024-01-04 ENCOUNTER — TELEPHONE (OUTPATIENT)
Dept: UROLOGY | Facility: CLINIC | Age: 69
End: 2024-01-04
Payer: COMMERCIAL

## 2024-01-04 ENCOUNTER — PRE VISIT (OUTPATIENT)
Dept: UROLOGY | Facility: CLINIC | Age: 69
End: 2024-01-04
Payer: COMMERCIAL

## 2024-01-04 LAB
ATRIAL RATE - MUSE: 83 BPM
DIASTOLIC BLOOD PRESSURE - MUSE: NORMAL MMHG
INTERPRETATION ECG - MUSE: NORMAL
P AXIS - MUSE: 34 DEGREES
PR INTERVAL - MUSE: 162 MS
QRS DURATION - MUSE: 88 MS
QT - MUSE: 352 MS
QTC - MUSE: 413 MS
R AXIS - MUSE: -10 DEGREES
SYSTOLIC BLOOD PRESSURE - MUSE: NORMAL MMHG
T AXIS - MUSE: 33 DEGREES
VENTRICULAR RATE- MUSE: 83 BPM

## 2024-01-04 NOTE — TELEPHONE ENCOUNTER
Returned missed call from pt earlier to reschedule 1/15 appt with Dr. Santacruz. Left direct callback to reschedule

## 2024-01-04 NOTE — TELEPHONE ENCOUNTER
Reason for visit: Post op - DOS 11/30/23    Relevant information: Lysis of adhesions, total cystectomy and Creation of ileal conduit urinary diversion, total pubectomy for osteomyelitis, rectus flap fixation into pelvis    Records/imaging/labs/orders: all records available      Pt called: No need for a call    At Rooming: esvin Tavares CMA  1/4/2024  1:15 PM

## 2024-01-10 ENCOUNTER — OFFICE VISIT (OUTPATIENT)
Dept: FAMILY MEDICINE | Facility: OTHER | Age: 69
End: 2024-01-10
Attending: FAMILY MEDICINE
Payer: COMMERCIAL

## 2024-01-10 ENCOUNTER — LAB (OUTPATIENT)
Dept: LAB | Facility: OTHER | Age: 69
End: 2024-01-10
Attending: INTERNAL MEDICINE
Payer: COMMERCIAL

## 2024-01-10 VITALS
RESPIRATION RATE: 20 BRPM | WEIGHT: 180 LBS | BODY MASS INDEX: 25.83 KG/M2 | DIASTOLIC BLOOD PRESSURE: 48 MMHG | TEMPERATURE: 97.1 F | HEART RATE: 88 BPM | SYSTOLIC BLOOD PRESSURE: 96 MMHG | OXYGEN SATURATION: 96 %

## 2024-01-10 DIAGNOSIS — C61 MALIGNANT NEOPLASM OF PROSTATE (H): ICD-10-CM

## 2024-01-10 DIAGNOSIS — R79.89 ELEVATED D-DIMER: ICD-10-CM

## 2024-01-10 DIAGNOSIS — I27.20 MILD PULMONARY HYPERTENSION (H): ICD-10-CM

## 2024-01-10 DIAGNOSIS — K21.9 GASTRO-ESOPHAGEAL REFLUX DISEASE WITHOUT ESOPHAGITIS: ICD-10-CM

## 2024-01-10 DIAGNOSIS — I26.99 OTHER ACUTE PULMONARY EMBOLISM WITHOUT ACUTE COR PULMONALE (H): ICD-10-CM

## 2024-01-10 DIAGNOSIS — C79.51 MALIGNANT NEOPLASM METASTATIC TO BONE (H): ICD-10-CM

## 2024-01-10 DIAGNOSIS — R06.02 SHORTNESS OF BREATH: ICD-10-CM

## 2024-01-10 DIAGNOSIS — M86.68 CHRONIC OSTEOMYELITIS OF SYMPHYSIS PUBIS (H): Primary | ICD-10-CM

## 2024-01-10 DIAGNOSIS — K65.1 PELVIC ABSCESS IN MALE (H): ICD-10-CM

## 2024-01-10 DIAGNOSIS — R11.0 NAUSEA: ICD-10-CM

## 2024-01-10 DIAGNOSIS — I10 ESSENTIAL HYPERTENSION: ICD-10-CM

## 2024-01-10 DIAGNOSIS — E11.69 TYPE 2 DIABETES MELLITUS WITH OTHER SPECIFIED COMPLICATION, UNSPECIFIED WHETHER LONG TERM INSULIN USE (H): ICD-10-CM

## 2024-01-10 DIAGNOSIS — E78.5 HYPERLIPIDEMIA LDL GOAL <100: ICD-10-CM

## 2024-01-10 DIAGNOSIS — N36.0 URINARY FISTULA: ICD-10-CM

## 2024-01-10 DIAGNOSIS — M86.68 CHRONIC OSTEOMYELITIS OF SYMPHYSIS PUBIS (H): ICD-10-CM

## 2024-01-10 DIAGNOSIS — I26.94 MULTIPLE SUBSEGMENTAL PULMONARY EMBOLI WITHOUT ACUTE COR PULMONALE (H): ICD-10-CM

## 2024-01-10 PROBLEM — R50.81 NEUTROPENIC FEVER (H): Status: RESOLVED | Noted: 2023-04-19 | Resolved: 2024-01-10

## 2024-01-10 PROBLEM — D70.9 NEUTROPENIC FEVER (H): Status: RESOLVED | Noted: 2023-04-19 | Resolved: 2024-01-10

## 2024-01-10 LAB
ALBUMIN SERPL BCG-MCNC: 3.3 G/DL (ref 3.5–5.2)
ALP SERPL-CCNC: 105 U/L (ref 40–150)
ALT SERPL W P-5'-P-CCNC: 13 U/L (ref 0–70)
ANION GAP SERPL CALCULATED.3IONS-SCNC: 12 MMOL/L (ref 7–15)
AST SERPL W P-5'-P-CCNC: 21 U/L (ref 0–45)
BASOPHILS # BLD AUTO: 0 10E3/UL (ref 0–0.2)
BASOPHILS NFR BLD AUTO: 1 %
BILIRUB DIRECT SERPL-MCNC: <0.2 MG/DL (ref 0–0.3)
BILIRUB SERPL-MCNC: 0.2 MG/DL
BUN SERPL-MCNC: 18.3 MG/DL (ref 8–23)
CALCIUM SERPL-MCNC: 10.3 MG/DL (ref 8.8–10.2)
CHLORIDE SERPL-SCNC: 105 MMOL/L (ref 98–107)
CREAT SERPL-MCNC: 0.84 MG/DL (ref 0.67–1.17)
CRP SERPL-MCNC: 37.44 MG/L
DEPRECATED HCO3 PLAS-SCNC: 18 MMOL/L (ref 22–29)
EGFRCR SERPLBLD CKD-EPI 2021: >90 ML/MIN/1.73M2
EOSINOPHIL # BLD AUTO: 0.4 10E3/UL (ref 0–0.7)
EOSINOPHIL NFR BLD AUTO: 6 %
ERYTHROCYTE [DISTWIDTH] IN BLOOD BY AUTOMATED COUNT: 17.6 % (ref 10–15)
ERYTHROCYTE [SEDIMENTATION RATE] IN BLOOD BY WESTERGREN METHOD: 27 MM/HR (ref 0–20)
GLUCOSE SERPL-MCNC: 120 MG/DL (ref 70–99)
HCT VFR BLD AUTO: 25.3 % (ref 40–53)
HGB BLD-MCNC: 7.8 G/DL (ref 13.3–17.7)
IMM GRANULOCYTES # BLD: 0 10E3/UL
IMM GRANULOCYTES NFR BLD: 0 %
LDH SERPL L TO P-CCNC: 397 U/L (ref 0–250)
LYMPHOCYTES # BLD AUTO: 1.7 10E3/UL (ref 0.8–5.3)
LYMPHOCYTES NFR BLD AUTO: 26 %
MCH RBC QN AUTO: 23.3 PG (ref 26.5–33)
MCHC RBC AUTO-ENTMCNC: 30.8 G/DL (ref 31.5–36.5)
MCV RBC AUTO: 76 FL (ref 78–100)
MONOCYTES # BLD AUTO: 0.4 10E3/UL (ref 0–1.3)
MONOCYTES NFR BLD AUTO: 6 %
NEUTROPHILS # BLD AUTO: 4 10E3/UL (ref 1.6–8.3)
NEUTROPHILS NFR BLD AUTO: 61 %
NRBC # BLD AUTO: 0 10E3/UL
NRBC BLD AUTO-RTO: 0 /100
PLATELET # BLD AUTO: 366 10E3/UL (ref 150–450)
POTASSIUM SERPL-SCNC: 5.2 MMOL/L (ref 3.4–5.3)
PROT SERPL-MCNC: 6.6 G/DL (ref 6.4–8.3)
PSA SERPL DL<=0.01 NG/ML-MCNC: 12.75 NG/ML (ref 0–6.5)
RBC # BLD AUTO: 3.35 10E6/UL (ref 4.4–5.9)
SODIUM SERPL-SCNC: 135 MMOL/L (ref 135–145)
WBC # BLD AUTO: 6.6 10E3/UL (ref 4–11)

## 2024-01-10 PROCEDURE — 36415 COLL VENOUS BLD VENIPUNCTURE: CPT | Mod: ZL

## 2024-01-10 PROCEDURE — 86140 C-REACTIVE PROTEIN: CPT | Mod: ZL

## 2024-01-10 PROCEDURE — 85652 RBC SED RATE AUTOMATED: CPT | Mod: ZL

## 2024-01-10 PROCEDURE — 90662 IIV NO PRSV INCREASED AG IM: CPT

## 2024-01-10 PROCEDURE — 84153 ASSAY OF PSA TOTAL: CPT | Mod: ZL

## 2024-01-10 PROCEDURE — 83615 LACTATE (LD) (LDH) ENZYME: CPT | Mod: ZL

## 2024-01-10 PROCEDURE — 82248 BILIRUBIN DIRECT: CPT | Mod: ZL

## 2024-01-10 PROCEDURE — 99214 OFFICE O/P EST MOD 30 MIN: CPT | Performed by: FAMILY MEDICINE

## 2024-01-10 PROCEDURE — G0463 HOSPITAL OUTPT CLINIC VISIT: HCPCS | Mod: 25

## 2024-01-10 PROCEDURE — 85004 AUTOMATED DIFF WBC COUNT: CPT | Mod: ZL

## 2024-01-10 PROCEDURE — 80053 COMPREHEN METABOLIC PANEL: CPT | Mod: ZL

## 2024-01-10 RX ORDER — OMEPRAZOLE 40 MG/1
40 CAPSULE, DELAYED RELEASE ORAL EVERY MORNING
Qty: 90 CAPSULE | Refills: 4 | COMMUNITY
Start: 2024-01-10 | End: 2024-01-24

## 2024-01-10 RX ORDER — ONDANSETRON 8 MG/1
8 TABLET, ORALLY DISINTEGRATING ORAL EVERY 8 HOURS PRN
Qty: 90 TABLET | Refills: 1 | Status: SHIPPED | OUTPATIENT
Start: 2024-01-10 | End: 2024-09-04

## 2024-01-10 RX ORDER — ATORVASTATIN CALCIUM 20 MG/1
20 TABLET, FILM COATED ORAL DAILY
Qty: 90 TABLET | Refills: 0 | Status: SHIPPED | OUTPATIENT
Start: 2024-01-10 | End: 2024-08-19

## 2024-01-10 RX ORDER — LISINOPRIL 20 MG/1
20 TABLET ORAL DAILY
Qty: 90 TABLET | Refills: 4 | Status: SHIPPED | OUTPATIENT
Start: 2024-01-10 | End: 2024-03-14

## 2024-01-10 RX ORDER — AMLODIPINE BESYLATE 10 MG/1
10 TABLET ORAL EVERY MORNING
Qty: 90 TABLET | Refills: 4 | Status: SHIPPED | OUTPATIENT
Start: 2024-01-10 | End: 2024-08-14

## 2024-01-10 RX ORDER — OXYCODONE HYDROCHLORIDE 15 MG/1
15 TABLET ORAL EVERY 6 HOURS PRN
Qty: 90 TABLET | Refills: 0 | Status: SHIPPED | OUTPATIENT
Start: 2024-01-10 | End: 2024-02-02

## 2024-01-10 ASSESSMENT — PAIN SCALES - GENERAL: PAINLEVEL: SEVERE PAIN (6)

## 2024-01-10 NOTE — NURSING NOTE
Patient here for hospital follow up, establish care, discharge orders to home from the [facility] on 01/12/2024. Medication Reconciliation: complete.    Akosua Rivero LPN  1/10/2024 1:30 PM

## 2024-01-11 NOTE — PROGRESS NOTES
SUBJECTIVE:   Dilip Kan is a 68 year old male who presents to clinic today for the following health issues: Discharge orders.  Medication renewal and review.  Pain medication renewal.    Patient is here to establish care.  He also is in need of discharge orders.  He brings with him his medications sheet which was reviewed.  There are numerous meds at the and his wife report that they are not taking.  Patient does have a history of prostate cancer.  He is being followed by oncology.  His chronic back pain.  Prostate cancer is metastasized to multiple areas.  He is currently in a nursing home due to a fractured left hip.  Status post radiation.  He is also being seen by infectious disease and is on Augmentin for a infection of his symphysis pubis.  History of blood clots.  He is being followed by infectious disease oncology urology.  Recent under recently underwent a bladder resection.  I had a long discussion with him on his current medication usage and at times he is not quite sure what medication he is taken.  States a lot of his medications are just brought to him    History of Present Illness       Back Pain:  He presents for follow up of back pain. Patient's back pain is a chronic problem.  Location of back pain:  Left lower back  Description of back pain: shooting  Back pain spreads: left buttocks    Since patient first noticed back pain, pain is: unchanged  Does back pain interfere with his job:  Not applicable       He eats 0-1 servings of fruits and vegetables daily.He consumes 2 sweetened beverage(s) daily.He exercises with enough effort to increase his heart rate 10 to 19 minutes per day.  He exercises with enough effort to increase his heart rate 3 or less days per week. He is missing 1 dose(s) of medications per week.        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     Priority: Medium    Iron deficiency anemia secondary to inadequate dietary iron intake 11/20/2023     Priority: Medium    Sepsis (H) 06/29/2023     Priority: Medium    Acute deep vein thrombosis (DVT) of proximal vein of right lower extremity (H) 05/01/2023     Priority: Medium    Multiple subsegmental pulmonary emboli without acute cor pulmonale (H) 05/01/2023     Priority: Medium    Acute kidney failure, unspecified (H24) 04/21/2023     Priority: Medium    Infection due to Klebsiella pneumoniae 04/21/2023     Priority: Medium    Bone metastasis 03/09/2023     Priority: Medium    Bone metastases 03/09/2023     Priority: Medium    Aneurysm of ascending aorta without rupture (H24) 10/05/2022     Priority: Medium    Status post total right knee replacement 11/16/2021     Priority: Medium    Malignant neoplasm of prostate (H) 09/16/2021     Priority: Medium    Type 2 diabetes mellitus with other specified complication, unspecified whether long term insulin use (H) 11/23/2020     Priority: Medium    Plaque in heart artery-aorta 03/24/2020     Priority: Medium    Aortic valve stenosis with insufficiency, etiology of cardiac valve disease unspecified 03/22/2019     Priority: Medium    Mild aortic stenosis 03/22/2019     Priority: Medium    Ascending aortic aneurysm-moderate at 4.6 cm on 3/11/20 03/22/2019     Priority: Medium    Aortic valve insufficiency-moderate to severe 03/22/2019     Priority: Medium    Hx of syncope 03/22/2019     Priority: Medium    History of tobacco abuse quitting 11/8/2002 03/22/2019     Priority: Medium    Hypertriglyceridemia 03/22/2019     Priority: Medium    Mild pulmonary hypertension (H) 03/22/2019     Priority: Medium    Palpitations 03/22/2019     Priority: Medium    Rising PSA following treatment for malignant neoplasm of prostate 03/29/2018     Priority: Medium    Chronic, continuous use of opioids 01/31/2018     Priority: Medium      Patient is followed by Wei Perez MD for ongoing prescription of pain medication.  All refills should be approved by this provider only at face-to-face appointments - not by phone request.    Medication(s): Hydrocodone.   Maximum quantity per month: 135  Clinic visit frequency required: Q 3 months   PDMP Review         Value Time User    State PDMP site checked  Yes 8/25/2021 10:41 AM Wei Perez MD   Controlled substance agreement:  Patient-Level CSA:    There are no patient-level csa.     Pain Clinic evaluation in the past: No    Opioid Risk Tool Total Score(s):  OPIOID RISK TOOL TOTAL SCORE 8/25/2021   Total Score 0   Total Risk Score Category Low Risk (0-3)         Other specified causes of urethral stricture 01/31/2018     Priority: Medium    GERD 04/28/2017     Priority: Medium    Essential hypertension 04/28/2017     Priority: Medium    Non morbid obesity due to excess calories 04/28/2017     Priority: Medium    Mixed hyperlipidemia 04/28/2017     Priority: Medium    Spinal stenosis of lumbar region with neurogenic claudication 04/28/2017     Priority: Medium    Controlled substance agreement updated 12- 01/15/2016     Priority: Medium    Backache 01/13/2014     Priority: Medium    Prostate cancer (H) 01/02/2013     Priority: Medium    Personal history of prostate cancer s/p prostatectomy and sEBRT 12/06/2012     Priority: Medium    Anemia due to acute blood loss 11/30/2012     Priority: Medium     Overview:   EBL 11/28/12:  1700 ml  EBL 11/28/12:  500 ml  Transfused 2 units pRBC's early on 11/29/12      Pelvic pain in male 11/30/2012     Priority: Medium     Past Medical History:   Diagnosis Date    Elevated prostate specific antigen (PSA)     4/10/2012    Encounter for other administrative examinations     1/31/2014    Esophagitis     No Comments Provided    Gastro-esophageal reflux disease without esophagitis     No Comments Provided    Low back pain     No Comments Provided     Malignant neoplasm of prostate (H)     9/6/2012    Nicotine dependence, uncomplicated     Quit - October 2007 with chantix    Other intervertebral disc degeneration, lumbosacral region     12/1/2008      Past Surgical History:   Procedure Laterality Date    APPENDECTOMY OPEN  [id number]    Ruptured - [place]    ARTHROPLASTY KNEE Right 11/16/2021    Procedure: ARTHROPLASTY, KNEE, TOTAL;  Surgeon: Micah Rock MD;  Location: [facility] OR    COLONOSCOPY  08/31/2006    next due in 2016    CYSTECTOMY BLADDER, ILEAL DIVERSION, COMBINED N/A 11/30/2023    Procedure: CYSTECTOMY, WITH URETEROILEAL CONDUIT CREATION and Pubectomy;  Surgeon: Miki Correa MD;  Location: [facility] OR    DISKECTOMY, LUMBAR, SINGLE SP  03/16/2009    L 3-4 microdiskectomy, [facility]    ESOPHAGOSCOPY, GASTROSCOPY, DUODENOSCOPY (EGD), COMBINED  03/2003         ESOPHAGOSCOPY, GASTROSCOPY, DUODENOSCOPY (EGD), COMBINED  08/31/2006         GRAFT FLAP PEDICLE PERINEUM N/A 11/30/2023    Procedure: Left Rectus Abdominis flap;  Surgeon: YADIRA Guadalupe MD;  Location: [facility] OR    PICC DOUBLE LUMEN PLACEMENT Right 12/01/2023    5FR DL PICC, basilic vein. L-43cm, 2cm out.    PROSTATECTOMY PERINEAL RADICAL  11/28/2012    Nerve Sparring, Dr Warner    SPINE SURGERY N/A 04/28/2017    L3 to S1 spinal decompression L4/L5 fusion    TONSILLECTOMY, ADENOIDECTOMY, COMBINED      as a child    VARICOCELECTOMY  1959    INCISION AND DRAINAGE,EXCISE VARICOCELE       Review of Systems     OBJECTIVE:     BP 96/48   Pulse 88   Temp 97.1  F (36.2  C)   Resp 20   Wt 81.6 kg (180 lb)   SpO2 96%   BMI 25.83 kg/m    Body mass index is 25.83 kg/m .  Physical Exam  Constitutional:       Appearance: Normal appearance.   Cardiovascular:      Rate and Rhythm: Normal rate and regular rhythm.   Pulmonary:      Effort: Pulmonary effort is normal.      Breath sounds: Normal breath sounds.   Neurological:      Mental Status: He is alert.   Psychiatric:         Mood and Affect: Mood  normal.         Thought Content: Thought content normal.         Diagnostic Test Results:  none     ASSESSMENT/PLAN:         (M86.68) Chronic osteomyelitis of symphysis pubis (H)  (primary encounter diagnosis)  Comment: Per infectious disease  Plan:     (C79.51) Malignant neoplasm metastatic to bone (H)  Comment: Refill of oxycodone.  Refill of Narcan  Plan: naloxone (NARCAN) 4 MG/0.1ML nasal spray            (I26.94) Multiple subsegmental pulmonary emboli without acute cor pulmonale (H) patient is on Eliquis.  Likely has a hypercoagulable state secondary to prostate cancer.  Continue on Eliquis likely for lifetime      (I27.20) Mild pulmonary hypertension (H)  Comment:   Plan:     (E11.69) Type 2 diabetes mellitus with other specified complication, unspecified whether long term insulin use (H)  Comment:   Plan:     (I10) Essential hypertension  Comment:   Plan: amLODIPine (NORVASC) 10 MG tablet, lisinopril         (ZESTRIL) 20 MG tablet        Continue with Norvasc Zestril    (K21.9) Gastro-esophageal reflux disease without esophagitis  Comment: Continue  Plan: omeprazole (PRILOSEC) 40 MG DR capsule            (N36.0) Urinary fistula with osteomyelitis  Comment: Plan per infectious disease  Plan: amoxicillin-clavulanate (AUGMENTIN) 875-125 MG         tablet, oxyCODONE IR (ROXICODONE) 15 MG tablet            (C61) Malignant neoplasm of prostate (H)  Comment:   Plan: apixaban ANTICOAGULANT (ELIQUIS) 5 MG tablet            (R06.02) Shortness of breath  Comment:   Plan: apixaban ANTICOAGULANT (ELIQUIS) 5 MG tablet            (E78.5) Hyperlipidemia LDL goal <100  Comment:   Plan: atorvastatin (LIPITOR) 20 MG tablet            (R11.0) Nausea  Comment:   Plan: ondansetron (ZOFRAN ODT) 8 MG ODT tab          I had a long discussion with him and his wife concerning his medications.  Certainly attempted to discontinue any medication he is currently not taking on a regular basis.  If there is any discrepancies with any of his  medications at home he will call.  Would recommend a follow-up visit with bring all his medications with him in the next month or 2.  To clarify better.  Discharged home.  He is COVID manage  Infectious disease.  I have asked him to contact ID for further instructions.    Ezequiel Alejandra MD  [facility]

## 2024-01-12 ENCOUNTER — PATIENT OUTREACH (OUTPATIENT)
Dept: ONCOLOGY | Facility: OTHER | Age: 69
End: 2024-01-12
Payer: COMMERCIAL

## 2024-01-15 ENCOUNTER — OFFICE VISIT (OUTPATIENT)
Dept: UROLOGY | Facility: CLINIC | Age: 69
End: 2024-01-15
Payer: COMMERCIAL

## 2024-01-15 VITALS — DIASTOLIC BLOOD PRESSURE: 63 MMHG | SYSTOLIC BLOOD PRESSURE: 104 MMHG | HEART RATE: 88 BPM

## 2024-01-15 DIAGNOSIS — Z93.6 S/P ILEAL CONDUIT (H): Primary | ICD-10-CM

## 2024-01-15 PROCEDURE — 99024 POSTOP FOLLOW-UP VISIT: CPT | Performed by: UROLOGY

## 2024-01-15 ASSESSMENT — PAIN SCALES - GENERAL: PAINLEVEL: MODERATE PAIN (4)

## 2024-01-15 NOTE — PATIENT INSTRUCTIONS
Send a photo to [facility] in two weeks of your wound    Lab work and renal US in one month.    Visit with Heather Franklin NP after labs and imaging.

## 2024-01-15 NOTE — LETTER
1/15/2024       RE: Dilip Kan  [id number] [place] [address]     Dear Colleague,    Thank you for referring your patient, Dilip Kan, to the [facility] [place] at [facility]. Please see a copy of my visit note below.    HPI:  Dilip Kan is a 68 year old male being seen for post-op follow-up.     accompanied by his spouse      -ileal conduit, pubectomy, and left rectus abdominis flap for radiation cystitis, pubovesical fistula, pubic osteomyelitis on 11/30/23  - pain fluctuating  - still on antibiotics BID, a couple of days left  -Cr 0.84        Exam:  /63   Pulse 88   General: age-appropriate appearing male in NAD sitting in a wheelchair  HEENT: Head AT/NC, EOMI, CN Grossly intact.  Resp: no respiratory distress  CV: heart rate regular  Abdomen: Degree of obesity is none. Abdomen is soft and nontender. No organomegaly. 2 stents from ileal conduit removed today. Surgical scars include mid incision. Eschar tissue removed today  LE: Edema is none   Neuro: grossly non focal. Normal reflexes  Skin: clear of rashes or ecchymoses. No sacral decubitus ulcer.   Motor: excellent strength throughout        Review of Imaging:  The following imaging exams were independently viewed and interpreted by me and discussed with patient:  none    Review of Labs:  The following labs were reviewed by me and discussed with the patient:  Recent Results (from the past 720 hour(s))   CRP inflammation    Collection Time: 01/10/24  1:08 PM   Result Value Ref Range    CRP Inflammation 37.44 (H) <5.00 mg/L   PSA tumor marker    Collection Time: 01/10/24  1:08 PM   Result Value Ref Range    PSA Tumor Marker 12.75 (H) 0.00 - 6.50 ng/mL   Lactate Dehydrogenase    Collection Time: 01/10/24  1:08 PM   Result Value Ref Range    Lactate Dehydrogenase 397 (H) 0 - 250 U/L   Comprehensive metabolic panel    Collection Time: 01/10/24  1:08 PM   Result  Value Ref Range    Sodium 135 135 - 145 mmol/L    Potassium 5.2 3.4 - 5.3 mmol/L    Carbon Dioxide (CO2) 18 (L) 22 - 29 mmol/L    Anion Gap 12 7 - 15 mmol/L    Urea Nitrogen 18.3 8.0 - 23.0 mg/dL    Creatinine 0.84 0.67 - 1.17 mg/dL    GFR Estimate >90 >60 mL/min/1.73m2    Calcium 10.3 (H) 8.8 - 10.2 mg/dL    Chloride 105 98 - 107 mmol/L    Glucose 120 (H) 70 - 99 mg/dL    Alkaline Phosphatase 105 40 - 150 U/L    AST 21 0 - 45 U/L    ALT 13 0 - 70 U/L    Protein Total 6.6 6.4 - 8.3 g/dL    Albumin 3.3 (L) 3.5 - 5.2 g/dL    Bilirubin Total 0.2 <=1.2 mg/dL   ESR: Erythrocyte sedimentation rate    Collection Time: 01/10/24  1:08 PM   Result Value Ref Range    Erythrocyte Sedimentation Rate 27 (H) 0 - 20 mm/hr   CBC with platelets and differential    Collection Time: 01/10/24  1:08 PM   Result Value Ref Range    WBC Count 6.6 4.0 - 11.0 10e3/uL    RBC Count 3.35 (L) 4.40 - 5.90 10e6/uL    Hemoglobin 7.8 (L) 13.3 - 17.7 g/dL    Hematocrit 25.3 (L) 40.0 - 53.0 %    MCV 76 (L) 78 - 100 fL    MCH 23.3 (L) 26.5 - 33.0 pg    MCHC 30.8 (L) 31.5 - 36.5 g/dL    RDW 17.6 (H) 10.0 - 15.0 %    Platelet Count 366 150 - 450 10e3/uL    % Neutrophils 61 %    % Lymphocytes 26 %    % Monocytes 6 %    % Eosinophils 6 %    % Basophils 1 %    % Immature Granulocytes 0 %    NRBCs per 100 WBC 0 <1 /100    Absolute Neutrophils 4.0 1.6 - 8.3 10e3/uL    Absolute Lymphocytes 1.7 0.8 - 5.3 10e3/uL    Absolute Monocytes 0.4 0.0 - 1.3 10e3/uL    Absolute Eosinophils 0.4 0.0 - 0.7 10e3/uL    Absolute Basophils 0.0 0.0 - 0.2 10e3/uL    Absolute Immature Granulocytes 0.0 <=0.4 10e3/uL    Absolute NRBCs 0.0 10e3/uL   Bilirubin direct    Collection Time: 01/10/24  1:08 PM   Result Value Ref Range    Bilirubin Direct <0.20 0.00 - 0.30 mg/dL     CRP 37.44 1/10/24    Assessment & Plan   Stents removed today.   Eschar tissue from Abdomen wound removed today. Wound pack education provided.  He will send us a photo of the wound in weeks  CRP recheck and renal  US in 1 month, VV with SHREYA after  Will get an MRI in a few months when/if the CRP has normalized      ==========================  Heather Franklin NP  [facility]     I acted as a scribe for this note. All portions of the documented history and physical were personally performed by Miki Correa MD as the attending physician.     =============    All portions of the documented history and physical were personally performed by me as the attending physician. I have reviewed and edited the scribe's note as appropriate to reflect my personal interactions with the patient.    Miki Correa MD  [facility] [place]    ==========================      Additional Coding Information:    Problems:  4 -- two or more stable chronic illnesses    Data Reviewed  Review of external notes as documented above     Tests ordered: CRP, Renal US    Level of risk:  3 -- low risk (e.g., OTC medication or observation, minor surgery without risks)    Time spent:  I spent a total of 30 minutes on the day of the visit.   Time spent by me doing chart review, history and exam, documentation and further activities per the note

## 2024-01-15 NOTE — NURSING NOTE
Chief Complaint   Patient presents with    RECHECK      DOS 11/30/23 - Lysis of adhesions, total cystectomy and Creation of ileal conduit urinary diversion, total pubectomy for osteomyelitis, rectus flap fixation into pelvis         Blood pressure 104/63, pulse 88. There is no height or weight on file to calculate BMI.    Patient Active Problem List   Diagnosis    Anemia due to acute blood loss    Backache    Chronic, continuous use of opioids    Controlled substance agreement updated 12-    GERD    Essential hypertension    Prostate cancer (H)    Non morbid obesity due to excess calories    Other specified causes of urethral stricture    Pelvic pain in male    Personal history of prostate cancer s/p prostatectomy and sEBRT    Mixed hyperlipidemia    Spinal stenosis of lumbar region with neurogenic claudication    Rising PSA following treatment for malignant neoplasm of prostate    Aortic valve stenosis with insufficiency, etiology of cardiac valve disease unspecified    Mild aortic stenosis    Ascending aortic aneurysm-moderate at 4.6 cm on 3/11/20    Aortic valve insufficiency-moderate to severe    Hx of syncope    History of tobacco abuse quitting 11/8/2002    Hypertriglyceridemia    Mild pulmonary hypertension (H)    Palpitations    Plaque in heart artery-aorta    Type 2 diabetes mellitus with other specified complication, unspecified whether long term insulin use (H)    Malignant neoplasm of prostate (H)    Status post total right knee replacement    Aneurysm of ascending aorta without rupture (H24)    Bone metastasis    Bone metastases    Acute kidney failure, unspecified (H24)    Infection due to Klebsiella pneumoniae    Acute deep vein thrombosis (DVT) of proximal vein of right lower extremity (H)    Multiple subsegmental pulmonary emboli without acute cor pulmonale (H)    Sepsis (H)    Iron deficiency anemia secondary to inadequate dietary iron intake    Urinary fistula    Radiation cystitis     Chronic osteomyelitis of symphysis pubis (H)       No Known Allergies    Current Outpatient Medications   Medication Sig Dispense Refill    acetaminophen (TYLENOL) 325 MG tablet Take 975 mg by mouth every 8 hours as needed for mild pain      amLODIPine (NORVASC) 10 MG tablet Take 1 tablet (10 mg) by mouth every morning 90 tablet 4    amoxicillin-clavulanate (AUGMENTIN) 875-125 MG tablet Take 1 tablet by mouth every 12 hours (DO not stop abx until instructed by Infectious Disease) 30 tablet 1    apixaban ANTICOAGULANT (ELIQUIS) 5 MG tablet Take 1 tablet (5 mg) by mouth 2 times daily 180 tablet 4    atorvastatin (LIPITOR) 20 MG tablet Take 1 tablet (20 mg) by mouth daily 90 tablet 0    fluconazole (DIFLUCAN) 200 MG tablet Take 2 tablets (400 mg) by mouth daily for 49 days (DO NOT stop until instructed by Infectious Disease)      furosemide (LASIX) 20 MG tablet Take 1 tablet (20 mg) by mouth daily as needed 30 tablet 3    leuprolide (LUPRON DEPOT) 45 MG kit Inject 45 mg into the muscle every 6 months      lisinopril (ZESTRIL) 20 MG tablet Take 1 tablet (20 mg) by mouth daily 90 tablet 4    megestrol (MEGACE) 20 MG tablet Take 20 mg by mouth 2 times daily      naloxone (NARCAN) 4 MG/0.1ML nasal spray Spray 1 spray (4 mg) into one nostril alternating nostrils as needed for opioid reversal every 2-3 minutes until assistance arrives 0.2 mL 3    ondansetron (ZOFRAN ODT) 8 MG ODT tab Take 1 tablet (8 mg) by mouth every 8 hours as needed for nausea 90 tablet 1    oxyCODONE IR (ROXICODONE) 15 MG tablet Take 1 tablet (15 mg) by mouth every 6 hours as needed for moderate pain or moderate to severe pain (can take every 4-6 hours as needed for pain) 90 tablet 0    docusate sodium (COLACE) 100 MG capsule Take 100 mg by mouth daily (Patient not taking: Reported on 1/15/2024)      omeprazole (PRILOSEC) 40 MG DR capsule 1 capsule (40 mg) every morning (Patient not taking: Reported on 1/15/2024) 90 capsule 4    polyethylene glycol  (MIRALAX) 17 GM/Dose powder Take 17 g by mouth daily (Patient not taking: Reported on 1/15/2024)         Social History     Tobacco Use    Smoking status: Former     Packs/day: 1.00     Years: 20.00     Additional pack years: 0.00     Total pack years: 20.00     Types: Cigarettes     Quit date: 2002     Years since quittin.2     Passive exposure: Past    Smokeless tobacco: Current     Types: Snuff    Tobacco comments:     Can't remember when all he had passive exposure   Vaping Use    Vaping Use: Never used   Substance Use Topics    Alcohol use: Not Currently    Drug use: No       Diana Tavares CMA  1/15/2024  10:18 AM

## 2024-01-15 NOTE — PROGRESS NOTES
HPI:  Dilip Kan is a 68 year old male being seen for post-op follow-up.     accompanied by his spouse      -ileal conduit, pubectomy, and left rectus abdominis flap for radiation cystitis, pubovesical fistula, pubic osteomyelitis on 11/30/23  - pain fluctuating  - still on antibiotics BID, a couple of days left  -Cr 0.84        Exam:  /63   Pulse 88   General: age-appropriate appearing male in NAD sitting in a wheelchair  HEENT: Head AT/NC, EOMI, CN Grossly intact.  Resp: no respiratory distress  CV: heart rate regular  Abdomen: Degree of obesity is none. Abdomen is soft and nontender. No organomegaly. 2 stents from ileal conduit removed today. Surgical scars include mid incision. Eschar tissue removed today  LE: Edema is none   Neuro: grossly non focal. Normal reflexes  Skin: clear of rashes or ecchymoses. No sacral decubitus ulcer.   Motor: excellent strength throughout        Review of Imaging:  The following imaging exams were independently viewed and interpreted by me and discussed with patient:  none    Review of Labs:  The following labs were reviewed by me and discussed with the patient:  Recent Results (from the past 720 hour(s))   CRP inflammation    Collection Time: 01/10/24  1:08 PM   Result Value Ref Range    CRP Inflammation 37.44 (H) <5.00 mg/L   PSA tumor marker    Collection Time: 01/10/24  1:08 PM   Result Value Ref Range    PSA Tumor Marker 12.75 (H) 0.00 - 6.50 ng/mL   Lactate Dehydrogenase    Collection Time: 01/10/24  1:08 PM   Result Value Ref Range    Lactate Dehydrogenase 397 (H) 0 - 250 U/L   Comprehensive metabolic panel    Collection Time: 01/10/24  1:08 PM   Result Value Ref Range    Sodium 135 135 - 145 mmol/L    Potassium 5.2 3.4 - 5.3 mmol/L    Carbon Dioxide (CO2) 18 (L) 22 - 29 mmol/L    Anion Gap 12 7 - 15 mmol/L    Urea Nitrogen 18.3 8.0 - 23.0 mg/dL    Creatinine 0.84 0.67 - 1.17 mg/dL    GFR Estimate >90 >60 mL/min/1.73m2    Calcium 10.3 (H) 8.8 - 10.2 mg/dL     Chloride 105 98 - 107 mmol/L    Glucose 120 (H) 70 - 99 mg/dL    Alkaline Phosphatase 105 40 - 150 U/L    AST 21 0 - 45 U/L    ALT 13 0 - 70 U/L    Protein Total 6.6 6.4 - 8.3 g/dL    Albumin 3.3 (L) 3.5 - 5.2 g/dL    Bilirubin Total 0.2 <=1.2 mg/dL   ESR: Erythrocyte sedimentation rate    Collection Time: 01/10/24  1:08 PM   Result Value Ref Range    Erythrocyte Sedimentation Rate 27 (H) 0 - 20 mm/hr   CBC with platelets and differential    Collection Time: 01/10/24  1:08 PM   Result Value Ref Range    WBC Count 6.6 4.0 - 11.0 10e3/uL    RBC Count 3.35 (L) 4.40 - 5.90 10e6/uL    Hemoglobin 7.8 (L) 13.3 - 17.7 g/dL    Hematocrit 25.3 (L) 40.0 - 53.0 %    MCV 76 (L) 78 - 100 fL    MCH 23.3 (L) 26.5 - 33.0 pg    MCHC 30.8 (L) 31.5 - 36.5 g/dL    RDW 17.6 (H) 10.0 - 15.0 %    Platelet Count 366 150 - 450 10e3/uL    % Neutrophils 61 %    % Lymphocytes 26 %    % Monocytes 6 %    % Eosinophils 6 %    % Basophils 1 %    % Immature Granulocytes 0 %    NRBCs per 100 WBC 0 <1 /100    Absolute Neutrophils 4.0 1.6 - 8.3 10e3/uL    Absolute Lymphocytes 1.7 0.8 - 5.3 10e3/uL    Absolute Monocytes 0.4 0.0 - 1.3 10e3/uL    Absolute Eosinophils 0.4 0.0 - 0.7 10e3/uL    Absolute Basophils 0.0 0.0 - 0.2 10e3/uL    Absolute Immature Granulocytes 0.0 <=0.4 10e3/uL    Absolute NRBCs 0.0 10e3/uL   Bilirubin direct    Collection Time: 01/10/24  1:08 PM   Result Value Ref Range    Bilirubin Direct <0.20 0.00 - 0.30 mg/dL     CRP 37.44 1/10/24    Assessment & Plan   1. Stents removed today.   2. Eschar tissue from Abdomen wound removed today. Wound pack education provided.  3. He will send us a photo of the wound in weeks  4. CRP recheck and renal US in 1 month, VV with SHREYA after  5. Will get an MRI in a few months when/if the CRP has normalized      ==========================  Heather Franklin, SANDRA  [facility]     I acted as a scribe for this note. All portions of the documented history and physical were personally  performed by Miki Correa MD as the attending physician.     =============    All portions of the documented history and physical were personally performed by me as the attending physician. I have reviewed and edited the scribe's note as appropriate to reflect my personal interactions with the patient.    Miki Correa MD  [facility] [place]    ==========================      Additional Coding Information:    Problems:  4 -- two or more stable chronic illnesses    Data Reviewed  Review of external notes as documented above     Tests ordered: CRP, Renal US    Level of risk:  3 -- low risk (e.g., OTC medication or observation, minor surgery without risks)    Time spent:  I spent a total of 30 minutes on the day of the visit.   Time spent by me doing chart review, history and exam, documentation and further activities per the note

## 2024-01-16 ENCOUNTER — TELEPHONE (OUTPATIENT)
Dept: FAMILY MEDICINE | Facility: OTHER | Age: 69
End: 2024-01-16
Payer: COMMERCIAL

## 2024-01-16 PROCEDURE — G0180 MD CERTIFICATION HHA PATIENT: HCPCS | Performed by: FAMILY MEDICINE

## 2024-01-16 NOTE — TELEPHONE ENCOUNTER
Verbal orders needed for skilled nursing once a week for nine weeks, plus two prn visits.    Yennifer Whyte on 1/16/2024 at 3:51 PM

## 2024-01-19 ENCOUNTER — TELEPHONE (OUTPATIENT)
Dept: FAMILY MEDICINE | Facility: OTHER | Age: 69
End: 2024-01-19
Payer: COMMERCIAL

## 2024-01-19 NOTE — TELEPHONE ENCOUNTER
Diamante Alicea requests verbal orders:    Skilled nursing once a week for nine weeks, plus two PRN skilled nursing visits.    Okay to leave detailed message.                Hermila Hernandez on 1/19/2024 at 4:41 PM

## 2024-01-23 ENCOUNTER — VIRTUAL VISIT (OUTPATIENT)
Dept: INFECTIOUS DISEASES | Facility: CLINIC | Age: 69
End: 2024-01-23
Attending: INTERNAL MEDICINE
Payer: COMMERCIAL

## 2024-01-23 ENCOUNTER — MEDICAL CORRESPONDENCE (OUTPATIENT)
Dept: HEALTH INFORMATION MANAGEMENT | Facility: OTHER | Age: 69
End: 2024-01-23

## 2024-01-23 VITALS
HEART RATE: 97 BPM | WEIGHT: 175 LBS | BODY MASS INDEX: 25.05 KG/M2 | OXYGEN SATURATION: 99 % | SYSTOLIC BLOOD PRESSURE: 120 MMHG | DIASTOLIC BLOOD PRESSURE: 60 MMHG | HEIGHT: 70 IN

## 2024-01-23 DIAGNOSIS — M86.68 CHRONIC OSTEOMYELITIS OF SYMPHYSIS PUBIS (H): Primary | ICD-10-CM

## 2024-01-23 PROCEDURE — 99214 OFFICE O/P EST MOD 30 MIN: CPT | Mod: 24 | Performed by: INTERNAL MEDICINE

## 2024-01-23 RX ORDER — FLUCONAZOLE 200 MG/1
400 TABLET ORAL DAILY
Qty: 60 TABLET | Refills: 0 | Status: SHIPPED | OUTPATIENT
Start: 2024-01-23 | End: 2024-02-22

## 2024-01-23 ASSESSMENT — PAIN SCALES - GENERAL: PAINLEVEL: MODERATE PAIN (4)

## 2024-01-23 NOTE — PROGRESS NOTES
Virtual Visit Details    Type of service:  Video Visit   Video Start Time:  3:30 PM  Video End Time: 3:52 PM    Originating Location (pt. Location): Home  Distant Location (provider location):  Off-site  Platform used for Video Visit: [facility] [address] [address]  Phone: [phone]  Fax: [phone]    Patient:  Dilip Kan, Date of birth 1955  Date of Visit:  01/23/2024  Referring Provider Josselin Kay MD  Reason for visit: Chronic osteomyelitis of pelvis s/p    Assessment & Plan      67 yo M with pubic symphysis osteomyelitis s/p cystectomy with ileal conduit, pubectomy with rectus flap on 11/30/2023. Intra-op cultures grew Candida albicans, Str agalactiae.     Chronic osteomyelitis s/p extensive source control surgery. His CRP and ESR remain elevated. I would like to complete 3 more weeks of Augmentin and 4 more weeks of fluconazole (as it seems to have been stopped).     -continue Augmentin 875 mg  x3 more weeks  -fluconazole 400 mg daily x 4 weeks  -repeat CRP/ESR/LFTs in 2 weeks  -repeat EKG in 2 weeks    RTC 2/27 for follow up with me, has Urology follow up as well    Total visit including reviewing records on the day of the visit and talking with the patient was 30 minutes.      Josselin Kay MD    History of Present Illness     Pertinent history obtain from: the patient    Patient  is a 67 yo M with PMHx of HTN, DM2, bilateral PE on AC, and metastatic prostate cancer (liver, bone:acetabulum), s/p radiation c/b radiation cysitis and chemotherapy (last dose 6/13/2023), hospitalized (Jun, 2023) with fever sepsis 2/2 pelvic abscess. He was found to have a pelvic abscess that was thought not to be reachable for drainage and hip fracture osteomyelitis versus metastasis. Of note, communication of abscess with bladder, prostate and not the rectum per cystogram. His urine and blood cultures grew  Klebsiella. He also screened positive for VRE although this never grew in any cultures. He was improving after a course of zosyn in the hospital, 2 weeks of linzolid for VRE, and discharged on levofloxacin 750 daily and flagyl 500 mg q8 h which was given for 8 weeks. He tolerated that fine and felt much improved in terms of fevers and appetite. CRP was down to 5. However, noted CRP elevated to 100, when stopped antibiotics.      CT 9/20 ([facility]) showed similar to smaller pelvic abscess, which prompted MRI pelvis 10/27/2023 ([facility]), whereby concerning findings of chronic osteomyelitis involving pubic symphysis septic arthropathy, along with anterior bladder wall defect and subjacent fistula. Because of these findings, seen by Urologist (Dr. Paul) and admitted for elective surgery. Underwent cystectomy with ileal conduit, pubectomy, with rectus flap by urology and plastic surgery teams on 11/30/2023. Intra-op cultures growing yeast and Str agalactiae.    Based on culture data, initially remained on micafungin, ceftriaxone, metronidazole while awaiting susceptibility on Candida isolate. Recommend to switch to oral regimen: PO Fluconazole 400mg daily (QTc 395 on 12/3/2023), and po augmentin 875mg BID. Both has good bioavailability in tissue and bone. Of note, does not have insurance coverage for home iv antibiotics.      He reports he feels better than prior to surgery.     He has been on Augmentin and completing therapy. However he does not remember being on fluconazole. He is not on it currently. It does appear in his discharge summary. He was at TCU until about 1 week ago in [place].     His wife thinks he was on fluconazole at the TCU but does not think he was restarted at discharge from the TCU.     Specialty Problems          Infectious Diseases    Infection due to Klebsiella pneumoniae        Chronic osteomyelitis of symphysis pubis (H)            Physical Exam     Vital signs:  /60   Pulse 97   Ht  "1.778 m (5' 10\")   Wt 79.4 kg (175 lb)   SpO2 99%   BMI 25.11 kg/m      He is well appearing, lying in bed.   Breathing comfortably on RA.     Data   Laboratory data and imaging listed below was reviewed prior to this encounter.     Inflammatory Markers:   Recent Labs   Lab Test 01/10/24  1308 10/16/23  1001 09/13/23  1422 08/31/23  1539   SED 27* 44* 62* 49*         CRP Inflammation   Date Value Ref Range Status   01/10/2024 37.44 (H) <5.00 mg/L Final        "

## 2024-01-23 NOTE — LETTER
1/23/2024       RE: Dilip Kan  [address] [place] [address]     Dear Colleague,    Thank you for referring your patient, Dilip Kan, to the [facility] [place] at [facility]. Please see a copy of my visit note below.    Virtual Visit Details    Type of service:  Video Visit   Video Start Time:  3:30 PM  Video End Time: 3:52 PM    Originating Location (pt. Location): Home  Distant Location (provider location):  Off-site  Platform used for Video Visit: [facility] [place]  [address] [address]  Phone: [phone]  Fax: [phone]    Patient:  Dilip Kan, Date of birth 1955  Date of Visit:  01/23/2024  Referring Provider Josselin Kay MD  Reason for visit: Chronic osteomyelitis of pelvis s/p    Assessment & Plan     69 yo M with pubic symphysis osteomyelitis s/p cystectomy with ileal conduit, pubectomy with rectus flap on 11/30/2023. Intra-op cultures grew Candida albicans, Str agalactiae.     Chronic osteomyelitis s/p extensive source control surgery. His CRP and ESR remain elevated. I would like to complete 3 more weeks of Augmentin and 4 more weeks of fluconazole (as it seems to have been stopped).     -continue Augmentin 875 mg  x3 more weeks  -fluconazole 400 mg daily x 4 weeks  -repeat CRP/ESR/LFTs in 2 weeks  -repeat EKG in 2 weeks    RTC 2/27 for follow up with me, has Urology follow up as well    Total visit including reviewing records on the day of the visit and talking with the patient was 30 minutes.      Josselin Kay MD    History of Present Illness    Pertinent history obtain from: the patient    Patient  is a 69 yo M with PMHx of HTN, DM2, bilateral PE on AC, and metastatic prostate cancer (liver, bone:acetabulum), s/p radiation c/b radiation cysitis and chemotherapy (last dose 6/13/2023),  hospitalized (Jun, 2023) with fever sepsis 2/2 pelvic abscess. He was found to have a pelvic abscess that was thought not to be reachable for drainage and hip fracture osteomyelitis versus metastasis. Of note, communication of abscess with bladder, prostate and not the rectum per cystogram. His urine and blood cultures grew Klebsiella. He also screened positive for VRE although this never grew in any cultures. He was improving after a course of zosyn in the hospital, 2 weeks of linzolid for VRE, and discharged on levofloxacin 750 daily and flagyl 500 mg q8 h which was given for 8 weeks. He tolerated that fine and felt much improved in terms of fevers and appetite. CRP was down to 5. However, noted CRP elevated to 100, when stopped antibiotics.      CT 9/20 ([facility]) showed similar to smaller pelvic abscess, which prompted MRI pelvis 10/27/2023 ([facility]), whereby concerning findings of chronic osteomyelitis involving pubic symphysis septic arthropathy, along with anterior bladder wall defect and subjacent fistula. Because of these findings, seen by Urologist (Dr. Paul) and admitted for elective surgery. Underwent cystectomy with ileal conduit, pubectomy, with rectus flap by urology and plastic surgery teams on 11/30/2023. Intra-op cultures growing yeast and Str agalactiae.    Based on culture data, initially remained on micafungin, ceftriaxone, metronidazole while awaiting susceptibility on Candida isolate. Recommend to switch to oral regimen: PO Fluconazole 400mg daily (QTc 395 on 12/3/2023), and po augmentin 875mg BID. Both has good bioavailability in tissue and bone. Of note, does not have insurance coverage for home iv antibiotics.      He reports he feels better than prior to surgery.     He has been on Augmentin and completing therapy. However he does not remember being on fluconazole. He is not on it currently. It does appear in his discharge summary. He was at TCU until about 1 week ago in [place].     His  "wife thinks he was on fluconazole at the TCU but does not think he was restarted at discharge from the TCU.     Specialty Problems          Infectious Diseases    Infection due to Klebsiella pneumoniae        Chronic osteomyelitis of symphysis pubis (H)            Physical Exam    Vital signs:  /60   Pulse 97   Ht 1.778 m (5' 10\")   Wt 79.4 kg (175 lb)   SpO2 99%   BMI 25.11 kg/m      He is well appearing, lying in bed.   Breathing comfortably on RA.     Data  Laboratory data and imaging listed below was reviewed prior to this encounter.     Inflammatory Markers:   Recent Labs   Lab Test 01/10/24  1308 10/16/23  1001 09/13/23  1422 08/31/23  1539   SED 27* 44* 62* 49*         CRP Inflammation   Date Value Ref Range Status   01/10/2024 37.44 (H) <5.00 mg/L Final          Josselin Kay MD    "

## 2024-01-23 NOTE — NURSING NOTE
Is the patient currently in the state of [place]? YES    Visit mode:VIDEO    If the visit is dropped, the patient can be reconnected by: VIDEO VISIT: Text to cell phone:   Telephone Information:   Mobile [phone]       Will anyone else be joining the visit? NO  (If patient encounters technical issues they should call [phone]    How would you like to obtain your AVS? MyChart    Are changes needed to the allergy or medication list? Pt stated no med changes    Reason for visit: RECHECK    No other vitals to report per pt    Kendra De VVF

## 2024-01-23 NOTE — PROGRESS NOTES
[facility]: Cancer Care Follow-Up Note                                    Discussion with Patient:                                                      Jermain is doing better since surgery on 11/30/24. His pain has significantly improved. He is no longer at a skilled nursing facility.           Goals          General     Being Active-pain relief (pt-stated)      Notes - Note created  10/13/2023 11:00 AM by Elza Lundberg RN     Goal Statement: I will establish a plan for preventing and managing pain.  Date Goal set: 10/13/2023  Barriers: disease burden and multiple diagnoses  Strengths: support, coping, motivation, and involvement with care team  Date to Achieve By: 6 months  Patient expressed understanding of goal: Yes  Action steps to achieve this goal:  I will take medications as prescribed.   I will call triage with new or worsening pain not controlled by medication.   I will use alternative therapies as directed. (Ice, heat, massage, etc)   I will call triage for medication refills when there are 2-3 days of medication remaining.         Patient will adhere to medication regimen             Dates of Treatment:                                                      Infusion given in last 28 days       None            Assessment:                                                          RNCC Short Symptom Review:     Assessment completed with:: Patient    General/Short Assessment  Does the patient have all their medications?: Yes  Is the patient experiencing any new or worsening symptoms?: No  Discussion with patient: Answered patient's questions and addressed concerns;Reviewed how and when to contact clinic;Reviewed patient's future appointments    Pain  Patient Reported Pain?: Yes, is currently well-managed  Pain Score: Moderate Pain (4)  Pain Management Interventions: medication (see MAR)    Patient Coping  Accepting    Clinic Utilization  Patient Aware of Next Appointment?: Yes    Other Patient  Concerns  Other Patient Reported Concerns: No    Intervention/Education provided during outreach:                                                           Patient to follow up as scheduled at next appt  Patient to call/MyChart message with updates  Confirmed patient has clinic and triage numbers    Signature:  Elza Lundberg RN

## 2024-01-24 ENCOUNTER — TELEPHONE (OUTPATIENT)
Dept: INFECTIOUS DISEASES | Facility: CLINIC | Age: 69
End: 2024-01-24
Payer: COMMERCIAL

## 2024-01-24 ENCOUNTER — TELEPHONE (OUTPATIENT)
Dept: FAMILY MEDICINE | Facility: OTHER | Age: 69
End: 2024-01-24
Payer: COMMERCIAL

## 2024-01-24 DIAGNOSIS — K21.9 GASTRO-ESOPHAGEAL REFLUX DISEASE WITHOUT ESOPHAGITIS: ICD-10-CM

## 2024-01-24 RX ORDER — OMEPRAZOLE 40 MG/1
40 CAPSULE, DELAYED RELEASE ORAL DAILY
Qty: 90 CAPSULE | Refills: 4 | Status: SHIPPED | OUTPATIENT
Start: 2024-01-24

## 2024-01-24 NOTE — TELEPHONE ENCOUNTER
MAX LVM 1/24 to let pt know about 2/27 video follow up with Dr. Kay and to call ID back if day and time doesn't work.

## 2024-01-24 NOTE — TELEPHONE ENCOUNTER
[facility] sent Rx request for the following:      Requested Prescriptions   Pending Prescriptions Disp Refills    omeprazole (PRILOSEC) 40 MG DR capsule [Pharmacy Med Name: omeprazole 40 mg capsule,delayed release] 90 capsule 0     Sig: Take 1 capsule (40 mg) by mouth daily       PPI Protocol Passed - 1/24/2024 10:06 AM        Passed - Medication is active on med list        Passed - Medication indicated for associated diagnosis     The medication is prescribed for one or more of the following conditions:     Erosive esophagitis    Gastric hypersecretion   Gastric ulcer   Gastroesophageal reflux disease   Helicobacter pylori gastrointestinal tract infection   Ulcer of duodenum   Drug-induced peptic ulcer   Esophageal stricture   Gastrointestinal hemorrhage   Indigestion   Stress ulcer   Zollinger-Gallardo syndrome   Davalos s esophagus   Laryngopharyngeal reflux          Passed - Recent (12 mo) or future (90 days) visit within the authorizing provider's specialty     The patient must have completed an in-person or virtual visit within the past 12 months or has a future visit scheduled within the next 90 days with the authorizing provider s specialty.  Urgent care and e-visits do not quality as an office visit for this protocol.          Passed - Patient is age 18 or older           Last Prescription Date:   1/10/24 Historical  Last Fill Qty/Refills:         90, R-4    Last Office Visit:              1/10/24 [place]   Future Office visit:           none noted   Routing refill request to provider for review/approval because:  Medication is reported/historical  Anh Steinberg RN ....................  1/24/2024   10:34 AM

## 2024-01-24 NOTE — TELEPHONE ENCOUNTER
Diamante Alicea requests a call back regarding the status of this request. Diamante stated she has yet to hear back.                  Hermila Hernandez on 1/24/2024 at 10:13 AM

## 2024-01-24 NOTE — TELEPHONE ENCOUNTER
Called and spoke with Diamante verbal orders given.Akosua Rivero LPN .......................1/24/2024  10:55 AM

## 2024-01-25 ENCOUNTER — ONCOLOGY VISIT (OUTPATIENT)
Dept: ONCOLOGY | Facility: OTHER | Age: 69
End: 2024-01-25
Attending: INTERNAL MEDICINE
Payer: COMMERCIAL

## 2024-01-25 VITALS
DIASTOLIC BLOOD PRESSURE: 46 MMHG | HEART RATE: 100 BPM | TEMPERATURE: 97.1 F | RESPIRATION RATE: 16 BRPM | WEIGHT: 177.4 LBS | BODY MASS INDEX: 25.45 KG/M2 | SYSTOLIC BLOOD PRESSURE: 86 MMHG | OXYGEN SATURATION: 98 %

## 2024-01-25 DIAGNOSIS — C79.51 MALIGNANT NEOPLASM METASTATIC TO BONE (H): ICD-10-CM

## 2024-01-25 DIAGNOSIS — R97.21 RISING PSA FOLLOWING TREATMENT FOR MALIGNANT NEOPLASM OF PROSTATE: Primary | ICD-10-CM

## 2024-01-25 DIAGNOSIS — C61 MALIGNANT NEOPLASM OF PROSTATE (H): ICD-10-CM

## 2024-01-25 PROCEDURE — 99215 OFFICE O/P EST HI 40 MIN: CPT | Performed by: INTERNAL MEDICINE

## 2024-01-25 PROCEDURE — 99417 PROLNG OP E/M EACH 15 MIN: CPT | Performed by: INTERNAL MEDICINE

## 2024-01-25 PROCEDURE — G0463 HOSPITAL OUTPT CLINIC VISIT: HCPCS

## 2024-01-25 ASSESSMENT — PAIN SCALES - GENERAL: PAINLEVEL: MODERATE PAIN (5)

## 2024-01-25 NOTE — PROGRESS NOTES
[facility] Hematology and Oncology Progress Note    Patient: Dilip Kan  MRN: [id number]  Date of Service: Jan 25, 2024         Reason for Visit    Chief Complaint   Patient presents with    Oncology Clinic Visit     Follow up labs & surgery       ECOG Performance Status  3      Encounter Diagnoses:    Metastatic castrate resistant prostate cancer      History of Present Illness    Mr. Dilip Kan returns for follow-up of metastatic castrate resistant prostate cancer.  For details of history see previous notes.  Briefly.The patient had progressed on abiraterone and prednisone.  He complained of left hip pain in February 2023.  MRI of the left hip revealed nondisplaced fracture of the left pubic bone adjacent to the pubic symphysis.  There was a fairly large abnormal signal associated with the left obturator internus muscle belly with associated T2 hyperintense signal deep to the muscle belly against the pelvic wall which was suggestive of possibly direct extension of tumor.  CT chest and abdomen/pelvis indicated progressive disease with increasing volume of a locally invasive mass adjacent to the prostate invading the obturator levator ani muscles with nondisplaced acute fracture to the left pubic symphysis.  There were multiple liver lesions consistent with diffuse hepatic metastatic disease.  We recommended radiation therapy to the left hip and left pubic symphysis.  We recommended starting the patient on docetaxel chemotherapy: With docetaxel given at a dose of 75 mg/m  and prednisone given at a dose of 5 mg p.o. twice daily on days 1 through 21 with 21-day cycle.  Patient developed hematuria that was gross with associated blood clots.  He was admitted with a fever of one 1.1 and neutrophil count of 0.7.  He was admitted and treated for neutropenic fever.  His hemoglobin dropped to 6.9 and required 2 units of packed red cells.  He was transferred to [facility] in [place] and was seen by   Fan of urology with plans for cystoscopy fulguration.  Was that this was radiation cystitis causing hematuria.  Cystoscopy revealed large clot burden which was evacuated.  With bladder was reexamined and noted to have a large, actively bleeding vessel.  These vessels were fulgurated..  He did have imaging in June 2023 with CT chest and abdomen/pelvis revealing a positive response with all liver lesions having been resolved, noted decrease in size of locally invasive mass in the left prostate, there was no significant change in the mixed lytic and sclerotic appearance of the left acetabulum.  Bone scan revealed uptake in the left acetabulum is relatively stable.  PSA continued to drop and was 2.24 in June 2023.  Subsequently developed fevers night sweats and temp of one 1.1 was seen in the emergency department.  On June 29, 2023 CT abdomen/pelvis revealed a 4 cm abscess deep within the pelvis, which appeared to be in communication of the urinary bladder or the left ventral aspect of the rectum.  The abscess abuts the left pubic bone at sepsis likely osteomyelitis.  There are small gas bubbles within the abscess cavity as well as surrounding it within the left pubic bone.  CT cystogram revealed an air-fluid collection along the primary bladder neck.  There is no evidence of fistula or communication within the rectum.  Reactive osseous changes of the pubic bone consistent with osteomyelitis he.  He had an MRI of the pelvis done which revealed apparent nondisplaced fracture involving the medial left acetabulum.  He was transferred to the [facility].  The 4 cm abscess was not amenable to percutaneous drainage.  He was to IV antibiotics and was felt that he would need a diverting colostomy to drain the pelvic abscess hematology oncology recommended holding chemotherapy while inpatient.  He was treated with empiric vancomycin, ceftriaxone and Flagyl and switch to IV Zosyn beginning on July 2,  2023 was also started on linezolid 600 mg IV every 12 hours due to the fact he was found to have a positive VRE in the stool.  Patient was discharged on linezolid/Levaquin and metronidazole.  His PSA was down to 1.43 at last 31 he did receive his last Lupron injection on July 31, 2023..  When we saw him last in September 2023 he was declining in terms of his performance status he had lost at least 25 pounds since June.  Ongoing left hip pain.  We elected to just treat with Lupron and he was due to receive it in December 2023 but apparently did not show up.The patient had undergone an MRI of the abdomen pelvis at the [facility] and the findings with the patient and cavity between the bladder and the prostate.  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  There is left inferior ramus fracture.  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  He was seen by Dr. Correa of urology who recommended cystectomy with ileal conduit and a left pubectomy.This was performed on November 30, 2023.  The patient underwent treatment for radiation cystitis, pubovesical fistula, and pubic osteomyelitis.  Patient underwent total cystectomy and creation of ileal conduit for urinary diversion lysis of adhesions, total pubic ectomy for osteomyelitis he also underwent a left rectus abdominis muscle flap to the pelvis by the plastic surgeon.  Pathology revealed the patient had focal high-grade prostatic adenocarcinoma with comedonecrosis involving the bladder.He was seen by infectious disease with patient had IntraOp cultures growing Candida albicans as well as strep agalactiae.  It was recommended patient start IV ceftriaxone as well as metronidazole for anaerobic coverage.  Recommended patient stay on IV micafunginHe was discharged on December 11, 2023: It was felt that the patient had severe protein calorie malnutrition due to to chronic illness.  He was discharged on Augmentin 875 mg every 12  hours.  He was transferred to tertiary care facility/nursing home.  It was recommended patient to start fluconazole 400 mg p.o. dailyAnd to continue Augmentin.  It was noted the patient is a small area of wound dehiscence Florendo surgical incision he was discharged with plan was to anticipate healing with secondary intention.  Comes in today for follow-up he appears to be somewhat depressed.  He is in a wheelchair with declining performance status due to ongoing weight loss.  Denies any areas of pain denies any shortness of breath cough or hemoptysis.  According to the patient, he has 49 days more antibiotics.  He did not receive his Lupron shot in December of last year.He continues on Diflucan as well as Eliquis.  His PSA on January 10 was 12.74    ______________________________________________________________________________    Past History    Past Medical History:   Diagnosis Date    Elevated prostate specific antigen (PSA)     4/10/2012    Encounter for other administrative examinations     1/31/2014    Esophagitis     No Comments Provided    Gastro-esophageal reflux disease without esophagitis     No Comments Provided    Low back pain     No Comments Provided    Malignant neoplasm of prostate (H)     9/6/2012    Nicotine dependence, uncomplicated     Quit - October 2007 with chantix    Other intervertebral disc degeneration, lumbosacral region     12/1/2008       Past Surgical History:   Procedure Laterality Date    APPENDECTOMY OPEN  [id number]    Ruptured - [place]    ARTHROPLASTY KNEE Right 11/16/2021    Procedure: ARTHROPLASTY, KNEE, TOTAL;  Surgeon: Micah Rock MD;  Location: [facility] OR    COLONOSCOPY  08/31/2006    next due in 2016    CYSTECTOMY BLADDER, ILEAL DIVERSION, COMBINED N/A 11/30/2023    Procedure: CYSTECTOMY, WITH URETEROILEAL CONDUIT CREATION and Pubectomy;  Surgeon: Miki Correa MD;  Location: [facility] OR    DISKECTOMY, LUMBAR, SINGLE SP  03/16/2009    L 3-4 microdiskectomy,  [facility]    ESOPHAGOSCOPY, GASTROSCOPY, DUODENOSCOPY (EGD), COMBINED  03/2003         ESOPHAGOSCOPY, GASTROSCOPY, DUODENOSCOPY (EGD), COMBINED  08/31/2006         GRAFT FLAP PEDICLE PERINEUM N/A 11/30/2023    Procedure: Left Rectus Abdominis flap;  Surgeon: YADIRA Guadalupe MD;  Location: [facility] OR    PICC DOUBLE LUMEN PLACEMENT Right 12/01/2023    5FR DL PICC, basilic vein. L-43cm, 2cm out.    PROSTATECTOMY PERINEAL RADICAL  11/28/2012    Nerve Sparring, Dr Warner    SPINE SURGERY N/A 04/28/2017    L3 to S1 spinal decompression L4/L5 fusion    TONSILLECTOMY, ADENOIDECTOMY, COMBINED      as a child    VARICOCELECTOMY  1959    INCISION AND DRAINAGE,EXCISE VARICOCELE           Review of systems.  CNS: There are no headaches, no blurred vision, no change in mental status,   ENT: There is no hearing loss.  Respiratory: There is no cough there is some dyspnea on exertion no hemoptysis  Cardiac: There is no chest pain, orthopnea, PND, or ankle edema.  GI: There is no bright red blood per rectum, no hematemesis, no reflux, no diarrhea or constipation  Musculoskeletal: There is lower extremity weakness and pelvic pain  : There is no urinary frequency, hematuria.  Constitutional: There is no fevers, night sweats, weight loss.  Endocrine: There is significant fatigue  Neuro: There is no tingling or numbness in the hands or feet.  Hematologic: There is no gingival bleeding, epistaxis, or easy bruisability.  Dermatologic: There is no skin rash.  A 14 point review of systems is otherwise negative.          Physical Exam    BP (!) 86/46 (BP Location: Right arm, Patient Position: Sitting, Cuff Size: Adult Large)   Pulse 100   Temp 97.1  F (36.2  C) (Tympanic)   Resp 16   Wt 80.5 kg (177 lb 6.4 oz)   SpO2 98%   BMI 25.45 kg/m        GENERAL: Alert and oriented to time place and person. Seated comfortably. In no distress.    HEAD: Atraumatic and normocephalic.    EYES: KRUPA, EOMI.  No pallor.  No icterus.    Oral cavity: no  mucosal lesion or tonsillar enlargement.    NECK: supple. JVP normal.  No thyroid enlargement.    LYMPH NODES: There are no palpable cervical, supraclavicular, axillary, or inguinal nodes.    LUNGS: clear to auscultation bilaterally.  Resonant to percussion throughout bilaterally.  Symmetrical breath movements bilaterally.    HEART: S1 and S2 are heard. Regular rate and rhythm.  No murmur or gallop or rub heard.  No peripheral edema.    ABDOMEN: Soft. Not tender. Not distended.  No palpable hepatomegaly or splenomegaly.  No other mass palpable.  Bowel sounds heard.    EXTREMITIES: There is no ankle edema.  SKIN: no rash, or bruising or purpura.    NEURO: Grossly non-focal.    Lab Results    No results found for this or any previous visit (from the past 240 hour(s)).    Imaging    No results found.    Assessment and Plan: #1: Metastatic castrate resistant prostate cancer:Patient was initially treated for nonmetastatic prostate cancer with rising PSA.  He was treated with apalutamide 240 mg daily.  He was switched to abiraterone and prednisone after rising PSA.  Subsequently developed progression with bony metastases involving the left acetabulum pubic symphysis as well as distant liver metastases.  He was switched to IV docetaxel chemotherapy.  He was started on concurrent radiation therapy to the pubic stiffness as well as left hip received 2 cycles of docetaxel: Subsequent neutropenic fever sepsis with Klebsiella and development of gross hematuria requiring transfer to [facility] where he required fulguration of multiple bleeding vessels in the bladder felt to be secondary to radiation cystitis.  Continue on docetaxel subsequently received a total of 4 cycles and had a drop in his PSA to 1.26 course complicated by pelvic abscess, septic shock osteomyelitis requiring transfer to [place] where he was treated with 6-week course of antibiotics Flagyl IV linezolid and Levaquin chemotherapy was on hold due to his  infection.  He had 5 pubovesical fistula requiring cystectomy and ileal conduit.  Pathology revealed high-grade prostatic adenocarcinoma focally involved in the bladder.  He was found to have osteomyelitis involving the pubic symphysis requiring IV antibiotics remains on antibiotics with oral Augmentin as well as oral fluconazole.  He is slowly recovering from his surgeries he is still not a candidate for any type of chemotherapy we suspect his PSA has risen to the fact has not been on time for his Lupron.  We recommend starting Lupron at a dose of 22.5 mg IM every 3 months and monitor PSAs to drop we will continue Lupron if not a recovered completely we may consider enzalutamide in addition to Lupron.  Time spent: 96 minutes was spent on this total patient visit: We spent time reviewing surgical reports including urology, disease, multiple providers including plastic surgery, reviewing all lab results Lupron at a dose of 22.5 mg IM every 3 months we will see the patient with cycle 2 of Lupron and monitor PSAs spent time ordering Lupron follow-up labs.    Cancer Staging   No matching staging information was found for the patient.        Signed by: Erum Fritz MD    CC: Ezequiel Alejandra MD

## 2024-01-25 NOTE — NURSING NOTE
"January 25, 2024 2:48 PM    Chief Complaint   Patient presents with    Oncology Clinic Visit     Follow up labs & surgery     Initial Vitals: BP (!) 86/46 (BP Location: Right arm, Patient Position: Sitting, Cuff Size: Adult Large)   Pulse 100   Temp 97.1  F (36.2  C) (Tympanic)   Resp 16   Wt 80.5 kg (177 lb 6.4 oz)   SpO2 98%   BMI 25.45 kg/m   Estimated body mass index is 25.45 kg/m  as calculated from the following:    Height as of 1/23/24: 1.778 m (5' 10\").    Weight as of this encounter: 80.5 kg (177 lb 6.4 oz). Body surface area is 1.99 meters squared.  Moderate Pain (5) Comment: Data Unavailable  No LMP for male patient.  Allergies reviewed: Yes  Medications reviewed: Yes    Medications: Medication refills not needed today.  Pharmacy name entered into EarlyDoc: [facility] - [place], [place] - [address]    Frailty Screening:  Is the patient here for a new oncology consult visit in cancer care? 2. No      Clinical Concerns: what's going on with cancer, lab work  ACP was notified.    Julia Robledo, [facility]     "

## 2024-01-26 ENCOUNTER — TELEPHONE (OUTPATIENT)
Dept: FAMILY MEDICINE | Facility: OTHER | Age: 69
End: 2024-01-26
Payer: COMMERCIAL

## 2024-01-26 NOTE — TELEPHONE ENCOUNTER
Would like verbal orders for Home care PT 1 time a week for 8 weeks.  Please call.    Abilio Castellon on 1/26/2024 at 9:23 AM

## 2024-02-02 ENCOUNTER — PRE VISIT (OUTPATIENT)
Dept: UROLOGY | Facility: CLINIC | Age: 69
End: 2024-02-02
Payer: COMMERCIAL

## 2024-02-02 DIAGNOSIS — N36.0 URINARY FISTULA: ICD-10-CM

## 2024-02-02 RX ORDER — OXYCODONE HYDROCHLORIDE 15 MG/1
15 TABLET ORAL EVERY 6 HOURS PRN
Qty: 90 TABLET | Refills: 0 | Status: SHIPPED | OUTPATIENT
Start: 2024-02-02 | End: 2024-02-29

## 2024-02-02 NOTE — TELEPHONE ENCOUNTER
Requested Prescriptions   Pending Prescriptions Disp Refills    oxyCODONE IR (ROXICODONE) 15 MG tablet 90 tablet 0     Sig: Take 1 tablet (15 mg) by mouth every 6 hours as needed for moderate pain or moderate to severe pain (can take every 4-6 hours as needed for pain)       There is no refill protocol information for this order        Last Prescription Date:   1/10/24  Last Fill Qty/Refills:         90, R-0    Last Office Visit:              1/10/24   Pt was seen 1/10/24 for hospital follow up, to establish care and for discharge orders to home from [facility] 1/12/24.    Future Office visit:           None    Prescribed for urinary fistula.     Per LOV note:  Patient is here to establish care. He also is in need of discharge orders. He brings with him his medications sheet which was reviewed. There are numerous meds at the and his wife report that they are not taking. Patient does have a history of prostate cancer. He is being followed by oncology. His chronic back pain. Prostate cancer is metastasized to multiple areas. He is currently in a nursing home due to a fractured left hip. Status post radiation. He is also being seen by infectious disease and is on Augmentin for a infection of his symphysis pubis. History of blood clots. He is being followed by infectious disease oncology urology. Recent under recently underwent a bladder resection.     Pt was seen 1/10/24 for hospital follow up, to establish care and for discharge orders to home from [facility] 1/12/24.    Routing to covering provider for refill consideration, as PCP/provider is out of clinic >48 hours or Pt is completely out of medication and provider is out of the clinic today.    Unable to complete prescription refill per RN Medication Refill Policy.     Xiao Donald RN .............. 2/2/2024  2:11 PM

## 2024-02-02 NOTE — TELEPHONE ENCOUNTER
Reason for visit: Follow up- S/P cystectomy with ileal conduit creation on 11/30/23 (Dr. Correa)     Relevant information: Pubovesical fistula, Robotic prostatectomy with EBRT, TUIBN, ADT, Bone metastases     Records/imaging/labs/orders: Renal US scheduled - 2/15/24    Pt called: No need for a call    At Rooming: Standard/Virtual    Cammie Jefferson [facility]  2/2/2024  4:26 PM

## 2024-02-02 NOTE — TELEPHONE ENCOUNTER
Reason for call: Medication or medication refill    Name of medication requested: oxycodone     How many days of medication do you have left? 2 days    What pharmacy do you use? [facility]    Preferred method for responding to this message: Telephone Call    Phone number patient can be reached at: Cell number on file:    Telephone Information:   Mobile [phone]       If we cannot reach you directly, may we leave a detailed response at the number you provided? Yes

## 2024-02-06 ENCOUNTER — HOSPITAL ENCOUNTER (OUTPATIENT)
Dept: INFUSION THERAPY | Facility: OTHER | Age: 69
Discharge: HOME | End: 2024-02-06
Attending: FAMILY MEDICINE
Payer: COMMERCIAL

## 2024-02-06 ENCOUNTER — LAB (OUTPATIENT)
Dept: LAB | Facility: OTHER | Age: 69
End: 2024-02-06
Attending: FAMILY MEDICINE
Payer: COMMERCIAL

## 2024-02-06 VITALS
HEART RATE: 93 BPM | RESPIRATION RATE: 16 BRPM | DIASTOLIC BLOOD PRESSURE: 49 MMHG | TEMPERATURE: 97.5 F | SYSTOLIC BLOOD PRESSURE: 106 MMHG

## 2024-02-06 DIAGNOSIS — C79.51 MALIGNANT NEOPLASM METASTATIC TO BONE (H): ICD-10-CM

## 2024-02-06 DIAGNOSIS — R97.21 RISING PSA FOLLOWING TREATMENT FOR MALIGNANT NEOPLASM OF PROSTATE: Primary | ICD-10-CM

## 2024-02-06 DIAGNOSIS — C61 MALIGNANT NEOPLASM OF PROSTATE (H): ICD-10-CM

## 2024-02-06 DIAGNOSIS — Z93.6 S/P ILEAL CONDUIT (H): ICD-10-CM

## 2024-02-06 LAB
CRP SERPL-MCNC: 11.78 MG/L
PSA SERPL DL<=0.01 NG/ML-MCNC: 15.52 NG/ML (ref 0–6.5)

## 2024-02-06 PROCEDURE — 36415 COLL VENOUS BLD VENIPUNCTURE: CPT | Mod: ZL

## 2024-02-06 PROCEDURE — 96402 CHEMO HORMON ANTINEOPL SQ/IM: CPT

## 2024-02-06 PROCEDURE — 250N000011 HC RX IP 250 OP 636: Mod: JZ | Performed by: INTERNAL MEDICINE

## 2024-02-06 PROCEDURE — 86140 C-REACTIVE PROTEIN: CPT | Mod: ZL

## 2024-02-06 PROCEDURE — 84153 ASSAY OF PSA TOTAL: CPT | Performed by: INTERNAL MEDICINE

## 2024-02-06 RX ADMIN — LEUPROLIDE ACETATE 22.5 MG: KIT at 10:19

## 2024-02-06 NOTE — NURSING NOTE
Infusion Nursing Note:  Dilip Kan presents today for Lupron.    Patient seen by provider today: No   present during visit today: Not Applicable.    Note: N/A.      Intravenous Access:  Labs drawn without difficulty per .    Treatment Conditions:  Not Applicable.      Post Infusion Assessment:  Patient tolerated injection without incident.  Site intact, free from redness, edema or discomfort.       Discharge Plan:   Discharge instructions reviewed with: Patient.  Patient and/or family verbalized understanding of discharge instructions and all questions answered.  Copy of AVS reviewed with patient and/or family.  Patient will return 4/30/2024 for next appointment.  Patient discharged in stable condition accompanied by: wife.  Departure Mode: Wheelchair.      Lizzeth Kumar RN

## 2024-02-08 ENCOUNTER — TELEPHONE (OUTPATIENT)
Dept: UROLOGY | Facility: CLINIC | Age: 69
End: 2024-02-08
Payer: COMMERCIAL

## 2024-02-08 DIAGNOSIS — Z93.6 S/P ILEAL CONDUIT (H): Primary | ICD-10-CM

## 2024-02-08 NOTE — TELEPHONE ENCOUNTER
[facility] Call Center    Phone Message    May a detailed message be left on voicemail: yes     Reason for Call: Minal from [facility] lab has patient scheduled but no orders. Please send orders if needed. Thank you    Action Taken: Message routed to:  [facility] URO    Travel Screening: Not Applicable

## 2024-02-09 NOTE — TELEPHONE ENCOUNTER
Spoke to Minal at the [facility] lab and let her know that per the last clinic note, Dr. Correa would like a CRP recheck.  Orders entered.     Naomi Falk RN   11:00 AM

## 2024-02-14 NOTE — TELEPHONE ENCOUNTER
Dr Alejandra reviewed and completed the following home care or hospice form(s) for home care  on 02/14/2024   This covers the certification period effective 01/16/2024 to 03/15/2024.  Akosua Rivero, LPN on 2/14/2024 at 4:34 PM

## 2024-02-15 ENCOUNTER — HOSPITAL ENCOUNTER (OUTPATIENT)
Dept: ULTRASOUND IMAGING | Facility: OTHER | Age: 69
Discharge: HOME | End: 2024-02-15
Attending: UROLOGY
Payer: COMMERCIAL

## 2024-02-15 ENCOUNTER — LAB (OUTPATIENT)
Dept: LAB | Facility: OTHER | Age: 69
End: 2024-02-15
Payer: COMMERCIAL

## 2024-02-15 DIAGNOSIS — Z93.6 S/P ILEAL CONDUIT (H): ICD-10-CM

## 2024-02-15 DIAGNOSIS — M86.68 CHRONIC OSTEOMYELITIS OF SYMPHYSIS PUBIS (H): Primary | ICD-10-CM

## 2024-02-15 LAB — CRP SERPL-MCNC: 30.29 MG/L

## 2024-02-15 PROCEDURE — 76770 US EXAM ABDO BACK WALL COMP: CPT

## 2024-02-15 PROCEDURE — 86140 C-REACTIVE PROTEIN: CPT | Mod: ZL

## 2024-02-15 PROCEDURE — 36415 COLL VENOUS BLD VENIPUNCTURE: CPT | Mod: ZL

## 2024-02-15 NOTE — PROGRESS NOTES
Dr. Alejandra reviewed and completed the following home care or hospice form(s) for Home Care on 2/14/2024. This covers the certification period effective 1/16/2024 to 3/15/2024.  Rosamaria Ceballos RN on 2/15/2024 at 3:32 PM

## 2024-02-21 NOTE — PROGRESS NOTES
HPI:  Dilip Kan is a 68 year old male being seen for post op follow up. 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     - on Augmentin and diflucan post op for 2 months  - 1/10/24- CRP 37.44  - 1/23/24 ID visit: Recommended continuing augmentin x3 weeks and fluconazole x4 weeks  - 2/6/24 - CRP 11.78  - 2/15/24 CPR 30.29     Today:  - Self report wound is getting better. Wound dressing change 1-2 times as recommended  - still on abx, has a couple of days of doses left  - ileal conduit drains well, dressing changed every 3 days by himself. Some mucus from ileal conduit stoma.      Reviewed notes from Dr. Kay from [facility] [place].    Exam:  There were no vitals taken for this visit.  GENERAL: alert and no distress  EYES: Eyes grossly normal to inspection.  No discharge or erythema, or obvious scleral/conjunctival abnormalities.  RESP: No audible wheeze, cough, or visible cyanosis.    SKIN: Visible skin clear. No significant rash, abnormal pigmentation or lesions.  NEURO: Cranial nerves grossly intact.  Mentation and speech appropriate for age.  PSYCH: Appropriate affect, tone, and pace of words  Abd: wound is healing. No eschar tissue.    Review of Imaging:  The following imaging exams were independently viewed and interpreted by me and discussed with patient:  Renal/Bladder Ultrasound: Normal    Review of Labs:  The following labs were reviewed by me and discussed with the patient:  Recent Results (from the past 720 hour(s))   CRP inflammation    Collection Time: 02/06/24  9:57 AM   Result Value Ref Range    CRP Inflammation 11.78 (H) <5.00 mg/L   PSA tumor marker    Collection Time: 02/06/24  9:59 AM   Result Value Ref Range    PSA Tumor Marker 15.52 (H) 0.00 - 6.50  ng/mL   CRP inflammation    Collection Time: 02/15/24  9:41 AM   Result Value Ref Range    CRP Inflammation 30.29 (H) <5.00 mg/L         Assessment & Plan   S/p ileal conduit  S/p pubectomy  Abdominal wound      -Discussed that elevation of CRP could be related to his abdominal wound or from breaks of antibiotics.    -Continue abx per ID recommendation.    -His wound is healing well. Continue wound pack and dressing changes.    -Reassured that mucus from ileal conduit is normal.     -VV follow up with me in 1 month with CRP prior.    - Will schedule MRI after CRP is stabilized.        Rachel Franklin NP  [facility] [place]    ==========================      Additional Coding Information:    Problems:  4 -- two or more stable chronic illnesses    Data Reviewed  Review of external notes as documented above     Tests ordered: CRP    Level of risk:  3 -- low risk (e.g., OTC medication or observation, minor surgery without risks)

## 2024-02-22 ENCOUNTER — VIRTUAL VISIT (OUTPATIENT)
Dept: UROLOGY | Facility: CLINIC | Age: 69
End: 2024-02-22
Payer: COMMERCIAL

## 2024-02-22 DIAGNOSIS — Z93.6 S/P ILEAL CONDUIT (H): Primary | ICD-10-CM

## 2024-02-22 PROCEDURE — 99213 OFFICE O/P EST LOW 20 MIN: CPT | Mod: 95

## 2024-02-22 ASSESSMENT — PAIN SCALES - GENERAL: PAINLEVEL: MODERATE PAIN (5)

## 2024-02-22 NOTE — PROGRESS NOTES
Virtual Visit Details    Type of service:  Video Visit   Video Start Time:  1205  Video End Time:12:20 PM    Originating Location (pt. Location): Home    Distant Location (provider location):  On-site  Platform used for Video Visit: Enrico

## 2024-02-22 NOTE — NURSING NOTE
Is the patient currently in the state of [place]? YES    Visit mode:VIDEO    If the visit is dropped, the patient can be reconnected by: VIDEO VISIT: Text to cell phone:   Telephone Information:   Mobile [phone]       Will anyone else be joining the visit? NO  (If patient encounters technical issues they should call [phone]    How would you like to obtain your AVS? MyChart    Are changes needed to the allergy or medication list? No    Reason for visit: RECHECK (1 month to review CT and lab)    Candelaria NICHOLE

## 2024-02-22 NOTE — LETTER
2/22/2024       RE: Dilip Kan  [address] [place] [address]     Dear Colleague,    Thank you for referring your patient, Dilip Kan, to the [facility] [place] at [facility]. Please see a copy of my visit note below.    HPI:  Dilip Kan is a 68 year old male being seen for post op follow up. 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     - on Augmentin and diflucan post op for 2 months  - 1/10/24- CRP 37.44  - 1/23/24 ID visit: Recommended continuing augmentin x3 weeks and fluconazole x4 weeks  - 2/6/24 - CRP 11.78  - 2/15/24 CPR 30.29     Today:  - Self report wound is getting better. Wound dressing change 1-2 times as recommended  - still on abx, has a couple of days of doses left  - ileal conduit drains well, dressing changed every 3 days by himself. Some mucus from ileal conduit stoma.      Reviewed notes from Dr. Kay from [facility] [place].    Exam:  There were no vitals taken for this visit.  GENERAL: alert and no distress  EYES: Eyes grossly normal to inspection.  No discharge or erythema, or obvious scleral/conjunctival abnormalities.  RESP: No audible wheeze, cough, or visible cyanosis.    SKIN: Visible skin clear. No significant rash, abnormal pigmentation or lesions.  NEURO: Cranial nerves grossly intact.  Mentation and speech appropriate for age.  PSYCH: Appropriate affect, tone, and pace of words  Abd: wound is healing. No eschar tissue.    Review of Imaging:  The following imaging exams were independently viewed and interpreted by me and discussed with patient:  Renal/Bladder Ultrasound: Normal    Review of Labs:  The following labs were reviewed by me and  discussed with the patient:  Recent Results (from the past 720 hour(s))   CRP inflammation    Collection Time: 02/06/24  9:57 AM   Result Value Ref Range    CRP Inflammation 11.78 (H) <5.00 mg/L   PSA tumor marker    Collection Time: 02/06/24  9:59 AM   Result Value Ref Range    PSA Tumor Marker 15.52 (H) 0.00 - 6.50 ng/mL   CRP inflammation    Collection Time: 02/15/24  9:41 AM   Result Value Ref Range    CRP Inflammation 30.29 (H) <5.00 mg/L         Assessment & Plan  S/p ileal conduit  S/p pubectomy  Abdominal wound      -Discussed that elevation of CRP could be related to his abdominal wound or from breaks of antibiotics.    -Continue abx per ID recommendation.    -His wound is healing well. Continue wound pack and dressing changes.    -Reassured that mucus from ileal conduit is normal.     -VV follow up with me in 1 month with CRP prior.    - Will schedule MRI after CRP is stabilized.        Rachel Franklin NP  [facility] [place]    ==========================      Additional Coding Information:    Problems:  4 -- two or more stable chronic illnesses    Data Reviewed  Review of external notes as documented above     Tests ordered: CRP    Level of risk:  3 -- low risk (e.g., OTC medication or observation, minor surgery without risks)                  Virtual Visit Details    Type of service:  Video Visit   Video Start Time:  1205  Video End Time:12:20 PM    Originating Location (pt. Location): Home    Distant Location (provider location):  On-site  Platform used for Video Visit: [employer]

## 2024-02-26 ENCOUNTER — TELEPHONE (OUTPATIENT)
Dept: FAMILY MEDICINE | Facility: OTHER | Age: 69
End: 2024-02-26
Payer: COMMERCIAL

## 2024-02-26 DIAGNOSIS — N36.0 URINARY FISTULA: Primary | ICD-10-CM

## 2024-02-26 NOTE — TELEPHONE ENCOUNTER
Letting you know that patient is having a trend of lower blood pressures. Lately it has been around 90/40.      Nancy Burnette on 2/26/2024 at 12:36 PM

## 2024-02-26 NOTE — TELEPHONE ENCOUNTER
Home Care reports that patients blood pressure has been trending low.  Today BP reading was 90/48, after this patient got up and moved around.  BP increased to 118/61.     Home Care is at patients house a couple of times per week.  Patient has been asymptomatic this whole time and drinks adequate water.  Home care encouraged patient to get up and move around every hour.      Home care is ok for this to wait for Ezequiel Alejandra MD to return.      Leona Shell LPN 2/26/2024 3:24 PM

## 2024-02-27 ENCOUNTER — VIRTUAL VISIT (OUTPATIENT)
Dept: INFECTIOUS DISEASES | Facility: CLINIC | Age: 69
End: 2024-02-27
Attending: INTERNAL MEDICINE
Payer: COMMERCIAL

## 2024-02-27 VITALS — BODY MASS INDEX: 25.05 KG/M2 | WEIGHT: 175 LBS | HEIGHT: 70 IN

## 2024-02-27 DIAGNOSIS — M86.68 CHRONIC OSTEOMYELITIS OF SYMPHYSIS PUBIS (H): ICD-10-CM

## 2024-02-27 PROCEDURE — 99214 OFFICE O/P EST MOD 30 MIN: CPT | Mod: 24 | Performed by: INTERNAL MEDICINE

## 2024-02-27 ASSESSMENT — PAIN SCALES - GENERAL: PAINLEVEL: MODERATE PAIN (4)

## 2024-02-27 NOTE — NURSING NOTE
No other vitals to report today, taken yest. by home healthcare but does not recall exact readings per pt      Is the patient currently in the state of [place]? YES    Visit mode:VIDEO    If the visit is dropped, the patient can be reconnected by: VIDEO VISIT: Send to e-mail at: [email or web address]    Will anyone else be joining the visit? NO  (If patient encounters technical issues they should call [phone]    How would you like to obtain your AVS? MyChart    Are changes needed to the allergy or medication list? Pt stated no changes to allergies and Pt stated no med changes    Patient declined individual allergy and medication review by support staff because they deny any changes and state that all information remains accurate since last reviewed/verified.       Reason for visit: ZAIDA Puga MA VVF

## 2024-02-27 NOTE — LETTER
2/27/2024       RE: Dilip Kan  [id number] [place] [address]     Dear Colleague,    Thank you for referring your patient, Dilip Kan, to the [facility] [place] at [facility]. Please see a copy of my visit note below.    Virtual Visit Details    Type of service:  Video Visit   Video Start Time:  2:03  Video End Time:2:08 PM    Originating Location (pt. Location): Home    Distant Location (provider location):  On-site  Platform used for Video Visit: [facility]  Dr. Josselin Kay, [facility], Floor [address]  [address] [place] [address]   Patient:  Dilip Kan, Date of birth 1955, Medical record number [id number]  Date of Visit:  02/27/2024         Assessment and Recommendations:     67 yo M with pubic symphysis osteomyelitis s/p cystectomy with ileal conduit, pubectomy with rectus flap on 11/30/2023. Intra-op cultures grew Candida albicans, Strep agalactiae.      Chronic osteomyelitis s/p extensive source control surgery. He has had antibiotics since 11/30. Initially micafungin, ceftriaxone, metronidazole and then fluconazole and augmentin. The fluconazole appears to have fallen off when he discharged home but we did the last 3-4 weeks on fluconazole and augmentin. His last CRP was back up to 30. If it was because of missed fluconazole doses, more movement with PT, or elevated PSA is unclear. He should get repeat labs next week. I will check the CRP, CBC, ESR and see if it is improved.     Josselin Kay MD  Division of Infectious Diseases and International Medicine  [phone]    RTC- 3-4 weeks    Total visit including reviewing outside records on the day of the visit was 30 minutes.          History of Infectious Disease Illness:     Patient  is a 67 yo M with PMHx of HTN, DM2, bilateral PE on AC, and metastatic prostate cancer (liver,  bone:acetabulum), s/p radiation c/b radiation cysitis and chemotherapy (last dose 6/13/2023), hospitalized (Jun, 2023) with fever sepsis 2/2 pelvic abscess. He was found to have a pelvic abscess that was thought not to be reachable for drainage and hip fracture osteomyelitis versus metastasis. Of note, communication of abscess with bladder, prostate and not the rectum per cystogram. His urine and blood cultures grew Klebsiella. He also screened positive for VRE although this never grew in any cultures. He was improving after a course of zosyn in the hospital, 2 weeks of linzolid for VRE, and discharged on levofloxacin 750 daily and flagyl 500 mg q8 h which was given for 8 weeks. He tolerated that fine and felt much improved in terms of fevers and appetite. CRP was down to 5. However, noted CRP elevated to 100, when stopped antibiotics.      CT 9/20 ([facility]) showed similar to smaller pelvic abscess, which prompted MRI pelvis 10/27/2023 ([facility]), whereby concerning findings of chronic osteomyelitis involving pubic symphysis septic arthropathy, along with anterior bladder wall defect and subjacent fistula. Because of these findings, seen by Urologist (Dr. Paul) and admitted for elective surgery. Underwent cystectomy with ileal conduit, pubectomy, with rectus flap by urology and plastic surgery teams on 11/30/2023. Intra-op cultures growing yeast and Strep agalactiae.     Based on culture data, initially remained on micafungin, ceftriaxone, metronidazole while awaiting susceptibility on Candida isolate. Recommend to switch to oral regimen: PO Fluconazole 400mg daily (QTc 395 on 12/3/2023), and po augmentin 875mg BID. Both has good bioavailability in tissue and bone. Of note, does not have insurance coverage for home iv antibiotics.      Last visit: 1/23/24     He reports he feels better than prior to surgery.      He has been on Augmentin and completing therapy. However he does not remember being on fluconazole. He is  not on it currently. It does appear in his discharge summary. He was at TCU until about 1 week ago in [place].      His wife thinks he was on fluconazole at the TCU but does not think he was restarted at discharge from the TCU.     We placed him on 3 more weeks of augmentin and 4 weeks of fluconazole.    Recent Events: 2/27/24    He reports feeling run down.     He had a shot a few weeks ago which makes him feel run down.     Occasional pain in stomach and leg.     He reports he still has a healing wound post op and he has home health that is monitoring it. He says he has rare drainage.     He has occasional chills. But he has lost a lot of weight. He also got the cancer shot. No fevers.     He has completed his antibiotics.     He able to go from bed to chair and to his living room.     He reports his pain is much improved. He still needs his walker.         Past Medical and Surgical History:     Past Medical History:   Diagnosis Date    Elevated prostate specific antigen (PSA)     4/10/2012    Encounter for other administrative examinations     1/31/2014    Esophagitis     No Comments Provided    Gastro-esophageal reflux disease without esophagitis     No Comments Provided    Low back pain     No Comments Provided    Malignant neoplasm of prostate (H)     9/6/2012    Nicotine dependence, uncomplicated     Quit - October 2007 with chantix    Other intervertebral disc degeneration, lumbosacral region     12/1/2008       Past Surgical History:   Procedure Laterality Date    APPENDECTOMY OPEN  091909    Ruptured - [place]    ARTHROPLASTY KNEE Right 11/16/2021    Procedure: ARTHROPLASTY, KNEE, TOTAL;  Surgeon: Micah Rock MD;  Location: [facility] OR    COLONOSCOPY  08/31/2006    next due in 2016    CYSTECTOMY BLADDER, ILEAL DIVERSION, COMBINED N/A 11/30/2023    Procedure: CYSTECTOMY, WITH URETEROILEAL CONDUIT CREATION and Pubectomy;  Surgeon: Miki Correa MD;  Location: [facility] OR    DISKECTOMY,  LUMBAR, SINGLE SP  2009    L 3-4 microdiskectomy, [facility]    ESOPHAGOSCOPY, GASTROSCOPY, DUODENOSCOPY (EGD), COMBINED  2003         ESOPHAGOSCOPY, GASTROSCOPY, DUODENOSCOPY (EGD), COMBINED  2006         GRAFT FLAP PEDICLE PERINEUM N/A 2023    Procedure: Left Rectus Abdominis flap;  Surgeon: YADIRA Guadalupe MD;  Location: [facility] OR    PICC DOUBLE LUMEN PLACEMENT Right 2023    5FR DL PICC, basilic vein. L-43cm, 2cm out.    PROSTATECTOMY PERINEAL RADICAL  2012    Nerve Sparring, Dr Warner    SPINE SURGERY N/A 2017    L3 to S1 spinal decompression L4/L5 fusion    TONSILLECTOMY, ADENOIDECTOMY, COMBINED      as a child    VARICOCELECTOMY      INCISION AND DRAINAGE,EXCISE VARICOCELE           Family History:     Family History   Problem Relation Age of Onset    Hypertension Mother         Hypertension,HTN    Other - See Comments Mother          Parkinsons    Heart Disease Father         Heart Disease, passed away from CHF,CAD    Colon Cancer Father         Cancer-colon    Hypertension Father         Hypertension    Other - See Comments Father         Stroke/Dementia    Family History Negative Sister         Good Health,emotional problems.    Family History Negative Sister         Good Health    Other - See Comments Daughter         w/o major medical problems.    Other - See Comments Son         w/o major medical problems.    Family History Negative Other         Good Health    Family History Negative Other         Good Health,previous marriage./previous marriage.    Family History Negative Other         Good Health,previous marriage.    Anesthesia Reaction No family hx of     Venous thrombosis No family hx of            Social History:     Social History     Tobacco Use    Smoking status: Former     Packs/day: 1.00     Years: 20.00     Additional pack years: 0.00     Total pack years: 20.00     Types: Cigarettes     Quit date: 2002     Years since quittin.3     Passive  exposure: Past    Smokeless tobacco: Current     Types: Snuff    Tobacco comments:     Can't remember when all he had passive exposure   Vaping Use    Vaping Use: Never used   Substance Use Topics    Alcohol use: Not Currently    Drug use: No     Social History     Social History Narrative    [employer] - retired.  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  Patient has quit smoking.  Lives in [place] on a farm with wife.             Review of Systems:   CONSTITUTIONAL:  No fevers or chills  EYES: negative for icterus  ENT:  negative for hearing loss, tinnitus, sore throat  RESPIRATORY:  negative for cough, sputum or dyspnea  CARDIOVASCULAR:  negative for chest pain, palpitations  GASTROINTESTINAL:  negative for nausea, vomiting, diarrhea or constipation  GENITOURINARY:  negative for dysuria  HEME:  No easy bruising  INTEGUMENT:  negative for rash or pruritus  NEURO:  Negative for headache         Current Medications:     Current Outpatient Medications   Medication Sig Dispense Refill    acetaminophen (TYLENOL) 325 MG tablet Take 975 mg by mouth every 8 hours as needed for mild pain      amLODIPine (NORVASC) 10 MG tablet Take 1 tablet (10 mg) by mouth every morning 90 tablet 4    amoxicillin-clavulanate (AUGMENTIN) 875-125 MG tablet Take 1 tablet by mouth every 12 hours (DO not stop abx until instructed by Infectious Disease) 30 tablet 1    apixaban ANTICOAGULANT (ELIQUIS) 5 MG tablet Take 1 tablet (5 mg) by mouth 2 times daily 180 tablet 4    atorvastatin (LIPITOR) 20 MG tablet Take 1 tablet (20 mg) by mouth daily 90 tablet 0    leuprolide (LUPRON DEPOT) 45 MG kit Inject 45 mg into the muscle every 6 months      lisinopril (ZESTRIL) 20 MG tablet Take 1 tablet (20 mg) by mouth daily 90 tablet 4    megestrol (MEGACE) 20 MG tablet Take 20 mg by mouth 2 times daily      omeprazole (PRILOSEC) 40 MG DR capsule Take 1 capsule (40 mg) by mouth daily 90 capsule 4    ondansetron (ZOFRAN ODT) 8 MG ODT tab Take 1  "tablet (8 mg) by mouth every 8 hours as needed for nausea 90 tablet 1    oxyCODONE IR (ROXICODONE) 15 MG tablet Take 1 tablet (15 mg) by mouth every 6 hours as needed for moderate pain or moderate to severe pain (can take every 4-6 hours as needed for pain) 90 tablet 0            Immunization History:     Immunization History   Administered Date(s) Administered    COVID-19 Bivalent 12+ (Pfizer) 12/02/2022    COVID-19 MONOVALENT 12+ (Pfizer) 01/25/2022    COVID-19 Vaccine (Sofiya) 03/06/2021    Influenza (High Dose) 3 valent vaccine 11/01/2018, 09/30/2019    Influenza (IIV3) PF 10/15/2008, 11/17/2011, 11/09/2012    Influenza Vaccine 65+ (Fluzone HD) 10/05/2020, 10/26/2021, 10/05/2022, 01/10/2024    Influenza Vaccine >6 months,quad, PF 10/16/2014, 11/13/2015, 11/03/2016, 09/25/2017    Pneumo Conj 13-V (2010&after) 10/05/2020    Pneumococcal 23 valent 10/05/2022    TDAP Vaccine (Adacel) 11/09/2012            Allergies:   No Known Allergies         Physical Exam:   Vital signs:  Ht 1.778 m (5' 10\")   Wt 79.4 kg (175 lb)   BMI 25.11 kg/m      Physical Examination:  GENERAL:  well-developed, well-nourished, seated in no acute distress.  HEENT:  Head is normocephalic, atraumatic   EYES:  Eyes have anicteric sclerae without conjunctival injection   ENT:  Oropharynx is moist without exudates or ulcers. Tongue is midline  NECK:  Supple. No cervical lymphadenopathy  LUNGS:  Clear to auscultation bilateral.   CARDIOVASCULAR:  Regular rate and rhythm with no murmurs, gallops or rubs.  ABDOMEN:  Normal bowel sounds, soft, nontender. No appreciable hepatosplenomegaly.  SKIN:  No acute rashes.    NEUROLOGIC:  Grossly nonfocal. Active x4 extremities         Laboratory Data:     Metabolic Studies   Sodium   Date Value Ref Range Status   01/10/2024 135 135 - 145 mmol/L Final     Comment:     Reference intervals for this test were updated on 09/26/2023 to more accurately reflect our healthy population. There may be differences in " the flagging of prior results with similar values performed with this method. Interpretation of those prior results can be made in the context of the updated reference intervals.    12/05/2023 139 135 - 145 mmol/L Final     Comment:     Reference intervals for this test were updated on 09/26/2023 to more accurately reflect our healthy population. There may be differences in the flagging of prior results with similar values performed with this method. Interpretation of those prior results can be made in the context of the updated reference intervals.    02/23/2021 140 134 - 144 mmol/L Final   10/05/2020 142 134 - 144 mmol/L Final     Potassium   Date Value Ref Range Status   01/10/2024 5.2 3.4 - 5.3 mmol/L Final   12/05/2023 4.1 3.4 - 5.3 mmol/L Final   10/13/2022 4.0 3.5 - 5.1 mmol/L Final   09/29/2022 4.0 3.5 - 5.1 mmol/L Final   02/23/2021 4.2 3.5 - 5.1 mmol/L Final   10/05/2020 4.4 3.5 - 5.1 mmol/L Final     Chloride   Date Value Ref Range Status   01/10/2024 105 98 - 107 mmol/L Final   12/05/2023 110 (H) 98 - 107 mmol/L Final   10/13/2022 103 98 - 107 mmol/L Final   09/29/2022 105 98 - 107 mmol/L Final   02/23/2021 103 98 - 107 mmol/L Final   10/05/2020 105 98 - 107 mmol/L Final     Carbon Dioxide   Date Value Ref Range Status   02/23/2021 29 21 - 31 mmol/L Final   10/05/2020 30 21 - 31 mmol/L Final     Carbon Dioxide (CO2)   Date Value Ref Range Status   01/10/2024 18 (L) 22 - 29 mmol/L Final   12/05/2023 21 (L) 22 - 29 mmol/L Final   10/13/2022 30 21 - 31 mmol/L Final   09/29/2022 29 21 - 31 mmol/L Final     Anion Gap   Date Value Ref Range Status   01/10/2024 12 7 - 15 mmol/L Final   12/05/2023 8 7 - 15 mmol/L Final   10/13/2022 6 3 - 14 mmol/L Final   09/29/2022 7 3 - 14 mmol/L Final   02/23/2021 8 3 - 14 mmol/L Final   10/05/2020 7 3 - 14 mmol/L Final     Urea Nitrogen   Date Value Ref Range Status   01/10/2024 18.3 8.0 - 23.0 mg/dL Final   12/05/2023 17.0 8.0 - 23.0 mg/dL Final   10/13/2022 15 7 - 25 mg/dL  "Final   09/29/2022 19 7 - 25 mg/dL Final   02/23/2021 17 7 - 25 mg/dL Final   10/05/2020 18 7 - 25 mg/dL Final     Creatinine   Date Value Ref Range Status   01/10/2024 0.84 0.67 - 1.17 mg/dL Final   12/05/2023 0.46 (L) 0.67 - 1.17 mg/dL Final   02/23/2021 0.73 0.70 - 1.30 mg/dL Final   11/23/2020 0.71 0.70 - 1.30 mg/dL Final     GFR Estimate   Date Value Ref Range Status   01/10/2024 >90 >60 mL/min/1.73m2 Final   12/05/2023 >90 >60 mL/min/1.73m2 Final   02/23/2021 >90 >60 mL/min/[1.73_m2] Final   11/23/2020 >90 >60 mL/min/[1.73_m2] Final     Glucose   Date Value Ref Range Status   01/10/2024 120 (H) 70 - 99 mg/dL Final   12/05/2023 132 (H) 70 - 99 mg/dL Final   10/13/2022 100 70 - 105 mg/dL Final   09/29/2022 99 70 - 105 mg/dL Final   02/23/2021 139 (H) 70 - 105 mg/dL Final   10/05/2020 153 (H) 70 - 105 mg/dL Final     GLUCOSE BY METER POCT   Date Value Ref Range Status   12/04/2023 118 (H) 70 - 99 mg/dL Final   12/04/2023 110 (H) 70 - 99 mg/dL Final     Hemoglobin A1C   Date Value Ref Range Status   11/20/2023 6.5 (H) <5.7 % Final     Comment:     Normal <5.7%   Prediabetes 5.7-6.4%    Diabetes 6.5% or higher     Note: Adopted from ADA consensus guidelines.   05/24/2021 6.0 4.0 - 6.0 % Final     Calcium   Date Value Ref Range Status   01/10/2024 10.3 (H) 8.8 - 10.2 mg/dL Final   12/05/2023 8.0 (L) 8.8 - 10.2 mg/dL Final   02/23/2021 10.0 8.6 - 10.3 mg/dL Final   10/05/2020 9.5 8.6 - 10.3 mg/dL Final     Phosphorus   Date Value Ref Range Status   12/06/2023 3.4 2.5 - 4.5 mg/dL Final   12/04/2023 4.0 2.5 - 4.5 mg/dL Final     Magnesium   Date Value Ref Range Status   12/11/2023 1.8 1.7 - 2.3 mg/dL Final   12/10/2023 1.8 1.7 - 2.3 mg/dL Final     Lactic Acid   Date Value Ref Range Status   07/02/2023 0.8 0.7 - 2.0 mmol/L Final   06/29/2023 1.0 0.7 - 2.0 mmol/L Final       Inflammatory Markers No results found for: \"CRP\"    Hepatic Studies    Bilirubin Total   Date Value Ref Range Status   01/10/2024 0.2 <=1.2 mg/dL " Final   12/05/2023 1.1 <=1.2 mg/dL Final   02/23/2021 0.8 0.3 - 1.0 mg/dL Final   04/07/2017 0.5 0.3 - 1.0 mg/dL      Alkaline Phosphatase   Date Value Ref Range Status   01/10/2024 105 40 - 150 U/L Final     Comment:     Reference intervals for this test were updated on 11/14/2023 to more accurately reflect our healthy population. There may be differences in the flagging of prior results with similar values performed with this method. Interpretation of those prior results can be made in the context of the updated reference intervals.   12/05/2023 458 (H) 40 - 150 U/L Final     Comment:     Reference intervals for this test were updated on 11/14/2023 to more accurately reflect our healthy population. There may be differences in the flagging of prior results with similar values performed with this method. Interpretation of those prior results can be made in the context of the updated reference intervals.   02/23/2021 66 34 - 104 U/L Final   04/07/2017 67 34 - 104 IU/L      Albumin   Date Value Ref Range Status   01/10/2024 3.3 (L) 3.5 - 5.2 g/dL Final   12/05/2023 2.0 (L) 3.5 - 5.2 g/dL Final   10/13/2022 3.8 3.5 - 5.7 g/dL Final   09/29/2022 3.8 3.5 - 5.7 g/dL Final   02/23/2021 4.3 3.5 - 5.7 g/dL Final   04/07/2017 4.0 3.5 - 5.7 g/dL      AST   Date Value Ref Range Status   01/10/2024 21 0 - 45 U/L Final     Comment:     Reference intervals for this test were updated on 6/12/2023 to more accurately reflect our healthy population. There may be differences in the flagging of prior results with similar values performed with this method. Interpretation of those prior results can be made in the context of the updated reference intervals.   12/05/2023 217 (H) 0 - 45 U/L Final     Comment:     Reference intervals for this test were updated on 6/12/2023 to more accurately reflect our healthy population. There may be differences in the flagging of prior results with similar values performed with this method. Interpretation  of those prior results can be made in the context of the updated reference intervals.   02/23/2021 15 13 - 39 U/L Final     ALT   Date Value Ref Range Status   01/10/2024 13 0 - 70 U/L Final     Comment:     Reference intervals for this test were updated on 6/12/2023 to more accurately reflect our healthy population. There may be differences in the flagging of prior results with similar values performed with this method. Interpretation of those prior results can be made in the context of the updated reference intervals.     12/05/2023 137 (H) 0 - 70 U/L Final     Comment:     Reference intervals for this test were updated on 6/12/2023 to more accurately reflect our healthy population. There may be differences in the flagging of prior results with similar values performed with this method. Interpretation of those prior results can be made in the context of the updated reference intervals.     02/23/2021 16 7 - 52 U/L Final       Hematology Studies      WBC   Date Value Ref Range Status   02/23/2021 6.9 4.0 - 11.0 10e9/L Final   07/09/2020 5.5 4.0 - 11.0 10e9/L Final     WBC Count   Date Value Ref Range Status   01/10/2024 6.6 4.0 - 11.0 10e3/uL Final   12/05/2023 5.3 4.0 - 11.0 10e3/uL Final     Absolute Neutrophils   Date Value Ref Range Status   04/22/2023 2.2 1.6 - 8.3 10e3/uL Final     Absolute Lymphocytes   Date Value Ref Range Status   04/22/2023 0.4 (L) 0.8 - 5.3 10e3/uL Final     Absolute Monocytes   Date Value Ref Range Status   04/22/2023 0.3 0.0 - 1.3 10e3/uL Final     Absolute Eosinophils   Date Value Ref Range Status   04/22/2023 0.0 0.0 - 0.7 10e3/uL Final     Hemoglobin   Date Value Ref Range Status   01/10/2024 7.8 (L) 13.3 - 17.7 g/dL Final   12/05/2023 8.5 (L) 13.3 - 17.7 g/dL Final   02/23/2021 14.1 13.3 - 17.7 g/dL Final   07/09/2020 13.7 13.3 - 17.7 g/dL Final     Hematocrit   Date Value Ref Range Status   01/10/2024 25.3 (L) 40.0 - 53.0 % Final   12/05/2023 27.8 (L) 40.0 - 53.0 % Final    02/23/2021 42.5 40.0 - 53.0 % Final   07/09/2020 41.7 40.0 - 53.0 % Final     Platelet Count   Date Value Ref Range Status   01/10/2024 366 150 - 450 10e3/uL Final   12/05/2023 294 150 - 450 10e3/uL Final   02/23/2021 200 150 - 450 10e9/L Final   07/09/2020 203 150 - 450 10e9/L Final       Imaging:  [unfilled]       Sincerely,    Josselin Kay MD

## 2024-02-27 NOTE — PROGRESS NOTES
Virtual Visit Details    Type of service:  Video Visit   Video Start Time:  2:03  Video End Time:2:08 PM    Originating Location (pt. Location): Home    Distant Location (provider location):  On-site  Platform used for Video Visit: [facility]  Dr. Josselin Kay, [facility], Floor 3  [address] [place] [address]   Patient:  Dilip Kan, Date of birth 1955, Medical record number [id number]  Date of Visit:  02/27/2024         Assessment and Recommendations:     67 yo M with pubic symphysis osteomyelitis s/p cystectomy with ileal conduit, pubectomy with rectus flap on 11/30/2023. Intra-op cultures grew Candida albicans, Strep agalactiae.      Chronic osteomyelitis s/p extensive source control surgery. He has had antibiotics since 11/30. Initially micafungin, ceftriaxone, metronidazole and then fluconazole and augmentin. The fluconazole appears to have fallen off when he discharged home but we did the last 3-4 weeks on fluconazole and augmentin. His last CRP was back up to 30. If it was because of missed fluconazole doses, more movement with PT, or elevated PSA is unclear. He should get repeat labs next week. I will check the CRP, CBC, ESR and see if it is improved.     Josselin Kay MD  Division of Infectious Diseases and International Medicine  [phone]    RTC- 3-4 weeks    Total visit including reviewing outside records on the day of the visit was 30 minutes.          History of Infectious Disease Illness:     Patient  is a 67 yo M with PMHx of HTN, DM2, bilateral PE on AC, and metastatic prostate cancer (liver, bone:acetabulum), s/p radiation c/b radiation cysitis and chemotherapy (last dose 6/13/2023), hospitalized (Jun, 2023) with fever sepsis 2/2 pelvic abscess. He was found to have a pelvic abscess that was thought not to be reachable for drainage and hip fracture osteomyelitis versus metastasis. Of note, communication of  abscess with bladder, prostate and not the rectum per cystogram. His urine and blood cultures grew Klebsiella. He also screened positive for VRE although this never grew in any cultures. He was improving after a course of zosyn in the hospital, 2 weeks of linzolid for VRE, and discharged on levofloxacin 750 daily and flagyl 500 mg q8 h which was given for 8 weeks. He tolerated that fine and felt much improved in terms of fevers and appetite. CRP was down to 5. However, noted CRP elevated to 100, when stopped antibiotics.      CT 9/20 ([facility]) showed similar to smaller pelvic abscess, which prompted MRI pelvis 10/27/2023 ([facility]), whereby concerning findings of chronic osteomyelitis involving pubic symphysis septic arthropathy, along with anterior bladder wall defect and subjacent fistula. Because of these findings, seen by Urologist (Dr. Paul) and admitted for elective surgery. Underwent cystectomy with ileal conduit, pubectomy, with rectus flap by urology and plastic surgery teams on 11/30/2023. Intra-op cultures growing yeast and Strep agalactiae.     Based on culture data, initially remained on micafungin, ceftriaxone, metronidazole while awaiting susceptibility on Candida isolate. Recommend to switch to oral regimen: PO Fluconazole 400mg daily (QTc 395 on 12/3/2023), and po augmentin 875mg BID. Both has good bioavailability in tissue and bone. Of note, does not have insurance coverage for home iv antibiotics.      Last visit: 1/23/24     He reports he feels better than prior to surgery.      He has been on Augmentin and completing therapy. However he does not remember being on fluconazole. He is not on it currently. It does appear in his discharge summary. He was at TCU until about 1 week ago in [place].      His wife thinks he was on fluconazole at the TCU but does not think he was restarted at discharge from the TCU.     We placed him on 3 more weeks of augmentin and 4 weeks of fluconazole.    Recent  Events: 2/27/24    He reports feeling run down.     He had a shot a few weeks ago which makes him feel run down.     Occasional pain in stomach and leg.     He reports he still has a healing wound post op and he has home health that is monitoring it. He says he has rare drainage.     He has occasional chills. But he has lost a lot of weight. He also got the cancer shot. No fevers.     He has completed his antibiotics.     He able to go from bed to chair and to his living room.     He reports his pain is much improved. He still needs his walker.         Past Medical and Surgical History:     Past Medical History:   Diagnosis Date    Elevated prostate specific antigen (PSA)     4/10/2012    Encounter for other administrative examinations     1/31/2014    Esophagitis     No Comments Provided    Gastro-esophageal reflux disease without esophagitis     No Comments Provided    Low back pain     No Comments Provided    Malignant neoplasm of prostate (H)     9/6/2012    Nicotine dependence, uncomplicated     Quit - October 2007 with chantix    Other intervertebral disc degeneration, lumbosacral region     12/1/2008       Past Surgical History:   Procedure Laterality Date    APPENDECTOMY OPEN  020332    Ruptured - [place]    ARTHROPLASTY KNEE Right 11/16/2021    Procedure: ARTHROPLASTY, KNEE, TOTAL;  Surgeon: Micah Rock MD;  Location: [facility] OR    COLONOSCOPY  08/31/2006    next due in 2016    CYSTECTOMY BLADDER, ILEAL DIVERSION, COMBINED N/A 11/30/2023    Procedure: CYSTECTOMY, WITH URETEROILEAL CONDUIT CREATION and Pubectomy;  Surgeon: Miki Correa MD;  Location: [facility] OR    DISKECTOMY, LUMBAR, SINGLE SP  03/16/2009    L 3-4 microdiskectomy, [facility]    ESOPHAGOSCOPY, GASTROSCOPY, DUODENOSCOPY (EGD), COMBINED  03/2003         ESOPHAGOSCOPY, GASTROSCOPY, DUODENOSCOPY (EGD), COMBINED  08/31/2006         GRAFT FLAP PEDICLE PERINEUM N/A 11/30/2023    Procedure: Left Rectus Abdominis flap;  Surgeon: YADIRA Guadalupe  Melody Anaya MD;  Location: [facility] OR    PICC DOUBLE LUMEN PLACEMENT Right 2023    5FR DL PICC, basilic vein. L-43cm, 2cm out.    PROSTATECTOMY PERINEAL RADICAL  2012    Nerve Sparring, Dr Warner    SPINE SURGERY N/A 2017    L3 to S1 spinal decompression L4/L5 fusion    TONSILLECTOMY, ADENOIDECTOMY, COMBINED      as a child    VARICOCELECTOMY      INCISION AND DRAINAGE,EXCISE VARICOCELE           Family History:     Family History   Problem Relation Age of Onset    Hypertension Mother         Hypertension,HTN    Other - See Comments Mother          Parkinsons    Heart Disease Father         Heart Disease, passed away from CHF,CAD    Colon Cancer Father         Cancer-colon    Hypertension Father         Hypertension    Other - See Comments Father         Stroke/Dementia    Family History Negative Sister         Good Health,emotional problems.    Family History Negative Sister         Good Health    Other - See Comments Daughter         w/o major medical problems.    Other - See Comments Son         w/o major medical problems.    Family History Negative Other         Good Health    Family History Negative Other         Good Health,previous marriage./previous marriage.    Family History Negative Other         Good Health,previous marriage.    Anesthesia Reaction No family hx of     Venous thrombosis No family hx of            Social History:     Social History     Tobacco Use    Smoking status: Former     Packs/day: 1.00     Years: 20.00     Additional pack years: 0.00     Total pack years: 20.00     Types: Cigarettes     Quit date: 2002     Years since quittin.3     Passive exposure: Past    Smokeless tobacco: Current     Types: Snuff    Tobacco comments:     Can't remember when all he had passive exposure   Vaping Use    Vaping Use: Never used   Substance Use Topics    Alcohol use: Not Currently    Drug use: No     Social History     Social History Narrative    [employer]  retired.  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  Patient has quit smoking.  Lives in [place] on a farm with wife.             Review of Systems:   CONSTITUTIONAL:  No fevers or chills  EYES: negative for icterus  ENT:  negative for hearing loss, tinnitus, sore throat  RESPIRATORY:  negative for cough, sputum or dyspnea  CARDIOVASCULAR:  negative for chest pain, palpitations  GASTROINTESTINAL:  negative for nausea, vomiting, diarrhea or constipation  GENITOURINARY:  negative for dysuria  HEME:  No easy bruising  INTEGUMENT:  negative for rash or pruritus  NEURO:  Negative for headache         Current Medications:     Current Outpatient Medications   Medication Sig Dispense Refill    acetaminophen (TYLENOL) 325 MG tablet Take 975 mg by mouth every 8 hours as needed for mild pain      amLODIPine (NORVASC) 10 MG tablet Take 1 tablet (10 mg) by mouth every morning 90 tablet 4    amoxicillin-clavulanate (AUGMENTIN) 875-125 MG tablet Take 1 tablet by mouth every 12 hours (DO not stop abx until instructed by Infectious Disease) 30 tablet 1    apixaban ANTICOAGULANT (ELIQUIS) 5 MG tablet Take 1 tablet (5 mg) by mouth 2 times daily 180 tablet 4    atorvastatin (LIPITOR) 20 MG tablet Take 1 tablet (20 mg) by mouth daily 90 tablet 0    leuprolide (LUPRON DEPOT) 45 MG kit Inject 45 mg into the muscle every 6 months      lisinopril (ZESTRIL) 20 MG tablet Take 1 tablet (20 mg) by mouth daily 90 tablet 4    megestrol (MEGACE) 20 MG tablet Take 20 mg by mouth 2 times daily      omeprazole (PRILOSEC) 40 MG DR capsule Take 1 capsule (40 mg) by mouth daily 90 capsule 4    ondansetron (ZOFRAN ODT) 8 MG ODT tab Take 1 tablet (8 mg) by mouth every 8 hours as needed for nausea 90 tablet 1    oxyCODONE IR (ROXICODONE) 15 MG tablet Take 1 tablet (15 mg) by mouth every 6 hours as needed for moderate pain or moderate to severe pain (can take every 4-6 hours as needed for pain) 90 tablet 0            Immunization History:  "    Immunization History   Administered Date(s) Administered    COVID-19 Bivalent 12+ (Pfizer) 12/02/2022    COVID-19 MONOVALENT 12+ (Pfizer) 01/25/2022    COVID-19 Vaccine (Sofiya) 03/06/2021    Influenza (High Dose) 3 valent vaccine 11/01/2018, 09/30/2019    Influenza (IIV3) PF 10/15/2008, 11/17/2011, 11/09/2012    Influenza Vaccine 65+ (Fluzone HD) 10/05/2020, 10/26/2021, 10/05/2022, 01/10/2024    Influenza Vaccine >6 months,quad, PF 10/16/2014, 11/13/2015, 11/03/2016, 09/25/2017    Pneumo Conj 13-V (2010&after) 10/05/2020    Pneumococcal 23 valent 10/05/2022    TDAP Vaccine (Adacel) 11/09/2012            Allergies:   No Known Allergies         Physical Exam:   Vital signs:  Ht 1.778 m (5' 10\")   Wt 79.4 kg (175 lb)   BMI 25.11 kg/m      Physical Examination:  GENERAL:  well-developed, well-nourished, seated in no acute distress.  HEENT:  Head is normocephalic, atraumatic   EYES:  Eyes have anicteric sclerae without conjunctival injection   ENT:  Oropharynx is moist without exudates or ulcers. Tongue is midline  NECK:  Supple. No cervical lymphadenopathy  LUNGS:  Clear to auscultation bilateral.   CARDIOVASCULAR:  Regular rate and rhythm with no murmurs, gallops or rubs.  ABDOMEN:  Normal bowel sounds, soft, nontender. No appreciable hepatosplenomegaly.  SKIN:  No acute rashes.    NEUROLOGIC:  Grossly nonfocal. Active x4 extremities         Laboratory Data:     Metabolic Studies   Sodium   Date Value Ref Range Status   01/10/2024 135 135 - 145 mmol/L Final     Comment:     Reference intervals for this test were updated on 09/26/2023 to more accurately reflect our healthy population. There may be differences in the flagging of prior results with similar values performed with this method. Interpretation of those prior results can be made in the context of the updated reference intervals.    12/05/2023 139 135 - 145 mmol/L Final     Comment:     Reference intervals for this test were updated on 09/26/2023 to more " accurately reflect our healthy population. There may be differences in the flagging of prior results with similar values performed with this method. Interpretation of those prior results can be made in the context of the updated reference intervals.    02/23/2021 140 134 - 144 mmol/L Final   10/05/2020 142 134 - 144 mmol/L Final     Potassium   Date Value Ref Range Status   01/10/2024 5.2 3.4 - 5.3 mmol/L Final   12/05/2023 4.1 3.4 - 5.3 mmol/L Final   10/13/2022 4.0 3.5 - 5.1 mmol/L Final   09/29/2022 4.0 3.5 - 5.1 mmol/L Final   02/23/2021 4.2 3.5 - 5.1 mmol/L Final   10/05/2020 4.4 3.5 - 5.1 mmol/L Final     Chloride   Date Value Ref Range Status   01/10/2024 105 98 - 107 mmol/L Final   12/05/2023 110 (H) 98 - 107 mmol/L Final   10/13/2022 103 98 - 107 mmol/L Final   09/29/2022 105 98 - 107 mmol/L Final   02/23/2021 103 98 - 107 mmol/L Final   10/05/2020 105 98 - 107 mmol/L Final     Carbon Dioxide   Date Value Ref Range Status   02/23/2021 29 21 - 31 mmol/L Final   10/05/2020 30 21 - 31 mmol/L Final     Carbon Dioxide (CO2)   Date Value Ref Range Status   01/10/2024 18 (L) 22 - 29 mmol/L Final   12/05/2023 21 (L) 22 - 29 mmol/L Final   10/13/2022 30 21 - 31 mmol/L Final   09/29/2022 29 21 - 31 mmol/L Final     Anion Gap   Date Value Ref Range Status   01/10/2024 12 7 - 15 mmol/L Final   12/05/2023 8 7 - 15 mmol/L Final   10/13/2022 6 3 - 14 mmol/L Final   09/29/2022 7 3 - 14 mmol/L Final   02/23/2021 8 3 - 14 mmol/L Final   10/05/2020 7 3 - 14 mmol/L Final     Urea Nitrogen   Date Value Ref Range Status   01/10/2024 18.3 8.0 - 23.0 mg/dL Final   12/05/2023 17.0 8.0 - 23.0 mg/dL Final   10/13/2022 15 7 - 25 mg/dL Final   09/29/2022 19 7 - 25 mg/dL Final   02/23/2021 17 7 - 25 mg/dL Final   10/05/2020 18 7 - 25 mg/dL Final     Creatinine   Date Value Ref Range Status   01/10/2024 0.84 0.67 - 1.17 mg/dL Final   12/05/2023 0.46 (L) 0.67 - 1.17 mg/dL Final   02/23/2021 0.73 0.70 - 1.30 mg/dL Final   11/23/2020 0.71  "0.70 - 1.30 mg/dL Final     GFR Estimate   Date Value Ref Range Status   01/10/2024 >90 >60 mL/min/1.73m2 Final   12/05/2023 >90 >60 mL/min/1.73m2 Final   02/23/2021 >90 >60 mL/min/[1.73_m2] Final   11/23/2020 >90 >60 mL/min/[1.73_m2] Final     Glucose   Date Value Ref Range Status   01/10/2024 120 (H) 70 - 99 mg/dL Final   12/05/2023 132 (H) 70 - 99 mg/dL Final   10/13/2022 100 70 - 105 mg/dL Final   09/29/2022 99 70 - 105 mg/dL Final   02/23/2021 139 (H) 70 - 105 mg/dL Final   10/05/2020 153 (H) 70 - 105 mg/dL Final     GLUCOSE BY METER POCT   Date Value Ref Range Status   12/04/2023 118 (H) 70 - 99 mg/dL Final   12/04/2023 110 (H) 70 - 99 mg/dL Final     Hemoglobin A1C   Date Value Ref Range Status   11/20/2023 6.5 (H) <5.7 % Final     Comment:     Normal <5.7%   Prediabetes 5.7-6.4%    Diabetes 6.5% or higher     Note: Adopted from ADA consensus guidelines.   05/24/2021 6.0 4.0 - 6.0 % Final     Calcium   Date Value Ref Range Status   01/10/2024 10.3 (H) 8.8 - 10.2 mg/dL Final   12/05/2023 8.0 (L) 8.8 - 10.2 mg/dL Final   02/23/2021 10.0 8.6 - 10.3 mg/dL Final   10/05/2020 9.5 8.6 - 10.3 mg/dL Final     Phosphorus   Date Value Ref Range Status   12/06/2023 3.4 2.5 - 4.5 mg/dL Final   12/04/2023 4.0 2.5 - 4.5 mg/dL Final     Magnesium   Date Value Ref Range Status   12/11/2023 1.8 1.7 - 2.3 mg/dL Final   12/10/2023 1.8 1.7 - 2.3 mg/dL Final     Lactic Acid   Date Value Ref Range Status   07/02/2023 0.8 0.7 - 2.0 mmol/L Final   06/29/2023 1.0 0.7 - 2.0 mmol/L Final       Inflammatory Markers No results found for: \"CRP\"    Hepatic Studies    Bilirubin Total   Date Value Ref Range Status   01/10/2024 0.2 <=1.2 mg/dL Final   12/05/2023 1.1 <=1.2 mg/dL Final   02/23/2021 0.8 0.3 - 1.0 mg/dL Final   04/07/2017 0.5 0.3 - 1.0 mg/dL      Alkaline Phosphatase   Date Value Ref Range Status   01/10/2024 105 40 - 150 U/L Final     Comment:     Reference intervals for this test were updated on 11/14/2023 to more accurately " reflect our healthy population. There may be differences in the flagging of prior results with similar values performed with this method. Interpretation of those prior results can be made in the context of the updated reference intervals.   12/05/2023 458 (H) 40 - 150 U/L Final     Comment:     Reference intervals for this test were updated on 11/14/2023 to more accurately reflect our healthy population. There may be differences in the flagging of prior results with similar values performed with this method. Interpretation of those prior results can be made in the context of the updated reference intervals.   02/23/2021 66 34 - 104 U/L Final   04/07/2017 67 34 - 104 IU/L      Albumin   Date Value Ref Range Status   01/10/2024 3.3 (L) 3.5 - 5.2 g/dL Final   12/05/2023 2.0 (L) 3.5 - 5.2 g/dL Final   10/13/2022 3.8 3.5 - 5.7 g/dL Final   09/29/2022 3.8 3.5 - 5.7 g/dL Final   02/23/2021 4.3 3.5 - 5.7 g/dL Final   04/07/2017 4.0 3.5 - 5.7 g/dL      AST   Date Value Ref Range Status   01/10/2024 21 0 - 45 U/L Final     Comment:     Reference intervals for this test were updated on 6/12/2023 to more accurately reflect our healthy population. There may be differences in the flagging of prior results with similar values performed with this method. Interpretation of those prior results can be made in the context of the updated reference intervals.   12/05/2023 217 (H) 0 - 45 U/L Final     Comment:     Reference intervals for this test were updated on 6/12/2023 to more accurately reflect our healthy population. There may be differences in the flagging of prior results with similar values performed with this method. Interpretation of those prior results can be made in the context of the updated reference intervals.   02/23/2021 15 13 - 39 U/L Final     ALT   Date Value Ref Range Status   01/10/2024 13 0 - 70 U/L Final     Comment:     Reference intervals for this test were updated on 6/12/2023 to more accurately reflect our  healthy population. There may be differences in the flagging of prior results with similar values performed with this method. Interpretation of those prior results can be made in the context of the updated reference intervals.     12/05/2023 137 (H) 0 - 70 U/L Final     Comment:     Reference intervals for this test were updated on 6/12/2023 to more accurately reflect our healthy population. There may be differences in the flagging of prior results with similar values performed with this method. Interpretation of those prior results can be made in the context of the updated reference intervals.     02/23/2021 16 7 - 52 U/L Final       Hematology Studies      WBC   Date Value Ref Range Status   02/23/2021 6.9 4.0 - 11.0 10e9/L Final   07/09/2020 5.5 4.0 - 11.0 10e9/L Final     WBC Count   Date Value Ref Range Status   01/10/2024 6.6 4.0 - 11.0 10e3/uL Final   12/05/2023 5.3 4.0 - 11.0 10e3/uL Final     Absolute Neutrophils   Date Value Ref Range Status   04/22/2023 2.2 1.6 - 8.3 10e3/uL Final     Absolute Lymphocytes   Date Value Ref Range Status   04/22/2023 0.4 (L) 0.8 - 5.3 10e3/uL Final     Absolute Monocytes   Date Value Ref Range Status   04/22/2023 0.3 0.0 - 1.3 10e3/uL Final     Absolute Eosinophils   Date Value Ref Range Status   04/22/2023 0.0 0.0 - 0.7 10e3/uL Final     Hemoglobin   Date Value Ref Range Status   01/10/2024 7.8 (L) 13.3 - 17.7 g/dL Final   12/05/2023 8.5 (L) 13.3 - 17.7 g/dL Final   02/23/2021 14.1 13.3 - 17.7 g/dL Final   07/09/2020 13.7 13.3 - 17.7 g/dL Final     Hematocrit   Date Value Ref Range Status   01/10/2024 25.3 (L) 40.0 - 53.0 % Final   12/05/2023 27.8 (L) 40.0 - 53.0 % Final   02/23/2021 42.5 40.0 - 53.0 % Final   07/09/2020 41.7 40.0 - 53.0 % Final     Platelet Count   Date Value Ref Range Status   01/10/2024 366 150 - 450 10e3/uL Final   12/05/2023 294 150 - 450 10e3/uL Final   02/23/2021 200 150 - 450 10e9/L Final   07/09/2020 203 150 - 450 10e9/L Final        Imaging:  @[email or web address]@

## 2024-02-27 NOTE — TELEPHONE ENCOUNTER
Spoke with Bentley from [facility] and he requested that I call the patient to rely providers message. Called Patient and Left message about providers note and to return call.  Desi Perez LPN ....................  2/27/2024   2:46 PM  EXT [phone]

## 2024-02-29 DIAGNOSIS — N36.0 URINARY FISTULA: ICD-10-CM

## 2024-02-29 RX ORDER — OXYCODONE HYDROCHLORIDE 15 MG/1
15 TABLET ORAL EVERY 6 HOURS PRN
Qty: 90 TABLET | Refills: 0 | Status: CANCELLED | OUTPATIENT
Start: 2024-02-29

## 2024-02-29 RX ORDER — OXYCODONE HYDROCHLORIDE 15 MG/1
15 TABLET ORAL EVERY 6 HOURS PRN
Qty: 30 TABLET | Refills: 0 | Status: SHIPPED | OUTPATIENT
Start: 2024-02-29 | End: 2024-02-29

## 2024-02-29 RX ORDER — OXYCODONE HYDROCHLORIDE 15 MG/1
15 TABLET ORAL EVERY 6 HOURS PRN
Qty: 70 TABLET | Refills: 0 | Status: SHIPPED | OUTPATIENT
Start: 2024-02-29 | End: 2024-03-14

## 2024-02-29 NOTE — TELEPHONE ENCOUNTER
Requested Prescriptions   Pending Prescriptions Disp Refills    oxyCODONE IR (ROXICODONE) 15 MG tablet 90 tablet 0     Sig: Take 1 tablet (15 mg) by mouth every 6 hours as needed for moderate pain or moderate to severe pain (can take every 4-6 hours as needed for pain)       There is no refill protocol information for this order        Last Prescription Date:   2/2/24  Last Fill Qty/Refills:         90, R-0    Last Office Visit:              1/10/24   Future Office visit:           None    Per LOV note:  Would recommend a follow-up visit with bring all his medications with him in the next month or 2.     Pt due for follow up appointment with Dr. Alejandra. Routing to provider for refill consideration. Routing to Unit scheduling pool, to assist Pt in scheduling appointment.     Unable to complete prescription refill per RN Medication Refill Policy.     Xiao Donald RN .............. 2/29/2024  9:15 AM

## 2024-02-29 NOTE — TELEPHONE ENCOUNTER
Patient informed of providers response below. Patient transferred to scheduling to make a follow up appointment. Patient is requesting a refill on Oxycodone.  Angelica Garay LPN

## 2024-02-29 NOTE — TELEPHONE ENCOUNTER
Patient calling back, says there is a mixup on his meds, Wants to talk to nurse. A nurse had talked with him this am.       Nancy Burnette on 2/29/2024 at 9:45 AM

## 2024-02-29 NOTE — TELEPHONE ENCOUNTER
Reason for call: Medication or medication refill    Name of medication requested: Oxi    How many days of medication do you have left? 2    What pharmacy do you use? [facility]    Preferred method for responding to this message: Telephone Call    Phone number patient can be reached at: Home number on file [phone] (home)    If we cannot reach you directly, may we leave a detailed response at the number you provided? Yes          Angelica Varela on 2/29/2024 at 8:58 AM

## 2024-02-29 NOTE — TELEPHONE ENCOUNTER
Patient notified of providers note.  Patient reports that he will run out before upcoming visit on 3-14-24.  Okay to work patient in on 3-6-24?      Leona Shell LPN 2/29/2024 1:34 PM

## 2024-02-29 NOTE — TELEPHONE ENCOUNTER
Patient reports that he averages 30-35 tabs per week, patient would like to wait to see Ezequiel Alejandra MD on 3-14-24.    Order pending.      Leona Shell LPN 2/29/2024 3:02 PM

## 2024-02-29 NOTE — TELEPHONE ENCOUNTER
Ok to work in or I can give him enough until the 14th   if he wants to wait till the 14th how many tablets does he need ?

## 2024-02-29 NOTE — TELEPHONE ENCOUNTER
PLEASE CALL PT WITH STATUS.    Patient called again, stating he last received #90 which lasts him 30 days. He will be out Saturday night. Pt has upcoming appointment scheduled:    Mar 14, 2024 10:40 AM  (Arrive by 10:25 AM)  SHORT with Ezequiel Alejandra MD  [facility] ([facility] ) [address]  [place] [address]  [phone]     Xiao Donald RN .............. 2/29/2024  10:22 AM

## 2024-03-03 ENCOUNTER — MEDICAL CORRESPONDENCE (OUTPATIENT)
Dept: HEALTH INFORMATION MANAGEMENT | Facility: OTHER | Age: 69
End: 2024-03-03
Payer: COMMERCIAL

## 2024-03-04 NOTE — TELEPHONE ENCOUNTER
Called and spoke to Patient after verifying last name and date of birth. Pt states his wife already picked up prescription. No further action needed. Xiao Donald RN .............. 3/4/2024  2:48 PM

## 2024-03-04 NOTE — TELEPHONE ENCOUNTER
Prescription was approved 2/29/24. Unclear if Pt was notified. Left message on machine to call back to [phone]. Xiao Donald RN .............. 3/4/2024  1:54 PM

## 2024-03-07 ENCOUNTER — LAB REQUISITION (OUTPATIENT)
Dept: LAB | Facility: OTHER | Age: 69
End: 2024-03-07
Payer: COMMERCIAL

## 2024-03-07 LAB
ALBUMIN SERPL BCG-MCNC: 3.8 G/DL (ref 3.5–5.2)
ALP SERPL-CCNC: 136 U/L (ref 40–150)
ALT SERPL W P-5'-P-CCNC: 8 U/L (ref 0–70)
ANION GAP SERPL CALCULATED.3IONS-SCNC: 13 MMOL/L (ref 7–15)
AST SERPL W P-5'-P-CCNC: 35 U/L (ref 0–45)
BASOPHILS # BLD AUTO: 0 10E3/UL (ref 0–0.2)
BASOPHILS NFR BLD AUTO: 1 %
BILIRUB SERPL-MCNC: 0.2 MG/DL
BUN SERPL-MCNC: 14 MG/DL (ref 8–23)
CALCIUM SERPL-MCNC: 9.8 MG/DL (ref 8.8–10.2)
CHLORIDE SERPL-SCNC: 98 MMOL/L (ref 98–107)
CREAT SERPL-MCNC: 0.76 MG/DL (ref 0.67–1.17)
CRP SERPL-MCNC: 23.78 MG/L
DEPRECATED HCO3 PLAS-SCNC: 24 MMOL/L (ref 22–29)
EGFRCR SERPLBLD CKD-EPI 2021: >90 ML/MIN/1.73M2
EOSINOPHIL # BLD AUTO: 0.2 10E3/UL (ref 0–0.7)
EOSINOPHIL NFR BLD AUTO: 2 %
ERYTHROCYTE [DISTWIDTH] IN BLOOD BY AUTOMATED COUNT: 18.8 % (ref 10–15)
ERYTHROCYTE [SEDIMENTATION RATE] IN BLOOD BY WESTERGREN METHOD: 37 MM/HR (ref 0–20)
GLUCOSE SERPL-MCNC: 122 MG/DL (ref 70–99)
HCT VFR BLD AUTO: 29.9 % (ref 40–53)
HGB BLD-MCNC: 9.5 G/DL (ref 13.3–17.7)
IMM GRANULOCYTES # BLD: 0 10E3/UL
IMM GRANULOCYTES NFR BLD: 0 %
LYMPHOCYTES # BLD AUTO: 1.3 10E3/UL (ref 0–5.3)
LYMPHOCYTES NFR BLD AUTO: 16 %
MCH RBC QN AUTO: 24.3 PG (ref 26.5–33)
MCHC RBC AUTO-ENTMCNC: 31.8 G/DL (ref 31.5–36.5)
MCV RBC AUTO: 77 FL (ref 78–100)
MONOCYTES # BLD AUTO: 0.5 10E3/UL (ref 0–1.3)
MONOCYTES NFR BLD AUTO: 6 %
NEUTROPHILS # BLD AUTO: 6.2 10E3/UL (ref 1.6–8.3)
NEUTROPHILS NFR BLD AUTO: 75 %
NRBC # BLD AUTO: 0 10E3/UL
NRBC BLD AUTO-RTO: 0 /100
PLATELET # BLD AUTO: 406 10E3/UL (ref 150–450)
POTASSIUM SERPL-SCNC: 4.4 MMOL/L (ref 3.4–5.3)
PROT SERPL-MCNC: 6.4 G/DL (ref 6.4–8.3)
RBC # BLD AUTO: 3.91 10E6/UL (ref 4.4–5.9)
SODIUM SERPL-SCNC: 135 MMOL/L (ref 135–145)
WBC # BLD AUTO: 8.2 10E3/UL (ref 4–11)

## 2024-03-07 PROCEDURE — 85652 RBC SED RATE AUTOMATED: CPT | Performed by: INTERNAL MEDICINE

## 2024-03-07 PROCEDURE — 80053 COMPREHEN METABOLIC PANEL: CPT | Performed by: INTERNAL MEDICINE

## 2024-03-07 PROCEDURE — 86140 C-REACTIVE PROTEIN: CPT | Performed by: INTERNAL MEDICINE

## 2024-03-07 PROCEDURE — 85025 COMPLETE CBC W/AUTO DIFF WBC: CPT | Performed by: INTERNAL MEDICINE

## 2024-03-08 ENCOUNTER — TELEPHONE (OUTPATIENT)
Dept: INFECTIOUS DISEASES | Facility: CLINIC | Age: 69
End: 2024-03-08
Payer: COMMERCIAL

## 2024-03-08 DIAGNOSIS — M86.68 CHRONIC OSTEOMYELITIS OF SYMPHYSIS PUBIS (H): Primary | ICD-10-CM

## 2024-03-08 NOTE — TELEPHONE ENCOUNTER
Wound is red, gauze is rubbing when sitting up. No drainage, no warmth or fever. Saw 3/7/24. Rn is new this is the first time she saw him. Wants to inform.     She goes out again in a week. If we want cultures she can get them. Pictures will come via email.       Alberto Marcos RN  Infectious Disease on 3/8/2024 at 2:00 PM

## 2024-03-08 NOTE — TELEPHONE ENCOUNTER
[facility] Call [facility]    Phone Message    May a detailed message be left on voicemail: yes     Reason for Call: Other: Caller requesting a call back to discuss pt's wound. Her direct line is [phone] or office number PH:  [phone]     Action Taken: Other: ID     Travel Screening: Not Applicable

## 2024-03-11 ENCOUNTER — PATIENT OUTREACH (OUTPATIENT)
Dept: ONCOLOGY | Facility: OTHER | Age: 69
End: 2024-03-11
Payer: COMMERCIAL

## 2024-03-12 ENCOUNTER — MEDICAL CORRESPONDENCE (OUTPATIENT)
Dept: HEALTH INFORMATION MANAGEMENT | Facility: OTHER | Age: 69
End: 2024-03-12
Payer: COMMERCIAL

## 2024-03-12 NOTE — TELEPHONE ENCOUNTER
Called LISA Grimes to inform that we want cultures.       Alberto Marcos RN  Infectious Disease on 3/12/2024 at 12:13 PM

## 2024-03-13 ENCOUNTER — TELEPHONE (OUTPATIENT)
Dept: FAMILY MEDICINE | Facility: OTHER | Age: 69
End: 2024-03-13

## 2024-03-13 PROCEDURE — 87075 CULTR BACTERIA EXCEPT BLOOD: CPT | Mod: ZL | Performed by: FAMILY MEDICINE

## 2024-03-13 PROCEDURE — 87205 SMEAR GRAM STAIN: CPT | Mod: ZL | Performed by: FAMILY MEDICINE

## 2024-03-13 NOTE — TELEPHONE ENCOUNTER
Deanne requesting verbal orders for skilled nursing 2  times a week for 2 weeks, 1 time a week for 7 weeks, 2-PRN's for complications.      Bethanie Marrero on 3/13/2024 at 1:39 PM

## 2024-03-14 ENCOUNTER — OFFICE VISIT (OUTPATIENT)
Dept: FAMILY MEDICINE | Facility: OTHER | Age: 69
End: 2024-03-14
Attending: FAMILY MEDICINE
Payer: COMMERCIAL

## 2024-03-14 VITALS
HEART RATE: 83 BPM | BODY MASS INDEX: 25.14 KG/M2 | OXYGEN SATURATION: 100 % | WEIGHT: 175.2 LBS | SYSTOLIC BLOOD PRESSURE: 120 MMHG | TEMPERATURE: 97.2 F | RESPIRATION RATE: 20 BRPM | DIASTOLIC BLOOD PRESSURE: 58 MMHG

## 2024-03-14 DIAGNOSIS — F11.90 CHRONIC, CONTINUOUS USE OF OPIOIDS: ICD-10-CM

## 2024-03-14 DIAGNOSIS — E11.69 TYPE 2 DIABETES MELLITUS WITH OTHER SPECIFIED COMPLICATION, UNSPECIFIED WHETHER LONG TERM INSULIN USE (H): ICD-10-CM

## 2024-03-14 DIAGNOSIS — N36.0 URINARY FISTULA: ICD-10-CM

## 2024-03-14 DIAGNOSIS — Z51.81 ENCOUNTER FOR THERAPEUTIC DRUG LEVEL MONITORING: ICD-10-CM

## 2024-03-14 DIAGNOSIS — M86.68 CHRONIC OSTEOMYELITIS OF SYMPHYSIS PUBIS (H): ICD-10-CM

## 2024-03-14 PROBLEM — N17.9 ACUTE KIDNEY FAILURE, UNSPECIFIED (H): Status: RESOLVED | Noted: 2023-04-21 | Resolved: 2024-03-14

## 2024-03-14 LAB
CREAT UR-MCNC: 64 MG/DL
HBA1C MFR BLD: 5.3 % (ref 4–6.2)

## 2024-03-14 PROCEDURE — 80353 DRUG SCREENING COCAINE: CPT | Mod: ZL | Performed by: FAMILY MEDICINE

## 2024-03-14 PROCEDURE — G0463 HOSPITAL OUTPT CLINIC VISIT: HCPCS

## 2024-03-14 PROCEDURE — 83036 HEMOGLOBIN GLYCOSYLATED A1C: CPT | Mod: ZL | Performed by: FAMILY MEDICINE

## 2024-03-14 PROCEDURE — 99214 OFFICE O/P EST MOD 30 MIN: CPT | Performed by: FAMILY MEDICINE

## 2024-03-14 PROCEDURE — 80373 DRUG SCREENING TRAMADOL: CPT | Mod: ZL | Performed by: FAMILY MEDICINE

## 2024-03-14 PROCEDURE — 36415 COLL VENOUS BLD VENIPUNCTURE: CPT | Mod: ZL | Performed by: FAMILY MEDICINE

## 2024-03-14 RX ORDER — OXYCODONE HYDROCHLORIDE 15 MG/1
15 TABLET ORAL EVERY 6 HOURS PRN
Qty: 90 TABLET | Refills: 0 | Status: SHIPPED | OUTPATIENT
Start: 2024-03-14 | End: 2024-04-01

## 2024-03-14 RX ORDER — OXYCODONE HYDROCHLORIDE 15 MG/1
15 TABLET ORAL EVERY 6 HOURS PRN
Qty: 90 TABLET | Refills: 0 | Status: SHIPPED | OUTPATIENT
Start: 2024-03-14 | End: 2024-04-02

## 2024-03-14 ASSESSMENT — PAIN SCALES - GENERAL: PAINLEVEL: SEVERE PAIN (6)

## 2024-03-14 NOTE — NURSING NOTE
Patient here for 2 week follow up after stopping the Lisinopril. His blood pressure is coming up a bit. Medication Reconciliation: complete.    Akosua Rivero LPN  3/14/2024 10:39 AM

## 2024-03-14 NOTE — PROGRESS NOTES
SUBJECTIVE:   Dilip Kan is a 68 year old male who presents to clinic today for the following health issues:  Blood pressure follow-up medication refill  Patient arrives here for blood pressure follow-up medication refill.  Since his last visit he is tolerating going off the low-dose lisinopril without difficulty.  He comes in today with a blood pressure 120/58.  Patient also is in need of oxycodone refilled.  He is using 2 to 4 tablets a day.  Tolerating the pain well.  He does have metastatic prostate cancer.  He is currently under treatment.  Chart reviewed showing he is in need of urine drug screen.  And narcotic contract per protocol.  Patient does have a follow-up appointment with infectious disease.          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     Priority: Medium    Iron deficiency anemia secondary to inadequate dietary iron intake 11/20/2023     Priority: Medium    Sepsis (H) 06/29/2023     Priority: Medium    Acute deep vein thrombosis (DVT) of proximal vein of right lower extremity (H) 05/01/2023     Priority: Medium    Multiple subsegmental pulmonary emboli without acute cor pulmonale (H) 05/01/2023     Priority: Medium    Infection due to Klebsiella pneumoniae 04/21/2023     Priority: Medium    Bone metastasis 03/09/2023     Priority: Medium    Bone metastases 03/09/2023     Priority: Medium    Aneurysm of ascending aorta without rupture (H24) 10/05/2022     Priority: Medium    Status post total right knee replacement 11/16/2021     Priority: Medium    Malignant neoplasm of prostate (H) 09/16/2021     Priority: Medium    Plaque in heart artery-aorta 03/24/2020     Priority: Medium    Aortic valve stenosis with insufficiency, etiology of cardiac valve disease unspecified 03/22/2019     Priority: Medium    Mild aortic  stenosis 03/22/2019     Priority: Medium    Ascending aortic aneurysm-moderate at 4.6 cm on 3/11/20 03/22/2019     Priority: Medium    Aortic valve insufficiency-moderate to severe 03/22/2019     Priority: Medium    Hx of syncope 03/22/2019     Priority: Medium    History of tobacco abuse quitting 11/8/2002 03/22/2019     Priority: Medium    Hypertriglyceridemia 03/22/2019     Priority: Medium    Mild pulmonary hypertension (H) 03/22/2019     Priority: Medium    Palpitations 03/22/2019     Priority: Medium    Rising PSA following treatment for malignant neoplasm of prostate 03/29/2018     Priority: Medium    Chronic, continuous use of opioids 01/31/2018     Priority: Medium     Patient is followed by Wei Perez MD for ongoing prescription of pain medication.  All refills should be approved by this provider only at face-to-face appointments - not by phone request.    Medication(s): Hydrocodone.   Maximum quantity per month: 135  Clinic visit frequency required: Q 3 months   PDMP Review         Value Time User    State PDMP site checked  Yes 8/25/2021 10:41 AM Wei Perez MD   Controlled substance agreement:  Patient-Level CSA:    There are no patient-level csa.     Pain Clinic evaluation in the past: No    Opioid Risk Tool Total Score(s):  OPIOID RISK TOOL TOTAL SCORE 8/25/2021   Total Score 0   Total Risk Score Category Low Risk (0-3)         Other specified causes of urethral stricture 01/31/2018     Priority: Medium    GERD 04/28/2017     Priority: Medium    Essential hypertension 04/28/2017     Priority: Medium    Non morbid obesity due to excess calories 04/28/2017     Priority: Medium    Mixed hyperlipidemia 04/28/2017     Priority: Medium    Spinal stenosis of lumbar region with neurogenic claudication 04/28/2017     Priority: Medium    Controlled substance agreement updated 12- 01/15/2016     Priority: Medium    Backache 01/13/2014     Priority: Medium    Prostate cancer (H) 01/02/2013      Priority: Medium    Personal history of prostate cancer s/p prostatectomy and sEBRT 12/06/2012     Priority: Medium    Anemia due to acute blood loss 11/30/2012     Priority: Medium     Overview:   EBL 11/28/12:  1700 ml  EBL 11/28/12:  500 ml  Transfused 2 units pRBC's early on 11/29/12      Pelvic pain in male 11/30/2012     Priority: Medium     Past Medical History:   Diagnosis Date    Elevated prostate specific antigen (PSA)     4/10/2012    Encounter for other administrative examinations     1/31/2014    Esophagitis     No Comments Provided    Gastro-esophageal reflux disease without esophagitis     No Comments Provided    Low back pain     No Comments Provided    Malignant neoplasm of prostate (H)     9/6/2012    Nicotine dependence, uncomplicated     Quit - October 2007 with chantix    Other intervertebral disc degeneration, lumbosacral region     12/1/2008        Review of Systems     OBJECTIVE:     /58   Pulse 83   Temp 97.2  F (36.2  C)   Resp 20   Wt 79.5 kg (175 lb 3.2 oz)   SpO2 100%   BMI 25.14 kg/m    Body mass index is 25.14 kg/m .  Physical Exam  Constitutional:       Appearance: Normal appearance.   Skin:     General: Skin is warm.   Neurological:      Mental Status: He is alert.   Psychiatric:         Mood and Affect: Mood normal.         Thought Content: Thought content normal.         Diagnostic Test Results:  none     ASSESSMENT/PLAN:     Results for orders placed or performed in visit on 03/14/24   Hemoglobin A1c     Status: Normal   Result Value Ref Range    Hemoglobin A1C 5.3 4.0 - 6.2 %   Urine Creatinine for Drug Screen Panel     Status: None   Result Value Ref Range    Creatinine Urine for Drug Screen 64 mg/dL   Swab Aerobic Bacterial Culture Routine With Gram Stain     Status: Abnormal (Preliminary result)    Specimen: Abdomen; Swab   Result Value Ref Range    Gram Stain Result 4+ PMNs/low power field (A)     Gram Stain Result 1+ Squamous epithelial cells/low power field  (A)     Gram Stain Result 2+ Gram positive bacilli (A)    Drug Confirmation Panel Urine with Creat     Status: None (In process)    Narrative    The following orders were created for panel order Drug Confirmation Panel Urine with Creat.  Procedure                               Abnormality         Status                     ---------                               -----------         ------                     Urine Drug Confirmation ...[[id number]]                      In process                 Urine Creatinine for Chai...[[id number]]                      Final result                 Please view results for these tests on the individual orders.         (M86.68) Chronic osteomyelitis of symphysis pubis (H)  Comment: Per infectious disease  Plan: Drug Confirmation Panel Urine with Creat            (F11.90) Chronic, continuous use of opioids  Comment:   Plan: Drug Confirmation Panel Urine with Creat            (E11.69) Type 2 diabetes mellitus with other specified complication, unspecified whether long term insulin use (H)  Comment: I discussed with the patient about diabetes.  He reports he has never been told he has diabetes.  Review of the chart shows no consistent elevated blood sugars.  His A1c is normal without any diabetic medications.  Currently does not meet the criteria for diabetes.  Diagnosis removed.  Plan: Hemoglobin A1c            (N36.0) Urinary fistula  Comment: Refill for 3 months follow-up in 3 months  Plan: oxyCODONE IR (ROXICODONE) 15 MG tablet,         oxyCODONE IR (ROXICODONE) 15 MG tablet,         oxyCODONE IR (ROXICODONE) 15 MG tablet            (Z51.81) Encounter for therapeutic drug level monitoring  Comment:   Plan: Drug Confirmation Panel Urine with Creat          Narcotic contract signed    Ezequiel Alejandra MD  [facility]

## 2024-03-14 NOTE — LETTER
Opioid / Opioid Plus Controlled Substance Agreement    This is an agreement between you and your provider about the safe and appropriate use of controlled substance/opioids prescribed by your care team. Controlled substances are medicines that can cause physical and mental dependence (abuse).    There are strict laws about having and using these medicines. We here at [facility] are committing to working with you in your efforts to get better. To support you in this work, we ll help you schedule regular office appointments for medicine refills. If we must cancel or change your appointment for any reason, we ll make sure you have enough medicine to last until your next appointment.     As a Provider, I will:  Listen carefully to your concerns and treat you with respect.   Recommend a treatment plan that I believe is in your best interest. This plan may involve therapies other than opioid pain medication.   Talk with you often about the possible benefits, and the risk of harm of any medicine that we prescribe for you.   Provide a plan on how to taper (discontinue or go off) using this medicine if the decision is made to stop its use.    As a Patient, I understand that opioid(s):   Are a controlled substance prescribed by my care team to help me function or work and manage my condition(s).   Are strong medicines and can cause serious side effects such as:  Drowsiness, which can seriously affect my driving ability  A lower breathing rate, enough to cause death  Harm to my thinking ability   Depression   Abuse of and addiction to this medicine  Need to be taken exactly as prescribed. Combining opioids with certain medicines or chemicals (such as illegal drugs, sedatives, sleeping pills, and benzodiazepines) can be dangerous or even fatal. If I stop opioids suddenly, I may have severe withdrawal symptoms.  Do not work for all types of pain nor for all patients. If they re not helpful, I may be asked to stop  them.        The risks, benefits and side effects of these medicine(s) were explained to me. I agree that:  I will take part in other treatments as advised by my care team. This may be psychiatry or counseling, physical therapy, behavioral therapy, group treatment or a referral to a specialist.     I will keep all my appointments. I understand that this is part of the monitoring of opioids. My care team may require an office visit for EVERY opioid/controlled substance refill. If I miss appointments or don t follow instructions, my care team may stop my medicine.    I will take my medicines as prescribed. I will not change the dose or schedule unless my care team tells me to. There will be no refills if I run out early.     I may be asked to come to the clinic and complete a urine drug test or complete a pill count at any time. If I don t give a urine sample or participate in a pill count, the care team may stop my medicine.    I will only receive prescriptions from this clinic for chronic pain. If I am treated by another provider for acute pain issues, I will tell them that I am taking opioid pain medication for chronic pain and that I have a treatment agreement with this provider. I will inform my [facility] care team within one business day if I am given a prescription for any pain medication by another healthcare provider. My [facility] care team can contact other providers and pharmacists about my use of any medicines.    It is up to me to make sure that I don t run out of my medicines on weekends or holidays. If my care team is willing to refill my opioid prescription without a visit, I must request refills only during office hours. Refills may take up to 3 business days to process. I will use one pharmacy to fill all my opioid and other controlled substance prescriptions. I will notify the clinic about any changes to my insurance or medication availability.    I am responsible for my  prescriptions. If the medicine/prescription is lost, stolen or destroyed, it will not be replaced. I also agree not to share controlled substance medicines with anyone.    I am aware I should not use any illegal or recreational drugs. I agree not to drink alcohol unless my care team says I can.       If I enroll in the [place] [facility] program, I will tell my care team prior to my next refill.     I will tell my care team right away if I become pregnant, have a new medical problem treated outside of my regular clinic, or have a change in my medications.    I understand that this medicine can affect my thinking, judgment and reaction time. Alcohol and drugs affect the brain and body, which can affect the safety of my driving. Being under the influence of alcohol or drugs can affect my decision-making, behaviors, personal safety, and the safety of others. Driving while impaired (DWI) can occur if a person is driving, operating, or in physical control of a car, motorcycle, boat, snowmobile, ATV, motorbike, off-road vehicle, or any other motor vehicle ([place] Statute 169A.20). I understand the risk if I choose to drive or operate any vehicle or machinery.    I understand that if I do not follow any of the conditions above, my prescriptions or treatment may be stopped or changed.          Opioids  What You Need to Know    What are opioids?   Opioids are pain medicines that must be prescribed by a doctor. They are also known as narcotics.     Examples are:   morphine (MS Contin, Nina)  oxycodone (Oxycontin)  oxycodone and acetaminophen (Percocet)  hydrocodone and acetaminophen (Vicodin, Norco)   fentanyl patch (Duragesic)   hydromorphone (Dilaudid)   methadone  codeine (Tylenol #3)     What do opioids do well?   Opioids are best for severe short-term pain such as after a surgery or injury. They may work well for cancer pain. They may help some people with long-lasting (chronic) pain.     What do opioids NOT do  well?   Opioids never get rid of pain entirely, and they don t work well for most patients with chronic pain. Opioids don t reduce swelling, one of the causes of pain.                                    Other ways to manage chronic pain and improve function include:     Treat the health problem that may be causing pain  Anti-inflammation medicines, which reduce swelling and tenderness, such as ibuprofen (Advil, Motrin) or naproxen (Aleve)  Acetaminophen (Tylenol)  Antidepressants and anti-seizure medicines, especially for nerve pain  Topical treatments such as patches or creams  Injections or nerve blocks  Chiropractic or osteopathic treatment  Acupuncture, massage, deep breathing, meditation, visual imagery, aromatherapy  Use heat or ice at the pain site  Physical therapy   Exercise  Stop smoking  Take part in therapy       Risks and side effects     Talk to your doctor before you start or decide to keep taking opioids. Possible side effects include:    Lowering your breathing rate enough to cause death  Overdose, including death, especially if taking higher than prescribed doses  Worse depression symptoms; less pleasure in things you usually enjoy  Feeling tired or sluggish  Slower thoughts or cloudy thinking  Being more sensitive to pain over time; pain is harder to control  Trouble sleeping or restless sleep  Changes in hormone levels (for example, less testosterone)  Changes in sex drive or ability to have sex  Constipation  Unsafe driving  Itching and sweating  Dizziness  Nausea, throwing up and dry mouth    What else should I know about opioids?    Opioids may lead to dependence, tolerance, or addiction.    Dependence means that if you stop or reduce the medicine too quickly, you will have withdrawal symptoms. These include loose poop (diarrhea), jitters, flu-like symptoms, nervousness and tremors. Dependence is not the same as addiction.                     Tolerance means needing higher doses over time to  get the same effect. This may increase the chance of serious side effects.    Addiction is when people improperly use a substance that harms their body, their mind or their relations with others. Use of opiates can cause a relapse of addiction if you have a history of drug or alcohol abuse.    People who have used opioids for a long time may have a lower quality of life, worse depression, higher levels of pain and more visits to doctors.    You can overdose on opioids. Take these steps to lower your risk of overdose:    Recognize the signs:  Signs of overdose include decrease or loss of consciousness (blackout), slowed breathing, trouble waking up and blue lips. If someone is worried about overdose, they should call 911.    Talk to your doctor about Narcan (naloxone).   If you are at risk for overdose, you may be given a prescription for Narcan. This medicine very quickly reverses the effects of opioids.   If you overdose, a friend or family member can give you Narcan while waiting for the ambulance. They need to know the signs of overdose and how to give Narcan.     Don't use alcohol or street drugs.   Taking them with opioids can cause death.    Do not take any of these medicines unless your doctor says it s OK. Taking these with opioids can cause death:  Benzodiazepines, such as lorazepam (Ativan), alprazolam (Xanax) or diazepam (Valium)  Muscle relaxers, such as cyclobenzaprine (Flexeril)  Sleeping pills like zolpidem (Ambien)   Other opioids      How to keep you and other people safe while taking opioids:    Never share your opioids with others.  Opioid medicines are regulated by the Drug Enforcement Agency ([employer]MET[employer]). Selling or sharing medications is a criminal act.    2. Be sure to store opioids in a secure place, locked up if possible. Young children can easily swallow them and overdose.    3. When you are traveling with your medicines, keep them in the original bottles. If you use a pill box, be sure you also  carry a copy of your medicine list from your clinic or pharmacy.    4. Safe disposal of opioids    Most pharmacies have places to get rid of medicine, called disposal kiosks. Medicine disposal options are also available in every [facility]. Search your county and  medication disposal  to find more options. You can find more details at:  https://[email or web address]-green/[email or web address]-[email or web address]-medications     I agree that my provider, clinic care team, and pharmacy may work with any city, state or federal law enforcement agency that investigates the misuse, sale, or other diversion of my controlled medicine. I will allow my provider to discuss my care with, or share a copy of, this agreement with any other treating provider, pharmacy or emergency room where I receive care.    I have read this agreement and have asked questions about anything I did not understand.    _______________________________________________________  Patient Signature - Dilip Kan _____________________                   Date     _______________________________________________________  Provider Signature - Ezequiel Alejandra MD   _____________________                   Date     _______________________________________________________  Witness Signature (required if provider not present while patient signing)   _____________________                   Date

## 2024-03-16 LAB
BACTERIA SPEC CULT: ABNORMAL
BACTERIA SPEC CULT: ABNORMAL
GRAM STAIN RESULT: ABNORMAL

## 2024-03-16 PROCEDURE — G0179 MD RECERTIFICATION HHA PT: HCPCS | Performed by: FAMILY MEDICINE

## 2024-03-18 ENCOUNTER — TELEPHONE (OUTPATIENT)
Dept: FAMILY MEDICINE | Facility: OTHER | Age: 69
End: 2024-03-18
Payer: COMMERCIAL

## 2024-03-18 DIAGNOSIS — C61 MALIGNANT NEOPLASM OF PROSTATE (H): ICD-10-CM

## 2024-03-18 LAB
OXYCODONE UR CFM-MCNC: 4220 NG/ML
OXYCODONE/CREAT UR: 6594 NG/MG {CREAT}
OXYMORPHONE UR CFM-MCNC: >7000 NG/ML
OXYMORPHONE/CREAT UR: ABNORMAL

## 2024-03-18 RX ORDER — PREDNISONE 5 MG/1
5 TABLET ORAL 2 TIMES DAILY
Qty: 60 TABLET | Refills: 11 | OUTPATIENT
Start: 2024-03-18

## 2024-03-18 NOTE — TELEPHONE ENCOUNTER
Jermain is getting Lupron injections. There is no oral chemo currently. Prednisone not needed.  Elza Lundberg RN...........3/18/2024 11:57 AM

## 2024-03-18 NOTE — TELEPHONE ENCOUNTER
[facility] home health care is looking for orders     Skilled n ursing     2 visits for 2 weeks    1 visit for 7 weeks     2 PRN visits for any complications    Please call and advise    Thank You    Vee Mejia on 3/18/2024 at 8:34 AM

## 2024-03-18 NOTE — TELEPHONE ENCOUNTER
I agree with the Home Care order requests below.  Ezequiel Alejandra MD on 3/18/2024 at 12:39 PM

## 2024-03-19 ENCOUNTER — VIRTUAL VISIT (OUTPATIENT)
Dept: INFECTIOUS DISEASES | Facility: CLINIC | Age: 69
End: 2024-03-19
Attending: INTERNAL MEDICINE
Payer: COMMERCIAL

## 2024-03-19 VITALS — BODY MASS INDEX: 25.05 KG/M2 | HEIGHT: 70 IN | WEIGHT: 175 LBS

## 2024-03-19 DIAGNOSIS — M86.68 CHRONIC OSTEOMYELITIS OF SYMPHYSIS PUBIS (H): Primary | ICD-10-CM

## 2024-03-19 PROCEDURE — 99214 OFFICE O/P EST MOD 30 MIN: CPT | Mod: 95 | Performed by: INTERNAL MEDICINE

## 2024-03-19 ASSESSMENT — PAIN SCALES - GENERAL: PAINLEVEL: MODERATE PAIN (4)

## 2024-03-19 NOTE — NURSING NOTE
Patient confirms medications and allergies are accurate via patients echeck in completion, and or denies any changes since last reviewed/verified.     Is the patient currently in the state of [place]? YES    Visit mode:VIDEO    If the visit is dropped, the patient can be reconnected by: VIDEO VISIT: Text to cell phone:   Telephone Information:   Mobile [phone]       Will anyone else be joining the visit? NO  (If patient encounters technical issues they should call [phone]    How would you like to obtain your AVS? MyChart    Are changes needed to the allergy or medication list? No    Reason for visit: RECHECK    Lizette NICHOLE

## 2024-03-19 NOTE — LETTER
3/19/2024       RE: Dilip Kan  [address] [place] [address]     Dear Colleague,    Thank you for referring your patient, Dilip Kan, to the [facility] [place] at [facility]. Please see a copy of my visit note below.    Virtual Visit Details    Type of service:  Video Visit   Video Start Time:  3:01 pm  Video End Time: 3:15 pm    Originating Location (pt. Location): Home  Distant Location (provider location):  On-site  Platform used for Video Visit: [facility]  Dr. Josselin Kay, [facility], Floor [address]  [address] [place] [address]   Patient:  Dilip Kan, Date of birth 1955, Medical record number [id number]  Date of Visit:  03/19/2024         Assessment and Recommendations:     67 yo M with pubic symphysis osteomyelitis s/p cystectomy with ileal conduit, pubectomy with rectus flap on 11/30/2023. Intra-op cultures grew Candida albicans, Strep agalactiae.      Chronic osteomyelitis s/p extensive source control surgery. He has had antibiotics since 11/30. Initially micafungin, ceftriaxone, metronidazole and then fluconazole and augmentin. He completed another month of augmentin and fluconazole afterwards. His last CRP was back up to 30 on 2/15 but on recheck is down to 24 on 3/7.  He should get repeat labs next week. I will check the CRP, CBC, ESR and see if it is improved.     We discussed treating a superficial infection given wound care's concerns about the wound draining. However, he feels it has improved on its own. Also he was cleaning it with alcohol and he stopped. He thinks this might have caused some irritation.     I am fine with watchful waiting. He will see Urology on 3/28.      Josselin Kay MD  Division of Infectious Diseases and International Medicine  [phone]     RTC- 3-4 weeks. I will be out on leave at that time. I  will put him in with one of my colleagues.      Total visit including reviewing outside records on the day of the visit was 30 minutes.     Josselin Kay MD  Division of Infectious Diseases and International Medicine  [phone]         History of Infectious Disease Illness:     Patient  is a 67 yo M with PMHx of HTN, DM2, bilateral PE on AC, and metastatic prostate cancer (liver, bone:acetabulum), s/p radiation c/b radiation cysitis and chemotherapy (last dose 6/13/2023), hospitalized (Jun, 2023) with fever sepsis 2/2 pelvic abscess. He was found to have a pelvic abscess that was thought not to be reachable for drainage and hip fracture osteomyelitis versus metastasis. Of note, communication of abscess with bladder, prostate and not the rectum per cystogram. His urine and blood cultures grew Klebsiella. He also screened positive for VRE although this never grew in any cultures. He was improving after a course of zosyn in the hospital, 2 weeks of linzolid for VRE, and discharged on levofloxacin 750 daily and flagyl 500 mg q8 h which was given for 8 weeks. He tolerated that fine and felt much improved in terms of fevers and appetite. CRP was down to 5. However, noted CRP elevated to 100, when stopped antibiotics.      CT 9/20 ([facility]) showed similar to smaller pelvic abscess, which prompted MRI pelvis 10/27/2023 ([facility]), whereby concerning findings of chronic osteomyelitis involving pubic symphysis septic arthropathy, along with anterior bladder wall defect and subjacent fistula. Because of these findings, seen by Urologist (Dr. Paul) and admitted for elective surgery. Underwent cystectomy with ileal conduit, pubectomy, with rectus flap by urology and plastic surgery teams on 11/30/2023. Intra-op cultures growing yeast and Strep agalactiae.     Based on culture data, initially remained on micafungin, ceftriaxone, metronidazole while awaiting susceptibility on Candida isolate. Recommend to switch to oral  regimen: PO Fluconazole 400mg daily (QTc 395 on 12/3/2023), and po augmentin 875mg BID. Both has good bioavailability in tissue and bone. Of note, does not have insurance coverage for home iv antibiotics.       Last visit: 1/23/24     He reports he feels better than prior to surgery.      He has been on Augmentin and completing therapy. However he does not remember being on fluconazole. He is not on it currently. It does appear in his discharge summary. He was at TCU until about 1 week ago in [place].      His wife thinks he was on fluconazole at the TCU but does not think he was restarted at discharge from the TCU.      We placed him on 3 more weeks of augmentin and 4 weeks of fluconazole.    Recent Events: 3/19/2024    He has had a draining wound at the side of his surgical site. This was cultured by wound care. It grew pseudomonas and e faecalis.     He reports the wound is improved. He says the erythema resolved. He says the pain at the site is gone. He now only has very minimal drainage. He says the wound has nearly closed.     He reports things are better. He thinks his pain is improved. His breathing is improved. His appetite has been back for the last 2 weeks. He feels much better since surgery.           Past Medical and Surgical History:     Past Medical History:   Diagnosis Date    Elevated prostate specific antigen (PSA)     4/10/2012    Encounter for other administrative examinations     1/31/2014    Esophagitis     No Comments Provided    Gastro-esophageal reflux disease without esophagitis     No Comments Provided    Low back pain     No Comments Provided    Malignant neoplasm of prostate (H)     9/6/2012    Nicotine dependence, uncomplicated     Quit - October 2007 with chantix    Other intervertebral disc degeneration, lumbosacral region     12/1/2008       Past Surgical History:   Procedure Laterality Date    APPENDECTOMY OPEN  [id number]    Ruptured - [place]    ARTHROPLASTY KNEE  Right 11/16/2021    Procedure: ARTHROPLASTY, KNEE, TOTAL;  Surgeon: Micah Rock MD;  Location: [facility] OR    COLONOSCOPY  08/31/2006    next due in 2016    CYSTECTOMY BLADDER, ILEAL DIVERSION, COMBINED N/A 11/30/2023    Procedure: CYSTECTOMY, WITH URETEROILEAL CONDUIT CREATION and Pubectomy;  Surgeon: Miki Correa MD;  Location: [facility] OR    DISKECTOMY, LUMBAR, SINGLE SP  03/16/2009    L 3-4 microdiskectomy, [facility]    ESOPHAGOSCOPY, GASTROSCOPY, DUODENOSCOPY (EGD), COMBINED  03/2003         ESOPHAGOSCOPY, GASTROSCOPY, DUODENOSCOPY (EGD), COMBINED  08/31/2006         GRAFT FLAP PEDICLE PERINEUM N/A 11/30/2023    Procedure: Left Rectus Abdominis flap;  Surgeon: YADIRA Guadalupe MD;  Location: [facility] OR    PICC DOUBLE LUMEN PLACEMENT Right 12/01/2023    5FR DL PICC, basilic vein. L-43cm, 2cm out.    PROSTATECTOMY PERINEAL RADICAL  11/28/2012    Nerve Sparring, Dr Warner    SPINE SURGERY N/A 04/28/2017    L3 to S1 spinal decompression L4/L5 fusion    TONSILLECTOMY, ADENOIDECTOMY, COMBINED      as a child    VARICOCELECTOMY  1959    INCISION AND DRAINAGE,EXCISE VARICOCELE           Family History:     Family History   Problem Relation Age of Onset    Hypertension Mother         Hypertension,HTN    Other - See Comments Mother          Parkinsons    Heart Disease Father         Heart Disease, passed away from CHF,CAD    Colon Cancer Father         Cancer-colon    Hypertension Father         Hypertension    Other - See Comments Father         Stroke/Dementia    Family History Negative Sister         Good Health,emotional problems.    Family History Negative Sister         Good Health    Other - See Comments Daughter         w/o major medical problems.    Other - See Comments Son         w/o major medical problems.    Family History Negative Other         Good Health    Family History Negative Other         Good Health,previous marriage./previous marriage.    Family History Negative Other         Good Health,previous  marriage.    Anesthesia Reaction No family hx of     Venous thrombosis No family hx of            Social History:     Social History     Tobacco Use    Smoking status: Former     Packs/day: 1.00     Years: 20.00     Additional pack years: 0.00     Total pack years: 20.00     Types: Cigarettes     Quit date: 2002     Years since quittin.3     Passive exposure: Past    Smokeless tobacco: Current     Types: Snuff    Tobacco comments:     Can't remember when all he had passive exposure   Vaping Use    Vaping Use: Never used   Substance Use Topics    Alcohol use: Not Currently    Drug use: No     Social History     Social History Narrative    [employer] - retired.  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  Patient has quit smoking.  Lives in [place] on a farm with wife.             Review of Systems:   CONSTITUTIONAL:  No fevers or chills  EYES: negative for icterus  ENT:  negative for hearing loss, tinnitus, sore throat  RESPIRATORY:  negative for cough, sputum or dyspnea  CARDIOVASCULAR:  negative for chest pain, palpitations  GASTROINTESTINAL:  negative for nausea, vomiting, diarrhea or constipation  GENITOURINARY:  negative for dysuria  HEME:  No easy bruising  INTEGUMENT:  negative for rash or pruritus  NEURO:  Negative for headache         Current Medications:     Current Outpatient Medications   Medication Sig Dispense Refill    acetaminophen (TYLENOL) 325 MG tablet Take 975 mg by mouth every 8 hours as needed for mild pain      amLODIPine (NORVASC) 10 MG tablet Take 1 tablet (10 mg) by mouth every morning 90 tablet 4    apixaban ANTICOAGULANT (ELIQUIS) 5 MG tablet Take 1 tablet (5 mg) by mouth 2 times daily 180 tablet 4    atorvastatin (LIPITOR) 20 MG tablet Take 1 tablet (20 mg) by mouth daily 90 tablet 0    leuprolide (LUPRON DEPOT) 45 MG kit Inject 45 mg into the muscle every 6 months      megestrol (MEGACE) 20 MG tablet Take 20 mg by mouth 2 times daily      omeprazole (PRILOSEC) 40  "MG DR capsule Take 1 capsule (40 mg) by mouth daily 90 capsule 4    ondansetron (ZOFRAN ODT) 8 MG ODT tab Take 1 tablet (8 mg) by mouth every 8 hours as needed for nausea 90 tablet 1    oxyCODONE IR (ROXICODONE) 15 MG tablet Take 1 tablet (15 mg) by mouth every 6 hours as needed for moderate pain or moderate to severe pain (can take every 4-6 hours as needed for pain) Refill on or after 05/14/2024 90 tablet 0    oxyCODONE IR (ROXICODONE) 15 MG tablet Take 1 tablet (15 mg) by mouth every 6 hours as needed for moderate pain or moderate to severe pain (can take every 4-6 hours as needed for pain) Refill on or after 04/14/2024 90 tablet 0    oxyCODONE IR (ROXICODONE) 15 MG tablet Take 1 tablet (15 mg) by mouth every 6 hours as needed for moderate pain or moderate to severe pain (can take every 4-6 hours as needed for pain) Refill on or after 03/14/2024 90 tablet 0            Immunization History:     Immunization History   Administered Date(s) Administered    COVID-19 Bivalent 12+ (Pfizer) 12/02/2022    COVID-19 MONOVALENT 12+ (Pfizer) 01/25/2022    COVID-19 Vaccine (Sofiya) 03/06/2021    Influenza (High Dose) 3 valent vaccine 11/01/2018, 09/30/2019    Influenza (IIV3) PF 10/15/2008, 11/17/2011, 11/09/2012    Influenza Vaccine 65+ (Fluzone HD) 10/05/2020, 10/26/2021, 10/05/2022, 01/10/2024    Influenza Vaccine >6 months,quad, PF 10/16/2014, 11/13/2015, 11/03/2016, 09/25/2017    Pneumo Conj 13-V (2010&after) 10/05/2020    Pneumococcal 23 valent 10/05/2022    TDAP Vaccine (Adacel) 11/09/2012            Allergies:   No Known Allergies         Physical Exam:   Vital signs:  Ht 1.778 m (5' 10\")   Wt 79.4 kg (175 lb)   BMI 25.11 kg/m      Physical Examination:  GENERAL:  well-developed, well-nourished, seated in no acute distress.  HEENT:  Head is normocephalic, atraumatic   EYES:  Eyes have anicteric sclerae without conjunctival injection   ENT:  Oropharynx is moist without exudates or ulcers. Tongue is midline  NECK:  " Supple. No cervical lymphadenopathy  LUNGS:  Clear to auscultation bilateral.   CARDIOVASCULAR:  Regular rate and rhythm with no murmurs, gallops or rubs.  ABDOMEN:  Normal bowel sounds, soft, nontender. No appreciable hepatosplenomegaly.  SKIN:  No acute rashes.  Abdominal wound is healing. Has granulation tissue but not red or angry. Minimal drainage.   NEUROLOGIC:  Grossly nonfocal. Active x4 extremities         Laboratory Data:     Collected 3/13/2024 12:17 PM       Status: Final result       Visible to patient: Yes (seen)       Dx: Chronic osteomyelitis of symphysis pu...    Specimen Information: Abdomen; Swab   0 Result Notes  Culture 2+ Pseudomonas aeruginosa Abnormal       Isolated in broth only Enterococcus faecalis Abnormal                Gram Stain Result  Abnormal   4+ PMNs/low power field      1+ Squamous epithelial cells/low power field      2+ Gram positive bacilli              Resulting Agency: [facility]     Susceptibility     Pseudomonas aeruginosa Enterococcus faecalis     MALDONADO MALDONADO     Ampicillin   <=2 Susceptible     Cefepime <=2 Susceptible       Ciprofloxacin  See below 1 <=1 Susceptible     Daptomycin   4 Intermediate     Gentamicin <=4 Susceptible       Imipenem <=1 Susceptible       Levofloxacin  See below 2 2 Susceptible     Linezolid   <=2 Susceptible     Penicillin   2 Susceptible     Tobramycin <=4 Susceptible       Vancomycin   1 Susceptible                 Metabolic Studies   Sodium   Date Value Ref Range Status   03/07/2024 135 135 - 145 mmol/L Final     Comment:     Reference intervals for this test were updated on 09/26/2023 to more accurately reflect our healthy population. There may be differences in the flagging of prior results with similar values performed with this method. Interpretation of those prior results can be made in the context of the updated reference intervals.    01/10/2024 135 135 - 145 mmol/L Final     Comment:     Reference intervals for this test were updated on  09/26/2023 to more accurately reflect our healthy population. There may be differences in the flagging of prior results with similar values performed with this method. Interpretation of those prior results can be made in the context of the updated reference intervals.    02/23/2021 140 134 - 144 mmol/L Final   10/05/2020 142 134 - 144 mmol/L Final     Potassium   Date Value Ref Range Status   03/07/2024 4.4 3.4 - 5.3 mmol/L Final   01/10/2024 5.2 3.4 - 5.3 mmol/L Final   10/13/2022 4.0 3.5 - 5.1 mmol/L Final   09/29/2022 4.0 3.5 - 5.1 mmol/L Final   02/23/2021 4.2 3.5 - 5.1 mmol/L Final   10/05/2020 4.4 3.5 - 5.1 mmol/L Final     Chloride   Date Value Ref Range Status   03/07/2024 98 98 - 107 mmol/L Final   01/10/2024 105 98 - 107 mmol/L Final   10/13/2022 103 98 - 107 mmol/L Final   09/29/2022 105 98 - 107 mmol/L Final   02/23/2021 103 98 - 107 mmol/L Final   10/05/2020 105 98 - 107 mmol/L Final     Carbon Dioxide   Date Value Ref Range Status   02/23/2021 29 21 - 31 mmol/L Final   10/05/2020 30 21 - 31 mmol/L Final     Carbon Dioxide (CO2)   Date Value Ref Range Status   03/07/2024 24 22 - 29 mmol/L Final   01/10/2024 18 (L) 22 - 29 mmol/L Final   10/13/2022 30 21 - 31 mmol/L Final   09/29/2022 29 21 - 31 mmol/L Final     Anion Gap   Date Value Ref Range Status   03/07/2024 13 7 - 15 mmol/L Final   01/10/2024 12 7 - 15 mmol/L Final   10/13/2022 6 3 - 14 mmol/L Final   09/29/2022 7 3 - 14 mmol/L Final   02/23/2021 8 3 - 14 mmol/L Final   10/05/2020 7 3 - 14 mmol/L Final     Urea Nitrogen   Date Value Ref Range Status   03/07/2024 14.0 8.0 - 23.0 mg/dL Final   01/10/2024 18.3 8.0 - 23.0 mg/dL Final   10/13/2022 15 7 - 25 mg/dL Final   09/29/2022 19 7 - 25 mg/dL Final   02/23/2021 17 7 - 25 mg/dL Final   10/05/2020 18 7 - 25 mg/dL Final     Creatinine   Date Value Ref Range Status   03/07/2024 0.76 0.67 - 1.17 mg/dL Final   01/10/2024 0.84 0.67 - 1.17 mg/dL Final   02/23/2021 0.73 0.70 - 1.30 mg/dL Final   11/23/2020  "0.71 0.70 - 1.30 mg/dL Final     GFR Estimate   Date Value Ref Range Status   03/07/2024 >90 >60 mL/min/1.73m2 Final   01/10/2024 >90 >60 mL/min/1.73m2 Final   02/23/2021 >90 >60 mL/min/[1.73_m2] Final   11/23/2020 >90 >60 mL/min/[1.73_m2] Final     Glucose   Date Value Ref Range Status   03/07/2024 122 (H) 70 - 99 mg/dL Final   01/10/2024 120 (H) 70 - 99 mg/dL Final   10/13/2022 100 70 - 105 mg/dL Final   09/29/2022 99 70 - 105 mg/dL Final   02/23/2021 139 (H) 70 - 105 mg/dL Final   10/05/2020 153 (H) 70 - 105 mg/dL Final     GLUCOSE BY METER POCT   Date Value Ref Range Status   12/04/2023 118 (H) 70 - 99 mg/dL Final   12/04/2023 110 (H) 70 - 99 mg/dL Final     Hemoglobin A1C   Date Value Ref Range Status   03/14/2024 5.3 4.0 - 6.2 % Final   05/24/2021 6.0 4.0 - 6.0 % Final     Calcium   Date Value Ref Range Status   03/07/2024 9.8 8.8 - 10.2 mg/dL Final   01/10/2024 10.3 (H) 8.8 - 10.2 mg/dL Final   02/23/2021 10.0 8.6 - 10.3 mg/dL Final   10/05/2020 9.5 8.6 - 10.3 mg/dL Final     Phosphorus   Date Value Ref Range Status   12/06/2023 3.4 2.5 - 4.5 mg/dL Final   12/04/2023 4.0 2.5 - 4.5 mg/dL Final     Magnesium   Date Value Ref Range Status   12/11/2023 1.8 1.7 - 2.3 mg/dL Final   12/10/2023 1.8 1.7 - 2.3 mg/dL Final     Lactic Acid   Date Value Ref Range Status   07/02/2023 0.8 0.7 - 2.0 mmol/L Final   06/29/2023 1.0 0.7 - 2.0 mmol/L Final       Inflammatory Markers No results found for: \"CRP\"    Hepatic Studies    Bilirubin Total   Date Value Ref Range Status   03/07/2024 0.2 <=1.2 mg/dL Final   01/10/2024 0.2 <=1.2 mg/dL Final   02/23/2021 0.8 0.3 - 1.0 mg/dL Final   04/07/2017 0.5 0.3 - 1.0 mg/dL      Alkaline Phosphatase   Date Value Ref Range Status   03/07/2024 136 40 - 150 U/L Final     Comment:     Reference intervals for this test were updated on 11/14/2023 to more accurately reflect our healthy population. There may be differences in the flagging of prior results with similar values performed with this " method. Interpretation of those prior results can be made in the context of the updated reference intervals.   01/10/2024 105 40 - 150 U/L Final     Comment:     Reference intervals for this test were updated on 11/14/2023 to more accurately reflect our healthy population. There may be differences in the flagging of prior results with similar values performed with this method. Interpretation of those prior results can be made in the context of the updated reference intervals.   02/23/2021 66 34 - 104 U/L Final   04/07/2017 67 34 - 104 IU/L      Albumin   Date Value Ref Range Status   03/07/2024 3.8 3.5 - 5.2 g/dL Final   01/10/2024 3.3 (L) 3.5 - 5.2 g/dL Final   10/13/2022 3.8 3.5 - 5.7 g/dL Final   09/29/2022 3.8 3.5 - 5.7 g/dL Final   02/23/2021 4.3 3.5 - 5.7 g/dL Final   04/07/2017 4.0 3.5 - 5.7 g/dL      AST   Date Value Ref Range Status   03/07/2024 35 0 - 45 U/L Final     Comment:     Reference intervals for this test were updated on 6/12/2023 to more accurately reflect our healthy population. There may be differences in the flagging of prior results with similar values performed with this method. Interpretation of those prior results can be made in the context of the updated reference intervals.   01/10/2024 21 0 - 45 U/L Final     Comment:     Reference intervals for this test were updated on 6/12/2023 to more accurately reflect our healthy population. There may be differences in the flagging of prior results with similar values performed with this method. Interpretation of those prior results can be made in the context of the updated reference intervals.   02/23/2021 15 13 - 39 U/L Final     ALT   Date Value Ref Range Status   03/07/2024 8 0 - 70 U/L Final     Comment:     Reference intervals for this test were updated on 6/12/2023 to more accurately reflect our healthy population. There may be differences in the flagging of prior results with similar values performed with this method. Interpretation of  those prior results can be made in the context of the updated reference intervals.     01/10/2024 13 0 - 70 U/L Final     Comment:     Reference intervals for this test were updated on 6/12/2023 to more accurately reflect our healthy population. There may be differences in the flagging of prior results with similar values performed with this method. Interpretation of those prior results can be made in the context of the updated reference intervals.     02/23/2021 16 7 - 52 U/L Final       Hematology Studies      WBC   Date Value Ref Range Status   02/23/2021 6.9 4.0 - 11.0 10e9/L Final   07/09/2020 5.5 4.0 - 11.0 10e9/L Final     WBC Count   Date Value Ref Range Status   03/07/2024 8.2 4.0 - 11.0 10e3/uL Final   01/10/2024 6.6 4.0 - 11.0 10e3/uL Final     Absolute Neutrophils   Date Value Ref Range Status   04/22/2023 2.2 1.6 - 8.3 10e3/uL Final     Absolute Lymphocytes   Date Value Ref Range Status   04/22/2023 0.4 (L) 0.8 - 5.3 10e3/uL Final     Absolute Monocytes   Date Value Ref Range Status   04/22/2023 0.3 0.0 - 1.3 10e3/uL Final     Absolute Eosinophils   Date Value Ref Range Status   04/22/2023 0.0 0.0 - 0.7 10e3/uL Final     Hemoglobin   Date Value Ref Range Status   03/07/2024 9.5 (L) 13.3 - 17.7 g/dL Final   01/10/2024 7.8 (L) 13.3 - 17.7 g/dL Final   02/23/2021 14.1 13.3 - 17.7 g/dL Final   07/09/2020 13.7 13.3 - 17.7 g/dL Final     Hematocrit   Date Value Ref Range Status   03/07/2024 29.9 (L) 40.0 - 53.0 % Final   01/10/2024 25.3 (L) 40.0 - 53.0 % Final   02/23/2021 42.5 40.0 - 53.0 % Final   07/09/2020 41.7 40.0 - 53.0 % Final     Platelet Count   Date Value Ref Range Status   03/07/2024 406 150 - 450 10e3/uL Final   01/10/2024 366 150 - 450 10e3/uL Final   02/23/2021 200 150 - 450 10e9/L Final   07/09/2020 203 150 - 450 10e9/L Final       Imaging:  [unfilled]       Josselin Kay MD

## 2024-03-20 LAB — BACTERIA SPEC CULT: NORMAL

## 2024-03-21 ENCOUNTER — MEDICAL CORRESPONDENCE (OUTPATIENT)
Dept: HEALTH INFORMATION MANAGEMENT | Facility: OTHER | Age: 69
End: 2024-03-21
Payer: COMMERCIAL

## 2024-03-21 NOTE — ED NOTES
2nd of 3 bags of LR hung.  Lab is at the bedside collecting 2nd set of blood cultures  
DATE/TIME OF CALL RECEIVED FROM LAB:  06/29/23 at 10:12 AM   LAB TEST:  lactic acid  LAB VALUE:  3.3  PROVIDER NOTIFIED?: Yes  PROVIDER NAME: Sarah  DATE/TIME LAB VALUE REPORTED TO PROVIDER: 1015  MECHANISM OF PROVIDER NOTIFICATION: Face-To-Face  PROVIDER RESPONSE: continue to monitor patient, fluids, labs   
EKG performed @ bedside  
Imaging at bedside  
Imaging called for push CT scan to [facility]   
Notified pts spouse of upcoming transfer   
Report given to receiving facility  
Warm pack applied to arm for comfort  
Wife heading home to get caths for pt per pt preference  
Statement Selected

## 2024-03-25 ENCOUNTER — LAB (OUTPATIENT)
Dept: LAB | Facility: OTHER | Age: 69
End: 2024-03-25
Payer: COMMERCIAL

## 2024-03-25 ENCOUNTER — TELEPHONE (OUTPATIENT)
Dept: INFECTIOUS DISEASES | Facility: CLINIC | Age: 69
End: 2024-03-25
Payer: COMMERCIAL

## 2024-03-25 DIAGNOSIS — C61 MALIGNANT NEOPLASM OF PROSTATE (H): ICD-10-CM

## 2024-03-25 DIAGNOSIS — K65.1 PELVIC ABSCESS IN MALE (H): ICD-10-CM

## 2024-03-25 DIAGNOSIS — C79.51 MALIGNANT NEOPLASM METASTATIC TO BONE (H): ICD-10-CM

## 2024-03-25 LAB
ALBUMIN SERPL BCG-MCNC: 3.5 G/DL (ref 3.5–5.2)
ALP SERPL-CCNC: 111 U/L (ref 40–150)
ALT SERPL W P-5'-P-CCNC: 8 U/L (ref 0–70)
ANION GAP SERPL CALCULATED.3IONS-SCNC: 12 MMOL/L (ref 7–15)
AST SERPL W P-5'-P-CCNC: 33 U/L (ref 0–45)
BILIRUB SERPL-MCNC: 0.2 MG/DL
BUN SERPL-MCNC: 15 MG/DL (ref 8–23)
CALCIUM SERPL-MCNC: 9.5 MG/DL (ref 8.8–10.2)
CHLORIDE SERPL-SCNC: 105 MMOL/L (ref 98–107)
CREAT SERPL-MCNC: 0.71 MG/DL (ref 0.67–1.17)
CRP SERPL-MCNC: 17.46 MG/L
DEPRECATED HCO3 PLAS-SCNC: 19 MMOL/L (ref 22–29)
EGFRCR SERPLBLD CKD-EPI 2021: >90 ML/MIN/1.73M2
ERYTHROCYTE [DISTWIDTH] IN BLOOD BY AUTOMATED COUNT: 17.8 % (ref 10–15)
ERYTHROCYTE [SEDIMENTATION RATE] IN BLOOD BY WESTERGREN METHOD: 32 MM/HR (ref 0–20)
GLUCOSE SERPL-MCNC: 84 MG/DL (ref 70–99)
HCT VFR BLD AUTO: 29.3 % (ref 40–53)
HGB BLD-MCNC: 9 G/DL (ref 13.3–17.7)
HOLD SPECIMEN: NORMAL
MCH RBC QN AUTO: 24.7 PG (ref 26.5–33)
MCHC RBC AUTO-ENTMCNC: 30.7 G/DL (ref 31.5–36.5)
MCV RBC AUTO: 80 FL (ref 78–100)
PLATELET # BLD AUTO: 326 10E3/UL (ref 150–450)
POTASSIUM SERPL-SCNC: 3.8 MMOL/L (ref 3.4–5.3)
PROT SERPL-MCNC: 6.3 G/DL (ref 6.4–8.3)
PSA SERPL DL<=0.01 NG/ML-MCNC: 20.38 NG/ML (ref 0–6.5)
RBC # BLD AUTO: 3.65 10E6/UL (ref 4.4–5.9)
SODIUM SERPL-SCNC: 136 MMOL/L (ref 135–145)
WBC # BLD AUTO: 7.2 10E3/UL (ref 4–11)

## 2024-03-25 PROCEDURE — 85652 RBC SED RATE AUTOMATED: CPT | Mod: ZL

## 2024-03-25 PROCEDURE — 36415 COLL VENOUS BLD VENIPUNCTURE: CPT | Mod: ZL

## 2024-03-25 PROCEDURE — 85027 COMPLETE CBC AUTOMATED: CPT | Mod: ZL

## 2024-03-25 PROCEDURE — 84153 ASSAY OF PSA TOTAL: CPT | Mod: ZL

## 2024-03-25 PROCEDURE — 86140 C-REACTIVE PROTEIN: CPT | Mod: ZL

## 2024-03-25 PROCEDURE — 82374 ASSAY BLOOD CARBON DIOXIDE: CPT | Mod: ZL

## 2024-03-25 NOTE — PROGRESS NOTES
Home care nurse felipe labs today. Will cancel lab appointment tomorrow.  Elza Lundberg RN...........3/25/2024 3:24 PM

## 2024-03-25 NOTE — TELEPHONE ENCOUNTER
Left Voicemail (1st Attempt) and Sent Mychart (1st Attempt) for the patient to call back and schedule the following:    Appointment type: Return  Provider: Pastor (raquel Kay)  Return date: 04/19/2024  Specialty phone number: [phone]  Additional appointment(s) needed: na  Additonal Notes: video

## 2024-03-27 DIAGNOSIS — M86.68 CHRONIC OSTEOMYELITIS OF SYMPHYSIS PUBIS (H): Primary | ICD-10-CM

## 2024-03-27 NOTE — PROGRESS NOTES
Dr. Alejandra reviewed and completed the following home care or hospice form(s) for Home Care on 3/27/2024.This covers the certification period effective 3/16/2024 to 5/14/2024.  Rosamaria Ceballos RN on 3/27/2024 at 3:11 PM

## 2024-03-27 NOTE — TELEPHONE ENCOUNTER
Left Voicemail (2nd Attempt) for the patient to call back and schedule the following:    Appointment type: Return  Provider: Pastor (raquel Kay)  Return date: 4/19/2024  Specialty phone number: [phone]  Additional appointment(s) needed: NA  Additonal Notes: NA

## 2024-03-27 NOTE — PROGRESS NOTES
HPI:  Dilip Kan is a 69 year old male being seen for post op follow up. 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          - closely followed by ID, off antibiotics for a few weeks now    - abdominal wound now closed    - CRP 17.46 on 3/25/24 from 23.78 on 3/7/24    - no concerns with ileal conduit, bag changed every 2-3 days and emptied every 2-3 hours    - cr 0.71    - has home care nurse visit once a week    Reviewed previous notes from Dr. Kay from ID service at [facility]    Exam:  There were no vitals taken for this visit.  GENERAL: alert and no distress  EYES: Eyes grossly normal to inspection.  No discharge or erythema, or obvious scleral/conjunctival abnormalities.  RESP: No audible wheeze, cough, or visible cyanosis.    ABDOMEN: wound closed, pink skin color at the previous incision  SKIN: Visible skin clear. No significant rash, abnormal pigmentation or lesions.  NEURO: Cranial nerves grossly intact.  Mentation and speech appropriate for age.  PSYCH: Appropriate affect, tone, and pace of words    Review of Imaging:  The following imaging exams were independently viewed and interpreted by me and discussed with patient:  Renal/Bladder Ultrasound: Normal    Review of Labs:  The following labs were reviewed by me and discussed with the patient:  Recent Results (from the past 720 hour(s))   Comprehensive metabolic panel    Collection Time: 03/07/24  2:50 PM   Result Value Ref Range    Sodium 135 135 - 145 mmol/L    Potassium 4.4 3.4 - 5.3 mmol/L    Carbon Dioxide (CO2) 24 22 - 29 mmol/L    Anion Gap 13 7 - 15 mmol/L    Urea Nitrogen 14.0 8.0 - 23.0 mg/dL    Creatinine 0.76 0.67 - 1.17 mg/dL    GFR Estimate >90 >60 mL/min/1.73m2    Calcium 9.8 8.8 - 10.2 mg/dL    Chloride 98 98  - 107 mmol/L    Glucose 122 (H) 70 - 99 mg/dL    Alkaline Phosphatase 136 40 - 150 U/L    AST 35 0 - 45 U/L    ALT 8 0 - 70 U/L    Protein Total 6.4 6.4 - 8.3 g/dL    Albumin 3.8 3.5 - 5.2 g/dL    Bilirubin Total 0.2 <=1.2 mg/dL   CRP inflammation    Collection Time: 03/07/24  2:50 PM   Result Value Ref Range    CRP Inflammation 23.78 (H) <5.00 mg/L   Erythrocyte sedimentation rate auto    Collection Time: 03/07/24  2:50 PM   Result Value Ref Range    Erythrocyte Sedimentation Rate 37 (H) 0 - 20 mm/hr   CBC with platelets and differential    Collection Time: 03/07/24  2:50 PM   Result Value Ref Range    WBC Count 8.2 4.0 - 11.0 10e3/uL    RBC Count 3.91 (L) 4.40 - 5.90 10e6/uL    Hemoglobin 9.5 (L) 13.3 - 17.7 g/dL    Hematocrit 29.9 (L) 40.0 - 53.0 %    MCV 77 (L) 78 - 100 fL    MCH 24.3 (L) 26.5 - 33.0 pg    MCHC 31.8 31.5 - 36.5 g/dL    RDW 18.8 (H) 10.0 - 15.0 %    Platelet Count 406 150 - 450 10e3/uL    % Neutrophils 75 %    % Lymphocytes 16 %    % Monocytes 6 %    % Eosinophils 2 %    % Basophils 1 %    % Immature Granulocytes 0 %    NRBCs per 100 WBC 0 <1 /100    Absolute Neutrophils 6.2 1.6 - 8.3 10e3/uL    Absolute Lymphocytes 1.3 0.0 - 5.3 10e3/uL    Absolute Monocytes 0.5 0.0 - 1.3 10e3/uL    Absolute Eosinophils 0.2 0.0 - 0.7 10e3/uL    Absolute Basophils 0.0 0.0 - 0.2 10e3/uL    Absolute Immature Granulocytes 0.0 <=0.4 10e3/uL    Absolute NRBCs 0.0 10e3/uL   Anaerobic bacterial culture    Collection Time: 03/13/24 12:17 PM    Specimen: Abdomen; Swab   Result Value Ref Range    Culture No anaerobic organisms isolated    Swab Aerobic Bacterial Culture Routine With Gram Stain    Collection Time: 03/13/24 12:17 PM    Specimen: Abdomen; Swab   Result Value Ref Range    Culture 2+ Pseudomonas aeruginosa (A)     Culture Isolated in broth only Enterococcus faecalis (A)     Gram Stain Result 4+ PMNs/low power field (A)     Gram Stain Result 1+ Squamous epithelial cells/low power field (A)     Gram Stain Result  2+ Gram positive bacilli (A)        Susceptibility    Enterococcus faecalis - MALDONADO     Ampicillin <=2 Susceptible      Levofloxacin 2 Susceptible      Linezolid <=2 Susceptible      Penicillin 2 Susceptible      Vancomycin 1 Susceptible      Ciprofloxacin <=1 Susceptible      Daptomycin 4 Intermediate     Pseudomonas aeruginosa - MALDONADO     Cefepime <=2 Susceptible      Ciprofloxacin*         * Ciprofloxacin not resistant.  Due to test limitations, lab cannot provide MALDONADO to determine susceptibility.     Gentamicin <=4 Susceptible      Imipenem <=1 Susceptible      Levofloxacin*         * Levofloxacin not resistant.  Due to test limitations, lab cannot provide MALDONADO to determine susceptibility.     Tobramycin <=4 Susceptible    Hemoglobin A1c    Collection Time: 03/14/24 11:09 AM   Result Value Ref Range    Hemoglobin A1C 5.3 4.0 - 6.2 %   Urine Drug Confirmation Panel    Collection Time: 03/14/24 11:09 AM   Result Value Ref Range    Oxycodone ng/mL 4,220 (H) <50 ng/mL    Oxycodone 6,594 Absent ng/mg [creat]    Oxymorphone ng/mL >7,000 (H) <50 ng/mL    Oxymorphone     Urine Creatinine for Drug Screen Panel    Collection Time: 03/14/24 11:09 AM   Result Value Ref Range    Creatinine Urine for Drug Screen 64 mg/dL   PSA tumor marker    Collection Time: 03/25/24  3:21 PM   Result Value Ref Range    PSA Tumor Marker 20.38 (H) 0.00 - 6.50 ng/mL   CRP, inflammation    Collection Time: 03/25/24  3:21 PM   Result Value Ref Range    CRP Inflammation 17.46 (H) <5.00 mg/L   ESR: Erythrocyte sedimentation rate    Collection Time: 03/25/24  3:21 PM   Result Value Ref Range    Erythrocyte Sedimentation Rate 32 (H) 0 - 20 mm/hr   Comprehensive metabolic panel (BMP + Alb, Alk Phos, ALT, AST, Total. Bili, TP)    Collection Time: 03/25/24  3:21 PM   Result Value Ref Range    Sodium 136 135 - 145 mmol/L    Potassium 3.8 3.4 - 5.3 mmol/L    Carbon Dioxide (CO2) 19 (L) 22 - 29 mmol/L    Anion Gap 12 7 - 15 mmol/L    Urea Nitrogen 15.0 8.0 -  23.0 mg/dL    Creatinine 0.71 0.67 - 1.17 mg/dL    GFR Estimate >90 >60 mL/min/1.73m2    Calcium 9.5 8.8 - 10.2 mg/dL    Chloride 105 98 - 107 mmol/L    Glucose 84 70 - 99 mg/dL    Alkaline Phosphatase 111 40 - 150 U/L    AST 33 0 - 45 U/L    ALT 8 0 - 70 U/L    Protein Total 6.3 (L) 6.4 - 8.3 g/dL    Albumin 3.5 3.5 - 5.2 g/dL    Bilirubin Total 0.2 <=1.2 mg/dL   CBC with platelets    Collection Time: 03/25/24  3:21 PM   Result Value Ref Range    WBC Count 7.2 4.0 - 11.0 10e3/uL    RBC Count 3.65 (L) 4.40 - 5.90 10e6/uL    Hemoglobin 9.0 (L) 13.3 - 17.7 g/dL    Hematocrit 29.3 (L) 40.0 - 53.0 %    MCV 80 78 - 100 fL    MCH 24.7 (L) 26.5 - 33.0 pg    MCHC 30.7 (L) 31.5 - 36.5 g/dL    RDW 17.8 (H) 10.0 - 15.0 %    Platelet Count 326 150 - 450 10e3/uL   Extra Serum Separator Tube (SST)    Collection Time: 03/25/24  3:22 PM   Result Value Ref Range    Hold Specimen JIC          Assessment & Plan   S/p ileal conduit  Chronic osteomyelitis of symphysis pubis    - Check CRP, will get MRI once CRP WNL  - VV in 3 months or sooner if needed      Rachel Franklin NP  [facility]OLOG[facility] [place]    ==========================      Additional Coding Information:    Problems:  4 -- one or more chronic illnesses with exacerbation or side effects    Data Reviewed  Review of external notes as documented above     Tests ordered: crp    Level of risk:  3 -- low risk (e.g., OTC medication or observation, minor surgery without risks)    Time spent:  I spent a total of 40 minutes on the day of the visit.   Time spent by me doing chart review, history and exam, documentation and further activities per the note

## 2024-03-28 ENCOUNTER — TELEPHONE (OUTPATIENT)
Dept: INFECTIOUS DISEASES | Facility: CLINIC | Age: 69
End: 2024-03-28

## 2024-03-28 ENCOUNTER — VIRTUAL VISIT (OUTPATIENT)
Dept: UROLOGY | Facility: CLINIC | Age: 69
End: 2024-03-28
Payer: COMMERCIAL

## 2024-03-28 DIAGNOSIS — M86.68 CHRONIC OSTEOMYELITIS OF SYMPHYSIS PUBIS (H): ICD-10-CM

## 2024-03-28 DIAGNOSIS — Z93.6 S/P ILEAL CONDUIT (H): Primary | ICD-10-CM

## 2024-03-28 PROCEDURE — 99215 OFFICE O/P EST HI 40 MIN: CPT | Mod: 95

## 2024-03-28 NOTE — NURSING NOTE
Is the patient currently in the state of [place]? YES    Visit mode:VIDEO    If the visit is dropped, the patient can be reconnected by: VIDEO VISIT: Text to cell phone:   Telephone Information:   Mobile [phone]       Will anyone else be joining the visit? NO  (If patient encounters technical issues they should call [phone]    How would you like to obtain your AVS? MyChart    Are changes needed to the allergy or medication list? No, Pt stated no changes to allergies, and Pt stated no med changes    Reason for visit: ZAIDA NICHOLE

## 2024-03-28 NOTE — TELEPHONE ENCOUNTER
Patient calling back, writer unable to schedule per note below as no template is available. Patient states he needs to be sooner than what writer offered of 6/25/2024. Please call thank you

## 2024-03-28 NOTE — LETTER
3/28/2024       RE: Dilip Kan  [address] [place] [address]     Dear Colleague,    Thank you for referring your patient, Dilip Kan, to the [facility]OLOG[facility] [place] at [facility]. Please see a copy of my visit note below.    HPI:  Dilip Kan is a 69 year old male being seen for post op follow up. 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          - closely followed by ID, off antibiotics for a few weeks now    - abdominal wound now closed    - CRP 17.46 on 3/25/24 from 23.78 on 3/7/24    - no concerns with ileal conduit, bag changed every 2-3 days and emptied every 2-3 hours    - cr 0.71    - has home care nurse visit once a week    Reviewed previous notes from Dr. Kay from ID service at [facility]    Exam:  There were no vitals taken for this visit.  GENERAL: alert and no distress  EYES: Eyes grossly normal to inspection.  No discharge or erythema, or obvious scleral/conjunctival abnormalities.  RESP: No audible wheeze, cough, or visible cyanosis.    ABDOMEN: wound closed, pink skin color at the previous incision  SKIN: Visible skin clear. No significant rash, abnormal pigmentation or lesions.  NEURO: Cranial nerves grossly intact.  Mentation and speech appropriate for age.  PSYCH: Appropriate affect, tone, and pace of words    Review of Imaging:  The following imaging exams were independently viewed and interpreted by me and discussed with patient:  Renal/Bladder Ultrasound: Normal    Review of Labs:  The following labs were reviewed by me and discussed with the patient:  Recent Results (from the past 720 hour(s))   Comprehensive metabolic panel    Collection Time: 03/07/24  2:50 PM   Result  Value Ref Range    Sodium 135 135 - 145 mmol/L    Potassium 4.4 3.4 - 5.3 mmol/L    Carbon Dioxide (CO2) 24 22 - 29 mmol/L    Anion Gap 13 7 - 15 mmol/L    Urea Nitrogen 14.0 8.0 - 23.0 mg/dL    Creatinine 0.76 0.67 - 1.17 mg/dL    GFR Estimate >90 >60 mL/min/1.73m2    Calcium 9.8 8.8 - 10.2 mg/dL    Chloride 98 98 - 107 mmol/L    Glucose 122 (H) 70 - 99 mg/dL    Alkaline Phosphatase 136 40 - 150 U/L    AST 35 0 - 45 U/L    ALT 8 0 - 70 U/L    Protein Total 6.4 6.4 - 8.3 g/dL    Albumin 3.8 3.5 - 5.2 g/dL    Bilirubin Total 0.2 <=1.2 mg/dL   CRP inflammation    Collection Time: 03/07/24  2:50 PM   Result Value Ref Range    CRP Inflammation 23.78 (H) <5.00 mg/L   Erythrocyte sedimentation rate auto    Collection Time: 03/07/24  2:50 PM   Result Value Ref Range    Erythrocyte Sedimentation Rate 37 (H) 0 - 20 mm/hr   CBC with platelets and differential    Collection Time: 03/07/24  2:50 PM   Result Value Ref Range    WBC Count 8.2 4.0 - 11.0 10e3/uL    RBC Count 3.91 (L) 4.40 - 5.90 10e6/uL    Hemoglobin 9.5 (L) 13.3 - 17.7 g/dL    Hematocrit 29.9 (L) 40.0 - 53.0 %    MCV 77 (L) 78 - 100 fL    MCH 24.3 (L) 26.5 - 33.0 pg    MCHC 31.8 31.5 - 36.5 g/dL    RDW 18.8 (H) 10.0 - 15.0 %    Platelet Count 406 150 - 450 10e3/uL    % Neutrophils 75 %    % Lymphocytes 16 %    % Monocytes 6 %    % Eosinophils 2 %    % Basophils 1 %    % Immature Granulocytes 0 %    NRBCs per 100 WBC 0 <1 /100    Absolute Neutrophils 6.2 1.6 - 8.3 10e3/uL    Absolute Lymphocytes 1.3 0.0 - 5.3 10e3/uL    Absolute Monocytes 0.5 0.0 - 1.3 10e3/uL    Absolute Eosinophils 0.2 0.0 - 0.7 10e3/uL    Absolute Basophils 0.0 0.0 - 0.2 10e3/uL    Absolute Immature Granulocytes 0.0 <=0.4 10e3/uL    Absolute NRBCs 0.0 10e3/uL   Anaerobic bacterial culture    Collection Time: 03/13/24 12:17 PM    Specimen: Abdomen; Swab   Result Value Ref Range    Culture No anaerobic organisms isolated    Swab Aerobic Bacterial Culture Routine With Gram Stain    Collection Time:  03/13/24 12:17 PM    Specimen: Abdomen; Swab   Result Value Ref Range    Culture 2+ Pseudomonas aeruginosa (A)     Culture Isolated in broth only Enterococcus faecalis (A)     Gram Stain Result 4+ PMNs/low power field (A)     Gram Stain Result 1+ Squamous epithelial cells/low power field (A)     Gram Stain Result 2+ Gram positive bacilli (A)        Susceptibility    Enterococcus faecalis - MALDONADO     Ampicillin <=2 Susceptible      Levofloxacin 2 Susceptible      Linezolid <=2 Susceptible      Penicillin 2 Susceptible      Vancomycin 1 Susceptible      Ciprofloxacin <=1 Susceptible      Daptomycin 4 Intermediate     Pseudomonas aeruginosa - MALDONADO     Cefepime <=2 Susceptible      Ciprofloxacin*         * Ciprofloxacin not resistant.  Due to test limitations, lab cannot provide MALDONADO to determine susceptibility.     Gentamicin <=4 Susceptible      Imipenem <=1 Susceptible      Levofloxacin*         * Levofloxacin not resistant.  Due to test limitations, lab cannot provide MALDONADO to determine susceptibility.     Tobramycin <=4 Susceptible    Hemoglobin A1c    Collection Time: 03/14/24 11:09 AM   Result Value Ref Range    Hemoglobin A1C 5.3 4.0 - 6.2 %   Urine Drug Confirmation Panel    Collection Time: 03/14/24 11:09 AM   Result Value Ref Range    Oxycodone ng/mL 4,220 (H) <50 ng/mL    Oxycodone 6,594 Absent ng/mg [creat]    Oxymorphone ng/mL >7,000 (H) <50 ng/mL    Oxymorphone     Urine Creatinine for Drug Screen Panel    Collection Time: 03/14/24 11:09 AM   Result Value Ref Range    Creatinine Urine for Drug Screen 64 mg/dL   PSA tumor marker    Collection Time: 03/25/24  3:21 PM   Result Value Ref Range    PSA Tumor Marker 20.38 (H) 0.00 - 6.50 ng/mL   CRP, inflammation    Collection Time: 03/25/24  3:21 PM   Result Value Ref Range    CRP Inflammation 17.46 (H) <5.00 mg/L   ESR: Erythrocyte sedimentation rate    Collection Time: 03/25/24  3:21 PM   Result Value Ref Range    Erythrocyte Sedimentation Rate 32 (H) 0 - 20 mm/hr    Comprehensive metabolic panel (BMP + Alb, Alk Phos, ALT, AST, Total. Bili, TP)    Collection Time: 03/25/24  3:21 PM   Result Value Ref Range    Sodium 136 135 - 145 mmol/L    Potassium 3.8 3.4 - 5.3 mmol/L    Carbon Dioxide (CO2) 19 (L) 22 - 29 mmol/L    Anion Gap 12 7 - 15 mmol/L    Urea Nitrogen 15.0 8.0 - 23.0 mg/dL    Creatinine 0.71 0.67 - 1.17 mg/dL    GFR Estimate >90 >60 mL/min/1.73m2    Calcium 9.5 8.8 - 10.2 mg/dL    Chloride 105 98 - 107 mmol/L    Glucose 84 70 - 99 mg/dL    Alkaline Phosphatase 111 40 - 150 U/L    AST 33 0 - 45 U/L    ALT 8 0 - 70 U/L    Protein Total 6.3 (L) 6.4 - 8.3 g/dL    Albumin 3.5 3.5 - 5.2 g/dL    Bilirubin Total 0.2 <=1.2 mg/dL   CBC with platelets    Collection Time: 03/25/24  3:21 PM   Result Value Ref Range    WBC Count 7.2 4.0 - 11.0 10e3/uL    RBC Count 3.65 (L) 4.40 - 5.90 10e6/uL    Hemoglobin 9.0 (L) 13.3 - 17.7 g/dL    Hematocrit 29.3 (L) 40.0 - 53.0 %    MCV 80 78 - 100 fL    MCH 24.7 (L) 26.5 - 33.0 pg    MCHC 30.7 (L) 31.5 - 36.5 g/dL    RDW 17.8 (H) 10.0 - 15.0 %    Platelet Count 326 150 - 450 10e3/uL   Extra Serum Separator Tube (SST)    Collection Time: 03/25/24  3:22 PM   Result Value Ref Range    Hold Specimen JIC          Assessment & Plan  S/p ileal conduit  Chronic osteomyelitis of symphysis pubis    - Check CRP, will get MRI once CRP WNL  - VV in 3 months or sooner if needed      Rachel Franklin NP  [facility] UROLOGY [facility]    ==========================      Additional Coding Information:    Problems:  4 -- one or more chronic illnesses with exacerbation or side effects    Data Reviewed  Review of external notes as documented above     Tests ordered: crp    Level of risk:  3 -- low risk (e.g., OTC medication or observation, minor surgery without risks)    Time spent:  I spent a total of 40 minutes on the day of the visit.   Time spent by me doing chart review, history and exam, documentation and further activities per the note

## 2024-03-28 NOTE — PATIENT INSTRUCTIONS
CRP once a month for the next 3 months. Will get MRI once CRP WNL.    VV in 3 months or sooner if needed.

## 2024-03-28 NOTE — TELEPHONE ENCOUNTER
Left Voicemail 3rd attempt to schedule for the patient to call back and schedule the following:    Appointment type: Return  Provider: Pastor  Return date: 04/19/2024  Specialty phone number: [phone]  Additional appointment(s) needed: na  Additonal Notes: Video, Dr Kay is currently on maternity leave. If pt wants sooner appt can schedule with a different provider or have scheduled in Aug with Dr. Kay per Alberto

## 2024-04-01 ENCOUNTER — TELEPHONE (OUTPATIENT)
Dept: FAMILY MEDICINE | Facility: OTHER | Age: 69
End: 2024-04-01
Payer: COMMERCIAL

## 2024-04-01 DIAGNOSIS — N36.0 URINARY FISTULA: ICD-10-CM

## 2024-04-01 RX ORDER — OXYCODONE HYDROCHLORIDE 15 MG/1
15 TABLET ORAL EVERY 6 HOURS PRN
Qty: 90 TABLET | Refills: 0 | Status: SHIPPED | OUTPATIENT
Start: 2024-04-01 | End: 2024-04-02

## 2024-04-01 NOTE — TELEPHONE ENCOUNTER
Patient was persistent that he would like a call back from Ny or his nurse as the pharmacy had not yet received the prescription for oxycodone. Please call.    Danuta Crandall on 4/1/2024 at 4:54 PM

## 2024-04-01 NOTE — TELEPHONE ENCOUNTER
Patient states his leg got real bad and he was taking 2 oxycodone at a time 4 times a day and now he is out. He took his last tablet this morning at 7 am. He feels like he is going through withdrawals. He would like a refill to [facility] pharmacy.    He is also wondering if maybe oxycodone might be changed to hydrocodone 1 tablet 5 times daily because he did not run out. Akosua Rivero LPN .......................4/1/2024  3:39 PM

## 2024-04-01 NOTE — TELEPHONE ENCOUNTER
Patient has questions on his Oxycodone.  Did not want them refilled till he talk to MBL or nurse.        Nancy Burnette on 4/1/2024 at 8:43 AM

## 2024-04-02 RX ORDER — OXYCODONE HYDROCHLORIDE 15 MG/1
15 TABLET ORAL EVERY 6 HOURS PRN
Qty: 90 TABLET | Refills: 0 | Status: SHIPPED | OUTPATIENT
Start: 2024-04-02 | End: 2024-04-11

## 2024-04-02 NOTE — TELEPHONE ENCOUNTER
Called and spoke with pharmacist patient was able to get prescription filled and he will schedule a follow up prior to refills to discuss pain medications. Akosua Rivero LPN .......................4/2/2024  9:22 AM

## 2024-04-11 ENCOUNTER — HOSPITAL ENCOUNTER (OUTPATIENT)
Dept: GENERAL RADIOLOGY | Facility: OTHER | Age: 69
Discharge: HOME | End: 2024-04-11
Attending: FAMILY MEDICINE
Payer: COMMERCIAL

## 2024-04-11 ENCOUNTER — OFFICE VISIT (OUTPATIENT)
Dept: FAMILY MEDICINE | Facility: OTHER | Age: 69
End: 2024-04-11
Attending: FAMILY MEDICINE
Payer: COMMERCIAL

## 2024-04-11 VITALS
RESPIRATION RATE: 20 BRPM | WEIGHT: 173.2 LBS | DIASTOLIC BLOOD PRESSURE: 68 MMHG | OXYGEN SATURATION: 97 % | BODY MASS INDEX: 24.85 KG/M2 | SYSTOLIC BLOOD PRESSURE: 118 MMHG | HEART RATE: 94 BPM | TEMPERATURE: 97.9 F

## 2024-04-11 DIAGNOSIS — C79.51 MALIGNANT NEOPLASM METASTATIC TO BONE (H): ICD-10-CM

## 2024-04-11 DIAGNOSIS — C61 MALIGNANT NEOPLASM OF PROSTATE (H): ICD-10-CM

## 2024-04-11 DIAGNOSIS — M79.662 PAIN OF LEFT LOWER LEG: ICD-10-CM

## 2024-04-11 DIAGNOSIS — M79.662 PAIN OF LEFT LOWER LEG: Primary | ICD-10-CM

## 2024-04-11 DIAGNOSIS — K65.1 PELVIC ABSCESS IN MALE (H): ICD-10-CM

## 2024-04-11 PROCEDURE — G0463 HOSPITAL OUTPT CLINIC VISIT: HCPCS

## 2024-04-11 PROCEDURE — 73552 X-RAY EXAM OF FEMUR 2/>: CPT | Mod: LT

## 2024-04-11 PROCEDURE — G2211 COMPLEX E/M VISIT ADD ON: HCPCS | Performed by: FAMILY MEDICINE

## 2024-04-11 PROCEDURE — 99214 OFFICE O/P EST MOD 30 MIN: CPT | Performed by: FAMILY MEDICINE

## 2024-04-11 RX ORDER — MORPHINE SULFATE 30 MG/1
30 TABLET, FILM COATED, EXTENDED RELEASE ORAL EVERY 12 HOURS
Qty: 60 TABLET | Refills: 0 | Status: SHIPPED | OUTPATIENT
Start: 2024-04-11 | End: 2024-05-01

## 2024-04-11 RX ORDER — OXYCODONE HYDROCHLORIDE 10 MG/1
10 TABLET ORAL EVERY 4 HOURS PRN
Qty: 100 TABLET | Refills: 0 | Status: SHIPPED | OUTPATIENT
Start: 2024-04-11 | End: 2024-04-22

## 2024-04-11 RX ORDER — MORPHINE SULFATE 30 MG/1
30 TABLET, FILM COATED, EXTENDED RELEASE ORAL EVERY 12 HOURS
Qty: 60 TABLET | Refills: 0 | Status: SHIPPED | OUTPATIENT
Start: 2024-04-11 | End: 2024-04-11

## 2024-04-11 ASSESSMENT — PAIN SCALES - GENERAL: PAINLEVEL: EXTREME PAIN (9)

## 2024-04-11 NOTE — PROGRESS NOTES
SUBJECTIVE:   Dilip Kan is a 69 year old male who presents to clinic today for the following health issues:  Pain medication  Patient arrives here for pain medication.  He is currently taking 5 tablets of oxycodone 75 mg a day.  Was wondering about switching to hydrocodone.  He states he ran out a couple days ago and did go through withdrawal.  Including palpitations sweaty.  He states that the pain markedly increased.  Specially the left leg.  Patient does have metastatic disease to the pelvis and left hip.  Patient continues to get Lupron.  He does have an oncology visit follow-up within the next couple weeks.  Weight is stable    History of Present Illness       Reason for visit:  Sick    He eats 0-1 servings of fruits and vegetables daily.He consumes 3 sweetened beverage(s) daily.He exercises with enough effort to increase his heart rate 9 or less minutes per day.  He exercises with enough effort to increase his heart rate 3 or less days per week.   He is taking medications regularly.        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     Priority: Medium    Iron deficiency anemia secondary to inadequate dietary iron intake 11/20/2023     Priority: Medium    Sepsis (H) 06/29/2023     Priority: Medium    Acute deep vein thrombosis (DVT) of proximal vein of right lower extremity (H) 05/01/2023     Priority: Medium    Multiple subsegmental pulmonary emboli without acute cor pulmonale (H) 05/01/2023     Priority: Medium    Infection due to Klebsiella pneumoniae 04/21/2023     Priority: Medium    Bone metastasis 03/09/2023     Priority: Medium    Bone metastases 03/09/2023     Priority: Medium    Aneurysm of ascending aorta without rupture (H24) 10/05/2022     Priority: Medium    Status post total right knee replacement 11/16/2021      Priority: Medium    Malignant neoplasm of prostate (H) 09/16/2021     Priority: Medium    Plaque in heart artery-aorta 03/24/2020     Priority: Medium    Aortic valve stenosis with insufficiency, etiology of cardiac valve disease unspecified 03/22/2019     Priority: Medium    Mild aortic stenosis 03/22/2019     Priority: Medium    Ascending aortic aneurysm-moderate at 4.6 cm on 3/11/20 03/22/2019     Priority: Medium    Aortic valve insufficiency-moderate to severe 03/22/2019     Priority: Medium    Hx of syncope 03/22/2019     Priority: Medium    History of tobacco abuse quitting 11/8/2002 03/22/2019     Priority: Medium    Hypertriglyceridemia 03/22/2019     Priority: Medium    Mild pulmonary hypertension (H) 03/22/2019     Priority: Medium    Palpitations 03/22/2019     Priority: Medium    Rising PSA following treatment for malignant neoplasm of prostate 03/29/2018     Priority: Medium    Chronic, continuous use of opioids 01/31/2018     Priority: Medium     Patient is followed by Wei Perez MD for ongoing prescription of pain medication.  All refills should be approved by this provider only at face-to-face appointments - not by phone request.    Medication(s): Hydrocodone.   Maximum quantity per month: 135  Clinic visit frequency required: Q 3 months   PDMP Review         Value Time User    State PDMP site checked  Yes 8/25/2021 10:41 AM Wei ePrez MD          Controlled substance agreement:  Patient-Level CSA:    There are no patient-level csa.       Pain Clinic evaluation in the past: No    Opioid Risk Tool Total Score(s):  OPIOID RISK TOOL TOTAL SCORE 8/25/2021   Total Score 0   Total Risk Score Category Low Risk (0-3)           Other specified causes of urethral stricture 01/31/2018     Priority: Medium    GERD 04/28/2017     Priority: Medium    Essential hypertension 04/28/2017     Priority: Medium    Non morbid obesity due to excess calories 04/28/2017     Priority: Medium    Mixed  hyperlipidemia 04/28/2017     Priority: Medium    Spinal stenosis of lumbar region with neurogenic claudication 04/28/2017     Priority: Medium    Controlled substance agreement updated 12- 01/15/2016     Priority: Medium    Backache 01/13/2014     Priority: Medium    Prostate cancer (H) 01/02/2013     Priority: Medium    Personal history of prostate cancer s/p prostatectomy and sEBRT 12/06/2012     Priority: Medium    Anemia due to acute blood loss 11/30/2012     Priority: Medium     Overview:   EBL 11/28/12:  1700 ml  EBL 11/28/12:  500 ml  Transfused 2 units pRBC's early on 11/29/12      Pelvic pain in male 11/30/2012     Priority: Medium     Past Medical History:   Diagnosis Date    Elevated prostate specific antigen (PSA)     4/10/2012    Encounter for other administrative examinations     1/31/2014    Esophagitis     No Comments Provided    Gastro-esophageal reflux disease without esophagitis     No Comments Provided    Low back pain     No Comments Provided    Malignant neoplasm of prostate (H)     9/6/2012    Nicotine dependence, uncomplicated     Quit - October 2007 with chantix    Other intervertebral disc degeneration, lumbosacral region     12/1/2008      Past Surgical History:   Procedure Laterality Date    APPENDECTOMY OPEN  [id number]    Ruptured - [place]    ARTHROPLASTY KNEE Right 11/16/2021    Procedure: ARTHROPLASTY, KNEE, TOTAL;  Surgeon: Micah Rock MD;  Location: [facility] OR    COLONOSCOPY  08/31/2006    next due in 2016    CYSTECTOMY BLADDER, ILEAL DIVERSION, COMBINED N/A 11/30/2023    Procedure: CYSTECTOMY, WITH URETEROILEAL CONDUIT CREATION and Pubectomy;  Surgeon: Miki Correa MD;  Location: [facility] OR    DISKECTOMY, LUMBAR, SINGLE SP  03/16/2009    L 3-4 microdiskectomy, [facility]    ESOPHAGOSCOPY, GASTROSCOPY, DUODENOSCOPY (EGD), COMBINED  03/2003         ESOPHAGOSCOPY, GASTROSCOPY, DUODENOSCOPY (EGD), COMBINED  08/31/2006         GRAFT FLAP PEDICLE PERINEUM N/A 11/30/2023     Procedure: Left Rectus Abdominis flap;  Surgeon: YADIRA Guadalupe MD;  Location: [facility] OR    PICC DOUBLE LUMEN PLACEMENT Right 12/01/2023    5FR DL PICC, basilic vein. L-43cm, 2cm out.    PROSTATECTOMY PERINEAL RADICAL  11/28/2012    Nerve Sparring, Dr Warner    SPINE SURGERY N/A 04/28/2017    L3 to S1 spinal decompression L4/L5 fusion    TONSILLECTOMY, ADENOIDECTOMY, COMBINED      as a child    VARICOCELECTOMY  1959    INCISION AND DRAINAGE,EXCISE VARICOCELE       Review of Systems     OBJECTIVE:     /68   Pulse 94   Temp 97.9  F (36.6  C)   Resp 20   Wt 78.6 kg (173 lb 3.2 oz)   SpO2 97%   BMI 24.85 kg/m    Body mass index is 24.85 kg/m .  Physical Exam  Constitutional:       Comments: Patient is in the wheelchair.   Neurological:      Mental Status: He is alert.         Diagnostic Test Results:  none     ASSESSMENT/PLAN:         (M79.662) Pain of left lower leg  (primary encounter diagnosis)  Comment:   Plan: XR Femur Left 2 Views            (C79.51) Malignant neoplasm metastatic to bone (H)  Comment:   Plan: oxyCODONE IR (ROXICODONE) 10 MG tablet,         morphine (MS CONTIN) 30 MG CR tablet,         DISCONTINUED: morphine (MS CONTIN) 30 MG CR         tablet          I discussed with the patient about long-acting opiates.  And patient is agreeable to it.  5 tablets of 15 mg of oxycodone is approximately 115 mill equivalents of morphine.  Drop that by 30% and will start him on a MS Contin 30 mg twice a day.  Supplement with 10 mg of oxycodone.  Follow-up in 1 month.  Sooner if needed.  Follow-up with oncology.  The longitudinal plan of care for the diagnosis(es)/condition(s) as documented were addressed during this visit. Due to the added complexity in care, I will continue to support Jermain in the subsequent management and with ongoing continuity of care.      Ezequiel Alejandra MD  [facility]

## 2024-04-11 NOTE — NURSING NOTE
Patient here to discuss changing his pain medication. Medication Reconciliation: complete.    Akosua Rivero LPN  4/11/2024 11:00 AM

## 2024-04-12 NOTE — PROVIDER NOTIFICATION
"Nicol campbell MD \"GLO Kan [id number] [id number] pt c/o heart burn and requesting tums, thanks. juliana vieyra rn [phone]\"  " EEG Hook up      Skin Integrity: Normal     Stephy Dow   04/12/2024 3:42 PM

## 2024-04-22 ENCOUNTER — TELEPHONE (OUTPATIENT)
Dept: FAMILY MEDICINE | Facility: OTHER | Age: 69
End: 2024-04-22
Payer: COMMERCIAL

## 2024-04-22 DIAGNOSIS — R33.9 URINARY RETENTION: Primary | ICD-10-CM

## 2024-04-22 DIAGNOSIS — C79.51 MALIGNANT NEOPLASM METASTATIC TO BONE (H): ICD-10-CM

## 2024-04-22 RX ORDER — OXYCODONE HYDROCHLORIDE 10 MG/1
10 TABLET ORAL EVERY 4 HOURS PRN
Qty: 100 TABLET | Refills: 0 | Status: SHIPPED | OUTPATIENT
Start: 2024-04-22 | End: 2024-05-01

## 2024-04-22 NOTE — TELEPHONE ENCOUNTER
Ellen from [facility] is requesting orders for ostomy supplies. Patient is needing to get his supplies through [facility]. They will also need updated chart notes regarding his ostomy sent with new orders. Order pended./    Anh Betancur LPN on 4/22/2024 at 2:02 PM

## 2024-04-22 NOTE — TELEPHONE ENCOUNTER
Ill send in more roxicodone .  Please have him make appt in the next week and have him bring in his morphine and will change to something else

## 2024-04-22 NOTE — TELEPHONE ENCOUNTER
The patent is calling back as his left leg is getting worse.  Dr Alejandra told him to call if this happened.

## 2024-04-22 NOTE — TELEPHONE ENCOUNTER
Jeannine called regarding some face to face documentation she is needing as well as a prescription for Urology supplies. She also wanted to follow up on a message that was left earlier today 04/22/2024 about patient having left thigh pain. Please call.    Danuta Crandall on 4/22/2024 at 12:29 PM

## 2024-04-22 NOTE — TELEPHONE ENCOUNTER
Returned call to patient. Patient reports that he is out of oxyCODONE IR 10 mg but he has many of the morphine tablets because they are not helping with the pain. He was taking two 10 mg tablets at a time because his pain was so bad.     Anh Betancur LPN on 4/22/2024 at 1:28 PM

## 2024-04-23 ENCOUNTER — TELEPHONE (OUTPATIENT)
Dept: FAMILY MEDICINE | Facility: OTHER | Age: 69
End: 2024-04-23
Payer: COMMERCIAL

## 2024-04-23 NOTE — TELEPHONE ENCOUNTER
Reason for call: Patient wanting a work in appointment.    Is the appointment for a Hospital Follow up?  No     (If yes - Unable to find an appointment with any provider during the time frame needed. Nurse/Provider - Can this patient be worked into a schedule with PCP or team member?)    Patient is having the following symptoms and/or what is the appt for:  Left leg. States MBL wants to see him next week. He is hoping to be seen either 4/30 or 5/1 when he is here for other appointments.    The patient is requesting an appointment with  [facility]    Was an appointment offered for this call? Yes    If Yes, what is the date of the appointment?  5/15/24     Preferred method for responding to this message: Telephone Call    Phone number patient can be reached at? Cell number on file:    Telephone Information:   Mobile [phone]       If we can't reach you directly, may we leave a detailed response at the number you provided? Yes    Can this message wait until your PCP/provider returns if unavailable today? Yes    Yennifer Whyte on 4/23/2024 at 8:57 AM

## 2024-04-24 ENCOUNTER — TELEPHONE (OUTPATIENT)
Dept: FAMILY MEDICINE | Facility: OTHER | Age: 69
End: 2024-04-24
Payer: COMMERCIAL

## 2024-04-24 NOTE — TELEPHONE ENCOUNTER
Dr. Alejandra authorized moving patient's appointment on 05/15/24 for medication follow up with Oxycodone.  Please offer him reschedule change to 04/30/24 at 9:00am or 05/01/24 at 9:00am.  These coincide with his other appointment on those days which are both at 9:30.    Xiao Niño LPN............4/24/2024 10:12 AM

## 2024-04-25 NOTE — TELEPHONE ENCOUNTER
Please call the patient.  He needs a work in on May 1st with Dr. Alejandra.   Medication refills.        Angelica Varela on 4/25/2024 at 8:25 AM

## 2024-04-25 NOTE — TELEPHONE ENCOUNTER
Assisted in scheduling move of patient's appointment from 05/15/24 to 9:00am on 05/01/24 per his request.    Xiao Niño LPN............4/25/2024 10:24 AM

## 2024-04-29 LAB
ALBUMIN SERPL BCG-MCNC: 3.4 G/DL (ref 3.5–5.2)
ALP SERPL-CCNC: 118 U/L (ref 40–150)
ALT SERPL W P-5'-P-CCNC: 8 U/L (ref 0–70)
ANION GAP SERPL CALCULATED.3IONS-SCNC: 11 MMOL/L (ref 7–15)
AST SERPL W P-5'-P-CCNC: 35 U/L (ref 0–45)
BILIRUB SERPL-MCNC: 0.2 MG/DL
BUN SERPL-MCNC: 15.7 MG/DL (ref 8–23)
CALCIUM SERPL-MCNC: 9.4 MG/DL (ref 8.8–10.2)
CHLORIDE SERPL-SCNC: 104 MMOL/L (ref 98–107)
CREAT SERPL-MCNC: 0.75 MG/DL (ref 0.67–1.17)
CRP SERPL-MCNC: 6.05 MG/L
DEPRECATED HCO3 PLAS-SCNC: 22 MMOL/L (ref 22–29)
EGFRCR SERPLBLD CKD-EPI 2021: >90 ML/MIN/1.73M2
ERYTHROCYTE [DISTWIDTH] IN BLOOD BY AUTOMATED COUNT: 16.8 % (ref 10–15)
ERYTHROCYTE [SEDIMENTATION RATE] IN BLOOD BY WESTERGREN METHOD: 14 MM/HR (ref 0–20)
GLUCOSE SERPL-MCNC: 98 MG/DL (ref 70–99)
HCT VFR BLD AUTO: 32.3 % (ref 40–53)
HGB BLD-MCNC: 9.9 G/DL (ref 13.3–17.7)
HOLD SPECIMEN: NORMAL
MCH RBC QN AUTO: 24.6 PG (ref 26.5–33)
MCHC RBC AUTO-ENTMCNC: 30.7 G/DL (ref 31.5–36.5)
MCV RBC AUTO: 80 FL (ref 78–100)
PLATELET # BLD AUTO: 391 10E3/UL (ref 150–450)
POTASSIUM SERPL-SCNC: 4.7 MMOL/L (ref 3.4–5.3)
PROT SERPL-MCNC: 6.3 G/DL (ref 6.4–8.3)
PSA SERPL DL<=0.01 NG/ML-MCNC: 21.58 NG/ML (ref 0–6.5)
RBC # BLD AUTO: 4.02 10E6/UL (ref 4.4–5.9)
SODIUM SERPL-SCNC: 137 MMOL/L (ref 135–145)
WBC # BLD AUTO: 6.7 10E3/UL (ref 4–11)

## 2024-04-29 PROCEDURE — 85027 COMPLETE CBC AUTOMATED: CPT | Performed by: INTERNAL MEDICINE

## 2024-04-29 PROCEDURE — 85652 RBC SED RATE AUTOMATED: CPT | Performed by: INTERNAL MEDICINE

## 2024-04-29 PROCEDURE — 86140 C-REACTIVE PROTEIN: CPT | Performed by: INTERNAL MEDICINE

## 2024-04-29 PROCEDURE — 80053 COMPREHEN METABOLIC PANEL: CPT | Performed by: INTERNAL MEDICINE

## 2024-04-29 PROCEDURE — 84153 ASSAY OF PSA TOTAL: CPT | Performed by: INTERNAL MEDICINE

## 2024-05-01 ENCOUNTER — OFFICE VISIT (OUTPATIENT)
Dept: FAMILY MEDICINE | Facility: OTHER | Age: 69
End: 2024-05-01
Attending: FAMILY MEDICINE
Payer: COMMERCIAL

## 2024-05-01 ENCOUNTER — HOSPITAL ENCOUNTER (OUTPATIENT)
Dept: INFUSION THERAPY | Facility: OTHER | Age: 69
Discharge: HOME | End: 2024-05-01
Attending: INTERNAL MEDICINE
Payer: COMMERCIAL

## 2024-05-01 ENCOUNTER — ONCOLOGY VISIT (OUTPATIENT)
Dept: ONCOLOGY | Facility: OTHER | Age: 69
End: 2024-05-01
Attending: INTERNAL MEDICINE
Payer: COMMERCIAL

## 2024-05-01 ENCOUNTER — TELEPHONE (OUTPATIENT)
Dept: ONCOLOGY | Facility: CLINIC | Age: 69
End: 2024-05-01

## 2024-05-01 ENCOUNTER — PATIENT OUTREACH (OUTPATIENT)
Dept: ONCOLOGY | Facility: OTHER | Age: 69
End: 2024-05-01

## 2024-05-01 VITALS
OXYGEN SATURATION: 98 % | SYSTOLIC BLOOD PRESSURE: 138 MMHG | RESPIRATION RATE: 20 BRPM | DIASTOLIC BLOOD PRESSURE: 80 MMHG | HEART RATE: 113 BPM | TEMPERATURE: 98.8 F

## 2024-05-01 VITALS
WEIGHT: 175 LBS | SYSTOLIC BLOOD PRESSURE: 138 MMHG | BODY MASS INDEX: 25.11 KG/M2 | HEART RATE: 100 BPM | TEMPERATURE: 98.8 F | DIASTOLIC BLOOD PRESSURE: 80 MMHG

## 2024-05-01 DIAGNOSIS — C79.51 MALIGNANT NEOPLASM METASTATIC TO BONE (H): ICD-10-CM

## 2024-05-01 DIAGNOSIS — R97.21 RISING PSA FOLLOWING TREATMENT FOR MALIGNANT NEOPLASM OF PROSTATE: ICD-10-CM

## 2024-05-01 DIAGNOSIS — C61 MALIGNANT NEOPLASM OF PROSTATE (H): Primary | ICD-10-CM

## 2024-05-01 DIAGNOSIS — C61 MALIGNANT NEOPLASM OF PROSTATE (H): ICD-10-CM

## 2024-05-01 DIAGNOSIS — C79.51 MALIGNANT NEOPLASM METASTATIC TO BONE (H): Primary | ICD-10-CM

## 2024-05-01 DIAGNOSIS — R11.0 NAUSEA: ICD-10-CM

## 2024-05-01 PROCEDURE — 99417 PROLNG OP E/M EACH 15 MIN: CPT | Performed by: INTERNAL MEDICINE

## 2024-05-01 PROCEDURE — 99215 OFFICE O/P EST HI 40 MIN: CPT | Performed by: FAMILY MEDICINE

## 2024-05-01 PROCEDURE — 99215 OFFICE O/P EST HI 40 MIN: CPT | Performed by: INTERNAL MEDICINE

## 2024-05-01 PROCEDURE — 250N000011 HC RX IP 250 OP 636: Mod: JZ | Performed by: INTERNAL MEDICINE

## 2024-05-01 PROCEDURE — G0463 HOSPITAL OUTPT CLINIC VISIT: HCPCS

## 2024-05-01 PROCEDURE — 96402 CHEMO HORMON ANTINEOPL SQ/IM: CPT | Performed by: INTERNAL MEDICINE

## 2024-05-01 PROCEDURE — G0463 HOSPITAL OUTPT CLINIC VISIT: HCPCS | Mod: 25

## 2024-05-01 PROCEDURE — G0463 HOSPITAL OUTPT CLINIC VISIT: HCPCS | Mod: 25,27

## 2024-05-01 RX ORDER — OXYCODONE HYDROCHLORIDE 30 MG/1
30 TABLET, FILM COATED, EXTENDED RELEASE ORAL EVERY 12 HOURS
Qty: 60 TABLET | Refills: 0 | Status: SHIPPED | OUTPATIENT
Start: 2024-05-01 | End: 2024-05-15

## 2024-05-01 RX ORDER — OXYCODONE HYDROCHLORIDE 10 MG/1
10 TABLET ORAL EVERY 4 HOURS PRN
Qty: 120 TABLET | Refills: 0 | Status: SHIPPED | OUTPATIENT
Start: 2024-05-01 | End: 2024-05-01

## 2024-05-01 RX ORDER — OXYCODONE HYDROCHLORIDE 10 MG/1
10 TABLET ORAL EVERY 4 HOURS PRN
Qty: 120 TABLET | Refills: 0 | Status: SHIPPED | OUTPATIENT
Start: 2024-05-01 | End: 2024-05-09

## 2024-05-01 RX ORDER — OXYCODONE HYDROCHLORIDE 30 MG/1
30 TABLET, FILM COATED, EXTENDED RELEASE ORAL EVERY 12 HOURS
Qty: 60 TABLET | Refills: 0 | Status: SHIPPED | OUTPATIENT
Start: 2024-05-01 | End: 2024-05-01

## 2024-05-01 RX ORDER — PREDNISONE 20 MG/1
20 TABLET ORAL DAILY
Qty: 30 TABLET | Refills: 0 | Status: SHIPPED | OUTPATIENT
Start: 2024-05-01 | End: 2024-05-09

## 2024-05-01 RX ORDER — PREDNISONE 20 MG/1
20 TABLET ORAL DAILY
Qty: 30 TABLET | Refills: 0 | Status: SHIPPED | OUTPATIENT
Start: 2024-05-01 | End: 2024-05-01

## 2024-05-01 RX ADMIN — LEUPROLIDE ACETATE 22.5 MG: KIT at 10:55

## 2024-05-01 ASSESSMENT — PAIN SCALES - PAIN ENJOYMENT GENERAL ACTIVITY SCALE (PEG)
PEG_TOTALSCORE: 9
AVG_PAIN_PASTWEEK: 9
INTERFERED_GENERAL_ACTIVITY: 9
AVG_PAIN_PASTWEEK: 9
PEG_TOTALSCORE: 9
INTERFERED_ENJOYMENT_LIFE: 9
INTERFERED_GENERAL_ACTIVITY: 9
INTERFERED_ENJOYMENT_LIFE: 9

## 2024-05-01 ASSESSMENT — PAIN SCALES - GENERAL
PAINLEVEL: EXTREME PAIN (9)
PAINLEVEL: EXTREME PAIN (8)

## 2024-05-01 ASSESSMENT — ANXIETY QUESTIONNAIRES
7. FEELING AFRAID AS IF SOMETHING AWFUL MIGHT HAPPEN: NOT AT ALL
3. WORRYING TOO MUCH ABOUT DIFFERENT THINGS: NOT AT ALL
2. NOT BEING ABLE TO STOP OR CONTROL WORRYING: NOT AT ALL
6. BECOMING EASILY ANNOYED OR IRRITABLE: SEVERAL DAYS
GAD7 TOTAL SCORE: 4
1. FEELING NERVOUS, ANXIOUS, OR ON EDGE: NOT AT ALL
GAD7 TOTAL SCORE: 4
IF YOU CHECKED OFF ANY PROBLEMS ON THIS QUESTIONNAIRE, HOW DIFFICULT HAVE THESE PROBLEMS MADE IT FOR YOU TO DO YOUR WORK, TAKE CARE OF THINGS AT HOME, OR GET ALONG WITH OTHER PEOPLE: NOT DIFFICULT AT ALL
GAD7 TOTAL SCORE: 4
8. IF YOU CHECKED OFF ANY PROBLEMS, HOW DIFFICULT HAVE THESE MADE IT FOR YOU TO DO YOUR WORK, TAKE CARE OF THINGS AT HOME, OR GET ALONG WITH OTHER PEOPLE?: NOT DIFFICULT AT ALL
7. FEELING AFRAID AS IF SOMETHING AWFUL MIGHT HAPPEN: NOT AT ALL
4. TROUBLE RELAXING: MORE THAN HALF THE DAYS
5. BEING SO RESTLESS THAT IT IS HARD TO SIT STILL: SEVERAL DAYS

## 2024-05-01 ASSESSMENT — PATIENT HEALTH QUESTIONNAIRE - PHQ9
SUM OF ALL RESPONSES TO PHQ QUESTIONS 1-9: 5
10. IF YOU CHECKED OFF ANY PROBLEMS, HOW DIFFICULT HAVE THESE PROBLEMS MADE IT FOR YOU TO DO YOUR WORK, TAKE CARE OF THINGS AT HOME, OR GET ALONG WITH OTHER PEOPLE: NOT DIFFICULT AT ALL
SUM OF ALL RESPONSES TO PHQ QUESTIONS 1-9: 5

## 2024-05-01 NOTE — NURSING NOTE
Infusion Nursing Note:  Dilip Chicas presents today for Lupron 22.5mg.    Patient seen by provider today: Yes: Dr. Fritz   present during visit today: Not Applicable.    Note: Patient seen by Dr. Fritz today. Will continue with every 3 months Lupron and started on Xtandi. Checked with prior auth dept and Ok to receive lupron today as no change in dose or frequency      Intravenous Access:  No Intravenous access/labs at this visit.    Treatment Conditions:  Not Applicable.      Post Infusion Assessment:  Site patent and intact, free from redness, edema or discomfort.  Patient tolerated injection without incident      Discharge Plan:   Discharge instructions reviewed with: Patient.  Patient and/or family verbalized understanding of discharge instructions and all questions answered.  AVS to patient via [employer].  Patient will return 7/23/24 for next appointment.   Patient discharged in stable condition accompanied by: self/Family.  Departure Mode: Wheelchair.      Argentina Zambrano RN

## 2024-05-01 NOTE — TELEPHONE ENCOUNTER
Prior Authorization Approval    Medication: XTANDI 80 MG PO TABS  Authorization Effective Date: 4/1/2024  Authorization Expiration Date: 5/1/2025  Approved Dose/Quantity: 30 tablets for 30 days  Reference #: [id number]   Insurance Company: [employer] - Phone [phone] Fax [phone]  Expected CoPay: $ 1,974.67  CoPay Card Available: No    Financial Assistance Needed: Yes  Which Pharmacy is filling the prescription:    Pharmacy Notified: No  Patient Notified: Yes

## 2024-05-01 NOTE — PROGRESS NOTES
[facility]: Cancer Care Follow-Up Note                                    Discussion with Patient:                                                      Jermain was seen today for follow up prostate cancer. Labs recently has shown elevation in PSA. His pain is better since surgery in [facility].          Goals          General     Being Active-pain relief (pt-stated)      Notes - Note created  10/13/2023 11:00 AM by Elza Lundberg RN     Goal Statement: I will establish a plan for preventing and managing pain.  Date Goal set: 10/13/2023  Barriers: disease burden and multiple diagnoses  Strengths: support, coping, motivation, and involvement with care team  Date to Achieve By: 6 months  Patient expressed understanding of goal: Yes  Action steps to achieve this goal:  I will take medications as prescribed.   I will call triage with new or worsening pain not controlled by medication.   I will use alternative therapies as directed. (Ice, heat, massage, etc)   I will call triage for medication refills when there are 2-3 days of medication remaining.         Patient will adhere to medication regimen             Dates of Treatment:                                                      Infusion given in last 28 days       Administered MAR Action Medication Dose Rate Visit    05/01/2024 10:55 Given leuprolide (LUPRON DEPOT) kit 22.5 mg 22.5 mg   Infusion Visit on 05/01/2024 in [facility]          Treatment Plan Medications       Oral ONC Prostate Cancer - Enzalutamide        Take Home Medications       Medication Sig Start/End Day/Cycle Status    prochlorperazine (COMPAZINE) 10 MG tablet Take 1 tablet (10 mg) by mouth every 6 hours as needed for nausea or vomiting S to -- Take-home, Prescription Medications - 5/7/2024 Unsigned                            Assessment:                                                          RNCC Short Symptom Review:     Assessment completed with::  Patient    General/Short Assessment  Does the patient have all their medications?: Yes  Is the patient experiencing any new or worsening symptoms?: No  Discussion with patient: Answered patient's questions and addressed concerns;Reviewed how and when to contact clinic;Reviewed patient's future appointments    Pain  Patient Reported Pain?: Yes, is currently well-managed  Pain Management Interventions: medication (see MAR)    Patient Coping  Accepting    Clinic Utilization  Patient Aware of Next Appointment?: Yes    Other Patient Concerns  Other Patient Reported Concerns: No    Intervention/Education provided during outreach:                                                       Will need to initiate Xtandi. [facility] will call Jermain to discuss and review side effects. They will mail to his home. Jermain will need to call when he knows date that the medication will arrive.  He will need monthly labs  He should continue Lupron    Patient to follow up as scheduled at next appt  Patient to call/[employer] message with updates  Confirmed patient has clinic and triage numbers    Signature:  Elza Lundberg RN

## 2024-05-01 NOTE — NURSING NOTE
Patient here for pain management. He took his last oxy this morning 4 hours prior to appointment and pain level is still 09/10. Medication Reconciliation: complete.    Akosua Rivero LPN  5/1/2024 9:05 AM

## 2024-05-01 NOTE — NURSING NOTE
"Oncology Rooming Note    May 1, 2024 9:49 AM   Dilip Kan is a 69 year old male who presents for:    Chief Complaint   Patient presents with    Oncology Clinic Visit     Prostate CA     Initial Vitals: /80   Pulse 100   Temp 98.8  F (37.1  C)   Wt 79.4 kg (175 lb)   BMI 25.11 kg/m   Estimated body mass index is 25.11 kg/m  as calculated from the following:    Height as of 3/19/24: 1.778 m (5' 10\").    Weight as of this encounter: 79.4 kg (175 lb). Body surface area is 1.98 meters squared.  Extreme Pain (8) Comment: Data Unavailable   No LMP for male patient.  Allergies reviewed: Yes  Medications reviewed: Yes    Medications: Medication refills not needed today.  [facility] into [facility] [place] [place] - [address]    Frailty Screening:   Is the patient here for a new oncology consult visit in cancer care? 2. No      Clinical concerns: pain, managed with Dr Alejandra.      Jacqueline Cortez CMA ([facility]) 5/1/2024 9:49 AM  "

## 2024-05-01 NOTE — TELEPHONE ENCOUNTER
PA Initiation    Medication: XTANDI 80 MG PO TABS  Insurance Company: [employer] - Phone [phone] Fax [phone]  Pharmacy Filling the Rx:    Filling Pharmacy Phone:    Filling Pharmacy Fax:    Start Date: 5/1/2024

## 2024-05-02 NOTE — PROGRESS NOTES
SUBJECTIVE:   Dilip Kan is a 69 year old male who presents to clinic today for the following health issues: Pain, metastatic prostate cancer    Patient arrives here for increasing pain and metastatic prostate cancer.  He is consuming about 80 mg of oxycodone per day.  He rates the pain as an 8 out of 10.  Involving his hip knees.  He does have metastatic prostate cancer in does see oncology.  Patient was started on MS Contin and brings the remaining pills here.  He states that it did not help at all.  He also has questions about hospice.  He is currently under active treatment.  I did discuss hospice restrictions etc. with both him and his wife.    History of Present Illness       Reason for visit:  Leg    He eats 0-1 servings of fruits and vegetables daily.He consumes 2 sweetened beverage(s) daily.He exercises with enough effort to increase his heart rate 9 or less minutes per day.  He exercises with enough effort to increase his heart rate 3 or less days per week. He is missing 1 dose(s) of medications per week.        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     Priority: Medium    Iron deficiency anemia secondary to inadequate dietary iron intake 11/20/2023     Priority: Medium    Sepsis (H) 06/29/2023     Priority: Medium    Acute deep vein thrombosis (DVT) of proximal vein of right lower extremity (H) 05/01/2023     Priority: Medium    Multiple subsegmental pulmonary emboli without acute cor pulmonale (H) 05/01/2023     Priority: Medium    Infection due to Klebsiella pneumoniae 04/21/2023     Priority: Medium    Bone metastasis 03/09/2023     Priority: Medium    Bone metastases 03/09/2023     Priority: Medium    Aneurysm of ascending aorta without rupture (H24) 10/05/2022     Priority: Medium    Status post total right knee  replacement 11/16/2021     Priority: Medium    Malignant neoplasm of prostate (H) 09/16/2021     Priority: Medium    Plaque in heart artery-aorta 03/24/2020     Priority: Medium    Aortic valve stenosis with insufficiency, etiology of cardiac valve disease unspecified 03/22/2019     Priority: Medium    Mild aortic stenosis 03/22/2019     Priority: Medium    Ascending aortic aneurysm-moderate at 4.6 cm on 3/11/20 03/22/2019     Priority: Medium    Aortic valve insufficiency-moderate to severe 03/22/2019     Priority: Medium    Hx of syncope 03/22/2019     Priority: Medium    History of tobacco abuse quitting 11/8/2002 03/22/2019     Priority: Medium    Hypertriglyceridemia 03/22/2019     Priority: Medium    Mild pulmonary hypertension (H) 03/22/2019     Priority: Medium    Palpitations 03/22/2019     Priority: Medium    Rising PSA following treatment for malignant neoplasm of prostate 03/29/2018     Priority: Medium    Chronic, continuous use of opioids 01/31/2018     Priority: Medium     Patient is followed by Wei Perez MD for ongoing prescription of pain medication.  All refills should be approved by this provider only at face-to-face appointments - not by phone request.    Medication(s): Hydrocodone.   Maximum quantity per month: 135  Clinic visit frequency required: Q 3 months   PDMP Review         Value Time User    State PDMP site checked  Yes 8/25/2021 10:41 AM Wei Perez MD          Controlled substance agreement:  Patient-Level CSA:    There are no patient-level csa.       Pain Clinic evaluation in the past: No    Opioid Risk Tool Total Score(s):  OPIOID RISK TOOL TOTAL SCORE 8/25/2021   Total Score 0   Total Risk Score Category Low Risk (0-3)           Other specified causes of urethral stricture 01/31/2018     Priority: Medium    GERD 04/28/2017     Priority: Medium    Essential hypertension 04/28/2017     Priority: Medium    Non morbid obesity due to excess calories 04/28/2017      Priority: Medium    Mixed hyperlipidemia 04/28/2017     Priority: Medium    Spinal stenosis of lumbar region with neurogenic claudication 04/28/2017     Priority: Medium    Controlled substance agreement updated 12- 01/15/2016     Priority: Medium    Backache 01/13/2014     Priority: Medium    Prostate cancer (H) 01/02/2013     Priority: Medium    Personal history of prostate cancer s/p prostatectomy and sEBRT 12/06/2012     Priority: Medium    Anemia due to acute blood loss 11/30/2012     Priority: Medium     Overview:   EBL 11/28/12:  1700 ml  EBL 11/28/12:  500 ml  Transfused 2 units pRBC's early on 11/29/12      Pelvic pain in male 11/30/2012     Priority: Medium     Past Medical History:   Diagnosis Date    Elevated prostate specific antigen (PSA)     4/10/2012    Encounter for other administrative examinations     1/31/2014    Esophagitis     No Comments Provided    Gastro-esophageal reflux disease without esophagitis     No Comments Provided    Low back pain     No Comments Provided    Malignant neoplasm of prostate (H)     9/6/2012    Nicotine dependence, uncomplicated     Quit - October 2007 with chantix    Other intervertebral disc degeneration, lumbosacral region     12/1/2008      Past Surgical History:   Procedure Laterality Date    APPENDECTOMY OPEN  311038    Ruptured - [place]    ARTHROPLASTY KNEE Right 11/16/2021    Procedure: ARTHROPLASTY, KNEE, TOTAL;  Surgeon: Micah Rock MD;  Location: [facility] OR    COLONOSCOPY  08/31/2006    next due in 2016    CYSTECTOMY BLADDER, ILEAL DIVERSION, COMBINED N/A 11/30/2023    Procedure: CYSTECTOMY, WITH URETEROILEAL CONDUIT CREATION and Pubectomy;  Surgeon: Miki Correa MD;  Location: [facility] OR    DISKECTOMY, LUMBAR, SINGLE SP  03/16/2009    L 3-4 microdiskectomy, [facility]    ESOPHAGOSCOPY, GASTROSCOPY, DUODENOSCOPY (EGD), COMBINED  03/2003         ESOPHAGOSCOPY, GASTROSCOPY, DUODENOSCOPY (EGD), COMBINED  08/31/2006         GRAFT FLAP  PEDICLE PERINEUM N/A 11/30/2023    Procedure: Left Rectus Abdominis flap;  Surgeon: YADIRA Guadalupe MD;  Location: [facility] OR    PICC DOUBLE LUMEN PLACEMENT Right 12/01/2023    5FR DL PICC, basilic vein. L-43cm, 2cm out.    PROSTATECTOMY PERINEAL RADICAL  11/28/2012    Nerve Sparring, Dr Warner    SPINE SURGERY N/A 04/28/2017    L3 to S1 spinal decompression L4/L5 fusion    TONSILLECTOMY, ADENOIDECTOMY, COMBINED      as a child    VARICOCELECTOMY  1959    INCISION AND DRAINAGE,EXCISE VARICOCELE       Review of Systems     OBJECTIVE:     /80   Pulse 113   Temp 98.8  F (37.1  C)   Resp 20   SpO2 98%   There is no height or weight on file to calculate BMI.  Physical Exam  Constitutional:       Comments: Uncomfortable in appearance   Musculoskeletal:         General: Normal range of motion.   Neurological:      Mental Status: He is alert.   Psychiatric:         Mood and Affect: Mood normal.         Thought Content: Thought content normal.         Diagnostic Test Results:  none     ASSESSMENT/PLAN:         (C79.51) Malignant neoplasm metastatic to bone (H)  Comment:  Plan: naloxone (NARCAN) 4 MG/0.1ML nasal spray,         oxyCODONE (OXYCONTIN) 30 MG 12 hr tablet,         oxyCODONE IR (ROXICODONE) 10 MG tablet,         predniSONE (DELTASONE) 20 MG tablet,         DISCONTINUED: oxyCODONE (OXYCONTIN) 30 MG 12 hr        tablet, DISCONTINUED: predniSONE (DELTASONE) 20        MG tablet, DISCONTINUED: oxyCODONE IR         (ROXICODONE) 10 MG tablet          Will try changing to OxyContin.  MS Contin does not seem to be worsening.  Will use about two thirds of his immediate action dose.  Which puts him at about 30 mg twice a day.  We also discussed possible future changes to Dilaudid.  And methadone.  Patient is seems to be receptive to this if the changes do not improve his pain level.  Also discussed will add prednisone for bone pain.  Hopefully that we will also improve his pain.  Follow-up in 2 weeks to 4 weeks.   Unless he gets a dramatic improvement in pain and decreasing doses of narcotics.  We can also at that time approach methadone and or Dilaudid.  Also discussed hospice.  Patient currently is not interested in hospice at this time.  The longitudinal plan of care for the diagnosis(es)/condition(s) as documented were addressed during this visit. Due to the added complexity in care, I will continue to support Jermain in the subsequent management and with ongoing continuity of care.      The longitudinal plan of care for the diagnosis(es)/condition(s) as documented were addressed during this visit. Due to the added complexity in care, I will continue to support Jermain in the subsequent management and with ongoing continuity of care.    Ezequiel Alejandra MD  [facility] AND [facility]

## 2024-05-02 NOTE — PROGRESS NOTES
[facility] Hematology and Oncology Progress Note    Patient: Dilip Kan  MRN: [id number]  Date of Service: May 1, 2024         Reason for Visit    Chief Complaint   Patient presents with    Oncology Clinic Visit     Prostate CA       ECOG Performance Status  2      Encounter Diagnoses:    Metastatic castrate resistant prostate cancer      History of Present Illness    Mr. Dilip Kan returns for follow-up metastatic castrate resistant prostate cancer for details of history see previous notes.  Most recentlyThe patient had progressed on abiraterone and prednisone.  He complained of left hip pain in February 2023.  MRI of the left hip revealed nondisplaced fracture of the left pubic bone adjacent to the pubic symphysis.  There was a fairly large abnormal signal associated with the left obturator internus muscle belly with associated T2 hyperintense signal deep to the muscle belly against the pelvic wall which was suggestive of possibly direct extension of tumor.  CT chest and abdomen/pelvis indicated progressive disease with increasing volume of a locally invasive mass adjacent to the prostate invading the obturator levator ani muscles with nondisplaced acute fracture to the left pubic symphysis.  There were multiple liver lesions consistent with diffuse hepatic metastatic disease.  We recommended radiation therapy to the left hip and left pubic symphysis.  We recommended starting the patient on docetaxel chemotherapy: With docetaxel given at a dose of 75 mg/m  and prednisone given at a dose of 5 mg p.o. twice daily on days 1 through 21 with 21-day cycle.  Patient developed hematuria that was gross with associated blood clots.  He was admitted with a fever of one 1.1 and neutrophil count of 0.7.  He was admitted and treated for neutropenic fever.  His hemoglobin dropped to 6.9 and required 2 units of packed red cells.  He was transferred to [facility] in [place] and was seen by Dr. Chavira of urology  with plans for cystoscopy fulguration.  Was that this was radiation cystitis causing hematuria.  Cystoscopy revealed large clot burden which was evacuated.  With bladder was reexamined and noted to have a large, actively bleeding vessel.  These vessels were fulgurated..  He did have imaging in June 2023 with CT chest and abdomen/pelvis revealing a positive response with all liver lesions having been resolved, noted decrease in size of locally invasive mass in the left prostate, there was no significant change in the mixed lytic and sclerotic appearance of the left acetabulum.  Bone scan revealed uptake in the left acetabulum is relatively stable.  PSA continued to drop and was 2.24 in June 2023.  Subsequently developed fevers night sweats and temp of  101 degrees Fahrenheit.  He was seen in the emergency department on June 29, 2023.CT abdomen/pelvis revealed a 4 cm abscess deep within the pelvis, which appeared to be in communication of the urinary bladder or the left ventral aspect of the rectum.  The abscess abuts the left pubic bone at sepsis likely osteomyelitis.  There are small gas bubbles within the abscess cavity as well as surrounding it within the left pubic bone.  CT cystogram revealed an air-fluid collection along the primary bladder neck.  There is no evidence of fistula or communication within the rectum.  Reactive osseous changes of the pubic bone consistent with osteomyelitis he.  He had an MRI of the pelvis done which revealed apparent nondisplaced fracture involving the medial left acetabulum.  He was transferred to the [facility].  The 4 cm abscess was not amenable to percutaneous drainage.  He was to IV antibiotics and was felt that he would need a diverting colostomy to drain the pelvic abscess hematology oncology recommended holding chemotherapy while inpatient.  He was treated with empiric vancomycin, ceftriaxone and Flagyl and switch to IV Zosyn beginning on July  2, 2023 was also started on linezolid 600 mg IV every 12 hours due to the fact he was found to have a positive VRE in the stool.  Patient was discharged on linezolid/Levaquin and metronidazole.  His PSA was down to 1.43 at last 31 he did receive his last Lupron injection on July 31, 2023..  When we saw him last in September 2023 he was declining in terms of his performance status he had lost at least 25 pounds since June.  Ongoing left hip pain.  We elected to just treat with Lupron and he was due to receive it in December 2023 but apparently did not show up.The patient had undergone an MRI of the abdomen pelvis at the [facility] and the findings with the patient and cavity between the bladder and the prostate.  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  There is left inferior ramus fracture.  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  He was seen by Dr. Correa of urology who recommended cystectomy with ileal conduit and a left pubectomy.This was performed on November 30, 2023.  The patient underwent treatment for radiation cystitis, pubovesical fistula, and pubic osteomyelitis.  Patient underwent total cystectomy and creation of ileal conduit for urinary diversion lysis of adhesions, total pubic ectomy for osteomyelitis he also underwent a left rectus abdominis muscle flap to the pelvis by the plastic surgeon.  Pathology revealed the patient had focal high-grade prostatic adenocarcinoma with comedonecrosis involving the bladder.He was seen by infectious disease with patient had IntraOp cultures growing Candida albicans as well as strep agalactiae.  It was recommended patient start IV ceftriaxone as well as metronidazole for anaerobic coverage.  Recommended patient stay on IV micafunginHe was discharged on December 11, 2023: It was felt that the patient had severe protein calorie malnunutrition due to to chronic illness.  He was discharged on Augmentin 875 mg  every 12 hours.  He was transferred to tertiary care facility/nursing home.  It was recommended patient to start fluconazole 400 mg p.o. dailyAnd to continue Augmentin.  It was noted the patient is a small area of wound dehiscence Florendo surgical incision he was discharged with plan was to anticipate healing with secondary intention.  Comes in today for follow-up he appears to be somewhat depressed.  He Was in a wheelchair with declining performance status due to ongoing weight loss.  It was noted that he missed at least 1 Lupron injection during his hospitalization.Pulmonary saw him in January his PSA had risen to 12.5.  We elected to resume Lupron 22.5 mg IM every 3 months and this was started in January.  Since then the patient has recovered from his illness his performance status improved he still has chronic left hip pain and on narcotic analgesics.  He has been seen by neurology in March of this year was noted that he was off antibiotics and doing well his abdominal wound had closed his ileal conduit was functioning well.  His PSA on April 29 had risen to 21.58.  Other than chronic hip pain he denies shortness of breath chest pain or hemoptysis.  He denies fevers or night sweats.  Denies headaches or change in mental status he is states he is eating well and starting to gain weight he is still somewhat wheelchair-bound but his goal is to eventually ambulate with crutches. .  He remains on chronic anticoagulation due to history of DVT      ______________________________________________________________________________    Past History    Past Medical History:   Diagnosis Date    Elevated prostate specific antigen (PSA)     4/10/2012    Encounter for other administrative examinations     1/31/2014    Esophagitis     No Comments Provided    Gastro-esophageal reflux disease without esophagitis     No Comments Provided    Low back pain     No Comments Provided    Malignant neoplasm of prostate (H)     9/6/2012     Nicotine dependence, uncomplicated     Quit - October 2007 with chantix    Other intervertebral disc degeneration, lumbosacral region     12/1/2008       Past Surgical History:   Procedure Laterality Date    APPENDECTOMY OPEN  [id number]    Ruptured - [place]    ARTHROPLASTY KNEE Right 11/16/2021    Procedure: ARTHROPLASTY, KNEE, TOTAL;  Surgeon: Micah Rock MD;  Location: [facility] OR    COLONOSCOPY  08/31/2006    next due in 2016    CYSTECTOMY BLADDER, ILEAL DIVERSION, COMBINED N/A 11/30/2023    Procedure: CYSTECTOMY, WITH URETEROILEAL CONDUIT CREATION and Pubectomy;  Surgeon: Miki Correa MD;  Location: [facility] OR    DISKECTOMY, LUMBAR, SINGLE SP  03/16/2009    L 3-4 microdiskectomy, [facility]    ESOPHAGOSCOPY, GASTROSCOPY, DUODENOSCOPY (EGD), COMBINED  03/2003         ESOPHAGOSCOPY, GASTROSCOPY, DUODENOSCOPY (EGD), COMBINED  08/31/2006         GRAFT FLAP PEDICLE PERINEUM N/A 11/30/2023    Procedure: Left Rectus Abdominis flap;  Surgeon: YADIRA Guadalupe MD;  Location: [facility] OR    PICC DOUBLE LUMEN PLACEMENT Right 12/01/2023    5FR DL PICC, basilic vein. L-43cm, 2cm out.    PROSTATECTOMY PERINEAL RADICAL  11/28/2012    Nerve SparringDr Warner    SPINE SURGERY N/A 04/28/2017    L3 to S1 spinal decompression L4/L5 fusion    TONSILLECTOMY, ADENOIDECTOMY, COMBINED      as a child    VARICOCELECTOMY  1959    INCISION AND DRAINAGE,EXCISE VARICOCELE           Review of systems.  CNS: There are no headaches, no blurred vision, no change in mental status,   ENT: There is no hearing loss.  Respiratory: There is no shortness of breath cough or hemoptysis  Cardiac: There is no chest pain, orthopnea, PND, or ankle edema.  GI: There is no bright red blood per rectum, no hematemesis, no reflux, no diarrhea or constipation  Musculoskeletal: There is chronic left hip pain there is  : There is no urinary frequency, hematuria.  Constitutional: There is no fevers, night sweats, weight loss.  Endocrine: Moderate  fatigue  Neuro: There is no tingling or numbness in the hands or feet.  Hematologic: There is no gingival bleeding, epistaxis, or easy bruisability.  Dermatologic: There is no skin rash.  A 14 point review of systems is otherwise negative.          Physical Exam    /80   Pulse 100   Temp 98.8  F (37.1  C)   Wt 79.4 kg (175 lb)   BMI 25.11 kg/m        GENERAL: Alert and oriented to time place and person. Seated comfortably. In no distress.    HEAD: Atraumatic and normocephalic.    EYES: KRUPA, EOMI.  No pallor.  No icterus.    Oral cavity: no mucosal lesion or tonsillar enlargement.    NECK: supple. JVP normal.  No thyroid enlargement.    LYMPH NODES: There are no palpable cervical, supraclavicular, axillary, or inguinal nodes.    LUNGS: clear to auscultation bilaterally.      HEART: S1 and S2 are heard. Regular rate and rhythm.  No murmur or gallop or rub heard.  No peripheral edema.    ABDOMEN: Soft. Not tender. Not distended.    No other mass palpable.  Bowel sounds heard.    EXTREMITIES: There is trace ankle edema.  There is limited mobility of the left hip due to pain there is tenderness over the left hip upon palpation  SKIN: no rash, or bruising or purpura.    NEURO: Grossly non-focal.    Lab Results    Recent Results (from the past 240 hour(s))   CBC with platelets    Collection Time: 04/29/24  2:41 PM   Result Value Ref Range    WBC Count 6.7 4.0 - 11.0 10e3/uL    RBC Count 4.02 (L) 4.40 - 5.90 10e6/uL    Hemoglobin 9.9 (L) 13.3 - 17.7 g/dL    Hematocrit 32.3 (L) 40.0 - 53.0 %    MCV 80 78 - 100 fL    MCH 24.6 (L) 26.5 - 33.0 pg    MCHC 30.7 (L) 31.5 - 36.5 g/dL    RDW 16.8 (H) 10.0 - 15.0 %    Platelet Count 391 150 - 450 10e3/uL   Comprehensive metabolic panel (BMP + Alb, Alk Phos, ALT, AST, Total. Bili, TP)    Collection Time: 04/29/24  2:41 PM   Result Value Ref Range    Sodium 137 135 - 145 mmol/L    Potassium 4.7 3.4 - 5.3 mmol/L    Carbon Dioxide (CO2) 22 22 - 29 mmol/L    Anion Gap 11 7 -  15 mmol/L    Urea Nitrogen 15.7 8.0 - 23.0 mg/dL    Creatinine 0.75 0.67 - 1.17 mg/dL    GFR Estimate >90 >60 mL/min/1.73m2    Calcium 9.4 8.8 - 10.2 mg/dL    Chloride 104 98 - 107 mmol/L    Glucose 98 70 - 99 mg/dL    Alkaline Phosphatase 118 40 - 150 U/L    AST 35 0 - 45 U/L    ALT 8 0 - 70 U/L    Protein Total 6.3 (L) 6.4 - 8.3 g/dL    Albumin 3.4 (L) 3.5 - 5.2 g/dL    Bilirubin Total 0.2 <=1.2 mg/dL   ESR: Erythrocyte sedimentation rate    Collection Time: 04/29/24  2:41 PM   Result Value Ref Range    Erythrocyte Sedimentation Rate 14 0 - 20 mm/hr   CRP, inflammation    Collection Time: 04/29/24  2:41 PM   Result Value Ref Range    CRP Inflammation 6.05 (H) <5.00 mg/L   PSA tumor marker    Collection Time: 04/29/24  2:41 PM   Result Value Ref Range    PSA Tumor Marker 21.58 (H) 0.00 - 6.50 ng/mL   Extra Serum Separator Tube (SST)    Collection Time: 04/29/24  2:42 PM   Result Value Ref Range    Hold Specimen JIC        Imaging    XR Femur Left 2 Views    Result Date: 4/11/2024  PROCEDURE:  XR FEMUR LEFT 2 VIEWS HISTORY: Pain of left lower leg COMPARISON: CT abdomen pelvis 9/20/2023 TECHNIQUE:  XR FEMUR LEFT 2 VIEWS FINDINGS: Left femur appears within normal limits and without acute abnormality. Joints are appropriately aligned. Linear lucencies through the left acetabulum and the medial left inferior pubic ramus. No suspicious osseous lesion. Advanced degenerative changes of the left hip. No foreign body or subcutaneous emphysema.      IMPRESSION: 1.  Left femur is intact. 2.  Chronic fractures through the left acetabulum and left inferior pubic ramus. TARUN MUÑOZ MD   SYSTEM ID:  [id number]     Assessment and Plan: #1: Metastatic castrate resistant prostate cancer with rising PSA: Status post apalutamide followed by abiraterone/prednisoneStatus post progression with liver metastases and bony metastasis involving the left acetabulum pubic symphysis.  Status post IV docetaxel given concurrently with radiation  therapy to the pubic symphysis s/p 2 cycles of docetaxel course complicated by neutropenic fever/sepsis/left hip and gross hematuria felt to be secondary to radiation cystitis requiring fulguration of multiple bleeding vessels in the bladder.  After 4 cycles of docetaxel had a drop in his PSA down to 1.26 course complicated by pelvic abscess with septic shock and osteomyelitis requiring transfer to [facility] subsequently treated with 6-week course of antibiotics status post pubovesical fistula requiring cystectomy and ileal conduit pathology was consistent with high-grade prostatic adenocarcinoma focally involving the bladder status post osteomyelitis of the pubic symphysis treated with IV antibiotics..  Currently on Lupron alone with rising PSA given the fact he is not adequately treated for metastatic prostate cancer we would like to add enzalutamide to his Lupron will treat with enzalutamide 80 mg p.o. daily will monitor PSAs monthly and see patient 3 months obtain CBC CMP LDH PSA if PSA continues to rise he will need a PSMA Pylarify scan to determine new potential sites of disease  Time spent: 75 minutes was spent on this patient visit: Spent time reviewing multiple recent provider notes multiple lab results, discussing PSA results with patient, performing history/physical, documenting history/physical ordering chronic and enzalutamide as well as monthly labs and follow-up appointments.     Cancer Staging   No matching staging information was found for the patient.        Signed by: Erum Fritz MD    CC: Ezequiel Alejandra MD

## 2024-05-06 ENCOUNTER — VIRTUAL VISIT (OUTPATIENT)
Dept: INFECTIOUS DISEASES | Facility: CLINIC | Age: 69
End: 2024-05-06
Attending: INTERNAL MEDICINE
Payer: COMMERCIAL

## 2024-05-06 VITALS
HEIGHT: 70 IN | WEIGHT: 175 LBS | BODY MASS INDEX: 25.05 KG/M2 | DIASTOLIC BLOOD PRESSURE: 70 MMHG | SYSTOLIC BLOOD PRESSURE: 116 MMHG

## 2024-05-06 DIAGNOSIS — M86.68 CHRONIC OSTEOMYELITIS OF SYMPHYSIS PUBIS (H): ICD-10-CM

## 2024-05-06 PROCEDURE — 99214 OFFICE O/P EST MOD 30 MIN: CPT | Mod: 95 | Performed by: INTERNAL MEDICINE

## 2024-05-06 ASSESSMENT — PAIN SCALES - GENERAL: PAINLEVEL: MODERATE PAIN (5)

## 2024-05-06 NOTE — NURSING NOTE
Is the patient currently in the state of [place]? YES    Visit mode:VIDEO    If the visit is dropped, the patient can be reconnected by: VIDEO VISIT: Text to cell phone:   Telephone Information:   Mobile [phone]    and VIDEO VISIT: Send to e-mail at: [email or web address]    Will anyone else be joining the visit? NO  (If patient encounters technical issues they should call [phone]    How would you like to obtain your AVS? MyChart    Are changes needed to the allergy or medication list? No    Patient declined individual allergy and medication review by support staff because they deny any changes and state that all information remains accurate since last reviewed/verified.     Are refills needed on medications prescribed by this physician? NO    Reason for visit: AIDEN ShahF

## 2024-05-06 NOTE — PROGRESS NOTES
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 s/p cystectomy with ileal conduit, pubectomy, rectus flap 11/30/2023  Intra-op cultures growing Candida albicans, Str agalactiae  Wound dehiscence at lower end of surgical incision, healed  Hx of pelvic abscess s/p 6 weeks of empiric antibiotic course without complete resolution (June-Aug, 2023)  Hx of bacteremia, Kleb pneumoniae with similar isolate from urine culture 4/19/2023  Prostate cancer with metastasis (liver, bone: acetabulum) s/p chemoradiation  Positive VRE screen, per rectal swab 7/1/2023    67 yo M with pubic symphysis osteomyelitis who underwent cystectomy with ileal conduit, pubectomy with rectus flap on 11/30/2023. Intra-op cultures grew Candida albicans, Strep agalactiae. He was treated until around Feb 2024.     His wound is now completely healed according to him ad he will send us a photo through "[employer]. I recommended to them that he has close follow up with Dr. Paul. His CRP has gone done and almost normal now off antibiotics. I would not recommend MRI since he is asymptomatic. He has baseline hip pain and difficulty moving due to fracture. Since we are still observing him but he is better, will space out follow ups to 3-4 months.      PLAN:  Observe off antibiotics.   Follow up with Dr. Paul.     30 minutes spent by me on the date of the encounter doing chart review, history and exam, documentation and further activities per the note    Virtual Visit Details    Type of service:  Video Visit   Video Start Time: 4:49:00 pm.  Video End Time: 5:03:01 pm.    Originating Location (pt.  Location): Home    Distant Location (provider location):  On-site  Platform used for Video Visit: [employer]    History of Present Illness     Pertinent history obtained from: patient and copied from previous ID notes.     Dilip Kan is a 69 year old male with metastatic prostate cancer (liver, bone:acetabulum), s/p radiation c/b radiation cysitis and chemotherapy (last dose 6/13/2023), hospitalized (Jun, 2023) with fever sepsis 2/2 pelvic abscess. He was found to have a pelvic abscess that was thought not to be reachable for drainage and hip fracture osteomyelitis versus metastasis. Of note, communication of abscess with bladder, prostate and not the rectum per cystogram. His urine and blood cultures grew Klebsiella.     CT 9/20 ([facility]) showed similar to smaller pelvic abscess, which prompted MRI pelvis 10/27/2023 ([facility]), whereby concerning findings of chronic osteomyelitis involving pubic symphysis septic arthropathy, along with anterior bladder wall defect and subjacent fistula. Because of these findings, seen by Urologist (Dr. Paul) and admitted for elective surgery. Underwent cystectomy with ileal conduit, pubectomy, with rectus flap by urology and plastic surgery teams on 11/30/2023. Intra-op cultures grew yeast and Strep agalactiae. Treated until around Feb 2024.    He is doing well. His wound has closed according to him. He does not have new pelvic pain, fevers. He is not on antibiotics anymore.     Data   Laboratory data and imaging listed below was reviewed prior to this encounter.     CRP   4/29 6  3/25 17  12/5/23 126

## 2024-05-06 NOTE — LETTER
5/6/2024       RE: Dilip Kan  [address] [place] [address]     Dear Colleague,    Thank you for referring your patient, Dilip Kan, to the [facility] INFECTIOUS DISEASE CLINIC [place] at [facility]. Please see a copy of my visit note below.      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 s/p cystectomy with ileal conduit, pubectomy, rectus flap 11/30/2023  Intra-op cultures growing Candida albicans, Str agalactiae  Wound dehiscence at lower end of surgical incision, healed  Hx of pelvic abscess s/p 6 weeks of empiric antibiotic course without complete resolution (June-Aug, 2023)  Hx of bacteremia, Kleb pneumoniae with similar isolate from urine culture 4/19/2023  Prostate cancer with metastasis (liver, bone: acetabulum) s/p chemoradiation  Positive VRE screen, per rectal swab 7/1/2023    67 yo M with pubic symphysis osteomyelitis who underwent cystectomy with ileal conduit, pubectomy with rectus flap on 11/30/2023. Intra-op cultures grew Candida albicans, Strep agalactiae. He was treated until around Feb 2024.     His wound is now completely healed according to him ad he will send us a photo through [employer]. I recommended to them that he has close follow up with Dr. Paul. His CRP has gone done and almost normal now off antibiotics. I would not recommend MRI since he is asymptomatic. He has baseline hip pain and difficulty moving due to fracture. Since we are still observing him but he is better, will space out follow ups to 3-4 months.      PLAN:  Observe off antibiotics.    Follow up with Dr. Paul.     30 minutes spent by me on the date of the encounter doing chart review, history and exam, documentation and further activities per the note    Virtual Visit Details    Type of service:  Video Visit   Video Start Time: 4:49:00 pm.  Video End Time: 5:03:01 pm.    Originating Location (pt. Location): Home    Distant Location (provider location):  On-site  Platform used for Video Visit: [employer]    History of Present Illness    Pertinent history obtained from: patient and copied from previous ID notes.     Dilip Kan is a 69 year old male with metastatic prostate cancer (liver, bone:acetabulum), s/p radiation c/b radiation cysitis and chemotherapy (last dose 6/13/2023), hospitalized (Jun, 2023) with fever sepsis 2/2 pelvic abscess. He was found to have a pelvic abscess that was thought not to be reachable for drainage and hip fracture osteomyelitis versus metastasis. Of note, communication of abscess with bladder, prostate and not the rectum per cystogram. His urine and blood cultures grew Klebsiella.     CT 9/20 ([facility]) showed similar to smaller pelvic abscess, which prompted MRI pelvis 10/27/2023 ([facility]), whereby concerning findings of chronic osteomyelitis involving pubic symphysis septic arthropathy, along with anterior bladder wall defect and subjacent fistula. Because of these findings, seen by Urologist (Dr. Paul) and admitted for elective surgery. Underwent cystectomy with ileal conduit, pubectomy, with rectus flap by urology and plastic surgery teams on 11/30/2023. Intra-op cultures grew yeast and Strep agalactiae. Treated until around Feb 2024.    He is doing well. His wound has closed according to him. He does not have new pelvic pain, fevers. He is not on antibiotics anymore.     Data  Laboratory data and imaging listed below was reviewed prior to this encounter.     CRP   4/29 6  3/25 17  12/5/23 126           SUNITA GRIFFIN MD

## 2024-05-07 ENCOUNTER — TELEPHONE (OUTPATIENT)
Dept: INFECTIOUS DISEASES | Facility: CLINIC | Age: 69
End: 2024-05-07
Payer: COMMERCIAL

## 2024-05-07 NOTE — TELEPHONE ENCOUNTER
MAX LEZAMA 5/7 to sched a 3-4 month (Aug or Sept) follow up with Dr. Kay per Dr. Baumann.     Yony Baumann MD [id number]  Please schedule with Dr. Kay in 3-4 months. Thanks, Ratna

## 2024-05-09 ENCOUNTER — TELEPHONE (OUTPATIENT)
Dept: FAMILY MEDICINE | Facility: OTHER | Age: 69
End: 2024-05-09
Payer: COMMERCIAL

## 2024-05-09 DIAGNOSIS — C79.51 MALIGNANT NEOPLASM METASTATIC TO BONE (H): ICD-10-CM

## 2024-05-09 RX ORDER — OXYCODONE HCL 40 MG/1
40 TABLET, FILM COATED, EXTENDED RELEASE ORAL EVERY 12 HOURS
Qty: 60 TABLET | Refills: 0 | Status: SHIPPED | OUTPATIENT
Start: 2024-05-09 | End: 2024-05-15

## 2024-05-09 RX ORDER — OXYCODONE HYDROCHLORIDE 10 MG/1
10 TABLET ORAL EVERY 4 HOURS PRN
Qty: 120 TABLET | Refills: 0 | Status: SHIPPED | OUTPATIENT
Start: 2024-05-09 | End: 2024-05-15

## 2024-05-09 NOTE — TELEPHONE ENCOUNTER
Patient reports initially got good results with the change in OxyContin and oxycodone.  But now is worsening.  Will increase his OxyContin to 40 twice a day.  Refill of his oxycodone.

## 2024-05-09 NOTE — TELEPHONE ENCOUNTER
Patient called regarding prescription for oxycodone 30 mg stating that it is not working. Patient would like a call back to discuss what Dr. Alejandra would like him to do instead. Please call.    Danuta Crandall on 5/9/2024 at 10:36 AM

## 2024-05-10 ENCOUNTER — TELEPHONE (OUTPATIENT)
Dept: FAMILY MEDICINE | Facility: OTHER | Age: 69
End: 2024-05-10
Payer: COMMERCIAL

## 2024-05-10 ENCOUNTER — DOCUMENTATION ONLY (OUTPATIENT)
Dept: FAMILY MEDICINE | Facility: OTHER | Age: 69
End: 2024-05-10
Payer: COMMERCIAL

## 2024-05-10 NOTE — PROGRESS NOTES
Discussed with the patient his pain.  He indicates that to control his pain he is having to take 810 tablet milligrams of the Oxy codon.  He is also on 30 mg twice a day of the OxyContin.  Discussed increasing his long-acting to 40 mg twice a day of the OxyContin and supplementing a 30 mg tablet if needed.  Hopefully that will bring down the usage of his immediate acting oxycodone.  Also advised to follow-up Wednesday or Thursday to discuss further options.  I am leaning towards a methadone plus Dilaudid.  Will also consider fentanyl patch.  I advised patient that the increasing in pain is certainly concerning. will also discussed with pharmacy.

## 2024-05-10 NOTE — TELEPHONE ENCOUNTER
Per [facility], they didn't have enough to fill the rx yesterday but today they do. No additional information is needed at this time.     SALVADOR ELENA RN on 5/10/2024 at 9:02 AM

## 2024-05-10 NOTE — TELEPHONE ENCOUNTER
10 mg oxycontin was sent to [facility] yesterday. Pharmacy states they need some sort of authorization from [facility]. Please give to pharmacy.    Yennifer Whyte on 5/10/2024 at 8:54 AM

## 2024-05-15 ENCOUNTER — DOCUMENTATION ONLY (OUTPATIENT)
Dept: ONCOLOGY | Facility: OTHER | Age: 69
End: 2024-05-15
Payer: COMMERCIAL

## 2024-05-15 ENCOUNTER — TELEPHONE (OUTPATIENT)
Dept: ONCOLOGY | Facility: OTHER | Age: 69
End: 2024-05-15
Payer: COMMERCIAL

## 2024-05-15 ENCOUNTER — OFFICE VISIT (OUTPATIENT)
Dept: FAMILY MEDICINE | Facility: OTHER | Age: 69
End: 2024-05-15
Attending: FAMILY MEDICINE
Payer: COMMERCIAL

## 2024-05-15 VITALS
HEART RATE: 75 BPM | BODY MASS INDEX: 26.4 KG/M2 | SYSTOLIC BLOOD PRESSURE: 116 MMHG | RESPIRATION RATE: 20 BRPM | TEMPERATURE: 97.1 F | OXYGEN SATURATION: 100 % | WEIGHT: 184 LBS | DIASTOLIC BLOOD PRESSURE: 60 MMHG

## 2024-05-15 DIAGNOSIS — C61 MALIGNANT NEOPLASM OF PROSTATE (H): ICD-10-CM

## 2024-05-15 DIAGNOSIS — C61 PROSTATE CANCER (H): ICD-10-CM

## 2024-05-15 DIAGNOSIS — M79.662 PAIN OF LEFT LOWER LEG: Primary | ICD-10-CM

## 2024-05-15 DIAGNOSIS — G89.29 CHRONIC LEFT-SIDED LOW BACK PAIN WITH LEFT-SIDED SCIATICA: ICD-10-CM

## 2024-05-15 DIAGNOSIS — C79.51 MALIGNANT NEOPLASM METASTATIC TO BONE (H): Primary | ICD-10-CM

## 2024-05-15 DIAGNOSIS — M54.42 CHRONIC LEFT-SIDED LOW BACK PAIN WITH LEFT-SIDED SCIATICA: ICD-10-CM

## 2024-05-15 DIAGNOSIS — C79.51 MALIGNANT NEOPLASM METASTATIC TO BONE (H): ICD-10-CM

## 2024-05-15 DIAGNOSIS — R97.21 RISING PSA FOLLOWING TREATMENT FOR MALIGNANT NEOPLASM OF PROSTATE: ICD-10-CM

## 2024-05-15 PROCEDURE — 93005 ELECTROCARDIOGRAM TRACING: CPT | Performed by: FAMILY MEDICINE

## 2024-05-15 PROCEDURE — G0463 HOSPITAL OUTPT CLINIC VISIT: HCPCS

## 2024-05-15 PROCEDURE — 99214 OFFICE O/P EST MOD 30 MIN: CPT | Performed by: FAMILY MEDICINE

## 2024-05-15 PROCEDURE — 93010 ELECTROCARDIOGRAM REPORT: CPT | Performed by: STUDENT IN AN ORGANIZED HEALTH CARE EDUCATION/TRAINING PROGRAM

## 2024-05-15 PROCEDURE — G0463 HOSPITAL OUTPT CLINIC VISIT: HCPCS | Mod: 25

## 2024-05-15 RX ORDER — LORAZEPAM 0.5 MG/1
TABLET ORAL
Qty: 2 TABLET | Refills: 0 | Status: SHIPPED | OUTPATIENT
Start: 2024-05-15 | End: 2024-05-28

## 2024-05-15 RX ORDER — PROCHLORPERAZINE MALEATE 10 MG
10 TABLET ORAL EVERY 6 HOURS PRN
Qty: 30 TABLET | Refills: 2 | Status: SHIPPED | OUTPATIENT
Start: 2024-05-15 | End: 2024-09-04

## 2024-05-15 ASSESSMENT — PAIN SCALES - GENERAL: PAINLEVEL: SEVERE PAIN (7)

## 2024-05-15 NOTE — ORAL ONC MGMT
Oral Chemotherapy Monitoring Program    Primary Oncologist: Dr. Fritz  Primary Oncology Clinic: [facility]  Cancer Diagnosis: Prostate Cancer    Drug: Enzalutamide (Xtandi) 80 mg daily  Start Date: TBD  Expected duration of therapy: Until disease progression or unacceptable toxicity    Drug Interaction Assessment:   Enzalutamide/apixaban (category D): Enzalutamide may decrease the serum concentration of Apixaban. Not an absolute contraindication, but must monitor for decreased effectiveness of apixaban. Enzalutamide already dose reduced. IB sent to Dr. Fritz.     Lab Monitoring Plan  CBC/CMP monthly    Subjective/Objective:  Dilip Kan is a 69 year old male contacted by phone for an initial visit for oral chemotherapy education.         8/8/2022     1:00 PM 8/29/2022     9:00 AM 8/29/2022    11:00 AM 9/29/2022    10:00 AM 12/23/2022     8:00 AM 5/7/2024     8:00 AM 5/15/2024     4:00 PM   ORAL CHEMOTHERAPY   Assessment Type New Teach Left Voicemail Initial Follow up Refill Refill Initial Work up New Teach   Diagnosis Code Prostate Cancer Prostate Cancer Prostate Cancer Prostate Cancer Prostate Cancer Prostate Cancer Prostate Cancer   Providers Catrina Fritz   Clinic Name/Location [facility]   Drug Name Zytiga (abiraterone) Zytiga (abiraterone) Zytiga (abiraterone) Zytiga (abiraterone) Zytiga (abiraterone) Xtandi (enzalutamide) Xtandi (enzalutamide)   Dose 1,000 mg 1,000 mg 1,000 mg 750 mg 1,000 mg 80 mg 80 mg   Current Schedule Daily Daily Daily Daily Daily Daily Daily   Cycle Details Continuous Continuous Continuous Continuous Continuous Continuous Continuous   Planned next cycle start date   8/24/2022       Doses missed in last 2 weeks   0       Adherence Assessment   Adherent       Adverse Effects   Fatigue       Fatigue   Grade 1       Pharmacist Intervention(fatigue)   No      "  Home BPs   Not applicable       Any new drug interactions?   No   Yes    Pharmacist Intervention?      Yes    Is the dose as ordered appropriate for the patient?   Yes   Yes    Is the patient currently in pain?   No       Has the patient been assessed within the past 6 months for depression?   Yes       Has the patient missed any days of school, work, or other routine activity?   No       Since the last time we talked, have you been hospitalized or used the emergency room?   No           Vitals:  BP:   BP Readings from Last 1 Encounters:   05/15/24 116/60     Wt Readings from Last 1 Encounters:   05/15/24 83.5 kg (184 lb)     Estimated body surface area is 2.03 meters squared as calculated from the following:    Height as of 5/6/24: 1.778 m (5' 10\").    Weight as of an earlier encounter on 5/15/24: 83.5 kg (184 lb).    Labs:  _  Result Component Current Result Ref Range   Sodium 137 (4/29/2024) 135 - 145 mmol/L     _  Result Component Current Result Ref Range   Potassium 4.7 (4/29/2024) 3.4 - 5.3 mmol/L     _  Result Component Current Result Ref Range   Calcium 9.4 (4/29/2024) 8.8 - 10.2 mg/dL     No results found for Mag within last 30 days.     No results found for Phos within last 30 days.     _  Result Component Current Result Ref Range   Albumin 3.4 (L) (4/29/2024) 3.5 - 5.2 g/dL     _  Result Component Current Result Ref Range   Urea Nitrogen 15.7 (4/29/2024) 8.0 - 23.0 mg/dL     _  Result Component Current Result Ref Range   Creatinine 0.75 (4/29/2024) 0.67 - 1.17 mg/dL       _  Result Component Current Result Ref Range   AST 35 (4/29/2024) 0 - 45 U/L     _  Result Component Current Result Ref Range   ALT 8 (4/29/2024) 0 - 70 U/L     _  Result Component Current Result Ref Range   Bilirubin Total 0.2 (4/29/2024) <=1.2 mg/dL       _  Result Component Current Result Ref Range   WBC Count 6.7 (4/29/2024) 4.0 - 11.0 10e3/uL     _  Result Component Current Result Ref Range   Hemoglobin 9.9 (L) (4/29/2024) 13.3 - " 17.7 g/dL     _  Result Component Current Result Ref Range   Platelet Count 391 (4/29/2024) 150 - 450 10e3/uL     No results found for ANC within last 30 days.         Assessment:  Patient is appropriate to start therapy.    Plan:  Basic chemotherapy teaching was reviewed with the patient including indication, start date of therapy, dose, administration, adverse effects, missed doses, food and drug interactions, monitoring, side effect management, office contact information, and safe handling. Written materials were provided and all questions answered.    Follow-Up:  Initial assessment ~1 week after starting enzalutamide to assess tolerability and adherence.     Marco A Joseph, PharmD  Hematology/Oncology Clinical Pharmacist  [facility]  [phone]

## 2024-05-15 NOTE — TELEPHONE ENCOUNTER
[facility] APPROVED    Medication: XTANDI 80 MG PO TABS  Amount: $ 3,333  Foundation Name: [facility] Phone: [phone]  [facility] Fax: [phone]      Foundation Effective Date: 1/1/2024  Foundation Expiration Date: 12/31/2024  Additional Information: Pt qualifies for Extra help sent pt info to enroll  Patient Notified: Yes

## 2024-05-15 NOTE — NURSING NOTE
Patient here for pain management. Medication Reconciliation: complete.    Akosua Rivero LPN  5/15/2024 9:15 AM

## 2024-05-15 NOTE — PROGRESS NOTES
Oral Chemotherapy Monitoring Program   Left Voicemail    Attempted to contact patient today for follow up regarding oral chemotherapy, enzalutamide (Xtandi), for new teach. No answer. Left voicemail for patient to call us back at [phone] when able. No medication name was left.    Sherita Vang  Oncology Pharmacy Intern  [facility]  [phone]

## 2024-05-16 ENCOUNTER — TELEPHONE (OUTPATIENT)
Dept: FAMILY MEDICINE | Facility: OTHER | Age: 69
End: 2024-05-16
Payer: COMMERCIAL

## 2024-05-16 DIAGNOSIS — R97.21 RISING PSA FOLLOWING TREATMENT FOR MALIGNANT NEOPLASM OF PROSTATE: ICD-10-CM

## 2024-05-16 DIAGNOSIS — C79.51 MALIGNANT NEOPLASM METASTATIC TO BONE (H): Primary | ICD-10-CM

## 2024-05-16 DIAGNOSIS — C61 MALIGNANT NEOPLASM OF PROSTATE (H): ICD-10-CM

## 2024-05-16 RX ORDER — PREDNISONE 5 MG/1
5 TABLET ORAL DAILY
COMMUNITY
Start: 2024-05-16 | End: 2024-08-14

## 2024-05-16 RX ORDER — METHADONE HYDROCHLORIDE 5 MG/1
5 TABLET ORAL EVERY 8 HOURS
Qty: 60 TABLET | Refills: 0 | Status: SHIPPED | OUTPATIENT
Start: 2024-05-16 | End: 2024-05-28

## 2024-05-16 RX ORDER — HYDROMORPHONE HYDROCHLORIDE 4 MG/1
2 TABLET ORAL EVERY 4 HOURS PRN
Qty: 30 TABLET | Refills: 0 | Status: SHIPPED | OUTPATIENT
Start: 2024-05-16 | End: 2024-05-28

## 2024-05-16 RX ORDER — HYDROMORPHONE HYDROCHLORIDE 2 MG/1
2 TABLET ORAL EVERY 4 HOURS PRN
Qty: 90 TABLET | Refills: 0 | Status: SHIPPED | OUTPATIENT
Start: 2024-05-16 | End: 2024-05-28

## 2024-05-16 NOTE — PROGRESS NOTES
SUBJECTIVE:   Dilip Kan is a 69 year old male who presents to clinic today for the following health issues: Pain    Patient arrives here to discuss changing his pain medication.  He reports that even since stopping his OxyContin he is not showing a significant amount of pain relief.  Continues to have pain on his left leg hip extending down the leg.  He reports he is on prednisone 20 mg a day.  That has not improved his pain.  States if he can sit still it seems to improve.  Walking makes it worse.  He has trouble walking because of pain.  He denies any leg weakness.  Starts in his lower back.  Patient does have a history of back surgery.  Patient has a history of metastatic prostate cancer.  Patient rates the pain as a 7 out of 10 most of the time    History of Present Illness       Reason for visit:  Pain    He eats 0-1 servings of fruits and vegetables daily.He consumes 2 sweetened beverage(s) daily.He exercises with enough effort to increase his heart rate 9 or less minutes per day.  He exercises with enough effort to increase his heart rate 3 or less days per week.   He is taking medications regularly.        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     Priority: Medium    Iron deficiency anemia secondary to inadequate dietary iron intake 11/20/2023     Priority: Medium    Sepsis (H) 06/29/2023     Priority: Medium    Acute deep vein thrombosis (DVT) of proximal vein of right lower extremity (H) 05/01/2023     Priority: Medium    Multiple subsegmental pulmonary emboli without acute cor pulmonale (H) 05/01/2023     Priority: Medium    Infection due to Klebsiella pneumoniae 04/21/2023     Priority: Medium    Bone metastasis 03/09/2023     Priority: Medium    Bone metastases 03/09/2023     Priority: Medium    Aneurysm of ascending  aorta without rupture (H24) 10/05/2022     Priority: Medium    Status post total right knee replacement 11/16/2021     Priority: Medium    Malignant neoplasm of prostate (H) 09/16/2021     Priority: Medium    Plaque in heart artery-aorta 03/24/2020     Priority: Medium    Aortic valve stenosis with insufficiency, etiology of cardiac valve disease unspecified 03/22/2019     Priority: Medium    Mild aortic stenosis 03/22/2019     Priority: Medium    Ascending aortic aneurysm-moderate at 4.6 cm on 3/11/20 03/22/2019     Priority: Medium    Aortic valve insufficiency-moderate to severe 03/22/2019     Priority: Medium    Hx of syncope 03/22/2019     Priority: Medium    History of tobacco abuse quitting 11/8/2002 03/22/2019     Priority: Medium    Hypertriglyceridemia 03/22/2019     Priority: Medium    Mild pulmonary hypertension (H) 03/22/2019     Priority: Medium    Palpitations 03/22/2019     Priority: Medium    Rising PSA following treatment for malignant neoplasm of prostate 03/29/2018     Priority: Medium    Chronic, continuous use of opioids 01/31/2018     Priority: Medium     Patient is followed by Wei Perez MD for ongoing prescription of pain medication.  All refills should be approved by this provider only at face-to-face appointments - not by phone request.    Medication(s): Hydrocodone.   Maximum quantity per month: 135  Clinic visit frequency required: Q 3 months   PDMP Review         Value Time User    State PDMP site checked  Yes 8/25/2021 10:41 AM Wei Perez MD          Controlled substance agreement:  Patient-Level CSA:    There are no patient-level csa.       Pain Clinic evaluation in the past: No    Opioid Risk Tool Total Score(s):  OPIOID RISK TOOL TOTAL SCORE 8/25/2021   Total Score 0   Total Risk Score Category Low Risk (0-3)           Other specified causes of urethral stricture 01/31/2018     Priority: Medium    GERD 04/28/2017     Priority: Medium    Essential hypertension  04/28/2017     Priority: Medium    Non morbid obesity due to excess calories 04/28/2017     Priority: Medium    Mixed hyperlipidemia 04/28/2017     Priority: Medium    Spinal stenosis of lumbar region with neurogenic claudication 04/28/2017     Priority: Medium    Controlled substance agreement updated 12- 01/15/2016     Priority: Medium    Backache 01/13/2014     Priority: Medium    Prostate cancer (H) 01/02/2013     Priority: Medium    Personal history of prostate cancer s/p prostatectomy and sEBRT 12/06/2012     Priority: Medium    Anemia due to acute blood loss 11/30/2012     Priority: Medium     Overview:   EBL 11/28/12:  1700 ml  EBL 11/28/12:  500 ml  Transfused 2 units pRBC's early on 11/29/12      Pelvic pain in male 11/30/2012     Priority: Medium     Past Medical History:   Diagnosis Date    Elevated prostate specific antigen (PSA)     4/10/2012    Encounter for other administrative examinations     1/31/2014    Esophagitis     No Comments Provided    Gastro-esophageal reflux disease without esophagitis     No Comments Provided    Low back pain     No Comments Provided    Malignant neoplasm of prostate (H)     9/6/2012    Nicotine dependence, uncomplicated     Quit - October 2007 with chantix    Other intervertebral disc degeneration, lumbosacral region     12/1/2008      Past Surgical History:   Procedure Laterality Date    APPENDECTOMY OPEN  [id number]    Ruptured - [place]    ARTHROPLASTY KNEE Right 11/16/2021    Procedure: ARTHROPLASTY, KNEE, TOTAL;  Surgeon: Micah Rock MD;  Location: [facility] OR    COLONOSCOPY  08/31/2006    next due in 2016    CYSTECTOMY BLADDER, ILEAL DIVERSION, COMBINED N/A 11/30/2023    Procedure: CYSTECTOMY, WITH URETEROILEAL CONDUIT CREATION and Pubectomy;  Surgeon: Miki Correa MD;  Location: [facility] OR    DISKECTOMY, LUMBAR, SINGLE SP  03/16/2009    L 3-4 microdiskectomy, [facility]    ESOPHAGOSCOPY, GASTROSCOPY, DUODENOSCOPY (EGD), COMBINED  03/2003          ESOPHAGOSCOPY, GASTROSCOPY, DUODENOSCOPY (EGD), COMBINED  08/31/2006         GRAFT FLAP PEDICLE PERINEUM N/A 11/30/2023    Procedure: Left Rectus Abdominis flap;  Surgeon: YADIRA Guadalupe MD;  Location: [facility] OR    PICC DOUBLE LUMEN PLACEMENT Right 12/01/2023    5FR DL PICC, basilic vein. L-43cm, 2cm out.    PROSTATECTOMY PERINEAL RADICAL  11/28/2012    Nerve Sparring, Dr Warner    SPINE SURGERY N/A 04/28/2017    L3 to S1 spinal decompression L4/L5 fusion    TONSILLECTOMY, ADENOIDECTOMY, COMBINED      as a child    VARICOCELECTOMY  1959    INCISION AND DRAINAGE,EXCISE VARICOCELE       Review of Systems     OBJECTIVE:     /60   Pulse 75   Temp 97.1  F (36.2  C)   Resp 20   Wt 83.5 kg (184 lb)   SpO2 100%   BMI 26.40 kg/m    Body mass index is 26.4 kg/m .  Physical Exam  Constitutional:       Comments: Patient appears to be slightly cachectic   Neurological:      Mental Status: He is alert.         Diagnostic Test Results:  none     ASSESSMENT/PLAN:         (M79.662) Pain of left lower leg  (primary encounter diagnosis)  Comment:   Plan: MR Lumbar Spine w/o & w Contrast, LORazepam         (ATIVAN) 0.5 MG tablet            (M54.42,  G89.29) Chronic left-sided low back pain with left-sided sciatica  Comment:   Plan: MR Lumbar Spine w/o & w Contrast, LORazepam         (ATIVAN) 0.5 MG tablet            (C79.51) Malignant neoplasm metastatic to bone (H)  Comment:   Plan: methadone (DOLOPHINE) 5 MG tablet,         HYDROmorphone (DILAUDID) 2 MG tablet, EKG         12-lead, tracing only (Same Day), CANCELED: EKG        12-lead, tracing only (Same Day)            (C61) Prostate cancer (H)  Comment:   Plan:     I had a long discussion with the patient at typically worsening pain is not a good sign and face of cancer such as prostate cancer.  We discussed potential hospice referral.  Although he currently is on chemotherapy.  Also discussed changing to methadone and Dilaudid.  Patient is agreeable.  Dose  calculated.  He is currently taking 255 mill equivalents of morphine.  80 oxycodone 90 OxyContin.  Which converts to starting at 5 mg of methadone 3 times a day.  Will increase the methadone every 7 days.  Supplement with Dilaudid 2 mg or 8 mg equivalents to morphine.  Patient is cautioned not to change these dosages or frequency.  He is cautioned on the significant side effect of methadone causing respiratory depression if you increase it too fast.  Because of his leg pain and history of back surgery will proceed with MRI of the back.  Specially there might be tumor present.  Decrease the prednisone to 15 mg a day.  See if we are getting any relief from that.  Follow-up in 2 weeks.  He is cleared to increase the methadone and add 1 extra pill in 7 days.  Also advised him not to do any more driving while on the medication.    The longitudinal plan of care for the diagnosis(es)/condition(s) as documented were addressed during this visit. Due to the added complexity in care, I will continue to support Jermain in the subsequent management and with ongoing continuity of care.  EKG was satisfactory.  Ezequiel Alejandra MD  [facility]

## 2024-05-16 NOTE — TELEPHONE ENCOUNTER
Patients prescription for methadone will be available to  tomorrow. Pharmacy will fill prescription for HYDROphone 4 MG and will hold the prescription for 2 MG to fill when he is out of the 4 MG.    Anh Betancur LPN on 5/16/2024 at 2:00 PM

## 2024-05-16 NOTE — TELEPHONE ENCOUNTER
Patient was in yesterday and the doctor started him on a new med for pain. The pharmacy does not have the prescription.      Nancy Burnette on 5/16/2024 at 10:44 AM    [facility]

## 2024-05-17 ENCOUNTER — PATIENT OUTREACH (OUTPATIENT)
Dept: ONCOLOGY | Facility: OTHER | Age: 69
End: 2024-05-17
Payer: COMMERCIAL

## 2024-05-17 NOTE — PROGRESS NOTES
Jermain called and he received the Xtandi today. He will start on Monday 5/20/24. Writer will call Jermain in 1 month to see how he is feeling.  Elza Lundberg RN...........5/17/2024 3:38 PM

## 2024-05-19 LAB
ATRIAL RATE - MUSE: 90 BPM
DIASTOLIC BLOOD PRESSURE - MUSE: NORMAL MMHG
INTERPRETATION ECG - MUSE: NORMAL
P AXIS - MUSE: 40 DEGREES
PR INTERVAL - MUSE: 134 MS
QRS DURATION - MUSE: 88 MS
QT - MUSE: 352 MS
QTC - MUSE: 430 MS
R AXIS - MUSE: 16 DEGREES
SYSTOLIC BLOOD PRESSURE - MUSE: NORMAL MMHG
T AXIS - MUSE: 36 DEGREES
VENTRICULAR RATE- MUSE: 90 BPM

## 2024-05-28 ENCOUNTER — OFFICE VISIT (OUTPATIENT)
Dept: FAMILY MEDICINE | Facility: OTHER | Age: 69
End: 2024-05-28
Attending: FAMILY MEDICINE
Payer: COMMERCIAL

## 2024-05-28 ENCOUNTER — LAB (OUTPATIENT)
Dept: LAB | Facility: OTHER | Age: 69
End: 2024-05-28
Attending: FAMILY MEDICINE
Payer: COMMERCIAL

## 2024-05-28 ENCOUNTER — HOSPITAL ENCOUNTER (OUTPATIENT)
Dept: MRI IMAGING | Facility: OTHER | Age: 69
Discharge: HOME | End: 2024-05-28
Attending: FAMILY MEDICINE
Payer: COMMERCIAL

## 2024-05-28 ENCOUNTER — TELEPHONE (OUTPATIENT)
Dept: ONCOLOGY | Facility: OTHER | Age: 69
End: 2024-05-28

## 2024-05-28 VITALS
RESPIRATION RATE: 20 BRPM | TEMPERATURE: 98.4 F | DIASTOLIC BLOOD PRESSURE: 60 MMHG | OXYGEN SATURATION: 97 % | SYSTOLIC BLOOD PRESSURE: 122 MMHG | HEART RATE: 69 BPM

## 2024-05-28 DIAGNOSIS — C61 PROSTATE CANCER (H): ICD-10-CM

## 2024-05-28 DIAGNOSIS — C79.51 MALIGNANT NEOPLASM METASTATIC TO BONE (H): ICD-10-CM

## 2024-05-28 DIAGNOSIS — M54.42 CHRONIC LEFT-SIDED LOW BACK PAIN WITH LEFT-SIDED SCIATICA: ICD-10-CM

## 2024-05-28 DIAGNOSIS — R97.21 RISING PSA FOLLOWING TREATMENT FOR MALIGNANT NEOPLASM OF PROSTATE: ICD-10-CM

## 2024-05-28 DIAGNOSIS — G89.29 CHRONIC LEFT-SIDED LOW BACK PAIN WITH LEFT-SIDED SCIATICA: ICD-10-CM

## 2024-05-28 DIAGNOSIS — C61 MALIGNANT NEOPLASM OF PROSTATE (H): ICD-10-CM

## 2024-05-28 DIAGNOSIS — M86.68 CHRONIC OSTEOMYELITIS OF SYMPHYSIS PUBIS (H): ICD-10-CM

## 2024-05-28 DIAGNOSIS — M79.662 PAIN OF LEFT LOWER LEG: ICD-10-CM

## 2024-05-28 LAB
ALBUMIN SERPL BCG-MCNC: 3.5 G/DL (ref 3.5–5.2)
ALP SERPL-CCNC: 131 U/L (ref 40–150)
ALT SERPL W P-5'-P-CCNC: 9 U/L (ref 0–70)
ANION GAP SERPL CALCULATED.3IONS-SCNC: 11 MMOL/L (ref 7–15)
AST SERPL W P-5'-P-CCNC: 20 U/L (ref 0–45)
BASOPHILS # BLD AUTO: 0 10E3/UL (ref 0–0.2)
BASOPHILS NFR BLD AUTO: 0 %
BILIRUB SERPL-MCNC: 0.3 MG/DL
BUN SERPL-MCNC: 20.8 MG/DL (ref 8–23)
CALCIUM SERPL-MCNC: 9 MG/DL (ref 8.8–10.2)
CHLORIDE SERPL-SCNC: 105 MMOL/L (ref 98–107)
CREAT SERPL-MCNC: 0.88 MG/DL (ref 0.67–1.17)
CRP SERPL-MCNC: 7.5 MG/L
DEPRECATED HCO3 PLAS-SCNC: 23 MMOL/L (ref 22–29)
EGFRCR SERPLBLD CKD-EPI 2021: >90 ML/MIN/1.73M2
EOSINOPHIL # BLD AUTO: 0.2 10E3/UL (ref 0–0.7)
EOSINOPHIL NFR BLD AUTO: 2 %
ERYTHROCYTE [DISTWIDTH] IN BLOOD BY AUTOMATED COUNT: 18.3 % (ref 10–15)
GLUCOSE SERPL-MCNC: 101 MG/DL (ref 70–99)
HCT VFR BLD AUTO: 31.5 % (ref 40–53)
HGB BLD-MCNC: 9.7 G/DL (ref 13.3–17.7)
IMM GRANULOCYTES # BLD: 0 10E3/UL
IMM GRANULOCYTES NFR BLD: 0 %
LYMPHOCYTES # BLD AUTO: 2 10E3/UL (ref 0.8–5.3)
LYMPHOCYTES NFR BLD AUTO: 22 %
MCH RBC QN AUTO: 25.1 PG (ref 26.5–33)
MCHC RBC AUTO-ENTMCNC: 30.8 G/DL (ref 31.5–36.5)
MCV RBC AUTO: 81 FL (ref 78–100)
MONOCYTES # BLD AUTO: 0.6 10E3/UL (ref 0–1.3)
MONOCYTES NFR BLD AUTO: 6 %
NEUTROPHILS # BLD AUTO: 6 10E3/UL (ref 1.6–8.3)
NEUTROPHILS NFR BLD AUTO: 69 %
NRBC # BLD AUTO: 0 10E3/UL
NRBC BLD AUTO-RTO: 0 /100
PLATELET # BLD AUTO: 344 10E3/UL (ref 150–450)
POTASSIUM SERPL-SCNC: 3.7 MMOL/L (ref 3.4–5.3)
PROT SERPL-MCNC: 6.3 G/DL (ref 6.4–8.3)
RBC # BLD AUTO: 3.87 10E6/UL (ref 4.4–5.9)
SODIUM SERPL-SCNC: 139 MMOL/L (ref 135–145)
WBC # BLD AUTO: 8.8 10E3/UL (ref 4–11)

## 2024-05-28 PROCEDURE — 36415 COLL VENOUS BLD VENIPUNCTURE: CPT | Mod: ZL

## 2024-05-28 PROCEDURE — 99214 OFFICE O/P EST MOD 30 MIN: CPT | Performed by: FAMILY MEDICINE

## 2024-05-28 PROCEDURE — A9575 INJ GADOTERATE MEGLUMI 0.1ML: HCPCS | Mod: JZ | Performed by: FAMILY MEDICINE

## 2024-05-28 PROCEDURE — 85025 COMPLETE CBC W/AUTO DIFF WBC: CPT | Mod: ZL

## 2024-05-28 PROCEDURE — 255N000002 HC RX 255 OP 636: Mod: JZ | Performed by: FAMILY MEDICINE

## 2024-05-28 PROCEDURE — 72158 MRI LUMBAR SPINE W/O & W/DYE: CPT

## 2024-05-28 PROCEDURE — G2211 COMPLEX E/M VISIT ADD ON: HCPCS | Performed by: FAMILY MEDICINE

## 2024-05-28 PROCEDURE — 86140 C-REACTIVE PROTEIN: CPT | Mod: ZL

## 2024-05-28 PROCEDURE — 84075 ASSAY ALKALINE PHOSPHATASE: CPT | Mod: ZL

## 2024-05-28 PROCEDURE — G0463 HOSPITAL OUTPT CLINIC VISIT: HCPCS | Mod: 25 | Performed by: FAMILY MEDICINE

## 2024-05-28 PROCEDURE — 82374 ASSAY BLOOD CARBON DIOXIDE: CPT | Mod: ZL

## 2024-05-28 RX ORDER — HYDROMORPHONE HYDROCHLORIDE 4 MG/1
2 TABLET ORAL EVERY 4 HOURS PRN
Qty: 45 TABLET | Refills: 0 | Status: SHIPPED | OUTPATIENT
Start: 2024-05-28 | End: 2024-06-13

## 2024-05-28 RX ORDER — METHADONE HYDROCHLORIDE 10 MG/1
10 TABLET ORAL EVERY 8 HOURS
Qty: 90 TABLET | Refills: 0 | Status: SHIPPED | OUTPATIENT
Start: 2024-05-28 | End: 2024-06-13

## 2024-05-28 RX ADMIN — GADOTERATE MEGLUMINE 18 ML: 376.9 INJECTION INTRAVENOUS at 07:57

## 2024-05-28 ASSESSMENT — PAIN SCALES - GENERAL: PAINLEVEL: SEVERE PAIN (7)

## 2024-05-28 NOTE — ORAL ONC MGMT
Oral Chemotherapy Monitoring Program   Left Voicemail    Attempted to contact patient today initial for follow up regarding oral chemotherapy, enzalutamide. No answer. Left voicemail for patient to call us back at [phone] when able. No medication name was left.    Cliff Valdez, PharmD  Oral Chemotherapy Pharmacist  [phone]

## 2024-05-28 NOTE — NURSING NOTE
Patient here for pain medication review. He says the methadone is working well for the long term pain after about a week he could notice the difference. Medication Reconciliation: complete.    Akosua Rivero LPN  5/28/2024 8:33 AM

## 2024-05-29 ENCOUNTER — TELEPHONE (OUTPATIENT)
Dept: ONCOLOGY | Facility: OTHER | Age: 69
End: 2024-05-29
Payer: COMMERCIAL

## 2024-05-29 NOTE — ORAL ONC MGMT
Oral Chemotherapy Monitoring Program    Primary Oncologist: Dr. Fritz  Drug: enzalutamide  Start Date: 5/20/2024    Subjective/Objective:  Dilip Kan is a 69 year old male contacted by phone for a follow-up visit for oral chemotherapy. Jermain reports feeling well since starting treatment but does have more fatigue. It is not inhibiting his daily activities and is relieved by rest.     Oral Chemotherapy Program  Lab review     Reviewed labs from 5/28/24     Labs are unremarkable and do not require any dose adjustments at this time           8/29/2022    11:00 AM 9/29/2022    10:00 AM 12/23/2022     8:00 AM 5/7/2024     8:00 AM 5/15/2024     4:00 PM 5/28/2024    12:00 PM 5/29/2024    10:00 AM   ORAL CHEMOTHERAPY   Assessment Type Initial Follow up Refill Refill Initial Work up New Teach Left Voicemail;Initial Follow up Initial Follow up   Diagnosis Code Prostate Cancer Prostate Cancer Prostate Cancer Prostate Cancer Prostate Cancer Prostate Cancer Prostate Cancer   Providers Catrina Fritz   Clinic Name/Location [facility]   Drug Name Zytiga (abiraterone) Zytiga (abiraterone) Zytiga (abiraterone) Xtandi (enzalutamide) Xtandi (enzalutamide) Xtandi (enzalutamide) Xtandi (enzalutamide)   Dose 1,000 mg 750 mg 1,000 mg 80 mg 80 mg 80 mg 80 mg   Current Schedule Daily Daily Daily Daily Daily Daily Daily   Cycle Details Continuous Continuous Continuous Continuous Continuous Continuous Continuous   Start Date of Last Cycle       5/20/2024   Planned next cycle start date 8/24/2022 6/19/2024   Doses missed in last 2 weeks 0      0   Adherence Assessment Adherent      Adherent   Adverse Effects Fatigue      Fatigue   Fatigue Grade 1      Grade 1   Pharmacist Intervention(fatigue) No      Yes   Intervention(s)       Patient education   Home BPs Not applicable         Any new drug interactions? No    "Yes   No   Pharmacist Intervention?    Yes      Is the dose as ordered appropriate for the patient? Yes   Yes   Yes   Is the patient currently in pain? No         Has the patient been assessed within the past 6 months for depression? Yes         Has the patient missed any days of school, work, or other routine activity? No         Since the last time we talked, have you been hospitalized or used the emergency room? No             Vitals:  BP:   BP Readings from Last 1 Encounters:   05/28/24 122/60     Wt Readings from Last 1 Encounters:   05/15/24 83.5 kg (184 lb)     Estimated body surface area is 2.03 meters squared as calculated from the following:    Height as of 5/6/24: 1.778 m (5' 10\").    Weight as of 5/15/24: 83.5 kg (184 lb).    Labs:  _  Result Component Current Result Ref Range   Sodium 139 (5/28/2024) 135 - 145 mmol/L     _  Result Component Current Result Ref Range   Potassium 3.7 (5/28/2024) 3.4 - 5.3 mmol/L     _  Result Component Current Result Ref Range   Calcium 9.0 (5/28/2024) 8.8 - 10.2 mg/dL     No results found for Mag within last 30 days.     No results found for Phos within last 30 days.     _  Result Component Current Result Ref Range   Albumin 3.5 (5/28/2024) 3.5 - 5.2 g/dL     _  Result Component Current Result Ref Range   Urea Nitrogen 20.8 (5/28/2024) 8.0 - 23.0 mg/dL     _  Result Component Current Result Ref Range   Creatinine 0.88 (5/28/2024) 0.67 - 1.17 mg/dL       _  Result Component Current Result Ref Range   AST 20 (5/28/2024) 0 - 45 U/L     _  Result Component Current Result Ref Range   ALT 9 (5/28/2024) 0 - 70 U/L     _  Result Component Current Result Ref Range   Bilirubin Total 0.3 (5/28/2024) <=1.2 mg/dL       _  Result Component Current Result Ref Range   WBC Count 8.8 (5/28/2024) 4.0 - 11.0 10e3/uL     _  Result Component Current Result Ref Range   Hemoglobin 9.7 (L) (5/28/2024) 13.3 - 17.7 g/dL     _  Result Component Current Result Ref Range   Platelet Count 344 " (5/28/2024) 150 - 450 10e3/uL     No results found for ANC within last 30 days.         Assessment/Plan:  We reviewed ways to manage fatigue and Jermain stated that it was manageable at this time. Continue with current treatment plan    Follow-Up:  6/26 - monthly labs      Cliff Valdez, PharmD  Oral Chemotherapy Pharmacist  [phone]

## 2024-05-29 NOTE — PROGRESS NOTES
SUBJECTIVE:   Dilip Kan is a 69 year old male who presents to clinic today for the following health issues: Follow-up pain management prostate cancer    Patient arrives here for follow-up pain management.  He states he has gotten improvement with the methadone after about a week of starting it.  He can see the difference.  He has cut down his Dilaudid.  He has not had any for the last couple days.  He is currently on 5 mg 4 times a day.  He recently did have his MRI of his back.  MRI report is pending    History of Present Illness       Reason for visit:  Pain    He eats 0-1 servings of fruits and vegetables daily.He consumes 2 sweetened beverage(s) daily.He exercises with enough effort to increase his heart rate 9 or less minutes per day.  He exercises with enough effort to increase his heart rate 3 or less days per week.   He is taking medications regularly.        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     Priority: Medium    Iron deficiency anemia secondary to inadequate dietary iron intake 11/20/2023     Priority: Medium    Sepsis (H) 06/29/2023     Priority: Medium    Acute deep vein thrombosis (DVT) of proximal vein of right lower extremity (H) 05/01/2023     Priority: Medium    Multiple subsegmental pulmonary emboli without acute cor pulmonale (H) 05/01/2023     Priority: Medium    Infection due to Klebsiella pneumoniae 04/21/2023     Priority: Medium    Bone metastasis 03/09/2023     Priority: Medium    Bone metastases 03/09/2023     Priority: Medium    Aneurysm of ascending aorta without rupture (H24) 10/05/2022     Priority: Medium    Status post total right knee replacement 11/16/2021     Priority: Medium    Malignant neoplasm of prostate (H) 09/16/2021     Priority: Medium    Plaque in heart artery-aorta 03/24/2020      Priority: Medium    Aortic valve stenosis with insufficiency, etiology of cardiac valve disease unspecified 03/22/2019     Priority: Medium    Mild aortic stenosis 03/22/2019     Priority: Medium    Ascending aortic aneurysm-moderate at 4.6 cm on 3/11/20 03/22/2019     Priority: Medium    Aortic valve insufficiency-moderate to severe 03/22/2019     Priority: Medium    Hx of syncope 03/22/2019     Priority: Medium    History of tobacco abuse quitting 11/8/2002 03/22/2019     Priority: Medium    Hypertriglyceridemia 03/22/2019     Priority: Medium    Mild pulmonary hypertension (H) 03/22/2019     Priority: Medium    Palpitations 03/22/2019     Priority: Medium    Rising PSA following treatment for malignant neoplasm of prostate 03/29/2018     Priority: Medium    Chronic, continuous use of opioids 01/31/2018     Priority: Medium     Patient is followed by Wei Perez MD for ongoing prescription of pain medication.  All refills should be approved by this provider only at face-to-face appointments - not by phone request.    Medication(s): Hydrocodone.   Maximum quantity per month: 135  Clinic visit frequency required: Q 3 months   PDMP Review         Value Time User    State PDMP site checked  Yes 8/25/2021 10:41 AM Wei Perez MD          Controlled substance agreement:  Patient-Level CSA:    There are no patient-level csa.       Pain Clinic evaluation in the past: No    Opioid Risk Tool Total Score(s):  OPIOID RISK TOOL TOTAL SCORE 8/25/2021   Total Score 0   Total Risk Score Category Low Risk (0-3)           Other specified causes of urethral stricture 01/31/2018     Priority: Medium    GERD 04/28/2017     Priority: Medium    Essential hypertension 04/28/2017     Priority: Medium    Non morbid obesity due to excess calories 04/28/2017     Priority: Medium    Mixed hyperlipidemia 04/28/2017     Priority: Medium    Spinal stenosis of lumbar region with neurogenic claudication 04/28/2017     Priority:  Medium    Controlled substance agreement updated 12- 01/15/2016     Priority: Medium    Backache 01/13/2014     Priority: Medium    Prostate cancer (H) 01/02/2013     Priority: Medium    Personal history of prostate cancer s/p prostatectomy and sEBRT 12/06/2012     Priority: Medium    Anemia due to acute blood loss 11/30/2012     Priority: Medium     Overview:   EBL 11/28/12:  1700 ml  EBL 11/28/12:  500 ml  Transfused 2 units pRBC's early on 11/29/12      Pelvic pain in male 11/30/2012     Priority: Medium       Review of Systems     OBJECTIVE:     /60   Pulse 69   Temp 98.4  F (36.9  C)   Resp 20   SpO2 97%   There is no height or weight on file to calculate BMI.  Physical Exam  Constitutional:       Comments: Patient is in a wheelchair   Cardiovascular:      Rate and Rhythm: Normal rate.   Neurological:      Mental Status: He is alert.   Psychiatric:         Mood and Affect: Mood normal.         Thought Content: Thought content normal.         Diagnostic Test Results:  none     ASSESSMENT/PLAN:         (M54.42,  G89.29) Chronic left-sided low back pain with left-sided sciatica  Comment:   Plan: HYDROmorphone (DILAUDID) 4 MG tablet, methadone        (DOLOPHINE) 10 MG tablet            (C79.51) Malignant neoplasm metastatic to bone (H)  Comment:   Plan: HYDROmorphone (DILAUDID) 4 MG tablet, methadone        (DOLOPHINE) 10 MG tablet            (C61) Prostate cancer (H)  Comment:   Plan: HYDROmorphone (DILAUDID) 4 MG tablet, methadone        (DOLOPHINE) 10 MG tablet        Will increase his methadone to 10 in the morning 10 at noon 5 in the evening.  After week increase it to 10 mg 3 times a day.  He is given a prescription for Dilaudid with instructions to try to minimize its use.  Follow-up in 1 month.    Will get back to him when his MRI returns.  The longitudinal plan of care for the diagnosis(es)/condition(s) as documented were addressed during this visit. Due to the added complexity in care, I  will continue to support Jermain in the subsequent management and with ongoing continuity of care.        Ezequiel Alejandra MD  [facility]

## 2024-06-10 ENCOUNTER — TELEPHONE (OUTPATIENT)
Dept: FAMILY MEDICINE | Facility: OTHER | Age: 69
End: 2024-06-10
Payer: COMMERCIAL

## 2024-06-10 NOTE — TELEPHONE ENCOUNTER
Patient states he is on methadone and has been taking the short term pill but is out of that      Lm Turner on 6/10/2024 at 3:36 PM

## 2024-06-11 DIAGNOSIS — R97.21 RISING PSA FOLLOWING TREATMENT FOR MALIGNANT NEOPLASM OF PROSTATE: ICD-10-CM

## 2024-06-11 DIAGNOSIS — C61 MALIGNANT NEOPLASM OF PROSTATE (H): ICD-10-CM

## 2024-06-11 DIAGNOSIS — C79.51 MALIGNANT NEOPLASM METASTATIC TO BONE (H): Primary | ICD-10-CM

## 2024-06-11 NOTE — TELEPHONE ENCOUNTER
Patient Information     Patient Name MRN Sex Dilip Lomax [id number] Male 1955      Telephone Encounter by Marco A Aburto RN at 2018 10:49 AM     Author:  Marco A Aburto RN Service:  (none) Author Type:  NURS- Registered Nurse     Filed:  2018 10:54 AM Encounter Date:  2017 Status:  Signed     :  Marco A Aburto RN (NURS- Registered Nurse)            Ace Inhibitors    Office visit in the past 12 months or per provider note.    Last visit with BANDAR HUGHES was on: 10/18/2017 in [facility]  Next visit with BANDAR HUGHES is on: 01/10/2018 in [facility]  Next visit with Family Practice is on: 01/10/2018 in [facility]    Lab test requirements:  Creatinine and Potassium annually, if ordering lab, order BMP.  CREATININE (mg/dL)    Date Value   2017 0.76     POTASSIUM (mmol/L)    Date Value   2017 4.0     Max refill for 12 months from last office visit or per provider note    Chart review shows that patient was seen by PCP on 10/18/17 for medication management. B/P was noted in office visit notes on that date. No changes to rx as requested. Patient is again seeing PCP on 1/10/18. Labs up to date as noted above. Writer will refill rx as requested at this time.    Prescription refilled per RN Medication Refill Policy.................... Marco A Aburto RN ....................  2018   10:52 AM         \"Have you been to the ER, urgent care clinic since your last visit?  Hospitalized since your last visit?\"    [facility]    “Have you seen or consulted any other health care providers outside of [facility] since your last visit?”    NO     “Have you had a pap smear?”    NO    No cervical cancer screening on file         “Have you had a colorectal cancer screening such as a colonoscopy/FIT/Cologuard?    NO    No colonoscopy on file  No cologuard on file  No FIT/FOBT on file   No flexible sigmoidoscopy on file         Click Here for Release of Records Request     eating due to the stress and [facility] and [facility] medication to calm her mind. She states since then her MIL has moved out and she is doing better. She feels less stressed, her appetite has picked back up. She has gained some weight back     Vitals:    06/11/24 0833 06/11/24 0906   BP: (!) 165/75 (!) 140/68   Site: Left Upper Arm    Position: Sitting    Cuff Size: Medium Adult    Pulse: 91    Temp: 97.9 °F (36.6 °C)    TempSrc: Oral    Weight: 48.8 kg (107 lb 9.6 oz)    Height: 1.524 m (5')      There is no problem list on file for this patient.    There are no problems to display for this patient.       Allergies   Allergen Reactions    Penicillins Other (See Comments)    Sulfa Antibiotics Other (See Comments)     Past Medical History:   Diagnosis Date    Hypertension     Osteoarthritis      History reviewed. No pertinent surgical history.  History reviewed. No pertinent family history.  Social History     Tobacco Use    Smoking status: Every Day     Types: Cigarettes    Smokeless tobacco: Never   Substance Use Topics    Alcohol use: Yes           Review of Systems   Constitutional:  Negative for fatigue.   Respiratory:  Negative for shortness of breath.    Cardiovascular:  Negative for chest pain and palpitations.   Genitourinary:  Negative for frequency.   Neurological:  Negative for dizziness and headaches.         Physical Exam  Constitutional:       General: She is not in acute distress.     Appearance: Normal appearance.   HENT:      Head: Normocephalic.      Right Ear: External ear normal.      Left Ear: External ear normal.   Cardiovascular:      Rate and Rhythm: Normal rate and regular rhythm.      Heart sounds: Normal heart sounds.   Pulmonary:      Effort: Pulmonary effort is normal.      Breath sounds: Normal breath sounds.   Musculoskeletal:      Right lower leg: No edema.      Left lower leg: No edema.   Skin:     Findings: No rash.   Neurological:      Mental Status: She is

## 2024-06-13 ENCOUNTER — OFFICE VISIT (OUTPATIENT)
Dept: FAMILY MEDICINE | Facility: OTHER | Age: 69
End: 2024-06-13
Attending: FAMILY MEDICINE
Payer: COMMERCIAL

## 2024-06-13 ENCOUNTER — TELEPHONE (OUTPATIENT)
Dept: FAMILY MEDICINE | Facility: OTHER | Age: 69
End: 2024-06-13
Payer: COMMERCIAL

## 2024-06-13 VITALS
TEMPERATURE: 97.8 F | DIASTOLIC BLOOD PRESSURE: 58 MMHG | HEART RATE: 76 BPM | WEIGHT: 181 LBS | BODY MASS INDEX: 25.97 KG/M2 | RESPIRATION RATE: 24 BRPM | SYSTOLIC BLOOD PRESSURE: 122 MMHG | OXYGEN SATURATION: 99 %

## 2024-06-13 DIAGNOSIS — C79.51 MALIGNANT NEOPLASM METASTATIC TO BONE (H): ICD-10-CM

## 2024-06-13 DIAGNOSIS — M54.42 CHRONIC LEFT-SIDED LOW BACK PAIN WITH LEFT-SIDED SCIATICA: ICD-10-CM

## 2024-06-13 DIAGNOSIS — K65.1 PELVIC ABSCESS IN MALE (H): ICD-10-CM

## 2024-06-13 DIAGNOSIS — G89.29 CHRONIC LEFT-SIDED LOW BACK PAIN WITH LEFT-SIDED SCIATICA: ICD-10-CM

## 2024-06-13 DIAGNOSIS — C61 MALIGNANT NEOPLASM OF PROSTATE (H): ICD-10-CM

## 2024-06-13 DIAGNOSIS — C61 PROSTATE CANCER (H): Primary | ICD-10-CM

## 2024-06-13 DIAGNOSIS — Z79.891 LONG TERM PRESCRIPTION OPIATE USE: ICD-10-CM

## 2024-06-13 LAB
ALBUMIN SERPL BCG-MCNC: 3.6 G/DL (ref 3.5–5.2)
ALP SERPL-CCNC: 174 U/L (ref 40–150)
ALT SERPL W P-5'-P-CCNC: 8 U/L (ref 0–70)
ANION GAP SERPL CALCULATED.3IONS-SCNC: 9 MMOL/L (ref 7–15)
AST SERPL W P-5'-P-CCNC: 35 U/L (ref 0–45)
BILIRUB SERPL-MCNC: 0.4 MG/DL
BUN SERPL-MCNC: 15.9 MG/DL (ref 8–23)
CALCIUM SERPL-MCNC: 9.9 MG/DL (ref 8.8–10.2)
CHLORIDE SERPL-SCNC: 103 MMOL/L (ref 98–107)
CREAT SERPL-MCNC: 0.96 MG/DL (ref 0.67–1.17)
CRP SERPL-MCNC: 19.93 MG/L
DEPRECATED HCO3 PLAS-SCNC: 25 MMOL/L (ref 22–29)
EGFRCR SERPLBLD CKD-EPI 2021: 85 ML/MIN/1.73M2
ERYTHROCYTE [DISTWIDTH] IN BLOOD BY AUTOMATED COUNT: 16.8 % (ref 10–15)
ERYTHROCYTE [SEDIMENTATION RATE] IN BLOOD BY WESTERGREN METHOD: 18 MM/HR (ref 0–20)
GLUCOSE SERPL-MCNC: 103 MG/DL (ref 70–99)
HCT VFR BLD AUTO: 33.1 % (ref 40–53)
HGB BLD-MCNC: 10.1 G/DL (ref 13.3–17.7)
MCH RBC QN AUTO: 24.5 PG (ref 26.5–33)
MCHC RBC AUTO-ENTMCNC: 30.5 G/DL (ref 31.5–36.5)
MCV RBC AUTO: 80 FL (ref 78–100)
PLATELET # BLD AUTO: 290 10E3/UL (ref 150–450)
POTASSIUM SERPL-SCNC: 4.5 MMOL/L (ref 3.4–5.3)
PROT SERPL-MCNC: 7.1 G/DL (ref 6.4–8.3)
PSA SERPL DL<=0.01 NG/ML-MCNC: 21.22 NG/ML (ref 0–6.5)
RBC # BLD AUTO: 4.12 10E6/UL (ref 4.4–5.9)
SODIUM SERPL-SCNC: 137 MMOL/L (ref 135–145)
WBC # BLD AUTO: 7.1 10E3/UL (ref 4–11)

## 2024-06-13 PROCEDURE — 85652 RBC SED RATE AUTOMATED: CPT | Mod: ZL | Performed by: FAMILY MEDICINE

## 2024-06-13 PROCEDURE — G0463 HOSPITAL OUTPT CLINIC VISIT: HCPCS

## 2024-06-13 PROCEDURE — 86140 C-REACTIVE PROTEIN: CPT | Mod: ZL | Performed by: FAMILY MEDICINE

## 2024-06-13 PROCEDURE — 80053 COMPREHEN METABOLIC PANEL: CPT | Mod: ZL | Performed by: FAMILY MEDICINE

## 2024-06-13 PROCEDURE — 84153 ASSAY OF PSA TOTAL: CPT | Mod: ZL | Performed by: FAMILY MEDICINE

## 2024-06-13 PROCEDURE — 85027 COMPLETE CBC AUTOMATED: CPT | Mod: ZL | Performed by: FAMILY MEDICINE

## 2024-06-13 PROCEDURE — 36415 COLL VENOUS BLD VENIPUNCTURE: CPT | Mod: ZL | Performed by: FAMILY MEDICINE

## 2024-06-13 PROCEDURE — 99215 OFFICE O/P EST HI 40 MIN: CPT | Performed by: FAMILY MEDICINE

## 2024-06-13 RX ORDER — METHADONE HYDROCHLORIDE 10 MG/1
20 TABLET ORAL EVERY 12 HOURS
Qty: 120 TABLET | Refills: 0 | Status: SHIPPED | OUTPATIENT
Start: 2024-06-13 | End: 2024-07-11

## 2024-06-13 RX ORDER — HYDROMORPHONE HYDROCHLORIDE 4 MG/1
2 TABLET ORAL EVERY 6 HOURS PRN
Qty: 50 TABLET | Refills: 0 | Status: SHIPPED | OUTPATIENT
Start: 2024-06-13 | End: 2024-07-11

## 2024-06-13 ASSESSMENT — PAIN SCALES - GENERAL: PAINLEVEL: SEVERE PAIN (6)

## 2024-06-13 NOTE — TELEPHONE ENCOUNTER
Patient is wondering if he can be worked in ASAP as he was told by Ny that he wanted to see him today 06/13/2024 at 1pm and then he was told it was double booked and he needed to reschedule. Please call back patient to reschedule at earliest available time slot.     Danuta Crandall on 6/13/2024 at 9:32 AM

## 2024-06-13 NOTE — NURSING NOTE
Patient here for pain medication consult and refills on his Dilaudid Medication Reconciliation: complete.    Akosua Rivero LPN  6/13/2024 1:12 PM

## 2024-06-13 NOTE — TELEPHONE ENCOUNTER
Called and spoke with patient will see him this afternoon at 1 for medication refill. Akosua Rivero LPN .......................6/13/2024  10:09 AM

## 2024-06-16 PROBLEM — Z79.891 LONG TERM PRESCRIPTION OPIATE USE: Status: ACTIVE | Noted: 2024-06-16

## 2024-06-16 NOTE — PROGRESS NOTES
SUBJECTIVE:   Dilip Kan is a 69 year old male who presents to clinic today for the following health issues:  Medication renewal  Patient arrives here with his wife to discuss his opiate use.  He has completed and uses his Dilaudid.  I advised him and reminded him from his last visit that I was hopeful that that would last close to a month.  But he had completed his use in 2 weeks.  I had a lengthy discussion with the patient and his wife concerning about his opiate use.  We discussed increasing his methadone as to decrease his Dilaudid use.  We also discussed his past history of potentially overusing and abusing opiates.  He has been on opiates for years.  I did review the chart and I cannot see any note that is brought this up.  We also discussed that he does have a very serious disease metastatic prostate cancer which can be quite painful.  And at times is very difficult to differentiate the amount of opiates that he should be using and the situation.  I discussed putting a limit on his Dilaudid.  And he is reluctantly agreeable.  Patient does have an appointment with oncology.  We also discussed potential hospice.  Signs and symptoms of getting admitted to the hospice.  He over the last month has lost only about 3 to 4 pounds.  He reports that when his pain is under good control his appetite is also good.  He also reports when his pain is under control he is able to finish and be active.    History of Present Illness       Reason for visit:  Follow up  Symptom onset:  More than a month  Symptoms include:  Leg and back pain  Symptom intensity:  Severe  Symptom progression:  Staying the same  Had these symptoms before:  Yes  Has tried/received treatment for these symptoms:  Yes  Previous treatment was successful:  No    He eats 0-1 servings of fruits and vegetables daily.He consumes 3 sweetened beverage(s) daily.He exercises with enough effort to increase his heart rate 20 to 29 minutes per day.  He  exercises with enough effort to increase his heart rate 5 days per week. He is missing 1 dose(s) of medications per week.        Patient Active Problem List    Diagnosis Date Noted    Long term prescription opiate use 06/16/2024     Priority: Medium    Chronic osteomyelitis of symphysis pubis (H) 01/10/2024     Priority: Medium    Urinary fistula 11/30/2023     Priority: Medium    Radiation cystitis 11/30/2023     Priority: Medium    Iron deficiency anemia secondary to inadequate dietary iron intake 11/20/2023     Priority: Medium    Sepsis (H) 06/29/2023     Priority: Medium    Acute deep vein thrombosis (DVT) of proximal vein of right lower extremity (H) 05/01/2023     Priority: Medium    Multiple subsegmental pulmonary emboli without acute cor pulmonale (H) 05/01/2023     Priority: Medium    Infection due to Klebsiella pneumoniae 04/21/2023     Priority: Medium    Bone metastasis 03/09/2023     Priority: Medium    Bone metastases 03/09/2023     Priority: Medium    Aneurysm of ascending aorta without rupture (H24) 10/05/2022     Priority: Medium    Status post total right knee replacement 11/16/2021     Priority: Medium    Malignant neoplasm of prostate (H) 09/16/2021     Priority: Medium    Plaque in heart artery-aorta 03/24/2020     Priority: Medium    Aortic valve stenosis with insufficiency, etiology of cardiac valve disease unspecified 03/22/2019     Priority: Medium    Mild aortic stenosis 03/22/2019     Priority: Medium    Ascending aortic aneurysm-moderate at 4.6 cm on 3/11/20 03/22/2019     Priority: Medium    Aortic valve insufficiency-moderate to severe 03/22/2019     Priority: Medium    Hx of syncope 03/22/2019     Priority: Medium    History of tobacco abuse quitting 11/8/2002 03/22/2019     Priority: Medium    Hypertriglyceridemia 03/22/2019     Priority: Medium    Mild pulmonary hypertension (H) 03/22/2019     Priority: Medium    Palpitations 03/22/2019     Priority: Medium    Rising PSA following  treatment for malignant neoplasm of prostate 03/29/2018     Priority: Medium    Chronic, continuous use of opioids 01/31/2018     Priority: Medium     Patient is followed by Wei Perez MD for ongoing prescription of pain medication.  All refills should be approved by this provider only at face-to-face appointments - not by phone request.    Medication(s): Hydrocodone.   Maximum quantity per month: 135  Clinic visit frequency required: Q 3 months   PDMP Review         Value Time User    State PDMP site checked  Yes 8/25/2021 10:41 AM Wei Perez MD          Controlled substance agreement:  Patient-Level CSA:    There are no patient-level csa.       Pain Clinic evaluation in the past: No    Opioid Risk Tool Total Score(s):  OPIOID RISK TOOL TOTAL SCORE 8/25/2021   Total Score 0   Total Risk Score Category Low Risk (0-3)           Other specified causes of urethral stricture 01/31/2018     Priority: Medium    GERD 04/28/2017     Priority: Medium    Essential hypertension 04/28/2017     Priority: Medium    Non morbid obesity due to excess calories 04/28/2017     Priority: Medium    Mixed hyperlipidemia 04/28/2017     Priority: Medium    Spinal stenosis of lumbar region with neurogenic claudication 04/28/2017     Priority: Medium    Backache 01/13/2014     Priority: Medium    Prostate cancer (H) 01/02/2013     Priority: Medium    Personal history of prostate cancer s/p prostatectomy and sEBRT 12/06/2012     Priority: Medium    Anemia due to acute blood loss 11/30/2012     Priority: Medium     Overview:   EBL 11/28/12:  1700 ml  EBL 11/28/12:  500 ml  Transfused 2 units pRBC's early on 11/29/12      Pelvic pain in male 11/30/2012     Priority: Medium     Past Medical History:   Diagnosis Date    Elevated prostate specific antigen (PSA)     4/10/2012    Encounter for other administrative examinations     1/31/2014    Esophagitis     No Comments Provided    Gastro-esophageal reflux disease without esophagitis      No Comments Provided    Low back pain     No Comments Provided    Malignant neoplasm of prostate (H)     9/6/2012    Nicotine dependence, uncomplicated     Quit - October 2007 with chantix    Other intervertebral disc degeneration, lumbosacral region     12/1/2008      Past Surgical History:   Procedure Laterality Date    APPENDECTOMY OPEN  091909    Ruptured - [facility]    ARTHROPLASTY KNEE Right 11/16/2021    Procedure: ARTHROPLASTY, KNEE, TOTAL;  Surgeon: Micah Rock MD;  Location: [facility] OR    COLONOSCOPY  08/31/2006    next due in 2016    CYSTECTOMY BLADDER, ILEAL DIVERSION, COMBINED N/A 11/30/2023    Procedure: CYSTECTOMY, WITH URETEROILEAL CONDUIT CREATION and Pubectomy;  Surgeon: Miki Correa MD;  Location: [facility] OR    DISKECTOMY, LUMBAR, SINGLE SP  03/16/2009    L 3-4 microdiskectomy, [facility]    ESOPHAGOSCOPY, GASTROSCOPY, DUODENOSCOPY (EGD), COMBINED  03/2003         ESOPHAGOSCOPY, GASTROSCOPY, DUODENOSCOPY (EGD), COMBINED  08/31/2006         GRAFT FLAP PEDICLE PERINEUM N/A 11/30/2023    Procedure: Left Rectus Abdominis flap;  Surgeon: YADIRA Guadalupe MD;  Location: [facility] OR    PICC DOUBLE LUMEN PLACEMENT Right 12/01/2023    5FR DL PICC, basilic vein. L-43cm, 2cm out.    PROSTATECTOMY PERINEAL RADICAL  11/28/2012    Nerve Sparring, Dr Warner    SPINE SURGERY N/A 04/28/2017    L3 to S1 spinal decompression L4/L5 fusion    TONSILLECTOMY, ADENOIDECTOMY, COMBINED      as a child    VARICOCELECTOMY  1959    INCISION AND DRAINAGE,EXCISE VARICOCELE     Family History   Problem Relation Age of Onset    Hypertension Mother         Hypertension,HTN    Other - See Comments Mother          Parkinsons    Heart Disease Father         Heart Disease, passed away from CHF,CAD    Colon Cancer Father         Cancer-colon    Hypertension Father         Hypertension    Other - See Comments Father         Stroke/Dementia    Family History Negative Sister         Good Health,emotional problems.    Family  History Negative Sister         Good Health    Other - See Comments Daughter         w/o major medical problems.    Other - See Comments Son         w/o major medical problems.    Family History Negative Other         Good Health    Family History Negative Other         Good Health,previous marriage./previous marriage.    Family History Negative Other         Good Health,previous marriage.    Anesthesia Reaction No family hx of     Venous thrombosis No family hx of        Review of Systems     OBJECTIVE:     /58   Pulse 76   Temp 97.8  F (36.6  C)   Resp 24   Wt 82.1 kg (181 lb)   SpO2 99%   BMI 25.97 kg/m    Body mass index is 25.97 kg/m .  Physical Exam  Constitutional:       Appearance: Normal appearance.   HENT:      Head: Normocephalic.   Neurological:      Mental Status: He is alert.   Psychiatric:         Mood and Affect: Mood normal.         Thought Content: Thought content normal.             ASSESSMENT/PLAN:         (C61) Prostate cancer (H)  (primary encounter diagnosis)  Comment:   Plan: HYDROmorphone (DILAUDID) 4 MG tablet, methadone        (DOLOPHINE) 10 MG tablet            (C61) Malignant neoplasm of prostate (H)  Comment:   Plan: methadone (DOLOPHINE) 10 MG tablet            (M54.42,  G89.29) Chronic left-sided low back pain with left-sided sciatica  Comment:   Plan: HYDROmorphone (DILAUDID) 4 MG tablet            (C79.51) Malignant neoplasm metastatic to bone (H)  Comment:   Plan: HYDROmorphone (DILAUDID) 4 MG tablet            (K65.1) Pelvic abscess in male (H)  Comment:   Plan:     (C79.51) Bone metastases  Comment:   Plan:     (Z79.891) Long term prescription opiate use  Comment:   Plan: Will increase his methadone to 20 mg twice a day.  Also continue with Dilaudid as a breakthrough.  Follow-up in 1 month.  I did advise him I expect his Dilaudid to last for that month.  If not longer.  He is agreeable.  Will see the patient in 1 month.      Ezequiel Alejandra MD  [facility]  [facility]

## 2024-06-18 DIAGNOSIS — R06.02 SHORTNESS OF BREATH: ICD-10-CM

## 2024-06-18 DIAGNOSIS — C79.51 MALIGNANT NEOPLASM METASTATIC TO BONE (H): Primary | ICD-10-CM

## 2024-06-18 RX ORDER — FUROSEMIDE 20 MG
20 TABLET ORAL DAILY PRN
Qty: 30 TABLET | Refills: 3 | Status: SHIPPED | OUTPATIENT
Start: 2024-06-18 | End: 2024-08-19

## 2024-06-19 ENCOUNTER — PATIENT OUTREACH (OUTPATIENT)
Dept: ONCOLOGY | Facility: OTHER | Age: 69
End: 2024-06-19
Payer: COMMERCIAL

## 2024-06-20 RX ORDER — PREDNISONE 10 MG/1
10 TABLET ORAL DAILY
Qty: 30 TABLET | Refills: 0 | Status: SHIPPED | OUTPATIENT
Start: 2024-06-20 | End: 2024-07-23

## 2024-06-20 NOTE — TELEPHONE ENCOUNTER
[facility] sent Rx request for the following:      Requested Prescriptions   Pending Prescriptions Disp Refills    predniSONE (DELTASONE) 20 MG tablet [Pharmacy Med Name: prednisone 20 mg tablet] 30 tablet 0     Sig: TAKE 1 TABLET BY MOUTH DAILY       There is no refill protocol information for this order  Not on active medication list as requested  Historically reported as 5 mg tablet daily     Last Office Visit:              6/13/24   Future Office visit:           6/28/24    Per LOV note:  Follow-up in 1 month.     Unable to complete prescription refill per RN Medication Refill Policy. Xiao Donald RN .............. 6/20/2024  10:54 AM

## 2024-06-24 ENCOUNTER — TELEPHONE (OUTPATIENT)
Dept: INFECTIOUS DISEASES | Facility: CLINIC | Age: 69
End: 2024-06-24
Payer: COMMERCIAL

## 2024-06-24 NOTE — TELEPHONE ENCOUNTER
Left patient a vm reminding him when his new appt with Eliza is: 8/27/2024 @ 1230p    Toma Jade, Lead [facility]  6/24/2024 12:43 PM

## 2024-06-24 NOTE — PROGRESS NOTES
Patient left voicemail on shared oncology phone(s) indicating a need to speak with Elza, and to please call him back at number listed on file. No further details given.     Attempted to call patient back with no answer at this time, unable to leave voicemail.     Please call upon return to clinic.  Ann Anderson LPN...................6/24/2024   11:52 AM

## 2024-06-27 NOTE — PROGRESS NOTES
"[facility]: Cancer Care Follow-Up Note                                    Discussion with Patient:                                                      Spoke with Jermain and he is experiencing some neuropathy in his feet. States \"feels funny when he walks\". He has also has some mouth sores and is using baking soda rinse. He is eating and drinking good. No constipation or diarrhea.          Goals          General     Being Active-pain relief (pt-stated)      Notes - Note created  10/13/2023 11:00 AM by Elza Lundberg RN     Goal Statement: I will establish a plan for preventing and managing pain.  Date Goal set: 10/13/2023  Barriers: disease burden and multiple diagnoses  Strengths: support, coping, motivation, and involvement with care team  Date to Achieve By: 6 months  Patient expressed understanding of goal: Yes  Action steps to achieve this goal:  I will take medications as prescribed.   I will call triage with new or worsening pain not controlled by medication.   I will use alternative therapies as directed. (Ice, heat, massage, etc)   I will call triage for medication refills when there are 2-3 days of medication remaining.         Patient will adhere to medication regimen             Dates of Treatment:                                                      Infusion given in last 28 days       None          Treatment Plan Medications       Oral ONC Prostate Cancer - Enzalutamide        Take Home Medications       Medication Sig Start/End Day/Cycle Status    enzalutamide (XTANDI) 80 MG tablet Take 1 tablet (80 mg) by mouth daily for 30 days 6/19/2024 to 7/19/2024 Day 1, Cycle 2 - 6/19/2024 Released                            Assessment:                                                          RNCC Short Symptom Review:     Assessment completed with:: Patient    General/Short Assessment  Does the patient have all their medications?: Yes  Is the patient experiencing any new or worsening symptoms?: " No  Discussion with patient: Answered patient's questions and addressed concerns;Reviewed how and when to contact clinic;Reviewed patient's future appointments    Pain  Patient Reported Pain?: Yes, is currently well-managed  Pain Score: Moderate Pain (5)  Pain Loc: Hip  Pain Management Interventions: medication (see MAR)    Patient Coping  Accepting    Clinic Utilization  Patient Aware of Next Appointment?: Yes    Other Patient Concerns  Other Patient Reported Concerns: No    Intervention/Education provided during outreach:                                                       Asked  to call and schedule labs for Jermain. He needs labs every 2 weeks. He is scheduled to see Erum Fritz MD in July. He will call if neuropathy worsens or mouth sores become bothersome or baking soda rinse not working.    Patient to follow up as scheduled at next appt  Patient to call/Anhelohart message with updates  Confirmed patient has clinic and triage numbers    Signature:  Elza Lundberg RN

## 2024-07-02 ENCOUNTER — DOCUMENTATION ONLY (OUTPATIENT)
Dept: FAMILY MEDICINE | Facility: OTHER | Age: 69
End: 2024-07-02
Payer: COMMERCIAL

## 2024-07-02 DIAGNOSIS — R33.9 RETENTION OF URINE, UNSPECIFIED: Primary | ICD-10-CM

## 2024-07-07 ENCOUNTER — HEALTH MAINTENANCE LETTER (OUTPATIENT)
Age: 69
End: 2024-07-07

## 2024-07-09 DIAGNOSIS — R97.21 RISING PSA FOLLOWING TREATMENT FOR MALIGNANT NEOPLASM OF PROSTATE: ICD-10-CM

## 2024-07-09 DIAGNOSIS — C79.51 MALIGNANT NEOPLASM METASTATIC TO BONE (H): Primary | ICD-10-CM

## 2024-07-09 DIAGNOSIS — C61 MALIGNANT NEOPLASM OF PROSTATE (H): ICD-10-CM

## 2024-07-11 ENCOUNTER — MYC MEDICAL ADVICE (OUTPATIENT)
Dept: PHARMACY | Facility: CLINIC | Age: 69
End: 2024-07-11

## 2024-07-11 ENCOUNTER — OFFICE VISIT (OUTPATIENT)
Dept: FAMILY MEDICINE | Facility: OTHER | Age: 69
End: 2024-07-11
Attending: FAMILY MEDICINE
Payer: COMMERCIAL

## 2024-07-11 VITALS
HEART RATE: 68 BPM | OXYGEN SATURATION: 98 % | TEMPERATURE: 97.3 F | DIASTOLIC BLOOD PRESSURE: 58 MMHG | BODY MASS INDEX: 26.2 KG/M2 | RESPIRATION RATE: 20 BRPM | WEIGHT: 182.6 LBS | SYSTOLIC BLOOD PRESSURE: 132 MMHG

## 2024-07-11 DIAGNOSIS — M86.68 CHRONIC OSTEOMYELITIS OF SYMPHYSIS PUBIS (H): ICD-10-CM

## 2024-07-11 DIAGNOSIS — M54.42 CHRONIC LEFT-SIDED LOW BACK PAIN WITH LEFT-SIDED SCIATICA: ICD-10-CM

## 2024-07-11 DIAGNOSIS — C61 PROSTATE CANCER (H): Primary | ICD-10-CM

## 2024-07-11 DIAGNOSIS — C61 MALIGNANT NEOPLASM OF PROSTATE (H): ICD-10-CM

## 2024-07-11 DIAGNOSIS — G89.29 CHRONIC LEFT-SIDED LOW BACK PAIN WITH LEFT-SIDED SCIATICA: ICD-10-CM

## 2024-07-11 DIAGNOSIS — C79.51 MALIGNANT NEOPLASM METASTATIC TO BONE (H): ICD-10-CM

## 2024-07-11 DIAGNOSIS — R97.21 RISING PSA FOLLOWING TREATMENT FOR MALIGNANT NEOPLASM OF PROSTATE: ICD-10-CM

## 2024-07-11 LAB
ALBUMIN SERPL BCG-MCNC: 3.6 G/DL (ref 3.5–5.2)
ALP SERPL-CCNC: 155 U/L (ref 40–150)
ALT SERPL W P-5'-P-CCNC: 9 U/L (ref 0–70)
ANION GAP SERPL CALCULATED.3IONS-SCNC: 11 MMOL/L (ref 7–15)
AST SERPL W P-5'-P-CCNC: 41 U/L (ref 0–45)
BASOPHILS # BLD AUTO: 0 10E3/UL (ref 0–0.2)
BASOPHILS NFR BLD AUTO: 1 %
BILIRUB SERPL-MCNC: 0.3 MG/DL
BUN SERPL-MCNC: 23.4 MG/DL (ref 8–23)
CALCIUM SERPL-MCNC: 9.6 MG/DL (ref 8.8–10.2)
CHLORIDE SERPL-SCNC: 100 MMOL/L (ref 98–107)
CREAT SERPL-MCNC: 1.01 MG/DL (ref 0.67–1.17)
CRP SERPL-MCNC: 6.22 MG/L
DEPRECATED HCO3 PLAS-SCNC: 25 MMOL/L (ref 22–29)
EGFRCR SERPLBLD CKD-EPI 2021: 80 ML/MIN/1.73M2
EOSINOPHIL # BLD AUTO: 0.2 10E3/UL (ref 0–0.7)
EOSINOPHIL NFR BLD AUTO: 4 %
ERYTHROCYTE [DISTWIDTH] IN BLOOD BY AUTOMATED COUNT: 16.8 % (ref 10–15)
ERYTHROCYTE [SEDIMENTATION RATE] IN BLOOD BY WESTERGREN METHOD: 13 MM/HR (ref 0–20)
GLUCOSE SERPL-MCNC: 98 MG/DL (ref 70–99)
HCT VFR BLD AUTO: 30.4 % (ref 40–53)
HGB BLD-MCNC: 9.3 G/DL (ref 13.3–17.7)
IMM GRANULOCYTES # BLD: 0 10E3/UL
IMM GRANULOCYTES NFR BLD: 0 %
LYMPHOCYTES # BLD AUTO: 1.4 10E3/UL (ref 0.8–5.3)
LYMPHOCYTES NFR BLD AUTO: 26 %
MCH RBC QN AUTO: 24.2 PG (ref 26.5–33)
MCHC RBC AUTO-ENTMCNC: 30.6 G/DL (ref 31.5–36.5)
MCV RBC AUTO: 79 FL (ref 78–100)
MONOCYTES # BLD AUTO: 0.4 10E3/UL (ref 0–1.3)
MONOCYTES NFR BLD AUTO: 8 %
NEUTROPHILS # BLD AUTO: 3.3 10E3/UL (ref 1.6–8.3)
NEUTROPHILS NFR BLD AUTO: 61 %
NRBC # BLD AUTO: 0 10E3/UL
NRBC BLD AUTO-RTO: 0 /100
PLATELET # BLD AUTO: 297 10E3/UL (ref 150–450)
POTASSIUM SERPL-SCNC: 4.4 MMOL/L (ref 3.4–5.3)
PROT SERPL-MCNC: 6.8 G/DL (ref 6.4–8.3)
RBC # BLD AUTO: 3.84 10E6/UL (ref 4.4–5.9)
SODIUM SERPL-SCNC: 136 MMOL/L (ref 135–145)
WBC # BLD AUTO: 5.5 10E3/UL (ref 4–11)

## 2024-07-11 PROCEDURE — G0463 HOSPITAL OUTPT CLINIC VISIT: HCPCS

## 2024-07-11 PROCEDURE — 86140 C-REACTIVE PROTEIN: CPT | Mod: ZL | Performed by: FAMILY MEDICINE

## 2024-07-11 PROCEDURE — 85025 COMPLETE CBC W/AUTO DIFF WBC: CPT | Mod: ZL | Performed by: FAMILY MEDICINE

## 2024-07-11 PROCEDURE — 99215 OFFICE O/P EST HI 40 MIN: CPT | Performed by: FAMILY MEDICINE

## 2024-07-11 PROCEDURE — 82040 ASSAY OF SERUM ALBUMIN: CPT | Mod: ZL | Performed by: FAMILY MEDICINE

## 2024-07-11 PROCEDURE — 36415 COLL VENOUS BLD VENIPUNCTURE: CPT | Mod: ZL | Performed by: FAMILY MEDICINE

## 2024-07-11 PROCEDURE — 85652 RBC SED RATE AUTOMATED: CPT | Mod: ZL | Performed by: FAMILY MEDICINE

## 2024-07-11 RX ORDER — HYDROMORPHONE HYDROCHLORIDE 4 MG/1
2 TABLET ORAL EVERY 6 HOURS PRN
Qty: 60 TABLET | Refills: 0 | Status: SHIPPED | OUTPATIENT
Start: 2024-07-11 | End: 2024-07-18 | Stop reason: ALTCHOICE

## 2024-07-11 RX ORDER — METHADONE HYDROCHLORIDE 10 MG/1
20 TABLET ORAL EVERY 8 HOURS
Qty: 180 TABLET | Refills: 0 | Status: SHIPPED | OUTPATIENT
Start: 2024-07-11 | End: 2024-07-18 | Stop reason: ALTCHOICE

## 2024-07-11 RX ORDER — METHADONE HYDROCHLORIDE 10 MG/1
20 TABLET ORAL EVERY 12 HOURS
Qty: 180 TABLET | Refills: 0 | Status: SHIPPED | OUTPATIENT
Start: 2024-07-11 | End: 2024-07-11

## 2024-07-11 ASSESSMENT — PAIN SCALES - GENERAL: PAINLEVEL: SEVERE PAIN (6)

## 2024-07-11 NOTE — PROGRESS NOTES
SUBJECTIVE:   Dilip Kan is a 69 year old male who presents to clinic today for the following health issues:  Follow-up pain medication  Patient arrives here for follow-up.  He took his last Dilaudid about 3 to 4 days ago.  Reports that he did feel it.  He was not quite a bit of discomfort and pain.  Is currently on 20 mg of methadone twice a day.  Is also been noticing increasing swelling in the lower extremities.  Still tries to fish on occasion.  But that is becoming more more difficult.  Patient does have an appointment with oncology on the 18th.    History of Present Illness       Reason for visit:  Leg pain    He eats 0-1 servings of fruits and vegetables daily.He consumes 2 sweetened beverage(s) daily.He exercises with enough effort to increase his heart rate 10 to 19 minutes per day.  He exercises with enough effort to increase his heart rate 4 days per week.   He is taking medications regularly.        Patient Active Problem List    Diagnosis Date Noted    Long term prescription opiate use 06/16/2024     Priority: Medium    Chronic osteomyelitis of symphysis pubis (H) 01/10/2024     Priority: Medium    Urinary fistula 11/30/2023     Priority: Medium    Radiation cystitis 11/30/2023     Priority: Medium    Iron deficiency anemia secondary to inadequate dietary iron intake 11/20/2023     Priority: Medium    Sepsis (H) 06/29/2023     Priority: Medium    Acute deep vein thrombosis (DVT) of proximal vein of right lower extremity (H) 05/01/2023     Priority: Medium    Multiple subsegmental pulmonary emboli without acute cor pulmonale (H) 05/01/2023     Priority: Medium    Infection due to Klebsiella pneumoniae 04/21/2023     Priority: Medium    Bone metastasis 03/09/2023     Priority: Medium    Bone metastases 03/09/2023     Priority: Medium    Aneurysm of ascending aorta without rupture (H24) 10/05/2022     Priority: Medium    Status post total right knee replacement 11/16/2021     Priority: Medium     Malignant neoplasm of prostate (H) 09/16/2021     Priority: Medium    Plaque in heart artery-aorta 03/24/2020     Priority: Medium    Aortic valve stenosis with insufficiency, etiology of cardiac valve disease unspecified 03/22/2019     Priority: Medium    Mild aortic stenosis 03/22/2019     Priority: Medium    Ascending aortic aneurysm-moderate at 4.6 cm on 3/11/20 03/22/2019     Priority: Medium    Aortic valve insufficiency-moderate to severe 03/22/2019     Priority: Medium    Hx of syncope 03/22/2019     Priority: Medium    History of tobacco abuse quitting 11/8/2002 03/22/2019     Priority: Medium    Hypertriglyceridemia 03/22/2019     Priority: Medium    Mild pulmonary hypertension (H) 03/22/2019     Priority: Medium    Palpitations 03/22/2019     Priority: Medium    Rising PSA following treatment for malignant neoplasm of prostate 03/29/2018     Priority: Medium    Chronic, continuous use of opioids 01/31/2018     Priority: Medium     Patient is followed by Wei Perez MD for ongoing prescription of pain medication.  All refills should be approved by this provider only at face-to-face appointments - not by phone request.    Medication(s): Hydrocodone.   Maximum quantity per month: 135  Clinic visit frequency required: Q 3 months   PDMP Review         Value Time User    State PDMP site checked  Yes 8/25/2021 10:41 AM Wei Perez MD          Controlled substance agreement:  Patient-Level CSA:    There are no patient-level csa.       Pain Clinic evaluation in the past: No    Opioid Risk Tool Total Score(s):  OPIOID RISK TOOL TOTAL SCORE 8/25/2021   Total Score 0   Total Risk Score Category Low Risk (0-3)           Other specified causes of urethral stricture 01/31/2018     Priority: Medium    GERD 04/28/2017     Priority: Medium    Essential hypertension 04/28/2017     Priority: Medium    Non morbid obesity due to excess calories 04/28/2017     Priority: Medium    Mixed hyperlipidemia 04/28/2017      Priority: Medium    Spinal stenosis of lumbar region with neurogenic claudication 04/28/2017     Priority: Medium    Backache 01/13/2014     Priority: Medium    Prostate cancer (H) 01/02/2013     Priority: Medium    Personal history of prostate cancer s/p prostatectomy and sEBRT 12/06/2012     Priority: Medium    Anemia due to acute blood loss 11/30/2012     Priority: Medium     Overview:   EBL 11/28/12:  1700 ml  EBL 11/28/12:  500 ml  Transfused 2 units pRBC's early on 11/29/12      Pelvic pain in male 11/30/2012     Priority: Medium     Past Medical History:   Diagnosis Date    Elevated prostate specific antigen (PSA)     4/10/2012    Encounter for other administrative examinations     1/31/2014    Esophagitis     No Comments Provided    Gastro-esophageal reflux disease without esophagitis     No Comments Provided    Low back pain     No Comments Provided    Malignant neoplasm of prostate (H)     9/6/2012    Nicotine dependence, uncomplicated     Quit - October 2007 with chantix    Other intervertebral disc degeneration, lumbosacral region     12/1/2008      Past Surgical History:   Procedure Laterality Date    APPENDECTOMY OPEN  [id number]    Ruptured - [place]    ARTHROPLASTY KNEE Right 11/16/2021    Procedure: ARTHROPLASTY, KNEE, TOTAL;  Surgeon: Micah Rock MD;  Location: [facility] OR    COLONOSCOPY  08/31/2006    next due in 2016    CYSTECTOMY BLADDER, ILEAL DIVERSION, COMBINED N/A 11/30/2023    Procedure: CYSTECTOMY, WITH URETEROILEAL CONDUIT CREATION and Pubectomy;  Surgeon: Miki Correa MD;  Location: [facility] OR    DISKECTOMY, LUMBAR, SINGLE SP  03/16/2009    L 3-4 microdiskectomy, [facility]    ESOPHAGOSCOPY, GASTROSCOPY, DUODENOSCOPY (EGD), COMBINED  03/2003         ESOPHAGOSCOPY, GASTROSCOPY, DUODENOSCOPY (EGD), COMBINED  08/31/2006         GRAFT FLAP PEDICLE PERINEUM N/A 11/30/2023    Procedure: Left Rectus Abdominis flap;  Surgeon: YADIRA Guadalupe MD;  Location: [facility] OR    PICC DOUBLE  LUMEN PLACEMENT Right 12/01/2023    5FR DL PICC, basilic vein. L-43cm, 2cm out.    PROSTATECTOMY PERINEAL RADICAL  11/28/2012    Nerve Sparring, Dr Warner    SPINE SURGERY N/A 04/28/2017    L3 to S1 spinal decompression L4/L5 fusion    TONSILLECTOMY, ADENOIDECTOMY, COMBINED      as a child    VARICOCELECTOMY  1959    INCISION AND DRAINAGE,EXCISE VARICOCELE       Review of Systems     OBJECTIVE:     /58   Pulse 68   Temp 97.3  F (36.3  C)   Resp 20   Wt 82.8 kg (182 lb 9.6 oz)   SpO2 98%   BMI 26.20 kg/m    Body mass index is 26.2 kg/m .  Physical Exam  Constitutional:       Appearance: Normal appearance.   Neurological:      Mental Status: He is alert.         Diagnostic Test Results:  none     ASSESSMENT/PLAN:         (C61) Prostate cancer (H)  (primary encounter diagnosis)  Comment:   Plan: HYDROmorphone (DILAUDID) 4 MG tablet, methadone        (DOLOPHINE) 10 MG tablet, DISCONTINUED:         methadone (DOLOPHINE) 10 MG tablet            (C61) Malignant neoplasm of prostate (H)  Comment:   Plan: methadone (DOLOPHINE) 10 MG tablet,         DISCONTINUED: methadone (DOLOPHINE) 10 MG         tablet            (M54.42,  G89.29) Chronic left-sided low back pain with left-sided sciatica  Comment:   Plan: HYDROmorphone (DILAUDID) 4 MG tablet            (C79.51) Malignant neoplasm metastatic to bone (H)  Comment:   Plan: HYDROmorphone (DILAUDID) 4 MG tablet            (M86.68) Chronic osteomyelitis of symphysis pubis (H)  Comment:   Plan:     (R97.21) Rising PSA following treatment for malignant neoplasm of prostate  Comment:   Plan:     Will increase his methadone to 20 mg twice a day with the 10 mg at night.  Do that for 5 days and increase it to 20 mg 3 times a day.  Hopefully 3 times a day will improve his pain coverage.  Refill of his Dilaudid.  He does have an appoint with oncology.  We also discussed potential hospice.  But would like to wait until he talks to his oncology to get a better clear picture of  prognosis  Ezequiel Alejandra MD  [facility]

## 2024-07-11 NOTE — NURSING NOTE
Patient here for pain medication refill. Medication Reconciliation: complete.    Akosua Rivero LPN  7/11/2024 8:57 AM

## 2024-07-12 DIAGNOSIS — R97.21 RISING PSA FOLLOWING TREATMENT FOR MALIGNANT NEOPLASM OF PROSTATE: ICD-10-CM

## 2024-07-12 DIAGNOSIS — C79.51 MALIGNANT NEOPLASM METASTATIC TO BONE (H): Primary | ICD-10-CM

## 2024-07-12 DIAGNOSIS — C61 MALIGNANT NEOPLASM OF PROSTATE (H): ICD-10-CM

## 2024-07-15 DIAGNOSIS — C79.51 MALIGNANT NEOPLASM METASTATIC TO BONE (H): ICD-10-CM

## 2024-07-17 ENCOUNTER — TELEPHONE (OUTPATIENT)
Dept: FAMILY MEDICINE | Facility: OTHER | Age: 69
End: 2024-07-17
Payer: COMMERCIAL

## 2024-07-17 NOTE — TELEPHONE ENCOUNTER
Patient feels that his pain meds are not working and it is time to discuss Hospice. He does have an appointment with  tomorrow and will talk with him. Akosua Rivero LPN .......................7/17/2024  3:36 PM

## 2024-07-17 NOTE — TELEPHONE ENCOUNTER
Pt states that the prescription for hydromorphone is not working.  Please call.    Abilio Castellon on 7/17/2024 at 8:58 AM

## 2024-07-18 ENCOUNTER — VIRTUAL VISIT (OUTPATIENT)
Dept: ONCOLOGY | Facility: OTHER | Age: 69
End: 2024-07-18
Attending: INTERNAL MEDICINE
Payer: COMMERCIAL

## 2024-07-18 DIAGNOSIS — G89.3 CANCER ASSOCIATED PAIN: Primary | ICD-10-CM

## 2024-07-18 DIAGNOSIS — C61 MALIGNANT NEOPLASM OF PROSTATE (H): ICD-10-CM

## 2024-07-18 DIAGNOSIS — C61 PROSTATE CANCER (H): ICD-10-CM

## 2024-07-18 DIAGNOSIS — C79.51 MALIGNANT NEOPLASM METASTATIC TO BONE (H): ICD-10-CM

## 2024-07-18 PROCEDURE — 99443 PR PHYSICIAN TELEPHONE EVALUATION 21-30 MIN: CPT | Mod: 93 | Performed by: INTERNAL MEDICINE

## 2024-07-18 RX ORDER — MEGESTROL ACETATE 20 MG/1
20 TABLET ORAL 2 TIMES DAILY
Qty: 180 TABLET | Refills: 3 | OUTPATIENT
Start: 2024-07-18

## 2024-07-18 RX ORDER — FENTANYL 25 UG/1
1 PATCH TRANSDERMAL
Qty: 12 PATCH | Refills: 0 | Status: SHIPPED | OUTPATIENT
Start: 2024-07-18 | End: 2024-08-14

## 2024-07-18 RX ORDER — OXYCODONE HYDROCHLORIDE 10 MG/1
5 TABLET ORAL EVERY 4 HOURS PRN
Qty: 100 TABLET | Refills: 0 | Status: SHIPPED | OUTPATIENT
Start: 2024-07-18 | End: 2024-08-14

## 2024-07-18 RX ORDER — MEGESTROL ACETATE 20 MG/1
20 TABLET ORAL 2 TIMES DAILY
Qty: 180 TABLET | Refills: 3 | Status: SHIPPED | OUTPATIENT
Start: 2024-07-18 | End: 2024-08-14

## 2024-07-18 NOTE — NURSING NOTE
" EMERGENCY DEPARTMENT ENCOUNTER    Room Number:  12/12  Date seen:  11/17/2018  Time seen: 5:51 PM  PCP: Kishor Camacho MD  Historian: pt      HPI:  Chief Complaint: left arm pain  A complete HPI/ROS/PMH/PSH/SH/FH are unobtainable due to: none  Context: Tre Owens is a 60 y.o. male who presents to the ED c/o severe left arm pain due to abscess to the left forearm that started two days ago. Pt admits to difficulty urinating. Pt admits to intermittent use of cocaine and IV drugs (methamphetamine) for the past few years. Pt admits to history of staff infection.    Pain Location: left forearm  Radiation: none  Quality: \"pain\"  Intensity/Severity: severe  Duration: two days  Onset quality: \"pain\"  Timing: constant  Progression: unchanged  Aggravating Factors: movement  Alleviating Factors: rest  Previous Episodes: none  Treatment before arrival: none  Associated Symptoms: left arm abscess, difficulty urinating.    PAST MEDICAL HISTORY  Active Ambulatory Problems     Diagnosis Date Noted   • Sepsis (CMS/[facility]) 02/13/2017   • Low back pain 02/14/2017   • Drug abuse, IVDA cocaine 02/14/2017   • Cellulitis of upper extremity 02/14/2017   • MSSA (methicillin susceptible Staphylococcus aureus) septicemia (CMS/[facility]) 02/16/2017   • Septicemia (CMS/[facility]) 02/17/2017   • Drug-induced constipation 02/19/2017     Resolved Ambulatory Problems     Diagnosis Date Noted   • No Resolved Ambulatory Problems     Past Medical History:   Diagnosis Date   • Alcoholism (CMS/[facility])    • Kidney failure    • Substance abuse (CMS/[facility])          PAST SURGICAL HISTORY  Past Surgical History:   Procedure Laterality Date   • APPENDECTOMY           FAMILY HISTORY  Family History   Problem Relation Age of Onset   • Alcohol abuse Father          SOCIAL HISTORY  Social History     Socioeconomic History   • Marital status: Single     Spouse name: Not on file   • Number of children: Not on file   • Years of education: Not on file   • Highest education " "Oncology Rooming Note    July 18, 2024 12:47 PM   Dilip Kan is a 69 year old male who presents for:    Chief Complaint   Patient presents with    Oncology Clinic Visit     Prostate cancer/pain/discuss hospice     Initial Vitals: There were no vitals taken for this visit. Estimated body mass index is 26.2 kg/m  as calculated from the following:    Height as of 5/6/24: 1.778 m (5' 10\").    Weight as of 7/11/24: 82.8 kg (182 lb 9.6 oz). There is no height or weight on file to calculate BSA.  Data Unavailable Comment: Data Unavailable   No LMP for male patient.  Allergies reviewed: Yes  Medications reviewed: Yes    Medications: MEDICATION REFILLS NEEDED TODAY. Provider was notified. Wants new pain medication  Pharmacy name entered into [employer]:    [facility] - [place], [place] - [address] [facility] - [place], [place] - [address]    Frailty Screening:   Is the patient here for a new oncology consult visit in cancer care? 2. No      Clinical concerns: discuss hospice and new pain medication  Dr. Fritz was notified.      Elza Lundberg RN             " level: Not on file   Social Needs   • Financial resource strain: Not on file   • Food insecurity - worry: Not on file   • Food insecurity - inability: Not on file   • Transportation needs - medical: Not on file   • Transportation needs - non-medical: Not on file   Occupational History   • Occupation: property management   Tobacco Use   • Smoking status: Never Smoker   Substance and Sexual Activity   • Alcohol use: No   • Drug use: Yes     Frequency: 7.0 times per week     Types: Cocaine, Methamphetamines     Comment: uses cocaine daily    • Sexual activity: Not on file   Other Topics Concern   • Not on file   Social History Narrative   • Not on file         ALLERGIES  Patient has no known allergies.        REVIEW OF SYSTEMS  Review of Systems   Constitutional: Negative for fever.   HENT: Negative for congestion and sore throat.    Respiratory: Negative for chest tightness and shortness of breath.    Cardiovascular: Negative for chest pain.   Gastrointestinal: Negative for abdominal pain.   Endocrine: Negative for polyuria.   Genitourinary: Positive for difficulty urinating. Negative for dysuria.   Musculoskeletal: Positive for myalgias (left arm). Negative for neck pain.   Skin: Positive for wound (left arm). Negative for rash.   Neurological: Negative for dizziness and headaches.   All other systems reviewed and are negative.           PHYSICAL EXAM  ED Triage Vitals [11/17/18 1742]   Temp Heart Rate Resp BP SpO2   98.9 °F (37.2 °C) 112 18 -- 90 %      Temp src Heart Rate Source Patient Position BP Location FiO2 (%)   Tympanic Monitor -- -- --         GENERAL: not distressed  HENT: nares patent  EYES: no scleral icterus  CV: regular rhythm, regular rate, 2+ radial pulses  RESPIRATORY: normal effort   ABDOMEN: soft  MUSCULOSKELETAL: no deformity, intact motor and sensory to radial and ulnar nerves, ping pong ball sized abscess to left proximal forearm wth surrounding erythema  NEURO: alert, NEVAREZ, FC  SKIN: warm,  dry    Vital signs and nursing notes reviewed.          PROCEDURES  Incision & Drainage  Date/Time: 11/17/2018 7:49 PM  Performed by: Wilner Pulido II, MD  Authorized by: Wilner Pulido II, MD     Consent:     Consent obtained:  Verbal and written    Consent given by:  Patient    Risks discussed:  Bleeding, incomplete drainage, pain, infection and damage to other organs    Alternatives discussed:  No treatment  Location:     Type:  Abscess    Location:  Upper extremity    Upper extremity location:  Arm    Arm location:  L lower arm  Pre-procedure details:     Skin preparation:  Chloraprep  Sedation:     Sedation type:  Moderate (conscious) sedation (Ketamine and Versed)  Procedure type:     Complexity:  Complex  Procedure details:     Needle aspiration: no      Incision types:  Single straight    Incision depth:  Dermal    Scalpel blade:  11    Wound management:  Probed and deloculated    Drainage:  Bloody and purulent    Drainage amount:  Copious    Packing materials:  1/2 in iodoform gauze  Post-procedure details:     Patient tolerance of procedure:  Tolerated well, no immediate complications (apnea (see below))  Comments:      1955: Pt was bagged for about 1 minute during procedure due to apnea.   2007: Strong left radial pulse with scant venous ooze at conclusion. NVI.   Procedural Sedation  Date/Time: 11/17/2018 7:49 PM  Performed by: Wilner Pulido II, MD  Authorized by: Wilner Pulido II, MD     Consent:     Consent obtained:  Written    Consent given by:  Patient    Risks discussed:  Prolonged hypoxia resulting in organ damage, respiratory compromise necessitating ventilatory assistance and intubation, dysrhythmia, inadequate sedation, nausea and vomiting (death)    Alternatives discussed:  Analgesia without sedation and anxiolysis  Indications:     Procedure performed:  Incision and drainage    Procedure necessitating sedation performed by:  Physician performing sedation    Intended  level of sedation:  Moderate (conscious sedation)  Pre-sedation assessment:     NPO status caution: unable to specify NPO status      ASA classification: class 1 - normal, healthy patient      Neck mobility: normal      Mouth opening:  3 or more finger widths    Mallampati score:  I - soft palate, uvula, fauces, pillars visible  Immediate pre-procedure details:     Reviewed: vital signs and relevant labs/tests      Verified: bag valve mask available, emergency equipment available, intubation equipment available, IV patency confirmed, oxygen available and suction available    Procedure details (see MAR for exact dosages):     Sedation start time:  11/17/2018 7:49 PM    Preoxygenation:  Room air    Sedation:  Ketamine    Intra-procedure monitoring:  Blood pressure monitoring, cardiac monitor, continuous capnometry, continuous pulse oximetry, frequent LOC assessments and frequent vital sign checks    Intra-procedure events: respiratory depression      Intra-procedure management:  Airway repositioning, supplemental oxygen and BVM ventilation    Sedation end time:  11/17/2018 8:26 PM    Total sedation time (minutes):  37  Post-procedure details:     Patient tolerance:  Tolerated well, no immediate complications          MEDICATIONS GIVEN IN ER  Medications   lidocaine PF 1% (XYLOCAINE) injection 20 mL ( Injection Given 11/17/18 1950)   midazolam (VERSED) injection 2 mg (2 mg Intravenous Given 11/17/18 1953)   ketamine (KETALAR) injection 100 mg (100 mg Intravenous Given 11/17/18 1950)   ondansetron (ZOFRAN) injection 4 mg (4 mg Intravenous Given 11/17/18 1948)   Tdap (BOOSTRIX) injection 0.5 mL (0.5 mL Intramuscular Given 11/17/18 2051)   clindamycin (CLEOCIN) capsule 450 mg (450 mg Oral Given 11/17/18 2054)         PROGRESS AND CONSULTS     1757  Discussed plan to drain the abscess. Pt understands and agrees with the plan. All questions have been answered.  Ordered wound culture for further viewing.    1834  Ordered  versed and ketamine to help ease the pt during I&D.     1946  Ordered zofran for nausea.    1952  Ordered versed for procedure.    2009  Ordered Tdap injection. Ordered clindamycin for infection.     2026  Rechecked pt. Pt was able to give thumbs up on command.    2042  Recheck pt who is now off of the oxygen. Pt is still groggy. Discussed plan with pt nurse to PO challenge the pt when the pt is ready.    2133  Rechecked patient who is resting. Discussed plan to discharge with antibiotics. Discussed signs of infection to look out for with the pt. Pt understands and agrees with the plan. All questions have been answered.      MEDICAL DECISION MAKING      MDM  Number of Diagnoses or Management Options  Abscess of left arm:   Diagnosis management comments: Abscess of left arm. Pt is very dramatic--even when not touching patient or when nurse gave Tdap. This necessitated sedation for deloculating abscess. Tolerated PO after sedation. D/C home. Partner is at bedside to care for him.        Amount and/or Complexity of Data Reviewed  Clinical lab tests: ordered (Wound culture pending)  Decide to obtain previous medical records or to obtain history from someone other than the patient: yes (EPIC)  Obtain history from someone other than the patient: yes (wife)  Review and summarize past medical records: yes (Last Tdap in 1975)               DIAGNOSIS  Final diagnoses:   Abscess of left arm         DISPOSITION  DISCHARGE    Patient discharged in stable condition.    Reviewed implications of results, diagnosis, meds, responsibility to follow up, warning signs and symptoms of possible worsening, potential complications and reasons to return to ER.    Patient/Family voiced understanding of above instructions.    Discussed plan for discharge, as there is no emergent indication for admission. Patient referred to primary care provider for BP management due to today's BP. Pt/family is agreeable and understands need for follow up and  repeat testing.  Pt is aware that discharge does not mean that nothing is wrong but it indicates no emergency is present that requires admission and they must continue care with follow-up as given below or physician of their choice.     FOLLOW-UP  Kishor Camacho MD  [address] [facility] [address] [phone]    Schedule an appointment as soon as possible for a visit in 2 days  for wound check and removal of packing         Medication List      New Prescriptions    clindamycin 150 MG capsule  Commonly known as:  CLEOCIN  Take 3 capsules by mouth 4 (Four) Times a Day for 7 days.     HYDROcodone-acetaminophen 5-325 MG per tablet  Commonly known as:  NORCO  Take 1 tablet by mouth Every 6 (Six) Hours As Needed for Moderate Pain    for up to 3 days.                      Latest Documented Vital Signs:  As of 9:38 PM  BP- 145/79 HR- 98 Temp- 99.7 °F (37.6 °C) (Oral) O2 sat- 99%        --  Documentation assistance provided by hailey Alexander for Dr. Ashok MD.  Information recorded by the scribe was done at my direction and has been verified and validated by me.               Gloria Alexander  11/17/18 2135       Gloria Alexander  11/17/18 2138       Wilner Pulido II, MD  11/17/18 8130

## 2024-07-18 NOTE — TELEPHONE ENCOUNTER
predniSONE (DELTASONE) 10 MG tablet     Sig: Take 1 tablet (10 mg) by mouth daily         Last Written Prescription Date:  6/20/24  Last Fill Quantity: 30,   # refills: 0  Last Office Visit: 7/11/24  Future Office visit:       Routing refill request to provider for review/approval because:  Drug not on the [facility], [facility] or [facility] refill protocol or controlled substance Argentina Zambrano RN on 7/18/2024 at 4:46 PM

## 2024-07-19 ENCOUNTER — TELEPHONE (OUTPATIENT)
Dept: ONCOLOGY | Facility: OTHER | Age: 69
End: 2024-07-19
Payer: COMMERCIAL

## 2024-07-19 DIAGNOSIS — M79.605 PAIN OF LEFT LOWER EXTREMITY: ICD-10-CM

## 2024-07-19 DIAGNOSIS — C79.51 MALIGNANT NEOPLASM METASTATIC TO BONE (H): Primary | ICD-10-CM

## 2024-07-19 DIAGNOSIS — M25.552 LEFT HIP PAIN: ICD-10-CM

## 2024-07-19 ASSESSMENT — ENCOUNTER SYMPTOMS
RESPIRATORY NEGATIVE: 1
CARDIOVASCULAR NEGATIVE: 1
ENDOCRINE NEGATIVE: 1
EYES NEGATIVE: 1
FATIGUE: 1
GASTROINTESTINAL NEGATIVE: 1
NEUROLOGICAL NEGATIVE: 1
ALLERGIC/IMMUNOLOGIC NEGATIVE: 1
HEMATOLOGIC/LYMPHATIC NEGATIVE: 1
BACK PAIN: 1
PSYCHIATRIC NEGATIVE: 1

## 2024-07-19 NOTE — PROGRESS NOTES
Virtual Visit Details    Type of service:  Telephone Visit   Phone call duration: 45   minutes   Originating Location (pt. Location): Home    [facility]    Patient: Dilip Kan  MRN: [id number]  Date of Service: Jul 18, 2024         Reason for Visit    Chief Complaint   Patient presents with    Oncology Clinic Visit     Prostate cancer/pain/discuss hospice       ECOG Performance Status: 0      Encounter Diagnoses: Prostate cancer, metastatic to bone      History of Present Illness:    . Dilip Kan returns for follow-up of prostate cancer.  He has a physician's notePatient remains on treatment with enzalutamide/Lupron.  Continues to have pain particularly in the lower back and his back.  He did undergo MRI of lumbar spine which revealed new lesions involving L4 L5-S1 disc.  Epidural left neuroforamen is patent.  Patient is currently on Dilaudid and methadone.  The radiologist stated.  We have recommended radiation.            ______________________________________________________________________________    Past History    Past Medical History:   Diagnosis Date    Elevated prostate specific antigen (PSA)     4/10/2012    Encounter for other administrative examinations     1/31/2014    Esophagitis     No Comments Provided    Gastro-esophageal reflux disease without esophagitis     No Comments Provided    Low back pain     No Comments Provided    Malignant neoplasm of prostate (H)     9/6/2012    Nicotine dependence, uncomplicated     Quit - October 2007 with chantix    Other intervertebral disc degeneration, lumbosacral region     12/1/2008       Past Surgical History:   Procedure Laterality Date    APPENDECTOMY OPEN  744304    Ruptured - [place]    ARTHROPLASTY KNEE Right 11/16/2021    Procedure: ARTHROPLASTY, KNEE, TOTAL;  Surgeon: Micah Rock MD;  Location:  OR    COLONOSCOPY  08/31/2006    next  due in 2016    CYSTECTOMY BLADDER, ILEAL DIVERSION, COMBINED N/A 11/30/2023    Procedure: CYSTECTOMY, WITH URETEROILEAL CONDUIT CREATION and Pubectomy;  Surgeon: Miki Correa MD;  Location: [facility] OR    DISKECTOMY, LUMBAR, SINGLE SP  03/16/2009    L 3-4 microdiskectomy, [facility]    ESOPHAGOSCOPY, GASTROSCOPY, DUODENOSCOPY (EGD), COMBINED  03/2003         ESOPHAGOSCOPY, GASTROSCOPY, DUODENOSCOPY (EGD), COMBINED  08/31/2006         GRAFT FLAP PEDICLE PERINEUM N/A 11/30/2023    Procedure: Left Rectus Abdominis flap;  Surgeon: YADIRA Guadalupe MD;  Location: [facility] OR    PICC DOUBLE LUMEN PLACEMENT Right 12/01/2023    5FR DL PICC, basilic vein. L-43cm, 2cm out.    PROSTATECTOMY PERINEAL RADICAL  11/28/2012    Nerve Sparring, Dr Warner    SPINE SURGERY N/A 04/28/2017    L3 to S1 spinal decompression L4/L5 fusion    TONSILLECTOMY, ADENOIDECTOMY, COMBINED      as a child    VARICOCELECTOMY  1959    INCISION AND DRAINAGE,EXCISE VARICOCELE       Review of Systems   Constitutional:  Positive for fatigue.   HENT: Negative.     Eyes: Negative.    Respiratory: Negative.     Cardiovascular: Negative.    Gastrointestinal: Negative.    Endocrine: Negative.    Genitourinary: Negative.    Musculoskeletal:  Positive for back pain.   Skin: Negative.    Allergic/Immunologic: Negative.    Neurological: Negative.    Hematological: Negative.    Psychiatric/Behavioral: Negative.            Physical Exam not performed.  Virtual visit      Physical Exam     Lab Results    Recent Results (from the past 240 hour(s))   CRP inflammation    Collection Time: 07/11/24  9:46 AM   Result Value Ref Range    CRP Inflammation 6.22 (H) <5.00 mg/L   Erythrocyte sedimentation rate auto    Collection Time: 07/11/24  9:46 AM   Result Value Ref Range    Erythrocyte Sedimentation Rate 13 0 - 20 mm/hr   Comprehensive metabolic panel    Collection Time: 07/11/24  9:46 AM   Result Value Ref Range    Sodium 136 135 - 145 mmol/L    Potassium 4.4 3.4 - 5.3 mmol/L     Carbon Dioxide (CO2) 25 22 - 29 mmol/L    Anion Gap 11 7 - 15 mmol/L    Urea Nitrogen 23.4 (H) 8.0 - 23.0 mg/dL    Creatinine 1.01 0.67 - 1.17 mg/dL    GFR Estimate 80 >60 mL/min/1.73m2    Calcium 9.6 8.8 - 10.2 mg/dL    Chloride 100 98 - 107 mmol/L    Glucose 98 70 - 99 mg/dL    Alkaline Phosphatase 155 (H) 40 - 150 U/L    AST 41 0 - 45 U/L    ALT 9 0 - 70 U/L    Protein Total 6.8 6.4 - 8.3 g/dL    Albumin 3.6 3.5 - 5.2 g/dL    Bilirubin Total 0.3 <=1.2 mg/dL   CBC with platelets and differential    Collection Time: 07/11/24  9:46 AM   Result Value Ref Range    WBC Count 5.5 4.0 - 11.0 10e3/uL    RBC Count 3.84 (L) 4.40 - 5.90 10e6/uL    Hemoglobin 9.3 (L) 13.3 - 17.7 g/dL    Hematocrit 30.4 (L) 40.0 - 53.0 %    MCV 79 78 - 100 fL    MCH 24.2 (L) 26.5 - 33.0 pg    MCHC 30.6 (L) 31.5 - 36.5 g/dL    RDW 16.8 (H) 10.0 - 15.0 %    Platelet Count 297 150 - 450 10e3/uL    % Neutrophils 61 %    % Lymphocytes 26 %    % Monocytes 8 %    % Eosinophils 4 %    % Basophils 1 %    % Immature Granulocytes 0 %    NRBCs per 100 WBC 0 <1 /100    Absolute Neutrophils 3.3 1.6 - 8.3 10e3/uL    Absolute Lymphocytes 1.4 0.8 - 5.3 10e3/uL    Absolute Monocytes 0.4 0.0 - 1.3 10e3/uL    Absolute Eosinophils 0.2 0.0 - 0.7 10e3/uL    Absolute Basophils 0.0 0.0 - 0.2 10e3/uL    Absolute Immature Granulocytes 0.0 <=0.4 10e3/uL    Absolute NRBCs 0.0 10e3/uL       Imaging    No results found.    Assessment and Plan:     1#  metastatic prostate cancer PSA is stable with questionable new bone metspatient refuses radiation oncology consultation we will continue enzalutamide andLupron we will switch to Fentanyl patch and oxycodone for breakthrough and obtAin PSMA PET scan and continue to follow monthly PSA we will see the patient in 3 months   30 minutes was spent on this telephone virtual visit      /        Cancer Staging   No matching staging information was found for the patient.        Signed by: Erum Fritz MD    CC: Ezequiel LLANOS  MD [place]

## 2024-07-19 NOTE — TELEPHONE ENCOUNTER
Would like to go to radiation therapy for his pain in left hip and leg. He says that it was mentioned yesterday during his visit with Dr. Fritz.   Will send to Erum Fritz MD to address. Patient aware Erum Fritz MD is out of office today.  Elza Lundberg RN...........7/19/2024 8:41 AM     tito

## 2024-07-20 ENCOUNTER — APPOINTMENT (OUTPATIENT)
Dept: CT IMAGING | Facility: OTHER | Age: 69
End: 2024-07-20
Attending: PHYSICIAN ASSISTANT
Payer: COMMERCIAL

## 2024-07-20 ENCOUNTER — APPOINTMENT (OUTPATIENT)
Dept: ULTRASOUND IMAGING | Facility: OTHER | Age: 69
End: 2024-07-20
Attending: PHYSICIAN ASSISTANT
Payer: COMMERCIAL

## 2024-07-20 ENCOUNTER — HOSPITAL ENCOUNTER (EMERGENCY)
Facility: OTHER | Age: 69
Discharge: ANOTHER HOSPITAL | End: 2024-07-20
Attending: PHYSICIAN ASSISTANT | Admitting: PHYSICIAN ASSISTANT
Payer: COMMERCIAL

## 2024-07-20 VITALS
DIASTOLIC BLOOD PRESSURE: 83 MMHG | OXYGEN SATURATION: 96 % | SYSTOLIC BLOOD PRESSURE: 142 MMHG | HEIGHT: 70 IN | TEMPERATURE: 97.9 F | BODY MASS INDEX: 25.77 KG/M2 | HEART RATE: 80 BPM | RESPIRATION RATE: 18 BRPM | WEIGHT: 180 LBS

## 2024-07-20 DIAGNOSIS — M79.662 PAIN OF LEFT LOWER LEG: ICD-10-CM

## 2024-07-20 DIAGNOSIS — C79.51 PROSTATE CANCER METASTATIC TO BONE (H): ICD-10-CM

## 2024-07-20 DIAGNOSIS — M25.552 HIP PAIN, LEFT: ICD-10-CM

## 2024-07-20 DIAGNOSIS — M84.40XA PATHOLOGICAL FRACTURE: ICD-10-CM

## 2024-07-20 DIAGNOSIS — M54.42 LOW BACK PAIN WITH LEFT-SIDED SCIATICA: ICD-10-CM

## 2024-07-20 DIAGNOSIS — C61 PROSTATE CANCER METASTATIC TO BONE (H): ICD-10-CM

## 2024-07-20 LAB
ALBUMIN UR-MCNC: NEGATIVE MG/DL
ANION GAP SERPL CALCULATED.3IONS-SCNC: 15 MMOL/L (ref 7–15)
APPEARANCE UR: CLEAR
BACTERIA #/AREA URNS HPF: ABNORMAL /HPF
BASOPHILS # BLD AUTO: 0 10E3/UL (ref 0–0.2)
BASOPHILS NFR BLD AUTO: 0 %
BILIRUB UR QL STRIP: NEGATIVE
BUN SERPL-MCNC: 30.5 MG/DL (ref 8–23)
CALCIUM SERPL-MCNC: 9.8 MG/DL (ref 8.8–10.4)
CHLORIDE SERPL-SCNC: 100 MMOL/L (ref 98–107)
COLOR UR AUTO: ABNORMAL
CREAT SERPL-MCNC: 1.15 MG/DL (ref 0.67–1.17)
EGFRCR SERPLBLD CKD-EPI 2021: 68 ML/MIN/1.73M2
EOSINOPHIL # BLD AUTO: 0.2 10E3/UL (ref 0–0.7)
EOSINOPHIL NFR BLD AUTO: 4 %
ERYTHROCYTE [DISTWIDTH] IN BLOOD BY AUTOMATED COUNT: 17 % (ref 10–15)
GLUCOSE SERPL-MCNC: 94 MG/DL (ref 70–99)
GLUCOSE UR STRIP-MCNC: NEGATIVE MG/DL
HCO3 SERPL-SCNC: 20 MMOL/L (ref 22–29)
HCT VFR BLD AUTO: 32.7 % (ref 40–53)
HGB BLD-MCNC: 10.2 G/DL (ref 13.3–17.7)
HGB UR QL STRIP: NEGATIVE
HOLD SPECIMEN: NORMAL
HYALINE CASTS: 1 /LPF
IMM GRANULOCYTES # BLD: 0 10E3/UL
IMM GRANULOCYTES NFR BLD: 0 %
KETONES UR STRIP-MCNC: NEGATIVE MG/DL
LEUKOCYTE ESTERASE UR QL STRIP: ABNORMAL
LYMPHOCYTES # BLD AUTO: 1 10E3/UL (ref 0.8–5.3)
LYMPHOCYTES NFR BLD AUTO: 15 %
MCH RBC QN AUTO: 24.2 PG (ref 26.5–33)
MCHC RBC AUTO-ENTMCNC: 31.2 G/DL (ref 31.5–36.5)
MCV RBC AUTO: 78 FL (ref 78–100)
MONOCYTES # BLD AUTO: 0.6 10E3/UL (ref 0–1.3)
MONOCYTES NFR BLD AUTO: 9 %
MUCOUS THREADS #/AREA URNS LPF: PRESENT /LPF
NEUTROPHILS # BLD AUTO: 5 10E3/UL (ref 1.6–8.3)
NEUTROPHILS NFR BLD AUTO: 73 %
NITRATE UR QL: NEGATIVE
NRBC # BLD AUTO: 0 10E3/UL
NRBC BLD AUTO-RTO: 0 /100
PH UR STRIP: 6.5 [PH] (ref 5–9)
PLATELET # BLD AUTO: 357 10E3/UL (ref 150–450)
POTASSIUM SERPL-SCNC: 4 MMOL/L (ref 3.4–5.3)
RBC # BLD AUTO: 4.22 10E6/UL (ref 4.4–5.9)
RBC URINE: 2 /HPF
SODIUM SERPL-SCNC: 135 MMOL/L (ref 135–145)
SP GR UR STRIP: 1.01 (ref 1–1.03)
UROBILINOGEN UR STRIP-MCNC: NORMAL MG/DL
WBC # BLD AUTO: 6.9 10E3/UL (ref 4–11)
WBC URINE: 16 /HPF

## 2024-07-20 PROCEDURE — 250N000013 HC RX MED GY IP 250 OP 250 PS 637: Performed by: PHYSICIAN ASSISTANT

## 2024-07-20 PROCEDURE — 87086 URINE CULTURE/COLONY COUNT: CPT | Performed by: PHYSICIAN ASSISTANT

## 2024-07-20 PROCEDURE — 250N000011 HC RX IP 250 OP 636: Performed by: PHYSICIAN ASSISTANT

## 2024-07-20 PROCEDURE — 96374 THER/PROPH/DIAG INJ IV PUSH: CPT | Mod: XU | Performed by: PHYSICIAN ASSISTANT

## 2024-07-20 PROCEDURE — 74177 CT ABD & PELVIS W/CONTRAST: CPT

## 2024-07-20 PROCEDURE — 96375 TX/PRO/DX INJ NEW DRUG ADDON: CPT | Performed by: PHYSICIAN ASSISTANT

## 2024-07-20 PROCEDURE — 36415 COLL VENOUS BLD VENIPUNCTURE: CPT | Performed by: PHYSICIAN ASSISTANT

## 2024-07-20 PROCEDURE — 96376 TX/PRO/DX INJ SAME DRUG ADON: CPT | Performed by: PHYSICIAN ASSISTANT

## 2024-07-20 PROCEDURE — 99285 EMERGENCY DEPT VISIT HI MDM: CPT | Mod: 25 | Performed by: PHYSICIAN ASSISTANT

## 2024-07-20 PROCEDURE — 93971 EXTREMITY STUDY: CPT | Mod: LT

## 2024-07-20 PROCEDURE — 99285 EMERGENCY DEPT VISIT HI MDM: CPT | Performed by: PHYSICIAN ASSISTANT

## 2024-07-20 PROCEDURE — 999N000104 CT LUMBAR SPINE RECONSTRUCTED

## 2024-07-20 PROCEDURE — 85025 COMPLETE CBC W/AUTO DIFF WBC: CPT | Performed by: PHYSICIAN ASSISTANT

## 2024-07-20 PROCEDURE — 87186 SC STD MICRODIL/AGAR DIL: CPT | Performed by: PHYSICIAN ASSISTANT

## 2024-07-20 PROCEDURE — 80048 BASIC METABOLIC PNL TOTAL CA: CPT | Performed by: PHYSICIAN ASSISTANT

## 2024-07-20 PROCEDURE — 81001 URINALYSIS AUTO W/SCOPE: CPT | Performed by: PHYSICIAN ASSISTANT

## 2024-07-20 RX ORDER — DEXAMETHASONE SODIUM PHOSPHATE 10 MG/ML
10 INJECTION, SOLUTION INTRAMUSCULAR; INTRAVENOUS ONCE
Status: COMPLETED | OUTPATIENT
Start: 2024-07-20 | End: 2024-07-20

## 2024-07-20 RX ORDER — IOPAMIDOL 755 MG/ML
104 INJECTION, SOLUTION INTRAVASCULAR ONCE
Status: COMPLETED | OUTPATIENT
Start: 2024-07-20 | End: 2024-07-20

## 2024-07-20 RX ORDER — HYDROXYZINE HYDROCHLORIDE 10 MG/1
10 TABLET, FILM COATED ORAL ONCE
Status: COMPLETED | OUTPATIENT
Start: 2024-07-20 | End: 2024-07-20

## 2024-07-20 RX ORDER — KETOROLAC TROMETHAMINE 15 MG/ML
15 INJECTION, SOLUTION INTRAMUSCULAR; INTRAVENOUS ONCE
Status: COMPLETED | OUTPATIENT
Start: 2024-07-20 | End: 2024-07-20

## 2024-07-20 RX ADMIN — HYDROMORPHONE HYDROCHLORIDE 1 MG: 1 INJECTION, SOLUTION INTRAMUSCULAR; INTRAVENOUS; SUBCUTANEOUS at 17:03

## 2024-07-20 RX ADMIN — HYDROMORPHONE HYDROCHLORIDE 1 MG: 1 INJECTION, SOLUTION INTRAMUSCULAR; INTRAVENOUS; SUBCUTANEOUS at 17:47

## 2024-07-20 RX ADMIN — HYDROXYZINE HYDROCHLORIDE 10 MG: 10 TABLET ORAL at 17:02

## 2024-07-20 RX ADMIN — IOPAMIDOL 104 ML: 755 INJECTION, SOLUTION INTRAVENOUS at 18:39

## 2024-07-20 RX ADMIN — DEXAMETHASONE SODIUM PHOSPHATE 10 MG: 10 INJECTION INTRAMUSCULAR; INTRAVENOUS at 17:03

## 2024-07-20 RX ADMIN — KETOROLAC TROMETHAMINE 15 MG: 15 INJECTION, SOLUTION INTRAMUSCULAR; INTRAVENOUS at 17:47

## 2024-07-20 ASSESSMENT — COLUMBIA-SUICIDE SEVERITY RATING SCALE - C-SSRS
6. HAVE YOU EVER DONE ANYTHING, STARTED TO DO ANYTHING, OR PREPARED TO DO ANYTHING TO END YOUR LIFE?: NO
2. HAVE YOU ACTUALLY HAD ANY THOUGHTS OF KILLING YOURSELF IN THE PAST MONTH?: NO
1. IN THE PAST MONTH, HAVE YOU WISHED YOU WERE DEAD OR WISHED YOU COULD GO TO SLEEP AND NOT WAKE UP?: NO

## 2024-07-20 ASSESSMENT — ACTIVITIES OF DAILY LIVING (ADL)
ADLS_ACUITY_SCORE: 38

## 2024-07-20 NOTE — ED TRIAGE NOTES
Patient arrived POV, states he has cancer in left hip and spine. Pain has been getting gradually worse, saw primary provider on Thursday and was given oxycodone and fentanyl patch. States he is still having increased pain and swelling. Patient ambulatory, alert.      Triage Assessment (Adult)       Row Name 07/20/24 1614          Triage Assessment    Airway WDL WDL     Additional Documentation Breath Sounds (Group)        Respiratory WDL    Respiratory WDL WDL        Breath Sounds    Breath Sounds All Fields     All Lung Fields Breath Sounds Anterior:;equal bilaterally        Skin Circulation/Temperature WDL    Skin Circulation/Temperature WDL WDL        Cardiac WDL    Cardiac WDL WDL     Cardiac Rhythm NSR        Peripheral/Neurovascular WDL    Peripheral Neurovascular WDL WDL        Cognitive/Neuro/Behavioral WDL    Cognitive/Neuro/Behavioral WDL WDL

## 2024-07-20 NOTE — ED PROVIDER NOTES
EMERGENCY DEPARTMENT ENCOUNTER      NAME: Dilip Kan  AGE: 69 year old male  YOB: 1955  MRN: [id number]  EVALUATION DATE & TIME: 7/20/2024  4:28 PM    PCP: Ezequiel Alejandra    ED PROVIDER: Tye Garay PA-C       CHIEF COMPLAINT:  Chief Complaint   Patient presents with    Leg Pain         HPI  Dilip Kan is a pleasant 69 year old male with history of metastatic prostate cancer who presents to the ER today for evaluation of severe left leg pain.  Patient is currently being followed by Dr. Fritz.  Patient is currently on oral Xtandi and Lupron injections.  Patient has been having severe pain to his left lower extremity over the past few days.  Was seen by his primary care doctor on Thursday where he is prescribed oxycodone and fentanyl patch.  Patient states that these pain medications have not not been helping with the pain.  Also having increasing swelling and heaviness in his left lower extremity.  Patient last had MRI of his lumbar spine back on 5/20/2024 with patient having tumor involvement of the L4, L5 down through S1 and S2 with epidural and left neural foramen invasion.  X-rays of the left femur on 4/11/2024 shows lucencies to the left IV tabular and medial left inferior pubic ramus.  No abdominal pain.  No fevers or chills.      REVIEW OF SYSTEMS   Review of Systems  As above, otherwise ROS is unremarkable.      PAST MEDICAL HISTORY:  Past Medical History:   Diagnosis Date    Elevated prostate specific antigen (PSA)     4/10/2012    Encounter for other administrative examinations     1/31/2014    Esophagitis     No Comments Provided    Gastro-esophageal reflux disease without esophagitis     No Comments Provided    Low back pain     No Comments Provided    Malignant neoplasm of prostate (H)     9/6/2012    Nicotine dependence, uncomplicated     Quit - October 2007 with chantix    Other intervertebral disc degeneration, lumbosacral region     12/1/2008         PAST  SURGICAL HISTORY:  Past Surgical History:   Procedure Laterality Date    APPENDECTOMY OPEN  [id number]    Ruptured - [place]    ARTHROPLASTY KNEE Right 11/16/2021    Procedure: ARTHROPLASTY, KNEE, TOTAL;  Surgeon: Micah Rock MD;  Location: [facility] OR    COLONOSCOPY  08/31/2006    next due in 2016    CYSTECTOMY BLADDER, ILEAL DIVERSION, COMBINED N/A 11/30/2023    Procedure: CYSTECTOMY, WITH URETEROILEAL CONDUIT CREATION and Pubectomy;  Surgeon: Miki Correa MD;  Location: [facility] OR    DISKECTOMY, LUMBAR, SINGLE SP  03/16/2009    L 3-4 microdiskectomy, [facility]    ESOPHAGOSCOPY, GASTROSCOPY, DUODENOSCOPY (EGD), COMBINED  03/2003         ESOPHAGOSCOPY, GASTROSCOPY, DUODENOSCOPY (EGD), COMBINED  08/31/2006         GRAFT FLAP PEDICLE PERINEUM N/A 11/30/2023    Procedure: Left Rectus Abdominis flap;  Surgeon: YADIRA Guadalupe MD;  Location: [facility] OR    PICC DOUBLE LUMEN PLACEMENT Right 12/01/2023    5FR DL PICC, basilic vein. L-43cm, 2cm out.    PROSTATECTOMY PERINEAL RADICAL  11/28/2012    Nerve Sparring, Dr Warner    SPINE SURGERY N/A 04/28/2017    L3 to S1 spinal decompression L4/L5 fusion    TONSILLECTOMY, ADENOIDECTOMY, COMBINED      as a child    VARICOCELECTOMY  1959    INCISION AND DRAINAGE,EXCISE VARICOCELE         CURRENT MEDICATIONS:    Current Outpatient Medications   Medication Instructions    acetaminophen (TYLENOL) 975 mg, Oral, EVERY 8 HOURS PRN    amLODIPine (NORVASC) 10 mg, Oral, EVERY MORNING    apixaban ANTICOAGULANT (ELIQUIS) 5 mg, Oral, 2 TIMES DAILY    atorvastatin (LIPITOR) 20 mg, Oral, DAILY    enzalutamide (XTANDI) 80 mg, Oral, DAILY    enzalutamide (XTANDI) 80 mg, Oral, DAILY    enzalutamide (XTANDI) 80 mg, Oral, DAILY    fentaNYL (DURAGESIC) 25 mcg/hr 72 hr patch 1 patch, Transdermal, EVERY 72 HOURS, remove old patch.    furosemide (LASIX) 20 mg, Oral, DAILY PRN    leuprolide (LUPRON DEPOT) 45 mg, Intramuscular, EVERY 6 MONTHS    megestrol (MEGACE) 20 mg, Oral, 2 TIMES DAILY     naloxone (NARCAN) 4 mg, Alternating Nostrils, PRN, every 2-3 minutes until assistance arrives    omeprazole (PRILOSEC) 40 mg, Oral, DAILY    ondansetron (ZOFRAN ODT) 8 mg, Oral, EVERY 8 HOURS PRN    oxyCODONE IR (ROXICODONE) 5 mg, Oral, EVERY 4 HOURS PRN    predniSONE (DELTASONE) 5 mg, Oral, DAILY    predniSONE (DELTASONE) 10 mg, Oral, DAILY    prochlorperazine (COMPAZINE) 10 mg, Oral, EVERY 6 HOURS PRN         ALLERGIES:  No Known Allergies      FAMILY HISTORY:  Family History   Problem Relation Age of Onset    Hypertension Mother         Hypertension,HTN    Other - See Comments Mother          Parkinsons    Heart Disease Father         Heart Disease, passed away from CHF,CAD    Colon Cancer Father         Cancer-colon    Hypertension Father         Hypertension    Other - See Comments Father         Stroke/Dementia    Family History Negative Sister         Good Health,emotional problems.    Family History Negative Sister         Good Health    Other - See Comments Daughter         w/o major medical problems.    Other - See Comments Son         w/o major medical problems.    Family History Negative Other         Good Health    Family History Negative Other         Good Health,previous marriage./previous marriage.    Family History Negative Other         Good Health,previous marriage.    Anesthesia Reaction No family hx of     Venous thrombosis No family hx of          SOCIAL HISTORY:   Social History     Socioeconomic History    Marital status:      Spouse name: None    Number of children: None    Years of education: None    Highest education level: None   Occupational History    Occupation:      Employer: OTHER   Tobacco Use    Smoking status: Former     Current packs/day: 0.00     Average packs/day: 1 pack/day for 20.0 years (20.0 ttl pk-yrs)     Types: Cigarettes     Start date: 1982     Quit date: 2002     Years since quittin.7     Passive exposure: Past    Smokeless tobacco: Current      Types: Snuff    Tobacco comments:     Can't remember when all he had passive exposure   Vaping Use    Vaping status: Never Used   Substance and Sexual Activity    Alcohol use: Not Currently    Drug use: No    Sexual activity: Yes     Partners: Female     Birth control/protection: None   Social History Narrative    [employer] - retired.  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  Patient has quit smoking.  Lives in [place] a farm with wife.      Social Determinants of Health     Financial Resource Strain: Low Risk  (1/10/2024)    Financial Resource Strain     Within the past 12 months, have you or your family members you live with been unable to get utilities (heat, electricity) when it was really needed?: No   Food Insecurity: Low Risk  (1/10/2024)    Food Insecurity     Within the past 12 months, did you worry that your food would run out before you got money to buy more?: No     Within the past 12 months, did the food you bought just not last and you didn t have money to get more?: Patient declined   Transportation Needs: Unknown (1/10/2024)    Transportation Needs     Within the past 12 months, has lack of transportation kept you from medical appointments, getting your medicines, non-medical meetings or appointments, work, or from getting things that you need?: Patient declined    Received from [facility], [facility]    Social Connections   Interpersonal Safety: Low Risk  (7/18/2024)    Interpersonal Safety     Do you feel physically and emotionally safe where you currently live?: Yes     Within the past 12 months, have you been hit, slapped, kicked or otherwise physically hurt by someone?: No     Within the past 12 months, have you been humiliated or emotionally abused in other ways by your partner or ex-partner?: No   Housing Stability: Low Risk  (1/10/2024)    Housing Stability     Do you have housing? : Yes     Are you  "worried about losing your housing?: No       ==================================================================================================================================    PHYSICAL EXAM    VITAL SIGNS: BP (!) 142/83   Pulse 80   Temp 97.9  F (36.6  C) (Oral)   Resp 18   Ht 1.778 m (5' 10\")   Wt 81.6 kg (180 lb)   SpO2 96%   BMI 25.83 kg/m      No data found.      Physical Exam  Vitals and nursing note reviewed.   Constitutional:       Appearance: Normal appearance. He is not ill-appearing or diaphoretic.      Comments: Patient appeared to be in significant discomfort from pain   HENT:      Nose: Nose normal.      Mouth/Throat:      Mouth: Mucous membranes are moist.      Pharynx: Oropharynx is clear.   Eyes:      Conjunctiva/sclera: Conjunctivae normal.   Cardiovascular:      Rate and Rhythm: Normal rate and regular rhythm.      Pulses: Normal pulses.      Heart sounds: Normal heart sounds.   Pulmonary:      Effort: Pulmonary effort is normal.      Breath sounds: Normal breath sounds.   Abdominal:      General: Abdomen is flat.      Palpations: Abdomen is soft.      Tenderness: There is abdominal tenderness (Left lower quadrant). There is no guarding.   Genitourinary:     Comments: Jiménez catheter in place.  Clear yellow urine in the collection bag.  No hematuria or clots noted.  Musculoskeletal:      Cervical back: Normal range of motion.      Right lower leg: Edema present.      Left lower leg: Edema (Greater than right lower extremity) present.      Comments: Patient with significant tenderness over the left pelvis and left hip region.  Patient with significant swelling of the left lower extremity.  No erythema or warmth of the left lower extremity.  No left knee pain.  No left calf pain.  Sensation to light touch intact throughout the lower extremity.  NVI   Skin:     General: Skin is warm and dry.   Neurological:      General: No focal deficit present.      Mental Status: He is alert.      Sensory: " No sensory deficit.      Motor: No weakness.   Psychiatric:         Mood and Affect: Mood normal.            ==================================================================================================================================    LABS & RADIOLOGY:  All pertinent labs reviewed and interpreted. Reviewed all pertinent imaging. Please see official radiology report.  Results for orders placed or performed during the hospital encounter of 07/20/24   US Lower Extremity Venous Duplex Left    Impression    Impression: No evidence of deep venous thrombosis within the left  lower extremity.    YONATHAN MALLORY MD         SYSTEM ID:  [id number]   CT Abdomen Pelvis w Contrast    Impression    IMPRESSION: Destructive metastatic disease involving the lumbar spine  at L4 and L5 with severe thecal sac compression. Neurologic compromise  is suspected. This would be below the level of the cord. This likely  has progressed since the post recent lumbar spine MRI.    Extensive destructive metastasis involving the left pelvis with  sizable soft tissue component extending medially and laterally from  the left iliac wing. There is comminuted pathologic fracture of the  left acetabulum. Additionally there is metastatic involvement of the  left sacrum at S1-S3.    Interval development of intrahepatic biliary dilatation. There is  sludge in the gallbladder lumen. An obstructing lesion is not  identified. Choledocholithiasis is a possibility.    YONATHAN MALLORY MD         SYSTEM ID:  [id number]   CT Lumbar Spine Reconstructed    Impression    IMPRESSION: Destructive metastatic disease involving the lumbar spine  at L4 and L5 with severe thecal sac compression. Neurologic compromise  is suspected. There is obliteration of fat in the epidural space as  well as both neuroforamen. This would be below the level of the cord.  This likely has progressed since the post recent lumbar spine MRI.    Extensive destructive metastasis  involving the left pelvis with  sizable soft tissue component extending medially and laterally from  the left iliac wing. There is comminuted pathologic fracture of the  left acetabulum. Additionally there is metastatic involvement of the  left sacrum at S1-S3.               YONATHAN MALLORY MD         SYSTEM ID:  [id number]   Basic metabolic panel   Result Value Ref Range    Sodium 135 135 - 145 mmol/L    Potassium 4.0 3.4 - 5.3 mmol/L    Chloride 100 98 - 107 mmol/L    Carbon Dioxide (CO2) 20 (L) 22 - 29 mmol/L    Anion Gap 15 7 - 15 mmol/L    Urea Nitrogen 30.5 (H) 8.0 - 23.0 mg/dL    Creatinine 1.15 0.67 - 1.17 mg/dL    GFR Estimate 68 >60 mL/min/1.73m2    Calcium 9.8 8.8 - 10.4 mg/dL    Glucose 94 70 - 99 mg/dL   UA with Microscopic reflex to Culture    Specimen: Urine, Clean Catch   Result Value Ref Range    Color Urine Light Yellow Colorless, Straw, Light Yellow, Yellow    Appearance Urine Clear Clear    Glucose Urine Negative Negative mg/dL    Bilirubin Urine Negative Negative    Ketones Urine Negative Negative mg/dL    Specific Gravity Urine 1.008 1.000 - 1.030    Blood Urine Negative Negative    pH Urine 6.5 5.0 - 9.0    Protein Albumin Urine Negative Negative mg/dL    Urobilinogen Urine Normal Normal, 2.0 mg/dL    Nitrite Urine Negative Negative    Leukocyte Esterase Urine Moderate (A) Negative    Bacteria Urine Few (A) None Seen /HPF    Mucus Urine Present (A) None Seen /LPF    RBC Urine 2 <=2 /HPF    WBC Urine 16 (H) <=5 /HPF    Hyaline Casts Urine 1 <=2 /LPF   CBC with platelets and differential   Result Value Ref Range    WBC Count 6.9 4.0 - 11.0 10e3/uL    RBC Count 4.22 (L) 4.40 - 5.90 10e6/uL    Hemoglobin 10.2 (L) 13.3 - 17.7 g/dL    Hematocrit 32.7 (L) 40.0 - 53.0 %    MCV 78 78 - 100 fL    MCH 24.2 (L) 26.5 - 33.0 pg    MCHC 31.2 (L) 31.5 - 36.5 g/dL    RDW 17.0 (H) 10.0 - 15.0 %    Platelet Count 357 150 - 450 10e3/uL    % Neutrophils 73 %    % Lymphocytes 15 %    % Monocytes 9 %    %  Eosinophils 4 %    % Basophils 0 %    % Immature Granulocytes 0 %    NRBCs per 100 WBC 0 <1 /100    Absolute Neutrophils 5.0 1.6 - 8.3 10e3/uL    Absolute Lymphocytes 1.0 0.8 - 5.3 10e3/uL    Absolute Monocytes 0.6 0.0 - 1.3 10e3/uL    Absolute Eosinophils 0.2 0.0 - 0.7 10e3/uL    Absolute Basophils 0.0 0.0 - 0.2 10e3/uL    Absolute Immature Granulocytes 0.0 <=0.4 10e3/uL    Absolute NRBCs 0.0 10e3/uL   Extra Blue Top Tube   Result Value Ref Range    Hold Specimen [facility]    Extra Red Top Tube   Result Value Ref Range    Hold Specimen hold    Extra Green Top (Lithium Heparin) Tube   Result Value Ref Range    Hold Specimen [facility]    Urine Culture    Specimen: Urine, Clean Catch   Result Value Ref Range    Culture >100,000 CFU/mL Escherichia coli (A)        Susceptibility    Escherichia coli - MALDONADO     Amoxicillin/Clavulanate  Susceptible      Ampicillin  Susceptible      Ampicillin/ Sulbactam  Susceptible      Aztreonam  Susceptible      Cefazolin  Susceptible      Cefepime  Susceptible      Ceftazidime  Susceptible      Ceftriaxone  Susceptible      Cefoxitin  Susceptible      Ciprofloxacin*         * Ciprofloxacin not resistant.  Due to test limitations, lab cannot provide MALDONADO to determine susceptibility.     Ertapenem  Susceptible      Gentamicin  Susceptible      Imipenem  Susceptible      Levofloxacin*         * Levofloxacin not resistant.  Due to test limitations, lab cannot provide MALDONADO to determine susceptibility.     Nitrofurantoin  Susceptible      Piperacillin/Tazobactam*         * Piperacillin/Tazobactam not resistant.  Due to test limitations, lab cannot provide MALDONADO to determine susceptibility.     Tetracycline  Susceptible      Tobramycin  Susceptible      Trimethoprim/Sulfamethoxazole  Susceptible      Cefpodoxime  Susceptible      Cefuroxime  Susceptible      Trimethoprim  Susceptible      CT Lumbar Spine Reconstructed   Final Result   IMPRESSION: Destructive metastatic disease involving the lumbar spine   at  L4 and L5 with severe thecal sac compression. Neurologic compromise   is suspected. There is obliteration of fat in the epidural space as   well as both neuroforamen. This would be below the level of the cord.   This likely has progressed since the post recent lumbar spine MRI.      Extensive destructive metastasis involving the left pelvis with   sizable soft tissue component extending medially and laterally from   the left iliac wing. There is comminuted pathologic fracture of the   left acetabulum. Additionally there is metastatic involvement of the   left sacrum at S1-S3.                      YONATHAN MALLORY MD            SYSTEM ID:  [id number]      CT Abdomen Pelvis w Contrast   Final Result   IMPRESSION: Destructive metastatic disease involving the lumbar spine   at L4 and L5 with severe thecal sac compression. Neurologic compromise   is suspected. This would be below the level of the cord. This likely   has progressed since the post recent lumbar spine MRI.      Extensive destructive metastasis involving the left pelvis with   sizable soft tissue component extending medially and laterally from   the left iliac wing. There is comminuted pathologic fracture of the   left acetabulum. Additionally there is metastatic involvement of the   left sacrum at S1-S3.      Interval development of intrahepatic biliary dilatation. There is   sludge in the gallbladder lumen. An obstructing lesion is not   identified. Choledocholithiasis is a possibility.      YONATHAN MALLORY MD            SYSTEM ID:  [id number]      US Lower Extremity Venous Duplex Left   Final Result   Impression: No evidence of deep venous thrombosis within the left   lower extremity.      YONATHAN MALLORY MD            SYSTEM ID:  [id number]            ==================================================================================================================================    ED COURSE, MEDICAL DECISION MAKING, FINAL IMPRESSION AND PLAN:  "    Assessment / Plan:  1. Pathological fracture    2. Prostate cancer metastatic to bone (H)    3. Hip pain, left    4. Pain of left lower leg    5. Low back pain with left-sided sciatica        The patient was interviewed and examined.  HPI and physical exam as below.  Differential diagnosis and MDM Key Documentation Elements as below.  Vitals, triage note, and nursing notes were reviewed.  BP (!) 142/83   Pulse 80   Temp 97.9  F (36.6  C) (Oral)   Resp 18   Ht 1.778 m (5' 10\")   Wt 81.6 kg (180 lb)   SpO2 96%   BMI 25.83 kg/m      Differential includes but is not limited to cauda equina syndrome, nerve impingement, vertebral body fracture, pelvic fracture, hip fracture, DVT    No signs acute DVT on ultrasound.  CT imaging of the lumbar spine and abdomen pelvis today revealed significant destructive metastatic disease of the L4 and L5 with thecal sac compression which is more pronounced since prior MRI.  Patient also with comminuted fracture of the left acetabulum and left pelvic fracture secondary to metastasis extending medially and laterally from the left iliac wing.  Metastatic involvement of the left sacral S1-S3 also present.    I discussed patient with neurosurgery at [facility], Dr. Dhaliwal, who recommended repeat MRI.  I also spoke with orthopedics, Dr. Rapp, who recommended transfer for further evaluation and surgical discussion about patient's left acetabular fracture.    Patient was given IV Dilaudid, IV Decadron, IV Toradol, and oral Atarax for pain control which did help.  Patient will be transferred by [facility] via ground ambulance.  Signed EMTALA obtained.  I spoke with the ER attending physician, Dr. Garay, who will accept patient.  Patient transferred in stable condition.    Pertinent Labs & Imaging studies reviewed. (See chart for details)  Results for orders placed or performed during the hospital encounter of 07/20/24   US Lower Extremity Venous Duplex Left    Impression    " Impression: No evidence of deep venous thrombosis within the left  lower extremity.    YONATHAN MALLORY MD         SYSTEM ID:  [id number]   CT Abdomen Pelvis w Contrast    Impression    IMPRESSION: Destructive metastatic disease involving the lumbar spine  at L4 and L5 with severe thecal sac compression. Neurologic compromise  is suspected. This would be below the level of the cord. This likely  has progressed since the post recent lumbar spine MRI.    Extensive destructive metastasis involving the left pelvis with  sizable soft tissue component extending medially and laterally from  the left iliac wing. There is comminuted pathologic fracture of the  left acetabulum. Additionally there is metastatic involvement of the  left sacrum at S1-S3.    Interval development of intrahepatic biliary dilatation. There is  sludge in the gallbladder lumen. An obstructing lesion is not  identified. Choledocholithiasis is a possibility.    YONATHAN MALLORY MD         SYSTEM ID:  [id number]   CT Lumbar Spine Reconstructed    Impression    IMPRESSION: Destructive metastatic disease involving the lumbar spine  at L4 and L5 with severe thecal sac compression. Neurologic compromise  is suspected. There is obliteration of fat in the epidural space as  well as both neuroforamen. This would be below the level of the cord.  This likely has progressed since the post recent lumbar spine MRI.    Extensive destructive metastasis involving the left pelvis with  sizable soft tissue component extending medially and laterally from  the left iliac wing. There is comminuted pathologic fracture of the  left acetabulum. Additionally there is metastatic involvement of the  left sacrum at S1-S3.               YONATHAN MALLORY MD         SYSTEM ID:  [id number]   Basic metabolic panel   Result Value Ref Range    Sodium 135 135 - 145 mmol/L    Potassium 4.0 3.4 - 5.3 mmol/L    Chloride 100 98 - 107 mmol/L    Carbon Dioxide (CO2) 20 (L) 22 - 29 mmol/L     Anion Gap 15 7 - 15 mmol/L    Urea Nitrogen 30.5 (H) 8.0 - 23.0 mg/dL    Creatinine 1.15 0.67 - 1.17 mg/dL    GFR Estimate 68 >60 mL/min/1.73m2    Calcium 9.8 8.8 - 10.4 mg/dL    Glucose 94 70 - 99 mg/dL   UA with Microscopic reflex to Culture    Specimen: Urine, Clean Catch   Result Value Ref Range    Color Urine Light Yellow Colorless, Straw, Light Yellow, Yellow    Appearance Urine Clear Clear    Glucose Urine Negative Negative mg/dL    Bilirubin Urine Negative Negative    Ketones Urine Negative Negative mg/dL    Specific Gravity Urine 1.008 1.000 - 1.030    Blood Urine Negative Negative    pH Urine 6.5 5.0 - 9.0    Protein Albumin Urine Negative Negative mg/dL    Urobilinogen Urine Normal Normal, 2.0 mg/dL    Nitrite Urine Negative Negative    Leukocyte Esterase Urine Moderate (A) Negative    Bacteria Urine Few (A) None Seen /HPF    Mucus Urine Present (A) None Seen /LPF    RBC Urine 2 <=2 /HPF    WBC Urine 16 (H) <=5 /HPF    Hyaline Casts Urine 1 <=2 /LPF   CBC with platelets and differential   Result Value Ref Range    WBC Count 6.9 4.0 - 11.0 10e3/uL    RBC Count 4.22 (L) 4.40 - 5.90 10e6/uL    Hemoglobin 10.2 (L) 13.3 - 17.7 g/dL    Hematocrit 32.7 (L) 40.0 - 53.0 %    MCV 78 78 - 100 fL    MCH 24.2 (L) 26.5 - 33.0 pg    MCHC 31.2 (L) 31.5 - 36.5 g/dL    RDW 17.0 (H) 10.0 - 15.0 %    Platelet Count 357 150 - 450 10e3/uL    % Neutrophils 73 %    % Lymphocytes 15 %    % Monocytes 9 %    % Eosinophils 4 %    % Basophils 0 %    % Immature Granulocytes 0 %    NRBCs per 100 WBC 0 <1 /100    Absolute Neutrophils 5.0 1.6 - 8.3 10e3/uL    Absolute Lymphocytes 1.0 0.8 - 5.3 10e3/uL    Absolute Monocytes 0.6 0.0 - 1.3 10e3/uL    Absolute Eosinophils 0.2 0.0 - 0.7 10e3/uL    Absolute Basophils 0.0 0.0 - 0.2 10e3/uL    Absolute Immature Granulocytes 0.0 <=0.4 10e3/uL    Absolute NRBCs 0.0 10e3/uL   Extra Blue Top Tube   Result Value Ref Range    Hold Specimen [facility]    Extra Red Top Tube   Result Value Ref Range    Hold  Specimen hold    Extra Green Top (Lithium Heparin) Tube   Result Value Ref Range    Hold Specimen [facility]    Urine Culture    Specimen: Urine, Clean Catch   Result Value Ref Range    Culture >100,000 CFU/mL Escherichia coli (A)        Susceptibility    Escherichia coli - MALDONADO     Amoxicillin/Clavulanate  Susceptible      Ampicillin  Susceptible      Ampicillin/ Sulbactam  Susceptible      Aztreonam  Susceptible      Cefazolin  Susceptible      Cefepime  Susceptible      Ceftazidime  Susceptible      Ceftriaxone  Susceptible      Cefoxitin  Susceptible      Ciprofloxacin*         * Ciprofloxacin not resistant.  Due to test limitations, lab cannot provide MALDONADO to determine susceptibility.     Ertapenem  Susceptible      Gentamicin  Susceptible      Imipenem  Susceptible      Levofloxacin*         * Levofloxacin not resistant.  Due to test limitations, lab cannot provide MALDONADO to determine susceptibility.     Nitrofurantoin  Susceptible      Piperacillin/Tazobactam*         * Piperacillin/Tazobactam not resistant.  Due to test limitations, lab cannot provide MALDONADO to determine susceptibility.     Tetracycline  Susceptible      Tobramycin  Susceptible      Trimethoprim/Sulfamethoxazole  Susceptible      Cefpodoxime  Susceptible      Cefuroxime  Susceptible      Trimethoprim  Susceptible      Lab Results   Component Value Date    ABORH B POS 11/20/2023         Reassessments, Medications, Interventions, & Response to Treatments:  -as above    Medications given during today's ER visit:  Medications   HYDROmorphone (DILAUDID) injection 1 mg (1 mg Intravenous $Given 7/20/24 1703)   hydrOXYzine HCl (ATARAX) tablet 10 mg (10 mg Oral $Given 7/20/24 1702)   dexAMETHasone PF (DECADRON) injection 10 mg (10 mg Intravenous $Given 7/20/24 1703)   iopamidol (ISOVUE-370) solution 104 mL (104 mLs Intravenous $Given 7/20/24 1839)   HYDROmorphone (DILAUDID) injection 1 mg (1 mg Intravenous $Given 7/20/24 1747)   ketorolac (TORADOL) injection 15 mg  (15 mg Intravenous $Given 7/20/24 5300)       Consultations:  As above    Decision Rules, Medical Calculators, and Risk Stratification Tools:  None    MDM Key Documentation Elements for Patient's Evaluation:  Differential diagnosis to include high risk considerations: As above  Escalation to admission/observation considered: Admission/observation considered, patient will be transferred  Discussions and management with other clinicians:    3a. Independent interpretation of testing performed by another health professional:  -Yes  3b. Discussion of management or test interpretation with another health professional: -Yes  Independent interpretation of tests:  Ordering and/or review of 3+ test(s)  Discussion of test interpretations with radiology:  No  History obtained from source other than patient or assessment requiring an independent historian:  No  Review of non-ED/external records:  review of 3+ records  Diagnostic tests considered but not ultimately performed/deferred:  -MRI lumbar spine, will be conducted in [place]  Prescription medications considered but not prescribed:  -Patient was transferred  Chronic conditions affecting care:  -Metastatic prostate cancer  Care affected by social determinants of health:  -None    The patient's management involved:   - Laboratory studies  - Imaging studies  - Parenteral controlled substance  - Decision regarding hospitalization      A shared decision making model was used. Time was taken to answer all questions.  Patient and/or associated parties understood and were agreeable to treatment plan.  Plan and all results were discussed.  Patient transferred in stable condition.    New prescriptions started at today's ER visit  Discharge Medication List as of 7/20/2024  9:57 PM          ==================================================================================================================================      IJoaquín PA-C, personally performed the services  described in this documentation, and it is both accurate and complete.       Tye Garay PA-C  07/20/24 2111       Tye Garay PA-C  07/27/24 1231       Tye Garay PA-C  07/27/24 1332       Tye Garay PA-C  07/30/24 1035

## 2024-07-22 LAB — BACTERIA UR CULT: ABNORMAL

## 2024-07-23 RX ORDER — PREDNISONE 10 MG/1
10 TABLET ORAL DAILY
Qty: 30 TABLET | Refills: 0 | Status: SHIPPED | OUTPATIENT
Start: 2024-07-23 | End: 2024-08-14

## 2024-07-23 NOTE — TELEPHONE ENCOUNTER
Per Erum Fritz MD order to radiation for left hip and leg pain. Orders placed per provider.  Elza Lundberg RN...........7/23/2024 9:55 AM

## 2024-07-31 ENCOUNTER — PATIENT OUTREACH (OUTPATIENT)
Dept: ONCOLOGY | Facility: OTHER | Age: 69
End: 2024-07-31
Payer: COMMERCIAL

## 2024-07-31 ENCOUNTER — TELEPHONE (OUTPATIENT)
Dept: FAMILY MEDICINE | Facility: OTHER | Age: 69
End: 2024-07-31
Payer: COMMERCIAL

## 2024-07-31 NOTE — TELEPHONE ENCOUNTER
Patient was in ER in [place] and sent down to [facility] and he is now an inpatient.  He has been there a week. Doing radiation treatment this week and last.        Nancy Burnette on 7/31/2024 at 11:03 AM

## 2024-07-31 NOTE — PROGRESS NOTES
Jermani is still admitted at [facility] for pain control. He did complete 5 radiation treatments while inpatient. Will be released after pain control achieved. States that pain getting better slowly. He will call writer when discharged.  Elza Lundberg RN...........7/31/2024 1:46 PM

## 2024-08-03 NOTE — MR AVS SNAPSHOT
"              After Visit Summary   11/20/2018    Dilip Kan    MRN: [id number]           Patient Information     Date Of Birth          1955        Visit Information        Provider Department      11/20/2018 2:30 PM Sumanth Roberts MD [facility]        Today's Diagnoses     Spinal stenosis of lumbar region with neurogenic claudication    -  1    Back muscle spasm           Follow-ups after your visit        Your next 10 appointments already scheduled     Dec 11, 2018 10:00 AM CST   Office Visit with Sumanth Roberts MD   [facility] ([facility]    [address] [id number] [phone]           Bring a current list of meds and any records pertaining to this visit. For Physicals, please bring immunization records and any forms needing to be filled out. Please arrive 10 minutes early to complete paperwork.              Who to contact     If you have questions or need follow up information about today's clinic visit or your schedule please contact [facility] directly at [phone].  Normal or non-critical lab and imaging results will be communicated to you by Lexityhart, letter or phone within 4 business days after the clinic has received the results. If you do not hear from us within 7 days, please contact the clinic through Aluwavet or phone. If you have a critical or abnormal lab result, we will notify you by phone as soon as possible.  Submit refill requests through Senexx or call your pharmacy and they will forward the refill request to us. Please allow 3 business days for your refill to be completed.          Additional Information About Your Visit        MyChart Information     Senexx lets you send messages to your doctor, view your test results, renew your prescriptions, schedule appointments and more. To sign up, go to [email or web address] . Click on \"Log in\" on the left side of the screen, which will take you to the " "Welcome page. Then click on \"Sign up Now\" on the right side of the page.     You will be asked to enter the access code listed below, as well as some personal information. Please follow the directions to create your username and password.     Your access code is: [email or web address]  Expires: 3/3/2019 11:07 AM     Your access code will  in 90 days. If you need help or a new code, please call your [facility] clinic or [phone].        Care EveryWhere ID     This is your Care EveryWhere ID. This could be used by other organizations to access your [facility] medical records  [id number]        Your Vitals Were     Pulse Respirations BMI (Body Mass Index)             100 20 31.57 kg/m2          Blood Pressure from Last 3 Encounters:   18 134/64   18 138/80   18 138/82    Weight from Last 3 Encounters:   18 220 lb (99.8 kg)   18 222 lb (100.7 kg)   18 221 lb (100.2 kg)              Today, you had the following     No orders found for display         Today's Medication Changes          These changes are accurate as of 18 11:59 PM.  If you have any questions, ask your nurse or doctor.               These medicines have changed or have updated prescriptions.        Dose/Directions    * diazepam 2 MG tablet   Commonly known as:  VALIUM   This may have changed:  Another medication with the same name was added. Make sure you understand how and when to take each.   Changed by:  Sumanth Roberts MD        Dose:  2-4 mg   Take 2-4 mg by mouth   Refills:  0       * diazepam 2 MG tablet   Commonly known as:  VALIUM   This may have changed:  You were already taking a medication with the same name, and this prescription was added. Make sure you understand how and when to take each.   Used for:  Spinal stenosis of lumbar region with neurogenic claudication, Back muscle spasm   Changed by:  Sumanth Roberts MD        Dose:  2 mg   Take 1 tablet (2 mg) by mouth nightly as needed for muscle " spasms   Quantity:  7 tablet   Refills:  0       oxyCODONE 5 MG tablet   Commonly known as:  ROXICODONE   This may have changed:    - how much to take  - how to take this  - when to take this  - reasons to take this   Used for:  Spinal stenosis of lumbar region with neurogenic claudication   Changed by:  Sumanth Roberts MD        Dose:  5 mg   Take 1 tablet (5 mg) by mouth every 4 hours as needed for severe pain   Quantity:  40 tablet   Refills:  0       * Notice:  This list has 2 medication(s) that are the same as other medications prescribed for you. Read the directions carefully, and ask your doctor or other care provider to review them with you.         Where to get your medicines      Some of these will need a paper prescription and others can be bought over the counter.  Ask your nurse if you have questions.     Bring a paper prescription for each of these medications     diazepam 2 MG tablet    oxyCODONE 5 MG tablet               Information about OPIOIDS     PRESCRIPTION OPIOIDS: WHAT YOU NEED TO KNOW   We gave you an opioid (narcotic) pain medicine. It is important to manage your pain, but opioids are not always the best choice. You should first try all the other options your care team gave you. Take this medicine for as short a time (and as few doses) as possible.    Some activities can increase your pain, such as bandage changes or therapy sessions. It may help to take your pain medicine 30 to 60 minutes before these activities. Reduce your stress by getting enough sleep, working on hobbies you enjoy and practicing relaxation or meditation. Talk to your care team about ways to manage your pain beyond prescription opioids.    These medicines have risks:    DO NOT drive when on new or higher doses of pain medicine. These medicines can affect your alertness and reaction times, and you could be arrested for driving under the influence (DUI). If you need to use opioids long-term, talk to your care team  about driving.    DO NOT operate heavy machinery    DO NOT do any other dangerous activities while taking these medicines.    DO NOT drink any alcohol while taking these medicines.     If the opioid prescribed includes acetaminophen, DO NOT take with any other medicines that contain acetaminophen. Read all labels carefully. Look for the word  acetaminophen  or  Tylenol.  Ask your pharmacist if you have questions or are unsure.    You can get addicted to pain medicines, especially if you have a history of addiction (chemical, alcohol or substance dependence). Talk to your care team about ways to reduce this risk.    All opioids tend to cause constipation. Drink plenty of water and eat foods that have a lot of fiber, such as fruits, vegetables, prune juice, apple juice and high-fiber cereal. Take a laxative (Miralax, milk of magnesia, Colace, Senna) if you don t move your bowels at least every other day. Other side effects include upset stomach, sleepiness, dizziness, throwing up, tolerance (needing more of the medicine to have the same effect), physical dependence and slowed breathing.    Store your pills in a secure place, locked if possible. We will not replace any lost or stolen medicine. If you don t finish your medicine, please throw away (dispose) as directed by your pharmacist. The [place] Pollution Control Agency has more information about safe disposal: [email or web address]-medications         Primary Care Provider Office Phone # Fax #    Sumanth Roberts [phone] [address] [facility] [address]        Equal Access to Services     LIZBETH CRUZ : Hadii aad ku hadasho Soomaali, waaxda luqadaha, qaybta kaalmada adeegyada, nu guzmán . So [facility] [place] [phone].    ATENCIÓN: Si habla español, tiene a mitchell disposición servicios gratuitos de asistencia lingüística. Llame al [phone].    We comply with  applicable federal civil rights laws and [place] laws. We do not discriminate on the basis of race, color, national origin, age, disability, sex, sexual orientation, or gender identity.            Thank you!     Thank you for choosing [facility]  for your care. Our goal is always to provide you with excellent care. Hearing back from our patients is one way we can continue to improve our services. Please take a few minutes to complete the written survey that you may receive in the mail after your visit with us. Thank you!             Your Updated Medication List - Protect others around you: Learn how to safely use, store and throw away your medicines at www.disposemymeds.org.          This list is accurate as of 11/20/18 11:59 PM.  Always use your most recent med list.                   Brand Name Dispense Instructions for use Diagnosis    acetaminophen 325 MG tablet    TYLENOL     Take 650 mg by mouth        ALPRAZolam 0.5 MG tablet    XANAX    2 tablet    One time as needed prior to MRI.  May repeat once.    Claustrophobia       amitriptyline 25 MG tablet    ELAVIL    30 tablet    Take 1 tablet (25 mg) by mouth At Bedtime        aspirin 81 MG EC tablet      Take 81 mg by mouth daily with food        atorvastatin 20 MG tablet    LIPITOR     Take 20 mg by mouth daily        Bee Pollen 500 MG Chew      Take 2 tablets by mouth daily        * diazepam 2 MG tablet    VALIUM     Take 2-4 mg by mouth        * diazepam 2 MG tablet    VALIUM    7 tablet    Take 1 tablet (2 mg) by mouth nightly as needed for muscle spasms    Spinal stenosis of lumbar region with neurogenic claudication, Back muscle spasm       HYDROcodone-acetaminophen  MG per tablet    NORCO    150 tablet    Take 1 tablet by mouth every 4 hours as needed for moderate to severe pain or severe pain Ok to refill on 11/27/18.    Chronic, continuous use of opioids       IBUPROFEN      Prn for back pain        lisinopril 5 MG tablet     PRINIVIL/ZESTRIL     Take 5 mg by mouth daily        OMEGA 3 PO      Take 2 capsules by mouth daily        omeprazole 40 MG DR capsule    priLOSEC     Take 40 mg by mouth daily        oxyCODONE 5 MG tablet    ROXICODONE    40 tablet    Take 1 tablet (5 mg) by mouth every 4 hours as needed for severe pain    Spinal stenosis of lumbar region with neurogenic claudication       RA VITAMIN B-12 TR 1000 MCG CR tablet   Generic drug:  vitamin B-12      Take 1,000 mcg by mouth daily        senna-docusate 8.6-50 MG tablet    SENOKOT-S/PERICOLACE     Take 1-4 tablets by mouth 2 times daily        sildenafil 20 MG tablet    REVATIO     TAKE 3 TO 5 TABLETS (60MG- 100MG) BY MOUTH ONE HOUR PRIOR TO SEXUAL ACTIVITY.        Simethicone 125 MG Caps      Take  by mouth.        SM GARLIC 150 MG Tabs tablet   Generic drug:  garlic      Take 2 tablets by mouth daily        VITAMIN B-1 PO      Take  by mouth.        * Notice:  This list has 2 medication(s) that are the same as other medications prescribed for you. Read the directions carefully, and ask your doctor or other care provider to review them with you.       Yes

## 2024-08-06 ENCOUNTER — PATIENT OUTREACH (OUTPATIENT)
Dept: ONCOLOGY | Facility: OTHER | Age: 69
End: 2024-08-06
Payer: COMMERCIAL

## 2024-08-06 NOTE — PROGRESS NOTES
Patient is still in [facility] and is to be discharged tomorrow. Jennifer Garner RN on 8/6/2024 at 2:02 PM

## 2024-08-07 ENCOUNTER — TELEPHONE (OUTPATIENT)
Dept: FAMILY MEDICINE | Facility: OTHER | Age: 69
End: 2024-08-07
Payer: COMMERCIAL

## 2024-08-07 NOTE — TELEPHONE ENCOUNTER
Reason for call: Patient wanting a work in appointment.    Is the appointment for a Hospital Follow up?  Yes     (If yes - Unable to find an appointment with any provider during the time frame needed. Nurse/Provider - Can this patient be worked into a schedule with PCP or team member?)    Patient is having the following symptoms and/or what is the appt for:  Hospital follow up [facility]     The patient is requesting an appointment with  [facility]    Was an appointment offered for this call? No    If Yes, what is the date of the appointment?  Nothing available in time frame requested     Preferred method for responding to this message: Telephone Call    Phone number patient can be reached at? Other phone number:  [facility]  [facility] - [phone] or contact patient    If we can't reach you directly, may we leave a detailed response at the number you provided? Yes    Can this message wait until your PCP/provider returns if unavailable today? Yes     Yennifer Whyte on 8/7/2024 at 10:35 AM

## 2024-08-07 NOTE — TELEPHONE ENCOUNTER
Spoke with patient and he is currently in hospital. Plans are to be discharged today. Appt made with [facility] 8/19/2024 @ 11:00/arrival time 10:45.  Desi Perez LPN ....................  8/7/2024   11:00 AM  EXT [id number]

## 2024-08-12 DIAGNOSIS — C79.51 MALIGNANT NEOPLASM METASTATIC TO BONE (H): Primary | ICD-10-CM

## 2024-08-12 DIAGNOSIS — C61 MALIGNANT NEOPLASM OF PROSTATE (H): ICD-10-CM

## 2024-08-12 DIAGNOSIS — R97.21 RISING PSA FOLLOWING TREATMENT FOR MALIGNANT NEOPLASM OF PROSTATE: ICD-10-CM

## 2024-08-14 ENCOUNTER — PATIENT OUTREACH (OUTPATIENT)
Dept: ONCOLOGY | Facility: OTHER | Age: 69
End: 2024-08-14
Payer: COMMERCIAL

## 2024-08-14 DIAGNOSIS — C61 PROSTATE CANCER (H): ICD-10-CM

## 2024-08-14 DIAGNOSIS — C79.51 MALIGNANT NEOPLASM METASTATIC TO BONE (H): Primary | ICD-10-CM

## 2024-08-14 RX ORDER — KETAMINE HYDROCHLORIDE 10 MG/ML
10 INJECTION INTRAMUSCULAR; INTRAVENOUS EVERY 6 HOURS PRN
COMMUNITY
Start: 2024-08-07

## 2024-08-14 RX ORDER — METHYLPREDNISOLONE SODIUM SUCCINATE 125 MG/2ML
125 INJECTION, POWDER, LYOPHILIZED, FOR SOLUTION INTRAMUSCULAR; INTRAVENOUS
Status: CANCELLED
Start: 2024-08-14

## 2024-08-14 RX ORDER — ALBUTEROL SULFATE 90 UG/1
1-2 AEROSOL, METERED RESPIRATORY (INHALATION)
Status: CANCELLED
Start: 2024-08-14

## 2024-08-14 RX ORDER — METHADONE HYDROCHLORIDE 10 MG/1
30 TABLET ORAL EVERY 8 HOURS
COMMUNITY
Start: 2024-08-07 | End: 2024-08-19

## 2024-08-14 RX ORDER — HYDROMORPHONE HYDROCHLORIDE 8 MG/1
16 TABLET ORAL EVERY 4 HOURS PRN
COMMUNITY
Start: 2024-08-07 | End: 2024-08-19

## 2024-08-14 RX ORDER — DIPHENHYDRAMINE HYDROCHLORIDE 50 MG/ML
50 INJECTION INTRAMUSCULAR; INTRAVENOUS
Status: CANCELLED
Start: 2024-08-14

## 2024-08-14 RX ORDER — AMOXICILLIN 250 MG
1 CAPSULE ORAL 2 TIMES DAILY
COMMUNITY
Start: 2024-08-07

## 2024-08-14 RX ORDER — EPINEPHRINE 1 MG/ML
0.3 INJECTION, SOLUTION, CONCENTRATE INTRAVENOUS EVERY 5 MIN PRN
Status: CANCELLED | OUTPATIENT
Start: 2024-08-14

## 2024-08-14 RX ORDER — DULOXETIN HYDROCHLORIDE 30 MG/1
30 CAPSULE, DELAYED RELEASE ORAL DAILY
COMMUNITY
Start: 2024-08-08 | End: 2024-08-19

## 2024-08-14 RX ORDER — QUETIAPINE FUMARATE 25 MG/1
25 TABLET, FILM COATED ORAL AT BEDTIME
COMMUNITY
Start: 2024-08-07

## 2024-08-14 RX ORDER — ALBUTEROL SULFATE 0.83 MG/ML
2.5 SOLUTION RESPIRATORY (INHALATION)
Status: CANCELLED | OUTPATIENT
Start: 2024-08-14

## 2024-08-14 NOTE — PROGRESS NOTES
[facility]: Transitions of Care Note  No chief complaint on file.      Most Recent Admission Date: 7/21/24  Most Recent Admission Diagnosis: transferred from the [facility] ED 7/21 after presenting with worsening back and hip pain.     Most Recent Discharge Date: 8/7/24  Most Recent Discharge Diagnosis: HOSPITAL DISCHARGE SUMMARY  Patient Name: Dilip Kan MRN: [id number]   YOB: 1955 Age: 69 year old   Primary Physician:  Cuauhtemoc Gabriel MD Phone: [phone]   Admitting Physician:  Yennifer Vincent MD Admission Date:7/20/2024   Discharging Physician:  Bentley Pan MD    Date of Service:8/7/2024 Discharge Date: 08/07/24        PRINCIPAL DISCHARGE DIAGNOSIS:   Principal Problem:  Malignant neoplasm metastatic to bone (HCC)  Active Problems:  Malignant neoplasm of prostate (HCC)  Type 2 diabetes mellitus without complication, without long-term current use of insulin (HCC)  Essential hypertension  Multiple subsegmental pulmonary emboli without acute cor pulmonale (HCC)  Pathologic fracture of left acetabulum  Metastatic cancer to spine (HCC)  H/O total cystectomy  Cancer related pain  Acute cystitis without hematuria   Transitions of Care Assessment    Discharge Assessment  How are you doing now that you are home?: feeling better. did 5 radiation treatments to left hip/low back at [facility]  How are your symptoms? (Red Flag symptoms escalate to triage hotline per guidelines): Improved  Do you know how to contact your clinic care team if you have future questions or changes to your health status? : Yes  Does the patient have their discharge instructions? : Yes  Does the patient have questions regarding their discharge instructions? : Yes (see comment) (regarding pain medications)  Were you started on any new medications or were there changes to any of your previous medications? : Yes  Does the patient have all of their medications?: Yes  Do you have questions regarding any of your medications?  : Yes (see comment)  Do you have all of your needed medical supplies or equipment (DME)?  (i.e. oxygen tank, CPAP, cane, etc.): No - What equipment or supplies are needed?             Follow up Plan     Discharge Follow-Up  Discharge follow up appointment scheduled in alignment with recommended follow up timeframe or Transitions of Risk Category? (Low = within 30 days; Moderate= within 14 days; High= within 7 days): Yes  Discharge Follow Up Appointment Date: 08/19/24  Discharge Follow Up Appointment Scheduled with?: Primary Care Provider    Future Appointments   Date Time Provider Department Center   8/19/2024 11:00 AM Ezequiel Alejandra MD [facility] [place]   8/27/2024 12:30 PM Josselin Kay MD [facility]   10/15/2024 10:00 AM [facility] INFUSION CHAIR 1 [facility] [place]       Outpatient Plan as outlined on AVS reviewed with patient.    For any urgent concerns, please contact our 24 hour clinic line:        Updated Brads medication list. He will be seeing Dr. Alejandra for hospital follow up next week (8/19/24). He was transferred to  to get monthly labs, Lupron injection, Xgeva injection, and follow up with Erum Fritz MD scheduled. Plan was for follow up in October with Erum Fritz MD , but will bump up follow up to September    Elza Lundberg RN

## 2024-08-19 ENCOUNTER — HOSPITAL ENCOUNTER (OUTPATIENT)
Dept: INFUSION THERAPY | Facility: OTHER | Age: 69
Discharge: HOME | End: 2024-08-19
Attending: INTERNAL MEDICINE
Payer: COMMERCIAL

## 2024-08-19 ENCOUNTER — LAB (OUTPATIENT)
Dept: LAB | Facility: OTHER | Age: 69
End: 2024-08-19
Attending: INTERNAL MEDICINE
Payer: COMMERCIAL

## 2024-08-19 ENCOUNTER — OFFICE VISIT (OUTPATIENT)
Dept: FAMILY MEDICINE | Facility: OTHER | Age: 69
End: 2024-08-19
Attending: FAMILY MEDICINE
Payer: COMMERCIAL

## 2024-08-19 VITALS
DIASTOLIC BLOOD PRESSURE: 54 MMHG | TEMPERATURE: 97.4 F | RESPIRATION RATE: 16 BRPM | HEART RATE: 72 BPM | SYSTOLIC BLOOD PRESSURE: 118 MMHG

## 2024-08-19 VITALS
WEIGHT: 192.4 LBS | OXYGEN SATURATION: 98 % | BODY MASS INDEX: 27.61 KG/M2 | HEART RATE: 76 BPM | RESPIRATION RATE: 20 BRPM | SYSTOLIC BLOOD PRESSURE: 132 MMHG | DIASTOLIC BLOOD PRESSURE: 60 MMHG | TEMPERATURE: 97.7 F

## 2024-08-19 DIAGNOSIS — C61 MALIGNANT NEOPLASM OF PROSTATE (H): ICD-10-CM

## 2024-08-19 DIAGNOSIS — C61 PROSTATE CANCER (H): Primary | ICD-10-CM

## 2024-08-19 DIAGNOSIS — R06.02 SHORTNESS OF BREATH: ICD-10-CM

## 2024-08-19 DIAGNOSIS — C61 MALIGNANT NEOPLASM OF PROSTATE METASTATIC TO BONE (H): ICD-10-CM

## 2024-08-19 DIAGNOSIS — F11.90 CHRONIC, CONTINUOUS USE OF OPIOIDS: ICD-10-CM

## 2024-08-19 DIAGNOSIS — R97.21 RISING PSA FOLLOWING TREATMENT FOR MALIGNANT NEOPLASM OF PROSTATE: ICD-10-CM

## 2024-08-19 DIAGNOSIS — C79.51 MALIGNANT NEOPLASM OF PROSTATE METASTATIC TO BONE (H): ICD-10-CM

## 2024-08-19 DIAGNOSIS — C79.51 MALIGNANT NEOPLASM METASTATIC TO BONE (H): ICD-10-CM

## 2024-08-19 DIAGNOSIS — C61 MALIGNANT NEOPLASM OF PROSTATE (H): Primary | ICD-10-CM

## 2024-08-19 LAB
ALBUMIN SERPL BCG-MCNC: 3.4 G/DL (ref 3.5–5.2)
ALP SERPL-CCNC: 149 U/L (ref 40–150)
ALT SERPL W P-5'-P-CCNC: 8 U/L (ref 0–70)
ANION GAP SERPL CALCULATED.3IONS-SCNC: 8 MMOL/L (ref 7–15)
AST SERPL W P-5'-P-CCNC: 29 U/L (ref 0–45)
BASOPHILS # BLD AUTO: 0 10E3/UL (ref 0–0.2)
BASOPHILS NFR BLD AUTO: 0 %
BILIRUB SERPL-MCNC: 0.4 MG/DL
BUN SERPL-MCNC: 14 MG/DL (ref 8–23)
CALCIUM SERPL-MCNC: 8.8 MG/DL (ref 8.8–10.4)
CALCIUM SERPL-MCNC: 8.8 MG/DL (ref 8.8–10.4)
CHLORIDE SERPL-SCNC: 104 MMOL/L (ref 98–107)
CREAT SERPL-MCNC: 0.66 MG/DL (ref 0.67–1.17)
EGFRCR SERPLBLD CKD-EPI 2021: >90 ML/MIN/1.73M2
EOSINOPHIL # BLD AUTO: 0.1 10E3/UL (ref 0–0.7)
EOSINOPHIL NFR BLD AUTO: 2 %
ERYTHROCYTE [DISTWIDTH] IN BLOOD BY AUTOMATED COUNT: 17.9 % (ref 10–15)
GLUCOSE SERPL-MCNC: 122 MG/DL (ref 70–99)
HCO3 SERPL-SCNC: 25 MMOL/L (ref 22–29)
HCT VFR BLD AUTO: 32.8 % (ref 40–53)
HGB BLD-MCNC: 9.9 G/DL (ref 13.3–17.7)
HOLD SPECIMEN: NORMAL
IMM GRANULOCYTES # BLD: 0 10E3/UL
IMM GRANULOCYTES NFR BLD: 0 %
LDH SERPL L TO P-CCNC: 358 U/L (ref 0–250)
LYMPHOCYTES # BLD AUTO: 0.4 10E3/UL (ref 0.8–5.3)
LYMPHOCYTES NFR BLD AUTO: 7 %
MCH RBC QN AUTO: 25 PG (ref 26.5–33)
MCHC RBC AUTO-ENTMCNC: 30.2 G/DL (ref 31.5–36.5)
MCV RBC AUTO: 83 FL (ref 78–100)
MONOCYTES # BLD AUTO: 0.4 10E3/UL (ref 0–1.3)
MONOCYTES NFR BLD AUTO: 7 %
NEUTROPHILS # BLD AUTO: 4.1 10E3/UL (ref 1.6–8.3)
NEUTROPHILS NFR BLD AUTO: 83 %
NRBC # BLD AUTO: 0 10E3/UL
NRBC BLD AUTO-RTO: 0 /100
PHOSPHATE SERPL-MCNC: 2.9 MG/DL (ref 2.5–4.5)
PLATELET # BLD AUTO: 263 10E3/UL (ref 150–450)
POTASSIUM SERPL-SCNC: 4.5 MMOL/L (ref 3.4–5.3)
PROT SERPL-MCNC: 6.5 G/DL (ref 6.4–8.3)
RBC # BLD AUTO: 3.96 10E6/UL (ref 4.4–5.9)
SODIUM SERPL-SCNC: 137 MMOL/L (ref 135–145)
WBC # BLD AUTO: 5 10E3/UL (ref 4–11)

## 2024-08-19 PROCEDURE — 84100 ASSAY OF PHOSPHORUS: CPT | Performed by: INTERNAL MEDICINE

## 2024-08-19 PROCEDURE — G0463 HOSPITAL OUTPT CLINIC VISIT: HCPCS | Mod: 25

## 2024-08-19 PROCEDURE — 80053 COMPREHEN METABOLIC PANEL: CPT | Performed by: INTERNAL MEDICINE

## 2024-08-19 PROCEDURE — 36415 COLL VENOUS BLD VENIPUNCTURE: CPT | Performed by: INTERNAL MEDICINE

## 2024-08-19 PROCEDURE — 96402 CHEMO HORMON ANTINEOPL SQ/IM: CPT

## 2024-08-19 PROCEDURE — 83615 LACTATE (LD) (LDH) ENZYME: CPT | Performed by: INTERNAL MEDICINE

## 2024-08-19 PROCEDURE — 250N000011 HC RX IP 250 OP 636: Performed by: INTERNAL MEDICINE

## 2024-08-19 PROCEDURE — G0463 HOSPITAL OUTPT CLINIC VISIT: HCPCS

## 2024-08-19 PROCEDURE — G2211 COMPLEX E/M VISIT ADD ON: HCPCS | Performed by: FAMILY MEDICINE

## 2024-08-19 PROCEDURE — 99214 OFFICE O/P EST MOD 30 MIN: CPT | Performed by: FAMILY MEDICINE

## 2024-08-19 PROCEDURE — 85041 AUTOMATED RBC COUNT: CPT | Performed by: INTERNAL MEDICINE

## 2024-08-19 PROCEDURE — 96372 THER/PROPH/DIAG INJ SC/IM: CPT | Performed by: INTERNAL MEDICINE

## 2024-08-19 RX ORDER — EPINEPHRINE 1 MG/ML
0.3 INJECTION, SOLUTION, CONCENTRATE INTRAVENOUS EVERY 5 MIN PRN
OUTPATIENT
Start: 2024-09-18

## 2024-08-19 RX ORDER — METHADONE HYDROCHLORIDE 10 MG/1
30 TABLET ORAL EVERY 8 HOURS
Qty: 270 TABLET | Refills: 0 | Status: SHIPPED | OUTPATIENT
Start: 2024-08-19

## 2024-08-19 RX ORDER — HYDROMORPHONE HYDROCHLORIDE 8 MG/1
16 TABLET ORAL EVERY 4 HOURS PRN
Qty: 180 TABLET | Refills: 0 | Status: SHIPPED | OUTPATIENT
Start: 2024-08-19

## 2024-08-19 RX ORDER — ALBUTEROL SULFATE 90 UG/1
1-2 AEROSOL, METERED RESPIRATORY (INHALATION)
Start: 2024-09-18

## 2024-08-19 RX ORDER — METHADONE HYDROCHLORIDE 10 MG/1
10 TABLET ORAL EVERY 8 HOURS
Qty: 270 TABLET | Refills: 0 | Status: SHIPPED | OUTPATIENT
Start: 2024-08-19

## 2024-08-19 RX ORDER — ALBUTEROL SULFATE 0.83 MG/ML
2.5 SOLUTION RESPIRATORY (INHALATION)
OUTPATIENT
Start: 2024-09-18

## 2024-08-19 RX ORDER — FUROSEMIDE 20 MG
20 TABLET ORAL DAILY PRN
Qty: 30 TABLET | Refills: 3 | Status: SHIPPED | OUTPATIENT
Start: 2024-08-19 | End: 2024-09-04

## 2024-08-19 RX ORDER — DIPHENHYDRAMINE HYDROCHLORIDE 50 MG/ML
50 INJECTION INTRAMUSCULAR; INTRAVENOUS
Start: 2024-09-18

## 2024-08-19 RX ORDER — HYDROMORPHONE HYDROCHLORIDE 4 MG/1
8 TABLET ORAL EVERY 4 HOURS PRN
Qty: 36 TABLET | Refills: 0 | Status: SHIPPED | OUTPATIENT
Start: 2024-08-19

## 2024-08-19 RX ORDER — METHYLPREDNISOLONE SODIUM SUCCINATE 125 MG/2ML
125 INJECTION, POWDER, LYOPHILIZED, FOR SOLUTION INTRAMUSCULAR; INTRAVENOUS
Start: 2024-09-18

## 2024-08-19 RX ADMIN — LEUPROLIDE ACETATE 22.5 MG: KIT at 13:02

## 2024-08-19 RX ADMIN — DENOSUMAB 120 MG: 120 INJECTION SUBCUTANEOUS at 14:14

## 2024-08-19 ASSESSMENT — PAIN SCALES - GENERAL: PAINLEVEL: SEVERE PAIN (6)

## 2024-08-19 NOTE — NURSING NOTE
Infusion Nursing Note:  Dilip Kan presents today for Xgeva/Lupron.    Patient seen by provider today: Yes: Dr. Alejandra   present during visit today: Not Applicable.    Note: N/A.      Intravenous Access:  Labs drawn without difficulty.    Treatment Conditions:  Lab Results   Component Value Date     08/19/2024    POTASSIUM 4.5 08/19/2024    MAG 1.8 12/11/2023    CR 0.66 (L) 08/19/2024    ROBERTO 8.8 08/19/2024    ROBERTO 8.8 08/19/2024    BILITOTAL 0.4 08/19/2024    ALBUMIN 3.4 (L) 08/19/2024    ALT 8 08/19/2024    AST 29 08/19/2024       Results reviewed, labs MET treatment parameters, ok to proceed with treatment.      Post Infusion Assessment:  Patient tolerated injection without incident, Lupron to R Dorsogluteal site and Xgeva to dorsal RUE.       Discharge Plan:   Discharge instructions reviewed with: Patient.  Patient and/or family verbalized understanding of discharge instructions and all questions answered.  Copy of AVS declined. Patient will return 9-18-24 for next appointment.  AVS to patient via VMob.  Patient discharged in stable condition accompanied by: self.  Departure Mode: Wheelchair.      Josselin Smith RN

## 2024-08-19 NOTE — NURSING NOTE
Patient here for hospital follow up he was at [facility] for pain control and chemo treatments. Medication Reconciliation: complete.    kAosua Rivero LPN  8/19/2024 11:04 AM

## 2024-08-19 NOTE — ADDENDUM NOTE
Encounter addended by: Josselin Smith RN on: 8/19/2024 2:54 PM   Actions taken: Clinical Note Signed

## 2024-08-19 NOTE — PROGRESS NOTES
SUBJECTIVE:   Dilip Kan is a 69 year old male who presents to clinic today for the following health issues: Follow-up hospitalization    Patient arrives here for follow-up hospitalization.  He was recently seen in our ER because of ongoing leg and back pain.  MRI was done at the time showing markedly worsen in his lumbar prostate cancer with subsequent sent to [facility] where you underwent radiation therapy.  He states that he is gotten some good improvement.  He is in the process of decreasing his pain medication mainly Dilaudid.  He was also discharged on ketamine orally but feels he might not needed any further.  He is currently on 16 mg of Dilaudid every 4 hours as needed.  He states he takes approximately 6 tablets a day.  Also on methadone 30 mg 3 times a day.  Patient states that he did see a pain specialist who has an appointment in the next few weeks for follow-up.  They had difficulty getting him under good control until they added the ketamine.  But with the radiation he feels that the pain has improved.  Chemotherapy later today              Review of Systems     OBJECTIVE:     /60   Pulse 76   Temp 97.7  F (36.5  C)   Resp 20   Wt 87.3 kg (192 lb 6.4 oz)   SpO2 98%   BMI 27.61 kg/m    Body mass index is 27.61 kg/m .  Physical Exam  Constitutional:       Comments: In a wheelchair but does not appear to be in any distress.  Patient appears to have gained weight   HENT:      Head: Normocephalic.   Neurological:      Mental Status: He is alert.   Psychiatric:         Mood and Affect: Mood normal.         Thought Content: Thought content normal.         Diagnostic Test Results:  none     ASSESSMENT/PLAN:         (C61) Malignant neoplasm of prostate (H)  (primary encounter diagnosis)  Comment:   Plan: methadone (DOLOPHINE) 10 MG tablet, methadone         (DOLOPHINE) 10 MG tablet, methadone (DOLOPHINE)        10 MG tablet, HYDROmorphone (DILAUDID) 8 MG         tablet, HYDROmorphone (DILAUDID)  8 MG tablet,         HYDROmorphone (DILAUDID) 8 MG tablet            (R06.02) Shortness of breath  Comment:   Plan: furosemide (LASIX) 20 MG tablet, methadone         (DOLOPHINE) 10 MG tablet            (F11.90) Chronic, continuous use of opioids  Comment:   Plan: methadone (DOLOPHINE) 10 MG tablet, methadone         (DOLOPHINE) 10 MG tablet, methadone (DOLOPHINE)        10 MG tablet, HYDROmorphone (DILAUDID) 8 MG         tablet, HYDROmorphone (DILAUDID) 8 MG tablet,         HYDROmorphone (DILAUDID) 8 MG tablet            (C61,  C79.51) 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  I did have trouble getting ketamine ordered solution and I advised patient that.  I advised him I could talk to pharmacy to see about getting it in other ways besides epic.  At this time he would like to try to get off his ketamine.  He will follow-up in 3 months unless he needs a as needed follow-up at which time he can come in anytime he wishes.  The longitudinal plan of care for the diagnosis(es)/condition(s) as documented were addressed during this visit. Due to the added complexity in care, I will continue to support Jermain in the subsequent management and with ongoing continuity of care.    Ezequiel Alejandra MD  [facility]

## 2024-08-27 ENCOUNTER — VIRTUAL VISIT (OUTPATIENT)
Dept: INFECTIOUS DISEASES | Facility: CLINIC | Age: 69
End: 2024-08-27
Attending: INTERNAL MEDICINE
Payer: COMMERCIAL

## 2024-08-27 VITALS — HEIGHT: 70 IN | WEIGHT: 190 LBS | BODY MASS INDEX: 27.2 KG/M2

## 2024-08-27 DIAGNOSIS — M86.68 CHRONIC OSTEOMYELITIS OF SYMPHYSIS PUBIS (H): Primary | ICD-10-CM

## 2024-08-27 PROCEDURE — 99214 OFFICE O/P EST MOD 30 MIN: CPT | Mod: 95 | Performed by: INTERNAL MEDICINE

## 2024-08-27 ASSESSMENT — PAIN SCALES - GENERAL: PAINLEVEL: MODERATE PAIN (5)

## 2024-08-27 NOTE — NURSING NOTE
Current patient location: [address] [place] [address]    Is the patient currently in the state of [place]? YES    Visit mode:VIDEO    If the visit is dropped, the patient can be reconnected by: VIDEO VISIT: Text to cell phone:   Telephone Information:   Mobile [phone]       Will anyone else be joining the visit? NO  (If patient encounters technical issues they should call [phone]    How would you like to obtain your AVS? MyChart    Are changes needed to the allergy or medication list? Pt stated no changes to allergies and Pt stated no med changes    Are refills needed on medications prescribed by this physician? NO    Rooming Documentation:  Questionnaire(s) not pre-assigned      Reason for visit: RECHECK    No other vitals to report per pt    Kendra BHATTF

## 2024-08-27 NOTE — LETTER
8/27/2024       RE: Dilip Kan  [address] [address]     Dear Colleague,    Thank you for referring your patient, Dilip Kan, to the [facility]. Please see a copy of my visit note below.    Virtual Visit Details    Type of service:  Video Visit   Video Start Time:  12:32 PM  Video End Time: 12:50 PM    Originating Location (pt. Location): Home    Distant Location (provider location):  On-site  Platform used for Video Visit: [facility]  Dr. 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 s/p cystectomy with ileal conduit, pubectomy, rectus flap 11/30/2023  Intra-op cultures growing Candida albicans, Str agalactiae  Wound dehiscence at lower end of surgical incision, healed  Hx of pelvic abscess s/p 6 weeks of empiric antibiotic course without complete resolution (June-Aug, 2023)  Hx of bacteremia, Kleb pneumoniae with similar isolate from urine culture 4/19/2023  Prostate cancer with metastasis (liver, bone: acetabulum) s/p chemoradiation  Positive VRE screen, per rectal swab 7/1/2023     67 yo M with pubic symphysis osteomyelitis who underwent cystectomy with ileal conduit, pubectomy with rectus flap on 11/30/2023. Intra-op cultures grew Candida albicans, Strep agalactiae. He was treated until around Feb 2024.      His wound is now completely healed according to him ad he will send us a photo through [employer]. I recommended to them that he has close follow up with Dr. Paul. His CRP has gone done and almost normal now off antibiotics. I would not recommend MRI since he  is asymptomatic. He has baseline hip pain and difficulty moving due to fracture. Since we are still observing him but he is better, will space out follow ups to 3-4 months.      PLAN:  Observe off antibiotics.   Follow up with Dr. Paul.      30 minutes spent by me on the date of the encounter doing chart review, history and exam, documentation and further activities per the note    Josselin Kay MD  Division of Infectious Diseases and International Medicine  [phone]         History of Infectious Disease Illness:     Pertinent history obtained from: patient and copied from previous ID notes.      Dilip Kan is a 69 year old male with metastatic prostate cancer (liver, bone:acetabulum), s/p radiation c/b radiation cysitis and chemotherapy (last dose 6/13/2023), hospitalized (Jun, 2023) with fever sepsis 2/2 pelvic abscess. He was found to have a pelvic abscess that was thought not to be reachable for drainage and hip fracture osteomyelitis versus metastasis. Of note, communication of abscess with bladder, prostate and not the rectum per cystogram. His urine and blood cultures grew Klebsiella.      CT 9/20 ([facility]) showed similar to smaller pelvic abscess, which prompted MRI pelvis 10/27/2023 ([facility]), whereby concerning findings of chronic osteomyelitis involving pubic symphysis septic arthropathy, along with anterior bladder wall defect and subjacent fistula. Because of these findings, seen by Urologist (Dr. Paul) and admitted for elective surgery. Underwent cystectomy with ileal conduit, pubectomy, with rectus flap by urology and plastic surgery teams on 11/30/2023. Intra-op cultures grew yeast and Strep agalactiae. Treated until around Feb 2024.    No fevers or chills. Does not feel like he has any infection. Wounds have all closed up and he feels well.           Past Medical and Surgical History:     Past Medical History:   Diagnosis Date     Elevated prostate specific antigen (PSA)     4/10/2012      Encounter for other administrative examinations     1/31/2014     Esophagitis     No Comments Provided     Gastro-esophageal reflux disease without esophagitis     No Comments Provided     Low back pain     No Comments Provided     Malignant neoplasm of prostate (H)     9/6/2012     Nicotine dependence, uncomplicated     Quit - October 2007 with chantix     Other intervertebral disc degeneration, lumbosacral region     12/1/2008       Past Surgical History:   Procedure Laterality Date     APPENDECTOMY OPEN  [id number]    Ruptured - [place]     ARTHROPLASTY KNEE Right 11/16/2021    Procedure: ARTHROPLASTY, KNEE, TOTAL;  Surgeon: Micah Rock MD;  Location: [facility] OR     COLONOSCOPY  08/31/2006    next due in 2016     CYSTECTOMY BLADDER, ILEAL DIVERSION, COMBINED N/A 11/30/2023    Procedure: CYSTECTOMY, WITH URETEROILEAL CONDUIT CREATION and Pubectomy;  Surgeon: Miki Correa MD;  Location: [facility] OR     DISKECTOMY, LUMBAR, SINGLE SP  03/16/2009    L 3-4 microdiskectomy, [facility]     ESOPHAGOSCOPY, GASTROSCOPY, DUODENOSCOPY (EGD), COMBINED  03/2003          ESOPHAGOSCOPY, GASTROSCOPY, DUODENOSCOPY (EGD), COMBINED  08/31/2006          GRAFT FLAP PEDICLE PERINEUM N/A 11/30/2023    Procedure: Left Rectus Abdominis flap;  Surgeon: YADIRA Guadalupe MD;  Location: [facility] OR     PICC DOUBLE LUMEN PLACEMENT Right 12/01/2023    5FR DL PICC, basilic vein. L-43cm, 2cm out.     PROSTATECTOMY PERINEAL RADICAL  11/28/2012    Nerve Sparring, Dr Warner     SPINE SURGERY N/A 04/28/2017    L3 to S1 spinal decompression L4/L5 fusion     TONSILLECTOMY, ADENOIDECTOMY, COMBINED      as a child     VARICOCELECTOMY  1959    INCISION AND DRAINAGE,EXCISE VARICOCELE           Family History:     Family History   Problem Relation Age of Onset     Hypertension Mother         Hypertension,HTN     Other - See Comments Mother          Parkinsons     Heart Disease Father         Heart Disease, passed away from CHF,CAD     Colon Cancer  Father         Cancer-colon     Hypertension Father         Hypertension     Other - See Comments Father         Stroke/Dementia     Family History Negative Sister         Good Health,emotional problems.     Family History Negative Sister         Good Health     Other - See Comments Daughter         w/o major medical problems.     Other - See Comments Son         w/o major medical problems.     Family History Negative Other         Good Health     Family History Negative Other         Good Health,previous marriage./previous marriage.     Family History Negative Other         Good Health,previous marriage.     Anesthesia Reaction No family hx of      Venous thrombosis No family hx of            Social History:     Social History     Tobacco Use     Smoking status: Former     Current packs/day: 0.00     Average packs/day: 1 pack/day for 20.0 years (20.0 ttl pk-yrs)     Types: Cigarettes     Start date: 1982     Quit date: 2002     Years since quittin.8     Passive exposure: Past     Smokeless tobacco: Current     Types: Snuff     Tobacco comments:     Can't remember when all he had passive exposure   Vaping Use     Vaping status: Never Used   Substance Use Topics     Alcohol use: Not Currently     Drug use: No     Social History     Social History Narrative    [employer] - retired.  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  Patient has quit smoking.  Lives in [place] a farm with wife.             Review of Systems:   CONSTITUTIONAL:  No fevers or chills  EYES: negative for icterus  ENT:  negative for hearing loss, tinnitus, sore throat  RESPIRATORY:  negative for cough, sputum or dyspnea  CARDIOVASCULAR:  negative for chest pain, palpitations  GASTROINTESTINAL:  negative for nausea, vomiting, diarrhea or constipation  GENITOURINARY:  negative for dysuria  HEME:  No easy bruising  INTEGUMENT:  negative for rash or pruritus  NEURO:  Negative for headache         Current Medications:      Current Outpatient Medications   Medication Sig Dispense Refill     acetaminophen (TYLENOL) 325 MG tablet Take 975 mg by mouth every 8 hours as needed for mild pain       apixaban ANTICOAGULANT (ELIQUIS) 5 MG tablet Take 1 tablet (5 mg) by mouth 2 times daily 180 tablet 4     enzalutamide (XTANDI) 80 MG tablet Take 1 tablet (80 mg) by mouth daily for 30 days 30 tablet 0     enzalutamide (XTANDI) 80 MG tablet Take 1 tablet (80 mg) by mouth daily for 30 days 30 tablet 0     enzalutamide (XTANDI) 80 MG tablet Take 1 tablet (80 mg) by mouth daily for 30 days 30 tablet 0     enzalutamide (XTANDI) 80 MG tablet Take 1 tablet (80 mg) by mouth daily for 30 days 30 tablet 0     furosemide (LASIX) 20 MG tablet Take 1 tablet (20 mg) by mouth daily as needed 30 tablet 3     HYDROmorphone (DILAUDID) 4 MG tablet Take 2 tablets (8 mg) by mouth every 4 hours as needed for severe pain 36 tablet 0     HYDROmorphone (DILAUDID) 8 MG tablet Take 2 tablets (16 mg) by mouth every 4 hours as needed for severe pain Refill on or after oct 17th 180 tablet 0     HYDROmorphone (DILAUDID) 8 MG tablet Take 2 tablets (16 mg) by mouth every 4 hours as needed for severe pain Refill on or after sept 18th 180 tablet 0     HYDROmorphone (DILAUDID) 8 MG tablet Take 2 tablets (16 mg) by mouth every 4 hours as needed for severe pain Refill on or after aug 19 180 tablet 0     ketamine (KETALAR) 10 MG/ML injection Take 10 mg by mouth every 6 hours as needed 1 ml by mouth every 6 hours as needed for pain       leuprolide (LUPRON DEPOT) 45 MG kit Inject 45 mg into the muscle every 6 months       methadone (DOLOPHINE) 10 MG tablet Take 1 tablet (10 mg) by mouth every 8 hours 270 tablet 0     methadone (DOLOPHINE) 10 MG tablet Take 3 tablets (30 mg) by mouth every 8 hours 270 tablet 0     methadone (DOLOPHINE) 10 MG tablet Take 3 tablets (30 mg) by mouth every 8 hours Refill on or after aug 19 270 tablet 0     naloxone (NARCAN) 4 MG/0.1ML nasal spray Spray 1  "spray (4 mg) into one nostril alternating nostrils as needed for opioid reversal every 2-3 minutes until assistance arrives 0.2 mL 0     omeprazole (PRILOSEC) 40 MG DR capsule Take 1 capsule (40 mg) by mouth daily 90 capsule 4     ondansetron (ZOFRAN ODT) 8 MG ODT tab Take 1 tablet (8 mg) by mouth every 8 hours as needed for nausea 90 tablet 1     prochlorperazine (COMPAZINE) 10 MG tablet Take 1 tablet (10 mg) by mouth every 6 hours as needed for nausea or vomiting 30 tablet 2     QUEtiapine (SEROQUEL) 25 MG tablet Take 25 mg by mouth at bedtime       senna-docusate (SENOKOT-S/PERICOLACE) 8.6-50 MG tablet Take 1 tablet by mouth 2 times daily              Immunization History:     Immunization History   Administered Date(s) Administered     COVID-19 Bivalent 12+ (Pfizer) 12/02/2022     COVID-19 MONOVALENT 12+ (Pfizer) 01/25/2022     COVID-19 Vaccine (Sofiya) 03/06/2021     Influenza (High Dose) 3 valent vaccine 11/01/2018, 09/30/2019     Influenza (IIV3) PF 10/15/2008, 11/17/2011, 11/09/2012     Influenza Vaccine 65+ (Fluzone HD) 10/05/2020, 10/26/2021, 10/05/2022, 01/10/2024     Influenza Vaccine >6 months,quad, PF 10/16/2014, 11/13/2015, 11/03/2016, 09/25/2017     Pneumo Conj 13-V (2010&after) 10/05/2020     Pneumococcal 23 valent 10/05/2022     TDAP Vaccine (Adacel) 11/09/2012            Allergies:   No Known Allergies         Physical Exam:   Vital signs:  Ht 1.778 m (5' 10\")   Wt 86.2 kg (190 lb)   BMI 27.26 kg/m      Physical Examination:  GENERAL:  well-developed, well-nourished, seated in no acute distress.  HEENT:  Head is normocephalic, atraumatic   EYES:  Eyes have anicteric sclerae without conjunctival injection   ENT:  Oropharynx is moist without exudates or ulcers. Tongue is midline  NECK:  Supple. No cervical lymphadenopathy  LUNGS:  Clear to auscultation bilateral.   CARDIOVASCULAR:  Regular rate and rhythm with no murmurs, gallops or rubs.  ABDOMEN:  Normal bowel sounds, soft, nontender. No " appreciable hepatosplenomegaly.  SKIN:  No acute rashes.    NEUROLOGIC:  Grossly nonfocal. Active x4 extremities         Laboratory Data:     Metabolic Studies   Sodium   Date Value Ref Range Status   08/19/2024 137 135 - 145 mmol/L Final   07/20/2024 135 135 - 145 mmol/L Final   02/23/2021 140 134 - 144 mmol/L Final   10/05/2020 142 134 - 144 mmol/L Final     Potassium   Date Value Ref Range Status   08/19/2024 4.5 3.4 - 5.3 mmol/L Final   07/20/2024 4.0 3.4 - 5.3 mmol/L Final   10/13/2022 4.0 3.5 - 5.1 mmol/L Final   09/29/2022 4.0 3.5 - 5.1 mmol/L Final   02/23/2021 4.2 3.5 - 5.1 mmol/L Final   10/05/2020 4.4 3.5 - 5.1 mmol/L Final     Chloride   Date Value Ref Range Status   08/19/2024 104 98 - 107 mmol/L Final   07/20/2024 100 98 - 107 mmol/L Final   10/13/2022 103 98 - 107 mmol/L Final   09/29/2022 105 98 - 107 mmol/L Final   02/23/2021 103 98 - 107 mmol/L Final   10/05/2020 105 98 - 107 mmol/L Final     Carbon Dioxide   Date Value Ref Range Status   02/23/2021 29 21 - 31 mmol/L Final   10/05/2020 30 21 - 31 mmol/L Final     Carbon Dioxide (CO2)   Date Value Ref Range Status   08/19/2024 25 22 - 29 mmol/L Final   07/20/2024 20 (L) 22 - 29 mmol/L Final   10/13/2022 30 21 - 31 mmol/L Final   09/29/2022 29 21 - 31 mmol/L Final     Anion Gap   Date Value Ref Range Status   08/19/2024 8 7 - 15 mmol/L Final   07/20/2024 15 7 - 15 mmol/L Final   10/13/2022 6 3 - 14 mmol/L Final   09/29/2022 7 3 - 14 mmol/L Final   02/23/2021 8 3 - 14 mmol/L Final   10/05/2020 7 3 - 14 mmol/L Final     Urea Nitrogen   Date Value Ref Range Status   08/19/2024 14.0 8.0 - 23.0 mg/dL Final   07/20/2024 30.5 (H) 8.0 - 23.0 mg/dL Final   10/13/2022 15 7 - 25 mg/dL Final   09/29/2022 19 7 - 25 mg/dL Final   02/23/2021 17 7 - 25 mg/dL Final   10/05/2020 18 7 - 25 mg/dL Final     Creatinine   Date Value Ref Range Status   08/19/2024 0.66 (L) 0.67 - 1.17 mg/dL Final   07/20/2024 1.15 0.67 - 1.17 mg/dL Final   02/23/2021 0.73 0.70 - 1.30 mg/dL  Final   11/23/2020 0.71 0.70 - 1.30 mg/dL Final     GFR Estimate   Date Value Ref Range Status   08/19/2024 >90 >60 mL/min/1.73m2 Final     Comment:     eGFR calculated using 2021 CKD-EPI equation.   07/20/2024 68 >60 mL/min/1.73m2 Final     Comment:     eGFR calculated using 2021 CKD-EPI equation.   02/23/2021 >90 >60 mL/min/[1.73_m2] Final   11/23/2020 >90 >60 mL/min/[1.73_m2] Final     Glucose   Date Value Ref Range Status   08/19/2024 122 (H) 70 - 99 mg/dL Final   07/20/2024 94 70 - 99 mg/dL Final   10/13/2022 100 70 - 105 mg/dL Final   09/29/2022 99 70 - 105 mg/dL Final   02/23/2021 139 (H) 70 - 105 mg/dL Final   10/05/2020 153 (H) 70 - 105 mg/dL Final     GLUCOSE BY METER POCT   Date Value Ref Range Status   12/04/2023 118 (H) 70 - 99 mg/dL Final   12/04/2023 110 (H) 70 - 99 mg/dL Final     Hemoglobin A1C   Date Value Ref Range Status   03/14/2024 5.3 4.0 - 6.2 % Final   05/24/2021 6.0 4.0 - 6.0 % Final     Calcium   Date Value Ref Range Status   08/19/2024 8.8 8.8 - 10.4 mg/dL Final     Comment:     Reference intervals for this test were updated on 7/16/2024 to reflect our healthy population more accurately. There may be differences in the flagging of prior results with similar values performed with this method. Those prior results can be interpreted in the context of the updated reference intervals.   08/19/2024 8.8 8.8 - 10.4 mg/dL Final     Comment:     Reference intervals for this test were updated on 7/16/2024 to reflect our healthy population more accurately. There may be differences in the flagging of prior results with similar values performed with this method. Those prior results can be interpreted in the context of the updated reference intervals.   02/23/2021 10.0 8.6 - 10.3 mg/dL Final   10/05/2020 9.5 8.6 - 10.3 mg/dL Final     Phosphorus   Date Value Ref Range Status   08/19/2024 2.9 2.5 - 4.5 mg/dL Final   12/06/2023 3.4 2.5 - 4.5 mg/dL Final     Magnesium   Date Value Ref Range Status    12/11/2023 1.8 1.7 - 2.3 mg/dL Final   12/10/2023 1.8 1.7 - 2.3 mg/dL Final     Lactic Acid   Date Value Ref Range Status   07/02/2023 0.8 0.7 - 2.0 mmol/L Final   06/29/2023 1.0 0.7 - 2.0 mmol/L Final       Inflammatory Markers   CRP Inflammation   Date Value Ref Range Status   07/11/2024 6.22 (H) <5.00 mg/L Final        Hepatic Studies    Bilirubin Total   Date Value Ref Range Status   08/19/2024 0.4 <=1.2 mg/dL Final   07/11/2024 0.3 <=1.2 mg/dL Final   02/23/2021 0.8 0.3 - 1.0 mg/dL Final   04/07/2017 0.5 0.3 - 1.0 mg/dL      Alkaline Phosphatase   Date Value Ref Range Status   08/19/2024 149 40 - 150 U/L Final   07/11/2024 155 (H) 40 - 150 U/L Final   02/23/2021 66 34 - 104 U/L Final   04/07/2017 67 34 - 104 IU/L      Albumin   Date Value Ref Range Status   08/19/2024 3.4 (L) 3.5 - 5.2 g/dL Final   07/11/2024 3.6 3.5 - 5.2 g/dL Final   10/13/2022 3.8 3.5 - 5.7 g/dL Final   09/29/2022 3.8 3.5 - 5.7 g/dL Final   02/23/2021 4.3 3.5 - 5.7 g/dL Final   04/07/2017 4.0 3.5 - 5.7 g/dL      AST   Date Value Ref Range Status   08/19/2024 29 0 - 45 U/L Final   07/11/2024 41 0 - 45 U/L Final     Comment:     Reference intervals for this test were updated on 6/12/2023 to more accurately reflect our healthy population. There may be differences in the flagging of prior results with similar values performed with this method. Interpretation of those prior results can be made in the context of the updated reference intervals.   02/23/2021 15 13 - 39 U/L Final     ALT   Date Value Ref Range Status   08/19/2024 8 0 - 70 U/L Final   07/11/2024 9 0 - 70 U/L Final     Comment:     Reference intervals for this test were updated on 6/12/2023 to more accurately reflect our healthy population. There may be differences in the flagging of prior results with similar values performed with this method. Interpretation of those prior results can be made in the context of the updated reference intervals.     02/23/2021 16 7 - 52 U/L Final        Hematology Studies      WBC   Date Value Ref Range Status   02/23/2021 6.9 4.0 - 11.0 10e9/L Final   07/09/2020 5.5 4.0 - 11.0 10e9/L Final     WBC Count   Date Value Ref Range Status   08/19/2024 5.0 4.0 - 11.0 10e3/uL Final   07/20/2024 6.9 4.0 - 11.0 10e3/uL Final     Absolute Neutrophils   Date Value Ref Range Status   04/22/2023 2.2 1.6 - 8.3 10e3/uL Final     Absolute Lymphocytes   Date Value Ref Range Status   04/22/2023 0.4 (L) 0.8 - 5.3 10e3/uL Final     Absolute Monocytes   Date Value Ref Range Status   04/22/2023 0.3 0.0 - 1.3 10e3/uL Final     Absolute Eosinophils   Date Value Ref Range Status   04/22/2023 0.0 0.0 - 0.7 10e3/uL Final     Hemoglobin   Date Value Ref Range Status   08/19/2024 9.9 (L) 13.3 - 17.7 g/dL Final   07/20/2024 10.2 (L) 13.3 - 17.7 g/dL Final   02/23/2021 14.1 13.3 - 17.7 g/dL Final   07/09/2020 13.7 13.3 - 17.7 g/dL Final     Hematocrit   Date Value Ref Range Status   08/19/2024 32.8 (L) 40.0 - 53.0 % Final   07/20/2024 32.7 (L) 40.0 - 53.0 % Final   02/23/2021 42.5 40.0 - 53.0 % Final   07/09/2020 41.7 40.0 - 53.0 % Final     Platelet Count   Date Value Ref Range Status   08/19/2024 263 150 - 450 10e3/uL Final   07/20/2024 357 150 - 450 10e3/uL Final   02/23/2021 200 150 - 450 10e9/L Final   07/09/2020 203 150 - 450 10e9/L Final       Imaging:  [unfilled]            Again, thank you for allowing me to participate in the care of your patient.      Sincerely,    Josselin Kay MD

## 2024-08-27 NOTE — PROGRESS NOTES
Virtual Visit Details    Type of service:  Video Visit   Video Start Time:  12:32 PM  Video End Time: 12:50 PM    Originating Location (pt. Location): Home    Distant Location (provider location):  On-site  Platform used for Video Visit: [facility]  Dr. 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 s/p cystectomy with ileal conduit, pubectomy, rectus flap 11/30/2023  Intra-op cultures growing Candida albicans, Str agalactiae  Wound dehiscence at lower end of surgical incision, healed  Hx of pelvic abscess s/p 6 weeks of empiric antibiotic course without complete resolution (June-Aug, 2023)  Hx of bacteremia, Kleb pneumoniae with similar isolate from urine culture 4/19/2023  Prostate cancer with metastasis (liver, bone: acetabulum) s/p chemoradiation  Positive VRE screen, per rectal swab 7/1/2023     69 yo M with pubic symphysis osteomyelitis who underwent cystectomy with ileal conduit, pubectomy with rectus flap on 11/30/2023. Intra-op cultures grew Candida albicans, Strep agalactiae. He was treated until around Feb 2024.      His wound is now completely healed according to him ad he will send us a photo through [employer]. I recommended to them that he has close follow up with Dr. Paul. His CRP has gone done and almost normal now off antibiotics. I would not recommend MRI since he is asymptomatic. He has baseline hip pain and difficulty moving due to fracture. Since we are still observing him but he is better, will space out follow ups to 3-4 months.      PLAN:  Observe off antibiotics.   Follow up with Dr. Paul.      30 minutes spent by me on the date of the encounter doing chart review, history  and exam, documentation and further activities per the note    Josselin Kay MD  Division of Infectious Diseases and International Medicine  [phone]         History of Infectious Disease Illness:     Pertinent history obtained from: patient and copied from previous ID notes.      Dilip Kan is a 69 year old male with metastatic prostate cancer (liver, bone:acetabulum), s/p radiation c/b radiation cysitis and chemotherapy (last dose 6/13/2023), hospitalized (Jun, 2023) with fever sepsis 2/2 pelvic abscess. He was found to have a pelvic abscess that was thought not to be reachable for drainage and hip fracture osteomyelitis versus metastasis. Of note, communication of abscess with bladder, prostate and not the rectum per cystogram. His urine and blood cultures grew Klebsiella.      CT 9/20 ([facility]) showed similar to smaller pelvic abscess, which prompted MRI pelvis 10/27/2023 ([facility]), whereby concerning findings of chronic osteomyelitis involving pubic symphysis septic arthropathy, along with anterior bladder wall defect and subjacent fistula. Because of these findings, seen by Urologist (Dr. Paul) and admitted for elective surgery. Underwent cystectomy with ileal conduit, pubectomy, with rectus flap by urology and plastic surgery teams on 11/30/2023. Intra-op cultures grew yeast and Strep agalactiae. Treated until around Feb 2024.    No fevers or chills. Does not feel like he has any infection. Wounds have all closed up and he feels well.           Past Medical and Surgical History:     Past Medical History:   Diagnosis Date    Elevated prostate specific antigen (PSA)     4/10/2012    Encounter for other administrative examinations     1/31/2014    Esophagitis     No Comments Provided    Gastro-esophageal reflux disease without esophagitis     No Comments Provided    Low back pain     No Comments Provided    Malignant neoplasm of prostate (H)     9/6/2012    Nicotine dependence, uncomplicated     Quit -  October 2007 with chantix    Other intervertebral disc degeneration, lumbosacral region     12/1/2008       Past Surgical History:   Procedure Laterality Date    APPENDECTOMY OPEN  091909    Ruptured - [place]    ARTHROPLASTY KNEE Right 11/16/2021    Procedure: ARTHROPLASTY, KNEE, TOTAL;  Surgeon: Micah Rock MD;  Location: [facility] OR    COLONOSCOPY  08/31/2006    next due in 2016    CYSTECTOMY BLADDER, ILEAL DIVERSION, COMBINED N/A 11/30/2023    Procedure: CYSTECTOMY, WITH URETEROILEAL CONDUIT CREATION and Pubectomy;  Surgeon: Miki Correa MD;  Location: [facility] OR    DISKECTOMY, LUMBAR, SINGLE SP  03/16/2009    L 3-4 microdiskectomy, [facility]    ESOPHAGOSCOPY, GASTROSCOPY, DUODENOSCOPY (EGD), COMBINED  03/2003         ESOPHAGOSCOPY, GASTROSCOPY, DUODENOSCOPY (EGD), COMBINED  08/31/2006         GRAFT FLAP PEDICLE PERINEUM N/A 11/30/2023    Procedure: Left Rectus Abdominis flap;  Surgeon: YADIRA Guadalupe MD;  Location: [facility] OR    PICC DOUBLE LUMEN PLACEMENT Right 12/01/2023    5FR DL PICC, basilic vein. L-43cm, 2cm out.    PROSTATECTOMY PERINEAL RADICAL  11/28/2012    Nerve Sparring, Dr Warner    SPINE SURGERY N/A 04/28/2017    L3 to S1 spinal decompression L4/L5 fusion    TONSILLECTOMY, ADENOIDECTOMY, COMBINED      as a child    VARICOCELECTOMY  1959    INCISION AND DRAINAGE,EXCISE VARICOCELE           Family History:     Family History   Problem Relation Age of Onset    Hypertension Mother         Hypertension,HTN    Other - See Comments Mother          Parkinsons    Heart Disease Father         Heart Disease, passed away from CHF,CAD    Colon Cancer Father         Cancer-colon    Hypertension Father         Hypertension    Other - See Comments Father         Stroke/Dementia    Family History Negative Sister         Good Health,emotional problems.    Family History Negative Sister         Good Health    Other - See Comments Daughter         w/o major medical problems.    Other - See Comments Son          w/o major medical problems.    Family History Negative Other         Good Health    Family History Negative Other         Good Health,previous marriage./previous marriage.    Family History Negative Other         Good Health,previous marriage.    Anesthesia Reaction No family hx of     Venous thrombosis No family hx of            Social History:     Social History     Tobacco Use    Smoking status: Former     Current packs/day: 0.00     Average packs/day: 1 pack/day for 20.0 years (20.0 ttl pk-yrs)     Types: Cigarettes     Start date: 1982     Quit date: 2002     Years since quittin.8     Passive exposure: Past    Smokeless tobacco: Current     Types: Snuff    Tobacco comments:     Can't remember when all he had passive exposure   Vaping Use    Vaping status: Never Used   Substance Use Topics    Alcohol use: Not Currently    Drug use: No     Social History     Social History Narrative    [employer] - retired.  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  Patient has quit smoking.  Lives in [place] on a farm with wife.             Review of Systems:   CONSTITUTIONAL:  No fevers or chills  EYES: negative for icterus  ENT:  negative for hearing loss, tinnitus, sore throat  RESPIRATORY:  negative for cough, sputum or dyspnea  CARDIOVASCULAR:  negative for chest pain, palpitations  GASTROINTESTINAL:  negative for nausea, vomiting, diarrhea or constipation  GENITOURINARY:  negative for dysuria  HEME:  No easy bruising  INTEGUMENT:  negative for rash or pruritus  NEURO:  Negative for headache         Current Medications:     Current Outpatient Medications   Medication Sig Dispense Refill    acetaminophen (TYLENOL) 325 MG tablet Take 975 mg by mouth every 8 hours as needed for mild pain      apixaban ANTICOAGULANT (ELIQUIS) 5 MG tablet Take 1 tablet (5 mg) by mouth 2 times daily 180 tablet 4    enzalutamide (XTANDI) 80 MG tablet Take 1 tablet (80 mg) by mouth daily for 30 days 30 tablet  0    enzalutamide (XTANDI) 80 MG tablet Take 1 tablet (80 mg) by mouth daily for 30 days 30 tablet 0    enzalutamide (XTANDI) 80 MG tablet Take 1 tablet (80 mg) by mouth daily for 30 days 30 tablet 0    enzalutamide (XTANDI) 80 MG tablet Take 1 tablet (80 mg) by mouth daily for 30 days 30 tablet 0    furosemide (LASIX) 20 MG tablet Take 1 tablet (20 mg) by mouth daily as needed 30 tablet 3    HYDROmorphone (DILAUDID) 4 MG tablet Take 2 tablets (8 mg) by mouth every 4 hours as needed for severe pain 36 tablet 0    HYDROmorphone (DILAUDID) 8 MG tablet Take 2 tablets (16 mg) by mouth every 4 hours as needed for severe pain Refill on or after oct 17th 180 tablet 0    HYDROmorphone (DILAUDID) 8 MG tablet Take 2 tablets (16 mg) by mouth every 4 hours as needed for severe pain Refill on or after sept 18th 180 tablet 0    HYDROmorphone (DILAUDID) 8 MG tablet Take 2 tablets (16 mg) by mouth every 4 hours as needed for severe pain Refill on or after aug 19 180 tablet 0    ketamine (KETALAR) 10 MG/ML injection Take 10 mg by mouth every 6 hours as needed 1 ml by mouth every 6 hours as needed for pain      leuprolide (LUPRON DEPOT) 45 MG kit Inject 45 mg into the muscle every 6 months      methadone (DOLOPHINE) 10 MG tablet Take 1 tablet (10 mg) by mouth every 8 hours 270 tablet 0    methadone (DOLOPHINE) 10 MG tablet Take 3 tablets (30 mg) by mouth every 8 hours 270 tablet 0    methadone (DOLOPHINE) 10 MG tablet Take 3 tablets (30 mg) by mouth every 8 hours Refill on or after aug 19 270 tablet 0    naloxone (NARCAN) 4 MG/0.1ML nasal spray Spray 1 spray (4 mg) into one nostril alternating nostrils as needed for opioid reversal every 2-3 minutes until assistance arrives 0.2 mL 0    omeprazole (PRILOSEC) 40 MG DR capsule Take 1 capsule (40 mg) by mouth daily 90 capsule 4    ondansetron (ZOFRAN ODT) 8 MG ODT tab Take 1 tablet (8 mg) by mouth every 8 hours as needed for nausea 90 tablet 1    prochlorperazine (COMPAZINE) 10 MG tablet  "Take 1 tablet (10 mg) by mouth every 6 hours as needed for nausea or vomiting 30 tablet 2    QUEtiapine (SEROQUEL) 25 MG tablet Take 25 mg by mouth at bedtime      senna-docusate (SENOKOT-S/PERICOLACE) 8.6-50 MG tablet Take 1 tablet by mouth 2 times daily              Immunization History:     Immunization History   Administered Date(s) Administered    COVID-19 Bivalent 12+ (Pfizer) 12/02/2022    COVID-19 MONOVALENT 12+ (Pfizer) 01/25/2022    COVID-19 Vaccine (Sofiya) 03/06/2021    Influenza (High Dose) 3 valent vaccine 11/01/2018, 09/30/2019    Influenza (IIV3) PF 10/15/2008, 11/17/2011, 11/09/2012    Influenza Vaccine 65+ (Fluzone HD) 10/05/2020, 10/26/2021, 10/05/2022, 01/10/2024    Influenza Vaccine >6 months,quad, PF 10/16/2014, 11/13/2015, 11/03/2016, 09/25/2017    Pneumo Conj 13-V (2010&after) 10/05/2020    Pneumococcal 23 valent 10/05/2022    TDAP Vaccine (Adacel) 11/09/2012            Allergies:   No Known Allergies         Physical Exam:   Vital signs:  Ht 1.778 m (5' 10\")   Wt 86.2 kg (190 lb)   BMI 27.26 kg/m      Physical Examination:  GENERAL:  well-developed, well-nourished, seated in no acute distress.  HEENT:  Head is normocephalic, atraumatic   EYES:  Eyes have anicteric sclerae without conjunctival injection   ENT:  Oropharynx is moist without exudates or ulcers. Tongue is midline  NECK:  Supple. No cervical lymphadenopathy  LUNGS:  Clear to auscultation bilateral.   CARDIOVASCULAR:  Regular rate and rhythm with no murmurs, gallops or rubs.  ABDOMEN:  Normal bowel sounds, soft, nontender. No appreciable hepatosplenomegaly.  SKIN:  No acute rashes.    NEUROLOGIC:  Grossly nonfocal. Active x4 extremities         Laboratory Data:     Metabolic Studies   Sodium   Date Value Ref Range Status   08/19/2024 137 135 - 145 mmol/L Final   07/20/2024 135 135 - 145 mmol/L Final   02/23/2021 140 134 - 144 mmol/L Final   10/05/2020 142 134 - 144 mmol/L Final     Potassium   Date Value Ref Range Status "   08/19/2024 4.5 3.4 - 5.3 mmol/L Final   07/20/2024 4.0 3.4 - 5.3 mmol/L Final   10/13/2022 4.0 3.5 - 5.1 mmol/L Final   09/29/2022 4.0 3.5 - 5.1 mmol/L Final   02/23/2021 4.2 3.5 - 5.1 mmol/L Final   10/05/2020 4.4 3.5 - 5.1 mmol/L Final     Chloride   Date Value Ref Range Status   08/19/2024 104 98 - 107 mmol/L Final   07/20/2024 100 98 - 107 mmol/L Final   10/13/2022 103 98 - 107 mmol/L Final   09/29/2022 105 98 - 107 mmol/L Final   02/23/2021 103 98 - 107 mmol/L Final   10/05/2020 105 98 - 107 mmol/L Final     Carbon Dioxide   Date Value Ref Range Status   02/23/2021 29 21 - 31 mmol/L Final   10/05/2020 30 21 - 31 mmol/L Final     Carbon Dioxide (CO2)   Date Value Ref Range Status   08/19/2024 25 22 - 29 mmol/L Final   07/20/2024 20 (L) 22 - 29 mmol/L Final   10/13/2022 30 21 - 31 mmol/L Final   09/29/2022 29 21 - 31 mmol/L Final     Anion Gap   Date Value Ref Range Status   08/19/2024 8 7 - 15 mmol/L Final   07/20/2024 15 7 - 15 mmol/L Final   10/13/2022 6 3 - 14 mmol/L Final   09/29/2022 7 3 - 14 mmol/L Final   02/23/2021 8 3 - 14 mmol/L Final   10/05/2020 7 3 - 14 mmol/L Final     Urea Nitrogen   Date Value Ref Range Status   08/19/2024 14.0 8.0 - 23.0 mg/dL Final   07/20/2024 30.5 (H) 8.0 - 23.0 mg/dL Final   10/13/2022 15 7 - 25 mg/dL Final   09/29/2022 19 7 - 25 mg/dL Final   02/23/2021 17 7 - 25 mg/dL Final   10/05/2020 18 7 - 25 mg/dL Final     Creatinine   Date Value Ref Range Status   08/19/2024 0.66 (L) 0.67 - 1.17 mg/dL Final   07/20/2024 1.15 0.67 - 1.17 mg/dL Final   02/23/2021 0.73 0.70 - 1.30 mg/dL Final   11/23/2020 0.71 0.70 - 1.30 mg/dL Final     GFR Estimate   Date Value Ref Range Status   08/19/2024 >90 >60 mL/min/1.73m2 Final     Comment:     eGFR calculated using 2021 CKD-EPI equation.   07/20/2024 68 >60 mL/min/1.73m2 Final     Comment:     eGFR calculated using 2021 CKD-EPI equation.   02/23/2021 >90 >60 mL/min/[1.73_m2] Final   11/23/2020 >90 >60 mL/min/[1.73_m2] Final     Glucose    Date Value Ref Range Status   08/19/2024 122 (H) 70 - 99 mg/dL Final   07/20/2024 94 70 - 99 mg/dL Final   10/13/2022 100 70 - 105 mg/dL Final   09/29/2022 99 70 - 105 mg/dL Final   02/23/2021 139 (H) 70 - 105 mg/dL Final   10/05/2020 153 (H) 70 - 105 mg/dL Final     GLUCOSE BY METER POCT   Date Value Ref Range Status   12/04/2023 118 (H) 70 - 99 mg/dL Final   12/04/2023 110 (H) 70 - 99 mg/dL Final     Hemoglobin A1C   Date Value Ref Range Status   03/14/2024 5.3 4.0 - 6.2 % Final   05/24/2021 6.0 4.0 - 6.0 % Final     Calcium   Date Value Ref Range Status   08/19/2024 8.8 8.8 - 10.4 mg/dL Final     Comment:     Reference intervals for this test were updated on 7/16/2024 to reflect our healthy population more accurately. There may be differences in the flagging of prior results with similar values performed with this method. Those prior results can be interpreted in the context of the updated reference intervals.   08/19/2024 8.8 8.8 - 10.4 mg/dL Final     Comment:     Reference intervals for this test were updated on 7/16/2024 to reflect our healthy population more accurately. There may be differences in the flagging of prior results with similar values performed with this method. Those prior results can be interpreted in the context of the updated reference intervals.   02/23/2021 10.0 8.6 - 10.3 mg/dL Final   10/05/2020 9.5 8.6 - 10.3 mg/dL Final     Phosphorus   Date Value Ref Range Status   08/19/2024 2.9 2.5 - 4.5 mg/dL Final   12/06/2023 3.4 2.5 - 4.5 mg/dL Final     Magnesium   Date Value Ref Range Status   12/11/2023 1.8 1.7 - 2.3 mg/dL Final   12/10/2023 1.8 1.7 - 2.3 mg/dL Final     Lactic Acid   Date Value Ref Range Status   07/02/2023 0.8 0.7 - 2.0 mmol/L Final   06/29/2023 1.0 0.7 - 2.0 mmol/L Final       Inflammatory Markers   CRP Inflammation   Date Value Ref Range Status   07/11/2024 6.22 (H) <5.00 mg/L Final        Hepatic Studies    Bilirubin Total   Date Value Ref Range Status   08/19/2024 0.4  <=1.2 mg/dL Final   07/11/2024 0.3 <=1.2 mg/dL Final   02/23/2021 0.8 0.3 - 1.0 mg/dL Final   04/07/2017 0.5 0.3 - 1.0 mg/dL      Alkaline Phosphatase   Date Value Ref Range Status   08/19/2024 149 40 - 150 U/L Final   07/11/2024 155 (H) 40 - 150 U/L Final   02/23/2021 66 34 - 104 U/L Final   04/07/2017 67 34 - 104 IU/L      Albumin   Date Value Ref Range Status   08/19/2024 3.4 (L) 3.5 - 5.2 g/dL Final   07/11/2024 3.6 3.5 - 5.2 g/dL Final   10/13/2022 3.8 3.5 - 5.7 g/dL Final   09/29/2022 3.8 3.5 - 5.7 g/dL Final   02/23/2021 4.3 3.5 - 5.7 g/dL Final   04/07/2017 4.0 3.5 - 5.7 g/dL      AST   Date Value Ref Range Status   08/19/2024 29 0 - 45 U/L Final   07/11/2024 41 0 - 45 U/L Final     Comment:     Reference intervals for this test were updated on 6/12/2023 to more accurately reflect our healthy population. There may be differences in the flagging of prior results with similar values performed with this method. Interpretation of those prior results can be made in the context of the updated reference intervals.   02/23/2021 15 13 - 39 U/L Final     ALT   Date Value Ref Range Status   08/19/2024 8 0 - 70 U/L Final   07/11/2024 9 0 - 70 U/L Final     Comment:     Reference intervals for this test were updated on 6/12/2023 to more accurately reflect our healthy population. There may be differences in the flagging of prior results with similar values performed with this method. Interpretation of those prior results can be made in the context of the updated reference intervals.     02/23/2021 16 7 - 52 U/L Final       Hematology Studies      WBC   Date Value Ref Range Status   02/23/2021 6.9 4.0 - 11.0 10e9/L Final   07/09/2020 5.5 4.0 - 11.0 10e9/L Final     WBC Count   Date Value Ref Range Status   08/19/2024 5.0 4.0 - 11.0 10e3/uL Final   07/20/2024 6.9 4.0 - 11.0 10e3/uL Final     Absolute Neutrophils   Date Value Ref Range Status   04/22/2023 2.2 1.6 - 8.3 10e3/uL Final     Absolute Lymphocytes   Date Value Ref  Range Status   04/22/2023 0.4 (L) 0.8 - 5.3 10e3/uL Final     Absolute Monocytes   Date Value Ref Range Status   04/22/2023 0.3 0.0 - 1.3 10e3/uL Final     Absolute Eosinophils   Date Value Ref Range Status   04/22/2023 0.0 0.0 - 0.7 10e3/uL Final     Hemoglobin   Date Value Ref Range Status   08/19/2024 9.9 (L) 13.3 - 17.7 g/dL Final   07/20/2024 10.2 (L) 13.3 - 17.7 g/dL Final   02/23/2021 14.1 13.3 - 17.7 g/dL Final   07/09/2020 13.7 13.3 - 17.7 g/dL Final     Hematocrit   Date Value Ref Range Status   08/19/2024 32.8 (L) 40.0 - 53.0 % Final   07/20/2024 32.7 (L) 40.0 - 53.0 % Final   02/23/2021 42.5 40.0 - 53.0 % Final   07/09/2020 41.7 40.0 - 53.0 % Final     Platelet Count   Date Value Ref Range Status   08/19/2024 263 150 - 450 10e3/uL Final   07/20/2024 357 150 - 450 10e3/uL Final   02/23/2021 200 150 - 450 10e9/L Final   07/09/2020 203 150 - 450 10e9/L Final       Imaging:  [unfilled]

## 2024-08-30 ENCOUNTER — TELEPHONE (OUTPATIENT)
Dept: INFECTIOUS DISEASES | Facility: CLINIC | Age: 69
End: 2024-08-30
Payer: COMMERCIAL

## 2024-08-30 NOTE — TELEPHONE ENCOUNTER
MAX LEZAMA 8/30 to sched a 3 month (around 11/27) follow up with Dr. Kay via in-person or virtual per checkout notes from 8/27. --2nd attempt.

## 2024-09-04 ENCOUNTER — ONCOLOGY VISIT (OUTPATIENT)
Dept: ONCOLOGY | Facility: OTHER | Age: 69
End: 2024-09-04
Attending: INTERNAL MEDICINE
Payer: COMMERCIAL

## 2024-09-04 ENCOUNTER — TELEPHONE (OUTPATIENT)
Dept: FAMILY MEDICINE | Facility: OTHER | Age: 69
End: 2024-09-04

## 2024-09-04 VITALS
SYSTOLIC BLOOD PRESSURE: 136 MMHG | WEIGHT: 190 LBS | DIASTOLIC BLOOD PRESSURE: 64 MMHG | OXYGEN SATURATION: 99 % | BODY MASS INDEX: 27.26 KG/M2 | RESPIRATION RATE: 16 BRPM | TEMPERATURE: 96.6 F | HEART RATE: 73 BPM

## 2024-09-04 DIAGNOSIS — R11.0 NAUSEA: ICD-10-CM

## 2024-09-04 DIAGNOSIS — R06.02 SHORTNESS OF BREATH: ICD-10-CM

## 2024-09-04 DIAGNOSIS — C61 MALIGNANT NEOPLASM OF PROSTATE (H): Primary | ICD-10-CM

## 2024-09-04 DIAGNOSIS — C79.51 MALIGNANT NEOPLASM METASTATIC TO BONE (H): ICD-10-CM

## 2024-09-04 DIAGNOSIS — R97.21 RISING PSA FOLLOWING TREATMENT FOR MALIGNANT NEOPLASM OF PROSTATE: ICD-10-CM

## 2024-09-04 LAB
ALBUMIN SERPL BCG-MCNC: 3.4 G/DL (ref 3.5–5.2)
ALP SERPL-CCNC: 122 U/L (ref 40–150)
ALT SERPL W P-5'-P-CCNC: 9 U/L (ref 0–70)
ANION GAP SERPL CALCULATED.3IONS-SCNC: 10 MMOL/L (ref 7–15)
AST SERPL W P-5'-P-CCNC: 22 U/L (ref 0–45)
BASOPHILS # BLD AUTO: 0 10E3/UL (ref 0–0.2)
BASOPHILS NFR BLD AUTO: 1 %
BILIRUB SERPL-MCNC: 0.4 MG/DL
BUN SERPL-MCNC: 8.7 MG/DL (ref 8–23)
CALCIUM SERPL-MCNC: 7.8 MG/DL (ref 8.8–10.4)
CHLORIDE SERPL-SCNC: 100 MMOL/L (ref 98–107)
CREAT SERPL-MCNC: 0.62 MG/DL (ref 0.67–1.17)
EGFRCR SERPLBLD CKD-EPI 2021: >90 ML/MIN/1.73M2
EOSINOPHIL # BLD AUTO: 0.1 10E3/UL (ref 0–0.7)
EOSINOPHIL NFR BLD AUTO: 3 %
ERYTHROCYTE [DISTWIDTH] IN BLOOD BY AUTOMATED COUNT: 17.4 % (ref 10–15)
GLUCOSE SERPL-MCNC: 125 MG/DL (ref 70–99)
HCO3 SERPL-SCNC: 24 MMOL/L (ref 22–29)
HCT VFR BLD AUTO: 31.5 % (ref 40–53)
HGB BLD-MCNC: 9.5 G/DL (ref 13.3–17.7)
IMM GRANULOCYTES # BLD: 0 10E3/UL
IMM GRANULOCYTES NFR BLD: 0 %
LDH SERPL L TO P-CCNC: 344 U/L (ref 0–250)
LYMPHOCYTES # BLD AUTO: 0.4 10E3/UL (ref 0.8–5.3)
LYMPHOCYTES NFR BLD AUTO: 10 %
MCH RBC QN AUTO: 24.6 PG (ref 26.5–33)
MCHC RBC AUTO-ENTMCNC: 30.2 G/DL (ref 31.5–36.5)
MCV RBC AUTO: 82 FL (ref 78–100)
MONOCYTES # BLD AUTO: 0.4 10E3/UL (ref 0–1.3)
MONOCYTES NFR BLD AUTO: 9 %
NEUTROPHILS # BLD AUTO: 3.3 10E3/UL (ref 1.6–8.3)
NEUTROPHILS NFR BLD AUTO: 77 %
NRBC # BLD AUTO: 0 10E3/UL
NRBC BLD AUTO-RTO: 0 /100
PLATELET # BLD AUTO: 346 10E3/UL (ref 150–450)
POTASSIUM SERPL-SCNC: 4.3 MMOL/L (ref 3.4–5.3)
PROT SERPL-MCNC: 6.5 G/DL (ref 6.4–8.3)
PSA SERPL DL<=0.01 NG/ML-MCNC: 10.43 NG/ML (ref 0–6.5)
RBC # BLD AUTO: 3.86 10E6/UL (ref 4.4–5.9)
SODIUM SERPL-SCNC: 134 MMOL/L (ref 135–145)
WBC # BLD AUTO: 4.2 10E3/UL (ref 4–11)

## 2024-09-04 PROCEDURE — 83615 LACTATE (LD) (LDH) ENZYME: CPT | Mod: ZL | Performed by: INTERNAL MEDICINE

## 2024-09-04 PROCEDURE — 36415 COLL VENOUS BLD VENIPUNCTURE: CPT | Mod: ZL | Performed by: INTERNAL MEDICINE

## 2024-09-04 PROCEDURE — 84403 ASSAY OF TOTAL TESTOSTERONE: CPT | Mod: ZL | Performed by: INTERNAL MEDICINE

## 2024-09-04 PROCEDURE — 85025 COMPLETE CBC W/AUTO DIFF WBC: CPT | Mod: ZL | Performed by: INTERNAL MEDICINE

## 2024-09-04 PROCEDURE — 82040 ASSAY OF SERUM ALBUMIN: CPT | Mod: ZL | Performed by: INTERNAL MEDICINE

## 2024-09-04 PROCEDURE — 99215 OFFICE O/P EST HI 40 MIN: CPT | Performed by: INTERNAL MEDICINE

## 2024-09-04 PROCEDURE — 99417 PROLNG OP E/M EACH 15 MIN: CPT | Performed by: INTERNAL MEDICINE

## 2024-09-04 PROCEDURE — 84155 ASSAY OF PROTEIN SERUM: CPT | Mod: ZL | Performed by: INTERNAL MEDICINE

## 2024-09-04 PROCEDURE — 84153 ASSAY OF PSA TOTAL: CPT | Mod: ZL | Performed by: INTERNAL MEDICINE

## 2024-09-04 PROCEDURE — G0463 HOSPITAL OUTPT CLINIC VISIT: HCPCS

## 2024-09-04 PROCEDURE — G2211 COMPLEX E/M VISIT ADD ON: HCPCS | Performed by: INTERNAL MEDICINE

## 2024-09-04 RX ORDER — PROCHLORPERAZINE MALEATE 10 MG
10 TABLET ORAL EVERY 6 HOURS PRN
Qty: 30 TABLET | Refills: 2 | Status: SHIPPED | OUTPATIENT
Start: 2024-09-04

## 2024-09-04 RX ORDER — FUROSEMIDE 20 MG
20 TABLET ORAL 2 TIMES DAILY
Qty: 60 TABLET | Refills: 3 | Status: SHIPPED | OUTPATIENT
Start: 2024-09-04

## 2024-09-04 RX ORDER — ONDANSETRON 8 MG/1
8 TABLET, ORALLY DISINTEGRATING ORAL EVERY 8 HOURS PRN
Qty: 90 TABLET | Refills: 1 | Status: SHIPPED | OUTPATIENT
Start: 2024-09-04

## 2024-09-04 ASSESSMENT — PAIN SCALES - GENERAL: PAINLEVEL: MODERATE PAIN (4)

## 2024-09-04 ASSESSMENT — ENCOUNTER SYMPTOMS
BACK PAIN: 1
HEMATOLOGIC/LYMPHATIC NEGATIVE: 1
SHORTNESS OF BREATH: 1
NEUROLOGICAL NEGATIVE: 1
FATIGUE: 1
ABDOMINAL DISTENTION: 0
CARDIOVASCULAR NEGATIVE: 1
PSYCHIATRIC NEGATIVE: 1
GASTROINTESTINAL NEGATIVE: 1
BLOOD IN STOOL: 0
DIFFICULTY URINATING: 1
ENDOCRINE NEGATIVE: 1
ALLERGIC/IMMUNOLOGIC NEGATIVE: 1
ABDOMINAL PAIN: 0
EYES NEGATIVE: 1

## 2024-09-04 NOTE — PATIENT INSTRUCTIONS
Continue on same medication.    Monthly labs needed; last done 8/19/24.  Will need a testosterone done with the November labs.    Radiology scheduling will contact you to arrange a PET PSMA/Pylarify scan.  If you have not received a call in the next 2 weeks, please call them at [phone]. PET scans require you to follow a diet plan starting 24 hours before the appointment - See handout.     Follow up with Dr Fritz in November after PET and monthly labs are completed.

## 2024-09-04 NOTE — TELEPHONE ENCOUNTER
Patient here today in oncology for follow up, complaining of his feet and lower legs swelling.     He was recently given Lasix 20 mg to take daily by Dr Alejandra.     Patient has been taking it twice a day and still has swelling and pain in his feet. Pharmacy won't fill it early because directions say one per day, #30.  Will you change the directions on it or send in a new one? Or see him?    Jacqueline Cortez CMA([facility])..................9/4/2024   9:23 AM

## 2024-09-04 NOTE — NURSING NOTE
"Oncology Rooming Note    September 4, 2024 9:16 AM   Dilip Kan is a 69 year old male who presents for:    Chief Complaint   Patient presents with    Oncology Clinic Visit     Prostate CA     Initial Vitals: BP (!) 150/76 (BP Location: Right arm, Patient Position: Sitting, Cuff Size: Adult Regular)   Pulse 73   Temp (!) 96.6  F (35.9  C) (Tympanic)   Resp 16   Wt 86.2 kg (190 lb)   SpO2 99%   BMI 27.26 kg/m   Estimated body mass index is 27.26 kg/m  as calculated from the following:    Height as of 8/27/24: 1.778 m (5' 10\").    Weight as of this encounter: 86.2 kg (190 lb). Body surface area is 2.06 meters squared.  Moderate Pain (4) Comment: Data Unavailable   No LMP for male patient.  Allergies reviewed: Yes  Medications reviewed: Yes    Medications: MEDICATION REFILLS NEEDED TODAY. Provider was notified.  Pharmacy name entered into [employer]:    [facility] - [place], [place] - [address] [facility] - [place], [place] - [address]    Frailty Screening:   Is the patient here for a new oncology consult visit in cancer care? 2. No      Clinical concerns: More nausea and hot flashes. Pain is managed much better now.       Jacqueline Cortez CMA ([facility]) 9/4/2024 9:16 AM  "

## 2024-09-04 NOTE — PROGRESS NOTES
[facility] Hematology and Oncology Progress Note    Patient: Dilip Kan  MRN: [id number]  Date of Service: Sep 4, 2024         Reason for Visit    Chief Complaint   Patient presents with    Oncology Clinic Visit     Prostate CA       ECOG Performance Status: 1      Encounter Diagnoses: Metastatic prostate cancer with bony metastases      History of Present Illness:    Mr. Dilip Kan returns for follow-up metastatic prostate cancer with bone metastases.  For details of history see previous notes.  We had seen him last on July 18 at that time he had undergone MRI of the lumbar spine which revealed new lesions involving L4 L5-S1 discs patient was on Dilaudid and methadone we had recommended radiation therapy but the patient declined.  His PSA at that time was 21.22 which was stableSubsequently developed significant lower back pain and hip pain and was seen in the emergency department on July 21.  He had an MRI of the lumbar spine which revealed progressive metastatic disease with increased expansile masses in the osseous structures resulting in spinal canal stenosis L4 and L5 he was transferred to  and was seen by neurosurgery who felt he was not a candidate for decompressive surgery radiation oncology was consulted and radiation therapy was started on July 26.  Dexamethasone started on July 23 patient required PCA hydromorphone, oral methadone, oral opioids his pain was managed with his high-dose opioid regimen multiple opioid sparing agents.  He was discharged August 7 and currently comes in for follow-up he states his pain is well-controlled with the current regimen that he is on which includes methadone 10 mg every 8 hours Dilaudid 60 mg every 4 hours as needed pain.  Does wear chronic Jiménez catheter.  He has some lower extremity weakness but overall he has control of his bowels and is able to ambulate with a walker.  He continues on enzalutamide as well as Lupron and Xgeva.  He says  he gets occasional hot flashes but these are tolerable he denies any shortness of breath cough hemoptysis denies any abdominal pain or change in bowel habits no bright red blood per rectum hematemesis or melena.  He was seen by infectious disease recently and has osteomyelitis has resolved and is currently off antibiotics.      ______________________________________________________________________________    Past History    Past Medical History:   Diagnosis Date    Elevated prostate specific antigen (PSA)     4/10/2012    Encounter for other administrative examinations     1/31/2014    Esophagitis     No Comments Provided    Gastro-esophageal reflux disease without esophagitis     No Comments Provided    Low back pain     No Comments Provided    Malignant neoplasm of prostate (H)     9/6/2012    Nicotine dependence, uncomplicated     Quit - October 2007 with chantix    Other intervertebral disc degeneration, lumbosacral region     12/1/2008       Past Surgical History:   Procedure Laterality Date    APPENDECTOMY OPEN  [id number]    Ruptured - [place]    ARTHROPLASTY KNEE Right 11/16/2021    Procedure: ARTHROPLASTY, KNEE, TOTAL;  Surgeon: Micah Rock MD;  Location: [facility] OR    COLONOSCOPY  08/31/2006    next due in 2016    CYSTECTOMY BLADDER, ILEAL DIVERSION, COMBINED N/A 11/30/2023    Procedure: CYSTECTOMY, WITH URETEROILEAL CONDUIT CREATION and Pubectomy;  Surgeon: Miki Correa MD;  Location: [facility] OR    DISKECTOMY, LUMBAR, SINGLE SP  03/16/2009    L 3-4 microdiskectomy, [facility]    ESOPHAGOSCOPY, GASTROSCOPY, DUODENOSCOPY (EGD), COMBINED  03/2003         ESOPHAGOSCOPY, GASTROSCOPY, DUODENOSCOPY (EGD), COMBINED  08/31/2006         GRAFT FLAP PEDICLE PERINEUM N/A 11/30/2023    Procedure: Left Rectus Abdominis flap;  Surgeon: YADIRA Guadalupe MD;  Location: [facility] OR    PICC DOUBLE LUMEN PLACEMENT Right 12/01/2023    5FR DL PICC, basilic vein. L-43cm, 2cm out.    PROSTATECTOMY PERINEAL RADICAL   11/28/2012    Nerve [place], Dr Warner    SPINE SURGERY N/A 04/28/2017    L3 to S1 spinal decompression L4/L5 fusion    TONSILLECTOMY, ADENOIDECTOMY, COMBINED      as a child    VARICOCELECTOMY  1959    INCISION AND DRAINAGE,EXCISE VARICOCELE       Review of Systems   Constitutional:  Positive for fatigue.   HENT: Negative.     Eyes: Negative.    Respiratory:  Positive for shortness of breath.         He has occasional dyspnea on exertion.   Cardiovascular: Negative.    Gastrointestinal: Negative.  Negative for abdominal distention, abdominal pain and blood in stool.   Endocrine: Negative.    Genitourinary:  Positive for difficulty urinating.   Musculoskeletal:  Positive for back pain.   Skin: Negative.    Allergic/Immunologic: Negative.    Neurological: Negative.    Hematological: Negative.    Psychiatric/Behavioral: Negative.     All other systems reviewed and are negative.         Physical Exam    /64   Pulse 73   Temp (!) 96.6  F (35.9  C) (Tympanic)   Resp 16   Wt 86.2 kg (190 lb)   SpO2 99%   BMI 27.26 kg/m      Physical Exam  Vitals and nursing note reviewed.   Constitutional:       Appearance: Normal appearance. He is normal weight.   HENT:      Head: Normocephalic and atraumatic.      Nose: Nose normal.      Mouth/Throat:      Pharynx: Oropharynx is clear.   Eyes:      Extraocular Movements: Extraocular movements intact.      Conjunctiva/sclera: Conjunctivae normal.      Pupils: Pupils are equal, round, and reactive to light.   Cardiovascular:      Rate and Rhythm: Normal rate and regular rhythm.      Pulses: Normal pulses.      Heart sounds: Normal heart sounds.   Pulmonary:      Effort: Pulmonary effort is normal.      Breath sounds: Normal breath sounds.   Abdominal:      General: Bowel sounds are normal. There is no distension.      Palpations: Abdomen is soft.      Tenderness: There is no abdominal tenderness.   Musculoskeletal:         General: Tenderness present. Normal range of motion.       Cervical back: Normal range of motion and neck supple.      Comments: There is tenderness over L4-L5 upon palpation   Lymphadenopathy:      Cervical: No cervical adenopathy.   Skin:     General: Skin is warm.   Neurological:      General: No focal deficit present.      Mental Status: He is alert and oriented to person, place, and time. Mental status is at baseline.      Comments: He has4/5 motor power bilaterally both lower extremities   Psychiatric:         Mood and Affect: Mood normal.          Lab Results    Recent Results (from the past 240 hour(s))   Comprehensive metabolic panel    Collection Time: 09/04/24  9:52 AM   Result Value Ref Range    Sodium 134 (L) 135 - 145 mmol/L    Potassium 4.3 3.4 - 5.3 mmol/L    Carbon Dioxide (CO2) 24 22 - 29 mmol/L    Anion Gap 10 7 - 15 mmol/L    Urea Nitrogen 8.7 8.0 - 23.0 mg/dL    Creatinine 0.62 (L) 0.67 - 1.17 mg/dL    GFR Estimate >90 >60 mL/min/1.73m2    Calcium 7.8 (L) 8.8 - 10.4 mg/dL    Chloride 100 98 - 107 mmol/L    Glucose 125 (H) 70 - 99 mg/dL    Alkaline Phosphatase 122 40 - 150 U/L    AST 22 0 - 45 U/L    ALT 9 0 - 70 U/L    Protein Total 6.5 6.4 - 8.3 g/dL    Albumin 3.4 (L) 3.5 - 5.2 g/dL    Bilirubin Total 0.4 <=1.2 mg/dL   Lactate Dehydrogenase    Collection Time: 09/04/24  9:52 AM   Result Value Ref Range    Lactate Dehydrogenase 344 (H) 0 - 250 U/L   PSA tumor marker    Collection Time: 09/04/24  9:52 AM   Result Value Ref Range    PSA Tumor Marker 10.43 (H) 0.00 - 6.50 ng/mL   CBC with platelets and differential    Collection Time: 09/04/24  9:52 AM   Result Value Ref Range    WBC Count 4.2 4.0 - 11.0 10e3/uL    RBC Count 3.86 (L) 4.40 - 5.90 10e6/uL    Hemoglobin 9.5 (L) 13.3 - 17.7 g/dL    Hematocrit 31.5 (L) 40.0 - 53.0 %    MCV 82 78 - 100 fL    MCH 24.6 (L) 26.5 - 33.0 pg    MCHC 30.2 (L) 31.5 - 36.5 g/dL    RDW 17.4 (H) 10.0 - 15.0 %    Platelet Count 346 150 - 450 10e3/uL    % Neutrophils 77 %    % Lymphocytes 10 %    % Monocytes 9 %    %  Eosinophils 3 %    % Basophils 1 %    % Immature Granulocytes 0 %    NRBCs per 100 WBC 0 <1 /100    Absolute Neutrophils 3.3 1.6 - 8.3 10e3/uL    Absolute Lymphocytes 0.4 (L) 0.8 - 5.3 10e3/uL    Absolute Monocytes 0.4 0.0 - 1.3 10e3/uL    Absolute Eosinophils 0.1 0.0 - 0.7 10e3/uL    Absolute Basophils 0.0 0.0 - 0.2 10e3/uL    Absolute Immature Granulocytes 0.0 <=0.4 10e3/uL    Absolute NRBCs 0.0 10e3/uL       Imaging    No results found.    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  Now currently on enzalutamide/Lupron/Xgeva with cold complicated bySevere back pain secondary to progressive metastatic disease increased expansile masses involving L4-L5 with  increasing spinal canal stenosis.  [facility] [place] [facility] [place] [facility] in [facility] symptoms.  Patient requiresDilaudid and methadone for pain management at this point and this pain prescriber Dr. Corderootherwise his PSA has dropped.nonetheless would like to adequately stage the patient with a PSMA Pylarify scan in 2 monthsand reassess if there is evidence of improvement we will continue the current regimenof enzalutamide/Lupron/Xgeva.  Time spent: 60  minutes  This patient visit: [facility] [place]  Lab results, reviewing imaging results,performed history/physical, documented history/physical and orderedenzalutamide/Lupron and Xgeva and ordered a PSMA Pylarify PET scan and follow-up labs and appointments.  The longitudinal plan of care for the diagnosis(es)/condition(s) as documented were addressed during this visit. Due to the added complexity in care, I will continue to support Jermain in the subsequent management and with ongoing continuity of care.    Cancer Staging   No matching staging information was found for the patient.        Signed by: Erum Fritz MD    CC: Ezequiel Alejandra MD

## 2024-09-05 DIAGNOSIS — C61 MALIGNANT NEOPLASM OF PROSTATE (H): ICD-10-CM

## 2024-09-05 DIAGNOSIS — R97.21 RISING PSA FOLLOWING TREATMENT FOR MALIGNANT NEOPLASM OF PROSTATE: ICD-10-CM

## 2024-09-05 DIAGNOSIS — C79.51 MALIGNANT NEOPLASM METASTATIC TO BONE (H): Primary | ICD-10-CM

## 2024-09-06 LAB — TESTOST SERPL-MCNC: 11 NG/DL (ref 240–950)

## 2024-09-11 DIAGNOSIS — C79.51 MALIGNANT NEOPLASM METASTATIC TO BONE (H): Primary | ICD-10-CM

## 2024-09-11 DIAGNOSIS — C61 MALIGNANT NEOPLASM OF PROSTATE (H): ICD-10-CM

## 2024-09-11 DIAGNOSIS — R97.21 RISING PSA FOLLOWING TREATMENT FOR MALIGNANT NEOPLASM OF PROSTATE: ICD-10-CM

## 2024-09-15 ENCOUNTER — HEALTH MAINTENANCE LETTER (OUTPATIENT)
Age: 69
End: 2024-09-15

## 2024-09-18 ENCOUNTER — APPOINTMENT (OUTPATIENT)
Dept: LAB | Facility: OTHER | Age: 69
End: 2024-09-18
Attending: INTERNAL MEDICINE
Payer: COMMERCIAL

## 2024-09-18 ENCOUNTER — HOSPITAL ENCOUNTER (OUTPATIENT)
Dept: INFUSION THERAPY | Facility: OTHER | Age: 69
Discharge: HOME | End: 2024-09-18
Attending: INTERNAL MEDICINE
Payer: COMMERCIAL

## 2024-09-18 VITALS — DIASTOLIC BLOOD PRESSURE: 57 MMHG | SYSTOLIC BLOOD PRESSURE: 135 MMHG | HEART RATE: 67 BPM

## 2024-09-18 DIAGNOSIS — C79.51 MALIGNANT NEOPLASM METASTATIC TO BONE (H): Primary | ICD-10-CM

## 2024-09-18 DIAGNOSIS — C61 PROSTATE CANCER (H): ICD-10-CM

## 2024-09-18 LAB
CALCIUM SERPL-MCNC: 8.1 MG/DL (ref 8.8–10.4)
CREAT SERPL-MCNC: 0.67 MG/DL (ref 0.67–1.17)
EGFRCR SERPLBLD CKD-EPI 2021: >90 ML/MIN/1.73M2
PHOSPHATE SERPL-MCNC: 3 MG/DL (ref 2.5–4.5)

## 2024-09-18 PROCEDURE — 36415 COLL VENOUS BLD VENIPUNCTURE: CPT | Performed by: INTERNAL MEDICINE

## 2024-09-18 PROCEDURE — 82310 ASSAY OF CALCIUM: CPT | Performed by: INTERNAL MEDICINE

## 2024-09-18 PROCEDURE — 250N000011 HC RX IP 250 OP 636: Performed by: INTERNAL MEDICINE

## 2024-09-18 PROCEDURE — 96372 THER/PROPH/DIAG INJ SC/IM: CPT | Performed by: INTERNAL MEDICINE

## 2024-09-18 PROCEDURE — 82565 ASSAY OF CREATININE: CPT | Performed by: INTERNAL MEDICINE

## 2024-09-18 PROCEDURE — 84100 ASSAY OF PHOSPHORUS: CPT | Performed by: INTERNAL MEDICINE

## 2024-09-18 RX ORDER — ALBUTEROL SULFATE 0.83 MG/ML
2.5 SOLUTION RESPIRATORY (INHALATION)
OUTPATIENT
Start: 2024-10-18

## 2024-09-18 RX ORDER — METHYLPREDNISOLONE SODIUM SUCCINATE 125 MG/2ML
125 INJECTION, POWDER, LYOPHILIZED, FOR SOLUTION INTRAMUSCULAR; INTRAVENOUS
Start: 2024-10-18

## 2024-09-18 RX ORDER — ALBUTEROL SULFATE 90 UG/1
1-2 AEROSOL, METERED RESPIRATORY (INHALATION)
Start: 2024-10-18

## 2024-09-18 RX ORDER — EPINEPHRINE 1 MG/ML
0.3 INJECTION, SOLUTION, CONCENTRATE INTRAVENOUS EVERY 5 MIN PRN
OUTPATIENT
Start: 2024-10-18

## 2024-09-18 RX ORDER — DIPHENHYDRAMINE HYDROCHLORIDE 50 MG/ML
50 INJECTION INTRAMUSCULAR; INTRAVENOUS
Start: 2024-10-18

## 2024-09-18 RX ADMIN — DENOSUMAB 120 MG: 120 INJECTION SUBCUTANEOUS at 13:45

## 2024-09-18 NOTE — NURSING NOTE
Infusion Nursing Note:  Dilip Kan presents today for xgeva/ labs.    Patient seen by provider today: No   present during visit today: Not Applicable.    Note: provatas approved using labs from 9/4 today due to a lab error, labs needing to be rerun and would be another 40 minutes.      Intravenous Access:  Labs drawn without difficulty by technician    Treatment Conditions:  Lab Results   Component Value Date     (L) 09/04/2024    POTASSIUM 4.3 09/04/2024    MAG 1.8 12/11/2023    CR 0.67 09/18/2024    ROBERTO 8.1 (L) 09/18/2024    BILITOTAL 0.4 09/04/2024    ALBUMIN 3.4 (L) 09/04/2024    ALT 9 09/04/2024    AST 22 09/04/2024       Results reviewed, labs MET treatment parameters, ok to proceed with treatment.      Post Infusion Assessment:  Patient tolerated injection without incident.  Site patent and intact, free from redness, edema or discomfort.       Discharge Plan:   Patient and/or family verbalized understanding of discharge instructions and all questions answered.  AVS to patient via [employer].  Patient will return 10/11 for next appointment.   Patient discharged in stable condition accompanied by: wife.  Departure Mode: Ambulatory.      Jean S. Hammann, RN

## 2024-10-02 ENCOUNTER — TELEPHONE (OUTPATIENT)
Dept: FAMILY MEDICINE | Facility: OTHER | Age: 69
End: 2024-10-02
Payer: COMMERCIAL

## 2024-10-02 NOTE — TELEPHONE ENCOUNTER
Patient is in need of a new urinary drainage bag for night time and [facility] in [place] stated that they needed a prescription sent from Dr. Alejandra. Please call patient with any questions.    Danuta Crandall on 10/2/2024 at 1:18 PM

## 2024-10-02 NOTE — TELEPHONE ENCOUNTER
Deng'ing up a BARD #[id number] bacteriostatic collection system for patient. Patient states that he likes this one because it has longer tubing and a much larger capacity for at night.  Ladi Sullivan RN on 10/2/2024 at 4:40 PM

## 2024-10-03 ENCOUNTER — PATIENT OUTREACH (OUTPATIENT)
Dept: ONCOLOGY | Facility: OTHER | Age: 69
End: 2024-10-03
Payer: COMMERCIAL

## 2024-10-09 DIAGNOSIS — C61 MALIGNANT NEOPLASM OF PROSTATE (H): ICD-10-CM

## 2024-10-09 DIAGNOSIS — C79.51 MALIGNANT NEOPLASM METASTATIC TO BONE (H): Primary | ICD-10-CM

## 2024-10-09 DIAGNOSIS — R97.21 RISING PSA FOLLOWING TREATMENT FOR MALIGNANT NEOPLASM OF PROSTATE: ICD-10-CM

## 2024-10-11 DIAGNOSIS — C61 MALIGNANT NEOPLASM OF PROSTATE (H): ICD-10-CM

## 2024-10-11 DIAGNOSIS — C79.51 MALIGNANT NEOPLASM METASTATIC TO BONE (H): Primary | ICD-10-CM

## 2024-10-11 DIAGNOSIS — R97.21 RISING PSA FOLLOWING TREATMENT FOR MALIGNANT NEOPLASM OF PROSTATE: ICD-10-CM

## 2024-10-14 DIAGNOSIS — C79.51 MALIGNANT NEOPLASM OF PROSTATE METASTATIC TO BONE (H): ICD-10-CM

## 2024-10-14 DIAGNOSIS — F11.90 CHRONIC, CONTINUOUS USE OF OPIOIDS: ICD-10-CM

## 2024-10-14 DIAGNOSIS — C61 MALIGNANT NEOPLASM OF PROSTATE (H): ICD-10-CM

## 2024-10-14 DIAGNOSIS — C61 MALIGNANT NEOPLASM OF PROSTATE METASTATIC TO BONE (H): ICD-10-CM

## 2024-10-14 NOTE — PROGRESS NOTES
Left message for patient to call back so Dr. Fritz' response can be given.  Felisha Ennis RN on 10/14/2024 at 3:37 PM     Abdomen soft, non-tender, no guarding.

## 2024-10-14 NOTE — PROGRESS NOTES
Patient returned call from RN CC:      YADIRA [facility]: Cancer Care Follow-Up Note                                    Discussion with Patient:                                                    States hot flashes are getting worse.  He has taken something in the past for them, and would like to try again.  Also wondering if there's anything he can do for his fatigue during the day.          Goals          General     Being Active-pain relief (pt-stated)      Notes - Note created  10/13/2023 11:00 AM by Elza Lundberg RN     Goal Statement: I will establish a plan for preventing and managing pain.  Date Goal set: 10/13/2023  Barriers: disease burden and multiple diagnoses  Strengths: support, coping, motivation, and involvement with care team  Date to Achieve By: 6 months  Patient expressed understanding of goal: Yes  Action steps to achieve this goal:  I will take medications as prescribed.   I will call triage with new or worsening pain not controlled by medication.   I will use alternative therapies as directed. (Ice, heat, massage, etc)   I will call triage for medication refills when there are 2-3 days of medication remaining.         Patient will adhere to medication regimen             Dates of Treatment:                                                      Infusion given in last 28 days       None          Treatment Plan Medications       Oral ONC Prostate Cancer - Enzalutamide        Take Home Medications       Medication Sig Start/End Day/Cycle Status    enzalutamide (XTANDI) 80 MG tablet Take 1 tablet (80 mg) by mouth daily. 10/17/2024 to 11/16/2024 Day 1, Cycle 6 - 10/17/2024 Released                            Assessment:                                                        RNCC Short Symptom Review:     Assessment completed with:: Patient    General/Short Assessment  Does the patient have all their medications?: Yes  Is the patient experiencing any new or worsening symptoms?: Yes, See comments  (Hot flashes)    Pain  Patient Reported Pain?: Yes, is currently well-managed  Pain Score: Moderate Pain (4)  Pain Loc:  (Left leg is better than right)  (DVPRS) Pain Rating Score : 3-Sometimes distracts me  Interfered with your usual ACTIVITY?: Does not interfere 0  Interfered with your SLEEP?: 3  Affected your MOOD?: Somewhat affects 5  Contributed to your STRESS?: Somewhat Contributes 5    Patient Coping  Sadness    Clinic Utilization  Patient Aware of Next Appointment?: Yes    Other Patient Concerns  Other Patient Reported Concerns: Yes, see notes    Intervention/Education provided during outreach:                                                         Route to provider to further advise  Confirmed patient has clinic and triage numbers    Signature:  Felisha Ennis RN

## 2024-10-15 NOTE — PROGRESS NOTES
Per Erum Fritz MD We can try something simple first. I would recommend Vitamin E 400 IU daily to start. If no response he will have to see Dr. Bunch of urology

## 2024-10-16 RX ORDER — METHADONE HYDROCHLORIDE 10 MG/1
30 TABLET ORAL EVERY 8 HOURS
Qty: 270 TABLET | Refills: 0 | Status: SHIPPED | OUTPATIENT
Start: 2024-10-16

## 2024-10-16 NOTE — TELEPHONE ENCOUNTER
methadone (DOLOPHINE) 10 MG tablet       Last Written Prescription Date:  8/19/24  Last Fill Quantity: 270,   # refills: 0  Last Office Visit: 8/19/24  Future Office visit:    Next 5 appointments (look out 90 days)      Nov 19, 2024 9:30 AM  Return Visit with Erum Fritz MD  [facility] ([facility] ) [address]  [place] [address]  [phone]             Routing refill request to provider for review/approval because:  Drug not on the [facility], [facility] or [facility] refill protocol or controlled substance Argentina Zambrano RN on 10/16/2024 at 3:07 PM

## 2024-10-28 ENCOUNTER — TELEPHONE (OUTPATIENT)
Dept: FAMILY MEDICINE | Facility: OTHER | Age: 69
End: 2024-10-28
Payer: COMMERCIAL

## 2024-10-28 DIAGNOSIS — C79.51 MALIGNANT NEOPLASM OF PROSTATE METASTATIC TO BONE (H): Primary | ICD-10-CM

## 2024-10-28 DIAGNOSIS — C61 MALIGNANT NEOPLASM OF PROSTATE METASTATIC TO BONE (H): Primary | ICD-10-CM

## 2024-10-28 RX ORDER — ALPRAZOLAM 0.5 MG
TABLET ORAL
Qty: 2 TABLET | Refills: 0 | Status: SHIPPED | OUTPATIENT
Start: 2024-10-28

## 2024-10-28 NOTE — TELEPHONE ENCOUNTER
Patient is having a PET scan on Wednesday and is needing medication prior due to his claustrophobia. Patient uses [facility] pharmacy. Please advise.    Carmen Calvillo on 10/28/2024 at 9:29 AM

## 2024-11-04 DIAGNOSIS — C61 MALIGNANT NEOPLASM OF PROSTATE (H): ICD-10-CM

## 2024-11-04 DIAGNOSIS — C79.51 MALIGNANT NEOPLASM METASTATIC TO BONE (H): Primary | ICD-10-CM

## 2024-11-04 DIAGNOSIS — R97.21 RISING PSA FOLLOWING TREATMENT FOR MALIGNANT NEOPLASM OF PROSTATE: ICD-10-CM

## 2024-11-07 ASSESSMENT — PATIENT HEALTH QUESTIONNAIRE - PHQ9: SUM OF ALL RESPONSES TO PHQ QUESTIONS 1-9: 0

## 2024-11-11 DIAGNOSIS — C61 MALIGNANT NEOPLASM OF PROSTATE (H): ICD-10-CM

## 2024-11-11 DIAGNOSIS — R97.21 RISING PSA FOLLOWING TREATMENT FOR MALIGNANT NEOPLASM OF PROSTATE: ICD-10-CM

## 2024-11-11 DIAGNOSIS — C79.51 MALIGNANT NEOPLASM METASTATIC TO BONE (H): Primary | ICD-10-CM

## 2024-11-12 NOTE — PROGRESS NOTES
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      Medication Reconciliation: complete  Sydney Caballero LPN      SUBJECTIVE:  64 year old male presents to follow up on protein in urine and for ascending aortic aneurysm.    100 protein noted on UA for DOT exam.  He also had trace ketones.  Urine was concentrated.  Last year he had trace protein.  Serum creatinine has been normal.  He's had prostatectomy and radiation, but with rising PSA started on hormone therapy. Reports urinary leakage at times.     Saw Dr. Dave last year due to an ascending aortic aneurysm measuring 4.6 cm.  Annual echocardiography was recommended.  He also has aortic insufficiency.  Dr. Pizarro requested patient to see cardiology prior to DOT exam, but Jermain forgot and has not had an echocardiogram or seen Dr. Dave yet.    REVIEW OF SYSTEMS:    Constitutional: negative  Respiratory: negative  Cardiovascular: negative    Current Outpatient Medications   Medication Sig Dispense Refill     acetaminophen (TYLENOL) 325 MG tablet Take 650 mg by mouth       aspirin EC 81 MG EC tablet Take 81 mg by mouth daily with food       atorvastatin (LIPITOR) 20 MG tablet Take 1 tablet (20 mg) by mouth daily 90 tablet 3     Bee Pollen 500 MG CHEW Take 2 tablets by mouth daily       cyanocobalamin (RA VITAMIN B-12 TR) 1000 MCG TBCR Take 1,000 mcg by mouth daily       garlic (SM GARLIC) 150 MG TABS tablet Take 2 tablets by mouth daily       HYDROcodone-acetaminophen (NORCO)  MG per tablet Take 1 tablet by mouth every 4 hours as needed for moderate to severe pain 5.5 on average 154 tablet 0     [START ON 3/23/2020] HYDROcodone-acetaminophen (NORCO)  MG per tablet Take 1 tablet by mouth every 4 hours as needed for moderate to severe pain 5 on average 145 tablet 0     IBUPROFEN Prn for back pain       lisinopril (PRINIVIL/ZESTRIL) 5 MG tablet Take 1 tablet (5 mg) by mouth daily 90  Patient identifiers for Brunilda England 89 y.o. female checked and correct.  Chief Complaint   Patient presents with    Fall     Hit her head on the tub she was leaning over to  something off the floor, no loc, got up and went to bed.. No blood thinners and no complaints, The patient said her grand daughter made her come      Past Medical History:   Diagnosis Date    Anal cancer 1995    Anemia     Cancer 1995    anal cancer    Centrilobular emphysema 7/6/2020    Diabetes mellitus     With circulatory disorder    Lumbar radiculopathy 5/16/2014    Lung cancer 2012    s/p chemo/radiation    Macular degeneration     Macular hemorrhage of left eye     Neuropathy     Osteoporosis     Osteoporosis, unspecified 11/9/2012    Pure hypercholesterolemia      Allergies reported:   Review of patient's allergies indicates:   Allergen Reactions    Bextra [valdecoxib] Swelling    Gabapentin Diarrhea and Nausea Only       Appearance: Pt awake, alert & oriented to person, place & time. Pt in no acute distress at present time. Pt is clean and well groomed with clothes appropriately fastened.   Skin: Skin warm, dry & intact. Color consistent with ethnicity. Mucous membranes moist. No breakdown or brusing noted.   Musculoskeletal: Patient moving all extremities well, no obvious swelling or deformities noted.   Respiratory: Respirations spontaneous, even, and non-labored. Visible chest rise noted. Airway is open and patent. No accessory muscle use noted.   Neurologic: Sensation is intact. Speech is clear and appropriate. Eyes open spontaneously, behavior appropriate to situation, follows commands, facial expression symmetrical, bilateral hand grasp equal and even, purposeful motor response noted.  Cardiac: All peripheral pulses present. No Bilateral lower extremity edema. Cap refill is <3 seconds.  Abdomen: Abdomen soft, non distended, non tender to palpation.   : Pt voids independently, denies dysuria, hematuria, frequency.     tablet 3     Omega-3 Fatty Acids (OMEGA 3 PO) Take 2 capsules by mouth daily       omeprazole (PRILOSEC) 40 MG DR capsule TAKE 1 CAPSULE BY MOUTH ONCE DAILY. 90 capsule 3     pregabalin (LYRICA) 50 MG capsule 50 mg in the morning and 100 mg at night 270 capsule 1     sildenafil (REVATIO) 20 MG tablet TAKE 3 TO 5 TABLETS BY MOUTH 1 hour prior TO sexual activity. 100 tablet 3     venlafaxine (EFFEXOR-XR) 150 MG 24 hr capsule Take 1 capsule (150 mg) by mouth daily 90 capsule 1     vitamin D3 (CHOLECALCIFEROL) 2000 units (50 mcg) tablet Take 1 tablet (2,000 Units) by mouth daily       No Known Allergies    OBJECTIVE:  BP (!) 167/79   Pulse 86   Temp 97.6  F (36.4  C) (Tympanic)   Resp 18   Wt 99.4 kg (219 lb 3.2 oz)   SpO2 96%   BMI 31.45 kg/m      EXAM:  General Appearance: Alert. No acute distress  Chest/Respiratory Exam: Clear to auscultation bilaterally  Cardiovascular Exam: Regular rate and rhythm. S1, S2, no murmur, gallop, or rubs.  Extremities: No lower extremity edema.  Psychiatric: Normal affect and mentation        ASSESSMENT/PLAN:    ICD-10-CM    1. Ascending aortic aneurysm-moderate at 4.6 cm on 3/29/18 I71.2 Echocardiogram Complete   2. Aortic valve insufficiency-moderate to severe I35.1 Echocardiogram Complete   3. Urine protein increased R80.9 Albumin Random Urine Quantitative with Creat Ratio       He has an ascending aortic aneurysm and aortic valve insufficiency.  We reviewed how annual echocardiography is recommended regardless of his DOT status.  This needs to be monitored.  He expresses understanding.  Echocardiogram ordered with plan for cardiology follow-up with Dr. Dave after the echocardiogram is complete.    Has spot elevated urine protein on UA.  Ordered albumin to creatinine ratio.  If elevated, then lisinopril could be increased, renal ultrasound ordered.  Plan to check serum creatinine soon, but past readings have been normal.    Follow-up based on lab and echo.    Wei Perez  MD    This document was prepared using a combination of typing and voice generated software.  While every attempt was made for accuracy, spelling and grammatical errors may exist.

## 2024-11-14 ENCOUNTER — LAB (OUTPATIENT)
Dept: LAB | Facility: OTHER | Age: 69
End: 2024-11-14
Attending: INTERNAL MEDICINE
Payer: COMMERCIAL

## 2024-11-14 ENCOUNTER — HOSPITAL ENCOUNTER (OUTPATIENT)
Dept: INFUSION THERAPY | Facility: OTHER | Age: 69
Discharge: HOME | End: 2024-11-14
Attending: INTERNAL MEDICINE
Payer: COMMERCIAL

## 2024-11-14 ENCOUNTER — OFFICE VISIT (OUTPATIENT)
Dept: FAMILY MEDICINE | Facility: OTHER | Age: 69
End: 2024-11-14
Attending: FAMILY MEDICINE
Payer: COMMERCIAL

## 2024-11-14 VITALS — TEMPERATURE: 97.8 F | RESPIRATION RATE: 20 BRPM

## 2024-11-14 VITALS
HEART RATE: 78 BPM | OXYGEN SATURATION: 97 % | RESPIRATION RATE: 22 BRPM | DIASTOLIC BLOOD PRESSURE: 54 MMHG | SYSTOLIC BLOOD PRESSURE: 114 MMHG | BODY MASS INDEX: 25.63 KG/M2 | WEIGHT: 178.6 LBS

## 2024-11-14 DIAGNOSIS — C61 MALIGNANT NEOPLASM OF PROSTATE (H): ICD-10-CM

## 2024-11-14 DIAGNOSIS — R97.21 RISING PSA FOLLOWING TREATMENT FOR MALIGNANT NEOPLASM OF PROSTATE: ICD-10-CM

## 2024-11-14 DIAGNOSIS — C79.51 MALIGNANT NEOPLASM METASTATIC TO BONE (H): Primary | ICD-10-CM

## 2024-11-14 DIAGNOSIS — F11.90 CHRONIC, CONTINUOUS USE OF OPIOIDS: ICD-10-CM

## 2024-11-14 DIAGNOSIS — C61 MALIGNANT NEOPLASM OF PROSTATE METASTATIC TO BONE (H): ICD-10-CM

## 2024-11-14 DIAGNOSIS — C61 PROSTATE CANCER (H): ICD-10-CM

## 2024-11-14 DIAGNOSIS — C79.51 MALIGNANT NEOPLASM METASTATIC TO BONE (H): ICD-10-CM

## 2024-11-14 DIAGNOSIS — C79.51 MALIGNANT NEOPLASM OF PROSTATE METASTATIC TO BONE (H): ICD-10-CM

## 2024-11-14 LAB
ALBUMIN SERPL BCG-MCNC: 3.7 G/DL (ref 3.5–5.2)
CALCIUM SERPL-MCNC: 8.1 MG/DL (ref 8.8–10.4)
CREAT SERPL-MCNC: 0.67 MG/DL (ref 0.67–1.17)
EGFRCR SERPLBLD CKD-EPI 2021: >90 ML/MIN/1.73M2
HOLD SPECIMEN: NORMAL
HOLD SPECIMEN: NORMAL
PHOSPHATE SERPL-MCNC: 2.7 MG/DL (ref 2.5–4.5)
PSA SERPL DL<=0.01 NG/ML-MCNC: 27.4 NG/ML (ref 0–6.5)

## 2024-11-14 PROCEDURE — 96372 THER/PROPH/DIAG INJ SC/IM: CPT | Performed by: INTERNAL MEDICINE

## 2024-11-14 PROCEDURE — 84100 ASSAY OF PHOSPHORUS: CPT | Performed by: INTERNAL MEDICINE

## 2024-11-14 PROCEDURE — 36415 COLL VENOUS BLD VENIPUNCTURE: CPT | Mod: ZL

## 2024-11-14 PROCEDURE — G0463 HOSPITAL OUTPT CLINIC VISIT: HCPCS

## 2024-11-14 PROCEDURE — 82040 ASSAY OF SERUM ALBUMIN: CPT | Performed by: INTERNAL MEDICINE

## 2024-11-14 PROCEDURE — 84153 ASSAY OF PSA TOTAL: CPT | Mod: ZL

## 2024-11-14 PROCEDURE — 250N000011 HC RX IP 250 OP 636: Mod: JZ | Performed by: INTERNAL MEDICINE

## 2024-11-14 PROCEDURE — 82565 ASSAY OF CREATININE: CPT | Performed by: INTERNAL MEDICINE

## 2024-11-14 PROCEDURE — 82310 ASSAY OF CALCIUM: CPT | Performed by: INTERNAL MEDICINE

## 2024-11-14 RX ORDER — DIPHENHYDRAMINE HYDROCHLORIDE 50 MG/ML
50 INJECTION INTRAMUSCULAR; INTRAVENOUS
Start: 2024-11-17

## 2024-11-14 RX ORDER — EPINEPHRINE 1 MG/ML
0.3 INJECTION, SOLUTION, CONCENTRATE INTRAVENOUS EVERY 5 MIN PRN
OUTPATIENT
Start: 2024-11-17

## 2024-11-14 RX ORDER — HYDROMORPHONE HYDROCHLORIDE 8 MG/1
16 TABLET ORAL EVERY 4 HOURS PRN
Qty: 180 TABLET | Refills: 0 | Status: SHIPPED | OUTPATIENT
Start: 2024-11-14

## 2024-11-14 RX ORDER — ALBUTEROL SULFATE 0.83 MG/ML
2.5 SOLUTION RESPIRATORY (INHALATION)
OUTPATIENT
Start: 2024-11-17

## 2024-11-14 RX ORDER — HYDROMORPHONE HYDROCHLORIDE 8 MG/1
8 TABLET ORAL EVERY 4 HOURS PRN
Qty: 180 TABLET | Refills: 0 | Status: SHIPPED | OUTPATIENT
Start: 2024-11-14

## 2024-11-14 RX ORDER — QUETIAPINE FUMARATE 25 MG/1
25 TABLET, FILM COATED ORAL AT BEDTIME
Qty: 90 TABLET | Refills: 1 | Status: SHIPPED | OUTPATIENT
Start: 2024-11-14

## 2024-11-14 RX ORDER — METHYLPREDNISOLONE SODIUM SUCCINATE 125 MG/2ML
125 INJECTION INTRAMUSCULAR; INTRAVENOUS
Start: 2024-11-17

## 2024-11-14 RX ORDER — ALBUTEROL SULFATE 90 UG/1
1-2 INHALANT RESPIRATORY (INHALATION)
Start: 2024-11-17

## 2024-11-14 RX ADMIN — DENOSUMAB 120 MG: 120 INJECTION SUBCUTANEOUS at 10:10

## 2024-11-14 RX ADMIN — LEUPROLIDE ACETATE 22.5 MG: KIT at 10:10

## 2024-11-14 ASSESSMENT — PAIN SCALES - GENERAL: PAINLEVEL_OUTOF10: MODERATE PAIN (5)

## 2024-11-14 NOTE — NURSING NOTE
Patient here for medication refills of the methadone, dilaudid and seroquel. Medication Reconciliation: complete.    Akosua Rivero LPN  11/14/2024 9:24 AM

## 2024-11-14 NOTE — PROGRESS NOTES
SUBJECTIVE:   Dilip Kan is a 69 year old male who presents to clinic today for the following health issues refill methadone Dilaudid and Seroquel    Patient arrives here for refill.  He continues to see oncology for his metastatic prostate cancer.  Continues getting a lot of benefit out of radiation therapy to the spine.  Continues to use 180 Dilaudid per month.  But feels that he may be able to cut down.  He does have a PSA coming up through oncology.    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     Priority: Medium    Iron deficiency anemia secondary to inadequate dietary iron intake 11/20/2023     Priority: Medium    Sepsis (H) 06/29/2023     Priority: Medium    Acute deep vein thrombosis (DVT) of proximal vein of right lower extremity (H) 05/01/2023     Priority: Medium    Multiple subsegmental pulmonary emboli without acute cor pulmonale (H) 05/01/2023     Priority: Medium    Infection due to Klebsiella pneumoniae 04/21/2023     Priority: Medium    Bone metastasis 03/09/2023     Priority: Medium    Bone metastases 03/09/2023     Priority: Medium    Aneurysm of ascending aorta without rupture (H) 10/05/2022     Priority: Medium    Status post total right knee replacement 11/16/2021     Priority: Medium    Malignant neoplasm of prostate (H) 09/16/2021     Priority: Medium    Plaque in heart artery-aorta 03/24/2020     Priority: Medium    Aortic valve stenosis with insufficiency, etiology of cardiac valve disease unspecified 03/22/2019     Priority: Medium    Mild aortic stenosis 03/22/2019     Priority: Medium    Ascending aortic aneurysm-moderate at 4.6 cm on 3/11/20 03/22/2019     Priority: Medium    Aortic valve insufficiency-moderate to  severe 03/22/2019     Priority: Medium    Hx of syncope 03/22/2019     Priority: Medium    History of tobacco abuse quitting 11/8/2002 03/22/2019     Priority: Medium    Hypertriglyceridemia 03/22/2019     Priority: Medium    Mild pulmonary hypertension (H) 03/22/2019     Priority: Medium    Palpitations 03/22/2019     Priority: Medium    Rising PSA following treatment for malignant neoplasm of prostate 03/29/2018     Priority: Medium    Chronic, continuous use of opioids 01/31/2018     Priority: Medium     Patient is followed by Wei Perez MD for ongoing prescription of pain medication.  All refills should be approved by this provider only at face-to-face appointments - not by phone request.    Medication(s): Hydrocodone.   Maximum quantity per month: 135  Clinic visit frequency required: Q 3 months   PDMP Review         Value Time User    State PDMP site checked  Yes 8/25/2021 10:41 AM Wei Perez MD          Controlled substance agreement:  Patient-Level CSA:    There are no patient-level csa.       Pain Clinic evaluation in the past: No    Opioid Risk Tool Total Score(s):  OPIOID RISK TOOL TOTAL SCORE 8/25/2021   Total Score 0   Total Risk Score Category Low Risk (0-3)           Other specified causes of urethral stricture 01/31/2018     Priority: Medium    GERD 04/28/2017     Priority: Medium    Essential hypertension 04/28/2017     Priority: Medium    Non morbid obesity due to excess calories 04/28/2017     Priority: Medium    Mixed hyperlipidemia 04/28/2017     Priority: Medium    Spinal stenosis of lumbar region with neurogenic claudication 04/28/2017     Priority: Medium    Backache 01/13/2014     Priority: Medium    Prostate cancer (H) 01/02/2013     Priority: Medium    Personal history of prostate cancer s/p prostatectomy and sEBRT 12/06/2012     Priority: Medium    Anemia due to acute blood loss 11/30/2012     Priority: Medium     Overview:   EBL 11/28/12:  1700 ml  EBL 11/28/12:  500  ml  Transfused 2 units pRBC's early on 11/29/12      Pelvic pain in male 11/30/2012     Priority: Medium     Past Medical History:   Diagnosis Date    Elevated prostate specific antigen (PSA)     4/10/2012    Encounter for other administrative examinations     1/31/2014    Esophagitis     No Comments Provided    Gastro-esophageal reflux disease without esophagitis     No Comments Provided    Low back pain     No Comments Provided    Malignant neoplasm of prostate (H)     9/6/2012    Nicotine dependence, uncomplicated     Quit - October 2007 with chantix    Other intervertebral disc degeneration, lumbosacral region     12/1/2008      Past Surgical History:   Procedure Laterality Date    APPENDECTOMY OPEN  091909    Ruptured - Rule    ARTHROPLASTY KNEE Right 11/16/2021    Procedure: ARTHROPLASTY, KNEE, TOTAL;  Surgeon: Micah Rock MD;  Location: [facility] OR    COLONOSCOPY  08/31/2006    next due in 2016    CYSTECTOMY BLADDER, ILEAL DIVERSION, COMBINED N/A 11/30/2023    Procedure: CYSTECTOMY, WITH URETEROILEAL CONDUIT CREATION and Pubectomy;  Surgeon: Miki Correa MD;  Location: [facility] OR    DISKECTOMY, LUMBAR, SINGLE SP  03/16/2009    L 3-4 microdiskectomy, [facility]    ESOPHAGOSCOPY, GASTROSCOPY, DUODENOSCOPY (EGD), COMBINED  03/2003         ESOPHAGOSCOPY, GASTROSCOPY, DUODENOSCOPY (EGD), COMBINED  08/31/2006         GRAFT FLAP PEDICLE PERINEUM N/A 11/30/2023    Procedure: Left Rectus Abdominis flap;  Surgeon: YADIRA Guadalupe MD;  Location: [facility] OR    PICC DOUBLE LUMEN PLACEMENT Right 12/01/2023    5FR DL PICC, basilic vein. L-43cm, 2cm out.    PROSTATECTOMY PERINEAL RADICAL  11/28/2012    Nerve Sparring, Dr Warner    SPINE SURGERY N/A 04/28/2017    L3 to S1 spinal decompression L4/L5 fusion    TONSILLECTOMY, ADENOIDECTOMY, COMBINED      as a child    VARICOCELECTOMY  1959    INCISION AND DRAINAGE,EXCISE VARICOCELE       Review of Systems     OBJECTIVE:     /54   Pulse 78   Resp 22   Wt  81 kg (178 lb 9.6 oz)   SpO2 97%   BMI 25.63 kg/m    Body mass index is 25.63 kg/m .  Physical Exam  Constitutional:       Appearance: Normal appearance.   Neurological:      Mental Status: He is alert.   Psychiatric:         Mood and Affect: Mood normal.         Thought Content: Thought content normal.         Diagnostic Test Results:  none     ASSESSMENT/PLAN:         (C61) Malignant neoplasm of prostate (H)  Comment:   Plan: HYDROmorphone (DILAUDID) 8 MG tablet,         HYDROmorphone (DILAUDID) 8 MG tablet,         HYDROmorphone (DILAUDID) 8 MG tablet,         QUEtiapine (SEROQUEL) 25 MG tablet            (F11.90) Chronic, continuous use of opioids  Comment:   Plan: HYDROmorphone (DILAUDID) 8 MG tablet,         HYDROmorphone (DILAUDID) 8 MG tablet,         HYDROmorphone (DILAUDID) 8 MG tablet,         QUEtiapine (SEROQUEL) 25 MG tablet            (C61,  C79.51) Malignant neoplasm of prostate metastatic to bone (H)  Comment:   Plan: HYDROmorphone (DILAUDID) 8 MG tablet,         HYDROmorphone (DILAUDID) 8 MG tablet,         HYDROmorphone (DILAUDID) 8 MG tablet,         QUEtiapine (SEROQUEL) 25 MG tablet          Will increase his methadone from 30 mg a day to 40.  Advised him hopefully that will decrease his Dilaudid use.  He will follow-up in approximately 1 month.  I also reviewed the side effects and risks of methadone and Dilaudid.  And his significant high doses.  We discussed respiratory failure.  Not to take medication beyond what was prescribed.  Follow-up in 1 to 2 months.  The longitudinal plan of care for the diagnosis(es)/condition(s) as documented were addressed during this visit. Due to the added complexity in care, I will continue to support Jermain in the subsequent management and with ongoing continuity of care.    Ezequiel Alejandra MD  [facility]

## 2024-11-14 NOTE — NURSING NOTE
Infusion Nursing Note:  Dilip Kan presents today for Lupron/Xgeva.    Patient seen by provider today: Yes - Dr. Alejandra   present during visit today: Not Applicable.    Note: N/A.      Intravenous Access:  Labs drawn without difficulty by lab.    Treatment Conditions:  Lab Results   Component Value Date     (L) 09/04/2024    POTASSIUM 4.3 09/04/2024    MAG 1.8 12/11/2023    CR 0.67 11/14/2024    ROBERTO 8.1 (L) 11/14/2024    BILITOTAL 0.4 09/04/2024    ALBUMIN 3.7 11/14/2024    ALT 9 09/04/2024    AST 22 09/04/2024       Results reviewed, labs MET treatment parameters, ok to proceed with treatment.      Post Infusion Assessment:  Patient tolerated injection without incident, Lupron to R dorsogluteal site and Xgeva to LUE.       Discharge Plan:   Discharge instructions reviewed with: Patient.  Patient and/or family verbalized understanding of discharge instructions and all questions answered.  Copy of AVS declined. Patient will return 12-13-24 for next appointment.  [facility]   Patient discharged in stable condition accompanied by: self.  Departure Mode: Wheelchair.       Josselin Smith RN

## 2024-11-24 ENCOUNTER — HEALTH MAINTENANCE LETTER (OUTPATIENT)
Age: 69
End: 2024-11-24

## 2024-11-25 ENCOUNTER — DOCUMENTATION ONLY (OUTPATIENT)
Dept: FAMILY MEDICINE | Facility: OTHER | Age: 69
End: 2024-11-25
Payer: COMMERCIAL

## 2024-11-26 ENCOUNTER — HOSPITAL ENCOUNTER (OUTPATIENT)
Dept: PET IMAGING | Facility: OTHER | Age: 69
Discharge: HOME | End: 2024-11-26
Attending: INTERNAL MEDICINE
Payer: COMMERCIAL

## 2024-11-26 DIAGNOSIS — C79.51 MALIGNANT NEOPLASM METASTATIC TO BONE (H): ICD-10-CM

## 2024-11-26 DIAGNOSIS — R97.21 RISING PSA FOLLOWING TREATMENT FOR MALIGNANT NEOPLASM OF PROSTATE: ICD-10-CM

## 2024-11-26 DIAGNOSIS — C61 MALIGNANT NEOPLASM OF PROSTATE (H): ICD-10-CM

## 2024-11-26 PROCEDURE — 78815 PET IMAGE W/CT SKULL-THIGH: CPT | Mod: PI

## 2024-11-26 PROCEDURE — 343N000001 HC RX 343 MED OP 636: Performed by: INTERNAL MEDICINE

## 2024-11-26 PROCEDURE — A9595 HC RX 343 MED OP 636: HCPCS | Performed by: INTERNAL MEDICINE

## 2024-11-26 RX ADMIN — PIFLUFOLASTAT F-18 9.94 MILLICURIE: 80 INJECTION INTRAVENOUS at 16:03

## 2024-12-10 ENCOUNTER — TELEPHONE (OUTPATIENT)
Dept: FAMILY MEDICINE | Facility: OTHER | Age: 69
End: 2024-12-10
Payer: COMMERCIAL

## 2024-12-10 DIAGNOSIS — C61 MALIGNANT NEOPLASM OF PROSTATE (H): ICD-10-CM

## 2024-12-10 DIAGNOSIS — R97.21 RISING PSA FOLLOWING TREATMENT FOR MALIGNANT NEOPLASM OF PROSTATE: ICD-10-CM

## 2024-12-10 DIAGNOSIS — C79.51 MALIGNANT NEOPLASM METASTATIC TO BONE (H): Primary | ICD-10-CM

## 2024-12-10 NOTE — TELEPHONE ENCOUNTER
The patient is wondering if he could get his Dilaudid on 12/12/2024 as he has an appointment on that day . It was to be refilled on 12/13/2024.  The pharmacy said he has to have an okay from Dr Alejandra to get it one day early.  He lived [place] miles out of town so would like to pick is up when he is in town on 12/12/204.  Please call him and let him know.

## 2024-12-10 NOTE — TELEPHONE ENCOUNTER
Called pharmacy spoke with Nevaeh letting her know its okay to fill early. Also called and notified patient.  Akosua Rivero LPN .......................12/10/2024  1:57 PM

## 2024-12-12 ENCOUNTER — LAB (OUTPATIENT)
Dept: LAB | Facility: OTHER | Age: 69
End: 2024-12-12
Attending: INTERNAL MEDICINE
Payer: COMMERCIAL

## 2024-12-12 ENCOUNTER — HOSPITAL ENCOUNTER (OUTPATIENT)
Dept: INFUSION THERAPY | Facility: OTHER | Age: 69
End: 2024-12-12
Attending: INTERNAL MEDICINE
Payer: COMMERCIAL

## 2024-12-12 ENCOUNTER — ONCOLOGY VISIT (OUTPATIENT)
Dept: ONCOLOGY | Facility: OTHER | Age: 69
End: 2024-12-12
Attending: INTERNAL MEDICINE
Payer: COMMERCIAL

## 2024-12-12 VITALS
SYSTOLIC BLOOD PRESSURE: 122 MMHG | OXYGEN SATURATION: 98 % | TEMPERATURE: 98.2 F | HEART RATE: 82 BPM | DIASTOLIC BLOOD PRESSURE: 62 MMHG | RESPIRATION RATE: 16 BRPM | BODY MASS INDEX: 24.68 KG/M2 | WEIGHT: 172 LBS

## 2024-12-12 DIAGNOSIS — M25.551 HIP PAIN, RIGHT: Primary | ICD-10-CM

## 2024-12-12 DIAGNOSIS — C61 MALIGNANT NEOPLASM OF PROSTATE (H): ICD-10-CM

## 2024-12-12 DIAGNOSIS — C79.51 MALIGNANT NEOPLASM METASTATIC TO BONE (H): ICD-10-CM

## 2024-12-12 DIAGNOSIS — M86.68 CHRONIC OSTEOMYELITIS OF SYMPHYSIS PUBIS (H): ICD-10-CM

## 2024-12-12 DIAGNOSIS — C61 PROSTATE CANCER (H): Primary | ICD-10-CM

## 2024-12-12 DIAGNOSIS — R97.21 RISING PSA FOLLOWING TREATMENT FOR MALIGNANT NEOPLASM OF PROSTATE: ICD-10-CM

## 2024-12-12 DIAGNOSIS — C79.51 MALIGNANT NEOPLASM METASTATIC TO BONE (H): Primary | ICD-10-CM

## 2024-12-12 LAB
ALBUMIN SERPL BCG-MCNC: 4 G/DL (ref 3.5–5.2)
ALP SERPL-CCNC: 114 U/L (ref 40–150)
ALT SERPL W P-5'-P-CCNC: 11 U/L (ref 0–70)
ANION GAP SERPL CALCULATED.3IONS-SCNC: 9 MMOL/L (ref 7–15)
AST SERPL W P-5'-P-CCNC: 59 U/L (ref 0–45)
BASOPHILS # BLD AUTO: 0 10E3/UL (ref 0–0.2)
BASOPHILS NFR BLD AUTO: 1 %
BILIRUB SERPL-MCNC: 0.4 MG/DL
BUN SERPL-MCNC: 11.8 MG/DL (ref 8–23)
CALCIUM SERPL-MCNC: 8.1 MG/DL (ref 8.8–10.4)
CALCIUM SERPL-MCNC: 8.1 MG/DL (ref 8.8–10.4)
CHLORIDE SERPL-SCNC: 99 MMOL/L (ref 98–107)
CREAT SERPL-MCNC: 0.61 MG/DL (ref 0.67–1.17)
CRP SERPL-MCNC: 11.88 MG/L
EGFRCR SERPLBLD CKD-EPI 2021: >90 ML/MIN/1.73M2
EOSINOPHIL # BLD AUTO: 0.2 10E3/UL (ref 0–0.7)
EOSINOPHIL NFR BLD AUTO: 3 %
ERYTHROCYTE [DISTWIDTH] IN BLOOD BY AUTOMATED COUNT: 17.3 % (ref 10–15)
GLUCOSE SERPL-MCNC: 120 MG/DL (ref 70–99)
HCO3 SERPL-SCNC: 25 MMOL/L (ref 22–29)
HCT VFR BLD AUTO: 35.8 % (ref 40–53)
HGB BLD-MCNC: 11 G/DL (ref 13.3–17.7)
IMM GRANULOCYTES # BLD: 0 10E3/UL
IMM GRANULOCYTES NFR BLD: 0 %
LDH SERPL L TO P-CCNC: 930 U/L (ref 0–250)
LYMPHOCYTES # BLD AUTO: 0.8 10E3/UL (ref 0.8–5.3)
LYMPHOCYTES NFR BLD AUTO: 14 %
MCH RBC QN AUTO: 24.6 PG (ref 26.5–33)
MCHC RBC AUTO-ENTMCNC: 30.7 G/DL (ref 31.5–36.5)
MCV RBC AUTO: 80 FL (ref 78–100)
MONOCYTES # BLD AUTO: 0.5 10E3/UL (ref 0–1.3)
MONOCYTES NFR BLD AUTO: 9 %
NEUTROPHILS # BLD AUTO: 4.2 10E3/UL (ref 1.6–8.3)
NEUTROPHILS NFR BLD AUTO: 73 %
NRBC # BLD AUTO: 0 10E3/UL
NRBC BLD AUTO-RTO: 0 /100
PHOSPHATE SERPL-MCNC: 2.7 MG/DL (ref 2.5–4.5)
PLATELET # BLD AUTO: 268 10E3/UL (ref 150–450)
POTASSIUM SERPL-SCNC: 4.1 MMOL/L (ref 3.4–5.3)
PROT SERPL-MCNC: 7 G/DL (ref 6.4–8.3)
PSA SERPL DL<=0.01 NG/ML-MCNC: 52.37 NG/ML (ref 0–6.5)
RBC # BLD AUTO: 4.48 10E6/UL (ref 4.4–5.9)
SODIUM SERPL-SCNC: 133 MMOL/L (ref 135–145)
WBC # BLD AUTO: 5.7 10E3/UL (ref 4–11)

## 2024-12-12 PROCEDURE — 84153 ASSAY OF PSA TOTAL: CPT | Mod: ZL

## 2024-12-12 PROCEDURE — 36415 COLL VENOUS BLD VENIPUNCTURE: CPT | Mod: ZL

## 2024-12-12 PROCEDURE — 85004 AUTOMATED DIFF WBC COUNT: CPT | Mod: ZL

## 2024-12-12 PROCEDURE — 84100 ASSAY OF PHOSPHORUS: CPT | Performed by: INTERNAL MEDICINE

## 2024-12-12 PROCEDURE — 85041 AUTOMATED RBC COUNT: CPT | Mod: ZL

## 2024-12-12 PROCEDURE — 83615 LACTATE (LD) (LDH) ENZYME: CPT | Mod: ZL

## 2024-12-12 PROCEDURE — 80053 COMPREHEN METABOLIC PANEL: CPT | Mod: ZL

## 2024-12-12 PROCEDURE — G0463 HOSPITAL OUTPT CLINIC VISIT: HCPCS

## 2024-12-12 PROCEDURE — 84403 ASSAY OF TOTAL TESTOSTERONE: CPT | Mod: ZL

## 2024-12-12 PROCEDURE — 96372 THER/PROPH/DIAG INJ SC/IM: CPT | Performed by: INTERNAL MEDICINE

## 2024-12-12 PROCEDURE — 86140 C-REACTIVE PROTEIN: CPT | Mod: ZL

## 2024-12-12 RX ORDER — DIPHENHYDRAMINE HYDROCHLORIDE 50 MG/ML
50 INJECTION INTRAMUSCULAR; INTRAVENOUS
Start: 2024-12-14

## 2024-12-12 RX ORDER — ALBUTEROL SULFATE 90 UG/1
1-2 INHALANT RESPIRATORY (INHALATION)
Start: 2024-12-14

## 2024-12-12 RX ORDER — EPINEPHRINE 1 MG/ML
0.3 INJECTION, SOLUTION, CONCENTRATE INTRAVENOUS EVERY 5 MIN PRN
OUTPATIENT
Start: 2024-12-14

## 2024-12-12 RX ORDER — METHYLPREDNISOLONE SODIUM SUCCINATE 125 MG/2ML
125 INJECTION INTRAMUSCULAR; INTRAVENOUS
Start: 2024-12-14

## 2024-12-12 RX ORDER — ALBUTEROL SULFATE 0.83 MG/ML
2.5 SOLUTION RESPIRATORY (INHALATION)
OUTPATIENT
Start: 2024-12-14

## 2024-12-12 ASSESSMENT — PAIN SCALES - GENERAL: PAINLEVEL_OUTOF10: MODERATE PAIN (5)

## 2024-12-12 NOTE — NURSING NOTE
"Oncology Rooming Note    December 12, 2024 9:47 AM   Dilip Kan is a 69 year old male who presents for:    Chief Complaint   Patient presents with    Oncology Clinic Visit     Prostate CA - f/u PET scan 11/26     Initial Vitals: /62 (BP Location: Right arm, Patient Position: Sitting, Cuff Size: Adult Regular)   Pulse 82   Temp 98.2  F (36.8  C) (Tympanic)   Resp 16   Wt 78 kg (172 lb)   SpO2 98%   BMI 24.68 kg/m   Estimated body mass index is 24.68 kg/m  as calculated from the following:    Height as of 8/27/24: 1.778 m (5' 10\").    Weight as of this encounter: 78 kg (172 lb). Body surface area is 1.96 meters squared.  Moderate Pain (5) Comment: Data Unavailable   No LMP for male patient.  Allergies reviewed: Yes  Medications reviewed: Yes    Medications: Medication refills not needed today.  Pharmacy name entered into [employer]:    [facility] - [place], [place] - [address] [facility] - [place], [place] - [address] [facility] [place]    Frailty Screening:   Is the patient here for a new oncology consult visit in cancer care? 2. No      Clinical concerns: follow up on PET scan. Discuss radiation - hip concerns     Jacqueline Cortez CMA (AAMA) 12/12/2024 9:47 AM  "

## 2024-12-12 NOTE — NURSING NOTE
Infusion Nursing Note:  Dilip Kan presents today for Xgeva.    Patient seen by provider today: Yes: Erum Fritz MD   present during visit today: Not Applicable.    Note: Pt's calcium remains low, encouraged him to start calcium supplement as per his Xgeva therapy plan.       Intravenous Access:  Labs drawn without difficulty by .    Treatment Conditions:  Lab Results   Component Value Date     (L) 12/12/2024    POTASSIUM 4.1 12/12/2024    MAG 1.8 12/11/2023    CR 0.61 (L) 12/12/2024    ROBERTO 8.1 (L) 12/12/2024    ROBERTO 8.1 (L) 12/12/2024    BILITOTAL 0.4 12/12/2024    ALBUMIN 4.0 12/12/2024    ALT 11 12/12/2024    AST 59 (H) 12/12/2024       Results reviewed, labs MET treatment parameters, ok to proceed with treatment.      Post Infusion Assessment:  Patient tolerated injection without incident to RUE.       Discharge Plan:   Discharge instructions reviewed with: Patient and Family.  Patient and/or family verbalized understanding of discharge instructions and all questions answered.  Copy of AVS declined. Patient will return 1-13-25 for next appointment.  AVS to patient via [email or web address].   Patient discharged in stable condition accompanied by: family, being seen in Oncology clinic.  Departure Mode: Wheelchair.      Josselin Smith RN

## 2024-12-12 NOTE — PROGRESS NOTES
[facility] Hematology and Oncology Progress Note    Patient: Dilip Kan  MRN: [id number]  Date of Service: Dec 12, 2024         Reason for Visit    Chief Complaint   Patient presents with    Oncology Clinic Visit     Prostate CA - f/u PET scan 11/26       ECOG Performance Status  2      Encounter Diagnoses:    Metastatic prostate cancer with bony metastases      History of Present Illness    Mr. Dilip Kan returns for follow-up of metastatic prostate cancer with bone metastases for details of history see previous notes.We had seen him last on July 18 at that time he had undergone MRI of the lumbar spine which revealed new lesions involving L4 L5-S1 discs patient was on Dilaudid and methadone we had recommended radiation therapy but the patient declined.  His PSA at that time was 21.22 which was stableSubsequently developed significant lower back pain and hip pain and was seen in the emergency department on July 21.  He had an MRI of the lumbar spine which revealed progressive metastatic disease with increased expansile masses in the osseous structures resulting in spinal canal stenosis L4 and L5 he was transferred to [facility] and was seen by neurosurgery who felt he was not a candidate for decompressive surgery radiation oncology was consulted and radiation therapy was started on July 26.  Dexamethasone started on July 23 patient required PCA hydromorphone, oral methadone, oral opioids his pain was managed with his high-dose opioid regimen multiple opioid sparing agents.  He was discharged August 7 and currently comes in for follow-up he states his pain is well-controlled with the current regimen that he is on which includes methadone 10 mg every 8 hours Dilaudid 60 mg every 4 hours as needed pain.  Does wear chronic FFoley catheter.  He has some lower extremity weakness but overall he has control of his bowels and is able to ambulate with a walker.  He continues on enzalutamide as well as  Lupron and Xgeva.  He says he gets occasional hot flashes but these are tolerable he denies any shortness of breath cough hemoptysis denies any abdominal pain or change in bowel habits no bright red blood per rectum hematemesis or melena.  He was seen by infectious disease recently and has osteomyelitis has resolved and is currently off antibiotics. He comes in today complaining of worsening right hip pain.  He is currently on methadone 30 mg p.o. every 8 hours as well as Dilaudid 8 mg p.o. every 4 hours as needed pain.  This is prescribed by his PCP.  He is currently on a low dose of enzalutamide at 80 mg p.o. daily in addition to Lupron and Xgeva.  He had a PSMA PET scan performed on November 26 and the findings were that there was extensive bony metastatic disease and multiple pelvic pathologic fractures abnormal radiotracer uptake within a single right paratracheal lymph node as well as bilateral common femoral nodes there was abnormal radiotracer uptake within the deep pelvis at the site of the prior prostatectomy.  The patient would like to consult with radiation oncology for possible radiation to his right hip.  He continues to follow-up with infectious disease and has an appointment scheduled in the near future.  He denies any fevers night sweats or weight loss.  Denies abdominal pain.  Denies loose stools bright red blood per rectum hematemesis or melena.  His major complaint is right hip pain.  His PSA has jumped from 10.43 approximately.  3 months ago to 52.37.  The patient is not interested in cytotoxic chemotherapy       ______________________________________________________________________________    Past History    Past Medical History:   Diagnosis Date    Elevated prostate specific antigen (PSA)     4/10/2012    Encounter for other administrative examinations     1/31/2014    Esophagitis     No Comments Provided    Gastro-esophageal reflux disease without esophagitis     No Comments Provided    Low  back pain     No Comments Provided    Malignant neoplasm of prostate (H)     9/6/2012    Nicotine dependence, uncomplicated     Quit - October 2007 with chantix    Other intervertebral disc degeneration, lumbosacral region     12/1/2008       Past Surgical History:   Procedure Laterality Date    APPENDECTOMY OPEN  [id number]    Ruptured - [place]    ARTHROPLASTY KNEE Right 11/16/2021    Procedure: ARTHROPLASTY, KNEE, TOTAL;  Surgeon: Micah Rock MD;  Location: [facility] OR    COLONOSCOPY  08/31/2006    next due in 2016    CYSTECTOMY BLADDER, ILEAL DIVERSION, COMBINED N/A 11/30/2023    Procedure: CYSTECTOMY, WITH URETEROILEAL CONDUIT CREATION and Pubectomy;  Surgeon: Miki Correa MD;  Location: [facility] OR    DISKECTOMY, LUMBAR, SINGLE SP  03/16/2009    L 3-4 microdiskectomy, [facility]    ESOPHAGOSCOPY, GASTROSCOPY, DUODENOSCOPY (EGD), COMBINED  03/2003         ESOPHAGOSCOPY, GASTROSCOPY, DUODENOSCOPY (EGD), COMBINED  08/31/2006         GRAFT FLAP PEDICLE PERINEUM N/A 11/30/2023    Procedure: Left Rectus Abdominis flap;  Surgeon: YADIRA Guadalupe MD;  Location: [facility] OR    PICC DOUBLE LUMEN PLACEMENT Right 12/01/2023    5FR DL PICC, basilic vein. L-43cm, 2cm out.    PROSTATECTOMY PERINEAL RADICAL  11/28/2012    Nerve Sparring, Dr Warner    SPINE SURGERY N/A 04/28/2017    L3 to S1 spinal decompression L4/L5 fusion    TONSILLECTOMY, ADENOIDECTOMY, COMBINED      as a child    VARICOCELECTOMY  1959    INCISION AND DRAINAGE,EXCISE VARICOCELE           Review of systems.  CNS: There are no headaches, no blurred vision, no change in mental status,   ENT: There is no hearing loss.  Respiratory: There is no cough shortness of breath or hemoptysis  Cardiac: There is no chest pain, orthopnea, PND, or ankle edema.  GI: There is no bright red blood per rectum, no hematemesis, no reflux, no diarrhea or constipation  Musculoskeletal: There is significant right hip pain  : There is no urinary frequency,  hematuria.  Constitutional: There is no fevers, night sweats, weight loss.  Endocrine: There is chronic fatigue positive hot flashes  Neuro: There is no tingling or numbness in the hands or feet.  Hematologic: There is no gingival bleeding, epistaxis, or easy bruisability.  Dermatologic: There is no skin rash.  A 14 point review of systems is otherwise negative.          Physical Exam    /62 (BP Location: Right arm, Patient Position: Sitting, Cuff Size: Adult Regular)   Pulse 82   Temp 98.2  F (36.8  C) (Tympanic)   Resp 16   Wt 78 kg (172 lb)   SpO2 98%   BMI 24.68 kg/m        GENERAL: Alert and oriented to time place and person. Seated comfortably. In no distress.    HEAD: Atraumatic and normocephalic.    EYES: KRUPA, EOMI.  No pallor.  No icterus.    Oral cavity: no mucosal lesion or tonsillar enlargement.    NECK: supple. JVP normal.  No thyroid enlargement.    LYMPH NODES: There are no palpable cervical, supraclavicular, axillary, or inguinal nodes.    LUNGS: clear to auscultation bilaterally.  Resonant to percussion throughout bilaterally.  Symmetrical breath movements bilaterally.    HEART: S1 and S2 are heard. Regular rate and rhythm.  No murmur or gallop or rub heard.      ABDOMEN: Soft. Not tender. Not distended.  No palpable hepatomegaly or splenomegaly.  No other mass palpable.  Normal active bowel sounds heard.    EXTREMITIES: There is trace to 1+ ankle edema.  There is limited range of motion of the right hip with tenderness upon palpation of the right hemipelvis  SKIN: no rash, or bruising or purpura.    NEURO: Grossly non-focal.    Lab Results    Recent Results (from the past 240 hours)   Calcium    Collection Time: 12/12/24  9:25 AM   Result Value Ref Range    Calcium 8.1 (L) 8.8 - 10.4 mg/dL   Phosphorus    Collection Time: 12/12/24  9:25 AM   Result Value Ref Range    Phosphorus 2.7 2.5 - 4.5 mg/dL   Comprehensive metabolic panel    Collection Time: 12/12/24  9:25 AM   Result Value Ref  Range    Sodium 133 (L) 135 - 145 mmol/L    Potassium 4.1 3.4 - 5.3 mmol/L    Carbon Dioxide (CO2) 25 22 - 29 mmol/L    Anion Gap 9 7 - 15 mmol/L    Urea Nitrogen 11.8 8.0 - 23.0 mg/dL    Creatinine 0.61 (L) 0.67 - 1.17 mg/dL    GFR Estimate >90 >60 mL/min/1.73m2    Calcium 8.1 (L) 8.8 - 10.4 mg/dL    Chloride 99 98 - 107 mmol/L    Glucose 120 (H) 70 - 99 mg/dL    Alkaline Phosphatase 114 40 - 150 U/L    AST 59 (H) 0 - 45 U/L    ALT 11 0 - 70 U/L    Protein Total 7.0 6.4 - 8.3 g/dL    Albumin 4.0 3.5 - 5.2 g/dL    Bilirubin Total 0.4 <=1.2 mg/dL   CRP inflammation    Collection Time: 12/12/24  9:25 AM   Result Value Ref Range    CRP Inflammation 11.88 (H) <5.00 mg/L   CBC with platelets and differential    Collection Time: 12/12/24  9:25 AM   Result Value Ref Range    WBC Count 5.7 4.0 - 11.0 10e3/uL    RBC Count 4.48 4.40 - 5.90 10e6/uL    Hemoglobin 11.0 (L) 13.3 - 17.7 g/dL    Hematocrit 35.8 (L) 40.0 - 53.0 %    MCV 80 78 - 100 fL    MCH 24.6 (L) 26.5 - 33.0 pg    MCHC 30.7 (L) 31.5 - 36.5 g/dL    RDW 17.3 (H) 10.0 - 15.0 %    Platelet Count 268 150 - 450 10e3/uL    % Neutrophils 73 %    % Lymphocytes 14 %    % Monocytes 9 %    % Eosinophils 3 %    % Basophils 1 %    % Immature Granulocytes 0 %    NRBCs per 100 WBC 0 <1 /100    Absolute Neutrophils 4.2 1.6 - 8.3 10e3/uL    Absolute Lymphocytes 0.8 0.8 - 5.3 10e3/uL    Absolute Monocytes 0.5 0.0 - 1.3 10e3/uL    Absolute Eosinophils 0.2 0.0 - 0.7 10e3/uL    Absolute Basophils 0.0 0.0 - 0.2 10e3/uL    Absolute Immature Granulocytes 0.0 <=0.4 10e3/uL    Absolute NRBCs 0.0 10e3/uL       Imaging    PET (Eyes to Thighs) Prostate    Result Date: 11/27/2024  Procedure: PET (EYES TO THIGHS) PROSTATE Subtype: Initial Radiopharmaceutical: F-18 Pylarify Dose: 9.94 mCi IV History: Malignant neoplasm metastatic to bone (H); Malignant neoplasm of prostate (H); Rising PSA following treatment for malignant neoplasm of prostate Comparison: 7/20/24 Technique:  A low dose CT  examination was performed for the purpose of attenuation correction and localization. Attenuation corrected PET images from the skull base to the mid thigh were acquired approximately one hour following intravenous administration of the dose, and displayed in transaxial, sagittal, and coronal projections. Uptake time: 61 minutes. Findings: Neck: Motion artifact slightly degrades assessment. Expected salivary activity. No sites of suspicious radiotracer uptake. Thorax: 1.2 cm right paratracheal node with increased radiotracer uptake. Abdomen/Pelvis: Bilateral enlarged common femoral lymph nodes demonstrate abnormal radiotracer uptake. Abnormal radiotracer uptake within the deep pelvis in the prostatectomy bed extending towards the left and cephalad. Thighs: No sites of suspicious radiotracer uptake. Bony structures: Extensive abnormal radiotracer uptake associated with destructive changes of the left hemipelvis. Pathologic fractures of the left superior and inferior pubic rami and left acetabulum. Extensive abnormal radiotracer uptake within the sacrum. Patchy abnormal radiotracer uptake within the right iliac bone. Pathologic fracture of the superior aspect of the right iliac bone. Abnormal radiotracer uptake within T12, L4 and L5. Abnormal radiotracer uptake within the medial right sixth rib. Abnormal radiotracer uptake within the right hemimandible, which may be related to motion artifact.     IMPRESSION:  Extensive bony metastatic disease with multiple pelvic pathologic fractures. Abnormal radiotracer uptake within a single right paratracheal lymph node as well as bilateral common femoral nodes. Abnormal radiotracer uptake within the deep pelvis at the site of prior prostatectomy. KATHI OSPINA MD   SYSTEM ID:  [id number]     Assessment and Plan: #1 :.  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 with pathology revealing high-grade prostatic adenocarcinoma focally involving the bladder with osteomyelitis of the pubic symphysis requiring IV antibiotics status post current treatment with enzalutamide/Lupron/Xgeva with severe back pain secondary to progressive metastatic disease with increasing expansile masses involving L4-L5 and increasing spinal canal stenosis status post transfer to [facility] in [place] with palliative radiation administered with improvement in pain symptoms with current pain management with Dilaudid/methadone with recent PSMA scan indicating progression of disease we have recommended cabazitaxel IV chemotherapy but patient is reluctant to proceed with chemotherapy therefore we will increase his enzalutamide to 160 mg p.o. daily and continue Lupron and Xgeva and make a referral to radiation oncology in [place] with Dr. Gutiérrez for potential radiation to the right iliac bone metastases which may be the cause of his right hip pain.  Nonetheless his prognosis is poor if no response to enzalutamide he will need treatment with IV chemotherapy with cabazitaxel.  Will otherwise the patient approxi-3 months obtain CBC CMP LDH PSA and testosterone.  Time spent: 65 minutes was spent on this patient visit : we spent time reviewing previous physician provider notes, reviewing PSMA PET scan results discussing PET scan results with the patient, discussing treatment plan with the patient, performing history/physical, documenting history/physical, ordering Xgeva with increased  dose of enzalutamide and Lupron and  referring patient to radiation oncology in [place] with Dr. Gutiérrez.  The longitudinal plan of care for the diagnosis(es)/condition(s) as documented were addressed during this visit. Due to the added complexity in care, I will continue to support Jermain in the subsequent management and with ongoing continuity of care.    Cancer Staging   No matching staging information was found for the patient.        Signed by: Erum Fritz MD    CC: Ezequiel Alejandra MD

## 2024-12-12 NOTE — PATIENT INSTRUCTIONS
Increase Xtandi as discussed.  Updated the treatment plan and let the oral chemo management team know.    Continue monthly labs.    Follow up with Dr Fritz in 3 months (after the monthly labs are done).     A referral was placed to Radiation Therapy at [facility] in [place].  If you have not heard from them after 2 weeks, please notify us at [phone].

## 2024-12-13 PROBLEM — Z90.6 H/O TOTAL CYSTECTOMY: Chronic | Status: ACTIVE | Noted: 2024-07-21

## 2024-12-13 PROBLEM — B96.1 BACTEREMIA DUE TO KLEBSIELLA PNEUMONIAE: Status: ACTIVE | Noted: 2023-04-22

## 2024-12-13 PROBLEM — N30.00 ACUTE CYSTITIS WITHOUT HEMATURIA: Status: ACTIVE | Noted: 2024-07-23

## 2024-12-13 PROBLEM — N30.40 CHRONIC RADIATION CYSTITIS: Status: ACTIVE | Noted: 2024-12-13

## 2024-12-13 PROBLEM — N32.0 BLADDER OUTFLOW OBSTRUCTION: Status: ACTIVE | Noted: 2023-04-22

## 2024-12-13 PROBLEM — C79.51 METASTATIC CANCER TO SPINE (H): Status: ACTIVE | Noted: 2024-07-21

## 2024-12-13 PROBLEM — M84.454A: Status: ACTIVE | Noted: 2024-07-21

## 2024-12-13 PROBLEM — R78.81 BACTEREMIA DUE TO KLEBSIELLA PNEUMONIAE: Status: ACTIVE | Noted: 2023-04-22

## 2024-12-13 PROBLEM — G89.3 CANCER RELATED PAIN: Status: ACTIVE | Noted: 2024-07-23

## 2024-12-13 PROBLEM — R82.90 ABNORMAL URINALYSIS: Status: ACTIVE | Noted: 2024-07-21

## 2024-12-13 PROBLEM — R60.0 BILATERAL LEG EDEMA: Chronic | Status: ACTIVE | Noted: 2024-07-21

## 2024-12-13 PROBLEM — A41.9 SEVERE SEPSIS WITHOUT SEPTIC SHOCK (H): Status: ACTIVE | Noted: 2023-04-22

## 2024-12-13 PROBLEM — K83.8 INTRAHEPATIC BILE DUCT DILATION: Status: ACTIVE | Noted: 2024-07-21

## 2024-12-13 PROBLEM — E87.1 HYPONATREMIA: Status: ACTIVE | Noted: 2023-04-22

## 2024-12-13 PROBLEM — R65.20 SEVERE SEPSIS WITHOUT SEPTIC SHOCK (H): Status: ACTIVE | Noted: 2023-04-22

## 2024-12-15 LAB — TESTOST SERPL-MCNC: 6 NG/DL (ref 240–950)

## 2024-12-19 ENCOUNTER — ONCOLOGY VISIT (OUTPATIENT)
Dept: RADIATION ONCOLOGY | Facility: HOSPITAL | Age: 69
End: 2024-12-19
Payer: COMMERCIAL

## 2024-12-19 ENCOUNTER — ONCOLOGY VISIT (OUTPATIENT)
Dept: RADIATION ONCOLOGY | Facility: HOSPITAL | Age: 69
End: 2024-12-19
Attending: RADIOLOGY
Payer: COMMERCIAL

## 2024-12-19 VITALS
HEIGHT: 70 IN | BODY MASS INDEX: 24.68 KG/M2 | SYSTOLIC BLOOD PRESSURE: 114 MMHG | TEMPERATURE: 99.3 F | DIASTOLIC BLOOD PRESSURE: 62 MMHG | OXYGEN SATURATION: 97 % | RESPIRATION RATE: 16 BRPM | HEART RATE: 78 BPM

## 2024-12-19 DIAGNOSIS — C61 PROSTATE CANCER (H): Primary | ICD-10-CM

## 2024-12-19 DIAGNOSIS — C79.51 MALIGNANT NEOPLASM METASTATIC TO BONE (H): ICD-10-CM

## 2024-12-19 DIAGNOSIS — M25.551 HIP PAIN, RIGHT: ICD-10-CM

## 2024-12-19 PROCEDURE — G0463 HOSPITAL OUTPT CLINIC VISIT: HCPCS

## 2024-12-19 ASSESSMENT — PAIN SCALES - GENERAL: PAINLEVEL_OUTOF10: EXTREME PAIN (8)

## 2024-12-19 NOTE — PROGRESS NOTES
Radiation oncology consultation note    Referring physician: Dr. Fritz    Diagnosis: Symptomatic progressive osseous and keke castration resistant prostatic cancer, currently on systemic treatment with leuprolide, enzalutamide, and denosumab immunotherapy    Performance Status: ECOG 2    Reason for Visit  Consultation for treatment options regarding the role of palliative radiation treatment for his right hip pain.    History  Mr. Dilip Kan was seen today for a radiation oncology consultation at the request of Dr. Fritz.  He is a 69-year-old gentleman with an underlying diagnosis of symptomatic progressive metastatic castration refractory prostate cancer.  He has been on leuprolide, enzalutamide, and denosumab under the direction of Dr. Fritz, and Dilaudid and methadone under the direction of Dr. Ezequiel Alejandra.    He is status post radical prostatectomy for bE0trDh, cM0, R1 prostatic adenocarcinoma, Sturkie grade group 1, 3+3 = 6, associated with multiple involved surgical inked margins and at the bladder neck margin on November 28, 2012.  Postoperative PSA levels were 0.67 NG/mL on January 2, 2013, 0.97 NG/mL on February 27, 2013.  He underwent salvage pelvic radiation treatment to the prostatic fossa and seminal vesicles for 45 Gray in 25 fractions from April 8, 2013 to May 14, 2013 and radiation boost to the prostatic fossa and seminal vesicles from May 15, 2013 2 June 26, 2013.      Postradiation treatment PSA levels were less than 0.13 NG/mL on November 13, 2013, 0.07 NG/mL on October 16, 2014, 0.154 NG/mL on March 1, 2016, 0.253 NG/mL on November 3, 2016, 0.256 NG/mL on March 8, 2017, 0.268 NG/mL on April 7, 2017, 1.177 NG/mL on November 1, 2018, 2.714 NG/mL on May 1, 2019, 7.112 NG/mL on November 4, 2019.  He saw Dr. Celestine Arrington and started ADT with leuprolide in 2019.  PSA was 1.006 NG/mL on February 5, 2020 and went up to 2.55 NG/mL on May 24, 2021, 3.48 NG/mL on August 25, 2021, and 3.67 NG/mL  on September 17, 2021.    He saw Dr. Fritz in consultation on September 15, 2021 and started apalutamide for castration resistant M0 prostate cancer.  With evidence of clinical and biochemical progression, his treatment was switched to abiraterone and prednisone.  His clinical course was complicated by worsening left pelvic/hip pain symptom associated with biochemical progression with rising PSA.  His systemic treatment was switched to docetaxel chemotherapy.    He also underwent palliative radiation treatment to the left pubis for 10 Gray in 1 fraction on March 14, 2023 under the direction of Dr. Thomas Hamilton.    His clinical course was complicated by new onset of radiation cystitis, pubovesical fistula, and pelvic osteomyelitis, requiring surgical correction.  He underwent lysis of adhesions, total cystectomy and creation of ileal conduit urinary diversion, total tubectomy for osteomyelitis, rectus flap fixation into pelvis on November 30, 2023.  Pathology report of pubic bone was unremarkable.  There was focal high-grade prostatic adenocarcinoma with comedonecrosis involving bladder.    He also received palliative radiation treatment for 20 Gray in 5 fractions from July 25, 2024 to July 31, 2024 from L2 to sacrum and left proximal pelvis on the the direction of Dr. Enrique.    His systemic treatment was switched from docetaxel chemotherapy to enzalutamide in addition to leuprolide and denosumab immunotherapy.  [facility] was remarkable for extensive uptake associated with destructive changes of the left hemipelvis, pathologic fractures of the left superior and inferior pubic rami and left acetabulum, extensive uptake within the sacrum, patchy abnormal uptake within the right iliac bone, pathologic fracture of superior aspect of right iliac bone, abnormal uptake within T12, L4, L5, medial right sixth rib, right hemimandible, abnormal uptake involving 1.2 cm right paratracheal  node and bilateral enlarged common femoral nodes, and abnormal uptake in the prostatectomy bed extending towards the left and superiorly.    He continues close follow-up with Dr. Fritz and has noted worsening right hip pain.  He relates that his right hip pain has been worse and he has to increase narcotic pain medication.  He rates his pain about 6 that 8 on a scale of 0-10.  He ambulates with assistance.    On examination, Alert, cooperative, and in no acute distress. Psychiatric: mood and affect are appropriate. Orientation in 3 spheres is intact. Cardiac: Regular rate and rhythm. S1, S2 with systolic ejection murmur along the sternal border and at PMI. Lungs: Normal breath sounds without rales or rhonchi. Abdomen: Soft, nontender, and without palpable mass lesion. Bowel sounds are active. Extremities: Without clubbing or cyanosis.there is bilateral edema involving both lower extremities, worse on the left than the right.  Musculoskeletal: remarkable for bony tenderness on palpation over the right femoral/acetabular area and the left hip area.     Data  I personally reviewed the patient's medical records, previous radiation treatment records and plans, procedure/operative notes, pathology reports, laboratory work, including CBC and PSA levels, and radiology studies as detailed above including the discussion of the results with the patient. This is very useful for radiation treatment evaluation and recommendations.    Assessment  The patient is a 69-year-old gentleman with a diagnosis of symptomatic progressive osseous and keke castration resistant prostatic cancer, currently on systemic treatment with leuprolide, enzalutamide, and denosumab immunotherapy.    Clinically the areas of significant pain symptom are at the left hip/pelvis and the right femoral head/acetabular area as well as area of tenderness on palpation over the right posterior iliac crest/sacral area.  However, there was no correlated increased  Pylarify avid areas involving the right femur, right acetabular areas other than right posterior iliac bones on Pylarify PET/CT study.  The hypermetabolic right iliac abnormalities do not correlate with his most painful site involving the right femoral/acetabular area on palpation.  To this end, with his history of previous radiation treatments associated with radiation related grade 4 complication, I do not believe that he is a candidate for additional palliative radiation treatment to the previously irradiated sites since the potential risk of grade 4 or life-threatening radiation related complication outweighs potential benefit from palliative radiation treatment for pain control.    With symptomatic progressive castration resistant metastatic PSMA-positive prostate cancer on Pylarify PET/CT study and based on the results of CBC on December 12, 2024 (WBC of 5.7, hemoglobin of 11, platelets of 268,000) and CMP on December 12, 2024, showing adequate renal and hepatic functions, he may be a candidate for Lutetium-177 vipivotide tetraxetan PSMA targeted radioligand therapy.  To this end, the patient will discuss the role of PSMA targeted radioligand therapy with Dr. Fritz.    At the end of our consultation, the patient felt that he understood the above discussion well, and that all questions were addressed thoroughly and to his satisfaction. The patient indicated that he planned to discuss the role of PSMA targeted radioligand therapy with Dr. Fritz.    Plan  The timeline, logistics, personnel, imaging, risks, and benefits associated with radiation therapy were explained.  All questions were answered, and the patient would like to proceed with the above plan.    Since he is not a candidate for palliative reirradiation at this time and will continue his care under the direction of and close follow-up with Dr. Fritz, I plan to see him on an as needed basis.  He is encouraged and advised to contact me if I can  be of further assistance.  He has my contact phone number.    Thank you very much for the opportunity to assist in the care of your patient. If I can be of further assistance, please do not hesitate to contact me.    Total exam time spent on the day of the visit including review of the chart/medical records, procedure/operative notes, pathology reports, laboratory work, reviewing and interpreting pertinent imaging studies and notes, obtaining a detailed history and physical exam, education, discussion/counseling regarding the above diagnosis and the radiation oncology treatment plan with the patient and documenting in epic was 110 minutes.    This chart is completed using Dragon Medical voice recognition. Because of the limitations of this technology, there may be some error in the transcription that are unintended despite editing on my part. If you have any questions or concerns, please do not hesitate to contact me for clarification.

## 2024-12-19 NOTE — PROGRESS NOTES
"Radiation Oncology Rooming Note    December 19, 2024 2:31 PM     Dilip Kan is a 69 year old male who presents for:       Chief Complaint   Patient presents with    Consult     Radiation Oncology Consultation        Initial Vitals: /62 (BP Location: Right arm, Patient Position: Chair, Cuff Size: Adult Regular)   Pulse 78   Temp 99.3  F (37.4  C) (Tympanic)   Resp 16   Ht 1.778 m (5' 10\")   SpO2 97%   BMI 24.68 kg/m     Estimated body mass index is 24.68 kg/m  as calculated from the following:    Height as of this encounter: 1.778 m (5' 10\").    Weight as of 12/12/24: 78 kg (172 lb).   Body surface area is 1.96 meters squared.  Extreme Pain (8) Comment: Data Unavailable       Allergies reviewed: Yes  Medications reviewed: Yes      Patient completed the following questionnaires: NCCN Distress Thermometer.       Patient was assessed using the NCCN psychosocial distress thermometer. Patient rated the score as a 8. Patient rated current stressors as pain, fatigue, and changes in appearance. Stressors brought to the attention of Dr. Hathaway for a score of 6 or greater or per nurses discretion.       Patient received folder containing the following:  advance directives information/application  financial letter  radiation therapy business card      Financial assistance resources given to patient:  [employer] application       Patient given site specific instructions including:   FABRICE Hein RN           "

## 2024-12-23 ENCOUNTER — TELEPHONE (OUTPATIENT)
Dept: ONCOLOGY | Facility: CLINIC | Age: 69
End: 2024-12-23
Payer: COMMERCIAL

## 2024-12-23 NOTE — TELEPHONE ENCOUNTER
[facility] APPROVED    Medication: XTANDI 80 MG PO TABS  Amount: $ 8,000  Foundation Name: [facility] Phone: [phone]  Member ID: [id number]   BIN: [id number]  PCN: [id number]  Group: [id number]  Foundation Effective Date: 11/23/2024  Foundation Expiration Date: 11/22/2025  Additional Information:  DX verification form needed requested 12/23/24   Patient Notified: Yes

## 2024-12-31 ENCOUNTER — TELEPHONE (OUTPATIENT)
Dept: FAMILY MEDICINE | Facility: OTHER | Age: 69
End: 2024-12-31
Payer: COMMERCIAL

## 2024-12-31 ENCOUNTER — DOCUMENTATION ONLY (OUTPATIENT)
Dept: FAMILY MEDICINE | Facility: OTHER | Age: 69
End: 2024-12-31
Payer: COMMERCIAL

## 2024-12-31 DIAGNOSIS — N36.0 URETHRAL FISTULA: ICD-10-CM

## 2024-12-31 DIAGNOSIS — F11.90 CHRONIC, CONTINUOUS USE OF OPIOIDS: ICD-10-CM

## 2024-12-31 DIAGNOSIS — Z93.6 STATUS OF ARTIFICIAL OPENING OF URINARY TRACT (H): Primary | ICD-10-CM

## 2024-12-31 DIAGNOSIS — C61 MALIGNANT NEOPLASM OF PROSTATE METASTATIC TO BONE (H): ICD-10-CM

## 2024-12-31 DIAGNOSIS — C79.51 MALIGNANT NEOPLASM OF PROSTATE METASTATIC TO BONE (H): ICD-10-CM

## 2024-12-31 DIAGNOSIS — N30.40 IRRADIATION CYSTITIS WITHOUT HEMATURIA: ICD-10-CM

## 2024-12-31 DIAGNOSIS — C61 MALIGNANT NEOPLASM OF PROSTATE (H): ICD-10-CM

## 2024-12-31 NOTE — TELEPHONE ENCOUNTER
Patient is requesting a call back from [facility] to discuss his hip pain. He did not want to give me any other details. He said [facility] will know him and is familiar with him. I let him know [facility] is out of clinic until 1/2/25. I offered, but he did not want to speak with a triage nurse.     Marifer Walker on 12/31/2024 at 2:32 PM

## 2025-01-02 RX ORDER — METHADONE HYDROCHLORIDE 10 MG/1
40 TABLET ORAL EVERY 8 HOURS PRN
Qty: 360 TABLET | Refills: 0 | Status: CANCELLED | OUTPATIENT
Start: 2025-01-02 | End: 2025-02-01

## 2025-01-02 RX ORDER — METHADONE HYDROCHLORIDE 10 MG/1
40 TABLET ORAL EVERY 8 HOURS
Qty: 360 TABLET | Refills: 0 | Status: SHIPPED | OUTPATIENT
Start: 2025-01-02 | End: 2025-02-01

## 2025-01-02 RX ORDER — HYDROMORPHONE HYDROCHLORIDE 8 MG/1
8 TABLET ORAL
Qty: 180 TABLET | Refills: 0 | Status: SHIPPED | OUTPATIENT
Start: 2025-01-02 | End: 2025-01-02

## 2025-01-02 RX ORDER — METHADONE HYDROCHLORIDE 40 MG/1
40 TABLET ORAL EVERY 8 HOURS PRN
Qty: 90 TABLET | Refills: 0 | Status: SHIPPED | OUTPATIENT
Start: 2025-01-02 | End: 2025-01-02

## 2025-01-02 NOTE — TELEPHONE ENCOUNTER
Patient has a history of metastatic prostate cancer.  Patient reports increasing pain in the hips.  He did have to use extra Dilaudid through the holidays.  Prescription sent in.  He is currently on methadone 30 mg 3 times a day.  Will increase it to 40 mg 3 times a day.  Prescription sent in.  Patient does have a call into oncology.

## 2025-01-02 NOTE — TELEPHONE ENCOUNTER
We received a Rx for Methadone 40 mg tablets -- 1 tab q8h prn. We are unable to order this product. The only options for the pt to receive this dose are 10 mg tablets -- 4 tablets q8h prn, or methadone concentrate 10 mg/mL. I would suggest the tablets as they are much cheaper than the concentrate. Please send new Rx if appropriate.     Thanks    Richard Patten, PharmD  [facility]

## 2025-01-05 DIAGNOSIS — C61 MALIGNANT NEOPLASM OF PROSTATE (H): ICD-10-CM

## 2025-01-05 DIAGNOSIS — C79.51 MALIGNANT NEOPLASM METASTATIC TO BONE (H): Primary | ICD-10-CM

## 2025-01-05 DIAGNOSIS — R97.21 RISING PSA FOLLOWING TREATMENT FOR MALIGNANT NEOPLASM OF PROSTATE: ICD-10-CM

## 2025-01-09 DIAGNOSIS — R97.21 RISING PSA FOLLOWING TREATMENT FOR MALIGNANT NEOPLASM OF PROSTATE: ICD-10-CM

## 2025-01-09 DIAGNOSIS — C79.51 MALIGNANT NEOPLASM METASTATIC TO BONE (H): Primary | ICD-10-CM

## 2025-01-09 DIAGNOSIS — C61 MALIGNANT NEOPLASM OF PROSTATE (H): ICD-10-CM

## 2025-01-16 ENCOUNTER — HOSPITAL ENCOUNTER (OUTPATIENT)
Dept: INFUSION THERAPY | Facility: OTHER | Age: 70
End: 2025-01-16
Attending: FAMILY MEDICINE
Payer: COMMERCIAL

## 2025-01-16 ENCOUNTER — LAB (OUTPATIENT)
Dept: LAB | Facility: OTHER | Age: 70
End: 2025-01-16
Attending: FAMILY MEDICINE
Payer: COMMERCIAL

## 2025-01-16 VITALS
TEMPERATURE: 98.1 F | RESPIRATION RATE: 16 BRPM | DIASTOLIC BLOOD PRESSURE: 55 MMHG | HEART RATE: 79 BPM | SYSTOLIC BLOOD PRESSURE: 117 MMHG

## 2025-01-16 DIAGNOSIS — C61 PROSTATE CANCER (H): ICD-10-CM

## 2025-01-16 DIAGNOSIS — R97.21 RISING PSA FOLLOWING TREATMENT FOR MALIGNANT NEOPLASM OF PROSTATE: ICD-10-CM

## 2025-01-16 DIAGNOSIS — C79.51 MALIGNANT NEOPLASM METASTATIC TO BONE (H): ICD-10-CM

## 2025-01-16 DIAGNOSIS — C79.51 MALIGNANT NEOPLASM METASTATIC TO BONE (H): Primary | ICD-10-CM

## 2025-01-16 DIAGNOSIS — C61 MALIGNANT NEOPLASM OF PROSTATE (H): ICD-10-CM

## 2025-01-16 LAB
ALBUMIN SERPL BCG-MCNC: 3.6 G/DL (ref 3.5–5.2)
ALP SERPL-CCNC: 115 U/L (ref 40–150)
ALT SERPL W P-5'-P-CCNC: 10 U/L (ref 0–70)
ANION GAP SERPL CALCULATED.3IONS-SCNC: 14 MMOL/L (ref 7–15)
AST SERPL W P-5'-P-CCNC: 81 U/L (ref 0–45)
BASOPHILS # BLD AUTO: 0 10E3/UL (ref 0–0.2)
BASOPHILS NFR BLD AUTO: 0 %
BILIRUB SERPL-MCNC: 0.5 MG/DL
BUN SERPL-MCNC: 16.7 MG/DL (ref 8–23)
CALCIUM SERPL-MCNC: 8.8 MG/DL (ref 8.8–10.4)
CHLORIDE SERPL-SCNC: 99 MMOL/L (ref 98–107)
CREAT SERPL-MCNC: 0.67 MG/DL (ref 0.67–1.17)
EGFRCR SERPLBLD CKD-EPI 2021: >90 ML/MIN/1.73M2
EOSINOPHIL # BLD AUTO: 0.1 10E3/UL (ref 0–0.7)
EOSINOPHIL NFR BLD AUTO: 3 %
ERYTHROCYTE [DISTWIDTH] IN BLOOD BY AUTOMATED COUNT: 17.4 % (ref 10–15)
GLUCOSE SERPL-MCNC: 135 MG/DL (ref 70–99)
HCO3 SERPL-SCNC: 21 MMOL/L (ref 22–29)
HCT VFR BLD AUTO: 31.8 % (ref 40–53)
HGB BLD-MCNC: 10.2 G/DL (ref 13.3–17.7)
IMM GRANULOCYTES # BLD: 0 10E3/UL
IMM GRANULOCYTES NFR BLD: 0 %
LDH SERPL L TO P-CCNC: 1219 U/L (ref 0–250)
LYMPHOCYTES # BLD AUTO: 0.6 10E3/UL (ref 0.8–5.3)
LYMPHOCYTES NFR BLD AUTO: 12 %
MCH RBC QN AUTO: 24.8 PG (ref 26.5–33)
MCHC RBC AUTO-ENTMCNC: 32.1 G/DL (ref 31.5–36.5)
MCV RBC AUTO: 77 FL (ref 78–100)
MONOCYTES # BLD AUTO: 0.4 10E3/UL (ref 0–1.3)
MONOCYTES NFR BLD AUTO: 8 %
NEUTROPHILS # BLD AUTO: 3.9 10E3/UL (ref 1.6–8.3)
NEUTROPHILS NFR BLD AUTO: 77 %
NRBC # BLD AUTO: 0 10E3/UL
NRBC BLD AUTO-RTO: 0 /100
PLATELET # BLD AUTO: 287 10E3/UL (ref 150–450)
POTASSIUM SERPL-SCNC: 4.3 MMOL/L (ref 3.4–5.3)
PROT SERPL-MCNC: 6.6 G/DL (ref 6.4–8.3)
PSA SERPL DL<=0.01 NG/ML-MCNC: 96.07 NG/ML (ref 0–6.5)
RBC # BLD AUTO: 4.11 10E6/UL (ref 4.4–5.9)
SODIUM SERPL-SCNC: 134 MMOL/L (ref 135–145)
WBC # BLD AUTO: 5.1 10E3/UL (ref 4–11)

## 2025-01-16 PROCEDURE — 36415 COLL VENOUS BLD VENIPUNCTURE: CPT | Mod: ZL

## 2025-01-16 PROCEDURE — 80053 COMPREHEN METABOLIC PANEL: CPT | Performed by: INTERNAL MEDICINE

## 2025-01-16 PROCEDURE — 84153 ASSAY OF PSA TOTAL: CPT | Mod: ZL

## 2025-01-16 PROCEDURE — 84460 ALANINE AMINO (ALT) (SGPT): CPT | Mod: ZL

## 2025-01-16 PROCEDURE — 84100 ASSAY OF PHOSPHORUS: CPT | Performed by: INTERNAL MEDICINE

## 2025-01-16 PROCEDURE — 85041 AUTOMATED RBC COUNT: CPT | Mod: ZL

## 2025-01-16 PROCEDURE — 84450 TRANSFERASE (AST) (SGOT): CPT | Mod: ZL

## 2025-01-16 PROCEDURE — 96372 THER/PROPH/DIAG INJ SC/IM: CPT | Performed by: INTERNAL MEDICINE

## 2025-01-16 PROCEDURE — 83615 LACTATE (LD) (LDH) ENZYME: CPT | Mod: ZL

## 2025-01-16 PROCEDURE — 80051 ELECTROLYTE PANEL: CPT | Mod: ZL

## 2025-01-16 PROCEDURE — 85004 AUTOMATED DIFF WBC COUNT: CPT | Mod: ZL

## 2025-01-16 RX ORDER — METHYLPREDNISOLONE SODIUM SUCCINATE 125 MG/2ML
125 INJECTION INTRAMUSCULAR; INTRAVENOUS
Start: 2025-02-12

## 2025-01-16 RX ORDER — ALBUTEROL SULFATE 0.83 MG/ML
2.5 SOLUTION RESPIRATORY (INHALATION)
OUTPATIENT
Start: 2025-02-12

## 2025-01-16 RX ORDER — DIPHENHYDRAMINE HYDROCHLORIDE 50 MG/ML
50 INJECTION INTRAMUSCULAR; INTRAVENOUS
Start: 2025-02-12

## 2025-01-16 RX ORDER — EPINEPHRINE 1 MG/ML
0.3 INJECTION, SOLUTION, CONCENTRATE INTRAVENOUS EVERY 5 MIN PRN
OUTPATIENT
Start: 2025-02-12

## 2025-01-16 RX ORDER — ALBUTEROL SULFATE 90 UG/1
1-2 INHALANT RESPIRATORY (INHALATION)
Start: 2025-02-12

## 2025-01-16 NOTE — NURSING NOTE
Infusion Nursing Note:  Dilip GR Juan Francisco presents today for Xgeva.    Patient seen by provider today: No   present during visit today: Not Applicable.    Note: N/A.      Intravenous Access:  Labs drawn without difficulty per .    Treatment Conditions:  Lab Results   Component Value Date     (L) 01/16/2025    POTASSIUM 4.3 01/16/2025    MAG 1.8 12/11/2023    CR 0.67 01/16/2025    ROBERTO 8.8 01/16/2025    ROBERTO 8.8 01/16/2025    BILITOTAL 0.5 01/16/2025    ALBUMIN 3.6 01/16/2025    ALT 10 01/16/2025    AST 81 (H) 01/16/2025   Phosphorus resulted at 3.3 with today's lab work.    Results reviewed, labs MET treatment parameters, ok to proceed with treatment.      Post Infusion Assessment:  Patient tolerated injection to posterior right arm without incident.  Site intact, free from redness, edema or discomfort.       Discharge Plan:   Discharge instructions reviewed with: Patient and Family.  Patient and/or family verbalized understanding of discharge instructions and all questions answered.  AVS to patient via [employer].  Patient will return 2/14/2025 for next appointment.   Patient discharged in stable condition accompanied by: family.  Departure Mode: Wheelchair.      Lizzeth Kumar RN

## 2025-01-21 ENCOUNTER — TELEPHONE (OUTPATIENT)
Dept: FAMILY MEDICINE | Facility: OTHER | Age: 70
End: 2025-01-21
Payer: COMMERCIAL

## 2025-01-21 NOTE — TELEPHONE ENCOUNTER
Pt needs to be seen tomorrow or Thursday.  Please put him into my schedule  if he is getting confused he needs to go the ER

## 2025-01-21 NOTE — TELEPHONE ENCOUNTER
After proper verification patient was relayed message from below and put into providers schedule for tomorrow at 9 am.   Dasha Vasques LPN on 1/21/2025 at 2:09 PM  EXT. [id number]

## 2025-01-21 NOTE — TELEPHONE ENCOUNTER
Patient called regarding reaction to the medication Dilaudid and stated that he is having jerking/involuntary muscle spasms. Call with any questions.    Danuta Crandall on 1/21/2025 at 11:50 AM

## 2025-01-21 NOTE — TELEPHONE ENCOUNTER
After proper verification, patient stated that all of a sudden he is having involuntary jerking or muscle spasms. He has noticed this since starting taking Dilaudid and Methadone. He stated that he has cut back on the pain medication but it hasn't seemed to help at all and it just makes his pain worse. He stated that he thinks he may have had this before and is wondering if it could be from his Lupron injection. Patient is wondering what he can do. Please Advise.  Dasha Vasques LPN on 1/21/2025 at 1:22 PM  EXT. [id number]

## 2025-01-22 ENCOUNTER — OFFICE VISIT (OUTPATIENT)
Dept: FAMILY MEDICINE | Facility: OTHER | Age: 70
End: 2025-01-22
Attending: FAMILY MEDICINE
Payer: COMMERCIAL

## 2025-01-22 ENCOUNTER — TELEPHONE (OUTPATIENT)
Dept: FAMILY MEDICINE | Facility: OTHER | Age: 70
End: 2025-01-22

## 2025-01-22 VITALS
DIASTOLIC BLOOD PRESSURE: 50 MMHG | TEMPERATURE: 97.2 F | RESPIRATION RATE: 20 BRPM | SYSTOLIC BLOOD PRESSURE: 116 MMHG | HEART RATE: 88 BPM | OXYGEN SATURATION: 97 %

## 2025-01-22 DIAGNOSIS — F11.20 OPIOID DEPENDENCE, UNCOMPLICATED (H): ICD-10-CM

## 2025-01-22 DIAGNOSIS — C79.51 MALIGNANT NEOPLASM OF PROSTATE METASTATIC TO BONE (H): ICD-10-CM

## 2025-01-22 DIAGNOSIS — K12.1 MOUTH ULCER: ICD-10-CM

## 2025-01-22 DIAGNOSIS — G89.3 CANCER RELATED PAIN: ICD-10-CM

## 2025-01-22 DIAGNOSIS — C61 MALIGNANT NEOPLASM OF PROSTATE (H): ICD-10-CM

## 2025-01-22 DIAGNOSIS — Z93.6 S/P ILEAL CONDUIT (H): ICD-10-CM

## 2025-01-22 DIAGNOSIS — F11.90 CHRONIC, CONTINUOUS USE OF OPIOIDS: ICD-10-CM

## 2025-01-22 DIAGNOSIS — C78.7 SECONDARY MALIGNANT NEOPLASM OF LIVER AND INTRAHEPATIC BILE DUCT (H): ICD-10-CM

## 2025-01-22 DIAGNOSIS — G25.3 MYOCLONIC JERKING: ICD-10-CM

## 2025-01-22 DIAGNOSIS — I10 ESSENTIAL HYPERTENSION: Primary | ICD-10-CM

## 2025-01-22 DIAGNOSIS — C61 MALIGNANT NEOPLASM OF PROSTATE METASTATIC TO BONE (H): ICD-10-CM

## 2025-01-22 DIAGNOSIS — I27.20 MILD PULMONARY HYPERTENSION (H): ICD-10-CM

## 2025-01-22 PROBLEM — E11.9 TYPE 2 DIABETES MELLITUS WITHOUT COMPLICATION, WITHOUT LONG-TERM CURRENT USE OF INSULIN (H): Status: RESOLVED | Noted: 2020-11-23 | Resolved: 2025-01-22

## 2025-01-22 LAB
HOLD SPECIMEN: NORMAL
MAGNESIUM SERPL-MCNC: 2.2 MG/DL (ref 1.7–2.3)

## 2025-01-22 PROCEDURE — G0008 ADMIN INFLUENZA VIRUS VAC: HCPCS

## 2025-01-22 PROCEDURE — 36415 COLL VENOUS BLD VENIPUNCTURE: CPT | Mod: ZL | Performed by: FAMILY MEDICINE

## 2025-01-22 PROCEDURE — G0463 HOSPITAL OUTPT CLINIC VISIT: HCPCS | Mod: 25

## 2025-01-22 PROCEDURE — 83735 ASSAY OF MAGNESIUM: CPT | Mod: ZL | Performed by: FAMILY MEDICINE

## 2025-01-22 RX ORDER — HYDROMORPHONE HYDROCHLORIDE 8 MG/1
8 TABLET ORAL EVERY 4 HOURS PRN
Qty: 180 TABLET | Refills: 0 | Status: SHIPPED | OUTPATIENT
Start: 2025-01-22 | End: 2025-01-23

## 2025-01-22 ASSESSMENT — PAIN SCALES - GENERAL: PAINLEVEL_OUTOF10: MODERATE PAIN (5)

## 2025-01-22 NOTE — TELEPHONE ENCOUNTER
Reason for call: Medication or medication refill    Have you contacted your pharmacy regarding this refill? YES    Name of medication requested: dilaudid    How many days of medication do you have left? FOUR    What pharmacy do you use? [facility]    Preferred method for responding to this message: Telephone Call    Phone number patient can be reached at: Cell number on file:    Telephone Information:   Mobile [phone]       If we cannot reach you directly, may we leave a detailed response at the number you provided? Yes      The patient stated the pharmacy needs a letter stating if is okay to refill early.      Hermila Hernandez on 1/22/2025 at 4:19 PM

## 2025-01-22 NOTE — NURSING NOTE
Patient here for involuntary muscle movements including jerking. He has been taking more Dilaudid and is wondering if that is a side effect. Medication Reconciliation: complete.    Akosua Rivero LPN  1/22/2025 9:04 AM

## 2025-01-22 NOTE — TELEPHONE ENCOUNTER
Dilaudid is written as 8 mg every 4 hours PRN.  Patient states that usually 2-3 days a week and 2-3 times those days, he has to take 2 tabs every 4 hours because his pain is 9-10/10. He currently has #21 tabs remaining, but according to pharmacy, he should have 10 days left. He states he has enough for 4 more days. Patient is requesting his script be changed or increased to reflect when he has to take 2 tabs instead of 1. Will route to Dr. Alejandra to consider.  Ladi Sullivan RN on 1/22/2025 at 5:11 PM

## 2025-01-23 PROBLEM — I71.21 ANEURYSM OF ASCENDING AORTA WITHOUT RUPTURE: Status: RESOLVED | Noted: 2022-10-05 | Resolved: 2025-01-23

## 2025-01-23 PROBLEM — A49.8 INFECTION DUE TO KLEBSIELLA PNEUMONIAE: Status: RESOLVED | Noted: 2023-04-21 | Resolved: 2025-01-23

## 2025-01-23 PROBLEM — Z96.651 STATUS POST TOTAL RIGHT KNEE REPLACEMENT: Status: RESOLVED | Noted: 2021-11-16 | Resolved: 2025-01-23

## 2025-01-23 PROBLEM — Z79.891 LONG TERM PRESCRIPTION OPIATE USE: Status: RESOLVED | Noted: 2024-06-16 | Resolved: 2025-01-23

## 2025-01-23 PROBLEM — A41.9 SEPSIS (H): Status: RESOLVED | Noted: 2023-06-29 | Resolved: 2025-01-23

## 2025-01-23 PROBLEM — E66.09 NON MORBID OBESITY DUE TO EXCESS CALORIES: Status: RESOLVED | Noted: 2017-04-28 | Resolved: 2025-01-23

## 2025-01-23 PROBLEM — Z93.6 S/P ILEAL CONDUIT (H): Status: ACTIVE | Noted: 2024-07-21

## 2025-01-23 PROBLEM — F11.20 OPIOID DEPENDENCE, UNCOMPLICATED (H): Status: ACTIVE | Noted: 2018-01-31

## 2025-01-23 PROBLEM — E87.1 HYPONATREMIA: Status: RESOLVED | Noted: 2023-04-22 | Resolved: 2025-01-23

## 2025-01-23 PROBLEM — R82.90 ABNORMAL URINALYSIS: Status: RESOLVED | Noted: 2024-07-21 | Resolved: 2025-01-23

## 2025-01-23 PROBLEM — C78.7 SECONDARY MALIGNANT NEOPLASM OF LIVER AND INTRAHEPATIC BILE DUCT (H): Status: ACTIVE | Noted: 2025-01-23

## 2025-01-23 LAB
ATRIAL RATE - MUSE: 78 BPM
DIASTOLIC BLOOD PRESSURE - MUSE: NORMAL MMHG
INTERPRETATION ECG - MUSE: NORMAL
P AXIS - MUSE: 65 DEGREES
PR INTERVAL - MUSE: 188 MS
QRS DURATION - MUSE: 74 MS
QT - MUSE: 372 MS
QTC - MUSE: 424 MS
R AXIS - MUSE: -8 DEGREES
SYSTOLIC BLOOD PRESSURE - MUSE: NORMAL MMHG
T AXIS - MUSE: 13 DEGREES
VENTRICULAR RATE- MUSE: 78 BPM

## 2025-01-23 RX ORDER — HYDROMORPHONE HYDROCHLORIDE 8 MG/1
8 TABLET ORAL EVERY 4 HOURS PRN
Qty: 180 TABLET | Refills: 0 | Status: SHIPPED | OUTPATIENT
Start: 2025-01-23

## 2025-01-23 RX ORDER — LEVETIRACETAM 250 MG/1
250 TABLET ORAL 2 TIMES DAILY
Qty: 60 TABLET | Refills: 0 | Status: SHIPPED | OUTPATIENT
Start: 2025-01-23 | End: 2025-02-22

## 2025-02-03 ENCOUNTER — TELEPHONE (OUTPATIENT)
Dept: FAMILY MEDICINE | Facility: OTHER | Age: 70
End: 2025-02-03
Payer: COMMERCIAL

## 2025-02-03 DIAGNOSIS — G89.3 CANCER RELATED PAIN: ICD-10-CM

## 2025-02-03 DIAGNOSIS — R97.21 RISING PSA FOLLOWING TREATMENT FOR MALIGNANT NEOPLASM OF PROSTATE: Primary | ICD-10-CM

## 2025-02-03 DIAGNOSIS — C61 MALIGNANT NEOPLASM OF PROSTATE (H): ICD-10-CM

## 2025-02-03 DIAGNOSIS — C79.51 METASTATIC CANCER TO SPINE (H): ICD-10-CM

## 2025-02-03 DIAGNOSIS — C61 PROSTATE CANCER (H): ICD-10-CM

## 2025-02-04 ENCOUNTER — TELEPHONE (OUTPATIENT)
Dept: FAMILY MEDICINE | Facility: OTHER | Age: 70
End: 2025-02-04
Payer: COMMERCIAL

## 2025-02-04 NOTE — TELEPHONE ENCOUNTER
Nevaeh from [facility] called and stated that patient took his last oral dose of chemotherapy this morning and he does not wish to continue with the oral chemo and stopped this morning. Please call Nveaeh at [phone] with any questions.    Danuta Crandall on 2/4/2025 at 4:07 PM

## 2025-02-04 NOTE — TELEPHONE ENCOUNTER
Spouse picked up a business card from [facility].    Referral pending, spouse informed to call Hospice to discuss insurance questions.    Leona Shell LPN 2/4/2025 10:35 AM    
Spouse would like a call back to discuss hospice options.     Amie Blount on 2/3/2025 at 4:33 PM    
Type 2 diabetes mellitus

## 2025-02-06 ENCOUNTER — MEDICAL CORRESPONDENCE (OUTPATIENT)
Dept: HEALTH INFORMATION MANAGEMENT | Facility: OTHER | Age: 70
End: 2025-02-06
Payer: COMMERCIAL

## 2025-02-06 DIAGNOSIS — R97.21 RISING PSA FOLLOWING TREATMENT FOR MALIGNANT NEOPLASM OF PROSTATE: ICD-10-CM

## 2025-02-06 DIAGNOSIS — C61 MALIGNANT NEOPLASM OF PROSTATE (H): ICD-10-CM

## 2025-02-06 DIAGNOSIS — C79.51 MALIGNANT NEOPLASM METASTATIC TO BONE (H): Primary | ICD-10-CM

## 2025-03-06 DIAGNOSIS — R97.21 RISING PSA FOLLOWING TREATMENT FOR MALIGNANT NEOPLASM OF PROSTATE: ICD-10-CM

## 2025-03-06 DIAGNOSIS — C79.51 MALIGNANT NEOPLASM METASTATIC TO BONE (H): Primary | ICD-10-CM

## 2025-03-06 DIAGNOSIS — C61 MALIGNANT NEOPLASM OF PROSTATE (H): ICD-10-CM

## 2025-03-10 ENCOUNTER — DOCUMENTATION ONLY (OUTPATIENT)
Dept: ONCOLOGY | Facility: OTHER | Age: 70
End: 2025-03-10
Payer: COMMERCIAL

## 2025-03-10 NOTE — PROGRESS NOTES
Thank you for the opportunity to be a part in the care of this patient's oral chemotherapy. The oncology pharmacy will no longer be following this patient for oral chemotherapy. If there are any questions or the plan changes, feel free to contact us.    Shanna Storey PharmD, MPH, BCPS  March 10, 2025

## 2025-05-23 NOTE — TELEPHONE ENCOUNTER
Ativan      Last Written Prescription Date:  7.10.23  Last Fill Quantity: #30,   # refills: 0  Last Office Visit: 7.13.23  Future Office visit:    Next 5 appointments (look out 90 days)      Sep 13, 2023  2:00 PM  Return Visit with Erum Fritz MD  [facility] ([facility] ) [address]  [place] [address]  [phone]             Routing refill request to provider for review/approval because:  Drug not on the [facility], [facility] or [facility] refill protocol or controlled substance     Palliative care encounter Delirium with dementia

## 2025-07-09 NOTE — TELEPHONE ENCOUNTER
Per chart review, prescriptions for [place] were sent to [facility] pharmacy by Dr. Perez on for #150 tablets on 5/5/23 for refills on 5/5, 6/2 and 7/3. Instructions read: take 1 tablet by mouth 5 times daily.    Per notes below:    Name of medication requested: Hydrocodone  And another one from [facility]  That he has not received in 2 months    Are you out of the medication? Out on Sunday he is using the Hydrocodone faster because the other one never came thru from [facility].    Per LOV note (5/5/23):  We discussed chronic opioid use.  In the setting of long-term use for noncancer pain, goal is to limit use.  He is not dealing with metastatic prostate cancer.  I offered a potential increase in hydrocodone since he received oxycodone in March to help with bone metastases.  He is not using oxycodone any longer after receiving palliative radiation.  He feels that current hydrocodone taking 50 mg daily is sufficient.  Provided a 3-month supply. Return in about 3 months (around 8/5/2023).    Oncologist not comfortable prescribing over Dr. Perez or pain contract. Routing to Dr. Perez to advise if Pt needs to be seen, or if increased dose would be considered.    Xiao Donald RN .............. 5/24/2023  11:16 AM     PERRL/conjunctiva clear

## (undated) DEVICE — PACK MAJOR ORTHO SOP15MOFCA

## (undated) DEVICE — ESU GROUND PAD ADULT W/CORD E7507

## (undated) DEVICE — SU VICRYL 2-0 CT-1 36" UND J945H

## (undated) DEVICE — ESU LIGASURE IMPACT OPEN SEALER/DVDR CVD LG JAW LF4418

## (undated) DEVICE — DRSG ABDOMINAL PAD UNSTERILE 5X9" 9190

## (undated) DEVICE — PREP POVIDONE-IODINE 10% SOLUTION 4OZ BOTTLE MDS093944

## (undated) DEVICE — BLADE SAW OSCIL/SAG STRK 25X90X1.27MM 4125-127-090

## (undated) DEVICE — ADH SKIN CLOSURE PREMIERPRO EXOFIN 1.0ML 3470

## (undated) DEVICE — CLIP HORIZON MED BLUE 002200

## (undated) DEVICE — LINEN TOWEL PACK X30 5481

## (undated) DEVICE — BONE CEMENT MIXEVAC III HI VAC KIT  0206-015-000

## (undated) DEVICE — PEN MARKING SKIN W/LABELS 31145918

## (undated) DEVICE — SYSTEM COLD THERAPY POLAR CARE GLACIER 10901

## (undated) DEVICE — PACK GOWN 3/PK DISP XL SBA32GPFCB

## (undated) DEVICE — PITCHER STERILE 1000ML  SSK9004A

## (undated) DEVICE — SYR PISTON URETHRAL 60ML 68000

## (undated) DEVICE — SOL WATER IRRIG 1000ML BOTTLE 2F7114

## (undated) DEVICE — PAD WRAP FOAM HOLDER POSITIONER KNEE DISP LF 2629-00

## (undated) DEVICE — VESSEL LOOPS BLUE SUPERMAXI 011022PBX

## (undated) DEVICE — DRAPE SHEET REV FOLD 3/4 9349

## (undated) DEVICE — CLIP HORIZON LG ORANGE 004200

## (undated) DEVICE — SUTURE BOOTS 051003PBX

## (undated) DEVICE — CATH FOLYSIL 16FR 15ML AA6116

## (undated) DEVICE — IRRISEPT

## (undated) DEVICE — GLOVE BIOGEL PI MICRO INDICATOR UNDERGLOVE SZ 8.0 48980

## (undated) DEVICE — TOURNIQUET SGL BLADDER 34"X4" PURPLE 5921-034-135

## (undated) DEVICE — DRAPE OVERHEAD TABLE

## (undated) DEVICE — BLADE KNIFE SURG 15 371115

## (undated) DEVICE — ESU SUCTION COAG CAUTERY HAND CONTROL 10FR 6" 30-5200

## (undated) DEVICE — SU VICRYL 3-0 SH 8X18" UND J864D

## (undated) DEVICE — ESU PENCIL W/COATED BLADE E2450H

## (undated) DEVICE — SUCTION MANIFOLD NEPTUNE 2 SYS 4 PORT 0702-020-000

## (undated) DEVICE — PREP CHLORAPREP 26ML TINTED ORANGE  260815

## (undated) DEVICE — CLIP HORIZON SM RED WIDE SLOT 001201

## (undated) DEVICE — DRAPE TOWEL 17X27" BLUE LF DISP 28700-004

## (undated) DEVICE — SOL WATER 1500ML

## (undated) DEVICE — SLEEVE COMPRESSION SCD KNEE MED 74022

## (undated) DEVICE — SU SILK 2-0 TIE 12X30" A305H

## (undated) DEVICE — JELLY LUBRICATING SURGILUBE 2OZ TUBE

## (undated) DEVICE — HOOD T4 PROTECTIVE STERI FACE SHIELD 400-800

## (undated) DEVICE — SYR 10ML LL W/O NDL 302995

## (undated) DEVICE — SPONGE KITTNER 30-101

## (undated) DEVICE — PREP POVIDONE-IODINE 7.5% SCRUB 4OZ BOTTLE MDS093945

## (undated) DEVICE — SU MONOCRYL 2-0 SH 27" UND Y417H

## (undated) DEVICE — COVER LIGHT HANDLE LT-F02

## (undated) DEVICE — SU VICRYL 0 UR-6 27" J603H

## (undated) DEVICE — SU VICRYL 4-0 RB-1 27" UND J214H

## (undated) DEVICE — ESU ELEC BLADE 2.75" COATED/INSULATED E1455

## (undated) DEVICE — SU VICRYL 4-0 RB-1 27" J304

## (undated) DEVICE — SU PDS II 3-0 SH 27" Z316H

## (undated) DEVICE — GLOVE PROTEXIS POWDER FREE SMT 7.0  2D72PT70X

## (undated) DEVICE — CAST PADDING 6" WEBRIL II UNSTERILE 4519

## (undated) DEVICE — Device

## (undated) DEVICE — SU PDS II 5-0 RB-1 DA 30" Z320H

## (undated) DEVICE — SU VICRYL 3-0 SH 27" UND J416H

## (undated) DEVICE — PREP CHLORAPREP 26ML TINTED HI-LITE ORANGE 930815

## (undated) DEVICE — BLADE SAW RECIP STRK 70X12.5X0.80MM 0277-096-277

## (undated) DEVICE — SU PDS II 1 CTX 36" Z371T

## (undated) DEVICE — DRAPE POUCH INSTRUMENT 1018

## (undated) DEVICE — BONE WAX 2.5GM W31G

## (undated) DEVICE — DRAPE STERI U 1015

## (undated) DEVICE — TAPE MEASURE PAPER 36" LF NI14-1300

## (undated) DEVICE — DRAPE EXTREMITY W/ARMBOARD 29405

## (undated) DEVICE — SU CHROMIC 4-0 SH 27" G121H

## (undated) DEVICE — STPL SKIN 35W ROTATING HEAD PRW35

## (undated) DEVICE — GLOVE PROTEXIS PI ORTHO PF 7.5 2D73HT75

## (undated) DEVICE — SU SILK 0 TIE 6X30" A306H

## (undated) DEVICE — DRILL PIN HEADLESS TROCAR 00-5901-020-00

## (undated) DEVICE — WIPES FOLEY CARE SURESTEP PROVON DFC100

## (undated) DEVICE — DRSG GAUZE 4X4" TRAY 6939

## (undated) DEVICE — DRSG PRIMAPORE 02X3" 7133

## (undated) DEVICE — LINEN TOWEL PACK X6 WHITE 5487

## (undated) DEVICE — GLOVE BIOGEL PI MICRO SZ 7.0 48570

## (undated) DEVICE — GLOVE BIOGEL PI MICRO SZ 7.5 48575

## (undated) DEVICE — GLOVE BIOGEL PI INDICATOR 8.0 LF 41680

## (undated) DEVICE — BNDG ELASTIC 6" DBL LENGTH UNSTERILE 6611-16

## (undated) DEVICE — DRSG AQUACEL AG EXTRA 4X4.7" 420677

## (undated) DEVICE — STAPLER SKIN 35 WIDE PMW35 6/BX

## (undated) DEVICE — SU SILK 4-0 TIE 12X30" A303H

## (undated) DEVICE — TUBING SUCTION 10'X3/16" N510

## (undated) DEVICE — STRAP STIRRUP W/O SLIP 30187-020

## (undated) DEVICE — DRAIN JACKSON PRATT RESERVOIR 100ML SU130-1305

## (undated) DEVICE — GUIDEWIRE SENSOR DUAL FLEX STR 0.035"X150CM M0066703080

## (undated) DEVICE — SU MONOCRYL 4-0 PS-2 27" UND Y426H

## (undated) DEVICE — SU PROLENE 3-0 SHDA 36" 8522H

## (undated) DEVICE — SU SILK 3-0 TIE 12X30" A304H

## (undated) DEVICE — SU ETHILON 2-0 FS 18" 664H

## (undated) DEVICE — PAD POLAR UNIV KNEE/SHOULDER LF 02320

## (undated) DEVICE — SYR BULB IRRIG DOVER 60 ML LATEX FREE 67000

## (undated) DEVICE — BLADE CLIPPER SGL USE 9680

## (undated) DEVICE — SU VICRYL 1 CTX CR 8X18" J765D

## (undated) DEVICE — BAG URINARY DRAIN 4000ML LF 153509

## (undated) DEVICE — SUCTION TIP YANKAUER W/O VENT K86

## (undated) DEVICE — ESU PENCIL SMOKE EVAC W/ROCKER SWITCH 0703-047-000

## (undated) DEVICE — LINEN GOWN XLG 5407

## (undated) RX ORDER — CEFTRIAXONE 2 G/1
INJECTION, POWDER, FOR SOLUTION INTRAMUSCULAR; INTRAVENOUS
Status: DISPENSED
Start: 2023-06-29

## (undated) RX ORDER — LIDOCAINE HYDROCHLORIDE 10 MG/ML
INJECTION, SOLUTION INFILTRATION; PERINEURAL
Status: DISPENSED
Start: 2018-04-24

## (undated) RX ORDER — DEXAMETHASONE SODIUM PHOSPHATE 10 MG/ML
INJECTION, SOLUTION INTRAMUSCULAR; INTRAVENOUS
Status: DISPENSED
Start: 2024-07-20

## (undated) RX ORDER — LIDOCAINE HYDROCHLORIDE 20 MG/ML
INJECTION, SOLUTION EPIDURAL; INFILTRATION; INTRACAUDAL; PERINEURAL
Status: DISPENSED
Start: 2021-11-16

## (undated) RX ORDER — OXYCODONE HYDROCHLORIDE 10 MG/1
TABLET ORAL
Status: DISPENSED
Start: 2023-11-30

## (undated) RX ORDER — LIDOCAINE HYDROCHLORIDE 10 MG/ML
INJECTION, SOLUTION EPIDURAL; INFILTRATION; INTRACAUDAL; PERINEURAL
Status: DISPENSED
Start: 2018-04-24

## (undated) RX ORDER — FENTANYL CITRATE 50 UG/ML
INJECTION, SOLUTION INTRAMUSCULAR; INTRAVENOUS
Status: DISPENSED
Start: 2021-11-16

## (undated) RX ORDER — ACETAMINOPHEN 325 MG/1
TABLET ORAL
Status: DISPENSED
Start: 2023-11-30

## (undated) RX ORDER — BUPIVACAINE HYDROCHLORIDE 5 MG/ML
INJECTION, SOLUTION EPIDURAL; INTRACAUDAL
Status: DISPENSED
Start: 2018-04-24

## (undated) RX ORDER — ERTAPENEM 1 G/1
INJECTION, POWDER, LYOPHILIZED, FOR SOLUTION INTRAMUSCULAR; INTRAVENOUS
Status: DISPENSED
Start: 2023-11-30

## (undated) RX ORDER — BUPIVACAINE HYDROCHLORIDE 5 MG/ML
INJECTION, SOLUTION EPIDURAL; INTRACAUDAL
Status: DISPENSED
Start: 2021-11-16

## (undated) RX ORDER — LIDOCAINE HYDROCHLORIDE 10 MG/ML
INJECTION, SOLUTION EPIDURAL; INFILTRATION; INTRACAUDAL; PERINEURAL
Status: DISPENSED
Start: 2018-08-15

## (undated) RX ORDER — KETOROLAC TROMETHAMINE 15 MG/ML
INJECTION, SOLUTION INTRAMUSCULAR; INTRAVENOUS
Status: DISPENSED
Start: 2024-07-20

## (undated) RX ORDER — FENTANYL CITRATE 50 UG/ML
INJECTION, SOLUTION INTRAMUSCULAR; INTRAVENOUS
Status: DISPENSED
Start: 2023-11-30

## (undated) RX ORDER — LIDOCAINE HYDROCHLORIDE 20 MG/ML
JELLY TOPICAL
Status: DISPENSED
Start: 2023-07-08

## (undated) RX ORDER — LORAZEPAM 2 MG/ML
INJECTION INTRAMUSCULAR
Status: DISPENSED
Start: 2023-04-19

## (undated) RX ORDER — FENTANYL CITRATE-0.9 % NACL/PF 10 MCG/ML
PLASTIC BAG, INJECTION (ML) INTRAVENOUS
Status: DISPENSED
Start: 2021-11-16

## (undated) RX ORDER — METHOCARBAMOL 500 MG/1
TABLET, FILM COATED ORAL
Status: DISPENSED
Start: 2023-11-30

## (undated) RX ORDER — METRONIDAZOLE 500 MG/100ML
INJECTION, SOLUTION INTRAVENOUS
Status: DISPENSED
Start: 2023-06-29

## (undated) RX ORDER — HEPARIN SODIUM 10000 [USP'U]/100ML
INJECTION, SOLUTION INTRAVENOUS
Status: DISPENSED
Start: 2023-05-01

## (undated) RX ORDER — HEPARIN SODIUM 5000 [USP'U]/.5ML
INJECTION, SOLUTION INTRAVENOUS; SUBCUTANEOUS
Status: DISPENSED
Start: 2023-11-30

## (undated) RX ORDER — ACETAMINOPHEN 500 MG
TABLET ORAL
Status: DISPENSED
Start: 2023-04-19

## (undated) RX ORDER — HYDROMORPHONE HYDROCHLORIDE 1 MG/ML
INJECTION, SOLUTION INTRAMUSCULAR; INTRAVENOUS; SUBCUTANEOUS
Status: DISPENSED
Start: 2023-05-01

## (undated) RX ORDER — LIDOCAINE HYDROCHLORIDE 20 MG/ML
JELLY TOPICAL
Status: DISPENSED
Start: 2023-06-29

## (undated) RX ORDER — CEFAZOLIN SODIUM/WATER 2 G/20 ML
SYRINGE (ML) INTRAVENOUS
Status: DISPENSED
Start: 2023-11-30

## (undated) RX ORDER — PROPOFOL 10 MG/ML
INJECTION, EMULSION INTRAVENOUS
Status: DISPENSED
Start: 2021-11-16

## (undated) RX ORDER — EPHEDRINE SULFATE 50 MG/ML
INJECTION, SOLUTION INTRAVENOUS
Status: DISPENSED
Start: 2021-11-16

## (undated) RX ORDER — CELECOXIB 100 MG/1
CAPSULE ORAL
Status: DISPENSED
Start: 2021-11-16

## (undated) RX ORDER — HYDROMORPHONE HCL IN WATER/PF 6 MG/30 ML
PATIENT CONTROLLED ANALGESIA SYRINGE INTRAVENOUS
Status: DISPENSED
Start: 2023-11-30

## (undated) RX ORDER — DEXMEDETOMIDINE HYDROCHLORIDE 4 UG/ML
INJECTION, SOLUTION INTRAVENOUS
Status: DISPENSED
Start: 2021-11-16

## (undated) RX ORDER — HYDROMORPHONE HYDROCHLORIDE 2 MG/ML
INJECTION, SOLUTION INTRAMUSCULAR; INTRAVENOUS; SUBCUTANEOUS
Status: DISPENSED
Start: 2021-11-16

## (undated) RX ORDER — FENTANYL CITRATE-0.9 % NACL/PF 10 MCG/ML
PLASTIC BAG, INJECTION (ML) INTRAVENOUS
Status: DISPENSED
Start: 2023-11-30

## (undated) RX ORDER — DEXAMETHASONE SODIUM PHOSPHATE 4 MG/ML
INJECTION, SOLUTION INTRA-ARTICULAR; INTRALESIONAL; INTRAMUSCULAR; INTRAVENOUS; SOFT TISSUE
Status: DISPENSED
Start: 2021-11-16

## (undated) RX ORDER — HYDROMORPHONE HYDROCHLORIDE 1 MG/ML
INJECTION, SOLUTION INTRAMUSCULAR; INTRAVENOUS; SUBCUTANEOUS
Status: DISPENSED
Start: 2023-11-30

## (undated) RX ORDER — ACETAMINOPHEN 325 MG/1
TABLET ORAL
Status: DISPENSED
Start: 2023-06-29

## (undated) RX ORDER — KETOROLAC TROMETHAMINE 30 MG/ML
INJECTION, SOLUTION INTRAMUSCULAR; INTRAVENOUS
Status: DISPENSED
Start: 2023-11-30

## (undated) RX ORDER — KETOROLAC TROMETHAMINE 30 MG/ML
INJECTION, SOLUTION INTRAMUSCULAR; INTRAVENOUS
Status: DISPENSED
Start: 2021-11-16

## (undated) RX ORDER — DEXMEDETOMIDINE HYDROCHLORIDE 100 UG/ML
INJECTION, SOLUTION INTRAVENOUS
Status: DISPENSED
Start: 2021-11-16

## (undated) RX ORDER — DEXAMETHASONE SODIUM PHOSPHATE 10 MG/ML
INJECTION, SOLUTION INTRAMUSCULAR; INTRAVENOUS
Status: DISPENSED
Start: 2021-11-16

## (undated) RX ORDER — HYDROMORPHONE HYDROCHLORIDE 1 MG/ML
INJECTION, SOLUTION INTRAMUSCULAR; INTRAVENOUS; SUBCUTANEOUS
Status: DISPENSED
Start: 2021-11-16

## (undated) RX ORDER — TRIAMCINOLONE ACETONIDE 40 MG/ML
INJECTION, SUSPENSION INTRA-ARTICULAR; INTRAMUSCULAR
Status: DISPENSED
Start: 2018-04-24

## (undated) RX ORDER — ACETAMINOPHEN 325 MG/1
TABLET ORAL
Status: DISPENSED
Start: 2021-11-16

## (undated) RX ORDER — DEXAMETHASONE SODIUM PHOSPHATE 10 MG/ML
INJECTION, SOLUTION INTRAMUSCULAR; INTRAVENOUS
Status: DISPENSED
Start: 2018-08-15

## (undated) RX ORDER — CEFAZOLIN SODIUM 2 G/100ML
INJECTION, SOLUTION INTRAVENOUS
Status: DISPENSED
Start: 2021-11-16

## (undated) RX ORDER — ONDANSETRON 2 MG/ML
INJECTION INTRAMUSCULAR; INTRAVENOUS
Status: DISPENSED
Start: 2023-11-30

## (undated) RX ORDER — ONDANSETRON 2 MG/ML
INJECTION INTRAMUSCULAR; INTRAVENOUS
Status: DISPENSED
Start: 2023-05-01

## (undated) RX ORDER — HYDROXYZINE HYDROCHLORIDE 10 MG/1
TABLET, FILM COATED ORAL
Status: DISPENSED
Start: 2024-07-20

## (undated) RX ORDER — SODIUM CHLORIDE, SODIUM LACTATE, POTASSIUM CHLORIDE, CALCIUM CHLORIDE 600; 310; 30; 20 MG/100ML; MG/100ML; MG/100ML; MG/100ML
INJECTION, SOLUTION INTRAVENOUS
Status: DISPENSED
Start: 2023-06-29

## (undated) RX ORDER — LIDOCAINE HYDROCHLORIDE 20 MG/ML
JELLY TOPICAL
Status: DISPENSED
Start: 2023-04-19

## (undated) RX ORDER — TRANEXAMIC ACID 650 MG/1
TABLET ORAL
Status: DISPENSED
Start: 2021-11-16

## (undated) RX ORDER — LIDOCAINE HYDROCHLORIDE 10 MG/ML
INJECTION, SOLUTION INFILTRATION; PERINEURAL
Status: DISPENSED
Start: 2018-08-15

## (undated) RX ORDER — CEFTRIAXONE SODIUM 2 G/50ML
INJECTION, SOLUTION INTRAVENOUS
Status: DISPENSED
Start: 2023-04-19

## (undated) RX ORDER — SODIUM CHLORIDE 9 MG/ML
INJECTION, SOLUTION INTRAVENOUS
Status: DISPENSED
Start: 2023-11-30

## (undated) RX ORDER — ONDANSETRON 2 MG/ML
INJECTION INTRAMUSCULAR; INTRAVENOUS
Status: DISPENSED
Start: 2021-11-16